# Patient Record
Sex: MALE | Race: WHITE | Employment: OTHER | URBAN - METROPOLITAN AREA
[De-identification: names, ages, dates, MRNs, and addresses within clinical notes are randomized per-mention and may not be internally consistent; named-entity substitution may affect disease eponyms.]

---

## 2017-01-10 ENCOUNTER — HOSPITAL ENCOUNTER (OUTPATIENT)
Dept: INFUSION THERAPY | Age: 67
Discharge: HOME | End: 2017-02-08
Attending: INTERNAL MEDICINE | Admitting: INTERNAL MEDICINE

## 2017-01-10 DIAGNOSIS — D80.1 HYPOGAMMAGLOBULINAEMIA, UNSPECIFIED: ICD-10-CM

## 2017-01-10 RX ORDER — DIPHENHYDRAMINE HYDROCHLORIDE 50 MG/ML
25 INJECTION INTRAMUSCULAR; INTRAVENOUS ONCE
Status: CANCELLED
Start: 2017-01-10 | End: 2017-01-10

## 2017-01-10 RX ORDER — DIPHENHYDRAMINE HYDROCHLORIDE 50 MG/ML
50 INJECTION INTRAMUSCULAR; INTRAVENOUS ONCE
Status: CANCELLED | OUTPATIENT
Start: 2017-01-10 | End: 2017-01-10

## 2017-01-10 RX ORDER — 0.9 % SODIUM CHLORIDE 0.9 %
10 VIAL (ML) INJECTION ONCE
Status: CANCELLED | OUTPATIENT
Start: 2017-01-10 | End: 2017-01-10

## 2017-01-10 RX ORDER — SODIUM CHLORIDE 9 MG/ML
INJECTION, SOLUTION INTRAVENOUS CONTINUOUS
Status: CANCELLED | OUTPATIENT
Start: 2017-01-10

## 2017-01-10 RX ORDER — METHYLPREDNISOLONE SODIUM SUCCINATE 40 MG/ML
40 INJECTION, POWDER, LYOPHILIZED, FOR SOLUTION INTRAMUSCULAR; INTRAVENOUS ONCE
Status: CANCELLED
Start: 2017-01-10 | End: 2017-01-10

## 2017-01-10 RX ORDER — METHYLPREDNISOLONE SODIUM SUCCINATE 125 MG/2ML
125 INJECTION, POWDER, LYOPHILIZED, FOR SOLUTION INTRAMUSCULAR; INTRAVENOUS ONCE
Status: CANCELLED | OUTPATIENT
Start: 2017-01-10 | End: 2017-01-10

## 2017-01-10 RX ORDER — 0.9 % SODIUM CHLORIDE 0.9 %
10 VIAL (ML) INJECTION PRN
Status: CANCELLED | OUTPATIENT
Start: 2017-01-10

## 2017-01-10 RX ORDER — HEPARIN SODIUM (PORCINE) LOCK FLUSH IV SOLN 100 UNIT/ML 100 UNIT/ML
500 SOLUTION INTRAVENOUS PRN
Status: CANCELLED | OUTPATIENT
Start: 2017-01-10

## 2017-01-10 RX ORDER — SODIUM CHLORIDE 9 MG/ML
INJECTION, SOLUTION INTRAVENOUS ONCE
Status: CANCELLED | OUTPATIENT
Start: 2017-01-10 | End: 2017-01-10

## 2017-01-10 RX ORDER — METHYLPREDNISOLONE SODIUM SUCCINATE 40 MG/ML
40 INJECTION, POWDER, LYOPHILIZED, FOR SOLUTION INTRAMUSCULAR; INTRAVENOUS ONCE
Status: DISCONTINUED | OUTPATIENT
Start: 2017-01-10 | End: 2017-01-10

## 2017-01-10 RX ORDER — DIPHENHYDRAMINE HYDROCHLORIDE 50 MG/ML
25 INJECTION INTRAMUSCULAR; INTRAVENOUS ONCE
Status: DISCONTINUED | OUTPATIENT
Start: 2017-01-10 | End: 2017-01-10

## 2017-01-10 RX ORDER — ACETAMINOPHEN 325 MG/1
650 TABLET ORAL ONCE
Status: DISCONTINUED | OUTPATIENT
Start: 2017-01-10 | End: 2017-01-10

## 2017-01-10 RX ORDER — ACETAMINOPHEN 325 MG/1
650 TABLET ORAL ONCE
Status: CANCELLED | OUTPATIENT
Start: 2017-01-10 | End: 2017-01-10

## 2017-01-12 ENCOUNTER — OFFICE VISIT (OUTPATIENT)
Dept: SURGERY | Age: 67
End: 2017-01-12

## 2017-01-12 ENCOUNTER — HOSPITAL ENCOUNTER (OUTPATIENT)
Dept: INFUSION THERAPY | Age: 67
Discharge: HOME | End: 2017-01-12
Attending: INTERNAL MEDICINE | Admitting: INTERNAL MEDICINE

## 2017-01-12 VITALS
SYSTOLIC BLOOD PRESSURE: 119 MMHG | HEIGHT: 72 IN | DIASTOLIC BLOOD PRESSURE: 62 MMHG | HEART RATE: 75 BPM | OXYGEN SATURATION: 97 % | RESPIRATION RATE: 18 BRPM | TEMPERATURE: 98.3 F | WEIGHT: 174 LBS | BODY MASS INDEX: 23.57 KG/M2

## 2017-01-12 VITALS
RESPIRATION RATE: 17 BRPM | SYSTOLIC BLOOD PRESSURE: 120 MMHG | BODY MASS INDEX: 22.43 KG/M2 | DIASTOLIC BLOOD PRESSURE: 70 MMHG | HEIGHT: 72 IN | HEART RATE: 84 BPM | WEIGHT: 165.6 LBS

## 2017-01-12 DIAGNOSIS — R19.09 GROIN MASS: ICD-10-CM

## 2017-01-12 DIAGNOSIS — D80.1 HYPOGAMMAGLOBULINAEMIA, UNSPECIFIED: ICD-10-CM

## 2017-01-12 DIAGNOSIS — C91.10 CLL (CHRONIC LYMPHOCYTIC LEUKEMIA) (HCC): Primary | ICD-10-CM

## 2017-01-12 LAB
ALBUMIN SERPL-MCNC: 4.2 GM/DL (ref 3.4–5)
ALP BLD-CCNC: 87 IU/L (ref 40–129)
ALT SERPL-CCNC: 10 U/L (ref 10–40)
ANION GAP SERPL CALCULATED.3IONS-SCNC: 17 MMOL/L (ref 4–16)
AST SERPL-CCNC: 9 IU/L (ref 15–37)
BILIRUB SERPL-MCNC: 0.5 MG/DL (ref 0–1)
BUN BLDV-MCNC: 12 MG/DL (ref 6–23)
CALCIUM SERPL-MCNC: 8.9 MG/DL (ref 8.3–10.6)
CHLORIDE BLD-SCNC: 99 MMOL/L (ref 99–110)
CO2: 23 MMOL/L (ref 21–32)
CREAT SERPL-MCNC: 0.9 MG/DL (ref 0.9–1.3)
DIFFERENTIAL TYPE: ABNORMAL
GFR AFRICAN AMERICAN: >60 ML/MIN/1.73M2
GFR NON-AFRICAN AMERICAN: >60 ML/MIN/1.73M2
GLUCOSE FASTING: 108 MG/DL (ref 70–99)
HCT VFR BLD CALC: 26.1 % (ref 42–52)
HEMOGLOBIN: 8.2 GM/DL (ref 13.5–18)
LYMPHOCYTES ABSOLUTE: 52.7 K/CU MM
LYMPHOCYTES RELATIVE PERCENT: 93 % (ref 24–44)
MACROCYTES: ABNORMAL
MCH RBC QN AUTO: 36.1 PG (ref 27–31)
MCHC RBC AUTO-ENTMCNC: 31.4 % (ref 32–36)
MCV RBC AUTO: 115 FL (ref 78–100)
MONOCYTES ABSOLUTE: 1.1 K/CU MM
MONOCYTES RELATIVE PERCENT: 2 % (ref 0–4)
PDW BLD-RTO: 18.8 % (ref 11.7–14.9)
PLATELET # BLD: 111 K/CU MM (ref 140–440)
PLT MORPHOLOGY: ABNORMAL
PMV BLD AUTO: 12.2 FL (ref 7.5–11.1)
POLYCHROMASIA: ABNORMAL
POTASSIUM SERPL-SCNC: 4.2 MMOL/L (ref 3.5–5.1)
RBC # BLD: 2.27 M/CU MM (ref 4.6–6.2)
SEGMENTED NEUTROPHILS ABSOLUTE COUNT: 2.8 K/CU MM
SEGMENTED NEUTROPHILS RELATIVE PERCENT: 5 % (ref 36–66)
SODIUM BLD-SCNC: 139 MMOL/L (ref 135–145)
TEAR DROP CELLS: ABNORMAL
TOTAL PROTEIN: 6.3 GM/DL (ref 6.4–8.2)
TOXIC GRANULATION: PRESENT
WBC # BLD: 56.6 K/CU MM (ref 4–10.5)

## 2017-01-12 PROCEDURE — 99203 OFFICE O/P NEW LOW 30 MIN: CPT | Performed by: SURGERY

## 2017-01-12 RX ORDER — 0.9 % SODIUM CHLORIDE 0.9 %
10 VIAL (ML) INJECTION PRN
Status: DISCONTINUED | OUTPATIENT
Start: 2017-01-12 | End: 2017-01-13 | Stop reason: HOSPADM

## 2017-01-12 RX ORDER — SODIUM CHLORIDE 9 MG/ML
INJECTION, SOLUTION INTRAVENOUS ONCE
Status: CANCELLED | OUTPATIENT
Start: 2017-01-12 | End: 2017-01-12

## 2017-01-12 RX ORDER — DIPHENHYDRAMINE HCL 25 MG
TABLET ORAL
Status: COMPLETED
Start: 2017-01-12 | End: 2017-01-12

## 2017-01-12 RX ORDER — HEPARIN SODIUM (PORCINE) LOCK FLUSH IV SOLN 100 UNIT/ML 100 UNIT/ML
500 SOLUTION INTRAVENOUS PRN
Status: CANCELLED | OUTPATIENT
Start: 2017-01-12

## 2017-01-12 RX ORDER — SODIUM CHLORIDE 9 MG/ML
INJECTION, SOLUTION INTRAVENOUS CONTINUOUS
Status: CANCELLED | OUTPATIENT
Start: 2017-01-12

## 2017-01-12 RX ORDER — DIPHENHYDRAMINE HCL 25 MG
25 TABLET ORAL ONCE
Status: COMPLETED | OUTPATIENT
Start: 2017-01-12 | End: 2017-01-12

## 2017-01-12 RX ORDER — DIPHENHYDRAMINE HYDROCHLORIDE 50 MG/ML
50 INJECTION INTRAMUSCULAR; INTRAVENOUS ONCE
Status: CANCELLED | OUTPATIENT
Start: 2017-01-12 | End: 2017-01-12

## 2017-01-12 RX ORDER — ACETAMINOPHEN 325 MG/1
650 TABLET ORAL ONCE
Status: CANCELLED | OUTPATIENT
Start: 2017-01-12 | End: 2017-01-12

## 2017-01-12 RX ORDER — METHYLPREDNISOLONE SODIUM SUCCINATE 40 MG/ML
40 INJECTION, POWDER, LYOPHILIZED, FOR SOLUTION INTRAMUSCULAR; INTRAVENOUS ONCE
Status: CANCELLED
Start: 2017-01-12 | End: 2017-01-12

## 2017-01-12 RX ORDER — METHYLPREDNISOLONE SODIUM SUCCINATE 40 MG/ML
40 INJECTION, POWDER, LYOPHILIZED, FOR SOLUTION INTRAMUSCULAR; INTRAVENOUS ONCE
Status: COMPLETED | OUTPATIENT
Start: 2017-01-12 | End: 2017-01-12

## 2017-01-12 RX ORDER — ACETAMINOPHEN 325 MG/1
650 TABLET ORAL ONCE
Status: COMPLETED | OUTPATIENT
Start: 2017-01-12 | End: 2017-01-12

## 2017-01-12 RX ORDER — IBRUTINIB 140 MG/1
140 TABLET, FILM COATED ORAL DAILY
COMMUNITY
Start: 2016-12-23 | End: 2020-08-03 | Stop reason: SDUPTHER

## 2017-01-12 RX ORDER — HEPARIN SODIUM (PORCINE) LOCK FLUSH IV SOLN 100 UNIT/ML 100 UNIT/ML
500 SOLUTION INTRAVENOUS PRN
Status: DISCONTINUED | OUTPATIENT
Start: 2017-01-12 | End: 2017-01-13 | Stop reason: HOSPADM

## 2017-01-12 RX ORDER — DIPHENHYDRAMINE HYDROCHLORIDE 50 MG/ML
25 INJECTION INTRAMUSCULAR; INTRAVENOUS ONCE
Status: DISCONTINUED | OUTPATIENT
Start: 2017-01-12 | End: 2017-01-12

## 2017-01-12 RX ORDER — DIPHENHYDRAMINE HYDROCHLORIDE 50 MG/ML
25 INJECTION INTRAMUSCULAR; INTRAVENOUS ONCE
Status: CANCELLED
Start: 2017-01-12 | End: 2017-01-12

## 2017-01-12 RX ORDER — METHYLPREDNISOLONE SODIUM SUCCINATE 125 MG/2ML
125 INJECTION, POWDER, LYOPHILIZED, FOR SOLUTION INTRAMUSCULAR; INTRAVENOUS ONCE
Status: CANCELLED | OUTPATIENT
Start: 2017-01-12 | End: 2017-01-12

## 2017-01-12 RX ORDER — 0.9 % SODIUM CHLORIDE 0.9 %
10 VIAL (ML) INJECTION ONCE
Status: CANCELLED | OUTPATIENT
Start: 2017-01-12 | End: 2017-01-12

## 2017-01-12 RX ORDER — 0.9 % SODIUM CHLORIDE 0.9 %
10 VIAL (ML) INJECTION PRN
Status: CANCELLED | OUTPATIENT
Start: 2017-01-12

## 2017-01-12 RX ADMIN — Medication 25 MG: at 08:23

## 2017-01-12 RX ADMIN — HEPARIN SODIUM (PORCINE) LOCK FLUSH IV SOLN 100 UNIT/ML 500 UNITS: 100 SOLUTION at 12:21

## 2017-01-12 RX ADMIN — Medication 10 ML: at 12:17

## 2017-01-12 RX ADMIN — ACETAMINOPHEN 650 MG: 325 TABLET ORAL at 08:21

## 2017-01-12 RX ADMIN — Medication 10 ML: at 08:10

## 2017-01-12 RX ADMIN — METHYLPREDNISOLONE SODIUM SUCCINATE 40 MG: 40 INJECTION, POWDER, LYOPHILIZED, FOR SOLUTION INTRAMUSCULAR; INTRAVENOUS at 08:25

## 2017-01-12 ASSESSMENT — ENCOUNTER SYMPTOMS
HEARTBURN: 0
HEMOPTYSIS: 0
NAUSEA: 0
COUGH: 1

## 2017-01-16 ENCOUNTER — HOSPITAL ENCOUNTER (OUTPATIENT)
Dept: PREADMISSION TESTING | Age: 67
Discharge: HOME | End: 2017-01-16
Attending: SURGERY | Admitting: SURGERY

## 2017-01-16 VITALS
WEIGHT: 164 LBS | TEMPERATURE: 98 F | HEART RATE: 83 BPM | OXYGEN SATURATION: 97 % | BODY MASS INDEX: 22.21 KG/M2 | DIASTOLIC BLOOD PRESSURE: 75 MMHG | SYSTOLIC BLOOD PRESSURE: 137 MMHG | HEIGHT: 72 IN | RESPIRATION RATE: 18 BRPM

## 2017-01-16 LAB
HCT VFR BLD CALC: 28.5 % (ref 42–52)
HEMOGLOBIN: 8.7 GM/DL (ref 13.5–18)
MCH RBC QN AUTO: 35.2 PG (ref 27–31)
MCHC RBC AUTO-ENTMCNC: 30.5 % (ref 32–36)
MCV RBC AUTO: 115.4 FL (ref 78–100)
PDW BLD-RTO: 19.4 % (ref 11.7–14.9)
PLATELET # BLD: 137 K/CU MM (ref 140–440)
PMV BLD AUTO: 11.8 FL (ref 7.5–11.1)
RBC # BLD: 2.47 M/CU MM (ref 4.6–6.2)
WBC # BLD: 82 K/CU MM (ref 4–10.5)

## 2017-01-16 RX ORDER — OMEPRAZOLE 20 MG/1
20 CAPSULE, DELAYED RELEASE ORAL DAILY PRN
COMMUNITY

## 2017-01-16 ASSESSMENT — PAIN DESCRIPTION - LOCATION: LOCATION: GROIN

## 2017-01-16 ASSESSMENT — PAIN DESCRIPTION - ORIENTATION: ORIENTATION: LEFT

## 2017-01-16 ASSESSMENT — PAIN SCALES - GENERAL: PAINLEVEL_OUTOF10: 3

## 2017-01-24 ENCOUNTER — HOSPITAL ENCOUNTER (OUTPATIENT)
Dept: OTHER | Age: 67
Discharge: HOME | End: 2017-01-24
Attending: INTERNAL MEDICINE | Admitting: INTERNAL MEDICINE

## 2017-01-24 LAB
ALBUMIN SERPL-MCNC: 4.4 GM/DL (ref 3.4–5)
ALP BLD-CCNC: 88 IU/L (ref 40–129)
ALT SERPL-CCNC: 9 U/L (ref 10–40)
ANION GAP SERPL CALCULATED.3IONS-SCNC: 17 MMOL/L (ref 4–16)
AST SERPL-CCNC: 6 IU/L (ref 15–37)
BILIRUB SERPL-MCNC: 0.5 MG/DL (ref 0–1)
BUN BLDV-MCNC: 10 MG/DL (ref 6–23)
CALCIUM SERPL-MCNC: 9.1 MG/DL (ref 8.3–10.6)
CHLORIDE BLD-SCNC: 97 MMOL/L (ref 99–110)
CO2: 24 MMOL/L (ref 21–32)
CREAT SERPL-MCNC: 0.9 MG/DL (ref 0.9–1.3)
ERYTHROCYTE SEDIMENTATION RATE: 72 MM/HR (ref 0–20)
FERRITIN: 193 NG/ML (ref 30–400)
FOLATE: 4.5 NG/ML (ref 3.1–17.5)
GFR AFRICAN AMERICAN: >60 ML/MIN/1.73M2
GFR NON-AFRICAN AMERICAN: >60 ML/MIN/1.73M2
GLUCOSE FASTING: 92 MG/DL (ref 70–99)
IRON: 65 UG/DL (ref 59–158)
LACTATE DEHYDROGENASE: 118 IU/L (ref 120–246)
PCT TRANSFERRIN: 24 % (ref 10–44)
POTASSIUM SERPL-SCNC: 3.6 MMOL/L (ref 3.5–5.1)
SODIUM BLD-SCNC: 138 MMOL/L (ref 135–145)
TOTAL IRON BINDING CAPACITY: 276 UG/DL (ref 250–450)
TOTAL PROTEIN: 6.2 GM/DL (ref 6.4–8.2)
UNSATURATED IRON BINDING CAPACITY: 211 UG/DL (ref 110–370)
URIC ACID: 5.4 MG/DL (ref 3.5–7.2)
VITAMIN B-12: 313.8 PG/ML (ref 211–911)

## 2017-01-25 LAB
ALBUMIN ELP: 3.5 GM/DL (ref 3.2–5.6)
ALPHA-1-GLOBULIN: 0.2 GM/DL (ref 0.1–0.4)
ALPHA-2-GLOBULIN: 1 GM/DL (ref 0.4–1.2)
BETA GLOBULIN: 0.9 GM/DL (ref 0.5–1.3)
GAMMA GLOBULIN: 0.6 GM/DL (ref 0.5–1.6)
PATHOLOGIST: ABNORMAL
TOTAL PROTEIN: 6.2 GM/DL (ref 6.4–8.2)

## 2017-01-26 ENCOUNTER — OFFICE VISIT (OUTPATIENT)
Dept: SURGERY | Age: 67
End: 2017-01-26

## 2017-01-26 VITALS
HEIGHT: 72 IN | BODY MASS INDEX: 22.21 KG/M2 | DIASTOLIC BLOOD PRESSURE: 60 MMHG | HEART RATE: 88 BPM | RESPIRATION RATE: 16 BRPM | WEIGHT: 164 LBS | SYSTOLIC BLOOD PRESSURE: 118 MMHG

## 2017-01-26 DIAGNOSIS — R19.09 GROIN MASS: Primary | ICD-10-CM

## 2017-01-26 DIAGNOSIS — Z09 POSTOP CHECK: ICD-10-CM

## 2017-01-26 PROCEDURE — 99024 POSTOP FOLLOW-UP VISIT: CPT | Performed by: SURGERY

## 2017-02-03 ENCOUNTER — HOSPITAL ENCOUNTER (OUTPATIENT)
Dept: OTHER | Age: 67
Discharge: HOME | End: 2017-02-03
Attending: INTERNAL MEDICINE | Admitting: INTERNAL MEDICINE

## 2017-02-03 LAB
ALBUMIN SERPL-MCNC: 4.3 GM/DL (ref 3.4–5)
ALP BLD-CCNC: 92 IU/L (ref 40–129)
ALT SERPL-CCNC: 8 U/L (ref 10–40)
ANION GAP SERPL CALCULATED.3IONS-SCNC: 16 MMOL/L (ref 4–16)
AST SERPL-CCNC: 7 IU/L (ref 15–37)
BILIRUB SERPL-MCNC: 0.6 MG/DL (ref 0–1)
BUN BLDV-MCNC: 10 MG/DL (ref 6–23)
CALCIUM SERPL-MCNC: 9.3 MG/DL (ref 8.3–10.6)
CHLORIDE BLD-SCNC: 97 MMOL/L (ref 99–110)
CO2: 24 MMOL/L (ref 21–32)
CREAT SERPL-MCNC: 0.8 MG/DL (ref 0.9–1.3)
GFR AFRICAN AMERICAN: >60 ML/MIN/1.73M2
GFR NON-AFRICAN AMERICAN: >60 ML/MIN/1.73M2
GLUCOSE FASTING: 242 MG/DL (ref 70–99)
LACTATE DEHYDROGENASE: 132 IU/L (ref 120–246)
POTASSIUM SERPL-SCNC: 4 MMOL/L (ref 3.5–5.1)
SODIUM BLD-SCNC: 137 MMOL/L (ref 135–145)
TOTAL PROTEIN: 6.3 GM/DL (ref 6.4–8.2)

## 2017-02-06 ENCOUNTER — HOSPITAL ENCOUNTER (OUTPATIENT)
Dept: INFUSION THERAPY | Age: 67
Discharge: HOME | End: 2017-02-06
Attending: INTERNAL MEDICINE | Admitting: INTERNAL MEDICINE

## 2017-02-06 VITALS
TEMPERATURE: 98.7 F | HEART RATE: 75 BPM | SYSTOLIC BLOOD PRESSURE: 133 MMHG | DIASTOLIC BLOOD PRESSURE: 75 MMHG | RESPIRATION RATE: 16 BRPM

## 2017-02-06 DIAGNOSIS — D80.1 HYPOGAMMAGLOBULINAEMIA, UNSPECIFIED: ICD-10-CM

## 2017-02-06 RX ORDER — METHYLPREDNISOLONE SODIUM SUCCINATE 40 MG/ML
40 INJECTION, POWDER, LYOPHILIZED, FOR SOLUTION INTRAMUSCULAR; INTRAVENOUS ONCE
Status: COMPLETED | OUTPATIENT
Start: 2017-02-06 | End: 2017-02-06

## 2017-02-06 RX ORDER — SODIUM CHLORIDE 9 MG/ML
INJECTION, SOLUTION INTRAVENOUS ONCE
Status: CANCELLED | OUTPATIENT
Start: 2017-02-06 | End: 2017-02-06

## 2017-02-06 RX ORDER — HEPARIN SODIUM (PORCINE) LOCK FLUSH IV SOLN 100 UNIT/ML 100 UNIT/ML
500 SOLUTION INTRAVENOUS PRN
Status: DISCONTINUED | OUTPATIENT
Start: 2017-02-06 | End: 2017-02-07 | Stop reason: HOSPADM

## 2017-02-06 RX ORDER — 0.9 % SODIUM CHLORIDE 0.9 %
10 VIAL (ML) INJECTION PRN
Status: CANCELLED | OUTPATIENT
Start: 2017-02-06

## 2017-02-06 RX ORDER — METHYLPREDNISOLONE SODIUM SUCCINATE 125 MG/2ML
125 INJECTION, POWDER, LYOPHILIZED, FOR SOLUTION INTRAMUSCULAR; INTRAVENOUS ONCE
Status: CANCELLED | OUTPATIENT
Start: 2017-02-06 | End: 2017-02-06

## 2017-02-06 RX ORDER — ACETAMINOPHEN 325 MG/1
650 TABLET ORAL ONCE
Status: CANCELLED | OUTPATIENT
Start: 2017-02-06 | End: 2017-02-06

## 2017-02-06 RX ORDER — 0.9 % SODIUM CHLORIDE 0.9 %
10 VIAL (ML) INJECTION ONCE
Status: CANCELLED | OUTPATIENT
Start: 2017-02-06 | End: 2017-02-06

## 2017-02-06 RX ORDER — DIPHENHYDRAMINE HYDROCHLORIDE 50 MG/ML
25 INJECTION INTRAMUSCULAR; INTRAVENOUS ONCE
Status: COMPLETED | OUTPATIENT
Start: 2017-02-06 | End: 2017-02-06

## 2017-02-06 RX ORDER — ACETAMINOPHEN 325 MG/1
650 TABLET ORAL ONCE
Status: COMPLETED | OUTPATIENT
Start: 2017-02-06 | End: 2017-02-06

## 2017-02-06 RX ORDER — METHYLPREDNISOLONE SODIUM SUCCINATE 40 MG/ML
40 INJECTION, POWDER, LYOPHILIZED, FOR SOLUTION INTRAMUSCULAR; INTRAVENOUS ONCE
Status: CANCELLED
Start: 2017-02-06 | End: 2017-02-06

## 2017-02-06 RX ORDER — DIPHENHYDRAMINE HYDROCHLORIDE 50 MG/ML
25 INJECTION INTRAMUSCULAR; INTRAVENOUS ONCE
Status: CANCELLED
Start: 2017-02-06 | End: 2017-02-06

## 2017-02-06 RX ORDER — 0.9 % SODIUM CHLORIDE 0.9 %
10 VIAL (ML) INJECTION PRN
Status: DISCONTINUED | OUTPATIENT
Start: 2017-02-06 | End: 2017-02-07 | Stop reason: HOSPADM

## 2017-02-06 RX ORDER — SODIUM CHLORIDE 9 MG/ML
INJECTION, SOLUTION INTRAVENOUS CONTINUOUS
Status: CANCELLED | OUTPATIENT
Start: 2017-02-06

## 2017-02-06 RX ORDER — HEPARIN SODIUM (PORCINE) LOCK FLUSH IV SOLN 100 UNIT/ML 100 UNIT/ML
500 SOLUTION INTRAVENOUS PRN
Status: CANCELLED | OUTPATIENT
Start: 2017-02-06

## 2017-02-06 RX ORDER — DIPHENHYDRAMINE HYDROCHLORIDE 50 MG/ML
50 INJECTION INTRAMUSCULAR; INTRAVENOUS ONCE
Status: CANCELLED | OUTPATIENT
Start: 2017-02-06 | End: 2017-02-06

## 2017-02-06 RX ADMIN — Medication 10 ML: at 09:00

## 2017-02-06 RX ADMIN — METHYLPREDNISOLONE SODIUM SUCCINATE 40 MG: 40 INJECTION, POWDER, LYOPHILIZED, FOR SOLUTION INTRAMUSCULAR; INTRAVENOUS at 09:07

## 2017-02-06 RX ADMIN — ACETAMINOPHEN 650 MG: 325 TABLET ORAL at 09:07

## 2017-02-06 RX ADMIN — Medication 10 ML: at 08:05

## 2017-02-06 RX ADMIN — DIPHENHYDRAMINE HYDROCHLORIDE 25 MG: 50 INJECTION INTRAMUSCULAR; INTRAVENOUS at 09:07

## 2017-02-06 ASSESSMENT — PAIN SCALES - GENERAL: PAINLEVEL_OUTOF10: 0

## 2017-02-17 ENCOUNTER — HOSPITAL ENCOUNTER (OUTPATIENT)
Dept: OTHER | Age: 67
Discharge: HOME | End: 2017-02-17
Attending: INTERNAL MEDICINE | Admitting: INTERNAL MEDICINE

## 2017-02-17 LAB
ALBUMIN SERPL-MCNC: 4.6 GM/DL (ref 3.4–5)
ALP BLD-CCNC: 92 IU/L (ref 40–128)
ALT SERPL-CCNC: 7 U/L (ref 10–40)
ANION GAP SERPL CALCULATED.3IONS-SCNC: 16 MMOL/L (ref 4–16)
AST SERPL-CCNC: 7 IU/L (ref 15–37)
BILIRUB SERPL-MCNC: 0.9 MG/DL (ref 0–1)
BUN BLDV-MCNC: 17 MG/DL (ref 6–23)
CALCIUM SERPL-MCNC: 9.3 MG/DL (ref 8.3–10.6)
CHLORIDE BLD-SCNC: 94 MMOL/L (ref 99–110)
CO2: 22 MMOL/L (ref 21–32)
CREAT SERPL-MCNC: 0.8 MG/DL (ref 0.9–1.3)
GFR AFRICAN AMERICAN: >60 ML/MIN/1.73M2
GFR NON-AFRICAN AMERICAN: >60 ML/MIN/1.73M2
GLUCOSE FASTING: 476 MG/DL (ref 70–99)
IGA: <10 MG/DL (ref 69–382)
IGG,SERUM: 603 MG/DL (ref 723–1685)
IGM,SERUM: <4 MG/DL (ref 62–277)
LACTATE DEHYDROGENASE: 136 IU/L (ref 120–246)
POTASSIUM SERPL-SCNC: 4.4 MMOL/L (ref 3.5–5.1)
SODIUM BLD-SCNC: 132 MMOL/L (ref 135–145)
TOTAL PROTEIN: 6.6 GM/DL (ref 6.4–8.2)
URIC ACID: 4.2 MG/DL (ref 3.5–7.2)

## 2017-02-21 LAB
ALBUMIN ELP: 3.7 GM/DL (ref 3.2–5.6)
ALPHA-1-GLOBULIN: 0.3 GM/DL (ref 0.1–0.4)
ALPHA-2-GLOBULIN: 1 GM/DL (ref 0.4–1.2)
BETA GLOBULIN: 0.9 GM/DL (ref 0.5–1.3)
GAMMA GLOBULIN: 0.7 GM/DL (ref 0.5–1.6)
TOTAL PROTEIN: 6.6 GM/DL (ref 6.4–8.2)

## 2017-03-06 ENCOUNTER — HOSPITAL ENCOUNTER (OUTPATIENT)
Dept: INFUSION THERAPY | Age: 67
Discharge: HOME | End: 2017-04-04
Attending: INTERNAL MEDICINE | Admitting: INTERNAL MEDICINE

## 2017-03-06 DIAGNOSIS — D80.1 HYPOGAMMAGLOBULINAEMIA, UNSPECIFIED: ICD-10-CM

## 2017-03-06 RX ORDER — DIPHENHYDRAMINE HYDROCHLORIDE 50 MG/ML
25 INJECTION INTRAMUSCULAR; INTRAVENOUS ONCE
Status: CANCELLED
Start: 2017-03-06 | End: 2017-03-06

## 2017-03-06 RX ORDER — HEPARIN SODIUM (PORCINE) LOCK FLUSH IV SOLN 100 UNIT/ML 100 UNIT/ML
500 SOLUTION INTRAVENOUS PRN
Status: CANCELLED | OUTPATIENT
Start: 2017-03-06

## 2017-03-06 RX ORDER — ACETAMINOPHEN 325 MG/1
650 TABLET ORAL ONCE
Status: CANCELLED | OUTPATIENT
Start: 2017-03-06 | End: 2017-03-06

## 2017-03-06 RX ORDER — ACETAMINOPHEN 325 MG/1
650 TABLET ORAL ONCE
Status: DISCONTINUED | OUTPATIENT
Start: 2017-03-06 | End: 2017-03-06

## 2017-03-06 RX ORDER — SODIUM CHLORIDE 9 MG/ML
INJECTION, SOLUTION INTRAVENOUS CONTINUOUS
Status: CANCELLED | OUTPATIENT
Start: 2017-03-06

## 2017-03-06 RX ORDER — METHYLPREDNISOLONE SODIUM SUCCINATE 40 MG/ML
40 INJECTION, POWDER, LYOPHILIZED, FOR SOLUTION INTRAMUSCULAR; INTRAVENOUS ONCE
Status: CANCELLED
Start: 2017-03-06 | End: 2017-03-06

## 2017-03-06 RX ORDER — DIPHENHYDRAMINE HYDROCHLORIDE 50 MG/ML
25 INJECTION INTRAMUSCULAR; INTRAVENOUS ONCE
Status: DISCONTINUED | OUTPATIENT
Start: 2017-03-06 | End: 2017-03-06

## 2017-03-06 RX ORDER — HEPARIN SODIUM (PORCINE) LOCK FLUSH IV SOLN 100 UNIT/ML 100 UNIT/ML
500 SOLUTION INTRAVENOUS PRN
Status: DISCONTINUED | OUTPATIENT
Start: 2017-03-06 | End: 2017-03-06

## 2017-03-06 RX ORDER — METHYLPREDNISOLONE SODIUM SUCCINATE 40 MG/ML
40 INJECTION, POWDER, LYOPHILIZED, FOR SOLUTION INTRAMUSCULAR; INTRAVENOUS ONCE
Status: DISCONTINUED | OUTPATIENT
Start: 2017-03-06 | End: 2017-03-06

## 2017-03-06 RX ORDER — SODIUM CHLORIDE 9 MG/ML
INJECTION, SOLUTION INTRAVENOUS ONCE
Status: CANCELLED | OUTPATIENT
Start: 2017-03-06 | End: 2017-03-06

## 2017-03-06 RX ORDER — 0.9 % SODIUM CHLORIDE 0.9 %
10 VIAL (ML) INJECTION PRN
Status: CANCELLED | OUTPATIENT
Start: 2017-03-06

## 2017-03-06 RX ORDER — 0.9 % SODIUM CHLORIDE 0.9 %
10 VIAL (ML) INJECTION ONCE
Status: CANCELLED | OUTPATIENT
Start: 2017-03-06 | End: 2017-03-06

## 2017-03-06 RX ORDER — METHYLPREDNISOLONE SODIUM SUCCINATE 125 MG/2ML
125 INJECTION, POWDER, LYOPHILIZED, FOR SOLUTION INTRAMUSCULAR; INTRAVENOUS ONCE
Status: CANCELLED | OUTPATIENT
Start: 2017-03-06 | End: 2017-03-06

## 2017-03-06 RX ORDER — DIPHENHYDRAMINE HYDROCHLORIDE 50 MG/ML
50 INJECTION INTRAMUSCULAR; INTRAVENOUS ONCE
Status: CANCELLED | OUTPATIENT
Start: 2017-03-06 | End: 2017-03-06

## 2017-03-06 RX ORDER — 0.9 % SODIUM CHLORIDE 0.9 %
10 VIAL (ML) INJECTION PRN
Status: DISCONTINUED | OUTPATIENT
Start: 2017-03-06 | End: 2017-03-06

## 2017-03-07 ENCOUNTER — HOSPITAL ENCOUNTER (OUTPATIENT)
Dept: INFUSION THERAPY | Age: 67
Discharge: HOME | End: 2017-03-07
Attending: INTERNAL MEDICINE | Admitting: INTERNAL MEDICINE

## 2017-03-07 VITALS
BODY MASS INDEX: 21.94 KG/M2 | TEMPERATURE: 99.3 F | DIASTOLIC BLOOD PRESSURE: 71 MMHG | SYSTOLIC BLOOD PRESSURE: 119 MMHG | HEIGHT: 72 IN | HEART RATE: 76 BPM | RESPIRATION RATE: 16 BRPM | OXYGEN SATURATION: 99 % | WEIGHT: 162 LBS

## 2017-03-07 DIAGNOSIS — D80.1 HYPOGAMMAGLOBULINAEMIA, UNSPECIFIED: ICD-10-CM

## 2017-03-07 LAB
ALBUMIN SERPL-MCNC: 4.2 GM/DL (ref 3.4–5)
ALP BLD-CCNC: 102 IU/L (ref 40–129)
ALT SERPL-CCNC: 9 U/L (ref 10–40)
ANION GAP SERPL CALCULATED.3IONS-SCNC: 12 MMOL/L (ref 4–16)
ANISOCYTOSIS: ABNORMAL
AST SERPL-CCNC: 8 IU/L (ref 15–37)
ATYPICAL LYMPHOCYTE ABSOLUTE COUNT: ABNORMAL
BILIRUB SERPL-MCNC: 0.6 MG/DL (ref 0–1)
BUN BLDV-MCNC: 14 MG/DL (ref 6–23)
CALCIUM SERPL-MCNC: 9.6 MG/DL (ref 8.3–10.6)
CHLORIDE BLD-SCNC: 99 MMOL/L (ref 99–110)
CO2: 27 MMOL/L (ref 21–32)
CREAT SERPL-MCNC: 0.7 MG/DL (ref 0.9–1.3)
DIFFERENTIAL TYPE: ABNORMAL
GFR AFRICAN AMERICAN: >60 ML/MIN/1.73M2
GFR NON-AFRICAN AMERICAN: >60 ML/MIN/1.73M2
GLUCOSE BLD-MCNC: 193 MG/DL (ref 70–140)
HCT VFR BLD CALC: 31.6 % (ref 42–52)
HEMOGLOBIN: 10 GM/DL (ref 13.5–18)
LYMPHOCYTES ABSOLUTE: 61.5 K/CU MM
LYMPHOCYTES RELATIVE PERCENT: 97 % (ref 24–44)
MACROCYTES: ABNORMAL
MCH RBC QN AUTO: 36 PG (ref 27–31)
MCHC RBC AUTO-ENTMCNC: 31.6 % (ref 32–36)
MCV RBC AUTO: 113.7 FL (ref 78–100)
PDW BLD-RTO: 17.2 % (ref 11.7–14.9)
PLATELET # BLD: 112 K/CU MM (ref 140–440)
PMV BLD AUTO: 12 FL (ref 7.5–11.1)
POLYCHROMASIA: ABNORMAL
POTASSIUM SERPL-SCNC: 4.2 MMOL/L (ref 3.5–5.1)
RBC # BLD: 2.78 M/CU MM (ref 4.6–6.2)
SEGMENTED NEUTROPHILS ABSOLUTE COUNT: 1.9 K/CU MM
SEGMENTED NEUTROPHILS RELATIVE PERCENT: 3 % (ref 36–66)
SMUDGE CELLS: PRESENT
SODIUM BLD-SCNC: 138 MMOL/L (ref 135–145)
TEAR DROP CELLS: ABNORMAL
TOTAL PROTEIN: 6.6 GM/DL (ref 6.4–8.2)
WBC # BLD: 63.4 K/CU MM (ref 4–10.5)

## 2017-03-07 RX ORDER — 0.9 % SODIUM CHLORIDE 0.9 %
10 VIAL (ML) INJECTION ONCE
Status: CANCELLED | OUTPATIENT
Start: 2017-03-07 | End: 2017-03-07

## 2017-03-07 RX ORDER — DIPHENHYDRAMINE HYDROCHLORIDE 50 MG/ML
25 INJECTION INTRAMUSCULAR; INTRAVENOUS ONCE
Status: COMPLETED | OUTPATIENT
Start: 2017-03-07 | End: 2017-03-07

## 2017-03-07 RX ORDER — METHYLPREDNISOLONE SODIUM SUCCINATE 40 MG/ML
40 INJECTION, POWDER, LYOPHILIZED, FOR SOLUTION INTRAMUSCULAR; INTRAVENOUS ONCE
Status: COMPLETED | OUTPATIENT
Start: 2017-03-07 | End: 2017-03-07

## 2017-03-07 RX ORDER — METHYLPREDNISOLONE SODIUM SUCCINATE 40 MG/ML
40 INJECTION, POWDER, LYOPHILIZED, FOR SOLUTION INTRAMUSCULAR; INTRAVENOUS ONCE
Status: CANCELLED
Start: 2017-03-07 | End: 2017-03-07

## 2017-03-07 RX ORDER — 0.9 % SODIUM CHLORIDE 0.9 %
10 VIAL (ML) INJECTION PRN
Status: DISCONTINUED | OUTPATIENT
Start: 2017-03-07 | End: 2017-03-08 | Stop reason: HOSPADM

## 2017-03-07 RX ORDER — ACETAMINOPHEN 325 MG/1
650 TABLET ORAL ONCE
Status: CANCELLED | OUTPATIENT
Start: 2017-03-07 | End: 2017-03-07

## 2017-03-07 RX ORDER — 0.9 % SODIUM CHLORIDE 0.9 %
10 VIAL (ML) INJECTION PRN
Status: CANCELLED | OUTPATIENT
Start: 2017-03-07

## 2017-03-07 RX ORDER — SODIUM CHLORIDE 9 MG/ML
INJECTION, SOLUTION INTRAVENOUS CONTINUOUS
Status: CANCELLED | OUTPATIENT
Start: 2017-03-07

## 2017-03-07 RX ORDER — HEPARIN SODIUM (PORCINE) LOCK FLUSH IV SOLN 100 UNIT/ML 100 UNIT/ML
500 SOLUTION INTRAVENOUS ONCE
Status: COMPLETED | OUTPATIENT
Start: 2017-03-07 | End: 2017-03-07

## 2017-03-07 RX ORDER — SODIUM CHLORIDE 9 MG/ML
INJECTION, SOLUTION INTRAVENOUS ONCE
Status: CANCELLED | OUTPATIENT
Start: 2017-03-07 | End: 2017-03-07

## 2017-03-07 RX ORDER — DIPHENHYDRAMINE HYDROCHLORIDE 50 MG/ML
25 INJECTION INTRAMUSCULAR; INTRAVENOUS ONCE
Status: CANCELLED
Start: 2017-03-07 | End: 2017-03-07

## 2017-03-07 RX ORDER — DIPHENHYDRAMINE HYDROCHLORIDE 50 MG/ML
50 INJECTION INTRAMUSCULAR; INTRAVENOUS ONCE
Status: CANCELLED | OUTPATIENT
Start: 2017-03-07 | End: 2017-03-07

## 2017-03-07 RX ORDER — METHYLPREDNISOLONE SODIUM SUCCINATE 125 MG/2ML
125 INJECTION, POWDER, LYOPHILIZED, FOR SOLUTION INTRAMUSCULAR; INTRAVENOUS ONCE
Status: CANCELLED | OUTPATIENT
Start: 2017-03-07 | End: 2017-03-07

## 2017-03-07 RX ORDER — HEPARIN SODIUM (PORCINE) LOCK FLUSH IV SOLN 100 UNIT/ML 100 UNIT/ML
500 SOLUTION INTRAVENOUS PRN
Status: CANCELLED | OUTPATIENT
Start: 2017-03-07

## 2017-03-07 RX ORDER — ACETAMINOPHEN 325 MG/1
650 TABLET ORAL ONCE
Status: COMPLETED | OUTPATIENT
Start: 2017-03-07 | End: 2017-03-07

## 2017-03-07 RX ADMIN — Medication 10 ML: at 08:35

## 2017-03-07 RX ADMIN — Medication 10 ML: at 11:50

## 2017-03-07 RX ADMIN — Medication 10 ML: at 08:30

## 2017-03-07 RX ADMIN — Medication 10 ML: at 08:38

## 2017-03-07 RX ADMIN — DIPHENHYDRAMINE HYDROCHLORIDE 25 MG: 50 INJECTION INTRAMUSCULAR; INTRAVENOUS at 08:38

## 2017-03-07 RX ADMIN — HEPARIN SODIUM (PORCINE) LOCK FLUSH IV SOLN 100 UNIT/ML 500 UNITS: 100 SOLUTION at 11:50

## 2017-03-07 RX ADMIN — ACETAMINOPHEN 650 MG: 325 TABLET ORAL at 08:35

## 2017-03-07 RX ADMIN — METHYLPREDNISOLONE SODIUM SUCCINATE 40 MG: 40 INJECTION, POWDER, LYOPHILIZED, FOR SOLUTION INTRAMUSCULAR; INTRAVENOUS at 08:35

## 2017-03-07 ASSESSMENT — PAIN SCALES - GENERAL: PAINLEVEL_OUTOF10: 0

## 2017-03-10 ENCOUNTER — HOSPITAL ENCOUNTER (OUTPATIENT)
Dept: OTHER | Age: 67
Discharge: HOME | End: 2017-03-10
Attending: INTERNAL MEDICINE | Admitting: INTERNAL MEDICINE

## 2017-03-10 LAB
ANISOCYTOSIS: ABNORMAL
BANDED NEUTROPHILS ABSOLUTE COUNT: 5.98 K/CU MM
BANDED NEUTROPHILS RELATIVE PERCENT: 8 % (ref 5–11)
DIFFERENTIAL TYPE: ABNORMAL
HCT VFR BLD CALC: 33.8 % (ref 42–52)
HEMOGLOBIN: 10.7 GM/DL (ref 13.5–18)
LYMPHOCYTES ABSOLUTE: 64.2 K/CU MM
LYMPHOCYTES RELATIVE PERCENT: 86 % (ref 24–44)
MCH RBC QN AUTO: 36.1 PG (ref 27–31)
MCHC RBC AUTO-ENTMCNC: 31.7 % (ref 32–36)
MCV RBC AUTO: 114.2 FL (ref 78–100)
PDW BLD-RTO: 18 % (ref 11.7–14.9)
PLATELET # BLD: 105 K/CU MM (ref 140–440)
PMV BLD AUTO: 12.5 FL (ref 7.5–11.1)
RBC # BLD: 2.96 M/CU MM (ref 4.6–6.2)
SEGMENTED NEUTROPHILS ABSOLUTE COUNT: 4.5 K/CU MM
SEGMENTED NEUTROPHILS RELATIVE PERCENT: 6 % (ref 36–66)
WBC # BLD: 74.7 K/CU MM (ref 4–10.5)
WBC # BLD: ABNORMAL 10*3/UL

## 2017-03-24 ENCOUNTER — HOSPITAL ENCOUNTER (OUTPATIENT)
Dept: OTHER | Age: 67
Discharge: HOME | End: 2017-03-24
Attending: INTERNAL MEDICINE | Admitting: INTERNAL MEDICINE

## 2017-03-24 LAB
ANISOCYTOSIS: ABNORMAL
ATYPICAL LYMPHOCYTE ABSOLUTE COUNT: ABNORMAL
BANDED NEUTROPHILS ABSOLUTE COUNT: 1.44 K/CU MM
BANDED NEUTROPHILS RELATIVE PERCENT: 2 % (ref 5–11)
DIFFERENTIAL TYPE: ABNORMAL
HCT VFR BLD CALC: 37 % (ref 42–52)
HEMOGLOBIN: 12 GM/DL (ref 13.5–18)
LYMPHOCYTES ABSOLUTE: 67.7 K/CU MM
LYMPHOCYTES RELATIVE PERCENT: 94 % (ref 24–44)
MACROCYTES: ABNORMAL
MCH RBC QN AUTO: 36 PG (ref 27–31)
MCHC RBC AUTO-ENTMCNC: 32.4 % (ref 32–36)
MCV RBC AUTO: 111.1 FL (ref 78–100)
PDW BLD-RTO: 16.2 % (ref 11.7–14.9)
PLATELET # BLD: 107 K/CU MM (ref 140–440)
PLT MORPHOLOGY: ABNORMAL
PMV BLD AUTO: 12.6 FL (ref 7.5–11.1)
POLYCHROMASIA: ABNORMAL
RBC # BLD: 3.33 M/CU MM (ref 4.6–6.2)
SEGMENTED NEUTROPHILS ABSOLUTE COUNT: 2.9 K/CU MM
SEGMENTED NEUTROPHILS RELATIVE PERCENT: 4 % (ref 36–66)
SMUDGE CELLS: PRESENT
TEAR DROP CELLS: ABNORMAL
WBC # BLD: 72 K/CU MM (ref 4–10.5)

## 2017-04-04 VITALS
DIASTOLIC BLOOD PRESSURE: 60 MMHG | RESPIRATION RATE: 18 BRPM | HEART RATE: 72 BPM | OXYGEN SATURATION: 98 % | SYSTOLIC BLOOD PRESSURE: 118 MMHG

## 2017-04-04 LAB
ALBUMIN SERPL-MCNC: 4 GM/DL (ref 3.4–5)
ALP BLD-CCNC: 79 IU/L (ref 40–128)
ALT SERPL-CCNC: 8 U/L (ref 10–40)
ANION GAP SERPL CALCULATED.3IONS-SCNC: 13 MMOL/L (ref 4–16)
ANISOCYTOSIS: ABNORMAL
AST SERPL-CCNC: 8 IU/L (ref 15–37)
BANDED NEUTROPHILS ABSOLUTE COUNT: 0.4 K/CU MM
BANDED NEUTROPHILS RELATIVE PERCENT: 1 % (ref 5–11)
BILIRUB SERPL-MCNC: 0.5 MG/DL (ref 0–1)
BUN BLDV-MCNC: 11 MG/DL (ref 6–23)
CALCIUM SERPL-MCNC: 8.8 MG/DL (ref 8.3–10.6)
CHLORIDE BLD-SCNC: 98 MMOL/L (ref 99–110)
CO2: 26 MMOL/L (ref 21–32)
CREAT SERPL-MCNC: 0.7 MG/DL (ref 0.9–1.3)
DIFFERENTIAL TYPE: ABNORMAL
GFR AFRICAN AMERICAN: >60 ML/MIN/1.73M2
GFR NON-AFRICAN AMERICAN: >60 ML/MIN/1.73M2
GLUCOSE BLD-MCNC: 298 MG/DL (ref 70–140)
HCT VFR BLD CALC: 29.9 % (ref 42–52)
HEMOGLOBIN: 9.4 GM/DL (ref 13.5–18)
LYMPHOCYTES ABSOLUTE: 38.2 K/CU MM
LYMPHOCYTES RELATIVE PERCENT: 96 % (ref 24–44)
MACROCYTES: ABNORMAL
MCH RBC QN AUTO: 35.5 PG (ref 27–31)
MCHC RBC AUTO-ENTMCNC: 31.4 % (ref 32–36)
MCV RBC AUTO: 112.8 FL (ref 78–100)
MONOCYTES ABSOLUTE: 0.4 K/CU MM
MONOCYTES RELATIVE PERCENT: 1 % (ref 0–4)
PDW BLD-RTO: 15.4 % (ref 11.7–14.9)
PLATELET # BLD: 83 K/CU MM (ref 140–440)
PMV BLD AUTO: 12.1 FL (ref 7.5–11.1)
POTASSIUM SERPL-SCNC: 4 MMOL/L (ref 3.5–5.1)
RBC # BLD: 2.65 M/CU MM (ref 4.6–6.2)
RBC # BLD: ABNORMAL 10*6/UL
SEGMENTED NEUTROPHILS ABSOLUTE COUNT: 0.8 K/CU MM
SEGMENTED NEUTROPHILS RELATIVE PERCENT: 2 % (ref 36–66)
SMUDGE CELLS: PRESENT
SODIUM BLD-SCNC: 137 MMOL/L (ref 135–145)
TOTAL PROTEIN: 5.7 GM/DL (ref 6.4–8.2)
WBC # BLD: 39.8 K/CU MM (ref 4–10.5)

## 2017-04-04 RX ORDER — METHYLPREDNISOLONE SODIUM SUCCINATE 40 MG/ML
40 INJECTION, POWDER, LYOPHILIZED, FOR SOLUTION INTRAMUSCULAR; INTRAVENOUS ONCE
Status: COMPLETED | OUTPATIENT
Start: 2017-04-04 | End: 2017-04-04

## 2017-04-04 RX ORDER — SODIUM CHLORIDE 0.9 % (FLUSH) 0.9 %
10 SYRINGE (ML) INJECTION PRN
Status: ACTIVE | OUTPATIENT
Start: 2017-04-04 | End: 2017-04-04

## 2017-04-04 RX ORDER — DIPHENHYDRAMINE HYDROCHLORIDE 50 MG/ML
25 INJECTION INTRAMUSCULAR; INTRAVENOUS ONCE
Status: COMPLETED | OUTPATIENT
Start: 2017-04-04 | End: 2017-04-04

## 2017-04-04 RX ORDER — HEPARIN SODIUM (PORCINE) LOCK FLUSH IV SOLN 100 UNIT/ML 100 UNIT/ML
500 SOLUTION INTRAVENOUS PRN
Status: DISPENSED | OUTPATIENT
Start: 2017-04-04 | End: 2017-04-04

## 2017-04-04 RX ORDER — ACETAMINOPHEN 325 MG/1
650 TABLET ORAL ONCE
Status: COMPLETED | OUTPATIENT
Start: 2017-04-04 | End: 2017-04-04

## 2017-04-04 RX ADMIN — DIPHENHYDRAMINE HYDROCHLORIDE 25 MG: 50 INJECTION INTRAMUSCULAR; INTRAVENOUS at 09:02

## 2017-04-04 RX ADMIN — HEPARIN SODIUM (PORCINE) LOCK FLUSH IV SOLN 100 UNIT/ML 500 UNITS: 100 SOLUTION at 11:33

## 2017-04-04 RX ADMIN — Medication 10 ML: at 11:33

## 2017-04-04 RX ADMIN — METHYLPREDNISOLONE SODIUM SUCCINATE 40 MG: 40 INJECTION, POWDER, LYOPHILIZED, FOR SOLUTION INTRAMUSCULAR; INTRAVENOUS at 09:02

## 2017-04-04 RX ADMIN — ACETAMINOPHEN 650 MG: 325 TABLET ORAL at 09:01

## 2017-04-18 ENCOUNTER — HOSPITAL ENCOUNTER (OUTPATIENT)
Dept: OTHER | Age: 67
Discharge: HOME | End: 2017-04-18
Attending: INTERNAL MEDICINE | Admitting: INTERNAL MEDICINE

## 2017-04-18 LAB
ALBUMIN SERPL-MCNC: 4.4 GM/DL (ref 3.4–5)
ALP BLD-CCNC: 61 IU/L (ref 40–129)
ALT SERPL-CCNC: 8 U/L (ref 10–40)
ANION GAP SERPL CALCULATED.3IONS-SCNC: 12 MMOL/L (ref 4–16)
AST SERPL-CCNC: 10 IU/L (ref 15–37)
BILIRUB SERPL-MCNC: 0.5 MG/DL (ref 0–1)
BUN BLDV-MCNC: 11 MG/DL (ref 6–23)
CALCIUM SERPL-MCNC: 9.1 MG/DL (ref 8.3–10.6)
CHLORIDE BLD-SCNC: 99 MMOL/L (ref 99–110)
CO2: 27 MMOL/L (ref 21–32)
CREAT SERPL-MCNC: 0.8 MG/DL (ref 0.9–1.3)
GFR AFRICAN AMERICAN: >60 ML/MIN/1.73M2
GFR NON-AFRICAN AMERICAN: >60 ML/MIN/1.73M2
GLUCOSE BLD-MCNC: 197 MG/DL (ref 70–140)
LACTATE DEHYDROGENASE: 135 IU/L (ref 120–246)
POTASSIUM SERPL-SCNC: 4.7 MMOL/L (ref 3.5–5.1)
SODIUM BLD-SCNC: 138 MMOL/L (ref 135–145)
TOTAL PROTEIN: 6 GM/DL (ref 6.4–8.2)

## 2017-04-19 LAB
ALBUMIN ELP: 3.6 GM/DL (ref 3.2–5.6)
ALPHA-1-GLOBULIN: 0.2 GM/DL (ref 0.1–0.4)
ALPHA-2-GLOBULIN: 0.8 GM/DL (ref 0.4–1.2)
BETA GLOBULIN: 0.9 GM/DL (ref 0.5–1.3)
GAMMA GLOBULIN: 0.6 GM/DL (ref 0.5–1.6)
TOTAL PROTEIN: 6 GM/DL (ref 6.4–8.2)

## 2017-05-02 ENCOUNTER — HOSPITAL ENCOUNTER (OUTPATIENT)
Dept: INFUSION THERAPY | Age: 67
Discharge: HOME | End: 2017-05-31
Attending: INTERNAL MEDICINE | Admitting: INTERNAL MEDICINE

## 2017-05-02 VITALS
RESPIRATION RATE: 16 BRPM | HEART RATE: 76 BPM | DIASTOLIC BLOOD PRESSURE: 84 MMHG | SYSTOLIC BLOOD PRESSURE: 129 MMHG | TEMPERATURE: 98.9 F

## 2017-05-02 DIAGNOSIS — D80.1 HYPOGAMMAGLOBULINAEMIA, UNSPECIFIED: ICD-10-CM

## 2017-05-02 LAB
ALBUMIN SERPL-MCNC: 4.5 GM/DL (ref 3.4–5)
ALP BLD-CCNC: 62 IU/L (ref 40–128)
ALT SERPL-CCNC: 8 U/L (ref 10–40)
ANION GAP SERPL CALCULATED.3IONS-SCNC: 14 MMOL/L (ref 4–16)
ANISOCYTOSIS: ABNORMAL
AST SERPL-CCNC: 10 IU/L (ref 15–37)
ATYPICAL LYMPHOCYTE ABSOLUTE COUNT: ABNORMAL
BANDED NEUTROPHILS ABSOLUTE COUNT: 0.74 K/CU MM
BANDED NEUTROPHILS RELATIVE PERCENT: 2 % (ref 5–11)
BILIRUB SERPL-MCNC: 0.5 MG/DL (ref 0–1)
BUN BLDV-MCNC: 13 MG/DL (ref 6–23)
CALCIUM SERPL-MCNC: 9.4 MG/DL (ref 8.3–10.6)
CHLORIDE BLD-SCNC: 103 MMOL/L (ref 99–110)
CO2: 25 MMOL/L (ref 21–32)
CREAT SERPL-MCNC: 0.8 MG/DL (ref 0.9–1.3)
DIFFERENTIAL TYPE: ABNORMAL
EOSINOPHILS ABSOLUTE: 0.4 K/CU MM
EOSINOPHILS RELATIVE PERCENT: 1 % (ref 0–3)
GFR AFRICAN AMERICAN: >60 ML/MIN/1.73M2
GFR NON-AFRICAN AMERICAN: >60 ML/MIN/1.73M2
GLUCOSE BLD-MCNC: 79 MG/DL (ref 70–140)
HCT VFR BLD CALC: 33.9 % (ref 42–52)
HEMOGLOBIN: 10.7 GM/DL (ref 13.5–18)
LYMPHOCYTES ABSOLUTE: 34.3 K/CU MM
LYMPHOCYTES RELATIVE PERCENT: 93 % (ref 24–44)
MACROCYTES: ABNORMAL
MCH RBC QN AUTO: 35.3 PG (ref 27–31)
MCHC RBC AUTO-ENTMCNC: 31.6 % (ref 32–36)
MCV RBC AUTO: 111.9 FL (ref 78–100)
PDW BLD-RTO: 15.9 % (ref 11.7–14.9)
PLATELET # BLD: 112 K/CU MM (ref 140–440)
PMV BLD AUTO: 12 FL (ref 7.5–11.1)
POLYCHROMASIA: ABNORMAL
POTASSIUM SERPL-SCNC: 4.5 MMOL/L (ref 3.5–5.1)
RBC # BLD: 3.03 M/CU MM (ref 4.6–6.2)
SEGMENTED NEUTROPHILS ABSOLUTE COUNT: 1.5 K/CU MM
SEGMENTED NEUTROPHILS RELATIVE PERCENT: 4 % (ref 36–66)
SMUDGE CELLS: PRESENT
SODIUM BLD-SCNC: 142 MMOL/L (ref 135–145)
TOTAL PROTEIN: 6.5 GM/DL (ref 6.4–8.2)
WBC # BLD: 36.9 K/CU MM (ref 4–10.5)

## 2017-05-02 RX ORDER — METHYLPREDNISOLONE SODIUM SUCCINATE 125 MG/2ML
125 INJECTION, POWDER, LYOPHILIZED, FOR SOLUTION INTRAMUSCULAR; INTRAVENOUS ONCE
Status: CANCELLED | OUTPATIENT
Start: 2017-05-02 | End: 2017-05-02

## 2017-05-02 RX ORDER — ACETAMINOPHEN 325 MG/1
650 TABLET ORAL ONCE
Status: COMPLETED | OUTPATIENT
Start: 2017-05-02 | End: 2017-05-02

## 2017-05-02 RX ORDER — METHYLPREDNISOLONE SODIUM SUCCINATE 40 MG/ML
40 INJECTION, POWDER, LYOPHILIZED, FOR SOLUTION INTRAMUSCULAR; INTRAVENOUS ONCE
Status: CANCELLED
Start: 2017-05-02 | End: 2017-05-02

## 2017-05-02 RX ORDER — HEPARIN SODIUM (PORCINE) LOCK FLUSH IV SOLN 100 UNIT/ML 100 UNIT/ML
500 SOLUTION INTRAVENOUS ONCE
Status: COMPLETED | OUTPATIENT
Start: 2017-05-02 | End: 2017-05-02

## 2017-05-02 RX ORDER — METHYLPREDNISOLONE SODIUM SUCCINATE 40 MG/ML
40 INJECTION, POWDER, LYOPHILIZED, FOR SOLUTION INTRAMUSCULAR; INTRAVENOUS ONCE
Status: COMPLETED | OUTPATIENT
Start: 2017-05-02 | End: 2017-05-02

## 2017-05-02 RX ORDER — HEPARIN SODIUM (PORCINE) LOCK FLUSH IV SOLN 100 UNIT/ML 100 UNIT/ML
500 SOLUTION INTRAVENOUS PRN
Status: CANCELLED | OUTPATIENT
Start: 2017-05-02

## 2017-05-02 RX ORDER — SODIUM CHLORIDE 9 MG/ML
INJECTION, SOLUTION INTRAVENOUS CONTINUOUS
Status: CANCELLED | OUTPATIENT
Start: 2017-05-02

## 2017-05-02 RX ORDER — DIPHENHYDRAMINE HYDROCHLORIDE 50 MG/ML
50 INJECTION INTRAMUSCULAR; INTRAVENOUS ONCE
Status: CANCELLED | OUTPATIENT
Start: 2017-05-02 | End: 2017-05-02

## 2017-05-02 RX ORDER — SODIUM CHLORIDE 9 MG/ML
INJECTION, SOLUTION INTRAVENOUS ONCE
Status: CANCELLED | OUTPATIENT
Start: 2017-05-02 | End: 2017-05-02

## 2017-05-02 RX ORDER — 0.9 % SODIUM CHLORIDE 0.9 %
10 VIAL (ML) INJECTION PRN
Status: CANCELLED | OUTPATIENT
Start: 2017-05-02

## 2017-05-02 RX ORDER — DIPHENHYDRAMINE HYDROCHLORIDE 50 MG/ML
25 INJECTION INTRAMUSCULAR; INTRAVENOUS ONCE
Status: COMPLETED | OUTPATIENT
Start: 2017-05-02 | End: 2017-05-02

## 2017-05-02 RX ORDER — DIPHENHYDRAMINE HYDROCHLORIDE 50 MG/ML
25 INJECTION INTRAMUSCULAR; INTRAVENOUS ONCE
Status: CANCELLED
Start: 2017-05-02 | End: 2017-05-02

## 2017-05-02 RX ORDER — 0.9 % SODIUM CHLORIDE 0.9 %
20 VIAL (ML) INJECTION PRN
Status: DISCONTINUED | OUTPATIENT
Start: 2017-05-02 | End: 2017-06-01 | Stop reason: HOSPADM

## 2017-05-02 RX ORDER — 0.9 % SODIUM CHLORIDE 0.9 %
10 VIAL (ML) INJECTION ONCE
Status: CANCELLED | OUTPATIENT
Start: 2017-05-02 | End: 2017-05-02

## 2017-05-02 RX ORDER — ACETAMINOPHEN 325 MG/1
650 TABLET ORAL ONCE
Status: CANCELLED | OUTPATIENT
Start: 2017-05-02 | End: 2017-05-02

## 2017-05-02 RX ADMIN — Medication 20 ML: at 11:45

## 2017-05-02 RX ADMIN — ACETAMINOPHEN 650 MG: 325 TABLET ORAL at 09:10

## 2017-05-02 RX ADMIN — Medication 20 ML: at 08:55

## 2017-05-02 RX ADMIN — METHYLPREDNISOLONE SODIUM SUCCINATE 40 MG: 40 INJECTION, POWDER, LYOPHILIZED, FOR SOLUTION INTRAMUSCULAR; INTRAVENOUS at 09:10

## 2017-05-02 RX ADMIN — DIPHENHYDRAMINE HYDROCHLORIDE 25 MG: 50 INJECTION INTRAMUSCULAR; INTRAVENOUS at 09:12

## 2017-05-02 RX ADMIN — HEPARIN SODIUM (PORCINE) LOCK FLUSH IV SOLN 100 UNIT/ML 500 UNITS: 100 SOLUTION at 11:45

## 2017-05-02 RX ADMIN — Medication 20 ML: at 09:12

## 2017-05-02 ASSESSMENT — PAIN SCALES - GENERAL: PAINLEVEL_OUTOF10: 0

## 2017-05-16 ENCOUNTER — HOSPITAL ENCOUNTER (OUTPATIENT)
Dept: OTHER | Age: 67
Discharge: HOME | End: 2017-05-16
Attending: INTERNAL MEDICINE | Admitting: INTERNAL MEDICINE

## 2017-05-16 LAB
ANISOCYTOSIS: ABNORMAL
ATYPICAL LYMPHOCYTE ABSOLUTE COUNT: ABNORMAL
BANDED NEUTROPHILS ABSOLUTE COUNT: 0.53 K/CU MM
BANDED NEUTROPHILS RELATIVE PERCENT: 1 % (ref 5–11)
DIFFERENTIAL TYPE: ABNORMAL
HCT VFR BLD CALC: 34.7 % (ref 42–52)
HEMOGLOBIN: 10.9 GM/DL (ref 13.5–18)
LYMPHOCYTES ABSOLUTE: 51 K/CU MM
LYMPHOCYTES RELATIVE PERCENT: 97 % (ref 24–44)
MACROCYTES: ABNORMAL
MCH RBC QN AUTO: 35 PG (ref 27–31)
MCHC RBC AUTO-ENTMCNC: 31.4 % (ref 32–36)
MCV RBC AUTO: 111.6 FL (ref 78–100)
PDW BLD-RTO: 16.5 % (ref 11.7–14.9)
PLATELET # BLD: 109 K/CU MM (ref 140–440)
PMV BLD AUTO: 11.7 FL (ref 7.5–11.1)
POLYCHROMASIA: ABNORMAL
RBC # BLD: 3.11 M/CU MM (ref 4.6–6.2)
SEGMENTED NEUTROPHILS ABSOLUTE COUNT: 1.1 K/CU MM
SEGMENTED NEUTROPHILS RELATIVE PERCENT: 2 % (ref 36–66)
SMUDGE CELLS: PRESENT
TEAR DROP CELLS: ABNORMAL
WBC # BLD: 52.6 K/CU MM (ref 4–10.5)

## 2017-05-30 ENCOUNTER — HOSPITAL ENCOUNTER (OUTPATIENT)
Dept: INFUSION THERAPY | Age: 67
Discharge: HOME | End: 2017-05-30
Attending: INTERNAL MEDICINE | Admitting: INTERNAL MEDICINE

## 2017-05-30 VITALS
DIASTOLIC BLOOD PRESSURE: 74 MMHG | BODY MASS INDEX: 22.21 KG/M2 | RESPIRATION RATE: 16 BRPM | SYSTOLIC BLOOD PRESSURE: 128 MMHG | TEMPERATURE: 98.6 F | HEART RATE: 62 BPM | HEIGHT: 72 IN | WEIGHT: 164 LBS | OXYGEN SATURATION: 96 %

## 2017-05-30 LAB
ALBUMIN SERPL-MCNC: 4.5 GM/DL (ref 3.4–5)
ALP BLD-CCNC: 54 IU/L (ref 40–129)
ALT SERPL-CCNC: 11 U/L (ref 10–40)
ANION GAP SERPL CALCULATED.3IONS-SCNC: 12 MMOL/L (ref 4–16)
ANISOCYTOSIS: ABNORMAL
AST SERPL-CCNC: 11 IU/L (ref 15–37)
ATYPICAL LYMPHOCYTE ABSOLUTE COUNT: ABNORMAL
BILIRUB SERPL-MCNC: 0.7 MG/DL (ref 0–1)
BUN BLDV-MCNC: 10 MG/DL (ref 6–23)
CALCIUM SERPL-MCNC: 9.1 MG/DL (ref 8.3–10.6)
CHLORIDE BLD-SCNC: 109 MMOL/L (ref 99–110)
CO2: 25 MMOL/L (ref 21–32)
CREAT SERPL-MCNC: 0.8 MG/DL (ref 0.9–1.3)
DIFFERENTIAL TYPE: ABNORMAL
GFR AFRICAN AMERICAN: >60 ML/MIN/1.73M2
GFR NON-AFRICAN AMERICAN: >60 ML/MIN/1.73M2
GLUCOSE BLD-MCNC: 68 MG/DL (ref 70–140)
HCT VFR BLD CALC: 32 % (ref 42–52)
HEMOGLOBIN: 10.5 GM/DL (ref 13.5–18)
LYMPHOCYTES ABSOLUTE: 45.7 K/CU MM
LYMPHOCYTES RELATIVE PERCENT: 98 % (ref 24–44)
MACROCYTES: ABNORMAL
MCH RBC QN AUTO: 36.1 PG (ref 27–31)
MCHC RBC AUTO-ENTMCNC: 32.8 % (ref 32–36)
MCV RBC AUTO: 110 FL (ref 78–100)
MONOCYTES ABSOLUTE: 0.5 K/CU MM
MONOCYTES RELATIVE PERCENT: 1 % (ref 0–4)
OVALOCYTES: ABNORMAL
PDW BLD-RTO: 16.9 % (ref 11.7–14.9)
PLATELET # BLD: 126 K/CU MM (ref 140–440)
PMV BLD AUTO: 11.9 FL (ref 7.5–11.1)
POLYCHROMASIA: ABNORMAL
POTASSIUM SERPL-SCNC: 3.9 MMOL/L (ref 3.5–5.1)
RBC # BLD: 2.91 M/CU MM (ref 4.6–6.2)
SEGMENTED NEUTROPHILS ABSOLUTE COUNT: 0.5 K/CU MM
SEGMENTED NEUTROPHILS RELATIVE PERCENT: 1 % (ref 36–66)
SMUDGE CELLS: PRESENT
SODIUM BLD-SCNC: 146 MMOL/L (ref 135–145)
TEAR DROP CELLS: ABNORMAL
TOTAL PROTEIN: 6.3 GM/DL (ref 6.4–8.2)
WBC # BLD: 46.7 K/CU MM (ref 4–10.5)

## 2017-05-30 RX ORDER — DIPHENHYDRAMINE HYDROCHLORIDE 50 MG/ML
25 INJECTION INTRAMUSCULAR; INTRAVENOUS ONCE
Status: COMPLETED | OUTPATIENT
Start: 2017-05-30 | End: 2017-05-30

## 2017-05-30 RX ORDER — METHYLPREDNISOLONE SODIUM SUCCINATE 40 MG/ML
40 INJECTION, POWDER, LYOPHILIZED, FOR SOLUTION INTRAMUSCULAR; INTRAVENOUS ONCE
Status: COMPLETED | OUTPATIENT
Start: 2017-05-30 | End: 2017-05-30

## 2017-05-30 RX ORDER — ACETAMINOPHEN 325 MG/1
650 TABLET ORAL ONCE
Status: COMPLETED | OUTPATIENT
Start: 2017-05-30 | End: 2017-05-30

## 2017-05-30 RX ORDER — 0.9 % SODIUM CHLORIDE 0.9 %
20 VIAL (ML) INJECTION PRN
Status: ACTIVE | OUTPATIENT
Start: 2017-05-30 | End: 2017-05-30

## 2017-05-30 RX ORDER — HEPARIN SODIUM (PORCINE) LOCK FLUSH IV SOLN 100 UNIT/ML 100 UNIT/ML
500 SOLUTION INTRAVENOUS ONCE
Status: COMPLETED | OUTPATIENT
Start: 2017-05-30 | End: 2017-05-30

## 2017-05-30 RX ADMIN — Medication 20 ML: at 08:37

## 2017-05-30 RX ADMIN — Medication 10 ML: at 08:30

## 2017-05-30 RX ADMIN — Medication 20 ML: at 08:35

## 2017-05-30 RX ADMIN — Medication 20 ML: at 11:10

## 2017-05-30 RX ADMIN — Medication 20 ML: at 08:00

## 2017-05-30 RX ADMIN — METHYLPREDNISOLONE SODIUM SUCCINATE 40 MG: 40 INJECTION, POWDER, LYOPHILIZED, FOR SOLUTION INTRAMUSCULAR; INTRAVENOUS at 08:30

## 2017-05-30 RX ADMIN — ACETAMINOPHEN 650 MG: 325 TABLET ORAL at 08:30

## 2017-05-30 RX ADMIN — DIPHENHYDRAMINE HYDROCHLORIDE 25 MG: 50 INJECTION INTRAMUSCULAR; INTRAVENOUS at 08:35

## 2017-05-30 RX ADMIN — HEPARIN SODIUM (PORCINE) LOCK FLUSH IV SOLN 100 UNIT/ML 500 UNITS: 100 SOLUTION at 11:10

## 2017-05-30 ASSESSMENT — PAIN SCALES - GENERAL: PAINLEVEL_OUTOF10: 0

## 2017-06-01 ENCOUNTER — HOSPITAL ENCOUNTER (OUTPATIENT)
Dept: OTHER | Age: 67
Discharge: HOME | End: 2017-06-30
Attending: INTERNAL MEDICINE | Admitting: INTERNAL MEDICINE

## 2017-06-27 ENCOUNTER — HOSPITAL ENCOUNTER (OUTPATIENT)
Dept: INFUSION THERAPY | Age: 67
Discharge: HOME | End: 2017-06-27
Attending: INTERNAL MEDICINE | Admitting: INTERNAL MEDICINE

## 2017-06-27 VITALS
DIASTOLIC BLOOD PRESSURE: 74 MMHG | RESPIRATION RATE: 18 BRPM | OXYGEN SATURATION: 97 % | SYSTOLIC BLOOD PRESSURE: 133 MMHG | HEART RATE: 72 BPM

## 2017-06-27 RX ORDER — METHYLPREDNISOLONE SODIUM SUCCINATE 40 MG/ML
40 INJECTION, POWDER, LYOPHILIZED, FOR SOLUTION INTRAMUSCULAR; INTRAVENOUS ONCE
Status: COMPLETED | OUTPATIENT
Start: 2017-06-27 | End: 2017-06-27

## 2017-06-27 RX ORDER — SODIUM CHLORIDE 0.9 % (FLUSH) 0.9 %
10 SYRINGE (ML) INJECTION PRN
Status: DISCONTINUED | OUTPATIENT
Start: 2017-06-27 | End: 2017-06-28 | Stop reason: HOSPADM

## 2017-06-27 RX ORDER — ACETAMINOPHEN 325 MG/1
650 TABLET ORAL ONCE
Status: COMPLETED | OUTPATIENT
Start: 2017-06-27 | End: 2017-06-27

## 2017-06-27 RX ORDER — DIPHENHYDRAMINE HYDROCHLORIDE 50 MG/ML
25 INJECTION INTRAMUSCULAR; INTRAVENOUS ONCE
Status: COMPLETED | OUTPATIENT
Start: 2017-06-27 | End: 2017-06-27

## 2017-06-27 RX ORDER — HEPARIN SODIUM (PORCINE) LOCK FLUSH IV SOLN 100 UNIT/ML 100 UNIT/ML
500 SOLUTION INTRAVENOUS PRN
Status: DISCONTINUED | OUTPATIENT
Start: 2017-06-27 | End: 2017-06-28 | Stop reason: HOSPADM

## 2017-06-27 RX ADMIN — HEPARIN SODIUM (PORCINE) LOCK FLUSH IV SOLN 100 UNIT/ML 500 UNITS: 100 SOLUTION at 11:00

## 2017-06-27 RX ADMIN — ACETAMINOPHEN 650 MG: 325 TABLET ORAL at 08:12

## 2017-06-27 RX ADMIN — METHYLPREDNISOLONE SODIUM SUCCINATE 40 MG: 40 INJECTION, POWDER, LYOPHILIZED, FOR SOLUTION INTRAMUSCULAR; INTRAVENOUS at 08:13

## 2017-06-27 RX ADMIN — Medication 20 ML: at 08:00

## 2017-06-27 RX ADMIN — DIPHENHYDRAMINE HYDROCHLORIDE 25 MG: 50 INJECTION INTRAMUSCULAR; INTRAVENOUS at 08:13

## 2017-06-27 RX ADMIN — Medication 20 ML: at 11:00

## 2017-07-24 ENCOUNTER — HOSPITAL ENCOUNTER (OUTPATIENT)
Dept: OTHER | Age: 67
Discharge: HOME | End: 2017-07-24
Attending: INTERNAL MEDICINE | Admitting: INTERNAL MEDICINE

## 2017-07-24 LAB
BANDED NEUTROPHILS ABSOLUTE COUNT: 0.54 K/CU MM
BANDED NEUTROPHILS RELATIVE PERCENT: 1 % (ref 5–11)
DIFFERENTIAL TYPE: ABNORMAL
EOSINOPHILS ABSOLUTE: 0.5 K/CU MM
EOSINOPHILS RELATIVE PERCENT: 1 % (ref 0–3)
HCT VFR BLD CALC: 37.6 % (ref 42–52)
HEMOGLOBIN: 12.3 GM/DL (ref 13.5–18)
LYMPHOCYTES ABSOLUTE: 48.8 K/CU MM
LYMPHOCYTES RELATIVE PERCENT: 90 % (ref 24–44)
MACROCYTES: ABNORMAL
MCH RBC QN AUTO: 35.9 PG (ref 27–31)
MCHC RBC AUTO-ENTMCNC: 32.7 % (ref 32–36)
MCV RBC AUTO: 109.6 FL (ref 78–100)
MONOCYTES ABSOLUTE: 1.1 K/CU MM
MONOCYTES RELATIVE PERCENT: 2 % (ref 0–4)
OVALOCYTES: ABNORMAL
PDW BLD-RTO: 15.9 % (ref 11.7–14.9)
PLATELET # BLD: 136 K/CU MM (ref 140–440)
PLT MORPHOLOGY: ABNORMAL
PMV BLD AUTO: 12.4 FL (ref 7.5–11.1)
POLYCHROMASIA: ABNORMAL
RBC # BLD: 3.43 M/CU MM (ref 4.6–6.2)
SEGMENTED NEUTROPHILS ABSOLUTE COUNT: 3.3 K/CU MM
SEGMENTED NEUTROPHILS RELATIVE PERCENT: 6 % (ref 36–66)
SMUDGE CELLS: PRESENT
TEAR DROP CELLS: ABNORMAL
WBC # BLD: 54.2 K/CU MM (ref 4–10.5)

## 2017-07-25 ENCOUNTER — HOSPITAL ENCOUNTER (OUTPATIENT)
Dept: INFUSION THERAPY | Age: 67
Discharge: HOME | End: 2017-07-25
Attending: INTERNAL MEDICINE | Admitting: INTERNAL MEDICINE

## 2017-07-25 VITALS
HEART RATE: 77 BPM | TEMPERATURE: 98 F | SYSTOLIC BLOOD PRESSURE: 136 MMHG | RESPIRATION RATE: 16 BRPM | DIASTOLIC BLOOD PRESSURE: 81 MMHG

## 2017-07-25 RX ORDER — METHYLPREDNISOLONE SODIUM SUCCINATE 40 MG/ML
40 INJECTION, POWDER, LYOPHILIZED, FOR SOLUTION INTRAMUSCULAR; INTRAVENOUS ONCE
Status: COMPLETED | OUTPATIENT
Start: 2017-07-25 | End: 2017-07-25

## 2017-07-25 RX ORDER — SODIUM CHLORIDE 0.9 % (FLUSH) 0.9 %
10 SYRINGE (ML) INJECTION PRN
Status: DISCONTINUED | OUTPATIENT
Start: 2017-07-25 | End: 2017-07-26 | Stop reason: HOSPADM

## 2017-07-25 RX ORDER — DIPHENHYDRAMINE HYDROCHLORIDE 50 MG/ML
25 INJECTION INTRAMUSCULAR; INTRAVENOUS ONCE
Status: COMPLETED | OUTPATIENT
Start: 2017-07-25 | End: 2017-07-25

## 2017-07-25 RX ORDER — HEPARIN SODIUM (PORCINE) LOCK FLUSH IV SOLN 100 UNIT/ML 100 UNIT/ML
500 SOLUTION INTRAVENOUS PRN
Status: DISCONTINUED | OUTPATIENT
Start: 2017-07-25 | End: 2017-07-26 | Stop reason: HOSPADM

## 2017-07-25 RX ORDER — ACETAMINOPHEN 325 MG/1
650 TABLET ORAL ONCE
Status: COMPLETED | OUTPATIENT
Start: 2017-07-25 | End: 2017-07-25

## 2017-07-25 RX ADMIN — HEPARIN SODIUM (PORCINE) LOCK FLUSH IV SOLN 100 UNIT/ML 500 UNITS: 100 SOLUTION at 11:49

## 2017-07-25 RX ADMIN — ACETAMINOPHEN 650 MG: 325 TABLET ORAL at 08:40

## 2017-07-25 RX ADMIN — Medication 10 ML: at 11:49

## 2017-07-25 RX ADMIN — DIPHENHYDRAMINE HYDROCHLORIDE 25 MG: 50 INJECTION INTRAMUSCULAR; INTRAVENOUS at 08:45

## 2017-07-25 RX ADMIN — METHYLPREDNISOLONE SODIUM SUCCINATE 40 MG: 40 INJECTION, POWDER, LYOPHILIZED, FOR SOLUTION INTRAMUSCULAR; INTRAVENOUS at 08:45

## 2017-07-25 ASSESSMENT — PAIN SCALES - GENERAL: PAINLEVEL_OUTOF10: 0

## 2017-08-21 ENCOUNTER — HOSPITAL ENCOUNTER (OUTPATIENT)
Dept: OTHER | Age: 67
Discharge: HOME | End: 2017-08-21
Attending: INTERNAL MEDICINE | Admitting: INTERNAL MEDICINE

## 2017-08-21 LAB
ALBUMIN SERPL-MCNC: 4.4 GM/DL (ref 3.4–5)
ALP BLD-CCNC: 67 IU/L (ref 40–129)
ALT SERPL-CCNC: 22 U/L (ref 10–40)
ANION GAP SERPL CALCULATED.3IONS-SCNC: 15 MMOL/L (ref 4–16)
AST SERPL-CCNC: 14 IU/L (ref 15–37)
BILIRUB SERPL-MCNC: 0.6 MG/DL (ref 0–1)
BUN BLDV-MCNC: 15 MG/DL (ref 6–23)
CALCIUM SERPL-MCNC: 9.1 MG/DL (ref 8.3–10.6)
CHLORIDE BLD-SCNC: 104 MMOL/L (ref 99–110)
CO2: 23 MMOL/L (ref 21–32)
CREAT SERPL-MCNC: 0.8 MG/DL (ref 0.9–1.3)
DIFFERENTIAL TYPE: ABNORMAL
GFR AFRICAN AMERICAN: >60 ML/MIN/1.73M2
GFR NON-AFRICAN AMERICAN: >60 ML/MIN/1.73M2
GLUCOSE BLD-MCNC: 126 MG/DL (ref 70–140)
HCT VFR BLD CALC: 37.2 % (ref 42–52)
HEMOGLOBIN: 12.1 GM/DL (ref 13.5–18)
IGA: <50 MG/DL (ref 69–382)
IGG,SERUM: 503 MG/DL (ref 723–1685)
IGM,SERUM: <25 MG/DL (ref 62–277)
LACTATE DEHYDROGENASE: 144 IU/L (ref 120–246)
LYMPHOCYTES ABSOLUTE: 34.8 K/CU MM
LYMPHOCYTES RELATIVE PERCENT: 90 % (ref 24–44)
MACROCYTES: ABNORMAL
MCH RBC QN AUTO: 35.6 PG (ref 27–31)
MCHC RBC AUTO-ENTMCNC: 32.5 % (ref 32–36)
MCV RBC AUTO: 109.4 FL (ref 78–100)
MONOCYTES ABSOLUTE: 1.2 K/CU MM
MONOCYTES RELATIVE PERCENT: 3 % (ref 0–4)
OVALOCYTES: ABNORMAL
PDW BLD-RTO: 15.6 % (ref 11.7–14.9)
PLATELET # BLD: 121 K/CU MM (ref 140–440)
PLT MORPHOLOGY: ABNORMAL
PMV BLD AUTO: 12.4 FL (ref 7.5–11.1)
POLYCHROMASIA: ABNORMAL
POTASSIUM SERPL-SCNC: 4.5 MMOL/L (ref 3.5–5.1)
RBC # BLD: 3.4 M/CU MM (ref 4.6–6.2)
SEGMENTED NEUTROPHILS ABSOLUTE COUNT: 2.7 K/CU MM
SEGMENTED NEUTROPHILS RELATIVE PERCENT: 7 % (ref 36–66)
SMUDGE CELLS: PRESENT
SODIUM BLD-SCNC: 142 MMOL/L (ref 135–145)
TOTAL PROTEIN: 6.3 GM/DL (ref 6.4–8.2)
WBC # BLD: 38.7 K/CU MM (ref 4–10.5)

## 2017-08-22 ENCOUNTER — HOSPITAL ENCOUNTER (OUTPATIENT)
Dept: INFUSION THERAPY | Age: 67
Discharge: HOME | End: 2017-08-22
Attending: INTERNAL MEDICINE | Admitting: INTERNAL MEDICINE

## 2017-08-22 VITALS
HEART RATE: 72 BPM | DIASTOLIC BLOOD PRESSURE: 73 MMHG | HEIGHT: 72 IN | TEMPERATURE: 98.2 F | RESPIRATION RATE: 20 BRPM | WEIGHT: 184 LBS | BODY MASS INDEX: 24.92 KG/M2 | SYSTOLIC BLOOD PRESSURE: 126 MMHG

## 2017-08-22 RX ORDER — SODIUM CHLORIDE 0.9 % (FLUSH) 0.9 %
10 SYRINGE (ML) INJECTION PRN
Status: DISCONTINUED | OUTPATIENT
Start: 2017-08-22 | End: 2017-08-23 | Stop reason: HOSPADM

## 2017-08-22 RX ORDER — DIPHENHYDRAMINE HYDROCHLORIDE 50 MG/ML
25 INJECTION INTRAMUSCULAR; INTRAVENOUS ONCE
Status: COMPLETED | OUTPATIENT
Start: 2017-08-22 | End: 2017-08-22

## 2017-08-22 RX ORDER — METHYLPREDNISOLONE SODIUM SUCCINATE 40 MG/ML
40 INJECTION, POWDER, LYOPHILIZED, FOR SOLUTION INTRAMUSCULAR; INTRAVENOUS ONCE
Status: COMPLETED | OUTPATIENT
Start: 2017-08-22 | End: 2017-08-22

## 2017-08-22 RX ORDER — ACETAMINOPHEN 325 MG/1
650 TABLET ORAL ONCE
Status: COMPLETED | OUTPATIENT
Start: 2017-08-22 | End: 2017-08-22

## 2017-08-22 RX ORDER — HEPARIN SODIUM (PORCINE) LOCK FLUSH IV SOLN 100 UNIT/ML 100 UNIT/ML
500 SOLUTION INTRAVENOUS PRN
Status: DISCONTINUED | OUTPATIENT
Start: 2017-08-22 | End: 2017-08-23 | Stop reason: HOSPADM

## 2017-08-22 RX ADMIN — METHYLPREDNISOLONE SODIUM SUCCINATE 40 MG: 40 INJECTION, POWDER, LYOPHILIZED, FOR SOLUTION INTRAMUSCULAR; INTRAVENOUS at 08:38

## 2017-08-22 RX ADMIN — Medication 10 ML: at 11:30

## 2017-08-22 RX ADMIN — HEPARIN SODIUM (PORCINE) LOCK FLUSH IV SOLN 100 UNIT/ML 500 UNITS: 100 SOLUTION at 11:35

## 2017-08-22 RX ADMIN — Medication 10 ML: at 08:39

## 2017-08-22 RX ADMIN — Medication 10 ML: at 11:35

## 2017-08-22 RX ADMIN — ACETAMINOPHEN 650 MG: 325 TABLET ORAL at 08:40

## 2017-08-22 RX ADMIN — DIPHENHYDRAMINE HYDROCHLORIDE 25 MG: 50 INJECTION INTRAMUSCULAR; INTRAVENOUS at 08:38

## 2017-08-22 ASSESSMENT — PAIN SCALES - GENERAL
PAINLEVEL_OUTOF10: 0
PAINLEVEL_OUTOF10: 0

## 2017-08-25 LAB
ALBUMIN ELP: 3.7 GM/DL (ref 3.2–5.6)
ALPHA-1-GLOBULIN: 0.2 GM/DL (ref 0.1–0.4)
ALPHA-2-GLOBULIN: 0.9 GM/DL (ref 0.4–1.2)
BETA GLOBULIN: 0.9 GM/DL (ref 0.5–1.3)
GAMMA GLOBULIN: 0.6 GM/DL (ref 0.5–1.6)
TOTAL PROTEIN: 6.3 GM/DL (ref 6.4–8.2)

## 2017-09-05 ENCOUNTER — HOSPITAL ENCOUNTER (OUTPATIENT)
Dept: OTHER | Age: 67
Discharge: HOME | End: 2017-09-05
Attending: INTERNAL MEDICINE | Admitting: INTERNAL MEDICINE

## 2017-09-05 LAB
ALBUMIN SERPL-MCNC: 4.4 GM/DL (ref 3.4–5)
ALP BLD-CCNC: 63 IU/L (ref 40–129)
ALT SERPL-CCNC: 22 U/L (ref 10–40)
ANION GAP SERPL CALCULATED.3IONS-SCNC: 13 MMOL/L (ref 4–16)
AST SERPL-CCNC: 21 IU/L (ref 15–37)
BILIRUB SERPL-MCNC: 0.4 MG/DL (ref 0–1)
BUN BLDV-MCNC: 13 MG/DL (ref 6–23)
CALCIUM SERPL-MCNC: 9.2 MG/DL (ref 8.3–10.6)
CHLORIDE BLD-SCNC: 103 MMOL/L (ref 99–110)
CO2: 25 MMOL/L (ref 21–32)
CREAT SERPL-MCNC: 0.9 MG/DL (ref 0.9–1.3)
GFR AFRICAN AMERICAN: >60 ML/MIN/1.73M2
GFR NON-AFRICAN AMERICAN: >60 ML/MIN/1.73M2
GLUCOSE BLD-MCNC: 75 MG/DL (ref 70–140)
LACTATE DEHYDROGENASE: 210 IU/L (ref 120–246)
POTASSIUM SERPL-SCNC: 4.2 MMOL/L (ref 3.5–5.1)
SODIUM BLD-SCNC: 141 MMOL/L (ref 135–145)
TOTAL PROTEIN: 6.5 GM/DL (ref 6.4–8.2)
URIC ACID: 6.6 MG/DL (ref 3.5–7.2)

## 2017-09-18 ENCOUNTER — HOSPITAL ENCOUNTER (OUTPATIENT)
Dept: OTHER | Age: 67
Discharge: HOME | End: 2017-09-18
Attending: INTERNAL MEDICINE | Admitting: INTERNAL MEDICINE

## 2017-09-18 LAB
ATYPICAL LYMPHOCYTE ABSOLUTE COUNT: ABNORMAL
BANDED NEUTROPHILS ABSOLUTE COUNT: 0.73 K/CU MM
BANDED NEUTROPHILS RELATIVE PERCENT: 3 % (ref 5–11)
DIFFERENTIAL TYPE: ABNORMAL
EOSINOPHILS ABSOLUTE: 0.5 K/CU MM
EOSINOPHILS RELATIVE PERCENT: 2 % (ref 0–3)
HCT VFR BLD CALC: 32.8 % (ref 42–52)
HEMOGLOBIN: 10.7 GM/DL (ref 13.5–18)
LYMPHOCYTES ABSOLUTE: 20.6 K/CU MM
LYMPHOCYTES RELATIVE PERCENT: 85 % (ref 24–44)
MACROCYTES: ABNORMAL
MCH RBC QN AUTO: 34.4 PG (ref 27–31)
MCHC RBC AUTO-ENTMCNC: 32.6 % (ref 32–36)
MCV RBC AUTO: 105.5 FL (ref 78–100)
MICROCYTES: ABNORMAL
PDW BLD-RTO: 15.4 % (ref 11.7–14.9)
PLATELET # BLD: 140 K/CU MM (ref 140–440)
PMV BLD AUTO: 10.6 FL (ref 7.5–11.1)
RBC # BLD: 3.11 M/CU MM (ref 4.6–6.2)
RBC # BLD: ABNORMAL 10*6/UL
SEGMENTED NEUTROPHILS ABSOLUTE COUNT: 2.4 K/CU MM
SEGMENTED NEUTROPHILS RELATIVE PERCENT: 10 % (ref 36–66)
SMUDGE CELLS: PRESENT
WBC # BLD: 24.2 K/CU MM (ref 4–10.5)

## 2017-09-19 ENCOUNTER — HOSPITAL ENCOUNTER (OUTPATIENT)
Dept: INFUSION THERAPY | Age: 67
Discharge: HOME | End: 2017-09-19
Attending: INTERNAL MEDICINE | Admitting: INTERNAL MEDICINE

## 2017-09-19 VITALS
OXYGEN SATURATION: 98 % | RESPIRATION RATE: 18 BRPM | SYSTOLIC BLOOD PRESSURE: 147 MMHG | DIASTOLIC BLOOD PRESSURE: 75 MMHG | TEMPERATURE: 98.4 F | HEART RATE: 80 BPM

## 2017-09-19 RX ORDER — ACETAMINOPHEN 325 MG/1
650 TABLET ORAL ONCE
Status: COMPLETED | OUTPATIENT
Start: 2017-09-19 | End: 2017-09-19

## 2017-09-19 RX ORDER — DIPHENHYDRAMINE HYDROCHLORIDE 50 MG/ML
25 INJECTION INTRAMUSCULAR; INTRAVENOUS ONCE
Status: COMPLETED | OUTPATIENT
Start: 2017-09-19 | End: 2017-09-19

## 2017-09-19 RX ORDER — HEPARIN SODIUM (PORCINE) LOCK FLUSH IV SOLN 100 UNIT/ML 100 UNIT/ML
500 SOLUTION INTRAVENOUS PRN
Status: DISCONTINUED | OUTPATIENT
Start: 2017-09-19 | End: 2017-09-20 | Stop reason: HOSPADM

## 2017-09-19 RX ORDER — SODIUM CHLORIDE 0.9 % (FLUSH) 0.9 %
10 SYRINGE (ML) INJECTION PRN
Status: DISCONTINUED | OUTPATIENT
Start: 2017-09-19 | End: 2017-09-20 | Stop reason: HOSPADM

## 2017-09-19 RX ORDER — METHYLPREDNISOLONE SODIUM SUCCINATE 40 MG/ML
40 INJECTION, POWDER, LYOPHILIZED, FOR SOLUTION INTRAMUSCULAR; INTRAVENOUS ONCE
Status: COMPLETED | OUTPATIENT
Start: 2017-09-19 | End: 2017-09-19

## 2017-09-19 RX ADMIN — Medication 10 ML: at 10:51

## 2017-09-19 RX ADMIN — HEPARIN SODIUM (PORCINE) LOCK FLUSH IV SOLN 100 UNIT/ML 500 UNITS: 100 SOLUTION at 10:51

## 2017-09-19 RX ADMIN — ACETAMINOPHEN 650 MG: 325 TABLET ORAL at 08:23

## 2017-09-19 RX ADMIN — DIPHENHYDRAMINE HYDROCHLORIDE 25 MG: 50 INJECTION INTRAMUSCULAR; INTRAVENOUS at 08:27

## 2017-09-19 RX ADMIN — Medication 20 ML: at 08:16

## 2017-09-19 RX ADMIN — METHYLPREDNISOLONE SODIUM SUCCINATE 40 MG: 40 INJECTION, POWDER, LYOPHILIZED, FOR SOLUTION INTRAMUSCULAR; INTRAVENOUS at 08:26

## 2017-10-17 ENCOUNTER — HOSPITAL ENCOUNTER (OUTPATIENT)
Dept: INFUSION THERAPY | Age: 67
Discharge: HOME | End: 2017-10-17
Attending: INTERNAL MEDICINE | Admitting: INTERNAL MEDICINE

## 2017-10-17 VITALS
DIASTOLIC BLOOD PRESSURE: 80 MMHG | RESPIRATION RATE: 16 BRPM | OXYGEN SATURATION: 96 % | HEART RATE: 79 BPM | SYSTOLIC BLOOD PRESSURE: 120 MMHG

## 2017-10-17 LAB
ATYPICAL LYMPHOCYTE ABSOLUTE COUNT: ABNORMAL
BANDED NEUTROPHILS ABSOLUTE COUNT: 0.28 K/CU MM
BANDED NEUTROPHILS RELATIVE PERCENT: 1 % (ref 5–11)
DIFFERENTIAL TYPE: ABNORMAL
HCT VFR BLD CALC: 29.8 % (ref 42–52)
HEMOGLOBIN: 9.8 GM/DL (ref 13.5–18)
LYMPHOCYTES ABSOLUTE: 25.7 K/CU MM
LYMPHOCYTES RELATIVE PERCENT: 92 % (ref 24–44)
MACROCYTES: ABNORMAL
MCH RBC QN AUTO: 34.5 PG (ref 27–31)
MCHC RBC AUTO-ENTMCNC: 32.9 % (ref 32–36)
MCV RBC AUTO: 104.9 FL (ref 78–100)
OVALOCYTES: ABNORMAL
PDW BLD-RTO: 16.3 % (ref 11.7–14.9)
PLATELET # BLD: 106 K/CU MM (ref 140–440)
PMV BLD AUTO: 11.2 FL (ref 7.5–11.1)
POLYCHROMASIA: ABNORMAL
RBC # BLD: 2.84 M/CU MM (ref 4.6–6.2)
RBC # BLD: ABNORMAL 10*6/UL
SEGMENTED NEUTROPHILS ABSOLUTE COUNT: 2 K/CU MM
SEGMENTED NEUTROPHILS RELATIVE PERCENT: 7 % (ref 36–66)
SMUDGE CELLS: PRESENT
WBC # BLD: 28 K/CU MM (ref 4–10.5)

## 2017-10-17 RX ORDER — DIPHENHYDRAMINE HYDROCHLORIDE 50 MG/ML
25 INJECTION INTRAMUSCULAR; INTRAVENOUS ONCE
Status: COMPLETED | OUTPATIENT
Start: 2017-10-17 | End: 2017-10-17

## 2017-10-17 RX ORDER — ACETAMINOPHEN 325 MG/1
650 TABLET ORAL ONCE
Status: COMPLETED | OUTPATIENT
Start: 2017-10-17 | End: 2017-10-17

## 2017-10-17 RX ORDER — HEPARIN SODIUM (PORCINE) LOCK FLUSH IV SOLN 100 UNIT/ML 100 UNIT/ML
500 SOLUTION INTRAVENOUS PRN
Status: DISPENSED | OUTPATIENT
Start: 2017-10-17 | End: 2017-10-17

## 2017-10-17 RX ORDER — METHYLPREDNISOLONE SODIUM SUCCINATE 40 MG/ML
40 INJECTION, POWDER, LYOPHILIZED, FOR SOLUTION INTRAMUSCULAR; INTRAVENOUS ONCE
Status: COMPLETED | OUTPATIENT
Start: 2017-10-17 | End: 2017-10-17

## 2017-10-17 RX ORDER — SODIUM CHLORIDE 0.9 % (FLUSH) 0.9 %
10 SYRINGE (ML) INJECTION PRN
Status: ACTIVE | OUTPATIENT
Start: 2017-10-17 | End: 2017-10-17

## 2017-10-17 RX ADMIN — Medication 10 ML: at 09:00

## 2017-10-17 RX ADMIN — Medication 10 ML: at 08:55

## 2017-10-17 RX ADMIN — DIPHENHYDRAMINE HYDROCHLORIDE 25 MG: 50 INJECTION INTRAMUSCULAR; INTRAVENOUS at 08:58

## 2017-10-17 RX ADMIN — HEPARIN SODIUM (PORCINE) LOCK FLUSH IV SOLN 100 UNIT/ML 500 UNITS: 100 SOLUTION at 11:18

## 2017-10-17 RX ADMIN — Medication 10 ML: at 08:57

## 2017-10-17 RX ADMIN — METHYLPREDNISOLONE SODIUM SUCCINATE 40 MG: 40 INJECTION, POWDER, LYOPHILIZED, FOR SOLUTION INTRAMUSCULAR; INTRAVENOUS at 08:55

## 2017-10-17 RX ADMIN — Medication 20 ML: at 08:10

## 2017-10-17 RX ADMIN — Medication 10 ML: at 11:18

## 2017-10-17 RX ADMIN — ACETAMINOPHEN 650 MG: 325 TABLET ORAL at 08:55

## 2017-10-17 ASSESSMENT — PAIN SCALES - GENERAL: PAINLEVEL_OUTOF10: 0

## 2017-10-31 ENCOUNTER — HOSPITAL ENCOUNTER (OUTPATIENT)
Dept: OTHER | Age: 67
Discharge: HOME | End: 2017-10-31
Attending: INTERNAL MEDICINE | Admitting: INTERNAL MEDICINE

## 2017-10-31 LAB
ALBUMIN SERPL-MCNC: 4.4 GM/DL (ref 3.4–5)
ALP BLD-CCNC: 89 IU/L (ref 40–129)
ALT SERPL-CCNC: 10 U/L (ref 10–40)
ANION GAP SERPL CALCULATED.3IONS-SCNC: 13 MMOL/L (ref 4–16)
ANISOCYTOSIS: ABNORMAL
AST SERPL-CCNC: 8 IU/L (ref 15–37)
BANDED NEUTROPHILS ABSOLUTE COUNT: 0.68 K/CU MM
BANDED NEUTROPHILS RELATIVE PERCENT: 1 % (ref 5–11)
BILIRUB SERPL-MCNC: 0.7 MG/DL (ref 0–1)
BUN BLDV-MCNC: 14 MG/DL (ref 6–23)
CALCIUM SERPL-MCNC: 8.9 MG/DL (ref 8.3–10.6)
CHLORIDE BLD-SCNC: 100 MMOL/L (ref 99–110)
CO2: 26 MMOL/L (ref 21–32)
CREAT SERPL-MCNC: 0.9 MG/DL (ref 0.9–1.3)
DIFFERENTIAL TYPE: ABNORMAL
GFR AFRICAN AMERICAN: >60 ML/MIN/1.73M2
GFR NON-AFRICAN AMERICAN: >60 ML/MIN/1.73M2
GLUCOSE BLD-MCNC: 173 MG/DL (ref 70–140)
HCT VFR BLD CALC: 30 % (ref 42–52)
HEMOGLOBIN: 9.4 GM/DL (ref 13.5–18)
LACTATE DEHYDROGENASE: 111 IU/L (ref 120–246)
LYMPHOCYTES ABSOLUTE: 65.4 K/CU MM
LYMPHOCYTES RELATIVE PERCENT: 96 % (ref 24–44)
MCH RBC QN AUTO: 33.7 PG (ref 27–31)
MCHC RBC AUTO-ENTMCNC: 31.3 % (ref 32–36)
MCV RBC AUTO: 107.5 FL (ref 78–100)
OVALOCYTES: ABNORMAL
PDW BLD-RTO: 18.1 % (ref 11.7–14.9)
PLATELET # BLD: 114 K/CU MM (ref 140–440)
PMV BLD AUTO: 12.1 FL (ref 7.5–11.1)
POTASSIUM SERPL-SCNC: 4.1 MMOL/L (ref 3.5–5.1)
RBC # BLD: 2.79 M/CU MM (ref 4.6–6.2)
SEGMENTED NEUTROPHILS ABSOLUTE COUNT: 2 K/CU MM
SEGMENTED NEUTROPHILS RELATIVE PERCENT: 3 % (ref 36–66)
SMUDGE CELLS: PRESENT
SODIUM BLD-SCNC: 139 MMOL/L (ref 135–145)
TOTAL PROTEIN: 6.4 GM/DL (ref 6.4–8.2)
URIC ACID: 5.1 MG/DL (ref 3.5–7.2)
WBC # BLD: 68.1 K/CU MM (ref 4–10.5)

## 2017-11-14 ENCOUNTER — HOSPITAL ENCOUNTER (OUTPATIENT)
Dept: INFUSION THERAPY | Age: 67
Discharge: HOME | End: 2017-11-14
Attending: INTERNAL MEDICINE | Admitting: INTERNAL MEDICINE

## 2017-11-14 ENCOUNTER — HOSPITAL ENCOUNTER (OUTPATIENT)
Dept: INFUSION THERAPY | Age: 67
Discharge: HOME | End: 2017-11-10
Attending: INTERNAL MEDICINE | Admitting: INTERNAL MEDICINE

## 2017-11-14 VITALS
HEIGHT: 72 IN | RESPIRATION RATE: 16 BRPM | OXYGEN SATURATION: 95 % | WEIGHT: 194 LBS | HEART RATE: 72 BPM | DIASTOLIC BLOOD PRESSURE: 74 MMHG | BODY MASS INDEX: 26.28 KG/M2 | TEMPERATURE: 98.3 F | SYSTOLIC BLOOD PRESSURE: 126 MMHG

## 2017-11-14 LAB
ALBUMIN SERPL-MCNC: 4.5 GM/DL (ref 3.4–5)
ALP BLD-CCNC: 84 IU/L (ref 40–129)
ALT SERPL-CCNC: 16 U/L (ref 10–40)
ANION GAP SERPL CALCULATED.3IONS-SCNC: 12 MMOL/L (ref 4–16)
ANISOCYTOSIS: ABNORMAL
AST SERPL-CCNC: 12 IU/L (ref 15–37)
ATYPICAL LYMPHOCYTE ABSOLUTE COUNT: ABNORMAL
BILIRUB SERPL-MCNC: 0.8 MG/DL (ref 0–1)
BUN BLDV-MCNC: 12 MG/DL (ref 6–23)
CALCIUM SERPL-MCNC: 9.1 MG/DL (ref 8.3–10.6)
CHLORIDE BLD-SCNC: 101 MMOL/L (ref 99–110)
CO2: 26 MMOL/L (ref 21–32)
CREAT SERPL-MCNC: 0.8 MG/DL (ref 0.9–1.3)
DIFFERENTIAL TYPE: ABNORMAL
EOSINOPHILS ABSOLUTE: 1.2 K/CU MM
EOSINOPHILS RELATIVE PERCENT: 1 % (ref 0–3)
GFR AFRICAN AMERICAN: >60 ML/MIN/1.73M2
GFR NON-AFRICAN AMERICAN: >60 ML/MIN/1.73M2
GLUCOSE BLD-MCNC: 63 MG/DL (ref 70–140)
HCT VFR BLD CALC: 29.8 % (ref 42–52)
HEMOGLOBIN: 9 GM/DL (ref 13.5–18)
LACTATE DEHYDROGENASE: 147 IU/L (ref 120–246)
LYMPHOCYTES ABSOLUTE: 119.6 K/CU MM
LYMPHOCYTES RELATIVE PERCENT: 97 % (ref 24–44)
MACROCYTES: ABNORMAL
MCH RBC QN AUTO: 34 PG (ref 27–31)
MCHC RBC AUTO-ENTMCNC: 30.2 % (ref 32–36)
MCV RBC AUTO: 112.5 FL (ref 78–100)
METAMYELOCYTES ABSOLUTE COUNT: 1.23 K/CU MM
METAMYELOCYTES PERCENT: 1 %
PDW BLD-RTO: 22.1 % (ref 11.7–14.9)
PLATELET # BLD: 130 K/CU MM (ref 140–440)
PLT MORPHOLOGY: ABNORMAL
PMV BLD AUTO: 12 FL (ref 7.5–11.1)
POLYCHROMASIA: ABNORMAL
POTASSIUM SERPL-SCNC: 3.9 MMOL/L (ref 3.5–5.1)
RBC # BLD: 2.65 M/CU MM (ref 4.6–6.2)
SEGMENTED NEUTROPHILS ABSOLUTE COUNT: 1.2 K/CU MM
SEGMENTED NEUTROPHILS RELATIVE PERCENT: 1 % (ref 36–66)
SMUDGE CELLS: PRESENT
SODIUM BLD-SCNC: 139 MMOL/L (ref 135–145)
TOTAL PROTEIN: 6.4 GM/DL (ref 6.4–8.2)
TOXIC GRANULATION: PRESENT
URIC ACID: 5.5 MG/DL (ref 3.5–7.2)
WBC # BLD: 123.2 K/CU MM (ref 4–10.5)

## 2017-11-14 RX ORDER — DIPHENHYDRAMINE HYDROCHLORIDE 50 MG/ML
25 INJECTION INTRAMUSCULAR; INTRAVENOUS ONCE
Status: DISCONTINUED | OUTPATIENT
Start: 2017-11-14 | End: 2019-04-11

## 2017-11-14 RX ORDER — HEPARIN SODIUM (PORCINE) LOCK FLUSH IV SOLN 100 UNIT/ML 100 UNIT/ML
500 SOLUTION INTRAVENOUS ONCE
Status: COMPLETED | OUTPATIENT
Start: 2017-11-14 | End: 2017-11-14

## 2017-11-14 RX ORDER — METHYLPREDNISOLONE SODIUM SUCCINATE 40 MG/ML
40 INJECTION, POWDER, LYOPHILIZED, FOR SOLUTION INTRAMUSCULAR; INTRAVENOUS ONCE
Status: DISCONTINUED | OUTPATIENT
Start: 2017-11-14 | End: 2019-04-11 | Stop reason: SDUPTHER

## 2017-11-14 RX ORDER — DIPHENHYDRAMINE HYDROCHLORIDE 50 MG/ML
25 INJECTION INTRAMUSCULAR; INTRAVENOUS ONCE
Status: COMPLETED | OUTPATIENT
Start: 2017-11-14 | End: 2017-11-14

## 2017-11-14 RX ORDER — 0.9 % SODIUM CHLORIDE 0.9 %
10 VIAL (ML) INJECTION PRN
Status: DISCONTINUED | OUTPATIENT
Start: 2017-11-14 | End: 2017-11-15 | Stop reason: HOSPADM

## 2017-11-14 RX ORDER — SODIUM CHLORIDE 0.9 % (FLUSH) 0.9 %
10 SYRINGE (ML) INJECTION PRN
Status: ACTIVE | OUTPATIENT
Start: 2017-11-14

## 2017-11-14 RX ORDER — METHYLPREDNISOLONE SODIUM SUCCINATE 40 MG/ML
40 INJECTION, POWDER, LYOPHILIZED, FOR SOLUTION INTRAMUSCULAR; INTRAVENOUS ONCE
Status: COMPLETED | OUTPATIENT
Start: 2017-11-14 | End: 2017-11-14

## 2017-11-14 RX ORDER — ACETAMINOPHEN 325 MG/1
650 TABLET ORAL ONCE
Status: COMPLETED | OUTPATIENT
Start: 2017-11-14 | End: 2017-11-14

## 2017-11-14 RX ORDER — ACETAMINOPHEN 325 MG/1
TABLET ORAL
Status: COMPLETED
Start: 2017-11-14 | End: 2017-11-14

## 2017-11-14 RX ORDER — HEPARIN SODIUM (PORCINE) LOCK FLUSH IV SOLN 100 UNIT/ML 100 UNIT/ML
500 SOLUTION INTRAVENOUS PRN
Status: DISCONTINUED | OUTPATIENT
Start: 2017-11-14 | End: 2019-04-11 | Stop reason: SDUPTHER

## 2017-11-14 RX ORDER — ACETAMINOPHEN 325 MG/1
650 TABLET ORAL ONCE
Status: DISCONTINUED | OUTPATIENT
Start: 2017-11-14 | End: 2019-04-11 | Stop reason: SDUPTHER

## 2017-11-14 RX ORDER — METHYLPREDNISOLONE SODIUM SUCCINATE 40 MG/ML
INJECTION, POWDER, LYOPHILIZED, FOR SOLUTION INTRAMUSCULAR; INTRAVENOUS
Status: COMPLETED
Start: 2017-11-14 | End: 2017-11-14

## 2017-11-14 RX ORDER — DIPHENHYDRAMINE HYDROCHLORIDE 50 MG/ML
INJECTION INTRAMUSCULAR; INTRAVENOUS
Status: COMPLETED
Start: 2017-11-14 | End: 2017-11-14

## 2017-11-14 RX ADMIN — Medication 10 ML: at 11:40

## 2017-11-14 RX ADMIN — Medication 10 ML: at 09:03

## 2017-11-14 RX ADMIN — METHYLPREDNISOLONE SODIUM SUCCINATE 40 MG: 40 INJECTION, POWDER, LYOPHILIZED, FOR SOLUTION INTRAMUSCULAR; INTRAVENOUS at 09:00

## 2017-11-14 RX ADMIN — Medication 10 ML: at 11:35

## 2017-11-14 RX ADMIN — DIPHENHYDRAMINE HYDROCHLORIDE 50 MG: 50 INJECTION INTRAMUSCULAR; INTRAVENOUS at 09:05

## 2017-11-14 RX ADMIN — HEPARIN SODIUM (PORCINE) LOCK FLUSH IV SOLN 100 UNIT/ML 500 UNITS: 100 SOLUTION at 11:40

## 2017-11-14 RX ADMIN — Medication 10 ML: at 08:50

## 2017-11-14 RX ADMIN — ACETAMINOPHEN 650 MG: 325 TABLET ORAL at 09:00

## 2017-11-14 ASSESSMENT — PAIN SCALES - GENERAL: PAINLEVEL_OUTOF10: 0

## 2017-11-14 NOTE — DISCHARGE SUMMARY
Tolerated IVIG well. Discharge instructions explained written copy given. Understanding verbalized. Discharged home. Down to private auto per self.

## 2017-11-28 ENCOUNTER — HOSPITAL ENCOUNTER (OUTPATIENT)
Dept: OTHER | Age: 67
Discharge: HOME | End: 2017-11-28
Attending: INTERNAL MEDICINE | Admitting: INTERNAL MEDICINE

## 2017-11-28 LAB
ANISOCYTOSIS: ABNORMAL
DIFFERENTIAL TYPE: ABNORMAL
HCT VFR BLD CALC: 32.2 % (ref 42–52)
HEMOGLOBIN: 9.1 GM/DL (ref 13.5–18)
LACTATE DEHYDROGENASE: 129 IU/L (ref 120–246)
LYMPHOCYTES ABSOLUTE: 98.7 K/CU MM
LYMPHOCYTES RELATIVE PERCENT: 96 % (ref 24–44)
MCH RBC QN AUTO: 34.1 PG (ref 27–31)
MCHC RBC AUTO-ENTMCNC: 28.3 % (ref 32–36)
MCV RBC AUTO: 120.6 FL (ref 78–100)
OVALOCYTES: ABNORMAL
PDW BLD-RTO: 22.6 % (ref 11.7–14.9)
PLATELET # BLD: 104 K/CU MM (ref 140–440)
PMV BLD AUTO: 11.9 FL (ref 7.5–11.1)
RBC # BLD: 2.67 M/CU MM (ref 4.6–6.2)
RBC # BLD: ABNORMAL 10*6/UL
SEGMENTED NEUTROPHILS ABSOLUTE COUNT: 4.1 K/CU MM
SEGMENTED NEUTROPHILS RELATIVE PERCENT: 4 % (ref 36–66)
URIC ACID: 6.3 MG/DL (ref 3.5–7.2)
WBC # BLD: 102.8 K/CU MM (ref 4–10.5)

## 2017-12-11 ENCOUNTER — HOSPITAL ENCOUNTER (OUTPATIENT)
Dept: OTHER | Age: 67
Discharge: HOME | End: 2017-12-11
Attending: INTERNAL MEDICINE | Admitting: INTERNAL MEDICINE

## 2017-12-11 LAB
ALBUMIN SERPL-MCNC: 4.5 GM/DL (ref 3.4–5)
ALP BLD-CCNC: 68 IU/L (ref 40–128)
ALT SERPL-CCNC: 20 U/L (ref 10–40)
ANION GAP SERPL CALCULATED.3IONS-SCNC: 12 MMOL/L (ref 4–16)
AST SERPL-CCNC: 15 IU/L (ref 15–37)
BILIRUB SERPL-MCNC: 0.7 MG/DL (ref 0–1)
BUN BLDV-MCNC: 10 MG/DL (ref 6–23)
CALCIUM SERPL-MCNC: 9.2 MG/DL (ref 8.3–10.6)
CHLORIDE BLD-SCNC: 103 MMOL/L (ref 99–110)
CO2: 25 MMOL/L (ref 21–32)
CREAT SERPL-MCNC: 0.9 MG/DL (ref 0.9–1.3)
GFR AFRICAN AMERICAN: >60 ML/MIN/1.73M2
GFR NON-AFRICAN AMERICAN: >60 ML/MIN/1.73M2
GLUCOSE BLD-MCNC: 100 MG/DL (ref 70–99)
POTASSIUM SERPL-SCNC: 4.4 MMOL/L (ref 3.5–5.1)
SODIUM BLD-SCNC: 140 MMOL/L (ref 135–145)
TOTAL PROTEIN: 6.2 GM/DL (ref 6.4–8.2)

## 2017-12-12 ENCOUNTER — HOSPITAL ENCOUNTER (OUTPATIENT)
Dept: INFUSION THERAPY | Age: 67
Discharge: HOME | End: 2017-12-12
Attending: INTERNAL MEDICINE | Admitting: INTERNAL MEDICINE

## 2017-12-12 VITALS
TEMPERATURE: 99 F | RESPIRATION RATE: 18 BRPM | SYSTOLIC BLOOD PRESSURE: 136 MMHG | DIASTOLIC BLOOD PRESSURE: 73 MMHG | OXYGEN SATURATION: 97 % | HEART RATE: 76 BPM

## 2017-12-12 RX ORDER — HEPARIN SODIUM (PORCINE) LOCK FLUSH IV SOLN 100 UNIT/ML 100 UNIT/ML
500 SOLUTION INTRAVENOUS PRN
Status: DISCONTINUED | OUTPATIENT
Start: 2017-12-12 | End: 2017-12-13 | Stop reason: HOSPADM

## 2017-12-12 RX ORDER — ACETAMINOPHEN 325 MG/1
650 TABLET ORAL ONCE
Status: COMPLETED | OUTPATIENT
Start: 2017-12-12 | End: 2017-12-12

## 2017-12-12 RX ORDER — SODIUM CHLORIDE 0.9 % (FLUSH) 0.9 %
10 SYRINGE (ML) INJECTION PRN
Status: DISCONTINUED | OUTPATIENT
Start: 2017-12-12 | End: 2017-12-13 | Stop reason: HOSPADM

## 2017-12-12 RX ORDER — DIPHENHYDRAMINE HYDROCHLORIDE 50 MG/ML
25 INJECTION INTRAMUSCULAR; INTRAVENOUS ONCE
Status: COMPLETED | OUTPATIENT
Start: 2017-12-12 | End: 2017-12-12

## 2017-12-12 RX ORDER — METHYLPREDNISOLONE SODIUM SUCCINATE 40 MG/ML
40 INJECTION, POWDER, LYOPHILIZED, FOR SOLUTION INTRAMUSCULAR; INTRAVENOUS ONCE
Status: COMPLETED | OUTPATIENT
Start: 2017-12-12 | End: 2017-12-12

## 2017-12-12 RX ADMIN — ACETAMINOPHEN 650 MG: 325 TABLET ORAL at 08:24

## 2017-12-12 RX ADMIN — METHYLPREDNISOLONE SODIUM SUCCINATE 40 MG: 40 INJECTION, POWDER, LYOPHILIZED, FOR SOLUTION INTRAMUSCULAR; INTRAVENOUS at 08:25

## 2017-12-12 RX ADMIN — Medication 20 ML: at 08:12

## 2017-12-12 RX ADMIN — Medication 10 ML: at 11:00

## 2017-12-12 RX ADMIN — HEPARIN SODIUM (PORCINE) LOCK FLUSH IV SOLN 100 UNIT/ML 500 UNITS: 100 SOLUTION at 11:00

## 2017-12-12 RX ADMIN — DIPHENHYDRAMINE HYDROCHLORIDE 25 MG: 50 INJECTION INTRAMUSCULAR; INTRAVENOUS at 08:29

## 2017-12-12 RX ADMIN — Medication: at 08:30

## 2017-12-12 NOTE — PLAN OF CARE
Pt taken to room 4   . Pt oriented to room, call light, bed/chair controls, TV, pt voiced understanding. Plan of care explained to pt, pt voiced understanding.

## 2018-01-09 ENCOUNTER — HOSPITAL ENCOUNTER (OUTPATIENT)
Dept: INFUSION THERAPY | Age: 68
Discharge: HOME | End: 2018-01-09
Attending: INTERNAL MEDICINE | Admitting: INTERNAL MEDICINE

## 2018-01-09 VITALS
DIASTOLIC BLOOD PRESSURE: 73 MMHG | HEART RATE: 78 BPM | OXYGEN SATURATION: 98 % | SYSTOLIC BLOOD PRESSURE: 124 MMHG | RESPIRATION RATE: 18 BRPM

## 2018-01-09 RX ORDER — SODIUM CHLORIDE 0.9 % (FLUSH) 0.9 %
10 SYRINGE (ML) INJECTION PRN
Status: DISCONTINUED | OUTPATIENT
Start: 2018-01-09 | End: 2018-01-10 | Stop reason: HOSPADM

## 2018-01-09 RX ORDER — ACETAMINOPHEN 325 MG/1
650 TABLET ORAL ONCE
Status: COMPLETED | OUTPATIENT
Start: 2018-01-09 | End: 2018-01-09

## 2018-01-09 RX ORDER — METHYLPREDNISOLONE SODIUM SUCCINATE 40 MG/ML
40 INJECTION, POWDER, LYOPHILIZED, FOR SOLUTION INTRAMUSCULAR; INTRAVENOUS ONCE
Status: COMPLETED | OUTPATIENT
Start: 2018-01-09 | End: 2018-01-09

## 2018-01-09 RX ORDER — HEPARIN SODIUM (PORCINE) LOCK FLUSH IV SOLN 100 UNIT/ML 100 UNIT/ML
500 SOLUTION INTRAVENOUS PRN
Status: DISCONTINUED | OUTPATIENT
Start: 2018-01-09 | End: 2018-01-10 | Stop reason: HOSPADM

## 2018-01-09 RX ORDER — DIPHENHYDRAMINE HYDROCHLORIDE 50 MG/ML
25 INJECTION INTRAMUSCULAR; INTRAVENOUS ONCE
Status: COMPLETED | OUTPATIENT
Start: 2018-01-09 | End: 2018-01-09

## 2018-01-09 RX ADMIN — Medication 10 ML: at 08:19

## 2018-01-09 RX ADMIN — HEPARIN SODIUM (PORCINE) LOCK FLUSH IV SOLN 100 UNIT/ML 500 UNITS: 100 SOLUTION at 12:28

## 2018-01-09 RX ADMIN — DIPHENHYDRAMINE HYDROCHLORIDE 25 MG: 50 INJECTION INTRAMUSCULAR; INTRAVENOUS at 08:54

## 2018-01-09 RX ADMIN — METHYLPREDNISOLONE SODIUM SUCCINATE 40 MG: 40 INJECTION, POWDER, LYOPHILIZED, FOR SOLUTION INTRAMUSCULAR; INTRAVENOUS at 08:53

## 2018-01-09 RX ADMIN — ACETAMINOPHEN 650 MG: 325 TABLET ORAL at 08:48

## 2018-01-09 RX ADMIN — Medication 10 ML: at 08:18

## 2018-01-09 RX ADMIN — Medication 10 ML: at 12:28

## 2018-01-09 NOTE — DISCHARGE SUMMARY
Tolerated infusion of ivig. Discharge instructions reviewed, signed and copy taken. Denies questions or concerns, states understanding. Off unit per self gait steady.

## 2018-02-06 ENCOUNTER — HOSPITAL ENCOUNTER (OUTPATIENT)
Dept: INFUSION THERAPY | Age: 68
Discharge: HOME | End: 2018-02-06
Attending: INTERNAL MEDICINE | Admitting: INTERNAL MEDICINE

## 2018-02-06 VITALS
SYSTOLIC BLOOD PRESSURE: 134 MMHG | DIASTOLIC BLOOD PRESSURE: 82 MMHG | HEART RATE: 66 BPM | TEMPERATURE: 98.2 F | WEIGHT: 194 LBS | BODY MASS INDEX: 26.28 KG/M2 | RESPIRATION RATE: 16 BRPM | HEIGHT: 72 IN | OXYGEN SATURATION: 96 %

## 2018-02-06 RX ORDER — HEPARIN SODIUM (PORCINE) LOCK FLUSH IV SOLN 100 UNIT/ML 100 UNIT/ML
500 SOLUTION INTRAVENOUS ONCE
Status: COMPLETED | OUTPATIENT
Start: 2018-02-06 | End: 2018-02-06

## 2018-02-06 RX ORDER — DIPHENHYDRAMINE HYDROCHLORIDE 50 MG/ML
25 INJECTION INTRAMUSCULAR; INTRAVENOUS ONCE
Status: COMPLETED | OUTPATIENT
Start: 2018-02-06 | End: 2018-02-06

## 2018-02-06 RX ORDER — 0.9 % SODIUM CHLORIDE 0.9 %
20 VIAL (ML) INJECTION PRN
Status: DISCONTINUED | OUTPATIENT
Start: 2018-02-06 | End: 2018-02-07 | Stop reason: HOSPADM

## 2018-02-06 RX ORDER — ACETAMINOPHEN 325 MG/1
650 TABLET ORAL ONCE
Status: COMPLETED | OUTPATIENT
Start: 2018-02-06 | End: 2018-02-06

## 2018-02-06 RX ORDER — METHYLPREDNISOLONE SODIUM SUCCINATE 40 MG/ML
40 INJECTION, POWDER, LYOPHILIZED, FOR SOLUTION INTRAMUSCULAR; INTRAVENOUS EVERY 6 HOURS
Status: DISCONTINUED | OUTPATIENT
Start: 2018-02-06 | End: 2018-02-07 | Stop reason: HOSPADM

## 2018-02-06 RX ADMIN — DIPHENHYDRAMINE HYDROCHLORIDE 25 MG: 50 INJECTION INTRAMUSCULAR; INTRAVENOUS at 09:43

## 2018-02-06 RX ADMIN — HEPARIN SODIUM (PORCINE) LOCK FLUSH IV SOLN 100 UNIT/ML 500 UNITS: 100 SOLUTION at 11:45

## 2018-02-06 RX ADMIN — Medication 10 ML: at 09:43

## 2018-02-06 RX ADMIN — ACETAMINOPHEN 650 MG: 325 TABLET ORAL at 09:30

## 2018-02-06 RX ADMIN — Medication 20 ML: at 11:45

## 2018-02-06 RX ADMIN — METHYLPREDNISOLONE SODIUM SUCCINATE 40 MG: 40 INJECTION, POWDER, LYOPHILIZED, FOR SOLUTION INTRAMUSCULAR; INTRAVENOUS at 09:30

## 2018-02-06 ASSESSMENT — PAIN SCALES - GENERAL: PAINLEVEL_OUTOF10: 0

## 2018-02-19 ENCOUNTER — HOSPITAL ENCOUNTER (OUTPATIENT)
Dept: OTHER | Age: 68
Discharge: HOME | End: 2018-02-19
Attending: INTERNAL MEDICINE | Admitting: INTERNAL MEDICINE

## 2018-02-19 LAB
ALBUMIN SERPL-MCNC: 4.7 GM/DL (ref 3.4–5)
ALP BLD-CCNC: 65 IU/L (ref 40–128)
ALT SERPL-CCNC: 17 U/L (ref 10–40)
ANION GAP SERPL CALCULATED.3IONS-SCNC: 13 MMOL/L (ref 4–16)
AST SERPL-CCNC: 13 IU/L (ref 15–37)
BILIRUB SERPL-MCNC: 0.6 MG/DL (ref 0–1)
BUN BLDV-MCNC: 17 MG/DL (ref 6–23)
CALCIUM SERPL-MCNC: 9.1 MG/DL (ref 8.3–10.6)
CHLORIDE BLD-SCNC: 102 MMOL/L (ref 99–110)
CO2: 26 MMOL/L (ref 21–32)
CREAT SERPL-MCNC: 1 MG/DL (ref 0.9–1.3)
GFR AFRICAN AMERICAN: >60 ML/MIN/1.73M2
GFR NON-AFRICAN AMERICAN: >60 ML/MIN/1.73M2
GLUCOSE BLD-MCNC: 112 MG/DL (ref 70–99)
IGA: <50 MG/DL (ref 69–382)
IGG,SERUM: 551 MG/DL (ref 723–1685)
IGM,SERUM: 25 MG/DL (ref 62–277)
POTASSIUM SERPL-SCNC: 4.6 MMOL/L (ref 3.5–5.1)
SODIUM BLD-SCNC: 141 MMOL/L (ref 135–145)
TOTAL PROTEIN: 6.6 GM/DL (ref 6.4–8.2)

## 2018-03-06 ENCOUNTER — HOSPITAL ENCOUNTER (OUTPATIENT)
Dept: INFUSION THERAPY | Age: 68
Discharge: HOME | End: 2018-03-06
Attending: INTERNAL MEDICINE | Admitting: INTERNAL MEDICINE

## 2018-03-06 RX ORDER — HEPARIN SODIUM (PORCINE) LOCK FLUSH IV SOLN 100 UNIT/ML 100 UNIT/ML
500 SOLUTION INTRAVENOUS PRN
Status: DISCONTINUED | OUTPATIENT
Start: 2018-03-06 | End: 2018-03-06

## 2018-03-06 RX ORDER — SODIUM CHLORIDE 0.9 % (FLUSH) 0.9 %
10 SYRINGE (ML) INJECTION PRN
Status: DISCONTINUED | OUTPATIENT
Start: 2018-03-06 | End: 2018-03-06

## 2018-03-06 RX ORDER — DIPHENHYDRAMINE HYDROCHLORIDE 50 MG/ML
25 INJECTION INTRAMUSCULAR; INTRAVENOUS ONCE
Status: DISCONTINUED | OUTPATIENT
Start: 2018-03-06 | End: 2018-03-06

## 2018-03-06 RX ORDER — METHYLPREDNISOLONE SODIUM SUCCINATE 40 MG/ML
40 INJECTION, POWDER, LYOPHILIZED, FOR SOLUTION INTRAMUSCULAR; INTRAVENOUS ONCE
Status: DISCONTINUED | OUTPATIENT
Start: 2018-03-06 | End: 2018-03-06

## 2018-03-06 RX ORDER — ACETAMINOPHEN 325 MG/1
650 TABLET ORAL ONCE
Status: DISCONTINUED | OUTPATIENT
Start: 2018-03-06 | End: 2018-03-06

## 2018-03-07 ENCOUNTER — HOSPITAL ENCOUNTER (OUTPATIENT)
Dept: INFUSION THERAPY | Age: 68
Discharge: HOME | End: 2018-03-31
Attending: INTERNAL MEDICINE | Admitting: INTERNAL MEDICINE

## 2018-03-07 VITALS
HEART RATE: 66 BPM | BODY MASS INDEX: 26.28 KG/M2 | DIASTOLIC BLOOD PRESSURE: 72 MMHG | HEIGHT: 72 IN | WEIGHT: 194 LBS | TEMPERATURE: 98.3 F | RESPIRATION RATE: 16 BRPM | SYSTOLIC BLOOD PRESSURE: 130 MMHG

## 2018-03-07 RX ORDER — METHYLPREDNISOLONE SODIUM SUCCINATE 40 MG/ML
40 INJECTION, POWDER, LYOPHILIZED, FOR SOLUTION INTRAMUSCULAR; INTRAVENOUS ONCE
Status: COMPLETED | OUTPATIENT
Start: 2018-03-07 | End: 2018-03-07

## 2018-03-07 RX ORDER — DIPHENHYDRAMINE HYDROCHLORIDE 50 MG/ML
25 INJECTION INTRAMUSCULAR; INTRAVENOUS ONCE
Status: COMPLETED | OUTPATIENT
Start: 2018-03-07 | End: 2018-03-07

## 2018-03-07 RX ORDER — SODIUM CHLORIDE 0.9 % (FLUSH) 0.9 %
10 SYRINGE (ML) INJECTION PRN
Status: DISCONTINUED | OUTPATIENT
Start: 2018-03-07 | End: 2018-04-01 | Stop reason: HOSPADM

## 2018-03-07 RX ORDER — ACETAMINOPHEN 325 MG/1
650 TABLET ORAL ONCE
Status: COMPLETED | OUTPATIENT
Start: 2018-03-07 | End: 2018-03-07

## 2018-03-07 RX ORDER — HEPARIN SODIUM (PORCINE) LOCK FLUSH IV SOLN 100 UNIT/ML 100 UNIT/ML
500 SOLUTION INTRAVENOUS PRN
Status: DISCONTINUED | OUTPATIENT
Start: 2018-03-07 | End: 2018-04-01 | Stop reason: HOSPADM

## 2018-03-07 RX ADMIN — HEPARIN SODIUM (PORCINE) LOCK FLUSH IV SOLN 100 UNIT/ML 500 UNITS: 100 SOLUTION at 11:05

## 2018-03-07 RX ADMIN — DIPHENHYDRAMINE HYDROCHLORIDE 25 MG: 50 INJECTION INTRAMUSCULAR; INTRAVENOUS at 09:58

## 2018-03-07 RX ADMIN — ACETAMINOPHEN 650 MG: 325 TABLET ORAL at 08:50

## 2018-03-07 RX ADMIN — Medication 10 ML: at 11:04

## 2018-03-07 RX ADMIN — Medication 10 ML: at 11:03

## 2018-03-07 RX ADMIN — Medication 10 ML: at 08:57

## 2018-03-07 RX ADMIN — METHYLPREDNISOLONE SODIUM SUCCINATE 40 MG: 40 INJECTION, POWDER, LYOPHILIZED, FOR SOLUTION INTRAMUSCULAR; INTRAVENOUS at 08:55

## 2018-03-07 RX ADMIN — Medication 10 ML: at 09:00

## 2018-03-07 RX ADMIN — Medication 10 ML: at 08:50

## 2018-03-07 ASSESSMENT — PAIN SCALES - GENERAL: PAINLEVEL_OUTOF10: 0

## 2018-04-01 ENCOUNTER — HOSPITAL ENCOUNTER (OUTPATIENT)
Dept: OTHER | Age: 68
Discharge: HOME | End: 2018-04-30
Attending: INTERNAL MEDICINE | Admitting: INTERNAL MEDICINE

## 2018-04-04 ENCOUNTER — HOSPITAL ENCOUNTER (OUTPATIENT)
Dept: INFUSION THERAPY | Age: 68
Discharge: HOME | End: 2018-04-04
Attending: INTERNAL MEDICINE | Admitting: INTERNAL MEDICINE

## 2018-04-04 VITALS
SYSTOLIC BLOOD PRESSURE: 135 MMHG | HEART RATE: 76 BPM | TEMPERATURE: 98.3 F | RESPIRATION RATE: 16 BRPM | DIASTOLIC BLOOD PRESSURE: 76 MMHG

## 2018-04-04 RX ORDER — METHYLPREDNISOLONE SODIUM SUCCINATE 40 MG/ML
40 INJECTION, POWDER, LYOPHILIZED, FOR SOLUTION INTRAMUSCULAR; INTRAVENOUS ONCE
Status: COMPLETED | OUTPATIENT
Start: 2018-04-04 | End: 2018-04-04

## 2018-04-04 RX ORDER — DIPHENHYDRAMINE HYDROCHLORIDE 50 MG/ML
25 INJECTION INTRAMUSCULAR; INTRAVENOUS ONCE
Status: COMPLETED | OUTPATIENT
Start: 2018-04-04 | End: 2018-04-04

## 2018-04-04 RX ORDER — ACETAMINOPHEN 325 MG/1
650 TABLET ORAL ONCE
Status: COMPLETED | OUTPATIENT
Start: 2018-04-04 | End: 2018-04-04

## 2018-04-04 RX ORDER — SODIUM CHLORIDE 0.9 % (FLUSH) 0.9 %
10 SYRINGE (ML) INJECTION PRN
Status: ACTIVE | OUTPATIENT
Start: 2018-04-04 | End: 2018-04-04

## 2018-04-04 RX ORDER — HEPARIN SODIUM (PORCINE) LOCK FLUSH IV SOLN 100 UNIT/ML 100 UNIT/ML
500 SOLUTION INTRAVENOUS PRN
Status: COMPLETED | OUTPATIENT
Start: 2018-04-04 | End: 2018-04-04

## 2018-04-04 RX ADMIN — HEPARIN SODIUM (PORCINE) LOCK FLUSH IV SOLN 100 UNIT/ML 500 UNITS: 100 SOLUTION at 11:55

## 2018-04-04 RX ADMIN — Medication 10 ML: at 11:55

## 2018-04-04 RX ADMIN — DIPHENHYDRAMINE HYDROCHLORIDE 25 MG: 50 INJECTION INTRAMUSCULAR; INTRAVENOUS at 09:01

## 2018-04-04 RX ADMIN — ACETAMINOPHEN 650 MG: 325 TABLET ORAL at 09:01

## 2018-04-04 RX ADMIN — METHYLPREDNISOLONE SODIUM SUCCINATE 40 MG: 40 INJECTION, POWDER, LYOPHILIZED, FOR SOLUTION INTRAMUSCULAR; INTRAVENOUS at 09:02

## 2018-04-04 ASSESSMENT — PAIN SCALES - GENERAL
PAINLEVEL_OUTOF10: 0
PAINLEVEL_OUTOF10: 0

## 2018-04-19 ENCOUNTER — HOSPITAL ENCOUNTER (OUTPATIENT)
Dept: OTHER | Age: 68
Discharge: HOME | End: 2018-04-19
Attending: INTERNAL MEDICINE | Admitting: INTERNAL MEDICINE

## 2018-04-19 LAB
ALBUMIN SERPL-MCNC: 4.7 GM/DL (ref 3.4–5)
ALP BLD-CCNC: 73 IU/L (ref 40–129)
ALT SERPL-CCNC: 25 U/L (ref 10–40)
ANION GAP SERPL CALCULATED.3IONS-SCNC: 12 MMOL/L (ref 4–16)
AST SERPL-CCNC: 21 IU/L (ref 15–37)
BILIRUB SERPL-MCNC: 0.6 MG/DL (ref 0–1)
BUN BLDV-MCNC: 11 MG/DL (ref 6–23)
CALCIUM SERPL-MCNC: 9.5 MG/DL (ref 8.3–10.6)
CHLORIDE BLD-SCNC: 99 MMOL/L (ref 99–110)
CO2: 28 MMOL/L (ref 21–32)
CREAT SERPL-MCNC: 1 MG/DL (ref 0.9–1.3)
GFR AFRICAN AMERICAN: >60 ML/MIN/1.73M2
GFR NON-AFRICAN AMERICAN: >60 ML/MIN/1.73M2
GLUCOSE BLD-MCNC: 92 MG/DL (ref 70–99)
POTASSIUM SERPL-SCNC: 4.5 MMOL/L (ref 3.5–5.1)
SODIUM BLD-SCNC: 139 MMOL/L (ref 135–145)
TOTAL PROTEIN: 6.8 GM/DL (ref 6.4–8.2)

## 2018-05-02 ENCOUNTER — HOSPITAL ENCOUNTER (OUTPATIENT)
Dept: INFUSION THERAPY | Age: 68
Discharge: HOME | End: 2018-05-31
Attending: INTERNAL MEDICINE | Admitting: INTERNAL MEDICINE

## 2018-05-02 VITALS
HEIGHT: 72 IN | SYSTOLIC BLOOD PRESSURE: 155 MMHG | WEIGHT: 204 LBS | DIASTOLIC BLOOD PRESSURE: 83 MMHG | RESPIRATION RATE: 16 BRPM | BODY MASS INDEX: 27.63 KG/M2 | TEMPERATURE: 97.8 F | HEART RATE: 80 BPM

## 2018-05-02 RX ORDER — HEPARIN SODIUM (PORCINE) LOCK FLUSH IV SOLN 100 UNIT/ML 100 UNIT/ML
500 SOLUTION INTRAVENOUS PRN
Status: DISCONTINUED | OUTPATIENT
Start: 2018-05-02 | End: 2018-06-01 | Stop reason: HOSPADM

## 2018-05-02 RX ORDER — ACETAMINOPHEN 325 MG/1
650 TABLET ORAL ONCE
Status: COMPLETED | OUTPATIENT
Start: 2018-05-02 | End: 2018-05-02

## 2018-05-02 RX ORDER — METHYLPREDNISOLONE SODIUM SUCCINATE 40 MG/ML
40 INJECTION, POWDER, LYOPHILIZED, FOR SOLUTION INTRAMUSCULAR; INTRAVENOUS ONCE
Status: COMPLETED | OUTPATIENT
Start: 2018-05-02 | End: 2018-05-02

## 2018-05-02 RX ORDER — SODIUM CHLORIDE 0.9 % (FLUSH) 0.9 %
10 SYRINGE (ML) INJECTION PRN
Status: DISCONTINUED | OUTPATIENT
Start: 2018-05-02 | End: 2018-06-01 | Stop reason: HOSPADM

## 2018-05-02 RX ORDER — DIPHENHYDRAMINE HYDROCHLORIDE 50 MG/ML
25 INJECTION INTRAMUSCULAR; INTRAVENOUS ONCE
Status: COMPLETED | OUTPATIENT
Start: 2018-05-02 | End: 2018-05-02

## 2018-05-02 RX ADMIN — ACETAMINOPHEN 650 MG: 325 TABLET ORAL at 08:10

## 2018-05-02 RX ADMIN — Medication 10 ML: at 08:12

## 2018-05-02 RX ADMIN — Medication 10 ML: at 10:36

## 2018-05-02 RX ADMIN — METHYLPREDNISOLONE SODIUM SUCCINATE 40 MG: 40 INJECTION, POWDER, LYOPHILIZED, FOR SOLUTION INTRAMUSCULAR; INTRAVENOUS at 08:10

## 2018-05-02 RX ADMIN — DIPHENHYDRAMINE HYDROCHLORIDE 25 MG: 50 INJECTION INTRAMUSCULAR; INTRAVENOUS at 08:13

## 2018-05-02 RX ADMIN — Medication 10 ML: at 08:00

## 2018-05-02 RX ADMIN — Medication 10 ML: at 10:35

## 2018-05-02 RX ADMIN — HEPARIN SODIUM (PORCINE) LOCK FLUSH IV SOLN 100 UNIT/ML 500 UNITS: 100 SOLUTION at 10:37

## 2018-05-02 ASSESSMENT — PAIN SCALES - GENERAL: PAINLEVEL_OUTOF10: 0

## 2018-05-25 ENCOUNTER — HOSPITAL ENCOUNTER (OUTPATIENT)
Dept: OTHER | Age: 68
Discharge: HOME | End: 2018-05-25
Attending: NURSE PRACTITIONER | Admitting: NURSE PRACTITIONER

## 2018-05-25 LAB
LACTATE DEHYDROGENASE: 145 IU/L (ref 120–246)
MAGNESIUM: 2.1 MG/DL (ref 1.8–2.4)

## 2018-05-30 ENCOUNTER — HOSPITAL ENCOUNTER (OUTPATIENT)
Dept: INFUSION THERAPY | Age: 68
Discharge: HOME | End: 2018-05-30
Attending: INTERNAL MEDICINE | Admitting: INTERNAL MEDICINE

## 2018-05-30 VITALS — SYSTOLIC BLOOD PRESSURE: 130 MMHG | DIASTOLIC BLOOD PRESSURE: 75 MMHG | HEART RATE: 85 BPM | RESPIRATION RATE: 20 BRPM

## 2018-05-30 RX ORDER — METHYLPREDNISOLONE SODIUM SUCCINATE 40 MG/ML
40 INJECTION, POWDER, LYOPHILIZED, FOR SOLUTION INTRAMUSCULAR; INTRAVENOUS ONCE
Status: COMPLETED | OUTPATIENT
Start: 2018-05-30 | End: 2018-05-30

## 2018-05-30 RX ORDER — SODIUM CHLORIDE 0.9 % (FLUSH) 0.9 %
10 SYRINGE (ML) INJECTION PRN
Status: DISCONTINUED | OUTPATIENT
Start: 2018-05-30 | End: 2018-05-31 | Stop reason: HOSPADM

## 2018-05-30 RX ORDER — HEPARIN SODIUM (PORCINE) LOCK FLUSH IV SOLN 100 UNIT/ML 100 UNIT/ML
500 SOLUTION INTRAVENOUS PRN
Status: DISCONTINUED | OUTPATIENT
Start: 2018-05-30 | End: 2018-05-31 | Stop reason: HOSPADM

## 2018-05-30 RX ORDER — DIPHENHYDRAMINE HYDROCHLORIDE 50 MG/ML
25 INJECTION INTRAMUSCULAR; INTRAVENOUS ONCE
Status: COMPLETED | OUTPATIENT
Start: 2018-05-30 | End: 2018-05-30

## 2018-05-30 RX ORDER — ACETAMINOPHEN 325 MG/1
650 TABLET ORAL ONCE
Status: COMPLETED | OUTPATIENT
Start: 2018-05-30 | End: 2018-05-30

## 2018-05-30 RX ADMIN — ACETAMINOPHEN 650 MG: 325 TABLET ORAL at 09:01

## 2018-05-30 RX ADMIN — DIPHENHYDRAMINE HYDROCHLORIDE 25 MG: 50 INJECTION INTRAMUSCULAR; INTRAVENOUS at 09:01

## 2018-05-30 RX ADMIN — Medication 10 ML: at 11:50

## 2018-05-30 RX ADMIN — HEPARIN SODIUM (PORCINE) LOCK FLUSH IV SOLN 100 UNIT/ML 500 UNITS: 100 SOLUTION at 11:50

## 2018-05-30 RX ADMIN — METHYLPREDNISOLONE SODIUM SUCCINATE 40 MG: 40 INJECTION, POWDER, LYOPHILIZED, FOR SOLUTION INTRAMUSCULAR; INTRAVENOUS at 09:02

## 2018-05-30 ASSESSMENT — PAIN SCALES - GENERAL
PAINLEVEL_OUTOF10: 0
PAINLEVEL_OUTOF10: 6

## 2018-06-01 ENCOUNTER — HOSPITAL ENCOUNTER (OUTPATIENT)
Dept: CT IMAGING | Age: 68
Discharge: HOME | End: 2018-06-01
Attending: INTERNAL MEDICINE | Admitting: INTERNAL MEDICINE

## 2018-06-01 ENCOUNTER — HOSPITAL ENCOUNTER (OUTPATIENT)
Dept: OTHER | Age: 68
Discharge: HOME | End: 2018-06-30
Attending: INTERNAL MEDICINE | Admitting: INTERNAL MEDICINE

## 2018-06-01 DIAGNOSIS — C91.10 LEUKEMIA, LYMPHOCYTIC, CHRONIC (HCC): ICD-10-CM

## 2018-06-01 DIAGNOSIS — C91.10 CHRONIC LYMPHOCYTIC LEUKEMIA OF B-CELL TYPE NOT HAVING ACHIEVED REMISSION (HCC): ICD-10-CM

## 2018-06-01 RX ORDER — SODIUM CHLORIDE 0.9 % (FLUSH) 0.9 %
10 SYRINGE (ML) INJECTION 2 TIMES DAILY
Status: DISCONTINUED | OUTPATIENT
Start: 2018-06-01 | End: 2018-06-02 | Stop reason: HOSPADM

## 2018-06-01 RX ADMIN — Medication 10 ML: at 15:09

## 2018-06-12 ENCOUNTER — HOSPITAL ENCOUNTER (OUTPATIENT)
Dept: MRI IMAGING | Age: 68
Discharge: HOME | End: 2018-06-12
Attending: INTERNAL MEDICINE | Admitting: INTERNAL MEDICINE

## 2018-06-12 DIAGNOSIS — M62.838 OTHER MUSCLE SPASM: ICD-10-CM

## 2018-06-27 ENCOUNTER — HOSPITAL ENCOUNTER (OUTPATIENT)
Dept: INFUSION THERAPY | Age: 68
Discharge: HOME | End: 2018-06-27
Attending: INTERNAL MEDICINE | Admitting: INTERNAL MEDICINE

## 2018-06-27 VITALS
HEART RATE: 76 BPM | SYSTOLIC BLOOD PRESSURE: 129 MMHG | DIASTOLIC BLOOD PRESSURE: 81 MMHG | BODY MASS INDEX: 26.14 KG/M2 | RESPIRATION RATE: 18 BRPM | HEIGHT: 72 IN | WEIGHT: 193 LBS | TEMPERATURE: 97.8 F

## 2018-06-27 RX ORDER — HEPARIN SODIUM (PORCINE) LOCK FLUSH IV SOLN 100 UNIT/ML 100 UNIT/ML
500 SOLUTION INTRAVENOUS PRN
Status: DISCONTINUED | OUTPATIENT
Start: 2018-06-27 | End: 2018-06-28 | Stop reason: HOSPADM

## 2018-06-27 RX ORDER — ACETAMINOPHEN 325 MG/1
650 TABLET ORAL ONCE
Status: COMPLETED | OUTPATIENT
Start: 2018-06-27 | End: 2018-06-27

## 2018-06-27 RX ORDER — 0.9 % SODIUM CHLORIDE 0.9 %
10 VIAL (ML) INJECTION PRN
Status: DISCONTINUED | OUTPATIENT
Start: 2018-06-27 | End: 2018-06-28 | Stop reason: HOSPADM

## 2018-06-27 RX ORDER — DIPHENHYDRAMINE HYDROCHLORIDE 50 MG/ML
25 INJECTION INTRAMUSCULAR; INTRAVENOUS ONCE
Status: COMPLETED | OUTPATIENT
Start: 2018-06-27 | End: 2018-06-27

## 2018-06-27 RX ORDER — METHYLPREDNISOLONE SODIUM SUCCINATE 40 MG/ML
40 INJECTION, POWDER, LYOPHILIZED, FOR SOLUTION INTRAMUSCULAR; INTRAVENOUS ONCE
Status: COMPLETED | OUTPATIENT
Start: 2018-06-27 | End: 2018-06-27

## 2018-06-27 RX ADMIN — HEPARIN SODIUM (PORCINE) LOCK FLUSH IV SOLN 100 UNIT/ML 500 UNITS: 100 SOLUTION at 11:10

## 2018-06-27 RX ADMIN — Medication 10 ML: at 08:20

## 2018-06-27 RX ADMIN — Medication 10 ML: at 11:10

## 2018-06-27 RX ADMIN — Medication 10 ML: at 08:28

## 2018-06-27 RX ADMIN — DIPHENHYDRAMINE HYDROCHLORIDE 25 MG: 50 INJECTION INTRAMUSCULAR; INTRAVENOUS at 08:28

## 2018-06-27 RX ADMIN — ACETAMINOPHEN 650 MG: 325 TABLET ORAL at 08:28

## 2018-06-27 RX ADMIN — METHYLPREDNISOLONE SODIUM SUCCINATE 40 MG: 40 INJECTION, POWDER, LYOPHILIZED, FOR SOLUTION INTRAMUSCULAR; INTRAVENOUS at 08:28

## 2018-06-27 ASSESSMENT — PAIN SCALES - GENERAL: PAINLEVEL_OUTOF10: 0

## 2018-07-01 ENCOUNTER — HOSPITAL ENCOUNTER (OUTPATIENT)
Dept: OTHER | Age: 68
Discharge: HOME | End: 2018-07-31
Attending: INTERNAL MEDICINE | Admitting: INTERNAL MEDICINE

## 2018-07-25 ENCOUNTER — HOSPITAL ENCOUNTER (OUTPATIENT)
Dept: INFUSION THERAPY | Age: 68
Discharge: HOME | End: 2018-07-25
Attending: INTERNAL MEDICINE | Admitting: INTERNAL MEDICINE

## 2018-07-25 VITALS
TEMPERATURE: 98.3 F | DIASTOLIC BLOOD PRESSURE: 72 MMHG | RESPIRATION RATE: 16 BRPM | SYSTOLIC BLOOD PRESSURE: 120 MMHG | HEART RATE: 77 BPM

## 2018-07-25 RX ORDER — METHYLPREDNISOLONE SODIUM SUCCINATE 40 MG/ML
40 INJECTION, POWDER, LYOPHILIZED, FOR SOLUTION INTRAMUSCULAR; INTRAVENOUS ONCE
Status: COMPLETED | OUTPATIENT
Start: 2018-07-25 | End: 2018-07-25

## 2018-07-25 RX ORDER — ACETAMINOPHEN 325 MG/1
650 TABLET ORAL ONCE
Status: COMPLETED | OUTPATIENT
Start: 2018-07-25 | End: 2018-07-25

## 2018-07-25 RX ORDER — SODIUM CHLORIDE 0.9 % (FLUSH) 0.9 %
10 SYRINGE (ML) INJECTION PRN
Status: DISCONTINUED | OUTPATIENT
Start: 2018-07-25 | End: 2018-07-26 | Stop reason: HOSPADM

## 2018-07-25 RX ORDER — DIPHENHYDRAMINE HYDROCHLORIDE 50 MG/ML
25 INJECTION INTRAMUSCULAR; INTRAVENOUS ONCE
Status: COMPLETED | OUTPATIENT
Start: 2018-07-25 | End: 2018-07-25

## 2018-07-25 RX ORDER — HEPARIN SODIUM (PORCINE) LOCK FLUSH IV SOLN 100 UNIT/ML 100 UNIT/ML
500 SOLUTION INTRAVENOUS PRN
Status: DISCONTINUED | OUTPATIENT
Start: 2018-07-25 | End: 2018-07-26 | Stop reason: HOSPADM

## 2018-07-25 RX ADMIN — Medication 10 ML: at 12:09

## 2018-07-25 RX ADMIN — HEPARIN SODIUM (PORCINE) LOCK FLUSH IV SOLN 100 UNIT/ML 500 UNITS: 100 SOLUTION at 12:08

## 2018-07-25 RX ADMIN — ACETAMINOPHEN 650 MG: 325 TABLET ORAL at 09:12

## 2018-07-25 RX ADMIN — DIPHENHYDRAMINE HYDROCHLORIDE 25 MG: 50 INJECTION INTRAMUSCULAR; INTRAVENOUS at 09:13

## 2018-07-25 RX ADMIN — Medication 10 ML: at 09:14

## 2018-07-25 RX ADMIN — METHYLPREDNISOLONE SODIUM SUCCINATE 40 MG: 40 INJECTION, POWDER, LYOPHILIZED, FOR SOLUTION INTRAMUSCULAR; INTRAVENOUS at 09:15

## 2018-07-25 ASSESSMENT — PAIN SCALES - GENERAL: PAINLEVEL_OUTOF10: 0

## 2018-07-25 NOTE — DISCHARGE SUMMARY
Tolerated IVIG well. Home instructions given with understaning. Discharged walking per self to the front entrance in fair condition to leave per private auto.

## 2018-08-01 ENCOUNTER — HOSPITAL ENCOUNTER (OUTPATIENT)
Dept: OTHER | Age: 68
Discharge: HOME | End: 2018-08-31
Attending: INTERNAL MEDICINE | Admitting: INTERNAL MEDICINE

## 2018-08-22 ENCOUNTER — HOSPITAL ENCOUNTER (OUTPATIENT)
Dept: INFUSION THERAPY | Age: 68
Discharge: HOME | End: 2018-08-22
Attending: INTERNAL MEDICINE | Admitting: INTERNAL MEDICINE

## 2018-08-22 VITALS
DIASTOLIC BLOOD PRESSURE: 89 MMHG | HEART RATE: 72 BPM | SYSTOLIC BLOOD PRESSURE: 148 MMHG | RESPIRATION RATE: 19 BRPM | TEMPERATURE: 98.3 F

## 2018-08-22 RX ORDER — ACETAMINOPHEN 325 MG/1
650 TABLET ORAL ONCE
Status: COMPLETED | OUTPATIENT
Start: 2018-08-22 | End: 2018-08-22

## 2018-08-22 RX ORDER — HEPARIN SODIUM (PORCINE) LOCK FLUSH IV SOLN 100 UNIT/ML 100 UNIT/ML
500 SOLUTION INTRAVENOUS ONCE
Status: COMPLETED | OUTPATIENT
Start: 2018-08-22 | End: 2018-08-22

## 2018-08-22 RX ORDER — 0.9 % SODIUM CHLORIDE 0.9 %
20 VIAL (ML) INJECTION PRN
Status: DISCONTINUED | OUTPATIENT
Start: 2018-08-22 | End: 2018-08-23 | Stop reason: HOSPADM

## 2018-08-22 RX ORDER — METHYLPREDNISOLONE SODIUM SUCCINATE 40 MG/ML
40 INJECTION, POWDER, LYOPHILIZED, FOR SOLUTION INTRAMUSCULAR; INTRAVENOUS DAILY
Status: DISCONTINUED | OUTPATIENT
Start: 2018-08-22 | End: 2018-08-23 | Stop reason: HOSPADM

## 2018-08-22 RX ORDER — DIPHENHYDRAMINE HYDROCHLORIDE 50 MG/ML
25 INJECTION INTRAMUSCULAR; INTRAVENOUS ONCE
Status: COMPLETED | OUTPATIENT
Start: 2018-08-22 | End: 2018-08-22

## 2018-08-22 RX ADMIN — HEPARIN SODIUM (PORCINE) LOCK FLUSH IV SOLN 100 UNIT/ML 500 UNITS: 100 SOLUTION at 11:01

## 2018-08-22 RX ADMIN — ACETAMINOPHEN 650 MG: 325 TABLET ORAL at 08:44

## 2018-08-22 RX ADMIN — Medication 20 ML: at 11:00

## 2018-08-22 RX ADMIN — DIPHENHYDRAMINE HYDROCHLORIDE 25 MG: 50 INJECTION INTRAMUSCULAR; INTRAVENOUS at 08:44

## 2018-08-22 RX ADMIN — METHYLPREDNISOLONE SODIUM SUCCINATE 40 MG: 40 INJECTION, POWDER, LYOPHILIZED, FOR SOLUTION INTRAMUSCULAR; INTRAVENOUS at 08:44

## 2018-08-22 ASSESSMENT — PAIN SCALES - GENERAL: PAINLEVEL_OUTOF10: 0

## 2018-09-01 ENCOUNTER — HOSPITAL ENCOUNTER (OUTPATIENT)
Dept: OTHER | Age: 68
Discharge: HOME | End: 2018-09-01
Attending: INTERNAL MEDICINE | Admitting: INTERNAL MEDICINE

## 2018-09-19 ENCOUNTER — HOSPITAL ENCOUNTER (OUTPATIENT)
Dept: INFUSION THERAPY | Age: 68
Discharge: HOME | End: 2018-09-19
Attending: INTERNAL MEDICINE | Admitting: INTERNAL MEDICINE

## 2018-09-19 VITALS
TEMPERATURE: 98.4 F | DIASTOLIC BLOOD PRESSURE: 76 MMHG | WEIGHT: 190 LBS | SYSTOLIC BLOOD PRESSURE: 133 MMHG | HEIGHT: 72 IN | OXYGEN SATURATION: 95 % | BODY MASS INDEX: 25.73 KG/M2 | RESPIRATION RATE: 16 BRPM | HEART RATE: 68 BPM

## 2018-09-19 RX ORDER — ACETAMINOPHEN 325 MG/1
650 TABLET ORAL ONCE
Status: COMPLETED | OUTPATIENT
Start: 2018-09-19 | End: 2018-09-19

## 2018-09-19 RX ORDER — DIPHENHYDRAMINE HYDROCHLORIDE 50 MG/ML
25 INJECTION INTRAMUSCULAR; INTRAVENOUS ONCE
Status: COMPLETED | OUTPATIENT
Start: 2018-09-19 | End: 2018-09-19

## 2018-09-19 RX ORDER — HEPARIN SODIUM (PORCINE) LOCK FLUSH IV SOLN 100 UNIT/ML 100 UNIT/ML
500 SOLUTION INTRAVENOUS ONCE
Status: COMPLETED | OUTPATIENT
Start: 2018-09-19 | End: 2018-09-19

## 2018-09-19 RX ORDER — 0.9 % SODIUM CHLORIDE 0.9 %
20 VIAL (ML) INJECTION PRN
Status: DISCONTINUED | OUTPATIENT
Start: 2018-09-19 | End: 2018-09-20 | Stop reason: HOSPADM

## 2018-09-19 RX ORDER — METHYLPREDNISOLONE SODIUM SUCCINATE 40 MG/ML
40 INJECTION, POWDER, LYOPHILIZED, FOR SOLUTION INTRAMUSCULAR; INTRAVENOUS ONCE
Status: COMPLETED | OUTPATIENT
Start: 2018-09-19 | End: 2018-09-19

## 2018-09-19 RX ADMIN — Medication 20 ML: at 08:32

## 2018-09-19 RX ADMIN — Medication 20 ML: at 08:37

## 2018-09-19 RX ADMIN — HEPARIN SODIUM (PORCINE) LOCK FLUSH IV SOLN 100 UNIT/ML 500 UNITS: 100 SOLUTION at 10:30

## 2018-09-19 RX ADMIN — DIPHENHYDRAMINE HYDROCHLORIDE 25 MG: 50 INJECTION INTRAMUSCULAR; INTRAVENOUS at 08:35

## 2018-09-19 RX ADMIN — METHYLPREDNISOLONE SODIUM SUCCINATE 40 MG: 40 INJECTION, POWDER, LYOPHILIZED, FOR SOLUTION INTRAMUSCULAR; INTRAVENOUS at 08:35

## 2018-09-19 RX ADMIN — ACETAMINOPHEN 650 MG: 325 TABLET ORAL at 08:30

## 2018-09-19 RX ADMIN — Medication 20 ML: at 08:00

## 2018-09-19 RX ADMIN — Medication 20 ML: at 10:30

## 2018-09-19 ASSESSMENT — PAIN SCALES - GENERAL: PAINLEVEL_OUTOF10: 0

## 2018-09-19 NOTE — PLAN OF CARE
Ambulatory to unit room 1 for IVIG. Reorientated to unit. Procedure and plan of care explained. Questions answered. Understanding verbalized.

## 2018-10-11 ENCOUNTER — HOSPITAL ENCOUNTER (OUTPATIENT)
Age: 68
Setting detail: SPECIMEN
Discharge: HOME | End: 2018-10-11
Payer: MEDICARE

## 2018-10-11 LAB
ALBUMIN SERPL-MCNC: 4.3 GM/DL (ref 3.4–5)
ALP BLD-CCNC: 74 IU/L (ref 40–129)
ALT SERPL-CCNC: 16 U/L (ref 10–40)
ANION GAP SERPL CALCULATED.3IONS-SCNC: 13 MMOL/L (ref 4–16)
AST SERPL-CCNC: 12 IU/L (ref 15–37)
BILIRUB SERPL-MCNC: 0.6 MG/DL (ref 0–1)
BUN BLDV-MCNC: 12 MG/DL (ref 6–23)
CALCIUM SERPL-MCNC: 8.8 MG/DL (ref 8.3–10.6)
CHLORIDE BLD-SCNC: 103 MMOL/L (ref 99–110)
CO2: 23 MMOL/L (ref 21–32)
CREAT SERPL-MCNC: 1 MG/DL (ref 0.9–1.3)
GFR AFRICAN AMERICAN: >60 ML/MIN/1.73M2
GFR NON-AFRICAN AMERICAN: >60 ML/MIN/1.73M2
GLUCOSE BLD-MCNC: 89 MG/DL (ref 70–99)
LACTATE DEHYDROGENASE: 146 IU/L (ref 120–246)
POTASSIUM SERPL-SCNC: 4.5 MMOL/L (ref 3.5–5.1)
SODIUM BLD-SCNC: 139 MMOL/L (ref 135–145)
TOTAL PROTEIN: 6.2 GM/DL (ref 6.4–8.2)

## 2018-10-11 PROCEDURE — 80053 COMPREHEN METABOLIC PANEL: CPT

## 2018-10-11 PROCEDURE — 83615 LACTATE (LD) (LDH) ENZYME: CPT

## 2018-10-17 ENCOUNTER — HOSPITAL ENCOUNTER (OUTPATIENT)
Dept: INFUSION THERAPY | Age: 68
Setting detail: INFUSION SERIES
Discharge: HOME | End: 2018-10-17
Payer: MEDICARE

## 2018-10-17 VITALS
RESPIRATION RATE: 16 BRPM | DIASTOLIC BLOOD PRESSURE: 75 MMHG | WEIGHT: 195 LBS | SYSTOLIC BLOOD PRESSURE: 130 MMHG | OXYGEN SATURATION: 96 % | HEIGHT: 72 IN | BODY MASS INDEX: 26.41 KG/M2 | HEART RATE: 74 BPM | TEMPERATURE: 98.7 F

## 2018-10-17 PROCEDURE — 96365 THER/PROPH/DIAG IV INF INIT: CPT

## 2018-10-17 PROCEDURE — 96366 THER/PROPH/DIAG IV INF ADDON: CPT

## 2018-10-17 PROCEDURE — 2580000003 HC RX 258: Performed by: INTERNAL MEDICINE

## 2018-10-17 PROCEDURE — 6360000002 HC RX W HCPCS: Performed by: INTERNAL MEDICINE

## 2018-10-17 PROCEDURE — 6370000000 HC RX 637 (ALT 250 FOR IP)

## 2018-10-17 PROCEDURE — 6370000000 HC RX 637 (ALT 250 FOR IP): Performed by: INTERNAL MEDICINE

## 2018-10-17 RX ORDER — DIPHENHYDRAMINE HYDROCHLORIDE 50 MG/ML
25 INJECTION INTRAMUSCULAR; INTRAVENOUS ONCE
Status: DISCONTINUED | OUTPATIENT
Start: 2018-10-17 | End: 2018-10-17

## 2018-10-17 RX ORDER — DIPHENHYDRAMINE HCL 25 MG
25 TABLET ORAL ONCE
Status: COMPLETED | OUTPATIENT
Start: 2018-10-17 | End: 2018-10-17

## 2018-10-17 RX ORDER — METHYLPREDNISOLONE SODIUM SUCCINATE 40 MG/ML
40 INJECTION, POWDER, LYOPHILIZED, FOR SOLUTION INTRAMUSCULAR; INTRAVENOUS ONCE
Status: COMPLETED | OUTPATIENT
Start: 2018-10-17 | End: 2018-10-17

## 2018-10-17 RX ORDER — HEPARIN SODIUM (PORCINE) LOCK FLUSH IV SOLN 100 UNIT/ML 100 UNIT/ML
500 SOLUTION INTRAVENOUS ONCE
Status: COMPLETED | OUTPATIENT
Start: 2018-10-17 | End: 2018-10-17

## 2018-10-17 RX ORDER — ACETAMINOPHEN 325 MG/1
650 TABLET ORAL ONCE
Status: COMPLETED | OUTPATIENT
Start: 2018-10-17 | End: 2018-10-17

## 2018-10-17 RX ORDER — DIPHENHYDRAMINE HCL 25 MG
TABLET ORAL
Status: COMPLETED
Start: 2018-10-17 | End: 2018-10-17

## 2018-10-17 RX ORDER — 0.9 % SODIUM CHLORIDE 0.9 %
20 VIAL (ML) INJECTION PRN
Status: DISCONTINUED | OUTPATIENT
Start: 2018-10-17 | End: 2018-10-18 | Stop reason: HOSPADM

## 2018-10-17 RX ADMIN — SODIUM CHLORIDE, PRESERVATIVE FREE 20 ML: 5 INJECTION INTRAVENOUS at 12:15

## 2018-10-17 RX ADMIN — IMMUNE GLOBULIN INTRAVENOUS (HUMAN) 20 G: 5 INJECTION, SOLUTION INTRAVENOUS at 09:45

## 2018-10-17 RX ADMIN — SODIUM CHLORIDE, PRESERVATIVE FREE 20 ML: 5 INJECTION INTRAVENOUS at 09:13

## 2018-10-17 RX ADMIN — SODIUM CHLORIDE, PRESERVATIVE FREE 20 ML: 5 INJECTION INTRAVENOUS at 08:20

## 2018-10-17 RX ADMIN — DIPHENHYDRAMINE HCL 25 MG: 25 TABLET ORAL at 09:10

## 2018-10-17 RX ADMIN — METHYLPREDNISOLONE SODIUM SUCCINATE 40 MG: 40 INJECTION, POWDER, FOR SOLUTION INTRAMUSCULAR; INTRAVENOUS at 09:10

## 2018-10-17 RX ADMIN — ACETAMINOPHEN 650 MG: 325 TABLET ORAL at 09:10

## 2018-10-17 RX ADMIN — SODIUM CHLORIDE, PRESERVATIVE FREE 500 UNITS: 5 INJECTION INTRAVENOUS at 12:15

## 2018-10-17 RX ADMIN — Medication 25 MG: at 09:10

## 2018-10-17 ASSESSMENT — PAIN SCALES - GENERAL: PAINLEVEL_OUTOF10: 0

## 2018-10-17 NOTE — PLAN OF CARE
Ambulatory to unit room 5 for IVIG. Reorientated to unit. Procedure and plan of care explained. Questions answered. Understanding verbalized.

## 2018-11-14 ENCOUNTER — HOSPITAL ENCOUNTER (OUTPATIENT)
Dept: INFUSION THERAPY | Age: 68
Setting detail: INFUSION SERIES
Discharge: HOME | End: 2018-11-14
Payer: MEDICARE

## 2018-11-14 VITALS
TEMPERATURE: 98.1 F | HEART RATE: 75 BPM | RESPIRATION RATE: 16 BRPM | SYSTOLIC BLOOD PRESSURE: 163 MMHG | DIASTOLIC BLOOD PRESSURE: 73 MMHG

## 2018-11-14 PROCEDURE — 96365 THER/PROPH/DIAG IV INF INIT: CPT

## 2018-11-14 PROCEDURE — 99211 OFF/OP EST MAY X REQ PHY/QHP: CPT

## 2018-11-14 PROCEDURE — 6370000000 HC RX 637 (ALT 250 FOR IP)

## 2018-11-14 PROCEDURE — 6370000000 HC RX 637 (ALT 250 FOR IP): Performed by: INTERNAL MEDICINE

## 2018-11-14 PROCEDURE — 2580000003 HC RX 258

## 2018-11-14 PROCEDURE — 96366 THER/PROPH/DIAG IV INF ADDON: CPT

## 2018-11-14 PROCEDURE — 2580000003 HC RX 258: Performed by: INTERNAL MEDICINE

## 2018-11-14 PROCEDURE — 6360000002 HC RX W HCPCS: Performed by: INTERNAL MEDICINE

## 2018-11-14 PROCEDURE — 96367 TX/PROPH/DG ADDL SEQ IV INF: CPT

## 2018-11-14 RX ORDER — DIPHENHYDRAMINE HYDROCHLORIDE 50 MG/ML
25 INJECTION INTRAMUSCULAR; INTRAVENOUS ONCE
Status: DISCONTINUED | OUTPATIENT
Start: 2018-11-14 | End: 2018-11-15 | Stop reason: HOSPADM

## 2018-11-14 RX ORDER — 0.9 % SODIUM CHLORIDE 0.9 %
10 VIAL (ML) INJECTION PRN
Status: DISCONTINUED | OUTPATIENT
Start: 2018-11-14 | End: 2018-11-15 | Stop reason: HOSPADM

## 2018-11-14 RX ORDER — DIPHENHYDRAMINE HCL 25 MG
TABLET ORAL
Status: COMPLETED
Start: 2018-11-14 | End: 2018-11-14

## 2018-11-14 RX ORDER — HEPARIN SODIUM (PORCINE) LOCK FLUSH IV SOLN 100 UNIT/ML 100 UNIT/ML
500 SOLUTION INTRAVENOUS PRN
Status: DISCONTINUED | OUTPATIENT
Start: 2018-11-14 | End: 2018-11-15 | Stop reason: HOSPADM

## 2018-11-14 RX ORDER — ACETAMINOPHEN 325 MG/1
650 TABLET ORAL ONCE
Status: COMPLETED | OUTPATIENT
Start: 2018-11-14 | End: 2018-11-14

## 2018-11-14 RX ORDER — METHYLPREDNISOLONE SODIUM SUCCINATE 40 MG/ML
40 INJECTION, POWDER, LYOPHILIZED, FOR SOLUTION INTRAMUSCULAR; INTRAVENOUS ONCE
Status: COMPLETED | OUTPATIENT
Start: 2018-11-14 | End: 2018-11-14

## 2018-11-14 RX ADMIN — DIPHENHYDRAMINE HCL 25 MG: 25 TABLET ORAL at 09:16

## 2018-11-14 RX ADMIN — IMMUNE GLOBULIN INTRAVENOUS (HUMAN) 20 G: 5 INJECTION, SOLUTION INTRAVENOUS at 09:28

## 2018-11-14 RX ADMIN — SODIUM CHLORIDE, PRESERVATIVE FREE 500 UNITS: 5 INJECTION INTRAVENOUS at 12:15

## 2018-11-14 RX ADMIN — WATER 10 ML: 1 INJECTION INTRAMUSCULAR; INTRAVENOUS; SUBCUTANEOUS at 09:16

## 2018-11-14 RX ADMIN — SODIUM CHLORIDE, PRESERVATIVE FREE 10 ML: 5 INJECTION INTRAVENOUS at 12:15

## 2018-11-14 RX ADMIN — METHYLPREDNISOLONE SODIUM SUCCINATE 40 MG: 40 INJECTION, POWDER, LYOPHILIZED, FOR SOLUTION INTRAMUSCULAR; INTRAVENOUS at 09:16

## 2018-11-14 RX ADMIN — ACETAMINOPHEN 650 MG: 325 TABLET ORAL at 09:16

## 2018-11-14 ASSESSMENT — PAIN SCALES - GENERAL: PAINLEVEL_OUTOF10: 0

## 2018-11-14 NOTE — PROGRESS NOTES
Alice deaccessed. DSD applied. Pt tolerated well. Discharged instructions given to pt, pt voiced understanding. Pt discharged via ambulatory by self to exit.

## 2018-11-14 NOTE — PROGRESS NOTES
Pt taken to room 05 for ivig infusion. Pt oriented to room, call light, bed/chair controls, TV, pt voiced understanding. Plan of care explained to pt, pt voiced understanding.

## 2018-12-12 ENCOUNTER — HOSPITAL ENCOUNTER (OUTPATIENT)
Dept: INFUSION THERAPY | Age: 68
Setting detail: INFUSION SERIES
Discharge: HOME | End: 2018-12-12
Payer: MEDICARE

## 2018-12-12 RX ORDER — METHYLPREDNISOLONE SODIUM SUCCINATE 40 MG/ML
40 INJECTION, POWDER, LYOPHILIZED, FOR SOLUTION INTRAMUSCULAR; INTRAVENOUS ONCE
Status: DISCONTINUED | OUTPATIENT
Start: 2018-12-12 | End: 2019-04-11 | Stop reason: SDUPTHER

## 2018-12-12 RX ORDER — HEPARIN SODIUM (PORCINE) LOCK FLUSH IV SOLN 100 UNIT/ML 100 UNIT/ML
500 SOLUTION INTRAVENOUS PRN
Status: DISCONTINUED | OUTPATIENT
Start: 2018-12-12 | End: 2019-04-11 | Stop reason: SDUPTHER

## 2018-12-12 RX ORDER — DIPHENHYDRAMINE HYDROCHLORIDE 50 MG/ML
25 INJECTION INTRAMUSCULAR; INTRAVENOUS EVERY 6 HOURS PRN
Status: DISCONTINUED | OUTPATIENT
Start: 2018-12-12 | End: 2019-04-09 | Stop reason: ALTCHOICE

## 2018-12-12 RX ORDER — ACETAMINOPHEN 325 MG/1
650 TABLET ORAL EVERY 4 HOURS PRN
Status: DISCONTINUED | OUTPATIENT
Start: 2018-12-12 | End: 2019-04-11 | Stop reason: SDUPTHER

## 2018-12-13 ENCOUNTER — HOSPITAL ENCOUNTER (OUTPATIENT)
Dept: INFUSION THERAPY | Age: 68
Setting detail: INFUSION SERIES
Discharge: HOME | End: 2018-12-13
Payer: MEDICARE

## 2018-12-13 VITALS
HEART RATE: 71 BPM | DIASTOLIC BLOOD PRESSURE: 89 MMHG | TEMPERATURE: 97.4 F | RESPIRATION RATE: 14 BRPM | SYSTOLIC BLOOD PRESSURE: 157 MMHG

## 2018-12-13 PROCEDURE — 96374 THER/PROPH/DIAG INJ IV PUSH: CPT

## 2018-12-13 PROCEDURE — 6360000002 HC RX W HCPCS: Performed by: INTERNAL MEDICINE

## 2018-12-13 PROCEDURE — 96366 THER/PROPH/DIAG IV INF ADDON: CPT

## 2018-12-13 PROCEDURE — 6370000000 HC RX 637 (ALT 250 FOR IP): Performed by: INTERNAL MEDICINE

## 2018-12-13 PROCEDURE — 99211 OFF/OP EST MAY X REQ PHY/QHP: CPT

## 2018-12-13 PROCEDURE — 96365 THER/PROPH/DIAG IV INF INIT: CPT

## 2018-12-13 RX ORDER — HEPARIN SODIUM (PORCINE) LOCK FLUSH IV SOLN 100 UNIT/ML 100 UNIT/ML
500 SOLUTION INTRAVENOUS PRN
Status: DISCONTINUED | OUTPATIENT
Start: 2018-12-13 | End: 2018-12-14 | Stop reason: HOSPADM

## 2018-12-13 RX ORDER — METHYLPREDNISOLONE SODIUM SUCCINATE 40 MG/ML
40 INJECTION, POWDER, LYOPHILIZED, FOR SOLUTION INTRAMUSCULAR; INTRAVENOUS ONCE
Status: COMPLETED | OUTPATIENT
Start: 2018-12-13 | End: 2018-12-13

## 2018-12-13 RX ORDER — SODIUM CHLORIDE 0.9 % (FLUSH) 0.9 %
20 SYRINGE (ML) INJECTION PRN
Status: DISCONTINUED | OUTPATIENT
Start: 2018-12-13 | End: 2018-12-14 | Stop reason: HOSPADM

## 2018-12-13 RX ORDER — DIPHENHYDRAMINE HCL 25 MG
25 TABLET ORAL ONCE
Status: COMPLETED | OUTPATIENT
Start: 2018-12-13 | End: 2018-12-13

## 2018-12-13 RX ORDER — DIPHENHYDRAMINE HYDROCHLORIDE 50 MG/ML
25 INJECTION INTRAMUSCULAR; INTRAVENOUS ONCE
Status: DISCONTINUED | OUTPATIENT
Start: 2018-12-13 | End: 2018-12-13

## 2018-12-13 RX ORDER — ACETAMINOPHEN 325 MG/1
650 TABLET ORAL PRN
Status: DISCONTINUED | OUTPATIENT
Start: 2018-12-13 | End: 2018-12-14 | Stop reason: HOSPADM

## 2018-12-13 RX ADMIN — METHYLPREDNISOLONE SODIUM SUCCINATE 40 MG: 40 INJECTION, POWDER, FOR SOLUTION INTRAMUSCULAR; INTRAVENOUS at 08:31

## 2018-12-13 RX ADMIN — DIPHENHYDRAMINE HCL 25 MG: 25 TABLET ORAL at 08:29

## 2018-12-13 RX ADMIN — ACETAMINOPHEN 650 MG: 325 TABLET ORAL at 08:30

## 2018-12-13 RX ADMIN — HUMAN IMMUNOGLOBULIN G 20 G: 10 LIQUID INTRAVENOUS at 08:53

## 2018-12-13 ASSESSMENT — PAIN SCALES - GENERAL
PAINLEVEL_OUTOF10: 0
PAINLEVEL_OUTOF10: 0

## 2018-12-13 NOTE — PROGRESS NOTES
Pt tolerated IVIG infusion without any side effects. Discharge instructions given and pt voiced understanding. Will return to see on Jan 10th for next scheduled IVIG infusion.

## 2018-12-13 NOTE — PROGRESS NOTES
Pt arrived ambulatory for IVIG. Taken to room 3. Placed in bed, oriented to room, bed controls, and call light. Call light placed in reach. Pt agreeable for POC.

## 2019-01-10 ENCOUNTER — HOSPITAL ENCOUNTER (OUTPATIENT)
Dept: INFUSION THERAPY | Age: 69
Setting detail: INFUSION SERIES
Discharge: HOME | End: 2019-01-10
Payer: MEDICARE

## 2019-01-10 VITALS
DIASTOLIC BLOOD PRESSURE: 95 MMHG | BODY MASS INDEX: 27.09 KG/M2 | HEART RATE: 70 BPM | HEIGHT: 72 IN | OXYGEN SATURATION: 100 % | TEMPERATURE: 98.8 F | WEIGHT: 200 LBS | RESPIRATION RATE: 16 BRPM | SYSTOLIC BLOOD PRESSURE: 181 MMHG

## 2019-01-10 PROCEDURE — 2580000003 HC RX 258: Performed by: NURSE PRACTITIONER

## 2019-01-10 PROCEDURE — 96366 THER/PROPH/DIAG IV INF ADDON: CPT

## 2019-01-10 PROCEDURE — 6360000002 HC RX W HCPCS: Performed by: NURSE PRACTITIONER

## 2019-01-10 PROCEDURE — 6370000000 HC RX 637 (ALT 250 FOR IP): Performed by: NURSE PRACTITIONER

## 2019-01-10 PROCEDURE — 96374 THER/PROPH/DIAG INJ IV PUSH: CPT

## 2019-01-10 PROCEDURE — 96365 THER/PROPH/DIAG IV INF INIT: CPT

## 2019-01-10 PROCEDURE — 99211 OFF/OP EST MAY X REQ PHY/QHP: CPT

## 2019-01-10 RX ORDER — DIPHENHYDRAMINE HCL 25 MG
25 TABLET ORAL ONCE
Status: COMPLETED | OUTPATIENT
Start: 2019-01-10 | End: 2019-01-10

## 2019-01-10 RX ORDER — METHYLPREDNISOLONE SODIUM SUCCINATE 40 MG/ML
40 INJECTION, POWDER, LYOPHILIZED, FOR SOLUTION INTRAMUSCULAR; INTRAVENOUS ONCE
Status: COMPLETED | OUTPATIENT
Start: 2019-01-10 | End: 2019-01-10

## 2019-01-10 RX ORDER — ACETAMINOPHEN 325 MG/1
650 TABLET ORAL ONCE
Status: COMPLETED | OUTPATIENT
Start: 2019-01-10 | End: 2019-01-10

## 2019-01-10 RX ORDER — SODIUM CHLORIDE 0.9 % (FLUSH) 0.9 %
10 SYRINGE (ML) INJECTION 2 TIMES DAILY
Status: DISCONTINUED | OUTPATIENT
Start: 2019-01-10 | End: 2019-01-11 | Stop reason: HOSPADM

## 2019-01-10 RX ORDER — HEPARIN SODIUM (PORCINE) LOCK FLUSH IV SOLN 100 UNIT/ML 100 UNIT/ML
500 SOLUTION INTRAVENOUS PRN
Status: DISCONTINUED | OUTPATIENT
Start: 2019-01-10 | End: 2019-01-11 | Stop reason: HOSPADM

## 2019-01-10 RX ADMIN — ACETAMINOPHEN 650 MG: 325 TABLET ORAL at 08:17

## 2019-01-10 RX ADMIN — SODIUM CHLORIDE, PRESERVATIVE FREE 500 UNITS: 5 INJECTION INTRAVENOUS at 11:16

## 2019-01-10 RX ADMIN — METHYLPREDNISOLONE SODIUM SUCCINATE 40 MG: 40 INJECTION, POWDER, LYOPHILIZED, FOR SOLUTION INTRAMUSCULAR; INTRAVENOUS at 08:17

## 2019-01-10 RX ADMIN — DIPHENHYDRAMINE HCL 25 MG: 25 TABLET ORAL at 08:17

## 2019-01-10 RX ADMIN — IMMUNE GLOBULIN (HUMAN) 20 G: 10 INJECTION INTRAVENOUS; SUBCUTANEOUS at 08:30

## 2019-01-10 RX ADMIN — SODIUM CHLORIDE, PRESERVATIVE FREE 10 ML: 5 INJECTION INTRAVENOUS at 11:16

## 2019-01-10 ASSESSMENT — PAIN SCALES - GENERAL: PAINLEVEL_OUTOF10: 0

## 2019-01-15 ENCOUNTER — HOSPITAL ENCOUNTER (OUTPATIENT)
Age: 69
Setting detail: SPECIMEN
Discharge: HOME | End: 2019-01-15
Payer: MEDICARE

## 2019-01-15 LAB
ALBUMIN SERPL-MCNC: 4.4 GM/DL (ref 3.4–5)
ALP BLD-CCNC: 64 IU/L (ref 40–128)
ALT SERPL-CCNC: 14 U/L (ref 10–40)
ANION GAP SERPL CALCULATED.3IONS-SCNC: 12 MMOL/L (ref 4–16)
AST SERPL-CCNC: 14 IU/L (ref 15–37)
BILIRUB SERPL-MCNC: 0.8 MG/DL (ref 0–1)
BUN BLDV-MCNC: 9 MG/DL (ref 6–23)
CALCIUM SERPL-MCNC: 8.7 MG/DL (ref 8.3–10.6)
CHLORIDE BLD-SCNC: 105 MMOL/L (ref 99–110)
CO2: 24 MMOL/L (ref 21–32)
CREAT SERPL-MCNC: 1.2 MG/DL (ref 0.9–1.3)
GFR AFRICAN AMERICAN: >60 ML/MIN/1.73M2
GFR NON-AFRICAN AMERICAN: >60 ML/MIN/1.73M2
GLUCOSE BLD-MCNC: 158 MG/DL (ref 70–99)
POTASSIUM SERPL-SCNC: 4.1 MMOL/L (ref 3.5–5.1)
SODIUM BLD-SCNC: 141 MMOL/L (ref 135–145)
TOTAL PROTEIN: 6.2 GM/DL (ref 6.4–8.2)

## 2019-01-15 PROCEDURE — 80053 COMPREHEN METABOLIC PANEL: CPT

## 2019-02-07 ENCOUNTER — HOSPITAL ENCOUNTER (OUTPATIENT)
Dept: INFUSION THERAPY | Age: 69
Setting detail: INFUSION SERIES
Discharge: HOME | End: 2019-02-07
Payer: MEDICARE

## 2019-02-07 RX ORDER — SODIUM CHLORIDE 0.9 % (FLUSH) 0.9 %
10 SYRINGE (ML) INJECTION ONCE
Status: DISCONTINUED | OUTPATIENT
Start: 2019-02-07 | End: 2019-06-13 | Stop reason: HOSPADM

## 2019-02-07 RX ORDER — ACETAMINOPHEN 325 MG/1
650 TABLET ORAL ONCE
Status: DISCONTINUED | OUTPATIENT
Start: 2019-02-07 | End: 2019-04-11 | Stop reason: SDUPTHER

## 2019-02-07 RX ORDER — METHYLPREDNISOLONE SODIUM SUCCINATE 40 MG/ML
20 INJECTION, POWDER, LYOPHILIZED, FOR SOLUTION INTRAMUSCULAR; INTRAVENOUS ONCE
Status: DISCONTINUED | OUTPATIENT
Start: 2019-02-07 | End: 2019-04-11 | Stop reason: SDUPTHER

## 2019-02-07 RX ORDER — DIPHENHYDRAMINE HCL 25 MG
25 TABLET ORAL ONCE
Status: DISCONTINUED | OUTPATIENT
Start: 2019-02-07 | End: 2019-04-11

## 2019-02-07 RX ORDER — HEPARIN SODIUM (PORCINE) LOCK FLUSH IV SOLN 100 UNIT/ML 100 UNIT/ML
500 SOLUTION INTRAVENOUS PRN
Status: DISCONTINUED | OUTPATIENT
Start: 2019-02-07 | End: 2019-06-13 | Stop reason: HOSPADM

## 2019-02-12 ENCOUNTER — HOSPITAL ENCOUNTER (OUTPATIENT)
Dept: INFUSION THERAPY | Age: 69
Setting detail: INFUSION SERIES
Discharge: HOME | End: 2019-02-12
Payer: MEDICARE

## 2019-02-12 VITALS
WEIGHT: 190 LBS | BODY MASS INDEX: 25.73 KG/M2 | RESPIRATION RATE: 14 BRPM | HEIGHT: 72 IN | HEART RATE: 75 BPM | DIASTOLIC BLOOD PRESSURE: 75 MMHG | OXYGEN SATURATION: 95 % | TEMPERATURE: 98.4 F | SYSTOLIC BLOOD PRESSURE: 151 MMHG

## 2019-02-12 PROCEDURE — 96366 THER/PROPH/DIAG IV INF ADDON: CPT

## 2019-02-12 PROCEDURE — 6360000002 HC RX W HCPCS: Performed by: NURSE PRACTITIONER

## 2019-02-12 PROCEDURE — 2580000003 HC RX 258: Performed by: NURSE PRACTITIONER

## 2019-02-12 PROCEDURE — 2580000003 HC RX 258

## 2019-02-12 PROCEDURE — 99211 OFF/OP EST MAY X REQ PHY/QHP: CPT

## 2019-02-12 PROCEDURE — 96365 THER/PROPH/DIAG IV INF INIT: CPT

## 2019-02-12 PROCEDURE — 6370000000 HC RX 637 (ALT 250 FOR IP): Performed by: NURSE PRACTITIONER

## 2019-02-12 RX ORDER — SODIUM CHLORIDE 0.9 % (FLUSH) 0.9 %
10 SYRINGE (ML) INJECTION PRN
Status: ACTIVE | OUTPATIENT
Start: 2019-02-12 | End: 2019-02-12

## 2019-02-12 RX ORDER — DIPHENHYDRAMINE HCL 25 MG
25 TABLET ORAL ONCE
Status: COMPLETED | OUTPATIENT
Start: 2019-02-12 | End: 2019-02-12

## 2019-02-12 RX ORDER — ACETAMINOPHEN 325 MG/1
650 TABLET ORAL ONCE
Status: COMPLETED | OUTPATIENT
Start: 2019-02-12 | End: 2019-02-12

## 2019-02-12 RX ORDER — METHYLPREDNISOLONE SODIUM SUCCINATE 40 MG/ML
40 INJECTION, POWDER, LYOPHILIZED, FOR SOLUTION INTRAMUSCULAR; INTRAVENOUS DAILY
Status: DISCONTINUED | OUTPATIENT
Start: 2019-02-12 | End: 2019-02-13 | Stop reason: HOSPADM

## 2019-02-12 RX ORDER — HEPARIN SODIUM (PORCINE) LOCK FLUSH IV SOLN 100 UNIT/ML 100 UNIT/ML
500 SOLUTION INTRAVENOUS PRN
Status: DISPENSED | OUTPATIENT
Start: 2019-02-12 | End: 2019-02-12

## 2019-02-12 RX ADMIN — WATER 10 ML: 1 INJECTION INTRAMUSCULAR; INTRAVENOUS; SUBCUTANEOUS at 08:49

## 2019-02-12 RX ADMIN — DIPHENHYDRAMINE HCL 25 MG: 25 TABLET ORAL at 08:49

## 2019-02-12 RX ADMIN — SODIUM CHLORIDE, PRESERVATIVE FREE 10 ML: 5 INJECTION INTRAVENOUS at 11:18

## 2019-02-12 RX ADMIN — METHYLPREDNISOLONE SODIUM SUCCINATE 40 MG: 40 INJECTION, POWDER, LYOPHILIZED, FOR SOLUTION INTRAMUSCULAR; INTRAVENOUS at 08:49

## 2019-02-12 RX ADMIN — IMMUNE GLOBULIN INTRAVENOUS (HUMAN) 20 G: 5 INJECTION, SOLUTION INTRAVENOUS at 08:52

## 2019-02-12 RX ADMIN — ACETAMINOPHEN 650 MG: 325 TABLET ORAL at 08:49

## 2019-02-12 RX ADMIN — SODIUM CHLORIDE, PRESERVATIVE FREE 500 UNITS: 5 INJECTION INTRAVENOUS at 11:18

## 2019-02-12 ASSESSMENT — PAIN SCALES - GENERAL: PAINLEVEL_OUTOF10: 0

## 2019-03-12 ENCOUNTER — HOSPITAL ENCOUNTER (OUTPATIENT)
Dept: INFUSION THERAPY | Age: 69
Setting detail: INFUSION SERIES
Discharge: HOME | End: 2019-03-12
Payer: MEDICARE

## 2019-03-12 VITALS
RESPIRATION RATE: 14 BRPM | DIASTOLIC BLOOD PRESSURE: 92 MMHG | TEMPERATURE: 98.2 F | SYSTOLIC BLOOD PRESSURE: 156 MMHG | HEART RATE: 72 BPM | OXYGEN SATURATION: 98 %

## 2019-03-12 PROCEDURE — 6370000000 HC RX 637 (ALT 250 FOR IP): Performed by: INTERNAL MEDICINE

## 2019-03-12 PROCEDURE — 96366 THER/PROPH/DIAG IV INF ADDON: CPT

## 2019-03-12 PROCEDURE — 99211 OFF/OP EST MAY X REQ PHY/QHP: CPT

## 2019-03-12 PROCEDURE — 6370000000 HC RX 637 (ALT 250 FOR IP): Performed by: NURSE PRACTITIONER

## 2019-03-12 PROCEDURE — 96374 THER/PROPH/DIAG INJ IV PUSH: CPT

## 2019-03-12 PROCEDURE — 96365 THER/PROPH/DIAG IV INF INIT: CPT

## 2019-03-12 PROCEDURE — 6360000002 HC RX W HCPCS: Performed by: NURSE PRACTITIONER

## 2019-03-12 PROCEDURE — 2580000003 HC RX 258: Performed by: NURSE PRACTITIONER

## 2019-03-12 RX ORDER — HEPARIN SODIUM (PORCINE) LOCK FLUSH IV SOLN 100 UNIT/ML 100 UNIT/ML
500 SOLUTION INTRAVENOUS PRN
Status: DISCONTINUED | OUTPATIENT
Start: 2019-03-12 | End: 2019-03-13 | Stop reason: HOSPADM

## 2019-03-12 RX ORDER — METHYLPREDNISOLONE SODIUM SUCCINATE 40 MG/ML
40 INJECTION, POWDER, LYOPHILIZED, FOR SOLUTION INTRAMUSCULAR; INTRAVENOUS ONCE
Status: COMPLETED | OUTPATIENT
Start: 2019-03-12 | End: 2019-03-12

## 2019-03-12 RX ORDER — DIPHENHYDRAMINE HCL 25 MG
25 TABLET ORAL ONCE
Status: COMPLETED | OUTPATIENT
Start: 2019-03-12 | End: 2019-03-12

## 2019-03-12 RX ORDER — BACLOFEN 10 MG/1
5 TABLET ORAL ONCE
Status: COMPLETED | OUTPATIENT
Start: 2019-03-12 | End: 2019-03-12

## 2019-03-12 RX ORDER — SODIUM CHLORIDE 0.9 % (FLUSH) 0.9 %
20 SYRINGE (ML) INJECTION PRN
Status: DISCONTINUED | OUTPATIENT
Start: 2019-03-12 | End: 2019-03-13 | Stop reason: HOSPADM

## 2019-03-12 RX ORDER — ACETAMINOPHEN 80 MG
TABLET,CHEWABLE ORAL
Status: DISPENSED
Start: 2019-03-12 | End: 2019-03-12

## 2019-03-12 RX ORDER — ACETAMINOPHEN 325 MG/1
650 TABLET ORAL ONCE
Status: COMPLETED | OUTPATIENT
Start: 2019-03-12 | End: 2019-03-12

## 2019-03-12 RX ADMIN — SODIUM CHLORIDE, PRESERVATIVE FREE 500 UNITS: 5 INJECTION INTRAVENOUS at 10:57

## 2019-03-12 RX ADMIN — ACETAMINOPHEN 650 MG: 325 TABLET ORAL at 08:23

## 2019-03-12 RX ADMIN — IMMUNE GLOBULIN INTRAVENOUS (HUMAN) 20 G: 5 INJECTION, SOLUTION INTRAVENOUS at 08:29

## 2019-03-12 RX ADMIN — SODIUM CHLORIDE, PRESERVATIVE FREE 20 ML: 5 INJECTION INTRAVENOUS at 10:57

## 2019-03-12 RX ADMIN — DIPHENHYDRAMINE HCL 25 MG: 25 TABLET ORAL at 08:23

## 2019-03-12 RX ADMIN — BACLOFEN 5 MG: 10 TABLET ORAL at 10:17

## 2019-03-12 RX ADMIN — METHYLPREDNISOLONE SODIUM SUCCINATE 40 MG: 40 INJECTION, POWDER, FOR SOLUTION INTRAMUSCULAR; INTRAVENOUS at 08:24

## 2019-03-12 ASSESSMENT — PAIN SCALES - GENERAL
PAINLEVEL_OUTOF10: 2
PAINLEVEL_OUTOF10: 2

## 2019-03-12 NOTE — PROGRESS NOTES
Pt c/o of back cramps and lower abd cramps. Dr. Debora Finch office notified and requesting muscle relaxer. Awaiting call back.

## 2019-04-04 ENCOUNTER — HOSPITAL ENCOUNTER (OUTPATIENT)
Age: 69
Setting detail: SPECIMEN
Discharge: HOME | End: 2019-04-04
Payer: MEDICARE

## 2019-04-04 LAB
MAGNESIUM: 2.2 MG/DL (ref 1.8–2.4)
PHOSPHORUS: 3.3 MG/DL (ref 2.5–4.9)

## 2019-04-04 PROCEDURE — 82607 VITAMIN B-12: CPT

## 2019-04-04 PROCEDURE — 84100 ASSAY OF PHOSPHORUS: CPT

## 2019-04-04 PROCEDURE — 84443 ASSAY THYROID STIM HORMONE: CPT

## 2019-04-04 PROCEDURE — 82746 ASSAY OF FOLIC ACID SERUM: CPT

## 2019-04-04 PROCEDURE — 83735 ASSAY OF MAGNESIUM: CPT

## 2019-04-04 PROCEDURE — 84439 ASSAY OF FREE THYROXINE: CPT

## 2019-04-05 LAB
FOLATE: 5.9 NG/ML (ref 3.1–17.5)
T4 FREE: 1.37 NG/DL (ref 0.9–1.8)
TSH HIGH SENSITIVITY: 3.04 UIU/ML (ref 0.27–4.2)
VITAMIN B-12: 259.8 PG/ML (ref 211–911)

## 2019-04-09 ENCOUNTER — APPOINTMENT (OUTPATIENT)
Dept: GENERAL RADIOLOGY | Age: 69
DRG: 190 | End: 2019-04-09
Payer: MEDICARE

## 2019-04-09 ENCOUNTER — HOSPITAL ENCOUNTER (OUTPATIENT)
Dept: INFUSION THERAPY | Age: 69
Setting detail: INFUSION SERIES
Discharge: HOME | DRG: 190 | End: 2019-04-09
Payer: MEDICARE

## 2019-04-09 ENCOUNTER — HOSPITAL ENCOUNTER (INPATIENT)
Age: 69
LOS: 3 days | Discharge: HOME | DRG: 190 | End: 2019-04-12
Attending: EMERGENCY MEDICINE | Admitting: INTERNAL MEDICINE
Payer: MEDICARE

## 2019-04-09 VITALS
RESPIRATION RATE: 16 BRPM | TEMPERATURE: 101.2 F | DIASTOLIC BLOOD PRESSURE: 79 MMHG | OXYGEN SATURATION: 95 % | HEART RATE: 115 BPM | SYSTOLIC BLOOD PRESSURE: 158 MMHG

## 2019-04-09 DIAGNOSIS — R05.9 COUGH: ICD-10-CM

## 2019-04-09 DIAGNOSIS — C91.10 CLL (CHRONIC LYMPHOCYTIC LEUKEMIA) (HCC): Primary | ICD-10-CM

## 2019-04-09 DIAGNOSIS — R79.89 ELEVATED LACTIC ACID LEVEL: ICD-10-CM

## 2019-04-09 DIAGNOSIS — M62.838 MUSCLE SPASM: ICD-10-CM

## 2019-04-09 DIAGNOSIS — E87.1 ACUTE HYPONATREMIA: ICD-10-CM

## 2019-04-09 DIAGNOSIS — J06.9 UPPER RESPIRATORY INFECTION, ACUTE: ICD-10-CM

## 2019-04-09 PROBLEM — M62.81 GENERALIZED MUSCLE WEAKNESS: Status: ACTIVE | Noted: 2019-04-09

## 2019-04-09 PROBLEM — Z79.4 TYPE 2 DIABETES MELLITUS WITH HYPERGLYCEMIA, WITH LONG-TERM CURRENT USE OF INSULIN (HCC): Status: ACTIVE | Noted: 2019-04-09

## 2019-04-09 PROBLEM — E11.65 TYPE 2 DIABETES MELLITUS WITH HYPERGLYCEMIA, WITH LONG-TERM CURRENT USE OF INSULIN (HCC): Status: ACTIVE | Noted: 2019-04-09

## 2019-04-09 PROBLEM — R63.0 POOR APPETITE: Status: ACTIVE | Noted: 2019-04-09

## 2019-04-09 LAB
ADENOVIRUS DETECTION BY PCR: NOT DETECTED
ALBUMIN SERPL-MCNC: 3.6 GM/DL (ref 3.4–5)
ALP BLD-CCNC: 110 IU/L (ref 40–129)
ALT SERPL-CCNC: 34 U/L (ref 10–40)
ANION GAP SERPL CALCULATED.3IONS-SCNC: 15 MMOL/L (ref 4–16)
AST SERPL-CCNC: 29 IU/L (ref 15–37)
ATYPICAL LYMPHOCYTE ABSOLUTE COUNT: ABNORMAL
BACTERIA: ABNORMAL /HPF
BACTERIA: NEGATIVE /HPF
BILIRUB SERPL-MCNC: 0.9 MG/DL (ref 0–1)
BILIRUBIN URINE: NEGATIVE MG/DL
BILIRUBIN URINE: NEGATIVE MG/DL
BLOOD, URINE: NEGATIVE
BLOOD, URINE: NEGATIVE
BORDETELLA PERTUSSIS PCR: NOT DETECTED
BUN BLDV-MCNC: 22 MG/DL (ref 6–23)
CALCIUM SERPL-MCNC: 8.1 MG/DL (ref 8.3–10.6)
CHLAMYDOPHILA PNEUMONIA PCR: NOT DETECTED
CHLORIDE BLD-SCNC: 95 MMOL/L (ref 99–110)
CLARITY: CLEAR
CLARITY: CLEAR
CO2: 17 MMOL/L (ref 21–32)
COLOR: YELLOW
COLOR: YELLOW
CORONAVIRUS 229E PCR: NOT DETECTED
CORONAVIRUS HKU1 PCR: NOT DETECTED
CORONAVIRUS NL63 PCR: NOT DETECTED
CORONAVIRUS OC43 PCR: NOT DETECTED
CREAT SERPL-MCNC: 1.1 MG/DL (ref 0.9–1.3)
DIFFERENTIAL TYPE: ABNORMAL
ESTIMATED AVERAGE GLUCOSE: 154 MG/DL
GFR AFRICAN AMERICAN: >60 ML/MIN/1.73M2
GFR NON-AFRICAN AMERICAN: >60 ML/MIN/1.73M2
GLUCOSE BLD-MCNC: 178 MG/DL (ref 70–99)
GLUCOSE BLD-MCNC: 180 MG/DL (ref 70–99)
GLUCOSE BLD-MCNC: 188 MG/DL (ref 70–99)
GLUCOSE BLD-MCNC: 259 MG/DL (ref 70–99)
GLUCOSE, URINE: NEGATIVE MG/DL
GLUCOSE, URINE: NEGATIVE MG/DL
HBA1C MFR BLD: 7 % (ref 4.2–6.3)
HCT VFR BLD CALC: 31.4 % (ref 42–52)
HEMOGLOBIN: 9.5 GM/DL (ref 13.5–18)
HUMAN METAPNEUMOVIRUS PCR: NOT DETECTED
INFLUENZA A BY PCR: NOT DETECTED
INFLUENZA A H1 (2009) PCR: NOT DETECTED
INFLUENZA A H1 PANDEMIC PCR: NOT DETECTED
INFLUENZA A H3 PCR: NOT DETECTED
INFLUENZA B BY PCR: NOT DETECTED
KETONES, URINE: ABNORMAL MG/DL
KETONES, URINE: NEGATIVE MG/DL
LACTIC ACID, SEPSIS: 0.8 MMOL/L (ref 0.5–1.9)
LACTIC ACID, SEPSIS: ABNORMAL MMOL/L (ref 0.5–1.9)
LEUKOCYTE ESTERASE, URINE: NEGATIVE
LEUKOCYTE ESTERASE, URINE: NEGATIVE
LIPASE: 23 IU/L (ref 13–60)
LYMPHOCYTES ABSOLUTE: ABNORMAL K/CU MM
LYMPHOCYTES RELATIVE PERCENT: 88 % (ref 24–44)
MCH RBC QN AUTO: 27.5 PG (ref 27–31)
MCHC RBC AUTO-ENTMCNC: 30.3 % (ref 32–36)
MCV RBC AUTO: 90.8 FL (ref 78–100)
MICROCYTES: ABNORMAL
MUCUS: ABNORMAL HPF
MUCUS: ABNORMAL HPF
MYCOPLASMA PNEUMONIAE PCR: NOT DETECTED
NITRITE URINE, QUANTITATIVE: NEGATIVE
NITRITE URINE, QUANTITATIVE: NEGATIVE
PARAINFLUENZA 1 PCR: NOT DETECTED
PARAINFLUENZA 2 PCR: NOT DETECTED
PARAINFLUENZA 3 PCR: NOT DETECTED
PARAINFLUENZA 4 PCR: NOT DETECTED
PDW BLD-RTO: 18.5 % (ref 11.7–14.9)
PH, URINE: 5 (ref 5–8)
PH, URINE: 5 (ref 5–8)
PLATELET # BLD: 117 K/CU MM (ref 140–440)
PMV BLD AUTO: 11.1 FL (ref 7.5–11.1)
POLYCHROMASIA: ABNORMAL
POTASSIUM SERPL-SCNC: 4.6 MMOL/L (ref 3.5–5.1)
PROTEIN UA: NEGATIVE MG/DL
PROTEIN UA: NEGATIVE MG/DL
RBC # BLD: 3.46 M/CU MM (ref 4.6–6.2)
RBC # BLD: ABNORMAL 10*6/UL
RBC URINE: <1 /HPF (ref 0–3)
RBC URINE: <1 /HPF (ref 0–3)
RHINOVIRUS ENTEROVIRUS PCR: ABNORMAL
RSV PCR: NOT DETECTED
SEGMENTED NEUTROPHILS ABSOLUTE COUNT: 3.3 K/CU MM
SEGMENTED NEUTROPHILS RELATIVE PERCENT: 12 % (ref 36–66)
SMUDGE CELLS: PRESENT
SODIUM BLD-SCNC: 127 MMOL/L (ref 135–145)
SPECIFIC GRAVITY UA: 1.01 (ref 1–1.03)
SPECIFIC GRAVITY UA: 1.02 (ref 1–1.03)
SQUAMOUS EPITHELIAL: <1 /HPF
TEAR DROP CELLS: ABNORMAL
TOTAL PROTEIN: 6.5 GM/DL (ref 6.4–8.2)
TRICHOMONAS: ABNORMAL /HPF
TRICHOMONAS: ABNORMAL /HPF
UNCLASSIFIED CAST: 1 /LPF
URIC ACID CRYSTALS: ABNORMAL /HPF
UROBILINOGEN, URINE: NORMAL MG/DL (ref 0.2–1)
UROBILINOGEN, URINE: NORMAL MG/DL (ref 0.2–1)
WBC # BLD: 27.1 K/CU MM (ref 4–10.5)
WBC UA: 1 /HPF (ref 0–2)
WBC UA: <1 /HPF (ref 0–2)

## 2019-04-09 PROCEDURE — 87581 M.PNEUMON DNA AMP PROBE: CPT

## 2019-04-09 PROCEDURE — 2700000000 HC OXYGEN THERAPY PER DAY

## 2019-04-09 PROCEDURE — 36415 COLL VENOUS BLD VENIPUNCTURE: CPT

## 2019-04-09 PROCEDURE — 80053 COMPREHEN METABOLIC PANEL: CPT

## 2019-04-09 PROCEDURE — 85027 COMPLETE CBC AUTOMATED: CPT

## 2019-04-09 PROCEDURE — 83605 ASSAY OF LACTIC ACID: CPT

## 2019-04-09 PROCEDURE — G0378 HOSPITAL OBSERVATION PER HR: HCPCS

## 2019-04-09 PROCEDURE — 85007 BL SMEAR W/DIFF WBC COUNT: CPT

## 2019-04-09 PROCEDURE — 6370000000 HC RX 637 (ALT 250 FOR IP): Performed by: NURSE PRACTITIONER

## 2019-04-09 PROCEDURE — 87486 CHLMYD PNEUM DNA AMP PROBE: CPT

## 2019-04-09 PROCEDURE — 82962 GLUCOSE BLOOD TEST: CPT

## 2019-04-09 PROCEDURE — 99211 OFF/OP EST MAY X REQ PHY/QHP: CPT

## 2019-04-09 PROCEDURE — 99285 EMERGENCY DEPT VISIT HI MDM: CPT

## 2019-04-09 PROCEDURE — 87798 DETECT AGENT NOS DNA AMP: CPT

## 2019-04-09 PROCEDURE — 94640 AIRWAY INHALATION TREATMENT: CPT

## 2019-04-09 PROCEDURE — 1200000000 HC SEMI PRIVATE

## 2019-04-09 PROCEDURE — 87040 BLOOD CULTURE FOR BACTERIA: CPT

## 2019-04-09 PROCEDURE — 2580000003 HC RX 258: Performed by: INTERNAL MEDICINE

## 2019-04-09 PROCEDURE — 83690 ASSAY OF LIPASE: CPT

## 2019-04-09 PROCEDURE — 87205 SMEAR GRAM STAIN: CPT

## 2019-04-09 PROCEDURE — 2580000003 HC RX 258: Performed by: PHYSICIAN ASSISTANT

## 2019-04-09 PROCEDURE — 93010 ELECTROCARDIOGRAM REPORT: CPT | Performed by: INTERNAL MEDICINE

## 2019-04-09 PROCEDURE — 83036 HEMOGLOBIN GLYCOSYLATED A1C: CPT

## 2019-04-09 PROCEDURE — 2580000003 HC RX 258: Performed by: NURSE PRACTITIONER

## 2019-04-09 PROCEDURE — 71046 X-RAY EXAM CHEST 2 VIEWS: CPT

## 2019-04-09 PROCEDURE — 94761 N-INVAS EAR/PLS OXIMETRY MLT: CPT

## 2019-04-09 PROCEDURE — 6360000002 HC RX W HCPCS: Performed by: INTERNAL MEDICINE

## 2019-04-09 PROCEDURE — 6370000000 HC RX 637 (ALT 250 FOR IP): Performed by: EMERGENCY MEDICINE

## 2019-04-09 PROCEDURE — 87070 CULTURE OTHR SPECIMN AEROBIC: CPT

## 2019-04-09 PROCEDURE — 81001 URINALYSIS AUTO W/SCOPE: CPT

## 2019-04-09 PROCEDURE — 93005 ELECTROCARDIOGRAM TRACING: CPT | Performed by: EMERGENCY MEDICINE

## 2019-04-09 PROCEDURE — 6370000000 HC RX 637 (ALT 250 FOR IP): Performed by: INTERNAL MEDICINE

## 2019-04-09 PROCEDURE — 6360000002 HC RX W HCPCS: Performed by: NURSE PRACTITIONER

## 2019-04-09 RX ORDER — IPRATROPIUM BROMIDE AND ALBUTEROL SULFATE 2.5; .5 MG/3ML; MG/3ML
1 SOLUTION RESPIRATORY (INHALATION) ONCE
Status: COMPLETED | OUTPATIENT
Start: 2019-04-09 | End: 2019-04-09

## 2019-04-09 RX ORDER — SODIUM CHLORIDE 0.9 % (FLUSH) 0.9 %
10 SYRINGE (ML) INJECTION EVERY 12 HOURS SCHEDULED
Status: DISCONTINUED | OUTPATIENT
Start: 2019-04-09 | End: 2019-04-12 | Stop reason: HOSPADM

## 2019-04-09 RX ORDER — PANTOPRAZOLE SODIUM 40 MG/10ML
40 INJECTION, POWDER, LYOPHILIZED, FOR SOLUTION INTRAVENOUS DAILY
Status: DISCONTINUED | OUTPATIENT
Start: 2019-04-09 | End: 2019-04-09

## 2019-04-09 RX ORDER — NICOTINE POLACRILEX 4 MG
15 LOZENGE BUCCAL PRN
Status: DISCONTINUED | OUTPATIENT
Start: 2019-04-09 | End: 2019-04-12 | Stop reason: HOSPADM

## 2019-04-09 RX ORDER — ONDANSETRON 2 MG/ML
4 INJECTION INTRAMUSCULAR; INTRAVENOUS EVERY 6 HOURS PRN
Status: DISCONTINUED | OUTPATIENT
Start: 2019-04-09 | End: 2019-04-12 | Stop reason: HOSPADM

## 2019-04-09 RX ORDER — SODIUM CHLORIDE 0.9 % (FLUSH) 0.9 %
10 SYRINGE (ML) INJECTION PRN
Status: DISCONTINUED | OUTPATIENT
Start: 2019-04-09 | End: 2019-04-12 | Stop reason: HOSPADM

## 2019-04-09 RX ORDER — IPRATROPIUM BROMIDE AND ALBUTEROL SULFATE 2.5; .5 MG/3ML; MG/3ML
1 SOLUTION RESPIRATORY (INHALATION)
Status: DISCONTINUED | OUTPATIENT
Start: 2019-04-09 | End: 2019-04-12 | Stop reason: HOSPADM

## 2019-04-09 RX ORDER — SODIUM CHLORIDE 9 MG/ML
INJECTION, SOLUTION INTRAVENOUS CONTINUOUS
Status: DISCONTINUED | OUTPATIENT
Start: 2019-04-09 | End: 2019-04-12 | Stop reason: HOSPADM

## 2019-04-09 RX ORDER — HEPARIN SODIUM (PORCINE) LOCK FLUSH IV SOLN 100 UNIT/ML 100 UNIT/ML
500 SOLUTION INTRAVENOUS PRN
Status: DISCONTINUED | OUTPATIENT
Start: 2019-04-09 | End: 2019-04-09 | Stop reason: HOSPADM

## 2019-04-09 RX ORDER — ACETAMINOPHEN 325 MG/1
650 TABLET ORAL EVERY 4 HOURS PRN
Status: DISCONTINUED | OUTPATIENT
Start: 2019-04-09 | End: 2019-04-12 | Stop reason: HOSPADM

## 2019-04-09 RX ORDER — 0.9 % SODIUM CHLORIDE 0.9 %
500 INTRAVENOUS SOLUTION INTRAVENOUS ONCE
Status: COMPLETED | OUTPATIENT
Start: 2019-04-09 | End: 2019-04-09

## 2019-04-09 RX ORDER — ACETAMINOPHEN 325 MG/1
650 TABLET ORAL ONCE
Status: DISCONTINUED | OUTPATIENT
Start: 2019-04-09 | End: 2019-04-09 | Stop reason: ALTCHOICE

## 2019-04-09 RX ORDER — SODIUM CHLORIDE 0.9 % (FLUSH) 0.9 %
10 SYRINGE (ML) INJECTION PRN
Status: DISCONTINUED | OUTPATIENT
Start: 2019-04-09 | End: 2019-04-09 | Stop reason: HOSPADM

## 2019-04-09 RX ORDER — GUAIFENESIN AND CODEINE PHOSPHATE 100; 10 MG/5ML; MG/5ML
10 SOLUTION ORAL EVERY 8 HOURS PRN
Status: DISCONTINUED | OUTPATIENT
Start: 2019-04-09 | End: 2019-04-12 | Stop reason: HOSPADM

## 2019-04-09 RX ORDER — DIPHENHYDRAMINE HCL 25 MG
25 TABLET ORAL ONCE
Status: DISCONTINUED | OUTPATIENT
Start: 2019-04-09 | End: 2019-04-09 | Stop reason: ALTCHOICE

## 2019-04-09 RX ORDER — INSULIN ASPART 100 [IU]/ML
20 INJECTION, SOLUTION INTRAVENOUS; SUBCUTANEOUS
Refills: 3 | Status: ON HOLD | COMMUNITY
Start: 2019-04-03 | End: 2022-03-27 | Stop reason: SDUPTHER

## 2019-04-09 RX ORDER — FAMOTIDINE 20 MG/1
20 TABLET, FILM COATED ORAL 2 TIMES DAILY
Status: DISCONTINUED | OUTPATIENT
Start: 2019-04-09 | End: 2019-04-12 | Stop reason: HOSPADM

## 2019-04-09 RX ORDER — INSULIN DETEMIR 100 [IU]/ML
50 INJECTION, SOLUTION SUBCUTANEOUS NIGHTLY
Refills: 3 | Status: ON HOLD | COMMUNITY
Start: 2019-04-03 | End: 2022-03-27 | Stop reason: SDUPTHER

## 2019-04-09 RX ORDER — VALACYCLOVIR HYDROCHLORIDE 500 MG/1
500 TABLET, FILM COATED ORAL DAILY
Status: DISCONTINUED | OUTPATIENT
Start: 2019-04-10 | End: 2019-04-12 | Stop reason: HOSPADM

## 2019-04-09 RX ORDER — DICYCLOMINE HYDROCHLORIDE 10 MG/1
20 CAPSULE ORAL 4 TIMES DAILY PRN
Status: DISCONTINUED | OUTPATIENT
Start: 2019-04-09 | End: 2019-04-12 | Stop reason: HOSPADM

## 2019-04-09 RX ORDER — DEXTROSE MONOHYDRATE 50 MG/ML
100 INJECTION, SOLUTION INTRAVENOUS PRN
Status: DISCONTINUED | OUTPATIENT
Start: 2019-04-09 | End: 2019-04-12 | Stop reason: HOSPADM

## 2019-04-09 RX ORDER — METHYLPREDNISOLONE SODIUM SUCCINATE 40 MG/ML
40 INJECTION, POWDER, LYOPHILIZED, FOR SOLUTION INTRAMUSCULAR; INTRAVENOUS ONCE
Status: DISCONTINUED | OUTPATIENT
Start: 2019-04-09 | End: 2019-04-09 | Stop reason: ALTCHOICE

## 2019-04-09 RX ORDER — DEXTROSE MONOHYDRATE 25 G/50ML
12.5 INJECTION, SOLUTION INTRAVENOUS PRN
Status: DISCONTINUED | OUTPATIENT
Start: 2019-04-09 | End: 2019-04-12 | Stop reason: HOSPADM

## 2019-04-09 RX ORDER — GUAIFENESIN 600 MG/1
600 TABLET, EXTENDED RELEASE ORAL 2 TIMES DAILY
Status: DISCONTINUED | OUTPATIENT
Start: 2019-04-09 | End: 2019-04-12 | Stop reason: HOSPADM

## 2019-04-09 RX ADMIN — VANCOMYCIN HYDROCHLORIDE 1500 MG: 5 INJECTION, POWDER, LYOPHILIZED, FOR SOLUTION INTRAVENOUS at 16:08

## 2019-04-09 RX ADMIN — SODIUM CHLORIDE: 9 INJECTION, SOLUTION INTRAVENOUS at 16:07

## 2019-04-09 RX ADMIN — ACETAMINOPHEN 650 MG: 325 TABLET ORAL at 16:19

## 2019-04-09 RX ADMIN — INSULIN LISPRO 2 UNITS: 100 INJECTION, SOLUTION INTRAVENOUS; SUBCUTANEOUS at 14:59

## 2019-04-09 RX ADMIN — GUAIFENESIN 600 MG: 600 TABLET, EXTENDED RELEASE ORAL at 20:30

## 2019-04-09 RX ADMIN — INSULIN DETEMIR 50 UNITS: 100 INJECTION, SOLUTION SUBCUTANEOUS at 20:30

## 2019-04-09 RX ADMIN — IPRATROPIUM BROMIDE AND ALBUTEROL SULFATE 1 AMPULE: .5; 3 SOLUTION RESPIRATORY (INHALATION) at 15:59

## 2019-04-09 RX ADMIN — SODIUM CHLORIDE 500 ML: 9 INJECTION, SOLUTION INTRAVENOUS at 11:01

## 2019-04-09 RX ADMIN — INSULIN LISPRO 6 UNITS: 100 INJECTION, SOLUTION INTRAVENOUS; SUBCUTANEOUS at 17:53

## 2019-04-09 RX ADMIN — ENOXAPARIN SODIUM 40 MG: 40 INJECTION SUBCUTANEOUS at 16:07

## 2019-04-09 RX ADMIN — CEFEPIME HYDROCHLORIDE 2 G: 2 INJECTION, POWDER, FOR SOLUTION INTRAVENOUS at 20:29

## 2019-04-09 RX ADMIN — CEFEPIME HYDROCHLORIDE 2 G: 2 INJECTION, POWDER, FOR SOLUTION INTRAVENOUS at 23:45

## 2019-04-09 RX ADMIN — IPRATROPIUM BROMIDE AND ALBUTEROL SULFATE 1 AMPULE: .5; 3 SOLUTION RESPIRATORY (INHALATION) at 11:21

## 2019-04-09 RX ADMIN — SODIUM CHLORIDE, PRESERVATIVE FREE 10 ML: 5 INJECTION INTRAVENOUS at 16:10

## 2019-04-09 RX ADMIN — ONDANSETRON 4 MG: 2 INJECTION INTRAMUSCULAR; INTRAVENOUS at 23:45

## 2019-04-09 RX ADMIN — Medication 10 ML: at 20:30

## 2019-04-09 RX ADMIN — FAMOTIDINE 20 MG: 20 TABLET ORAL at 20:30

## 2019-04-09 RX ADMIN — INSULIN LISPRO 1 UNITS: 100 INJECTION, SOLUTION INTRAVENOUS; SUBCUTANEOUS at 20:30

## 2019-04-09 ASSESSMENT — PAIN DESCRIPTION - LOCATION
LOCATION: ABDOMEN
LOCATION: ABDOMEN

## 2019-04-09 ASSESSMENT — PAIN SCALES - GENERAL
PAINLEVEL_OUTOF10: 0
PAINLEVEL_OUTOF10: 10
PAINLEVEL_OUTOF10: 10
PAINLEVEL_OUTOF10: 7

## 2019-04-09 ASSESSMENT — PAIN DESCRIPTION - PAIN TYPE
TYPE: ACUTE PAIN
TYPE: ACUTE PAIN

## 2019-04-09 NOTE — PROGRESS NOTES
Medication History  [facility]    Patient Name: Radha Bazzi 1950     Medication history has been completed by: Suyapa Salas CPhT    Source(s) of information: patient and insurance claims     Primary Care Physician: Nusrat Ortez MD     Pharmacy: [facility] [address]    Allergies as of 04/09/2019    (No Known Allergies)        Prior to Admission medications    Medication Sig Start Date End Date Taking?  Authorizing Provider   LEVEMIR FLEXTOUCH 100 UNIT/ML injection pen Inject 50 Units into the skin nightly 4/3/19  Yes Historical Provider, MD   NOVOLOG FLEXPEN 100 UNIT/ML injection pen Inject 15 Units into the skin 3 times daily (before meals) 4/3/19  Yes Historical Provider, MD   insulin aspart (NOVOLOG FLEXPEN) 100 UNIT/ML injection pen Inject into the skin See Admin Instructions 04/09/19 On Sliding scale regimen in addition to his base units of 15 before meals   Yes Historical Provider, MD   dicyclomine (BENTYL) 10 MG capsule Take 2 capsules by mouth 4 times daily as needed (pain) 9/26/17  Yes Duy Pardo PA-C   valACYclovir (VALTREX) 500 MG tablet Take 500 mg by mouth daily 2/24/17  Yes Historical Provider, MD   metFORMIN (GLUCOPHAGE) 1000 MG tablet Take 1,000 mg by mouth 2 times daily (with meals)   Yes Historical Provider, MD   omeprazole (PRILOSEC) 20 MG delayed release capsule Take 20 mg by mouth daily as needed   Yes Historical Provider, MD   IMBRUVICA 140 MG chemo capsule Take 140 mg by mouth daily  12/23/16  Yes Historical Provider, MD           glucose monitoring kit (FREESTYLE) monitoring kit 1 kit by Does not apply route daily as needed (glucose checks) 3/31/17   Mindy Cisneros MD   Insulin Syringe-Needle U-100 30G X 1/2\" 0.5 ML MISC 1 each by Does not apply route daily 3/31/17   Mindy Cisneros MD   Immune Globulin, Human, 20 GM/200ML SOLN solution Infuse 20 g intravenously every 30 days 03/29/17 Given through infusion therapy states he is due on the 4 th of April    Historical Provider, MD     Medications added or changed (ex. new medication, dosage change, interval change, formulation change): · Lantus changed to levemir   · Humalog changed to Novolog    Medications removed from list (include reason, ex. noncompliance, medication cost, therapy complete etc.):   · Cyclobenzaprine patient states he no longer takes this medication  · Glipizide-Metformin patient states he no longer takes this medication  · Tramadol patient states he no longer takes this medication    Other Comments:  · Medication list reviewed with patient and insurance claims verified  · Patient reports he was to have infusion therapy this am, but the clinic transferred him to the ER. Did not receive treatment. · Reports he took first doses of medications today  · Insulin coverage updated per information received.     To my knowledge the above medication history is accurate as of 4/9/2019 10:56 AM.   Char Guillermo CPhT   4/9/2019 10:56 AM

## 2019-04-09 NOTE — PROGRESS NOTES
Pt presented with weakness, shakiness, and c/o abd and upper (R) thigh pain with intermittent fevers. VS obtained and charted. Taken to ED for Evaluation per this nurse.

## 2019-04-09 NOTE — ED NOTES
THIS NURSE ATTEMPTED TO CALL REPORT FOR 2ND TIME, ANI FRANCO STATES THAT SHE IS STILL NOT ABLE TO TAKE REPORT DUE TO PATIENT CARE AND SHE HAS NOT BEEN ABLE TO LOOK THE PATIENT UP YET. STATES THAT SHE WILL CALL THIS NURSE BACK.      Aixa Martinez RN  04/09/19 [id number]

## 2019-04-09 NOTE — H&P
History and Physical  JENNI Su-BC   Internal Medicine Hospitalist        Name:  Micaela Schafer /Age/Sex: 1950  (80 y.o. male)   MRN & CSN:  [id number] & [id number] Admission Date/Time: 2019  8:35 AM   Location:  [facility] PCP: Sheela Taylor MD       Hospital Day: 1      Supervising Physician: Dr. Vilma Garcia     Chief Complaint: Abdominal Pain (has had off an on for last couple of years; states that they progressed today)     Assessment and Plan:   Micaela Schafer is a 80 y.o.  male who presents with Upper respiratory infection, acute     Suspected, acute Upper respiratory infection - c/o SOB and productive cough with yellowish sputum, fever 101.2 at IV infusion clinic, afebrile upon arrival to ED, WBC 27.1 could be r/t CLL, Lactate 2.6         - admit inpatient         - ABx: Cefepime, Vanc         - series Lactate per protocol         - IVF         - Mucinex          - Duoneb         - pending resp PCR, UA, bld and sputum CXs         - neutropenic isolation     Acute hyponatremia, possibly d/t dehydration, hyperglycemia - Na 127, corrected with fluids and Insulin for hyperglycemia, BMP in AM.      H/o CLL - on chemo treatment, oncology, Dr. Edmond consulted for chemo management.  Generalized muscle weakness/muscle spasms - could be 2/2 above condition, fall precautions, Hospice consulted.  Type 2 DM with hyperglycemia, with long-term current use of insulin - serum glucose 180; Last HgbA1C 8.9 (2016); hold DM2 home med for now, Levemir + ISS, monitor POCT, diabetic diet, pending HgbA1C.      Poor appetite - reports decreased appetite and poor oral intake, consult dietician for further evaluation.  CAD - on ASA, Plavix, s/p CABG per hx, continue DAPT, nitro, and Lipitor. Current diagnosis and plan of management discussed with the patient at the time of admission in lay language who agree(s) to the above plan and disposition of admission for further care. All concerns and questions addressed. Patient assessment and plan discussed with supervising physician - Dr. Lowell Snyder;    DVT Prophylaxis [x] Lovenox, []  Heparin, [x] SCDs, [] Ambulation  [] Long term AC   GI Prophylaxis [x] PPI,  [] H2 Blocker,  [] Carafate,  [] Diet,  [] No GI prophylaxis, N/A: patient is not under significant medical stress, non-ICU or is receiving a diet/tube feeds   Code Status DNR-CC status, discussed with patient at bedside upon admission. Disposition Patient requires continued admission due to Suspected, acute Upper respiratory infection, Generalized muscle weakness/muscle spasms, hyponatremia. Discharge Plan: Patient plans to return home upon discharge after seen by hospice team.   MDM [] Low, [x] Moderate,[]  High  Patient's risk as above due to:      [x] One or more chronic illnesses with mild exacerbation progression      [] Two or more stable chronic illnesses      [] Undiagnosed new problem with uncertain prognosis      [] Elective major surgery      []Prescription drug management     History of Present Illness:     Principal Problem: Upper respiratory infection, acute  Linda Nelson is a 80 y.o. male who presents to the ED with complaints of generalized muscle weakness and spasms, shortness of breath, and productive cough with yellow sputum. Patient has PMH of CAD, CLL, DM type 2, and arthritis. Patient reports that he has been coughing with yellow phlegm and nasal congestion symptoms for the past 4 weeks. Patient stated that he is more concern if he has  Influenza which is he had h/o influenza in the past. Patient also reports that he was at IV infusion clinic for his CLL treatment when he felt his muscle spasms got worse, felt a generalized muscle weakness, and had a fever of 101.2/temporal. Patient is seeing Dr. Edmond as his oncologist. Pt seen and examined. Patient denies CP, palpitation, chills or diaphoresis.  Denies any other symptoms Medical History:      Past Medical History:   Diagnosis Date    Arthritis     knees, Rt hand/wrist    CAD (coronary artery disease)     Cancer (HCC)     CLL--Sees Dr Josephine [place] Diabetes mellitus ([facility] [address]     History of blood transfusion     no reaction    Hx of motion sickness     MDRO (multiple drug resistant organisms) resistance     abscess on buttock 10yrs ago    Migraine     last one summer 2016    Pneumonia 2016    Wears dentures     full upper--lower dentures    Wears glasses      Past Surgery History:  Patient  has a past surgical history that includes knee surgery (); Tunneled venous port placement (); Colonoscopy (); fracture surgery (Left, ); Cardiac catheterization (); Tonsillectomy (late ); Dental surgery; eye surgery (Right, 2016); and other surgical history (Left, 2017). Social History:    FAM HX: Assessed: family history includes Asthma in his maternal uncle; Colon Cancer in his brother; Diabetes in his mother; Early Death in his brother; Heart Disease in his father; High Blood Pressure in his mother; Other in his sister.   Soc HX:   Social History     Socioeconomic History    Marital status: Single     Spouse name: None    Number of children: None    Years of education: None    Highest education level: None   Occupational History    None   Social Needs    Financial resource strain: None    Food insecurity:     Worry: None     Inability: None    Transportation needs:     Medical: None     Non-medical: None   Tobacco Use    Smoking status: Former Smoker     Years: 20.00     Last attempt to quit: 1987     Years since quittin.2    Smokeless tobacco: Never Used   Substance and Sexual Activity    Alcohol use: No    Drug use: No    Sexual activity: None   Lifestyle    Physical activity:     Days per week: None     Minutes per session: None    Stress: None   Relationships    Social connections:     Talks on phone: None     Gets together: None     Attends [employer] service: None     Active member of club or organization: None     Attends meetings of clubs or organizations: None     Relationship status: None    Intimate partner violence:     Fear of current or ex partner: None     Emotionally abused: None     Physically abused: None     Forced sexual activity: None   Other Topics Concern    None   Social History Narrative    None     TOBACCO:   reports that he quit smoking about 32 years ago. He quit after 20.00 years of use. He has never used smokeless tobacco.  ETOH:   reports that he does not drink alcohol. Drugs:  reports that he does not use drugs. Allergies: No Known Allergies  Medications:   Medications:    sodium chloride flush  10 mL Intravenous 2 times per day    sodium chloride flush  10 mL Intravenous 2 times per day    enoxaparin  40 mg Subcutaneous Daily    pantoprazole  40 mg Intravenous Daily    insulin lispro  0-12 Units Subcutaneous TID WC    insulin lispro  0-6 Units Subcutaneous Nightly    ibrutinib  140 mg Oral Daily    immune globulin (human)  20 g Intravenous Q30 Days    insulin detemir  50 Units Subcutaneous Nightly    valACYclovir  500 mg Oral Daily    guaiFENesin  600 mg Oral BID    ipratropium-albuterol  1 ampule Inhalation Q4H WA      Infusions:   PRN Meds:     sodium chloride flush 10 mL PRN   sodium chloride flush 10 mL PRN   magnesium hydroxide 30 mL Daily PRN   ondansetron 4 mg Q6H PRN   acetaminophen 650 mg Q4H PRN   dicyclomine 20 mg 4x Daily PRN   glucose 15 g PRN   dextrose 12.5 g PRN   glucagon (rDNA) 1 mg PRN   dextrose 100 mL/hr PRN     Prior to Admission Meds:  Prior to Admission medications    Medication Sig Start Date End Date Taking?  Authorizing Provider   LEVEMIR FLEXTOUCH 100 UNIT/ML injection pen Inject 50 Units into the skin nightly 4/3/19  Yes Historical Provider, MD   NOVOLOG FLEXPEN 100 UNIT/ML injection pen Inject 15 Units into the skin 3 times daily (before meals) 4/3/19  Yes Historical Provider, MD   insulin aspart (NOVOLOG FLEXPEN) 100 UNIT/ML injection pen Inject into the skin See Admin Instructions 04/09/19 On Sliding scale regimen in addition to his base units of 15 before meals   Yes Historical Provider, MD   dicyclomine (BENTYL) 10 MG capsule Take 2 capsules by mouth 4 times daily as needed (pain) 9/26/17  Yes Duy Pardo PA-C   valACYclovir (VALTREX) 500 MG tablet Take 500 mg by mouth daily 2/24/17  Yes Historical Provider, MD   metFORMIN (GLUCOPHAGE) 1000 MG tablet Take 1,000 mg by mouth 2 times daily (with meals)   Yes Historical Provider, MD   omeprazole (PRILOSEC) 20 MG delayed release capsule Take 20 mg by mouth daily as needed   Yes Historical Provider, MD   IMBRUVICA 140 MG chemo capsule Take 140 mg by mouth daily  12/23/16  Yes Historical Provider, MD   glucose monitoring kit (FREESTYLE) monitoring kit 1 kit by Does not apply route daily as needed (glucose checks) 3/31/17   Mindy Cisneros MD   Insulin Syringe-Needle U-100 30G X 1/2\" 0.5 ML MISC 1 each by Does not apply route daily 3/31/17   Mindy Cisneros MD   Immune Globulin, Human, 20 GM/200ML SOLN solution Infuse 20 g intravenously every 30 days 03/29/17 Given through infusion therapy states he is due on the 4 th of April    Historical Provider, MD     Data:     Laboratory this visit:  Reviewed  Recent Labs     04/09/19  0920   WBC 27.1*   HGB 9.5*   HCT 31.4*   *      Recent Labs     04/09/19  0920   *   K 4.6   CL 95*   CO2 17*   BUN 22   CREATININE 1.1     Recent Labs     04/09/19  0920   AST 29   ALT 34   BILITOT 0.9   ALKPHOS 110     No results for input(s): INR in the last 72 hours. No results for input(s): CKTOTAL, CKMB, CKMBINDEX in the last 72 hours. Invalid input(s): Wendy Ambrocio input(s): PRO-BNP    Radiology this visit:  Reviewed.     Xr Chest Standard (2 Vw)    Result Date: 4/9/2019  EXAMINATION: TWO VIEWS OF THE CHEST 4/9/2019 9:18 am COMPARISON: 05/29/2018 HISTORY: ORDERING SYSTEM PROVIDED HISTORY: cough TECHNOLOGIST PROVIDED HISTORY: Reason for exam:->cough Ordering Physician Provided Reason for Exam: cough, weakness Acuity: Acute Type of Exam: Initial Cough FINDINGS: Right IJ port noted. Heart size and pulmonary vessels within normal limits. Lungs clear. Costophrenic angles sharp     No evidence of pneumonia     EKG this visit:   EKG: sinus tachycardia, [id number]. Otherwise normal ECG. When compared with ECG of 26-SEP-2017 20:47, aberrant conduction is no longer present.     Current Treatment Team:  Treatment Team: Attending Provider: Krissy Moncada MD; Consulting Physician: Katharine Edmond MD; Registered Nurse: Gamaliel Rush RN; Consulting Physician: Abi Kaplan MD; Consulting Physician: Edgard Dillon MD; Patient Care Tech: [facility]  4/9/2019 11:18 AM      Electronically signed by JENNI Schneider CNP on 4/9/2019 at 11:18 AM

## 2019-04-09 NOTE — ED PROVIDER NOTES
EKG:  Sinus tachycardia with a rate of 104. MN interval 148, QRS 82, . No ST elevations or depressions. Normal T waves. Impression: Sinus tachycardia, otherwise normal EKG. Compared to previous EKG from 9/26/17, the previously noted PAC is resolved and the tachycardia is new. Vadim Carballo MD  04/09/19 0848      I independently examined and evaluated Ewelina Montoya. In brief, hx of CLL presents with spasms in random muscles in his body. He has had spasms like this previously with infusions for the CLL. The spasms stopped after he stopped the infusions. They're brief. Mild cough x 4 weeks, occasionally productive. Fever this morning at the infusion clinic. Vitals:    04/09/19 0842   BP: (!) 143/79   Pulse: 99   Resp: 23   Temp: 98.6 °F (37 °C)   SpO2: 99%     Focused exam revealed Patient sitting comfortably in the bed in no acute distress. Heart regular rate and rhythm. Lungs clear to auscultation bilaterally. Intact distal pulses. No extremity deformities or tenderness. No focal weakness. EKG:  See above    ED course:  Labs show leukocytosis consistent with his known leukemia. They also show a slightly elevated lactic acid level. Patient started on IV fluids. Chest x-ray is unremarkable. Urine is not concerning for an infection. No obvious source of infection. After discussion with his oncologist due to his fever, plan to him to the hospital for observation. Due to oncologist's recommendations will hold antibiotics at this time. Plan of care explained to patient. All questions and concerns were addressed to the patient's satisfaction. Patient understood and agreed with plan. All diagnostic, treatment, and disposition decisions were made by myself in conjunction with the advanced practice provider. For all further details of the patient's emergency department visit, please see the advanced practice provider's documentation.     Comment: Please note this report has been produced using speech recognition software and may contain errors related to that system including errors in grammar, punctuation, and spelling, as well as words and phrases that may be inappropriate. If there are any questions or concerns please feel free to contact the dictating provider for clarification.        Jennifer Woods MD  05/07/19 [id number]

## 2019-04-09 NOTE — CONSULTS
PHARMACY TO DOSE VANCOMYCIN PER DR PALLA    INFECTION BEING TREATED = UPPER RESP INFECTION  CULTURES = RHINOVIRUS POSITIVE, BLOOD AND SPUTUM (PENDING)  OTHER ABT'S = CEFEPIME    AGE = 80 y.o.     SEX = male  HEIGHT = 6' (1.829 m)  Wt Readings from Last 3 Encounters:   04/09/19 190 lb (86.2 kg)   02/12/19 190 lb (86.2 kg)   01/10/19 200 lb (90.7 kg)     Estimated Creatinine Clearance: 72 mL/min (based on SCr of 1.1 mg/dL). Recent Labs     04/09/19  0920   WBC 27.1*   BUN 22   CREATININE 1.1     DOSING PLAN COMMENTS:  GIVE VANCOMYCIN 1,500MG IVPB X1 DOSE TO START  -START VANCOMYCIN 1,250MG IVPB Q12H TOMORROW 4/10 @04:00    VANCO TROUGH SCHEDULED FOR 4/11 @03:30    Jonny [facility]  4/9/2019   3:00 PM  _______________________________________

## 2019-04-09 NOTE — ED PROVIDER NOTES
eMERGENCY dEPARTMENT eNCOUnter      PCP: Marko Pichardo MD    CHIEF COMPLAINT    Chief Complaint   Patient presents with    Abdominal Pain     has had off an on for last couple of years; states that they progressed today       HPI    Bernice Freeman is a 80 y.o. male with PMH of CLL who presents with muscle spasms and productive cough with yellow sputum. Patient reports that he has had cough and nasal congestion symptoms for the last 4 weeks and has been told that he has had influenza in the past. Patient reports that he has had muscle spasms this morning. Patient reports he's had muscle spasms in the past with his IV infusions for CLL. These feel the same as his prior muscle spasms with his IV infusions. Patient reports that he went to get an infusion this morning at the infusion clinic and they took his temperature and he had a fever of 101.2°F temporally. Patient pretty does not feel well and was advised to come to the ED for further evaluation. Patient reports that his muscle spasms occur in his abdomen, left side of neck, right thigh and they're brief and occurred. He reports that right now he is not having any spasms- pain free at this time. Patient reports that his oncologist is Dr. Boni Polo. Patient denies chest pain, palpitations, wheezing, hemoptysis, shortness of breath, nausea, vomiting, numbness, weakness, tingling, headache. REVIEW OF SYSTEMS    Constitutional:  + fever  HENT:  Denies sore throat or ear pain   Cardiovascular:  Denies chest pain, palpitations   Respiratory:  See HPI  GI:  Denies abdominal pain, nausea, vomiting, or diarrhea  :  Denies any urinary symptoms  Musculoskeletal:  + muscle spasms;  Denies back pain  Skin:  Denies rash  Neurologic:  Denies headache, focal weakness or sensory changes   Endocrine:  Denies polyuria or polydypsia   Lymphatic:  Denies swollen glands     All other review of systems are negative  See HPI and nursing notes for additional information     PAST MEDICAL AND SURGICAL HISTORY    Past Medical History:   Diagnosis Date    Arthritis     knees, Rt hand/wrist    CAD (coronary artery disease)     Cancer (HCC)     CLL--Sees Dr Usha Sandoval Diabetes mellitus ([facility] [address]     History of blood transfusion     no reaction    Hx of motion sickness     MDRO (multiple drug resistant organisms) resistance     abscess on buttock 10yrs ago    Migraine     last one summer 2016    Pneumonia 2016    Wears dentures     full upper--lower dentures    Wears glasses      Past Surgical History:   Procedure Laterality Date    CARDIAC CATHETERIZATION      x 2  last showed \"inflammation of heart\"    COLONOSCOPY  2010    DENTAL SURGERY      all teeth extracted     EYE SURGERY Right 2016    Cataract removal    FRACTURE SURGERY Left     left ankle plate and screws    KNEE SURGERY  1970    left    OTHER SURGICAL HISTORY Left 2017    groin excision    TONSILLECTOMY  late     age 13    TUNNELED VENOUS PORT PLACEMENT  2013    Right upper chest       CURRENT MEDICATIONS        ALLERGIES    No Known Allergies    SOCIAL AND FAMILY HISTORY    Social History     Socioeconomic History    Marital status: Single     Spouse name: None    Number of children: None    Years of education: None    Highest education level: None   Occupational History    None   Social Needs    Financial resource strain: None    Food insecurity:     Worry: None     Inability: None    Transportation needs:     Medical: None     Non-medical: None   Tobacco Use    Smoking status: Former Smoker     Years: 20.00     Last attempt to quit: 1987     Years since quittin.2    Smokeless tobacco: Never Used   Substance and Sexual Activity    Alcohol use: No    Drug use: No    Sexual activity: None   Lifestyle    Physical activity:     Days per week: None     Minutes per session: None    Stress: None   Relationships    Social connections:     Talks on phone: None     Gets together: None     Attends Confucianist service: None     Active member of club or organization: None     Attends meetings of clubs or organizations: None     Relationship status: None    Intimate partner violence:     Fear of current or ex partner: None     Emotionally abused: None     Physically abused: None     Forced sexual activity: None   Other Topics Concern    None   Social History Narrative    None     Family History   Problem Relation Age of Onset    Diabetes Mother     High Blood Pressure Mother     Heart Disease Father     Other Sister         liver problems    Early Death Brother     Asthma Maternal Uncle     Colon Cancer Brother          PHYSICAL EXAM    VITAL SIGNS: /67   Pulse 91   Temp 98.6 °F (37 °C) (Oral)   Resp 15   SpO2 93%    Constitutional:  Well developed, Well nourished  HENT:  Normocephalic, Atraumatic, PERRL. EOMI. Sclera clear. Conjunctiva normal, No discharge. Neck/Lymphatics: supple, no JVD, no swollen nodes  Cardiovascular:  Rate regular, regular Rhythm, no murmurs/rubs/gallops. No carotid bruits or murmurs heard in carotids. No JVD  Respiratory:  Nonlabored breathing. Normal breath sounds, No wheezing, rhonchi, rales. Patient does take frequent deep irregular breaths that he reports are due to trying to avoid muscle spasms. Abdomen: Bowel sounds normal, Soft, No tenderness, no masses. Musculoskeletal:    There is no edema, asymmetry, or calf / thigh tenderness bilaterally. No cyanosis. No cool or pale-appearing limb. Distal cap refill and pulses intact bilateral upper and lower extremities  Bilateral upper and lower extremity ROM intact without pain or obvious deficit  Integument:  Warm, Dry. No diaphoresis. Neurologic: Alert & oriented , No focal deficits noted. Cranial nerves II through XII grossly intact. Normal gross motor coordination & motor strength bilateral upper and lower extremities  Sensation intact.  DTRs 2+ in all extremities.   Psychiatric:  Affect normal, Mood normal.       Labs:  Results for orders placed or performed during the hospital encounter of 04/09/19   CBC Auto Differential   Result Value Ref Range    WBC 27.1 (H) 4.0 - 10.5 K/CU MM    RBC 3.46 (L) 4.6 - 6.2 M/CU MM    Hemoglobin 9.5 (L) 13.5 - 18.0 GM/DL    Hematocrit 31.4 (L) 42 - 52 %    MCV 90.8 78 - 100 FL    MCH 27.5 27 - 31 PG    MCHC 30.3 (L) 32.0 - 36.0 %    RDW 18.5 (H) 11.7 - 14.9 %    Platelets 963 (L) 814 - 440 K/CU MM    MPV 11.1 7.5 - 11.1 FL    Segs Relative 12.0 (L) 36 - 66 %    Lymphocytes % 88.0 (H) 24 - 44 %    Segs Absolute 3.3 K/CU MM    Lymphocytes #  K/CU MM     23.8  OCCASIONAL LYMPHOCYTES WITH VISIBLE NUCLEOLUS NOTED      Differential Type MANUAL DIFFERENTIAL     RBC Morphology OCCASIONAL  ELLIPTOCYTES       Microcytes 1+     Polychromasia 1+     Tear Drop Cells 1+     Atypical Lymphocytes Absolute 1+     Smudge Cells PRESENT    Comprehensive Metabolic Panel   Result Value Ref Range    Sodium 127 (L) 135 - 145 MMOL/L    Potassium 4.6 3.5 - 5.1 MMOL/L    Chloride 95 (L) 99 - 110 mMol/L    CO2 17 (L) 21 - 32 MMOL/L    BUN 22 6 - 23 MG/DL    CREATININE 1.1 0.9 - 1.3 MG/DL    Glucose 180 (H) 70 - 99 MG/DL    Calcium 8.1 (L) 8.3 - 10.6 MG/DL    Alb 3.6 3.4 - 5.0 GM/DL    Total Protein 6.5 6.4 - 8.2 GM/DL    Total Bilirubin 0.9 0.0 - 1.0 MG/DL    ALT 34 10 - 40 U/L    AST 29 15 - 37 IU/L    Alkaline Phosphatase 110 40 - 129 IU/L    GFR Non-African American >60 >60 mL/min/1.73m2    GFR African American >60 >60 mL/min/1.73m2    Anion Gap 15 4 - 16   Lipase   Result Value Ref Range    Lipase 23 13 - 60 IU/L   Urinalysis   Result Value Ref Range    Color, UA YELLOW YELLOW    Clarity, UA CLEAR CLEAR    Glucose, Urine NEGATIVE NEGATIVE MG/DL    Bilirubin Urine NEGATIVE NEGATIVE MG/DL    Ketones, Urine NEGATIVE NEGATIVE MG/DL    Specific Gravity, UA 1.017 1.001 - 1.035    Blood, Urine NEGATIVE NEGATIVE    pH, Urine 5.0 5.0 - 8.0    Protein, UA NEGATIVE NEGATIVE MG/DL    Urobilinogen, Urine NORMAL 0.2 - 1.0 MG/DL    Nitrite Urine, Quantitative NEGATIVE NEGATIVE    Leukocyte Esterase, Urine NEGATIVE NEGATIVE    RBC, UA <1 0 - 3 /HPF    WBC, UA <1 0 - 2 /HPF    Bacteria, UA RARE (A) NEGATIVE /HPF    Squam Epithel, UA <1 /HPF    Mucus, UA RARE (A) NEGATIVE HPF    Trichomonas, UA NONE SEEN NONE SEEN /HPF    Unclassified Cast 1 /LPF   Lactate, Sepsis   Result Value Ref Range    Lactic Acid, Sepsis (HH) 0.5 - 1.9 mMOL/L     2.6  LACS CALLED TO OMAR CRUZ PA IN ERT AT 0951 ON [id number] BY LAURA POWELL MT  RESULTS READ BACK     EKG 12 Lead   Result Value Ref Range    Ventricular Rate 104 BPM    Atrial Rate 104 BPM    P-R Interval 148 ms    QRS Duration 82 ms    Q-T Interval 330 ms    QTc Calculation (Bazett) 433 ms    P Axis 67 degrees    R Axis 46 degrees    T Axis 69 degrees    Diagnosis       Sinus tachycardia  Otherwise normal ECG  When compared with ECG of 26-SEP-2017 20:47,  aberrant conduction is no longer present             EKG      EKG Interpretation  Please see ED physician's note for EKG interpretation - Dr. Marquis Medeiros    XR CHEST STANDARD (2 VW)   Final Result   No evidence of pneumonia                 ED COURSE & MEDICAL DECISION MAKING       Vital signs and nursing notes reviewed during ED course. Patient care and presentation staffed and seen in conjunction with supervising physician, Dr. Sudhir Moon. Patient presents as above which prompted work up today. While in the ED today- labs, EKG, and imaging were obtained. For EKG interpretation-see ED physician's note. Chest x-ray obtained and reveals no evidence of pneumonia, pneumothorax, pleural effusion. Labs reveal chronic leukocytosis, chronic anemia, chronic thrombocytopenia likely secondary to patient's and well. Segs percentage is 12. CMP reveals acute hyponatremia of 127. Lactic acid is elevated at 2.6. Patient treated with IV fluids in the ED today for his lactate acidosis as well as hyponatremia. DuoNeb also given as patient was slightly wheezy for supervising physician with his cough. Blood cultures obtained. Respiratory panel is in process at this time. Urinalysis does not appear infected. Emergent processes considered today. I consulted patient's oncologist, Dr. Yoko Roland, and we discussed the patient's case. At this time given patient's segs percentage Dr. Edmond reports that patient is at risk for neutropenic fever but he does not feel that patient is currently having neutropenic fever. He would like to hold off on antibiotics at this time and have patient admitted to the hospitalist for 24 hours observation and await patient's respiratory disease panel. I consulted with hospitalist and discussed the patient's case with Dr. Nikhil Guerra as well as Drenda Canavan NP. They agreed to admit the patient at this time for further evaluation and care. All pertinent Lab data and radiographic results reviewed with patient at bedside. The patient and/or the family were informed of the results of any tests/labs/imaging, the treatment plan, and time was allotted to answer questions. Diagnosis, disposition, and treatment plan reviewed in detail with patient. Patient understands and agrees to admission at this time for further evaluation and care. Clinical  IMPRESSION    1. CLL (chronic lymphocytic leukemia) ([facility] [address]    2. Cough    3. Muscle spasm    4. Acute hyponatremia    5. Elevated lactic acid level            Comment: Please note this report has been produced using speech recognition software and may contain errors related to that system including errors in grammar, punctuation, and spelling, as well as words and phrases that may be inappropriate. If there are any questions or concerns please feel free to contact the dictating provider for clarification.           Paty Patrick PA-C  04/09/19 4456

## 2019-04-09 NOTE — CONSULTS
10yrs ago    Migraine     last one summer 2016    Pneumonia 2016    Wears dentures     full upper--lower dentures    Wears glasses        Past Surgical History:        Procedure Laterality Date    CARDIAC CATHETERIZATION  1990's    x 2  last showed \"inflammation of heart\"    COLONOSCOPY  2010    DENTAL SURGERY      all teeth extracted     EYE SURGERY Right 08/2016    Cataract removal    FRACTURE SURGERY Left 1990's    left ankle plate and screws    KNEE SURGERY  1970    left    OTHER SURGICAL HISTORY Left 01/18/2017    groin excision    TONSILLECTOMY  late 1960's    age 13    TUNNELED VENOUS PORT PLACEMENT  2013    Right upper chest         Current Medications:    Medications    Scheduled Medications:    sodium chloride flush  10 mL Intravenous 2 times per day    sodium chloride flush  10 mL Intravenous 2 times per day    enoxaparin  40 mg Subcutaneous Daily    pantoprazole  40 mg Intravenous Daily    insulin lispro  0-12 Units Subcutaneous TID WC    insulin lispro  0-6 Units Subcutaneous Nightly    ibrutinib  140 mg Oral Daily    immune globulin (human)  20 g Intravenous Q30 Days    insulin detemir  50 Units Subcutaneous Nightly    valACYclovir  500 mg Oral Daily    guaiFENesin  600 mg Oral BID    ipratropium-albuterol  1 ampule Inhalation Q4H WA     PRN Medications: sodium chloride flush, sodium chloride flush, magnesium hydroxide, ondansetron, acetaminophen, dicyclomine, glucose, dextrose, glucagon (rDNA), dextrose      Allergies:  Patient has no known allergies. Social History:   TOBACCO:   reports that he quit smoking about 32 years ago. He quit after 20.00 years of use. He has never used smokeless tobacco.  ETOH:   reports that he does not drink alcohol.     Family History:       Problem Relation Age of Onset    Diabetes Mother     High Blood Pressure Mother     Heart Disease Father     Other Sister         liver problems    Early Death Brother     Asthma Maternal Uncle     Colon Cancer Brother          REVIEW OF SYSTEMS:    As per HPI    PHYSICAL EXAM:      Vitals:    BP (!) 119/58   Pulse 95   Temp 99.9 °F (37.7 °C) (Oral)   Resp 20   SpO2 93%   CONSTITUTIONAL: awake, alert, ill appearing  EYES: No palor or any icetrus  ENT: AT NC  NECK: no jvd  HEMATOLOGIC/LYMPHATIC: no cervical, supraclavicular or axillary lymphadenopathy   LUNGS: dec bs rt side with occasional wheeze  CARDIOVASCULAR: regular rate and rhythm, normal S1 and S2, no murmur noted  ABDOMEN: normal bowel sounds x 4, soft,   non-distended,   Mildly tender  no masses palpated, no hepatosplenomegaly   NEUROLOGIC: awake, alert, oriented to name, place and time. Motor skills grossly intact.   SKIN: Normal skin color, texture, turgor and no jaundice. appears intact   EXTREMITIES: no LE edema     DATA:    ABGs: No results found for: PHART, PO2ART, MCN5MMQ  CBC:   Recent Labs     04/09/19  0920   WBC 27.1*   HGB 9.5*   *     BMP:    Recent Labs     04/09/19  0920   *   K 4.6   CL 95*   CO2 17*   BUN 22   CREATININE 1.1   GLUCOSE 180*     Magnesium:   Lab Results   Component Value Date    MG 2.2 04/04/2019     Hepatic:   Recent Labs     04/09/19  0920   AST 29   ALT 34   BILITOT 0.9   ALKPHOS 110     Recent Labs     04/09/19  0920   LIPASE 23     No results for input(s): PROTIME, INR in the last 72 hours. No results for input(s): PTT in the last 72 hours. Lipids: No results for input(s): CHOL, HDL in the last 72 hours. Invalid input(s): LDLCALCU  INR: No results for input(s): INR in the last 72 hours. 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 Acquired hypogammaglobulinemia: IVIG once acute issues resolve. ? URI/Bronchitis. BC pending. Is on empiric abx.     Other cytopenia: Could be sec to ?infection vs cll. Will monitor for now    Continue other medical care    Discussed the findings and plan with him and he verbalized understanding.     Thank you for letting us be part of the care and will follow along    [address]  4/9/2019  2:22 PM

## 2019-04-09 NOTE — ED NOTES
@ 1039  [facility] [address]  return call transfer to [address] [place] [address] consult completed     Eufemia Diez  04/09/19 5648

## 2019-04-10 LAB
ANION GAP SERPL CALCULATED.3IONS-SCNC: 10 MMOL/L (ref 4–16)
ANISOCYTOSIS: ABNORMAL
ATYPICAL LYMPHOCYTE ABSOLUTE COUNT: ABNORMAL
BUN BLDV-MCNC: 16 MG/DL (ref 6–23)
CALCIUM SERPL-MCNC: 7.1 MG/DL (ref 8.3–10.6)
CHLORIDE BLD-SCNC: 95 MMOL/L (ref 99–110)
CO2: 18 MMOL/L (ref 21–32)
CREAT SERPL-MCNC: 1 MG/DL (ref 0.9–1.3)
DIFFERENTIAL TYPE: ABNORMAL
ELLIPTOCYTES: ABNORMAL
GFR AFRICAN AMERICAN: >60 ML/MIN/1.73M2
GFR NON-AFRICAN AMERICAN: >60 ML/MIN/1.73M2
GLUCOSE BLD-MCNC: 147 MG/DL (ref 70–99)
GLUCOSE BLD-MCNC: 149 MG/DL (ref 70–99)
GLUCOSE BLD-MCNC: 161 MG/DL (ref 70–99)
GLUCOSE BLD-MCNC: 175 MG/DL (ref 70–99)
GLUCOSE BLD-MCNC: 190 MG/DL (ref 70–99)
GLUCOSE BLD-MCNC: 244 MG/DL (ref 70–99)
HCT VFR BLD CALC: 25.8 % (ref 42–52)
HEMOGLOBIN: 7.8 GM/DL (ref 13.5–18)
LYMPHOCYTES ABSOLUTE: 16.9 K/CU MM
LYMPHOCYTES RELATIVE PERCENT: 92 % (ref 24–44)
MCH RBC QN AUTO: 27.7 PG (ref 27–31)
MCHC RBC AUTO-ENTMCNC: 30.2 % (ref 32–36)
MCV RBC AUTO: 91.5 FL (ref 78–100)
MICROCYTES: ABNORMAL
PDW BLD-RTO: 18.6 % (ref 11.7–14.9)
PLATELET # BLD: 102 K/CU MM (ref 140–440)
PLT MORPHOLOGY: ABNORMAL
PMV BLD AUTO: 12.7 FL (ref 7.5–11.1)
POLYCHROMASIA: ABNORMAL
POTASSIUM SERPL-SCNC: 3.9 MMOL/L (ref 3.5–5.1)
RBC # BLD: 2.82 M/CU MM (ref 4.6–6.2)
SEGMENTED NEUTROPHILS ABSOLUTE COUNT: 1.5 K/CU MM
SEGMENTED NEUTROPHILS RELATIVE PERCENT: 8 % (ref 36–66)
SMUDGE CELLS: PRESENT
SODIUM BLD-SCNC: 123 MMOL/L (ref 135–145)
SODIUM BLD-SCNC: 125 MMOL/L (ref 135–145)
TEAR DROP CELLS: ABNORMAL
WBC # BLD: 18.4 K/CU MM (ref 4–10.5)

## 2019-04-10 PROCEDURE — 2580000003 HC RX 258: Performed by: INTERNAL MEDICINE

## 2019-04-10 PROCEDURE — 6360000002 HC RX W HCPCS: Performed by: HOSPITALIST

## 2019-04-10 PROCEDURE — 6360000002 HC RX W HCPCS: Performed by: NURSE PRACTITIONER

## 2019-04-10 PROCEDURE — 87040 BLOOD CULTURE FOR BACTERIA: CPT

## 2019-04-10 PROCEDURE — 6370000000 HC RX 637 (ALT 250 FOR IP): Performed by: NURSE PRACTITIONER

## 2019-04-10 PROCEDURE — 2580000003 HC RX 258: Performed by: NURSE PRACTITIONER

## 2019-04-10 PROCEDURE — 36415 COLL VENOUS BLD VENIPUNCTURE: CPT

## 2019-04-10 PROCEDURE — 94640 AIRWAY INHALATION TREATMENT: CPT

## 2019-04-10 PROCEDURE — 85007 BL SMEAR W/DIFF WBC COUNT: CPT

## 2019-04-10 PROCEDURE — 2580000003 HC RX 258: Performed by: HOSPITALIST

## 2019-04-10 PROCEDURE — 80048 BASIC METABOLIC PNL TOTAL CA: CPT

## 2019-04-10 PROCEDURE — 2700000000 HC OXYGEN THERAPY PER DAY

## 2019-04-10 PROCEDURE — 85027 COMPLETE CBC AUTOMATED: CPT

## 2019-04-10 PROCEDURE — 1200000000 HC SEMI PRIVATE

## 2019-04-10 PROCEDURE — 94761 N-INVAS EAR/PLS OXIMETRY MLT: CPT

## 2019-04-10 PROCEDURE — 84295 ASSAY OF SERUM SODIUM: CPT

## 2019-04-10 PROCEDURE — 82962 GLUCOSE BLOOD TEST: CPT

## 2019-04-10 PROCEDURE — 6360000002 HC RX W HCPCS: Performed by: INTERNAL MEDICINE

## 2019-04-10 PROCEDURE — 87449 NOS EACH ORGANISM AG IA: CPT

## 2019-04-10 RX ADMIN — SODIUM CHLORIDE: 9 INJECTION, SOLUTION INTRAVENOUS at 14:41

## 2019-04-10 RX ADMIN — IPRATROPIUM BROMIDE AND ALBUTEROL SULFATE 1 AMPULE: .5; 3 SOLUTION RESPIRATORY (INHALATION) at 07:20

## 2019-04-10 RX ADMIN — GUAIFENESIN 600 MG: 600 TABLET, EXTENDED RELEASE ORAL at 08:48

## 2019-04-10 RX ADMIN — INSULIN LISPRO 2 UNITS: 100 INJECTION, SOLUTION INTRAVENOUS; SUBCUTANEOUS at 18:49

## 2019-04-10 RX ADMIN — ACETAMINOPHEN 650 MG: 325 TABLET ORAL at 05:41

## 2019-04-10 RX ADMIN — INSULIN DETEMIR 50 UNITS: 100 INJECTION, SOLUTION SUBCUTANEOUS at 20:58

## 2019-04-10 RX ADMIN — VANCOMYCIN HYDROCHLORIDE 1250 MG: 5 INJECTION, POWDER, LYOPHILIZED, FOR SOLUTION INTRAVENOUS at 17:16

## 2019-04-10 RX ADMIN — INSULIN LISPRO 2 UNITS: 100 INJECTION, SOLUTION INTRAVENOUS; SUBCUTANEOUS at 20:58

## 2019-04-10 RX ADMIN — IPRATROPIUM BROMIDE AND ALBUTEROL SULFATE 1 AMPULE: .5; 3 SOLUTION RESPIRATORY (INHALATION) at 19:07

## 2019-04-10 RX ADMIN — INSULIN LISPRO 2 UNITS: 100 INJECTION, SOLUTION INTRAVENOUS; SUBCUTANEOUS at 11:53

## 2019-04-10 RX ADMIN — FAMOTIDINE 20 MG: 20 TABLET ORAL at 08:48

## 2019-04-10 RX ADMIN — CEFEPIME HYDROCHLORIDE 2 G: 2 INJECTION, POWDER, FOR SOLUTION INTRAVENOUS at 16:19

## 2019-04-10 RX ADMIN — GUAIFENESIN 600 MG: 600 TABLET, EXTENDED RELEASE ORAL at 20:57

## 2019-04-10 RX ADMIN — VANCOMYCIN HYDROCHLORIDE 1250 MG: 5 INJECTION, POWDER, LYOPHILIZED, FOR SOLUTION INTRAVENOUS at 05:34

## 2019-04-10 RX ADMIN — FAMOTIDINE 20 MG: 20 TABLET ORAL at 20:57

## 2019-04-10 RX ADMIN — ACETAMINOPHEN 650 MG: 325 TABLET ORAL at 11:55

## 2019-04-10 RX ADMIN — CEFEPIME HYDROCHLORIDE 2 G: 2 INJECTION, POWDER, FOR SOLUTION INTRAVENOUS at 08:48

## 2019-04-10 RX ADMIN — INSULIN LISPRO 2 UNITS: 100 INJECTION, SOLUTION INTRAVENOUS; SUBCUTANEOUS at 08:55

## 2019-04-10 RX ADMIN — VALACYCLOVIR HYDROCHLORIDE 500 MG: 500 TABLET, FILM COATED ORAL at 08:48

## 2019-04-10 RX ADMIN — SODIUM CHLORIDE: 9 INJECTION, SOLUTION INTRAVENOUS at 05:43

## 2019-04-10 ASSESSMENT — PAIN DESCRIPTION - ONSET: ONSET: GRADUAL

## 2019-04-10 ASSESSMENT — PAIN DESCRIPTION - LOCATION: LOCATION: HEAD

## 2019-04-10 ASSESSMENT — PAIN DESCRIPTION - PAIN TYPE: TYPE: ACUTE PAIN

## 2019-04-10 ASSESSMENT — PAIN DESCRIPTION - ORIENTATION: ORIENTATION: POSTERIOR

## 2019-04-10 ASSESSMENT — PAIN DESCRIPTION - DESCRIPTORS: DESCRIPTORS: ACHING;DULL;HEADACHE

## 2019-04-10 ASSESSMENT — PAIN SCALES - GENERAL
PAINLEVEL_OUTOF10: 6
PAINLEVEL_OUTOF10: 6

## 2019-04-10 ASSESSMENT — PAIN DESCRIPTION - PROGRESSION: CLINICAL_PROGRESSION: NOT CHANGED

## 2019-04-10 ASSESSMENT — PAIN DESCRIPTION - FREQUENCY: FREQUENCY: INTERMITTENT

## 2019-04-10 ASSESSMENT — PAIN - FUNCTIONAL ASSESSMENT: PAIN_FUNCTIONAL_ASSESSMENT: PREVENTS OR INTERFERES SOME ACTIVE ACTIVITIES AND ADLS

## 2019-04-10 NOTE — PROGRESS NOTES
Jourdan Cho is a 80 y.o. male patient is short of breath    Current Facility-Administered Medications   Medication Dose Route Frequency Provider Last Rate Last Dose    sodium chloride flush 0.9 % injection 10 mL  10 mL Intravenous 2 times per day SAI Haynes-C   10 mL at 04/09/19 1610    sodium chloride flush 0.9 % injection 10 mL  10 mL Intravenous PRN Jhony Stubbs, PA-C        sodium chloride flush 0.9 % injection 10 mL  10 mL Intravenous 2 times per day Devonda Purl, APRN - CNP        sodium chloride flush 0.9 % injection 10 mL  10 mL Intravenous PRN Devonda Purl, APRN - CNP        magnesium hydroxide (MILK OF MAGNESIA) 400 MG/5ML suspension 30 mL  30 mL Oral Daily PRN Devonda Purl, APRN - CNP        ondansetron (ZOFRAN) injection 4 mg  4 mg Intravenous Q6H PRN Devonda Purl, APRN - CNP   4 mg at 04/09/19 2345    enoxaparin (LOVENOX) injection 40 mg  40 mg Subcutaneous Daily Devonda Purl, APRN - CNP   40 mg at 04/09/19 1607    acetaminophen (TYLENOL) tablet 650 mg  650 mg Oral Q4H PRN Devonda Purl, APRN - CNP   650 mg at 04/10/19 0541    dicyclomine (BENTYL) capsule 20 mg  20 mg Oral 4x Daily PRN Devonda Purl, APRN - CNP        insulin lispro (HUMALOG) injection vial 0-12 Units  0-12 Units Subcutaneous TID WC Carmita Anderson, APRN - CNP   2 Units at 04/10/19 0855    insulin lispro (HUMALOG) injection vial 0-6 Units  0-6 Units Subcutaneous Nightly Devonda Purl, APRN - CNP   1 Units at 04/09/19 2030    glucose (GLUTOSE) 40 % oral gel 15 g  15 g Oral PRN Carmita Anderson, APRN - CNP        dextrose 50 % solution 12.5 g  12.5 g Intravenous PRN Carmita Anderson, APRN - CNP        glucagon (rDNA) injection 1 mg  1 mg Intramuscular PRN Carmita Anderson, APRN - CNP        dextrose 5 % solution  100 mL/hr Intravenous PRN Carmita Anderson, APRN - CNP        ibrutinib (IMBRUVICA) chemo tablet 140 mg  140 mg Oral Daily Carmita Anderson, APRN - CNP        insulin detemir (LEVEMIR) injection vial 50 Units  50 Units Subcutaneous Nightly JENNI Jean Baptiste - CNP   50 Units at 04/09/19 2030    valACYclovir (VALTREX) tablet 500 mg  500 mg Oral Daily JENNI Jean Baptiste - CNP   500 mg at 04/10/19 0848    guaiFENesin (MUCINEX) extended release tablet 600 mg  600 mg Oral BID JENNI Jean Baptiste - CNP   600 mg at 04/10/19 0848    ipratropium-albuterol (DUONEB) nebulizer solution 1 ampule  1 ampule Inhalation Q4H WA JENNI Jean Baptiste - CNP   1 ampule at 04/10/19 0720    0.9 % sodium chloride infusion   Intravenous Continuous JENNI Weiss  mL/hr at 04/10/19 0543      cefepime (MAXIPIME) 2 g in dextrose 5 % 50 mL IVPB  2 g Intravenous Q8H JENNI Jean aBptiste - CNP   Stopped at 04/10/19 0918    vancomycin (VANCOCIN) 1,250 mg in dextrose 5 % 250 mL IVPB  1,250 mg Intravenous Q12H Adan Jasmine MD   Stopped at 04/10/19 0734    famotidine (PEPCID) tablet 20 mg  20 mg Oral BID JENNI Jean Baptiste CNP   20 mg at 04/10/19 0848    guaiFENesin-codeine (TUSSI-ORGANIDIN NR) 100-10 MG/5ML syrup 10 mL  10 mL Oral Q8H PRN Adan Jasmine MD   10 mL at 04/09/19 2030     Facility-Administered Medications Ordered in Other Encounters   Medication Dose Route Frequency Provider Last Rate Last Dose    immune globulin (human) 10% solution 20 g  20 g Intravenous Once Neema Edmond, APRN - ROSA M        acetaminophen (TYLENOL) tablet 650 mg  650 mg Oral Once Neema Edmond, APRN - CNP        diphenhydrAMINE (BENADRYL) tablet 25 mg  25 mg Oral Once Neema Edmond APRN - CNP        methylPREDNISolone sodium (SOLU-MEDROL) injection 20 mg  20 mg Intravenous Once Neema Edmond APRN - CNP        heparin flush 100 UNIT/ML injection 500 Units  500 Units Intercatheter PRN Neema Edmond APRN - CNP        sodium chloride flush 0.9 % injection 10 mL  10 mL Intravenous Once Neema Saenz See, APRN - CNP        immune globulin (human) 10% solution 20 g  20 g Intravenous Once Shyam Rivas See, MD        acetaminophen (TYLENOL) tablet 650 mg  650 mg Oral Q4H PRN Kamilla Edmond MD        methylPREDNISolone sodium (SOLU-MEDROL) injection 40 mg  40 mg Intravenous Once Kamilla Edmond MD        heparin flush 100 UNIT/ML injection 500 Units  500 Units Intercatheter PRN Kamilla Edmond MD        sodium chloride flush 0.9 % injection 10 mL  10 mL Intravenous PRN Norrine Fleischer, MD        heparin flush 100 UNIT/ML injection 500 Units  500 Units Intercatheter PRN Norrine Fleischer, MD        acetaminophen (TYLENOL) tablet 650 mg  650 mg Oral Once Norrine Fleischer, MD        diphenhydrAMINE (BENADRYL) injection 25 mg  25 mg Intravenous Once Norrine Fleischer, MD        methylPREDNISolone sodium (SOLU-MEDROL) injection 40 mg  40 mg Intravenous Once Norrine Fleischer, MD         No Known Allergies  Principal Problem:    Upper respiratory infection, acute  Active Problems:    Generalized muscle weakness    Type 2 diabetes mellitus with hyperglycemia, with long-term current use of insulin (HCC)    Poor appetite  Resolved Problems:    * No resolved hospital problems. *    Blood pressure 120/65, pulse 91, temperature 98.3 °F (36.8 °C), temperature source Oral, resp. rate 17, height 6' (1.829 m), weight 190 lb 3.2 oz (86.3 kg), SpO2 93 %. Subjective:  Symptoms:  Stable. Diet:  Adequate intake. Pain:  He reports no pain. Objective:  General Appearance:  Comfortable. Vital signs: (most recent): Blood pressure (!) 147/64, pulse 87, temperature 98 °F (36.7 °C), temperature source Oral, resp. rate 22, height 6' (1.829 m), weight 190 lb 3.2 oz (86.3 kg), SpO2 96 %. Vital signs are normal.    HEENT: Normal HEENT exam.    Lungs: There are decreased breath sounds. Heart: Normal rate. Abdomen: Abdomen is soft. Bowel sounds are normal.     Extremities: Normal range of motion. Neurological: Patient is alert. Pupils:  Pupils are equal, round, and reactive to light. Skin:  Warm.       Assessment & Plan   Dyspnea due

## 2019-04-11 LAB
ANION GAP SERPL CALCULATED.3IONS-SCNC: 9 MMOL/L (ref 4–16)
BUN BLDV-MCNC: 13 MG/DL (ref 6–23)
CALCIUM SERPL-MCNC: 7.1 MG/DL (ref 8.3–10.6)
CHLORIDE BLD-SCNC: 104 MMOL/L (ref 99–110)
CO2: 20 MMOL/L (ref 21–32)
CREAT SERPL-MCNC: 0.9 MG/DL (ref 0.9–1.3)
DOSE AMOUNT: ABNORMAL
DOSE TIME: ABNORMAL
GFR AFRICAN AMERICAN: >60 ML/MIN/1.73M2
GFR NON-AFRICAN AMERICAN: >60 ML/MIN/1.73M2
GLUCOSE BLD-MCNC: 132 MG/DL (ref 70–99)
GLUCOSE BLD-MCNC: 164 MG/DL (ref 70–99)
GLUCOSE BLD-MCNC: 232 MG/DL (ref 70–99)
GLUCOSE BLD-MCNC: 377 MG/DL (ref 70–99)
GLUCOSE BLD-MCNC: 405 MG/DL (ref 70–99)
HCT VFR BLD CALC: 26.3 % (ref 42–52)
HEMOGLOBIN: 7.7 GM/DL (ref 13.5–18)
LEGIONELLA URINARY AG: NEGATIVE
MCH RBC QN AUTO: 27.6 PG (ref 27–31)
MCHC RBC AUTO-ENTMCNC: 29.3 % (ref 32–36)
MCV RBC AUTO: 94.3 FL (ref 78–100)
PDW BLD-RTO: 18.6 % (ref 11.7–14.9)
PLATELET # BLD: 105 K/CU MM (ref 140–440)
PMV BLD AUTO: 12.4 FL (ref 7.5–11.1)
POTASSIUM SERPL-SCNC: 4.2 MMOL/L (ref 3.5–5.1)
RBC # BLD: 2.79 M/CU MM (ref 4.6–6.2)
SODIUM BLD-SCNC: 133 MMOL/L (ref 135–145)
VANCOMYCIN TROUGH: 7.8 UG/ML (ref 10–20)
WBC # BLD: 15.4 K/CU MM (ref 4–10.5)

## 2019-04-11 PROCEDURE — 80048 BASIC METABOLIC PNL TOTAL CA: CPT

## 2019-04-11 PROCEDURE — 2700000000 HC OXYGEN THERAPY PER DAY

## 2019-04-11 PROCEDURE — 94667 MNPJ CHEST WALL 1ST: CPT

## 2019-04-11 PROCEDURE — 2580000003 HC RX 258: Performed by: NURSE PRACTITIONER

## 2019-04-11 PROCEDURE — 94640 AIRWAY INHALATION TREATMENT: CPT

## 2019-04-11 PROCEDURE — 2580000003 HC RX 258: Performed by: HOSPITALIST

## 2019-04-11 PROCEDURE — 80202 ASSAY OF VANCOMYCIN: CPT

## 2019-04-11 PROCEDURE — 6370000000 HC RX 637 (ALT 250 FOR IP): Performed by: NURSE PRACTITIONER

## 2019-04-11 PROCEDURE — 6360000002 HC RX W HCPCS: Performed by: HOSPITALIST

## 2019-04-11 PROCEDURE — 85027 COMPLETE CBC AUTOMATED: CPT

## 2019-04-11 PROCEDURE — 2580000003 HC RX 258

## 2019-04-11 PROCEDURE — 82962 GLUCOSE BLOOD TEST: CPT

## 2019-04-11 PROCEDURE — 1200000000 HC SEMI PRIVATE

## 2019-04-11 PROCEDURE — 94761 N-INVAS EAR/PLS OXIMETRY MLT: CPT

## 2019-04-11 PROCEDURE — 6360000002 HC RX W HCPCS: Performed by: NURSE PRACTITIONER

## 2019-04-11 RX ORDER — METHYLPREDNISOLONE SODIUM SUCCINATE 40 MG/ML
40 INJECTION, POWDER, LYOPHILIZED, FOR SOLUTION INTRAMUSCULAR; INTRAVENOUS EVERY 8 HOURS
Status: DISCONTINUED | OUTPATIENT
Start: 2019-04-11 | End: 2019-04-12 | Stop reason: HOSPADM

## 2019-04-11 RX ADMIN — INSULIN LISPRO 2 UNITS: 100 INJECTION, SOLUTION INTRAVENOUS; SUBCUTANEOUS at 09:40

## 2019-04-11 RX ADMIN — FAMOTIDINE 20 MG: 20 TABLET ORAL at 09:37

## 2019-04-11 RX ADMIN — INSULIN LISPRO 4 UNITS: 100 INJECTION, SOLUTION INTRAVENOUS; SUBCUTANEOUS at 13:56

## 2019-04-11 RX ADMIN — IPRATROPIUM BROMIDE AND ALBUTEROL SULFATE 1 AMPULE: .5; 3 SOLUTION RESPIRATORY (INHALATION) at 07:43

## 2019-04-11 RX ADMIN — SODIUM CHLORIDE: 9 INJECTION, SOLUTION INTRAVENOUS at 13:56

## 2019-04-11 RX ADMIN — IPRATROPIUM BROMIDE AND ALBUTEROL SULFATE 1 AMPULE: .5; 3 SOLUTION RESPIRATORY (INHALATION) at 11:35

## 2019-04-11 RX ADMIN — CEFEPIME HYDROCHLORIDE 2 G: 2 INJECTION, POWDER, FOR SOLUTION INTRAVENOUS at 16:16

## 2019-04-11 RX ADMIN — INSULIN LISPRO 10 UNITS: 100 INJECTION, SOLUTION INTRAVENOUS; SUBCUTANEOUS at 18:07

## 2019-04-11 RX ADMIN — VALACYCLOVIR HYDROCHLORIDE 500 MG: 500 TABLET, FILM COATED ORAL at 09:37

## 2019-04-11 RX ADMIN — METHYLPREDNISOLONE SODIUM SUCCINATE 40 MG: 40 INJECTION, POWDER, LYOPHILIZED, FOR SOLUTION INTRAMUSCULAR; INTRAVENOUS at 11:44

## 2019-04-11 RX ADMIN — GUAIFENESIN 600 MG: 600 TABLET, EXTENDED RELEASE ORAL at 21:11

## 2019-04-11 RX ADMIN — METHYLPREDNISOLONE SODIUM SUCCINATE 40 MG: 40 INJECTION, POWDER, LYOPHILIZED, FOR SOLUTION INTRAMUSCULAR; INTRAVENOUS at 19:12

## 2019-04-11 RX ADMIN — VANCOMYCIN HYDROCHLORIDE 1750 MG: 5 INJECTION, POWDER, LYOPHILIZED, FOR SOLUTION INTRAVENOUS at 16:17

## 2019-04-11 RX ADMIN — IPRATROPIUM BROMIDE AND ALBUTEROL SULFATE 1 AMPULE: .5; 3 SOLUTION RESPIRATORY (INHALATION) at 15:36

## 2019-04-11 RX ADMIN — VANCOMYCIN HYDROCHLORIDE 1250 MG: 5 INJECTION, POWDER, LYOPHILIZED, FOR SOLUTION INTRAVENOUS at 05:09

## 2019-04-11 RX ADMIN — GUAIFENESIN 600 MG: 600 TABLET, EXTENDED RELEASE ORAL at 09:37

## 2019-04-11 RX ADMIN — INSULIN LISPRO 6 UNITS: 100 INJECTION, SOLUTION INTRAVENOUS; SUBCUTANEOUS at 21:12

## 2019-04-11 RX ADMIN — INSULIN DETEMIR 50 UNITS: 100 INJECTION, SOLUTION SUBCUTANEOUS at 21:12

## 2019-04-11 RX ADMIN — CEFEPIME HYDROCHLORIDE 2 G: 2 INJECTION, POWDER, FOR SOLUTION INTRAVENOUS at 00:31

## 2019-04-11 RX ADMIN — IPRATROPIUM BROMIDE AND ALBUTEROL SULFATE 1 AMPULE: .5; 3 SOLUTION RESPIRATORY (INHALATION) at 20:53

## 2019-04-11 RX ADMIN — FAMOTIDINE 20 MG: 20 TABLET ORAL at 21:11

## 2019-04-11 RX ADMIN — CEFEPIME HYDROCHLORIDE 2 G: 2 INJECTION, POWDER, FOR SOLUTION INTRAVENOUS at 23:59

## 2019-04-11 RX ADMIN — CEFEPIME HYDROCHLORIDE 2 G: 2 INJECTION, POWDER, FOR SOLUTION INTRAVENOUS at 09:38

## 2019-04-11 ASSESSMENT — PAIN SCALES - GENERAL: PAINLEVEL_OUTOF10: 2

## 2019-04-11 ASSESSMENT — PAIN DESCRIPTION - PROGRESSION: CLINICAL_PROGRESSION: NOT CHANGED

## 2019-04-11 NOTE — PLAN OF CARE
Problem: Nutritional:  Goal: Nutritional status will improve  Description  Nutritional status will improve  Outcome: Ongoing     Problem: Physical Regulation:  Goal: Diagnostic test results will improve  Description  Diagnostic test results will improve  Outcome: Ongoing  Goal: Will remain free from infection  Description  Will remain free from infection  Outcome: Ongoing  Goal: Ability to maintain vital signs within normal range will improve  Description  Ability to maintain vital signs within normal range will improve  Outcome: Ongoing     Problem: Respiratory:  Goal: Ability to maintain normal respiratory secretions will improve  Description  Ability to maintain normal respiratory secretions will improve  Outcome: Ongoing     Problem: Skin Integrity:  Goal: Demonstration of wound healing without infection will improve  Description  Demonstration of wound healing without infection will improve  Outcome: Ongoing  Goal: Complications related to intravenous access or infusion will be avoided or minimized  Description  Complications related to intravenous access or infusion will be avoided or minimized  Outcome: Ongoing     Problem: Falls - Risk of:  Goal: Will remain free from falls  Description  Will remain free from falls  Outcome: Ongoing  Goal: Absence of physical injury  Description  Absence of physical injury  Outcome: Ongoing

## 2019-04-11 NOTE — PROGRESS NOTES
[facility] Hospitalist Progress Note     Admit Date: 4/9/2019      Hospital Day: 3      Subjective:   Pt seen and examined. Feels a little better. No SOB at rest, with nasal O2. He still coughs up lot of mucous. General ROS: No fever, chills. Cardiovascular ROS: no chest pain. Respiratory ROS: +cough, shortness of breath. Gastrointestinal ROS: no abdominal pain, diarrhea, N/V. Objective:   /63   Pulse 82   Temp 98.2 °F (36.8 °C) (Oral)   Resp 18   Ht 6' (1.829 m)   Wt 190 lb 3.2 oz (86.3 kg)   SpO2 94%   BMI 25.80 kg/m²    General: The patient appears as stated age. Well appearing, and in no distress at rest, with nasal O2 2L/min  Mental status: Alert, Oriented x3. Coherent. No agitation. Eyes: MITCHEL. Normal conjunctiva. ENT/Mouth: normal appearing jaw and neck, no neck nodes or sinus tenderness. Clear oropharynx with moist mucous membrane. Cardiovascular:  normal rate, regular rhythm, normal S1, S2, no murmurs, rubs, clicks or gallops. No peripheral edema. Dorsal pedis pulses 2+ bilaterally. Respiratory: bilateral expiratory wheeze present. No respiratory distress at rest.  Gastrointestinal: soft, nontender, nondistended, no masses or organomegaly. Genitourinary:  No CVA tenderness. Musculoskletal:  no clubbing or cyanosis. No joint swelling, warmth, or tenderness. Skin:  normal coloration and turgor, no rashes, no suspicious skin lesions noted. Neurologic: Normal speech, no focal findings or movement disorder noted. Data Review  Labs were reviewed. Imaging films was personally reviewed with finding below.     Xr Chest Standard (2 Vw)    Result Date: 4/9/2019  EXAMINATION: TWO VIEWS OF THE CHEST 4/9/2019 9:18 am COMPARISON: 05/29/2018 HISTORY: ORDERING SYSTEM PROVIDED HISTORY: cough TECHNOLOGIST PROVIDED HISTORY: Reason for exam:->cough Ordering Physician Provided Reason for Exam: cough, weakness Acuity: Acute Type of Exam: Initial Cough FINDINGS: Right IJ port noted. Heart size and pulmonary vessels within normal limits. Lungs clear. Costophrenic angles sharp     No evidence of pneumonia       Telemetry EKG strip was personally reviewed. Assessment/Plan:     Acute hypoxic respiratory failure  COPD exacerbation  Rhinovirus infection  - continue duoneb q4h  - start solumedrol 40mg iv q8h  - O2 to keep Sat >92%. - vancomycin. Fever, likely due to URI, improving. +Coag neg Staphy, suspect contamination. - continue vancomycin for now. If blood culture remains positive for CoNS in only one out of two sets, then may discontinue antibiotics. CLL  - continue Ibrutinib. Seen by Dr. Edmond    DM   - continue Levemir 50 units nightly and sliding scale. Anemia, due to CLL  - monitor CBC daily. Transfuse for Hgb <7. Thrombocytopenia due to CLL  - avoid AC      The above assessment/plan has been explained to the patient/family, who indicated understanding.     Geremias Power MD  4/11/2019 9:20 AM

## 2019-04-11 NOTE — PROGRESS NOTES
VANCOMYCIN LAB REVIEW / Dose Change:  TROUGH:    Recent Labs     04/11/19  0515   VANCOTROUGH 7.8*     Estimated Creatinine Clearance: 87 mL/min (based on SCr of 0.9 mg/dL). Recent Labs     04/09/19  0920 04/10/19  0545 04/11/19  0515   WBC 27.1* 18.4* 15.4*   BUN 22 16 13   CREATININE 1.1 1.0 0.9     WBC   Date Value Ref Range Status   04/11/2019 15.4 (H) 4.0 - 10.5 K/CU MM Final     Lactate   Date Value Ref Range Status   03/29/2017 1.4 0.4 - 2.0 mMOL/L Final   03/29/2017 2.2 (HH) 0.4 - 2.0 mMOL/L Final   07/05/2016 1.2 0.4 - 2.0 mMOL/L Final   07/05/2016 2.5 (HH) 0.4 - 2.0 mMOL/L Final     Temp Readings from Last 3 Encounters:   04/11/19 98.2 °F (36.8 °C) (Oral)   04/09/19 101.2 °F (38.4 °C) (Temporal)   03/12/19 98.2 °F (36.8 °C)       Intake/Output Summary (Last 24 hours) at 4/11/2019 0730  Last data filed at 4/10/2019 1904  Gross per 24 hour   Intake 1801.67 ml   Output 550 ml   Net 1251.67 ml     -DAY OF ANTIBIOTIC THERAPY:  3  -I/O net + 1251ml  -Dx: uri with fever & Bacteremia   V Tr goal = 15-20.  -renal, slight improvement each day, now, crcl= 87  -temps= wn limits.  -wbc= continue to trend towards normal, still up some at= 15.4  -cultures: blood= staph coag neg. - still in process; Sputum= still- pending.    -Vanco TR this early am= 7.8 = low. Dose of vanco iv just given right after this level.     THUS/ change :   -VANCOMYCIN to 1750 mg [20.3kg] ivpb q12hr;  next dose @ 15:00 today.    -next Vanco TR set for 4/13 @ 1430.    -Dai Wong MS, [facility]  7:33 AM  _____________________________________________________________________

## 2019-04-12 VITALS
RESPIRATION RATE: 17 BRPM | HEART RATE: 80 BPM | BODY MASS INDEX: 25.76 KG/M2 | WEIGHT: 190.2 LBS | DIASTOLIC BLOOD PRESSURE: 69 MMHG | TEMPERATURE: 97.8 F | HEIGHT: 72 IN | OXYGEN SATURATION: 93 % | SYSTOLIC BLOOD PRESSURE: 136 MMHG

## 2019-04-12 LAB
CULTURE: NORMAL
GLUCOSE BLD-MCNC: 334 MG/DL (ref 70–99)
GLUCOSE BLD-MCNC: 424 MG/DL (ref 70–99)
GRAM SMEAR: NORMAL
Lab: NORMAL
SPECIMEN: NORMAL

## 2019-04-12 PROCEDURE — 2580000003 HC RX 258: Performed by: NURSE PRACTITIONER

## 2019-04-12 PROCEDURE — 6370000000 HC RX 637 (ALT 250 FOR IP): Performed by: NURSE PRACTITIONER

## 2019-04-12 PROCEDURE — 6370000000 HC RX 637 (ALT 250 FOR IP): Performed by: HOSPITALIST

## 2019-04-12 PROCEDURE — 2580000003 HC RX 258: Performed by: HOSPITALIST

## 2019-04-12 PROCEDURE — 2700000000 HC OXYGEN THERAPY PER DAY

## 2019-04-12 PROCEDURE — 6370000000 HC RX 637 (ALT 250 FOR IP): Performed by: INTERNAL MEDICINE

## 2019-04-12 PROCEDURE — 2580000003 HC RX 258: Performed by: PHYSICIAN ASSISTANT

## 2019-04-12 PROCEDURE — 6360000002 HC RX W HCPCS: Performed by: HOSPITALIST

## 2019-04-12 PROCEDURE — 82962 GLUCOSE BLOOD TEST: CPT

## 2019-04-12 PROCEDURE — 94761 N-INVAS EAR/PLS OXIMETRY MLT: CPT

## 2019-04-12 PROCEDURE — 94640 AIRWAY INHALATION TREATMENT: CPT

## 2019-04-12 RX ORDER — ALBUTEROL SULFATE 90 UG/1
2 AEROSOL, METERED RESPIRATORY (INHALATION) EVERY 4 HOURS PRN
Qty: 1 INHALER | Refills: 3 | Status: SHIPPED | OUTPATIENT
Start: 2019-04-12 | End: 2022-04-18

## 2019-04-12 RX ORDER — GUAIFENESIN AND CODEINE PHOSPHATE 100; 10 MG/5ML; MG/5ML
10 SOLUTION ORAL EVERY 8 HOURS PRN
Qty: 1 BOTTLE | Refills: 0 | Status: SHIPPED | OUTPATIENT
Start: 2019-04-12 | End: 2019-04-15

## 2019-04-12 RX ORDER — AZITHROMYCIN 250 MG/1
500 TABLET, FILM COATED ORAL DAILY
Status: DISCONTINUED | OUTPATIENT
Start: 2019-04-12 | End: 2019-04-12 | Stop reason: HOSPADM

## 2019-04-12 RX ORDER — AZITHROMYCIN 500 MG/1
500 TABLET, FILM COATED ORAL DAILY
Qty: 2 TABLET | Refills: 0 | Status: SHIPPED | OUTPATIENT
Start: 2019-04-13 | End: 2019-04-15

## 2019-04-12 RX ORDER — PREDNISONE 10 MG/1
TABLET ORAL
Qty: 18 TABLET | Refills: 0 | Status: SHIPPED | OUTPATIENT
Start: 2019-04-12 | End: 2019-04-22 | Stop reason: ALTCHOICE

## 2019-04-12 RX ORDER — GUAIFENESIN 600 MG/1
600 TABLET, EXTENDED RELEASE ORAL 2 TIMES DAILY
Qty: 20 TABLET | Refills: 0 | Status: SHIPPED | OUTPATIENT
Start: 2019-04-12 | End: 2019-04-22 | Stop reason: ALTCHOICE

## 2019-04-12 RX ORDER — HEPARIN SODIUM (PORCINE) LOCK FLUSH IV SOLN 100 UNIT/ML 100 UNIT/ML
500 SOLUTION INTRAVENOUS PRN
Status: DISCONTINUED | OUTPATIENT
Start: 2019-04-12 | End: 2019-04-12 | Stop reason: HOSPADM

## 2019-04-12 RX ADMIN — METHYLPREDNISOLONE SODIUM SUCCINATE 40 MG: 40 INJECTION, POWDER, LYOPHILIZED, FOR SOLUTION INTRAMUSCULAR; INTRAVENOUS at 09:00

## 2019-04-12 RX ADMIN — FAMOTIDINE 20 MG: 20 TABLET ORAL at 09:00

## 2019-04-12 RX ADMIN — SODIUM CHLORIDE, PRESERVATIVE FREE 10 ML: 5 INJECTION INTRAVENOUS at 09:00

## 2019-04-12 RX ADMIN — VALACYCLOVIR HYDROCHLORIDE 500 MG: 500 TABLET, FILM COATED ORAL at 08:59

## 2019-04-12 RX ADMIN — GUAIFENESIN 600 MG: 600 TABLET, EXTENDED RELEASE ORAL at 09:00

## 2019-04-12 RX ADMIN — Medication 10 ML: at 00:05

## 2019-04-12 RX ADMIN — SODIUM CHLORIDE, PRESERVATIVE FREE 500 UNITS: 5 INJECTION INTRAVENOUS at 12:36

## 2019-04-12 RX ADMIN — IPRATROPIUM BROMIDE AND ALBUTEROL SULFATE 1 AMPULE: .5; 3 SOLUTION RESPIRATORY (INHALATION) at 08:43

## 2019-04-12 RX ADMIN — VANCOMYCIN HYDROCHLORIDE 1750 MG: 5 INJECTION, POWDER, LYOPHILIZED, FOR SOLUTION INTRAVENOUS at 02:27

## 2019-04-12 RX ADMIN — INSULIN LISPRO 12 UNITS: 100 INJECTION, SOLUTION INTRAVENOUS; SUBCUTANEOUS at 12:40

## 2019-04-12 RX ADMIN — INSULIN LISPRO 8 UNITS: 100 INJECTION, SOLUTION INTRAVENOUS; SUBCUTANEOUS at 09:03

## 2019-04-12 RX ADMIN — SODIUM CHLORIDE: 9 INJECTION, SOLUTION INTRAVENOUS at 00:05

## 2019-04-12 RX ADMIN — IPRATROPIUM BROMIDE AND ALBUTEROL SULFATE 1 AMPULE: .5; 3 SOLUTION RESPIRATORY (INHALATION) at 12:10

## 2019-04-12 RX ADMIN — METHYLPREDNISOLONE SODIUM SUCCINATE 40 MG: 40 INJECTION, POWDER, LYOPHILIZED, FOR SOLUTION INTRAMUSCULAR; INTRAVENOUS at 00:00

## 2019-04-12 RX ADMIN — AZITHROMYCIN 500 MG: 250 TABLET, FILM COATED ORAL at 11:01

## 2019-04-12 ASSESSMENT — PAIN SCALES - GENERAL: PAINLEVEL_OUTOF10: 0

## 2019-04-12 NOTE — PLAN OF CARE
Problem: Nutritional:  Goal: Nutritional status will improve  Description  Nutritional status will improve  4/12/2019 0101 by Quita Dalal RN  Outcome: Ongoing  4/11/2019 1524 by Eva Madison RN  Outcome: Ongoing     Problem: Physical Regulation:  Goal: Diagnostic test results will improve  Description  Diagnostic test results will improve  4/12/2019 0101 by Quita Dalal RN  Outcome: Ongoing  4/11/2019 1524 by Eva Madison RN  Outcome: Ongoing  Goal: Will remain free from infection  Description  Will remain free from infection  4/12/2019 0101 by Quita Dalal RN  Outcome: Ongoing  4/11/2019 1524 by Eva Madison RN  Outcome: Ongoing  Goal: Ability to maintain vital signs within normal range will improve  Description  Ability to maintain vital signs within normal range will improve  4/12/2019 0101 by Quita Dalal RN  Outcome: Ongoing  4/11/2019 1524 by Eva Madison RN  Outcome: Ongoing     Problem: Respiratory:  Goal: Ability to maintain normal respiratory secretions will improve  Description  Ability to maintain normal respiratory secretions will improve  4/12/2019 0101 by Quita Dalal RN  Outcome: Ongoing  4/11/2019 1524 by Eva Madison RN  Outcome: Ongoing     Problem: Skin Integrity:  Goal: Demonstration of wound healing without infection will improve  Description  Demonstration of wound healing without infection will improve  4/12/2019 0101 by Quita Dalal RN  Outcome: Ongoing  4/11/2019 1524 by Eva Madison RN  Outcome: Ongoing  Goal: Complications related to intravenous access or infusion will be avoided or minimized  Description  Complications related to intravenous access or infusion will be avoided or minimized  4/12/2019 0101 by Quita Dalal RN  Outcome: Ongoing  4/11/2019 1524 by Eva Madison RN  Outcome: Ongoing     Problem: Falls - Risk of:  Goal: Will remain free from falls  Description  Will remain free from

## 2019-04-12 NOTE — DISCHARGE INSTR - DIET

## 2019-04-12 NOTE — PROGRESS NOTES
4/12/2019 11:33 AM  Patient Room #: 8279/0109-V  Patient Name: Julieth Faustin    (Step 1 Done by RN if possible otherwise call Pulmonary Diagnostics)  1. Place patient on room air at rest for at least 30 minutes. If patient falls below 88% before 30 minutes then you can record the level and stop. Record room air saturation level ____ %. If patient is at 88% or below, they will qualify for home oxygen and you can stop. If level does not fall below 88%, fill in level above. If indicated continue to Step 2. Signature:_______________________ Date: ____________  (Step 2&3 Done by RCP)  2. Ambulate patient on room air until saturation falls below 89%. Record level of room air saturation with ambulation____ %. Next, place patient back on ______lpm oxygen and ambulate, record level _____%. (Note:  this level must show improvement from room air level done with ambulation.)  If patients saturation on room air with ambulation is 88% or below AND patient shows improvement with oxygen during ambulation, they will qualify for home oxygen and you can stop. If patient does not drop below 89%, then patient should have an overnight oximetry trending on room air to see if level falls below 88%. Complete level in Step 3 below. 3. Room air overnight oximetry level 88 % for__________  cumulative minutes. If patients room air oxygen level is < 89% for at least 5 cumulative minutes, patient will qualify for home oxygen and you can stop.         (Attach Night Trending Report)    Complete order below: Diagnosis:__________PNEUMONIA_____________  Home oxygen at:  Length of Need: ? Lifetime X 3 Months     _____lpm or _____%   via  [] nasal cannula  []mask  [] other:________________         []continuous []  with activity  []  Nocturnal   [] Portable Tanks []  Concentrator        Therapist Signature:  _____________________________     Date:  _______________  Physician Signature:  ___Electronically Signed in EMR_ Date: ________________    Physician Printed Name:  Mary Shields MD NPI:  [id number]  [] Patient Qualifies      [] Patient Does NOT qualify

## 2019-04-12 NOTE — DISCHARGE SUMMARY
98% on walking.     CLL  - continue Ibrutinib. He will follow up with Dr. Edmond  As outpatient.       DM   - continue Levemir 50 units nightly and sliding scale.       Anemia, due to CLL  - monitored CBC daily. he did not require transfuusionn during [facility] hospitalizz[facility].     Thrombocytopenia due to CLL  -   We avoided Lovenox for DVT prophylaxis. He did not have any evidence of bleeding. His plaatelet count was stable. Addendum on 4/15)  Blood culture on 4/9 showed GPC anaerobe in 1/2 sets. Repeat blood cultures were negative and he had no symptoms of sepsis or bacteremia upon discharge. I do not think he needs further intervention for this. Physical Examination upon discharge:   Pt was personally examined by me on the day of discharge with the following findings:  /69   Pulse 80   Temp 97.8 °F (36.6 °C) (Oral)   Resp 17   Ht 6' (1.829 m)   Wt 190 lb 3.2 oz (86.3 kg)   SpO2 93%   BMI 25.80 kg/m²   General: The patient appears as stated age. Well appearing, and in no distress. Mental status: Alert, Oriented x3. Coherent. No agitation. Eyes: MITCHEL. Normal conjunctiva. ENT/Mouth: normal appearing jaw and neck, no neck nodes or sinus tenderness. Clear oropharynx with moist mucous membrane. Cardiovascular:  normal rate, regular rhythm, normal S1, S2, no murmurs, rubs, clicks or gallops. No peripheral edema. Dorsal pedis pulses 2+ bilaterally. Respiratory:   Mild expiratory wheeze without  Respiratory distress. Gastrointestinal: soft, nontender, nondistended, no masses or organomegaly. Genitourinary:  No CVA tenderness. Musculoskletal:  no clubbing or cyanosis. No joint swelling, warmth, or tenderness. Skin:  normal coloration and turgor, no rashes, no suspicious skin lesions noted.     Condition at the time of discharge:  Stable  Disposition: home  Discharge FOLLOW UP  Follow-up With  Details  Why  Contact Info   Tawana Doan MD  Schedule an appointment as soon as

## 2019-04-12 NOTE — CONSULTS
Chart reviewed, the patient declined hospice consult per case management notes. I have not seen the patient.      Pepe Rosenberg MD, [facility]

## 2019-04-14 LAB
CULTURE: NORMAL
Lab: NORMAL
SPECIMEN: NORMAL

## 2019-04-15 LAB
CULTURE: NORMAL
CULTURE: NORMAL
Lab: NORMAL
Lab: NORMAL
SPECIMEN: NORMAL
SPECIMEN: NORMAL

## 2019-04-16 LAB
CULTURE: ABNORMAL
EKG ATRIAL RATE: 104 BPM
EKG DIAGNOSIS: NORMAL
EKG P AXIS: 67 DEGREES
EKG P-R INTERVAL: 148 MS
EKG Q-T INTERVAL: 330 MS
EKG QRS DURATION: 82 MS
EKG QTC CALCULATION (BAZETT): 433 MS
EKG R AXIS: 46 DEGREES
EKG T AXIS: 69 DEGREES
EKG VENTRICULAR RATE: 104 BPM
Lab: ABNORMAL
SPECIMEN: ABNORMAL

## 2019-04-22 ENCOUNTER — HOSPITAL ENCOUNTER (OUTPATIENT)
Dept: INFUSION THERAPY | Age: 69
Setting detail: INFUSION SERIES
Discharge: HOME | End: 2019-04-22
Payer: MEDICARE

## 2019-04-22 VITALS
OXYGEN SATURATION: 100 % | HEIGHT: 72 IN | SYSTOLIC BLOOD PRESSURE: 113 MMHG | TEMPERATURE: 98.2 F | HEART RATE: 82 BPM | RESPIRATION RATE: 18 BRPM | BODY MASS INDEX: 25.73 KG/M2 | DIASTOLIC BLOOD PRESSURE: 79 MMHG | WEIGHT: 190 LBS

## 2019-04-22 PROCEDURE — 96366 THER/PROPH/DIAG IV INF ADDON: CPT

## 2019-04-22 PROCEDURE — 2580000003 HC RX 258: Performed by: NURSE PRACTITIONER

## 2019-04-22 PROCEDURE — 96374 THER/PROPH/DIAG INJ IV PUSH: CPT

## 2019-04-22 PROCEDURE — 99211 OFF/OP EST MAY X REQ PHY/QHP: CPT

## 2019-04-22 PROCEDURE — 6360000002 HC RX W HCPCS: Performed by: NURSE PRACTITIONER

## 2019-04-22 PROCEDURE — 6370000000 HC RX 637 (ALT 250 FOR IP): Performed by: NURSE PRACTITIONER

## 2019-04-22 PROCEDURE — 96365 THER/PROPH/DIAG IV INF INIT: CPT

## 2019-04-22 RX ORDER — ACETAMINOPHEN 325 MG/1
650 TABLET ORAL ONCE
Status: COMPLETED | OUTPATIENT
Start: 2019-04-22 | End: 2019-04-22

## 2019-04-22 RX ORDER — DIPHENHYDRAMINE HCL 25 MG
25 TABLET ORAL ONCE
Status: COMPLETED | OUTPATIENT
Start: 2019-04-22 | End: 2019-04-22

## 2019-04-22 RX ORDER — SODIUM CHLORIDE 0.9 % (FLUSH) 0.9 %
10 SYRINGE (ML) INJECTION PRN
Status: DISCONTINUED | OUTPATIENT
Start: 2019-04-22 | End: 2019-04-23 | Stop reason: HOSPADM

## 2019-04-22 RX ORDER — HEPARIN SODIUM (PORCINE) LOCK FLUSH IV SOLN 100 UNIT/ML 100 UNIT/ML
500 SOLUTION INTRAVENOUS PRN
Status: DISCONTINUED | OUTPATIENT
Start: 2019-04-22 | End: 2019-04-23 | Stop reason: HOSPADM

## 2019-04-22 RX ORDER — METHYLPREDNISOLONE SODIUM SUCCINATE 40 MG/ML
40 INJECTION, POWDER, LYOPHILIZED, FOR SOLUTION INTRAMUSCULAR; INTRAVENOUS ONCE
Status: COMPLETED | OUTPATIENT
Start: 2019-04-22 | End: 2019-04-22

## 2019-04-22 RX ADMIN — DIPHENHYDRAMINE HCL 25 MG: 25 TABLET ORAL at 08:38

## 2019-04-22 RX ADMIN — IMMUNE GLOBULIN INTRAVENOUS (HUMAN) 20 G: 5 INJECTION, SOLUTION INTRAVENOUS at 08:42

## 2019-04-22 RX ADMIN — SODIUM CHLORIDE, PRESERVATIVE FREE 500 UNITS: 5 INJECTION INTRAVENOUS at 11:09

## 2019-04-22 RX ADMIN — METHYLPREDNISOLONE SODIUM SUCCINATE 40 MG: 40 INJECTION, POWDER, FOR SOLUTION INTRAMUSCULAR; INTRAVENOUS at 08:40

## 2019-04-22 RX ADMIN — SODIUM CHLORIDE, PRESERVATIVE FREE 10 ML: 5 INJECTION INTRAVENOUS at 11:09

## 2019-04-22 RX ADMIN — ACETAMINOPHEN 650 MG: 325 TABLET ORAL at 08:39

## 2019-04-22 ASSESSMENT — PAIN SCALES - GENERAL
PAINLEVEL_OUTOF10: 0
PAINLEVEL_OUTOF10: 0

## 2019-05-14 ENCOUNTER — APPOINTMENT (OUTPATIENT)
Dept: CT IMAGING | Age: 69
DRG: 191 | End: 2019-05-14
Payer: MEDICARE

## 2019-05-14 ENCOUNTER — APPOINTMENT (OUTPATIENT)
Dept: GENERAL RADIOLOGY | Age: 69
DRG: 191 | End: 2019-05-14
Payer: MEDICARE

## 2019-05-14 ENCOUNTER — HOSPITAL ENCOUNTER (INPATIENT)
Age: 69
LOS: 3 days | Discharge: HOME | DRG: 191 | End: 2019-05-17
Attending: EMERGENCY MEDICINE | Admitting: HOSPITALIST
Payer: MEDICARE

## 2019-05-14 DIAGNOSIS — J96.01 ACUTE RESPIRATORY FAILURE WITH HYPOXEMIA (HCC): ICD-10-CM

## 2019-05-14 DIAGNOSIS — C91.10 CLL (CHRONIC LYMPHOCYTIC LEUKEMIA) (HCC): ICD-10-CM

## 2019-05-14 DIAGNOSIS — J47.0 BRONCHIECTASIS WITH ACUTE LOWER RESPIRATORY INFECTION (HCC): Primary | ICD-10-CM

## 2019-05-14 PROBLEM — J44.9 COPD (CHRONIC OBSTRUCTIVE PULMONARY DISEASE) (HCC): Status: ACTIVE | Noted: 2019-05-14

## 2019-05-14 LAB
ADENOVIRUS DETECTION BY PCR: NOT DETECTED
ALBUMIN SERPL-MCNC: 3.2 GM/DL (ref 3.4–5)
ALP BLD-CCNC: 104 IU/L (ref 40–129)
ALT SERPL-CCNC: 26 U/L (ref 10–40)
ANION GAP SERPL CALCULATED.3IONS-SCNC: 16 MMOL/L (ref 4–16)
AST SERPL-CCNC: 15 IU/L (ref 15–37)
BANDED NEUTROPHILS ABSOLUTE COUNT: 0.34 K/CU MM
BANDED NEUTROPHILS RELATIVE PERCENT: 1 % (ref 5–11)
BILIRUB SERPL-MCNC: 0.6 MG/DL (ref 0–1)
BORDETELLA PERTUSSIS PCR: NOT DETECTED
BUN BLDV-MCNC: 13 MG/DL (ref 6–23)
CALCIUM SERPL-MCNC: 8.6 MG/DL (ref 8.3–10.6)
CHLAMYDOPHILA PNEUMONIA PCR: NOT DETECTED
CHLORIDE BLD-SCNC: 95 MMOL/L (ref 99–110)
CO2: 21 MMOL/L (ref 21–32)
CORONAVIRUS 229E PCR: NOT DETECTED
CORONAVIRUS HKU1 PCR: NOT DETECTED
CORONAVIRUS NL63 PCR: NOT DETECTED
CORONAVIRUS OC43 PCR: NOT DETECTED
CREAT SERPL-MCNC: 0.9 MG/DL (ref 0.9–1.3)
DIFFERENTIAL TYPE: ABNORMAL
ESTIMATED AVERAGE GLUCOSE: 137 MG/DL
GFR AFRICAN AMERICAN: >60 ML/MIN/1.73M2
GFR NON-AFRICAN AMERICAN: >60 ML/MIN/1.73M2
GLUCOSE BLD-MCNC: 132 MG/DL (ref 70–99)
GLUCOSE BLD-MCNC: 174 MG/DL (ref 70–99)
HBA1C MFR BLD: 6.4 % (ref 4.2–6.3)
HCT VFR BLD CALC: 27.4 % (ref 42–52)
HEMOGLOBIN: 7.9 GM/DL (ref 13.5–18)
HUMAN METAPNEUMOVIRUS PCR: NOT DETECTED
HYPOCHROMIA: ABNORMAL
INFLUENZA A BY PCR: NOT DETECTED
INFLUENZA A H1 (2009) PCR: NOT DETECTED
INFLUENZA A H1 PANDEMIC PCR: NOT DETECTED
INFLUENZA A H3 PCR: NOT DETECTED
INFLUENZA B BY PCR: NOT DETECTED
LACTATE: 0.8 MMOL/L (ref 0.4–2)
LYMPHOCYTES ABSOLUTE: 31.8 K/CU MM
LYMPHOCYTES RELATIVE PERCENT: 93 % (ref 24–44)
MCH RBC QN AUTO: 28.2 PG (ref 27–31)
MCHC RBC AUTO-ENTMCNC: 28.8 % (ref 32–36)
MCV RBC AUTO: 97.9 FL (ref 78–100)
MYCOPLASMA PNEUMONIAE PCR: NOT DETECTED
OVALOCYTES: ABNORMAL
PARAINFLUENZA 1 PCR: NOT DETECTED
PARAINFLUENZA 2 PCR: NOT DETECTED
PARAINFLUENZA 3 PCR: NOT DETECTED
PARAINFLUENZA 4 PCR: NOT DETECTED
PDW BLD-RTO: 20.6 % (ref 11.7–14.9)
PLATELET # BLD: 172 K/CU MM (ref 140–440)
PMV BLD AUTO: 11.9 FL (ref 7.5–11.1)
POTASSIUM SERPL-SCNC: 4 MMOL/L (ref 3.5–5.1)
RBC # BLD: 2.8 M/CU MM (ref 4.6–6.2)
RHINOVIRUS ENTEROVIRUS PCR: ABNORMAL
RSV PCR: NOT DETECTED
SEGMENTED NEUTROPHILS ABSOLUTE COUNT: 2.1 K/CU MM
SEGMENTED NEUTROPHILS RELATIVE PERCENT: 6 % (ref 36–66)
SMUDGE CELLS: PRESENT
SODIUM BLD-SCNC: 132 MMOL/L (ref 135–145)
TOTAL PROTEIN: 5.8 GM/DL (ref 6.4–8.2)
TOXIC GRANULATION: PRESENT
WBC # BLD: 34.2 K/CU MM (ref 4–10.5)

## 2019-05-14 PROCEDURE — 85027 COMPLETE CBC AUTOMATED: CPT

## 2019-05-14 PROCEDURE — 87486 CHLMYD PNEUM DNA AMP PROBE: CPT

## 2019-05-14 PROCEDURE — 2580000003 HC RX 258: Performed by: EMERGENCY MEDICINE

## 2019-05-14 PROCEDURE — 6360000002 HC RX W HCPCS: Performed by: HOSPITALIST

## 2019-05-14 PROCEDURE — 6360000004 HC RX CONTRAST MEDICATION: Performed by: EMERGENCY MEDICINE

## 2019-05-14 PROCEDURE — 2580000003 HC RX 258: Performed by: HOSPITALIST

## 2019-05-14 PROCEDURE — 96375 TX/PRO/DX INJ NEW DRUG ADDON: CPT

## 2019-05-14 PROCEDURE — 96366 THER/PROPH/DIAG IV INF ADDON: CPT

## 2019-05-14 PROCEDURE — 87581 M.PNEUMON DNA AMP PROBE: CPT

## 2019-05-14 PROCEDURE — 96365 THER/PROPH/DIAG IV INF INIT: CPT

## 2019-05-14 PROCEDURE — 85007 BL SMEAR W/DIFF WBC COUNT: CPT

## 2019-05-14 PROCEDURE — 83605 ASSAY OF LACTIC ACID: CPT

## 2019-05-14 PROCEDURE — 87633 RESP VIRUS 12-25 TARGETS: CPT

## 2019-05-14 PROCEDURE — 80053 COMPREHEN METABOLIC PANEL: CPT

## 2019-05-14 PROCEDURE — 6360000002 HC RX W HCPCS: Performed by: EMERGENCY MEDICINE

## 2019-05-14 PROCEDURE — 82962 GLUCOSE BLOOD TEST: CPT

## 2019-05-14 PROCEDURE — 1200000000 HC SEMI PRIVATE

## 2019-05-14 PROCEDURE — 74177 CT ABD & PELVIS W/CONTRAST: CPT

## 2019-05-14 PROCEDURE — 93005 ELECTROCARDIOGRAM TRACING: CPT | Performed by: EMERGENCY MEDICINE

## 2019-05-14 PROCEDURE — 83036 HEMOGLOBIN GLYCOSYLATED A1C: CPT

## 2019-05-14 PROCEDURE — 71046 X-RAY EXAM CHEST 2 VIEWS: CPT

## 2019-05-14 PROCEDURE — 71260 CT THORAX DX C+: CPT

## 2019-05-14 PROCEDURE — 87040 BLOOD CULTURE FOR BACTERIA: CPT

## 2019-05-14 PROCEDURE — 87798 DETECT AGENT NOS DNA AMP: CPT

## 2019-05-14 PROCEDURE — 6370000000 HC RX 637 (ALT 250 FOR IP): Performed by: HOSPITALIST

## 2019-05-14 PROCEDURE — 99285 EMERGENCY DEPT VISIT HI MDM: CPT

## 2019-05-14 RX ORDER — ONDANSETRON 2 MG/ML
4 INJECTION INTRAMUSCULAR; INTRAVENOUS EVERY 6 HOURS PRN
Status: DISCONTINUED | OUTPATIENT
Start: 2019-05-14 | End: 2019-05-17 | Stop reason: HOSPADM

## 2019-05-14 RX ORDER — MORPHINE SULFATE 4 MG/ML
4 INJECTION, SOLUTION INTRAMUSCULAR; INTRAVENOUS ONCE
Status: COMPLETED | OUTPATIENT
Start: 2019-05-14 | End: 2019-05-14

## 2019-05-14 RX ORDER — VALACYCLOVIR HYDROCHLORIDE 500 MG/1
500 TABLET, FILM COATED ORAL DAILY
Status: DISCONTINUED | OUTPATIENT
Start: 2019-05-15 | End: 2019-05-17 | Stop reason: HOSPADM

## 2019-05-14 RX ORDER — 0.9 % SODIUM CHLORIDE 0.9 %
1000 INTRAVENOUS SOLUTION INTRAVENOUS ONCE
Status: COMPLETED | OUTPATIENT
Start: 2019-05-14 | End: 2019-05-14

## 2019-05-14 RX ORDER — SODIUM CHLORIDE 9 MG/ML
INJECTION, SOLUTION INTRAVENOUS CONTINUOUS
Status: DISPENSED | OUTPATIENT
Start: 2019-05-14 | End: 2019-05-15

## 2019-05-14 RX ORDER — METHYLPREDNISOLONE SODIUM SUCCINATE 40 MG/ML
20 INJECTION, POWDER, LYOPHILIZED, FOR SOLUTION INTRAMUSCULAR; INTRAVENOUS DAILY
Status: DISCONTINUED | OUTPATIENT
Start: 2019-05-15 | End: 2019-05-17 | Stop reason: HOSPADM

## 2019-05-14 RX ORDER — NICOTINE POLACRILEX 4 MG
15 LOZENGE BUCCAL PRN
Status: DISCONTINUED | OUTPATIENT
Start: 2019-05-14 | End: 2019-05-15 | Stop reason: SDUPTHER

## 2019-05-14 RX ORDER — SODIUM CHLORIDE 0.9 % (FLUSH) 0.9 %
10 SYRINGE (ML) INJECTION EVERY 12 HOURS SCHEDULED
Status: DISCONTINUED | OUTPATIENT
Start: 2019-05-14 | End: 2019-05-17 | Stop reason: HOSPADM

## 2019-05-14 RX ORDER — INSULIN GLARGINE 100 [IU]/ML
50 INJECTION, SOLUTION SUBCUTANEOUS NIGHTLY
Status: DISCONTINUED | OUTPATIENT
Start: 2019-05-14 | End: 2019-05-16

## 2019-05-14 RX ORDER — DEXTROSE MONOHYDRATE 50 MG/ML
100 INJECTION, SOLUTION INTRAVENOUS PRN
Status: DISCONTINUED | OUTPATIENT
Start: 2019-05-14 | End: 2019-05-15 | Stop reason: SDUPTHER

## 2019-05-14 RX ORDER — IPRATROPIUM BROMIDE AND ALBUTEROL SULFATE 2.5; .5 MG/3ML; MG/3ML
1 SOLUTION RESPIRATORY (INHALATION)
Status: DISCONTINUED | OUTPATIENT
Start: 2019-05-15 | End: 2019-05-17 | Stop reason: HOSPADM

## 2019-05-14 RX ORDER — SODIUM CHLORIDE 0.9 % (FLUSH) 0.9 %
10 SYRINGE (ML) INJECTION PRN
Status: DISCONTINUED | OUTPATIENT
Start: 2019-05-14 | End: 2019-05-17 | Stop reason: HOSPADM

## 2019-05-14 RX ORDER — PANTOPRAZOLE SODIUM 40 MG/1
40 TABLET, DELAYED RELEASE ORAL
Status: DISCONTINUED | OUTPATIENT
Start: 2019-05-15 | End: 2019-05-17 | Stop reason: HOSPADM

## 2019-05-14 RX ORDER — DEXTROSE MONOHYDRATE 25 G/50ML
12.5 INJECTION, SOLUTION INTRAVENOUS PRN
Status: DISCONTINUED | OUTPATIENT
Start: 2019-05-14 | End: 2019-05-15 | Stop reason: SDUPTHER

## 2019-05-14 RX ADMIN — IOPAMIDOL 80 ML: 755 INJECTION, SOLUTION INTRAVENOUS at 18:13

## 2019-05-14 RX ADMIN — SODIUM CHLORIDE 1000 ML: 9 INJECTION, SOLUTION INTRAVENOUS at 18:22

## 2019-05-14 RX ADMIN — INSULIN LISPRO 1 UNITS: 100 INJECTION, SOLUTION INTRAVENOUS; SUBCUTANEOUS at 22:32

## 2019-05-14 RX ADMIN — AZITHROMYCIN MONOHYDRATE 500 MG: 500 INJECTION, POWDER, LYOPHILIZED, FOR SOLUTION INTRAVENOUS at 19:50

## 2019-05-14 RX ADMIN — SODIUM CHLORIDE, PRESERVATIVE FREE 10 ML: 5 INJECTION INTRAVENOUS at 22:26

## 2019-05-14 RX ADMIN — VANCOMYCIN HYDROCHLORIDE 1750 MG: 5 INJECTION, POWDER, LYOPHILIZED, FOR SOLUTION INTRAVENOUS at 22:24

## 2019-05-14 RX ADMIN — CEFEPIME HYDROCHLORIDE 2 G: 2 INJECTION, POWDER, FOR SOLUTION INTRAVENOUS at 19:36

## 2019-05-14 RX ADMIN — INSULIN GLARGINE 50 UNITS: 100 INJECTION, SOLUTION SUBCUTANEOUS at 22:32

## 2019-05-14 RX ADMIN — SODIUM CHLORIDE 1000 ML: 9 INJECTION, SOLUTION INTRAVENOUS at 22:25

## 2019-05-14 RX ADMIN — MORPHINE SULFATE 4 MG: 4 INJECTION INTRAVENOUS at 19:00

## 2019-05-14 ASSESSMENT — PAIN DESCRIPTION - PAIN TYPE: TYPE: ACUTE PAIN

## 2019-05-14 ASSESSMENT — PAIN DESCRIPTION - LOCATION: LOCATION: ABDOMEN

## 2019-05-14 ASSESSMENT — PAIN SCALES - GENERAL
PAINLEVEL_OUTOF10: 5
PAINLEVEL_OUTOF10: 0

## 2019-05-14 NOTE — ED NOTES
@[id number] paged hospitalist via [address] [place] [address] per Dr Luisa New  05/14/19 1941

## 2019-05-14 NOTE — ED PROVIDER NOTES
Triage Chief Complaint:   Shortness of Breath (recent pneumonia; ca patient (CLL)) and Cough    [place]:  Speedy Bennett is a 80 y.o. male that presents For shortness of breath and URI symptoms over the last 3 months or so He's had recurrent URI symptoms, he has had various different viruses and the flu, got over those and over the last few days he's developed other URI. He's had a cough congestion body aches and weakness. Patient does have CLL and is on chronic chemotherapy, has been treating symptomatically and not getting better. He has had fevers and chills, no chest pain, has been short of breath, no leg pain or leg swelling.     ROS:  General:  No fevers, + chills, no weakness  Eyes:  No recent vison changes, no discharge  ENT:  No sore throat, + nasal congestion, no hearing changes  Cardiovascular:  No chest pain, no palpitations  Respiratory:  + shortness of breath, no cough, no wheezing  Gastrointestinal:  No pain, no nausea, no vomiting, no diarrhea  Musculoskeletal:  No muscle pain, no joint pain  Skin:  No rash, no pruritis, no easy bruising  Neurologic:  No speech problems, no headache, no extremity numbness, no extremity tingling, no extremity weakness  Psychiatric:  No anxiety  Genitourinary:  No dysuria, no hematuria  Endocrine:  No unexpected weight gain, no unexpected weight loss  Extremities:  no edema, no pain    Past Medical History:   Diagnosis Date    Arthritis     knees, Rt hand/wrist    CAD (coronary artery disease)     Cancer (HCC)     CLL--[facility] Peter [facility]     History of blood transfusion     no reaction    Hx of motion sickness     MDRO (multiple drug resistant organisms) resistance     abscess on buttock 10yrs ago    Migraine     last one summer 2016    Pneumonia 2016    Wears dentures     full upper--lower dentures    Wears glasses      Past Surgical History:   Procedure Laterality Date    CARDIAC CATHETERIZATION  1990's    x 2  last showed \"inflammation of heart\"    COLONOSCOPY  2010    DENTAL SURGERY      all teeth extracted     EYE SURGERY Right 2016    Cataract removal    FRACTURE SURGERY Left     left ankle plate and screws    KNEE SURGERY  1970    left    OTHER SURGICAL HISTORY Left 2017    groin excision    TONSILLECTOMY  late     age 13    TUNNELED VENOUS PORT PLACEMENT      Right upper chest     Family History   Problem Relation Age of Onset    Diabetes Mother     High Blood Pressure Mother     Heart Disease Father     Other Sister         liver problems    Early Death Brother     Asthma Maternal Uncle     Colon Cancer Brother      Social History     Socioeconomic History    Marital status: Single     Spouse name: Not on file    Number of children: Not on file    Years of education: Not on file    Highest education level: Not on file   Occupational History    Not on file   Social Needs    Financial resource strain: Not on file    Food insecurity:     Worry: Not on file     Inability: Not on file    Transportation needs:     Medical: Not on file     Non-medical: Not on file   Tobacco Use    Smoking status: Former Smoker     Years: 20.00     Last attempt to quit: 1987     Years since quittin.4    Smokeless tobacco: Never Used   Substance and Sexual Activity    Alcohol use: No    Drug use: No    Sexual activity: Not on file   Lifestyle    Physical activity:     Days per week: Not on file     Minutes per session: Not on file    Stress: Not on file   Relationships    Social connections:     Talks on phone: Not on file     Gets together: Not on file     Attends Yarsanism service: Not on file     Active member of club or organization: Not on file     Attends meetings of clubs or organizations: Not on file     Relationship status: Not on file    Intimate partner violence:     Fear of current or ex partner: Not on file     Emotionally abused: Not on file     Physically abused: Not on file     Forced sexual activity: Not on file   Other Topics Concern    Not on file   Social History Narrative    Not on file     No current facility-administered medications for this encounter.       Current Outpatient Medications   Medication Sig Dispense Refill    levofloxacin (LEVAQUIN) 500 MG tablet Take 1 tablet by mouth daily for 7 days 7 tablet 0    LEVEMIR FLEXTOUCH 100 UNIT/ML injection pen Inject 50 Units into the skin nightly  3    NOVOLOG FLEXPEN 100 UNIT/ML injection pen Inject 20 Units into the skin 3 times daily (before meals)   3    insulin aspart (NOVOLOG FLEXPEN) 100 UNIT/ML injection pen Inject into the skin See Admin Instructions 05/14/19 On Sliding scale regimen in addition to his base units of 15 before meals      valACYclovir (VALTREX) 500 MG tablet Take 500 mg by mouth daily      omeprazole (PRILOSEC) 20 MG delayed release capsule Take 20 mg by mouth daily as needed      Immune Globulin, Human, 20 GM/200ML SOLN solution Infuse 20 g intravenously every 30 days 05/14/19 Given through infusion therapy states he is due on the 20 of May      IMBRUVICA 140 MG chemo capsule Take 140 mg by mouth daily       albuterol sulfate HFA (PROVENTIL HFA) 108 (90 Base) MCG/ACT inhaler Inhale 2 puffs into the lungs every 4 hours as needed for Wheezing or Shortness of Breath 1 Inhaler 3    glucose monitoring kit (FREESTYLE) monitoring kit 1 kit by Does not apply route daily as needed (glucose checks) 1 kit 0    Insulin Syringe-Needle U-100 30G X 1/2\" 0.5 ML MISC 1 each by Does not apply route daily 100 each 3    metFORMIN (GLUCOPHAGE) 1000 MG tablet Take 1,000 mg by mouth 2 times daily (with meals)       Facility-Administered Medications Ordered in Other Encounters   Medication Dose Route Frequency Provider Last Rate Last Dose    heparin flush 100 UNIT/ML injection 500 Units  500 Units Intercatheter PRN Margaret Numbers See, APRN - CNP        sodium chloride flush 0.9 % injection 10 mL  10 mL Intravenous Once Anjel Space See, APRN - CNP        sodium chloride flush 0.9 % injection 10 mL  10 mL Intravenous PRN Siddharth Coronado MD         No Known Allergies    Nursing Notes Reviewed    Physical Exam:  ED Triage Vitals [05/14/19 1614]   Enc Vitals Group      BP (!) 145/72      Pulse 95      Resp 17      Temp 99.2 °F (37.3 °C)      Temp Source Oral      SpO2 95 %      Weight 190 lb (86.2 kg)      Height 6' (1.829 m)      Head Circumference       Peak Flow       Pain Score       Pain Loc       Pain Edu? Excl. in [address]? My pulse ox interpretation is - normal    General appearance:  No acute distress. Skin:  Warm. Dry. Eye:  Extraocular movements intact. Ears, nose, mouth and throat:  Oral mucosa moist   Neck:  Trachea midline. Extremity:  No swelling. Normal ROM     Heart:  Regular rate and rhythm, normal S1 & S2, no extra heart sounds. Perfusion:  intact  Respiratory:  Lungs clear to auscultation bilaterally. Respirations nonlabored. Abdominal:  Normal bowel sounds. Soft. Nontender. Non distended. Neurological:  Alert and oriented times 3.       I have reviewed and interpreted all of the currently available lab results from this visit (if applicable):  Results for orders placed or performed during the hospital encounter of 05/14/19   RESP Disease Panel PCR   Result Value Ref Range    Adenovirus Detection by PCR NOT DETECTED NOT DETECTED    Coronavirus 229E PCR NOT DETECTED NOT DETECTED    Coronavirus HKU1 PCR NOT DETECTED NOT DETECTED    Coronavirus NL63 PCR NOT DETECTED NOT DETECTED    Coronavirus OC43 PCR NOT DETECTED NOT DETECTED    Human Metapneumovirus PCR NOT DETECTED NOT DETECTED    Rhinovirus Enterovirus PCR DETECTED BY PCR (A) NOT DETECTED    Influenza A by PCR NOT DETECTED NOT DETECTED    Influenza A H1 (2009) PCR NOT DETECTED NOT DETECTED    Influenza A H1 Pandemic PCR NOT DETECTED NOT DETECTED    Influenza A H3 PCR NOT DETECTED NOT DETECTED    Influenza B by PCR NOT DETECTED NOT DETECTED    Parainfluenza 1 PCR NOT DETECTED NOT DETECTED    Parainfluenza 2 PCR NOT DETECTED NOT DETECTED    Parainfluenza 3 PCR NOT DETECTED NOT DETECTED    Parainfluenza 4 PCR NOT DETECTED NOT DETECTED    RSV PCR NOT DETECTED NOT DETECTED    B Pertussis by PCR NOT DETECTED NOT DETECTED    Chlamydophila Pneumonia PCR NOT DETECTED NOT DETECTED    Mycoplasma pneumo by PCR NOT DETECTED NOT DETECTED   Culture blood 1   Result Value Ref Range    Specimen BLOOD     Special Requests NONE     Culture NO GROWTH AT 5 DAYS    Culture blood 2   Result Value Ref Range    Specimen BLOOD     Special Requests NONE     Culture NO GROWTH AT 5 DAYS    Sputum culture   Result Value Ref Range    Specimen SPUTUM     Special Requests NONE     Gram Smear RARE  NECROTIC POLYS       Gram Smear NO  EPITHELIAL CELLS NOTED       Gram Smear RARE  GRAM POSITIVE COCCI       Gram Smear MUCOUS     Culture MIXED, COMMENSAL RESPIRATORY ANGELA SCANTY GROWTH    CBC auto diff   Result Value Ref Range    WBC 34.2 (HH) 4.0 - 10.5 K/CU MM    RBC 2.80 (L) 4.6 - 6.2 M/CU MM    Hemoglobin 7.9 (L) 13.5 - 18.0 GM/DL    Hematocrit 27.4 (L) 42 - 52 %    MCV 97.9 78 - 100 FL    MCH 28.2 27 - 31 PG    MCHC 28.8 (L) 32.0 - 36.0 %    RDW 20.6 (H) 11.7 - 14.9 %    Platelets 776 494 - 013 K/CU MM    MPV 11.9 (H) 7.5 - 11.1 FL    Bands Relative 1 (L) 5 - 11 %    Segs Relative 6.0 (L) 36 - 66 %    Lymphocytes % 93.0 (H) 24 - 44 %    Bands Absolute 0.34 K/CU MM    Segs Absolute 2.1 K/CU MM    Lymphocytes # 31.8 K/CU MM    Differential Type MANUAL DIFFERENTIAL     Hypochromia 1+     Ovalocytes 1+     Toxic Granulation PRESENT     Smudge Cells PRESENT    CMP   Result Value Ref Range    Sodium 132 (L) 135 - 145 MMOL/L    Potassium 4.0 3.5 - 5.1 MMOL/L    Chloride 95 (L) 99 - 110 mMol/L    CO2 21 21 - 32 MMOL/L    BUN 13 6 - 23 MG/DL    CREATININE 0.9 0.9 - 1.3 MG/DL    Glucose 132 (H) 70 - 99 MG/DL    Calcium 8.6 8.3 - 10.6 MG/DL    Alb 3.2 (L) 3.4 - 5.0 GM/DL Total Protein 5.8 (L) 6.4 - 8.2 GM/DL    Total Bilirubin 0.6 0.0 - 1.0 MG/DL    ALT 26 10 - 40 U/L    AST 15 15 - 37 IU/L    Alkaline Phosphatase 104 40 - 129 IU/L    GFR Non-African American >60 >60 mL/min/1.73m2    GFR African American >60 >60 mL/min/1.73m2    Anion Gap 16 4 - 16   Lactic Acid, Plasma   Result Value Ref Range    Lactate 0.8 0.4 - 2.0 mMOL/L   Hemoglobin A1c   Result Value Ref Range    Hemoglobin A1C 6.4 (H) 4.2 - 6.3 %    eAG 137 mg/dL   Basic metabolic panel   Result Value Ref Range    Sodium 132 (L) 135 - 145 MMOL/L    Potassium 3.9 3.5 - 5.1 MMOL/L    Chloride 96 (L) 99 - 110 mMol/L    CO2 23 21 - 32 MMOL/L    Anion Gap 13 4 - 16    BUN 11 6 - 23 MG/DL    CREATININE 0.8 (L) 0.9 - 1.3 MG/DL    Glucose 115 (H) 70 - 99 MG/DL    Calcium 8.2 (L) 8.3 - 10.6 MG/DL    GFR Non-African American >60 >60 mL/min/1.73m2    GFR African American >60 >60 mL/min/1.73m2   CBC   Result Value Ref Range    WBC 29.1 (H) 4.0 - 10.5 K/CU MM    RBC 2.75 (L) 4.6 - 6.2 M/CU MM    Hemoglobin 7.7 (L) 13.5 - 18.0 GM/DL    Hematocrit 26.1 (L) 42 - 52 %    MCV 94.9 78 - 100 FL    MCH 28.0 27 - 31 PG    MCHC 29.5 (L) 32.0 - 36.0 %    RDW 20.5 (H) 11.7 - 14.9 %    Platelets 208 838 - 040 K/CU MM    MPV 12.0 (H) 7.5 - 11.1 FL   Basic metabolic panel   Result Value Ref Range    Sodium 139 135 - 145 MMOL/L    Potassium 4.2 3.5 - 5.1 MMOL/L    Chloride 104 99 - 110 mMol/L    CO2 23 21 - 32 MMOL/L    Anion Gap 12 4 - 16    BUN 16 6 - 23 MG/DL    CREATININE 0.7 (L) 0.9 - 1.3 MG/DL    Glucose 179 (H) 70 - 99 MG/DL    Calcium 8.4 8.3 - 10.6 MG/DL    GFR Non-African American >60 >60 mL/min/1.73m2    GFR African American >60 >60 mL/min/1.73m2   CBC auto differential   Result Value Ref Range    WBC 28.8 (H) 4.0 - 10.5 K/CU MM    RBC 2.79 (L) 4.6 - 6.2 M/CU MM    Hemoglobin 7.8 (L) 13.5 - 18.0 GM/DL    Hematocrit 27.6 (L) 42 - 52 %    MCV 98.9 78 - 100 FL    MCH 28.0 27 - 31 PG    MCHC 28.3 (L) 32.0 - 36.0 %    RDW 20.1 (H) 11.7 - 14.9 % MG/DL   POCT Glucose   Result Value Ref Range    POC Glucose 178 (H) 70 - 99 MG/DL   POCT Glucose   Result Value Ref Range    POC Glucose 125 (H) 70 - 99 MG/DL   POCT Glucose   Result Value Ref Range    POC Glucose 279 (H) 70 - 99 MG/DL   POCT Glucose   Result Value Ref Range    POC Glucose 336 (H) 70 - 99 MG/DL   POCT Glucose   Result Value Ref Range    POC Glucose 311 (H) 70 - 99 MG/DL   POCT Glucose   Result Value Ref Range    POC Glucose 181 (H) 70 - 99 MG/DL   POCT Glucose   Result Value Ref Range    POC Glucose 322 (H) 70 - 99 MG/DL   POCT Glucose   Result Value Ref Range    POC Glucose 251 (H) 70 - 99 MG/DL   POCT Glucose   Result Value Ref Range    POC Glucose 195 (H) 70 - 99 MG/DL   POCT Glucose   Result Value Ref Range    POC Glucose 166 (H) 70 - 99 MG/DL   POCT Glucose   Result Value Ref Range    POC Glucose 159 (H) 70 - 99 MG/DL   POCT Glucose   Result Value Ref Range    POC Glucose 202 (H) 70 - 99 MG/DL   EKG 12 Lead   Result Value Ref Range    Ventricular Rate 94 BPM    Atrial Rate 94 BPM    P-R Interval 144 ms    QRS Duration 88 ms    Q-T Interval 346 ms    QTc Calculation (Bazett) 432 ms    P Axis 36 degrees    R Axis 29 degrees    T Axis 63 degrees    Diagnosis       Normal sinus rhythm  Normal ECG  When compared with ECG of 09-APR-2019 08:41,  No significant change was found  Confirmed by [facility] Dejon GALLARDO ([id number]) on 5/15/2019 9:46:22 AM        Radiographs (if obtained):  [] The following radiograph was interpreted by myself in the absence of a radiologist:   [] Radiologist's Report Reviewed:  CT ABDOMEN PELVIS W IV CONTRAST Additional Contrast? None   Final Result   Multiple small pulmonary nodules with surrounding ground-glass changes and   bronchiectasis are suggestive of an infectious process. Recommend follow-up. Consider 3 months. No acute intra-process. Mediastinal adenopathy may be slightly increased from the previous study.    Retroperitoneal adenopathy appears slightly increased. Enlarged prostate gland. Correlate with PSA values. CT CHEST W CONTRAST   Final Result   Multiple small pulmonary nodules with surrounding ground-glass changes and   bronchiectasis are suggestive of an infectious process. Recommend follow-up. Consider 3 months. No acute intra-process. Mediastinal adenopathy may be slightly increased from the previous study. Retroperitoneal adenopathy appears slightly increased. Enlarged prostate gland. Correlate with PSA values. XR CHEST STANDARD (2 VW)   Final Result   No acute cardiopulmonary disease               EKG (if obtained): (All EKG's are interpreted by myself in the absence of a cardiologist)  EKG shows a sinus rhythm rate of 94 normal MI and QRS intervals no ST elevation no old EKG is available for comparison  Chart review shows recent radiographs:  Xr Chest Standard (2 Vw)    Result Date: 5/14/2019  EXAMINATION: TWO XRAY VIEWS OF THE CHEST 5/14/2019 4:23 pm COMPARISON: 04/09/2019 HISTORY: ORDERING SYSTEM PROVIDED HISTORY: sob TECHNOLOGIST PROVIDED HISTORY: Reason for exam:->sob Ordering Physician Provided Reason for Exam: flu like symptoms Acuity: Unknown Type of Exam: Unknown Relevant Medical/Surgical History: cad    ca    pneumonia FINDINGS: A right IJ port terminates in the mid SVC. No focal infiltrate, pleural effusion or pneumothorax is demonstrated. The heart is normal in size. No acute osseous abnormality is seen.      No acute cardiopulmonary disease       MDM:  Patient with URI symptoms, is immunocompromised due to his chronic chemotherapy, he had an IV placed, temperature is 99.2, labs sent he has a leukocytosis of 34.2, he has chronic issue with this, no elevation and BUN/creatinine, chest x-ray does not have acute process, respiratory panel added, CT chest added, blood cultures added, workup ending at this point patient will be signed out to oncoming physician to the final disposition once workup is

## 2019-05-14 NOTE — H&P
Department of Internal Medicine  Hospitalist  Attending History and Physical      CHIEF COMPLAINT:  Shortness of breath    Reason for Admission:  COPD exacerbation    History Obtained From:  patient    HISTORY OF PRESENT ILLNESS:      The patient is a 80 y.o. male with significant past medical history of CLL, DM presents as he has been having increased sputum production with fevers and shortness of breath. Has coughing which is deep causing muscles spasms and abd pain. No vomting or diarrhea.    Medical History:        Diagnosis Date    Arthritis     knees, Rt hand/wrist    CAD (coronary artery disease)     Cancer (HCC)     CLL--Sees Dr Dionne Carmona [facility] mellitus ([facility]     History of blood transfusion     no reaction    Hx of motion sickness     MDRO (multiple drug resistant organisms) resistance     abscess on buttock 10yrs ago    Migraine     last one summer 2016    Pneumonia 2016    Wears dentures     full upper--lower dentures    Wears glasses      Past Surgical History:        Procedure Laterality Date    CARDIAC CATHETERIZATION  1990's    x 2  last showed \"inflammation of heart\"    COLONOSCOPY  2010    DENTAL SURGERY      all teeth extracted     EYE SURGERY Right 08/2016    Cataract removal    FRACTURE SURGERY Left 1990's    left ankle plate and screws    KNEE SURGERY  1970    left    OTHER SURGICAL HISTORY Left 01/18/2017    groin excision    TONSILLECTOMY  late 1960's    age 13    TUNNELED VENOUS PORT PLACEMENT  2013    Right upper chest     Medications Prior to Admission:    Current Facility-Administered Medications on File Prior to Encounter   Medication Dose Route Frequency Provider Last Rate Last Dose    heparin flush 100 UNIT/ML injection 500 Units  500 Units Intercatheter PRN Jennifer Reges See, APRN - CNP        sodium chloride flush 0.9 % injection 10 mL  10 mL Intravenous Once Jennifer Reges See, APRN - CNP        sodium chloride flush 0.9 % injection 10 mL  10 mL Intravenous PRN Jayson Veras MD         Current Outpatient Medications on File Prior to Encounter   Medication Sig Dispense Refill    albuterol sulfate HFA (PROVENTIL HFA) 108 (90 Base) MCG/ACT inhaler Inhale 2 puffs into the lungs every 4 hours as needed for Wheezing or Shortness of Breath 1 Inhaler 3    LEVEMIR FLEXTOUCH 100 UNIT/ML injection pen Inject 50 Units into the skin nightly  3    NOVOLOG FLEXPEN 100 UNIT/ML injection pen Inject 15 Units into the skin 3 times daily (before meals)  3    insulin aspart (NOVOLOG FLEXPEN) 100 UNIT/ML injection pen Inject into the skin See Admin Instructions 04/09/19 On Sliding scale regimen in addition to his base units of 15 before meals      glucose monitoring kit (FREESTYLE) monitoring kit 1 kit by Does not apply route daily as needed (glucose checks) 1 kit 0    Insulin Syringe-Needle U-100 30G X 1/2\" 0.5 ML MISC 1 each by Does not apply route daily 100 each 3    valACYclovir (VALTREX) 500 MG tablet Take 500 mg by mouth daily      metFORMIN (GLUCOPHAGE) 1000 MG tablet Take 1,000 mg by mouth 2 times daily (with meals)      omeprazole (PRILOSEC) 20 MG delayed release capsule Take 20 mg by mouth daily as needed      Immune Globulin, Human, 20 GM/200ML SOLN solution Infuse 20 g intravenously every 30 days 03/29/17 Given through infusion therapy states he is due on the 4 th of April      IMBRUVICA 140 MG chemo capsule Take 140 mg by mouth daily            Allergies:  Patient has no known allergies.     Social History:   Social History     Socioeconomic History    Marital status: Single     Spouse name: Not on file    Number of children: Not on file    Years of education: Not on file    Highest education level: Not on file   Occupational History    Not on file   Social Needs    Financial resource strain: Not on file    Food insecurity:     Worry: Not on file     Inability: Not on file    Transportation needs:     Medical: Not on file     Non-medical: Not on file   Tobacco Use    Smoking status: Former Smoker     Years: 20.00     Last attempt to quit: 1987     Years since quittin.3    Smokeless tobacco: Never Used   Substance and Sexual Activity    Alcohol use: No    Drug use: No    Sexual activity: Not on file   Lifestyle    Physical activity:     Days per week: Not on file     Minutes per session: Not on file    Stress: Not on file   Relationships    Social connections:     Talks on phone: Not on file     Gets together: Not on file     Attends Hinduism service: Not on file     Active member of club or organization: Not on file     Attends meetings of clubs or organizations: Not on file     Relationship status: Not on file    Intimate partner violence:     Fear of current or ex partner: Not on file     Emotionally abused: Not on file     Physically abused: Not on file     Forced sexual activity: Not on file   Other Topics Concern    Not on file   Social History Narrative    Not on file         Family History:   Family History   Problem Relation Age of Onset    Diabetes Mother     High Blood Pressure Mother     Heart Disease Father     Other Sister         liver problems    Early Death Brother     Asthma Maternal Uncle     Colon Cancer Brother        REVIEW OF SYSTEMS:  CONSTITUTIONAL:  positive for  fevers  EYES:  negative  HEENT:  negative  RESPIRATORY:  positive for  cough with sputum and dyspnea  CARDIOVASCULAR:  negative  GASTROINTESTINAL:  positive for abdominal pain  GENITOURINARY:  negative  ENDOCRINE:  negative  MUSCULOSKELETAL:  negative  NEUROLOGICAL:  negative  BEHAVIOR/PSYCH:  negative  PHYSICAL EXAM:    Vitals:  BP (!) 145/72   Pulse 95   Temp 99.2 °F (37.3 °C) (Oral)   Resp 17   Ht 6' (1.829 m)   Wt 190 lb (86.2 kg)   SpO2 91%   BMI 25.77 kg/m²     CONSTITUTIONAL:  awake  EYES:  Lids and lashes normal, pupils equal, round and reactive to light, extra ocular muscles intact, sclera clear, conjunctiva normal  ENT:  Normocephalic, without obvious abnormality, atraumatic, sinuses nontender on palpation, external ears without lesionsLUNGS:  No increased work of breathing. decreased breath sounds. CARDIOVASCULAR:  Normal apical impulse, regular rate and rhythm, normal S1 and S2, no S3 or S4, and no murmur noted. Chest port righ upper chest  ABDOMEN:  No scars, normal bowel sounds, soft, non-distended, non-tender, no masses palpated, no hepatosplenomegally  MUSCULOSKELETAL:  there is no redness, warmth, or swelling of the joints  NEUROLOGIC:  Awake, alert, oriented to name, place and time. Cranial nerves II-XII are grossly intact. Motor is 5 out of 5 bilaterally. SKIN:  no bruising or bleeding    DATA:  CXR  NAD  CT chest/abd  Multiple small pulmonary nodules with surrounding ground-glass changes and   bronchiectasis are suggestive of an infectious process.  Recommend follow-up. Consider 3 months.       No acute intra-process.       Mediastinal adenopathy may be slightly increased from the previous study.    Retroperitoneal adenopathy appears slightly increased.       Enlarged prostate gland.  Correlate with PSA values.         EKG 94 SR  ASSESSMENT AND PLAN:    COPD exacerbation due to infectious bronchiectasis  -IV rocephin and zithro  -duoenbs, resp PCR  -sputum cx  Leukocytosis with CLL  -inbrutinib  -follows with Dr Edmond  Hyponatremia  -IVF  DM  -SSI  Mod PCM  -supplements  Chronic anemia with CLL  -stable and transfuse if Hb < 7  DVT prophylaxis  -lovenox

## 2019-05-14 NOTE — ED PROVIDER NOTES
Layla for Everett Medrano:    Patient endorsed to me pending results of CT imaging. CT imaging demonstrating infectious process/bronchiectasis in lungs. Patient is with CLL and is on chemotherapy and is with leukocytosis and borderline fever as well as hypoxemia requiring nasal cannula oxygen. Patient clearly benefit from admission. IV antibiotics were given for broad-spectrum coverage with cefepime, vancomycin and azithromycin. Patient to be discussed with hospitalist team and patient to be admitted to medicine.     Chelsy Ding MD  05/14/19 [id number]

## 2019-05-15 LAB
ANION GAP SERPL CALCULATED.3IONS-SCNC: 13 MMOL/L (ref 4–16)
BUN BLDV-MCNC: 11 MG/DL (ref 6–23)
CALCIUM SERPL-MCNC: 8.2 MG/DL (ref 8.3–10.6)
CHLORIDE BLD-SCNC: 96 MMOL/L (ref 99–110)
CO2: 23 MMOL/L (ref 21–32)
CREAT SERPL-MCNC: 0.8 MG/DL (ref 0.9–1.3)
GFR AFRICAN AMERICAN: >60 ML/MIN/1.73M2
GFR NON-AFRICAN AMERICAN: >60 ML/MIN/1.73M2
GLUCOSE BLD-MCNC: 115 MG/DL (ref 70–99)
GLUCOSE BLD-MCNC: 125 MG/DL (ref 70–99)
GLUCOSE BLD-MCNC: 178 MG/DL (ref 70–99)
GLUCOSE BLD-MCNC: 279 MG/DL (ref 70–99)
GLUCOSE BLD-MCNC: 311 MG/DL (ref 70–99)
GLUCOSE BLD-MCNC: 336 MG/DL (ref 70–99)
HCT VFR BLD CALC: 26.1 % (ref 42–52)
HEMOGLOBIN: 7.7 GM/DL (ref 13.5–18)
MCH RBC QN AUTO: 28 PG (ref 27–31)
MCHC RBC AUTO-ENTMCNC: 29.5 % (ref 32–36)
MCV RBC AUTO: 94.9 FL (ref 78–100)
PDW BLD-RTO: 20.5 % (ref 11.7–14.9)
PLATELET # BLD: 168 K/CU MM (ref 140–440)
PMV BLD AUTO: 12 FL (ref 7.5–11.1)
POTASSIUM SERPL-SCNC: 3.9 MMOL/L (ref 3.5–5.1)
RBC # BLD: 2.75 M/CU MM (ref 4.6–6.2)
SODIUM BLD-SCNC: 132 MMOL/L (ref 135–145)
WBC # BLD: 29.1 K/CU MM (ref 4–10.5)

## 2019-05-15 PROCEDURE — 6370000000 HC RX 637 (ALT 250 FOR IP): Performed by: INTERNAL MEDICINE

## 2019-05-15 PROCEDURE — 36415 COLL VENOUS BLD VENIPUNCTURE: CPT

## 2019-05-15 PROCEDURE — 2580000003 HC RX 258: Performed by: HOSPITALIST

## 2019-05-15 PROCEDURE — 85027 COMPLETE CBC AUTOMATED: CPT

## 2019-05-15 PROCEDURE — 80048 BASIC METABOLIC PNL TOTAL CA: CPT

## 2019-05-15 PROCEDURE — 6370000000 HC RX 637 (ALT 250 FOR IP): Performed by: HOSPITALIST

## 2019-05-15 PROCEDURE — 6360000002 HC RX W HCPCS: Performed by: HOSPITALIST

## 2019-05-15 PROCEDURE — 94640 AIRWAY INHALATION TREATMENT: CPT

## 2019-05-15 PROCEDURE — 1200000000 HC SEMI PRIVATE

## 2019-05-15 PROCEDURE — 6370000000 HC RX 637 (ALT 250 FOR IP): Performed by: NURSE PRACTITIONER

## 2019-05-15 PROCEDURE — 2700000000 HC OXYGEN THERAPY PER DAY

## 2019-05-15 PROCEDURE — 93010 ELECTROCARDIOGRAM REPORT: CPT | Performed by: INTERNAL MEDICINE

## 2019-05-15 PROCEDURE — 94761 N-INVAS EAR/PLS OXIMETRY MLT: CPT

## 2019-05-15 PROCEDURE — 82962 GLUCOSE BLOOD TEST: CPT

## 2019-05-15 PROCEDURE — 87070 CULTURE OTHR SPECIMN AEROBIC: CPT

## 2019-05-15 PROCEDURE — 87205 SMEAR GRAM STAIN: CPT

## 2019-05-15 RX ORDER — NICOTINE POLACRILEX 4 MG
15 LOZENGE BUCCAL PRN
Status: DISCONTINUED | OUTPATIENT
Start: 2019-05-15 | End: 2019-05-17 | Stop reason: HOSPADM

## 2019-05-15 RX ORDER — DEXTROSE MONOHYDRATE 25 G/50ML
12.5 INJECTION, SOLUTION INTRAVENOUS PRN
Status: DISCONTINUED | OUTPATIENT
Start: 2019-05-15 | End: 2019-05-17 | Stop reason: HOSPADM

## 2019-05-15 RX ORDER — ACETAMINOPHEN 325 MG/1
650 TABLET ORAL EVERY 4 HOURS PRN
Status: DISCONTINUED | OUTPATIENT
Start: 2019-05-15 | End: 2019-05-17 | Stop reason: HOSPADM

## 2019-05-15 RX ORDER — DEXTROSE MONOHYDRATE 50 MG/ML
100 INJECTION, SOLUTION INTRAVENOUS PRN
Status: DISCONTINUED | OUTPATIENT
Start: 2019-05-15 | End: 2019-05-17 | Stop reason: HOSPADM

## 2019-05-15 RX ADMIN — METHYLPREDNISOLONE SODIUM SUCCINATE 20 MG: 40 INJECTION, POWDER, LYOPHILIZED, FOR SOLUTION INTRAMUSCULAR; INTRAVENOUS at 08:48

## 2019-05-15 RX ADMIN — IPRATROPIUM BROMIDE AND ALBUTEROL SULFATE 1 AMPULE: .5; 3 SOLUTION RESPIRATORY (INHALATION) at 11:18

## 2019-05-15 RX ADMIN — AZITHROMYCIN MONOHYDRATE 500 MG: 500 INJECTION, POWDER, LYOPHILIZED, FOR SOLUTION INTRAVENOUS at 19:08

## 2019-05-15 RX ADMIN — INSULIN LISPRO 15 UNITS: 100 INJECTION, SOLUTION INTRAVENOUS; SUBCUTANEOUS at 17:41

## 2019-05-15 RX ADMIN — INSULIN LISPRO 15 UNITS: 100 INJECTION, SOLUTION INTRAVENOUS; SUBCUTANEOUS at 13:03

## 2019-05-15 RX ADMIN — SODIUM CHLORIDE, PRESERVATIVE FREE 10 ML: 5 INJECTION INTRAVENOUS at 20:47

## 2019-05-15 RX ADMIN — ENOXAPARIN SODIUM 40 MG: 40 INJECTION SUBCUTANEOUS at 08:48

## 2019-05-15 RX ADMIN — IPRATROPIUM BROMIDE AND ALBUTEROL SULFATE 1 AMPULE: .5; 3 SOLUTION RESPIRATORY (INHALATION) at 08:02

## 2019-05-15 RX ADMIN — IPRATROPIUM BROMIDE AND ALBUTEROL SULFATE 1 AMPULE: .5; 3 SOLUTION RESPIRATORY (INHALATION) at 15:12

## 2019-05-15 RX ADMIN — SODIUM CHLORIDE: 9 INJECTION, SOLUTION INTRAVENOUS at 13:01

## 2019-05-15 RX ADMIN — ACETAMINOPHEN 650 MG: 325 TABLET ORAL at 06:21

## 2019-05-15 RX ADMIN — INSULIN GLARGINE 50 UNITS: 100 INJECTION, SOLUTION SUBCUTANEOUS at 20:56

## 2019-05-15 RX ADMIN — CEFTRIAXONE 1 G: 1 INJECTION, POWDER, FOR SOLUTION INTRAMUSCULAR; INTRAVENOUS at 02:26

## 2019-05-15 RX ADMIN — VALACYCLOVIR HYDROCHLORIDE 500 MG: 500 TABLET, FILM COATED ORAL at 08:53

## 2019-05-15 RX ADMIN — PANTOPRAZOLE SODIUM 40 MG: 40 TABLET, DELAYED RELEASE ORAL at 06:21

## 2019-05-15 ASSESSMENT — PAIN SCALES - GENERAL
PAINLEVEL_OUTOF10: 6
PAINLEVEL_OUTOF10: 0
PAINLEVEL_OUTOF10: 0

## 2019-05-15 NOTE — PROGRESS NOTES
Progress Note (Hospitalist, Internal Medicine)  IDENTIFYING INFORMATION   PATIENT:  Purnima Florian  MRN:  [id number]  ADMIT DATE: 5/14/2019  TIME OF EVALUATION: 5/15/2019 1:16 PM      HISTORY OF PRESENT ILLNESS    [age] [facility] male with significant past medical history of CLL, DM presents as he has been having increased sputum production with fevers and shortness of breath. Has coughing which is deep causing muscles spasms and abd pain. No vomting or diarrhea. CC: SOB/cough    SUBJECTIVE     Pt states he is breathing better. Not wheezing as bad, no fevers.      MEDICATIONS   Medications Prior to Admission  Medications Prior to Admission: LEVEMIR FLEXTOUCH 100 UNIT/ML injection pen, Inject 50 Units into the skin nightly  NOVOLOG FLEXPEN 100 UNIT/ML injection pen, Inject 15 Units into the skin 3 times daily (before meals)  insulin aspart (NOVOLOG FLEXPEN) 100 UNIT/ML injection pen, Inject into the skin See Admin Instructions 05/14/19 On Sliding scale regimen in addition to his base units of 15 before meals  valACYclovir (VALTREX) 500 MG tablet, Take 500 mg by mouth daily  omeprazole (PRILOSEC) 20 MG delayed release capsule, Take 20 mg by mouth daily as needed  Immune Globulin, Human, 20 GM/200ML SOLN solution, Infuse 20 g intravenously every 30 days 05/14/19 Given through infusion therapy states he is due on the 20 of May  IMBRUVICA 140 MG chemo capsule, Take 140 mg by mouth daily   albuterol sulfate HFA (PROVENTIL HFA) 108 (90 Base) MCG/ACT inhaler, Inhale 2 puffs into the lungs every 4 hours as needed for Wheezing or Shortness of Breath  glucose monitoring kit (FREESTYLE) monitoring kit, 1 kit by Does not apply route daily as needed (glucose checks)  Insulin Syringe-Needle U-100 30G X 1/2\" 0.5 ML MISC, 1 each by Does not apply route daily  metFORMIN (GLUCOPHAGE) 1000 MG tablet, Take 1,000 mg by mouth 2 times daily (with meals)    Current Medications  Current Facility-Administered Medications   Medication Dose Route Frequency Provider Last Rate Last Dose    acetaminophen (TYLENOL) tablet 650 mg  650 mg Oral Q4H PRN Gil Alvarez, APRN - CNP   650 mg at 05/15/19 0621    glucose (GLUTOSE) 40 % oral gel 15 g  15 g Oral PRN Cong Phillips MD        dextrose 50 % solution 12.5 g  12.5 g Intravenous PRN Cong Phillips MD        glucagon (rDNA) injection 1 mg  1 mg Intramuscular PRN Cong Phillips MD        dextrose 5 % solution  100 mL/hr Intravenous PRN Cong Phillips MD        insulin lispro (HUMALOG) injection vial 15 Units  15 Units Subcutaneous TID WC Cong Phillips MD   15 Units at 05/15/19 1303    ipratropium-albuterol (DUONEB) nebulizer solution 1 ampule  1 ampule Inhalation Q4H WA Ady Sawant MD   1 ampule at 05/15/19 1118    ibrutinib (IMBRUVICA) chemo tablet 140 mg  140 mg Oral Daily Ady Sawant MD   140 mg at 05/15/19 1058    valACYclovir (VALTREX) tablet 500 mg  500 mg Oral Daily Ady Sawant MD   500 mg at 05/15/19 0853    pantoprazole (PROTONIX) tablet 40 mg  40 mg Oral QAM AC Ady Sawant MD   40 mg at 05/15/19 0621    sodium chloride flush 0.9 % injection 10 mL  10 mL Intravenous 2 times per day Ady Sawant MD   Stopped at 05/15/19 0849    sodium chloride flush 0.9 % injection 10 mL  10 mL Intravenous PRN Ady Sawant MD        magnesium hydroxide (MILK OF MAGNESIA) 400 MG/5ML suspension 30 mL  30 mL Oral Daily PRN Ady Sawant MD        ondansetron (ZOFRAN) injection 4 mg  4 mg Intravenous Q6H PRN Ady Sawant MD        enoxaparin (LOVENOX) injection 40 mg  40 mg Subcutaneous Daily Ady Sawant MD   40 mg at 05/15/19 0848    cefTRIAXone (ROCEPHIN) 1 g in dextrose 5 % 50 mL IVPB  1 g Intravenous Q24H Ady Sawant MD   Stopped at 05/15/19 0300    methylPREDNISolone sodium (SOLU-MEDROL) injection 20 mg  20 mg Intravenous Daily Ady Sawant MD   20 mg at 05/15/19 0848    0.9 % sodium chloride infusion   Intravenous Continuous Ady Sawant  mL/hr at 05/15/19 1301      insulin glargine HGB 7.7 04/11/2019     Last 3 WBC/ANC  Lab Results   Component Value Date    WBC 29.1 05/15/2019    WBC 34.2 05/14/2019    WBC 15.4 04/11/2019     No components found for: GRNLOCTYABS  Last 3 Platelets  No results found for: PLATELET  Chemistry  [unfilled]  [unfilled]  Lab Results   Component Value Date     10/11/2018     05/25/2018     11/28/2017     Coagulation Studies  Lab Results   Component Value Date    INR 1.03 12/30/2013     Liver Function Studies  Lab Results   Component Value Date    ALT 26 05/14/2019    AST 15 05/14/2019    ALKPHOS 104 05/14/2019       Recent Imaging        Relevant labs and imaging reviewed    ASSESSMENT AND PLAN     68M, h/o COPD, CLL, DM p/w SOB    COPD exacerbation due to infectious bronchiectasis  -IV rocephin and zithro  -duoenbs, resp PCR  -sputum cx    Leukocytosis with CLL  -inbrutinib  -follows with Dr Edmond    Hyponatremia  -IVF  DM  -SSI    Mod PCM  -supplements  Chronic anemia with CLL  -stable and transfuse if Hb < 7    DVT prophylaxis  -lovenox      Electronically signed by Myke Mosqueda MD on 5/15/2019 at 1:16 PM

## 2019-05-15 NOTE — ED NOTES
Hamzah Mathur [facility] [phone] call with any questions or updates     Leo Davis  05/14/19 2012

## 2019-05-15 NOTE — PLAN OF CARE
Problem: Falls - Risk of:  Goal: Will remain free from falls  Description  Will remain free from falls  Outcome: Ongoing  Goal: Absence of physical injury  Description  Absence of physical injury  Outcome: Ongoing     Problem: Discharge Planning:  Goal: Discharged to appropriate level of care  Description  Discharged to appropriate level of care  Outcome: Ongoing  Goal: Participates in care planning  Description  Participates in care planning  Outcome: Ongoing     Problem: Airway Clearance - Ineffective:  Goal: Clear lung sounds  Description  Clear lung sounds  Outcome: Ongoing  Goal: Ability to maintain a clear airway will improve  Description  Ability to maintain a clear airway will improve  Outcome: Ongoing     Problem: Gas Exchange - Impaired:  Goal: Levels of oxygenation will improve  Description  Levels of oxygenation will improve  Outcome: Ongoing

## 2019-05-16 LAB
ANION GAP SERPL CALCULATED.3IONS-SCNC: 12 MMOL/L (ref 4–16)
ANISOCYTOSIS: ABNORMAL
ATYPICAL LYMPHOCYTE ABSOLUTE COUNT: ABNORMAL
BANDED NEUTROPHILS ABSOLUTE COUNT: 0.58 K/CU MM
BANDED NEUTROPHILS RELATIVE PERCENT: 2 % (ref 5–11)
BASOPHILS ABSOLUTE: 0.3 K/CU MM
BASOPHILS RELATIVE PERCENT: 1 % (ref 0–1)
BUN BLDV-MCNC: 16 MG/DL (ref 6–23)
CALCIUM SERPL-MCNC: 8.4 MG/DL (ref 8.3–10.6)
CHLORIDE BLD-SCNC: 104 MMOL/L (ref 99–110)
CO2: 23 MMOL/L (ref 21–32)
CREAT SERPL-MCNC: 0.7 MG/DL (ref 0.9–1.3)
DIFFERENTIAL TYPE: ABNORMAL
GFR AFRICAN AMERICAN: >60 ML/MIN/1.73M2
GFR NON-AFRICAN AMERICAN: >60 ML/MIN/1.73M2
GLUCOSE BLD-MCNC: 179 MG/DL (ref 70–99)
GLUCOSE BLD-MCNC: 181 MG/DL (ref 70–99)
GLUCOSE BLD-MCNC: 195 MG/DL (ref 70–99)
GLUCOSE BLD-MCNC: 251 MG/DL (ref 70–99)
GLUCOSE BLD-MCNC: 322 MG/DL (ref 70–99)
HCT VFR BLD CALC: 27.6 % (ref 42–52)
HEMOGLOBIN: 7.8 GM/DL (ref 13.5–18)
LYMPHOCYTES ABSOLUTE: 25.3 K/CU MM
LYMPHOCYTES RELATIVE PERCENT: 88 % (ref 24–44)
MACROCYTES: ABNORMAL
MCH RBC QN AUTO: 28 PG (ref 27–31)
MCHC RBC AUTO-ENTMCNC: 28.3 % (ref 32–36)
MCV RBC AUTO: 98.9 FL (ref 78–100)
MONOCYTES ABSOLUTE: 0.3 K/CU MM
MONOCYTES RELATIVE PERCENT: 1 % (ref 0–4)
OVALOCYTES: ABNORMAL
PDW BLD-RTO: 20.1 % (ref 11.7–14.9)
PLATELET # BLD: 176 K/CU MM (ref 140–440)
PLT MORPHOLOGY: ABNORMAL
PMV BLD AUTO: 12 FL (ref 7.5–11.1)
POLYCHROMASIA: ABNORMAL
POTASSIUM SERPL-SCNC: 4.2 MMOL/L (ref 3.5–5.1)
RBC # BLD: 2.79 M/CU MM (ref 4.6–6.2)
SEGMENTED NEUTROPHILS ABSOLUTE COUNT: 2.3 K/CU MM
SEGMENTED NEUTROPHILS RELATIVE PERCENT: 8 % (ref 36–66)
SMUDGE CELLS: PRESENT
SODIUM BLD-SCNC: 139 MMOL/L (ref 135–145)
TOXIC GRANULATION: PRESENT
WBC # BLD: 28.8 K/CU MM (ref 4–10.5)

## 2019-05-16 PROCEDURE — 6370000000 HC RX 637 (ALT 250 FOR IP): Performed by: INTERNAL MEDICINE

## 2019-05-16 PROCEDURE — 6370000000 HC RX 637 (ALT 250 FOR IP): Performed by: HOSPITALIST

## 2019-05-16 PROCEDURE — 2580000003 HC RX 258: Performed by: HOSPITALIST

## 2019-05-16 PROCEDURE — 82962 GLUCOSE BLOOD TEST: CPT

## 2019-05-16 PROCEDURE — 80048 BASIC METABOLIC PNL TOTAL CA: CPT

## 2019-05-16 PROCEDURE — 85027 COMPLETE CBC AUTOMATED: CPT

## 2019-05-16 PROCEDURE — 6360000002 HC RX W HCPCS: Performed by: HOSPITALIST

## 2019-05-16 PROCEDURE — 85007 BL SMEAR W/DIFF WBC COUNT: CPT

## 2019-05-16 PROCEDURE — 2700000000 HC OXYGEN THERAPY PER DAY

## 2019-05-16 PROCEDURE — 94761 N-INVAS EAR/PLS OXIMETRY MLT: CPT

## 2019-05-16 PROCEDURE — 36415 COLL VENOUS BLD VENIPUNCTURE: CPT

## 2019-05-16 PROCEDURE — 94640 AIRWAY INHALATION TREATMENT: CPT

## 2019-05-16 PROCEDURE — 1200000000 HC SEMI PRIVATE

## 2019-05-16 RX ORDER — INSULIN GLARGINE 100 [IU]/ML
50 INJECTION, SOLUTION SUBCUTANEOUS NIGHTLY
Status: DISCONTINUED | OUTPATIENT
Start: 2019-05-16 | End: 2019-05-17 | Stop reason: HOSPADM

## 2019-05-16 RX ADMIN — IPRATROPIUM BROMIDE AND ALBUTEROL SULFATE 1 AMPULE: .5; 3 SOLUTION RESPIRATORY (INHALATION) at 19:52

## 2019-05-16 RX ADMIN — INSULIN LISPRO 20 UNITS: 100 INJECTION, SOLUTION INTRAVENOUS; SUBCUTANEOUS at 17:42

## 2019-05-16 RX ADMIN — AZITHROMYCIN MONOHYDRATE 500 MG: 500 INJECTION, POWDER, LYOPHILIZED, FOR SOLUTION INTRAVENOUS at 18:49

## 2019-05-16 RX ADMIN — INSULIN LISPRO 20 UNITS: 100 INJECTION, SOLUTION INTRAVENOUS; SUBCUTANEOUS at 13:14

## 2019-05-16 RX ADMIN — LINAGLIPTIN 5 MG: 5 TABLET, FILM COATED ORAL at 13:13

## 2019-05-16 RX ADMIN — INSULIN LISPRO 6 UNITS: 100 INJECTION, SOLUTION INTRAVENOUS; SUBCUTANEOUS at 17:40

## 2019-05-16 RX ADMIN — INSULIN LISPRO 15 UNITS: 100 INJECTION, SOLUTION INTRAVENOUS; SUBCUTANEOUS at 09:01

## 2019-05-16 RX ADMIN — PANTOPRAZOLE SODIUM 40 MG: 40 TABLET, DELAYED RELEASE ORAL at 06:52

## 2019-05-16 RX ADMIN — CEFTRIAXONE 1 G: 1 INJECTION, POWDER, FOR SOLUTION INTRAMUSCULAR; INTRAVENOUS at 00:24

## 2019-05-16 RX ADMIN — INSULIN LISPRO 8 UNITS: 100 INJECTION, SOLUTION INTRAVENOUS; SUBCUTANEOUS at 13:13

## 2019-05-16 RX ADMIN — IPRATROPIUM BROMIDE AND ALBUTEROL SULFATE 1 AMPULE: .5; 3 SOLUTION RESPIRATORY (INHALATION) at 12:05

## 2019-05-16 RX ADMIN — ENOXAPARIN SODIUM 40 MG: 40 INJECTION SUBCUTANEOUS at 07:41

## 2019-05-16 RX ADMIN — IPRATROPIUM BROMIDE AND ALBUTEROL SULFATE 1 AMPULE: .5; 3 SOLUTION RESPIRATORY (INHALATION) at 08:48

## 2019-05-16 RX ADMIN — METHYLPREDNISOLONE SODIUM SUCCINATE 20 MG: 40 INJECTION, POWDER, LYOPHILIZED, FOR SOLUTION INTRAMUSCULAR; INTRAVENOUS at 07:41

## 2019-05-16 RX ADMIN — CEFTRIAXONE 1 G: 1 INJECTION, POWDER, FOR SOLUTION INTRAMUSCULAR; INTRAVENOUS at 21:47

## 2019-05-16 RX ADMIN — VALACYCLOVIR HYDROCHLORIDE 500 MG: 500 TABLET, FILM COATED ORAL at 07:45

## 2019-05-16 RX ADMIN — INSULIN GLARGINE 50 UNITS: 100 INJECTION, SOLUTION SUBCUTANEOUS at 21:58

## 2019-05-16 ASSESSMENT — PAIN SCALES - GENERAL: PAINLEVEL_OUTOF10: 0

## 2019-05-16 NOTE — PROGRESS NOTES
Progress Note (Hospitalist, Internal Medicine)  IDENTIFYING INFORMATION   PATIENT:  Sydney Keyes  MRN:  [id number]  ADMIT DATE: 5/14/2019  TIME OF EVALUATION: 5/16/2019 12:59 PM      HISTORY OF PRESENT ILLNESS    80 y.o. male with significant past medical history of CLL, DM presents as he has been having increased sputum production with fevers and shortness of breath. Has coughing which is deep causing muscles spasms and abd pain. No vomting or diarrhea. CC: SOB/cough    SUBJECTIVE     Pt states he is breathing better. Not wheezing as bad, no fevers.  Few sneezing, and some coughing up phlegm    MEDICATIONS   Medications Prior to Admission  Medications Prior to Admission: LEVEMIR FLEXTOUCH 100 UNIT/ML injection pen, Inject 50 Units into the skin nightly  NOVOLOG FLEXPEN 100 UNIT/ML injection pen, Inject 15 Units into the skin 3 times daily (before meals)  insulin aspart (NOVOLOG FLEXPEN) 100 UNIT/ML injection pen, Inject into the skin See Admin Instructions 05/14/19 On Sliding scale regimen in addition to his base units of 15 before meals  valACYclovir (VALTREX) 500 MG tablet, Take 500 mg by mouth daily  omeprazole (PRILOSEC) 20 MG delayed release capsule, Take 20 mg by mouth daily as needed  Immune Globulin, Human, 20 GM/200ML SOLN solution, Infuse 20 g intravenously every 30 days 05/14/19 Given through infusion therapy states he is due on the 20 of May  IMBRUVICA 140 MG chemo capsule, Take 140 mg by mouth daily   albuterol sulfate HFA (PROVENTIL HFA) 108 (90 Base) MCG/ACT inhaler, Inhale 2 puffs into the lungs every 4 hours as needed for Wheezing or Shortness of Breath  glucose monitoring kit (FREESTYLE) monitoring kit, 1 kit by Does not apply route daily as needed (glucose checks)  Insulin Syringe-Needle U-100 30G X 1/2\" 0.5 ML MISC, 1 each by Does not apply route daily  metFORMIN (GLUCOPHAGE) 1000 MG tablet, Take 1,000 mg by mouth 2 times daily (with meals)    Current Medications  Current Facility-Administered Medications   Medication Dose Route Frequency Provider Last Rate Last Dose    insulin glargine (LANTUS) injection vial 50 Units  50 Units Subcutaneous Nightly  Iesha Norris MD        insulin lispro (HUMALOG) injection vial 0-12 Units  0-12 Units Subcutaneous TID  Hosea Lake MD        insulin lispro (HUMALOG) injection vial 20 Units  20 Units Subcutaneous TID  Hosea Lake MD        insulin lispro (HUMALOG) injection vial 0-12 Units  0-12 Units Subcutaneous 2 times per day Hosea Lake MD        linagliptin (TRADJENTA) tablet 5 mg  5 mg Oral Daily M Iesha Norris MD        acetaminophen (TYLENOL) tablet 650 mg  650 mg Oral Q4H PRN JENNI Su - CNP   650 mg at 05/15/19 0621    glucose (GLUTOSE) 40 % oral gel 15 g  15 g Oral PRN Jenkins Soulier V, MD        dextrose 50 % solution 12.5 g  12.5 g Intravenous PRN Cong Arroyo MD        glucagon (rDNA) injection 1 mg  1 mg Intramuscular PRN Jenkins Soulier V, MD        dextrose 5 % solution  100 mL/hr Intravenous PRN Marilu Roger MD        ipratropium-albuterol (DUONEB) nebulizer solution 1 ampule  1 ampule Inhalation Q4H WA Amie Chand MD   1 ampule at 05/16/19 1205    ibrutinib (IMBRUVICA) chemo tablet 140 mg  140 mg Oral Daily Amie Chand MD   140 mg at 05/16/19 0755    valACYclovir (VALTREX) tablet 500 mg  500 mg Oral Daily Amie Chand MD   500 mg at 05/16/19 0745    pantoprazole (PROTONIX) tablet 40 mg  40 mg Oral QAM AC Amie Chand MD   40 mg at 05/16/19 3465    sodium chloride flush 0.9 % injection 10 mL  10 mL Intravenous 2 times per day Amie Chand MD   10 mL at 05/15/19 2047    sodium chloride flush 0.9 % injection 10 mL  10 mL Intravenous PRN Amie Chand MD        magnesium hydroxide (MILK OF MAGNESIA) 400 MG/5ML suspension 30 mL  30 mL Oral Daily PRN Amie Chand MD        ondansetron (ZOFRAN) injection 4 mg  4 mg Intravenous Q6H PRN Amie Chand MD        enoxaparin (LOVENOX) injection 40 mg  40 mg Subcutaneous Daily Marianne Lobo MD   40 mg at 05/16/19 0741    cefTRIAXone (ROCEPHIN) 1 g in dextrose 5 % 50 mL IVPB  1 g Intravenous Q24H Marianne Lobo MD   Stopped at 05/16/19 0127    methylPREDNISolone sodium (SOLU-MEDROL) injection 20 mg  20 mg Intravenous Daily Marianne Lobo MD   20 mg at 05/16/19 0741    azithromycin (ZITHROMAX) 500 mg in dextrose 5 % 250 mL IVPB  500 mg Intravenous Q24H Marianne Lobo MD   Stopped at 05/15/19 2046     Facility-Administered Medications Ordered in Other Encounters   Medication Dose Route Frequency Provider Last Rate Last Dose    heparin flush 100 UNIT/ML injection 500 Units  500 Units Intercatheter PRN Daniel Ligas See, APRN - CNP        sodium chloride flush 0.9 % injection 10 mL  10 mL Intravenous Once Daniel Ligas See, APRN - CNP        sodium chloride flush 0.9 % injection 10 mL  10 mL Intravenous PRN Perlita Montgomery MD             Allergies  No Known Allergies    REVIEW OF SYSTEMS     Within above limitations. 14 point review of systems reviewed. Pertinent positive or negative as per HPI or otherwise negative per 14 point systems review. Reviewed 5/16/2019 at 12:59 PM    PHYSICAL EXAM       Blood pressure 134/69, pulse 87, temperature 97.1 °F (36.2 °C), temperature source Oral, resp. rate 18, height 6' (1.829 m), weight 183 lb 8 oz (83.2 kg), SpO2 96 %. General - AAO x 3  Psych - Appropriate affect/speech. No agitation  Eyes - Eye lids intact. No scleral icterus  ENT - Lips wnl. External ear clear/dry/intact. No thyromegaly on inspection  Neuro - No gross peripheral or central neuro deficits on inspection  Heart - Sinus. RRR. S1 and S2 present. No added HS/murmurs appreciated. No elevated JVD appreciated  Lung - Adequate air entry b/l, No crackles/wheezes- minimal  GI -  Soft. No guarding/rigidity. No hepatosplenomegaly/ascites. BS+   - No CVA/suprapubic tenderness or palpable bladder distension  Skin - Intact. No rash/petechiae/ecchymosis.

## 2019-05-16 NOTE — CONSULTS
Endocrinology   Consult Note  Dear Doctor Emili Sofia    Thank You for the Consult     Pt. Was Admitted for : COPD exacerbation    Reason for Consult:  Better control of blood glucose      History Obtained From:  Patient/ EMR       HISTORY OF PRESENT ILLNESS:                The patient is a 80 y.o. male with significant past medical history, of CAD,, CLL, diabetes mellitus, COPD I migraine headache was admitted to hospital for shortness of breath with thick exacerbation of COPD. He is being treated with steroid that resulted in this further worsening of his blood glucose. I was  consulted for better control of blood glucose. ROS:   Pt's ROS done in detail. Abnormal ROS are noted in Medical and Surgical History Section below: Other Medical History:        Diagnosis Date    Arthritis     knees, Rt hand/wrist    CAD (coronary artery disease)     Cancer (HCC)     CLL--Sees Dr Sarai Galo Diabetes mellitus ([facility] [address]     History of blood transfusion     no reaction    Hx of motion sickness     MDRO (multiple drug resistant organisms) resistance     abscess on buttock 10yrs ago    Migraine     last one summer 2016    Pneumonia 2016    Wears dentures     full upper--lower dentures    Wears glasses      Surgical History:        Procedure Laterality Date    CARDIAC CATHETERIZATION  1990's    x 2  last showed \"inflammation of heart\"    COLONOSCOPY  2010    DENTAL SURGERY      all teeth extracted     EYE SURGERY Right 08/2016    Cataract removal    FRACTURE SURGERY Left 1990's    left ankle plate and screws    KNEE SURGERY  1970    left    OTHER SURGICAL HISTORY Left 01/18/2017    groin excision    TONSILLECTOMY  late 1960's    age 13    TUNNELED VENOUS PORT PLACEMENT  2013    Right upper chest       Allergies:  Patient has no known allergies.     Family History:       Problem Relation Age of Onset    Diabetes Mother     High Blood Pressure Mother     Heart Disease Father     Other Sister         liver problems    Early Death Brother     Asthma Maternal Uncle     Colon Cancer Brother      REVIEW OF SYSTEMS:  Review of System Done as noted above     PHYSICAL EXAM:      Vitals:    /69   Pulse 87   Temp 97.1 °F (36.2 °C) (Oral)   Resp 18   Ht 6' (1.829 m)   Wt 183 lb 8 oz (83.2 kg)   SpO2 96%   BMI 24.89 kg/m²     CONSTITUTIONAL:  awake, alert, cooperative, appears stated age   EYES:  vision intact Fundoscopic Exam not performed   ENT:Normal  NECK:  Supple, No JVD. Thyroid Exam:Normal   LUNGS:  Has Vesicular Breath Sounds, has wheezing and rales  CARDIOVASCULAR:  Normal apical impulse, regular rate and rhythm, normal S1 and S2, no S3 or S4, and has no  murmur   ABDOMEN:  No scars, normal bowel sounds, soft, non-distended, non-tender, no masses palpated, no hepatolienomegaly  Musculoskeletal: Normal  Extremities: Normal, peripheral pulses normal, , has no edema   NEUROLOGIC:  Awake, alert, oriented to name, place and time. Cranial nerves II-XII are grossly intact. Motor is  intact. Sensory possible neuropathy.  ,  and gait is normal.    DATA:    CBC:   Recent Labs     05/14/19  1700 05/15/19  0741 05/16/19  0651   WBC 34.2* 29.1* 28.8*   HGB 7.9* 7.7* 7.8*    168 176    CMP:  Recent Labs     05/14/19  1700 05/15/19  0741 05/16/19  0651   * 132* 139   K 4.0 3.9 4.2   CL 95* 96* 104   CO2 21 23 23   BUN 13 11 16   CREATININE 0.9 0.8* 0.7*   CALCIUM 8.6 8.2* 8.4   PROT 5.8*  --   --    LABALBU 3.2*  --   --    BILITOT 0.6  --   --    ALKPHOS 104  --   --    AST 15  --   --    ALT 26  --   --      Lipids:   Lab Results   Component Value Date    CHOL 160 08/03/2012    HDL 32 08/03/2012    TRIG 274 08/03/2012     Glucose:   Recent Labs     05/15/19  1717 05/15/19  2055 05/16/19  0749   POCGLU 336* 311* 181*     Hemoglobin A1C:   Lab Results   Component Value Date    LABA1C 6.4 05/14/2019     Free T4:   Lab Results   Component Value Date    T4FREE 1.37 04/04/2019     Free T3:   Lab Results Component Value Date    FT3 3.2 03/27/2012     TSH High Sensitivity:   Lab Results   Component Value Date    TSHHS 3.040 04/04/2019       Xr Chest Standard (2 Vw)    Result Date: 5/14/2019  EXAMINATION: TWO XRAY VIEWS OF THE CHEST 5/14/2019 4:23 pm COMPARISON: 04/09/2019 HISTORY: ORDERING SYSTEM PROVIDED HISTORY: sob TECHNOLOGIST PROVIDED HISTORY: Reason for exam:->sob Ordering Physician Provided Reason for Exam: flu like symptoms Acuity: Unknown Type of Exam: Unknown Relevant Medical/Surgical History: cad    ca    pneumonia FINDINGS: A right IJ port terminates in the mid SVC. No focal infiltrate, pleural effusion or pneumothorax is demonstrated. The heart is normal in size. No acute osseous abnormality is seen. No acute cardiopulmonary disease     Ct Chest W Contrast    Result Date: 5/14/2019  EXAMINATION: CT OF THE ABDOMEN AND PELVIS WITH CONTRAST; CT OF THE CHEST WITH CONTRAST 5/14/2019 6:33 pm; 5/14/2019 6:30 pm t. .     Multiple small pulmonary nodules with surrounding ground-glass changes and bronchiectasis are suggestive of an infectious process. Recommend follow-up. Consider 3 months. No acute intra-process. Mediastinal adenopathy may be slightly increased from the previous study. Retroperitoneal adenopathy appears slightly increased. Enlarged prostate gland. Correlate with PSA values.            Scheduled Medicines   Medications:    insulin glargine  50 Units Subcutaneous Nightly    insulin lispro  0-12 Units Subcutaneous TID WC    insulin lispro  20 Units Subcutaneous TID WC    insulin lispro  0-12 Units Subcutaneous 2 times per day    ipratropium-albuterol  1 ampule Inhalation Q4H WA    ibrutinib  140 mg Oral Daily    valACYclovir  500 mg Oral Daily    pantoprazole  40 mg Oral QAM AC    sodium chloride flush  10 mL Intravenous 2 times per day    enoxaparin  40 mg Subcutaneous Daily    cefTRIAXone (ROCEPHIN) IV  1 g Intravenous Q24H    methylPREDNISolone  20 mg Intravenous Daily    azithromycin  500 mg Intravenous Q24H      Infusions:    dextrose           IMPRESSION    Patient Active Problem List   Diagnosis    Pneumonia    Sepsis ([facility] [address]    Hypogammaglobulinemia ([facility] [address]    CLL (chronic lymphocytic leukemia) ([facility] [address]    Upper respiratory infection, acute    Generalized muscle weakness    Type 2 diabetes mellitus with hyperglycemia, with long-term current use of insulin ([facility])    Poor appetite    COPD (chronic obstructive pulmonary disease) ([facility] [address]         RECOMMENDATIONS:      1. Reviewed POC blood glucose . Labs and X ray results   2. Reviewed Home and Current Medicines   3. Will Start On meal/ Correction bolus Humalog/ Lantus Insulin regime  4. Monitor Blood glucose frequently   5. Modify  the dose of Insulin/ OHGD  as needed        Will follow with you  Again thank you for sharing pt's care with me.      Truly yours,       Bonilla Bedolla MD

## 2019-05-16 NOTE — PLAN OF CARE
Problem: Falls - Risk of:  Goal: Will remain free from falls  Description  Will remain free from falls  5/16/2019 0534 by Leslye Mcclure RN  Outcome: Ongoing  5/15/2019 1622 by Enio Galvan RN  Outcome: Ongoing  Goal: Absence of physical injury  Description  Absence of physical injury  Outcome: Ongoing     Problem: Discharge Planning:  Goal: Discharged to appropriate level of care  Description  Discharged to appropriate level of care  Outcome: Ongoing  Goal: Participates in care planning  Description  Participates in care planning  Outcome: Ongoing     Problem: Airway Clearance - Ineffective:  Goal: Clear lung sounds  Description  Clear lung sounds  Outcome: Ongoing  Goal: Ability to maintain a clear airway will improve  Description  Ability to maintain a clear airway will improve  Outcome: Ongoing     Problem: Gas Exchange - Impaired:  Goal: Levels of oxygenation will improve  Description  Levels of oxygenation will improve  Outcome: Ongoing

## 2019-05-16 NOTE — CARE COORDINATION
LSW reviewed chart/into room to initiate discharge planning. LSW introduced self and role of LSW. Pt lives home alone. Pt has no HC. Pt does not use any DME. Pt has PCP. Pt has insurance but struggles with cost of Rx. Pt is agreeable to [facility]. Pt is independent of ADL's   Call and left message on [facility] voice mail.      Electronically signed by GEORGE Arceo on 5/16/2019 at 4:07 PM

## 2019-05-17 VITALS
SYSTOLIC BLOOD PRESSURE: 120 MMHG | DIASTOLIC BLOOD PRESSURE: 61 MMHG | RESPIRATION RATE: 16 BRPM | TEMPERATURE: 98 F | OXYGEN SATURATION: 92 % | HEART RATE: 88 BPM | BODY MASS INDEX: 24.79 KG/M2 | WEIGHT: 183 LBS | HEIGHT: 72 IN

## 2019-05-17 LAB
ANION GAP SERPL CALCULATED.3IONS-SCNC: 13 MMOL/L (ref 4–16)
ANISOCYTOSIS: ABNORMAL
ATYPICAL LYMPHOCYTE ABSOLUTE COUNT: ABNORMAL
BANDED NEUTROPHILS ABSOLUTE COUNT: 0.32 K/CU MM
BANDED NEUTROPHILS RELATIVE PERCENT: 1 % (ref 5–11)
BUN BLDV-MCNC: 20 MG/DL (ref 6–23)
CALCIUM SERPL-MCNC: 8.6 MG/DL (ref 8.3–10.6)
CHLORIDE BLD-SCNC: 104 MMOL/L (ref 99–110)
CO2: 25 MMOL/L (ref 21–32)
CREAT SERPL-MCNC: 0.8 MG/DL (ref 0.9–1.3)
DIFFERENTIAL TYPE: ABNORMAL
GFR AFRICAN AMERICAN: >60 ML/MIN/1.73M2
GFR NON-AFRICAN AMERICAN: >60 ML/MIN/1.73M2
GLUCOSE BLD-MCNC: 149 MG/DL (ref 70–99)
GLUCOSE BLD-MCNC: 159 MG/DL (ref 70–99)
GLUCOSE BLD-MCNC: 166 MG/DL (ref 70–99)
GLUCOSE BLD-MCNC: 202 MG/DL (ref 70–99)
HCT VFR BLD CALC: 28.1 % (ref 42–52)
HEMOGLOBIN: 7.9 GM/DL (ref 13.5–18)
LYMPHOCYTES ABSOLUTE: 29.9 K/CU MM
LYMPHOCYTES RELATIVE PERCENT: 95 % (ref 24–44)
MACROCYTES: ABNORMAL
MCH RBC QN AUTO: 28.1 PG (ref 27–31)
MCHC RBC AUTO-ENTMCNC: 28.1 % (ref 32–36)
MCV RBC AUTO: 100 FL (ref 78–100)
OVALOCYTES: ABNORMAL
PDW BLD-RTO: 20.3 % (ref 11.7–14.9)
PLATELET # BLD: 181 K/CU MM (ref 140–440)
PLT MORPHOLOGY: ABNORMAL
PMV BLD AUTO: 11.7 FL (ref 7.5–11.1)
POLYCHROMASIA: ABNORMAL
POTASSIUM SERPL-SCNC: 4.5 MMOL/L (ref 3.5–5.1)
RBC # BLD: 2.81 M/CU MM (ref 4.6–6.2)
SEGMENTED NEUTROPHILS ABSOLUTE COUNT: 1.3 K/CU MM
SEGMENTED NEUTROPHILS RELATIVE PERCENT: 4 % (ref 36–66)
SMUDGE CELLS: PRESENT
SODIUM BLD-SCNC: 142 MMOL/L (ref 135–145)
TEAR DROP CELLS: ABNORMAL
TOXIC GRANULATION: PRESENT
WBC # BLD: 31.5 K/CU MM (ref 4–10.5)

## 2019-05-17 PROCEDURE — 6370000000 HC RX 637 (ALT 250 FOR IP): Performed by: HOSPITALIST

## 2019-05-17 PROCEDURE — 2700000000 HC OXYGEN THERAPY PER DAY

## 2019-05-17 PROCEDURE — 6360000002 HC RX W HCPCS: Performed by: HOSPITALIST

## 2019-05-17 PROCEDURE — 82962 GLUCOSE BLOOD TEST: CPT

## 2019-05-17 PROCEDURE — 85027 COMPLETE CBC AUTOMATED: CPT

## 2019-05-17 PROCEDURE — 85007 BL SMEAR W/DIFF WBC COUNT: CPT

## 2019-05-17 PROCEDURE — 6370000000 HC RX 637 (ALT 250 FOR IP): Performed by: INTERNAL MEDICINE

## 2019-05-17 PROCEDURE — 94640 AIRWAY INHALATION TREATMENT: CPT

## 2019-05-17 PROCEDURE — 80048 BASIC METABOLIC PNL TOTAL CA: CPT

## 2019-05-17 RX ORDER — HEPARIN SODIUM (PORCINE) LOCK FLUSH IV SOLN 100 UNIT/ML 100 UNIT/ML
500 SOLUTION INTRAVENOUS PRN
Status: DISCONTINUED | OUTPATIENT
Start: 2019-05-17 | End: 2019-05-17 | Stop reason: HOSPADM

## 2019-05-17 RX ORDER — METHYLPREDNISOLONE 4 MG/1
TABLET ORAL
Qty: 1 KIT | Refills: 0 | Status: SHIPPED | OUTPATIENT
Start: 2019-05-17 | End: 2019-05-23

## 2019-05-17 RX ORDER — LEVOFLOXACIN 500 MG/1
500 TABLET, FILM COATED ORAL DAILY
Qty: 7 TABLET | Refills: 0 | Status: SHIPPED | OUTPATIENT
Start: 2019-05-17 | End: 2019-05-24

## 2019-05-17 RX ADMIN — INSULIN LISPRO 6 UNITS: 100 INJECTION, SOLUTION INTRAVENOUS; SUBCUTANEOUS at 11:53

## 2019-05-17 RX ADMIN — INSULIN LISPRO 20 UNITS: 100 INJECTION, SOLUTION INTRAVENOUS; SUBCUTANEOUS at 11:54

## 2019-05-17 RX ADMIN — VALACYCLOVIR HYDROCHLORIDE 500 MG: 500 TABLET, FILM COATED ORAL at 08:39

## 2019-05-17 RX ADMIN — METHYLPREDNISOLONE SODIUM SUCCINATE 20 MG: 40 INJECTION, POWDER, LYOPHILIZED, FOR SOLUTION INTRAMUSCULAR; INTRAVENOUS at 08:38

## 2019-05-17 RX ADMIN — PANTOPRAZOLE SODIUM 40 MG: 40 TABLET, DELAYED RELEASE ORAL at 05:47

## 2019-05-17 RX ADMIN — INSULIN LISPRO 20 UNITS: 100 INJECTION, SOLUTION INTRAVENOUS; SUBCUTANEOUS at 09:05

## 2019-05-17 RX ADMIN — ENOXAPARIN SODIUM 40 MG: 40 INJECTION SUBCUTANEOUS at 08:38

## 2019-05-17 RX ADMIN — IPRATROPIUM BROMIDE AND ALBUTEROL SULFATE 1 AMPULE: .5; 3 SOLUTION RESPIRATORY (INHALATION) at 07:52

## 2019-05-17 RX ADMIN — INSULIN LISPRO 2 UNITS: 100 INJECTION, SOLUTION INTRAVENOUS; SUBCUTANEOUS at 09:03

## 2019-05-17 RX ADMIN — IPRATROPIUM BROMIDE AND ALBUTEROL SULFATE 1 AMPULE: .5; 3 SOLUTION RESPIRATORY (INHALATION) at 11:10

## 2019-05-17 RX ADMIN — LINAGLIPTIN 5 MG: 5 TABLET, FILM COATED ORAL at 08:39

## 2019-05-17 NOTE — PROGRESS NOTES
Progress Note( Dr. Tripp Juan)  5/17/2019  Subjective:   Admit Date: 5/14/2019  PCP: Anthony Duke MD    Admitted For :    Consulted For:     Interval History:    Denies any chest pains,   Denies SOB . Denies nausea or vomiting. No new bowel or bladder symptoms.        Intake/Output Summary (Last 24 hours) at 5/17/2019 0727  Last data filed at 5/17/2019 0310  Gross per 24 hour   Intake 1230 ml   Output 1550 ml   Net -320 ml       DATA    CBC:   Recent Labs     05/15/19  0741 05/16/19  0651 05/17/19  0601   WBC 29.1* 28.8* 31.5*   HGB 7.7* 7.8* 7.9*    176 181    CMP:  Recent Labs     05/14/19  1700 05/15/19  0741 05/16/19  0651 05/17/19  0601   * 132* 139 142   K 4.0 3.9 4.2 4.5   CL 95* 96* 104 104   CO2 21 23 23 25   BUN 13 11 16 20   CREATININE 0.9 0.8* 0.7* 0.8*   CALCIUM 8.6 8.2* 8.4 8.6   PROT 5.8*  --   --   --    LABALBU 3.2*  --   --   --    BILITOT 0.6  --   --   --    ALKPHOS 104  --   --   --    AST 15  --   --   --    ALT 26  --   --   --      Lipids:   Lab Results   Component Value Date    CHOL 160 08/03/2012    HDL 32 08/03/2012    TRIG 274 08/03/2012     Glucose:  Recent Labs     05/16/19  1734 05/16/19  2157 05/17/19  0302   POCGLU 251* 195* 166*     VezofftcorU3D:  Lab Results   Component Value Date    LABA1C 6.4 05/14/2019     High Sensitivity TSH:   Lab Results   Component Value Date    TSHHS 3.040 04/04/2019     Free T3:   Lab Results   Component Value Date    FT3 3.2 03/27/2012     Free T4:  Lab Results   Component Value Date    T4FREE 1.37 04/04/2019       Xr Chest Standard (2 Vw)    Result Date: 5/14/2019  EXAMINATION: TWO XRAY VIEWS OF THE CHEST 5/14/2019 4:23 pm COMPARISON: 04/09/2019 HISTORY: ORDERING SYSTEM PROVIDED HISTORY: sob TECHNOLOGIST PROVIDED HISTORY: Reason for exam:->sob Ordering Physician Provided Reason for Exam: flu like symptoms Acuity: Unknown Type of Exam: Unknown Relevant Medical/Surgical History: cad    ca    pneumonia FINDINGS: A right IJ port terminates in the mid SVC. No focal infiltrate, pleural effusion or pneumothorax is demonstrated. The heart is normal in size. No acute osseous abnormality is seen. No acute cardiopulmonary disease     Ct Chest W Contrast    Result Date: 5/14/2019  EXAMINATION: CT OF THE ABDOMEN AND PELVIS WITH CONTRAST; CT OF THE CHEST WITH CONTRAST 5/14/2019 6:33 pm; 5/14/2019 6:30 pm TECHNIQUE: CT of the abdomen and pelvis was performed with the administration of intravenous contrast. Multiplanar reformatted images are provided for review. Dose modulation, iterative reconstruction, and/or weight based adjustment of the mA/kV was utilized to reduce the radiation dose to as low as reasonably achievable.; CT of the chest was performed with the administration of intravenous contrast. Multiplanar reformatted images are provided for review. Dose modulation, iterative reconstruction, and/or weight based adjustment of the mA/kV was utilized to reduce the radiation dose to as low as reasonably achievable. COMPARISON: June 1, 2018 HISTORY: ORDERING SYSTEM PROVIDED HISTORY: Abdominal pain TECHNOLOGIST PROVIDED HISTORY: IV contrast only. Thank you. Additional Contrast?->None Ordering Physician Provided Reason for Exam: Diffuse abd pain. Acuity: Acute Type of Exam: Initial Additional signs and symptoms: No a/p surg Relevant Medical/Surgical History: 80 mL Isovue 370 used for C/A/P; ORDERING SYSTEM PROVIDED HISTORY: Cough TECHNOLOGIST PROVIDED HISTORY: Ordering Physician Provided Reason for Exam: Cough Acuity: Acute Type of Exam: Initial Additional signs and symptoms: No a/p surg Relevant Medical/Surgical History: 80 mL Isovue 370 used FINDINGS: Chest: Mediastinum: No cardiomegaly. No pericardial effusion. Mediastinal adenopathy similar or slightly increased from the previous study. Paratracheal lymph nodes are seen. Subcarinal adenopathy. Left hilar lymph nodes with calcification. Lungs/pleura: Emphysematous changes.   No focal consolidation. Lower lobe bronchiectasis with multiple small pulmonary nodules. 3 mm left upper lobe nodule series 3, image 65. Multiple small nodular opacities within the lingula. Cluster of nodules with ground-glass opacity within the right middle lobe. Soft Tissues/Bones: Right chest wall Port-A-Cath. No axillary adenopathy. Thoracic spine degenerative changes. Abdomen/Pelvis: Organs: Stable liver hypodensities measuring less than 1 cm. Decreased attenuation of the liver is consistent with hepatic steatosis. No gallstones. No pancreatic lesion. Splenomegaly. No adrenal lesion. No hydronephrosis. Left renal cyst. GI/Bowel: No bowel obstruction. No acute inflammatory process. Sigmoid diverticulosis without acute diverticulitis. Pelvis: No free fluid. No bladder calculus. Prostate gland appears enlarged. Peritoneum/Retroperitoneum: Atherosclerotic calcification of the abdominal aorta without aneurysmal dilatation. Multiple prominent retroperitoneal lymph nodes appear slightly increased from the previous study. No pelvic adenopathy. Bones/Soft Tissues: No acute soft tissue abnormality. Lumbar spine degenerative changes. Multiple small pulmonary nodules with surrounding ground-glass changes and bronchiectasis are suggestive of an infectious process. Recommend follow-up. Consider 3 months. No acute intra-process. Mediastinal adenopathy may be slightly increased from the previous study. Retroperitoneal adenopathy appears slightly increased. Enlarged prostate gland. Correlate with PSA values. Ct Abdomen Pelvis W Iv Contrast Additional Contrast? None    Result Date: 5/14/2019  EXAMINATION: CT OF THE ABDOMEN AND PELVIS WITH CONTRAST; CT OF THE CHEST WITH CONTRAST 5/14/2019 6:33 pm; 5/14/2019 6:30 pm TECHNIQUE: CT of the abdomen and pelvis was performed with the administration of intravenous contrast. Multiplanar reformatted images are provided for review.  Dose modulation, iterative reconstruction, and/or weight based adjustment of the mA/kV was utilized to reduce the radiation dose to as low as reasonably achievable.; CT of the chest was performed with the administration of intravenous contrast. Multiplanar reformatted images are provided for review. Dose modulation, iterative reconstruction, and/or weight based adjustment of the mA/kV was utilized to reduce the radiation dose to as low as reasonably achievable. COMPARISON: June 1, 2018 HISTORY: ORDERING SYSTEM PROVIDED HISTORY: Abdominal pain TECHNOLOGIST PROVIDED HISTORY: IV contrast only. Thank you. Additional Contrast?->None Ordering Physician Provided Reason for Exam: Diffuse abd pain. Acuity: Acute Type of Exam: Initial Additional signs and symptoms: No a/p surg Relevant Medical/Surgical History: 80 mL Isovue 370 used for C/A/P; ORDERING SYSTEM PROVIDED HISTORY: Cough TECHNOLOGIST PROVIDED HISTORY: Ordering Physician Provided Reason for Exam: Cough Acuity: Acute Type of Exam: Initial Additional signs and symptoms: No a/p surg Relevant Medical/Surgical History: 80 mL Isovue 370 used FINDINGS: Chest: Mediastinum: No cardiomegaly. No pericardial effusion. Mediastinal adenopathy similar or slightly increased from the previous study. Paratracheal lymph nodes are seen. Subcarinal adenopathy. Left hilar lymph nodes with calcification. Lungs/pleura: Emphysematous changes. No focal consolidation. Lower lobe bronchiectasis with multiple small pulmonary nodules. 3 mm left upper lobe nodule series 3, image 65. Multiple small nodular opacities within the lingula. Cluster of nodules with ground-glass opacity within the right middle lobe. Soft Tissues/Bones: Right chest wall Port-A-Cath. No axillary adenopathy. Thoracic spine degenerative changes. Abdomen/Pelvis: Organs: Stable liver hypodensities measuring less than 1 cm. Decreased attenuation of the liver is consistent with hepatic steatosis. No gallstones. No pancreatic lesion. Splenomegaly. No adrenal lesion. No hydronephrosis. Left renal cyst. GI/Bowel: No bowel obstruction. No acute inflammatory process. Sigmoid diverticulosis without acute diverticulitis. Pelvis: No free fluid. No bladder calculus. Prostate gland appears enlarged. Peritoneum/Retroperitoneum: Atherosclerotic calcification of the abdominal aorta without aneurysmal dilatation. Multiple prominent retroperitoneal lymph nodes appear slightly increased from the previous study. No pelvic adenopathy. Bones/Soft Tissues: No acute soft tissue abnormality. Lumbar spine degenerative changes. Multiple small pulmonary nodules with surrounding ground-glass changes and bronchiectasis are suggestive of an infectious process. Recommend follow-up. Consider 3 months. No acute intra-process. Mediastinal adenopathy may be slightly increased from the previous study. Retroperitoneal adenopathy appears slightly increased. Enlarged prostate gland. Correlate with PSA values.        Scheduled Medicines   Medications:    insulin glargine  50 Units Subcutaneous Nightly    insulin lispro  0-12 Units Subcutaneous TID WC    insulin lispro  20 Units Subcutaneous TID WC    insulin lispro  0-12 Units Subcutaneous 2 times per day    linagliptin  5 mg Oral Daily    ipratropium-albuterol  1 ampule Inhalation Q4H WA    ibrutinib  140 mg Oral Daily    valACYclovir  500 mg Oral Daily    pantoprazole  40 mg Oral QAM AC    sodium chloride flush  10 mL Intravenous 2 times per day    enoxaparin  40 mg Subcutaneous Daily    cefTRIAXone (ROCEPHIN) IV  1 g Intravenous Q24H    methylPREDNISolone  20 mg Intravenous Daily    azithromycin  500 mg Intravenous Q24H      Infusions:    dextrose           Objective:   Vitals: /63   Pulse 68   Temp 97.7 °F (36.5 °C) (Oral)   Resp 16   Ht 6' (1.829 m)   Wt 183 lb (83 kg)   SpO2 97%   BMI 24.82 kg/m²   General appearance: alert and cooperative with exam  Neck: no JVD or bruit  Thyroid : Normal lobes   Lungs: Has Vesicular Breath sounds   Heart:  regular rate and rhythm  Abdomen: soft, non-tender; bowel sounds normal; no masses,  no organomegaly  Musculoskeletal: Normal  Extremities: extremities normal, , no edema  Neurologic:  Awake, alert, oriented to name, place and time. Cranial nerves II-XII are grossly intact. Motor is  intact. Sensory is intact. ,  and gait is normal.    Assessment:     Patient Active Problem List:     Pneumonia     Sepsis ([facility]     Hypogammaglobulinemia ([facility]     CLL (chronic lymphocytic leukemia) ([facility]     Upper respiratory infection, acute     Generalized muscle weakness     Type 2 diabetes mellitus with hyperglycemia, with long-term current use of insulin (HCC)     Poor appetite     COPD (chronic obstructive pulmonary disease) ([facility]      Plan:     1. Reviewed POC blood glucose . Labs and X ray results   2. Reviewed Current Medicines   3. On meal/ Correction bolus Humalog/ Basal Lantus Insulin regime / and Oral Hypoglycemic drugs   4. Monitor Blood glucose frequently   5. Modified  the dose of Insulin/ other medicines as needed   6. Will follow     .      Rachael Hernadez MD

## 2019-05-17 NOTE — DISCHARGE SUMMARY
Jourdan Cho 1950 [id number]  PCP:  Roberto Raphael MD    Admit date: 5/14/2019  Admitting Physician: Ady Sawant MD    Discharge date: 5/17/2019 Discharge Physician: David Rose MD      Reason for admission:   Chief Complaint   Patient presents with    Shortness of Breath     recent pneumonia; ca patient (CLL)    Cough     Present on Admission:   COPD (chronic obstructive pulmonary disease) [facility])       Discharge Diagnoses & Hospital Course[de-identified]      67 y. o. male with significant past medical history of CLL, DM presents as he has been having increased sputum production with fevers and shortness of breath. Has coughing which is deep causing muscles spasms and abd pain. Pt was diagnosed with multiple pulmonary nodules that is likely PNA. Pt had COPD exacerbation, and improved with IV steriods and ABX. Patient was feeling and looking better on the day of discharge and wanted to go home. Patient was discharged in a stable condition with following medications and instructions. Procedures:  CXR; CT chest    Significant Diagnostic Studies at discharge:   CBC:   Lab Results   Component Value Date    WBC 31.5 05/17/2019    RBC 2.81 05/17/2019    RBC 3.38 08/21/2017    HGB 7.9 05/17/2019    HCT 28.1 05/17/2019    .0 05/17/2019    MCH 28.1 05/17/2019    MCHC 28.1 05/17/2019    RDW 20.3 05/17/2019     05/17/2019    MPV 11.7 05/17/2019     WBC:    Lab Results   Component Value Date    WBC 31.5 05/17/2019         Exam:   Wt Readings from Last 3 Encounters:   05/17/19 183 lb (83 kg)   04/22/19 190 lb (86.2 kg)   04/11/19 190 lb 3.2 oz (86.3 kg)       Blood pressure 120/61, pulse 88, temperature 98 °F (36.7 °C), temperature source Oral, resp. rate 16, height 6' (1.829 m), weight 183 lb (83 kg), SpO2 92 %. General - AAO x 3  Psych - Appropriate affect/speech. No agitation  Eyes - Eye lids intact. No scleral icterus  ENT - Lips wnl. External ear clear/dry/intact.  No thyromegaly on inspection  Neuro - No gross peripheral or central neuro deficits on inspection  Heart - Sinus. RRR. S1 and S2 present. No elevated JVD appreciated  Lung - Adequate air entry b/l, No crackles/wheezes appreciated  GI -  Soft. No guarding/rigidity. No hepatosplenomegaly/ascites. BS+   - No CVA/suprapubic tenderness or palpable bladder distension  Skin - Intact. No rash/petechiae/ecchymosis. Warm extremities        Significant Diagnostic Studies:   CBC:   Recent Labs     05/15/19  0741 05/16/19  0651 05/17/19  0601   WBC 29.1* 28.8* 31.5*   HGB 7.7* 7.8* 7.9*    176 181     WBC   Date/Time Value Ref Range Status   05/17/2019 06:01 AM 31.5 (HH) 4.0 - 10.5 K/CU MM Final   05/16/2019 06:51 AM 28.8 (H) 4.0 - 10.5 K/CU MM Final   05/15/2019 07:41 AM 29.1 (H) 4.0 - 10.5 K/CU MM Final     Hemoglobin   Date/Time Value Ref Range Status   05/17/2019 06:01 AM 7.9 (L) 13.5 - 18.0 GM/DL Final   05/16/2019 06:51 AM 7.8 (L) 13.5 - 18.0 GM/DL Final   05/15/2019 07:41 AM 7.7 (L) 13.5 - 18.0 GM/DL Final     Platelets   Date/Time Value Ref Range Status   05/17/2019 06:01  140 - 440 K/CU MM Final   05/16/2019 06:51  140 - 440 K/CU MM Final   05/15/2019 07:41  140 - 440 K/CU MM Final    CMP:  Recent Labs     05/14/19  1700 05/15/19  0741 05/16/19  0651 05/17/19  0601   * 132* 139 142   K 4.0 3.9 4.2 4.5   CL 95* 96* 104 104   CO2 21 23 23 25   BUN 13 11 16 20   CREATININE 0.9 0.8* 0.7* 0.8*   CALCIUM 8.6 8.2* 8.4 8.6   PROT 5.8*  --   --   --    LABALBU 3.2*  --   --   --    BILITOT 0.6  --   --   --    ALKPHOS 104  --   --   --    AST 15  --   --   --    ALT 26  --   --   --      Troponin: No results for input(s): TROPONINI in the last 72 hours. BNP: No results for input(s): BNP in the last 72 hours. Lipids: No results for input(s): CHOL, HDL in the last 72 hours.     Invalid input(s): LDLCALCU  ABGs:No results for input(s): PH, EXA3EOH, PO2ART, BE, ION3VND, CO2CT, O2SAT, LABCARB in the last 72 hours.    Invalid input(s): METHGBART    Glucose:   Recent Labs     05/17/19  0302 05/17/19  0759 05/17/19  1150   POCGLU 166* 159* 202*     Magnesium: No results for input(s): MG in the last 72 hours. Phosphorus:No results for input(s): PHOS in the last 72 hours. INR: No results for input(s): INR in the last 72 hours. Multiple small pulmonary nodules with surrounding ground-glass changes and   bronchiectasis are suggestive of an infectious process.  Recommend follow-up. Consider 3 months. Patient Instructions:   Robert Wilkins   Home Medication Instructions OWJ:[id number]    Printed on:05/17/19 3237   Medication Information                      albuterol sulfate HFA (PROVENTIL HFA) 108 (90 Base) MCG/ACT inhaler  Inhale 2 puffs into the lungs every 4 hours as needed for Wheezing or Shortness of Breath             glucose monitoring kit (FREESTYLE) monitoring kit  1 kit by Does not apply route daily as needed (glucose checks)             IMBRUVICA 140 MG chemo capsule  Take 140 mg by mouth daily              Immune Globulin, Human, 20 GM/200ML SOLN solution  Infuse 20 g intravenously every 30 days 05/14/19 Given through infusion therapy states he is due on the 20 of May             insulin aspart (NOVOLOG FLEXPEN) 100 UNIT/ML injection pen  Inject into the skin See Admin Instructions 05/14/19 On Sliding scale regimen in addition to his base units of 15 before meals             Insulin Syringe-Needle U-100 30G X 1/2\" 0.5 ML MISC  1 each by Does not apply route daily             LEVEMIR FLEXTOUCH 100 UNIT/ML injection pen  Inject 50 Units into the skin nightly             levofloxacin (LEVAQUIN) 500 MG tablet  Take 1 tablet by mouth daily for 7 days             metFORMIN (GLUCOPHAGE) 1000 MG tablet  Take 1,000 mg by mouth 2 times daily (with meals)             methylPREDNISolone (MEDROL DOSEPACK) 4 MG tablet  Take by mouth.              NOVOLOG FLEXPEN 100 UNIT/ML injection pen  Inject 15 Units into the skin 3 times daily (before meals)             omeprazole (PRILOSEC) 20 MG delayed release capsule  Take 20 mg by mouth daily as needed             valACYclovir (VALTREX) 500 MG tablet  Take 500 mg by mouth daily                  Code Status: Full Code     Consults:   PHARMACY TO DOSE VANCOMYCIN  IP CONSULT TO ENDOCRINOLOGY    Diet: regular diet and diabetic diet    Activity: activity as tolerated   Work:    Discharged Condition: good    Prognosis: Fair - Good    Disposition: home      Follow-up with   1. PCP within   5-7    Days, will need repeat CT Chest in 3 months      Follow up labs: none       Discharge Physician Signed:   Patric Ledesma [facility], Internal medicine  5/17/2019 at 3:09 PM    The patient was seen and examined on day of discharge and this discharge summary is in conjunction with any daily progress note from day of discharge.   Time spent on discharge in the examination, evaluation, counseling and review of medications and discharge plan: <30 minutes    Please forward this discharge summary to patient's PCP

## 2019-05-18 LAB
CULTURE: NORMAL
GRAM SMEAR: NORMAL
Lab: NORMAL
SPECIMEN: NORMAL

## 2019-05-19 LAB
CULTURE: NORMAL
CULTURE: NORMAL
Lab: NORMAL
Lab: NORMAL
SPECIMEN: NORMAL
SPECIMEN: NORMAL

## 2019-05-20 ENCOUNTER — HOSPITAL ENCOUNTER (OUTPATIENT)
Dept: INFUSION THERAPY | Age: 69
Setting detail: INFUSION SERIES
Discharge: HOME | End: 2019-05-20
Payer: MEDICARE

## 2019-05-20 VITALS
DIASTOLIC BLOOD PRESSURE: 86 MMHG | SYSTOLIC BLOOD PRESSURE: 160 MMHG | HEART RATE: 91 BPM | RESPIRATION RATE: 16 BRPM | OXYGEN SATURATION: 95 % | TEMPERATURE: 98.9 F

## 2019-05-20 LAB
EKG ATRIAL RATE: 94 BPM
EKG DIAGNOSIS: NORMAL
EKG P AXIS: 36 DEGREES
EKG P-R INTERVAL: 144 MS
EKG Q-T INTERVAL: 346 MS
EKG QRS DURATION: 88 MS
EKG QTC CALCULATION (BAZETT): 432 MS
EKG R AXIS: 29 DEGREES
EKG T AXIS: 63 DEGREES
EKG VENTRICULAR RATE: 94 BPM

## 2019-05-20 PROCEDURE — 6370000000 HC RX 637 (ALT 250 FOR IP): Performed by: INTERNAL MEDICINE

## 2019-05-20 PROCEDURE — 2580000003 HC RX 258: Performed by: INTERNAL MEDICINE

## 2019-05-20 PROCEDURE — 96365 THER/PROPH/DIAG IV INF INIT: CPT

## 2019-05-20 PROCEDURE — 96366 THER/PROPH/DIAG IV INF ADDON: CPT

## 2019-05-20 PROCEDURE — 99211 OFF/OP EST MAY X REQ PHY/QHP: CPT

## 2019-05-20 PROCEDURE — 96374 THER/PROPH/DIAG INJ IV PUSH: CPT

## 2019-05-20 PROCEDURE — 6360000002 HC RX W HCPCS: Performed by: INTERNAL MEDICINE

## 2019-05-20 PROCEDURE — 2580000003 HC RX 258

## 2019-05-20 PROCEDURE — 96375 TX/PRO/DX INJ NEW DRUG ADDON: CPT

## 2019-05-20 RX ORDER — ACETAMINOPHEN 325 MG/1
650 TABLET ORAL ONCE
Status: COMPLETED | OUTPATIENT
Start: 2019-05-20 | End: 2019-05-20

## 2019-05-20 RX ORDER — HEPARIN SODIUM (PORCINE) LOCK FLUSH IV SOLN 100 UNIT/ML 100 UNIT/ML
500 SOLUTION INTRAVENOUS PRN
Status: DISCONTINUED | OUTPATIENT
Start: 2019-05-20 | End: 2019-05-21 | Stop reason: HOSPADM

## 2019-05-20 RX ORDER — DIPHENHYDRAMINE HYDROCHLORIDE 50 MG/ML
25 INJECTION INTRAMUSCULAR; INTRAVENOUS ONCE
Status: COMPLETED | OUTPATIENT
Start: 2019-05-20 | End: 2019-05-20

## 2019-05-20 RX ORDER — SODIUM CHLORIDE 0.9 % (FLUSH) 0.9 %
10 SYRINGE (ML) INJECTION PRN
Status: DISCONTINUED | OUTPATIENT
Start: 2019-05-20 | End: 2019-05-21 | Stop reason: HOSPADM

## 2019-05-20 RX ORDER — METHYLPREDNISOLONE SODIUM SUCCINATE 40 MG/ML
40 INJECTION, POWDER, LYOPHILIZED, FOR SOLUTION INTRAMUSCULAR; INTRAVENOUS ONCE
Status: COMPLETED | OUTPATIENT
Start: 2019-05-20 | End: 2019-05-20

## 2019-05-20 RX ADMIN — METHYLPREDNISOLONE SODIUM SUCCINATE 40 MG: 40 INJECTION, POWDER, LYOPHILIZED, FOR SOLUTION INTRAMUSCULAR; INTRAVENOUS at 08:15

## 2019-05-20 RX ADMIN — WATER: 1 INJECTION INTRAMUSCULAR; INTRAVENOUS; SUBCUTANEOUS at 08:47

## 2019-05-20 RX ADMIN — IMMUNE GLOBULIN INTRAVENOUS (HUMAN) 20 G: 5 INJECTION, SOLUTION INTRAVENOUS at 08:20

## 2019-05-20 RX ADMIN — DIPHENHYDRAMINE HYDROCHLORIDE 25 MG: 50 INJECTION, SOLUTION INTRAMUSCULAR; INTRAVENOUS at 08:16

## 2019-05-20 RX ADMIN — ACETAMINOPHEN 650 MG: 325 TABLET ORAL at 08:15

## 2019-05-20 RX ADMIN — SODIUM CHLORIDE, PRESERVATIVE FREE 500 UNITS: 5 INJECTION INTRAVENOUS at 10:26

## 2019-05-20 RX ADMIN — SODIUM CHLORIDE, PRESERVATIVE FREE 20 ML: 5 INJECTION INTRAVENOUS at 10:26

## 2019-05-20 ASSESSMENT — PAIN SCALES - GENERAL
PAINLEVEL_OUTOF10: 0
PAINLEVEL_OUTOF10: 0

## 2019-05-20 NOTE — PROGRESS NOTES
Pt taken to room 2, oriented to room, bed/chair controls, and call light. Needs met at present. Call light in reach. Pt agreeable for plan of care.

## 2019-06-17 ENCOUNTER — HOSPITAL ENCOUNTER (OUTPATIENT)
Dept: INFUSION THERAPY | Age: 69
Setting detail: INFUSION SERIES
Discharge: HOME | End: 2019-06-17
Payer: MEDICARE

## 2019-06-17 VITALS
RESPIRATION RATE: 16 BRPM | DIASTOLIC BLOOD PRESSURE: 75 MMHG | HEART RATE: 77 BPM | SYSTOLIC BLOOD PRESSURE: 161 MMHG | TEMPERATURE: 98.3 F | OXYGEN SATURATION: 98 %

## 2019-06-17 PROCEDURE — 96374 THER/PROPH/DIAG INJ IV PUSH: CPT

## 2019-06-17 PROCEDURE — 96375 TX/PRO/DX INJ NEW DRUG ADDON: CPT

## 2019-06-17 PROCEDURE — 99211 OFF/OP EST MAY X REQ PHY/QHP: CPT

## 2019-06-17 PROCEDURE — 2580000003 HC RX 258: Performed by: NURSE PRACTITIONER

## 2019-06-17 PROCEDURE — 96365 THER/PROPH/DIAG IV INF INIT: CPT

## 2019-06-17 PROCEDURE — 6360000002 HC RX W HCPCS: Performed by: NURSE PRACTITIONER

## 2019-06-17 PROCEDURE — 6370000000 HC RX 637 (ALT 250 FOR IP): Performed by: NURSE PRACTITIONER

## 2019-06-17 RX ORDER — HEPARIN SODIUM (PORCINE) LOCK FLUSH IV SOLN 100 UNIT/ML 100 UNIT/ML
500 SOLUTION INTRAVENOUS PRN
Status: DISCONTINUED | OUTPATIENT
Start: 2019-06-17 | End: 2019-06-18 | Stop reason: HOSPADM

## 2019-06-17 RX ORDER — SODIUM CHLORIDE 0.9 % (FLUSH) 0.9 %
10 SYRINGE (ML) INJECTION PRN
Status: DISCONTINUED | OUTPATIENT
Start: 2019-06-17 | End: 2019-06-18 | Stop reason: HOSPADM

## 2019-06-17 RX ORDER — ACETAMINOPHEN 325 MG/1
650 TABLET ORAL ONCE
Status: COMPLETED | OUTPATIENT
Start: 2019-06-17 | End: 2019-06-17

## 2019-06-17 RX ORDER — METHYLPREDNISOLONE SODIUM SUCCINATE 40 MG/ML
40 INJECTION, POWDER, LYOPHILIZED, FOR SOLUTION INTRAMUSCULAR; INTRAVENOUS ONCE
Status: COMPLETED | OUTPATIENT
Start: 2019-06-17 | End: 2019-06-17

## 2019-06-17 RX ORDER — DIPHENHYDRAMINE HYDROCHLORIDE 50 MG/ML
25 INJECTION INTRAMUSCULAR; INTRAVENOUS ONCE
Status: COMPLETED | OUTPATIENT
Start: 2019-06-17 | End: 2019-06-17

## 2019-06-17 RX ADMIN — DIPHENHYDRAMINE HYDROCHLORIDE 25 MG: 50 INJECTION, SOLUTION INTRAMUSCULAR; INTRAVENOUS at 08:20

## 2019-06-17 RX ADMIN — SODIUM CHLORIDE, PRESERVATIVE FREE 20 ML: 5 INJECTION INTRAVENOUS at 10:58

## 2019-06-17 RX ADMIN — IMMUNE GLOBULIN INTRAVENOUS (HUMAN) 20 G: 5 INJECTION, SOLUTION INTRAVENOUS at 08:22

## 2019-06-17 RX ADMIN — ACETAMINOPHEN 650 MG: 325 TABLET ORAL at 08:21

## 2019-06-17 RX ADMIN — Medication 500 UNITS: at 10:58

## 2019-06-17 RX ADMIN — SODIUM CHLORIDE, PRESERVATIVE FREE 10 ML: 5 INJECTION INTRAVENOUS at 08:21

## 2019-06-17 RX ADMIN — METHYLPREDNISOLONE SODIUM SUCCINATE 40 MG: 40 INJECTION, POWDER, FOR SOLUTION INTRAMUSCULAR; INTRAVENOUS at 08:20

## 2019-06-17 ASSESSMENT — PAIN SCALES - GENERAL
PAINLEVEL_OUTOF10: 0
PAINLEVEL_OUTOF10: 0

## 2019-06-17 NOTE — PROGRESS NOTES
Reviewed discharge instructions, voiced understanding, and copies of AVS given to take home. Pt tolerated IVIG well, with no s/s of an allergic reaction. Pt to private auto via ambulation.

## 2019-07-15 ENCOUNTER — HOSPITAL ENCOUNTER (OUTPATIENT)
Dept: INFUSION THERAPY | Age: 69
Setting detail: INFUSION SERIES
Discharge: HOME | End: 2019-07-15
Payer: MEDICARE

## 2019-07-15 VITALS
TEMPERATURE: 98.9 F | DIASTOLIC BLOOD PRESSURE: 78 MMHG | HEART RATE: 80 BPM | SYSTOLIC BLOOD PRESSURE: 150 MMHG | OXYGEN SATURATION: 99 % | RESPIRATION RATE: 14 BRPM

## 2019-07-15 PROCEDURE — 96375 TX/PRO/DX INJ NEW DRUG ADDON: CPT

## 2019-07-15 PROCEDURE — 99211 OFF/OP EST MAY X REQ PHY/QHP: CPT

## 2019-07-15 PROCEDURE — 6360000002 HC RX W HCPCS: Performed by: INTERNAL MEDICINE

## 2019-07-15 PROCEDURE — 6370000000 HC RX 637 (ALT 250 FOR IP): Performed by: INTERNAL MEDICINE

## 2019-07-15 PROCEDURE — 96365 THER/PROPH/DIAG IV INF INIT: CPT

## 2019-07-15 PROCEDURE — 2580000003 HC RX 258

## 2019-07-15 PROCEDURE — 96366 THER/PROPH/DIAG IV INF ADDON: CPT

## 2019-07-15 PROCEDURE — 96374 THER/PROPH/DIAG INJ IV PUSH: CPT

## 2019-07-15 PROCEDURE — 2580000003 HC RX 258: Performed by: INTERNAL MEDICINE

## 2019-07-15 RX ORDER — DIPHENHYDRAMINE HYDROCHLORIDE 50 MG/ML
25 INJECTION INTRAMUSCULAR; INTRAVENOUS ONCE
Status: COMPLETED | OUTPATIENT
Start: 2019-07-15 | End: 2019-07-15

## 2019-07-15 RX ORDER — ACETAMINOPHEN 325 MG/1
650 TABLET ORAL ONCE
Status: COMPLETED | OUTPATIENT
Start: 2019-07-15 | End: 2019-07-15

## 2019-07-15 RX ORDER — SODIUM CHLORIDE 0.9 % (FLUSH) 0.9 %
10 SYRINGE (ML) INJECTION PRN
Status: DISCONTINUED | OUTPATIENT
Start: 2019-07-15 | End: 2019-07-16 | Stop reason: HOSPADM

## 2019-07-15 RX ORDER — HEPARIN SODIUM (PORCINE) LOCK FLUSH IV SOLN 100 UNIT/ML 100 UNIT/ML
500 SOLUTION INTRAVENOUS PRN
Status: DISCONTINUED | OUTPATIENT
Start: 2019-07-15 | End: 2019-07-16 | Stop reason: HOSPADM

## 2019-07-15 RX ORDER — METHYLPREDNISOLONE SODIUM SUCCINATE 40 MG/ML
40 INJECTION, POWDER, LYOPHILIZED, FOR SOLUTION INTRAMUSCULAR; INTRAVENOUS ONCE
Status: COMPLETED | OUTPATIENT
Start: 2019-07-15 | End: 2019-07-15

## 2019-07-15 RX ADMIN — Medication 20 ML: at 11:21

## 2019-07-15 RX ADMIN — DIPHENHYDRAMINE HYDROCHLORIDE 25 MG: 50 INJECTION, SOLUTION INTRAMUSCULAR; INTRAVENOUS at 08:13

## 2019-07-15 RX ADMIN — ACETAMINOPHEN 650 MG: 325 TABLET ORAL at 08:07

## 2019-07-15 RX ADMIN — METHYLPREDNISOLONE SODIUM SUCCINATE 40 MG: 40 INJECTION, POWDER, LYOPHILIZED, FOR SOLUTION INTRAMUSCULAR; INTRAVENOUS at 08:07

## 2019-07-15 RX ADMIN — SODIUM CHLORIDE, PRESERVATIVE FREE 500 UNITS: 5 INJECTION INTRAVENOUS at 11:21

## 2019-07-15 RX ADMIN — Medication 10 ML: at 08:07

## 2019-07-15 RX ADMIN — IMMUNE GLOBULIN INTRAVENOUS (HUMAN) 20 G: 5 INJECTION, SOLUTION INTRAVENOUS at 08:46

## 2019-07-15 RX ADMIN — WATER 10 ML: 1 INJECTION INTRAMUSCULAR; INTRAVENOUS; SUBCUTANEOUS at 07:58

## 2019-07-15 ASSESSMENT — PAIN DESCRIPTION - ORIENTATION: ORIENTATION: LEFT

## 2019-07-15 ASSESSMENT — PAIN SCALES - GENERAL
PAINLEVEL_OUTOF10: 3
PAINLEVEL_OUTOF10: 3

## 2019-07-15 ASSESSMENT — PAIN DESCRIPTION - PAIN TYPE: TYPE: CHRONIC PAIN

## 2019-07-18 ENCOUNTER — HOSPITAL ENCOUNTER (OUTPATIENT)
Age: 69
Setting detail: SPECIMEN
Discharge: HOME | End: 2019-07-18
Payer: MEDICARE

## 2019-07-18 LAB
ALBUMIN SERPL-MCNC: 4.5 GM/DL (ref 3.4–5)
ALP BLD-CCNC: 71 IU/L (ref 40–129)
ALT SERPL-CCNC: 15 U/L (ref 10–40)
ANION GAP SERPL CALCULATED.3IONS-SCNC: 11 MMOL/L (ref 4–16)
AST SERPL-CCNC: 12 IU/L (ref 15–37)
BILIRUB SERPL-MCNC: 0.4 MG/DL (ref 0–1)
BUN BLDV-MCNC: 16 MG/DL (ref 6–23)
CALCIUM SERPL-MCNC: 9.2 MG/DL (ref 8.3–10.6)
CHLORIDE BLD-SCNC: 108 MMOL/L (ref 99–110)
CO2: 23 MMOL/L (ref 21–32)
CREAT SERPL-MCNC: 1 MG/DL (ref 0.9–1.3)
GFR AFRICAN AMERICAN: >60 ML/MIN/1.73M2
GFR NON-AFRICAN AMERICAN: >60 ML/MIN/1.73M2
GLUCOSE BLD-MCNC: 115 MG/DL (ref 70–99)
LACTATE DEHYDROGENASE: 135 IU/L (ref 120–246)
POTASSIUM SERPL-SCNC: 4.6 MMOL/L (ref 3.5–5.1)
SODIUM BLD-SCNC: 142 MMOL/L (ref 135–145)
TOTAL PROTEIN: 6.5 GM/DL (ref 6.4–8.2)
URIC ACID: 5.5 MG/DL (ref 3.5–7.2)

## 2019-07-18 PROCEDURE — 83615 LACTATE (LD) (LDH) ENZYME: CPT

## 2019-07-18 PROCEDURE — 80053 COMPREHEN METABOLIC PANEL: CPT

## 2019-07-18 PROCEDURE — 84550 ASSAY OF BLOOD/URIC ACID: CPT

## 2019-08-12 ENCOUNTER — HOSPITAL ENCOUNTER (OUTPATIENT)
Dept: INFUSION THERAPY | Age: 69
Setting detail: INFUSION SERIES
Discharge: HOME | End: 2019-08-12
Payer: MEDICARE

## 2019-08-12 VITALS
OXYGEN SATURATION: 98 % | TEMPERATURE: 98.9 F | RESPIRATION RATE: 14 BRPM | DIASTOLIC BLOOD PRESSURE: 77 MMHG | SYSTOLIC BLOOD PRESSURE: 143 MMHG

## 2019-08-12 PROCEDURE — 6360000002 HC RX W HCPCS: Performed by: INTERNAL MEDICINE

## 2019-08-12 PROCEDURE — 96375 TX/PRO/DX INJ NEW DRUG ADDON: CPT

## 2019-08-12 PROCEDURE — 96365 THER/PROPH/DIAG IV INF INIT: CPT

## 2019-08-12 PROCEDURE — 6370000000 HC RX 637 (ALT 250 FOR IP): Performed by: INTERNAL MEDICINE

## 2019-08-12 PROCEDURE — 96374 THER/PROPH/DIAG INJ IV PUSH: CPT

## 2019-08-12 PROCEDURE — 96366 THER/PROPH/DIAG IV INF ADDON: CPT

## 2019-08-12 PROCEDURE — 99211 OFF/OP EST MAY X REQ PHY/QHP: CPT

## 2019-08-12 PROCEDURE — 2580000003 HC RX 258: Performed by: INTERNAL MEDICINE

## 2019-08-12 RX ORDER — HEPARIN SODIUM (PORCINE) LOCK FLUSH IV SOLN 100 UNIT/ML 100 UNIT/ML
500 SOLUTION INTRAVENOUS PRN
Status: DISCONTINUED | OUTPATIENT
Start: 2019-08-12 | End: 2019-08-13 | Stop reason: HOSPADM

## 2019-08-12 RX ORDER — ACETAMINOPHEN 325 MG/1
650 TABLET ORAL ONCE
Status: COMPLETED | OUTPATIENT
Start: 2019-08-12 | End: 2019-08-12

## 2019-08-12 RX ORDER — SODIUM CHLORIDE 0.9 % (FLUSH) 0.9 %
10 SYRINGE (ML) INJECTION PRN
Status: DISCONTINUED | OUTPATIENT
Start: 2019-08-12 | End: 2019-08-13 | Stop reason: HOSPADM

## 2019-08-12 RX ORDER — DIPHENHYDRAMINE HYDROCHLORIDE 50 MG/ML
25 INJECTION INTRAMUSCULAR; INTRAVENOUS ONCE
Status: COMPLETED | OUTPATIENT
Start: 2019-08-12 | End: 2019-08-12

## 2019-08-12 RX ORDER — METHYLPREDNISOLONE SODIUM SUCCINATE 40 MG/ML
40 INJECTION, POWDER, LYOPHILIZED, FOR SOLUTION INTRAMUSCULAR; INTRAVENOUS DAILY
Status: DISCONTINUED | OUTPATIENT
Start: 2019-08-12 | End: 2019-08-13 | Stop reason: HOSPADM

## 2019-08-12 RX ADMIN — Medication 10 ML: at 08:51

## 2019-08-12 RX ADMIN — DIPHENHYDRAMINE HYDROCHLORIDE 25 MG: 50 INJECTION, SOLUTION INTRAMUSCULAR; INTRAVENOUS at 08:48

## 2019-08-12 RX ADMIN — METHYLPREDNISOLONE SODIUM SUCCINATE 40 MG: 40 INJECTION, POWDER, FOR SOLUTION INTRAMUSCULAR; INTRAVENOUS at 08:49

## 2019-08-12 RX ADMIN — Medication 20 ML: at 11:30

## 2019-08-12 RX ADMIN — IMMUNE GLOBULIN INTRAVENOUS (HUMAN) 20 G: 5 INJECTION, SOLUTION INTRAVENOUS at 08:55

## 2019-08-12 RX ADMIN — Medication 500 UNITS: at 11:30

## 2019-08-12 RX ADMIN — ACETAMINOPHEN 650 MG: 325 TABLET ORAL at 08:48

## 2019-08-12 ASSESSMENT — PAIN SCALES - GENERAL
PAINLEVEL_OUTOF10: 0
PAINLEVEL_OUTOF10: 0

## 2019-08-12 NOTE — PROGRESS NOTES
Reviewed discharge instructions, voiced understanding, and copies of AVS given to take home. Pt tolerated IVIG Infusion well, with no s/s of an allergic reaction. Pt to private auto via ambulation.

## 2019-09-09 ENCOUNTER — HOSPITAL ENCOUNTER (OUTPATIENT)
Dept: INFUSION THERAPY | Age: 69
Setting detail: INFUSION SERIES
Discharge: HOME | End: 2019-09-09
Payer: MEDICARE

## 2019-09-09 VITALS
WEIGHT: 195.39 LBS | TEMPERATURE: 99 F | SYSTOLIC BLOOD PRESSURE: 123 MMHG | DIASTOLIC BLOOD PRESSURE: 66 MMHG | BODY MASS INDEX: 26.47 KG/M2 | RESPIRATION RATE: 16 BRPM | HEIGHT: 72 IN | HEART RATE: 70 BPM | OXYGEN SATURATION: 97 %

## 2019-09-09 PROCEDURE — 96374 THER/PROPH/DIAG INJ IV PUSH: CPT

## 2019-09-09 PROCEDURE — 6370000000 HC RX 637 (ALT 250 FOR IP): Performed by: INTERNAL MEDICINE

## 2019-09-09 PROCEDURE — 2580000003 HC RX 258: Performed by: INTERNAL MEDICINE

## 2019-09-09 PROCEDURE — 96366 THER/PROPH/DIAG IV INF ADDON: CPT

## 2019-09-09 PROCEDURE — 96375 TX/PRO/DX INJ NEW DRUG ADDON: CPT

## 2019-09-09 PROCEDURE — 6360000002 HC RX W HCPCS: Performed by: INTERNAL MEDICINE

## 2019-09-09 PROCEDURE — 99211 OFF/OP EST MAY X REQ PHY/QHP: CPT

## 2019-09-09 PROCEDURE — 96365 THER/PROPH/DIAG IV INF INIT: CPT

## 2019-09-09 RX ORDER — METHYLPREDNISOLONE SODIUM SUCCINATE 40 MG/ML
40 INJECTION, POWDER, LYOPHILIZED, FOR SOLUTION INTRAMUSCULAR; INTRAVENOUS DAILY
Status: DISCONTINUED | OUTPATIENT
Start: 2019-09-09 | End: 2019-09-10 | Stop reason: HOSPADM

## 2019-09-09 RX ORDER — DIPHENHYDRAMINE HYDROCHLORIDE 50 MG/ML
25 INJECTION INTRAMUSCULAR; INTRAVENOUS ONCE
Status: COMPLETED | OUTPATIENT
Start: 2019-09-09 | End: 2019-09-09

## 2019-09-09 RX ORDER — HEPARIN SODIUM (PORCINE) LOCK FLUSH IV SOLN 100 UNIT/ML 100 UNIT/ML
500 SOLUTION INTRAVENOUS PRN
Status: DISCONTINUED | OUTPATIENT
Start: 2019-09-09 | End: 2019-09-10 | Stop reason: HOSPADM

## 2019-09-09 RX ORDER — 0.9 % SODIUM CHLORIDE 0.9 %
20 VIAL (ML) INJECTION PRN
Status: DISCONTINUED | OUTPATIENT
Start: 2019-09-09 | End: 2019-09-10 | Stop reason: HOSPADM

## 2019-09-09 RX ORDER — 0.9 % SODIUM CHLORIDE 0.9 %
10 VIAL (ML) INJECTION PRN
Status: DISCONTINUED | OUTPATIENT
Start: 2019-09-09 | End: 2019-09-09 | Stop reason: SDUPTHER

## 2019-09-09 RX ORDER — ACETAMINOPHEN 325 MG/1
650 TABLET ORAL ONCE
Status: COMPLETED | OUTPATIENT
Start: 2019-09-09 | End: 2019-09-09

## 2019-09-09 RX ORDER — SODIUM CHLORIDE 9 MG/ML
INJECTION, SOLUTION INTRAVENOUS ONCE
Status: DISCONTINUED | OUTPATIENT
Start: 2019-09-09 | End: 2019-09-09

## 2019-09-09 RX ADMIN — IMMUNE GLOBULIN INTRAVENOUS (HUMAN) 20 G: 5 INJECTION, SOLUTION INTRAVENOUS at 08:25

## 2019-09-09 RX ADMIN — Medication 500 UNITS: at 10:35

## 2019-09-09 RX ADMIN — ACETAMINOPHEN 650 MG: 325 TABLET ORAL at 08:20

## 2019-09-09 RX ADMIN — Medication 20 ML: at 10:35

## 2019-09-09 RX ADMIN — METHYLPREDNISOLONE SODIUM SUCCINATE 40 MG: 40 INJECTION, POWDER, FOR SOLUTION INTRAMUSCULAR; INTRAVENOUS at 08:20

## 2019-09-09 RX ADMIN — Medication 20 ML: at 08:20

## 2019-09-09 RX ADMIN — DIPHENHYDRAMINE HYDROCHLORIDE 25 MG: 50 INJECTION, SOLUTION INTRAMUSCULAR; INTRAVENOUS at 08:20

## 2019-09-09 ASSESSMENT — PAIN SCALES - GENERAL: PAINLEVEL_OUTOF10: 0

## 2019-09-09 NOTE — PLAN OF CARE
Ambulatory to unit room 4 for IVIG. Reorientated to unit. Procedure and plan of care explained. Questions answered. Understanding verbalized.

## 2019-10-07 ENCOUNTER — HOSPITAL ENCOUNTER (OUTPATIENT)
Dept: INFUSION THERAPY | Age: 69
Setting detail: INFUSION SERIES
Discharge: HOME | End: 2019-10-07
Payer: MEDICARE

## 2019-10-07 VITALS
HEART RATE: 79 BPM | OXYGEN SATURATION: 96 % | SYSTOLIC BLOOD PRESSURE: 141 MMHG | DIASTOLIC BLOOD PRESSURE: 72 MMHG | RESPIRATION RATE: 14 BRPM | TEMPERATURE: 98.7 F

## 2019-10-07 PROCEDURE — 2580000003 HC RX 258: Performed by: INTERNAL MEDICINE

## 2019-10-07 PROCEDURE — 99211 OFF/OP EST MAY X REQ PHY/QHP: CPT

## 2019-10-07 PROCEDURE — 96366 THER/PROPH/DIAG IV INF ADDON: CPT

## 2019-10-07 PROCEDURE — 96375 TX/PRO/DX INJ NEW DRUG ADDON: CPT

## 2019-10-07 PROCEDURE — 96374 THER/PROPH/DIAG INJ IV PUSH: CPT

## 2019-10-07 PROCEDURE — 96365 THER/PROPH/DIAG IV INF INIT: CPT

## 2019-10-07 PROCEDURE — 6360000002 HC RX W HCPCS: Performed by: INTERNAL MEDICINE

## 2019-10-07 PROCEDURE — 6370000000 HC RX 637 (ALT 250 FOR IP): Performed by: INTERNAL MEDICINE

## 2019-10-07 RX ORDER — HEPARIN SODIUM (PORCINE) LOCK FLUSH IV SOLN 100 UNIT/ML 100 UNIT/ML
500 SOLUTION INTRAVENOUS PRN
Status: DISCONTINUED | OUTPATIENT
Start: 2019-10-07 | End: 2019-10-08 | Stop reason: HOSPADM

## 2019-10-07 RX ORDER — ACETAMINOPHEN 325 MG/1
650 TABLET ORAL ONCE
Status: COMPLETED | OUTPATIENT
Start: 2019-10-07 | End: 2019-10-07

## 2019-10-07 RX ORDER — TAMSULOSIN HYDROCHLORIDE 0.4 MG/1
0.4 CAPSULE ORAL DAILY
COMMUNITY

## 2019-10-07 RX ORDER — 0.9 % SODIUM CHLORIDE 0.9 %
20 VIAL (ML) INJECTION PRN
Status: DISCONTINUED | OUTPATIENT
Start: 2019-10-07 | End: 2019-10-08 | Stop reason: HOSPADM

## 2019-10-07 RX ORDER — DIPHENHYDRAMINE HYDROCHLORIDE 50 MG/ML
25 INJECTION INTRAMUSCULAR; INTRAVENOUS ONCE
Status: COMPLETED | OUTPATIENT
Start: 2019-10-07 | End: 2019-10-07

## 2019-10-07 RX ORDER — METHYLPREDNISOLONE SODIUM SUCCINATE 40 MG/ML
40 INJECTION, POWDER, LYOPHILIZED, FOR SOLUTION INTRAMUSCULAR; INTRAVENOUS ONCE
Status: COMPLETED | OUTPATIENT
Start: 2019-10-07 | End: 2019-10-07

## 2019-10-07 RX ADMIN — DIPHENHYDRAMINE HYDROCHLORIDE 25 MG: 50 INJECTION, SOLUTION INTRAMUSCULAR; INTRAVENOUS at 08:41

## 2019-10-07 RX ADMIN — METHYLPREDNISOLONE SODIUM SUCCINATE 40 MG: 40 INJECTION, POWDER, FOR SOLUTION INTRAMUSCULAR; INTRAVENOUS at 08:41

## 2019-10-07 RX ADMIN — Medication 10 ML: at 11:16

## 2019-10-07 RX ADMIN — Medication 500 UNITS: at 11:17

## 2019-10-07 RX ADMIN — ACETAMINOPHEN 650 MG: 325 TABLET ORAL at 08:40

## 2019-10-07 RX ADMIN — IMMUNE GLOBULIN INTRAVENOUS (HUMAN) 20 G: 5 INJECTION, SOLUTION INTRAVENOUS at 08:45

## 2019-10-07 ASSESSMENT — PAIN SCALES - GENERAL: PAINLEVEL_OUTOF10: 0

## 2019-10-18 ENCOUNTER — HOSPITAL ENCOUNTER (OUTPATIENT)
Age: 69
Setting detail: SPECIMEN
Discharge: HOME | End: 2019-10-18
Payer: MEDICARE

## 2019-10-18 ENCOUNTER — HOSPITAL ENCOUNTER (EMERGENCY)
Age: 69
Discharge: HOME | End: 2019-10-18
Payer: MEDICARE

## 2019-10-18 VITALS
TEMPERATURE: 98 F | SYSTOLIC BLOOD PRESSURE: 170 MMHG | OXYGEN SATURATION: 95 % | RESPIRATION RATE: 20 BRPM | DIASTOLIC BLOOD PRESSURE: 78 MMHG | HEART RATE: 80 BPM

## 2019-10-18 DIAGNOSIS — S05.02XA ABRASION OF LEFT CORNEA, INITIAL ENCOUNTER: Primary | ICD-10-CM

## 2019-10-18 LAB
ALBUMIN SERPL-MCNC: 4.5 GM/DL (ref 3.4–5)
ALP BLD-CCNC: 67 IU/L (ref 40–128)
ALT SERPL-CCNC: 12 U/L (ref 10–40)
ANION GAP SERPL CALCULATED.3IONS-SCNC: 12 MMOL/L (ref 4–16)
AST SERPL-CCNC: 14 IU/L (ref 15–37)
BILIRUB SERPL-MCNC: 0.6 MG/DL (ref 0–1)
BUN BLDV-MCNC: 12 MG/DL (ref 6–23)
CALCIUM SERPL-MCNC: 8.7 MG/DL (ref 8.3–10.6)
CHLORIDE BLD-SCNC: 105 MMOL/L (ref 99–110)
CO2: 22 MMOL/L (ref 21–32)
CREAT SERPL-MCNC: 1.1 MG/DL (ref 0.9–1.3)
GFR AFRICAN AMERICAN: >60 ML/MIN/1.73M2
GFR NON-AFRICAN AMERICAN: >60 ML/MIN/1.73M2
GLUCOSE BLD-MCNC: 145 MG/DL (ref 70–99)
POTASSIUM SERPL-SCNC: 4.5 MMOL/L (ref 3.5–5.1)
SODIUM BLD-SCNC: 139 MMOL/L (ref 135–145)
TOTAL PROTEIN: 6.3 GM/DL (ref 6.4–8.2)

## 2019-10-18 PROCEDURE — 99283 EMERGENCY DEPT VISIT LOW MDM: CPT

## 2019-10-18 PROCEDURE — 80053 COMPREHEN METABOLIC PANEL: CPT

## 2019-10-18 PROCEDURE — 6370000000 HC RX 637 (ALT 250 FOR IP): Performed by: PHYSICIAN ASSISTANT

## 2019-10-18 RX ORDER — TOBRAMYCIN 3 MG/ML
1 SOLUTION/ DROPS OPHTHALMIC EVERY 4 HOURS
Qty: 1 BOTTLE | Refills: 0 | Status: SHIPPED | OUTPATIENT
Start: 2019-10-18 | End: 2019-10-25

## 2019-10-18 RX ORDER — TOBRAMYCIN 3 MG/ML
1 SOLUTION/ DROPS OPHTHALMIC ONCE
Status: COMPLETED | OUTPATIENT
Start: 2019-10-18 | End: 2019-10-18

## 2019-10-18 RX ORDER — TETRACAINE HYDROCHLORIDE 5 MG/ML
2 SOLUTION OPHTHALMIC ONCE
Status: COMPLETED | OUTPATIENT
Start: 2019-10-18 | End: 2019-10-18

## 2019-10-18 RX ADMIN — TOBRAMYCIN 1 DROP: 3 SOLUTION OPHTHALMIC at 04:19

## 2019-10-18 RX ADMIN — FLUORESCEIN SODIUM 1 MG: 1 STRIP OPHTHALMIC at 03:00

## 2019-10-18 RX ADMIN — TETRACAINE HYDROCHLORIDE 2 DROP: 5 SOLUTION OPHTHALMIC at 04:00

## 2019-10-24 ENCOUNTER — HOSPITAL ENCOUNTER (OUTPATIENT)
Age: 69
Setting detail: SPECIMEN
Discharge: HOME | End: 2019-10-24
Payer: MEDICARE

## 2019-10-24 LAB
FERRITIN: 7 NG/ML (ref 30–400)
IRON: 22 UG/DL (ref 59–158)
PCT TRANSFERRIN: 4 % (ref 10–44)
TOTAL IRON BINDING CAPACITY: 499 UG/DL (ref 250–450)
UNSATURATED IRON BINDING CAPACITY: 477 UG/DL (ref 110–370)

## 2019-10-24 PROCEDURE — 83550 IRON BINDING TEST: CPT

## 2019-10-24 PROCEDURE — 83540 ASSAY OF IRON: CPT

## 2019-10-24 PROCEDURE — 82728 ASSAY OF FERRITIN: CPT

## 2019-11-04 ENCOUNTER — HOSPITAL ENCOUNTER (OUTPATIENT)
Dept: INFUSION THERAPY | Age: 69
Setting detail: INFUSION SERIES
Discharge: HOME | End: 2019-11-04
Payer: MEDICARE

## 2019-11-04 VITALS
HEART RATE: 88 BPM | OXYGEN SATURATION: 99 % | TEMPERATURE: 98.1 F | SYSTOLIC BLOOD PRESSURE: 150 MMHG | RESPIRATION RATE: 16 BRPM | DIASTOLIC BLOOD PRESSURE: 88 MMHG

## 2019-11-04 PROCEDURE — 96375 TX/PRO/DX INJ NEW DRUG ADDON: CPT

## 2019-11-04 PROCEDURE — 2580000003 HC RX 258: Performed by: INTERNAL MEDICINE

## 2019-11-04 PROCEDURE — 96365 THER/PROPH/DIAG IV INF INIT: CPT

## 2019-11-04 PROCEDURE — 96374 THER/PROPH/DIAG INJ IV PUSH: CPT

## 2019-11-04 PROCEDURE — 6370000000 HC RX 637 (ALT 250 FOR IP): Performed by: INTERNAL MEDICINE

## 2019-11-04 PROCEDURE — 6360000002 HC RX W HCPCS: Performed by: INTERNAL MEDICINE

## 2019-11-04 PROCEDURE — 96366 THER/PROPH/DIAG IV INF ADDON: CPT

## 2019-11-04 RX ORDER — DIPHENHYDRAMINE HYDROCHLORIDE 50 MG/ML
25 INJECTION INTRAMUSCULAR; INTRAVENOUS ONCE
Status: COMPLETED | OUTPATIENT
Start: 2019-11-04 | End: 2019-11-04

## 2019-11-04 RX ORDER — 0.9 % SODIUM CHLORIDE 0.9 %
20 VIAL (ML) INJECTION PRN
Status: DISCONTINUED | OUTPATIENT
Start: 2019-11-04 | End: 2019-11-05 | Stop reason: HOSPADM

## 2019-11-04 RX ORDER — HEPARIN SODIUM (PORCINE) LOCK FLUSH IV SOLN 100 UNIT/ML 100 UNIT/ML
500 SOLUTION INTRAVENOUS PRN
Status: DISCONTINUED | OUTPATIENT
Start: 2019-11-04 | End: 2019-11-05 | Stop reason: HOSPADM

## 2019-11-04 RX ORDER — ACETAMINOPHEN 325 MG/1
650 TABLET ORAL ONCE
Status: COMPLETED | OUTPATIENT
Start: 2019-11-04 | End: 2019-11-04

## 2019-11-04 RX ORDER — METHYLPREDNISOLONE SODIUM SUCCINATE 40 MG/ML
40 INJECTION, POWDER, LYOPHILIZED, FOR SOLUTION INTRAMUSCULAR; INTRAVENOUS DAILY
Status: DISCONTINUED | OUTPATIENT
Start: 2019-11-04 | End: 2019-11-05 | Stop reason: HOSPADM

## 2019-11-04 RX ADMIN — DIPHENHYDRAMINE HYDROCHLORIDE 25 MG: 50 INJECTION, SOLUTION INTRAMUSCULAR; INTRAVENOUS at 08:12

## 2019-11-04 RX ADMIN — METHYLPREDNISOLONE SODIUM SUCCINATE 40 MG: 40 INJECTION, POWDER, FOR SOLUTION INTRAMUSCULAR; INTRAVENOUS at 08:12

## 2019-11-04 RX ADMIN — Medication 20 ML: at 11:16

## 2019-11-04 RX ADMIN — IMMUNE GLOBULIN INTRAVENOUS (HUMAN) 20 G: 5 INJECTION, SOLUTION INTRAVENOUS at 08:40

## 2019-11-04 RX ADMIN — ACETAMINOPHEN 650 MG: 325 TABLET ORAL at 08:12

## 2019-11-04 RX ADMIN — Medication 500 UNITS: at 11:17

## 2019-11-04 ASSESSMENT — PAIN SCALES - GENERAL: PAINLEVEL_OUTOF10: 0

## 2019-11-04 NOTE — PROGRESS NOTES
Prior to discharge, the After Visit Summary Discharge Instructions were reviewed with the patient. He was offered a printed version of the AVS, but declined the offer.

## 2019-11-07 ENCOUNTER — HOSPITAL ENCOUNTER (OUTPATIENT)
Dept: CT IMAGING | Age: 69
Discharge: HOME | End: 2019-11-07
Payer: MEDICARE

## 2019-11-07 DIAGNOSIS — R91.8 LUNG MASS: ICD-10-CM

## 2019-11-07 PROCEDURE — 6360000004 HC RX CONTRAST MEDICATION: Performed by: INTERNAL MEDICINE

## 2019-11-07 PROCEDURE — 71260 CT THORAX DX C+: CPT

## 2019-11-07 RX ADMIN — IOPAMIDOL 75 ML: 755 INJECTION, SOLUTION INTRAVENOUS at 08:42

## 2019-12-02 ENCOUNTER — HOSPITAL ENCOUNTER (OUTPATIENT)
Dept: INFUSION THERAPY | Age: 69
Setting detail: INFUSION SERIES
Discharge: HOME | End: 2019-12-02
Payer: MEDICARE

## 2019-12-02 VITALS
HEART RATE: 82 BPM | TEMPERATURE: 98.5 F | OXYGEN SATURATION: 98 % | RESPIRATION RATE: 16 BRPM | DIASTOLIC BLOOD PRESSURE: 78 MMHG | SYSTOLIC BLOOD PRESSURE: 161 MMHG

## 2019-12-02 PROCEDURE — 96374 THER/PROPH/DIAG INJ IV PUSH: CPT

## 2019-12-02 PROCEDURE — 6360000002 HC RX W HCPCS: Performed by: INTERNAL MEDICINE

## 2019-12-02 PROCEDURE — 96375 TX/PRO/DX INJ NEW DRUG ADDON: CPT

## 2019-12-02 PROCEDURE — 96366 THER/PROPH/DIAG IV INF ADDON: CPT

## 2019-12-02 PROCEDURE — 6370000000 HC RX 637 (ALT 250 FOR IP): Performed by: INTERNAL MEDICINE

## 2019-12-02 PROCEDURE — 99211 OFF/OP EST MAY X REQ PHY/QHP: CPT

## 2019-12-02 PROCEDURE — 2580000003 HC RX 258: Performed by: INTERNAL MEDICINE

## 2019-12-02 PROCEDURE — 96365 THER/PROPH/DIAG IV INF INIT: CPT

## 2019-12-02 RX ORDER — HEPARIN SODIUM (PORCINE) LOCK FLUSH IV SOLN 100 UNIT/ML 100 UNIT/ML
500 SOLUTION INTRAVENOUS PRN
Status: DISCONTINUED | OUTPATIENT
Start: 2019-12-02 | End: 2019-12-03 | Stop reason: HOSPADM

## 2019-12-02 RX ORDER — DIPHENHYDRAMINE HYDROCHLORIDE 50 MG/ML
25 INJECTION INTRAMUSCULAR; INTRAVENOUS ONCE
Status: COMPLETED | OUTPATIENT
Start: 2019-12-02 | End: 2019-12-02

## 2019-12-02 RX ORDER — ACETAMINOPHEN 325 MG/1
650 TABLET ORAL ONCE
Status: COMPLETED | OUTPATIENT
Start: 2019-12-02 | End: 2019-12-02

## 2019-12-02 RX ORDER — SODIUM CHLORIDE 0.9 % (FLUSH) 0.9 %
10 SYRINGE (ML) INJECTION PRN
Status: DISCONTINUED | OUTPATIENT
Start: 2019-12-02 | End: 2019-12-03 | Stop reason: HOSPADM

## 2019-12-02 RX ORDER — METHYLPREDNISOLONE SODIUM SUCCINATE 40 MG/ML
40 INJECTION, POWDER, LYOPHILIZED, FOR SOLUTION INTRAMUSCULAR; INTRAVENOUS DAILY
Status: DISCONTINUED | OUTPATIENT
Start: 2019-12-02 | End: 2019-12-03 | Stop reason: HOSPADM

## 2019-12-02 RX ADMIN — DIPHENHYDRAMINE HYDROCHLORIDE 25 MG: 50 INJECTION, SOLUTION INTRAMUSCULAR; INTRAVENOUS at 08:17

## 2019-12-02 RX ADMIN — IMMUNE GLOBULIN INTRAVENOUS (HUMAN) 20 G: 5 INJECTION, SOLUTION INTRAVENOUS at 08:21

## 2019-12-02 RX ADMIN — METHYLPREDNISOLONE SODIUM SUCCINATE 40 MG: 40 INJECTION, POWDER, FOR SOLUTION INTRAMUSCULAR; INTRAVENOUS at 08:20

## 2019-12-02 RX ADMIN — ACETAMINOPHEN 650 MG: 325 TABLET ORAL at 08:19

## 2019-12-02 RX ADMIN — Medication 10 ML: at 10:49

## 2019-12-02 RX ADMIN — Medication 500 UNITS: at 10:49

## 2019-12-02 ASSESSMENT — PAIN SCALES - GENERAL: PAINLEVEL_OUTOF10: 0

## 2019-12-02 NOTE — PROGRESS NOTES
Reviewed discharge instructions, voiced understanding, and copies of AVS given to take home. Pt tolerated IVIG well. Pt to exit via ambulation.        Orders Placed This Encounter   Medications    immune globulin (human) 10% solution 20 g     Order Specific Question:   What brand of IVIG     Answer:   Flebogamma     Comments:    is the preferred brand to dispense    acetaminophen (TYLENOL) tablet 650 mg    methylPREDNISolone sodium (SOLU-MEDROL) injection 40 mg    diphenhydrAMINE (BENADRYL) injection 25 mg    heparin flush 100 UNIT/ML injection 500 Units    sodium chloride flush 0.9 % injection 10 mL

## 2019-12-05 ENCOUNTER — HOSPITAL ENCOUNTER (OUTPATIENT)
Age: 69
Setting detail: SPECIMEN
Discharge: HOME | End: 2019-12-05
Payer: MEDICARE

## 2019-12-05 LAB
ALBUMIN SERPL-MCNC: 4.3 GM/DL (ref 3.4–5)
ALP BLD-CCNC: 72 IU/L (ref 40–129)
ALT SERPL-CCNC: 15 U/L (ref 10–40)
ANION GAP SERPL CALCULATED.3IONS-SCNC: 11 MMOL/L (ref 4–16)
AST SERPL-CCNC: 12 IU/L (ref 15–37)
BILIRUB SERPL-MCNC: 0.5 MG/DL (ref 0–1)
BUN BLDV-MCNC: 14 MG/DL (ref 6–23)
CALCIUM SERPL-MCNC: 8.9 MG/DL (ref 8.3–10.6)
CHLORIDE BLD-SCNC: 102 MMOL/L (ref 99–110)
CO2: 25 MMOL/L (ref 21–32)
CREAT SERPL-MCNC: 1 MG/DL (ref 0.9–1.3)
FERRITIN: 9 NG/ML (ref 30–400)
GFR AFRICAN AMERICAN: >60 ML/MIN/1.73M2
GFR NON-AFRICAN AMERICAN: >60 ML/MIN/1.73M2
GLUCOSE BLD-MCNC: 57 MG/DL (ref 70–99)
IRON: 30 UG/DL (ref 59–158)
POTASSIUM SERPL-SCNC: 3.7 MMOL/L (ref 3.5–5.1)
SODIUM BLD-SCNC: 138 MMOL/L (ref 135–145)
TOTAL PROTEIN: 6.4 GM/DL (ref 6.4–8.2)

## 2019-12-05 PROCEDURE — 82728 ASSAY OF FERRITIN: CPT

## 2019-12-05 PROCEDURE — 80053 COMPREHEN METABOLIC PANEL: CPT

## 2019-12-05 PROCEDURE — 83540 ASSAY OF IRON: CPT

## 2019-12-30 ENCOUNTER — HOSPITAL ENCOUNTER (OUTPATIENT)
Dept: INFUSION THERAPY | Age: 69
Setting detail: INFUSION SERIES
Discharge: HOME | End: 2019-12-30
Payer: MEDICARE

## 2019-12-30 VITALS
RESPIRATION RATE: 16 BRPM | TEMPERATURE: 98.5 F | SYSTOLIC BLOOD PRESSURE: 154 MMHG | OXYGEN SATURATION: 97 % | HEART RATE: 73 BPM | DIASTOLIC BLOOD PRESSURE: 77 MMHG

## 2019-12-30 PROCEDURE — 2580000003 HC RX 258: Performed by: INTERNAL MEDICINE

## 2019-12-30 PROCEDURE — 96366 THER/PROPH/DIAG IV INF ADDON: CPT

## 2019-12-30 PROCEDURE — 96374 THER/PROPH/DIAG INJ IV PUSH: CPT

## 2019-12-30 PROCEDURE — 99211 OFF/OP EST MAY X REQ PHY/QHP: CPT

## 2019-12-30 PROCEDURE — 96375 TX/PRO/DX INJ NEW DRUG ADDON: CPT

## 2019-12-30 PROCEDURE — 6360000002 HC RX W HCPCS: Performed by: INTERNAL MEDICINE

## 2019-12-30 PROCEDURE — 6370000000 HC RX 637 (ALT 250 FOR IP): Performed by: INTERNAL MEDICINE

## 2019-12-30 PROCEDURE — 96365 THER/PROPH/DIAG IV INF INIT: CPT

## 2019-12-30 RX ORDER — ACETAMINOPHEN 325 MG/1
650 TABLET ORAL EVERY 4 HOURS PRN
Status: DISCONTINUED | OUTPATIENT
Start: 2019-12-30 | End: 2019-12-31 | Stop reason: HOSPADM

## 2019-12-30 RX ORDER — HEPARIN SODIUM (PORCINE) LOCK FLUSH IV SOLN 100 UNIT/ML 100 UNIT/ML
500 SOLUTION INTRAVENOUS PRN
Status: DISCONTINUED | OUTPATIENT
Start: 2019-12-30 | End: 2019-12-31 | Stop reason: HOSPADM

## 2019-12-30 RX ORDER — SODIUM CHLORIDE 0.9 % (FLUSH) 0.9 %
10 SYRINGE (ML) INJECTION PRN
Status: DISCONTINUED | OUTPATIENT
Start: 2019-12-30 | End: 2019-12-31 | Stop reason: HOSPADM

## 2019-12-30 RX ORDER — DIPHENHYDRAMINE HYDROCHLORIDE 50 MG/ML
25 INJECTION INTRAMUSCULAR; INTRAVENOUS ONCE
Status: COMPLETED | OUTPATIENT
Start: 2019-12-30 | End: 2019-12-30

## 2019-12-30 RX ORDER — METHYLPREDNISOLONE SODIUM SUCCINATE 40 MG/ML
40 INJECTION, POWDER, LYOPHILIZED, FOR SOLUTION INTRAMUSCULAR; INTRAVENOUS ONCE
Status: COMPLETED | OUTPATIENT
Start: 2019-12-30 | End: 2019-12-30

## 2019-12-30 RX ADMIN — ACETAMINOPHEN 650 MG: 325 TABLET ORAL at 07:57

## 2019-12-30 RX ADMIN — IMMUNE GLOBULIN INTRAVENOUS (HUMAN) 20 G: 5 INJECTION, SOLUTION INTRAVENOUS at 08:49

## 2019-12-30 RX ADMIN — SODIUM CHLORIDE, PRESERVATIVE FREE 10 ML: 5 INJECTION INTRAVENOUS at 07:55

## 2019-12-30 RX ADMIN — SODIUM CHLORIDE, PRESERVATIVE FREE 10 ML: 5 INJECTION INTRAVENOUS at 08:00

## 2019-12-30 RX ADMIN — Medication 500 UNITS: at 11:24

## 2019-12-30 RX ADMIN — METHYLPREDNISOLONE SODIUM SUCCINATE 40 MG: 40 INJECTION, POWDER, FOR SOLUTION INTRAMUSCULAR; INTRAVENOUS at 07:57

## 2019-12-30 RX ADMIN — SODIUM CHLORIDE, PRESERVATIVE FREE 10 ML: 5 INJECTION INTRAVENOUS at 11:24

## 2019-12-30 RX ADMIN — DIPHENHYDRAMINE HYDROCHLORIDE 25 MG: 50 INJECTION, SOLUTION INTRAMUSCULAR; INTRAVENOUS at 08:01

## 2019-12-30 ASSESSMENT — PAIN SCALES - GENERAL
PAINLEVEL_OUTOF10: 0
PAINLEVEL_OUTOF10: 0

## 2019-12-30 NOTE — PROGRESS NOTES
Prior to discharge, the After Visit Summary Discharge Instructions were reviewed with the patient. He was offered a printed version of the AVS, but declined the offer.      Orders Placed This Encounter   Medications    immune globulin (human) 10% solution 20 g     Order Specific Question:   What brand of IVIG     Answer:   Flebogamma     Comments:    is the preferred brand to dispense    acetaminophen (TYLENOL) tablet 650 mg    diphenhydrAMINE (BENADRYL) injection 25 mg    methylPREDNISolone sodium (SOLU-MEDROL) injection 40 mg    sodium chloride flush 0.9 % injection 10 mL    heparin flush 100 UNIT/ML injection 500 Units

## 2019-12-30 NOTE — PROGRESS NOTES
Pt taken to room 02, oriented to room, bed/chair controls, and call light. Needs met at present. Call light in reach. Pt agreeable for plan of care.

## 2020-01-24 ENCOUNTER — HOSPITAL ENCOUNTER (OUTPATIENT)
Dept: INFUSION THERAPY | Age: 70
Discharge: HOME | End: 2020-01-24
Payer: MEDICARE

## 2020-01-24 LAB
DIFFERENTIAL TYPE: ABNORMAL
FERRITIN: 71 NG/ML (ref 30–400)
HCT VFR BLD CALC: 37.6 % (ref 42–52)
HEMOGLOBIN: 12.3 GM/DL (ref 13.5–18)
IRON: 83 UG/DL (ref 59–158)
LYMPHOCYTES ABSOLUTE: 13.3 K/CU MM
LYMPHOCYTES RELATIVE PERCENT: 84 % (ref 24–44)
MCH RBC QN AUTO: 29.4 PG (ref 27–31)
MCHC RBC AUTO-ENTMCNC: 32.7 % (ref 32–36)
MCV RBC AUTO: 90 FL (ref 78–100)
MONOCYTES ABSOLUTE: 0.3 K/CU MM
MONOCYTES RELATIVE PERCENT: 2 % (ref 0–4)
PDW BLD-RTO: 22.9 % (ref 11.7–14.9)
PLATELET # BLD: 120 K/CU MM (ref 140–440)
PMV BLD AUTO: 11.9 FL (ref 7.5–11.1)
RBC # BLD: 4.18 M/CU MM (ref 4.6–6.2)
SEGMENTED NEUTROPHILS ABSOLUTE COUNT: 2.2 K/CU MM
SEGMENTED NEUTROPHILS RELATIVE PERCENT: 14 % (ref 36–66)
WBC # BLD: 15.8 K/CU MM (ref 4–10.5)

## 2020-01-24 PROCEDURE — 85025 COMPLETE CBC W/AUTO DIFF WBC: CPT

## 2020-01-24 PROCEDURE — 83540 ASSAY OF IRON: CPT

## 2020-01-24 PROCEDURE — 36415 COLL VENOUS BLD VENIPUNCTURE: CPT

## 2020-01-24 PROCEDURE — 82728 ASSAY OF FERRITIN: CPT

## 2020-01-27 ENCOUNTER — HOSPITAL ENCOUNTER (OUTPATIENT)
Dept: INFUSION THERAPY | Age: 70
Setting detail: INFUSION SERIES
Discharge: HOME | End: 2020-01-27
Payer: MEDICARE

## 2020-01-27 VITALS
HEART RATE: 81 BPM | TEMPERATURE: 98.3 F | OXYGEN SATURATION: 97 % | RESPIRATION RATE: 16 BRPM | SYSTOLIC BLOOD PRESSURE: 145 MMHG | DIASTOLIC BLOOD PRESSURE: 83 MMHG

## 2020-01-27 PROCEDURE — 99211 OFF/OP EST MAY X REQ PHY/QHP: CPT

## 2020-01-27 PROCEDURE — 96375 TX/PRO/DX INJ NEW DRUG ADDON: CPT

## 2020-01-27 PROCEDURE — 96374 THER/PROPH/DIAG INJ IV PUSH: CPT

## 2020-01-27 PROCEDURE — 6360000002 HC RX W HCPCS: Performed by: INTERNAL MEDICINE

## 2020-01-27 PROCEDURE — 96366 THER/PROPH/DIAG IV INF ADDON: CPT

## 2020-01-27 PROCEDURE — 6370000000 HC RX 637 (ALT 250 FOR IP): Performed by: INTERNAL MEDICINE

## 2020-01-27 PROCEDURE — 2580000003 HC RX 258: Performed by: INTERNAL MEDICINE

## 2020-01-27 PROCEDURE — 96365 THER/PROPH/DIAG IV INF INIT: CPT

## 2020-01-27 RX ORDER — ACETAMINOPHEN 325 MG/1
650 TABLET ORAL EVERY 4 HOURS PRN
Status: DISCONTINUED | OUTPATIENT
Start: 2020-01-27 | End: 2020-01-28 | Stop reason: HOSPADM

## 2020-01-27 RX ORDER — SODIUM CHLORIDE 0.9 % (FLUSH) 0.9 %
10 SYRINGE (ML) INJECTION PRN
Status: DISCONTINUED | OUTPATIENT
Start: 2020-01-27 | End: 2020-01-28 | Stop reason: HOSPADM

## 2020-01-27 RX ORDER — HEPARIN SODIUM (PORCINE) LOCK FLUSH IV SOLN 100 UNIT/ML 100 UNIT/ML
500 SOLUTION INTRAVENOUS PRN
Status: DISCONTINUED | OUTPATIENT
Start: 2020-01-27 | End: 2020-01-28 | Stop reason: HOSPADM

## 2020-01-27 RX ORDER — DIPHENHYDRAMINE HYDROCHLORIDE 50 MG/ML
25 INJECTION INTRAMUSCULAR; INTRAVENOUS ONCE
Status: COMPLETED | OUTPATIENT
Start: 2020-01-27 | End: 2020-01-27

## 2020-01-27 RX ORDER — METHYLPREDNISOLONE SODIUM SUCCINATE 40 MG/ML
40 INJECTION, POWDER, LYOPHILIZED, FOR SOLUTION INTRAMUSCULAR; INTRAVENOUS ONCE
Status: COMPLETED | OUTPATIENT
Start: 2020-01-27 | End: 2020-01-27

## 2020-01-27 RX ADMIN — Medication 500 UNITS: at 10:13

## 2020-01-27 RX ADMIN — METHYLPREDNISOLONE SODIUM SUCCINATE 40 MG: 40 INJECTION, POWDER, FOR SOLUTION INTRAMUSCULAR; INTRAVENOUS at 07:55

## 2020-01-27 RX ADMIN — ACETAMINOPHEN 650 MG: 325 TABLET ORAL at 07:55

## 2020-01-27 RX ADMIN — SODIUM CHLORIDE, PRESERVATIVE FREE 10 ML: 5 INJECTION INTRAVENOUS at 10:13

## 2020-01-27 RX ADMIN — DIPHENHYDRAMINE HYDROCHLORIDE 25 MG: 50 INJECTION, SOLUTION INTRAMUSCULAR; INTRAVENOUS at 07:55

## 2020-01-27 RX ADMIN — IMMUNE GLOBULIN INTRAVENOUS (HUMAN) 20 G: 5 INJECTION, SOLUTION INTRAVENOUS at 08:00

## 2020-01-27 ASSESSMENT — PAIN SCALES - GENERAL: PAINLEVEL_OUTOF10: 0

## 2020-01-27 NOTE — PROGRESS NOTES
Prior to discharge, the After Visit Summary Discharge Instructions were reviewed with the patient. He was offered a printed version of the AVS, but declined the offer.      Orders Placed This Encounter   Medications    immune globulin (human) 10% solution 20 g     Order Specific Question:   What brand of IVIG     Answer:   Flebogamma     Comments:    is the preferred brand to dispense    sodium chloride flush 0.9 % injection 10 mL    heparin flush 100 UNIT/ML injection 500 Units    acetaminophen (TYLENOL) tablet 650 mg    diphenhydrAMINE (BENADRYL) injection 25 mg    methylPREDNISolone sodium (SOLU-MEDROL) injection 40 mg

## 2020-02-24 ENCOUNTER — HOSPITAL ENCOUNTER (OUTPATIENT)
Dept: INFUSION THERAPY | Age: 70
Setting detail: INFUSION SERIES
Discharge: HOME | End: 2020-02-24
Payer: MEDICARE

## 2020-02-24 VITALS
DIASTOLIC BLOOD PRESSURE: 73 MMHG | TEMPERATURE: 97.8 F | SYSTOLIC BLOOD PRESSURE: 167 MMHG | RESPIRATION RATE: 16 BRPM | HEART RATE: 85 BPM | OXYGEN SATURATION: 95 %

## 2020-02-24 LAB
ALBUMIN SERPL-MCNC: 4.2 GM/DL (ref 3.4–5)
ALP BLD-CCNC: 106 IU/L (ref 40–129)
ALT SERPL-CCNC: 18 U/L (ref 10–40)
ANION GAP SERPL CALCULATED.3IONS-SCNC: 14 MMOL/L (ref 4–16)
ANISOCYTOSIS: ABNORMAL
AST SERPL-CCNC: 13 IU/L (ref 15–37)
ATYPICAL LYMPHOCYTE ABSOLUTE COUNT: ABNORMAL
BANDED NEUTROPHILS ABSOLUTE COUNT: 1.57 K/CU MM
BANDED NEUTROPHILS RELATIVE PERCENT: 6 % (ref 5–11)
BILIRUB SERPL-MCNC: 0.6 MG/DL (ref 0–1)
BUN BLDV-MCNC: 15 MG/DL (ref 6–23)
CALCIUM SERPL-MCNC: 8.8 MG/DL (ref 8.3–10.6)
CHLORIDE BLD-SCNC: 100 MMOL/L (ref 99–110)
CO2: 22 MMOL/L (ref 21–32)
CREAT SERPL-MCNC: 1 MG/DL (ref 0.9–1.3)
DIFFERENTIAL TYPE: ABNORMAL
EOSINOPHILS ABSOLUTE: 0.5 K/CU MM
EOSINOPHILS RELATIVE PERCENT: 2 % (ref 0–3)
GFR AFRICAN AMERICAN: >60 ML/MIN/1.73M2
GFR NON-AFRICAN AMERICAN: >60 ML/MIN/1.73M2
GLUCOSE BLD-MCNC: 115 MG/DL (ref 70–99)
HCT VFR BLD CALC: 38.3 % (ref 42–52)
HEMOGLOBIN: 12.1 GM/DL (ref 13.5–18)
IGG,SERUM: 491 MG/DL (ref 723–1685)
LACTATE DEHYDROGENASE: 176 IU/L (ref 120–246)
LYMPHOCYTES ABSOLUTE: 20 K/CU MM
LYMPHOCYTES RELATIVE PERCENT: 77 % (ref 24–44)
MCH RBC QN AUTO: 29.6 PG (ref 27–31)
MCHC RBC AUTO-ENTMCNC: 31.6 % (ref 32–36)
MCV RBC AUTO: 93.6 FL (ref 78–100)
MONOCYTES ABSOLUTE: 0.3 K/CU MM
MONOCYTES RELATIVE PERCENT: 1 % (ref 0–4)
PDW BLD-RTO: 19.4 % (ref 11.7–14.9)
PLATELET # BLD: 159 K/CU MM (ref 140–440)
PLT MORPHOLOGY: ABNORMAL
PMV BLD AUTO: 11.6 FL (ref 7.5–11.1)
POLYCHROMASIA: ABNORMAL
POTASSIUM SERPL-SCNC: 4.5 MMOL/L (ref 3.5–5.1)
RBC # BLD: 4.09 M/CU MM (ref 4.6–6.2)
SEGMENTED NEUTROPHILS ABSOLUTE COUNT: 3.7 K/CU MM
SEGMENTED NEUTROPHILS RELATIVE PERCENT: 14 % (ref 36–66)
SMUDGE CELLS: PRESENT
SODIUM BLD-SCNC: 136 MMOL/L (ref 135–145)
TOTAL PROTEIN: 6.1 GM/DL (ref 6.4–8.2)
WBC # BLD: 26.1 K/CU MM (ref 4–10.5)

## 2020-02-24 PROCEDURE — 36591 DRAW BLOOD OFF VENOUS DEVICE: CPT

## 2020-02-24 PROCEDURE — 96374 THER/PROPH/DIAG INJ IV PUSH: CPT

## 2020-02-24 PROCEDURE — 83615 LACTATE (LD) (LDH) ENZYME: CPT

## 2020-02-24 PROCEDURE — 85027 COMPLETE CBC AUTOMATED: CPT

## 2020-02-24 PROCEDURE — 6370000000 HC RX 637 (ALT 250 FOR IP): Performed by: INTERNAL MEDICINE

## 2020-02-24 PROCEDURE — 80053 COMPREHEN METABOLIC PANEL: CPT

## 2020-02-24 PROCEDURE — 96365 THER/PROPH/DIAG IV INF INIT: CPT

## 2020-02-24 PROCEDURE — 82784 ASSAY IGA/IGD/IGG/IGM EACH: CPT

## 2020-02-24 PROCEDURE — 96375 TX/PRO/DX INJ NEW DRUG ADDON: CPT

## 2020-02-24 PROCEDURE — 2580000003 HC RX 258: Performed by: INTERNAL MEDICINE

## 2020-02-24 PROCEDURE — 85007 BL SMEAR W/DIFF WBC COUNT: CPT

## 2020-02-24 PROCEDURE — 6360000002 HC RX W HCPCS: Performed by: INTERNAL MEDICINE

## 2020-02-24 PROCEDURE — 99211 OFF/OP EST MAY X REQ PHY/QHP: CPT

## 2020-02-24 PROCEDURE — 96366 THER/PROPH/DIAG IV INF ADDON: CPT

## 2020-02-24 RX ORDER — DIPHENHYDRAMINE HYDROCHLORIDE 50 MG/ML
25 INJECTION INTRAMUSCULAR; INTRAVENOUS ONCE
Status: COMPLETED | OUTPATIENT
Start: 2020-02-24 | End: 2020-02-24

## 2020-02-24 RX ORDER — HEPARIN SODIUM (PORCINE) LOCK FLUSH IV SOLN 100 UNIT/ML 100 UNIT/ML
500 SOLUTION INTRAVENOUS PRN
Status: DISCONTINUED | OUTPATIENT
Start: 2020-02-24 | End: 2020-02-25 | Stop reason: HOSPADM

## 2020-02-24 RX ORDER — METHYLPREDNISOLONE SODIUM SUCCINATE 40 MG/ML
40 INJECTION, POWDER, LYOPHILIZED, FOR SOLUTION INTRAMUSCULAR; INTRAVENOUS ONCE
Status: COMPLETED | OUTPATIENT
Start: 2020-02-24 | End: 2020-02-24

## 2020-02-24 RX ORDER — SODIUM CHLORIDE 0.9 % (FLUSH) 0.9 %
10 SYRINGE (ML) INJECTION PRN
Status: DISCONTINUED | OUTPATIENT
Start: 2020-02-24 | End: 2020-02-25 | Stop reason: HOSPADM

## 2020-02-24 RX ORDER — ACETAMINOPHEN 325 MG/1
650 TABLET ORAL ONCE
Status: COMPLETED | OUTPATIENT
Start: 2020-02-24 | End: 2020-02-24

## 2020-02-24 RX ADMIN — DIPHENHYDRAMINE HYDROCHLORIDE 25 MG: 50 INJECTION INTRAMUSCULAR; INTRAVENOUS at 09:05

## 2020-02-24 RX ADMIN — Medication 500 UNITS: at 11:30

## 2020-02-24 RX ADMIN — ACETAMINOPHEN 650 MG: 325 TABLET ORAL at 09:06

## 2020-02-24 RX ADMIN — METHYLPREDNISOLONE SODIUM SUCCINATE 40 MG: 40 INJECTION, POWDER, FOR SOLUTION INTRAMUSCULAR; INTRAVENOUS at 09:05

## 2020-02-24 RX ADMIN — SODIUM CHLORIDE, PRESERVATIVE FREE 10 ML: 5 INJECTION INTRAVENOUS at 11:30

## 2020-02-24 RX ADMIN — IMMUNE GLOBULIN INTRAVENOUS (HUMAN) 200 ML: 5 INJECTION, SOLUTION INTRAVENOUS at 09:05

## 2020-02-24 ASSESSMENT — PAIN SCALES - GENERAL
PAINLEVEL_OUTOF10: 0
PAINLEVEL_OUTOF10: 0

## 2020-02-27 ENCOUNTER — HOSPITAL ENCOUNTER (OUTPATIENT)
Dept: CT IMAGING | Age: 70
Discharge: HOME | End: 2020-02-27
Payer: MEDICARE

## 2020-02-27 PROCEDURE — 6360000004 HC RX CONTRAST MEDICATION: Performed by: INTERNAL MEDICINE

## 2020-02-27 PROCEDURE — 71260 CT THORAX DX C+: CPT

## 2020-02-27 PROCEDURE — 2580000003 HC RX 258: Performed by: INTERNAL MEDICINE

## 2020-02-27 RX ORDER — SODIUM CHLORIDE 0.9 % (FLUSH) 0.9 %
10 SYRINGE (ML) INJECTION PRN
Status: DISCONTINUED | OUTPATIENT
Start: 2020-02-27 | End: 2020-02-28 | Stop reason: HOSPADM

## 2020-02-27 RX ADMIN — Medication 10 ML: at 11:20

## 2020-02-27 RX ADMIN — IOPAMIDOL 75 ML: 755 INJECTION, SOLUTION INTRAVENOUS at 11:20

## 2020-03-23 ENCOUNTER — HOSPITAL ENCOUNTER (OUTPATIENT)
Dept: INFUSION THERAPY | Age: 70
Setting detail: INFUSION SERIES
Discharge: HOME | End: 2020-03-23
Payer: MEDICARE

## 2020-03-23 VITALS — HEART RATE: 78 BPM | RESPIRATION RATE: 16 BRPM | TEMPERATURE: 98.4 F | OXYGEN SATURATION: 97 %

## 2020-03-23 PROCEDURE — 96374 THER/PROPH/DIAG INJ IV PUSH: CPT

## 2020-03-23 PROCEDURE — 6360000002 HC RX W HCPCS: Performed by: INTERNAL MEDICINE

## 2020-03-23 PROCEDURE — 96365 THER/PROPH/DIAG IV INF INIT: CPT

## 2020-03-23 PROCEDURE — 96375 TX/PRO/DX INJ NEW DRUG ADDON: CPT

## 2020-03-23 PROCEDURE — 6370000000 HC RX 637 (ALT 250 FOR IP): Performed by: INTERNAL MEDICINE

## 2020-03-23 PROCEDURE — 99211 OFF/OP EST MAY X REQ PHY/QHP: CPT

## 2020-03-23 PROCEDURE — 2580000003 HC RX 258: Performed by: INTERNAL MEDICINE

## 2020-03-23 RX ORDER — SODIUM CHLORIDE 0.9 % (FLUSH) 0.9 %
10 SYRINGE (ML) INJECTION PRN
Status: DISCONTINUED | OUTPATIENT
Start: 2020-03-23 | End: 2020-03-24 | Stop reason: HOSPADM

## 2020-03-23 RX ORDER — HEPARIN SODIUM (PORCINE) LOCK FLUSH IV SOLN 100 UNIT/ML 100 UNIT/ML
500 SOLUTION INTRAVENOUS PRN
Status: DISCONTINUED | OUTPATIENT
Start: 2020-03-23 | End: 2020-03-24 | Stop reason: HOSPADM

## 2020-03-23 RX ORDER — ACETAMINOPHEN 325 MG/1
650 TABLET ORAL ONCE
Status: COMPLETED | OUTPATIENT
Start: 2020-03-23 | End: 2020-03-23

## 2020-03-23 RX ORDER — DIPHENHYDRAMINE HYDROCHLORIDE 50 MG/ML
25 INJECTION INTRAMUSCULAR; INTRAVENOUS ONCE
Status: COMPLETED | OUTPATIENT
Start: 2020-03-23 | End: 2020-03-23

## 2020-03-23 RX ORDER — METHYLPREDNISOLONE SODIUM SUCCINATE 40 MG/ML
40 INJECTION, POWDER, LYOPHILIZED, FOR SOLUTION INTRAMUSCULAR; INTRAVENOUS ONCE
Status: COMPLETED | OUTPATIENT
Start: 2020-03-23 | End: 2020-03-23

## 2020-03-23 RX ADMIN — ACETAMINOPHEN 650 MG: 325 TABLET ORAL at 08:38

## 2020-03-23 RX ADMIN — DIPHENHYDRAMINE HYDROCHLORIDE 25 MG: 50 INJECTION, SOLUTION INTRAMUSCULAR; INTRAVENOUS at 08:44

## 2020-03-23 RX ADMIN — SODIUM CHLORIDE, PRESERVATIVE FREE 20 ML: 5 INJECTION INTRAVENOUS at 11:14

## 2020-03-23 RX ADMIN — IMMUNE GLOBULIN INTRAVENOUS (HUMAN) 20 G: 5 INJECTION, SOLUTION INTRAVENOUS at 08:44

## 2020-03-23 RX ADMIN — Medication 500 UNITS: at 11:15

## 2020-03-23 RX ADMIN — METHYLPREDNISOLONE SODIUM SUCCINATE 40 MG: 40 INJECTION, POWDER, FOR SOLUTION INTRAMUSCULAR; INTRAVENOUS at 08:38

## 2020-03-23 ASSESSMENT — PAIN SCALES - GENERAL
PAINLEVEL_OUTOF10: 0
PAINLEVEL_OUTOF10: 0

## 2020-03-23 NOTE — PROGRESS NOTES
Prior to discharge, the After Visit Summary Discharge Instructions were reviewed with the patient. He was offered a printed version of the AVS, but declined the offer. Recurrent appointment, pt tolerated infusion well. Pt discharged HOME. Pt to exit via ambulation. Appt card given for next appt  Pt in aggreeance for time and date.     Orders Placed This Encounter   Medications    immune globulin (FLEBOGAMMA) 10% solution 20 g    acetaminophen (TYLENOL) tablet 650 mg    diphenhydrAMINE (BENADRYL) injection 25 mg    methylPREDNISolone sodium (SOLU-MEDROL) injection 40 mg    heparin flush 100 UNIT/ML injection 500 Units    sodium chloride flush 0.9 % injection 10 mL

## 2020-04-08 PROBLEM — D70.9 NEUTROPENIA (HCC): Status: ACTIVE | Noted: 2020-04-08

## 2020-04-08 PROBLEM — D50.8 OTHER IRON DEFICIENCY ANEMIAS: Status: ACTIVE | Noted: 2020-04-08

## 2020-04-08 PROBLEM — M62.838 OTHER MUSCLE SPASM: Status: ACTIVE | Noted: 2020-04-08

## 2020-04-08 PROBLEM — M85.80 OTHER SPECIFIED DISORDERS OF BONE DENSITY AND STRUCTURE, UNSPECIFIED SITE: Status: ACTIVE | Noted: 2020-04-08

## 2020-04-08 PROBLEM — K90.9 INTESTINAL MALABSORPTION: Status: ACTIVE | Noted: 2020-04-08

## 2020-04-08 PROBLEM — L03.113 CELLULITIS OF RIGHT UPPER LIMB: Status: ACTIVE | Noted: 2020-04-08

## 2020-04-08 PROBLEM — R91.8 OTHER NONSPECIFIC ABNORMAL FINDING OF LUNG FIELD: Status: ACTIVE | Noted: 2020-04-08

## 2020-04-08 PROBLEM — B00.9 HERPESVIRAL INFECTION: Status: ACTIVE | Noted: 2020-04-08

## 2020-04-20 ENCOUNTER — HOSPITAL ENCOUNTER (OUTPATIENT)
Dept: INFUSION THERAPY | Age: 70
Setting detail: INFUSION SERIES
Discharge: HOME | End: 2020-04-20
Payer: MEDICARE

## 2020-04-20 VITALS
TEMPERATURE: 97.6 F | HEART RATE: 70 BPM | SYSTOLIC BLOOD PRESSURE: 164 MMHG | RESPIRATION RATE: 18 BRPM | DIASTOLIC BLOOD PRESSURE: 80 MMHG

## 2020-04-20 PROCEDURE — 96365 THER/PROPH/DIAG IV INF INIT: CPT

## 2020-04-20 PROCEDURE — 2580000003 HC RX 258: Performed by: INTERNAL MEDICINE

## 2020-04-20 PROCEDURE — 96375 TX/PRO/DX INJ NEW DRUG ADDON: CPT

## 2020-04-20 PROCEDURE — 96374 THER/PROPH/DIAG INJ IV PUSH: CPT

## 2020-04-20 PROCEDURE — 99211 OFF/OP EST MAY X REQ PHY/QHP: CPT

## 2020-04-20 PROCEDURE — 6360000002 HC RX W HCPCS

## 2020-04-20 PROCEDURE — 6370000000 HC RX 637 (ALT 250 FOR IP)

## 2020-04-20 PROCEDURE — 96366 THER/PROPH/DIAG IV INF ADDON: CPT

## 2020-04-20 PROCEDURE — 6360000002 HC RX W HCPCS: Performed by: INTERNAL MEDICINE

## 2020-04-20 RX ORDER — SODIUM CHLORIDE 0.9 % (FLUSH) 0.9 %
10 SYRINGE (ML) INJECTION PRN
Status: DISCONTINUED | OUTPATIENT
Start: 2020-04-20 | End: 2020-04-21 | Stop reason: HOSPADM

## 2020-04-20 RX ORDER — METHYLPREDNISOLONE SODIUM SUCCINATE 40 MG/ML
40 INJECTION, POWDER, LYOPHILIZED, FOR SOLUTION INTRAMUSCULAR; INTRAVENOUS ONCE
Status: COMPLETED | OUTPATIENT
Start: 2020-04-20 | End: 2020-04-20

## 2020-04-20 RX ORDER — HEPARIN SODIUM (PORCINE) LOCK FLUSH IV SOLN 100 UNIT/ML 100 UNIT/ML
500 SOLUTION INTRAVENOUS PRN
Status: DISCONTINUED | OUTPATIENT
Start: 2020-04-20 | End: 2020-04-21 | Stop reason: HOSPADM

## 2020-04-20 RX ORDER — ACETAMINOPHEN 325 MG/1
650 TABLET ORAL ONCE
Status: COMPLETED | OUTPATIENT
Start: 2020-04-20 | End: 2020-04-20

## 2020-04-20 RX ORDER — ACETAMINOPHEN 325 MG/1
TABLET ORAL
Status: COMPLETED
Start: 2020-04-20 | End: 2020-04-20

## 2020-04-20 RX ORDER — METHYLPREDNISOLONE SODIUM SUCCINATE 40 MG/ML
INJECTION, POWDER, LYOPHILIZED, FOR SOLUTION INTRAMUSCULAR; INTRAVENOUS
Status: COMPLETED
Start: 2020-04-20 | End: 2020-04-20

## 2020-04-20 RX ORDER — DIPHENHYDRAMINE HYDROCHLORIDE 50 MG/ML
INJECTION INTRAMUSCULAR; INTRAVENOUS
Status: COMPLETED
Start: 2020-04-20 | End: 2020-04-20

## 2020-04-20 RX ORDER — DIPHENHYDRAMINE HYDROCHLORIDE 50 MG/ML
25 INJECTION INTRAMUSCULAR; INTRAVENOUS ONCE
Status: COMPLETED | OUTPATIENT
Start: 2020-04-20 | End: 2020-04-20

## 2020-04-20 RX ADMIN — SODIUM CHLORIDE, PRESERVATIVE FREE 500 UNITS: 5 INJECTION INTRAVENOUS at 10:46

## 2020-04-20 RX ADMIN — ACETAMINOPHEN 650 MG: 325 TABLET ORAL at 08:12

## 2020-04-20 RX ADMIN — METHYLPREDNISOLONE SODIUM SUCCINATE 40 MG: 40 INJECTION, POWDER, LYOPHILIZED, FOR SOLUTION INTRAMUSCULAR; INTRAVENOUS at 08:08

## 2020-04-20 RX ADMIN — IMMUNE GLOBULIN INTRAVENOUS (HUMAN) 20 G: 5 INJECTION, SOLUTION INTRAVENOUS at 08:16

## 2020-04-20 RX ADMIN — SODIUM CHLORIDE, PRESERVATIVE FREE 10 ML: 5 INJECTION INTRAVENOUS at 10:46

## 2020-04-20 RX ADMIN — DIPHENHYDRAMINE HYDROCHLORIDE 25 MG: 50 INJECTION INTRAMUSCULAR; INTRAVENOUS at 08:10

## 2020-04-20 RX ADMIN — METHYLPREDNISOLONE SODIUM SUCCINATE 40 MG: 40 INJECTION, POWDER, FOR SOLUTION INTRAMUSCULAR; INTRAVENOUS at 08:08

## 2020-04-20 ASSESSMENT — PAIN SCALES - GENERAL: PAINLEVEL_OUTOF10: 0

## 2020-04-20 NOTE — PROGRESS NOTES
Tolerated IVIG well. Reviewed discharge instruction, voiced understanding. Copies of AVS given. Pt discharged Home. Pt to exit via ambulation.     Orders Placed This Encounter   Medications    immune globulin (FLEBOGAMMA) 10% solution 20 g    acetaminophen (TYLENOL) tablet 650 mg    methylPREDNISolone sodium (SOLU-MEDROL) injection 40 mg    diphenhydrAMINE (BENADRYL) injection 25 mg    heparin flush 100 UNIT/ML injection 500 Units    sodium chloride flush 0.9 % injection 10 mL    acetaminophen (TYLENOL) 325 MG tablet     Dee Dee Brown: cabinet override    methylPREDNISolone sodium (SOLU-MEDROL) 40 MG injection     Dee Dee Brown: cabinet override    diphenhydrAMINE (BENADRYL) 50 MG/ML injection     Dee Dee Brown: tino garza

## 2020-05-18 ENCOUNTER — HOSPITAL ENCOUNTER (OUTPATIENT)
Dept: INFUSION THERAPY | Age: 70
Setting detail: INFUSION SERIES
Discharge: HOME | End: 2020-05-18
Payer: MEDICARE

## 2020-05-18 VITALS
OXYGEN SATURATION: 98 % | TEMPERATURE: 97.3 F | DIASTOLIC BLOOD PRESSURE: 78 MMHG | SYSTOLIC BLOOD PRESSURE: 143 MMHG | HEART RATE: 79 BPM | RESPIRATION RATE: 16 BRPM

## 2020-05-18 PROCEDURE — 2580000003 HC RX 258: Performed by: INTERNAL MEDICINE

## 2020-05-18 PROCEDURE — 96365 THER/PROPH/DIAG IV INF INIT: CPT

## 2020-05-18 PROCEDURE — 6360000002 HC RX W HCPCS: Performed by: INTERNAL MEDICINE

## 2020-05-18 PROCEDURE — 96366 THER/PROPH/DIAG IV INF ADDON: CPT

## 2020-05-18 PROCEDURE — 96374 THER/PROPH/DIAG INJ IV PUSH: CPT

## 2020-05-18 PROCEDURE — 96375 TX/PRO/DX INJ NEW DRUG ADDON: CPT

## 2020-05-18 PROCEDURE — 99211 OFF/OP EST MAY X REQ PHY/QHP: CPT

## 2020-05-18 PROCEDURE — 6360000002 HC RX W HCPCS

## 2020-05-18 PROCEDURE — 6370000000 HC RX 637 (ALT 250 FOR IP)

## 2020-05-18 RX ORDER — METHYLPREDNISOLONE SODIUM SUCCINATE 40 MG/ML
40 INJECTION, POWDER, LYOPHILIZED, FOR SOLUTION INTRAMUSCULAR; INTRAVENOUS ONCE
Status: COMPLETED | OUTPATIENT
Start: 2020-05-18 | End: 2020-05-18

## 2020-05-18 RX ORDER — HEPARIN SODIUM (PORCINE) LOCK FLUSH IV SOLN 100 UNIT/ML 100 UNIT/ML
500 SOLUTION INTRAVENOUS PRN
Status: DISCONTINUED | OUTPATIENT
Start: 2020-05-18 | End: 2020-05-19 | Stop reason: HOSPADM

## 2020-05-18 RX ORDER — DIPHENHYDRAMINE HYDROCHLORIDE 50 MG/ML
INJECTION INTRAMUSCULAR; INTRAVENOUS
Status: COMPLETED
Start: 2020-05-18 | End: 2020-05-18

## 2020-05-18 RX ORDER — ACETAMINOPHEN 325 MG/1
TABLET ORAL
Status: COMPLETED
Start: 2020-05-18 | End: 2020-05-18

## 2020-05-18 RX ORDER — ACETAMINOPHEN 325 MG/1
650 TABLET ORAL ONCE
Status: COMPLETED | OUTPATIENT
Start: 2020-05-18 | End: 2020-05-18

## 2020-05-18 RX ORDER — METHYLPREDNISOLONE SODIUM SUCCINATE 40 MG/ML
INJECTION, POWDER, LYOPHILIZED, FOR SOLUTION INTRAMUSCULAR; INTRAVENOUS
Status: COMPLETED
Start: 2020-05-18 | End: 2020-05-18

## 2020-05-18 RX ORDER — DIPHENHYDRAMINE HYDROCHLORIDE 50 MG/ML
25 INJECTION INTRAMUSCULAR; INTRAVENOUS ONCE
Status: COMPLETED | OUTPATIENT
Start: 2020-05-18 | End: 2020-05-18

## 2020-05-18 RX ORDER — SODIUM CHLORIDE 0.9 % (FLUSH) 0.9 %
10 SYRINGE (ML) INJECTION PRN
Status: DISCONTINUED | OUTPATIENT
Start: 2020-05-18 | End: 2020-05-19 | Stop reason: HOSPADM

## 2020-05-18 RX ADMIN — DIPHENHYDRAMINE HYDROCHLORIDE 25 MG: 50 INJECTION INTRAMUSCULAR; INTRAVENOUS at 08:01

## 2020-05-18 RX ADMIN — ACETAMINOPHEN 650 MG: 325 TABLET ORAL at 08:02

## 2020-05-18 RX ADMIN — METHYLPREDNISOLONE SODIUM SUCCINATE 40 MG: 40 INJECTION, POWDER, FOR SOLUTION INTRAMUSCULAR; INTRAVENOUS at 08:01

## 2020-05-18 RX ADMIN — Medication 500 UNITS: at 11:11

## 2020-05-18 RX ADMIN — METHYLPREDNISOLONE SODIUM SUCCINATE 40 MG: 40 INJECTION, POWDER, LYOPHILIZED, FOR SOLUTION INTRAMUSCULAR; INTRAVENOUS at 08:01

## 2020-05-18 RX ADMIN — IMMUNE GLOBULIN INTRAVENOUS (HUMAN) 20 G: 5 INJECTION, SOLUTION INTRAVENOUS at 08:33

## 2020-05-18 RX ADMIN — SODIUM CHLORIDE, PRESERVATIVE FREE 20 ML: 5 INJECTION INTRAVENOUS at 11:10

## 2020-05-18 ASSESSMENT — PAIN DESCRIPTION - DESCRIPTORS: DESCRIPTORS: ACHING

## 2020-05-18 ASSESSMENT — PAIN SCALES - GENERAL: PAINLEVEL_OUTOF10: 2

## 2020-05-18 ASSESSMENT — PAIN DESCRIPTION - PAIN TYPE: TYPE: CHRONIC PAIN

## 2020-05-18 ASSESSMENT — PAIN DESCRIPTION - LOCATION: LOCATION: HAND

## 2020-05-18 NOTE — PROGRESS NOTES
Tolerated IVIG well. Reviewed discharge instruction, voiced understanding. Copies of AVS given. Pt discharged home. Pt to exit via ambulation.     Orders Placed This Encounter   Medications    immune globulin (FLEBOGAMMA) 10% solution 20 g    acetaminophen (TYLENOL) tablet 650 mg    diphenhydrAMINE (BENADRYL) injection 25 mg    methylPREDNISolone sodium (SOLU-MEDROL) injection 40 mg    heparin flush 100 UNIT/ML injection 500 Units    sodium chloride flush 0.9 % injection 10 mL    acetaminophen (TYLENOL) 325 MG tablet     Dee Dee Brown: cabinet override    methylPREDNISolone sodium (SOLU-MEDROL) 40 MG injection     Dee Dee Brown: cabinet override    diphenhydrAMINE (BENADRYL) 50 MG/ML injection     Dee Dee Brown: tino garza

## 2020-05-22 ENCOUNTER — HOSPITAL ENCOUNTER (OUTPATIENT)
Age: 70
Discharge: HOME | End: 2020-05-22
Payer: MEDICARE

## 2020-05-22 ENCOUNTER — HOSPITAL ENCOUNTER (OUTPATIENT)
Dept: CT IMAGING | Age: 70
Discharge: HOME | End: 2020-05-22
Payer: MEDICARE

## 2020-05-22 LAB
ANISOCYTOSIS: ABNORMAL
ATYPICAL LYMPHOCYTE ABSOLUTE COUNT: ABNORMAL
BANDED NEUTROPHILS ABSOLUTE COUNT: 1.11 K/CU MM
BANDED NEUTROPHILS RELATIVE PERCENT: 4 % (ref 5–11)
DIFFERENTIAL TYPE: ABNORMAL
GFR AFRICAN AMERICAN: >60 ML/MIN/1.73M2
GFR NON-AFRICAN AMERICAN: >60 ML/MIN/1.73M2
HCT VFR BLD CALC: 41 % (ref 42–52)
HEMOGLOBIN: 13.2 GM/DL (ref 13.5–18)
LYMPHOCYTES ABSOLUTE: 24.1 K/CU MM
LYMPHOCYTES RELATIVE PERCENT: 87 % (ref 24–44)
MCH RBC QN AUTO: 29.3 PG (ref 27–31)
MCHC RBC AUTO-ENTMCNC: 32.2 % (ref 32–36)
MCV RBC AUTO: 91.1 FL (ref 78–100)
METAMYELOCYTES ABSOLUTE COUNT: 0.28 K/CU MM
METAMYELOCYTES PERCENT: 1 %
PDW BLD-RTO: 14.8 % (ref 11.7–14.9)
PLATELET # BLD: 154 K/CU MM (ref 140–440)
PLT MORPHOLOGY: ABNORMAL
PMV BLD AUTO: 12.2 FL (ref 7.5–11.1)
POC CREATININE: 1 MG/DL (ref 0.9–1.3)
POLYCHROMASIA: ABNORMAL
RBC # BLD: 4.5 M/CU MM (ref 4.6–6.2)
REASON FOR REJECTION: NORMAL
REJECTED TEST: NORMAL
SEGMENTED NEUTROPHILS ABSOLUTE COUNT: 2.2 K/CU MM
SEGMENTED NEUTROPHILS RELATIVE PERCENT: 8 % (ref 36–66)
SMUDGE CELLS: PRESENT
WBC # BLD: 27.7 K/CU MM (ref 4–10.5)

## 2020-05-22 PROCEDURE — 71260 CT THORAX DX C+: CPT

## 2020-05-22 PROCEDURE — 85007 BL SMEAR W/DIFF WBC COUNT: CPT

## 2020-05-22 PROCEDURE — 36415 COLL VENOUS BLD VENIPUNCTURE: CPT

## 2020-05-22 PROCEDURE — 6360000004 HC RX CONTRAST MEDICATION: Performed by: INTERNAL MEDICINE

## 2020-05-22 PROCEDURE — 2580000003 HC RX 258: Performed by: INTERNAL MEDICINE

## 2020-05-22 PROCEDURE — 85027 COMPLETE CBC AUTOMATED: CPT

## 2020-05-22 PROCEDURE — 80053 COMPREHEN METABOLIC PANEL: CPT

## 2020-05-22 RX ORDER — SODIUM CHLORIDE 0.9 % (FLUSH) 0.9 %
10 SYRINGE (ML) INJECTION PRN
Status: DISCONTINUED | OUTPATIENT
Start: 2020-05-22 | End: 2020-05-23 | Stop reason: HOSPADM

## 2020-05-22 RX ADMIN — SODIUM CHLORIDE, PRESERVATIVE FREE 10 ML: 5 INJECTION INTRAVENOUS at 10:15

## 2020-05-22 RX ADMIN — IOPAMIDOL 75 ML: 755 INJECTION, SOLUTION INTRAVENOUS at 10:10

## 2020-05-27 ENCOUNTER — HOSPITAL ENCOUNTER (OUTPATIENT)
Age: 70
Setting detail: SPECIMEN
Discharge: HOME | End: 2020-05-27
Payer: MEDICARE

## 2020-05-27 LAB
ALBUMIN SERPL-MCNC: 4.4 GM/DL (ref 3.4–5)
ALP BLD-CCNC: 89 IU/L (ref 40–129)
ALT SERPL-CCNC: 24 U/L (ref 10–40)
ANION GAP SERPL CALCULATED.3IONS-SCNC: 16 MMOL/L (ref 4–16)
AST SERPL-CCNC: 14 IU/L (ref 15–37)
BILIRUB SERPL-MCNC: 0.8 MG/DL (ref 0–1)
BUN BLDV-MCNC: 14 MG/DL (ref 6–23)
CALCIUM SERPL-MCNC: 8.5 MG/DL (ref 8.3–10.6)
CHLORIDE BLD-SCNC: 99 MMOL/L (ref 99–110)
CO2: 22 MMOL/L (ref 21–32)
CREAT SERPL-MCNC: 1.1 MG/DL (ref 0.9–1.3)
FERRITIN: 25 NG/ML (ref 30–400)
GFR AFRICAN AMERICAN: >60 ML/MIN/1.73M2
GFR NON-AFRICAN AMERICAN: >60 ML/MIN/1.73M2
GLUCOSE BLD-MCNC: 266 MG/DL (ref 70–99)
IRON: 44 UG/DL (ref 59–158)
LACTATE DEHYDROGENASE: 155 IU/L (ref 120–246)
PCT TRANSFERRIN: 10 % (ref 10–44)
POTASSIUM SERPL-SCNC: 4.1 MMOL/L (ref 3.5–5.1)
SODIUM BLD-SCNC: 137 MMOL/L (ref 135–145)
TOTAL IRON BINDING CAPACITY: 425 UG/DL (ref 250–450)
TOTAL PROTEIN: 6.3 GM/DL (ref 6.4–8.2)
UNSATURATED IRON BINDING CAPACITY: 381 UG/DL (ref 110–370)
URIC ACID: 5 MG/DL (ref 3.5–7.2)
VITAMIN B-12: 325.8 PG/ML (ref 211–911)

## 2020-05-27 PROCEDURE — 83540 ASSAY OF IRON: CPT

## 2020-05-27 PROCEDURE — 82607 VITAMIN B-12: CPT

## 2020-05-27 PROCEDURE — 83615 LACTATE (LD) (LDH) ENZYME: CPT

## 2020-05-27 PROCEDURE — 80053 COMPREHEN METABOLIC PANEL: CPT

## 2020-05-27 PROCEDURE — 84550 ASSAY OF BLOOD/URIC ACID: CPT

## 2020-05-27 PROCEDURE — 36415 COLL VENOUS BLD VENIPUNCTURE: CPT

## 2020-05-27 PROCEDURE — 82728 ASSAY OF FERRITIN: CPT

## 2020-05-27 PROCEDURE — 83550 IRON BINDING TEST: CPT

## 2020-05-29 ENCOUNTER — HOSPITAL ENCOUNTER (OUTPATIENT)
Dept: INFUSION THERAPY | Age: 70
Discharge: HOME | End: 2020-05-29
Payer: MEDICARE

## 2020-05-29 PROCEDURE — 99211 OFF/OP EST MAY X REQ PHY/QHP: CPT

## 2020-06-15 ENCOUNTER — HOSPITAL ENCOUNTER (OUTPATIENT)
Dept: INFUSION THERAPY | Age: 70
Setting detail: INFUSION SERIES
Discharge: HOME | End: 2020-06-15
Payer: MEDICARE

## 2020-06-15 VITALS — OXYGEN SATURATION: 98 % | TEMPERATURE: 97.3 F | HEART RATE: 97 BPM | RESPIRATION RATE: 16 BRPM

## 2020-06-15 PROCEDURE — 96366 THER/PROPH/DIAG IV INF ADDON: CPT

## 2020-06-15 PROCEDURE — 6360000002 HC RX W HCPCS: Performed by: INTERNAL MEDICINE

## 2020-06-15 PROCEDURE — 6370000000 HC RX 637 (ALT 250 FOR IP): Performed by: INTERNAL MEDICINE

## 2020-06-15 PROCEDURE — 99211 OFF/OP EST MAY X REQ PHY/QHP: CPT

## 2020-06-15 PROCEDURE — 96374 THER/PROPH/DIAG INJ IV PUSH: CPT

## 2020-06-15 PROCEDURE — 96375 TX/PRO/DX INJ NEW DRUG ADDON: CPT

## 2020-06-15 PROCEDURE — 96361 HYDRATE IV INFUSION ADD-ON: CPT

## 2020-06-15 PROCEDURE — 2580000003 HC RX 258: Performed by: INTERNAL MEDICINE

## 2020-06-15 PROCEDURE — 96365 THER/PROPH/DIAG IV INF INIT: CPT

## 2020-06-15 RX ORDER — SODIUM CHLORIDE 0.9 % (FLUSH) 0.9 %
10 SYRINGE (ML) INJECTION PRN
Status: DISCONTINUED | OUTPATIENT
Start: 2020-06-15 | End: 2020-06-16 | Stop reason: HOSPADM

## 2020-06-15 RX ORDER — METHYLPREDNISOLONE SODIUM SUCCINATE 40 MG/ML
40 INJECTION, POWDER, LYOPHILIZED, FOR SOLUTION INTRAMUSCULAR; INTRAVENOUS ONCE
Status: COMPLETED | OUTPATIENT
Start: 2020-06-15 | End: 2020-06-15

## 2020-06-15 RX ORDER — HEPARIN SODIUM (PORCINE) LOCK FLUSH IV SOLN 100 UNIT/ML 100 UNIT/ML
500 SOLUTION INTRAVENOUS PRN
Status: DISCONTINUED | OUTPATIENT
Start: 2020-06-15 | End: 2020-06-16 | Stop reason: HOSPADM

## 2020-06-15 RX ORDER — DIPHENHYDRAMINE HYDROCHLORIDE 50 MG/ML
25 INJECTION INTRAMUSCULAR; INTRAVENOUS EVERY 6 HOURS PRN
Status: DISCONTINUED | OUTPATIENT
Start: 2020-06-15 | End: 2020-06-16 | Stop reason: HOSPADM

## 2020-06-15 RX ORDER — ACETAMINOPHEN 325 MG/1
650 TABLET ORAL ONCE
Status: COMPLETED | OUTPATIENT
Start: 2020-06-15 | End: 2020-06-15

## 2020-06-15 RX ADMIN — Medication 500 UNITS: at 10:47

## 2020-06-15 RX ADMIN — DIPHENHYDRAMINE HYDROCHLORIDE 25 MG: 50 INJECTION INTRAMUSCULAR; INTRAVENOUS at 08:16

## 2020-06-15 RX ADMIN — SODIUM CHLORIDE, PRESERVATIVE FREE 10 ML: 5 INJECTION INTRAVENOUS at 10:47

## 2020-06-15 RX ADMIN — ACETAMINOPHEN 650 MG: 325 TABLET ORAL at 08:14

## 2020-06-15 RX ADMIN — IMMUNE GLOBULIN INTRAVENOUS (HUMAN) 20 G: 5 INJECTION, SOLUTION INTRAVENOUS at 08:19

## 2020-06-15 RX ADMIN — METHYLPREDNISOLONE SODIUM SUCCINATE 40 MG: 40 INJECTION, POWDER, FOR SOLUTION INTRAMUSCULAR; INTRAVENOUS at 08:15

## 2020-06-15 ASSESSMENT — PAIN SCALES - GENERAL: PAINLEVEL_OUTOF10: 0

## 2020-06-15 NOTE — PROGRESS NOTES
Prior to discharge, the After Visit Summary Discharge Instructions were reviewed with the patient. He was offered a printed version of the AVS, but declined the offer. Recurrent appointment, pt tolerated infusion well. Pt discharged home. Pt to exit via steady gait.     Orders Placed This Encounter   Medications    immune globulin (FLEBOGAMMA) 10% solution 20 g    acetaminophen (TYLENOL) tablet 650 mg    diphenhydrAMINE (BENADRYL) injection 25 mg    methylPREDNISolone sodium (SOLU-MEDROL) injection 40 mg    heparin flush 100 UNIT/ML injection 500 Units    sodium chloride flush 0.9 % injection 10 mL

## 2020-07-13 ENCOUNTER — HOSPITAL ENCOUNTER (OUTPATIENT)
Dept: INFUSION THERAPY | Age: 70
Setting detail: INFUSION SERIES
Discharge: HOME | End: 2020-07-13
Payer: MEDICARE

## 2020-07-13 VITALS
TEMPERATURE: 99.7 F | OXYGEN SATURATION: 96 % | SYSTOLIC BLOOD PRESSURE: 153 MMHG | HEART RATE: 84 BPM | RESPIRATION RATE: 16 BRPM | DIASTOLIC BLOOD PRESSURE: 68 MMHG

## 2020-07-13 PROCEDURE — 6360000002 HC RX W HCPCS

## 2020-07-13 PROCEDURE — 6360000002 HC RX W HCPCS: Performed by: INTERNAL MEDICINE

## 2020-07-13 PROCEDURE — 96375 TX/PRO/DX INJ NEW DRUG ADDON: CPT

## 2020-07-13 PROCEDURE — 96374 THER/PROPH/DIAG INJ IV PUSH: CPT

## 2020-07-13 PROCEDURE — 96365 THER/PROPH/DIAG IV INF INIT: CPT

## 2020-07-13 PROCEDURE — 96366 THER/PROPH/DIAG IV INF ADDON: CPT

## 2020-07-13 PROCEDURE — 99211 OFF/OP EST MAY X REQ PHY/QHP: CPT

## 2020-07-13 PROCEDURE — 2580000003 HC RX 258: Performed by: INTERNAL MEDICINE

## 2020-07-13 PROCEDURE — 6370000000 HC RX 637 (ALT 250 FOR IP): Performed by: INTERNAL MEDICINE

## 2020-07-13 RX ORDER — DIPHENHYDRAMINE HYDROCHLORIDE 50 MG/ML
25 INJECTION INTRAMUSCULAR; INTRAVENOUS ONCE
Status: COMPLETED | OUTPATIENT
Start: 2020-07-13 | End: 2020-07-13

## 2020-07-13 RX ORDER — METHYLPREDNISOLONE SODIUM SUCCINATE 40 MG/ML
40 INJECTION, POWDER, LYOPHILIZED, FOR SOLUTION INTRAMUSCULAR; INTRAVENOUS ONCE
Status: COMPLETED | OUTPATIENT
Start: 2020-07-13 | End: 2020-07-13

## 2020-07-13 RX ORDER — SODIUM CHLORIDE 0.9 % (FLUSH) 0.9 %
10 SYRINGE (ML) INJECTION PRN
Status: DISCONTINUED | OUTPATIENT
Start: 2020-07-13 | End: 2020-07-14 | Stop reason: HOSPADM

## 2020-07-13 RX ORDER — HEPARIN SODIUM (PORCINE) LOCK FLUSH IV SOLN 100 UNIT/ML 100 UNIT/ML
500 SOLUTION INTRAVENOUS PRN
Status: DISCONTINUED | OUTPATIENT
Start: 2020-07-13 | End: 2020-07-14 | Stop reason: HOSPADM

## 2020-07-13 RX ORDER — ACETAMINOPHEN 325 MG/1
650 TABLET ORAL ONCE
Status: COMPLETED | OUTPATIENT
Start: 2020-07-13 | End: 2020-07-13

## 2020-07-13 RX ORDER — METHYLPREDNISOLONE SODIUM SUCCINATE 40 MG/ML
INJECTION, POWDER, LYOPHILIZED, FOR SOLUTION INTRAMUSCULAR; INTRAVENOUS
Status: COMPLETED
Start: 2020-07-13 | End: 2020-07-13

## 2020-07-13 RX ADMIN — METHYLPREDNISOLONE SODIUM SUCCINATE 40 MG: 40 INJECTION, POWDER, FOR SOLUTION INTRAMUSCULAR; INTRAVENOUS at 08:02

## 2020-07-13 RX ADMIN — METHYLPREDNISOLONE SODIUM SUCCINATE 40 MG: 40 INJECTION, POWDER, LYOPHILIZED, FOR SOLUTION INTRAMUSCULAR; INTRAVENOUS at 08:02

## 2020-07-13 RX ADMIN — IMMUNE GLOBULIN INTRAVENOUS (HUMAN) 20 G: 5 INJECTION, SOLUTION INTRAVENOUS at 08:20

## 2020-07-13 RX ADMIN — ACETAMINOPHEN 650 MG: 325 TABLET ORAL at 08:01

## 2020-07-13 RX ADMIN — SODIUM CHLORIDE, PRESERVATIVE FREE 10 ML: 5 INJECTION INTRAVENOUS at 10:45

## 2020-07-13 RX ADMIN — Medication 500 UNITS: at 10:45

## 2020-07-13 RX ADMIN — DIPHENHYDRAMINE HYDROCHLORIDE 25 MG: 50 INJECTION INTRAMUSCULAR; INTRAVENOUS at 08:01

## 2020-07-13 ASSESSMENT — PAIN SCALES - GENERAL
PAINLEVEL_OUTOF10: 0
PAINLEVEL_OUTOF10: 0

## 2020-07-20 ENCOUNTER — HOSPITAL ENCOUNTER (OUTPATIENT)
Dept: INFUSION THERAPY | Age: 70
Discharge: HOME | End: 2020-07-20
Payer: MEDICARE

## 2020-07-20 LAB
ALBUMIN SERPL-MCNC: 4.4 GM/DL (ref 3.4–5)
ALP BLD-CCNC: 98 IU/L (ref 40–129)
ALT SERPL-CCNC: 27 U/L (ref 10–40)
ANION GAP SERPL CALCULATED.3IONS-SCNC: 17 MMOL/L (ref 4–16)
AST SERPL-CCNC: 19 IU/L (ref 15–37)
BASOPHILS ABSOLUTE: 0 K/CU MM
BASOPHILS RELATIVE PERCENT: 0.1 % (ref 0–1)
BILIRUB SERPL-MCNC: 0.8 MG/DL (ref 0–1)
BUN BLDV-MCNC: 15 MG/DL (ref 6–23)
CALCIUM SERPL-MCNC: 9 MG/DL (ref 8.3–10.6)
CHLORIDE BLD-SCNC: 102 MMOL/L (ref 99–110)
CO2: 21 MMOL/L (ref 21–32)
CREAT SERPL-MCNC: 1.1 MG/DL (ref 0.9–1.3)
DIFFERENTIAL TYPE: ABNORMAL
EOSINOPHILS ABSOLUTE: 0.1 K/CU MM
EOSINOPHILS RELATIVE PERCENT: 0.7 % (ref 0–3)
GFR AFRICAN AMERICAN: >60 ML/MIN/1.73M2
GFR NON-AFRICAN AMERICAN: >60 ML/MIN/1.73M2
GLUCOSE BLD-MCNC: 217 MG/DL (ref 70–99)
HCT VFR BLD CALC: 39.7 % (ref 42–52)
HEMOGLOBIN: 13 GM/DL (ref 13.5–18)
LYMPHOCYTES ABSOLUTE: 9.1 K/CU MM
LYMPHOCYTES RELATIVE PERCENT: 67.7 % (ref 24–44)
MAGNESIUM: 2.3 MG/DL (ref 1.8–2.4)
MCH RBC QN AUTO: 28.4 PG (ref 27–31)
MCHC RBC AUTO-ENTMCNC: 32.7 % (ref 32–36)
MCV RBC AUTO: 86.9 FL (ref 78–100)
MONOCYTES ABSOLUTE: 0.4 K/CU MM
MONOCYTES RELATIVE PERCENT: 2.7 % (ref 0–4)
PDW BLD-RTO: 18.7 % (ref 11.7–14.9)
PLATELET # BLD: 118 K/CU MM (ref 140–440)
PMV BLD AUTO: 11.7 FL (ref 7.5–11.1)
POTASSIUM SERPL-SCNC: 4.2 MMOL/L (ref 3.5–5.1)
RBC # BLD: 4.57 M/CU MM (ref 4.6–6.2)
SEGMENTED NEUTROPHILS ABSOLUTE COUNT: 3.9 K/CU MM
SEGMENTED NEUTROPHILS RELATIVE PERCENT: 28.8 % (ref 36–66)
SODIUM BLD-SCNC: 140 MMOL/L (ref 135–145)
TOTAL PROTEIN: 6.5 GM/DL (ref 6.4–8.2)
WBC # BLD: 13.5 K/CU MM (ref 4–10.5)

## 2020-07-20 PROCEDURE — 85025 COMPLETE CBC W/AUTO DIFF WBC: CPT

## 2020-07-20 PROCEDURE — 83735 ASSAY OF MAGNESIUM: CPT

## 2020-07-20 PROCEDURE — 80053 COMPREHEN METABOLIC PANEL: CPT

## 2020-07-20 PROCEDURE — 36415 COLL VENOUS BLD VENIPUNCTURE: CPT

## 2020-07-24 PROBLEM — C91.10 LEUKEMIA, LYMPHOCYTIC, CHRONIC (HCC): Status: ACTIVE | Noted: 2020-07-24

## 2020-07-24 PROBLEM — D80.3 SELECTIVE DEFICIENCY OF IGG (HCC): Status: ACTIVE | Noted: 2020-07-24

## 2020-08-03 RX ORDER — IBRUTINIB 140 MG/1
140 TABLET, FILM COATED ORAL DAILY
Qty: 30 TABLET | Refills: 5 | Status: SHIPPED | OUTPATIENT
Start: 2020-08-03 | End: 2021-02-16 | Stop reason: SDUPTHER

## 2020-08-04 ENCOUNTER — HOSPITAL ENCOUNTER (OUTPATIENT)
Dept: INFUSION THERAPY | Age: 70
Discharge: HOME | End: 2020-08-04
Payer: MEDICARE

## 2020-08-04 LAB
ALBUMIN SERPL-MCNC: 4 GM/DL (ref 3.4–5)
ALP BLD-CCNC: 149 IU/L (ref 40–128)
ALT SERPL-CCNC: 25 U/L (ref 10–40)
ANION GAP SERPL CALCULATED.3IONS-SCNC: 14 MMOL/L (ref 4–16)
ANISOCYTOSIS: ABNORMAL
AST SERPL-CCNC: 26 IU/L (ref 15–37)
BANDED NEUTROPHILS ABSOLUTE COUNT: 1.76 K/CU MM
BANDED NEUTROPHILS RELATIVE PERCENT: 6 % (ref 5–11)
BILIRUB SERPL-MCNC: 0.6 MG/DL (ref 0–1)
BUN BLDV-MCNC: 15 MG/DL (ref 6–23)
CALCIUM SERPL-MCNC: 8.8 MG/DL (ref 8.3–10.6)
CHLORIDE BLD-SCNC: 101 MMOL/L (ref 99–110)
CO2: 22 MMOL/L (ref 21–32)
COMMENT: ABNORMAL
CREAT SERPL-MCNC: 1.2 MG/DL (ref 0.9–1.3)
DIFFERENTIAL TYPE: ABNORMAL
EOSINOPHILS ABSOLUTE: 0.3 K/CU MM
EOSINOPHILS RELATIVE PERCENT: 1 % (ref 0–3)
GFR AFRICAN AMERICAN: >60 ML/MIN/1.73M2
GFR NON-AFRICAN AMERICAN: >60 ML/MIN/1.73M2
GLUCOSE BLD-MCNC: 273 MG/DL (ref 70–99)
HCT VFR BLD CALC: 39.5 % (ref 42–52)
HEMOGLOBIN: 13.1 GM/DL (ref 13.5–18)
LYMPHOCYTES ABSOLUTE: 23.5 K/CU MM
LYMPHOCYTES RELATIVE PERCENT: 80 % (ref 24–44)
MCH RBC QN AUTO: 28.2 PG (ref 27–31)
MCHC RBC AUTO-ENTMCNC: 33.2 % (ref 32–36)
MCV RBC AUTO: 84.9 FL (ref 78–100)
MONOCYTES ABSOLUTE: 0.3 K/CU MM
MONOCYTES RELATIVE PERCENT: 1 % (ref 0–4)
PDW BLD-RTO: 18.9 % (ref 11.7–14.9)
PLATELET # BLD: 104 K/CU MM (ref 140–440)
PMV BLD AUTO: 10.6 FL (ref 7.5–11.1)
POLYCHROMASIA: ABNORMAL
POTASSIUM SERPL-SCNC: 4.6 MMOL/L (ref 3.5–5.1)
RBC # BLD: 4.65 M/CU MM (ref 4.6–6.2)
SEGMENTED NEUTROPHILS ABSOLUTE COUNT: 3.5 K/CU MM
SEGMENTED NEUTROPHILS RELATIVE PERCENT: 12 % (ref 36–66)
SMUDGE CELLS: PRESENT
SODIUM BLD-SCNC: 137 MMOL/L (ref 135–145)
TOTAL PROTEIN: 5.8 GM/DL (ref 6.4–8.2)
WBC # BLD: 29.4 K/CU MM (ref 4–10.5)

## 2020-08-04 PROCEDURE — 80053 COMPREHEN METABOLIC PANEL: CPT

## 2020-08-04 PROCEDURE — 85007 BL SMEAR W/DIFF WBC COUNT: CPT

## 2020-08-04 PROCEDURE — 36415 COLL VENOUS BLD VENIPUNCTURE: CPT

## 2020-08-04 PROCEDURE — 85027 COMPLETE CBC AUTOMATED: CPT

## 2020-08-06 LAB — COMMENT: NORMAL

## 2020-08-10 ENCOUNTER — HOSPITAL ENCOUNTER (OUTPATIENT)
Dept: INFUSION THERAPY | Age: 70
Setting detail: INFUSION SERIES
Discharge: HOME | End: 2020-08-10
Payer: MEDICARE

## 2020-08-10 VITALS
SYSTOLIC BLOOD PRESSURE: 156 MMHG | OXYGEN SATURATION: 98 % | DIASTOLIC BLOOD PRESSURE: 77 MMHG | HEART RATE: 84 BPM | TEMPERATURE: 97 F | RESPIRATION RATE: 16 BRPM

## 2020-08-10 DIAGNOSIS — C91.10 LEUKEMIA, LYMPHOCYTIC, CHRONIC (HCC): Primary | ICD-10-CM

## 2020-08-10 DIAGNOSIS — D80.3 SELECTIVE DEFICIENCY OF IGG (HCC): ICD-10-CM

## 2020-08-10 DIAGNOSIS — D80.1 HYPOGAMMAGLOBULINEMIA (HCC): ICD-10-CM

## 2020-08-10 PROCEDURE — 96375 TX/PRO/DX INJ NEW DRUG ADDON: CPT

## 2020-08-10 PROCEDURE — 96374 THER/PROPH/DIAG INJ IV PUSH: CPT

## 2020-08-10 PROCEDURE — 96366 THER/PROPH/DIAG IV INF ADDON: CPT

## 2020-08-10 PROCEDURE — 99211 OFF/OP EST MAY X REQ PHY/QHP: CPT

## 2020-08-10 PROCEDURE — 6360000002 HC RX W HCPCS: Performed by: INTERNAL MEDICINE

## 2020-08-10 PROCEDURE — 96365 THER/PROPH/DIAG IV INF INIT: CPT

## 2020-08-10 PROCEDURE — 2580000003 HC RX 258: Performed by: INTERNAL MEDICINE

## 2020-08-10 PROCEDURE — 6370000000 HC RX 637 (ALT 250 FOR IP): Performed by: INTERNAL MEDICINE

## 2020-08-10 RX ORDER — DIPHENHYDRAMINE HYDROCHLORIDE 50 MG/ML
25 INJECTION INTRAMUSCULAR; INTRAVENOUS ONCE
Status: COMPLETED | OUTPATIENT
Start: 2020-08-10 | End: 2020-08-10

## 2020-08-10 RX ORDER — SODIUM CHLORIDE 0.9 % (FLUSH) 0.9 %
10 SYRINGE (ML) INJECTION PRN
Status: CANCELLED | OUTPATIENT
Start: 2020-09-07

## 2020-08-10 RX ORDER — METHYLPREDNISOLONE SODIUM SUCCINATE 40 MG/ML
40 INJECTION, POWDER, LYOPHILIZED, FOR SOLUTION INTRAMUSCULAR; INTRAVENOUS ONCE
Status: COMPLETED | OUTPATIENT
Start: 2020-08-10 | End: 2020-08-10

## 2020-08-10 RX ORDER — ACETAMINOPHEN 325 MG/1
650 TABLET ORAL ONCE
Status: CANCELLED | OUTPATIENT
Start: 2020-09-07

## 2020-08-10 RX ORDER — SODIUM CHLORIDE 0.9 % (FLUSH) 0.9 %
10 SYRINGE (ML) INJECTION PRN
Status: DISCONTINUED | OUTPATIENT
Start: 2020-08-10 | End: 2020-08-11 | Stop reason: HOSPADM

## 2020-08-10 RX ORDER — HEPARIN SODIUM (PORCINE) LOCK FLUSH IV SOLN 100 UNIT/ML 100 UNIT/ML
500 SOLUTION INTRAVENOUS PRN
Status: DISCONTINUED | OUTPATIENT
Start: 2020-08-10 | End: 2020-08-11 | Stop reason: HOSPADM

## 2020-08-10 RX ORDER — METHYLPREDNISOLONE SODIUM SUCCINATE 40 MG/ML
40 INJECTION, POWDER, LYOPHILIZED, FOR SOLUTION INTRAMUSCULAR; INTRAVENOUS ONCE
Status: CANCELLED
Start: 2020-09-07

## 2020-08-10 RX ORDER — ACETAMINOPHEN 325 MG/1
650 TABLET ORAL ONCE
Status: COMPLETED | OUTPATIENT
Start: 2020-08-10 | End: 2020-08-10

## 2020-08-10 RX ORDER — CYCLOBENZAPRINE HCL 5 MG
5 TABLET ORAL 3 TIMES DAILY PRN
COMMUNITY
End: 2021-11-02 | Stop reason: ALTCHOICE

## 2020-08-10 RX ORDER — HEPARIN SODIUM (PORCINE) LOCK FLUSH IV SOLN 100 UNIT/ML 100 UNIT/ML
500 SOLUTION INTRAVENOUS PRN
Status: CANCELLED | OUTPATIENT
Start: 2020-09-07

## 2020-08-10 RX ORDER — DIPHENHYDRAMINE HYDROCHLORIDE 50 MG/ML
25 INJECTION INTRAMUSCULAR; INTRAVENOUS ONCE
Status: CANCELLED
Start: 2020-09-07

## 2020-08-10 RX ORDER — MAGNESIUM OXIDE 400 MG/1
400 TABLET ORAL DAILY
COMMUNITY
End: 2022-09-28

## 2020-08-10 RX ADMIN — METHYLPREDNISOLONE SODIUM SUCCINATE 40 MG: 40 INJECTION, POWDER, FOR SOLUTION INTRAMUSCULAR; INTRAVENOUS at 07:59

## 2020-08-10 RX ADMIN — IMMUNE GLOBULIN INTRAVENOUS (HUMAN) 20 G: 5 INJECTION, SOLUTION INTRAVENOUS at 08:05

## 2020-08-10 RX ADMIN — Medication 500 UNITS: at 10:10

## 2020-08-10 RX ADMIN — DIPHENHYDRAMINE HYDROCHLORIDE 20 MG: 50 INJECTION INTRAMUSCULAR; INTRAVENOUS at 08:02

## 2020-08-10 RX ADMIN — SODIUM CHLORIDE, PRESERVATIVE FREE 10 ML: 5 INJECTION INTRAVENOUS at 10:10

## 2020-08-10 RX ADMIN — ACETAMINOPHEN 650 MG: 325 TABLET ORAL at 07:59

## 2020-08-10 ASSESSMENT — PAIN SCALES - GENERAL
PAINLEVEL_OUTOF10: 0
PAINLEVEL_OUTOF10: 0

## 2020-08-10 NOTE — PROGRESS NOTES
Tolerated infusion well. Reviewed discharge instruction, voiced understanding. Copies of AVS given. Pt discharged home. Pt to exit via steady gait.     Orders Placed This Encounter   Medications    acetaminophen (TYLENOL) tablet 650 mg    immune globulin (FLEBOGAMMA) 10% solution 20 g    sodium chloride flush 0.9 % injection 10 mL    heparin flush 100 UNIT/ML injection 500 Units    diphenhydrAMINE (BENADRYL) injection 25 mg    methylPREDNISolone sodium (SOLU-MEDROL) injection 40 mg

## 2020-08-28 ENCOUNTER — HOSPITAL ENCOUNTER (OUTPATIENT)
Dept: ULTRASOUND IMAGING | Age: 70
Discharge: HOME | End: 2020-08-28
Payer: MEDICARE

## 2020-08-28 PROCEDURE — 76770 US EXAM ABDO BACK WALL COMP: CPT

## 2020-09-08 ENCOUNTER — HOSPITAL ENCOUNTER (OUTPATIENT)
Dept: INFUSION THERAPY | Age: 70
Setting detail: INFUSION SERIES
Discharge: HOME | End: 2020-09-08
Payer: MEDICARE

## 2020-09-08 VITALS
OXYGEN SATURATION: 97 % | RESPIRATION RATE: 14 BRPM | DIASTOLIC BLOOD PRESSURE: 79 MMHG | SYSTOLIC BLOOD PRESSURE: 148 MMHG | HEART RATE: 83 BPM | TEMPERATURE: 96.8 F

## 2020-09-08 DIAGNOSIS — D80.1 HYPOGAMMAGLOBULINEMIA (HCC): ICD-10-CM

## 2020-09-08 DIAGNOSIS — D80.3 SELECTIVE DEFICIENCY OF IGG (HCC): ICD-10-CM

## 2020-09-08 DIAGNOSIS — C91.10 LEUKEMIA, LYMPHOCYTIC, CHRONIC (HCC): Primary | ICD-10-CM

## 2020-09-08 PROCEDURE — 6360000002 HC RX W HCPCS: Performed by: INTERNAL MEDICINE

## 2020-09-08 PROCEDURE — 96374 THER/PROPH/DIAG INJ IV PUSH: CPT

## 2020-09-08 PROCEDURE — 2580000003 HC RX 258: Performed by: INTERNAL MEDICINE

## 2020-09-08 PROCEDURE — 99211 OFF/OP EST MAY X REQ PHY/QHP: CPT

## 2020-09-08 PROCEDURE — 6370000000 HC RX 637 (ALT 250 FOR IP)

## 2020-09-08 PROCEDURE — 96375 TX/PRO/DX INJ NEW DRUG ADDON: CPT

## 2020-09-08 PROCEDURE — 96366 THER/PROPH/DIAG IV INF ADDON: CPT

## 2020-09-08 PROCEDURE — 6360000002 HC RX W HCPCS

## 2020-09-08 PROCEDURE — 96365 THER/PROPH/DIAG IV INF INIT: CPT

## 2020-09-08 RX ORDER — METHYLPREDNISOLONE SODIUM SUCCINATE 40 MG/ML
INJECTION, POWDER, LYOPHILIZED, FOR SOLUTION INTRAMUSCULAR; INTRAVENOUS
Status: COMPLETED
Start: 2020-09-08 | End: 2020-09-08

## 2020-09-08 RX ORDER — HEPARIN SODIUM (PORCINE) LOCK FLUSH IV SOLN 100 UNIT/ML 100 UNIT/ML
500 SOLUTION INTRAVENOUS PRN
Status: CANCELLED | OUTPATIENT
Start: 2020-10-05

## 2020-09-08 RX ORDER — HEPARIN SODIUM (PORCINE) LOCK FLUSH IV SOLN 100 UNIT/ML 100 UNIT/ML
500 SOLUTION INTRAVENOUS PRN
Status: DISCONTINUED | OUTPATIENT
Start: 2020-09-08 | End: 2020-09-09 | Stop reason: HOSPADM

## 2020-09-08 RX ORDER — ACETAMINOPHEN 325 MG/1
650 TABLET ORAL ONCE
Status: CANCELLED | OUTPATIENT
Start: 2020-10-05

## 2020-09-08 RX ORDER — DIPHENHYDRAMINE HYDROCHLORIDE 50 MG/ML
25 INJECTION INTRAMUSCULAR; INTRAVENOUS ONCE
Status: CANCELLED
Start: 2020-10-05

## 2020-09-08 RX ORDER — METHYLPREDNISOLONE SODIUM SUCCINATE 40 MG/ML
40 INJECTION, POWDER, LYOPHILIZED, FOR SOLUTION INTRAMUSCULAR; INTRAVENOUS ONCE
Status: CANCELLED
Start: 2020-10-05

## 2020-09-08 RX ORDER — DIPHENHYDRAMINE HYDROCHLORIDE 50 MG/ML
INJECTION INTRAMUSCULAR; INTRAVENOUS
Status: COMPLETED
Start: 2020-09-08 | End: 2020-09-08

## 2020-09-08 RX ORDER — METHYLPREDNISOLONE SODIUM SUCCINATE 40 MG/ML
40 INJECTION, POWDER, LYOPHILIZED, FOR SOLUTION INTRAMUSCULAR; INTRAVENOUS ONCE
Status: COMPLETED | OUTPATIENT
Start: 2020-09-08 | End: 2020-09-08

## 2020-09-08 RX ORDER — DIPHENHYDRAMINE HYDROCHLORIDE 50 MG/ML
25 INJECTION INTRAMUSCULAR; INTRAVENOUS ONCE
Status: COMPLETED | OUTPATIENT
Start: 2020-09-08 | End: 2020-09-08

## 2020-09-08 RX ORDER — SODIUM CHLORIDE 0.9 % (FLUSH) 0.9 %
10 SYRINGE (ML) INJECTION PRN
Status: CANCELLED | OUTPATIENT
Start: 2020-10-05

## 2020-09-08 RX ORDER — SODIUM CHLORIDE 0.9 % (FLUSH) 0.9 %
10 SYRINGE (ML) INJECTION PRN
Status: DISCONTINUED | OUTPATIENT
Start: 2020-09-08 | End: 2020-09-09 | Stop reason: HOSPADM

## 2020-09-08 RX ORDER — ACETAMINOPHEN 325 MG/1
650 TABLET ORAL ONCE
Status: COMPLETED | OUTPATIENT
Start: 2020-09-08 | End: 2020-09-08

## 2020-09-08 RX ORDER — ACETAMINOPHEN 325 MG/1
TABLET ORAL
Status: COMPLETED
Start: 2020-09-08 | End: 2020-09-08

## 2020-09-08 RX ADMIN — SODIUM CHLORIDE, PRESERVATIVE FREE 10 ML: 5 INJECTION INTRAVENOUS at 08:01

## 2020-09-08 RX ADMIN — ACETAMINOPHEN 650 MG: 325 TABLET ORAL at 08:01

## 2020-09-08 RX ADMIN — IMMUNE GLOBULIN INTRAVENOUS (HUMAN) 20 G: 5 INJECTION, SOLUTION INTRAVENOUS at 08:38

## 2020-09-08 RX ADMIN — DIPHENHYDRAMINE HYDROCHLORIDE 25 MG: 50 INJECTION INTRAMUSCULAR; INTRAVENOUS at 08:02

## 2020-09-08 RX ADMIN — METHYLPREDNISOLONE SODIUM SUCCINATE 40 MG: 40 INJECTION, POWDER, FOR SOLUTION INTRAMUSCULAR; INTRAVENOUS at 08:01

## 2020-09-08 RX ADMIN — METHYLPREDNISOLONE SODIUM SUCCINATE 40 MG: 40 INJECTION, POWDER, LYOPHILIZED, FOR SOLUTION INTRAMUSCULAR; INTRAVENOUS at 08:01

## 2020-09-08 RX ADMIN — SODIUM CHLORIDE, PRESERVATIVE FREE 20 ML: 5 INJECTION INTRAVENOUS at 11:06

## 2020-09-08 RX ADMIN — Medication 500 UNITS: at 11:06

## 2020-09-08 ASSESSMENT — PAIN SCALES - GENERAL
PAINLEVEL_OUTOF10: 0
PAINLEVEL_OUTOF10: 0

## 2020-09-08 NOTE — PROGRESS NOTES
Tolerated IVIG infusion well. Reviewed discharge instruction, voiced understanding. Copies of AVS given. Pt discharged home. Pt to exit via ambulation.     Orders Placed This Encounter   Medications    acetaminophen (TYLENOL) tablet 650 mg    immune globulin (FLEBOGAMMA) 10% solution 20 g    sodium chloride flush 0.9 % injection 10 mL    heparin flush 100 UNIT/ML injection 500 Units    diphenhydrAMINE (BENADRYL) injection 25 mg    methylPREDNISolone sodium (SOLU-MEDROL) injection 40 mg    acetaminophen (TYLENOL) 325 MG tablet     Dee Dee Brown: cabinet override    methylPREDNISolone sodium (SOLU-MEDROL) 40 MG injection     Dee Dee Brown: cabinet override    diphenhydrAMINE (BENADRYL) 50 MG/ML injection     Dee Dee Brown: tino garza

## 2020-10-02 ENCOUNTER — OFFICE VISIT (OUTPATIENT)
Dept: ONCOLOGY | Age: 70
End: 2020-10-02
Payer: MEDICARE

## 2020-10-02 ENCOUNTER — HOSPITAL ENCOUNTER (OUTPATIENT)
Dept: INFUSION THERAPY | Age: 70
Discharge: HOME | End: 2020-10-02
Payer: MEDICARE

## 2020-10-02 VITALS
TEMPERATURE: 97.6 F | WEIGHT: 200 LBS | DIASTOLIC BLOOD PRESSURE: 78 MMHG | RESPIRATION RATE: 16 BRPM | OXYGEN SATURATION: 96 % | SYSTOLIC BLOOD PRESSURE: 128 MMHG | HEIGHT: 72 IN | HEART RATE: 78 BPM | BODY MASS INDEX: 27.09 KG/M2

## 2020-10-02 DIAGNOSIS — D80.3 SELECTIVE DEFICIENCY OF IGG (HCC): ICD-10-CM

## 2020-10-02 DIAGNOSIS — C91.10 CLL (CHRONIC LYMPHOCYTIC LEUKEMIA) (HCC): ICD-10-CM

## 2020-10-02 DIAGNOSIS — D70.9 NEUTROPENIA, UNSPECIFIED TYPE (HCC): ICD-10-CM

## 2020-10-02 LAB
ALBUMIN SERPL-MCNC: 4.5 GM/DL (ref 3.4–5)
ALP BLD-CCNC: 71 IU/L (ref 40–128)
ALT SERPL-CCNC: 13 U/L (ref 10–40)
ANION GAP SERPL CALCULATED.3IONS-SCNC: 13 MMOL/L (ref 4–16)
AST SERPL-CCNC: 13 IU/L (ref 15–37)
BASOPHILS ABSOLUTE: 0 K/CU MM
BASOPHILS RELATIVE PERCENT: 0 % (ref 0–1)
BILIRUB SERPL-MCNC: 0.6 MG/DL (ref 0–1)
BUN BLDV-MCNC: 15 MG/DL (ref 6–23)
CALCIUM SERPL-MCNC: 8.7 MG/DL (ref 8.3–10.6)
CHLORIDE BLD-SCNC: 101 MMOL/L (ref 99–110)
CO2: 24 MMOL/L (ref 21–32)
CREAT SERPL-MCNC: 1.2 MG/DL (ref 0.9–1.3)
DIFFERENTIAL TYPE: ABNORMAL
EOSINOPHILS ABSOLUTE: 0.1 K/CU MM
EOSINOPHILS RELATIVE PERCENT: 0.2 % (ref 0–3)
GFR AFRICAN AMERICAN: >60 ML/MIN/1.73M2
GFR NON-AFRICAN AMERICAN: >60 ML/MIN/1.73M2
GLUCOSE BLD-MCNC: 115 MG/DL (ref 70–99)
HCT VFR BLD CALC: 35 % (ref 42–52)
HEMOGLOBIN: 11.2 GM/DL (ref 13.5–18)
IGG,SERUM: 474 MG/DL (ref 723–1685)
IRON: 35 UG/DL (ref 59–158)
LACTATE DEHYDROGENASE: 155 IU/L (ref 120–246)
LYMPHOCYTES ABSOLUTE: 25.3 K/CU MM
LYMPHOCYTES RELATIVE PERCENT: 84.9 % (ref 24–44)
MCH RBC QN AUTO: 28.8 PG (ref 27–31)
MCHC RBC AUTO-ENTMCNC: 32 % (ref 32–36)
MCV RBC AUTO: 90 FL (ref 78–100)
MONOCYTES ABSOLUTE: 0.3 K/CU MM
MONOCYTES RELATIVE PERCENT: 1.1 % (ref 0–4)
PCT TRANSFERRIN: 8 % (ref 10–44)
PDW BLD-RTO: 18 % (ref 11.7–14.9)
PLATELET # BLD: 121 K/CU MM (ref 140–440)
PMV BLD AUTO: 10 FL (ref 7.5–11.1)
POTASSIUM SERPL-SCNC: 4.2 MMOL/L (ref 3.5–5.1)
RBC # BLD: 3.89 M/CU MM (ref 4.6–6.2)
SEGMENTED NEUTROPHILS ABSOLUTE COUNT: 4.1 K/CU MM
SEGMENTED NEUTROPHILS RELATIVE PERCENT: 13.8 % (ref 36–66)
SODIUM BLD-SCNC: 138 MMOL/L (ref 135–145)
TOTAL IRON BINDING CAPACITY: 416 UG/DL (ref 250–450)
TOTAL PROTEIN: 5.9 GM/DL (ref 6.4–8.2)
UNSATURATED IRON BINDING CAPACITY: 381 UG/DL (ref 110–370)
URIC ACID: 6.1 MG/DL (ref 3.5–7.2)
WBC # BLD: 29.8 K/CU MM (ref 4–10.5)

## 2020-10-02 PROCEDURE — 83615 LACTATE (LD) (LDH) ENZYME: CPT

## 2020-10-02 PROCEDURE — 84550 ASSAY OF BLOOD/URIC ACID: CPT

## 2020-10-02 PROCEDURE — 1123F ACP DISCUSS/DSCN MKR DOCD: CPT | Performed by: INTERNAL MEDICINE

## 2020-10-02 PROCEDURE — 4040F PNEUMOC VAC/ADMIN/RCVD: CPT | Performed by: INTERNAL MEDICINE

## 2020-10-02 PROCEDURE — G8484 FLU IMMUNIZE NO ADMIN: HCPCS | Performed by: INTERNAL MEDICINE

## 2020-10-02 PROCEDURE — 1036F TOBACCO NON-USER: CPT | Performed by: INTERNAL MEDICINE

## 2020-10-02 PROCEDURE — 99214 OFFICE O/P EST MOD 30 MIN: CPT | Performed by: INTERNAL MEDICINE

## 2020-10-02 PROCEDURE — 82784 ASSAY IGA/IGD/IGG/IGM EACH: CPT

## 2020-10-02 PROCEDURE — 83540 ASSAY OF IRON: CPT

## 2020-10-02 PROCEDURE — 3017F COLORECTAL CA SCREEN DOC REV: CPT | Performed by: INTERNAL MEDICINE

## 2020-10-02 PROCEDURE — 80053 COMPREHEN METABOLIC PANEL: CPT

## 2020-10-02 PROCEDURE — 83550 IRON BINDING TEST: CPT

## 2020-10-02 PROCEDURE — G8417 CALC BMI ABV UP PARAM F/U: HCPCS | Performed by: INTERNAL MEDICINE

## 2020-10-02 PROCEDURE — 36415 COLL VENOUS BLD VENIPUNCTURE: CPT

## 2020-10-02 PROCEDURE — G8427 DOCREV CUR MEDS BY ELIG CLIN: HCPCS | Performed by: INTERNAL MEDICINE

## 2020-10-02 PROCEDURE — 85025 COMPLETE CBC W/AUTO DIFF WBC: CPT

## 2020-10-02 RX ORDER — VALACYCLOVIR HYDROCHLORIDE 500 MG/1
500 TABLET, FILM COATED ORAL DAILY
Qty: 30 TABLET | Refills: 5 | Status: SHIPPED | OUTPATIENT
Start: 2020-10-02 | End: 2021-02-23 | Stop reason: SDUPTHER

## 2020-10-02 ASSESSMENT — PATIENT HEALTH QUESTIONNAIRE - PHQ9
SUM OF ALL RESPONSES TO PHQ QUESTIONS 1-9: 0
SUM OF ALL RESPONSES TO PHQ QUESTIONS 1-9: 0
2. FEELING DOWN, DEPRESSED OR HOPELESS: 0
SUM OF ALL RESPONSES TO PHQ9 QUESTIONS 1 & 2: 0
1. LITTLE INTEREST OR PLEASURE IN DOING THINGS: 0

## 2020-10-02 NOTE — PROGRESS NOTES
abdomen, and pelvis mildly increased as compared from previous CAT scan in March 2012.,  6. Starting in Jan 2014 he was initiated on 2nd line chemo with Bendamustine and Rituxan with good initial response. Continued till Nov 2014, CAT scan done on 8/14/14 showed an interval decrease in generalized adenopathy compared to previous study In December 2013, suggesting good response to therapy.,  7. CAT scan done on 12/23/14 showed Minimal interval increase in size of at least two periportal and retrocaval lymph nodes. Otherwise stable thoracic, abdominal and pelvic adenopathy. Also mild interval increase in splenomegaly measuring 19 cm, previously 17 cm. 8. CAT scan done on 6/25/15 showed mild interval decrease in the splenomegaly and also in retroperitoneal mesenteric lymph nodes. 9. Started Idelalisib [Zydelig] on 20th Jan 2015 at 150mg po bid. with good initial response. He did remarkably well with Zydelig from January of 2015 until June of 2016, after he was hospitalized for Rhinovirus Pneumonia in May of 2016. Also discussed Living Will, Power of Melina, DNR status, et cetera. In April 2016, chest CT showed stable  10. , 13. In July 2016, His FISH testing on peripheral blood showed abnormal results, with 6q deletion, 17p deletion, and 11q centromere signal in 3 percent of cells. The 17p deletion (Tp53) in 80 percent of cells is associated with unfavorable prognosis. Because of his CLL with poor prognostic markers, including 17p deletion detected in 2012 & again in July of 2016. 11. He was started on ibrutinib in September 2016 140mg/d increased to 280 mg/ after 4 weeks. 12. The abdominal CT 7/5/16 is showing no acute abnormalities, stable splenomegaly, and one periportal lymph node measuring 2.2 by 3.7 cm which is unchanged from before. 13. CAT scan of the chest August 5, 2016, showed axillary, mediastinal, hilar, and upper abdominal lymphadenopathy consistent with his CLL.  The maximum size of the nodes is decrease in mediastinal and hilar lymphadenopathy. . Regards to his abdomen also there is decrease in his lymphadenopathy. Although there is a left inguinal node that has enlarged. Section that no obvious source of his abdominal cramping. 10/10/2018 EGD showed severe ulcerative esophagitis with slight narrowing in the GE junction small hiatal hernia mild superficial gastritis, Colonoscope revealed 2 mm polyp in the sigmoid colon, diverticulosis, hemorrhoids, moderate stool  1/2019; Colonoscopy with two diminutive polyps, diverticulosis and hemorrhoids, path with TA above IC valve and HP distal sigmoid  10-19 iron def based on labs refered to GI , stool occult negative x #3 , NO PICA   11-19 saw Dr. Stephan Merino, and referred to [facility] for capsule endoscopy  11/7/2019: Ct chest:Ground-glass nodule seen within the right upper lobe the largest measuring 17  mm in diameter. 12-6-19 capsule endoscopy, results unavailable   2/2020: CT chest:IMPRESSION:  Previously noted ground-glass opacities in the right lung have resolved. However, there is a new cluster of tiny pulmonary nodules in the left lower  lobe which could represent an infectious or inflammatory etiology. Short-term follow-up chest CT is recommended in 3-6 months. Multiple additional tiny pulmonary nodules are overall stable. Stable mediastinal lymph nodes without new lymphadenopathy. Previous Therapies    May 2020: Ct chest:  1. Interval resolution of the previously described cluster of pulmonary    nodules in the left lower lobe.  Findings are most compatible with resolved    infectious/inflammatory etiology. 2. No acute cardiopulmonary disease. 3. COPD. 4. Stable subcentimeter mediastinal lymph nodes. 8/28/20: US RP normal    August 2020 cystoscopy revealed enlarged prostate. Was Recommended TURBT      PAST MEDICAL HISTORY:   1. Stage I CLL with conversion from good to poor prognostic factors. ,  2. History of hypertension for many years.,  3. 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 Arthritis in the past. ,  5. Frozen shoulder for which he had treatment including injection by Dr. Pat Sandoval. ,  6. Cellulitis with Zoster type lesion Left thigh resolved with Bactrim and Valtrex in 2016.,  7. abdominal illness, with fever, nausea, and discomfort, suggestive of infectious etiology in May 2016. ,  8. hospitalization between  and  for what seems like atypical rhinovirus pneumonia involving right middle and lower lobe and left lower lobe with sepsis,  9. 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 Dr. Gabe Ramirez is referring him for MOHS surgery. ,  10. hospitalized from  to 2017, for hyperosmolar hyperglycemia with hyponatremia and hyperkalemia and UTI. He was seen by Dr. Boni Roy, who put him on insulin. He was also seen by Dr. Billy Resendez. He felt much better after his sugar got under better control and electrolyte imbalanced reversed,  11. Ultrasound Doppler 2017, was negative for any DVT in either leg. Chest x-ray showed no acute process. PAST SURGICAL HISTORY:   1. knee operation,  2. tonsillectomy,  3. broken ankle times two requiring open reduction. ,  4. Vision better after cataract surgery,  FAMILY HISTORY:   His paternal aunt had colon cancer. Uncle also had some form of cancer. Father had heart disease. Sister  12 of Liver disease   SOCIAL HISTORY:   He has a 40-pack-year history of smoking, quit in . He denies alcohol use. He is not . Has one son. Interval History  10/2/2020: Arrived alone to the clinic today. Tolerating Ibrutinib fairly well. Cramps resolved after he started taking magnesium. No overt bleeding or nay rash. No fever, night sweats, lad, weight loss. No chest pain, increased sob, palpitations or any dizziness.  No abdominal pain, nausea, emesis, diarrhea or any constipation. Generalized aches and pains. No fatigue.      Review of Systems   Per interval history; otherwise 10 point ROS is negative              Vital Signs:  /78 (Site: Right Upper Arm, Position: Sitting, Cuff Size: Large Adult)   Pulse 78   Temp 97.6 °F (36.4 °C) (Infrared)   Resp 16   Ht 6' (1.829 m)   Wt 200 lb (90.7 kg)   SpO2 96%   BMI 27.12 kg/m²     Physical Exam:  CONSTITUTIONAL: alert and awake  EYES: No palor or any icetrus   ENT: ATNC   NECK: No JVD   HEMATOLOGIC/LYMPHATIC: no cervical, supraclavicular or axillary lymphadenopathy   LUNGS:CTAB   CARDIOVASCULAR:s1s2 SM+ rrr   ABDOMEN:soft ntnd bs pos  NEUROLOGIC: GI   SKIN: skin tags   EXTREMITIES: no LE edema bilaterally      Labs:    Hematology:  Lab Results   Component Value Date    WBC 29.8 (H) 10/02/2020    RBC 3.89 (L) 10/02/2020    HGB 11.2 (L) 10/02/2020    HCT 35.0 (L) 10/02/2020    MCV 90.0 10/02/2020    MCH 28.8 10/02/2020    MCHC 32.0 10/02/2020    RDW 18.0 (H) 10/02/2020     (L) 10/02/2020    MPV 10.0 10/02/2020    BANDSPCT 6 08/04/2020    SEGSPCT 13.8 (L) 10/02/2020    EOSRELPCT 0.2 10/02/2020    BASOPCT 0.0 10/02/2020    LYMPHOPCT 84.9 (H) 10/02/2020    MONOPCT 1.1 10/02/2020    BANDABS 1.76 08/04/2020    SEGSABS 4.1 10/02/2020    EOSABS 0.1 10/02/2020    BASOSABS 0.0 10/02/2020    LYMPHSABS 25.3 10/02/2020    MONOSABS 0.3 10/02/2020    DIFFTYPE AUTOMATED DIFFERENTIAL 10/02/2020    ANISOCYTOSIS 1+ 08/04/2020    POLYCHROM 1+ 08/04/2020    WBCMORP ATYPICAL LYMPHOCYTES WITH PROMINENT NUCLEOLI NOTED 09/26/2017    PLTM SEVERAL LARGE PLATELETS 86/84/1889     Lab Results   Component Value Date    ESR 72 (H) 01/24/2017       Chemistry:  Lab Results   Component Value Date     08/04/2020    K 4.6 08/04/2020     08/04/2020    CO2 22 08/04/2020    BUN 15 08/04/2020    CREATININE 1.2 08/04/2020    GLUCOSE 273 (H) 08/04/2020    CALCIUM 8.8 08/04/2020    PROT 5.8 (L) 08/04/2020    LABALBU 4.0 08/04/2020    BILITOT 0.6 08/04/2020    ALKPHOS 149 (H) 08/04/2020    AST 26 08/04/2020    ALT 25 08/04/2020    LABGLOM >60 08/04/2020    GFRAA >60 08/04/2020    PHOS 3.3 04/04/2019    MG 2.3 07/20/2020    POCGLU 202 (H) 05/17/2019     Lab Results   Component Value Date     05/27/2020     No components found for: LD  Lab Results   Component Value Date    TSHHS 3.040 04/04/2019    T4FREE 1.37 04/04/2019    FT3 3.2 03/27/2012       Immunology:  Lab Results   Component Value Date    PROT 5.8 (L) 08/04/2020    ALBUMINELP 3.7 08/21/2017    LABALPH 0.2 08/21/2017    LABALPH 0.9 08/21/2017    LABBETA 0.9 08/21/2017    GAMGLOB 0.6 08/21/2017     No results found for: Charlette Villafuerte, KLFLCR  No results found for: B2M    Coagulation Panel:  Lab Results   Component Value Date    PROTIME 11.1 12/30/2013    INR 1.03 12/30/2013    APTT 24.6 12/30/2013       Anemia Panel:  Lab Results   Component Value Date    DQCJWLVE79 325.8 05/27/2020    FOLATE 5.9 04/04/2019       Tumor Markers:  No results found for: , CEA, , LABCA2, PSA      Imaging: Reviewed     Pathology:Reviewed     Observations:  Performance Status: ECOG 1  Depression Status: PHQ-9 Total Score: 0 (10/2/2020 12:22 PM)          Assessment & Plan:  B-cell CLL, stage I with conversion from good to poor prognostic factors, with 17p deletion:   His diagnosis of CLL since 2007.   17p deletion since 2015. Initial treatment with Leukeran from 2000 to 2011 intermittently and FCR regimen in 2012, bendamustine/Rituxan in 2014. Idelalisib from January 2015 until June of 2016. On ibrutinib since September 2016. Ibrutinib therapy seems to be helping him, overall improvement. Only able to tolerate 140 mg p.o. every other day  He was off for a couple months During the fall 2017  Cramps very bad july 2020, held treatment for two weeks. Further regimen pending sx control and repeat CBC  . Acquired hypogammaglobulinemia:   Continue IVIG once a month.    Check immunoglobulin levels    spasms: resolved with magnesium    BPH: is being followed by Urology    Iron def anemia and esophagitis: follows with GI, Dr Rufino Dugan. Reported that he had colonoscopy 2Y ago with polyps detected. EGD was normal except for H. pylori which was treated. Was supposed to have VCE. Note September CBC with Hb decline slightly, could be sec to ibrutinib vs GIB. Was on  oral iron along with Vitamin C, further recs pending ferritin and iron panel. Intermittent mils thrombocytopenia: meds reviewed. Could be sec to ibrutinib vs CLL> Will monitor for now. Lung Nodule RUL: CT May 2020  as above with infection/inflammation. Continue other medical care. Discussed above findings and plan with him and he verbalized understanding. Answered all his questions. Discussed healthy lifestyle, smoking cessation, healthy diet and exercise as tolerated. Also discussed importance of being up-to-date with age-appropriate screening tools. Recommend follow-up with primary care physician and other specialist.    Please do not hesitate to contact us if you need any further information. Return to clinic 11/27/20 Or earlier if new symptoms    I have recommended that the patient follow CDC guidelines for prevention of COVID-19 infection.     [id number]           Electronically signed by Patricio Hirsch MD on 10/2/2020 at 12:55 PM

## 2020-10-02 NOTE — PROGRESS NOTES
MA Rooming Questions  Patient: Gaudencio Sharp  MRN: [id number]    Date: 10/2/2020        1. Do you have any new issues?   no         2. Do you need any refills on medications?    no    3. Have you had any imaging done since your last visit?   no    4. Have you been hospitalized or seen in the emergency room since your last visit here?   no    5. Did the patient have a depression screening completed today?  Yes    PHQ-9 Total Score: 0 (10/2/2020 12:22 PM)       PHQ-9 Given to (if applicable):               PHQ-9 Score (if applicable):                     [] Positive     []  Negative              Does question #9 need addressed (if applicable)                     [] Yes    []  No               Walt Lawrence MA

## 2020-10-04 RX ORDER — ASCORBIC ACID 250 MG
500 TABLET ORAL DAILY
Qty: 30 TABLET | Refills: 3 | Status: SHIPPED | OUTPATIENT
Start: 2020-10-04 | End: 2021-02-23 | Stop reason: SDUPTHER

## 2020-10-04 RX ORDER — FERROUS GLUCONATE 324(37.5)
324 TABLET ORAL DAILY
Qty: 30 TABLET | Refills: 5 | Status: SHIPPED | OUTPATIENT
Start: 2020-10-04 | End: 2021-04-27 | Stop reason: SDUPTHER

## 2020-10-06 ENCOUNTER — HOSPITAL ENCOUNTER (OUTPATIENT)
Dept: INFUSION THERAPY | Age: 70
Setting detail: INFUSION SERIES
Discharge: HOME | End: 2020-10-06
Payer: MEDICARE

## 2020-10-06 VITALS
RESPIRATION RATE: 14 BRPM | OXYGEN SATURATION: 97 % | TEMPERATURE: 98.8 F | HEART RATE: 79 BPM | SYSTOLIC BLOOD PRESSURE: 140 MMHG | DIASTOLIC BLOOD PRESSURE: 77 MMHG

## 2020-10-06 DIAGNOSIS — D80.1 HYPOGAMMAGLOBULINEMIA (HCC): ICD-10-CM

## 2020-10-06 DIAGNOSIS — C91.10 LEUKEMIA, LYMPHOCYTIC, CHRONIC (HCC): Primary | ICD-10-CM

## 2020-10-06 DIAGNOSIS — D80.3 SELECTIVE DEFICIENCY OF IGG (HCC): ICD-10-CM

## 2020-10-06 PROCEDURE — 99211 OFF/OP EST MAY X REQ PHY/QHP: CPT

## 2020-10-06 PROCEDURE — 96375 TX/PRO/DX INJ NEW DRUG ADDON: CPT

## 2020-10-06 PROCEDURE — 6370000000 HC RX 637 (ALT 250 FOR IP): Performed by: INTERNAL MEDICINE

## 2020-10-06 PROCEDURE — 2580000003 HC RX 258: Performed by: INTERNAL MEDICINE

## 2020-10-06 PROCEDURE — 96366 THER/PROPH/DIAG IV INF ADDON: CPT

## 2020-10-06 PROCEDURE — 6360000002 HC RX W HCPCS: Performed by: INTERNAL MEDICINE

## 2020-10-06 PROCEDURE — 96374 THER/PROPH/DIAG INJ IV PUSH: CPT

## 2020-10-06 PROCEDURE — 96365 THER/PROPH/DIAG IV INF INIT: CPT

## 2020-10-06 RX ORDER — HEPARIN SODIUM (PORCINE) LOCK FLUSH IV SOLN 100 UNIT/ML 100 UNIT/ML
500 SOLUTION INTRAVENOUS PRN
Status: CANCELLED | OUTPATIENT
Start: 2020-11-03

## 2020-10-06 RX ORDER — METHYLPREDNISOLONE SODIUM SUCCINATE 40 MG/ML
40 INJECTION, POWDER, LYOPHILIZED, FOR SOLUTION INTRAMUSCULAR; INTRAVENOUS ONCE
Status: CANCELLED
Start: 2020-11-03

## 2020-10-06 RX ORDER — ACETAMINOPHEN 325 MG/1
650 TABLET ORAL ONCE
Status: CANCELLED | OUTPATIENT
Start: 2020-11-03

## 2020-10-06 RX ORDER — ACETAMINOPHEN 325 MG/1
650 TABLET ORAL ONCE
Status: COMPLETED | OUTPATIENT
Start: 2020-10-06 | End: 2020-10-06

## 2020-10-06 RX ORDER — DIPHENHYDRAMINE HYDROCHLORIDE 50 MG/ML
25 INJECTION INTRAMUSCULAR; INTRAVENOUS ONCE
Status: COMPLETED | OUTPATIENT
Start: 2020-10-06 | End: 2020-10-06

## 2020-10-06 RX ORDER — DIPHENHYDRAMINE HYDROCHLORIDE 50 MG/ML
25 INJECTION INTRAMUSCULAR; INTRAVENOUS ONCE
Status: CANCELLED
Start: 2020-11-03

## 2020-10-06 RX ORDER — METHYLPREDNISOLONE SODIUM SUCCINATE 40 MG/ML
40 INJECTION, POWDER, LYOPHILIZED, FOR SOLUTION INTRAMUSCULAR; INTRAVENOUS ONCE
Status: COMPLETED | OUTPATIENT
Start: 2020-10-06 | End: 2020-10-06

## 2020-10-06 RX ORDER — HEPARIN SODIUM (PORCINE) LOCK FLUSH IV SOLN 100 UNIT/ML 100 UNIT/ML
500 SOLUTION INTRAVENOUS PRN
Status: DISCONTINUED | OUTPATIENT
Start: 2020-10-06 | End: 2020-10-07 | Stop reason: HOSPADM

## 2020-10-06 RX ORDER — SODIUM CHLORIDE 0.9 % (FLUSH) 0.9 %
10 SYRINGE (ML) INJECTION PRN
Status: DISCONTINUED | OUTPATIENT
Start: 2020-10-06 | End: 2020-10-07 | Stop reason: HOSPADM

## 2020-10-06 RX ORDER — SODIUM CHLORIDE 0.9 % (FLUSH) 0.9 %
10 SYRINGE (ML) INJECTION PRN
Status: CANCELLED | OUTPATIENT
Start: 2020-11-03

## 2020-10-06 RX ADMIN — ACETAMINOPHEN 650 MG: 325 TABLET ORAL at 07:57

## 2020-10-06 RX ADMIN — DIPHENHYDRAMINE HYDROCHLORIDE 25 MG: 50 INJECTION INTRAMUSCULAR; INTRAVENOUS at 07:57

## 2020-10-06 RX ADMIN — METHYLPREDNISOLONE SODIUM SUCCINATE 40 MG: 40 INJECTION, POWDER, FOR SOLUTION INTRAMUSCULAR; INTRAVENOUS at 07:57

## 2020-10-06 RX ADMIN — SODIUM CHLORIDE, PRESERVATIVE FREE 10 ML: 5 INJECTION INTRAVENOUS at 11:22

## 2020-10-06 RX ADMIN — SODIUM CHLORIDE, PRESERVATIVE FREE 10 ML: 5 INJECTION INTRAVENOUS at 07:59

## 2020-10-06 RX ADMIN — Medication 500 UNITS: at 11:22

## 2020-10-06 RX ADMIN — IMMUNE GLOBULIN INTRAVENOUS (HUMAN) 20 G: 5 INJECTION, SOLUTION INTRAVENOUS at 08:45

## 2020-10-06 ASSESSMENT — PAIN SCALES - GENERAL: PAINLEVEL_OUTOF10: 0

## 2020-10-06 NOTE — PROGRESS NOTES
Prior to discharge, the After Visit Summary Discharge Instructions were reviewed with the patient. He was offered a printed version of the AVS, but declined the offer. Recurrent appointment, pt tolerated infusion well. Pt discharged home. Pt to exit via steady gait.     Orders Placed This Encounter   Medications    acetaminophen (TYLENOL) tablet 650 mg    immune globulin (FLEBOGAMMA) 10% solution 20 g    sodium chloride flush 0.9 % injection 10 mL    heparin flush 100 UNIT/ML injection 500 Units    diphenhydrAMINE (BENADRYL) injection 25 mg    methylPREDNISolone sodium (SOLU-MEDROL) injection 40 mg

## 2020-10-27 ENCOUNTER — HOSPITAL ENCOUNTER (OUTPATIENT)
Age: 70
Discharge: HOME | End: 2020-10-27
Payer: MEDICARE

## 2020-10-27 PROCEDURE — 36415 COLL VENOUS BLD VENIPUNCTURE: CPT

## 2020-10-27 PROCEDURE — 84154 ASSAY OF PSA FREE: CPT

## 2020-10-27 PROCEDURE — 84153 ASSAY OF PSA TOTAL: CPT

## 2020-10-28 LAB
PROSTATE SPECIFIC ANTIGEN FREE: 0.3 NG/ML
PROSTATE SPECIFIC ANTIGEN PERCENT FREE: 16 %
PROSTATE SPECIFIC ANTIGEN: 1.9 NG/ML (ref 0–4)

## 2020-11-03 ENCOUNTER — HOSPITAL ENCOUNTER (OUTPATIENT)
Dept: INFUSION THERAPY | Age: 70
Setting detail: INFUSION SERIES
Discharge: HOME | End: 2020-11-03
Payer: MEDICARE

## 2020-11-03 VITALS
HEART RATE: 78 BPM | RESPIRATION RATE: 16 BRPM | DIASTOLIC BLOOD PRESSURE: 85 MMHG | OXYGEN SATURATION: 97 % | TEMPERATURE: 98.4 F | SYSTOLIC BLOOD PRESSURE: 138 MMHG

## 2020-11-03 DIAGNOSIS — D80.3 SELECTIVE DEFICIENCY OF IGG (HCC): ICD-10-CM

## 2020-11-03 DIAGNOSIS — D80.1 HYPOGAMMAGLOBULINEMIA (HCC): ICD-10-CM

## 2020-11-03 DIAGNOSIS — C91.10 LEUKEMIA, LYMPHOCYTIC, CHRONIC (HCC): Primary | ICD-10-CM

## 2020-11-03 PROCEDURE — 6360000002 HC RX W HCPCS: Performed by: INTERNAL MEDICINE

## 2020-11-03 PROCEDURE — 96375 TX/PRO/DX INJ NEW DRUG ADDON: CPT

## 2020-11-03 PROCEDURE — 96374 THER/PROPH/DIAG INJ IV PUSH: CPT

## 2020-11-03 PROCEDURE — 6370000000 HC RX 637 (ALT 250 FOR IP): Performed by: INTERNAL MEDICINE

## 2020-11-03 PROCEDURE — 99211 OFF/OP EST MAY X REQ PHY/QHP: CPT

## 2020-11-03 PROCEDURE — 96365 THER/PROPH/DIAG IV INF INIT: CPT

## 2020-11-03 PROCEDURE — 2580000003 HC RX 258: Performed by: INTERNAL MEDICINE

## 2020-11-03 PROCEDURE — 96366 THER/PROPH/DIAG IV INF ADDON: CPT

## 2020-11-03 RX ORDER — SODIUM CHLORIDE 0.9 % (FLUSH) 0.9 %
10 SYRINGE (ML) INJECTION PRN
Status: DISCONTINUED | OUTPATIENT
Start: 2020-11-03 | End: 2020-11-04 | Stop reason: HOSPADM

## 2020-11-03 RX ORDER — METHYLPREDNISOLONE SODIUM SUCCINATE 40 MG/ML
40 INJECTION, POWDER, LYOPHILIZED, FOR SOLUTION INTRAMUSCULAR; INTRAVENOUS ONCE
Status: CANCELLED
Start: 2020-12-01

## 2020-11-03 RX ORDER — HEPARIN SODIUM (PORCINE) LOCK FLUSH IV SOLN 100 UNIT/ML 100 UNIT/ML
500 SOLUTION INTRAVENOUS PRN
Status: CANCELLED | OUTPATIENT
Start: 2020-12-01

## 2020-11-03 RX ORDER — ACETAMINOPHEN 325 MG/1
650 TABLET ORAL ONCE
Status: CANCELLED | OUTPATIENT
Start: 2020-12-01

## 2020-11-03 RX ORDER — SODIUM CHLORIDE 0.9 % (FLUSH) 0.9 %
10 SYRINGE (ML) INJECTION PRN
Status: CANCELLED | OUTPATIENT
Start: 2020-12-01

## 2020-11-03 RX ORDER — DIPHENHYDRAMINE HYDROCHLORIDE 50 MG/ML
25 INJECTION INTRAMUSCULAR; INTRAVENOUS ONCE
Status: CANCELLED
Start: 2020-12-01

## 2020-11-03 RX ORDER — HEPARIN SODIUM (PORCINE) LOCK FLUSH IV SOLN 100 UNIT/ML 100 UNIT/ML
500 SOLUTION INTRAVENOUS PRN
Status: DISCONTINUED | OUTPATIENT
Start: 2020-11-03 | End: 2020-11-04 | Stop reason: HOSPADM

## 2020-11-03 RX ORDER — ACETAMINOPHEN 325 MG/1
650 TABLET ORAL ONCE
Status: COMPLETED | OUTPATIENT
Start: 2020-11-03 | End: 2020-11-03

## 2020-11-03 RX ORDER — DIPHENHYDRAMINE HYDROCHLORIDE 50 MG/ML
25 INJECTION INTRAMUSCULAR; INTRAVENOUS ONCE
Status: COMPLETED | OUTPATIENT
Start: 2020-11-03 | End: 2020-11-03

## 2020-11-03 RX ORDER — METHYLPREDNISOLONE SODIUM SUCCINATE 40 MG/ML
40 INJECTION, POWDER, LYOPHILIZED, FOR SOLUTION INTRAMUSCULAR; INTRAVENOUS ONCE
Status: COMPLETED | OUTPATIENT
Start: 2020-11-03 | End: 2020-11-03

## 2020-11-03 RX ADMIN — SODIUM CHLORIDE, PRESERVATIVE FREE 10 ML: 5 INJECTION INTRAVENOUS at 08:23

## 2020-11-03 RX ADMIN — ACETAMINOPHEN 650 MG: 325 TABLET ORAL at 08:22

## 2020-11-03 RX ADMIN — SODIUM CHLORIDE, PRESERVATIVE FREE 10 ML: 5 INJECTION INTRAVENOUS at 08:27

## 2020-11-03 RX ADMIN — DIPHENHYDRAMINE HYDROCHLORIDE 25 MG: 50 INJECTION INTRAMUSCULAR; INTRAVENOUS at 08:22

## 2020-11-03 RX ADMIN — SODIUM CHLORIDE, PRESERVATIVE FREE 10 ML: 5 INJECTION INTRAVENOUS at 10:34

## 2020-11-03 RX ADMIN — METHYLPREDNISOLONE SODIUM SUCCINATE 40 MG: 40 INJECTION, POWDER, FOR SOLUTION INTRAMUSCULAR; INTRAVENOUS at 08:22

## 2020-11-03 RX ADMIN — SODIUM CHLORIDE, PRESERVATIVE FREE 10 ML: 5 INJECTION INTRAVENOUS at 08:21

## 2020-11-03 RX ADMIN — Medication 500 UNITS: at 10:35

## 2020-11-03 RX ADMIN — IMMUNE GLOBULIN INTRAVENOUS (HUMAN) 20 G: 5 INJECTION, SOLUTION INTRAVENOUS at 08:22

## 2020-11-03 ASSESSMENT — PAIN SCALES - GENERAL
PAINLEVEL_OUTOF10: 0
PAINLEVEL_OUTOF10: 0

## 2020-12-01 ENCOUNTER — HOSPITAL ENCOUNTER (OUTPATIENT)
Dept: INFUSION THERAPY | Age: 70
Setting detail: INFUSION SERIES
Discharge: HOME | End: 2020-12-01
Payer: MEDICARE

## 2020-12-01 VITALS
TEMPERATURE: 97.7 F | SYSTOLIC BLOOD PRESSURE: 141 MMHG | OXYGEN SATURATION: 98 % | DIASTOLIC BLOOD PRESSURE: 73 MMHG | RESPIRATION RATE: 16 BRPM | HEART RATE: 88 BPM

## 2020-12-01 DIAGNOSIS — C91.10 LEUKEMIA, LYMPHOCYTIC, CHRONIC (HCC): Primary | ICD-10-CM

## 2020-12-01 DIAGNOSIS — D80.1 HYPOGAMMAGLOBULINEMIA (HCC): ICD-10-CM

## 2020-12-01 DIAGNOSIS — D80.3 SELECTIVE DEFICIENCY OF IGG (HCC): ICD-10-CM

## 2020-12-01 PROCEDURE — 6370000000 HC RX 637 (ALT 250 FOR IP): Performed by: INTERNAL MEDICINE

## 2020-12-01 PROCEDURE — 6360000002 HC RX W HCPCS: Performed by: INTERNAL MEDICINE

## 2020-12-01 PROCEDURE — 96366 THER/PROPH/DIAG IV INF ADDON: CPT

## 2020-12-01 PROCEDURE — 2580000003 HC RX 258: Performed by: INTERNAL MEDICINE

## 2020-12-01 PROCEDURE — 99211 OFF/OP EST MAY X REQ PHY/QHP: CPT

## 2020-12-01 PROCEDURE — 96375 TX/PRO/DX INJ NEW DRUG ADDON: CPT

## 2020-12-01 PROCEDURE — 96374 THER/PROPH/DIAG INJ IV PUSH: CPT

## 2020-12-01 PROCEDURE — 96365 THER/PROPH/DIAG IV INF INIT: CPT

## 2020-12-01 RX ORDER — HEPARIN SODIUM (PORCINE) LOCK FLUSH IV SOLN 100 UNIT/ML 100 UNIT/ML
500 SOLUTION INTRAVENOUS PRN
Status: CANCELLED | OUTPATIENT
Start: 2020-12-29

## 2020-12-01 RX ORDER — METHYLPREDNISOLONE SODIUM SUCCINATE 40 MG/ML
40 INJECTION, POWDER, LYOPHILIZED, FOR SOLUTION INTRAMUSCULAR; INTRAVENOUS ONCE
Status: CANCELLED
Start: 2020-12-29

## 2020-12-01 RX ORDER — METHYLPREDNISOLONE SODIUM SUCCINATE 40 MG/ML
40 INJECTION, POWDER, LYOPHILIZED, FOR SOLUTION INTRAMUSCULAR; INTRAVENOUS ONCE
Status: COMPLETED | OUTPATIENT
Start: 2020-12-01 | End: 2020-12-01

## 2020-12-01 RX ORDER — SODIUM CHLORIDE 0.9 % (FLUSH) 0.9 %
10 SYRINGE (ML) INJECTION PRN
Status: CANCELLED | OUTPATIENT
Start: 2020-12-29

## 2020-12-01 RX ORDER — ACETAMINOPHEN 325 MG/1
650 TABLET ORAL ONCE
Status: COMPLETED | OUTPATIENT
Start: 2020-12-01 | End: 2020-12-01

## 2020-12-01 RX ORDER — SODIUM CHLORIDE 0.9 % (FLUSH) 0.9 %
10 SYRINGE (ML) INJECTION PRN
Status: DISCONTINUED | OUTPATIENT
Start: 2020-12-01 | End: 2020-12-02 | Stop reason: HOSPADM

## 2020-12-01 RX ORDER — DIPHENHYDRAMINE HYDROCHLORIDE 50 MG/ML
25 INJECTION INTRAMUSCULAR; INTRAVENOUS ONCE
Status: COMPLETED | OUTPATIENT
Start: 2020-12-01 | End: 2020-12-01

## 2020-12-01 RX ORDER — DIPHENHYDRAMINE HYDROCHLORIDE 50 MG/ML
25 INJECTION INTRAMUSCULAR; INTRAVENOUS ONCE
Status: CANCELLED
Start: 2020-12-29

## 2020-12-01 RX ORDER — HEPARIN SODIUM (PORCINE) LOCK FLUSH IV SOLN 100 UNIT/ML 100 UNIT/ML
500 SOLUTION INTRAVENOUS PRN
Status: DISCONTINUED | OUTPATIENT
Start: 2020-12-01 | End: 2020-12-02 | Stop reason: HOSPADM

## 2020-12-01 RX ORDER — ACETAMINOPHEN 325 MG/1
650 TABLET ORAL ONCE
Status: CANCELLED | OUTPATIENT
Start: 2020-12-29

## 2020-12-01 RX ADMIN — METHYLPREDNISOLONE SODIUM SUCCINATE 40 MG: 40 INJECTION, POWDER, FOR SOLUTION INTRAMUSCULAR; INTRAVENOUS at 08:00

## 2020-12-01 RX ADMIN — IMMUNE GLOBULIN INTRAVENOUS (HUMAN) 20 G: 5 INJECTION, SOLUTION INTRAVENOUS at 08:19

## 2020-12-01 RX ADMIN — DIPHENHYDRAMINE HYDROCHLORIDE 25 MG: 50 INJECTION INTRAMUSCULAR; INTRAVENOUS at 08:00

## 2020-12-01 RX ADMIN — SODIUM CHLORIDE, PRESERVATIVE FREE 10 ML: 5 INJECTION INTRAVENOUS at 08:00

## 2020-12-01 RX ADMIN — SODIUM CHLORIDE, PRESERVATIVE FREE 10 ML: 5 INJECTION INTRAVENOUS at 10:24

## 2020-12-01 RX ADMIN — SODIUM CHLORIDE, PRESERVATIVE FREE 10 ML: 5 INJECTION INTRAVENOUS at 07:55

## 2020-12-01 RX ADMIN — ACETAMINOPHEN 650 MG: 325 TABLET ORAL at 08:04

## 2020-12-01 RX ADMIN — SODIUM CHLORIDE, PRESERVATIVE FREE 10 ML: 5 INJECTION INTRAVENOUS at 10:25

## 2020-12-01 RX ADMIN — Medication 500 UNITS: at 10:25

## 2020-12-01 ASSESSMENT — PAIN SCALES - GENERAL: PAINLEVEL_OUTOF10: 0

## 2020-12-01 NOTE — DISCHARGE SUMMARY
Tolerated IVIG well. Reviewed discharge instructions, understanding verbalized. Copies of AVS given to take home. Patient discharged home. Down to exit per self.     Orders Placed This Encounter   Medications    acetaminophen (TYLENOL) tablet 650 mg    immune globulin (FLEBOGAMMA) 10% solution 20 g    sodium chloride flush 0.9 % injection 10 mL    heparin flush 100 UNIT/ML injection 500 Units    diphenhydrAMINE (BENADRYL) injection 25 mg    methylPREDNISolone sodium (SOLU-MEDROL) injection 40 mg

## 2020-12-03 ENCOUNTER — HOSPITAL ENCOUNTER (OUTPATIENT)
Dept: INFUSION THERAPY | Age: 70
Discharge: HOME | End: 2020-12-03
Payer: MEDICARE

## 2020-12-03 ENCOUNTER — OFFICE VISIT (OUTPATIENT)
Dept: ONCOLOGY | Age: 70
End: 2020-12-03
Payer: MEDICARE

## 2020-12-03 VITALS
OXYGEN SATURATION: 96 % | SYSTOLIC BLOOD PRESSURE: 131 MMHG | DIASTOLIC BLOOD PRESSURE: 69 MMHG | WEIGHT: 200 LBS | HEIGHT: 72 IN | HEART RATE: 86 BPM | RESPIRATION RATE: 18 BRPM | BODY MASS INDEX: 27.09 KG/M2

## 2020-12-03 DIAGNOSIS — D70.9 NEUTROPENIA, UNSPECIFIED TYPE (HCC): ICD-10-CM

## 2020-12-03 DIAGNOSIS — K90.9 INTESTINAL MALABSORPTION, UNSPECIFIED TYPE: ICD-10-CM

## 2020-12-03 DIAGNOSIS — D50.8 OTHER IRON DEFICIENCY ANEMIAS: ICD-10-CM

## 2020-12-03 DIAGNOSIS — D80.1 HYPOGAMMAGLOBULINEMIA (HCC): ICD-10-CM

## 2020-12-03 LAB
ALBUMIN SERPL-MCNC: 4.1 GM/DL (ref 3.4–5)
ALP BLD-CCNC: 72 IU/L (ref 40–129)
ALT SERPL-CCNC: 15 U/L (ref 10–40)
ANION GAP SERPL CALCULATED.3IONS-SCNC: 12 MMOL/L (ref 4–16)
AST SERPL-CCNC: 14 IU/L (ref 15–37)
BASOPHILS ABSOLUTE: 0 K/CU MM
BASOPHILS RELATIVE PERCENT: 0 % (ref 0–1)
BILIRUB SERPL-MCNC: 0.4 MG/DL (ref 0–1)
BUN BLDV-MCNC: 17 MG/DL (ref 6–23)
CALCIUM SERPL-MCNC: 8.5 MG/DL (ref 8.3–10.6)
CHLORIDE BLD-SCNC: 102 MMOL/L (ref 99–110)
CO2: 24 MMOL/L (ref 21–32)
CREAT SERPL-MCNC: 1 MG/DL (ref 0.9–1.3)
DIFFERENTIAL TYPE: ABNORMAL
EOSINOPHILS ABSOLUTE: 0 K/CU MM
EOSINOPHILS RELATIVE PERCENT: 0.1 % (ref 0–3)
FERRITIN: 12 NG/ML (ref 30–400)
FOLATE: 8.5 NG/ML (ref 3.1–17.5)
GFR AFRICAN AMERICAN: >60 ML/MIN/1.73M2
GFR NON-AFRICAN AMERICAN: >60 ML/MIN/1.73M2
GLUCOSE BLD-MCNC: 273 MG/DL (ref 70–99)
HCT VFR BLD CALC: 34.9 % (ref 42–52)
HEMOGLOBIN: 11 GM/DL (ref 13.5–18)
IRON: 78 UG/DL (ref 59–158)
LYMPHOCYTES ABSOLUTE: 17.4 K/CU MM
LYMPHOCYTES RELATIVE PERCENT: 81.8 % (ref 24–44)
MCH RBC QN AUTO: 27.6 PG (ref 27–31)
MCHC RBC AUTO-ENTMCNC: 31.5 % (ref 32–36)
MCV RBC AUTO: 87.5 FL (ref 78–100)
MONOCYTES ABSOLUTE: 0.5 K/CU MM
MONOCYTES RELATIVE PERCENT: 2.4 % (ref 0–4)
PCT TRANSFERRIN: 20 % (ref 10–44)
PDW BLD-RTO: 17.9 % (ref 11.7–14.9)
PLATELET # BLD: 121 K/CU MM (ref 140–440)
PMV BLD AUTO: 10.7 FL (ref 7.5–11.1)
POTASSIUM SERPL-SCNC: 4.4 MMOL/L (ref 3.5–5.1)
RBC # BLD: 3.99 M/CU MM (ref 4.6–6.2)
SEGMENTED NEUTROPHILS ABSOLUTE COUNT: 3.3 K/CU MM
SEGMENTED NEUTROPHILS RELATIVE PERCENT: 15.7 % (ref 36–66)
SODIUM BLD-SCNC: 138 MMOL/L (ref 135–145)
TOTAL IRON BINDING CAPACITY: 383 UG/DL (ref 250–450)
TOTAL PROTEIN: 6.1 GM/DL (ref 6.4–8.2)
UNSATURATED IRON BINDING CAPACITY: 305 UG/DL (ref 110–370)
VITAMIN B-12: 348.9 PG/ML (ref 211–911)
WBC # BLD: 21.2 K/CU MM (ref 4–10.5)

## 2020-12-03 PROCEDURE — 99214 OFFICE O/P EST MOD 30 MIN: CPT | Performed by: INTERNAL MEDICINE

## 2020-12-03 PROCEDURE — 85025 COMPLETE CBC W/AUTO DIFF WBC: CPT

## 2020-12-03 PROCEDURE — 1036F TOBACCO NON-USER: CPT | Performed by: INTERNAL MEDICINE

## 2020-12-03 PROCEDURE — 83550 IRON BINDING TEST: CPT

## 2020-12-03 PROCEDURE — 3017F COLORECTAL CA SCREEN DOC REV: CPT | Performed by: INTERNAL MEDICINE

## 2020-12-03 PROCEDURE — G8417 CALC BMI ABV UP PARAM F/U: HCPCS | Performed by: INTERNAL MEDICINE

## 2020-12-03 PROCEDURE — 4040F PNEUMOC VAC/ADMIN/RCVD: CPT | Performed by: INTERNAL MEDICINE

## 2020-12-03 PROCEDURE — 80053 COMPREHEN METABOLIC PANEL: CPT

## 2020-12-03 PROCEDURE — 99211 OFF/OP EST MAY X REQ PHY/QHP: CPT

## 2020-12-03 PROCEDURE — 83540 ASSAY OF IRON: CPT

## 2020-12-03 PROCEDURE — 82746 ASSAY OF FOLIC ACID SERUM: CPT

## 2020-12-03 PROCEDURE — 1123F ACP DISCUSS/DSCN MKR DOCD: CPT | Performed by: INTERNAL MEDICINE

## 2020-12-03 PROCEDURE — 82728 ASSAY OF FERRITIN: CPT

## 2020-12-03 PROCEDURE — 36415 COLL VENOUS BLD VENIPUNCTURE: CPT

## 2020-12-03 PROCEDURE — 82607 VITAMIN B-12: CPT

## 2020-12-03 PROCEDURE — G8427 DOCREV CUR MEDS BY ELIG CLIN: HCPCS | Performed by: INTERNAL MEDICINE

## 2020-12-03 PROCEDURE — G8484 FLU IMMUNIZE NO ADMIN: HCPCS | Performed by: INTERNAL MEDICINE

## 2020-12-03 RX ORDER — SODIUM CHLORIDE 0.9 % (FLUSH) 0.9 %
5 SYRINGE (ML) INJECTION PRN
Status: CANCELLED | OUTPATIENT
Start: 2020-12-10

## 2020-12-03 RX ORDER — SODIUM CHLORIDE 9 MG/ML
INJECTION, SOLUTION INTRAVENOUS CONTINUOUS
Status: CANCELLED | OUTPATIENT
Start: 2020-12-10

## 2020-12-03 RX ORDER — EPINEPHRINE 1 MG/ML
0.3 INJECTION, SOLUTION, CONCENTRATE INTRAVENOUS PRN
Status: CANCELLED | OUTPATIENT
Start: 2020-12-10

## 2020-12-03 RX ORDER — DIPHENHYDRAMINE HYDROCHLORIDE 50 MG/ML
50 INJECTION INTRAMUSCULAR; INTRAVENOUS ONCE
Status: CANCELLED | OUTPATIENT
Start: 2020-12-10

## 2020-12-03 RX ORDER — CYANOCOBALAMIN 1000 UG/ML
1000 INJECTION INTRAMUSCULAR; SUBCUTANEOUS ONCE
Status: CANCELLED
Start: 2020-12-10

## 2020-12-03 RX ORDER — HEPARIN SODIUM (PORCINE) LOCK FLUSH IV SOLN 100 UNIT/ML 100 UNIT/ML
500 SOLUTION INTRAVENOUS PRN
Status: CANCELLED | OUTPATIENT
Start: 2020-12-10

## 2020-12-03 RX ORDER — METHYLPREDNISOLONE SODIUM SUCCINATE 125 MG/2ML
125 INJECTION, POWDER, LYOPHILIZED, FOR SOLUTION INTRAMUSCULAR; INTRAVENOUS ONCE
Status: CANCELLED | OUTPATIENT
Start: 2020-12-10

## 2020-12-03 RX ORDER — SODIUM CHLORIDE 0.9 % (FLUSH) 0.9 %
10 SYRINGE (ML) INJECTION PRN
Status: CANCELLED | OUTPATIENT
Start: 2020-12-10

## 2020-12-03 ASSESSMENT — PATIENT HEALTH QUESTIONNAIRE - PHQ9
2. FEELING DOWN, DEPRESSED OR HOPELESS: 0
SUM OF ALL RESPONSES TO PHQ QUESTIONS 1-9: 0
SUM OF ALL RESPONSES TO PHQ9 QUESTIONS 1 & 2: 0
1. LITTLE INTEREST OR PLEASURE IN DOING THINGS: 0
SUM OF ALL RESPONSES TO PHQ QUESTIONS 1-9: 0
SUM OF ALL RESPONSES TO PHQ QUESTIONS 1-9: 0

## 2020-12-03 NOTE — PROGRESS NOTES
Patient Name: Munir Stephens  Patient : 1950  Patient MRN: [id number]     Primary Oncologist: Catracho Jett MD  Referring Physician: Thelma Plata MD       Date of Service: 12/3/2020      Chief Complaint:   Chief Complaint   Patient presents with    Follow-up        Active Problem list  1. CLL (chronic lymphocytic leukemia) ([facility] [address]    2. Hypogammaglobulinemia (HCC)           HPI:        1. Mr. Betty Berger ( 52) was diagnosed with stage I CLL in , when he presented with infection and lymphocytosis. His peripheral blood immunophenotyping was characteristic of B-cell CLL. It was CD38 negative, ZAP-70 positive, CD19 and 20 positive, CD5 positive. Karyotyping was normal in 2006.,  2. Because of his infection and associated acquired immune deficiency(hypogammaglobulinemia), he was given IVIG for a year. He was started back on IVIG therapy monthly since 2013 to prevent future major infections continuing for now. Also had minor Infusional reaction to IVIG in 2016 responded to Steroids & Benadryl., No further problems after that. 3. CLL was treated only with Leukeran intermittently from 2007 until 2011 and again from 2011 until 2012. 4. 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 Flow studies still showed the same and FISH in 2012 showed deletion of tp53 (17p) and MYB (6q). ,  5. He was thus treated with Fludara and Rituxan for six months between April and 2012 with excellent clinical and hematological response.  . In 2013, he developed Multiple b/l nodes in axilla and groin area, CAT scan on 13, Bulky lymphadenopathy within the chest, abdomen, and pelvis mildly increased as compared from previous CAT scan in March 2012.,  6. Starting in Jan 2014 he was initiated on 2nd line chemo with Bendamustine and Rituxan with good initial response. Continued till Nov 2014, CAT scan done on 8/14/14 showed an interval decrease in generalized adenopathy compared to previous study In December 2013, suggesting good response to therapy.,  7. CAT scan done on 12/23/14 showed Minimal interval increase in size of at least two periportal and retrocaval lymph nodes. Otherwise stable thoracic, abdominal and pelvic adenopathy. Also mild interval increase in splenomegaly measuring 19 cm, previously 17 cm. 8. CAT scan done on 6/25/15 showed mild interval decrease in the splenomegaly and also in retroperitoneal mesenteric lymph nodes. 9. Started Idelalisib [Zydelig] on 20th Jan 2015 at 150mg po bid. with good initial response. He did remarkably well with Zydelig from January of 2015 until June of 2016, after he was hospitalized for Rhinovirus Pneumonia in May of 2016. Also discussed Living Will, Power of [facility], DNR status, et cetera. In April 2016, chest CT showed stable  10. , 13. In July 2016, His FISH testing on peripheral blood showed abnormal results, with 6q deletion, 17p deletion, and 11q centromere signal in 3 percent of cells. The 17p deletion (Tp53) in 80 percent of cells is associated with unfavorable prognosis. Because of his CLL with poor prognostic markers, including 17p deletion detected in 2012 & again in July of 2016. 11. He was started on ibrutinib in September 2016 140mg/d increased to 280 mg/ after 4 weeks. 12. The abdominal CT 7/5/16 is showing no acute abnormalities, stable splenomegaly, and one periportal lymph node measuring 2.2 by 3.7 cm which is unchanged from before. 13. CAT scan of the chest August 5, 2016, showed axillary, mediastinal, hilar, and upper abdominal lymphadenopathy consistent with his CLL. The maximum size of the nodes is 2.4 cm.  A 1 cm infiltrate in the lateral segment of the right middle lobe possibly infectious in nature, segmental calcification of left coronary artery. CAT scan of the chest, abdomen, and pelvis December 7,2016 2016, which revealed a mild mediastinal right hilar adenopathy, decreased in size from the previous examination of April and August of 2016, .5. His ibrutinib was stopped in December 2016 when he was feeling bad and it was started back at one a day, 140 mg daily instead of 280 mg that he was taking prior to that  14. In January 2017, he developed progressive lump in his left inguinal area. That being the only area of progressive adenopathy with multiple nodes coalesced together, a question of Pimentels transformation versus more aggressive histology conversion was considered as a possibility. 13. Dr. Juan Samuel, did a biopsy of the lymph node on January 18, 2017. The final pathology was reported as B-cell small lymphocytic lymphoma (B-cell CLL/SLL), with no evidence of any large cell OR Pimentels transformation. There was some evidence of localized herpetic simplex lymphadenitis. 16. Because of possibility of localized infection with herpes. I started him on Valtrex 500 t.i.d. for two to three weeks then tapered to 1/d as maintenance therapy. Lymphadenopathy in left groin almost completely resolved. 17. His ibrutinib was stopped in December 2016 when he was feeling bad and it was started back at one a day, 140 mg daily as maintenance dose instead of 280 mg that he was taking prior to that. 18. His quantitative immunoglobulin on February 17, 2017, IgG 600, IgA less than 10, IgM less than 4. Serum protein electrophoresis was within the normal range. Gammaglobulin was continued monthly since it has helped any serious infections under control. 19. August 2017 he had a IgG that was 503  20. October 2017 started a ibrutinib he was off for 2 months due to his insurance issues  5-18 CT chest: Showed some mild decrease in mediastinal and hilar lymphadenopathy. Gaetano Vicente Regards to his abdomen also there is decrease in his lymphadenopathy. Although there is a left inguinal node that has enlarged. Section that no obvious source of his abdominal cramping. 10/10/2018 EGD showed severe ulcerative esophagitis with slight narrowing in the GE junction small hiatal hernia mild superficial gastritis, Colonoscope revealed 2 mm polyp in the sigmoid colon, diverticulosis, hemorrhoids, moderate stool  1/2019; Colonoscopy with two diminutive polyps, diverticulosis and hemorrhoids, path with TA above IC valve and HP distal sigmoid  10-19 iron def based on labs refered to GI , stool occult negative x #3 , NO PICA   11-19 saw Dr. Tate Cheney, [facility]  11/7/2019: Ct chest:Ground-glass nodule seen within the right upper lobe the largest measuring 17  mm in diameter. 12-6-19 capsule endoscopy, results unavailable   2/2020: CT chest:IMPRESSION:  Previously noted ground-glass opacities in the right lung have resolved. However, there is a new cluster of tiny pulmonary nodules in the left lower  lobe which could represent an infectious or inflammatory etiology. Short-term follow-up chest CT is recommended in 3-6 months. Multiple additional tiny pulmonary nodules are overall stable. Stable mediastinal lymph nodes without new lymphadenopathy. Previous Therapies    May 2020: Ct chest:  1. Interval resolution of the previously described cluster of pulmonary    nodules in the left lower lobe.  Findings are most compatible with resolved    infectious/inflammatory etiology. 2. No acute cardiopulmonary disease. 3. COPD. 4. Stable subcentimeter mediastinal lymph nodes. 8/28/20: US RP normal    August 2020 cystoscopy revealed enlarged prostate. Was Recommended TURBT      PAST MEDICAL HISTORY:   1. Stage I CLL with conversion from good to poor prognostic factors. ,  2. History of hypertension for many years. ,  3. 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 Arthritis in the past. ,  5. Frozen shoulder for which he had treatment including injection by Dr. Sharri Willard. ,  6. Cellulitis with Zoster type lesion Left thigh resolved with Bactrim and Valtrex in 2016.,  7. abdominal illness, with fever, nausea, and discomfort, suggestive of infectious etiology in May 2016. ,  8. hospitalization between  and  for what seems like atypical rhinovirus pneumonia involving right middle and lower lobe and left lower lobe with sepsis,  9. 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 Dr. Amelia Mccarthy is referring him for [facility] surgery. ,  10. hospitalized from  to 2017, for hyperosmolar hyperglycemia with hyponatremia and hyperkalemia and UTI. He was seen by Dr. Jin Graves, who put him on insulin. He was also seen by Dr. Jay Madden. He felt much better after his sugar got under better control and electrolyte imbalanced reversed,  11. Ultrasound Doppler 2017, was negative for any DVT in either leg. Chest x-ray showed no acute process. PAST SURGICAL HISTORY:   1. knee operation,  2. tonsillectomy,  3. broken ankle times two requiring open reduction. ,  4. Vision better after cataract surgery    FAMILY HISTORY:   His paternal aunt had colon cancer. Uncle also had some form of cancer. Father had heart disease. Sister  12 of Liver disease     SOCIAL HISTORY:   He has a 40-pack-year history of smoking, quit in . He denies alcohol use. He is not . Has one son. Interval History  12/3/2020: Arrived alone to the clinic today. Overall feels good. No fever, night sweats, lad, weight loss. Appetite is preserved. No frequent infections. Tolerating Ibrutinib fairly well. Cramps resolved after he started taking magnesium. No overt bleeding or any rash. No chest pain, increased sob, palpitations or any dizziness.  No abdominal pain, nausea, emesis, diarrhea or any constipation. No fatigue.      Review of Systems   Per interval history; otherwise 10 point ROS is negative              Vital Signs:  /69 (Site: Right Upper Arm, Position: Sitting, Cuff Size: Medium Adult)   Pulse 86   Resp 18   Ht 6' (1.829 m)   Wt 200 lb (90.7 kg)   SpO2 96%   BMI 27.12 kg/m²     Physical Exam:  CONSTITUTIONAL: alert and awake  EYES: No palor or any icetrus   ENT: ATNC   NECK: No JVD   HEMATOLOGIC/LYMPHATIC: no cervical, supraclavicular or axillary lymphadenopathy   LUNGS:CTAB   CARDIOVASCULAR:s1s2 SM+ rrr   ABDOMEN:soft ntnd bs pos  NEUROLOGIC: GI   SKIN: skin tags   EXTREMITIES: no LE edema bilaterally      Labs:    Hematology:  Lab Results   Component Value Date    WBC 21.2 (H) 12/03/2020    RBC 3.99 (L) 12/03/2020    HGB 11.0 (L) 12/03/2020    HCT 34.9 (L) 12/03/2020    MCV 87.5 12/03/2020    MCH 27.6 12/03/2020    MCHC 31.5 (L) 12/03/2020    RDW 17.9 (H) 12/03/2020     (L) 12/03/2020    MPV 10.7 12/03/2020    BANDSPCT 6 08/04/2020    SEGSPCT 15.7 (L) 12/03/2020    EOSRELPCT 0.1 12/03/2020    BASOPCT 0.0 12/03/2020    LYMPHOPCT 81.8 (H) 12/03/2020    MONOPCT 2.4 12/03/2020    BANDABS 1.76 08/04/2020    SEGSABS 3.3 12/03/2020    EOSABS 0.0 12/03/2020    BASOSABS 0.0 12/03/2020    LYMPHSABS 17.4 12/03/2020    MONOSABS 0.5 12/03/2020    DIFFTYPE AUTOMATED DIFFERENTIAL 12/03/2020    ANISOCYTOSIS 1+ 08/04/2020    POLYCHROM 1+ 08/04/2020    WBCMORP ATYPICAL LYMPHOCYTES WITH PROMINENT NUCLEOLI NOTED 09/26/2017    PLTM SEVERAL LARGE PLATELETS 38/28/4800     Lab Results   Component Value Date    ESR 72 (H) 01/24/2017       Chemistry:  Lab Results   Component Value Date     10/02/2020    K 4.2 10/02/2020     10/02/2020    CO2 24 10/02/2020    BUN 15 10/02/2020    CREATININE 1.2 10/02/2020    GLUCOSE 115 (H) 10/02/2020    CALCIUM 8.7 10/02/2020    PROT 5.9 (L) 10/02/2020    LABALBU 4.5 10/02/2020    BILITOT 0.6 10/02/2020    ALKPHOS 71 10/02/2020    AST 13 (L) 10/02/2020    ALT 13 10/02/2020    LABGLOM >60 10/02/2020    GFRAA >60 10/02/2020    PHOS 3.3 04/04/2019    MG 2.3 07/20/2020    POCGLU 202 (H) 05/17/2019     Lab Results   Component Value Date     10/02/2020     No components found for: LD  Lab Results   Component Value Date    TSHHS 3.040 04/04/2019    T4FREE 1.37 04/04/2019    FT3 3.2 03/27/2012       Immunology:  Lab Results   Component Value Date    PROT 5.9 (L) 10/02/2020    ALBUMINELP 3.7 08/21/2017    LABALPH 0.2 08/21/2017    LABALPH 0.9 08/21/2017    LABBETA 0.9 08/21/2017    GAMGLOB 0.6 08/21/2017     No results found for: Viri Gaming, KLFLCR  No results found for: B2M    Coagulation Panel:  Lab Results   Component Value Date    PROTIME 11.1 12/30/2013    INR 1.03 12/30/2013    APTT 24.6 12/30/2013       Anemia Panel:  Lab Results   Component Value Date    WPKAIYTC27 325.8 05/27/2020    FOLATE 5.9 04/04/2019       Tumor Markers:  Lab Results   Component Value Date    PSA 1.9 10/27/2020         Imaging: Reviewed     Pathology:Reviewed     Observations:  Performance Status: ECOG 1  Depression Status: PHQ-9 Total Score: 0 (12/3/2020  9:24 AM)          Assessment & Plan:  B-cell CLL, stage I with conversion from good to poor prognostic factors, with 17p deletion:   His diagnosis of CLL since 2007.   17p deletion since 2015. Initial treatment with Leukeran from 2000 to 2011 intermittently and FCR regimen in 2012, bendamustine/Rituxan in 2014. Idelalisib from January 2015 until June of 2016. On ibrutinib since September 2016. Ibrutinib therapy seems to be helping him, overall improvement. Was Only able to tolerate 140 mg p.o. every other day  He was off for a couple months During the fall 2017  Cramps very bad july 2020, held treatment for two weeks. Resumed Ibrutinib and is currently on 140 mg po daily. Continue current dose as Stable counts and no B sx.      . Acquired hypogammaglobulinemia:   Continue IVIG once a month. Check immunoglobulin levels periodicxally    spasms: resolved with magnesium    BPH: is being followed by Urology    Iron def anemia and esophagitis: follows with GI, Dr Zena Hernandez. Reported that he had colonoscopy 2Y ago with polyps detected. EGD was normal except for H. pylori which was treated. Was supposed to have VCE. Note October 2020, CBC with Hb decline slightly, could be sec to ibrutinib vs GIB. Is  on  oral iron OD along with Vitamin C, further recs pending ferritin and iron panel. Intermittent mils thrombocytopenia: meds reviewed. Could be sec to ibrutinib vs CLL> Will monitor for now. Lung Nodule RUL: CT May 2020  as above clear, most probably sec to  infection/inflammation. Continue other medical care. Discussed above findings and plan with him and he verbalized understanding. Answered all his questions. Discussed healthy lifestyle, smoking cessation, healthy diet and exercise as tolerated. Also discussed importance of being up-to-date with age-appropriate screening tools. Recommend follow-up with primary care physician and other specialist.    Please do not hesitate to contact us if you need any further information. Return to clinic April 12/2021 Or earlier if new symptoms    I have recommended that the patient follow CDC guidelines for prevention of COVID-19 infection.     [id number]           Electronically signed by Abbie Martinez MD on 12/3/2020 at 9:48 AM

## 2020-12-04 PROBLEM — D50.0 IRON DEFICIENCY ANEMIA DUE TO CHRONIC BLOOD LOSS: Status: ACTIVE | Noted: 2020-04-08

## 2020-12-10 ENCOUNTER — TELEPHONE (OUTPATIENT)
Dept: INFUSION THERAPY | Age: 70
End: 2020-12-10

## 2020-12-10 ENCOUNTER — TELEPHONE (OUTPATIENT)
Dept: ONCOLOGY | Age: 70
End: 2020-12-10

## 2020-12-10 NOTE — TELEPHONE ENCOUNTER
L/M WITH April NURSE NAVIGATOR AT [facility] TO CALL PT TO EXPLAIN WHY HE NEEDS AN IRON INFUSION. PT WAS CALLED TO SCHEDULE AND DIDN'T FEEL IT WAS NEEDED SINCE HE WAS UNAWARE OF HIS IRON COUNTS.

## 2020-12-10 NOTE — TELEPHONE ENCOUNTER
Phoned pt to discuss the need for IV iron and B12 injection per Dr Marilia Melendez. Pt expressed understanding and agreed to have the infusion. He will call the infusion center to schedule.

## 2020-12-22 ENCOUNTER — HOSPITAL ENCOUNTER (OUTPATIENT)
Dept: INFUSION THERAPY | Age: 70
Setting detail: INFUSION SERIES
Discharge: HOME | End: 2020-12-22
Payer: MEDICARE

## 2020-12-22 VITALS
RESPIRATION RATE: 18 BRPM | TEMPERATURE: 97.3 F | SYSTOLIC BLOOD PRESSURE: 145 MMHG | DIASTOLIC BLOOD PRESSURE: 65 MMHG | HEART RATE: 74 BPM

## 2020-12-22 DIAGNOSIS — D50.0 IRON DEFICIENCY ANEMIA DUE TO CHRONIC BLOOD LOSS: ICD-10-CM

## 2020-12-22 DIAGNOSIS — D50.0 IRON DEFICIENCY ANEMIA DUE TO CHRONIC BLOOD LOSS: Primary | ICD-10-CM

## 2020-12-22 DIAGNOSIS — K90.9 INTESTINAL MALABSORPTION, UNSPECIFIED TYPE: Primary | ICD-10-CM

## 2020-12-22 PROCEDURE — 6360000002 HC RX W HCPCS: Performed by: INTERNAL MEDICINE

## 2020-12-22 PROCEDURE — 99211 OFF/OP EST MAY X REQ PHY/QHP: CPT

## 2020-12-22 PROCEDURE — 2580000003 HC RX 258: Performed by: INTERNAL MEDICINE

## 2020-12-22 PROCEDURE — 96365 THER/PROPH/DIAG IV INF INIT: CPT

## 2020-12-22 PROCEDURE — 96372 THER/PROPH/DIAG INJ SC/IM: CPT

## 2020-12-22 RX ORDER — CYANOCOBALAMIN 1000 UG/ML
1000 INJECTION INTRAMUSCULAR; SUBCUTANEOUS ONCE
Status: COMPLETED
Start: 2020-12-22 | End: 2020-12-22

## 2020-12-22 RX ORDER — SODIUM CHLORIDE 0.9 % (FLUSH) 0.9 %
10 SYRINGE (ML) INJECTION PRN
Status: DISCONTINUED | OUTPATIENT
Start: 2020-12-22 | End: 2020-12-23 | Stop reason: HOSPADM

## 2020-12-22 RX ORDER — METHYLPREDNISOLONE SODIUM SUCCINATE 125 MG/2ML
125 INJECTION, POWDER, LYOPHILIZED, FOR SOLUTION INTRAMUSCULAR; INTRAVENOUS ONCE
Status: CANCELLED | OUTPATIENT
Start: 2020-12-29 | End: 2020-12-29

## 2020-12-22 RX ORDER — HEPARIN SODIUM (PORCINE) LOCK FLUSH IV SOLN 100 UNIT/ML 100 UNIT/ML
500 SOLUTION INTRAVENOUS PRN
Status: DISCONTINUED | OUTPATIENT
Start: 2020-12-22 | End: 2020-12-23 | Stop reason: HOSPADM

## 2020-12-22 RX ORDER — SODIUM CHLORIDE 9 MG/ML
INJECTION, SOLUTION INTRAVENOUS CONTINUOUS
Status: CANCELLED | OUTPATIENT
Start: 2020-12-29

## 2020-12-22 RX ORDER — SODIUM CHLORIDE 0.9 % (FLUSH) 0.9 %
5 SYRINGE (ML) INJECTION PRN
Status: CANCELLED | OUTPATIENT
Start: 2020-12-29

## 2020-12-22 RX ORDER — HEPARIN SODIUM (PORCINE) LOCK FLUSH IV SOLN 100 UNIT/ML 100 UNIT/ML
500 SOLUTION INTRAVENOUS PRN
Status: CANCELLED | OUTPATIENT
Start: 2020-12-29

## 2020-12-22 RX ORDER — DIPHENHYDRAMINE HYDROCHLORIDE 50 MG/ML
50 INJECTION INTRAMUSCULAR; INTRAVENOUS ONCE
Status: CANCELLED | OUTPATIENT
Start: 2020-12-29 | End: 2020-12-29

## 2020-12-22 RX ORDER — EPINEPHRINE 1 MG/ML
0.3 INJECTION, SOLUTION, CONCENTRATE INTRAVENOUS PRN
Status: CANCELLED | OUTPATIENT
Start: 2020-12-29

## 2020-12-22 RX ORDER — CYANOCOBALAMIN 1000 UG/ML
1000 INJECTION INTRAMUSCULAR; SUBCUTANEOUS ONCE
Status: CANCELLED
Start: 2021-01-19

## 2020-12-22 RX ORDER — SODIUM CHLORIDE 0.9 % (FLUSH) 0.9 %
10 SYRINGE (ML) INJECTION PRN
Status: CANCELLED | OUTPATIENT
Start: 2020-12-29

## 2020-12-22 RX ADMIN — SODIUM CHLORIDE, PRESERVATIVE FREE 10 ML: 5 INJECTION INTRAVENOUS at 12:20

## 2020-12-22 RX ADMIN — CYANOCOBALAMIN 1000 MCG: 1000 INJECTION, SOLUTION INTRAMUSCULAR at 12:30

## 2020-12-22 RX ADMIN — SODIUM CHLORIDE, PRESERVATIVE FREE 10 ML: 5 INJECTION INTRAVENOUS at 13:25

## 2020-12-22 RX ADMIN — SODIUM CHLORIDE 750 MG: 9 INJECTION, SOLUTION INTRAVENOUS at 13:03

## 2020-12-22 RX ADMIN — Medication 500 UNITS: at 13:43

## 2020-12-22 NOTE — PROGRESS NOTES
Pt taken to room 02 for iron and vitamin b-12 . Pt oriented to room, call light, bed/chair controls, TV, pt voiced understanding. Plan of care explained to pt, pt voiced understanding.

## 2020-12-22 NOTE — PROGRESS NOTES
Tolerated infusion well. Reviewed discharge instruction, voiced understanding. Copies of AVS given. Pt discharged home. Pt to exit via steady gait. Orders Placed This Encounter   Medications    ferric carboxymaltose (INJECTAFER) 750 mg in sodium chloride 0.9 % 250 mL IVPB     With verification, confirm documentation of current weight, ordered weight-type, and dose calculation.     sodium chloride flush 0.9 % injection 10 mL    heparin flush 100 UNIT/ML injection 500 Units

## 2020-12-29 ENCOUNTER — HOSPITAL ENCOUNTER (OUTPATIENT)
Dept: INFUSION THERAPY | Age: 70
Setting detail: INFUSION SERIES
Discharge: HOME | End: 2020-12-29
Payer: MEDICARE

## 2020-12-29 VITALS
TEMPERATURE: 97.5 F | RESPIRATION RATE: 16 BRPM | DIASTOLIC BLOOD PRESSURE: 75 MMHG | OXYGEN SATURATION: 98 % | HEART RATE: 70 BPM | SYSTOLIC BLOOD PRESSURE: 131 MMHG

## 2020-12-29 DIAGNOSIS — D80.3 SELECTIVE DEFICIENCY OF IGG (HCC): ICD-10-CM

## 2020-12-29 DIAGNOSIS — D80.1 HYPOGAMMAGLOBULINEMIA (HCC): ICD-10-CM

## 2020-12-29 DIAGNOSIS — C91.10 LEUKEMIA, LYMPHOCYTIC, CHRONIC (HCC): Primary | ICD-10-CM

## 2020-12-29 DIAGNOSIS — K90.9 INTESTINAL MALABSORPTION, UNSPECIFIED TYPE: Primary | ICD-10-CM

## 2020-12-29 DIAGNOSIS — D50.0 IRON DEFICIENCY ANEMIA DUE TO CHRONIC BLOOD LOSS: ICD-10-CM

## 2020-12-29 PROCEDURE — 6370000000 HC RX 637 (ALT 250 FOR IP)

## 2020-12-29 PROCEDURE — 6360000002 HC RX W HCPCS: Performed by: INTERNAL MEDICINE

## 2020-12-29 PROCEDURE — 6360000002 HC RX W HCPCS

## 2020-12-29 PROCEDURE — 96366 THER/PROPH/DIAG IV INF ADDON: CPT

## 2020-12-29 PROCEDURE — 96365 THER/PROPH/DIAG IV INF INIT: CPT

## 2020-12-29 PROCEDURE — 2580000003 HC RX 258: Performed by: INTERNAL MEDICINE

## 2020-12-29 PROCEDURE — 99211 OFF/OP EST MAY X REQ PHY/QHP: CPT

## 2020-12-29 PROCEDURE — 96375 TX/PRO/DX INJ NEW DRUG ADDON: CPT

## 2020-12-29 PROCEDURE — 96374 THER/PROPH/DIAG INJ IV PUSH: CPT

## 2020-12-29 RX ORDER — DIPHENHYDRAMINE HYDROCHLORIDE 50 MG/ML
25 INJECTION INTRAMUSCULAR; INTRAVENOUS ONCE
Status: COMPLETED | OUTPATIENT
Start: 2020-12-29 | End: 2020-12-29

## 2020-12-29 RX ORDER — SODIUM CHLORIDE 9 MG/ML
INJECTION, SOLUTION INTRAVENOUS CONTINUOUS
Status: CANCELLED | OUTPATIENT
Start: 2021-01-05

## 2020-12-29 RX ORDER — ACETAMINOPHEN 325 MG/1
TABLET ORAL
Status: COMPLETED
Start: 2020-12-29 | End: 2020-12-29

## 2020-12-29 RX ORDER — DIPHENHYDRAMINE HYDROCHLORIDE 50 MG/ML
INJECTION INTRAMUSCULAR; INTRAVENOUS
Status: COMPLETED
Start: 2020-12-29 | End: 2020-12-29

## 2020-12-29 RX ORDER — ACETAMINOPHEN 325 MG/1
650 TABLET ORAL ONCE
Status: COMPLETED | OUTPATIENT
Start: 2020-12-29 | End: 2020-12-29

## 2020-12-29 RX ORDER — METHYLPREDNISOLONE SODIUM SUCCINATE 125 MG/2ML
125 INJECTION, POWDER, LYOPHILIZED, FOR SOLUTION INTRAMUSCULAR; INTRAVENOUS ONCE
Status: CANCELLED | OUTPATIENT
Start: 2021-01-05 | End: 2021-01-05

## 2020-12-29 RX ORDER — METHYLPREDNISOLONE SODIUM SUCCINATE 40 MG/ML
40 INJECTION, POWDER, LYOPHILIZED, FOR SOLUTION INTRAMUSCULAR; INTRAVENOUS ONCE
Status: COMPLETED | OUTPATIENT
Start: 2020-12-29 | End: 2020-12-29

## 2020-12-29 RX ORDER — METHYLPREDNISOLONE SODIUM SUCCINATE 40 MG/ML
INJECTION, POWDER, LYOPHILIZED, FOR SOLUTION INTRAMUSCULAR; INTRAVENOUS
Status: COMPLETED
Start: 2020-12-29 | End: 2020-12-29

## 2020-12-29 RX ORDER — SODIUM CHLORIDE 0.9 % (FLUSH) 0.9 %
10 SYRINGE (ML) INJECTION PRN
Status: CANCELLED | OUTPATIENT
Start: 2021-01-05

## 2020-12-29 RX ORDER — HEPARIN SODIUM (PORCINE) LOCK FLUSH IV SOLN 100 UNIT/ML 100 UNIT/ML
500 SOLUTION INTRAVENOUS PRN
Status: CANCELLED | OUTPATIENT
Start: 2021-01-26

## 2020-12-29 RX ORDER — SODIUM CHLORIDE 0.9 % (FLUSH) 0.9 %
10 SYRINGE (ML) INJECTION PRN
Status: DISCONTINUED | OUTPATIENT
Start: 2020-12-29 | End: 2020-12-30 | Stop reason: HOSPADM

## 2020-12-29 RX ORDER — SODIUM CHLORIDE 0.9 % (FLUSH) 0.9 %
5 SYRINGE (ML) INJECTION PRN
Status: CANCELLED | OUTPATIENT
Start: 2021-01-05

## 2020-12-29 RX ORDER — EPINEPHRINE 1 MG/ML
0.3 INJECTION, SOLUTION, CONCENTRATE INTRAVENOUS PRN
Status: CANCELLED | OUTPATIENT
Start: 2021-01-05

## 2020-12-29 RX ORDER — METHYLPREDNISOLONE SODIUM SUCCINATE 40 MG/ML
40 INJECTION, POWDER, LYOPHILIZED, FOR SOLUTION INTRAMUSCULAR; INTRAVENOUS ONCE
Status: CANCELLED
Start: 2021-01-26 | End: 2021-01-26

## 2020-12-29 RX ORDER — SODIUM CHLORIDE 0.9 % (FLUSH) 0.9 %
10 SYRINGE (ML) INJECTION PRN
Status: CANCELLED | OUTPATIENT
Start: 2021-01-26

## 2020-12-29 RX ORDER — DIPHENHYDRAMINE HYDROCHLORIDE 50 MG/ML
25 INJECTION INTRAMUSCULAR; INTRAVENOUS ONCE
Status: CANCELLED
Start: 2021-01-26 | End: 2021-01-26

## 2020-12-29 RX ORDER — DIPHENHYDRAMINE HYDROCHLORIDE 50 MG/ML
50 INJECTION INTRAMUSCULAR; INTRAVENOUS ONCE
Status: CANCELLED | OUTPATIENT
Start: 2021-01-05 | End: 2021-01-05

## 2020-12-29 RX ORDER — ACETAMINOPHEN 325 MG/1
650 TABLET ORAL ONCE
Status: CANCELLED | OUTPATIENT
Start: 2021-01-26 | End: 2021-01-26

## 2020-12-29 RX ORDER — HEPARIN SODIUM (PORCINE) LOCK FLUSH IV SOLN 100 UNIT/ML 100 UNIT/ML
500 SOLUTION INTRAVENOUS PRN
Status: DISCONTINUED | OUTPATIENT
Start: 2020-12-29 | End: 2020-12-30 | Stop reason: HOSPADM

## 2020-12-29 RX ORDER — HEPARIN SODIUM (PORCINE) LOCK FLUSH IV SOLN 100 UNIT/ML 100 UNIT/ML
500 SOLUTION INTRAVENOUS PRN
Status: CANCELLED | OUTPATIENT
Start: 2021-01-05

## 2020-12-29 RX ADMIN — Medication 500 UNITS: at 11:10

## 2020-12-29 RX ADMIN — METHYLPREDNISOLONE SODIUM SUCCINATE 40 MG: 40 INJECTION, POWDER, LYOPHILIZED, FOR SOLUTION INTRAMUSCULAR; INTRAVENOUS at 08:03

## 2020-12-29 RX ADMIN — IMMUNE GLOBULIN INTRAVENOUS (HUMAN) 20 G: 5 INJECTION, SOLUTION INTRAVENOUS at 08:43

## 2020-12-29 RX ADMIN — DIPHENHYDRAMINE HYDROCHLORIDE 25 MG: 50 INJECTION INTRAMUSCULAR; INTRAVENOUS at 08:03

## 2020-12-29 RX ADMIN — DIPHENHYDRAMINE HYDROCHLORIDE 25 MG: 50 INJECTION, SOLUTION INTRAMUSCULAR; INTRAVENOUS at 08:03

## 2020-12-29 RX ADMIN — METHYLPREDNISOLONE SODIUM SUCCINATE 40 MG: 40 INJECTION, POWDER, FOR SOLUTION INTRAMUSCULAR; INTRAVENOUS at 08:03

## 2020-12-29 RX ADMIN — SODIUM CHLORIDE, PRESERVATIVE FREE 10 ML: 5 INJECTION INTRAVENOUS at 11:10

## 2020-12-29 RX ADMIN — SODIUM CHLORIDE 750 MG: 9 INJECTION, SOLUTION INTRAVENOUS at 10:45

## 2020-12-29 RX ADMIN — ACETAMINOPHEN 650 MG: 325 TABLET ORAL at 08:03

## 2020-12-29 RX ADMIN — SODIUM CHLORIDE, PRESERVATIVE FREE 10 ML: 5 INJECTION INTRAVENOUS at 08:04

## 2020-12-29 RX ADMIN — SODIUM CHLORIDE, PRESERVATIVE FREE 10 ML: 5 INJECTION INTRAVENOUS at 10:44

## 2020-12-29 ASSESSMENT — PAIN SCALES - GENERAL: PAINLEVEL_OUTOF10: 0

## 2021-01-26 ENCOUNTER — HOSPITAL ENCOUNTER (OUTPATIENT)
Dept: INFUSION THERAPY | Age: 71
Setting detail: INFUSION SERIES
Discharge: HOME | End: 2021-01-26
Payer: MEDICARE

## 2021-01-26 VITALS
OXYGEN SATURATION: 97 % | HEART RATE: 77 BPM | RESPIRATION RATE: 16 BRPM | DIASTOLIC BLOOD PRESSURE: 72 MMHG | TEMPERATURE: 97.7 F | SYSTOLIC BLOOD PRESSURE: 145 MMHG

## 2021-01-26 DIAGNOSIS — D80.3 SELECTIVE DEFICIENCY OF IGG (HCC): ICD-10-CM

## 2021-01-26 DIAGNOSIS — D80.1 HYPOGAMMAGLOBULINEMIA (HCC): ICD-10-CM

## 2021-01-26 DIAGNOSIS — D50.0 IRON DEFICIENCY ANEMIA DUE TO CHRONIC BLOOD LOSS: ICD-10-CM

## 2021-01-26 DIAGNOSIS — C91.10 LEUKEMIA, LYMPHOCYTIC, CHRONIC (HCC): Primary | ICD-10-CM

## 2021-01-26 PROCEDURE — 96372 THER/PROPH/DIAG INJ SC/IM: CPT

## 2021-01-26 PROCEDURE — 96366 THER/PROPH/DIAG IV INF ADDON: CPT

## 2021-01-26 PROCEDURE — 96365 THER/PROPH/DIAG IV INF INIT: CPT

## 2021-01-26 PROCEDURE — 96374 THER/PROPH/DIAG INJ IV PUSH: CPT

## 2021-01-26 PROCEDURE — 2580000003 HC RX 258: Performed by: INTERNAL MEDICINE

## 2021-01-26 PROCEDURE — 6360000002 HC RX W HCPCS

## 2021-01-26 PROCEDURE — 6370000000 HC RX 637 (ALT 250 FOR IP)

## 2021-01-26 PROCEDURE — 96375 TX/PRO/DX INJ NEW DRUG ADDON: CPT

## 2021-01-26 PROCEDURE — 6360000002 HC RX W HCPCS: Performed by: INTERNAL MEDICINE

## 2021-01-26 PROCEDURE — 99211 OFF/OP EST MAY X REQ PHY/QHP: CPT

## 2021-01-26 RX ORDER — METHYLPREDNISOLONE SODIUM SUCCINATE 40 MG/ML
INJECTION, POWDER, LYOPHILIZED, FOR SOLUTION INTRAMUSCULAR; INTRAVENOUS
Status: COMPLETED
Start: 2021-01-26 | End: 2021-01-26

## 2021-01-26 RX ORDER — DIPHENHYDRAMINE HYDROCHLORIDE 50 MG/ML
INJECTION INTRAMUSCULAR; INTRAVENOUS
Status: COMPLETED
Start: 2021-01-26 | End: 2021-01-26

## 2021-01-26 RX ORDER — HEPARIN SODIUM (PORCINE) LOCK FLUSH IV SOLN 100 UNIT/ML 100 UNIT/ML
500 SOLUTION INTRAVENOUS PRN
Status: CANCELLED | OUTPATIENT
Start: 2021-02-23

## 2021-01-26 RX ORDER — DIPHENHYDRAMINE HYDROCHLORIDE 50 MG/ML
25 INJECTION INTRAMUSCULAR; INTRAVENOUS ONCE
Status: CANCELLED
Start: 2021-02-23 | End: 2021-02-23

## 2021-01-26 RX ORDER — HEPARIN SODIUM (PORCINE) LOCK FLUSH IV SOLN 100 UNIT/ML 100 UNIT/ML
500 SOLUTION INTRAVENOUS PRN
Status: DISCONTINUED | OUTPATIENT
Start: 2021-01-26 | End: 2021-01-27 | Stop reason: HOSPADM

## 2021-01-26 RX ORDER — ACETAMINOPHEN 325 MG/1
TABLET ORAL
Status: COMPLETED
Start: 2021-01-26 | End: 2021-01-26

## 2021-01-26 RX ORDER — DIPHENHYDRAMINE HYDROCHLORIDE 50 MG/ML
25 INJECTION INTRAMUSCULAR; INTRAVENOUS ONCE
Status: COMPLETED | OUTPATIENT
Start: 2021-01-26 | End: 2021-01-26

## 2021-01-26 RX ORDER — ACETAMINOPHEN 325 MG/1
650 TABLET ORAL ONCE
Status: CANCELLED | OUTPATIENT
Start: 2021-02-23 | End: 2021-02-23

## 2021-01-26 RX ORDER — ACETAMINOPHEN 325 MG/1
650 TABLET ORAL ONCE
Status: COMPLETED | OUTPATIENT
Start: 2021-01-26 | End: 2021-01-26

## 2021-01-26 RX ORDER — SODIUM CHLORIDE 0.9 % (FLUSH) 0.9 %
10 SYRINGE (ML) INJECTION PRN
Status: CANCELLED | OUTPATIENT
Start: 2021-02-23

## 2021-01-26 RX ORDER — SODIUM CHLORIDE 0.9 % (FLUSH) 0.9 %
10 SYRINGE (ML) INJECTION PRN
Status: DISCONTINUED | OUTPATIENT
Start: 2021-01-26 | End: 2021-01-26

## 2021-01-26 RX ORDER — SODIUM CHLORIDE 0.9 % (FLUSH) 0.9 %
10 SYRINGE (ML) INJECTION PRN
Status: DISCONTINUED | OUTPATIENT
Start: 2021-01-26 | End: 2021-01-27 | Stop reason: HOSPADM

## 2021-01-26 RX ORDER — METHYLPREDNISOLONE SODIUM SUCCINATE 40 MG/ML
40 INJECTION, POWDER, LYOPHILIZED, FOR SOLUTION INTRAMUSCULAR; INTRAVENOUS ONCE
Status: COMPLETED | OUTPATIENT
Start: 2021-01-26 | End: 2021-01-26

## 2021-01-26 RX ORDER — HEPARIN SODIUM (PORCINE) LOCK FLUSH IV SOLN 100 UNIT/ML 100 UNIT/ML
500 SOLUTION INTRAVENOUS PRN
Status: DISCONTINUED | OUTPATIENT
Start: 2021-01-26 | End: 2021-01-26

## 2021-01-26 RX ORDER — METHYLPREDNISOLONE SODIUM SUCCINATE 40 MG/ML
40 INJECTION, POWDER, LYOPHILIZED, FOR SOLUTION INTRAMUSCULAR; INTRAVENOUS ONCE
Status: CANCELLED
Start: 2021-02-23 | End: 2021-02-23

## 2021-01-26 RX ORDER — CYANOCOBALAMIN 1000 UG/ML
1000 INJECTION INTRAMUSCULAR; SUBCUTANEOUS ONCE
Status: COMPLETED
Start: 2021-01-26 | End: 2021-01-26

## 2021-01-26 RX ORDER — CYANOCOBALAMIN 1000 UG/ML
1000 INJECTION INTRAMUSCULAR; SUBCUTANEOUS ONCE
Status: CANCELLED
Start: 2021-02-16

## 2021-01-26 RX ADMIN — CYANOCOBALAMIN 1000 MCG: 1000 INJECTION, SOLUTION INTRAMUSCULAR at 10:53

## 2021-01-26 RX ADMIN — ACETAMINOPHEN 650 MG: 325 TABLET ORAL at 07:56

## 2021-01-26 RX ADMIN — HEPARIN 500 UNITS: 100 SYRINGE at 10:51

## 2021-01-26 RX ADMIN — METHYLPREDNISOLONE SODIUM SUCCINATE 40 MG: 40 INJECTION, POWDER, FOR SOLUTION INTRAMUSCULAR; INTRAVENOUS at 07:56

## 2021-01-26 RX ADMIN — IMMUNE GLOBULIN INTRAVENOUS (HUMAN) 20 G: 5 INJECTION, SOLUTION INTRAVENOUS at 08:37

## 2021-01-26 RX ADMIN — SODIUM CHLORIDE, PRESERVATIVE FREE 10 ML: 5 INJECTION INTRAVENOUS at 07:56

## 2021-01-26 RX ADMIN — DIPHENHYDRAMINE HYDROCHLORIDE 25 MG: 50 INJECTION INTRAMUSCULAR; INTRAVENOUS at 07:57

## 2021-01-26 RX ADMIN — METHYLPREDNISOLONE SODIUM SUCCINATE 40 MG: 40 INJECTION, POWDER, LYOPHILIZED, FOR SOLUTION INTRAMUSCULAR; INTRAVENOUS at 07:56

## 2021-01-26 RX ADMIN — DIPHENHYDRAMINE HYDROCHLORIDE 25 MG: 50 INJECTION, SOLUTION INTRAMUSCULAR; INTRAVENOUS at 07:57

## 2021-01-26 RX ADMIN — SODIUM CHLORIDE, PRESERVATIVE FREE 10 ML: 5 INJECTION INTRAVENOUS at 10:51

## 2021-01-26 ASSESSMENT — PAIN SCALES - GENERAL: PAINLEVEL_OUTOF10: 0

## 2021-01-26 NOTE — PROGRESS NOTES
Prior to discharge, the After Visit Summary Discharge Instructions were reviewed with the patient. He was offered a printed version of the AVS, but declined the offer. Recurrent appointment, pt tolerated infusion well. Pt discharged home. Pt to exit via steady gait.     Orders Placed This Encounter   Medications    acetaminophen (TYLENOL) tablet 650 mg    immune globulin (FLEBOGAMMA) 10% solution 20 g    DISCONTD: sodium chloride flush 0.9 % injection 10 mL    DISCONTD: heparin flush 100 UNIT/ML injection 500 Units    diphenhydrAMINE (BENADRYL) injection 25 mg    methylPREDNISolone sodium (SOLU-MEDROL) injection 40 mg    acetaminophen (TYLENOL) 325 MG tablet     Dee Dee Brown: cabinet override    methylPREDNISolone sodium (SOLU-MEDROL) 40 MG injection     Dee Dee Brown: cabinet override    diphenhydrAMINE (BENADRYL) 50 MG/ML injection     Dee Dee Brown: cabinet override    cyanocobalamin injection 1,000 mcg    heparin flush 100 UNIT/ML injection 500 Units    sodium chloride flush 0.9 % injection 10 mL

## 2021-02-03 ENCOUNTER — HOSPITAL ENCOUNTER (OUTPATIENT)
Dept: INFUSION THERAPY | Age: 71
Discharge: HOME | End: 2021-02-03
Payer: MEDICARE

## 2021-02-03 DIAGNOSIS — D80.1 HYPOGAMMAGLOBULINEMIA (HCC): ICD-10-CM

## 2021-02-03 DIAGNOSIS — C91.10 CLL (CHRONIC LYMPHOCYTIC LEUKEMIA) (HCC): ICD-10-CM

## 2021-02-03 LAB
ALBUMIN SERPL-MCNC: 4.3 GM/DL (ref 3.4–5)
ALP BLD-CCNC: 74 IU/L (ref 40–129)
ALT SERPL-CCNC: 27 U/L (ref 10–40)
ANION GAP SERPL CALCULATED.3IONS-SCNC: 11 MMOL/L (ref 4–16)
AST SERPL-CCNC: 16 IU/L (ref 15–37)
BASOPHILS ABSOLUTE: 0 K/CU MM
BASOPHILS RELATIVE PERCENT: 0 % (ref 0–1)
BILIRUB SERPL-MCNC: 0.6 MG/DL (ref 0–1)
BUN BLDV-MCNC: 12 MG/DL (ref 6–23)
CALCIUM SERPL-MCNC: 8.6 MG/DL (ref 8.3–10.6)
CHLORIDE BLD-SCNC: 101 MMOL/L (ref 99–110)
CO2: 25 MMOL/L (ref 21–32)
CREAT SERPL-MCNC: 1 MG/DL (ref 0.9–1.3)
DIFFERENTIAL TYPE: ABNORMAL
EOSINOPHILS ABSOLUTE: 0.1 K/CU MM
EOSINOPHILS RELATIVE PERCENT: 0.2 % (ref 0–3)
FERRITIN: 106 NG/ML (ref 30–400)
GFR AFRICAN AMERICAN: >60 ML/MIN/1.73M2
GFR NON-AFRICAN AMERICAN: >60 ML/MIN/1.73M2
GLUCOSE BLD-MCNC: 148 MG/DL (ref 70–99)
HCT VFR BLD CALC: 39.1 % (ref 42–52)
HEMOGLOBIN: 12.7 GM/DL (ref 13.5–18)
IRON: 68 UG/DL (ref 59–158)
LACTATE DEHYDROGENASE: 134 IU/L (ref 120–246)
LYMPHOCYTES ABSOLUTE: 20 K/CU MM
LYMPHOCYTES RELATIVE PERCENT: 83.4 % (ref 24–44)
MCH RBC QN AUTO: 30 PG (ref 27–31)
MCHC RBC AUTO-ENTMCNC: 32.5 % (ref 32–36)
MCV RBC AUTO: 92.2 FL (ref 78–100)
MONOCYTES ABSOLUTE: 0.5 K/CU MM
MONOCYTES RELATIVE PERCENT: 1.9 % (ref 0–4)
PCT TRANSFERRIN: 23 % (ref 10–44)
PDW BLD-RTO: 22.1 % (ref 11.7–14.9)
PLATELET # BLD: 78 K/CU MM (ref 140–440)
PMV BLD AUTO: 10.6 FL (ref 7.5–11.1)
POTASSIUM SERPL-SCNC: 4 MMOL/L (ref 3.5–5.1)
RBC # BLD: 4.24 M/CU MM (ref 4.6–6.2)
SEGMENTED NEUTROPHILS ABSOLUTE COUNT: 3.5 K/CU MM
SEGMENTED NEUTROPHILS RELATIVE PERCENT: 14.5 % (ref 36–66)
SODIUM BLD-SCNC: 137 MMOL/L (ref 135–145)
TOTAL IRON BINDING CAPACITY: 290 UG/DL (ref 250–450)
TOTAL PROTEIN: 6.2 GM/DL (ref 6.4–8.2)
UNSATURATED IRON BINDING CAPACITY: 222 UG/DL (ref 110–370)
WBC # BLD: 23.9 K/CU MM (ref 4–10.5)

## 2021-02-03 PROCEDURE — 80053 COMPREHEN METABOLIC PANEL: CPT

## 2021-02-03 PROCEDURE — 85025 COMPLETE CBC W/AUTO DIFF WBC: CPT

## 2021-02-03 PROCEDURE — 36415 COLL VENOUS BLD VENIPUNCTURE: CPT

## 2021-02-03 PROCEDURE — 83550 IRON BINDING TEST: CPT

## 2021-02-03 PROCEDURE — 83540 ASSAY OF IRON: CPT

## 2021-02-03 PROCEDURE — 83615 LACTATE (LD) (LDH) ENZYME: CPT

## 2021-02-03 PROCEDURE — 82728 ASSAY OF FERRITIN: CPT

## 2021-02-16 RX ORDER — IBRUTINIB 140 MG/1
140 TABLET, FILM COATED ORAL DAILY
Qty: 30 TABLET | Refills: 5 | Status: SHIPPED | OUTPATIENT
Start: 2021-02-16 | End: 2021-08-03 | Stop reason: SDUPTHER

## 2021-02-23 ENCOUNTER — HOSPITAL ENCOUNTER (OUTPATIENT)
Dept: INFUSION THERAPY | Age: 71
Setting detail: INFUSION SERIES
Discharge: HOME | End: 2021-02-23
Payer: MEDICARE

## 2021-02-23 VITALS
RESPIRATION RATE: 16 BRPM | BODY MASS INDEX: 27.36 KG/M2 | WEIGHT: 202 LBS | HEIGHT: 72 IN | TEMPERATURE: 97.3 F | SYSTOLIC BLOOD PRESSURE: 137 MMHG | DIASTOLIC BLOOD PRESSURE: 74 MMHG | HEART RATE: 70 BPM

## 2021-02-23 DIAGNOSIS — D80.3 SELECTIVE DEFICIENCY OF IGG (HCC): ICD-10-CM

## 2021-02-23 DIAGNOSIS — C91.10 LEUKEMIA, LYMPHOCYTIC, CHRONIC (HCC): Primary | ICD-10-CM

## 2021-02-23 DIAGNOSIS — D80.1 HYPOGAMMAGLOBULINEMIA (HCC): ICD-10-CM

## 2021-02-23 PROCEDURE — 96365 THER/PROPH/DIAG IV INF INIT: CPT

## 2021-02-23 PROCEDURE — 6360000002 HC RX W HCPCS: Performed by: INTERNAL MEDICINE

## 2021-02-23 PROCEDURE — 96366 THER/PROPH/DIAG IV INF ADDON: CPT

## 2021-02-23 PROCEDURE — 6370000000 HC RX 637 (ALT 250 FOR IP): Performed by: INTERNAL MEDICINE

## 2021-02-23 PROCEDURE — 2580000003 HC RX 258: Performed by: INTERNAL MEDICINE

## 2021-02-23 PROCEDURE — 99211 OFF/OP EST MAY X REQ PHY/QHP: CPT

## 2021-02-23 RX ORDER — DIPHENHYDRAMINE HYDROCHLORIDE 50 MG/ML
25 INJECTION INTRAMUSCULAR; INTRAVENOUS ONCE
Status: COMPLETED | OUTPATIENT
Start: 2021-02-23 | End: 2021-02-23

## 2021-02-23 RX ORDER — HEPARIN SODIUM (PORCINE) LOCK FLUSH IV SOLN 100 UNIT/ML 100 UNIT/ML
500 SOLUTION INTRAVENOUS PRN
Status: CANCELLED
Start: 2021-02-23

## 2021-02-23 RX ORDER — SODIUM CHLORIDE 0.9 % (FLUSH) 0.9 %
10 SYRINGE (ML) INJECTION PRN
Status: DISCONTINUED | OUTPATIENT
Start: 2021-02-23 | End: 2021-02-24 | Stop reason: HOSPADM

## 2021-02-23 RX ORDER — ACETAMINOPHEN 325 MG/1
650 TABLET ORAL ONCE
Status: CANCELLED | OUTPATIENT
Start: 2021-03-23 | End: 2021-03-23

## 2021-02-23 RX ORDER — CYANOCOBALAMIN 1000 UG/ML
1000 INJECTION INTRAMUSCULAR; SUBCUTANEOUS ONCE
Status: CANCELLED
Start: 2021-03-23 | End: 2021-03-23

## 2021-02-23 RX ORDER — ACETAMINOPHEN 325 MG/1
650 TABLET ORAL ONCE
Status: COMPLETED | OUTPATIENT
Start: 2021-02-23 | End: 2021-02-23

## 2021-02-23 RX ORDER — DIPHENHYDRAMINE HYDROCHLORIDE 50 MG/ML
25 INJECTION INTRAMUSCULAR; INTRAVENOUS ONCE
Status: CANCELLED
Start: 2021-03-23 | End: 2021-03-23

## 2021-02-23 RX ORDER — SODIUM CHLORIDE 0.9 % (FLUSH) 0.9 %
10 SYRINGE (ML) INJECTION PRN
Status: CANCELLED | OUTPATIENT
Start: 2021-03-23

## 2021-02-23 RX ORDER — METHYLPREDNISOLONE SODIUM SUCCINATE 40 MG/ML
40 INJECTION, POWDER, LYOPHILIZED, FOR SOLUTION INTRAMUSCULAR; INTRAVENOUS ONCE
Status: CANCELLED
Start: 2021-03-23 | End: 2021-03-23

## 2021-02-23 RX ORDER — VALACYCLOVIR HYDROCHLORIDE 500 MG/1
500 TABLET, FILM COATED ORAL DAILY
Qty: 30 TABLET | Refills: 5 | Status: SHIPPED | OUTPATIENT
Start: 2021-02-23 | End: 2021-04-27 | Stop reason: SDUPTHER

## 2021-02-23 RX ORDER — HEPARIN SODIUM (PORCINE) LOCK FLUSH IV SOLN 100 UNIT/ML 100 UNIT/ML
500 SOLUTION INTRAVENOUS PRN
Status: DISCONTINUED | OUTPATIENT
Start: 2021-02-23 | End: 2021-02-24 | Stop reason: HOSPADM

## 2021-02-23 RX ORDER — METHYLPREDNISOLONE SODIUM SUCCINATE 40 MG/ML
40 INJECTION, POWDER, LYOPHILIZED, FOR SOLUTION INTRAMUSCULAR; INTRAVENOUS ONCE
Status: COMPLETED | OUTPATIENT
Start: 2021-02-23 | End: 2021-02-23

## 2021-02-23 RX ORDER — HEPARIN SODIUM (PORCINE) LOCK FLUSH IV SOLN 100 UNIT/ML 100 UNIT/ML
500 SOLUTION INTRAVENOUS PRN
Status: CANCELLED | OUTPATIENT
Start: 2021-03-23

## 2021-02-23 RX ORDER — CYANOCOBALAMIN 1000 UG/ML
1000 INJECTION INTRAMUSCULAR; SUBCUTANEOUS ONCE
Status: COMPLETED | OUTPATIENT
Start: 2021-02-23 | End: 2021-02-23

## 2021-02-23 RX ORDER — ASCORBIC ACID 250 MG
500 TABLET ORAL DAILY
Qty: 30 TABLET | Refills: 3 | Status: SHIPPED | OUTPATIENT
Start: 2021-02-23 | End: 2021-04-27 | Stop reason: SDUPTHER

## 2021-02-23 RX ORDER — HEPARIN SODIUM (PORCINE) LOCK FLUSH IV SOLN 100 UNIT/ML 100 UNIT/ML
500 SOLUTION INTRAVENOUS PRN
Status: CANCELLED
Start: 2021-03-23

## 2021-02-23 RX ADMIN — ACETAMINOPHEN 650 MG: 325 TABLET ORAL at 07:56

## 2021-02-23 RX ADMIN — METHYLPREDNISOLONE SODIUM SUCCINATE 40 MG: 40 INJECTION, POWDER, FOR SOLUTION INTRAMUSCULAR; INTRAVENOUS at 07:59

## 2021-02-23 RX ADMIN — Medication 500 UNITS: at 10:18

## 2021-02-23 RX ADMIN — SODIUM CHLORIDE, PRESERVATIVE FREE 20 ML: 5 INJECTION INTRAVENOUS at 10:18

## 2021-02-23 RX ADMIN — SODIUM CHLORIDE, PRESERVATIVE FREE 10 ML: 5 INJECTION INTRAVENOUS at 07:55

## 2021-02-23 RX ADMIN — CYANOCOBALAMIN 1000 MCG: 1000 INJECTION, SOLUTION INTRAMUSCULAR at 08:06

## 2021-02-23 RX ADMIN — SODIUM CHLORIDE, PRESERVATIVE FREE 10 ML: 5 INJECTION INTRAVENOUS at 08:00

## 2021-02-23 RX ADMIN — IMMUNE GLOBULIN INTRAVENOUS (HUMAN) 20 G: 5 INJECTION, SOLUTION INTRAVENOUS at 08:20

## 2021-02-23 RX ADMIN — DIPHENHYDRAMINE HYDROCHLORIDE 25 MG: 50 INJECTION INTRAMUSCULAR; INTRAVENOUS at 07:59

## 2021-02-23 ASSESSMENT — PAIN SCALES - GENERAL: PAINLEVEL_OUTOF10: 0

## 2021-03-23 ENCOUNTER — HOSPITAL ENCOUNTER (OUTPATIENT)
Dept: INFUSION THERAPY | Age: 71
Setting detail: INFUSION SERIES
Discharge: HOME | End: 2021-03-23
Payer: MEDICARE

## 2021-03-23 VITALS
WEIGHT: 202 LBS | SYSTOLIC BLOOD PRESSURE: 138 MMHG | BODY MASS INDEX: 27.36 KG/M2 | RESPIRATION RATE: 16 BRPM | DIASTOLIC BLOOD PRESSURE: 66 MMHG | HEIGHT: 72 IN | TEMPERATURE: 97.3 F | HEART RATE: 72 BPM

## 2021-03-23 DIAGNOSIS — D80.1 HYPOGAMMAGLOBULINEMIA (HCC): ICD-10-CM

## 2021-03-23 DIAGNOSIS — C91.10 LEUKEMIA, LYMPHOCYTIC, CHRONIC (HCC): Primary | ICD-10-CM

## 2021-03-23 DIAGNOSIS — D80.3 SELECTIVE DEFICIENCY OF IGG (HCC): ICD-10-CM

## 2021-03-23 PROCEDURE — 6370000000 HC RX 637 (ALT 250 FOR IP): Performed by: INTERNAL MEDICINE

## 2021-03-23 PROCEDURE — 96365 THER/PROPH/DIAG IV INF INIT: CPT

## 2021-03-23 PROCEDURE — 96366 THER/PROPH/DIAG IV INF ADDON: CPT

## 2021-03-23 PROCEDURE — 96374 THER/PROPH/DIAG INJ IV PUSH: CPT

## 2021-03-23 PROCEDURE — 6360000002 HC RX W HCPCS: Performed by: INTERNAL MEDICINE

## 2021-03-23 PROCEDURE — 2580000003 HC RX 258: Performed by: INTERNAL MEDICINE

## 2021-03-23 PROCEDURE — 96375 TX/PRO/DX INJ NEW DRUG ADDON: CPT

## 2021-03-23 PROCEDURE — 99211 OFF/OP EST MAY X REQ PHY/QHP: CPT

## 2021-03-23 RX ORDER — METHYLPREDNISOLONE SODIUM SUCCINATE 40 MG/ML
40 INJECTION, POWDER, LYOPHILIZED, FOR SOLUTION INTRAMUSCULAR; INTRAVENOUS ONCE
Status: CANCELLED
Start: 2021-04-20 | End: 2021-04-20

## 2021-03-23 RX ORDER — SODIUM CHLORIDE 0.9 % (FLUSH) 0.9 %
10 SYRINGE (ML) INJECTION PRN
Status: DISCONTINUED | OUTPATIENT
Start: 2021-03-23 | End: 2021-03-24 | Stop reason: HOSPADM

## 2021-03-23 RX ORDER — DIPHENHYDRAMINE HYDROCHLORIDE 50 MG/ML
25 INJECTION INTRAMUSCULAR; INTRAVENOUS ONCE
Status: COMPLETED | OUTPATIENT
Start: 2021-03-23 | End: 2021-03-23

## 2021-03-23 RX ORDER — HEPARIN SODIUM (PORCINE) LOCK FLUSH IV SOLN 100 UNIT/ML 100 UNIT/ML
500 SOLUTION INTRAVENOUS PRN
Status: CANCELLED | OUTPATIENT
Start: 2021-04-20

## 2021-03-23 RX ORDER — SODIUM CHLORIDE 0.9 % (FLUSH) 0.9 %
10 SYRINGE (ML) INJECTION PRN
Status: CANCELLED | OUTPATIENT
Start: 2021-04-20

## 2021-03-23 RX ORDER — HEPARIN SODIUM (PORCINE) LOCK FLUSH IV SOLN 100 UNIT/ML 100 UNIT/ML
500 SOLUTION INTRAVENOUS PRN
Status: CANCELLED
Start: 2021-04-20

## 2021-03-23 RX ORDER — ACETAMINOPHEN 325 MG/1
650 TABLET ORAL ONCE
Status: COMPLETED | OUTPATIENT
Start: 2021-03-23 | End: 2021-03-23

## 2021-03-23 RX ORDER — CYANOCOBALAMIN 1000 UG/ML
1000 INJECTION INTRAMUSCULAR; SUBCUTANEOUS ONCE
Status: CANCELLED
Start: 2021-04-20 | End: 2021-04-20

## 2021-03-23 RX ORDER — HEPARIN SODIUM (PORCINE) LOCK FLUSH IV SOLN 100 UNIT/ML 100 UNIT/ML
500 SOLUTION INTRAVENOUS PRN
Status: DISCONTINUED | OUTPATIENT
Start: 2021-03-23 | End: 2021-03-24 | Stop reason: HOSPADM

## 2021-03-23 RX ORDER — CYANOCOBALAMIN 1000 UG/ML
1000 INJECTION INTRAMUSCULAR; SUBCUTANEOUS ONCE
Status: COMPLETED | OUTPATIENT
Start: 2021-03-23 | End: 2021-03-23

## 2021-03-23 RX ORDER — METHYLPREDNISOLONE SODIUM SUCCINATE 40 MG/ML
40 INJECTION, POWDER, LYOPHILIZED, FOR SOLUTION INTRAMUSCULAR; INTRAVENOUS ONCE
Status: COMPLETED | OUTPATIENT
Start: 2021-03-23 | End: 2021-03-23

## 2021-03-23 RX ORDER — DIPHENHYDRAMINE HYDROCHLORIDE 50 MG/ML
25 INJECTION INTRAMUSCULAR; INTRAVENOUS ONCE
Status: CANCELLED
Start: 2021-04-20 | End: 2021-04-20

## 2021-03-23 RX ORDER — ACETAMINOPHEN 325 MG/1
650 TABLET ORAL ONCE
Status: CANCELLED | OUTPATIENT
Start: 2021-04-20 | End: 2021-04-20

## 2021-03-23 RX ADMIN — SODIUM CHLORIDE, PRESERVATIVE FREE 10 ML: 5 INJECTION INTRAVENOUS at 11:00

## 2021-03-23 RX ADMIN — SODIUM CHLORIDE, PRESERVATIVE FREE 10 ML: 5 INJECTION INTRAVENOUS at 11:01

## 2021-03-23 RX ADMIN — ACETAMINOPHEN 650 MG: 325 TABLET ORAL at 08:06

## 2021-03-23 RX ADMIN — CYANOCOBALAMIN 1000 MCG: 1000 INJECTION, SOLUTION INTRAMUSCULAR at 08:10

## 2021-03-23 RX ADMIN — SODIUM CHLORIDE, PRESERVATIVE FREE 10 ML: 5 INJECTION INTRAVENOUS at 08:05

## 2021-03-23 RX ADMIN — Medication 500 UNITS: at 11:01

## 2021-03-23 RX ADMIN — IMMUNE GLOBULIN INTRAVENOUS (HUMAN) 20 G: 5 INJECTION, SOLUTION INTRAVENOUS at 08:45

## 2021-03-23 RX ADMIN — SODIUM CHLORIDE, PRESERVATIVE FREE 10 ML: 5 INJECTION INTRAVENOUS at 08:08

## 2021-03-23 RX ADMIN — METHYLPREDNISOLONE SODIUM SUCCINATE 40 MG: 40 INJECTION, POWDER, FOR SOLUTION INTRAMUSCULAR; INTRAVENOUS at 08:06

## 2021-03-23 RX ADMIN — DIPHENHYDRAMINE HYDROCHLORIDE 25 MG: 50 INJECTION INTRAMUSCULAR; INTRAVENOUS at 08:07

## 2021-03-23 RX ADMIN — SODIUM CHLORIDE, PRESERVATIVE FREE 10 ML: 5 INJECTION INTRAVENOUS at 08:07

## 2021-03-23 ASSESSMENT — PAIN SCALES - GENERAL: PAINLEVEL_OUTOF10: 0

## 2021-03-23 NOTE — DISCHARGE SUMMARY
Tolerated Treatments well. Reviewed discharge instructions, understanding verbalized. Copies of AVS given to take home. Patient discharged home. Down to exit per self.     Orders Placed This Encounter   Medications    acetaminophen (TYLENOL) tablet 650 mg    immune globulin (FLEBOGAMMA) 10% solution 20 g    sodium chloride flush 0.9 % injection 10 mL    heparin flush 100 UNIT/ML injection 500 Units    diphenhydrAMINE (BENADRYL) injection 25 mg    methylPREDNISolone sodium (SOLU-MEDROL) injection 40 mg    cyanocobalamin injection 1,000 mcg

## 2021-03-23 NOTE — PLAN OF CARE
Ambulatory to unit room 5 for Reorientated to unit. Procedure and plan of care explained. Questions answered. Understanding verbalized.

## 2021-04-05 ENCOUNTER — HOSPITAL ENCOUNTER (OUTPATIENT)
Dept: INFUSION THERAPY | Age: 71
Discharge: HOME | End: 2021-04-05
Payer: MEDICARE

## 2021-04-05 DIAGNOSIS — D80.1 HYPOGAMMAGLOBULINEMIA (HCC): ICD-10-CM

## 2021-04-05 DIAGNOSIS — C91.10 CLL (CHRONIC LYMPHOCYTIC LEUKEMIA) (HCC): ICD-10-CM

## 2021-04-05 LAB
ALBUMIN SERPL-MCNC: 4.2 GM/DL (ref 3.4–5)
ALP BLD-CCNC: 69 IU/L (ref 40–129)
ALT SERPL-CCNC: 25 U/L (ref 10–40)
ANION GAP SERPL CALCULATED.3IONS-SCNC: 8 MMOL/L (ref 4–16)
AST SERPL-CCNC: 21 IU/L (ref 15–37)
BASOPHILS ABSOLUTE: 0 K/CU MM
BASOPHILS RELATIVE PERCENT: 0.1 % (ref 0–1)
BILIRUB SERPL-MCNC: 0.6 MG/DL (ref 0–1)
BUN BLDV-MCNC: 15 MG/DL (ref 6–23)
CALCIUM SERPL-MCNC: 8.9 MG/DL (ref 8.3–10.6)
CHLORIDE BLD-SCNC: 106 MMOL/L (ref 99–110)
CO2: 27 MMOL/L (ref 21–32)
CREAT SERPL-MCNC: 1.1 MG/DL (ref 0.9–1.3)
DIFFERENTIAL TYPE: ABNORMAL
EOSINOPHILS ABSOLUTE: 0.1 K/CU MM
EOSINOPHILS RELATIVE PERCENT: 0.2 % (ref 0–3)
FERRITIN: 29 NG/ML (ref 30–400)
GFR AFRICAN AMERICAN: >60 ML/MIN/1.73M2
GFR NON-AFRICAN AMERICAN: >60 ML/MIN/1.73M2
GLUCOSE BLD-MCNC: 78 MG/DL (ref 70–99)
HCT VFR BLD CALC: 39.3 % (ref 42–52)
HEMOGLOBIN: 12.8 GM/DL (ref 13.5–18)
IGG,SERUM: 540 MG/DL (ref 723–1685)
IRON: 65 UG/DL (ref 59–158)
LACTATE DEHYDROGENASE: 152 IU/L (ref 120–246)
LYMPHOCYTES ABSOLUTE: 21.3 K/CU MM
LYMPHOCYTES RELATIVE PERCENT: 79.1 % (ref 24–44)
MCH RBC QN AUTO: 30.1 PG (ref 27–31)
MCHC RBC AUTO-ENTMCNC: 32.6 % (ref 32–36)
MCV RBC AUTO: 92.5 FL (ref 78–100)
MONOCYTES ABSOLUTE: 0.7 K/CU MM
MONOCYTES RELATIVE PERCENT: 2.7 % (ref 0–4)
PCT TRANSFERRIN: 17 % (ref 10–44)
PDW BLD-RTO: 16.7 % (ref 11.7–14.9)
PLATELET # BLD: 113 K/CU MM (ref 140–440)
PMV BLD AUTO: 10.9 FL (ref 7.5–11.1)
POTASSIUM SERPL-SCNC: 4.3 MMOL/L (ref 3.5–5.1)
RBC # BLD: 4.25 M/CU MM (ref 4.6–6.2)
SEGMENTED NEUTROPHILS ABSOLUTE COUNT: 4.8 K/CU MM
SEGMENTED NEUTROPHILS RELATIVE PERCENT: 17.9 % (ref 36–66)
SODIUM BLD-SCNC: 141 MMOL/L (ref 135–145)
TOTAL IRON BINDING CAPACITY: 374 UG/DL (ref 250–450)
TOTAL PROTEIN: 5.9 GM/DL (ref 6.4–8.2)
UNSATURATED IRON BINDING CAPACITY: 309 UG/DL (ref 110–370)
WBC # BLD: 27 K/CU MM (ref 4–10.5)

## 2021-04-05 PROCEDURE — 82728 ASSAY OF FERRITIN: CPT

## 2021-04-05 PROCEDURE — 82784 ASSAY IGA/IGD/IGG/IGM EACH: CPT

## 2021-04-05 PROCEDURE — 36415 COLL VENOUS BLD VENIPUNCTURE: CPT

## 2021-04-05 PROCEDURE — 80053 COMPREHEN METABOLIC PANEL: CPT

## 2021-04-05 PROCEDURE — 83540 ASSAY OF IRON: CPT

## 2021-04-05 PROCEDURE — 83615 LACTATE (LD) (LDH) ENZYME: CPT

## 2021-04-05 PROCEDURE — 83550 IRON BINDING TEST: CPT

## 2021-04-05 PROCEDURE — 85025 COMPLETE CBC W/AUTO DIFF WBC: CPT

## 2021-04-20 ENCOUNTER — HOSPITAL ENCOUNTER (OUTPATIENT)
Dept: INFUSION THERAPY | Age: 71
Setting detail: INFUSION SERIES
Discharge: HOME | End: 2021-04-20
Payer: MEDICARE

## 2021-04-20 VITALS
HEART RATE: 80 BPM | BODY MASS INDEX: 27.36 KG/M2 | SYSTOLIC BLOOD PRESSURE: 142 MMHG | OXYGEN SATURATION: 96 % | RESPIRATION RATE: 16 BRPM | DIASTOLIC BLOOD PRESSURE: 74 MMHG | WEIGHT: 202 LBS | TEMPERATURE: 97.5 F | HEIGHT: 72 IN

## 2021-04-20 DIAGNOSIS — D80.1 HYPOGAMMAGLOBULINEMIA (HCC): ICD-10-CM

## 2021-04-20 DIAGNOSIS — D80.3 SELECTIVE DEFICIENCY OF IGG (HCC): ICD-10-CM

## 2021-04-20 DIAGNOSIS — C91.10 LEUKEMIA, LYMPHOCYTIC, CHRONIC (HCC): Primary | ICD-10-CM

## 2021-04-20 PROCEDURE — 96372 THER/PROPH/DIAG INJ SC/IM: CPT

## 2021-04-20 PROCEDURE — 6370000000 HC RX 637 (ALT 250 FOR IP): Performed by: INTERNAL MEDICINE

## 2021-04-20 PROCEDURE — 96365 THER/PROPH/DIAG IV INF INIT: CPT

## 2021-04-20 PROCEDURE — 2580000003 HC RX 258: Performed by: INTERNAL MEDICINE

## 2021-04-20 PROCEDURE — 96367 TX/PROPH/DG ADDL SEQ IV INF: CPT

## 2021-04-20 PROCEDURE — 6360000002 HC RX W HCPCS: Performed by: INTERNAL MEDICINE

## 2021-04-20 PROCEDURE — 96374 THER/PROPH/DIAG INJ IV PUSH: CPT

## 2021-04-20 PROCEDURE — 99211 OFF/OP EST MAY X REQ PHY/QHP: CPT

## 2021-04-20 RX ORDER — CYANOCOBALAMIN 1000 UG/ML
1000 INJECTION INTRAMUSCULAR; SUBCUTANEOUS ONCE
Status: COMPLETED | OUTPATIENT
Start: 2021-04-20 | End: 2021-04-20

## 2021-04-20 RX ORDER — HEPARIN SODIUM (PORCINE) LOCK FLUSH IV SOLN 100 UNIT/ML 100 UNIT/ML
500 SOLUTION INTRAVENOUS PRN
Status: DISCONTINUED | OUTPATIENT
Start: 2021-04-20 | End: 2021-04-21 | Stop reason: HOSPADM

## 2021-04-20 RX ORDER — CYANOCOBALAMIN 1000 UG/ML
1000 INJECTION INTRAMUSCULAR; SUBCUTANEOUS ONCE
Status: CANCELLED
Start: 2021-05-18 | End: 2021-05-18

## 2021-04-20 RX ORDER — METHYLPREDNISOLONE SODIUM SUCCINATE 40 MG/ML
40 INJECTION, POWDER, LYOPHILIZED, FOR SOLUTION INTRAMUSCULAR; INTRAVENOUS ONCE
Status: CANCELLED
Start: 2021-05-18 | End: 2021-05-18

## 2021-04-20 RX ORDER — METHYLPREDNISOLONE SODIUM SUCCINATE 40 MG/ML
40 INJECTION, POWDER, LYOPHILIZED, FOR SOLUTION INTRAMUSCULAR; INTRAVENOUS ONCE
Status: COMPLETED | OUTPATIENT
Start: 2021-04-20 | End: 2021-04-20

## 2021-04-20 RX ORDER — HEPARIN SODIUM (PORCINE) LOCK FLUSH IV SOLN 100 UNIT/ML 100 UNIT/ML
500 SOLUTION INTRAVENOUS PRN
Status: CANCELLED
Start: 2021-05-18

## 2021-04-20 RX ORDER — SODIUM CHLORIDE 0.9 % (FLUSH) 0.9 %
10 SYRINGE (ML) INJECTION PRN
Status: CANCELLED | OUTPATIENT
Start: 2021-05-18

## 2021-04-20 RX ORDER — DIPHENHYDRAMINE HYDROCHLORIDE 50 MG/ML
25 INJECTION INTRAMUSCULAR; INTRAVENOUS ONCE
Status: COMPLETED | OUTPATIENT
Start: 2021-04-20 | End: 2021-04-20

## 2021-04-20 RX ORDER — ACETAMINOPHEN 325 MG/1
650 TABLET ORAL ONCE
Status: CANCELLED | OUTPATIENT
Start: 2021-05-18 | End: 2021-05-18

## 2021-04-20 RX ORDER — SODIUM CHLORIDE 0.9 % (FLUSH) 0.9 %
10 SYRINGE (ML) INJECTION PRN
Status: DISCONTINUED | OUTPATIENT
Start: 2021-04-20 | End: 2021-04-21 | Stop reason: HOSPADM

## 2021-04-20 RX ORDER — DOXYCYCLINE HYCLATE 100 MG/1
100 CAPSULE ORAL 2 TIMES DAILY
Status: ON HOLD | COMMUNITY
End: 2022-03-27 | Stop reason: HOSPADM

## 2021-04-20 RX ORDER — DIPHENHYDRAMINE HYDROCHLORIDE 50 MG/ML
25 INJECTION INTRAMUSCULAR; INTRAVENOUS ONCE
Status: CANCELLED
Start: 2021-05-18 | End: 2021-05-18

## 2021-04-20 RX ORDER — HEPARIN SODIUM (PORCINE) LOCK FLUSH IV SOLN 100 UNIT/ML 100 UNIT/ML
500 SOLUTION INTRAVENOUS PRN
Status: CANCELLED | OUTPATIENT
Start: 2021-05-18

## 2021-04-20 RX ORDER — ACETAMINOPHEN 325 MG/1
650 TABLET ORAL ONCE
Status: COMPLETED | OUTPATIENT
Start: 2021-04-20 | End: 2021-04-20

## 2021-04-20 RX ADMIN — SODIUM CHLORIDE, PRESERVATIVE FREE 10 ML: 5 INJECTION INTRAVENOUS at 11:01

## 2021-04-20 RX ADMIN — SODIUM CHLORIDE, PRESERVATIVE FREE 10 ML: 5 INJECTION INTRAVENOUS at 08:05

## 2021-04-20 RX ADMIN — ACETAMINOPHEN 650 MG: 325 TABLET ORAL at 08:00

## 2021-04-20 RX ADMIN — Medication 500 UNITS: at 11:01

## 2021-04-20 RX ADMIN — METHYLPREDNISOLONE SODIUM SUCCINATE 40 MG: 40 INJECTION, POWDER, FOR SOLUTION INTRAMUSCULAR; INTRAVENOUS at 08:04

## 2021-04-20 RX ADMIN — CYANOCOBALAMIN 1000 MCG: 1000 INJECTION, SOLUTION INTRAMUSCULAR at 08:06

## 2021-04-20 RX ADMIN — SODIUM CHLORIDE, PRESERVATIVE FREE 10 ML: 5 INJECTION INTRAVENOUS at 11:00

## 2021-04-20 RX ADMIN — DIPHENHYDRAMINE HYDROCHLORIDE 25 MG: 50 INJECTION, SOLUTION INTRAMUSCULAR; INTRAVENOUS at 08:00

## 2021-04-20 RX ADMIN — SODIUM CHLORIDE, PRESERVATIVE FREE 10 ML: 5 INJECTION INTRAVENOUS at 07:45

## 2021-04-20 RX ADMIN — SODIUM CHLORIDE, PRESERVATIVE FREE 10 ML: 5 INJECTION INTRAVENOUS at 08:03

## 2021-04-20 RX ADMIN — IMMUNE GLOBULIN INTRAVENOUS (HUMAN) 20 G: 5 INJECTION, SOLUTION INTRAVENOUS at 09:00

## 2021-04-20 ASSESSMENT — PAIN SCALES - GENERAL: PAINLEVEL_OUTOF10: 0

## 2021-04-20 NOTE — DISCHARGE SUMMARY
Tolerated IVIG well. Reviewed discharge instructions, understanding verbalized. Copies of AVS given to take home. Patient discharged home. Down to exit per self.     Orders Placed This Encounter   Medications    acetaminophen (TYLENOL) tablet 650 mg    immune globulin (FLEBOGAMMA) 10% solution 20 g    sodium chloride flush 0.9 % injection 10 mL    heparin flush 100 UNIT/ML injection 500 Units    diphenhydrAMINE (BENADRYL) injection 25 mg    methylPREDNISolone sodium (SOLU-MEDROL) injection 40 mg    heparin flush 100 UNIT/ML injection 500 Units    cyanocobalamin injection 1,000 mcg

## 2021-04-27 ENCOUNTER — OFFICE VISIT (OUTPATIENT)
Dept: ONCOLOGY | Age: 71
End: 2021-04-27
Payer: MEDICARE

## 2021-04-27 ENCOUNTER — HOSPITAL ENCOUNTER (OUTPATIENT)
Dept: INFUSION THERAPY | Age: 71
Discharge: HOME | End: 2021-04-27
Payer: MEDICARE

## 2021-04-27 VITALS
RESPIRATION RATE: 16 BRPM | TEMPERATURE: 99.6 F | WEIGHT: 201 LBS | DIASTOLIC BLOOD PRESSURE: 74 MMHG | SYSTOLIC BLOOD PRESSURE: 160 MMHG | BODY MASS INDEX: 27.22 KG/M2 | OXYGEN SATURATION: 93 % | HEART RATE: 82 BPM | HEIGHT: 72 IN

## 2021-04-27 DIAGNOSIS — C91.10 LEUKEMIA, LYMPHOCYTIC, CHRONIC (HCC): Primary | ICD-10-CM

## 2021-04-27 DIAGNOSIS — D50.0 IRON DEFICIENCY ANEMIA DUE TO CHRONIC BLOOD LOSS: ICD-10-CM

## 2021-04-27 DIAGNOSIS — D80.1 HYPOGAMMAGLOBULINEMIA (HCC): ICD-10-CM

## 2021-04-27 DIAGNOSIS — K90.9 INTESTINAL MALABSORPTION, UNSPECIFIED TYPE: ICD-10-CM

## 2021-04-27 PROCEDURE — 1123F ACP DISCUSS/DSCN MKR DOCD: CPT | Performed by: INTERNAL MEDICINE

## 2021-04-27 PROCEDURE — 99214 OFFICE O/P EST MOD 30 MIN: CPT | Performed by: INTERNAL MEDICINE

## 2021-04-27 PROCEDURE — 1036F TOBACCO NON-USER: CPT | Performed by: INTERNAL MEDICINE

## 2021-04-27 PROCEDURE — 4040F PNEUMOC VAC/ADMIN/RCVD: CPT | Performed by: INTERNAL MEDICINE

## 2021-04-27 PROCEDURE — 3017F COLORECTAL CA SCREEN DOC REV: CPT | Performed by: INTERNAL MEDICINE

## 2021-04-27 PROCEDURE — G8427 DOCREV CUR MEDS BY ELIG CLIN: HCPCS | Performed by: INTERNAL MEDICINE

## 2021-04-27 PROCEDURE — 99211 OFF/OP EST MAY X REQ PHY/QHP: CPT

## 2021-04-27 PROCEDURE — G8417 CALC BMI ABV UP PARAM F/U: HCPCS | Performed by: INTERNAL MEDICINE

## 2021-04-27 RX ORDER — ASCORBIC ACID 250 MG
500 TABLET ORAL DAILY
Qty: 30 TABLET | Refills: 3 | Status: SHIPPED | OUTPATIENT
Start: 2021-04-27 | End: 2021-10-18 | Stop reason: SDUPTHER

## 2021-04-27 RX ORDER — VALACYCLOVIR HYDROCHLORIDE 500 MG/1
500 TABLET, FILM COATED ORAL DAILY
Qty: 30 TABLET | Refills: 5 | Status: SHIPPED | OUTPATIENT
Start: 2021-04-27 | End: 2021-11-09

## 2021-04-27 RX ORDER — FERROUS GLUCONATE 324(37.5)
324 TABLET ORAL 2 TIMES DAILY
Qty: 60 TABLET | Refills: 5 | Status: SHIPPED | OUTPATIENT
Start: 2021-04-27 | End: 2021-11-10

## 2021-04-27 NOTE — PROGRESS NOTES
[place] Rooming Questions  Patient: Godwin Shaw  MRN: [id number]    Date: 4/27/2021        1. Do you have any new issues?   no         2. Do you need any refills on medications? yes - vit c acyclovir     3. Have you had any imaging done since your last visit? yes - US    4. Have you been hospitalized or seen in the emergency room since your last visit here?   no    5. Did the patient have a depression screening completed today?  No    No data recorded     PHQ-9 Given to (if applicable):               PHQ-9 Score (if applicable):                     [] Positive     []  Negative              Does question #9 need addressed (if applicable)                     [] Yes    []  No               Courtney Villaseñor CMA

## 2021-04-27 NOTE — PROGRESS NOTES
Patient Name: Fe Mancera  Patient : 1950  Patient MRN: [id number]     Primary Oncologist: Jorge Morocho MD  Referring Physician: Dolly Whalen MD       Date of Service: 2021      Chief Complaint:   Chief Complaint   Patient presents with    Follow-up        Active Problem list  1. Leukemia, lymphocytic, chronic ([address]    2. Iron deficiency anemia due to chronic blood loss    3. Intestinal malabsorption, unspecified type    4. Hypogammaglobulinemia (HCC)           HPI:        1. Mr. Zacarias Lu ( 52) was diagnosed with stage I CLL in , when he presented with infection and lymphocytosis. His peripheral blood immunophenotyping was characteristic of B-cell CLL. It was CD38 negative, ZAP-70 positive, CD19 and 20 positive, CD5 positive. Karyotyping was normal in 2006.,  2. Because of his infection and associated acquired immune deficiency(hypogammaglobulinemia), he was given IVIG for a year. He was started back on IVIG therapy monthly since 2013 to prevent future major infections continuing for now. Also had minor Infusional reaction to IVIG in 2016 responded to Steroids & Benadryl., No further problems after that. 3. CLL was treated only with Leukeran intermittently from 2007 until 2011 and again from 2011 until 2012. 4. 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 Flow studies still showed the same and FISH in 2012 showed deletion of tp53 (17p) and MYB (6q). ,  5. He was thus treated with Fludara and Rituxan for six months between April and 2012 with excellent clinical and hematological response.  . In 2013, he developed Multiple b/l nodes in axilla and groin area, CAT scan on to his insurance issues  5-18 CT chest: Showed some mild decrease in mediastinal and hilar lymphadenopathy. . Regards to his abdomen also there is decrease in his lymphadenopathy. Although there is a left inguinal node that has enlarged. Section that no obvious source of his abdominal cramping. 10/10/2018 EGD showed severe ulcerative esophagitis with slight narrowing in the GE junction small hiatal hernia mild superficial gastritis, Colonoscope revealed 2 mm polyp in the sigmoid colon, diverticulosis, hemorrhoids, moderate stool  1/2019; Colonoscopy with two diminutive polyps, diverticulosis and hemorrhoids, path with TA above IC valve and HP distal sigmoid  10-19 iron def based on labs refered to GI , stool occult negative x #3 , NO PICA   11-19 saw Dr. Rodriguez Prkasandra, and referred to subhash for capsule endoscopy  11/7/2019: Ct chest:Ground-glass nodule seen within the right upper lobe the largest measuring 17  mm in diameter. 12-6-19 capsule endoscopy, results unavailable   2/2020: CT chest:IMPRESSION:  Previously noted ground-glass opacities in the right lung have resolved. However, there is a new cluster of tiny pulmonary nodules in the left lower  lobe which could represent an infectious or inflammatory etiology. Short-term follow-up chest CT is recommended in 3-6 months. Multiple additional tiny pulmonary nodules are overall stable. Stable mediastinal lymph nodes without new lymphadenopathy. Previous Therapies    May 2020: Ct chest:  1. Interval resolution of the previously described cluster of pulmonary    nodules in the left lower lobe.  Findings are most compatible with resolved    infectious/inflammatory etiology. 2. No acute cardiopulmonary disease. 3. COPD. 4. Stable subcentimeter mediastinal lymph nodes. 8/28/20: US RP normal    August 2020 cystoscopy revealed enlarged prostate.  Was Recommended TURBT      PAST MEDICAL HISTORY:   1. Stage I CLL with conversion from good to poor prognostic factors. ,  2. History of hypertension for many years. ,  3. 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 Arthritis in the past. ,  5. Frozen shoulder for which he had treatment including injection by Dr. Khushboo Carmona. ,  6. Cellulitis with Zoster type lesion Left thigh resolved with Bactrim and Valtrex in 2016.,  7. abdominal illness, with fever, nausea, and discomfort, suggestive of infectious etiology in May 2016. ,  8. hospitalization between  and  for what seems like atypical rhinovirus pneumonia involving right middle and lower lobe and left lower lobe with sepsis,  9. 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 Dr. Nati Potter is referring him for MOHS surgery. ,  10. hospitalized from  to 2017, for hyperosmolar hyperglycemia with hyponatremia and hyperkalemia and UTI. He was seen by Dr. Evelyn Lam, who put him on insulin. He was also seen by Dr. Gabriela Duran. He felt much better after his sugar got under better control and electrolyte imbalanced reversed,  11. Ultrasound Doppler 2017, was negative for any DVT in either leg. Chest x-ray showed no acute process. PAST SURGICAL HISTORY:   1. knee operation,  2. tonsillectomy,  3. broken ankle times two requiring open reduction. ,  4. Vision better after cataract surgery    FAMILY HISTORY:   His paternal aunt had colon cancer. Uncle also had some form of cancer. Father had heart disease. Sister  12 of Liver disease     SOCIAL HISTORY:   He has a 40-pack-year history of smoking, quit in . He denies alcohol use. He is not . Has one son. Interval History  2021: Arrived alone to the clinic today. Reported that he started noticing swelling in the left testicle 3-4 weeks ago, was evaluated by Dr Lily Espinoza. Also had an ultrasound of the testicles done.  Then he started to notice swelling in  eright testicle. Reported pain. No fever. No erythema. No new  sx. No bleeding. Has completed one course of antibiotics and is on second course of antibiotics. Has run out of oral iron tablets 4 days. No overt bleeding. Energy levels are better. No other infection. No night sweats. No new lad, weight loss. Completed both doses of COVID vaccine.  Leg cramps resolved    Review of Systems   Per interval history; otherwise 10 point ROS is negative              Vital Signs:  BP (!) 160/74 (Site: Right Upper Arm, Position: Sitting, Cuff Size: Medium Adult)   Pulse 82   Temp 99.6 °F (37.6 °C) (Temporal)   Resp 16   Ht 6' (1.829 m)   Wt 201 lb (91.2 kg)   SpO2 93%   BMI 27.26 kg/m²     Physical Exam:  CONSTITUTIONAL: alert and awake  EYES: No palor or any icetrus   ENT: ATNC   NECK: No JVD   HEMATOLOGIC/LYMPHATIC: no cervical, supraclavicular or axillary lymphadenopathy   LUNGS:CTAB   CARDIOVASCULAR:s1s2 SM+ rrr   ABDOMEN:soft ntnd bs pos  NEUROLOGIC: GI   SKIN: skin tags   EXTREMITIES: no LE edema bilaterally      Labs:    Hematology:  Lab Results   Component Value Date    WBC 27.0 (H) 04/05/2021    RBC 4.25 (L) 04/05/2021    HGB 12.8 (L) 04/05/2021    HCT 39.3 (L) 04/05/2021    MCV 92.5 04/05/2021    MCH 30.1 04/05/2021    MCHC 32.6 04/05/2021    RDW 16.7 (H) 04/05/2021     (L) 04/05/2021    MPV 10.9 04/05/2021    BANDSPCT 6 08/04/2020    SEGSPCT 17.9 (L) 04/05/2021    EOSRELPCT 0.2 04/05/2021    BASOPCT 0.1 04/05/2021    LYMPHOPCT 79.1 (H) 04/05/2021    MONOPCT 2.7 04/05/2021    BANDABS 1.76 08/04/2020    SEGSABS 4.8 04/05/2021    EOSABS 0.1 04/05/2021    BASOSABS 0.0 04/05/2021    LYMPHSABS 21.3 04/05/2021    MONOSABS 0.7 04/05/2021    DIFFTYPE AUTOMATED DIFFERENTIAL 04/05/2021    ANISOCYTOSIS 1+ 08/04/2020    POLYCHROM 1+ 08/04/2020    WBCMORP ATYPICAL LYMPHOCYTES WITH PROMINENT NUCLEOLI NOTED 09/26/2017    PLTM SEVERAL LARGE PLATELETS 42/25/6565     Lab Results   Component Value Date    ESR 72 (H) tolerate 140 mg p.o. every other day  He was off for a couple months During the fall 2017  Cramps very bad july 2020, held treatment for two weeks. Resumed Ibrutinib and is currently on 140 mg po daily. Continue current dose as Stable counts and no B sx. . Acquired hypogammaglobulinemia:   Continue IVIG once a month. Check immunoglobulin levels     Testicular swelling/pain: Is being followed by urology    spasms: resolved with magnesium    BPH: is being followed by Urology    Iron def anemia and esophagitis: follows with GI, Dr Magdalene Gonzalez. Reported that he had colonoscopy 2018 with polyps detected. EGD was normal except for H. pylori which was treated. Was supposed to have VCE. Note October 2020, CBC with Hb decline slightly, could be sec to ibrutinib vs GIB. Is  on  oral iron OD along with Vitamin C, recommend further GI evaluation. UA with no blood. Intermittent mild thrombocytopenia: meds reviewed. Could be sec to ibrutinib vs CLL. Counts better than last cbc,  Will monitor for now. Lung Nodule RUL: CT May 2020  as above clear, most probably sec to  infection/inflammation. Continue other medical care. Discussed above findings and plan with him and he verbalized understanding. Answered all his questions. Discussed healthy lifestyle, smoking cessation, healthy diet and exercise as tolerated. Also discussed importance of being up-to-date with age-appropriate screening tools. Recommend follow-up with primary care physician and other specialist.    Please do not hesitate to contact us if you need any further information. Return to clinic august 2021   Or earlier if new symptoms    I have recommended that the patient follow CDC guidelines for prevention of COVID-19 infection.     [address]           Electronically signed by Melchor Soria MD on 4/27/2021 at 9:18 AM

## 2021-05-18 ENCOUNTER — HOSPITAL ENCOUNTER (OUTPATIENT)
Dept: INFUSION THERAPY | Age: 71
Setting detail: INFUSION SERIES
Discharge: HOME | End: 2021-05-18
Payer: MEDICARE

## 2021-05-18 VITALS
SYSTOLIC BLOOD PRESSURE: 137 MMHG | DIASTOLIC BLOOD PRESSURE: 74 MMHG | HEART RATE: 78 BPM | RESPIRATION RATE: 16 BRPM | OXYGEN SATURATION: 97 % | TEMPERATURE: 97.3 F

## 2021-05-18 DIAGNOSIS — D80.1 HYPOGAMMAGLOBULINEMIA (HCC): ICD-10-CM

## 2021-05-18 DIAGNOSIS — C91.10 LEUKEMIA, LYMPHOCYTIC, CHRONIC (HCC): Primary | ICD-10-CM

## 2021-05-18 DIAGNOSIS — D80.3 SELECTIVE DEFICIENCY OF IGG (HCC): ICD-10-CM

## 2021-05-18 PROCEDURE — 96366 THER/PROPH/DIAG IV INF ADDON: CPT

## 2021-05-18 PROCEDURE — 36430 TRANSFUSION BLD/BLD COMPNT: CPT

## 2021-05-18 PROCEDURE — 2580000003 HC RX 258: Performed by: INTERNAL MEDICINE

## 2021-05-18 PROCEDURE — 96375 TX/PRO/DX INJ NEW DRUG ADDON: CPT

## 2021-05-18 PROCEDURE — 6370000000 HC RX 637 (ALT 250 FOR IP): Performed by: INTERNAL MEDICINE

## 2021-05-18 PROCEDURE — 96372 THER/PROPH/DIAG INJ SC/IM: CPT

## 2021-05-18 PROCEDURE — 96374 THER/PROPH/DIAG INJ IV PUSH: CPT

## 2021-05-18 PROCEDURE — 6360000002 HC RX W HCPCS: Performed by: INTERNAL MEDICINE

## 2021-05-18 PROCEDURE — 96365 THER/PROPH/DIAG IV INF INIT: CPT

## 2021-05-18 PROCEDURE — 99211 OFF/OP EST MAY X REQ PHY/QHP: CPT

## 2021-05-18 RX ORDER — METHYLPREDNISOLONE SODIUM SUCCINATE 40 MG/ML
40 INJECTION, POWDER, LYOPHILIZED, FOR SOLUTION INTRAMUSCULAR; INTRAVENOUS ONCE
Status: CANCELLED
Start: 2021-06-01 | End: 2021-06-01

## 2021-05-18 RX ORDER — DIPHENHYDRAMINE HYDROCHLORIDE 50 MG/ML
25 INJECTION INTRAMUSCULAR; INTRAVENOUS ONCE
Status: COMPLETED | OUTPATIENT
Start: 2021-05-18 | End: 2021-05-18

## 2021-05-18 RX ORDER — ACETAMINOPHEN 325 MG/1
650 TABLET ORAL ONCE
Status: CANCELLED | OUTPATIENT
Start: 2021-06-01 | End: 2021-06-01

## 2021-05-18 RX ORDER — CYANOCOBALAMIN 1000 UG/ML
1000 INJECTION INTRAMUSCULAR; SUBCUTANEOUS ONCE
Status: CANCELLED
Start: 2021-06-01 | End: 2021-06-01

## 2021-05-18 RX ORDER — SODIUM CHLORIDE 0.9 % (FLUSH) 0.9 %
10 SYRINGE (ML) INJECTION PRN
Status: CANCELLED | OUTPATIENT
Start: 2021-06-01

## 2021-05-18 RX ORDER — SODIUM CHLORIDE 0.9 % (FLUSH) 0.9 %
10 SYRINGE (ML) INJECTION PRN
Status: DISCONTINUED | OUTPATIENT
Start: 2021-05-18 | End: 2021-05-19 | Stop reason: HOSPADM

## 2021-05-18 RX ORDER — HEPARIN SODIUM (PORCINE) LOCK FLUSH IV SOLN 100 UNIT/ML 100 UNIT/ML
500 SOLUTION INTRAVENOUS PRN
Status: DISCONTINUED | OUTPATIENT
Start: 2021-05-18 | End: 2021-05-19 | Stop reason: HOSPADM

## 2021-05-18 RX ORDER — ACETAMINOPHEN 325 MG/1
650 TABLET ORAL ONCE
Status: COMPLETED | OUTPATIENT
Start: 2021-05-18 | End: 2021-05-18

## 2021-05-18 RX ORDER — HEPARIN SODIUM (PORCINE) LOCK FLUSH IV SOLN 100 UNIT/ML 100 UNIT/ML
500 SOLUTION INTRAVENOUS PRN
Status: CANCELLED | OUTPATIENT
Start: 2021-06-01

## 2021-05-18 RX ORDER — HEPARIN SODIUM (PORCINE) LOCK FLUSH IV SOLN 100 UNIT/ML 100 UNIT/ML
500 SOLUTION INTRAVENOUS PRN
Status: CANCELLED
Start: 2021-06-01

## 2021-05-18 RX ORDER — CYANOCOBALAMIN 1000 UG/ML
1000 INJECTION INTRAMUSCULAR; SUBCUTANEOUS ONCE
Status: COMPLETED | OUTPATIENT
Start: 2021-05-18 | End: 2021-05-18

## 2021-05-18 RX ORDER — DIPHENHYDRAMINE HYDROCHLORIDE 50 MG/ML
25 INJECTION INTRAMUSCULAR; INTRAVENOUS ONCE
Status: CANCELLED
Start: 2021-06-01 | End: 2021-06-01

## 2021-05-18 RX ORDER — METHYLPREDNISOLONE SODIUM SUCCINATE 40 MG/ML
40 INJECTION, POWDER, LYOPHILIZED, FOR SOLUTION INTRAMUSCULAR; INTRAVENOUS ONCE
Status: COMPLETED | OUTPATIENT
Start: 2021-05-18 | End: 2021-05-18

## 2021-05-18 RX ADMIN — CYANOCOBALAMIN 1000 MCG: 1000 INJECTION, SOLUTION INTRAMUSCULAR at 08:12

## 2021-05-18 RX ADMIN — ACETAMINOPHEN 650 MG: 325 TABLET ORAL at 08:11

## 2021-05-18 RX ADMIN — METHYLPREDNISOLONE SODIUM SUCCINATE 40 MG: 40 INJECTION, POWDER, FOR SOLUTION INTRAMUSCULAR; INTRAVENOUS at 08:11

## 2021-05-18 RX ADMIN — DIPHENHYDRAMINE HYDROCHLORIDE 25 MG: 50 INJECTION INTRAMUSCULAR; INTRAVENOUS at 08:11

## 2021-05-18 RX ADMIN — IMMUNE GLOBULIN (HUMAN) 20 G: 10 INJECTION INTRAVENOUS; SUBCUTANEOUS at 08:54

## 2021-05-18 RX ADMIN — SODIUM CHLORIDE, PRESERVATIVE FREE 10 ML: 5 INJECTION INTRAVENOUS at 10:44

## 2021-05-18 RX ADMIN — SODIUM CHLORIDE, PRESERVATIVE FREE 10 ML: 5 INJECTION INTRAVENOUS at 08:10

## 2021-05-18 RX ADMIN — HEPARIN 500 UNITS: 100 SYRINGE at 10:44

## 2021-05-18 ASSESSMENT — PAIN SCALES - GENERAL: PAINLEVEL_OUTOF10: 0

## 2021-05-18 NOTE — PROGRESS NOTES
Prior to discharge, the After Visit Summary Discharge Instructions were reviewed with the patient. He was offered a printed version of the AVS, but declined the offer. Recurrent appointment, pt tolerated infusion well. Pt discharged home. Pt to exit via steady gait.     Orders Placed This Encounter   Medications    acetaminophen (TYLENOL) tablet 650 mg    sodium chloride flush 0.9 % injection 10 mL    heparin flush 100 UNIT/ML injection 500 Units    diphenhydrAMINE (BENADRYL) injection 25 mg    methylPREDNISolone sodium (SOLU-MEDROL) injection 40 mg    heparin flush 100 UNIT/ML injection 500 Units    cyanocobalamin injection 1,000 mcg    immune globulin (GAMUNEX-C) 10% solution 20 g

## 2021-05-18 NOTE — PROGRESS NOTES
Pt taken to room 02 for ivig infusion. Pt oriented to room, call light, bed/chair controls, TV, pt voiced understanding. Plan of care explained to pt, pt voiced understanding.

## 2021-06-07 ENCOUNTER — CLINICAL DOCUMENTATION (OUTPATIENT)
Dept: ONCOLOGY | Age: 71
End: 2021-06-07

## 2021-06-15 ENCOUNTER — HOSPITAL ENCOUNTER (OUTPATIENT)
Dept: INFUSION THERAPY | Age: 71
Setting detail: INFUSION SERIES
Discharge: HOME | End: 2021-06-15
Payer: MEDICARE

## 2021-06-15 VITALS
HEART RATE: 78 BPM | OXYGEN SATURATION: 97 % | DIASTOLIC BLOOD PRESSURE: 69 MMHG | TEMPERATURE: 97.9 F | RESPIRATION RATE: 16 BRPM | SYSTOLIC BLOOD PRESSURE: 154 MMHG

## 2021-06-15 DIAGNOSIS — D80.3 SELECTIVE DEFICIENCY OF IGG (HCC): ICD-10-CM

## 2021-06-15 DIAGNOSIS — D80.1 HYPOGAMMAGLOBULINEMIA (HCC): ICD-10-CM

## 2021-06-15 DIAGNOSIS — C91.10 LEUKEMIA, LYMPHOCYTIC, CHRONIC (HCC): Primary | ICD-10-CM

## 2021-06-15 PROCEDURE — 96372 THER/PROPH/DIAG INJ SC/IM: CPT

## 2021-06-15 PROCEDURE — 6360000002 HC RX W HCPCS: Performed by: INTERNAL MEDICINE

## 2021-06-15 PROCEDURE — 99211 OFF/OP EST MAY X REQ PHY/QHP: CPT

## 2021-06-15 PROCEDURE — 6370000000 HC RX 637 (ALT 250 FOR IP): Performed by: INTERNAL MEDICINE

## 2021-06-15 PROCEDURE — 2580000003 HC RX 258: Performed by: INTERNAL MEDICINE

## 2021-06-15 PROCEDURE — 96374 THER/PROPH/DIAG INJ IV PUSH: CPT

## 2021-06-15 PROCEDURE — 96365 THER/PROPH/DIAG IV INF INIT: CPT

## 2021-06-15 PROCEDURE — 6370000000 HC RX 637 (ALT 250 FOR IP)

## 2021-06-15 PROCEDURE — 96375 TX/PRO/DX INJ NEW DRUG ADDON: CPT

## 2021-06-15 PROCEDURE — 96366 THER/PROPH/DIAG IV INF ADDON: CPT

## 2021-06-15 RX ORDER — HEPARIN SODIUM (PORCINE) LOCK FLUSH IV SOLN 100 UNIT/ML 100 UNIT/ML
500 SOLUTION INTRAVENOUS PRN
Status: CANCELLED
Start: 2021-07-13

## 2021-06-15 RX ORDER — SODIUM CHLORIDE 0.9 % (FLUSH) 0.9 %
10 SYRINGE (ML) INJECTION PRN
Status: DISCONTINUED | OUTPATIENT
Start: 2021-06-15 | End: 2021-06-16 | Stop reason: HOSPADM

## 2021-06-15 RX ORDER — HEPARIN SODIUM (PORCINE) LOCK FLUSH IV SOLN 100 UNIT/ML 100 UNIT/ML
500 SOLUTION INTRAVENOUS PRN
Status: DISCONTINUED | OUTPATIENT
Start: 2021-06-15 | End: 2021-06-16 | Stop reason: HOSPADM

## 2021-06-15 RX ORDER — DIPHENHYDRAMINE HYDROCHLORIDE 50 MG/ML
25 INJECTION INTRAMUSCULAR; INTRAVENOUS ONCE
Status: COMPLETED | OUTPATIENT
Start: 2021-06-15 | End: 2021-06-15

## 2021-06-15 RX ORDER — HEPARIN SODIUM (PORCINE) LOCK FLUSH IV SOLN 100 UNIT/ML 100 UNIT/ML
500 SOLUTION INTRAVENOUS PRN
Status: CANCELLED | OUTPATIENT
Start: 2021-07-13

## 2021-06-15 RX ORDER — ACETAMINOPHEN 325 MG/1
650 TABLET ORAL ONCE
Status: CANCELLED | OUTPATIENT
Start: 2021-07-13 | End: 2021-07-13

## 2021-06-15 RX ORDER — CYANOCOBALAMIN 1000 UG/ML
1000 INJECTION INTRAMUSCULAR; SUBCUTANEOUS ONCE
Status: CANCELLED
Start: 2021-07-13 | End: 2021-07-13

## 2021-06-15 RX ORDER — METHYLPREDNISOLONE SODIUM SUCCINATE 40 MG/ML
40 INJECTION, POWDER, LYOPHILIZED, FOR SOLUTION INTRAMUSCULAR; INTRAVENOUS ONCE
Status: COMPLETED | OUTPATIENT
Start: 2021-06-15 | End: 2021-06-15

## 2021-06-15 RX ORDER — DIPHENHYDRAMINE HYDROCHLORIDE 50 MG/ML
25 INJECTION INTRAMUSCULAR; INTRAVENOUS ONCE
Status: CANCELLED
Start: 2021-07-13 | End: 2021-07-13

## 2021-06-15 RX ORDER — CYANOCOBALAMIN 1000 UG/ML
1000 INJECTION INTRAMUSCULAR; SUBCUTANEOUS ONCE
Status: COMPLETED | OUTPATIENT
Start: 2021-06-15 | End: 2021-06-15

## 2021-06-15 RX ORDER — SODIUM CHLORIDE 0.9 % (FLUSH) 0.9 %
10 SYRINGE (ML) INJECTION PRN
Status: CANCELLED | OUTPATIENT
Start: 2021-07-13

## 2021-06-15 RX ORDER — ACETAMINOPHEN 325 MG/1
TABLET ORAL
Status: COMPLETED
Start: 2021-06-15 | End: 2021-06-15

## 2021-06-15 RX ORDER — METHYLPREDNISOLONE SODIUM SUCCINATE 40 MG/ML
40 INJECTION, POWDER, LYOPHILIZED, FOR SOLUTION INTRAMUSCULAR; INTRAVENOUS ONCE
Status: CANCELLED
Start: 2021-07-13 | End: 2021-07-13

## 2021-06-15 RX ORDER — ACETAMINOPHEN 325 MG/1
650 TABLET ORAL ONCE
Status: COMPLETED | OUTPATIENT
Start: 2021-06-15 | End: 2021-06-15

## 2021-06-15 RX ADMIN — METHYLPREDNISOLONE SODIUM SUCCINATE 40 MG: 40 INJECTION, POWDER, FOR SOLUTION INTRAMUSCULAR; INTRAVENOUS at 07:56

## 2021-06-15 RX ADMIN — SODIUM CHLORIDE, PRESERVATIVE FREE 10 ML: 5 INJECTION INTRAVENOUS at 07:56

## 2021-06-15 RX ADMIN — DIPHENHYDRAMINE HYDROCHLORIDE 25 MG: 50 INJECTION, SOLUTION INTRAMUSCULAR; INTRAVENOUS at 08:03

## 2021-06-15 RX ADMIN — HEPARIN 500 UNITS: 100 SYRINGE at 10:20

## 2021-06-15 RX ADMIN — ACETAMINOPHEN 325 MG: 325 TABLET ORAL at 07:57

## 2021-06-15 RX ADMIN — SODIUM CHLORIDE, PRESERVATIVE FREE 10 ML: 5 INJECTION INTRAVENOUS at 10:19

## 2021-06-15 RX ADMIN — ACETAMINOPHEN 325 MG: 325 TABLET ORAL at 08:15

## 2021-06-15 RX ADMIN — CYANOCOBALAMIN 1000 MCG: 1000 INJECTION, SOLUTION INTRAMUSCULAR at 08:04

## 2021-06-15 RX ADMIN — SODIUM CHLORIDE, PRESERVATIVE FREE 10 ML: 5 INJECTION INTRAVENOUS at 08:05

## 2021-06-15 RX ADMIN — IMMUNE GLOBULIN (HUMAN) 20 G: 10 INJECTION INTRAVENOUS; SUBCUTANEOUS at 08:45

## 2021-06-15 ASSESSMENT — PAIN DESCRIPTION - PAIN TYPE: TYPE: CHRONIC PAIN

## 2021-06-15 ASSESSMENT — PAIN SCALES - GENERAL
PAINLEVEL_OUTOF10: 0
PAINLEVEL_OUTOF10: 2
PAINLEVEL_OUTOF10: 2

## 2021-06-15 ASSESSMENT — PAIN DESCRIPTION - LOCATION: LOCATION: HAND

## 2021-06-15 NOTE — PROGRESS NOTES
Tolerated infusion well. Reviewed discharge instruction, voiced understanding. Copies of AVS given. Pt discharged home. Pt to exit via steady gait.     Orders Placed This Encounter   Medications    acetaminophen (TYLENOL) tablet 650 mg    sodium chloride flush 0.9 % injection 10 mL    heparin flush 100 UNIT/ML injection 500 Units    diphenhydrAMINE (BENADRYL) injection 25 mg    methylPREDNISolone sodium (SOLU-MEDROL) injection 40 mg    heparin flush 100 UNIT/ML injection 500 Units    cyanocobalamin injection 1,000 mcg    immune globulin (GAMUNEX-C) 10% solution 20 g    acetaminophen (TYLENOL) 325 MG tablet     Jerrica Begum: cabinet override

## 2021-07-13 ENCOUNTER — HOSPITAL ENCOUNTER (OUTPATIENT)
Dept: INFUSION THERAPY | Age: 71
Setting detail: INFUSION SERIES
Discharge: HOME | End: 2021-07-13
Payer: MEDICARE

## 2021-07-13 VITALS
HEART RATE: 72 BPM | DIASTOLIC BLOOD PRESSURE: 69 MMHG | RESPIRATION RATE: 16 BRPM | SYSTOLIC BLOOD PRESSURE: 126 MMHG | OXYGEN SATURATION: 96 % | TEMPERATURE: 97.5 F

## 2021-07-13 DIAGNOSIS — C91.10 LEUKEMIA, LYMPHOCYTIC, CHRONIC (HCC): Primary | ICD-10-CM

## 2021-07-13 DIAGNOSIS — D80.3 SELECTIVE DEFICIENCY OF IGG (HCC): ICD-10-CM

## 2021-07-13 DIAGNOSIS — D80.1 HYPOGAMMAGLOBULINEMIA (HCC): ICD-10-CM

## 2021-07-13 PROCEDURE — 96374 THER/PROPH/DIAG INJ IV PUSH: CPT

## 2021-07-13 PROCEDURE — 6370000000 HC RX 637 (ALT 250 FOR IP): Performed by: INTERNAL MEDICINE

## 2021-07-13 PROCEDURE — 96365 THER/PROPH/DIAG IV INF INIT: CPT

## 2021-07-13 PROCEDURE — 2580000003 HC RX 258: Performed by: INTERNAL MEDICINE

## 2021-07-13 PROCEDURE — 99211 OFF/OP EST MAY X REQ PHY/QHP: CPT

## 2021-07-13 PROCEDURE — 96366 THER/PROPH/DIAG IV INF ADDON: CPT

## 2021-07-13 PROCEDURE — 6360000002 HC RX W HCPCS: Performed by: INTERNAL MEDICINE

## 2021-07-13 RX ORDER — HEPARIN SODIUM (PORCINE) LOCK FLUSH IV SOLN 100 UNIT/ML 100 UNIT/ML
500 SOLUTION INTRAVENOUS PRN
Status: DISCONTINUED | OUTPATIENT
Start: 2021-07-13 | End: 2021-07-14 | Stop reason: HOSPADM

## 2021-07-13 RX ORDER — SODIUM CHLORIDE 0.9 % (FLUSH) 0.9 %
10 SYRINGE (ML) INJECTION PRN
Status: CANCELLED | OUTPATIENT
Start: 2021-08-10

## 2021-07-13 RX ORDER — SODIUM CHLORIDE 0.9 % (FLUSH) 0.9 %
10 SYRINGE (ML) INJECTION PRN
Status: DISCONTINUED | OUTPATIENT
Start: 2021-07-13 | End: 2021-07-14 | Stop reason: HOSPADM

## 2021-07-13 RX ORDER — DIPHENHYDRAMINE HYDROCHLORIDE 50 MG/ML
25 INJECTION INTRAMUSCULAR; INTRAVENOUS ONCE
Status: CANCELLED
Start: 2021-08-10 | End: 2021-08-10

## 2021-07-13 RX ORDER — METHYLPREDNISOLONE SODIUM SUCCINATE 40 MG/ML
40 INJECTION, POWDER, LYOPHILIZED, FOR SOLUTION INTRAMUSCULAR; INTRAVENOUS ONCE
Status: COMPLETED | OUTPATIENT
Start: 2021-07-13 | End: 2021-07-13

## 2021-07-13 RX ORDER — CYANOCOBALAMIN 1000 UG/ML
1000 INJECTION INTRAMUSCULAR; SUBCUTANEOUS ONCE
Status: CANCELLED
Start: 2021-08-10 | End: 2021-08-10

## 2021-07-13 RX ORDER — HEPARIN SODIUM (PORCINE) LOCK FLUSH IV SOLN 100 UNIT/ML 100 UNIT/ML
500 SOLUTION INTRAVENOUS PRN
Status: CANCELLED | OUTPATIENT
Start: 2021-08-10

## 2021-07-13 RX ORDER — DIPHENHYDRAMINE HYDROCHLORIDE 50 MG/ML
25 INJECTION INTRAMUSCULAR; INTRAVENOUS ONCE
Status: COMPLETED | OUTPATIENT
Start: 2021-07-13 | End: 2021-07-13

## 2021-07-13 RX ORDER — HEPARIN SODIUM (PORCINE) LOCK FLUSH IV SOLN 100 UNIT/ML 100 UNIT/ML
500 SOLUTION INTRAVENOUS PRN
Status: CANCELLED
Start: 2021-08-10

## 2021-07-13 RX ORDER — ACETAMINOPHEN 325 MG/1
650 TABLET ORAL ONCE
Status: COMPLETED | OUTPATIENT
Start: 2021-07-13 | End: 2021-07-13

## 2021-07-13 RX ORDER — ACETAMINOPHEN 325 MG/1
650 TABLET ORAL ONCE
Status: CANCELLED | OUTPATIENT
Start: 2021-08-10 | End: 2021-08-10

## 2021-07-13 RX ORDER — METHYLPREDNISOLONE SODIUM SUCCINATE 40 MG/ML
40 INJECTION, POWDER, LYOPHILIZED, FOR SOLUTION INTRAMUSCULAR; INTRAVENOUS ONCE
Status: CANCELLED
Start: 2021-08-10 | End: 2021-08-10

## 2021-07-13 RX ORDER — CYANOCOBALAMIN 1000 UG/ML
1000 INJECTION INTRAMUSCULAR; SUBCUTANEOUS ONCE
Status: COMPLETED | OUTPATIENT
Start: 2021-07-13 | End: 2021-07-13

## 2021-07-13 RX ADMIN — SODIUM CHLORIDE, PRESERVATIVE FREE 10 ML: 5 INJECTION INTRAVENOUS at 07:55

## 2021-07-13 RX ADMIN — ACETAMINOPHEN 650 MG: 325 TABLET ORAL at 08:10

## 2021-07-13 RX ADMIN — IMMUNE GLOBULIN (HUMAN) 20 G: 10 INJECTION INTRAVENOUS; SUBCUTANEOUS at 08:20

## 2021-07-13 RX ADMIN — SODIUM CHLORIDE, PRESERVATIVE FREE 10 ML: 5 INJECTION INTRAVENOUS at 08:07

## 2021-07-13 RX ADMIN — HEPARIN 500 UNITS: 100 SYRINGE at 10:11

## 2021-07-13 RX ADMIN — SODIUM CHLORIDE, PRESERVATIVE FREE 10 ML: 5 INJECTION INTRAVENOUS at 10:10

## 2021-07-13 RX ADMIN — SODIUM CHLORIDE, PRESERVATIVE FREE 10 ML: 5 INJECTION INTRAVENOUS at 08:10

## 2021-07-13 RX ADMIN — CYANOCOBALAMIN 1000 MCG: 1000 INJECTION, SOLUTION INTRAMUSCULAR at 08:10

## 2021-07-13 RX ADMIN — DIPHENHYDRAMINE HYDROCHLORIDE 25 MG: 50 INJECTION INTRAMUSCULAR; INTRAVENOUS at 08:05

## 2021-07-13 RX ADMIN — METHYLPREDNISOLONE SODIUM SUCCINATE 40 MG: 40 INJECTION, POWDER, FOR SOLUTION INTRAMUSCULAR; INTRAVENOUS at 08:08

## 2021-07-13 RX ADMIN — SODIUM CHLORIDE, PRESERVATIVE FREE 10 ML: 5 INJECTION INTRAVENOUS at 10:11

## 2021-07-13 ASSESSMENT — PAIN SCALES - GENERAL: PAINLEVEL_OUTOF10: 0

## 2021-07-13 NOTE — PLAN OF CARE
Ambulatory to unit room 2 for IVIG. Reorientated to unit. Procedure and plan of care explained. Questions answered. Understanding verbalized.

## 2021-07-13 NOTE — DISCHARGE SUMMARY
Tolerated IVIG well. Reviewed discharge instructions, understanding verbalized. Copies of AVS given to take home. Patient discharged home. Down to exit per self.     Orders Placed This Encounter   Medications    acetaminophen (TYLENOL) tablet 650 mg    sodium chloride flush 0.9 % injection 10 mL    heparin flush 100 UNIT/ML injection 500 Units    diphenhydrAMINE (BENADRYL) injection 25 mg    methylPREDNISolone sodium (SOLU-MEDROL) injection 40 mg    heparin flush 100 UNIT/ML injection 500 Units    cyanocobalamin injection 1,000 mcg    immune globulin (GAMUNEX-C) 10% solution 20 g

## 2021-07-27 ENCOUNTER — HOSPITAL ENCOUNTER (OUTPATIENT)
Dept: INFUSION THERAPY | Age: 71
Discharge: HOME | End: 2021-07-27
Payer: MEDICARE

## 2021-07-27 DIAGNOSIS — C91.10 LEUKEMIA, LYMPHOCYTIC, CHRONIC (HCC): ICD-10-CM

## 2021-07-27 DIAGNOSIS — K90.9 INTESTINAL MALABSORPTION, UNSPECIFIED TYPE: ICD-10-CM

## 2021-07-27 DIAGNOSIS — D50.0 IRON DEFICIENCY ANEMIA DUE TO CHRONIC BLOOD LOSS: ICD-10-CM

## 2021-07-27 DIAGNOSIS — D80.1 HYPOGAMMAGLOBULINEMIA (HCC): ICD-10-CM

## 2021-07-27 LAB
ALBUMIN SERPL-MCNC: 4.9 GM/DL (ref 3.4–5)
ALP BLD-CCNC: 85 IU/L (ref 40–129)
ALT SERPL-CCNC: 19 U/L (ref 10–40)
ANION GAP SERPL CALCULATED.3IONS-SCNC: 13 MMOL/L (ref 4–16)
AST SERPL-CCNC: 14 IU/L (ref 15–37)
BASOPHILS ABSOLUTE: 0 K/CU MM
BASOPHILS RELATIVE PERCENT: 0.1 % (ref 0–1)
BILIRUB SERPL-MCNC: 0.5 MG/DL (ref 0–1)
BUN BLDV-MCNC: 18 MG/DL (ref 6–23)
CALCIUM SERPL-MCNC: 9.2 MG/DL (ref 8.3–10.6)
CHLORIDE BLD-SCNC: 102 MMOL/L (ref 99–110)
CO2: 24 MMOL/L (ref 21–32)
CREAT SERPL-MCNC: 0.9 MG/DL (ref 0.9–1.3)
DIFFERENTIAL TYPE: ABNORMAL
EOSINOPHILS ABSOLUTE: 0.1 K/CU MM
EOSINOPHILS RELATIVE PERCENT: 0.5 % (ref 0–3)
FERRITIN: 16 NG/ML (ref 30–400)
FOLATE: 10.5 NG/ML (ref 3.1–17.5)
GFR AFRICAN AMERICAN: >60 ML/MIN/1.73M2
GFR NON-AFRICAN AMERICAN: >60 ML/MIN/1.73M2
GLUCOSE BLD-MCNC: 131 MG/DL (ref 70–99)
HCT VFR BLD CALC: 39.9 % (ref 42–52)
HEMOGLOBIN: 12.6 GM/DL (ref 13.5–18)
IRON: 55 UG/DL (ref 59–158)
LACTATE DEHYDROGENASE: 158 IU/L (ref 120–246)
LYMPHOCYTES ABSOLUTE: 14.3 K/CU MM
LYMPHOCYTES RELATIVE PERCENT: 69.5 % (ref 24–44)
MCH RBC QN AUTO: 27 PG (ref 27–31)
MCHC RBC AUTO-ENTMCNC: 31.6 % (ref 32–36)
MCV RBC AUTO: 85.4 FL (ref 78–100)
MONOCYTES ABSOLUTE: 0.6 K/CU MM
MONOCYTES RELATIVE PERCENT: 2.7 % (ref 0–4)
PCT TRANSFERRIN: 12 % (ref 10–44)
PDW BLD-RTO: 17.6 % (ref 11.7–14.9)
PLATELET # BLD: 98 K/CU MM (ref 140–440)
PMV BLD AUTO: 10.8 FL (ref 7.5–11.1)
POTASSIUM SERPL-SCNC: 4.4 MMOL/L (ref 3.5–5.1)
RBC # BLD: 4.67 M/CU MM (ref 4.6–6.2)
SEGMENTED NEUTROPHILS ABSOLUTE COUNT: 5.6 K/CU MM
SEGMENTED NEUTROPHILS RELATIVE PERCENT: 27.2 % (ref 36–66)
SODIUM BLD-SCNC: 139 MMOL/L (ref 135–145)
TOTAL IRON BINDING CAPACITY: 455 UG/DL (ref 250–450)
TOTAL PROTEIN: 6.3 GM/DL (ref 6.4–8.2)
UNSATURATED IRON BINDING CAPACITY: 400 UG/DL (ref 110–370)
VITAMIN B-12: 655.6 PG/ML (ref 211–911)
WBC # BLD: 20.5 K/CU MM (ref 4–10.5)

## 2021-07-27 PROCEDURE — 82746 ASSAY OF FOLIC ACID SERUM: CPT

## 2021-07-27 PROCEDURE — 82728 ASSAY OF FERRITIN: CPT

## 2021-07-27 PROCEDURE — 36415 COLL VENOUS BLD VENIPUNCTURE: CPT

## 2021-07-27 PROCEDURE — 83540 ASSAY OF IRON: CPT

## 2021-07-27 PROCEDURE — 80053 COMPREHEN METABOLIC PANEL: CPT

## 2021-07-27 PROCEDURE — 83550 IRON BINDING TEST: CPT

## 2021-07-27 PROCEDURE — 85025 COMPLETE CBC W/AUTO DIFF WBC: CPT

## 2021-07-27 PROCEDURE — 82607 VITAMIN B-12: CPT

## 2021-07-27 PROCEDURE — 83615 LACTATE (LD) (LDH) ENZYME: CPT

## 2021-07-29 ENCOUNTER — TELEPHONE (OUTPATIENT)
Dept: ONCOLOGY | Age: 71
End: 2021-07-29

## 2021-07-29 RX ORDER — SODIUM CHLORIDE 9 MG/ML
INJECTION, SOLUTION INTRAVENOUS CONTINUOUS
Status: CANCELLED | OUTPATIENT
Start: 2021-07-29

## 2021-07-29 RX ORDER — SODIUM CHLORIDE 0.9 % (FLUSH) 0.9 %
5-40 SYRINGE (ML) INJECTION PRN
Status: CANCELLED | OUTPATIENT
Start: 2021-07-29

## 2021-07-29 RX ORDER — HEPARIN SODIUM (PORCINE) LOCK FLUSH IV SOLN 100 UNIT/ML 100 UNIT/ML
500 SOLUTION INTRAVENOUS PRN
Status: CANCELLED | OUTPATIENT
Start: 2021-07-29

## 2021-07-29 RX ORDER — EPINEPHRINE 1 MG/ML
0.3 INJECTION, SOLUTION, CONCENTRATE INTRAVENOUS PRN
Status: CANCELLED | OUTPATIENT
Start: 2021-07-29

## 2021-07-29 RX ORDER — DIPHENHYDRAMINE HYDROCHLORIDE 50 MG/ML
50 INJECTION INTRAMUSCULAR; INTRAVENOUS ONCE
Status: CANCELLED | OUTPATIENT
Start: 2021-07-29 | End: 2021-07-29

## 2021-07-29 RX ORDER — METHYLPREDNISOLONE SODIUM SUCCINATE 125 MG/2ML
125 INJECTION, POWDER, LYOPHILIZED, FOR SOLUTION INTRAMUSCULAR; INTRAVENOUS ONCE
Status: CANCELLED | OUTPATIENT
Start: 2021-07-29 | End: 2021-07-29

## 2021-07-29 RX ORDER — SODIUM CHLORIDE 9 MG/ML
25 INJECTION, SOLUTION INTRAVENOUS PRN
Status: CANCELLED | OUTPATIENT
Start: 2021-07-29

## 2021-08-02 ENCOUNTER — TELEPHONE (OUTPATIENT)
Dept: ONCOLOGY | Age: 71
End: 2021-08-02

## 2021-08-02 NOTE — TELEPHONE ENCOUNTER
Left message with pt that he can have both treatments on the same day as long as the infusion center can accommodate that schedule.

## 2021-08-03 RX ORDER — IBRUTINIB 140 MG/1
140 TABLET, FILM COATED ORAL DAILY
Qty: 30 TABLET | Refills: 5 | Status: SHIPPED | OUTPATIENT
Start: 2021-08-03 | End: 2022-01-18 | Stop reason: SDUPTHER

## 2021-08-04 ENCOUNTER — HOSPITAL ENCOUNTER (OUTPATIENT)
Dept: INFUSION THERAPY | Age: 71
Discharge: HOME | End: 2021-08-04
Payer: MEDICARE

## 2021-08-04 ENCOUNTER — OFFICE VISIT (OUTPATIENT)
Dept: ONCOLOGY | Age: 71
End: 2021-08-04
Payer: MEDICARE

## 2021-08-04 VITALS
HEIGHT: 72 IN | TEMPERATURE: 96.8 F | BODY MASS INDEX: 27.14 KG/M2 | HEART RATE: 80 BPM | OXYGEN SATURATION: 97 % | SYSTOLIC BLOOD PRESSURE: 141 MMHG | WEIGHT: 200.4 LBS | DIASTOLIC BLOOD PRESSURE: 70 MMHG

## 2021-08-04 DIAGNOSIS — C91.10 CLL (CHRONIC LYMPHOCYTIC LEUKEMIA) (HCC): Primary | ICD-10-CM

## 2021-08-04 DIAGNOSIS — D50.0 IRON DEFICIENCY ANEMIA DUE TO CHRONIC BLOOD LOSS: ICD-10-CM

## 2021-08-04 DIAGNOSIS — K90.9 INTESTINAL MALABSORPTION, UNSPECIFIED TYPE: ICD-10-CM

## 2021-08-04 PROCEDURE — G8427 DOCREV CUR MEDS BY ELIG CLIN: HCPCS | Performed by: INTERNAL MEDICINE

## 2021-08-04 PROCEDURE — 4040F PNEUMOC VAC/ADMIN/RCVD: CPT | Performed by: INTERNAL MEDICINE

## 2021-08-04 PROCEDURE — 3017F COLORECTAL CA SCREEN DOC REV: CPT | Performed by: INTERNAL MEDICINE

## 2021-08-04 PROCEDURE — 1123F ACP DISCUSS/DSCN MKR DOCD: CPT | Performed by: INTERNAL MEDICINE

## 2021-08-04 PROCEDURE — 99211 OFF/OP EST MAY X REQ PHY/QHP: CPT

## 2021-08-04 PROCEDURE — 99214 OFFICE O/P EST MOD 30 MIN: CPT | Performed by: INTERNAL MEDICINE

## 2021-08-04 PROCEDURE — 1036F TOBACCO NON-USER: CPT | Performed by: INTERNAL MEDICINE

## 2021-08-04 PROCEDURE — G8417 CALC BMI ABV UP PARAM F/U: HCPCS | Performed by: INTERNAL MEDICINE

## 2021-08-04 RX ORDER — ACETAMINOPHEN AND CODEINE PHOSPHATE 300; 30 MG/1; MG/1
1 TABLET ORAL EVERY 6 HOURS PRN
COMMUNITY
Start: 2021-05-20

## 2021-08-04 RX ORDER — FINASTERIDE 5 MG/1
5 TABLET, FILM COATED ORAL DAILY
COMMUNITY
Start: 2021-05-24

## 2021-08-04 RX ORDER — DOXYCYCLINE HYCLATE 50 MG/1
CAPSULE, GELATIN COATED ORAL
COMMUNITY
Start: 2021-07-08 | End: 2021-11-10

## 2021-08-04 ASSESSMENT — PATIENT HEALTH QUESTIONNAIRE - PHQ9
2. FEELING DOWN, DEPRESSED OR HOPELESS: 0
SUM OF ALL RESPONSES TO PHQ9 QUESTIONS 1 & 2: 0
SUM OF ALL RESPONSES TO PHQ QUESTIONS 1-9: 0
SUM OF ALL RESPONSES TO PHQ QUESTIONS 1-9: 0
1. LITTLE INTEREST OR PLEASURE IN DOING THINGS: 0
SUM OF ALL RESPONSES TO PHQ QUESTIONS 1-9: 0

## 2021-08-04 NOTE — PROGRESS NOTES
Patient Name: [facility]  Patient : 1950  Patient MRN: [id number]     Primary Oncologist: Phan Swanson MD  Referring Physician: Helen Monte MD       Date of Service: 2021      Chief Complaint:   Chief Complaint   Patient presents with    Follow-up        Active Problem list  1. CLL (chronic lymphocytic leukemia) (Formerly [facility])           HPI:        1. Mr. Bianka Armstrong ( 52) was diagnosed with stage I CLL in , when he presented with infection and lymphocytosis. His peripheral blood immunophenotyping was characteristic of B-cell CLL. It was CD38 negative, ZAP-70 positive, CD19 and 20 positive, CD5 positive. Karyotyping was normal in 2006.,  2. Because of his infection and associated acquired immune deficiency(hypogammaglobulinemia), he was given IVIG for a year. He was started back on IVIG therapy monthly since 2013 to prevent future major infections continuing for now. Also had minor Infusional reaction to IVIG in 2016 responded to Steroids & Benadryl., No further problems after that. 3. CLL was treated only with Leukeran intermittently from 2007 until 2011 and again from 2011 until 2012. 4. 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 Flow studies still showed the same and FISH in 2012 showed deletion of tp53 (17p) and MYB (6q). ,  5. He was thus treated with Fludara and Rituxan for six months between April and 2012 with excellent clinical and hematological response.  . In 2013, he developed Multiple b/l nodes in axilla and groin area, CAT scan on 13, Bulky lymphadenopathy within the chest, abdomen, and pelvis mildly increased as compared from previous CAT scan in March 2012.,  6. Starting in Jan 2014 he was initiated on 2nd line chemo with Bendamustine and Rituxan with good initial response. Continued till Nov 2014, CAT scan done on 8/14/14 showed an interval decrease in generalized adenopathy compared to previous study In December 2013, suggesting good response to therapy.,  7. CAT scan done on 12/23/14 showed Minimal interval increase in size of at least two periportal and retrocaval lymph nodes. Otherwise stable thoracic, abdominal and pelvic adenopathy. Also mild interval increase in splenomegaly measuring 19 cm, previously 17 cm. 8. CAT scan done on 6/25/15 showed mild interval decrease in the splenomegaly and also in retroperitoneal mesenteric lymph nodes. 9. Started Idelalisib [Zydelig] on 20th Jan 2015 at 150mg po bid. with good initial response. He did remarkably well with Zydelig from January of 2015 until June of 2016, after he was hospitalized for Rhinovirus Pneumonia in May of 2016. Also discussed Living Will, [facility], DNR status, et cetera. In April 2016, chest CT showed stable  10. , 13. In July 2016, His FISH testing on peripheral blood showed abnormal results, with 6q deletion, 17p deletion, and 11q centromere signal in 3 percent of cells. The 17p deletion (Tp53) in 80 percent of cells is associated with unfavorable prognosis. Because of his CLL with poor prognostic markers, including 17p deletion detected in 2012 & again in July of 2016. 11. He was started on ibrutinib in September 2016 140mg/d increased to 280 mg/ after 4 weeks. 12. The abdominal CT 7/5/16 is showing no acute abnormalities, stable splenomegaly, and one periportal lymph node measuring 2.2 by 3.7 cm which is unchanged from before. 13. CAT scan of the chest August 5, 2016, showed axillary, mediastinal, hilar, and upper abdominal lymphadenopathy consistent with his CLL. The maximum size of the nodes is 2.4 cm.  A 1 cm infiltrate in the lateral segment of the right middle lobe possibly infectious in nature, segmental calcification of left coronary artery. CAT scan of the chest, abdomen, and pelvis December 7,2016 2016, which revealed a mild mediastinal right hilar adenopathy, decreased in size from the previous examination of April and August of 2016, .5. His ibrutinib was stopped in December 2016 when he was feeling bad and it was started back at one a day, 140 mg daily instead of 280 mg that he was taking prior to that  14. In January 2017, he developed progressive lump in his left inguinal area. That being the only area of progressive adenopathy with multiple nodes coalesced together, a question of Pimentels transformation versus more aggressive histology conversion was considered as a possibility. 13. Dr. Montana Madison, did a biopsy of the lymph node on January 18, 2017. The final pathology was reported as B-cell small lymphocytic lymphoma (B-cell CLL/SLL), with no evidence of any large cell OR Pimentels transformation. There was some evidence of localized herpetic simplex lymphadenitis. 16. Because of possibility of localized infection with herpes. I started him on Valtrex 500 t.i.d. for two to three weeks then tapered to 1/d as maintenance therapy. Lymphadenopathy in left groin almost completely resolved. 17. His ibrutinib was stopped in December 2016 when he was feeling bad and it was started back at one a day, 140 mg daily as maintenance dose instead of 280 mg that he was taking prior to that. 18. His quantitative immunoglobulin on February 17, 2017, IgG 600, IgA less than 10, IgM less than 4. Serum protein electrophoresis was within the normal range. Gammaglobulin was continued monthly since it has helped any serious infections under control. 19. August 2017 he had a IgG that was 503  20. October 2017 started a ibrutinib he was off for 2 months due to his insurance issues  5-18 CT chest: Showed some mild decrease in mediastinal and hilar lymphadenopathy. . Regards to his abdomen also there is decrease in his lymphadenopathy. Although there is a left inguinal node that has enlarged. Section that no obvious source of his abdominal cramping. 10/10/2018 EGD showed severe ulcerative esophagitis with slight narrowing in the GE junction small hiatal hernia mild superficial gastritis, Colonoscope revealed 2 mm polyp in the sigmoid colon, diverticulosis, hemorrhoids, moderate stool  1/2019; Colonoscopy with two diminutive polyps, diverticulosis and hemorrhoids, path with TA above IC valve and HP distal sigmoid  10-19 iron def based on labs refered to GI , stool occult negative x #3 , NO PICA   11-19 saw Dr. Brian [facility]endon, [facility]  11/7/2019: Ct chest:Ground-glass nodule seen within the right upper lobe the largest measuring 17  mm in diameter. 12-6-19 capsule endoscopy, results unavailable   2/2020: CT chest:IMPRESSION:  Previously noted ground-glass opacities in the right lung have resolved. However, there is a new cluster of tiny pulmonary nodules in the left lower  lobe which could represent an infectious or inflammatory etiology. Short-term follow-up chest CT is recommended in 3-6 months. Multiple additional tiny pulmonary nodules are overall stable. Stable mediastinal lymph nodes without new lymphadenopathy. Previous Therapies    May 2020: Ct chest:  1. Interval resolution of the previously described cluster of pulmonary    nodules in the left lower lobe.  Findings are most compatible with resolved    infectious/inflammatory etiology. 2. No acute cardiopulmonary disease. 3. COPD. 4. Stable subcentimeter mediastinal lymph nodes. 8/28/20: US RP normal    August 2020 cystoscopy revealed enlarged prostate. Was Recommended TURBT      PAST MEDICAL HISTORY:   1. Stage I CLL with conversion from good to poor prognostic factors. ,  2. History of hypertension for many years. ,  3. 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 Arthritis in the past. ,  5. Frozen shoulder for which he had treatment including injection by Dr. Birtha Sever. ,  6. Cellulitis with Zoster type lesion Left thigh resolved with Bactrim and Valtrex in 2016.,  7. abdominal illness, with fever, nausea, and discomfort, suggestive of infectious etiology in May 2016. ,  8. hospitalization between  and  for what seems like atypical rhinovirus pneumonia involving right middle and lower lobe and left lower lobe with sepsis,  9. 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 Dr. Kaylee James [facility] ,  10. hospitalized from  to 2017, for hyperosmolar hyperglycemia with hyponatremia and hyperkalemia and UTI. He was seen by Dr. Iona Hernandez, who put him on insulin. He was also seen by Dr. Victor Baumgarten. He felt much better after his sugar got under better control and electrolyte imbalanced reversed,  11. Ultrasound Doppler 2017, was negative for any DVT in either leg. Chest x-ray showed no acute process. PAST SURGICAL HISTORY:   1. knee operation,  2. tonsillectomy,  3. broken ankle times two requiring open reduction. ,  4. Vision better after cataract surgery    FAMILY HISTORY:   His paternal aunt had colon cancer. Uncle also had some form of cancer. Father had heart disease. Sister  12 of Liver disease     SOCIAL HISTORY:   He has a 40-pack-year history of smoking, quit in . He denies alcohol use. He is not . Has one son. Interval History  2021: Arrived alone to the clinic today. .Reported upper abdominal spasms and tightness when he tries to reach, for the past 3-4 years. No nausea or any emesis. Intermittent loose stools. No overt bleeding. Fatigue partly because of insomnia. Gu sx the same. No chest pain, increased sob. No LE edema. Completed both doses of COVID vaccine. LE neuropathy the same. Has been taking iron bid.      Review of Systems   Per interval history; otherwise 10 point ROS is negative              Vital Signs:  BP (!) 141/70 (Site: Right Upper Arm, Position: Sitting, Cuff Size: Medium Adult)   Pulse 80   Temp 96.8 °F (36 °C) (Temporal)   Ht 6' (1.829 m)   Wt 200 lb 6.4 oz (90.9 kg)   SpO2 97%   BMI 27.18 kg/m²     Physical Exam:  CONSTITUTIONAL: alert and awake  EYES: No palor or any icetrus   ENT: ATNC   NECK: No JVD   HEMATOLOGIC/LYMPHATIC: no cervical, supraclavicular or axillary lymphadenopathy   LUNGS:CTAB   CARDIOVASCULAR:s1s2 SM+ rrr   ABDOMEN:soft ntnd bs pos  NEUROLOGIC: GI   SKIN: skin tags   EXTREMITIES: no LE edema bilaterally      Labs:    Hematology:  Lab Results   Component Value Date    WBC 20.5 (H) 07/27/2021    RBC 4.67 07/27/2021    HGB 12.6 (L) 07/27/2021    HCT 39.9 (L) 07/27/2021    MCV 85.4 07/27/2021    MCH 27.0 07/27/2021    MCHC 31.6 (L) 07/27/2021    RDW 17.6 (H) 07/27/2021    PLT 98 (L) 07/27/2021    MPV 10.8 07/27/2021    BANDSPCT 6 08/04/2020    SEGSPCT 27.2 (L) 07/27/2021    EOSRELPCT 0.5 07/27/2021    BASOPCT 0.1 07/27/2021    LYMPHOPCT 69.5 (H) 07/27/2021    MONOPCT 2.7 07/27/2021    BANDABS 1.76 08/04/2020    SEGSABS 5.6 07/27/2021    EOSABS 0.1 07/27/2021    BASOSABS 0.0 07/27/2021    LYMPHSABS 14.3 07/27/2021    MONOSABS 0.6 07/27/2021    DIFFTYPE AUTOMATED DIFFERENTIAL 07/27/2021    ANISOCYTOSIS 1+ 08/04/2020    POLYCHROM 1+ 08/04/2020    WBCMORP ATYPICAL LYMPHOCYTES WITH PROMINENT NUCLEOLI NOTED 09/26/2017    PLTM SEVERAL LARGE PLATELETS 54/21/2738     Lab Results   Component Value Date    ESR 72 (H) 01/24/2017       Chemistry:  Lab Results   Component Value Date     07/27/2021    K 4.4 07/27/2021     07/27/2021    CO2 24 07/27/2021    BUN 18 07/27/2021    CREATININE 0.9 07/27/2021    GLUCOSE 131 (H) 07/27/2021    CALCIUM 9.2 07/27/2021    PROT 6.3 (L) 07/27/2021    LABALBU 4.9 07/27/2021    BILITOT 0.5 07/27/2021    ALKPHOS 85 07/27/2021    AST 14 (L) 07/27/2021    ALT 19 07/27/2021    LABGLOM >60 07/27/2021    GFRAA >60 07/27/2021    PHOS 3.3 04/04/2019    MG 2.3 07/20/2020    POCGLU 202 (H) 05/17/2019     Lab Results   Component Value Date     07/27/2021     No components found for: LD  Lab Results   Component Value Date    TSHHS 3.040 04/04/2019    T4FREE 1.37 04/04/2019    FT3 3.2 03/27/2012       Immunology:  Lab Results   Component Value Date    PROT 6.3 (L) 07/27/2021    ALBUMINELP 3.7 08/21/2017    LABALPH 0.2 08/21/2017    LABALPH 0.9 08/21/2017    LABBETA 0.9 08/21/2017    GAMGLOB 0.6 08/21/2017     No results found for: Lisbet Brood, KLFLCR  No results found for: B2M    Coagulation Panel:  Lab Results   Component Value Date    PROTIME 11.1 12/30/2013    INR 1.03 12/30/2013    APTT 24.6 12/30/2013       Anemia Panel:  Lab Results   Component Value Date    BKMYTWKM81 655.6 07/27/2021    FOLATE 10.5 07/27/2021       Tumor Markers:  Lab Results   Component Value Date    PSA 1.9 10/27/2020         Imaging: Reviewed     Pathology:Reviewed     Observations:  Performance Status: ECOG 1  Depression Status: PHQ-9 Total Score: 0 (8/4/2021  1:00 PM)          Assessment & Plan:  B-cell CLL, stage I with conversion from good to poor prognostic factors, with 17p deletion:   His diagnosis of CLL since 2007.   17p deletion since 2015. Initial treatment with Leukeran from 2000 to 2011 intermittently and FCR regimen in 2012, bendamustine/Rituxan in 2014. Idelalisib from January 2015 until June of 2016. On ibrutinib since September 2016. Ibrutinib therapy seems to be helping him, overall improvement. Was Only able to tolerate 140 mg p.o. every other day  He was off for a couple months During the fall 2017  Cramps very bad july 2020, held treatment for two weeks. Resumed Ibrutinib and is currently on 140 mg po daily. Plan to Continue current dose as Stable counts and no B sx.      . Acquired hypogammaglobulinemia:   Continue IVIG once a month. Check immunoglobulin levels     BPH: is being followed by Urology    Iron def anemia and esophagitis: follows with GI, Dr Michelle Ghotra. Reported that he had colonoscopy 2018 with polyps detected. EGD was normal except for H. pylori which was treated. Was supposed to have VCE which was unsuccessful once. Ferritin still low,  Is  on  oral iron bid along with Vitamin C, recommend further GI evaluation. UA with no blood. Recommend parenteral iron. Intermittent mild thrombocytopenia: meds reviewed. Could be sec to ibrutinib vs CLL. Counts stable,  Will monitor for now. Lung Nodule RUL: CT May 2020  as above clear, most probably sec to  infection/inflammation. Continue other medical care. Discussed above findings and plan with him and he verbalized understanding. Answered all his questions. Discussed healthy lifestyle, smoking cessation, healthy diet and exercise as tolerated. Also discussed importance of being up-to-date with age-appropriate screening tools. Recommend follow-up with primary care physician and other specialist.    Please do not hesitate to contact us if you need any further information. Return to clinic November 2021   Or earlier if new symptoms    I have recommended that the patient follow CDC guidelines for prevention of COVID-19 infection.     [facility]           Electronically signed by Sarahi Perera MD on 8/4/2021 at 1:14 PM

## 2021-08-04 NOTE — PROGRESS NOTES
[place] Rooming Questions  Patient: Munir Stephens  MRN: [id number]    Date: 8/4/2021        1. Do you have any new issues?   no         2. Do you need any refills on medications? yes - [facility]     3. Have you had any imaging done since your last visit? yes - HERE     4. Have you been hospitalized or seen in the emergency room since your last visit here?   no    5. Did the patient have a depression screening completed today?  Yes    PHQ-9 Total Score: 0 (8/4/2021  1:00 PM)       PHQ-9 Given to (if applicable):               PHQ-9 Score (if applicable):                     [] Positive     []  Negative              Does question #9 need addressed (if applicable)                     [] Yes    []  No               [place] Font, CMA

## 2021-08-10 ENCOUNTER — HOSPITAL ENCOUNTER (OUTPATIENT)
Dept: INFUSION THERAPY | Age: 71
Setting detail: INFUSION SERIES
Discharge: HOME | End: 2021-08-10
Payer: MEDICARE

## 2021-08-10 VITALS
DIASTOLIC BLOOD PRESSURE: 78 MMHG | HEART RATE: 78 BPM | SYSTOLIC BLOOD PRESSURE: 149 MMHG | RESPIRATION RATE: 16 BRPM | BODY MASS INDEX: 27.09 KG/M2 | TEMPERATURE: 97.5 F | WEIGHT: 200 LBS | OXYGEN SATURATION: 96 % | HEIGHT: 72 IN

## 2021-08-10 DIAGNOSIS — D80.3 SELECTIVE DEFICIENCY OF IGG (HCC): ICD-10-CM

## 2021-08-10 DIAGNOSIS — C91.10 LEUKEMIA, LYMPHOCYTIC, CHRONIC (HCC): Primary | ICD-10-CM

## 2021-08-10 DIAGNOSIS — D80.1 HYPOGAMMAGLOBULINEMIA (HCC): ICD-10-CM

## 2021-08-10 PROCEDURE — 6360000002 HC RX W HCPCS: Performed by: INTERNAL MEDICINE

## 2021-08-10 PROCEDURE — 99211 OFF/OP EST MAY X REQ PHY/QHP: CPT

## 2021-08-10 PROCEDURE — 96374 THER/PROPH/DIAG INJ IV PUSH: CPT

## 2021-08-10 PROCEDURE — 96366 THER/PROPH/DIAG IV INF ADDON: CPT

## 2021-08-10 PROCEDURE — 96375 TX/PRO/DX INJ NEW DRUG ADDON: CPT

## 2021-08-10 PROCEDURE — 96365 THER/PROPH/DIAG IV INF INIT: CPT

## 2021-08-10 PROCEDURE — 2580000003 HC RX 258: Performed by: INTERNAL MEDICINE

## 2021-08-10 PROCEDURE — 6370000000 HC RX 637 (ALT 250 FOR IP): Performed by: INTERNAL MEDICINE

## 2021-08-10 PROCEDURE — 96372 THER/PROPH/DIAG INJ SC/IM: CPT

## 2021-08-10 RX ORDER — CYANOCOBALAMIN 1000 UG/ML
1000 INJECTION INTRAMUSCULAR; SUBCUTANEOUS ONCE
Status: CANCELLED
Start: 2021-09-07 | End: 2021-09-07

## 2021-08-10 RX ORDER — HEPARIN SODIUM (PORCINE) LOCK FLUSH IV SOLN 100 UNIT/ML 100 UNIT/ML
500 SOLUTION INTRAVENOUS PRN
Status: DISCONTINUED | OUTPATIENT
Start: 2021-08-10 | End: 2021-08-11 | Stop reason: HOSPADM

## 2021-08-10 RX ORDER — SODIUM CHLORIDE 0.9 % (FLUSH) 0.9 %
10 SYRINGE (ML) INJECTION PRN
Status: CANCELLED | OUTPATIENT
Start: 2021-09-07

## 2021-08-10 RX ORDER — ACETAMINOPHEN 325 MG/1
650 TABLET ORAL ONCE
Status: CANCELLED | OUTPATIENT
Start: 2021-09-07 | End: 2021-09-07

## 2021-08-10 RX ORDER — METHYLPREDNISOLONE SODIUM SUCCINATE 40 MG/ML
40 INJECTION, POWDER, LYOPHILIZED, FOR SOLUTION INTRAMUSCULAR; INTRAVENOUS ONCE
Status: COMPLETED | OUTPATIENT
Start: 2021-08-10 | End: 2021-08-10

## 2021-08-10 RX ORDER — HEPARIN SODIUM (PORCINE) LOCK FLUSH IV SOLN 100 UNIT/ML 100 UNIT/ML
500 SOLUTION INTRAVENOUS PRN
Status: CANCELLED | OUTPATIENT
Start: 2021-09-07

## 2021-08-10 RX ORDER — DIPHENHYDRAMINE HYDROCHLORIDE 50 MG/ML
25 INJECTION INTRAMUSCULAR; INTRAVENOUS ONCE
Status: CANCELLED
Start: 2021-09-07 | End: 2021-09-07

## 2021-08-10 RX ORDER — SODIUM CHLORIDE 0.9 % (FLUSH) 0.9 %
10 SYRINGE (ML) INJECTION PRN
Status: DISCONTINUED | OUTPATIENT
Start: 2021-08-10 | End: 2021-08-11 | Stop reason: HOSPADM

## 2021-08-10 RX ORDER — CYANOCOBALAMIN 1000 UG/ML
1000 INJECTION INTRAMUSCULAR; SUBCUTANEOUS ONCE
Status: COMPLETED | OUTPATIENT
Start: 2021-08-10 | End: 2021-08-10

## 2021-08-10 RX ORDER — ACETAMINOPHEN 325 MG/1
650 TABLET ORAL ONCE
Status: COMPLETED | OUTPATIENT
Start: 2021-08-10 | End: 2021-08-10

## 2021-08-10 RX ORDER — METHYLPREDNISOLONE SODIUM SUCCINATE 40 MG/ML
40 INJECTION, POWDER, LYOPHILIZED, FOR SOLUTION INTRAMUSCULAR; INTRAVENOUS ONCE
Status: CANCELLED
Start: 2021-09-07 | End: 2021-09-07

## 2021-08-10 RX ORDER — DIPHENHYDRAMINE HYDROCHLORIDE 50 MG/ML
25 INJECTION INTRAMUSCULAR; INTRAVENOUS ONCE
Status: COMPLETED | OUTPATIENT
Start: 2021-08-10 | End: 2021-08-10

## 2021-08-10 RX ORDER — HEPARIN SODIUM (PORCINE) LOCK FLUSH IV SOLN 100 UNIT/ML 100 UNIT/ML
500 SOLUTION INTRAVENOUS PRN
Status: CANCELLED
Start: 2021-09-07

## 2021-08-10 RX ADMIN — METHYLPREDNISOLONE SODIUM SUCCINATE 40 MG: 40 INJECTION, POWDER, FOR SOLUTION INTRAMUSCULAR; INTRAVENOUS at 07:55

## 2021-08-10 RX ADMIN — IMMUNE GLOBULIN (HUMAN) 20 G: 10 INJECTION INTRAVENOUS; SUBCUTANEOUS at 08:25

## 2021-08-10 RX ADMIN — SODIUM CHLORIDE, PRESERVATIVE FREE 10 ML: 5 INJECTION INTRAVENOUS at 10:20

## 2021-08-10 RX ADMIN — SODIUM CHLORIDE, PRESERVATIVE FREE 10 ML: 5 INJECTION INTRAVENOUS at 10:21

## 2021-08-10 RX ADMIN — HEPARIN 500 UNITS: 100 SYRINGE at 10:21

## 2021-08-10 RX ADMIN — CYANOCOBALAMIN 1000 MCG: 1000 INJECTION, SOLUTION INTRAMUSCULAR at 07:55

## 2021-08-10 RX ADMIN — DIPHENHYDRAMINE HYDROCHLORIDE 25 MG: 50 INJECTION INTRAMUSCULAR; INTRAVENOUS at 07:57

## 2021-08-10 RX ADMIN — ACETAMINOPHEN 650 MG: 325 TABLET ORAL at 07:55

## 2021-08-10 RX ADMIN — SODIUM CHLORIDE, PRESERVATIVE FREE 10 ML: 5 INJECTION INTRAVENOUS at 07:55

## 2021-08-10 RX ADMIN — SODIUM CHLORIDE, PRESERVATIVE FREE 10 ML: 5 INJECTION INTRAVENOUS at 07:57

## 2021-08-10 ASSESSMENT — PAIN SCALES - GENERAL: PAINLEVEL_OUTOF10: 0

## 2021-08-10 NOTE — DISCHARGE SUMMARY
Tolerated IVIG and B12  well. Reviewed discharge instructions, understanding verbalized. Copies of AVS given to take home. Patient discharged home. Down to exit per self.     Orders Placed This Encounter   Medications    acetaminophen (TYLENOL) tablet 650 mg    sodium chloride flush 0.9 % injection 10 mL    heparin flush 100 UNIT/ML injection 500 Units    diphenhydrAMINE (BENADRYL) injection 25 mg    methylPREDNISolone sodium (SOLU-MEDROL) injection 40 mg    heparin flush 100 UNIT/ML injection 500 Units    cyanocobalamin injection 1,000 mcg    immune globulin (GAMUNEX-C) 10% solution 20 g

## 2021-08-13 ENCOUNTER — HOSPITAL ENCOUNTER (OUTPATIENT)
Dept: INFUSION THERAPY | Age: 71
Setting detail: INFUSION SERIES
Discharge: HOME | End: 2021-08-13
Payer: MEDICARE

## 2021-08-13 VITALS
OXYGEN SATURATION: 97 % | RESPIRATION RATE: 16 BRPM | DIASTOLIC BLOOD PRESSURE: 78 MMHG | SYSTOLIC BLOOD PRESSURE: 145 MMHG | HEART RATE: 79 BPM | TEMPERATURE: 97.3 F

## 2021-08-13 DIAGNOSIS — D50.0 IRON DEFICIENCY ANEMIA DUE TO CHRONIC BLOOD LOSS: ICD-10-CM

## 2021-08-13 DIAGNOSIS — K90.9 INTESTINAL MALABSORPTION, UNSPECIFIED TYPE: Primary | ICD-10-CM

## 2021-08-13 PROCEDURE — 99211 OFF/OP EST MAY X REQ PHY/QHP: CPT

## 2021-08-13 PROCEDURE — 6360000002 HC RX W HCPCS: Performed by: INTERNAL MEDICINE

## 2021-08-13 PROCEDURE — 96365 THER/PROPH/DIAG IV INF INIT: CPT

## 2021-08-13 PROCEDURE — 2580000003 HC RX 258: Performed by: INTERNAL MEDICINE

## 2021-08-13 RX ORDER — SODIUM CHLORIDE 9 MG/ML
INJECTION, SOLUTION INTRAVENOUS CONTINUOUS
Status: CANCELLED | OUTPATIENT
Start: 2021-08-20

## 2021-08-13 RX ORDER — DIPHENHYDRAMINE HYDROCHLORIDE 50 MG/ML
50 INJECTION INTRAMUSCULAR; INTRAVENOUS ONCE
Status: CANCELLED | OUTPATIENT
Start: 2021-08-20 | End: 2021-08-20

## 2021-08-13 RX ORDER — HEPARIN SODIUM (PORCINE) LOCK FLUSH IV SOLN 100 UNIT/ML 100 UNIT/ML
500 SOLUTION INTRAVENOUS PRN
Status: CANCELLED | OUTPATIENT
Start: 2021-08-20

## 2021-08-13 RX ORDER — SODIUM CHLORIDE 9 MG/ML
25 INJECTION, SOLUTION INTRAVENOUS PRN
Status: CANCELLED | OUTPATIENT
Start: 2021-08-20

## 2021-08-13 RX ORDER — SODIUM CHLORIDE 9 MG/ML
25 INJECTION, SOLUTION INTRAVENOUS PRN
Status: DISCONTINUED | OUTPATIENT
Start: 2021-08-13 | End: 2021-08-14 | Stop reason: HOSPADM

## 2021-08-13 RX ORDER — SODIUM CHLORIDE 0.9 % (FLUSH) 0.9 %
5-40 SYRINGE (ML) INJECTION PRN
Status: CANCELLED | OUTPATIENT
Start: 2021-08-20

## 2021-08-13 RX ORDER — METHYLPREDNISOLONE SODIUM SUCCINATE 125 MG/2ML
125 INJECTION, POWDER, LYOPHILIZED, FOR SOLUTION INTRAMUSCULAR; INTRAVENOUS ONCE
Status: CANCELLED | OUTPATIENT
Start: 2021-08-20 | End: 2021-08-20

## 2021-08-13 RX ORDER — HEPARIN SODIUM (PORCINE) LOCK FLUSH IV SOLN 100 UNIT/ML 100 UNIT/ML
500 SOLUTION INTRAVENOUS PRN
Status: DISCONTINUED | OUTPATIENT
Start: 2021-08-13 | End: 2021-08-14 | Stop reason: HOSPADM

## 2021-08-13 RX ORDER — EPINEPHRINE 1 MG/ML
0.3 INJECTION, SOLUTION, CONCENTRATE INTRAVENOUS PRN
Status: CANCELLED | OUTPATIENT
Start: 2021-08-20

## 2021-08-13 RX ORDER — SODIUM CHLORIDE 0.9 % (FLUSH) 0.9 %
5-40 SYRINGE (ML) INJECTION PRN
Status: DISCONTINUED | OUTPATIENT
Start: 2021-08-13 | End: 2021-08-14 | Stop reason: HOSPADM

## 2021-08-13 RX ADMIN — HEPARIN 500 UNITS: 100 SYRINGE at 09:25

## 2021-08-13 RX ADMIN — FERRIC CARBOXYMALTOSE INJECTION 750 MG: 50 INJECTION, SOLUTION INTRAVENOUS at 08:40

## 2021-08-13 RX ADMIN — SODIUM CHLORIDE, PRESERVATIVE FREE 10 ML: 5 INJECTION INTRAVENOUS at 08:41

## 2021-08-13 RX ADMIN — SODIUM CHLORIDE, PRESERVATIVE FREE 20 ML: 5 INJECTION INTRAVENOUS at 08:55

## 2021-08-13 NOTE — PLAN OF CARE
Tolerated infusion well. Reviewed discharge instructions, understanding verbalized. Copies of AVS given to take home. Patient discharged home. Down to exit per self. Orders Placed This Encounter   Medications    ferric carboxymaltose (INJECTAFER) 750 mg in sodium chloride 0.9 % 250 mL IVPB     With verification, confirm documentation of current weight, ordered weight-type, and dose calculation.     sodium chloride flush 0.9 % injection 5-40 mL    0.9 % sodium chloride infusion    heparin flush 100 UNIT/ML injection 500 Units

## 2021-08-13 NOTE — PLAN OF CARE
Ambulatory to unit room 2 for Injectafer. Reorientated to unit. Procedure and plan of care explained. Questions answered. Understanding verbalized.

## 2021-08-20 ENCOUNTER — HOSPITAL ENCOUNTER (OUTPATIENT)
Dept: INFUSION THERAPY | Age: 71
Setting detail: INFUSION SERIES
Discharge: HOME | End: 2021-08-20
Payer: MEDICARE

## 2021-08-20 VITALS
OXYGEN SATURATION: 97 % | HEART RATE: 86 BPM | SYSTOLIC BLOOD PRESSURE: 152 MMHG | DIASTOLIC BLOOD PRESSURE: 70 MMHG | RESPIRATION RATE: 16 BRPM | TEMPERATURE: 97.3 F

## 2021-08-20 DIAGNOSIS — K90.9 INTESTINAL MALABSORPTION, UNSPECIFIED TYPE: Primary | ICD-10-CM

## 2021-08-20 DIAGNOSIS — D50.0 IRON DEFICIENCY ANEMIA DUE TO CHRONIC BLOOD LOSS: ICD-10-CM

## 2021-08-20 PROCEDURE — 6360000002 HC RX W HCPCS: Performed by: INTERNAL MEDICINE

## 2021-08-20 PROCEDURE — 99211 OFF/OP EST MAY X REQ PHY/QHP: CPT

## 2021-08-20 PROCEDURE — 96365 THER/PROPH/DIAG IV INF INIT: CPT

## 2021-08-20 PROCEDURE — 2580000003 HC RX 258: Performed by: INTERNAL MEDICINE

## 2021-08-20 RX ORDER — HEPARIN SODIUM (PORCINE) LOCK FLUSH IV SOLN 100 UNIT/ML 100 UNIT/ML
500 SOLUTION INTRAVENOUS PRN
Status: CANCELLED | OUTPATIENT
Start: 2021-08-27

## 2021-08-20 RX ORDER — METHYLPREDNISOLONE SODIUM SUCCINATE 125 MG/2ML
125 INJECTION, POWDER, LYOPHILIZED, FOR SOLUTION INTRAMUSCULAR; INTRAVENOUS ONCE
Status: CANCELLED | OUTPATIENT
Start: 2021-08-27 | End: 2021-08-27

## 2021-08-20 RX ORDER — SODIUM CHLORIDE 9 MG/ML
25 INJECTION, SOLUTION INTRAVENOUS PRN
Status: CANCELLED | OUTPATIENT
Start: 2021-08-27

## 2021-08-20 RX ORDER — SODIUM CHLORIDE 0.9 % (FLUSH) 0.9 %
5-40 SYRINGE (ML) INJECTION PRN
Status: CANCELLED | OUTPATIENT
Start: 2021-08-27

## 2021-08-20 RX ORDER — SODIUM CHLORIDE 9 MG/ML
INJECTION, SOLUTION INTRAVENOUS CONTINUOUS
Status: CANCELLED | OUTPATIENT
Start: 2021-08-27

## 2021-08-20 RX ORDER — DIPHENHYDRAMINE HYDROCHLORIDE 50 MG/ML
50 INJECTION INTRAMUSCULAR; INTRAVENOUS ONCE
Status: CANCELLED | OUTPATIENT
Start: 2021-08-27 | End: 2021-08-27

## 2021-08-20 RX ORDER — HEPARIN SODIUM (PORCINE) LOCK FLUSH IV SOLN 100 UNIT/ML 100 UNIT/ML
500 SOLUTION INTRAVENOUS PRN
Status: DISCONTINUED | OUTPATIENT
Start: 2021-08-20 | End: 2021-08-20

## 2021-08-20 RX ORDER — SODIUM CHLORIDE 0.9 % (FLUSH) 0.9 %
5-40 SYRINGE (ML) INJECTION PRN
Status: DISCONTINUED | OUTPATIENT
Start: 2021-08-20 | End: 2021-08-20

## 2021-08-20 RX ORDER — EPINEPHRINE 1 MG/ML
0.3 INJECTION, SOLUTION, CONCENTRATE INTRAVENOUS PRN
Status: CANCELLED | OUTPATIENT
Start: 2021-08-27

## 2021-08-20 RX ADMIN — SODIUM CHLORIDE, PRESERVATIVE FREE 20 ML: 5 INJECTION INTRAVENOUS at 08:38

## 2021-08-20 RX ADMIN — HEPARIN 500 UNITS: 100 SYRINGE at 08:38

## 2021-08-20 RX ADMIN — FERRIC CARBOXYMALTOSE INJECTION 750 MG: 50 INJECTION, SOLUTION INTRAVENOUS at 08:15

## 2021-08-20 RX ADMIN — SODIUM CHLORIDE, PRESERVATIVE FREE 10 ML: 5 INJECTION INTRAVENOUS at 08:00

## 2021-09-07 ENCOUNTER — HOSPITAL ENCOUNTER (OUTPATIENT)
Dept: INFUSION THERAPY | Age: 71
Setting detail: INFUSION SERIES
Discharge: HOME | End: 2021-09-07
Payer: MEDICARE

## 2021-09-07 VITALS
BODY MASS INDEX: 27.09 KG/M2 | HEIGHT: 72 IN | SYSTOLIC BLOOD PRESSURE: 127 MMHG | WEIGHT: 200 LBS | OXYGEN SATURATION: 93 % | HEART RATE: 72 BPM | TEMPERATURE: 97.3 F | DIASTOLIC BLOOD PRESSURE: 68 MMHG | RESPIRATION RATE: 16 BRPM

## 2021-09-07 DIAGNOSIS — D80.1 HYPOGAMMAGLOBULINEMIA (HCC): ICD-10-CM

## 2021-09-07 DIAGNOSIS — C91.10 LEUKEMIA, LYMPHOCYTIC, CHRONIC (HCC): Primary | ICD-10-CM

## 2021-09-07 DIAGNOSIS — D80.3 SELECTIVE DEFICIENCY OF IGG (HCC): ICD-10-CM

## 2021-09-07 PROCEDURE — 96366 THER/PROPH/DIAG IV INF ADDON: CPT

## 2021-09-07 PROCEDURE — 96375 TX/PRO/DX INJ NEW DRUG ADDON: CPT

## 2021-09-07 PROCEDURE — 6370000000 HC RX 637 (ALT 250 FOR IP): Performed by: INTERNAL MEDICINE

## 2021-09-07 PROCEDURE — 99211 OFF/OP EST MAY X REQ PHY/QHP: CPT

## 2021-09-07 PROCEDURE — 2580000003 HC RX 258: Performed by: INTERNAL MEDICINE

## 2021-09-07 PROCEDURE — 6360000002 HC RX W HCPCS: Performed by: INTERNAL MEDICINE

## 2021-09-07 PROCEDURE — 96365 THER/PROPH/DIAG IV INF INIT: CPT

## 2021-09-07 PROCEDURE — 96374 THER/PROPH/DIAG INJ IV PUSH: CPT

## 2021-09-07 RX ORDER — CYANOCOBALAMIN 1000 UG/ML
1000 INJECTION INTRAMUSCULAR; SUBCUTANEOUS ONCE
Status: COMPLETED | OUTPATIENT
Start: 2021-09-07 | End: 2021-09-07

## 2021-09-07 RX ORDER — METHYLPREDNISOLONE SODIUM SUCCINATE 40 MG/ML
40 INJECTION, POWDER, LYOPHILIZED, FOR SOLUTION INTRAMUSCULAR; INTRAVENOUS ONCE
Status: COMPLETED | OUTPATIENT
Start: 2021-09-07 | End: 2021-09-07

## 2021-09-07 RX ORDER — ACETAMINOPHEN 325 MG/1
650 TABLET ORAL ONCE
Status: CANCELLED | OUTPATIENT
Start: 2021-10-05 | End: 2021-10-05

## 2021-09-07 RX ORDER — DIPHENHYDRAMINE HYDROCHLORIDE 50 MG/ML
25 INJECTION INTRAMUSCULAR; INTRAVENOUS ONCE
Status: COMPLETED | OUTPATIENT
Start: 2021-09-07 | End: 2021-09-07

## 2021-09-07 RX ORDER — CYANOCOBALAMIN 1000 UG/ML
1000 INJECTION INTRAMUSCULAR; SUBCUTANEOUS ONCE
Status: CANCELLED
Start: 2021-10-05 | End: 2021-10-05

## 2021-09-07 RX ORDER — METHYLPREDNISOLONE SODIUM SUCCINATE 40 MG/ML
40 INJECTION, POWDER, LYOPHILIZED, FOR SOLUTION INTRAMUSCULAR; INTRAVENOUS ONCE
Status: CANCELLED
Start: 2021-10-05 | End: 2021-10-05

## 2021-09-07 RX ORDER — HEPARIN SODIUM (PORCINE) LOCK FLUSH IV SOLN 100 UNIT/ML 100 UNIT/ML
500 SOLUTION INTRAVENOUS PRN
Status: CANCELLED | OUTPATIENT
Start: 2021-10-05

## 2021-09-07 RX ORDER — ACETAMINOPHEN 325 MG/1
650 TABLET ORAL ONCE
Status: COMPLETED | OUTPATIENT
Start: 2021-09-07 | End: 2021-09-07

## 2021-09-07 RX ORDER — HEPARIN SODIUM (PORCINE) LOCK FLUSH IV SOLN 100 UNIT/ML 100 UNIT/ML
500 SOLUTION INTRAVENOUS PRN
Status: CANCELLED
Start: 2021-10-05

## 2021-09-07 RX ORDER — SODIUM CHLORIDE 0.9 % (FLUSH) 0.9 %
10 SYRINGE (ML) INJECTION PRN
Status: DISCONTINUED | OUTPATIENT
Start: 2021-09-07 | End: 2021-09-08 | Stop reason: HOSPADM

## 2021-09-07 RX ORDER — DIPHENHYDRAMINE HYDROCHLORIDE 50 MG/ML
25 INJECTION INTRAMUSCULAR; INTRAVENOUS ONCE
Status: CANCELLED
Start: 2021-10-05 | End: 2021-10-05

## 2021-09-07 RX ORDER — SODIUM CHLORIDE 0.9 % (FLUSH) 0.9 %
10 SYRINGE (ML) INJECTION PRN
Status: CANCELLED | OUTPATIENT
Start: 2021-10-05

## 2021-09-07 RX ORDER — HEPARIN SODIUM (PORCINE) LOCK FLUSH IV SOLN 100 UNIT/ML 100 UNIT/ML
500 SOLUTION INTRAVENOUS PRN
Status: DISCONTINUED | OUTPATIENT
Start: 2021-09-07 | End: 2021-09-08 | Stop reason: HOSPADM

## 2021-09-07 RX ADMIN — ACETAMINOPHEN 650 MG: 325 TABLET ORAL at 08:15

## 2021-09-07 RX ADMIN — SODIUM CHLORIDE, PRESERVATIVE FREE 10 ML: 5 INJECTION INTRAVENOUS at 08:10

## 2021-09-07 RX ADMIN — CYANOCOBALAMIN 1000 MCG: 1000 INJECTION, SOLUTION INTRAMUSCULAR at 08:20

## 2021-09-07 RX ADMIN — METHYLPREDNISOLONE SODIUM SUCCINATE 40 MG: 40 INJECTION, POWDER, FOR SOLUTION INTRAMUSCULAR; INTRAVENOUS at 08:15

## 2021-09-07 RX ADMIN — DIPHENHYDRAMINE HYDROCHLORIDE 25 MG: 50 INJECTION INTRAMUSCULAR; INTRAVENOUS at 08:17

## 2021-09-07 RX ADMIN — SODIUM CHLORIDE, PRESERVATIVE FREE 10 ML: 5 INJECTION INTRAVENOUS at 08:19

## 2021-09-07 RX ADMIN — SODIUM CHLORIDE, PRESERVATIVE FREE 10 ML: 5 INJECTION INTRAVENOUS at 10:46

## 2021-09-07 RX ADMIN — SODIUM CHLORIDE, PRESERVATIVE FREE 10 ML: 5 INJECTION INTRAVENOUS at 10:45

## 2021-09-07 RX ADMIN — SODIUM CHLORIDE, PRESERVATIVE FREE 10 ML: 5 INJECTION INTRAVENOUS at 08:16

## 2021-09-07 RX ADMIN — HEPARIN 500 UNITS: 100 SYRINGE at 10:45

## 2021-09-07 RX ADMIN — IMMUNE GLOBULIN (HUMAN) 20 G: 10 INJECTION INTRAVENOUS; SUBCUTANEOUS at 08:31

## 2021-09-07 ASSESSMENT — PAIN SCALES - GENERAL: PAINLEVEL_OUTOF10: 0

## 2021-10-05 ENCOUNTER — HOSPITAL ENCOUNTER (OUTPATIENT)
Dept: INFUSION THERAPY | Age: 71
Setting detail: INFUSION SERIES
Discharge: HOME | End: 2021-10-05
Payer: MEDICARE

## 2021-10-05 VITALS
TEMPERATURE: 97.3 F | RESPIRATION RATE: 16 BRPM | OXYGEN SATURATION: 96 % | SYSTOLIC BLOOD PRESSURE: 139 MMHG | DIASTOLIC BLOOD PRESSURE: 71 MMHG | HEART RATE: 80 BPM

## 2021-10-05 DIAGNOSIS — C91.10 LEUKEMIA, LYMPHOCYTIC, CHRONIC (HCC): Primary | ICD-10-CM

## 2021-10-05 DIAGNOSIS — D80.3 SELECTIVE DEFICIENCY OF IGG (HCC): ICD-10-CM

## 2021-10-05 DIAGNOSIS — D80.1 HYPOGAMMAGLOBULINEMIA (HCC): ICD-10-CM

## 2021-10-05 PROCEDURE — 2580000003 HC RX 258: Performed by: INTERNAL MEDICINE

## 2021-10-05 PROCEDURE — 96374 THER/PROPH/DIAG INJ IV PUSH: CPT

## 2021-10-05 PROCEDURE — 99211 OFF/OP EST MAY X REQ PHY/QHP: CPT

## 2021-10-05 PROCEDURE — 6360000002 HC RX W HCPCS: Performed by: INTERNAL MEDICINE

## 2021-10-05 PROCEDURE — 96366 THER/PROPH/DIAG IV INF ADDON: CPT

## 2021-10-05 PROCEDURE — 6370000000 HC RX 637 (ALT 250 FOR IP): Performed by: INTERNAL MEDICINE

## 2021-10-05 PROCEDURE — 96375 TX/PRO/DX INJ NEW DRUG ADDON: CPT

## 2021-10-05 PROCEDURE — 96365 THER/PROPH/DIAG IV INF INIT: CPT

## 2021-10-05 RX ORDER — DIPHENHYDRAMINE HYDROCHLORIDE 50 MG/ML
25 INJECTION INTRAMUSCULAR; INTRAVENOUS ONCE
Status: CANCELLED
Start: 2021-11-02 | End: 2021-11-02

## 2021-10-05 RX ORDER — HEPARIN SODIUM (PORCINE) LOCK FLUSH IV SOLN 100 UNIT/ML 100 UNIT/ML
500 SOLUTION INTRAVENOUS PRN
Status: CANCELLED | OUTPATIENT
Start: 2021-11-02

## 2021-10-05 RX ORDER — METHYLPREDNISOLONE SODIUM SUCCINATE 40 MG/ML
40 INJECTION, POWDER, LYOPHILIZED, FOR SOLUTION INTRAMUSCULAR; INTRAVENOUS ONCE
Status: COMPLETED | OUTPATIENT
Start: 2021-10-05 | End: 2021-10-05

## 2021-10-05 RX ORDER — CYANOCOBALAMIN 1000 UG/ML
1000 INJECTION INTRAMUSCULAR; SUBCUTANEOUS ONCE
Status: CANCELLED
Start: 2021-11-02 | End: 2021-11-02

## 2021-10-05 RX ORDER — ACETAMINOPHEN 325 MG/1
650 TABLET ORAL ONCE
Status: COMPLETED | OUTPATIENT
Start: 2021-10-05 | End: 2021-10-05

## 2021-10-05 RX ORDER — METHYLPREDNISOLONE SODIUM SUCCINATE 40 MG/ML
40 INJECTION, POWDER, LYOPHILIZED, FOR SOLUTION INTRAMUSCULAR; INTRAVENOUS ONCE
Status: CANCELLED
Start: 2021-11-02 | End: 2021-11-02

## 2021-10-05 RX ORDER — SODIUM CHLORIDE 0.9 % (FLUSH) 0.9 %
10 SYRINGE (ML) INJECTION PRN
Status: CANCELLED | OUTPATIENT
Start: 2021-11-02

## 2021-10-05 RX ORDER — HEPARIN SODIUM (PORCINE) LOCK FLUSH IV SOLN 100 UNIT/ML 100 UNIT/ML
500 SOLUTION INTRAVENOUS PRN
Status: DISCONTINUED | OUTPATIENT
Start: 2021-10-05 | End: 2021-10-06 | Stop reason: HOSPADM

## 2021-10-05 RX ORDER — ACETAMINOPHEN 325 MG/1
650 TABLET ORAL ONCE
Status: CANCELLED | OUTPATIENT
Start: 2021-11-02 | End: 2021-11-02

## 2021-10-05 RX ORDER — DIPHENHYDRAMINE HYDROCHLORIDE 50 MG/ML
25 INJECTION INTRAMUSCULAR; INTRAVENOUS ONCE
Status: COMPLETED | OUTPATIENT
Start: 2021-10-05 | End: 2021-10-05

## 2021-10-05 RX ORDER — CYANOCOBALAMIN 1000 UG/ML
1000 INJECTION INTRAMUSCULAR; SUBCUTANEOUS ONCE
Status: COMPLETED | OUTPATIENT
Start: 2021-10-05 | End: 2021-10-05

## 2021-10-05 RX ORDER — HEPARIN SODIUM (PORCINE) LOCK FLUSH IV SOLN 100 UNIT/ML 100 UNIT/ML
500 SOLUTION INTRAVENOUS PRN
Status: CANCELLED
Start: 2021-11-02

## 2021-10-05 RX ORDER — SODIUM CHLORIDE 0.9 % (FLUSH) 0.9 %
10 SYRINGE (ML) INJECTION PRN
Status: DISCONTINUED | OUTPATIENT
Start: 2021-10-05 | End: 2021-10-06 | Stop reason: HOSPADM

## 2021-10-05 RX ADMIN — ACETAMINOPHEN 650 MG: 325 TABLET ORAL at 08:20

## 2021-10-05 RX ADMIN — SODIUM CHLORIDE, PRESERVATIVE FREE 10 ML: 5 INJECTION INTRAVENOUS at 08:22

## 2021-10-05 RX ADMIN — CYANOCOBALAMIN 1000 MCG: 1000 INJECTION, SOLUTION INTRAMUSCULAR at 08:26

## 2021-10-05 RX ADMIN — METHYLPREDNISOLONE SODIUM SUCCINATE 40 MG: 40 INJECTION, POWDER, FOR SOLUTION INTRAMUSCULAR; INTRAVENOUS at 08:22

## 2021-10-05 RX ADMIN — HEPARIN 500 UNITS: 100 SYRINGE at 10:48

## 2021-10-05 RX ADMIN — SODIUM CHLORIDE, PRESERVATIVE FREE 10 ML: 5 INJECTION INTRAVENOUS at 08:24

## 2021-10-05 RX ADMIN — IMMUNE GLOBULIN (HUMAN) 20 G: 10 INJECTION INTRAVENOUS; SUBCUTANEOUS at 08:47

## 2021-10-05 RX ADMIN — SODIUM CHLORIDE, PRESERVATIVE FREE 10 ML: 5 INJECTION INTRAVENOUS at 08:10

## 2021-10-05 RX ADMIN — SODIUM CHLORIDE, PRESERVATIVE FREE 10 ML: 5 INJECTION INTRAVENOUS at 10:48

## 2021-10-05 RX ADMIN — DIPHENHYDRAMINE HYDROCHLORIDE 25 MG: 50 INJECTION INTRAMUSCULAR; INTRAVENOUS at 08:20

## 2021-10-05 ASSESSMENT — PAIN SCALES - GENERAL: PAINLEVEL_OUTOF10: 0

## 2021-10-05 NOTE — DISCHARGE SUMMARY
Tolerated IVIG well. Reviewed discharge instructions, understanding verbalized. Copies of AVS given to take home. Patient discharged home. Down to exit per self.     Orders Placed This Encounter   Medications    acetaminophen (TYLENOL) tablet 650 mg    sodium chloride flush 0.9 % injection 10 mL    heparin flush 100 UNIT/ML injection 500 Units    diphenhydrAMINE (BENADRYL) injection 25 mg    methylPREDNISolone sodium (SOLU-MEDROL) injection 40 mg    cyanocobalamin injection 1,000 mcg    immune globulin (GAMUNEX-C) 10% solution 20 g

## 2021-10-18 RX ORDER — ASCORBIC ACID 250 MG
500 TABLET ORAL DAILY
Qty: 30 TABLET | Refills: 3 | Status: SHIPPED | OUTPATIENT
Start: 2021-10-18 | End: 2022-03-07 | Stop reason: SDUPTHER

## 2021-10-29 ENCOUNTER — HOSPITAL ENCOUNTER (OUTPATIENT)
Dept: INFUSION THERAPY | Age: 71
Discharge: HOME | End: 2021-10-29
Payer: MEDICARE

## 2021-10-29 DIAGNOSIS — D50.0 IRON DEFICIENCY ANEMIA DUE TO CHRONIC BLOOD LOSS: ICD-10-CM

## 2021-10-29 DIAGNOSIS — C91.10 CLL (CHRONIC LYMPHOCYTIC LEUKEMIA) (HCC): ICD-10-CM

## 2021-10-29 DIAGNOSIS — K90.9 INTESTINAL MALABSORPTION, UNSPECIFIED TYPE: ICD-10-CM

## 2021-10-29 LAB
ALBUMIN SERPL-MCNC: 4.5 GM/DL (ref 3.4–5)
ALP BLD-CCNC: 83 IU/L (ref 40–129)
ALT SERPL-CCNC: 18 U/L (ref 10–40)
ANION GAP SERPL CALCULATED.3IONS-SCNC: 12 MMOL/L (ref 4–16)
AST SERPL-CCNC: 13 IU/L (ref 15–37)
BASOPHILS ABSOLUTE: 0 K/CU MM
BASOPHILS RELATIVE PERCENT: 0.1 % (ref 0–1)
BILIRUB SERPL-MCNC: 0.7 MG/DL (ref 0–1)
BUN BLDV-MCNC: 17 MG/DL (ref 6–23)
CALCIUM SERPL-MCNC: 9.1 MG/DL (ref 8.3–10.6)
CHLORIDE BLD-SCNC: 103 MMOL/L (ref 99–110)
CO2: 22 MMOL/L (ref 21–32)
CREAT SERPL-MCNC: 0.7 MG/DL (ref 0.9–1.3)
DIFFERENTIAL TYPE: ABNORMAL
EOSINOPHILS ABSOLUTE: 0.1 K/CU MM
EOSINOPHILS RELATIVE PERCENT: 0.5 % (ref 0–3)
FERRITIN: 75 NG/ML (ref 30–400)
GFR AFRICAN AMERICAN: >60 ML/MIN/1.73M2
GFR NON-AFRICAN AMERICAN: >60 ML/MIN/1.73M2
GLUCOSE BLD-MCNC: 191 MG/DL (ref 70–99)
HCT VFR BLD CALC: 39.8 % (ref 42–52)
HEMOGLOBIN: 13.1 GM/DL (ref 13.5–18)
IRON: 61 UG/DL (ref 59–158)
LYMPHOCYTES ABSOLUTE: 16.4 K/CU MM
LYMPHOCYTES RELATIVE PERCENT: 69.9 % (ref 24–44)
MCH RBC QN AUTO: 29.3 PG (ref 27–31)
MCHC RBC AUTO-ENTMCNC: 32.9 % (ref 32–36)
MCV RBC AUTO: 89 FL (ref 78–100)
MONOCYTES ABSOLUTE: 0.6 K/CU MM
MONOCYTES RELATIVE PERCENT: 2.6 % (ref 0–4)
PCT TRANSFERRIN: 18 % (ref 10–44)
PDW BLD-RTO: 18.5 % (ref 11.7–14.9)
PLATELET # BLD: 106 K/CU MM (ref 140–440)
PMV BLD AUTO: 10.7 FL (ref 7.5–11.1)
POTASSIUM SERPL-SCNC: 4.4 MMOL/L (ref 3.5–5.1)
RBC # BLD: 4.47 M/CU MM (ref 4.6–6.2)
SEGMENTED NEUTROPHILS ABSOLUTE COUNT: 6.3 K/CU MM
SEGMENTED NEUTROPHILS RELATIVE PERCENT: 26.9 % (ref 36–66)
SODIUM BLD-SCNC: 137 MMOL/L (ref 135–145)
TOTAL IRON BINDING CAPACITY: 339 UG/DL (ref 250–450)
TOTAL PROTEIN: 6.1 GM/DL (ref 6.4–8.2)
UNSATURATED IRON BINDING CAPACITY: 278 UG/DL (ref 110–370)
WBC # BLD: 23.5 K/CU MM (ref 4–10.5)

## 2021-10-29 PROCEDURE — 85025 COMPLETE CBC W/AUTO DIFF WBC: CPT

## 2021-10-29 PROCEDURE — 82728 ASSAY OF FERRITIN: CPT

## 2021-10-29 PROCEDURE — 80053 COMPREHEN METABOLIC PANEL: CPT

## 2021-10-29 PROCEDURE — 83550 IRON BINDING TEST: CPT

## 2021-10-29 PROCEDURE — 83540 ASSAY OF IRON: CPT

## 2021-10-29 PROCEDURE — 36415 COLL VENOUS BLD VENIPUNCTURE: CPT

## 2021-11-01 ENCOUNTER — OFFICE VISIT (OUTPATIENT)
Dept: ONCOLOGY | Age: 71
End: 2021-11-01
Payer: MEDICARE

## 2021-11-01 ENCOUNTER — HOSPITAL ENCOUNTER (OUTPATIENT)
Dept: INFUSION THERAPY | Age: 71
Discharge: HOME | End: 2021-11-01
Payer: MEDICARE

## 2021-11-01 VITALS
TEMPERATURE: 98.7 F | HEIGHT: 72 IN | SYSTOLIC BLOOD PRESSURE: 140 MMHG | BODY MASS INDEX: 26.93 KG/M2 | HEART RATE: 84 BPM | WEIGHT: 198.8 LBS | RESPIRATION RATE: 18 BRPM | OXYGEN SATURATION: 98 % | DIASTOLIC BLOOD PRESSURE: 70 MMHG

## 2021-11-01 DIAGNOSIS — C91.10 CLL (CHRONIC LYMPHOCYTIC LEUKEMIA) (HCC): Primary | ICD-10-CM

## 2021-11-01 DIAGNOSIS — D50.0 IRON DEFICIENCY ANEMIA DUE TO CHRONIC BLOOD LOSS: ICD-10-CM

## 2021-11-01 DIAGNOSIS — D80.1 HYPOGAMMAGLOBULINEMIA (HCC): ICD-10-CM

## 2021-11-01 DIAGNOSIS — K90.9 INTESTINAL MALABSORPTION, UNSPECIFIED TYPE: ICD-10-CM

## 2021-11-01 PROCEDURE — 1036F TOBACCO NON-USER: CPT | Performed by: INTERNAL MEDICINE

## 2021-11-01 PROCEDURE — G8427 DOCREV CUR MEDS BY ELIG CLIN: HCPCS | Performed by: INTERNAL MEDICINE

## 2021-11-01 PROCEDURE — G8484 FLU IMMUNIZE NO ADMIN: HCPCS | Performed by: INTERNAL MEDICINE

## 2021-11-01 PROCEDURE — G8417 CALC BMI ABV UP PARAM F/U: HCPCS | Performed by: INTERNAL MEDICINE

## 2021-11-01 PROCEDURE — 3017F COLORECTAL CA SCREEN DOC REV: CPT | Performed by: INTERNAL MEDICINE

## 2021-11-01 PROCEDURE — 1123F ACP DISCUSS/DSCN MKR DOCD: CPT | Performed by: INTERNAL MEDICINE

## 2021-11-01 PROCEDURE — 99214 OFFICE O/P EST MOD 30 MIN: CPT | Performed by: INTERNAL MEDICINE

## 2021-11-01 PROCEDURE — 4040F PNEUMOC VAC/ADMIN/RCVD: CPT | Performed by: INTERNAL MEDICINE

## 2021-11-01 PROCEDURE — 99211 OFF/OP EST MAY X REQ PHY/QHP: CPT

## 2021-11-01 RX ORDER — ATORVASTATIN CALCIUM 80 MG/1
80 TABLET, FILM COATED ORAL DAILY
COMMUNITY
Start: 2021-10-26

## 2021-11-01 NOTE — PROGRESS NOTES
Patient Name: Tricia Patel  Patient : 1950  Patient MRN: [id number]     Primary Oncologist: Fred Ingram MD  Referring Physician: Noé Jarvis MD       Date of Service: 2021      Chief Complaint:   Chief Complaint   Patient presents with    Follow-up        Active Problem list  1. CLL (chronic lymphocytic leukemia) ([facility] [address]    2. Iron deficiency anemia due to chronic blood loss    3. Intestinal malabsorption, unspecified type    4. Hypogammaglobulinemia (HCC)           HPI:        1. Mr. Saloni Zaragoza ( 52) was diagnosed with stage I CLL in , when he presented with infection and lymphocytosis. His peripheral blood immunophenotyping was characteristic of B-cell CLL. It was CD38 negative, ZAP-70 positive, CD19 and 20 positive, CD5 positive. Karyotyping was normal in 2006.,  2. Because of his infection and associated acquired immune deficiency(hypogammaglobulinemia), he was given IVIG for a year. He was started back on IVIG therapy monthly since 2013 to prevent future major infections continuing for now. Also had minor Infusional reaction to IVIG in 2016 responded to Steroids & Benadryl., No further problems after that. 3. CLL was treated only with Leukeran intermittently from 2007 until 2011 and again from 2011 until 2012. 4. 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 Flow studies still showed the same and FISH in 2012 showed deletion of tp53 (17p) and MYB (6q). ,  5. He was thus treated with Fludara and Rituxan for six months between April and 2012 with excellent clinical and hematological response.  . In 2013, he developed Multiple b/l nodes in axilla and groin area, CAT scan on 12/27/13, Bulky lymphadenopathy within the chest, abdomen, and pelvis mildly increased as compared from previous CAT scan in March 2012.,  6. Starting in Jan 2014 he was initiated on 2nd line chemo with Bendamustine and Rituxan with good initial response. Continued till Nov 2014, CAT scan done on 8/14/14 showed an interval decrease in generalized adenopathy compared to previous study In December 2013, suggesting good response to therapy.,  7. CAT scan done on 12/23/14 showed Minimal interval increase in size of at least two periportal and retrocaval lymph nodes. Otherwise stable thoracic, abdominal and pelvic adenopathy. Also mild interval increase in splenomegaly measuring 19 cm, previously 17 cm. 8. CAT scan done on 6/25/15 showed mild interval decrease in the splenomegaly and also in retroperitoneal mesenteric lymph nodes. 9. Started Idelalisib [Zydelig] on 20th Jan 2015 at 150mg po bid. with good initial response. He did remarkably well with Zydelig from January of 2015 until June of 2016, after he was hospitalized for Rhinovirus Pneumonia in May of 2016. Also discussed Living Will, Power of [address] [place] [address], DNR status, et cetera. In April 2016, chest CT showed stable  10. , 13. In July 2016, His FISH testing on peripheral blood showed abnormal results, with 6q deletion, 17p deletion, and 11q centromere signal in 3 percent of cells. The 17p deletion (Tp53) in 80 percent of cells is associated with unfavorable prognosis. Because of his CLL with poor prognostic markers, including 17p deletion detected in 2012 & again in July of 2016. 11. He was started on ibrutinib in September 2016 140mg/d increased to 280 mg/ after 4 weeks. 12. The abdominal CT 7/5/16 is showing no acute abnormalities, stable splenomegaly, and one periportal lymph node measuring 2.2 by 3.7 cm which is unchanged from before.   13. CAT scan of the chest August 5, 2016, showed axillary, mediastinal, hilar, and upper abdominal lymphadenopathy consistent with his CLL. The maximum size of the nodes is 2.4 cm. A 1 cm infiltrate in the lateral segment of the right middle lobe possibly infectious in nature, segmental calcification of left coronary artery. CAT scan of the chest, abdomen, and pelvis December 7,2016 2016, which revealed a mild mediastinal right hilar adenopathy, decreased in size from the previous examination of April and August of 2016, .5. His ibrutinib was stopped in December 2016 when he was feeling bad and it was started back at one a day, 140 mg daily instead of 280 mg that he was taking prior to that  14. In January 2017, he developed progressive lump in his left inguinal area. That being the only area of progressive adenopathy with multiple nodes coalesced together, a question of Pimentels transformation versus more aggressive histology conversion was considered as a possibility. 13. Dr. Jun Fam, did a biopsy of the lymph node on January 18, 2017. The final pathology was reported as B-cell small lymphocytic lymphoma (B-cell CLL/SLL), with no evidence of any large cell OR Pimentels transformation. There was some evidence of localized herpetic simplex lymphadenitis. 16. Because of possibility of localized infection with herpes. I started him on Valtrex 500 t.i.d. for two to three weeks then tapered to 1/d as maintenance therapy. Lymphadenopathy in left groin almost completely resolved. 17. His ibrutinib was stopped in December 2016 when he was feeling bad and it was started back at one a day, 140 mg daily as maintenance dose instead of 280 mg that he was taking prior to that. 18. His quantitative immunoglobulin on February 17, 2017, IgG 600, IgA less than 10, IgM less than 4. Serum protein electrophoresis was within the normal range. Gammaglobulin was continued monthly since it has helped any serious infections under control. 19. August 2017 he had a IgG that was 503  20.  October 2017 started a ibrutinib he was off for 2 months due to his insurance issues  5-18 CT chest: Showed some mild decrease in mediastinal and hilar lymphadenopathy. . Regards to his abdomen also there is decrease in his lymphadenopathy. Although there is a left inguinal node that has enlarged. Section that no obvious source of his abdominal cramping. 10/10/2018 EGD showed severe ulcerative esophagitis with slight narrowing in the GE junction small hiatal hernia mild superficial gastritis, Colonoscope revealed 2 mm polyp in the sigmoid colon, diverticulosis, hemorrhoids, moderate stool  1/2019; Colonoscopy with two diminutive polyps, diverticulosis and hemorrhoids, path with TA above IC valve and HP distal sigmoid  10-19 iron def based on labs refered to GI , stool occult negative x #3 , NO PICA   11-19 saw Dr. Adrian Mohr, and referred to subhash for capsule endoscopy  11/7/2019: Ct chest:Ground-glass nodule seen within the right upper lobe the largest measuring 17  mm in diameter. 12-6-19 capsule endoscopy, results unavailable   2/2020: CT chest:IMPRESSION:  Previously noted ground-glass opacities in the right lung have resolved. However, there is a new cluster of tiny pulmonary nodules in the left lower  lobe which could represent an infectious or inflammatory etiology. Short-term follow-up chest CT is recommended in 3-6 months. Multiple additional tiny pulmonary nodules are overall stable. Stable mediastinal lymph nodes without new lymphadenopathy. Previous Therapies    May 2020: Ct chest:  1. Interval resolution of the previously described cluster of pulmonary    nodules in the left lower lobe.  Findings are most compatible with resolved    infectious/inflammatory etiology. 2. No acute cardiopulmonary disease. 3. COPD. 4. Stable subcentimeter mediastinal lymph nodes. 8/28/20: US RP normal    August 2020 cystoscopy revealed enlarged prostate.  Was Recommended TURBT      PAST MEDICAL HISTORY:   1. Stage I CLL with conversion from good to poor prognostic factors. ,  2. History of hypertension for many years. ,  3. 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 Arthritis in the past. ,  5. Frozen shoulder for which he had treatment including injection by Dr. Alix De Oliveira. ,  6. Cellulitis with Zoster type lesion Left thigh resolved with Bactrim and Valtrex in 2016.,  7. abdominal illness, with fever, nausea, and discomfort, suggestive of infectious etiology in May 2016. ,  8. hospitalization between  and  for what seems like atypical rhinovirus pneumonia involving right middle and lower lobe and left lower lobe with sepsis,  9. 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 Dr. Beata Mullen is referring him for MOHS surgery. ,  10. hospitalized from  to 2017, for hyperosmolar hyperglycemia with hyponatremia and hyperkalemia and UTI. He was seen by Dr. Nahed Galdamez, who put him on insulin. He was also seen by Dr. Ger Meehan. He felt much better after his sugar got under better control and electrolyte imbalanced reversed,  11. Ultrasound Doppler 2017, was negative for any DVT in either leg. Chest x-ray showed no acute process. PAST SURGICAL HISTORY:   1. knee operation,  2. tonsillectomy,  3. broken ankle times two requiring open reduction. ,  4. Vision better after cataract surgery    FAMILY HISTORY:   His paternal aunt had colon cancer. Uncle also had some form of cancer. Father had heart disease. Sister  12 of Liver disease     SOCIAL HISTORY:   He has a 40-pack-year history of smoking, quit in . He denies alcohol use. He is not . Has one son. Interval History  2021: Arrived alone to the clinic today. Had a cystoscopy but looks like he did not have TURBT and is being observed for now. Reported slow stream but no other  sx. Medications helping. No fever, night sweats, lad, weight loss.  Energy levels the same. No chest pain, increased sob, palpitations or nay dizziness. Reported that the spasms in the side of the stomach have resolved but the front are persistent. No nausea or any emesis , diarrhea or any constipation. Resolves spontaneously.      Review of Systems   Per interval history; otherwise 10 point ROS is negative              Vital Signs:  BP (!) 140/70 (Site: Right Upper Arm, Position: Sitting, Cuff Size: Medium Adult)   Pulse 84   Temp 98.7 °F (37.1 °C) (Temporal)   Resp 18   Ht 6' (1.829 m)   Wt 198 lb 12.8 oz (90.2 kg)   SpO2 98%   BMI 26.96 kg/m²     Physical Exam:  CONSTITUTIONAL: alert and awake  EYES: No palor or any icetrus   ENT: ATNC   NECK: No JVD   HEMATOLOGIC/LYMPHATIC: no cervical, supraclavicular or axillary lymphadenopathy   LUNGS:CTAB   CARDIOVASCULAR:s1s2 SM+ rrr   ABDOMEN:soft ntnd bs pos  NEUROLOGIC: GI   SKIN: skin tags   EXTREMITIES: no LE edema bilaterally      Labs:    Hematology:  Lab Results   Component Value Date    WBC 23.5 (H) 10/29/2021    RBC 4.47 (L) 10/29/2021    HGB 13.1 (L) 10/29/2021    HCT 39.8 (L) 10/29/2021    MCV 89.0 10/29/2021    MCH 29.3 10/29/2021    MCHC 32.9 10/29/2021    RDW 18.5 (H) 10/29/2021     (L) 10/29/2021    MPV 10.7 10/29/2021    BANDSPCT 6 08/04/2020    SEGSPCT 26.9 (L) 10/29/2021    EOSRELPCT 0.5 10/29/2021    BASOPCT 0.1 10/29/2021    LYMPHOPCT 69.9 (H) 10/29/2021    MONOPCT 2.6 10/29/2021    BANDABS 1.76 08/04/2020    SEGSABS 6.3 10/29/2021    EOSABS 0.1 10/29/2021    BASOSABS 0.0 10/29/2021    LYMPHSABS 16.4 10/29/2021    MONOSABS 0.6 10/29/2021    DIFFTYPE AUTOMATED DIFFERENTIAL 10/29/2021    ANISOCYTOSIS 1+ 08/04/2020    POLYCHROM 1+ 08/04/2020    WBCMORP ATYPICAL LYMPHOCYTES WITH PROMINENT NUCLEOLI NOTED 09/26/2017    PLTM SEVERAL LARGE PLATELETS 38/41/2331     Lab Results   Component Value Date    ESR 72 (H) 01/24/2017       Chemistry:  Lab Results   Component Value Date     10/29/2021    K 4.4 10/29/2021    CL 103 10/29/2021    CO2 22 10/29/2021    BUN 17 10/29/2021    CREATININE 0.7 (L) 10/29/2021    GLUCOSE 191 (H) 10/29/2021    CALCIUM 9.1 10/29/2021    PROT 6.1 (L) 10/29/2021    LABALBU 4.5 10/29/2021    BILITOT 0.7 10/29/2021    ALKPHOS 83 10/29/2021    AST 13 (L) 10/29/2021    ALT 18 10/29/2021    LABGLOM >60 10/29/2021    GFRAA >60 10/29/2021    PHOS 3.3 04/04/2019    MG 2.3 07/20/2020    POCGLU 202 (H) 05/17/2019     Lab Results   Component Value Date     07/27/2021     No components found for: LD  Lab Results   Component Value Date    TSHHS 3.040 04/04/2019    T4FREE 1.37 04/04/2019    FT3 3.2 03/27/2012       Immunology:  Lab Results   Component Value Date    PROT 6.1 (L) 10/29/2021    ALBUMINELP 3.7 08/21/2017    LABALPH 0.2 08/21/2017    LABALPH 0.9 08/21/2017    LABBETA 0.9 08/21/2017    GAMGLOB 0.6 08/21/2017     No results found for: Wandalee Jonathan, KLFLCR  No results found for: B2M    Coagulation Panel:  Lab Results   Component Value Date    PROTIME 11.1 12/30/2013    INR 1.03 12/30/2013    APTT 24.6 12/30/2013       Anemia Panel:  Lab Results   Component Value Date    VVUVKLYY97 655.6 07/27/2021    FOLATE 10.5 07/27/2021       Tumor Markers:  Lab Results   Component Value Date    PSA 1.9 10/27/2020         Imaging: Reviewed     Pathology:Reviewed     Observations:  Performance Status: ECOG 1  Depression Status: No data recorded          Assessment & Plan:  B-cell CLL, stage I with conversion from good to poor prognostic factors, with 17p deletion:   His diagnosis of CLL since 2007.   17p deletion since 2015. Initial treatment with Leukeran from 2000 to 2011 intermittently and FCR regimen in 2012, bendamustine/Rituxan in 2014. Idelalisib from January 2015 until June of 2016. On ibrutinib since September 2016. Ibrutinib therapy seems to be helping him, overall improvement.   Was Only able to tolerate 140 mg p.o. every other day  He was off for a couple months During the fall 2017  Resumed Ibrutinib and is currently on 140 mg po daily. Plan to Continue current dose as Stable counts and no B sx. . Acquired hypogammaglobulinemia:   Continue IVIG     BPH: is being followed by Urology    Iron def anemia and esophagitis: follows with GI, Dr Sun Forbes. Reported that he had colonoscopy 2018 with polyps detected. EGD was normal except for H. pylori which was treated. Was supposed to have VCE which was unsuccessful once. Is  on  oral iron bid along with Vitamin C for 6 weeks and then OD for another 6 weeks, recommend further GI evaluation. UA with no blood. Recommend parenteral iron as needed    Intermittent mild thrombocytopenia: meds reviewed. Could be sec to ibrutinib vs CLL. Counts stable,  Will monitor for now. Lung Nodule RUL: CT May 2020  as above clear, most probably sec to  infection/inflammation. No further follow up was recommended    Continue other medical care. Discussed above findings and plan with him and he verbalized understanding. Answered all his questions. Discussed healthy lifestyle, smoking cessation, healthy diet and exercise as tolerated. Also discussed importance of being up-to-date with age-appropriate screening tools. Recommend follow-up with primary care physician and other specialist.    Please do not hesitate to contact us if you need any further information. Return to clinic feb 2022 Or earlier if new symptoms    I have recommended that the patient follow CDC guidelines for prevention of COVID-19 infection. Has received COVID vaccine, booster and also FLU vaccine.    [id number]           Electronically signed by Jhoana Mooney MD on 11/1/2021 at 9:49 AM

## 2021-11-02 ENCOUNTER — HOSPITAL ENCOUNTER (OUTPATIENT)
Dept: INFUSION THERAPY | Age: 71
Setting detail: INFUSION SERIES
Discharge: HOME | End: 2021-11-02
Payer: MEDICARE

## 2021-11-02 VITALS
HEART RATE: 78 BPM | TEMPERATURE: 97.5 F | SYSTOLIC BLOOD PRESSURE: 156 MMHG | RESPIRATION RATE: 16 BRPM | OXYGEN SATURATION: 95 % | DIASTOLIC BLOOD PRESSURE: 77 MMHG

## 2021-11-02 DIAGNOSIS — D80.1 HYPOGAMMAGLOBULINEMIA (HCC): ICD-10-CM

## 2021-11-02 DIAGNOSIS — D80.3 SELECTIVE DEFICIENCY OF IGG (HCC): ICD-10-CM

## 2021-11-02 DIAGNOSIS — C91.10 LEUKEMIA, LYMPHOCYTIC, CHRONIC (HCC): Primary | ICD-10-CM

## 2021-11-02 PROCEDURE — 6370000000 HC RX 637 (ALT 250 FOR IP): Performed by: INTERNAL MEDICINE

## 2021-11-02 PROCEDURE — 6360000002 HC RX W HCPCS: Performed by: INTERNAL MEDICINE

## 2021-11-02 PROCEDURE — 96366 THER/PROPH/DIAG IV INF ADDON: CPT

## 2021-11-02 PROCEDURE — 96365 THER/PROPH/DIAG IV INF INIT: CPT

## 2021-11-02 PROCEDURE — 96375 TX/PRO/DX INJ NEW DRUG ADDON: CPT

## 2021-11-02 PROCEDURE — 2580000003 HC RX 258: Performed by: INTERNAL MEDICINE

## 2021-11-02 PROCEDURE — 99211 OFF/OP EST MAY X REQ PHY/QHP: CPT

## 2021-11-02 PROCEDURE — 96374 THER/PROPH/DIAG INJ IV PUSH: CPT

## 2021-11-02 PROCEDURE — 96372 THER/PROPH/DIAG INJ SC/IM: CPT

## 2021-11-02 RX ORDER — CYANOCOBALAMIN 1000 UG/ML
1000 INJECTION INTRAMUSCULAR; SUBCUTANEOUS ONCE
Status: CANCELLED
Start: 2021-12-01 | End: 2021-12-01

## 2021-11-02 RX ORDER — METHYLPREDNISOLONE SODIUM SUCCINATE 40 MG/ML
40 INJECTION, POWDER, LYOPHILIZED, FOR SOLUTION INTRAMUSCULAR; INTRAVENOUS ONCE
Status: CANCELLED
Start: 2021-12-01 | End: 2021-12-01

## 2021-11-02 RX ORDER — HEPARIN SODIUM (PORCINE) LOCK FLUSH IV SOLN 100 UNIT/ML 100 UNIT/ML
500 SOLUTION INTRAVENOUS PRN
Status: DISCONTINUED | OUTPATIENT
Start: 2021-11-02 | End: 2021-11-03 | Stop reason: HOSPADM

## 2021-11-02 RX ORDER — HEPARIN SODIUM (PORCINE) LOCK FLUSH IV SOLN 100 UNIT/ML 100 UNIT/ML
500 SOLUTION INTRAVENOUS PRN
Status: CANCELLED | OUTPATIENT
Start: 2021-12-01

## 2021-11-02 RX ORDER — DIPHENHYDRAMINE HYDROCHLORIDE 50 MG/ML
25 INJECTION INTRAMUSCULAR; INTRAVENOUS ONCE
Status: CANCELLED
Start: 2021-12-01 | End: 2021-12-01

## 2021-11-02 RX ORDER — DIPHENHYDRAMINE HYDROCHLORIDE 50 MG/ML
25 INJECTION INTRAMUSCULAR; INTRAVENOUS ONCE
Status: COMPLETED | OUTPATIENT
Start: 2021-11-02 | End: 2021-11-02

## 2021-11-02 RX ORDER — ACETAMINOPHEN 325 MG/1
650 TABLET ORAL ONCE
Status: CANCELLED | OUTPATIENT
Start: 2021-12-01 | End: 2021-12-01

## 2021-11-02 RX ORDER — SODIUM CHLORIDE 0.9 % (FLUSH) 0.9 %
10 SYRINGE (ML) INJECTION PRN
Status: DISCONTINUED | OUTPATIENT
Start: 2021-11-02 | End: 2021-11-03 | Stop reason: HOSPADM

## 2021-11-02 RX ORDER — HEPARIN SODIUM (PORCINE) LOCK FLUSH IV SOLN 100 UNIT/ML 100 UNIT/ML
500 SOLUTION INTRAVENOUS PRN
Status: CANCELLED
Start: 2021-12-01

## 2021-11-02 RX ORDER — CYANOCOBALAMIN 1000 UG/ML
1000 INJECTION INTRAMUSCULAR; SUBCUTANEOUS ONCE
Status: COMPLETED | OUTPATIENT
Start: 2021-11-02 | End: 2021-11-02

## 2021-11-02 RX ORDER — SODIUM CHLORIDE 0.9 % (FLUSH) 0.9 %
10 SYRINGE (ML) INJECTION PRN
Status: CANCELLED | OUTPATIENT
Start: 2021-12-01

## 2021-11-02 RX ORDER — METHYLPREDNISOLONE SODIUM SUCCINATE 40 MG/ML
40 INJECTION, POWDER, LYOPHILIZED, FOR SOLUTION INTRAMUSCULAR; INTRAVENOUS ONCE
Status: COMPLETED | OUTPATIENT
Start: 2021-11-02 | End: 2021-11-02

## 2021-11-02 RX ORDER — ACETAMINOPHEN 325 MG/1
650 TABLET ORAL ONCE
Status: COMPLETED | OUTPATIENT
Start: 2021-11-02 | End: 2021-11-02

## 2021-11-02 RX ADMIN — SODIUM CHLORIDE, PRESERVATIVE FREE 10 ML: 5 INJECTION INTRAVENOUS at 08:08

## 2021-11-02 RX ADMIN — Medication 500 UNITS: at 10:35

## 2021-11-02 RX ADMIN — DIPHENHYDRAMINE HYDROCHLORIDE 25 MG: 50 INJECTION INTRAMUSCULAR; INTRAVENOUS at 08:09

## 2021-11-02 RX ADMIN — ACETAMINOPHEN 650 MG: 325 TABLET ORAL at 08:07

## 2021-11-02 RX ADMIN — IMMUNE GLOBULIN (HUMAN) 20 G: 10 INJECTION INTRAVENOUS; SUBCUTANEOUS at 08:37

## 2021-11-02 RX ADMIN — CYANOCOBALAMIN 1000 MCG: 1000 INJECTION, SOLUTION INTRAMUSCULAR at 08:13

## 2021-11-02 RX ADMIN — SODIUM CHLORIDE, PRESERVATIVE FREE 10 ML: 5 INJECTION INTRAVENOUS at 08:14

## 2021-11-02 RX ADMIN — METHYLPREDNISOLONE SODIUM SUCCINATE 40 MG: 40 INJECTION, POWDER, FOR SOLUTION INTRAMUSCULAR; INTRAVENOUS at 08:07

## 2021-11-02 RX ADMIN — SODIUM CHLORIDE, PRESERVATIVE FREE 20 ML: 5 INJECTION INTRAVENOUS at 10:35

## 2021-11-02 ASSESSMENT — PAIN SCALES - GENERAL
PAINLEVEL_OUTOF10: 0
PAINLEVEL_OUTOF10: 0

## 2021-11-02 NOTE — PROGRESS NOTES
Tolerated infusion well. Reviewed discharge instruction, voiced understanding. Copies of AVS given. Pt discharged home. Pt to exit via ambulation.     Orders Placed This Encounter   Medications    acetaminophen (TYLENOL) tablet 650 mg    sodium chloride flush 0.9 % injection 10 mL    diphenhydrAMINE (BENADRYL) injection 25 mg    methylPREDNISolone sodium (SOLU-MEDROL) injection 40 mg    heparin flush 100 UNIT/ML injection 500 Units    cyanocobalamin injection 1,000 mcg    immune globulin (GAMUNEX-C) 10% solution 20 g

## 2021-11-09 RX ORDER — VALACYCLOVIR HYDROCHLORIDE 500 MG/1
500 TABLET, FILM COATED ORAL DAILY
Qty: 30 TABLET | Refills: 5 | Status: SHIPPED | OUTPATIENT
Start: 2021-11-09 | End: 2022-04-28 | Stop reason: SDUPTHER

## 2021-11-10 RX ORDER — FERROUS GLUCONATE 324(37.5)
324 TABLET ORAL 2 TIMES DAILY
Qty: 60 TABLET | Refills: 5 | Status: SHIPPED | OUTPATIENT
Start: 2021-11-10 | End: 2022-04-28 | Stop reason: SDUPTHER

## 2021-11-30 ENCOUNTER — HOSPITAL ENCOUNTER (OUTPATIENT)
Dept: INFUSION THERAPY | Age: 71
Setting detail: INFUSION SERIES
Discharge: HOME | End: 2021-11-30
Payer: MEDICARE

## 2021-11-30 VITALS
DIASTOLIC BLOOD PRESSURE: 69 MMHG | RESPIRATION RATE: 16 BRPM | OXYGEN SATURATION: 94 % | TEMPERATURE: 97.3 F | HEART RATE: 92 BPM | SYSTOLIC BLOOD PRESSURE: 124 MMHG

## 2021-11-30 DIAGNOSIS — D80.1 HYPOGAMMAGLOBULINEMIA (HCC): ICD-10-CM

## 2021-11-30 DIAGNOSIS — D80.3 SELECTIVE DEFICIENCY OF IGG (HCC): ICD-10-CM

## 2021-11-30 DIAGNOSIS — C91.10 LEUKEMIA, LYMPHOCYTIC, CHRONIC (HCC): Primary | ICD-10-CM

## 2021-11-30 PROCEDURE — 2580000003 HC RX 258: Performed by: INTERNAL MEDICINE

## 2021-11-30 PROCEDURE — 6370000000 HC RX 637 (ALT 250 FOR IP)

## 2021-11-30 PROCEDURE — 99211 OFF/OP EST MAY X REQ PHY/QHP: CPT

## 2021-11-30 PROCEDURE — 96375 TX/PRO/DX INJ NEW DRUG ADDON: CPT

## 2021-11-30 PROCEDURE — 96374 THER/PROPH/DIAG INJ IV PUSH: CPT

## 2021-11-30 PROCEDURE — 96366 THER/PROPH/DIAG IV INF ADDON: CPT

## 2021-11-30 PROCEDURE — 96365 THER/PROPH/DIAG IV INF INIT: CPT

## 2021-11-30 PROCEDURE — 6360000002 HC RX W HCPCS

## 2021-11-30 PROCEDURE — 6360000002 HC RX W HCPCS: Performed by: INTERNAL MEDICINE

## 2021-11-30 RX ORDER — CYANOCOBALAMIN 1000 UG/ML
1000 INJECTION INTRAMUSCULAR; SUBCUTANEOUS ONCE
Status: CANCELLED
Start: 2021-12-01 | End: 2021-12-01

## 2021-11-30 RX ORDER — DIPHENHYDRAMINE HYDROCHLORIDE 50 MG/ML
25 INJECTION INTRAMUSCULAR; INTRAVENOUS ONCE
Status: COMPLETED | OUTPATIENT
Start: 2021-11-30 | End: 2021-11-30

## 2021-11-30 RX ORDER — METHYLPREDNISOLONE SODIUM SUCCINATE 40 MG/ML
40 INJECTION, POWDER, LYOPHILIZED, FOR SOLUTION INTRAMUSCULAR; INTRAVENOUS ONCE
Status: COMPLETED | OUTPATIENT
Start: 2021-11-30 | End: 2021-11-30

## 2021-11-30 RX ORDER — METHYLPREDNISOLONE SODIUM SUCCINATE 40 MG/ML
40 INJECTION, POWDER, LYOPHILIZED, FOR SOLUTION INTRAMUSCULAR; INTRAVENOUS ONCE
Status: CANCELLED
Start: 2021-12-01 | End: 2021-12-01

## 2021-11-30 RX ORDER — HEPARIN SODIUM (PORCINE) LOCK FLUSH IV SOLN 100 UNIT/ML 100 UNIT/ML
500 SOLUTION INTRAVENOUS PRN
Status: DISCONTINUED | OUTPATIENT
Start: 2021-11-30 | End: 2021-12-01 | Stop reason: HOSPADM

## 2021-11-30 RX ORDER — CYANOCOBALAMIN 1000 UG/ML
1000 INJECTION INTRAMUSCULAR; SUBCUTANEOUS ONCE
Status: COMPLETED | OUTPATIENT
Start: 2021-11-30 | End: 2021-11-30

## 2021-11-30 RX ORDER — HEPARIN SODIUM (PORCINE) LOCK FLUSH IV SOLN 100 UNIT/ML 100 UNIT/ML
500 SOLUTION INTRAVENOUS PRN
Status: CANCELLED
Start: 2021-12-01

## 2021-11-30 RX ORDER — ACETAMINOPHEN 325 MG/1
650 TABLET ORAL ONCE
Status: COMPLETED | OUTPATIENT
Start: 2021-11-30 | End: 2021-11-30

## 2021-11-30 RX ORDER — HEPARIN SODIUM (PORCINE) LOCK FLUSH IV SOLN 100 UNIT/ML 100 UNIT/ML
500 SOLUTION INTRAVENOUS PRN
Status: CANCELLED | OUTPATIENT
Start: 2021-12-01

## 2021-11-30 RX ORDER — SODIUM CHLORIDE 0.9 % (FLUSH) 0.9 %
10 SYRINGE (ML) INJECTION PRN
Status: CANCELLED | OUTPATIENT
Start: 2021-12-01

## 2021-11-30 RX ORDER — SODIUM CHLORIDE 0.9 % (FLUSH) 0.9 %
10 SYRINGE (ML) INJECTION PRN
Status: DISCONTINUED | OUTPATIENT
Start: 2021-11-30 | End: 2021-12-01 | Stop reason: HOSPADM

## 2021-11-30 RX ORDER — DIPHENHYDRAMINE HYDROCHLORIDE 50 MG/ML
25 INJECTION INTRAMUSCULAR; INTRAVENOUS ONCE
Status: CANCELLED
Start: 2021-12-01 | End: 2021-12-01

## 2021-11-30 RX ORDER — METHYLPREDNISOLONE SODIUM SUCCINATE 40 MG/ML
INJECTION, POWDER, LYOPHILIZED, FOR SOLUTION INTRAMUSCULAR; INTRAVENOUS
Status: COMPLETED
Start: 2021-11-30 | End: 2021-11-30

## 2021-11-30 RX ORDER — ACETAMINOPHEN 325 MG/1
TABLET ORAL
Status: COMPLETED
Start: 2021-11-30 | End: 2021-11-30

## 2021-11-30 RX ORDER — ACETAMINOPHEN 325 MG/1
650 TABLET ORAL ONCE
Status: CANCELLED | OUTPATIENT
Start: 2021-12-01 | End: 2021-12-01

## 2021-11-30 RX ORDER — DIPHENHYDRAMINE HYDROCHLORIDE 50 MG/ML
INJECTION INTRAMUSCULAR; INTRAVENOUS
Status: COMPLETED
Start: 2021-11-30 | End: 2021-11-30

## 2021-11-30 RX ADMIN — SODIUM CHLORIDE, PRESERVATIVE FREE 10 ML: 5 INJECTION INTRAVENOUS at 08:02

## 2021-11-30 RX ADMIN — IMMUNE GLOBULIN (HUMAN) 20 G: 10 INJECTION INTRAVENOUS; SUBCUTANEOUS at 08:32

## 2021-11-30 RX ADMIN — HEPARIN SODIUM (PORCINE) LOCK FLUSH IV SOLN 100 UNIT/ML 500 UNITS: 100 SOLUTION at 09:56

## 2021-11-30 RX ADMIN — ACETAMINOPHEN 650 MG: 325 TABLET ORAL at 07:57

## 2021-11-30 RX ADMIN — METHYLPREDNISOLONE SODIUM SUCCINATE 40 MG: 40 INJECTION, POWDER, FOR SOLUTION INTRAMUSCULAR; INTRAVENOUS at 07:58

## 2021-11-30 RX ADMIN — SODIUM CHLORIDE, PRESERVATIVE FREE 20 ML: 5 INJECTION INTRAVENOUS at 09:56

## 2021-11-30 RX ADMIN — CYANOCOBALAMIN 1000 MCG: 1000 INJECTION, SOLUTION INTRAMUSCULAR at 09:46

## 2021-11-30 RX ADMIN — DIPHENHYDRAMINE HYDROCHLORIDE 25 MG: 50 INJECTION, SOLUTION INTRAMUSCULAR; INTRAVENOUS at 07:58

## 2021-11-30 RX ADMIN — DIPHENHYDRAMINE HYDROCHLORIDE 25 MG: 50 INJECTION INTRAMUSCULAR; INTRAVENOUS at 07:58

## 2021-11-30 RX ADMIN — METHYLPREDNISOLONE SODIUM SUCCINATE 40 MG: 40 INJECTION, POWDER, LYOPHILIZED, FOR SOLUTION INTRAMUSCULAR; INTRAVENOUS at 07:58

## 2021-11-30 ASSESSMENT — PAIN SCALES - GENERAL
PAINLEVEL_OUTOF10: 0
PAINLEVEL_OUTOF10: 0

## 2021-11-30 NOTE — PROGRESS NOTES
Tolerated infusion well. Reviewed discharge instruction, voiced understanding. Copies of AVS given. Pt discharged home. Pt to exit via ambulation.     Orders Placed This Encounter   Medications    acetaminophen (TYLENOL) tablet 650 mg    sodium chloride flush 0.9 % injection 10 mL    heparin flush 100 UNIT/ML injection 500 Units    diphenhydrAMINE (BENADRYL) injection 25 mg    methylPREDNISolone sodium (SOLU-MEDROL) injection 40 mg    heparin flush 100 UNIT/ML injection 500 Units    cyanocobalamin injection 1,000 mcg    immune globulin (GAMUNEX-C) 10% solution 20 g    acetaminophen (TYLENOL) 325 MG tablet     Dee Dee Brown: cabinet override    methylPREDNISolone sodium (SOLU-MEDROL) 40 MG injection     Dee Dee Brown: cabinet override    diphenhydrAMINE (BENADRYL) 50 MG/ML injection     Dee Dee Brown: tino override

## 2021-12-27 ENCOUNTER — HOSPITAL ENCOUNTER (OUTPATIENT)
Dept: INFUSION THERAPY | Age: 71
Setting detail: INFUSION SERIES
Discharge: HOME | End: 2021-12-27
Payer: MEDICARE

## 2021-12-27 ENCOUNTER — TELEPHONE (OUTPATIENT)
Dept: ONCOLOGY | Age: 71
End: 2021-12-27

## 2021-12-27 VITALS
TEMPERATURE: 97.3 F | OXYGEN SATURATION: 96 % | SYSTOLIC BLOOD PRESSURE: 154 MMHG | DIASTOLIC BLOOD PRESSURE: 74 MMHG | HEART RATE: 81 BPM | RESPIRATION RATE: 18 BRPM

## 2021-12-27 DIAGNOSIS — D80.3 SELECTIVE DEFICIENCY OF IGG (HCC): ICD-10-CM

## 2021-12-27 DIAGNOSIS — D80.1 HYPOGAMMAGLOBULINEMIA (HCC): ICD-10-CM

## 2021-12-27 DIAGNOSIS — C91.10 LEUKEMIA, LYMPHOCYTIC, CHRONIC (HCC): Primary | ICD-10-CM

## 2021-12-27 PROCEDURE — 2580000003 HC RX 258: Performed by: INTERNAL MEDICINE

## 2021-12-27 PROCEDURE — 96372 THER/PROPH/DIAG INJ SC/IM: CPT

## 2021-12-27 PROCEDURE — 96374 THER/PROPH/DIAG INJ IV PUSH: CPT

## 2021-12-27 PROCEDURE — 96365 THER/PROPH/DIAG IV INF INIT: CPT

## 2021-12-27 PROCEDURE — 6360000002 HC RX W HCPCS: Performed by: INTERNAL MEDICINE

## 2021-12-27 PROCEDURE — 96375 TX/PRO/DX INJ NEW DRUG ADDON: CPT

## 2021-12-27 PROCEDURE — 99211 OFF/OP EST MAY X REQ PHY/QHP: CPT

## 2021-12-27 PROCEDURE — 96366 THER/PROPH/DIAG IV INF ADDON: CPT

## 2021-12-27 PROCEDURE — 6370000000 HC RX 637 (ALT 250 FOR IP): Performed by: INTERNAL MEDICINE

## 2021-12-27 RX ORDER — METHYLPREDNISOLONE SODIUM SUCCINATE 40 MG/ML
40 INJECTION, POWDER, LYOPHILIZED, FOR SOLUTION INTRAMUSCULAR; INTRAVENOUS ONCE
Status: CANCELLED
Start: 2022-01-01 | End: 2022-01-01

## 2021-12-27 RX ORDER — ACETAMINOPHEN 325 MG/1
650 TABLET ORAL ONCE
Status: CANCELLED | OUTPATIENT
Start: 2022-01-01 | End: 2022-01-01

## 2021-12-27 RX ORDER — METHYLPREDNISOLONE SODIUM SUCCINATE 40 MG/ML
40 INJECTION, POWDER, LYOPHILIZED, FOR SOLUTION INTRAMUSCULAR; INTRAVENOUS ONCE
Status: COMPLETED | OUTPATIENT
Start: 2021-12-27 | End: 2021-12-27

## 2021-12-27 RX ORDER — HEPARIN SODIUM (PORCINE) LOCK FLUSH IV SOLN 100 UNIT/ML 100 UNIT/ML
500 SOLUTION INTRAVENOUS PRN
Status: DISCONTINUED | OUTPATIENT
Start: 2021-12-27 | End: 2021-12-28 | Stop reason: HOSPADM

## 2021-12-27 RX ORDER — SODIUM CHLORIDE 0.9 % (FLUSH) 0.9 %
10 SYRINGE (ML) INJECTION PRN
Status: DISCONTINUED | OUTPATIENT
Start: 2021-12-27 | End: 2021-12-28 | Stop reason: HOSPADM

## 2021-12-27 RX ORDER — CYANOCOBALAMIN 1000 UG/ML
1000 INJECTION INTRAMUSCULAR; SUBCUTANEOUS ONCE
Status: CANCELLED
Start: 2022-01-01 | End: 2022-01-01

## 2021-12-27 RX ORDER — CYANOCOBALAMIN 1000 UG/ML
1000 INJECTION INTRAMUSCULAR; SUBCUTANEOUS ONCE
Status: COMPLETED | OUTPATIENT
Start: 2021-12-27 | End: 2021-12-27

## 2021-12-27 RX ORDER — HEPARIN SODIUM (PORCINE) LOCK FLUSH IV SOLN 100 UNIT/ML 100 UNIT/ML
500 SOLUTION INTRAVENOUS PRN
Status: CANCELLED | OUTPATIENT
Start: 2022-01-01

## 2021-12-27 RX ORDER — DIPHENHYDRAMINE HYDROCHLORIDE 50 MG/ML
25 INJECTION INTRAMUSCULAR; INTRAVENOUS ONCE
Status: COMPLETED | OUTPATIENT
Start: 2021-12-27 | End: 2021-12-27

## 2021-12-27 RX ORDER — SODIUM CHLORIDE 0.9 % (FLUSH) 0.9 %
10 SYRINGE (ML) INJECTION PRN
Status: CANCELLED | OUTPATIENT
Start: 2022-01-01

## 2021-12-27 RX ORDER — HEPARIN SODIUM (PORCINE) LOCK FLUSH IV SOLN 100 UNIT/ML 100 UNIT/ML
500 SOLUTION INTRAVENOUS PRN
Status: CANCELLED
Start: 2022-01-01

## 2021-12-27 RX ORDER — DIPHENHYDRAMINE HYDROCHLORIDE 50 MG/ML
25 INJECTION INTRAMUSCULAR; INTRAVENOUS ONCE
Status: CANCELLED
Start: 2022-01-01 | End: 2022-01-01

## 2021-12-27 RX ORDER — ACETAMINOPHEN 325 MG/1
650 TABLET ORAL ONCE
Status: COMPLETED | OUTPATIENT
Start: 2021-12-27 | End: 2021-12-27

## 2021-12-27 RX ADMIN — DIPHENHYDRAMINE HYDROCHLORIDE 25 MG: 50 INJECTION, SOLUTION INTRAMUSCULAR; INTRAVENOUS at 08:27

## 2021-12-27 RX ADMIN — SODIUM CHLORIDE, PRESERVATIVE FREE 20 ML: 5 INJECTION INTRAVENOUS at 10:33

## 2021-12-27 RX ADMIN — SODIUM CHLORIDE, PRESERVATIVE FREE 10 ML: 5 INJECTION INTRAVENOUS at 07:57

## 2021-12-27 RX ADMIN — METHYLPREDNISOLONE SODIUM SUCCINATE 40 MG: 40 INJECTION, POWDER, FOR SOLUTION INTRAMUSCULAR; INTRAVENOUS at 08:27

## 2021-12-27 RX ADMIN — ACETAMINOPHEN 650 MG: 325 TABLET ORAL at 08:27

## 2021-12-27 RX ADMIN — CYANOCOBALAMIN 1000 MCG: 1000 INJECTION, SOLUTION INTRAMUSCULAR at 09:45

## 2021-12-27 RX ADMIN — SODIUM CHLORIDE, PRESERVATIVE FREE 10 ML: 5 INJECTION INTRAVENOUS at 08:30

## 2021-12-27 RX ADMIN — HEPARIN 500 UNITS: 100 SYRINGE at 10:33

## 2021-12-27 RX ADMIN — IMMUNE GLOBULIN (HUMAN) 20 G: 10 INJECTION INTRAVENOUS; SUBCUTANEOUS at 08:33

## 2021-12-27 ASSESSMENT — PAIN SCALES - GENERAL
PAINLEVEL_OUTOF10: 0
PAINLEVEL_OUTOF10: 0

## 2021-12-27 NOTE — PROGRESS NOTES
Tolerated iinfusion well. Reviewed discharge instruction, voiced understanding. Copies of AVS offered but refused. Pt discharged home. Pt to exit via ambulation.     Orders Placed This Encounter   Medications    acetaminophen (TYLENOL) tablet 650 mg    sodium chloride flush 0.9 % injection 10 mL    heparin flush 100 UNIT/ML injection 500 Units    diphenhydrAMINE (BENADRYL) injection 25 mg    methylPREDNISolone sodium (SOLU-MEDROL) injection 40 mg    cyanocobalamin injection 1,000 mcg    immune globulin (GAMUNEX-C) 10% solution 20 g

## 2022-01-18 DIAGNOSIS — C91.10 CLL (CHRONIC LYMPHOCYTIC LEUKEMIA) (HCC): Primary | ICD-10-CM

## 2022-01-18 RX ORDER — IBRUTINIB 140 MG/1
140 TABLET, FILM COATED ORAL DAILY
Qty: 30 TABLET | Refills: 5 | Status: SHIPPED | OUTPATIENT
Start: 2022-01-18 | End: 2022-07-06 | Stop reason: SDUPTHER

## 2022-01-24 ENCOUNTER — HOSPITAL ENCOUNTER (OUTPATIENT)
Dept: INFUSION THERAPY | Age: 72
Setting detail: INFUSION SERIES
Discharge: HOME | End: 2022-01-24
Payer: MEDICARE

## 2022-01-24 VITALS
RESPIRATION RATE: 16 BRPM | SYSTOLIC BLOOD PRESSURE: 137 MMHG | TEMPERATURE: 99 F | OXYGEN SATURATION: 94 % | DIASTOLIC BLOOD PRESSURE: 62 MMHG | HEART RATE: 85 BPM

## 2022-01-24 DIAGNOSIS — D80.1 HYPOGAMMAGLOBULINEMIA (HCC): ICD-10-CM

## 2022-01-24 DIAGNOSIS — C91.10 LEUKEMIA, LYMPHOCYTIC, CHRONIC (HCC): Primary | ICD-10-CM

## 2022-01-24 DIAGNOSIS — D80.3 SELECTIVE DEFICIENCY OF IGG (HCC): ICD-10-CM

## 2022-01-24 PROCEDURE — 96374 THER/PROPH/DIAG INJ IV PUSH: CPT

## 2022-01-24 PROCEDURE — 96375 TX/PRO/DX INJ NEW DRUG ADDON: CPT

## 2022-01-24 PROCEDURE — 6360000002 HC RX W HCPCS

## 2022-01-24 PROCEDURE — 99211 OFF/OP EST MAY X REQ PHY/QHP: CPT

## 2022-01-24 PROCEDURE — 6360000002 HC RX W HCPCS: Performed by: INTERNAL MEDICINE

## 2022-01-24 PROCEDURE — 2580000003 HC RX 258: Performed by: INTERNAL MEDICINE

## 2022-01-24 PROCEDURE — 96372 THER/PROPH/DIAG INJ SC/IM: CPT

## 2022-01-24 PROCEDURE — 96365 THER/PROPH/DIAG IV INF INIT: CPT

## 2022-01-24 RX ORDER — CYANOCOBALAMIN 1000 UG/ML
1000 INJECTION INTRAMUSCULAR; SUBCUTANEOUS ONCE
Status: CANCELLED
Start: 2022-01-24 | End: 2022-01-24

## 2022-01-24 RX ORDER — HEPARIN SODIUM (PORCINE) LOCK FLUSH IV SOLN 100 UNIT/ML 100 UNIT/ML
500 SOLUTION INTRAVENOUS PRN
Status: CANCELLED | OUTPATIENT
Start: 2022-02-01

## 2022-01-24 RX ORDER — HEPARIN SODIUM (PORCINE) LOCK FLUSH IV SOLN 100 UNIT/ML 100 UNIT/ML
500 SOLUTION INTRAVENOUS PRN
Status: DISCONTINUED | OUTPATIENT
Start: 2022-01-24 | End: 2022-01-25 | Stop reason: HOSPADM

## 2022-01-24 RX ORDER — DIPHENHYDRAMINE HYDROCHLORIDE 50 MG/ML
25 INJECTION INTRAMUSCULAR; INTRAVENOUS ONCE
Status: CANCELLED
Start: 2022-02-28 | End: 2022-02-28

## 2022-01-24 RX ORDER — HEPARIN SODIUM (PORCINE) LOCK FLUSH IV SOLN 100 UNIT/ML 100 UNIT/ML
500 SOLUTION INTRAVENOUS PRN
Status: CANCELLED
Start: 2022-02-01

## 2022-01-24 RX ORDER — DIPHENHYDRAMINE HYDROCHLORIDE 50 MG/ML
25 INJECTION INTRAMUSCULAR; INTRAVENOUS ONCE
Status: CANCELLED
Start: 2022-01-24 | End: 2022-01-24

## 2022-01-24 RX ORDER — METHYLPREDNISOLONE SODIUM SUCCINATE 40 MG/ML
40 INJECTION, POWDER, LYOPHILIZED, FOR SOLUTION INTRAMUSCULAR; INTRAVENOUS ONCE
Status: COMPLETED | OUTPATIENT
Start: 2022-01-24 | End: 2022-01-24

## 2022-01-24 RX ORDER — METHYLPREDNISOLONE SODIUM SUCCINATE 125 MG/2ML
40 INJECTION, POWDER, LYOPHILIZED, FOR SOLUTION INTRAMUSCULAR; INTRAVENOUS ONCE
Status: CANCELLED
Start: 2022-03-01 | End: 2022-03-01

## 2022-01-24 RX ORDER — SODIUM CHLORIDE 0.9 % (FLUSH) 0.9 %
10 SYRINGE (ML) INJECTION PRN
Status: CANCELLED | OUTPATIENT
Start: 2022-02-01

## 2022-01-24 RX ORDER — SODIUM CHLORIDE 0.9 % (FLUSH) 0.9 %
10 SYRINGE (ML) INJECTION PRN
Status: DISCONTINUED | OUTPATIENT
Start: 2022-01-24 | End: 2022-01-25 | Stop reason: HOSPADM

## 2022-01-24 RX ORDER — METHYLPREDNISOLONE SODIUM SUCCINATE 40 MG/ML
INJECTION, POWDER, LYOPHILIZED, FOR SOLUTION INTRAMUSCULAR; INTRAVENOUS
Status: COMPLETED
Start: 2022-01-24 | End: 2022-01-24

## 2022-01-24 RX ORDER — CYANOCOBALAMIN 1000 UG/ML
1000 INJECTION INTRAMUSCULAR; SUBCUTANEOUS ONCE
Status: COMPLETED | OUTPATIENT
Start: 2022-01-24 | End: 2022-01-24

## 2022-01-24 RX ORDER — METHYLPREDNISOLONE SODIUM SUCCINATE 125 MG/2ML
40 INJECTION, POWDER, LYOPHILIZED, FOR SOLUTION INTRAMUSCULAR; INTRAVENOUS ONCE
Status: CANCELLED
Start: 2022-01-24 | End: 2022-01-24

## 2022-01-24 RX ORDER — CYANOCOBALAMIN 1000 UG/ML
1000 INJECTION INTRAMUSCULAR; SUBCUTANEOUS ONCE
Status: CANCELLED
Start: 2022-02-28 | End: 2022-02-28

## 2022-01-24 RX ADMIN — METHYLPREDNISOLONE SODIUM SUCCINATE 40 MG: 40 INJECTION, POWDER, LYOPHILIZED, FOR SOLUTION INTRAMUSCULAR; INTRAVENOUS at 09:21

## 2022-01-24 RX ADMIN — IMMUNE GLOBULIN (HUMAN) 20 G: 10 INJECTION INTRAVENOUS; SUBCUTANEOUS at 09:31

## 2022-01-24 RX ADMIN — SODIUM CHLORIDE, PRESERVATIVE FREE 10 ML: 5 INJECTION INTRAVENOUS at 11:49

## 2022-01-24 RX ADMIN — SODIUM CHLORIDE, PRESERVATIVE FREE 10 ML: 5 INJECTION INTRAVENOUS at 07:57

## 2022-01-24 RX ADMIN — HEPARIN 500 UNITS: 100 SYRINGE at 11:49

## 2022-01-24 RX ADMIN — METHYLPREDNISOLONE SODIUM SUCCINATE 40 MG: 40 INJECTION, POWDER, FOR SOLUTION INTRAMUSCULAR; INTRAVENOUS at 09:21

## 2022-01-24 RX ADMIN — CYANOCOBALAMIN 1000 MCG: 1000 INJECTION, SOLUTION INTRAMUSCULAR at 10:33

## 2022-01-24 NOTE — PROGRESS NOTES
Tolerated IVIG infusion well. Reviewed discharge instruction, voiced understanding. Copies of AVS given. Pt discharged home. Pt to exit via ambulation.     Orders Placed This Encounter   Medications    sodium chloride flush 0.9 % injection 10 mL    heparin flush 100 UNIT/ML injection 500 Units    heparin flush 100 UNIT/ML injection 500 Units    immune globulin (GAMUNEX-C) 10% solution 20 g    methylPREDNISolone sodium (SOLU-MEDROL) injection 40 mg    methylPREDNISolone sodium (SOLU-MEDROL) 40 MG injection     Dee Dee Brown: cabinet override    cyanocobalamin injection 1,000 mcg

## 2022-02-03 ENCOUNTER — HOSPITAL ENCOUNTER (OUTPATIENT)
Dept: INFUSION THERAPY | Age: 72
Discharge: HOME | End: 2022-02-03
Payer: MEDICARE

## 2022-02-03 DIAGNOSIS — D50.0 IRON DEFICIENCY ANEMIA DUE TO CHRONIC BLOOD LOSS: ICD-10-CM

## 2022-02-03 DIAGNOSIS — C91.10 CLL (CHRONIC LYMPHOCYTIC LEUKEMIA) (HCC): ICD-10-CM

## 2022-02-03 DIAGNOSIS — D80.1 HYPOGAMMAGLOBULINEMIA (HCC): ICD-10-CM

## 2022-02-03 DIAGNOSIS — K90.9 INTESTINAL MALABSORPTION, UNSPECIFIED TYPE: ICD-10-CM

## 2022-02-03 LAB
ALBUMIN SERPL-MCNC: 3.9 GM/DL (ref 3.4–5)
ALP BLD-CCNC: 92 IU/L (ref 40–129)
ALT SERPL-CCNC: 14 U/L (ref 10–40)
ANION GAP SERPL CALCULATED.3IONS-SCNC: 11 MMOL/L (ref 4–16)
AST SERPL-CCNC: 14 IU/L (ref 15–37)
BASOPHILS ABSOLUTE: 0 K/CU MM
BASOPHILS RELATIVE PERCENT: 0.1 % (ref 0–1)
BILIRUB SERPL-MCNC: 0.5 MG/DL (ref 0–1)
BUN BLDV-MCNC: 15 MG/DL (ref 6–23)
CALCIUM SERPL-MCNC: 8.8 MG/DL (ref 8.3–10.6)
CHLORIDE BLD-SCNC: 101 MMOL/L (ref 99–110)
CO2: 25 MMOL/L (ref 21–32)
CREAT SERPL-MCNC: 0.8 MG/DL (ref 0.9–1.3)
DIFFERENTIAL TYPE: ABNORMAL
EOSINOPHILS ABSOLUTE: 0.1 K/CU MM
EOSINOPHILS RELATIVE PERCENT: 0.7 % (ref 0–3)
FERRITIN: 17 NG/ML (ref 30–400)
FOLATE: 9.4 NG/ML (ref 3.1–17.5)
GFR AFRICAN AMERICAN: >60 ML/MIN/1.73M2
GFR NON-AFRICAN AMERICAN: >60 ML/MIN/1.73M2
GLUCOSE BLD-MCNC: 88 MG/DL (ref 70–99)
HCT VFR BLD CALC: 38 % (ref 42–52)
HEMOGLOBIN: 11.9 GM/DL (ref 13.5–18)
IGG,SERUM: 496 MG/DL (ref 723–1685)
IRON: 52 UG/DL (ref 59–158)
LACTATE DEHYDROGENASE: 157 IU/L (ref 120–246)
LYMPHOCYTES ABSOLUTE: 9.7 K/CU MM
LYMPHOCYTES RELATIVE PERCENT: 71 % (ref 24–44)
MCH RBC QN AUTO: 25.8 PG (ref 27–31)
MCHC RBC AUTO-ENTMCNC: 31.3 % (ref 32–36)
MCV RBC AUTO: 82.4 FL (ref 78–100)
MONOCYTES ABSOLUTE: 1 K/CU MM
MONOCYTES RELATIVE PERCENT: 7.2 % (ref 0–4)
PCT TRANSFERRIN: 13 % (ref 10–44)
PDW BLD-RTO: 16.5 % (ref 11.7–14.9)
PLATELET # BLD: 136 K/CU MM (ref 140–440)
PMV BLD AUTO: 10.8 FL (ref 7.5–11.1)
POTASSIUM SERPL-SCNC: 4.3 MMOL/L (ref 3.5–5.1)
RBC # BLD: 4.61 M/CU MM (ref 4.6–6.2)
SEGMENTED NEUTROPHILS ABSOLUTE COUNT: 2.9 K/CU MM
SEGMENTED NEUTROPHILS RELATIVE PERCENT: 21 % (ref 36–66)
SODIUM BLD-SCNC: 137 MMOL/L (ref 135–145)
TOTAL IRON BINDING CAPACITY: 404 UG/DL (ref 250–450)
TOTAL PROTEIN: 5.6 GM/DL (ref 6.4–8.2)
UNSATURATED IRON BINDING CAPACITY: 352 UG/DL (ref 110–370)
VITAMIN B-12: 954.1 PG/ML (ref 211–911)
WBC # BLD: 13.7 K/CU MM (ref 4–10.5)

## 2022-02-03 PROCEDURE — 82607 VITAMIN B-12: CPT

## 2022-02-03 PROCEDURE — 82784 ASSAY IGA/IGD/IGG/IGM EACH: CPT

## 2022-02-03 PROCEDURE — 83540 ASSAY OF IRON: CPT

## 2022-02-03 PROCEDURE — 82746 ASSAY OF FOLIC ACID SERUM: CPT

## 2022-02-03 PROCEDURE — 82728 ASSAY OF FERRITIN: CPT

## 2022-02-03 PROCEDURE — 83550 IRON BINDING TEST: CPT

## 2022-02-03 PROCEDURE — 83615 LACTATE (LD) (LDH) ENZYME: CPT

## 2022-02-03 PROCEDURE — 85025 COMPLETE CBC W/AUTO DIFF WBC: CPT

## 2022-02-03 PROCEDURE — 36415 COLL VENOUS BLD VENIPUNCTURE: CPT

## 2022-02-03 PROCEDURE — 80053 COMPREHEN METABOLIC PANEL: CPT

## 2022-02-10 ENCOUNTER — OFFICE VISIT (OUTPATIENT)
Dept: ONCOLOGY | Age: 72
End: 2022-02-10
Payer: MEDICARE

## 2022-02-10 ENCOUNTER — HOSPITAL ENCOUNTER (OUTPATIENT)
Dept: INFUSION THERAPY | Age: 72
Discharge: HOME | End: 2022-02-10

## 2022-02-10 VITALS
BODY MASS INDEX: 26.76 KG/M2 | SYSTOLIC BLOOD PRESSURE: 153 MMHG | DIASTOLIC BLOOD PRESSURE: 70 MMHG | HEIGHT: 72 IN | OXYGEN SATURATION: 97 % | WEIGHT: 197.6 LBS | RESPIRATION RATE: 16 BRPM | TEMPERATURE: 96.5 F | HEART RATE: 84 BPM

## 2022-02-10 DIAGNOSIS — D80.3 SELECTIVE DEFICIENCY OF IGG (HCC): ICD-10-CM

## 2022-02-10 DIAGNOSIS — J20.9 ACUTE BRONCHITIS, UNSPECIFIED ORGANISM: ICD-10-CM

## 2022-02-10 DIAGNOSIS — K90.9 INTESTINAL MALABSORPTION, UNSPECIFIED TYPE: ICD-10-CM

## 2022-02-10 DIAGNOSIS — D50.0 IRON DEFICIENCY ANEMIA DUE TO CHRONIC BLOOD LOSS: ICD-10-CM

## 2022-02-10 DIAGNOSIS — R91.8 OTHER NONSPECIFIC ABNORMAL FINDING OF LUNG FIELD: ICD-10-CM

## 2022-02-10 DIAGNOSIS — D80.1 HYPOGAMMAGLOBULINEMIA (HCC): ICD-10-CM

## 2022-02-10 DIAGNOSIS — C91.10 CLL (CHRONIC LYMPHOCYTIC LEUKEMIA) (HCC): Primary | ICD-10-CM

## 2022-02-10 PROCEDURE — G8417 CALC BMI ABV UP PARAM F/U: HCPCS | Performed by: INTERNAL MEDICINE

## 2022-02-10 PROCEDURE — G8427 DOCREV CUR MEDS BY ELIG CLIN: HCPCS | Performed by: INTERNAL MEDICINE

## 2022-02-10 PROCEDURE — 4040F PNEUMOC VAC/ADMIN/RCVD: CPT | Performed by: INTERNAL MEDICINE

## 2022-02-10 PROCEDURE — 1123F ACP DISCUSS/DSCN MKR DOCD: CPT | Performed by: INTERNAL MEDICINE

## 2022-02-10 PROCEDURE — G8482 FLU IMMUNIZE ORDER/ADMIN: HCPCS | Performed by: INTERNAL MEDICINE

## 2022-02-10 PROCEDURE — 1036F TOBACCO NON-USER: CPT | Performed by: INTERNAL MEDICINE

## 2022-02-10 PROCEDURE — 3017F COLORECTAL CA SCREEN DOC REV: CPT | Performed by: INTERNAL MEDICINE

## 2022-02-10 PROCEDURE — 99214 OFFICE O/P EST MOD 30 MIN: CPT | Performed by: INTERNAL MEDICINE

## 2022-02-10 RX ORDER — SODIUM CHLORIDE 9 MG/ML
INJECTION, SOLUTION INTRAVENOUS CONTINUOUS
Status: CANCELLED | OUTPATIENT
Start: 2022-02-24

## 2022-02-10 RX ORDER — ALBUTEROL SULFATE 90 UG/1
4 AEROSOL, METERED RESPIRATORY (INHALATION) PRN
Status: CANCELLED | OUTPATIENT
Start: 2022-02-24

## 2022-02-10 RX ORDER — ACETAMINOPHEN 325 MG/1
650 TABLET ORAL
Status: CANCELLED | OUTPATIENT
Start: 2022-02-24

## 2022-02-10 RX ORDER — AZITHROMYCIN 250 MG/1
250 TABLET, FILM COATED ORAL SEE ADMIN INSTRUCTIONS
Qty: 6 TABLET | Refills: 0 | Status: SHIPPED | OUTPATIENT
Start: 2022-02-10 | End: 2022-02-15

## 2022-02-10 RX ORDER — HEPARIN SODIUM (PORCINE) LOCK FLUSH IV SOLN 100 UNIT/ML 100 UNIT/ML
500 SOLUTION INTRAVENOUS PRN
Status: CANCELLED | OUTPATIENT
Start: 2022-02-24

## 2022-02-10 RX ORDER — SODIUM CHLORIDE 9 MG/ML
25 INJECTION, SOLUTION INTRAVENOUS PRN
Status: CANCELLED | OUTPATIENT
Start: 2022-02-24

## 2022-02-10 RX ORDER — DIPHENHYDRAMINE HYDROCHLORIDE 50 MG/ML
50 INJECTION INTRAMUSCULAR; INTRAVENOUS
Status: CANCELLED | OUTPATIENT
Start: 2022-02-24

## 2022-02-10 RX ORDER — SODIUM CHLORIDE 0.9 % (FLUSH) 0.9 %
5-40 SYRINGE (ML) INJECTION PRN
Status: CANCELLED | OUTPATIENT
Start: 2022-02-24

## 2022-02-10 RX ORDER — MEPERIDINE HYDROCHLORIDE 50 MG/ML
12.5 INJECTION INTRAMUSCULAR; INTRAVENOUS; SUBCUTANEOUS PRN
Status: CANCELLED | OUTPATIENT
Start: 2022-02-24

## 2022-02-10 RX ORDER — EPINEPHRINE 1 MG/ML
0.3 INJECTION, SOLUTION, CONCENTRATE INTRAVENOUS PRN
Status: CANCELLED | OUTPATIENT
Start: 2022-02-24

## 2022-02-10 RX ORDER — ONDANSETRON 2 MG/ML
8 INJECTION INTRAMUSCULAR; INTRAVENOUS
Status: CANCELLED | OUTPATIENT
Start: 2022-02-24

## 2022-02-10 ASSESSMENT — PATIENT HEALTH QUESTIONNAIRE - PHQ9
2. FEELING DOWN, DEPRESSED OR HOPELESS: 0
SUM OF ALL RESPONSES TO PHQ QUESTIONS 1-9: 0
1. LITTLE INTEREST OR PLEASURE IN DOING THINGS: 0
SUM OF ALL RESPONSES TO PHQ9 QUESTIONS 1 & 2: 0

## 2022-02-10 NOTE — PROGRESS NOTES
Patient Name: Jon Weaver  Patient : 1950  Patient MRN: [id number]     Primary Oncologist: John Beltran MD  Referring Physician: Nathan Jacinto MD       Date of Service: 2/10/2022      Chief Complaint:   Chief Complaint   Patient presents with    Follow-up        Active Problem list  1. CLL (chronic lymphocytic leukemia) ([facility])           HPI:        1. Mr. Juan Jose Abraham ( 52) was diagnosed with stage I CLL in , when he presented with infection and lymphocytosis. His peripheral blood immunophenotyping was characteristic of B-cell CLL. It was CD38 negative, ZAP-70 positive, CD19 and 20 positive, CD5 positive. Karyotyping was normal in 2006.,  2. Because of his infection and associated acquired immune deficiency(hypogammaglobulinemia), he was given IVIG for a year. He was started back on IVIG therapy monthly since 2013 to prevent future major infections continuing for now. Also had minor Infusional reaction to IVIG in 2016 responded to Steroids & Benadryl., No further problems after that. 3. CLL was treated only with Leukeran intermittently from 2007 until 2011 and again from 2011 until 2012. 4. 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 Flow studies still showed the same and FISH in 2012 showed deletion of tp53 (17p) and MYB (6q). ,  5. He was thus treated with Fludara and Rituxan for six months between April and 2012 with excellent clinical and hematological response.  . In 2013, he developed Multiple b/l nodes in axilla and groin area, CAT scan on 13, Bulky lymphadenopathy within the chest, abdomen, and pelvis mildly increased as compared from previous CAT scan in March 2012.,  6. Starting in Jan 2014 he was initiated on 2nd line chemo with Bendamustine and Rituxan with good initial response. Continued till Nov 2014, CAT scan done on 8/14/14 showed an interval decrease in generalized adenopathy compared to previous study In December 2013, suggesting good response to therapy.,  7. CAT scan done on 12/23/14 showed Minimal interval increase in size of at least two periportal and retrocaval lymph nodes. Otherwise stable thoracic, abdominal and pelvic adenopathy. Also mild interval increase in splenomegaly measuring 19 cm, previously 17 cm. 8. CAT scan done on 6/25/15 showed mild interval decrease in the splenomegaly and also in retroperitoneal mesenteric lymph nodes. 9. Started Idelalisib [Zydelig] on 20th Jan 2015 at 150mg po bid. with good initial response. He did remarkably well with Zydelig from January of 2015 until June of 2016, after he was hospitalized for Rhinovirus Pneumonia in May of 2016. Also discussed Living [facility], DNR status, et cetera. In April 2016, chest CT showed stable  10. , 13. In July 2016, His FISH testing on peripheral blood showed abnormal results, with 6q deletion, 17p deletion, and 11q centromere signal in 3 percent of cells. The 17p deletion (Tp53) in 80 percent of cells is associated with unfavorable prognosis. Because of his CLL with poor prognostic markers, including 17p deletion detected in 2012 & again in July of 2016. 11. He was started on ibrutinib in September 2016 140mg/d increased to 280 mg/ after 4 weeks. 12. The abdominal CT 7/5/16 is showing no acute abnormalities, stable splenomegaly, and one periportal lymph node measuring 2.2 by 3.7 cm which is unchanged from before. 13. CAT scan of the chest August 5, 2016, showed axillary, mediastinal, hilar, and upper abdominal lymphadenopathy consistent with his CLL. The maximum size of the nodes is 2.4 cm.  A 1 cm infiltrate in the lateral segment of the right middle lobe possibly infectious in nature, segmental calcification of left coronary artery. CAT scan of the chest, abdomen, and pelvis December 7,2016 2016, which revealed a mild mediastinal right hilar adenopathy, decreased in size from the previous examination of April and August of 2016, .5. His ibrutinib was stopped in December 2016 when he was feeling bad and it was started back at one a day, 140 mg daily instead of 280 mg that he was taking prior to that  14. In January 2017, he developed progressive lump in his left inguinal area. That being the only area of progressive adenopathy with multiple nodes coalesced together, a question of Pimentels transformation versus more aggressive histology conversion was considered as a possibility. 13. Dr. Tamela Peabody, did a biopsy of the lymph node on January 18, 2017. The final pathology was reported as B-cell small lymphocytic lymphoma (B-cell CLL/SLL), with no evidence of any large cell OR Pimentels transformation. There was some evidence of localized herpetic simplex lymphadenitis. 16. Because of possibility of localized infection with herpes. I started him on Valtrex 500 t.i.d. for two to three weeks then tapered to 1/d as maintenance therapy. Lymphadenopathy in left groin almost completely resolved. 17. His ibrutinib was stopped in December 2016 when he was feeling bad and it was started back at one a day, 140 mg daily as maintenance dose instead of 280 mg that he was taking prior to that. 18. His quantitative immunoglobulin on February 17, 2017, IgG 600, IgA less than 10, IgM less than 4. Serum protein electrophoresis was within the normal range. Gammaglobulin was continued monthly since it has helped any serious infections under control. 19. August 2017 he had a IgG that was 503  20. October 2017 started a ibrutinib he was off for 2 months due to his insurance issues  5-18 CT chest: Showed some mild decrease in mediastinal and hilar lymphadenopathy. . Regards to his abdomen also there is decrease in his lymphadenopathy. Although there is a left inguinal node that has enlarged. Section that no obvious source of his abdominal cramping. 10/10/2018 EGD showed severe ulcerative esophagitis with slight narrowing in the GE junction small hiatal hernia mild superficial gastritis, Colonoscope revealed 2 mm polyp in the sigmoid colon, diverticulosis, hemorrhoids, moderate stool  1/2019; Colonoscopy with two diminutive polyps, diverticulosis and hemorrhoids, path with TA above IC valve and HP distal sigmoid  10-19 iron def based on labs refered to GI , stool occult negative x #3 , NO PICA   11-19 saw Dr. Anjana [facility], [facility]  11/7/2019: Ct chest:Ground-glass nodule seen within the right upper lobe the largest measuring 17  mm in diameter. 12-6-19 capsule endoscopy, results unavailable   2/2020: CT chest:IMPRESSION:  Previously noted ground-glass opacities in the right lung have resolved. However, there is a new cluster of tiny pulmonary nodules in the left lower  lobe which could represent an infectious or inflammatory etiology. Short-term follow-up chest CT is recommended in 3-6 months. Multiple additional tiny pulmonary nodules are overall stable. Stable mediastinal lymph nodes without new lymphadenopathy. Previous Therapies    May 2020: Ct chest:  1. Interval resolution of the previously described cluster of pulmonary    nodules in the left lower lobe.  Findings are most compatible with resolved    infectious/inflammatory etiology. 2. No acute cardiopulmonary disease. 3. COPD. 4. Stable subcentimeter mediastinal lymph nodes. 8/28/20: US RP normal    August 2020 cystoscopy revealed enlarged prostate. Was Recommended TURBT      PAST MEDICAL HISTORY:   1. Stage I CLL with conversion from good to poor prognostic factors. ,  2. History of hypertension for many years. ,  3. 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 Arthritis in the past. ,  5. Frozen shoulder for which he had treatment including injection by Dr. Ben Higginbotham. ,  6. Cellulitis with Zoster type lesion Left thigh resolved with Bactrim and Valtrex in 2016.,  7. abdominal illness, with fever, nausea, and discomfort, suggestive of infectious etiology in May 2016. ,  8. hospitalization between  and  for what seems like atypical rhinovirus pneumonia involving right middle and lower lobe and left lower lobe with sepsis,  9. 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 Dr. Yareli Soto is referring him for MOHS surgery. ,  10. hospitalized from  to 2017, for hyperosmolar hyperglycemia with hyponatremia and hyperkalemia and UTI. He was seen by Dr. Abi Epps, who put him on insulin. He was also seen by Dr. Gisselle Miner. He felt much better after his sugar got under better control and electrolyte imbalanced reversed,  11. Ultrasound Doppler 2017, was negative for any DVT in either leg. Chest x-ray showed no acute process. PAST SURGICAL HISTORY:   1. knee operation,  2. tonsillectomy,  3. broken ankle times two requiring open reduction. ,  4. Vision better after cataract surgery    FAMILY HISTORY:   His paternal aunt had colon cancer. Uncle also had some form of cancer. Father had heart disease. Sister  12 of Liver disease     SOCIAL HISTORY:   He has a 40-pack-year history of smoking, quit in . He denies alcohol use. He is not . Has one son. Interval History  2/10/2022: Arrived alone to the clinic today. Urine stream better with medication. No fever, night sweats, lad, weight loss. Energy levels the same(feels tired all of a sudden), sleeps a lot. No chest pain, increased sob, palpitations or any dizziness. No abdominal pain. No nausea or any emesis , diarrhea or any constipation. No overt bleeding.  Reported increased sinus and nasal drainage.      Review of Systems   Per interval history; otherwise 10 point ROS is negative              Vital Signs:  BP (!) 153/70 (Site: Left Upper Arm, Position: Sitting, Cuff Size: Large Adult)   Pulse 84   Temp 96.5 °F (35.8 °C) (Temporal)   Resp 16   Ht 6' (1.829 m)   Wt 197 lb 9.6 oz (89.6 kg)   SpO2 97%   BMI 26.80 kg/m²     Physical Exam:  CONSTITUTIONAL: alert and awake  EYES: No palor or any icetrus   ENT: ATNC   NECK: No JVD   HEMATOLOGIC/LYMPHATIC: no cervical, supraclavicular or axillary lymphadenopathy   LUNGS:CTAB   CARDIOVASCULAR:s1s2 SM+ rrr   ABDOMEN:soft ntnd bs pos  NEUROLOGIC: GI   SKIN: skin tags   EXTREMITIES: no LE edema bilaterally      Labs:    Hematology:  Lab Results   Component Value Date    WBC 13.7 (H) 02/03/2022    RBC 4.61 02/03/2022    HGB 11.9 (L) 02/03/2022    HCT 38.0 (L) 02/03/2022    MCV 82.4 02/03/2022    MCH 25.8 (L) 02/03/2022    MCHC 31.3 (L) 02/03/2022    RDW 16.5 (H) 02/03/2022     (L) 02/03/2022    MPV 10.8 02/03/2022    BANDSPCT 6 08/04/2020    SEGSPCT 21.0 (L) 02/03/2022    EOSRELPCT 0.7 02/03/2022    BASOPCT 0.1 02/03/2022    LYMPHOPCT 71.0 (H) 02/03/2022    MONOPCT 7.2 (H) 02/03/2022    BANDABS 1.76 08/04/2020    SEGSABS 2.9 02/03/2022    EOSABS 0.1 02/03/2022    BASOSABS 0.0 02/03/2022    LYMPHSABS 9.7 02/03/2022    MONOSABS 1.0 02/03/2022    DIFFTYPE AUTOMATED DIFFERENTIAL 02/03/2022    ANISOCYTOSIS 1+ 08/04/2020    POLYCHROM 1+ 08/04/2020    WBCMORP ATYPICAL LYMPHOCYTES WITH PROMINENT NUCLEOLI NOTED 09/26/2017    PLTM SEVERAL LARGE PLATELETS 22/39/3529     Lab Results   Component Value Date    ESR 72 (H) 01/24/2017       Chemistry:  Lab Results   Component Value Date     02/03/2022    K 4.3 02/03/2022     02/03/2022    CO2 25 02/03/2022    BUN 15 02/03/2022    CREATININE 0.8 (L) 02/03/2022    GLUCOSE 88 02/03/2022    CALCIUM 8.8 02/03/2022    PROT 5.6 (L) 02/03/2022    LABALBU 3.9 02/03/2022    BILITOT 0.5 02/03/2022 ALKPHOS 92 02/03/2022    AST 14 (L) 02/03/2022    ALT 14 02/03/2022    LABGLOM >60 02/03/2022    GFRAA >60 02/03/2022    PHOS 3.3 04/04/2019    MG 2.3 07/20/2020    POCGLU 202 (H) 05/17/2019     Lab Results   Component Value Date     02/03/2022     No components found for: LD  Lab Results   Component Value Date    TSHHS 3.040 04/04/2019    T4FREE 1.37 04/04/2019    FT3 3.2 03/27/2012       Immunology:  Lab Results   Component Value Date    PROT 5.6 (L) 02/03/2022    ALBUMINELP 3.7 08/21/2017    LABALPH 0.2 08/21/2017    LABALPH 0.9 08/21/2017    LABBETA 0.9 08/21/2017    GAMGLOB 0.6 08/21/2017     No results found for: Mohsen Del, KLFLCR  No results found for: B2M    Coagulation Panel:  Lab Results   Component Value Date    PROTIME 11.1 12/30/2013    INR 1.03 12/30/2013    APTT 24.6 12/30/2013       Anemia Panel:  Lab Results   Component Value Date    CPYOJMWT21 954.1 (H) 02/03/2022    FOLATE 9.4 02/03/2022       Tumor Markers:  Lab Results   Component Value Date    PSA 1.9 10/27/2020         Imaging: Reviewed     Pathology:Reviewed     Observations:  Performance Status: ECOG 1  Depression Status: PHQ-9 Total Score: 0 (2/10/2022  9:11 AM)          Assessment & Plan:  B-cell CLL, stage I with conversion from good to poor prognostic factors, with 17p deletion:   His diagnosis of CLL since 2007.   17p deletion since 2015. Initial treatment with Leukeran from 2000 to 2011 intermittently and FCR regimen in 2012, bendamustine/Rituxan in 2014. Idelalisib from January 2015 until June of 2016. On ibrutinib since September 2016. Ibrutinib therapy seems to be helping him, overall improvement. Was Only able to tolerate 140 mg p.o. every other day  He was off for a couple months During the fall 2017  Resumed Ibrutinib and is currently on 140 mg po daily. Plan to Continue current dose as Stable wbc counts and no B sx.      . Acquired hypogammaglobulinemia:   Continue IVIG     BPH: is being followed by Urology    Iron def anemia and esophagitis: follows with GI, Dr Annemarie Dias. Reported that he had colonoscopy 2018 with polyps detected. EGD was normal except for H. pylori which was treated. Was supposed to have VCE which was unsuccessful once. Feb 2022 Hb 11.9, ferritin 17. Is  on  oral iron bid along with Vitamin C. Recommend parenteral iron. Recommend further GI evaluation but he defers . UA with no blood. Intermittent mild thrombocytopenia: meds reviewed. Could be sec to ibrutinib vs CLL. Counts stable,  Will monitor for now. Lung Nodule RUL: CT May 2020  as above clear, most probably sec to  infection/inflammation. No further follow up was recommended    Acute bronchitis: Z pack prescibed    Continue other medical care. Discussed above findings and plan with him and he verbalized understanding. Answered all his questions. Discussed healthy lifestyle, smoking cessation, healthy diet and exercise as tolerated. Also discussed importance of being up-to-date with age-appropriate screening tools. Recommend follow-up with primary care physician and other specialist.    Please do not hesitate to contact us if you need any further information. Return to clinic April 2022  Or earlier if new symptoms    I have recommended that the patient follow CDC guidelines for prevention of COVID-19 infection. Has received COVID vaccine, booster and also FLU vaccine.  Should be eligible for second nbooster  ANUPAM

## 2022-02-21 ENCOUNTER — HOSPITAL ENCOUNTER (OUTPATIENT)
Dept: INFUSION THERAPY | Age: 72
Setting detail: INFUSION SERIES
Discharge: HOME | End: 2022-02-21
Payer: MEDICARE

## 2022-02-21 VITALS
RESPIRATION RATE: 18 BRPM | HEART RATE: 84 BPM | SYSTOLIC BLOOD PRESSURE: 151 MMHG | DIASTOLIC BLOOD PRESSURE: 84 MMHG | OXYGEN SATURATION: 95 % | TEMPERATURE: 98.1 F

## 2022-02-21 DIAGNOSIS — K90.9 INTESTINAL MALABSORPTION, UNSPECIFIED TYPE: Primary | ICD-10-CM

## 2022-02-21 DIAGNOSIS — D80.1 HYPOGAMMAGLOBULINEMIA (HCC): ICD-10-CM

## 2022-02-21 DIAGNOSIS — D80.3 SELECTIVE DEFICIENCY OF IGG (HCC): ICD-10-CM

## 2022-02-21 DIAGNOSIS — D50.0 IRON DEFICIENCY ANEMIA DUE TO CHRONIC BLOOD LOSS: ICD-10-CM

## 2022-02-21 DIAGNOSIS — C91.10 LEUKEMIA, LYMPHOCYTIC, CHRONIC (HCC): Primary | ICD-10-CM

## 2022-02-21 PROCEDURE — 99211 OFF/OP EST MAY X REQ PHY/QHP: CPT

## 2022-02-21 PROCEDURE — 96374 THER/PROPH/DIAG INJ IV PUSH: CPT

## 2022-02-21 PROCEDURE — 96365 THER/PROPH/DIAG IV INF INIT: CPT

## 2022-02-21 PROCEDURE — 96366 THER/PROPH/DIAG IV INF ADDON: CPT

## 2022-02-21 PROCEDURE — 6360000002 HC RX W HCPCS: Performed by: INTERNAL MEDICINE

## 2022-02-21 PROCEDURE — 2580000003 HC RX 258: Performed by: INTERNAL MEDICINE

## 2022-02-21 PROCEDURE — 96375 TX/PRO/DX INJ NEW DRUG ADDON: CPT

## 2022-02-21 RX ORDER — SODIUM CHLORIDE 9 MG/ML
INJECTION, SOLUTION INTRAVENOUS CONTINUOUS
Status: CANCELLED | OUTPATIENT
Start: 2022-02-28

## 2022-02-21 RX ORDER — SODIUM CHLORIDE 0.9 % (FLUSH) 0.9 %
10 SYRINGE (ML) INJECTION PRN
Status: CANCELLED | OUTPATIENT
Start: 2022-02-28

## 2022-02-21 RX ORDER — EPINEPHRINE 1 MG/ML
0.3 INJECTION, SOLUTION, CONCENTRATE INTRAVENOUS PRN
Status: CANCELLED | OUTPATIENT
Start: 2022-02-28

## 2022-02-21 RX ORDER — CYANOCOBALAMIN 1000 UG/ML
1000 INJECTION INTRAMUSCULAR; SUBCUTANEOUS ONCE
Status: COMPLETED | OUTPATIENT
Start: 2022-02-21 | End: 2022-02-21

## 2022-02-21 RX ORDER — DIPHENHYDRAMINE HYDROCHLORIDE 50 MG/ML
25 INJECTION INTRAMUSCULAR; INTRAVENOUS ONCE
Status: CANCELLED
Start: 2022-02-28 | End: 2022-02-28

## 2022-02-21 RX ORDER — HEPARIN SODIUM (PORCINE) LOCK FLUSH IV SOLN 100 UNIT/ML 100 UNIT/ML
500 SOLUTION INTRAVENOUS PRN
Status: CANCELLED | OUTPATIENT
Start: 2022-02-28

## 2022-02-21 RX ORDER — SODIUM CHLORIDE 0.9 % (FLUSH) 0.9 %
10 SYRINGE (ML) INJECTION PRN
Status: DISCONTINUED | OUTPATIENT
Start: 2022-02-21 | End: 2022-02-22 | Stop reason: HOSPADM

## 2022-02-21 RX ORDER — CYANOCOBALAMIN 1000 UG/ML
1000 INJECTION INTRAMUSCULAR; SUBCUTANEOUS ONCE
Status: CANCELLED
Start: 2022-02-28 | End: 2022-02-28

## 2022-02-21 RX ORDER — SODIUM CHLORIDE 9 MG/ML
25 INJECTION, SOLUTION INTRAVENOUS PRN
Status: CANCELLED | OUTPATIENT
Start: 2022-02-28

## 2022-02-21 RX ORDER — SODIUM CHLORIDE 0.9 % (FLUSH) 0.9 %
5-40 SYRINGE (ML) INJECTION PRN
Status: CANCELLED | OUTPATIENT
Start: 2022-02-28

## 2022-02-21 RX ORDER — ACETAMINOPHEN 325 MG/1
650 TABLET ORAL
Status: CANCELLED | OUTPATIENT
Start: 2022-02-28

## 2022-02-21 RX ORDER — METHYLPREDNISOLONE SODIUM SUCCINATE 40 MG/ML
40 INJECTION, POWDER, LYOPHILIZED, FOR SOLUTION INTRAMUSCULAR; INTRAVENOUS ONCE
Status: CANCELLED
Start: 2022-02-28 | End: 2022-02-28

## 2022-02-21 RX ORDER — METHYLPREDNISOLONE SODIUM SUCCINATE 40 MG/ML
40 INJECTION, POWDER, LYOPHILIZED, FOR SOLUTION INTRAMUSCULAR; INTRAVENOUS ONCE
Status: COMPLETED | OUTPATIENT
Start: 2022-02-21 | End: 2022-02-21

## 2022-02-21 RX ORDER — ALBUTEROL SULFATE 90 UG/1
4 AEROSOL, METERED RESPIRATORY (INHALATION) PRN
Status: CANCELLED | OUTPATIENT
Start: 2022-02-28

## 2022-02-21 RX ORDER — HEPARIN SODIUM (PORCINE) LOCK FLUSH IV SOLN 100 UNIT/ML 100 UNIT/ML
500 SOLUTION INTRAVENOUS PRN
Status: DISCONTINUED | OUTPATIENT
Start: 2022-02-21 | End: 2022-02-22 | Stop reason: HOSPADM

## 2022-02-21 RX ORDER — ONDANSETRON 2 MG/ML
8 INJECTION INTRAMUSCULAR; INTRAVENOUS
Status: CANCELLED | OUTPATIENT
Start: 2022-02-28

## 2022-02-21 RX ORDER — SODIUM CHLORIDE 0.9 % (FLUSH) 0.9 %
5-40 SYRINGE (ML) INJECTION PRN
Status: DISCONTINUED | OUTPATIENT
Start: 2022-02-21 | End: 2022-02-22 | Stop reason: HOSPADM

## 2022-02-21 RX ORDER — DIPHENHYDRAMINE HYDROCHLORIDE 50 MG/ML
50 INJECTION INTRAMUSCULAR; INTRAVENOUS
Status: CANCELLED | OUTPATIENT
Start: 2022-02-28

## 2022-02-21 RX ORDER — MEPERIDINE HYDROCHLORIDE 25 MG/ML
12.5 INJECTION INTRAMUSCULAR; INTRAVENOUS; SUBCUTANEOUS PRN
Status: CANCELLED | OUTPATIENT
Start: 2022-02-28

## 2022-02-21 RX ORDER — HEPARIN SODIUM (PORCINE) LOCK FLUSH IV SOLN 100 UNIT/ML 100 UNIT/ML
500 SOLUTION INTRAVENOUS PRN
Status: CANCELLED
Start: 2022-02-28

## 2022-02-21 RX ORDER — DIPHENHYDRAMINE HYDROCHLORIDE 50 MG/ML
25 INJECTION INTRAMUSCULAR; INTRAVENOUS ONCE
Status: COMPLETED | OUTPATIENT
Start: 2022-02-21 | End: 2022-02-21

## 2022-02-21 RX ADMIN — SODIUM CHLORIDE, PRESERVATIVE FREE 10 ML: 5 INJECTION INTRAVENOUS at 12:02

## 2022-02-21 RX ADMIN — IMMUNE GLOBULIN (HUMAN) 20 G: 10 INJECTION INTRAVENOUS; SUBCUTANEOUS at 08:13

## 2022-02-21 RX ADMIN — DIPHENHYDRAMINE HYDROCHLORIDE 25 MG: 50 INJECTION, SOLUTION INTRAMUSCULAR; INTRAVENOUS at 08:04

## 2022-02-21 RX ADMIN — SODIUM CHLORIDE, PRESERVATIVE FREE 10 ML: 5 INJECTION INTRAVENOUS at 10:20

## 2022-02-21 RX ADMIN — METHYLPREDNISOLONE SODIUM SUCCINATE 40 MG: 40 INJECTION, POWDER, FOR SOLUTION INTRAMUSCULAR; INTRAVENOUS at 08:03

## 2022-02-21 RX ADMIN — CYANOCOBALAMIN 1000 MCG: 1000 INJECTION, SOLUTION INTRAMUSCULAR at 11:58

## 2022-02-21 RX ADMIN — SODIUM CHLORIDE, PRESERVATIVE FREE 10 ML: 5 INJECTION INTRAVENOUS at 08:08

## 2022-02-21 RX ADMIN — HEPARIN 500 UNITS: 100 SYRINGE at 12:02

## 2022-02-21 RX ADMIN — IRON SUCROSE 300 MG: 20 INJECTION, SOLUTION INTRAVENOUS at 10:21

## 2022-02-21 RX ADMIN — SODIUM CHLORIDE, PRESERVATIVE FREE 10 ML: 5 INJECTION INTRAVENOUS at 07:42

## 2022-02-21 RX ADMIN — SODIUM CHLORIDE, PRESERVATIVE FREE 10 ML: 5 INJECTION INTRAVENOUS at 08:04

## 2022-02-21 ASSESSMENT — PAIN SCALES - GENERAL: PAINLEVEL_OUTOF10: 0

## 2022-02-21 NOTE — PROGRESS NOTES
Tolerated IVIG and Venofer infusions well. Reviewed discharge instruction, voiced understanding. Copies of AVS given. Pt discharged home. Pt to exit via ambulation.     Orders Placed This Encounter   Medications    sodium chloride flush 0.9 % injection 10 mL    heparin flush 100 UNIT/ML injection 500 Units    immune globulin (GAMUNEX-C) 10% solution 20 g    methylPREDNISolone sodium (SOLU-MEDROL) injection 40 mg    diphenhydrAMINE (BENADRYL) injection 25 mg    cyanocobalamin injection 1,000 mcg

## 2022-02-28 ENCOUNTER — HOSPITAL ENCOUNTER (OUTPATIENT)
Dept: INFUSION THERAPY | Age: 72
Setting detail: INFUSION SERIES
Discharge: HOME | End: 2022-02-28
Payer: MEDICARE

## 2022-02-28 DIAGNOSIS — K90.9 INTESTINAL MALABSORPTION, UNSPECIFIED TYPE: Primary | ICD-10-CM

## 2022-02-28 DIAGNOSIS — D50.0 IRON DEFICIENCY ANEMIA DUE TO CHRONIC BLOOD LOSS: ICD-10-CM

## 2022-02-28 PROCEDURE — 99211 OFF/OP EST MAY X REQ PHY/QHP: CPT

## 2022-02-28 PROCEDURE — 96365 THER/PROPH/DIAG IV INF INIT: CPT

## 2022-02-28 PROCEDURE — 96366 THER/PROPH/DIAG IV INF ADDON: CPT

## 2022-02-28 PROCEDURE — 6360000002 HC RX W HCPCS: Performed by: INTERNAL MEDICINE

## 2022-02-28 PROCEDURE — 2580000003 HC RX 258: Performed by: INTERNAL MEDICINE

## 2022-02-28 RX ORDER — HEPARIN SODIUM (PORCINE) LOCK FLUSH IV SOLN 100 UNIT/ML 100 UNIT/ML
500 SOLUTION INTRAVENOUS PRN
Status: CANCELLED | OUTPATIENT
Start: 2022-03-07

## 2022-02-28 RX ORDER — HEPARIN SODIUM (PORCINE) LOCK FLUSH IV SOLN 100 UNIT/ML 100 UNIT/ML
500 SOLUTION INTRAVENOUS PRN
Status: DISCONTINUED | OUTPATIENT
Start: 2022-02-28 | End: 2022-03-01 | Stop reason: HOSPADM

## 2022-02-28 RX ORDER — ALBUTEROL SULFATE 90 UG/1
4 AEROSOL, METERED RESPIRATORY (INHALATION) PRN
Status: CANCELLED | OUTPATIENT
Start: 2022-03-07

## 2022-02-28 RX ORDER — EPINEPHRINE 1 MG/ML
0.3 INJECTION, SOLUTION, CONCENTRATE INTRAVENOUS PRN
Status: CANCELLED | OUTPATIENT
Start: 2022-03-07

## 2022-02-28 RX ORDER — SODIUM CHLORIDE 9 MG/ML
INJECTION, SOLUTION INTRAVENOUS CONTINUOUS
Status: CANCELLED | OUTPATIENT
Start: 2022-03-07

## 2022-02-28 RX ORDER — MEPERIDINE HYDROCHLORIDE 25 MG/ML
12.5 INJECTION INTRAMUSCULAR; INTRAVENOUS; SUBCUTANEOUS PRN
Status: CANCELLED | OUTPATIENT
Start: 2022-03-07

## 2022-02-28 RX ORDER — SODIUM CHLORIDE 9 MG/ML
25 INJECTION, SOLUTION INTRAVENOUS PRN
Status: CANCELLED | OUTPATIENT
Start: 2022-03-07

## 2022-02-28 RX ORDER — DIPHENHYDRAMINE HYDROCHLORIDE 50 MG/ML
50 INJECTION INTRAMUSCULAR; INTRAVENOUS
Status: CANCELLED | OUTPATIENT
Start: 2022-03-07

## 2022-02-28 RX ORDER — ONDANSETRON 2 MG/ML
8 INJECTION INTRAMUSCULAR; INTRAVENOUS
Status: CANCELLED | OUTPATIENT
Start: 2022-03-07

## 2022-02-28 RX ORDER — ACETAMINOPHEN 325 MG/1
650 TABLET ORAL
Status: CANCELLED | OUTPATIENT
Start: 2022-03-07

## 2022-02-28 RX ORDER — SODIUM CHLORIDE 0.9 % (FLUSH) 0.9 %
5-40 SYRINGE (ML) INJECTION PRN
Status: DISCONTINUED | OUTPATIENT
Start: 2022-02-28 | End: 2022-03-01 | Stop reason: HOSPADM

## 2022-02-28 RX ORDER — SODIUM CHLORIDE 0.9 % (FLUSH) 0.9 %
5-40 SYRINGE (ML) INJECTION PRN
Status: CANCELLED | OUTPATIENT
Start: 2022-03-07

## 2022-02-28 RX ADMIN — SODIUM CHLORIDE, PRESERVATIVE FREE 10 ML: 5 INJECTION INTRAVENOUS at 11:46

## 2022-02-28 RX ADMIN — HEPARIN 500 UNITS: 100 SYRINGE at 11:46

## 2022-02-28 RX ADMIN — IRON SUCROSE 300 MG: 20 INJECTION, SOLUTION INTRAVENOUS at 10:00

## 2022-02-28 RX ADMIN — SODIUM CHLORIDE, PRESERVATIVE FREE 10 ML: 5 INJECTION INTRAVENOUS at 09:55

## 2022-02-28 NOTE — PROGRESS NOTES
Alice deaccessed. DSD applied. Pt tolerated well. Discharged instructions given to pt, pt voiced understanding. Pt discharged via ambulatory by self to exit. HPI:  Patient is a 89 y/o female PMHx of dementia, HTN, osteopenia presenting to the ED BIB son c/o confusion today. Per Son at bedside, he received a call around 3 pm today from his Aunt that her speech did not make any sense. Patient Son states when he arrived he found patient in chair, when she had slid forward and fell off chair onto her buttocks. He denies patient having LOC at this time. He also found patient was confused and unable to  her cellphone from floor when asked for it. At baseline, pt able to answer questions and able to follow commands. Son states her PCP Dr. Norwood saw patient about a year ago. She was prescribed HCTZ however he cannot recall dose, patient does not take medication regularly. HE reports short term memory loss which has been ongoing for over a year, worsening in the past 6 months.    PAST MEDICAL & SURGICAL HISTORY:      FAMILY HISTORY:        Social Hx:  Nonsmoker, no drug or alcohol use    MEDICATIONS  (STANDING):       Allergies    No Known Allergies    Intolerances        ROS: Pertinent positives in HPI, all other ROS were reviewed and are negative.      Vital Signs Last 24 Hrs  T(C): 37.1 (06 Jan 2019 16:41), Max: 37.1 (06 Jan 2019 16:41)  T(F): 98.7 (06 Jan 2019 16:41), Max: 98.7 (06 Jan 2019 16:41)  HR: 90 (06 Jan 2019 16:41) (90 - 90)  BP: 199/119 (06 Jan 2019 16:41) (199/119 - 199/119)  BP(mean): --  RR: 16 (06 Jan 2019 16:41) (16 - 16)  SpO2: 100% (06 Jan 2019 16:41) (100% - 100%)        Constitutional: awake and alert.  HEENT: PERRLA, EOMI,   Neck: Supple.  Respiratory: Breath sounds are clear bilaterally  Cardiovascular: S1 and S2, regular / irregular rhythm  Gastrointestinal: soft, nontender  Extremities:  no edema  Vascular: Caritid Bruit - no  Musculoskeletal: no joint swelling/tenderness, no abnormal movements  Skin: No rashes    Neurological exam:  HF: A x O x 3. Appropriately interactive, normal affect. Speech fluent, No Aphasia or paraphasic errors. Naming /repetition intact   CN: THAD, EOMI, VFF, facial sensation normal, no NLFD, tongue midline, Palate moves equally, SCM equal bilaterally  Motor: No pronator drift, Strength 5/5 in all 4 ext, normal bulk and tone, no tremor, rigidity or bradykinesia.    Sens: Intact to light touch / PP/ VS/ JS    Reflexes: Symmetric and normal . BJ 2+, BR 2+, KJ 2+, AJ 2+, downgoing toes b/l  Coord:  No FNFA, dysmetria, JULIANE intact   Gait/Balance: Normal/Cannot test    NIHSS:          Labs:                        12.2   6.62  )-----------( 195      ( 06 Jan 2019 17:08 )             38.3     01-06    142  |  107  |  24<H>  ----------------------------<  113<H>  3.9   |  28  |  0.79    Ca    9.1      06 Jan 2019 17:08    TPro  7.3  /  Alb  3.5  /  TBili  0.5  /  DBili  x   /  AST  39<H>  /  ALT  48  /  AlkPhos  122<H>  01-06        PT/INR - ( 06 Jan 2019 17:08 )   PT: 12.1 sec;   INR: 1.09 ratio         PTT - ( 06 Jan 2019 17:08 )  PTT:28.3 sec    Radiology report:  - CT head:  - MRI brain  - MRA head/carotids  - EEG    A/P:    - No IV tpa given because…  - ASA/ PLavix  - Statin  - Dysphagia screen  - DVT prophylaxia  - MRI/A brain-carotids  - PT eval  - Speech/swallow eval    Total Critical Care Time spent: HPI:  Patient is a 89 y/o female PMHx of dementia, HTN, osteopenia presenting to the ED BIB son c/o confusion today. Patient arrival to ED at 1641. Per Son at bedside, he received a call around 3 pm today from his Aunt that her speech did not make any sense. Patient Son states when he arrived he found patient in chair, when she had slid forward and fell off chair onto her buttocks. He denies patient having LOC at this time. He also found patient was confused and unable to  her cellphone from floor when asked for it. At baseline, pt able to answer questions and able to follow commands. Son states her PCP Dr. Norwood saw patient about a year ago. She was prescribed HCTZ however he cannot recall dose, patient does not take medication regularly. HE reports short term memory loss which has been ongoing for over a year, worsening in the past 6 months.    PAST MEDICAL & SURGICAL HISTORY:  Dementia  HTN    FAMILY HISTORY:        Social Hx:  Nonsmoker, no drug or alcohol use    MEDICATIONS  (STANDING):       Allergies  No Known Allergies  Intolerances      ROS: Pertinent positives in HPI, all other ROS were reviewed and are negative.      Vital Signs Last 24 Hrs  T(C): 37.1 (06 Jan 2019 16:41), Max: 37.1 (06 Jan 2019 16:41)  T(F): 98.7 (06 Jan 2019 16:41), Max: 98.7 (06 Jan 2019 16:41)  HR: 90 (06 Jan 2019 16:41) (90 - 90)  BP: 199/119 (06 Jan 2019 16:41) (199/119 - 199/119)  BP(mean): --  RR: 16 (06 Jan 2019 16:41) (16 - 16)  SpO2: 100% (06 Jan 2019 16:41) (100% - 100%)        Constitutional: awake and alert.  HEENT: PERRLA, EOMI,   Neck: Supple.  Respiratory: Breath sounds are clear bilaterally  Cardiovascular: S1 and S2, regular / irregular rhythm  Gastrointestinal: soft, nontender  Extremities:  no edema  Vascular: Caritid Bruit - no  Musculoskeletal: no joint swelling/tenderness, no abnormal movements  Skin: No rashes    Neurological exam:  HF: A x O x 2 to person, place, year states it is december,  Speech fluent, No Aphasia or paraphasic errors. Naming /repetition intact   CN: THAD, EOMI, VFF, facial sensation normal, no NLFD, tongue midline, Palate moves equally, SCM equal bilaterally  Motor: No pronator drift, Strength 5/5 in all 4 ext, normal bulk and tone, no tremor, rigidity or bradykinesia.    Sens: Intact to light touch   Reflexes: Symmetric and normal . BJ 2+, BR 2+, KJ 2+, AJ 2+, downgoing toes b/l  Coord:  No FNFA, dysmetria, JULIANE intact   Gait/Balance: Cannot test    NIHSS: 1      Labs:                        12.2   6.62  )-----------( 195      ( 06 Jan 2019 17:08 )             38.3     01-06    142  |  107  |  24<H>  ----------------------------<  113<H>  3.9   |  28  |  0.79    Ca    9.1      06 Jan 2019 17:08    TPro  7.3  /  Alb  3.5  /  TBili  0.5  /  DBili  x   /  AST  39<H>  /  ALT  48  /  AlkPhos  122<H>  01-06        PT/INR - ( 06 Jan 2019 17:08 )   PT: 12.1 sec;   INR: 1.09 ratio         PTT - ( 06 Jan 2019 17:08 )  PTT:28.3 sec    Radiology report:  CT Brain Stroke Protocol (01.06.19 @ 16:59)   IMPRESSION:       No evidence of acute intracranial abnormality.  No evidence of   hemorrhage.      Chronic changes as above. HPI:  89 y/o female PMHx of dementia, HTN, osteopenia presenting to the ED BIB son c/o confusion today. Patient arrival to ED at 16.41 PM. Per Son at bedside, he received a call around 3.00 pm today from his Aunt that her speech did not make any sense. Patient Son states when he arrived he found patient in chair, when she had slid forward and fell off chair onto her buttocks. He denies patient having LOC at this time. He also found patient was confused and unable to  her cellphone from floor when asked for it. At baseline, pt able to answer questions and able to follow commands. Son states her PCP Dr. Norwood saw patient about a year ago, was prescribed HCTZ however he cannot recall dose, patient does not take medication regularly. HE reports short term memory loss which has been ongoing for over a year, worsening in the past 6 months.    PAST MEDICAL & SURGICAL HISTORY:  Dementia  HTN    FAMILY HISTORY:  Unable to obtain     Social Hx:  Nonsmoker, no drug or alcohol use    MEDICATIONS  (STANDING):       Allergies  No Known Allergies  Intolerances      ROS: Pertinent positives in HPI, all other ROS were reviewed and are negative.      Vital Signs Last 24 Hrs  T(C): 37.1 (06 Jan 2019 16:41), Max: 37.1 (06 Jan 2019 16:41)  T(F): 98.7 (06 Jan 2019 16:41), Max: 98.7 (06 Jan 2019 16:41)  HR: 90 (06 Jan 2019 16:41) (90 - 90)  BP: 199/119 (06 Jan 2019 16:41) (199/119 - 199/119)  BP(mean): --  RR: 16 (06 Jan 2019 16:41) (16 - 16)  SpO2: 100% (06 Jan 2019 16:41) (100% - 100%)      PE:  Constitutional: awake and alert.  HEENT: PERRLA, EOMI,   Neck: Supple.  Respiratory: Breath sounds are clear bilaterally  Cardiovascular: S1 and S2, regular / irregular rhythm  Extremities:  no edema  Vascular: Caritid Bruit - no  Musculoskeletal: no joint swelling/tenderness, no abnormal movements  Skin: No rashes    Neurological exam:  HF: A x O x 2 to person, place, year states it is december,  Speech fluent, No Aphasia or paraphasic errors. Naming /repetition intact   CN: THAD, EOMI, VFF, facial sensation normal, no NLFD, tongue midline, Palate moves equally, SCM equal bilaterally  Motor: No pronator drift, Strength 5/5 in all 4 ext, normal bulk and tone, no tremor, rigidity or bradykinesia.    Sens: Intact to light touch   Reflexes: Symmetric and normal . BJ 2+, BR 2+, KJ 2+, AJ 2+, downgoing toes b/l  Coord:  No FNFA, dysmetria, JULIANE intact   Gait/Balance: Cannot test    NIHSS: 1      Labs:                        12.2   6.62  )-----------( 195      ( 06 Jan 2019 17:08 )             38.3     01-06    142  |  107  |  24<H>  ----------------------------<  113<H>  3.9   |  28  |  0.79    Ca    9.1      06 Jan 2019 17:08    TPro  7.3  /  Alb  3.5  /  TBili  0.5  /  DBili  x   /  AST  39<H>  /  ALT  48  /  AlkPhos  122<H>  01-06        PT/INR - ( 06 Jan 2019 17:08 )   PT: 12.1 sec;   INR: 1.09 ratio         PTT - ( 06 Jan 2019 17:08 )  PTT:28.3 sec    Radiology report:  CT Brain Stroke Protocol (01.06.19 @ 16:59)   IMPRESSION:       No evidence of acute intracranial abnormality.  No evidence of   hemorrhage.      Chronic changes as above.

## 2022-03-07 ENCOUNTER — HOSPITAL ENCOUNTER (OUTPATIENT)
Dept: INFUSION THERAPY | Age: 72
Setting detail: INFUSION SERIES
Discharge: HOME | End: 2022-03-07
Payer: MEDICARE

## 2022-03-07 VITALS
OXYGEN SATURATION: 94 % | DIASTOLIC BLOOD PRESSURE: 79 MMHG | HEART RATE: 76 BPM | RESPIRATION RATE: 16 BRPM | TEMPERATURE: 97.3 F | SYSTOLIC BLOOD PRESSURE: 139 MMHG

## 2022-03-07 DIAGNOSIS — D50.0 IRON DEFICIENCY ANEMIA DUE TO CHRONIC BLOOD LOSS: ICD-10-CM

## 2022-03-07 DIAGNOSIS — K90.9 INTESTINAL MALABSORPTION, UNSPECIFIED TYPE: Primary | ICD-10-CM

## 2022-03-07 PROCEDURE — 96365 THER/PROPH/DIAG IV INF INIT: CPT

## 2022-03-07 PROCEDURE — 96366 THER/PROPH/DIAG IV INF ADDON: CPT

## 2022-03-07 PROCEDURE — 99211 OFF/OP EST MAY X REQ PHY/QHP: CPT

## 2022-03-07 PROCEDURE — 2580000003 HC RX 258: Performed by: INTERNAL MEDICINE

## 2022-03-07 PROCEDURE — 6360000002 HC RX W HCPCS: Performed by: INTERNAL MEDICINE

## 2022-03-07 RX ORDER — ALBUTEROL SULFATE 90 UG/1
4 AEROSOL, METERED RESPIRATORY (INHALATION) PRN
Status: CANCELLED | OUTPATIENT
Start: 2022-03-14

## 2022-03-07 RX ORDER — SODIUM CHLORIDE 9 MG/ML
INJECTION, SOLUTION INTRAVENOUS CONTINUOUS
Status: CANCELLED | OUTPATIENT
Start: 2022-03-14

## 2022-03-07 RX ORDER — DIPHENHYDRAMINE HYDROCHLORIDE 50 MG/ML
50 INJECTION INTRAMUSCULAR; INTRAVENOUS
Status: CANCELLED | OUTPATIENT
Start: 2022-03-14

## 2022-03-07 RX ORDER — SODIUM CHLORIDE 0.9 % (FLUSH) 0.9 %
5-40 SYRINGE (ML) INJECTION PRN
Status: DISCONTINUED | OUTPATIENT
Start: 2022-03-07 | End: 2022-03-07

## 2022-03-07 RX ORDER — ASCORBIC ACID 250 MG
500 TABLET ORAL DAILY
Qty: 30 TABLET | Refills: 3 | Status: ON HOLD | OUTPATIENT
Start: 2022-03-07 | End: 2022-03-27 | Stop reason: HOSPADM

## 2022-03-07 RX ORDER — HEPARIN SODIUM (PORCINE) LOCK FLUSH IV SOLN 100 UNIT/ML 100 UNIT/ML
500 SOLUTION INTRAVENOUS PRN
Status: CANCELLED | OUTPATIENT
Start: 2022-03-14

## 2022-03-07 RX ORDER — ACETAMINOPHEN 325 MG/1
650 TABLET ORAL
Status: CANCELLED | OUTPATIENT
Start: 2022-03-14

## 2022-03-07 RX ORDER — MEPERIDINE HYDROCHLORIDE 25 MG/ML
12.5 INJECTION INTRAMUSCULAR; INTRAVENOUS; SUBCUTANEOUS PRN
Status: CANCELLED | OUTPATIENT
Start: 2022-03-14

## 2022-03-07 RX ORDER — HEPARIN SODIUM (PORCINE) LOCK FLUSH IV SOLN 100 UNIT/ML 100 UNIT/ML
500 SOLUTION INTRAVENOUS PRN
Status: DISCONTINUED | OUTPATIENT
Start: 2022-03-07 | End: 2022-03-07

## 2022-03-07 RX ORDER — SODIUM CHLORIDE 0.9 % (FLUSH) 0.9 %
5-40 SYRINGE (ML) INJECTION PRN
Status: CANCELLED | OUTPATIENT
Start: 2022-03-14

## 2022-03-07 RX ORDER — SODIUM CHLORIDE 9 MG/ML
25 INJECTION, SOLUTION INTRAVENOUS PRN
Status: CANCELLED | OUTPATIENT
Start: 2022-03-14

## 2022-03-07 RX ORDER — ONDANSETRON 2 MG/ML
8 INJECTION INTRAMUSCULAR; INTRAVENOUS
Status: CANCELLED | OUTPATIENT
Start: 2022-03-14

## 2022-03-07 RX ORDER — EPINEPHRINE 1 MG/ML
0.3 INJECTION, SOLUTION, CONCENTRATE INTRAVENOUS PRN
Status: CANCELLED | OUTPATIENT
Start: 2022-03-14

## 2022-03-07 RX ADMIN — HEPARIN 500 UNITS: 100 SYRINGE at 10:40

## 2022-03-07 RX ADMIN — SODIUM CHLORIDE, PRESERVATIVE FREE 10 ML: 5 INJECTION INTRAVENOUS at 08:55

## 2022-03-07 RX ADMIN — IRON SUCROSE 300 MG: 20 INJECTION, SOLUTION INTRAVENOUS at 09:01

## 2022-03-07 RX ADMIN — SODIUM CHLORIDE, PRESERVATIVE FREE 20 ML: 5 INJECTION INTRAVENOUS at 10:40

## 2022-03-07 NOTE — DISCHARGE SUMMARY
Tolerated Infusion well. Reviewed discharge instructions, understanding verbalized. Copies of AVS given to take home. Patient discharged home. Down to exit per self.     Orders Placed This Encounter   Medications    iron sucrose (VENOFER) 300 mg in sodium chloride 0.9 % 250 mL IVPB    DISCONTD: sodium chloride flush 0.9 % injection 5-40 mL    DISCONTD: heparin flush 100 UNIT/ML injection 500 Units

## 2022-03-21 ENCOUNTER — HOSPITAL ENCOUNTER (OUTPATIENT)
Dept: INFUSION THERAPY | Age: 72
Setting detail: INFUSION SERIES
Discharge: HOME | DRG: 193 | End: 2022-03-21
Payer: MEDICARE

## 2022-03-21 VITALS
DIASTOLIC BLOOD PRESSURE: 73 MMHG | TEMPERATURE: 97.3 F | SYSTOLIC BLOOD PRESSURE: 135 MMHG | OXYGEN SATURATION: 94 % | HEART RATE: 81 BPM | RESPIRATION RATE: 16 BRPM

## 2022-03-21 DIAGNOSIS — D80.3 SELECTIVE DEFICIENCY OF IGG (HCC): ICD-10-CM

## 2022-03-21 DIAGNOSIS — D80.1 HYPOGAMMAGLOBULINEMIA (HCC): ICD-10-CM

## 2022-03-21 DIAGNOSIS — C91.10 LEUKEMIA, LYMPHOCYTIC, CHRONIC (HCC): Primary | ICD-10-CM

## 2022-03-21 PROCEDURE — 2580000003 HC RX 258: Performed by: INTERNAL MEDICINE

## 2022-03-21 PROCEDURE — 6360000002 HC RX W HCPCS: Performed by: INTERNAL MEDICINE

## 2022-03-21 PROCEDURE — 96366 THER/PROPH/DIAG IV INF ADDON: CPT

## 2022-03-21 PROCEDURE — 99211 OFF/OP EST MAY X REQ PHY/QHP: CPT

## 2022-03-21 PROCEDURE — 96375 TX/PRO/DX INJ NEW DRUG ADDON: CPT

## 2022-03-21 PROCEDURE — 96374 THER/PROPH/DIAG INJ IV PUSH: CPT

## 2022-03-21 PROCEDURE — 96365 THER/PROPH/DIAG IV INF INIT: CPT

## 2022-03-21 RX ORDER — SODIUM CHLORIDE 0.9 % (FLUSH) 0.9 %
10 SYRINGE (ML) INJECTION PRN
Status: DISCONTINUED | OUTPATIENT
Start: 2022-03-21 | End: 2022-03-22 | Stop reason: HOSPADM

## 2022-03-21 RX ORDER — GUAIFENESIN 600 MG/1
1200 TABLET, EXTENDED RELEASE ORAL 2 TIMES DAILY
COMMUNITY
End: 2022-04-18

## 2022-03-21 RX ORDER — METHYLPREDNISOLONE SODIUM SUCCINATE 40 MG/ML
40 INJECTION, POWDER, LYOPHILIZED, FOR SOLUTION INTRAMUSCULAR; INTRAVENOUS ONCE
Status: CANCELLED
Start: 2022-03-28 | End: 2022-03-28

## 2022-03-21 RX ORDER — CYANOCOBALAMIN 1000 UG/ML
1000 INJECTION INTRAMUSCULAR; SUBCUTANEOUS ONCE
Status: COMPLETED | OUTPATIENT
Start: 2022-03-21 | End: 2022-03-21

## 2022-03-21 RX ORDER — SODIUM CHLORIDE 0.9 % (FLUSH) 0.9 %
10 SYRINGE (ML) INJECTION PRN
Status: CANCELLED | OUTPATIENT
Start: 2022-03-28

## 2022-03-21 RX ORDER — HEPARIN SODIUM (PORCINE) LOCK FLUSH IV SOLN 100 UNIT/ML 100 UNIT/ML
500 SOLUTION INTRAVENOUS PRN
Status: DISCONTINUED | OUTPATIENT
Start: 2022-03-21 | End: 2022-03-22 | Stop reason: HOSPADM

## 2022-03-21 RX ORDER — DIPHENHYDRAMINE HYDROCHLORIDE 50 MG/ML
25 INJECTION INTRAMUSCULAR; INTRAVENOUS ONCE
Status: COMPLETED | OUTPATIENT
Start: 2022-03-21 | End: 2022-03-21

## 2022-03-21 RX ORDER — HEPARIN SODIUM (PORCINE) LOCK FLUSH IV SOLN 100 UNIT/ML 100 UNIT/ML
500 SOLUTION INTRAVENOUS PRN
Status: CANCELLED | OUTPATIENT
Start: 2022-03-28

## 2022-03-21 RX ORDER — BENZONATATE 100 MG/1
100 CAPSULE ORAL DAILY
COMMUNITY
End: 2022-04-28

## 2022-03-21 RX ORDER — CYANOCOBALAMIN 1000 UG/ML
1000 INJECTION INTRAMUSCULAR; SUBCUTANEOUS ONCE
Status: CANCELLED
Start: 2022-03-28 | End: 2022-03-28

## 2022-03-21 RX ORDER — METHYLPREDNISOLONE SODIUM SUCCINATE 40 MG/ML
40 INJECTION, POWDER, LYOPHILIZED, FOR SOLUTION INTRAMUSCULAR; INTRAVENOUS ONCE
Status: COMPLETED | OUTPATIENT
Start: 2022-03-21 | End: 2022-03-21

## 2022-03-21 RX ORDER — HEPARIN SODIUM (PORCINE) LOCK FLUSH IV SOLN 100 UNIT/ML 100 UNIT/ML
500 SOLUTION INTRAVENOUS PRN
Status: CANCELLED
Start: 2022-03-28

## 2022-03-21 RX ORDER — DIPHENHYDRAMINE HYDROCHLORIDE 50 MG/ML
25 INJECTION INTRAMUSCULAR; INTRAVENOUS ONCE
Status: CANCELLED
Start: 2022-03-28 | End: 2022-03-28

## 2022-03-21 RX ADMIN — CYANOCOBALAMIN 1000 MCG: 1000 INJECTION, SOLUTION INTRAMUSCULAR at 07:59

## 2022-03-21 RX ADMIN — SODIUM CHLORIDE, PRESERVATIVE FREE 10 ML: 5 INJECTION INTRAVENOUS at 07:55

## 2022-03-21 RX ADMIN — SODIUM CHLORIDE, PRESERVATIVE FREE 10 ML: 5 INJECTION INTRAVENOUS at 09:49

## 2022-03-21 RX ADMIN — DIPHENHYDRAMINE HYDROCHLORIDE 25 MG: 50 INJECTION, SOLUTION INTRAMUSCULAR; INTRAVENOUS at 07:58

## 2022-03-21 RX ADMIN — METHYLPREDNISOLONE SODIUM SUCCINATE 40 MG: 40 INJECTION, POWDER, FOR SOLUTION INTRAMUSCULAR; INTRAVENOUS at 07:58

## 2022-03-21 RX ADMIN — Medication 500 UNITS: at 10:33

## 2022-03-21 RX ADMIN — SODIUM CHLORIDE, PRESERVATIVE FREE 10 ML: 5 INJECTION INTRAVENOUS at 10:33

## 2022-03-21 RX ADMIN — IMMUNE GLOBULIN (HUMAN) 20 G: 10 INJECTION INTRAVENOUS; SUBCUTANEOUS at 08:18

## 2022-03-21 ASSESSMENT — PAIN SCALES - GENERAL: PAINLEVEL_OUTOF10: 0

## 2022-03-24 ENCOUNTER — HOSPITAL ENCOUNTER (OUTPATIENT)
Dept: GENERAL RADIOLOGY | Age: 72
Discharge: HOME | DRG: 193 | End: 2022-03-24
Payer: MEDICARE

## 2022-03-24 ENCOUNTER — APPOINTMENT (OUTPATIENT)
Dept: GENERAL RADIOLOGY | Age: 72
DRG: 193 | End: 2022-03-24
Payer: MEDICARE

## 2022-03-24 ENCOUNTER — HOSPITAL ENCOUNTER (OUTPATIENT)
Age: 72
Discharge: HOME | DRG: 193 | End: 2022-03-24
Payer: MEDICARE

## 2022-03-24 ENCOUNTER — HOSPITAL ENCOUNTER (INPATIENT)
Age: 72
LOS: 4 days | Discharge: HOME | DRG: 193 | End: 2022-03-28
Attending: INTERNAL MEDICINE | Admitting: INTERNAL MEDICINE
Payer: MEDICARE

## 2022-03-24 DIAGNOSIS — C91.10 CLL (CHRONIC LYMPHOCYTIC LEUKEMIA) (HCC): ICD-10-CM

## 2022-03-24 DIAGNOSIS — J18.9 PNEUMONIA DUE TO INFECTIOUS ORGANISM, UNSPECIFIED LATERALITY, UNSPECIFIED PART OF LUNG: Primary | ICD-10-CM

## 2022-03-24 DIAGNOSIS — R09.1 PLEURISY: ICD-10-CM

## 2022-03-24 DIAGNOSIS — R09.02 HYPOXIA: ICD-10-CM

## 2022-03-24 LAB
ADENOVIRUS DETECTION BY PCR: NOT DETECTED
ALBUMIN SERPL-MCNC: 3.6 GM/DL (ref 3.4–5)
ALP BLD-CCNC: 93 IU/L (ref 40–129)
ALT SERPL-CCNC: 17 U/L (ref 10–40)
ANION GAP SERPL CALCULATED.3IONS-SCNC: 14 MMOL/L (ref 4–16)
AST SERPL-CCNC: 17 IU/L (ref 15–37)
ATYPICAL LYMPHOCYTE ABSOLUTE COUNT: ABNORMAL
BASE EXCESS MIXED: 1 (ref 0–1.2)
BILIRUB SERPL-MCNC: 0.6 MG/DL (ref 0–1)
BORDETELLA PARAPERTUSSIS BY PCR: NOT DETECTED
BORDETELLA PERTUSSIS PCR: NOT DETECTED
BUN BLDV-MCNC: 15 MG/DL (ref 6–23)
CALCIUM SERPL-MCNC: 9 MG/DL (ref 8.3–10.6)
CHLAMYDOPHILA PNEUMONIA PCR: NOT DETECTED
CHLORIDE BLD-SCNC: 97 MMOL/L (ref 99–110)
CO2: 23 MMOL/L (ref 21–32)
COMMENT: ABNORMAL
CORONAVIRUS 229E PCR: NOT DETECTED
CORONAVIRUS HKU1 PCR: NOT DETECTED
CORONAVIRUS NL63 PCR: NOT DETECTED
CORONAVIRUS OC43 PCR: NOT DETECTED
CREAT SERPL-MCNC: 0.8 MG/DL (ref 0.9–1.3)
DIFFERENTIAL TYPE: ABNORMAL
EKG ATRIAL RATE: 101 BPM
EKG DIAGNOSIS: NORMAL
EKG P AXIS: 58 DEGREES
EKG P-R INTERVAL: 138 MS
EKG Q-T INTERVAL: 338 MS
EKG QRS DURATION: 86 MS
EKG QTC CALCULATION (BAZETT): 438 MS
EKG R AXIS: 33 DEGREES
EKG T AXIS: 73 DEGREES
EKG VENTRICULAR RATE: 101 BPM
EOSINOPHILS ABSOLUTE: 0.1 K/CU MM
EOSINOPHILS RELATIVE PERCENT: 1 % (ref 0–3)
GFR AFRICAN AMERICAN: >60 ML/MIN/1.73M2
GFR NON-AFRICAN AMERICAN: >60 ML/MIN/1.73M2
GLUCOSE BLD-MCNC: 128 MG/DL (ref 70–99)
GLUCOSE BLD-MCNC: 163 MG/DL (ref 70–99)
HCO3 VENOUS: 25.2 MMOL/L (ref 19–25)
HCT VFR BLD CALC: 37.2 % (ref 42–52)
HEMOGLOBIN: 11.4 GM/DL (ref 13.5–18)
HUMAN METAPNEUMOVIRUS PCR: NOT DETECTED
INFLUENZA A BY PCR: NOT DETECTED
INFLUENZA A H1 (2009) PCR: NOT DETECTED
INFLUENZA A H1 PANDEMIC PCR: NOT DETECTED
INFLUENZA A H3 PCR: NOT DETECTED
INFLUENZA B BY PCR: NOT DETECTED
LACTIC ACID, SEPSIS: 1.3 MMOL/L (ref 0.5–1.9)
LYMPHOCYTES ABSOLUTE: 7 K/CU MM
LYMPHOCYTES RELATIVE PERCENT: 96 % (ref 24–44)
MCH RBC QN AUTO: 24.1 PG (ref 27–31)
MCHC RBC AUTO-ENTMCNC: 30.6 % (ref 32–36)
MCV RBC AUTO: 78.6 FL (ref 78–100)
MONOCYTES ABSOLUTE: 0.2 K/CU MM
MONOCYTES RELATIVE PERCENT: 3 % (ref 0–4)
MYCOPLASMA PNEUMONIAE PCR: NOT DETECTED
O2 SAT, VEN: 95.2 % (ref 50–70)
PARAINFLUENZA 1 PCR: NOT DETECTED
PARAINFLUENZA 2 PCR: NOT DETECTED
PARAINFLUENZA 3 PCR: NOT DETECTED
PARAINFLUENZA 4 PCR: NOT DETECTED
PCO2, VEN: 38 MMHG (ref 38–52)
PDW BLD-RTO: 16.8 % (ref 11.7–14.9)
PH VENOUS: 7.43 (ref 7.32–7.42)
PLATELET # BLD: 207 K/CU MM (ref 140–440)
PMV BLD AUTO: 11.1 FL (ref 7.5–11.1)
PO2, VEN: 128 MMHG (ref 28–48)
POTASSIUM SERPL-SCNC: 4 MMOL/L (ref 3.5–5.1)
PRO-BNP: 182.3 PG/ML
PROCALCITONIN: 0.06
RBC # BLD: 4.73 M/CU MM (ref 4.6–6.2)
RHINOVIRUS ENTEROVIRUS PCR: NOT DETECTED
RSV PCR: NOT DETECTED
SARS-COV-2: NOT DETECTED
SMUDGE CELLS: PRESENT
SODIUM BLD-SCNC: 134 MMOL/L (ref 135–145)
TOTAL PROTEIN: 5.9 GM/DL (ref 6.4–8.2)
TROPONIN T: <0.01 NG/ML
WBC # BLD: 7.3 K/CU MM (ref 4–10.5)

## 2022-03-24 PROCEDURE — 93005 ELECTROCARDIOGRAM TRACING: CPT | Performed by: PHYSICIAN ASSISTANT

## 2022-03-24 PROCEDURE — 71046 X-RAY EXAM CHEST 2 VIEWS: CPT

## 2022-03-24 PROCEDURE — 2580000003 HC RX 258: Performed by: PHYSICIAN ASSISTANT

## 2022-03-24 PROCEDURE — 80053 COMPREHEN METABOLIC PANEL: CPT

## 2022-03-24 PROCEDURE — 93010 ELECTROCARDIOGRAM REPORT: CPT | Performed by: INTERNAL MEDICINE

## 2022-03-24 PROCEDURE — 83880 ASSAY OF NATRIURETIC PEPTIDE: CPT

## 2022-03-24 PROCEDURE — 99285 EMERGENCY DEPT VISIT HI MDM: CPT

## 2022-03-24 PROCEDURE — 85007 BL SMEAR W/DIFF WBC COUNT: CPT

## 2022-03-24 PROCEDURE — 6370000000 HC RX 637 (ALT 250 FOR IP): Performed by: NURSE PRACTITIONER

## 2022-03-24 PROCEDURE — 84484 ASSAY OF TROPONIN QUANT: CPT

## 2022-03-24 PROCEDURE — 84145 PROCALCITONIN (PCT): CPT

## 2022-03-24 PROCEDURE — 0202U NFCT DS 22 TRGT SARS-COV-2: CPT

## 2022-03-24 PROCEDURE — 85027 COMPLETE CBC AUTOMATED: CPT

## 2022-03-24 PROCEDURE — 96365 THER/PROPH/DIAG IV INF INIT: CPT

## 2022-03-24 PROCEDURE — 82805 BLOOD GASES W/O2 SATURATION: CPT

## 2022-03-24 PROCEDURE — 71045 X-RAY EXAM CHEST 1 VIEW: CPT

## 2022-03-24 PROCEDURE — 87899 AGENT NOS ASSAY W/OPTIC: CPT

## 2022-03-24 PROCEDURE — 82962 GLUCOSE BLOOD TEST: CPT

## 2022-03-24 PROCEDURE — 87040 BLOOD CULTURE FOR BACTERIA: CPT

## 2022-03-24 PROCEDURE — 1200000000 HC SEMI PRIVATE

## 2022-03-24 PROCEDURE — 36415 COLL VENOUS BLD VENIPUNCTURE: CPT

## 2022-03-24 PROCEDURE — 2580000003 HC RX 258: Performed by: NURSE PRACTITIONER

## 2022-03-24 PROCEDURE — 6370000000 HC RX 637 (ALT 250 FOR IP): Performed by: INTERNAL MEDICINE

## 2022-03-24 PROCEDURE — 83605 ASSAY OF LACTIC ACID: CPT

## 2022-03-24 PROCEDURE — 6360000002 HC RX W HCPCS: Performed by: PHYSICIAN ASSISTANT

## 2022-03-24 PROCEDURE — 87449 NOS EACH ORGANISM AG IA: CPT

## 2022-03-24 RX ORDER — IPRATROPIUM BROMIDE AND ALBUTEROL SULFATE 2.5; .5 MG/3ML; MG/3ML
1 SOLUTION RESPIRATORY (INHALATION) EVERY 6 HOURS PRN
Status: DISCONTINUED | OUTPATIENT
Start: 2022-03-24 | End: 2022-03-28 | Stop reason: HOSPADM

## 2022-03-24 RX ORDER — TAMSULOSIN HYDROCHLORIDE 0.4 MG/1
0.4 CAPSULE ORAL DAILY
Status: DISCONTINUED | OUTPATIENT
Start: 2022-03-24 | End: 2022-03-28 | Stop reason: HOSPADM

## 2022-03-24 RX ORDER — SODIUM CHLORIDE 0.9 % (FLUSH) 0.9 %
5-40 SYRINGE (ML) INJECTION EVERY 12 HOURS SCHEDULED
Status: DISCONTINUED | OUTPATIENT
Start: 2022-03-24 | End: 2022-03-28 | Stop reason: HOSPADM

## 2022-03-24 RX ORDER — SODIUM CHLORIDE 0.9 % (FLUSH) 0.9 %
5-40 SYRINGE (ML) INJECTION PRN
Status: DISCONTINUED | OUTPATIENT
Start: 2022-03-24 | End: 2022-03-28 | Stop reason: HOSPADM

## 2022-03-24 RX ORDER — ACETAMINOPHEN 650 MG/1
650 SUPPOSITORY RECTAL EVERY 6 HOURS PRN
Status: DISCONTINUED | OUTPATIENT
Start: 2022-03-24 | End: 2022-03-28 | Stop reason: HOSPADM

## 2022-03-24 RX ORDER — FERROUS GLUCONATE 324(37.5)
324 TABLET ORAL 2 TIMES DAILY WITH MEALS
Status: DISCONTINUED | OUTPATIENT
Start: 2022-03-24 | End: 2022-03-28 | Stop reason: HOSPADM

## 2022-03-24 RX ORDER — SODIUM CHLORIDE 9 MG/ML
25 INJECTION, SOLUTION INTRAVENOUS PRN
Status: DISCONTINUED | OUTPATIENT
Start: 2022-03-24 | End: 2022-03-28 | Stop reason: HOSPADM

## 2022-03-24 RX ORDER — IPRATROPIUM BROMIDE AND ALBUTEROL SULFATE 2.5; .5 MG/3ML; MG/3ML
1 SOLUTION RESPIRATORY (INHALATION) EVERY 6 HOURS
Status: DISCONTINUED | OUTPATIENT
Start: 2022-03-24 | End: 2022-03-24

## 2022-03-24 RX ORDER — PANTOPRAZOLE SODIUM 40 MG/1
40 TABLET, DELAYED RELEASE ORAL
Status: DISCONTINUED | OUTPATIENT
Start: 2022-03-25 | End: 2022-03-28 | Stop reason: HOSPADM

## 2022-03-24 RX ORDER — GUAIFENESIN 600 MG/1
1200 TABLET, EXTENDED RELEASE ORAL 2 TIMES DAILY
Status: DISCONTINUED | OUTPATIENT
Start: 2022-03-24 | End: 2022-03-28 | Stop reason: HOSPADM

## 2022-03-24 RX ORDER — POLYETHYLENE GLYCOL 3350 17 G/17G
17 POWDER, FOR SOLUTION ORAL DAILY PRN
Status: DISCONTINUED | OUTPATIENT
Start: 2022-03-24 | End: 2022-03-28 | Stop reason: HOSPADM

## 2022-03-24 RX ORDER — ALBUTEROL SULFATE 90 UG/1
2 AEROSOL, METERED RESPIRATORY (INHALATION) 3 TIMES DAILY
Status: DISCONTINUED | OUTPATIENT
Start: 2022-03-24 | End: 2022-03-28 | Stop reason: HOSPADM

## 2022-03-24 RX ORDER — BENZONATATE 100 MG/1
100 CAPSULE ORAL DAILY
Status: DISCONTINUED | OUTPATIENT
Start: 2022-03-24 | End: 2022-03-28 | Stop reason: HOSPADM

## 2022-03-24 RX ORDER — FINASTERIDE 5 MG/1
5 TABLET, FILM COATED ORAL DAILY
Status: DISCONTINUED | OUTPATIENT
Start: 2022-03-24 | End: 2022-03-28 | Stop reason: HOSPADM

## 2022-03-24 RX ORDER — ATORVASTATIN CALCIUM 40 MG/1
80 TABLET, FILM COATED ORAL NIGHTLY
Status: DISCONTINUED | OUTPATIENT
Start: 2022-03-24 | End: 2022-03-28 | Stop reason: HOSPADM

## 2022-03-24 RX ORDER — ASCORBIC ACID 500 MG
500 TABLET ORAL DAILY
Status: DISCONTINUED | OUTPATIENT
Start: 2022-03-24 | End: 2022-03-28 | Stop reason: HOSPADM

## 2022-03-24 RX ORDER — LEVOFLOXACIN 5 MG/ML
750 INJECTION, SOLUTION INTRAVENOUS EVERY 24 HOURS
Status: DISCONTINUED | OUTPATIENT
Start: 2022-03-25 | End: 2022-03-28 | Stop reason: HOSPADM

## 2022-03-24 RX ORDER — ACETAMINOPHEN 325 MG/1
650 TABLET ORAL EVERY 6 HOURS PRN
Status: DISCONTINUED | OUTPATIENT
Start: 2022-03-24 | End: 2022-03-28 | Stop reason: HOSPADM

## 2022-03-24 RX ORDER — ONDANSETRON 4 MG/1
4 TABLET, ORALLY DISINTEGRATING ORAL EVERY 8 HOURS PRN
Status: DISCONTINUED | OUTPATIENT
Start: 2022-03-24 | End: 2022-03-28 | Stop reason: HOSPADM

## 2022-03-24 RX ORDER — SODIUM CHLORIDE 9 MG/ML
INJECTION, SOLUTION INTRAVENOUS CONTINUOUS
Status: ACTIVE | OUTPATIENT
Start: 2022-03-24 | End: 2022-03-25

## 2022-03-24 RX ORDER — ONDANSETRON 2 MG/ML
4 INJECTION INTRAMUSCULAR; INTRAVENOUS EVERY 6 HOURS PRN
Status: DISCONTINUED | OUTPATIENT
Start: 2022-03-24 | End: 2022-03-28 | Stop reason: HOSPADM

## 2022-03-24 RX ORDER — LEVOFLOXACIN 5 MG/ML
750 INJECTION, SOLUTION INTRAVENOUS EVERY 24 HOURS
Status: DISCONTINUED | OUTPATIENT
Start: 2022-03-24 | End: 2022-03-24

## 2022-03-24 RX ORDER — INSULIN GLARGINE 100 [IU]/ML
40 INJECTION, SOLUTION SUBCUTANEOUS NIGHTLY
Status: DISCONTINUED | OUTPATIENT
Start: 2022-03-24 | End: 2022-03-28 | Stop reason: HOSPADM

## 2022-03-24 RX ADMIN — INSULIN GLARGINE 40 UNITS: 100 INJECTION, SOLUTION SUBCUTANEOUS at 23:41

## 2022-03-24 RX ADMIN — ATORVASTATIN CALCIUM 80 MG: 40 TABLET, FILM COATED ORAL at 23:42

## 2022-03-24 RX ADMIN — AZITHROMYCIN DIHYDRATE 500 MG: 500 INJECTION, POWDER, LYOPHILIZED, FOR SOLUTION INTRAVENOUS at 16:29

## 2022-03-24 RX ADMIN — CEFTRIAXONE SODIUM 1000 MG: 1 INJECTION, POWDER, FOR SOLUTION INTRAMUSCULAR; INTRAVENOUS at 15:44

## 2022-03-24 RX ADMIN — GUAIFENESIN 1200 MG: 600 TABLET, EXTENDED RELEASE ORAL at 23:41

## 2022-03-24 RX ADMIN — SODIUM CHLORIDE, PRESERVATIVE FREE 10 ML: 5 INJECTION INTRAVENOUS at 23:45

## 2022-03-24 NOTE — RT PROTOCOL NOTE
RT Inhaler-Nebulizer Bronchodilator Protocol Note    There is a bronchodilator order in the chart from a provider indicating to follow the RT Bronchodilator Protocol and there is an Initiate RT Inhaler-Nebulizer Bronchodilator Protocol order as well (see protocol at bottom of note). CXR Findings:  XR CHEST PORTABLE    Result Date: 3/24/2022  Some small somewhat nodular patchy areas of opacity are seen at the left lung base and to a lesser degree within the lateral right mid and lower lung and interstitial prominence on the prior study has significantly improved. Current findings may just represent chronic lung changes though areas of pneumonia, pneumonitis or a nodular pulmonary process cannot be excluded. The findings from the last RT Protocol Assessment were as follows:   History Pulmonary Disease: Chronic pulmonary disease  Respiratory Pattern: Dyspnea on exertion or RR 21-25 bpm  Breath Sounds: Slightly diminished and/or crackles  Cough: Strong, productive  Indication for Bronchodilator Therapy:    Bronchodilator Assessment Score: 7    Aerosolized bronchodilator medication orders have been revised according to the RT Inhaler-Nebulizer Bronchodilator Protocol below. Respiratory Therapist to perform RT Therapy Protocol Assessment initially then follow the protocol. Repeat RT Therapy Protocol Assessment PRN for score 0-3 or on second treatment, BID, and PRN for scores above 3. No Indications - adjust the frequency to every 6 hours PRN wheezing or bronchospasm, if no treatments needed after 48 hours then discontinue using Per Protocol order mode. If indication present, adjust the RT bronchodilator orders based on the Bronchodilator Assessment Score as indicated below.   Use Inhaler orders unless patient has one or more of the following: on home nebulizer, not able to hold breath for 10 seconds, is not alert and oriented, cannot activate and use MDI correctly, or respiratory rate 25 breaths per minute or more, then use the equivalent nebulizer order(s) with same Frequency and PRN reasons based on the score. If a patient is on this medication at home then do not decrease Frequency below that used at home. 0-3 - enter or revise RT bronchodilator order(s) to equivalent RT Bronchodilator order with Frequency of every 4 hours PRN for wheezing or increased work of breathing using Per Protocol order mode. 4-6 - enter or revise RT Bronchodilator order(s) to two equivalent RT bronchodilator orders with one order with BID Frequency and one order with Frequency of every 4 hours PRN wheezing or increased work of breathing using Per Protocol order mode. 7-10 - enter or revise RT Bronchodilator order(s) to two equivalent RT bronchodilator orders with one order with TID Frequency and one order with Frequency of every 4 hours PRN wheezing or increased work of breathing using Per Protocol order mode. 11-13 - enter or revise RT Bronchodilator order(s) to one equivalent RT bronchodilator order with QID Frequency and an Albuterol order with Frequency of every 4 hours PRN wheezing or increased work of breathing using Per Protocol order mode. Greater than 13 - enter or revise RT Bronchodilator order(s) to one equivalent RT bronchodilator order with every 4 hours Frequency and an Albuterol order with Frequency of every 2 hours PRN wheezing or increased work of breathing using Per Protocol order mode. RT to enter RT Home Evaluation for COPD & MDI Assessment order using Per Protocol order mode.     Electronically signed by Negrita Perea RCP on 3/24/2022 at 5:54 PM

## 2022-03-24 NOTE — ED PROVIDER NOTES
As PA-in-triage, I performed a medical screening history and physical exam on this patient. HISTORY OF PRESENT ILLNESS  Eitan Reeves is a 80 y.o. male presents for concern of pneumonia. Has been having 3 weeks of generalized fatigue and a deep harsh productive cough. Has a history of CLL, takes oral chemo pills and receives once monthly infusions for immunoglobulins. Was called by PCP today as outpatient chest x-ray showed concerning findings for pneumonia and was sent into the ED for evaluation. Had a fever this morning reported at 99F. Denies any pleuritic chest pain, mopped assist.  No nausea or vomiting. .  States that he was only put on a \"mucus relief medication\" and no antibiotics since onset of symptoms. PHYSICAL EXAM  BP (!) 164/68   Pulse 92   Temp 98.4 °F (36.9 °C) (Oral)   Resp 15   Ht 6' (1.829 m)   Wt 192 lb (87.1 kg)   SpO2 94%   BMI 26.04 kg/m²     On exam, the patient appears well-hydrated, well-nourished, and in no acute distress. Mucous membranes are moist. Speech is clear. Breathing is unlabored. Skin is dry. Mental status is normal. The patient has normal gait, moves all extremities, and is without facial droop. Labs, imaging, EKG and medications ordered at triage. Please see ED provider's note for patient complete ED evaluation, labs/imaging interpretation and final disposition/clinical impression.          Lucien Lorenz PA-C  03/24/22 [id number]

## 2022-03-24 NOTE — ED NOTES
[id number] paged hospitalist     Sheila Copping  03/24/22 1647  [address] [facility] returned call      Sheila Copping  03/24/22 1703

## 2022-03-24 NOTE — H&P
History and Physical      Name:  Tyrell Slaughter /Age/Sex: 1950  (80 y.o. male)   MRN & CSN:  [id number] & [id number] Admission Date/Time: 3/24/2022  3:19 PM   Location:  [facility] PCP: Olga Metzger MD       Hospital Day: 1           Assessment and Plan:   # Pneumonia, community acquired - CXR noted for bilateral nodular opacities in left lung base and to a lesser degree within the lateral right mid and lower lung. Respiratory panel negative. Will check pro-pascual, f/u lactic ordered in ED, check urine antigens, sputum cx if able, f/u bc's drawn in ED. Manage on IV Levaquin, nebs, supplemental O2 as needed, anti-tussive/expectorant medications, limited IV hydration overnight. #  Acute respiratory failure with hypoxia in setting of above - Pt not on home O2. Reported O2 sat in mid 80's in ED. Currently requiring O2 @ 2l per nc, f/u blood gases, monitor pulse ox, POC as noted above    # DM II - Hold metformin. Resume basal and ssi, monitor BG AC/HS, ADA diet    # CLL - Followed by Oncology O/P. On monthly IVIG, and daily Ibrutinib-resume. Other chronic medical conditions - Resume home medications unless contraindicated  # ? CAD per EHR - Pt denies. Denies CP, EKG without acute ischemia trop neg. Currently on statin   # Mixed HLD - resume statin  # BPH on flomax- resume, monitor for retention      Present on Admission:   Pneumonia             Diet ADULT DIET;  Regular; Low Fat/Low Chol/High Fiber/2 gm Na   DVT Prophylaxis [x] Lovenox, []  Heparin, [] SCDs, [] Ambulation  [] Long term AC   GI Prophylaxis [] PPI,  [] H2 Blocker,  [] Carafate,  [] Diet/Tube Feeds   Code Status Full code   Disposition Admit to med surg in pt    Patient plans to return home upon discharge         Chief Complaint: Shortness of Breath      History obtained from patient and EHR  History of Present Illness:   Tyrell Slaughter is a 80 y.o. male  with history of CLL, CAD (per EHR), diabetes mellitus, hyperlipidemia, BPH, who presents with upper respiratory symptoms. The patient reports sinus congestion and productive cough of green secretions for the past week. He has been managed on Mucinex and antitussives with no significant relief. He reports chills, did not measure his temperature. He denies significant shortness of breath. He denies chest pain. He reports generalized fatigue. He reports nausea; denies emesis or diarrhea. No dysuria. His PCP ordered an outpatient chest x-ray with findings concerning for pneumonia therefore he was sent in for evaluation. He presented to the emergency department afebrile pulse 92 respirations 15 blood pressure 164/68. O2 sat 94% on room air reportedly desatted to mid 80s therefore was placed on supplemental oxygen at 2 L per nasal cannula. Chest x-ray performed showed nodular patchy opacities in the left lung base and lateral right mid and lower lung. EKG performed showed sinus tachycardia with nonspecific ST-T wave changes, . Troponin negative x1, . Chemistry panel significant for sodium 134 chloride 94 glucose 128 otherwise unremarkable. LFTs unremarkable. Respiratory panel negative. WBC within normal limits. Hemoglobin 11.4 hematocrit 37.2, platelets 417. Blood cultures were ordered. The patient was dosed with IV Ceftriaxone/Zithromax and has been admitted for further management. Ten point ROS: reviewed and are negative, unless as noted in above HPI. Objective:   No intake or output data in the 24 hours ending 03/24/22 1739     Vitals:   Vitals:    03/24/22 1630 03/24/22 1645 03/24/22 1715 03/24/22 1730   BP: 110/80 126/65 (!) 144/81 (!) 144/67   Pulse: 81 86 94 93   Resp: 20 24 20 23   Temp:       TempSrc:       SpO2:    95%   Weight:       Height:           Physical Exam: 03/24/22     GEN -Awake* appearing male, in NAD. Appears given age. EYES -anicteric, conjunctiva pink. HENT -Head is normocephalic, atraumatic.  MM are moist. Oral pharynx without exudates,   NECK -Supple, no apparent thyromegaly or masses. RESP -few fine crackles in posterior lower lobes, no wheezing or rhonchi. Symmetric chest movement   C/V -S1/S2 auscultated. RRR without appreciable M/R/G. No JVD. Cap refill <3 sec. No peripheral edema. GI -Abdomen is soft non distended, non tender to palpation. + BS. No masses or guarding.  -No CVA/ flank tenderness. Godfrey catheter is not present. LYMPH- No petechiae or ecchymoses. MS -No gross joint deformities. SKIN -Normal coloration, warm, dry. NEURO-Cranial nerves appear grossly intact, normal speech, no lateralizing weakness. PSYC-Awake, alert, oriented x 4. Appropriate affect. Past Medical History:      Past Medical History:   Diagnosis Date    Arthritis     knees, Rt hand/wrist    CAD (coronary artery disease)     Cancer (HCC)     CLL--[facility]     History of blood transfusion     no reaction    Hx of motion sickness     MDRO (multiple drug resistant organisms) resistance     abscess on buttock 10yrs ago    Migraine     last one summer 2016    Pneumonia 2016    Wears dentures     full upper--lower dentures    Wears glasses        Past Surgical  History:    has a past surgical history that includes knee surgery (1970); Tunneled venous port placement (2013); Colonoscopy (2010); fracture surgery (Left, 1990's); Cardiac catheterization (1990's); Tonsillectomy (late 1960's); Dental surgery; eye surgery (Right, 08/2016); and other surgical history (Left, 01/18/2017). Family  History:   family history includes Asthma in his maternal uncle; Colon Cancer in his brother; Diabetes in his mother; Early Death in his brother; Heart Disease in his father; High Blood Pressure in his mother; Other in his sister.         Social History:     Social History     Socioeconomic History    Marital status: Single     Spouse name: None    Number of children: None    Years of education: None    Highest education level: None   Occupational History    None   Tobacco Use    Smoking status: Former Smoker     Packs/day: 1.00     Years: 20.00     Pack years: 20.00     Types: Cigarettes     Start date: 10/2/1965     Quit date: 1987     Years since quittin.2    Smokeless tobacco: Never Used   Substance and Sexual Activity    Alcohol use: No    Drug use: No    Sexual activity: None   Other Topics Concern    None   Social History Narrative    None     Social Determinants of Health     Financial Resource Strain:     Difficulty of Paying Living Expenses: Not on file   Food Insecurity:     Worried About Running Out of Food in the Last Year: Not on file    [place] of Food in the Last Year: Not on file   Transportation Needs:     Lack of Transportation (Medical): Not on file    Lack of Transportation (Non-Medical): Not on file   Physical Activity:     Days of Exercise per Week: Not on file    Minutes of Exercise per Session: Not on file   Stress:     Feeling of Stress : Not on file   Social Connections:     Frequency of Communication with Friends and Family: Not on file    Frequency of Social Gatherings with Friends and Family: Not on file    Attends Mosque Services: Not on file    Active Member of [address] [place] [address] [employer] or Organizations: Not on file    Attends Club or Organization Meetings: Not on file    Marital Status: Not on file   Intimate Partner Violence:     Fear of Current or Ex-Partner: Not on file    Emotionally Abused: Not on file    Physically Abused: Not on file    Sexually Abused: Not on file   Housing Stability:     Unable to Pay for Housing in the Last Year: Not on file    Number of [place] in the Last Year: Not on file    Unstable Housing in the Last Year: Not on file      reports that he quit smoking about 35 years ago. His smoking use included cigarettes. He started smoking about 56 years ago. He has a 20.00 pack-year smoking history.  He has never used smokeless tobacco.   reports no history of alcohol use. reports no history of drug use. Allergies:   No Known Allergies    Home Medications:     Prior to Admission medications    Medication Sig Start Date End Date Taking? Authorizing Provider   benzonatate (TESSALON) 100 MG capsule Take 100 mg by mouth daily    Historical Provider, MD   guaiFENesin (MUCINEX) 600 MG extended release tablet Take 1,200 mg by mouth 2 times daily    Historical Provider, MD   ascorbic acid (V-R VITAMIN C) 250 MG tablet Take 2 tablets by mouth daily 3/7/22   Rocío Carpio MD   ibrutinib (IMBRUVICA) 140 MG tablet Take 1 tablet by mouth daily 1/18/22 7/17/22  Rocío Carpio MD   ferrous gluconate 324 (37.5 Fe) MG TABS Take 1 tablet by mouth 2 times daily 11/10/21   Rocío Carpio MD   valACYclovir (VALTREX) 500 MG tablet Take 1 tablet by mouth daily 11/9/21   Rocío Carpio MD   atorvastatin (LIPITOR) 80 MG tablet take 1 tablet by oral route every day 10/26/21   Historical Provider, MD   acetaminophen-codeine (TYLENOL #3) 300-30 MG per tablet as needed.  5/20/21   Historical Provider, MD   finasteride (PROSCAR) 5 MG tablet  5/24/21   Historical Provider, MD   doxycycline hyclate (VIBRAMYCIN) 100 MG capsule Take 100 mg by mouth 2 times daily    Historical Provider, MD   magnesium oxide (MAG-OX) 400 MG tablet Take 400 mg by mouth daily    Historical Provider, MD   tamsulosin (FLOMAX) 0.4 MG capsule Take 0.4 mg by mouth daily    Historical Provider, MD   albuterol sulfate HFA (PROVENTIL HFA) 108 (90 Base) MCG/ACT inhaler Inhale 2 puffs into the lungs every 4 hours as needed for Wheezing or Shortness of Breath 4/12/19   Gianluca Bee MD   LEVEMIR FLEXTOUCH 100 UNIT/ML injection pen Inject 50 Units into the skin nightly 4/3/19   Historical Provider, MD   NOVOLOG FLEXPEN 100 UNIT/ML injection pen Inject 20 Units into the skin 3 times daily (before meals)  4/3/19   Historical Provider, MD   insulin aspart (NOVOLOG FLEXPEN) 100 UNIT/ML injection pen Inject into the skin See Admin Instructions 05/14/19 On Sliding scale regimen in addition to his base units of 15 before meals    Historical Provider, MD   glucose monitoring kit (FREESTYLE) monitoring kit 1 kit by Does not apply route daily as needed (glucose checks) 3/31/17   Jessica Perea MD   Insulin Syringe-Needle U-100 30G X 1/2\" 0.5 ML MISC 1 each by Does not apply route daily 3/31/17   Jessica Perea MD   metFORMIN (GLUCOPHAGE) 1000 MG tablet Take 1,000 mg by mouth 2 times daily (with meals)    Historical Provider, MD   omeprazole (PRILOSEC) 20 MG delayed release capsule Take 20 mg by mouth daily as needed    Historical Provider, MD   Immune Globulin, Human, 20 GM/200ML SOLN solution Infuse 20 g intravenously every 30 days 05/14/19 Given through infusion therapy states he is due on the 20 of May    Historical Provider, MD         Medications:   Medications:    ipratropium-albuterol  1 ampule Inhalation Q6H    sodium chloride flush  5-40 mL IntraVENous 2 times per day    enoxaparin  40 mg SubCUTAneous Daily    ascorbic acid  500 mg Oral Daily    atorvastatin  80 mg Oral Nightly    benzonatate  100 mg Oral Daily    ferrous gluconate  324 mg Oral BID    finasteride  5 mg Oral Daily    guaiFENesin  1,200 mg Oral BID    [START ON 3/25/2022] ibrutinib  140 mg Oral Daily    insulin lispro  10 Units SubCUTAneous TID WC    insulin detemir  40 Units SubCUTAneous Nightly    [START ON 3/25/2022] pantoprazole  40 mg Oral QAM AC    tamsulosin  0.4 mg Oral Daily    [START ON 3/25/2022] levofloxacin  750 mg IntraVENous Q24H      Infusions:    sodium chloride       PRN Meds: sodium chloride flush, 5-40 mL, PRN  sodium chloride, 25 mL, PRN  ondansetron, 4 mg, Q8H PRN   Or  ondansetron, 4 mg, Q6H PRN  polyethylene glycol, 17 g, Daily PRN  acetaminophen, 650 mg, Q6H PRN   Or  acetaminophen, 650 mg, Q6H PRN        Data:     Laboratory this visit:  Reviewed  Recent Labs     03/24/22  1403   WBC 7.3   HGB 11.4*   HCT 37.2*   PLT 207      Recent Labs     03/24/22  1403   *   K 4.0   CL 97*   CO2 23   BUN 15   CREATININE 0.8*     Recent Labs     03/24/22  1403   AST 17   ALT 17   BILITOT 0.6   ALKPHOS 93     No results for input(s): INR in the last 72 hours. Radiology this visit:  Reviewed. XR CHEST (2 VW)    Result Date: 3/24/2022  EXAMINATION: TWO XRAY VIEWS OF THE CHEST 3/24/2022 10:36 am COMPARISON: 5/14/2019 HISTORY: ORDERING SYSTEM PROVIDED HISTORY: Pleurisy TECHNOLOGIST PROVIDED HISTORY: Reason for Exam: patient reports sinus drainage and chest congestion, patient reports he is undergoing therapy for leukemia FINDINGS: A right IJ Port-A-Cath is in place. The tip is in the superior vena cava. The heart size is normal.  Bilateral interstitial disease is noted. The bony thorax is grossly intact. Bilateral interstitial opacity. Nodular consolidation at the left lung base. Pneumonia is favored over metastatic/lymphangitic disease. Consider atypical etiologies. XR CHEST PORTABLE    Result Date: 3/24/2022  EXAM: XR Chest, 1 View EXAM DATE/TIME: 3/24/2022 1:16 pm CLINICAL HISTORY: ORDERING SYSTEM PROVIDED  shortness of breath  TECHNOLOGIST PROVIDED HISTORY: Reason for exam:->shortness of breath  Reason for Exam: shortness of breath Additional signs and symptoms: shortness of breath  Relevant Medical/Surgical History: shortness of breath TECHNIQUE: Frontal view of the chest. COMPARISON: 03/24/2022 FINDINGS: Lungs:  Some small somewhat nodular patchy areas of opacity are seen at the left lung base and to a lesser degree within the lateral right mid and lower lung and interstitial prominence on the prior study has significantly improved. Pleural space:  No acute findings. No pneumothorax. Heart:  No acute findings. No cardiomegaly. Mediastinum:  No acute findings. Bones/joints:  No acute findings. Tubes, lines and devices:  Right-sided chest port is unchanged.      Some small somewhat nodular patchy areas of opacity are seen at the left lung base and to a lesser degree within the lateral right mid and lower lung and interstitial prominence on the prior study has significantly improved. Current findings may just represent chronic lung changes though areas of pneumonia, pneumonitis or a nodular pulmonary process cannot be excluded.            EKG this visit:  Reviewed         Electronically signed by JENNI Piña CNP on 3/24/2022 at 5:39 PM

## 2022-03-24 NOTE — ED TRIAGE NOTES
Pt to ED with complaints of shortness of breath x several days. Pt states he was sent to ED by PCP after being told he had bilateral pneumonia this morning.

## 2022-03-24 NOTE — ED PROVIDER NOTES
EMERGENCY DEPARTMENT ENCOUNTER        PCP: Alice Ybarra MD    [address] [facility]    Chief Complaint   Patient presents with    Shortness of Breath       This patient was not evaluated by the attending physician. I have independently evaluated this patient. My supervising physician was available for consultation. HPI      Peter Arias is a 80 y.o. male with PMH of CLL on oral chemo and monthly immunoglobulin infusions who presents to the ED sent by PCP for pneumonia. Context is patient reports he's had cough, congestion for 3 wks. He has gradually developed shortness of breath as well. Cough is productive with white to yellow sputum. Duration of symptoms is that they have been waxing and waning over the last 3 wks but lately have been worsening. He went to see his PCP today who did a CXR that showed pneumonia and patient was sent to the ED. He reports fever of 99F this morning. He has been taking \"mucus relief medication\" with some improvement in sputum production. Dr. Charlene Barrera is his oncologist.    Denies chest pain, nausea, vomiting, abdominal pain. Denies using oxygen at home. REVIEW OF SYSTEMS    Constitutional:  + Subjective fever  HENT:  + congestion; Denies sore throat or ear pain   Cardiovascular:  Denies palpitations, chest pain, syncope, extremity edema.   Respiratory:  + cough, shortness of breath; Denies hemoptysis    GI:  Denies abdominal pain, nausea, vomiting, or diarrhea  :  Denies any urinary symptoms  Musculoskeletal:  Denies back pain, extremity swelling  Skin:  Denies rash  Neurologic:  Denies lightheadedness, dizziness, headache, focal weakness or sensory changes   Endocrine:  Denies polyuria or polydypsia   Lymphatic:  Denies swollen glands     All other review of systems are negative  See HPI and nursing notes for additional information         PAST MEDICAL & SURGICAL HISTORY    Past Medical History:   Diagnosis Date    Arthritis     knees, Rt hand/wrist    CAD (coronary artery disease)     Cancer ([facility] [address]     CLL--Sees Dr Duran Henderson Diabetes mellitus [facility])     History of blood transfusion     no reaction    Hx of motion sickness     MDRO (multiple drug resistant organisms) resistance     abscess on buttock 10yrs ago    Migraine     last one summer 2016    Pneumonia 2016    Wears dentures     full upper--lower dentures    Wears glasses      Past Surgical History:   Procedure Laterality Date    CARDIAC CATHETERIZATION  1990's    x 2  last showed \"inflammation of heart\"    COLONOSCOPY  2010    DENTAL SURGERY      all teeth extracted     EYE SURGERY Right 08/2016    Cataract removal    FRACTURE SURGERY Left 1990's    left ankle plate and screws    KNEE SURGERY  1970    left    OTHER SURGICAL HISTORY Left 01/18/2017    groin excision    TONSILLECTOMY  late 1960's    age 13    TUNNELED VENOUS PORT PLACEMENT  2013    Right upper chest       CURRENT MEDICATIONS    Current Outpatient Rx   Medication Sig Dispense Refill    benzonatate (TESSALON) 100 MG capsule Take 100 mg by mouth daily      guaiFENesin (MUCINEX) 600 MG extended release tablet Take 1,200 mg by mouth 2 times daily      ascorbic acid (V-R VITAMIN C) 250 MG tablet Take 2 tablets by mouth daily 30 tablet 3    ibrutinib (IMBRUVICA) 140 MG tablet Take 1 tablet by mouth daily 30 tablet 5    ferrous gluconate 324 (37.5 Fe) MG TABS Take 1 tablet by mouth 2 times daily 60 tablet 5    valACYclovir (VALTREX) 500 MG tablet Take 1 tablet by mouth daily 30 tablet 5    atorvastatin (LIPITOR) 80 MG tablet take 1 tablet by oral route every day      acetaminophen-codeine (TYLENOL #3) 300-30 MG per tablet as needed.       finasteride (PROSCAR) 5 MG tablet       doxycycline hyclate (VIBRAMYCIN) 100 MG capsule Take 100 mg by mouth 2 times daily      magnesium oxide (MAG-OX) 400 MG tablet Take 400 mg by mouth daily      tamsulosin (FLOMAX) 0.4 MG capsule Take 0.4 mg by mouth daily      albuterol sulfate HFA (PROVENTIL HFA) 108 (90 Base) MCG/ACT inhaler Inhale 2 puffs into the lungs every 4 hours as needed for Wheezing or Shortness of Breath 1 Inhaler 3    LEVEMIR FLEXTOUCH 100 UNIT/ML injection pen Inject 50 Units into the skin nightly  3    NOVOLOG FLEXPEN 100 UNIT/ML injection pen Inject 20 Units into the skin 3 times daily (before meals)   3    insulin aspart (NOVOLOG FLEXPEN) 100 UNIT/ML injection pen Inject into the skin See Admin Instructions 19 On Sliding scale regimen in addition to his base units of 15 before meals      glucose monitoring kit (FREESTYLE) monitoring kit 1 kit by Does not apply route daily as needed (glucose checks) 1 kit 0    Insulin Syringe-Needle U-100 30G X 1/2\" 0.5 ML MISC 1 each by Does not apply route daily 100 each 3    metFORMIN (GLUCOPHAGE) 1000 MG tablet Take 1,000 mg by mouth 2 times daily (with meals)      omeprazole (PRILOSEC) 20 MG delayed release capsule Take 20 mg by mouth daily as needed      Immune Globulin, Human, 20 GM/200ML SOLN solution Infuse 20 g intravenously every 30 days 19 Given through infusion therapy states he is due on the 20 of May         ALLERGIES    No Known Allergies    SOCIAL & FAMILY HISTORY    Social History     Socioeconomic History    Marital status: Single     Spouse name: None    Number of children: None    Years of education: None    Highest education level: None   Occupational History    None   Tobacco Use    Smoking status: Former Smoker     Packs/day: 1.00     Years: 20.00     Pack years: 20.00     Types: Cigarettes     Start date: 10/2/1965     Quit date: 1987     Years since quittin.2    Smokeless tobacco: Never Used   Substance and Sexual Activity    Alcohol use: No    Drug use: No    Sexual activity: None   Other Topics Concern    None   Social History Narrative    None     Social Determinants of Health     Financial Resource Strain:     Difficulty of Paying Living Expenses: Not on file   Food Insecurity:     Worried About [facility] in the Last Year: Not on file    [place] of Food in the Last Year: Not on file   Transportation Needs:     Lack of Transportation (Medical): Not on file    Lack of Transportation (Non-Medical): Not on file   Physical Activity:     Days of Exercise per Week: Not on file    Minutes of Exercise per Session: Not on file   Stress:     Feeling of Stress : Not on file   Social Connections:     Frequency of Communication with Friends and Family: Not on file    Frequency of Social Gatherings with Friends and Family: Not on file    Attends Anabaptist Services: Not on file    Active Member of [address] [place] [address] or Organizations: Not on file    Attends Club or Organization Meetings: Not on file    Marital Status: Not on file   Intimate Partner Violence:     Fear of Current or Ex-Partner: Not on file    Emotionally Abused: Not on file    Physically Abused: Not on file    Sexually Abused: Not on file   Housing Stability:     Unable to Pay for Housing in the Last Year: Not on file    Number of [place] in the Last Year: Not on file    Unstable Housing in the Last Year: Not on file     Family History   Problem Relation Age of Onset    Diabetes Mother     High Blood Pressure Mother     Heart Disease Father     Other Sister         liver problems    Early Death Brother     Asthma Maternal Uncle     Colon Cancer Brother        PHYSICAL EXAM    VITAL SIGNS: /62   Pulse 87   Temp 99.3 °F (37.4 °C) (Oral)   Resp 24   Ht 6' (1.829 m)   Wt 192 lb (87.1 kg)   SpO2 (!) 85%   BMI 26.04 kg/m²    Constitutional:  Well developed, well nourished, no acute distress   HENT:  Atraumatic, moist mucus membranes  Neck: supple, no JVD  Respiratory:  Lungs with good air movement throughout, no retractions, no wheezing, rhonchi, rales. Mild intermittent tachypnea. Patient is on 2 L of oxygen via nasal cannula to maintain oxygen saturation as without oxygen he desaturates to 85% on room air. No clipped speech. Cardiovascular:  Rate regular, regular Rhythm, no murmurs/rubs/gallops. No carotid bruits or murmurs heard in carotids. Vascular: Radial pulses and DP pulses 2+ equal bilaterally  GI:  Soft, nontender, normal bowel sounds  Musculoskeletal:  No edema, no deformities. No thigh/calf tenderness to palpation bilaterally. Compartments are soft in bilateral lower extremities.   Integument:  Skin warm and dry, no petechiae   Neurologic:  Alert & oriented, normal speech  Psych: Pleasant affect, no hallucinations        LABS:  Results for orders placed or performed during the hospital encounter of 03/24/22   Respiratory Panel, Molecular, with COVID-19 (Restricted: peds pts or suitable admitted adults)    Specimen: Nasopharyngeal   Result Value Ref Range    Adenovirus Detection by PCR NOT DETECTED NOT DETECTED    Coronavirus 229E PCR NOT DETECTED NOT DETECTED    Coronavirus HKU1 PCR NOT DETECTED NOT DETECTED    Coronavirus NL63 PCR NOT DETECTED NOT DETECTED    Coronavirus OC43 PCR NOT DETECTED NOT DETECTED    SARS-CoV-2 NOT DETECTED NOT DETECTED    Human Metapneumovirus PCR NOT DETECTED NOT DETECTED    Rhinovirus Enterovirus PCR NOT DETECTED NOT DETECTED    Influenza A by PCR NOT DETECTED NOT DETECTED    Influenza A H1 Pandemic PCR NOT DETECTED NOT DETECTED    Influenza A H1 (2009) PCR NOT DETECTED NOT DETECTED    Influenza A H3 PCR NOT DETECTED NOT DETECTED    Influenza B by PCR NOT DETECTED NOT DETECTED    Parainfluenza 1 PCR NOT DETECTED NOT DETECTED    Parainfluenza 2 PCR NOT DETECTED NOT DETECTED    Parainfluenza 3 PCR NOT DETECTED NOT DETECTED    Parainfluenza 4 PCR NOT DETECTED NOT DETECTED    RSV PCR NOT DETECTED NOT DETECTED    Bordetella parapertussis by PCR NOT DETECTED NOT DETECTED    B Pertussis by PCR NOT DETECTED NOT DETECTED    Chlamydophila Pneumonia PCR NOT DETECTED NOT DETECTED    Mycoplasma pneumo by PCR NOT DETECTED NOT DETECTED   CBC with Auto Differential   Result Value Ref Range WBC 7.3 4.0 - 10.5 K/CU MM    RBC 4.73 4.6 - 6.2 M/CU MM    Hemoglobin 11.4 (L) 13.5 - 18.0 GM/DL    Hematocrit 37.2 (L) 42 - 52 %    MCV 78.6 78 - 100 FL    MCH 24.1 (L) 27 - 31 PG    MCHC 30.6 (L) 32.0 - 36.0 %    RDW 16.8 (H) 11.7 - 14.9 %    Platelets 785 857 - 827 K/CU MM    MPV 11.1 7.5 - 11.1 FL    Eosinophils % 1.0 0 - 3 %    Lymphocytes % 96.0 (H) 24 - 44 %    Monocytes % 3.0 0 - 4 %    Eosinophils Absolute 0.1 K/CU MM    Lymphocytes Absolute 7.0 K/CU MM    Monocytes Absolute 0.2 K/CU MM    Differential Type MANUAL DIFFERENTIAL     Atypical Lymphocytes Absolute 1+     Smudge Cells PRESENT    Comprehensive Metabolic Panel w/ Reflex to MG   Result Value Ref Range    Sodium 134 (L) 135 - 145 MMOL/L    Potassium 4.0 3.5 - 5.1 MMOL/L    Chloride 97 (L) 99 - 110 mMol/L    CO2 23 21 - 32 MMOL/L    BUN 15 6 - 23 MG/DL    CREATININE 0.8 (L) 0.9 - 1.3 MG/DL    Glucose 128 (H) 70 - 99 MG/DL    Calcium 9.0 8.3 - 10.6 MG/DL    Albumin 3.6 3.4 - 5.0 GM/DL    Total Protein 5.9 (L) 6.4 - 8.2 GM/DL    Total Bilirubin 0.6 0.0 - 1.0 MG/DL    ALT 17 10 - 40 U/L    AST 17 15 - 37 IU/L    Alkaline Phosphatase 93 40 - 129 IU/L    GFR Non-African American >60 >60 mL/min/1.73m2    GFR African American >60 >60 mL/min/1.73m2    Anion Gap 14 4 - 16   Troponin   Result Value Ref Range    Troponin T <0.010 <0.01 NG/ML   Brain Natriuretic Peptide   Result Value Ref Range    Pro-.3 <300 PG/ML   EKG 12 Lead   Result Value Ref Range    Ventricular Rate 101 BPM    Atrial Rate 101 BPM    P-R Interval 138 ms    QRS Duration 86 ms    Q-T Interval 338 ms    QTc Calculation (Bazett) 438 ms    P Axis 58 degrees    R Axis 33 degrees    T Axis 73 degrees    Diagnosis       Sinus tachycardia  Otherwise normal ECG  When compared with ECG of 14-MAY-2019 16:27,  No significant change was found  Confirmed by Mehreen Melvin ([id number]) on 3/24/2022 4:10:05 PM           EKG: EKG Interpretation  Please see ED physician's note for EKG interpretation-  [place]    RADIOLOGY/PROCEDURES    XR CHEST PORTABLE   Final Result      Some small somewhat nodular patchy areas of opacity are seen at the left lung   base and to a lesser degree within the lateral right mid and lower lung and   interstitial prominence on the prior study has significantly improved. Current findings may just represent chronic lung changes though areas of   pneumonia, pneumonitis or a nodular pulmonary process cannot be excluded. ED COURSE & MEDICAL DECISION MAKING       Vital signs and nursing notes reviewed during ED course. I have independently evaluated this patient. Supervising physician present in the Emergency Department, available for consultation, throughout entirety of patient care. Patient presents as above sent by PCP for pneumonia. Patient placed on telemetry monitoring as well as continuous pulse oximetry monitoring. While in the ED today, labs, imaging, and EKG were obtained. For EKG interpretation- please see ED physician's note. Labs reveal mild anemia but is without emergent findings. Troponin is negative. Respiratory disease panel negative. Chest xray obtained today and reveals some small patchy areas of opacity seen at the left lung base and within the lateral right mid and lower lung concerning for pneumonia. Patient hypoxic on room air and therefore is requiring supplemental oxygen. Patient started on IV antibiotics of IV ceftriaxone and IV azithromycin for pneumonia. Patient will be admitted for further evaluation care as well as IV antibiotics and supplemental oxygen. I consulted with hospitalist and we discussed the patient's case. Hospitalist agrees to admit the patient at this time for further evaluation and care. All pertinent Lab data and radiographic results reviewed with patient at bedside. The patient and/or the family were informed of the results of any tests/labs/imaging, the treatment plan, and time was allotted to answer questions. Diagnosis, disposition, and treatment plan reviewed in detail with patient who understands and agrees to admission at this time. See chart for details of medications given during the ED stay. Clinical  IMPRESSION    1. Pneumonia due to infectious organism, unspecified laterality, unspecified part of lung    2. Hypoxia    3. CLL (chronic lymphocytic leukemia) (HCC)        PLAN:  Admission to the hospital    Comment: Please note this report has been produced using speech recognition software and may contain errors related to that system including errors in grammar, punctuation, and spelling, as well as words and phrases that may be inappropriate. If there are any questions or concerns please feel free to contact the dictating provider for clarification.         Roverto Anderson PA-C  03/24/22 0951

## 2022-03-25 LAB
ALBUMIN SERPL-MCNC: 3.4 GM/DL (ref 3.4–5)
ALP BLD-CCNC: 89 IU/L (ref 40–128)
ALT SERPL-CCNC: 16 U/L (ref 10–40)
ANION GAP SERPL CALCULATED.3IONS-SCNC: 12 MMOL/L (ref 4–16)
AST SERPL-CCNC: 14 IU/L (ref 15–37)
ATYPICAL LYMPHOCYTE ABSOLUTE COUNT: ABNORMAL
BILIRUB SERPL-MCNC: 0.7 MG/DL (ref 0–1)
BUN BLDV-MCNC: 13 MG/DL (ref 6–23)
CALCIUM SERPL-MCNC: 8.8 MG/DL (ref 8.3–10.6)
CHLORIDE BLD-SCNC: 98 MMOL/L (ref 99–110)
CO2: 24 MMOL/L (ref 21–32)
CREAT SERPL-MCNC: 0.7 MG/DL (ref 0.9–1.3)
DIFFERENTIAL TYPE: ABNORMAL
GFR AFRICAN AMERICAN: >60 ML/MIN/1.73M2
GFR NON-AFRICAN AMERICAN: >60 ML/MIN/1.73M2
GLUCOSE BLD-MCNC: 107 MG/DL (ref 70–99)
GLUCOSE BLD-MCNC: 111 MG/DL (ref 70–99)
GLUCOSE BLD-MCNC: 128 MG/DL (ref 70–99)
GLUCOSE BLD-MCNC: 268 MG/DL (ref 70–99)
GLUCOSE BLD-MCNC: 351 MG/DL (ref 70–99)
GLUCOSE BLD-MCNC: 89 MG/DL (ref 70–99)
HCT VFR BLD CALC: 34.5 % (ref 42–52)
HEMOGLOBIN: 10.6 GM/DL (ref 13.5–18)
LYMPHOCYTES ABSOLUTE: 6 K/CU MM
LYMPHOCYTES RELATIVE PERCENT: 98 % (ref 24–44)
MCH RBC QN AUTO: 24.3 PG (ref 27–31)
MCHC RBC AUTO-ENTMCNC: 30.7 % (ref 32–36)
MCV RBC AUTO: 78.9 FL (ref 78–100)
PDW BLD-RTO: 17 % (ref 11.7–14.9)
PLATELET # BLD: 187 K/CU MM (ref 140–440)
PMV BLD AUTO: 11 FL (ref 7.5–11.1)
POTASSIUM SERPL-SCNC: 4.1 MMOL/L (ref 3.5–5.1)
RBC # BLD: 4.37 M/CU MM (ref 4.6–6.2)
SEGMENTED NEUTROPHILS ABSOLUTE COUNT: 0.1 K/CU MM
SEGMENTED NEUTROPHILS RELATIVE PERCENT: 2 % (ref 36–66)
SMUDGE CELLS: PRESENT
SODIUM BLD-SCNC: 134 MMOL/L (ref 135–145)
TOTAL PROTEIN: 5.2 GM/DL (ref 6.4–8.2)
WBC # BLD: 6.1 K/CU MM (ref 4–10.5)

## 2022-03-25 PROCEDURE — 1200000000 HC SEMI PRIVATE

## 2022-03-25 PROCEDURE — 36415 COLL VENOUS BLD VENIPUNCTURE: CPT

## 2022-03-25 PROCEDURE — 85007 BL SMEAR W/DIFF WBC COUNT: CPT

## 2022-03-25 PROCEDURE — 94640 AIRWAY INHALATION TREATMENT: CPT

## 2022-03-25 PROCEDURE — 6370000000 HC RX 637 (ALT 250 FOR IP): Performed by: HOSPITALIST

## 2022-03-25 PROCEDURE — 80053 COMPREHEN METABOLIC PANEL: CPT

## 2022-03-25 PROCEDURE — 94761 N-INVAS EAR/PLS OXIMETRY MLT: CPT

## 2022-03-25 PROCEDURE — 85027 COMPLETE CBC AUTOMATED: CPT

## 2022-03-25 PROCEDURE — 2580000003 HC RX 258: Performed by: NURSE PRACTITIONER

## 2022-03-25 PROCEDURE — 6360000002 HC RX W HCPCS: Performed by: NURSE PRACTITIONER

## 2022-03-25 PROCEDURE — 2700000000 HC OXYGEN THERAPY PER DAY

## 2022-03-25 PROCEDURE — 6370000000 HC RX 637 (ALT 250 FOR IP): Performed by: NURSE PRACTITIONER

## 2022-03-25 PROCEDURE — 82962 GLUCOSE BLOOD TEST: CPT

## 2022-03-25 RX ORDER — PREDNISONE 20 MG/1
40 TABLET ORAL DAILY
Status: DISCONTINUED | OUTPATIENT
Start: 2022-03-25 | End: 2022-03-28 | Stop reason: HOSPADM

## 2022-03-25 RX ADMIN — Medication 324 MG: at 17:45

## 2022-03-25 RX ADMIN — INSULIN LISPRO 10 UNITS: 100 INJECTION, SOLUTION INTRAVENOUS; SUBCUTANEOUS at 12:47

## 2022-03-25 RX ADMIN — ACETAMINOPHEN 650 MG: 325 TABLET ORAL at 12:49

## 2022-03-25 RX ADMIN — IBRUTINIB 140 MG: 140 TABLET, FILM COATED ORAL at 17:46

## 2022-03-25 RX ADMIN — ALBUTEROL SULFATE 2 PUFF: 90 AEROSOL, METERED RESPIRATORY (INHALATION) at 07:41

## 2022-03-25 RX ADMIN — ALBUTEROL SULFATE 2 PUFF: 90 AEROSOL, METERED RESPIRATORY (INHALATION) at 19:56

## 2022-03-25 RX ADMIN — SODIUM CHLORIDE, PRESERVATIVE FREE 10 ML: 5 INJECTION INTRAVENOUS at 08:13

## 2022-03-25 RX ADMIN — LEVOFLOXACIN 750 MG: 5 INJECTION, SOLUTION INTRAVENOUS at 17:54

## 2022-03-25 RX ADMIN — ALBUTEROL SULFATE 2 PUFF: 90 AEROSOL, METERED RESPIRATORY (INHALATION) at 15:24

## 2022-03-25 RX ADMIN — GUAIFENESIN 1200 MG: 600 TABLET, EXTENDED RELEASE ORAL at 08:14

## 2022-03-25 RX ADMIN — ENOXAPARIN SODIUM 40 MG: 100 INJECTION SUBCUTANEOUS at 08:12

## 2022-03-25 RX ADMIN — TAMSULOSIN HYDROCHLORIDE 0.4 MG: 0.4 CAPSULE ORAL at 08:13

## 2022-03-25 RX ADMIN — PANTOPRAZOLE SODIUM 40 MG: 40 TABLET, DELAYED RELEASE ORAL at 08:13

## 2022-03-25 RX ADMIN — Medication 2 PUFF: at 19:56

## 2022-03-25 RX ADMIN — INSULIN GLARGINE 40 UNITS: 100 INJECTION, SOLUTION SUBCUTANEOUS at 21:11

## 2022-03-25 RX ADMIN — FINASTERIDE 5 MG: 5 TABLET, FILM COATED ORAL at 08:13

## 2022-03-25 RX ADMIN — BENZONATATE 100 MG: 100 CAPSULE ORAL at 08:13

## 2022-03-25 RX ADMIN — GUAIFENESIN 1200 MG: 600 TABLET, EXTENDED RELEASE ORAL at 21:11

## 2022-03-25 RX ADMIN — Medication 324 MG: at 08:14

## 2022-03-25 RX ADMIN — PREDNISONE 40 MG: 20 TABLET ORAL at 12:47

## 2022-03-25 RX ADMIN — Medication 2 PUFF: at 15:24

## 2022-03-25 RX ADMIN — ATORVASTATIN CALCIUM 80 MG: 40 TABLET, FILM COATED ORAL at 21:11

## 2022-03-25 RX ADMIN — Medication 2 PUFF: at 07:41

## 2022-03-25 RX ADMIN — Medication 500 MG: at 08:12

## 2022-03-25 ASSESSMENT — PAIN SCALES - GENERAL
PAINLEVEL_OUTOF10: 3
PAINLEVEL_OUTOF10: 0

## 2022-03-25 NOTE — PROGRESS NOTES
Hospitalist Progress Note      Name:  Kim Delong /Age/Sex: 1950  (80 y.o. male)   MRN & CSN:  [id number] & [id number] Admission Date/Time: 3/24/2022  3:19 PM   Location:  [address] [place] [address] PCP: Torsten Fischer MD         Hospital Day: 2    Assessment and Plan:       1. Acute hypoxic respiratory failure secondary to community-acquired pneumonia:  Continue supplemental oxygen to keep O2 sat above 90%  Currently on 1 L nasal cannula oxygen  Continue IV antibiotic breathing treatment  Added p.o. steroid as the patient smoke before  there is mild chest wheezing on examination  ABG show ph 7.43, Pco2 38    4-hovmltscf-qgvogoka pneumonia continue IV levofloxacin  Serum  procalcitonin low  Upper respiratory panel negative including Covid negative  Follow-up legionella and strep pneumonia Ag     DM II - Hold metformin. Resume basal and ssi, monitor BG AC/HS, ADA diet     # CLL - Followed by Oncology O/P. On monthly IVIG, and daily Ibrutinib-resume.      # Mixed HLD - resume statin  # BPH on flomax- resume, monitor for retention    Diet ADULT DIET; Regular; Low Fat/Low Chol/High Fiber/2 gm Na   DVT Prophylaxis [x] Lovenox, []  Heparin, [] SCDs, [] Ambulation   GI Prophylaxis [x] PPI,  [] H2 Blocker,  [] Carafate,  [] Diet/Tube Feeds   Code Status Full Code   Disposition Patient requires continued admission due to    MDM [] Low, [] Moderate,[]  High  Patient's risk as above due to      History of Present Illness: The patient was seen and examined at bedside  Patient still has shortness of breath on productive cough  No chest pain no fever  There is mild chest wheezing   start on oral steroid    Objective: Intake/Output Summary (Last 24 hours) at 3/25/2022 1003  Last data filed at 3/25/2022 0810  Gross per 24 hour   Intake --   Output 600 ml   Net -600 ml      Vitals:   Vitals:    22 0742   BP:    Pulse:    Resp: 18   Temp:    SpO2: 93%     Physical Exam:   GEN Awake.  Alert , not in respiratory distress, not in pain  HEENT: PEERLA, , supple neck,   Chest: air entry equal bilaterally, mild  wheezing of chest , no crepitation  Heart: S1 and S2 heard, no murmur, no gallop or rub, regular rate  Abdomen: soft, ND , Nt, +BS  Extremities: no cyanosis, tenderness or erythema, peripheral pulses audible  Neurology: alert, oriented x3, able to move 4 limbs    Medications:   Medications:    predniSONE  40 mg Oral Daily    sodium chloride flush  5-40 mL IntraVENous 2 times per day    enoxaparin  40 mg SubCUTAneous Daily    ascorbic acid  500 mg Oral Daily    atorvastatin  80 mg Oral Nightly    benzonatate  100 mg Oral Daily    ferrous gluconate  324 mg Oral BID WC    finasteride  5 mg Oral Daily    guaiFENesin  1,200 mg Oral BID    ibrutinib  140 mg Oral Daily    insulin lispro  10 Units SubCUTAneous TID WC    pantoprazole  40 mg Oral QAM AC    tamsulosin  0.4 mg Oral Daily    levofloxacin  750 mg IntraVENous Q24H    insulin glargine  40 Units SubCUTAneous Nightly    albuterol sulfate HFA  2 puff Inhalation TID    ipratropium  2 puff Inhalation TID      Infusions:    sodium chloride       PRN Meds: sodium chloride flush, 5-40 mL, PRN  sodium chloride, 25 mL, PRN  ondansetron, 4 mg, Q8H PRN   Or  ondansetron, 4 mg, Q6H PRN  polyethylene glycol, 17 g, Daily PRN  acetaminophen, 650 mg, Q6H PRN   Or  acetaminophen, 650 mg, Q6H PRN  ipratropium-albuterol, 1 ampule, Q6H PRN          Electronically signed by Jeny Chamorro MD on 3/25/2022 at 10:03 AM

## 2022-03-25 NOTE — CONSULTS
The following patient supplied medication(s) have been identified and labeled by the pharmacist for administration in the hospital... Imbruvica 140mg po daily    Kostas Oliveira.  Howard Radford, [facility]  3/25/2022 10:08 AM

## 2022-03-25 NOTE — CARE COORDINATION
Reviewed chart and discussed in IDR, pt up and independent in room. Attempted to see pt but sleeping soundly. Chart review shows pt lives alone, he is independent, he has a PCP and insurance. CM will continue to follow.

## 2022-03-26 LAB
ALBUMIN SERPL-MCNC: 3.6 GM/DL (ref 3.4–5)
ALP BLD-CCNC: 89 IU/L (ref 40–128)
ALT SERPL-CCNC: 16 U/L (ref 10–40)
ANION GAP SERPL CALCULATED.3IONS-SCNC: 12 MMOL/L (ref 4–16)
AST SERPL-CCNC: 12 IU/L (ref 15–37)
BILIRUB SERPL-MCNC: 0.6 MG/DL (ref 0–1)
BUN BLDV-MCNC: 16 MG/DL (ref 6–23)
CALCIUM SERPL-MCNC: 9 MG/DL (ref 8.3–10.6)
CHLORIDE BLD-SCNC: 100 MMOL/L (ref 99–110)
CO2: 24 MMOL/L (ref 21–32)
CREAT SERPL-MCNC: 0.7 MG/DL (ref 0.9–1.3)
GFR AFRICAN AMERICAN: >60 ML/MIN/1.73M2
GFR NON-AFRICAN AMERICAN: >60 ML/MIN/1.73M2
GLUCOSE BLD-MCNC: 173 MG/DL (ref 70–99)
GLUCOSE BLD-MCNC: 179 MG/DL (ref 70–99)
GLUCOSE BLD-MCNC: 211 MG/DL (ref 70–99)
GLUCOSE BLD-MCNC: 261 MG/DL (ref 70–99)
GLUCOSE BLD-MCNC: 278 MG/DL (ref 70–99)
GLUCOSE BLD-MCNC: 321 MG/DL (ref 70–99)
GLUCOSE BLD-MCNC: 412 MG/DL (ref 70–99)
HCT VFR BLD CALC: 37.2 % (ref 42–52)
HEMOGLOBIN: 11.2 GM/DL (ref 13.5–18)
MCH RBC QN AUTO: 23.9 PG (ref 27–31)
MCHC RBC AUTO-ENTMCNC: 30.1 % (ref 32–36)
MCV RBC AUTO: 79.5 FL (ref 78–100)
PDW BLD-RTO: 17.2 % (ref 11.7–14.9)
PLATELET # BLD: 193 K/CU MM (ref 140–440)
PMV BLD AUTO: 11 FL (ref 7.5–11.1)
POTASSIUM SERPL-SCNC: 4.2 MMOL/L (ref 3.5–5.1)
RBC # BLD: 4.68 M/CU MM (ref 4.6–6.2)
SODIUM BLD-SCNC: 136 MMOL/L (ref 135–145)
TOTAL PROTEIN: 5.5 GM/DL (ref 6.4–8.2)
WBC # BLD: 6.6 K/CU MM (ref 4–10.5)

## 2022-03-26 PROCEDURE — 85027 COMPLETE CBC AUTOMATED: CPT

## 2022-03-26 PROCEDURE — 2700000000 HC OXYGEN THERAPY PER DAY

## 2022-03-26 PROCEDURE — 94761 N-INVAS EAR/PLS OXIMETRY MLT: CPT

## 2022-03-26 PROCEDURE — 6370000000 HC RX 637 (ALT 250 FOR IP): Performed by: NURSE PRACTITIONER

## 2022-03-26 PROCEDURE — 6370000000 HC RX 637 (ALT 250 FOR IP): Performed by: HOSPITALIST

## 2022-03-26 PROCEDURE — 80053 COMPREHEN METABOLIC PANEL: CPT

## 2022-03-26 PROCEDURE — 1200000000 HC SEMI PRIVATE

## 2022-03-26 PROCEDURE — 6360000002 HC RX W HCPCS: Performed by: NURSE PRACTITIONER

## 2022-03-26 PROCEDURE — 82962 GLUCOSE BLOOD TEST: CPT

## 2022-03-26 PROCEDURE — 2580000003 HC RX 258: Performed by: NURSE PRACTITIONER

## 2022-03-26 PROCEDURE — 36415 COLL VENOUS BLD VENIPUNCTURE: CPT

## 2022-03-26 PROCEDURE — 94640 AIRWAY INHALATION TREATMENT: CPT

## 2022-03-26 RX ORDER — DEXTROSE MONOHYDRATE 25 G/50ML
12.5 INJECTION, SOLUTION INTRAVENOUS PRN
Status: DISCONTINUED | OUTPATIENT
Start: 2022-03-26 | End: 2022-03-26

## 2022-03-26 RX ORDER — NICOTINE POLACRILEX 4 MG
15 LOZENGE BUCCAL PRN
Status: DISCONTINUED | OUTPATIENT
Start: 2022-03-26 | End: 2022-03-28 | Stop reason: HOSPADM

## 2022-03-26 RX ORDER — DEXTROSE MONOHYDRATE 50 MG/ML
100 INJECTION, SOLUTION INTRAVENOUS PRN
Status: DISCONTINUED | OUTPATIENT
Start: 2022-03-26 | End: 2022-03-28 | Stop reason: HOSPADM

## 2022-03-26 RX ADMIN — Medication 2 PUFF: at 14:36

## 2022-03-26 RX ADMIN — FINASTERIDE 5 MG: 5 TABLET, FILM COATED ORAL at 08:35

## 2022-03-26 RX ADMIN — ALBUTEROL SULFATE 2 PUFF: 90 AEROSOL, METERED RESPIRATORY (INHALATION) at 20:04

## 2022-03-26 RX ADMIN — Medication 500 MG: at 08:59

## 2022-03-26 RX ADMIN — PREDNISONE 40 MG: 20 TABLET ORAL at 08:58

## 2022-03-26 RX ADMIN — INSULIN LISPRO 10 UNITS: 100 INJECTION, SOLUTION INTRAVENOUS; SUBCUTANEOUS at 12:38

## 2022-03-26 RX ADMIN — SODIUM CHLORIDE 25 ML: 9 INJECTION, SOLUTION INTRAVENOUS at 17:28

## 2022-03-26 RX ADMIN — Medication 2 PUFF: at 20:05

## 2022-03-26 RX ADMIN — LEVOFLOXACIN 750 MG: 5 INJECTION, SOLUTION INTRAVENOUS at 17:29

## 2022-03-26 RX ADMIN — ALBUTEROL SULFATE 2 PUFF: 90 AEROSOL, METERED RESPIRATORY (INHALATION) at 14:35

## 2022-03-26 RX ADMIN — INSULIN LISPRO 10 UNITS: 100 INJECTION, SOLUTION INTRAVENOUS; SUBCUTANEOUS at 17:36

## 2022-03-26 RX ADMIN — SODIUM CHLORIDE, PRESERVATIVE FREE 10 ML: 5 INJECTION INTRAVENOUS at 22:31

## 2022-03-26 RX ADMIN — GUAIFENESIN 1200 MG: 600 TABLET, EXTENDED RELEASE ORAL at 22:30

## 2022-03-26 RX ADMIN — IBRUTINIB 140 MG: 140 TABLET, FILM COATED ORAL at 09:10

## 2022-03-26 RX ADMIN — BENZONATATE 100 MG: 100 CAPSULE ORAL at 08:59

## 2022-03-26 RX ADMIN — GUAIFENESIN 1200 MG: 600 TABLET, EXTENDED RELEASE ORAL at 08:58

## 2022-03-26 RX ADMIN — ATORVASTATIN CALCIUM 80 MG: 40 TABLET, FILM COATED ORAL at 22:30

## 2022-03-26 RX ADMIN — ENOXAPARIN SODIUM 40 MG: 100 INJECTION SUBCUTANEOUS at 08:59

## 2022-03-26 RX ADMIN — INSULIN LISPRO 10 UNITS: 100 INJECTION, SOLUTION INTRAVENOUS; SUBCUTANEOUS at 09:03

## 2022-03-26 RX ADMIN — ALBUTEROL SULFATE 2 PUFF: 90 AEROSOL, METERED RESPIRATORY (INHALATION) at 07:45

## 2022-03-26 RX ADMIN — Medication 2 PUFF: at 07:46

## 2022-03-26 RX ADMIN — TAMSULOSIN HYDROCHLORIDE 0.4 MG: 0.4 CAPSULE ORAL at 08:59

## 2022-03-26 RX ADMIN — Medication 324 MG: at 08:59

## 2022-03-26 RX ADMIN — PANTOPRAZOLE SODIUM 40 MG: 40 TABLET, DELAYED RELEASE ORAL at 08:58

## 2022-03-26 RX ADMIN — Medication 324 MG: at 17:29

## 2022-03-26 RX ADMIN — INSULIN GLARGINE 40 UNITS: 100 INJECTION, SOLUTION SUBCUTANEOUS at 22:30

## 2022-03-26 RX ADMIN — SODIUM CHLORIDE, PRESERVATIVE FREE 10 ML: 5 INJECTION INTRAVENOUS at 09:05

## 2022-03-26 ASSESSMENT — PAIN SCALES - GENERAL
PAINLEVEL_OUTOF10: 0

## 2022-03-26 NOTE — PROGRESS NOTES
Hospitalist Progress Note      Name:  Kim Delong /Age/Sex: 1950  (80 y.o. male)   MRN & CSN:  [id number] & [id number] Admission Date/Time: 3/24/2022  3:19 PM   Location:  [address] [place] [address] PCP: Torsten Fischer MD         Hospital Day: 3    Assessment and Plan:       1. Acute hypoxic respiratory failure secondary to community-acquired pneumonia:  Continue supplemental oxygen to keep O2 sat above 90%  Currently on 1 L nasal cannula oxygen  Continue IV antibiotic breathing treatment  Added p.o. steroid as the patient smoke before  there is mild chest wheezing on examination  ABG show ph 7.43, Pco2 38    5-Fslrlkwsq-cmupcply pneumonia continue IV levofloxacin  Serum  procalcitonin low  Upper respiratory panel negative including Covid negative  Follow-up legionella and strep pneumonia Ag     DM II - Hold metformin. Resume basal and ssi, monitor BG AC/HS, ADA diet     # CLL - Followed by Oncology O/P. On monthly IVIG, and daily Ibrutinib-resume. Anemia of chronic disease, Hb 10.6 down from 11.4 3/24  No sign of bleeding  Monitor CBC      # Mixed HLD - resume statin  # BPH on flomax- resume, monitor for retention    Diet ADULT DIET; Regular; Low Fat/Low Chol/High Fiber/2 gm Na   DVT Prophylaxis [x] Lovenox, []  Heparin, [] SCDs, [] Ambulation   GI Prophylaxis [x] PPI,  [] H2 Blocker,  [] Carafate,  [] Diet/Tube Feeds   Code Status Full Code   Disposition Patient requires continued admission due to    MDM [] Low, [] Moderate,[]  High  Patient's risk as above due to      History of Present Illness: The patient was seen and examined at bedside  Patient start feeling better  Still has chest wheezing   No chest pain no fever  start on oral steroid    Objective:        Intake/Output Summary (Last 24 hours) at 3/26/2022 0850  Last data filed at 3/26/2022 0601  Gross per 24 hour   Intake 375 ml   Output 2850 ml   Net -2475 ml      Vitals:   Vitals:    22 0757   BP: 137/84   Pulse: 77   Resp:    Temp: 98.1 °F (36.7 °C)   SpO2: 93%     Physical Exam:   GEN Awake.  Alert , not in respiratory distress, not in pain  HEENT: PEERLA, , supple neck,   Chest: air entry equal bilaterally, mild  wheezing of chest , no crepitation  Heart: S1 and S2 heard, no murmur, no gallop or rub, regular rate  Abdomen: soft, ND , Nt, +BS  Extremities: no cyanosis, tenderness or erythema, peripheral pulses audible  Neurology: alert, oriented x3, able to move 4 limbs    Medications:   Medications:    predniSONE  40 mg Oral Daily    sodium chloride flush  5-40 mL IntraVENous 2 times per day    enoxaparin  40 mg SubCUTAneous Daily    ascorbic acid  500 mg Oral Daily    atorvastatin  80 mg Oral Nightly    benzonatate  100 mg Oral Daily    ferrous gluconate  324 mg Oral BID WC    finasteride  5 mg Oral Daily    guaiFENesin  1,200 mg Oral BID    ibrutinib  140 mg Oral Daily    insulin lispro  10 Units SubCUTAneous TID WC    pantoprazole  40 mg Oral QAM AC    tamsulosin  0.4 mg Oral Daily    levofloxacin  750 mg IntraVENous Q24H    insulin glargine  40 Units SubCUTAneous Nightly    albuterol sulfate HFA  2 puff Inhalation TID    ipratropium  2 puff Inhalation TID      Infusions:    sodium chloride       PRN Meds: sodium chloride flush, 5-40 mL, PRN  sodium chloride, 25 mL, PRN  ondansetron, 4 mg, Q8H PRN   Or  ondansetron, 4 mg, Q6H PRN  polyethylene glycol, 17 g, Daily PRN  acetaminophen, 650 mg, Q6H PRN   Or  acetaminophen, 650 mg, Q6H PRN  ipratropium-albuterol, 1 ampule, Q6H PRN          Electronically signed by Becka Hernandez MD on 3/26/2022 at 8:50 AM

## 2022-03-27 LAB
ALBUMIN SERPL-MCNC: 3.8 GM/DL (ref 3.4–5)
ALP BLD-CCNC: 90 IU/L (ref 40–129)
ALT SERPL-CCNC: 21 U/L (ref 10–40)
ANION GAP SERPL CALCULATED.3IONS-SCNC: 12 MMOL/L (ref 4–16)
AST SERPL-CCNC: 16 IU/L (ref 15–37)
BILIRUB SERPL-MCNC: 0.4 MG/DL (ref 0–1)
BUN BLDV-MCNC: 19 MG/DL (ref 6–23)
CALCIUM SERPL-MCNC: 8.6 MG/DL (ref 8.3–10.6)
CHLORIDE BLD-SCNC: 98 MMOL/L (ref 99–110)
CO2: 24 MMOL/L (ref 21–32)
CREAT SERPL-MCNC: 0.8 MG/DL (ref 0.9–1.3)
GFR AFRICAN AMERICAN: >60 ML/MIN/1.73M2
GFR NON-AFRICAN AMERICAN: >60 ML/MIN/1.73M2
GLUCOSE BLD-MCNC: 178 MG/DL (ref 70–99)
GLUCOSE BLD-MCNC: 231 MG/DL (ref 70–99)
GLUCOSE BLD-MCNC: 296 MG/DL (ref 70–99)
GLUCOSE BLD-MCNC: 308 MG/DL (ref 70–99)
GLUCOSE BLD-MCNC: 449 MG/DL (ref 70–99)
HCT VFR BLD CALC: 38.9 % (ref 42–52)
HEMOGLOBIN: 12 GM/DL (ref 13.5–18)
MCH RBC QN AUTO: 24.4 PG (ref 27–31)
MCHC RBC AUTO-ENTMCNC: 30.8 % (ref 32–36)
MCV RBC AUTO: 79.1 FL (ref 78–100)
PDW BLD-RTO: 17 % (ref 11.7–14.9)
PLATELET # BLD: 212 K/CU MM (ref 140–440)
PMV BLD AUTO: 11.3 FL (ref 7.5–11.1)
POTASSIUM SERPL-SCNC: 4.9 MMOL/L (ref 3.5–5.1)
RBC # BLD: 4.92 M/CU MM (ref 4.6–6.2)
SODIUM BLD-SCNC: 134 MMOL/L (ref 135–145)
TOTAL PROTEIN: 5.8 GM/DL (ref 6.4–8.2)
WBC # BLD: 5.3 K/CU MM (ref 4–10.5)

## 2022-03-27 PROCEDURE — 82962 GLUCOSE BLOOD TEST: CPT

## 2022-03-27 PROCEDURE — 2580000003 HC RX 258: Performed by: NURSE PRACTITIONER

## 2022-03-27 PROCEDURE — 6370000000 HC RX 637 (ALT 250 FOR IP): Performed by: NURSE PRACTITIONER

## 2022-03-27 PROCEDURE — 94640 AIRWAY INHALATION TREATMENT: CPT

## 2022-03-27 PROCEDURE — 94761 N-INVAS EAR/PLS OXIMETRY MLT: CPT

## 2022-03-27 PROCEDURE — 6360000002 HC RX W HCPCS: Performed by: NURSE PRACTITIONER

## 2022-03-27 PROCEDURE — 80053 COMPREHEN METABOLIC PANEL: CPT

## 2022-03-27 PROCEDURE — 36415 COLL VENOUS BLD VENIPUNCTURE: CPT

## 2022-03-27 PROCEDURE — 85027 COMPLETE CBC AUTOMATED: CPT

## 2022-03-27 PROCEDURE — 6370000000 HC RX 637 (ALT 250 FOR IP): Performed by: HOSPITALIST

## 2022-03-27 PROCEDURE — 1200000000 HC SEMI PRIVATE

## 2022-03-27 RX ORDER — PREDNISONE 20 MG/1
TABLET ORAL
Qty: 15 TABLET | Refills: 0 | Status: SHIPPED | OUTPATIENT
Start: 2022-03-28 | End: 2022-04-09

## 2022-03-27 RX ORDER — INSULIN DETEMIR 100 [IU]/ML
40 INJECTION, SOLUTION SUBCUTANEOUS NIGHTLY
Qty: 5 PEN | Refills: 3 | Status: SHIPPED | OUTPATIENT
Start: 2022-03-27

## 2022-03-27 RX ORDER — IPRATROPIUM BROMIDE AND ALBUTEROL SULFATE 2.5; .5 MG/3ML; MG/3ML
3 SOLUTION RESPIRATORY (INHALATION) EVERY 6 HOURS PRN
Qty: 360 ML | Refills: 3 | Status: SHIPPED | OUTPATIENT
Start: 2022-03-27 | End: 2022-06-14

## 2022-03-27 RX ORDER — LEVOFLOXACIN 750 MG/1
750 TABLET ORAL DAILY
Qty: 5 TABLET | Refills: 0 | Status: SHIPPED | OUTPATIENT
Start: 2022-03-27 | End: 2022-04-06

## 2022-03-27 RX ORDER — INSULIN ASPART 100 [IU]/ML
15 INJECTION, SOLUTION INTRAVENOUS; SUBCUTANEOUS
Qty: 5 PEN | Refills: 3 | Status: ON HOLD | OUTPATIENT
Start: 2022-03-27 | End: 2022-11-02 | Stop reason: SDUPTHER

## 2022-03-27 RX ADMIN — TAMSULOSIN HYDROCHLORIDE 0.4 MG: 0.4 CAPSULE ORAL at 09:18

## 2022-03-27 RX ADMIN — Medication 324 MG: at 16:22

## 2022-03-27 RX ADMIN — SODIUM CHLORIDE 25 ML: 9 INJECTION, SOLUTION INTRAVENOUS at 16:28

## 2022-03-27 RX ADMIN — Medication 2 PUFF: at 14:57

## 2022-03-27 RX ADMIN — SODIUM CHLORIDE, PRESERVATIVE FREE 5 ML: 5 INJECTION INTRAVENOUS at 20:44

## 2022-03-27 RX ADMIN — IBRUTINIB 140 MG: 140 TABLET, FILM COATED ORAL at 08:51

## 2022-03-27 RX ADMIN — INSULIN GLARGINE 40 UNITS: 100 INJECTION, SOLUTION SUBCUTANEOUS at 20:44

## 2022-03-27 RX ADMIN — PANTOPRAZOLE SODIUM 40 MG: 40 TABLET, DELAYED RELEASE ORAL at 09:18

## 2022-03-27 RX ADMIN — ALBUTEROL SULFATE 2 PUFF: 90 AEROSOL, METERED RESPIRATORY (INHALATION) at 19:38

## 2022-03-27 RX ADMIN — GUAIFENESIN 1200 MG: 600 TABLET, EXTENDED RELEASE ORAL at 20:43

## 2022-03-27 RX ADMIN — ALBUTEROL SULFATE 2 PUFF: 90 AEROSOL, METERED RESPIRATORY (INHALATION) at 14:57

## 2022-03-27 RX ADMIN — ENOXAPARIN SODIUM 40 MG: 100 INJECTION SUBCUTANEOUS at 09:19

## 2022-03-27 RX ADMIN — Medication 500 MG: at 09:18

## 2022-03-27 RX ADMIN — Medication 2 PUFF: at 09:01

## 2022-03-27 RX ADMIN — BENZONATATE 100 MG: 100 CAPSULE ORAL at 09:18

## 2022-03-27 RX ADMIN — ATORVASTATIN CALCIUM 80 MG: 40 TABLET, FILM COATED ORAL at 20:43

## 2022-03-27 RX ADMIN — SODIUM CHLORIDE, PRESERVATIVE FREE 10 ML: 5 INJECTION INTRAVENOUS at 08:19

## 2022-03-27 RX ADMIN — Medication 324 MG: at 09:18

## 2022-03-27 RX ADMIN — PREDNISONE 40 MG: 20 TABLET ORAL at 09:18

## 2022-03-27 RX ADMIN — FINASTERIDE 5 MG: 5 TABLET, FILM COATED ORAL at 09:18

## 2022-03-27 RX ADMIN — ALBUTEROL SULFATE 2 PUFF: 90 AEROSOL, METERED RESPIRATORY (INHALATION) at 09:00

## 2022-03-27 RX ADMIN — INSULIN LISPRO 10 UNITS: 100 INJECTION, SOLUTION INTRAVENOUS; SUBCUTANEOUS at 12:29

## 2022-03-27 RX ADMIN — INSULIN LISPRO 10 UNITS: 100 INJECTION, SOLUTION INTRAVENOUS; SUBCUTANEOUS at 08:46

## 2022-03-27 RX ADMIN — LEVOFLOXACIN 750 MG: 5 INJECTION, SOLUTION INTRAVENOUS at 20:52

## 2022-03-27 RX ADMIN — SODIUM CHLORIDE, PRESERVATIVE FREE 10 ML: 5 INJECTION INTRAVENOUS at 20:43

## 2022-03-27 RX ADMIN — INSULIN LISPRO 10 UNITS: 100 INJECTION, SOLUTION INTRAVENOUS; SUBCUTANEOUS at 18:13

## 2022-03-27 RX ADMIN — Medication 2 PUFF: at 19:39

## 2022-03-27 RX ADMIN — GUAIFENESIN 1200 MG: 600 TABLET, EXTENDED RELEASE ORAL at 09:18

## 2022-03-27 NOTE — DISCHARGE SUMMARY
V2.0  Discharge Summary    Name:  Gerry Jacobs /Age/Sex: 1950 (80 y.o. male)   Admit Date: 3/24/2022  Discharge Date: 3/27/22    MRN & CSN:  [id number] & [id number] Encounter Date and Time 3/27/22 7:10 PM EDT    Attending:  Dariana Gutierrez MD Discharging Provider: Dariana Gutierrez MD       Hospital Course:     Brief HPI  Gerry Jacobs is a 80 y.o. male  with history of CLL, CAD (per EHR), diabetes mellitus, hyperlipidemia, BPH, who presents with upper respiratory symptoms. The patient reports sinus congestion and productive cough of green secretions for the past week. He has been managed on Mucinex and antitussives with no significant relief. He reports chills, did not measure his temperature. He denies significant shortness of breath. He denies chest pain. He reports generalized fatigue. He reports nausea; denies emesis or diarrhea. No dysuria. His PCP ordered an outpatient chest x-ray with findings concerning for pneumonia therefore he was sent in for evaluation. He presented to the emergency department afebrile pulse 92 respirations 15 blood pressure 164/68. O2 sat 94% on room air reportedly desatted to mid 80s therefore was placed on supplemental oxygen at 2 L per nasal cannula. Chest x-ray performed showed nodular patchy opacities in the left lung base and lateral right mid and lower lung. EKG performed showed sinus tachycardia with nonspecific ST-T wave changes, . Troponin negative x1, . Chemistry panel significant for sodium 134 chloride 94 glucose 128 otherwise unremarkable. LFTs unremarkable. Respiratory panel negative. WBC within normal limits. Hemoglobin 11.4 hematocrit 37.2, platelets 160. Blood cultures were ordered. The patient was dosed with IV Ceftriaxone/Zithromax and has been admitted for further management. Brief Problem Based Course:  Patient was admitted in the hospital.  He was placed on IV antibioticsand nebulized treatments and oral steroids. Respiratory status improved. Required oxygen at rest, pulse ox on ambulation today at 94% and breathing was not labored. Patient feels much improved and requesting to go home. Patient will finish course of antibiotic as an outpatient. In view of his immunocompromise status, will continue with a longer course of antibiotic than usual.    Otherwise his other medical issues during this hospital stay remained stable. Vital signs on discharge, blood pressure 134/71, rate 82, respiratory 18, saturation 94% room air. Cardiovascular: S1-S2, regular rate  Lungs: Coarse breath sounds, no wheezing, no accessory muscle use  Abdomen: Soft, nontender, no guarding or rebound  Extremities: No edema, no calf tenderness  Neuro: Alert, ambulatory      The patient expressed appropriate understanding of, and agreement with the discharge recommendations, medications, and plan. Consults this admission:  IP CONSULT TO HOSPITALIST     Addendum: Patient was scheduled for discharge on 3/27/2022. However due to personal issues, was unable to secure transportation, and access to his residence, therefore will be discharged today.     Discharge Diagnosis:   Acute hypoxic respiratory failure  Community-acquired pneumonia  Type 2 diabetes  CLL  BPH    Discharge Instruction:   Follow up appointments:   Primary care physician: Deepika Parker MD within 2 weeks  Diet: cardiac diet   Activity: activity as tolerated  Disposition: Discharged to:   [x]Home, []C, []SNF, []Acute Rehab, []Hospice   Condition on discharge: Stable  Labs and Tests to be Followed up as an outpatient by PCP or Specialist: PCP    Discharge Medications:        Medication List      START taking these medications    ipratropium-albuterol 0.5-2.5 (3) MG/3ML Soln nebulizer solution  Commonly known as: DUONEB  Inhale 3 mLs into the lungs every 6 hours as needed for Shortness of Breath     levoFLOXacin 750 MG tablet  Commonly known as: Levaquin  Take 1 tablet by mouth daily for 10 days     predniSONE 20 MG tablet  Commonly known as: DELTASONE  Take 2 tablets by mouth daily for 3 days, THEN 1.5 tablets daily for 3 days, THEN 1 tablet daily for 3 days, THEN 0.5 tablets daily for 3 days. Start taking on: March 28, 2022        CHANGE how you take these medications    Levemir FlexTouch 100 UNIT/ML injection pen  Generic drug: insulin detemir  Inject 40 Units into the skin nightly  What changed: how much to take     * NovoLOG FlexPen 100 UNIT/ML injection pen  Generic drug: insulin aspart  Inject 15 Units into the skin 3 times daily (before meals)  What changed: how much to take     * NovoLOG FlexPen 100 UNIT/ML injection pen  Generic drug: insulin aspart  What changed: Another medication with the same name was changed. Make sure you understand how and when to take each. * This list has 2 medication(s) that are the same as other medications prescribed for you. Read the directions carefully, and ask your doctor or other care provider to review them with you.             CONTINUE taking these medications    acetaminophen-codeine 300-30 MG per tablet  Commonly known as: TYLENOL #3     albuterol sulfate  (90 Base) MCG/ACT inhaler  Commonly known as: Proventil HFA  Inhale 2 puffs into the lungs every 4 hours as needed for Wheezing or Shortness of Breath     atorvastatin 80 MG tablet  Commonly known as: LIPITOR     benzonatate 100 MG capsule  Commonly known as: TESSALON     ferrous gluconate 324 (37.5 Fe) MG Tabs  Take 1 tablet by mouth 2 times daily     finasteride 5 MG tablet  Commonly known as: PROSCAR     glucose monitoring kit  1 kit by Does not apply route daily as needed (glucose checks)     Imbruvica 140 MG tablet  Generic drug: ibrutinib  Take 1 tablet by mouth daily     immune globulin (human) 20 GM/200ML Soln solution     Insulin Syringe-Needle U-100 30G X 1/2\" 0.5 ML Misc  1 each by Does not apply route daily     magnesium oxide 400 MG tablet  Commonly known as: MAG-OX     metFORMIN 1000 MG tablet  Commonly known as: GLUCOPHAGE     Mucinex 600 MG extended release tablet  Generic drug: guaiFENesin     omeprazole 20 MG delayed release capsule  Commonly known as: PRILOSEC     tamsulosin 0.4 MG capsule  Commonly known as: FLOMAX     valACYclovir 500 MG tablet  Commonly known as: VALTREX  Take 1 tablet by mouth daily        STOP taking these medications    ascorbic acid 250 MG tablet  Commonly known as: V-R VITAMIN C     doxycycline hyclate 100 MG capsule  Commonly known as: VIBRAMYCIN           Where to Get Your Medications      These medications were sent to [facility], [address] [facility]  [address], [facility] [address]    Phone: [phone]   · ipratropium-albuterol 0.5-2.5 (3) MG/3ML Soln nebulizer solution  · Levemir FlexTouch 100 UNIT/ML injection pen  · levoFLOXacin 750 MG tablet  · NovoLOG FlexPen 100 UNIT/ML injection pen  · predniSONE 20 MG tablet        Objective Findings at Discharge:   /71   Pulse 82   Temp 97.6 °F (36.4 °C) (Oral)   Resp 18   Ht 6' (1.829 m)   Wt 181 lb 9.6 oz (82.4 kg)   SpO2 95%   BMI 24.63 kg/m²         Labs and Imaging   XR CHEST (2 VW)    Result Date: 3/25/2022  EXAMINATION: TWO XRAY VIEWS OF THE CHEST 3/24/2022 10:36 am COMPARISON: 5/14/2019 HISTORY: ORDERING SYSTEM PROVIDED HISTORY: Pleurisy TECHNOLOGIST PROVIDED HISTORY: Reason for Exam: patient reports sinus drainage and chest congestion, patient reports he is undergoing therapy for leukemia FINDINGS: A right IJ Port-A-Cath is in place. The tip is in the superior vena cava. The heart size is normal.  Bilateral interstitial disease is noted. The bony thorax is grossly intact. Bilateral interstitial opacity. Nodular consolidation at the left lung base. Pneumonia is favored over metastatic/lymphangitic disease. Consider atypical etiologies.      XR CHEST PORTABLE    Result Date: 3/24/2022  EXAM: XR Chest, 1 View EXAM DATE/TIME: 3/24/2022 1:16 pm CLINICAL HISTORY: ORDERING SYSTEM PROVIDED  shortness of breath  TECHNOLOGIST PROVIDED HISTORY: Reason for exam:->shortness of breath  Reason for Exam: shortness of breath Additional signs and symptoms: shortness of breath  Relevant Medical/Surgical History: shortness of breath TECHNIQUE: Frontal view of the chest. COMPARISON: 03/24/2022 FINDINGS: Lungs:  Some small somewhat nodular patchy areas of opacity are seen at the left lung base and to a lesser degree within the lateral right mid and lower lung and interstitial prominence on the prior study has significantly improved. Pleural space:  No acute findings. No pneumothorax. Heart:  No acute findings. No cardiomegaly. Mediastinum:  No acute findings. Bones/joints:  No acute findings. Tubes, lines and devices:  Right-sided chest port is unchanged. Some small somewhat nodular patchy areas of opacity are seen at the left lung base and to a lesser degree within the lateral right mid and lower lung and interstitial prominence on the prior study has significantly improved. Current findings may just represent chronic lung changes though areas of pneumonia, pneumonitis or a nodular pulmonary process cannot be excluded.        CBC:   Recent Labs     03/25/22  0655 03/26/22  0815 03/27/22  1447   WBC 6.1 6.6 5.3   HGB 10.6* 11.2* 12.0*    193 212     BMP:    Recent Labs     03/25/22  0655 03/26/22  0815 03/27/22  1447   * 136 134*   K 4.1 4.2 4.9   CL 98* 100 98*   CO2 24 24 24   BUN 13 16 19   CREATININE 0.7* 0.7* 0.8*   GLUCOSE 107* 173* 296*     Hepatic:   Recent Labs     03/25/22  0655 03/26/22  0815 03/27/22  1447   AST 14* 12* 16   ALT 16 16 21   BILITOT 0.7 0.6 0.4   ALKPHOS 89 89 90     Lipids:   Lab Results   Component Value Date    CHOL 160 08/03/2012    HDL 32 08/03/2012    TRIG 274 08/03/2012     Hemoglobin A1C:   Lab Results   Component Value Date    LABA1C 6.4 05/14/2019     TSH: No results found for: TSH  Troponin:   Lab Results   Component Value Date    TROPONINT <0.010 03/24/2022    TROPONINT <0.010 09/26/2017    TROPONINT <0.010 03/29/2017     Lactic Acid: No results for input(s): LACTA in the last 72 hours. BNP: No results for input(s): PROBNP in the last 72 hours.   UA:  Lab Results   Component Value Date    NITRU NEGATIVE 04/09/2019    COLORU YELLOW 04/09/2019    WBCUA 1 04/09/2019    RBCUA <1 04/09/2019    MUCUS RARE 04/09/2019    TRICHOMONAS NONE SEEN 04/09/2019    BACTERIA NEGATIVE 04/09/2019    CLARITYU CLEAR 04/09/2019    SPECGRAV 1.015 04/09/2019    LEUKOCYTESUR NEGATIVE 04/09/2019    UROBILINOGEN NORMAL 04/09/2019    BILIRUBINUR NEGATIVE 04/09/2019    BLOODU NEGATIVE 04/09/2019    KETUA SMALL 04/09/2019     Urine Cultures: No results found for: LABURIN  Blood Cultures: No results found for: BC  No results found for: BLOODCULT2  Organism: No results found for: ORG    Time Spent Discharging patient 40 minutes    Electronically signed by Maryse Chang MD on 3/27/2022 at 7:10 PM

## 2022-03-27 NOTE — PLAN OF CARE
Problem: Gas Exchange - Impaired:  Goal: Levels of oxygenation will improve  Description: Levels of oxygenation will improve  Outcome: Ongoing     Problem: Serum Glucose Level - Abnormal:  Goal: Ability to maintain appropriate glucose levels will improve  Description: Ability to maintain appropriate glucose levels will improve  Outcome: Ongoing

## 2022-03-27 NOTE — PROGRESS NOTES
Orders given to d/c pt. Pt reported that his son has his keys to the house and that he was unable to reach his son by phone.

## 2022-03-27 NOTE — PROGRESS NOTES
03/27/22 1458   Treatment   Treatment Type MDI   $Treatment Type $Inhaled Therapy/Meds   Medications Albuterol   Pre-Tx Pulse 76   Breath Sounds Pre-Tx NIKI Diminished   Breath Sounds Pre-Tx LLL Diminished   Breath Sounds Pre-Tx RUL Diminished   Breath Sounds Pre-Tx RML Diminished   Breath Sounds Pre-Tx RLL Diminished   Delivery Source Other (Comment)   Position Rosas's   Tx Tolerance Well   Duration 3   Is patient on O2?  N   Oxygen Therapy/Pulse Ox   O2 Therapy Room air   SpO2 95 %   Pulse Oximeter Device Mode Intermittent   Pulse Oximeter Device Location Finger

## 2022-03-27 NOTE — DISCHARGE INSTR - DIET

## 2022-03-28 VITALS
SYSTOLIC BLOOD PRESSURE: 132 MMHG | HEIGHT: 72 IN | WEIGHT: 181.6 LBS | DIASTOLIC BLOOD PRESSURE: 76 MMHG | BODY MASS INDEX: 24.6 KG/M2 | TEMPERATURE: 97.8 F | OXYGEN SATURATION: 93 % | RESPIRATION RATE: 18 BRPM | HEART RATE: 79 BPM

## 2022-03-28 LAB — GLUCOSE BLD-MCNC: 159 MG/DL (ref 70–99)

## 2022-03-28 PROCEDURE — 6370000000 HC RX 637 (ALT 250 FOR IP): Performed by: NURSE PRACTITIONER

## 2022-03-28 PROCEDURE — 2580000003 HC RX 258: Performed by: NURSE PRACTITIONER

## 2022-03-28 PROCEDURE — 6370000000 HC RX 637 (ALT 250 FOR IP): Performed by: HOSPITALIST

## 2022-03-28 PROCEDURE — 94640 AIRWAY INHALATION TREATMENT: CPT

## 2022-03-28 PROCEDURE — 94761 N-INVAS EAR/PLS OXIMETRY MLT: CPT

## 2022-03-28 PROCEDURE — 82962 GLUCOSE BLOOD TEST: CPT

## 2022-03-28 PROCEDURE — 6360000002 HC RX W HCPCS: Performed by: NURSE PRACTITIONER

## 2022-03-28 RX ADMIN — PANTOPRAZOLE SODIUM 40 MG: 40 TABLET, DELAYED RELEASE ORAL at 08:55

## 2022-03-28 RX ADMIN — ALBUTEROL SULFATE 2 PUFF: 90 AEROSOL, METERED RESPIRATORY (INHALATION) at 07:54

## 2022-03-28 RX ADMIN — BENZONATATE 100 MG: 100 CAPSULE ORAL at 08:44

## 2022-03-28 RX ADMIN — GUAIFENESIN 1200 MG: 600 TABLET, EXTENDED RELEASE ORAL at 08:44

## 2022-03-28 RX ADMIN — Medication 2 PUFF: at 07:54

## 2022-03-28 RX ADMIN — FINASTERIDE 5 MG: 5 TABLET, FILM COATED ORAL at 08:44

## 2022-03-28 RX ADMIN — PREDNISONE 40 MG: 20 TABLET ORAL at 08:44

## 2022-03-28 RX ADMIN — Medication 500 MG: at 08:44

## 2022-03-28 RX ADMIN — SODIUM CHLORIDE, PRESERVATIVE FREE 10 ML: 5 INJECTION INTRAVENOUS at 08:55

## 2022-03-28 RX ADMIN — ENOXAPARIN SODIUM 40 MG: 100 INJECTION SUBCUTANEOUS at 08:45

## 2022-03-28 RX ADMIN — TAMSULOSIN HYDROCHLORIDE 0.4 MG: 0.4 CAPSULE ORAL at 08:44

## 2022-03-28 RX ADMIN — IBRUTINIB 140 MG: 140 TABLET, FILM COATED ORAL at 08:45

## 2022-03-28 RX ADMIN — INSULIN LISPRO 10 UNITS: 100 INJECTION, SOLUTION INTRAVENOUS; SUBCUTANEOUS at 08:52

## 2022-03-28 RX ADMIN — Medication 324 MG: at 08:44

## 2022-03-28 ASSESSMENT — PAIN SCALES - GENERAL: PAINLEVEL_OUTOF10: 0

## 2022-03-28 NOTE — PROGRESS NOTES
V2.0  [facility] [place]      Name:  David Jorge /Age/Sex: 1950  (80 y.o. male)   MRN & CSN:  [id number] & [id number] Encounter Date/Time: 3/28/2022 1:21 PM EDT    Location:  [address] [place] [address] PCP: Davida Tran MD       Hospital Day: 5    Assessment and Plan:   Acute hypoxic respiratory failure  Community-acquired pneumonia  Type 2 diabetes  CLL  BPH    3/27/2022  Stable for discharge  In view of his immunocompromise status, we will keep him in a longer course of antibiotics then usual.  Discharge paperwork completed. Diet No diet orders on file   DVT Prophylaxis [x] Lovenox, []  Heparin, [] SCDs, [] Ambulation,  [] Eliquis, [] Xarelto   Code Status Prior   Disposition Discharge   Surrogate Decision Maker/ POA      Subjective:   Jaiden Quiros is a 80 y. o. male  with history of CLL, CAD (per EHR), diabetes mellitus, hyperlipidemia, BPH, who presents with upper respiratory symptoms.  The patient reports sinus congestion and productive cough of green secretions for the past week.  He has been managed on Mucinex and antitussives with no significant relief.  He reports chills, did not measure his temperature.  He denies significant shortness of breath.  He denies chest pain.  He reports generalized fatigue.  He reports nausea; denies emesis or diarrhea. No dysuria.  His PCP ordered an outpatient chest x-ray with findings concerning for pneumonia therefore he was sent in for evaluation. [facility] presented to the emergency department afebrile pulse 92 respirations 15 blood pressure 164/68.  O2 sat 94% on room air reportedly desatted to mid 80s therefore was placed on supplemental oxygen at 2 L per nasal cannula.  Chest x-ray performed showed nodular patchy opacities in the left lung base and lateral right mid and lower lung.  EKG performed showed sinus tachycardia with nonspecific ST-T wave changes, .  Troponin negative x1, .  Chemistry panel significant for sodium 134 chloride 94 glucose 128 otherwise unremarkable.  LFTs unremarkable. Respiratory panel negative. WBC within normal limits.  Hemoglobin 11.4 hematocrit 37.2, platelets 862.  Blood cultures were ordered. The patient was dosed with IV Ceftriaxone/Zithromax and has been admitted for further management. 3/27/2022  He has no complaints. Breathing back to baseline. Feels overall much improved. Not happy regarding the insulin regimen, that is not the same as his home regimen. No cough. No sore throat. No fever chills  Review of Systems:    Review of Systems  Negative except as mentioned in HPI    Objective: Intake/Output Summary (Last 24 hours) at 3/28/2022 1321  Last data filed at 3/28/2022 0935  Gross per 24 hour   Intake 960 ml   Output 1625 ml   Net -665 ml        Vitals:   Vitals:    03/28/22 0930   BP: 132/76   Pulse: 79   Resp: 18   Temp: 97.8 °F (36.6 °C)   SpO2: 93%       BAIG:       Last 3 Weights: Wt Readings from Last 3 Encounters:   03/26/22 181 lb 9.6 oz (82.4 kg)   02/10/22 197 lb 9.6 oz (89.6 kg)   11/01/21 198 lb 12.8 oz (90.2 kg)             Physical Exam:     16-year-old white gentleman looks stated age. No acute respiratory distress. Speaking in full sentences. ENT: Pupils equal round react light, extraocular muscles intact. No oral thrush  Neck, no thyromegaly or adenopathy, no crepitus or JVD  Cardiovascular: S1-S2, regular rate  Lungs: Coarse breath sounds but clear to auscultation. Few crackles left base.   Abdomen: Soft, nontender, no guarding rebound  Extremities: No edema, calf tenderness, clubbing cyanosis  Neuro: Alert, in x3, nonfocal      NIHSS Stroke Scale:       CIWA Assessment:  CIWA Assessment  BP: 132/76  Pulse: 79       Medications:   Medications:    Infusions:   PRN Meds:     Labs      Recent Results (from the past 24 hour(s))   CBC    Collection Time: 03/27/22  2:47 PM   Result Value Ref Range    WBC 5.3 4.0 - 10.5 K/CU MM    RBC 4.92 4.6 - 6.2 M/CU MM    Hemoglobin 12.0 (L) 13.5 - 18.0 GM/DL    Hematocrit 38.9 (L) 42 - 52 %    MCV 79.1 78 - 100 FL    MCH 24.4 (L) 27 - 31 PG    MCHC 30.8 (L) 32.0 - 36.0 %    RDW 17.0 (H) 11.7 - 14.9 %    Platelets 060 633 - 176 K/CU MM    MPV 11.3 (H) 7.5 - 11.1 FL   Comprehensive Metabolic Panel    Collection Time: 03/27/22  2:47 PM   Result Value Ref Range    Sodium 134 (L) 135 - 145 MMOL/L    Potassium 4.9 3.5 - 5.1 MMOL/L    Chloride 98 (L) 99 - 110 mMol/L    CO2 24 21 - 32 MMOL/L    BUN 19 6 - 23 MG/DL    CREATININE 0.8 (L) 0.9 - 1.3 MG/DL    Glucose 296 (H) 70 - 99 MG/DL    Calcium 8.6 8.3 - 10.6 MG/DL    Albumin 3.8 3.4 - 5.0 GM/DL    Total Protein 5.8 (L) 6.4 - 8.2 GM/DL    Total Bilirubin 0.4 0.0 - 1.0 MG/DL    ALT 21 10 - 40 U/L    AST 16 15 - 37 IU/L    Alkaline Phosphatase 90 40 - 129 IU/L    GFR Non-African American >60 >60 mL/min/1.73m2    GFR African American >60 >60 mL/min/1.73m2    Anion Gap 12 4 - 16   POCT Glucose    Collection Time: 03/27/22  4:53 PM   Result Value Ref Range    POC Glucose 308 (H) 70 - 99 MG/DL   POCT Glucose    Collection Time: 03/28/22  7:27 AM   Result Value Ref Range    POC Glucose 159 (H) 70 - 99 MG/DL        Imaging/Diagnostics Last 24 Hours   No results found.     Electronically signed by Pauly Noguera MD on 3/28/2022 at 1:21 PM

## 2022-03-29 LAB
CULTURE: NORMAL
CULTURE: NORMAL
Lab: NORMAL
Lab: NORMAL
SPECIMEN: NORMAL
SPECIMEN: NORMAL

## 2022-04-03 NOTE — PROGRESS NOTES
Physician Progress Note      PATIENT:               Gil Salcido  CSN #:                  [id number]  :                       1950  ADMIT DATE:       3/24/2022 3:19 PM  [address] [place] DATE:        3/28/2022 11:09 AM  RESPONDING  PROVIDER #:        Ranjana Hernandez MD          QUERY TEXT:    Pt admitted with PNA. If possible, please document in the progress notes and   discharge summary if you are evaluating and/or treating any of the following:    Note: CAP and HCAP indicate where the pneumonia was acquired, not a specific   type. The medical record reflects the following:  Risk Factors: CLL on oral chemo, DM  Clinical Indicators: Patient presented with congestion, productive cough with   green expectorant, chills and fatigue. Patient was diagnosed with pneumonia. CXR showed some small somewhat nodular patchy areas of opacity are seen at the   left lung base and to a lesser degree within the lateral right mid and lower   lung and interstitial prominence on the prior study has significantly   improved. Treatment: Levaquin IV, Levaquin x 10 days PO on d/c, imaging, labs. Thank you,  Albino Sauceda, RN  [phone]  Options provided:  -- Gram negative pneumonia  -- Streptococcus pneumonia  -- Other - I will add my own diagnosis  -- Disagree - Not applicable / Not valid  -- Disagree - Clinically unable to determine / Unknown  -- Refer to Clinical Documentation Reviewer    PROVIDER RESPONSE TEXT:    Pneumonia due to unknown micro- organism    Query created by: Charlee Sahu on 2022 11:39 AM      QUERY TEXT:    Patient admitted with PNA. Noted documentation of acute respiratory failure in   H&P and progress notes dated 3/24-3/28/22. In order to support the diagnosis   of acute respiratory failure, please include additional clinical indicators in   your documentation. Or please document if the diagnosis of acute respiratory   failure has been ruled out after further study.     The medical record reflects the following:  Risk Factors: PNA, congestion  Clinical Indicators: Per ED notes, pt did not complain of shortness of breath,   lungs clear with occasional rhonchi. H&P dated 3/24/22, \"O2 sat 94% on room air reportedly desatted to mid 80s   therefore was placed on supplemental oxygen at 2 L per nasal cannula. \" Exam   yielded, \"few fine crackles in posterior lower lobes. \"  Pt denies significant   shortness of breath. PN 3/25/22 indicates specifically pt not in respiratory distress, mild   wheezing on exam, pt has SOB on productive cough. No blood gasses were drawn. Pt placed on 1L O2 approximately 0100 on 3/25 with   saturation of 91% O2 increased to 2L on 3/27 approx. 1300 with a saturation   of 95%. O2 discontinued and pt on room air as of 2100 on 3/27. No   documentation of hyoxia on nursing vital sign flowsheet with the exception of   1 entry of 85% on room air on 3/24 approximately 1500. Treatment: oxygen, nebs, Prednisone 40mg PO x 1 daily    Acute Respiratory Failure Clinical Indicators per  MS-DRG Training Guide and   Quick Reference Guide:  pO2 < 60 mmHg or SpO2 (pulse oximetry) < 91% breathing room air  pCO2 > 50 and pH < 7.35  P/F ratio (pO2 / FIO2) < 300  pO2 decrease or pCO2 increase by 10 mmHg from baseline (if known)  Supplemental oxygen of 40% or more  Presence of respiratory distress, tachypnea, dyspnea, shortness of breath,   wheezing  Unable to speak in complete sentences  Use of accessory muscles to breathe  Extreme anxiety and feeling of impending doom  Tripod position  Confusion/altered mental status/obtunded    Thank you,  Roxie Nunez, RN  [phone]  Options provided:  -- Acute Respiratory Failure as evidenced by, Please document evidence.   -- Acute Respiratory Failure ruled out after study  -- Other - I will add my own diagnosis  -- Disagree - Not applicable / Not valid  -- Disagree - Clinically unable to determine / Unknown  -- Refer to Clinical Documentation Reviewer    PROVIDER RESPONSE TEXT:    This patient is in acute respiratory failure as evidenced by requiring oxygen    Query created by: Miguel Gonsales on 4/1/2022 11:58 AM      Electronically signed by:  Loulou Dixon MD 4/3/2022 11:30 AM

## 2022-04-18 ENCOUNTER — HOSPITAL ENCOUNTER (OUTPATIENT)
Dept: INFUSION THERAPY | Age: 72
Setting detail: INFUSION SERIES
Discharge: HOME | End: 2022-04-18
Payer: MEDICARE

## 2022-04-18 VITALS — TEMPERATURE: 97.3 F | OXYGEN SATURATION: 90 % | HEART RATE: 84 BPM | RESPIRATION RATE: 18 BRPM

## 2022-04-18 DIAGNOSIS — D80.3 SELECTIVE DEFICIENCY OF IGG (HCC): ICD-10-CM

## 2022-04-18 DIAGNOSIS — C91.10 LEUKEMIA, LYMPHOCYTIC, CHRONIC (HCC): Primary | ICD-10-CM

## 2022-04-18 DIAGNOSIS — D80.1 HYPOGAMMAGLOBULINEMIA (HCC): ICD-10-CM

## 2022-04-18 PROCEDURE — 96366 THER/PROPH/DIAG IV INF ADDON: CPT

## 2022-04-18 PROCEDURE — 96372 THER/PROPH/DIAG INJ SC/IM: CPT

## 2022-04-18 PROCEDURE — 96375 TX/PRO/DX INJ NEW DRUG ADDON: CPT

## 2022-04-18 PROCEDURE — 96365 THER/PROPH/DIAG IV INF INIT: CPT

## 2022-04-18 PROCEDURE — 2580000003 HC RX 258: Performed by: INTERNAL MEDICINE

## 2022-04-18 PROCEDURE — 99211 OFF/OP EST MAY X REQ PHY/QHP: CPT

## 2022-04-18 PROCEDURE — 96374 THER/PROPH/DIAG INJ IV PUSH: CPT

## 2022-04-18 PROCEDURE — 6360000002 HC RX W HCPCS: Performed by: INTERNAL MEDICINE

## 2022-04-18 RX ORDER — CYANOCOBALAMIN 1000 UG/ML
1000 INJECTION INTRAMUSCULAR; SUBCUTANEOUS ONCE
Status: CANCELLED
Start: 2022-04-25 | End: 2022-04-25

## 2022-04-18 RX ORDER — HEPARIN SODIUM (PORCINE) LOCK FLUSH IV SOLN 100 UNIT/ML 100 UNIT/ML
500 SOLUTION INTRAVENOUS PRN
Status: CANCELLED
Start: 2022-04-25

## 2022-04-18 RX ORDER — METHYLPREDNISOLONE SODIUM SUCCINATE 40 MG/ML
40 INJECTION, POWDER, LYOPHILIZED, FOR SOLUTION INTRAMUSCULAR; INTRAVENOUS ONCE
Status: COMPLETED | OUTPATIENT
Start: 2022-04-18 | End: 2022-04-18

## 2022-04-18 RX ORDER — METHYLPREDNISOLONE SODIUM SUCCINATE 40 MG/ML
40 INJECTION, POWDER, LYOPHILIZED, FOR SOLUTION INTRAMUSCULAR; INTRAVENOUS ONCE
Status: CANCELLED
Start: 2022-04-25 | End: 2022-04-25

## 2022-04-18 RX ORDER — HEPARIN SODIUM (PORCINE) LOCK FLUSH IV SOLN 100 UNIT/ML 100 UNIT/ML
500 SOLUTION INTRAVENOUS PRN
Status: DISCONTINUED | OUTPATIENT
Start: 2022-04-18 | End: 2022-04-19 | Stop reason: HOSPADM

## 2022-04-18 RX ORDER — CYANOCOBALAMIN 1000 UG/ML
1000 INJECTION INTRAMUSCULAR; SUBCUTANEOUS ONCE
Status: COMPLETED | OUTPATIENT
Start: 2022-04-18 | End: 2022-04-18

## 2022-04-18 RX ORDER — SODIUM CHLORIDE 0.9 % (FLUSH) 0.9 %
10 SYRINGE (ML) INJECTION PRN
Status: CANCELLED | OUTPATIENT
Start: 2022-04-25

## 2022-04-18 RX ORDER — DIPHENHYDRAMINE HYDROCHLORIDE 50 MG/ML
25 INJECTION INTRAMUSCULAR; INTRAVENOUS ONCE
Status: COMPLETED | OUTPATIENT
Start: 2022-04-18 | End: 2022-04-18

## 2022-04-18 RX ORDER — DIPHENHYDRAMINE HYDROCHLORIDE 50 MG/ML
25 INJECTION INTRAMUSCULAR; INTRAVENOUS ONCE
Status: CANCELLED
Start: 2022-04-25 | End: 2022-04-25

## 2022-04-18 RX ORDER — SODIUM CHLORIDE 0.9 % (FLUSH) 0.9 %
10 SYRINGE (ML) INJECTION PRN
Status: DISCONTINUED | OUTPATIENT
Start: 2022-04-18 | End: 2022-04-19 | Stop reason: HOSPADM

## 2022-04-18 RX ORDER — HEPARIN SODIUM (PORCINE) LOCK FLUSH IV SOLN 100 UNIT/ML 100 UNIT/ML
500 SOLUTION INTRAVENOUS PRN
Status: CANCELLED | OUTPATIENT
Start: 2022-04-25

## 2022-04-18 RX ADMIN — DIPHENHYDRAMINE HYDROCHLORIDE 25 MG: 50 INJECTION, SOLUTION INTRAMUSCULAR; INTRAVENOUS at 08:06

## 2022-04-18 RX ADMIN — SODIUM CHLORIDE, PRESERVATIVE FREE 20 ML: 5 INJECTION INTRAVENOUS at 10:26

## 2022-04-18 RX ADMIN — HEPARIN 500 UNITS: 100 SYRINGE at 10:26

## 2022-04-18 RX ADMIN — SODIUM CHLORIDE, PRESERVATIVE FREE 10 ML: 5 INJECTION INTRAVENOUS at 08:06

## 2022-04-18 RX ADMIN — CYANOCOBALAMIN 1000 MCG: 1000 INJECTION, SOLUTION INTRAMUSCULAR at 08:06

## 2022-04-18 RX ADMIN — IMMUNE GLOBULIN (HUMAN) 20 G: 10 INJECTION INTRAVENOUS; SUBCUTANEOUS at 08:30

## 2022-04-18 RX ADMIN — METHYLPREDNISOLONE SODIUM SUCCINATE 40 MG: 40 INJECTION, POWDER, FOR SOLUTION INTRAMUSCULAR; INTRAVENOUS at 08:01

## 2022-04-18 RX ADMIN — SODIUM CHLORIDE, PRESERVATIVE FREE 10 ML: 5 INJECTION INTRAVENOUS at 08:00

## 2022-04-21 ENCOUNTER — HOSPITAL ENCOUNTER (OUTPATIENT)
Dept: INFUSION THERAPY | Age: 72
Discharge: HOME | End: 2022-04-21
Payer: MEDICARE

## 2022-04-21 DIAGNOSIS — D50.0 IRON DEFICIENCY ANEMIA DUE TO CHRONIC BLOOD LOSS: ICD-10-CM

## 2022-04-21 DIAGNOSIS — R91.8 OTHER NONSPECIFIC ABNORMAL FINDING OF LUNG FIELD: ICD-10-CM

## 2022-04-21 DIAGNOSIS — D80.1 HYPOGAMMAGLOBULINEMIA (HCC): ICD-10-CM

## 2022-04-21 DIAGNOSIS — K90.9 INTESTINAL MALABSORPTION, UNSPECIFIED TYPE: ICD-10-CM

## 2022-04-21 DIAGNOSIS — C91.10 CLL (CHRONIC LYMPHOCYTIC LEUKEMIA) (HCC): Primary | ICD-10-CM

## 2022-04-21 DIAGNOSIS — C91.10 CLL (CHRONIC LYMPHOCYTIC LEUKEMIA) (HCC): ICD-10-CM

## 2022-04-21 DIAGNOSIS — D80.3 SELECTIVE DEFICIENCY OF IGG (HCC): ICD-10-CM

## 2022-04-21 LAB
ALBUMIN SERPL-MCNC: 4 GM/DL (ref 3.4–5)
ALP BLD-CCNC: 98 IU/L (ref 40–129)
ALT SERPL-CCNC: 15 U/L (ref 10–40)
ANION GAP SERPL CALCULATED.3IONS-SCNC: 13 MMOL/L (ref 4–16)
AST SERPL-CCNC: 12 IU/L (ref 15–37)
BASOPHILS ABSOLUTE: 0 K/CU MM
BASOPHILS RELATIVE PERCENT: 0.1 % (ref 0–1)
BILIRUB SERPL-MCNC: 0.5 MG/DL (ref 0–1)
BUN BLDV-MCNC: 16 MG/DL (ref 6–23)
CALCIUM SERPL-MCNC: 9.8 MG/DL (ref 8.3–10.6)
CHLORIDE BLD-SCNC: 99 MMOL/L (ref 99–110)
CO2: 24 MMOL/L (ref 21–32)
CREAT SERPL-MCNC: 0.8 MG/DL (ref 0.9–1.3)
DIFFERENTIAL TYPE: ABNORMAL
EOSINOPHILS ABSOLUTE: 0.1 K/CU MM
EOSINOPHILS RELATIVE PERCENT: 0.6 % (ref 0–3)
FERRITIN: 85 NG/ML (ref 30–400)
GFR AFRICAN AMERICAN: >60 ML/MIN/1.73M2
GFR NON-AFRICAN AMERICAN: >60 ML/MIN/1.73M2
GLUCOSE BLD-MCNC: 100 MG/DL (ref 70–99)
HCT VFR BLD CALC: 39.8 % (ref 42–52)
HEMOGLOBIN: 12.6 GM/DL (ref 13.5–18)
IRON: 34 UG/DL (ref 59–158)
LYMPHOCYTES ABSOLUTE: 12.1 K/CU MM
LYMPHOCYTES RELATIVE PERCENT: 66 % (ref 24–44)
MCH RBC QN AUTO: 24.6 PG (ref 27–31)
MCHC RBC AUTO-ENTMCNC: 31.7 % (ref 32–36)
MCV RBC AUTO: 77.7 FL (ref 78–100)
MONOCYTES ABSOLUTE: 0.6 K/CU MM
MONOCYTES RELATIVE PERCENT: 3.2 % (ref 0–4)
PCT TRANSFERRIN: 11 % (ref 10–44)
PDW BLD-RTO: 20.3 % (ref 11.7–14.9)
PLATELET # BLD: 182 K/CU MM (ref 140–440)
PMV BLD AUTO: 10.5 FL (ref 7.5–11.1)
POTASSIUM SERPL-SCNC: 4.2 MMOL/L (ref 3.5–5.1)
RBC # BLD: 5.12 M/CU MM (ref 4.6–6.2)
SEGMENTED NEUTROPHILS ABSOLUTE COUNT: 5.5 K/CU MM
SEGMENTED NEUTROPHILS RELATIVE PERCENT: 30.1 % (ref 36–66)
SODIUM BLD-SCNC: 136 MMOL/L (ref 135–145)
TOTAL IRON BINDING CAPACITY: 317 UG/DL (ref 250–450)
TOTAL PROTEIN: 5.9 GM/DL (ref 6.4–8.2)
UNSATURATED IRON BINDING CAPACITY: 283 UG/DL (ref 110–370)
WBC # BLD: 18.3 K/CU MM (ref 4–10.5)

## 2022-04-21 PROCEDURE — 82728 ASSAY OF FERRITIN: CPT

## 2022-04-21 PROCEDURE — 36415 COLL VENOUS BLD VENIPUNCTURE: CPT

## 2022-04-21 PROCEDURE — 83550 IRON BINDING TEST: CPT

## 2022-04-21 PROCEDURE — 85025 COMPLETE CBC W/AUTO DIFF WBC: CPT

## 2022-04-21 PROCEDURE — 82607 VITAMIN B-12: CPT

## 2022-04-21 PROCEDURE — 80053 COMPREHEN METABOLIC PANEL: CPT

## 2022-04-21 PROCEDURE — 83540 ASSAY OF IRON: CPT

## 2022-04-24 LAB — VITAMIN B-12: 1163 PG/ML (ref 211–911)

## 2022-04-28 ENCOUNTER — HOSPITAL ENCOUNTER (OUTPATIENT)
Dept: INFUSION THERAPY | Age: 72
Discharge: HOME | End: 2022-04-28

## 2022-04-28 ENCOUNTER — OFFICE VISIT (OUTPATIENT)
Dept: ONCOLOGY | Age: 72
End: 2022-04-28
Payer: MEDICARE

## 2022-04-28 VITALS
OXYGEN SATURATION: 96 % | SYSTOLIC BLOOD PRESSURE: 146 MMHG | WEIGHT: 183 LBS | TEMPERATURE: 96.6 F | BODY MASS INDEX: 24.79 KG/M2 | DIASTOLIC BLOOD PRESSURE: 79 MMHG | HEART RATE: 79 BPM | RESPIRATION RATE: 18 BRPM | HEIGHT: 72 IN

## 2022-04-28 DIAGNOSIS — D80.1 HYPOGAMMAGLOBULINEMIA (HCC): ICD-10-CM

## 2022-04-28 DIAGNOSIS — D50.0 IRON DEFICIENCY ANEMIA DUE TO CHRONIC BLOOD LOSS: ICD-10-CM

## 2022-04-28 DIAGNOSIS — J20.9 ACUTE BRONCHITIS, UNSPECIFIED ORGANISM: ICD-10-CM

## 2022-04-28 DIAGNOSIS — C91.10 CLL (CHRONIC LYMPHOCYTIC LEUKEMIA) (HCC): Primary | ICD-10-CM

## 2022-04-28 DIAGNOSIS — K90.9 INTESTINAL MALABSORPTION, UNSPECIFIED TYPE: ICD-10-CM

## 2022-04-28 PROCEDURE — G8427 DOCREV CUR MEDS BY ELIG CLIN: HCPCS | Performed by: INTERNAL MEDICINE

## 2022-04-28 PROCEDURE — 3017F COLORECTAL CA SCREEN DOC REV: CPT | Performed by: INTERNAL MEDICINE

## 2022-04-28 PROCEDURE — G8420 CALC BMI NORM PARAMETERS: HCPCS | Performed by: INTERNAL MEDICINE

## 2022-04-28 PROCEDURE — 1036F TOBACCO NON-USER: CPT | Performed by: INTERNAL MEDICINE

## 2022-04-28 PROCEDURE — 99214 OFFICE O/P EST MOD 30 MIN: CPT | Performed by: INTERNAL MEDICINE

## 2022-04-28 PROCEDURE — 1123F ACP DISCUSS/DSCN MKR DOCD: CPT | Performed by: INTERNAL MEDICINE

## 2022-04-28 PROCEDURE — 4040F PNEUMOC VAC/ADMIN/RCVD: CPT | Performed by: INTERNAL MEDICINE

## 2022-04-28 RX ORDER — FERROUS GLUCONATE 324(37.5)
324 TABLET ORAL 2 TIMES DAILY
Qty: 60 TABLET | Refills: 5 | Status: SHIPPED | OUTPATIENT
Start: 2022-04-28

## 2022-04-28 RX ORDER — VALACYCLOVIR HYDROCHLORIDE 500 MG/1
500 TABLET, FILM COATED ORAL DAILY
Qty: 30 TABLET | Refills: 5 | Status: ON HOLD | OUTPATIENT
Start: 2022-04-28 | End: 2022-11-01

## 2022-04-28 NOTE — PROGRESS NOTES
Patient Name: Herbert Dumont  Patient : 1950  Patient MRN: [id number]     Primary Oncologist: Patricio Hargrove MD  Referring Physician: Schuyler Sandoval MD       Date of Service: 2022      Chief Complaint:   Chief Complaint   Patient presents with    Follow-up        Active Problem list  1. CLL (chronic lymphocytic leukemia) ([facility] [address]    2. Hypogammaglobulinemia ([facility] [address]    3. Iron deficiency anemia due to chronic blood loss    4. Intestinal malabsorption, unspecified type    5. Acute bronchitis, unspecified organism           HPI:        1. Mr. Shaila Felix ( 52) was diagnosed with stage I CLL in , when he presented with infection and lymphocytosis. His peripheral blood immunophenotyping was characteristic of B-cell CLL. It was CD38 negative, ZAP-70 positive, CD19 and 20 positive, CD5 positive. Karyotyping was normal in 2006.,  2. Because of his infection and associated acquired immune deficiency(hypogammaglobulinemia), he was given IVIG for a year. He was started back on IVIG therapy monthly since 2013 to prevent future major infections continuing for now. Also had minor Infusional reaction to IVIG in 2016 responded to Steroids & Benadryl., No further problems after that. 3. CLL was treated only with Leukeran intermittently from 2007 until 2011 and again from 2011 until 2012. 4. 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 Flow studies still showed the same and FISH in 2012 showed deletion of tp53 (17p) and MYB (6q). ,  5. He was thus treated with Fludara and Rituxan for six months between April and 2012 with excellent clinical and hematological response.  . In 2013, he developed Multiple b/l nodes in axilla and groin area, CAT scan on 12/27/13, Bulky lymphadenopathy within the chest, abdomen, and pelvis mildly increased as compared from previous CAT scan in March 2012.,  6. Starting in Jan 2014 he was initiated on 2nd line chemo with Bendamustine and Rituxan with good initial response. Continued till Nov 2014, CAT scan done on 8/14/14 showed an interval decrease in generalized adenopathy compared to previous study In December 2013, suggesting good response to therapy.,  7. CAT scan done on 12/23/14 showed Minimal interval increase in size of at least two periportal and retrocaval lymph nodes. Otherwise stable thoracic, abdominal and pelvic adenopathy. Also mild interval increase in splenomegaly measuring 19 cm, previously 17 cm. 8. CAT scan done on 6/25/15 showed mild interval decrease in the splenomegaly and also in retroperitoneal mesenteric lymph nodes. 9. Started Idelalisib [Zydelig] on 20th Jan 2015 at 150mg po bid. with good initial response. He did remarkably well with Zydelig from January of 2015 until June of 2016, after he was hospitalized for Rhinovirus Pneumonia in May of 2016. Also discussed Living Will, Power of [facility], DNR status, et cetera. In April 2016, chest CT showed stable  10. , 13. In July 2016, His FISH testing on peripheral blood showed abnormal results, with 6q deletion, 17p deletion, and 11q centromere signal in 3 percent of cells. The 17p deletion (Tp53) in 80 percent of cells is associated with unfavorable prognosis. Because of his CLL with poor prognostic markers, including 17p deletion detected in 2012 & again in July of 2016. 11. He was started on ibrutinib in September 2016 140mg/d increased to 280 mg/ after 4 weeks. 12. The abdominal CT 7/5/16 is showing no acute abnormalities, stable splenomegaly, and one periportal lymph node measuring 2.2 by 3.7 cm which is unchanged from before.   13. CAT scan of the chest August 5, 2016, showed axillary, mediastinal, hilar, and upper abdominal lymphadenopathy consistent with his CLL. The maximum size of the nodes is 2.4 cm. A 1 cm infiltrate in the lateral segment of the right middle lobe possibly infectious in nature, segmental calcification of left coronary artery. CAT scan of the chest, abdomen, and pelvis December 7,2016 2016, which revealed a mild mediastinal right hilar adenopathy, decreased in size from the previous examination of April and August of 2016, .5. His ibrutinib was stopped in December 2016 when he was feeling bad and it was started back at one a day, 140 mg daily instead of 280 mg that he was taking prior to that  14. In January 2017, he developed progressive lump in his left inguinal area. That being the only area of progressive adenopathy with multiple nodes coalesced together, a question of Pimentels transformation versus more aggressive histology conversion was considered as a possibility. 13. Dr. Sury Stone, did a biopsy of the lymph node on January 18, 2017. The final pathology was reported as B-cell small lymphocytic lymphoma (B-cell CLL/SLL), with no evidence of any large cell OR Pimentels transformation. There was some evidence of localized herpetic simplex lymphadenitis. 16. Because of possibility of localized infection with herpes. I started him on Valtrex 500 t.i.d. for two to three weeks then tapered to 1/d as maintenance therapy. Lymphadenopathy in left groin almost completely resolved. 17. His ibrutinib was stopped in December 2016 when he was feeling bad and it was started back at one a day, 140 mg daily as maintenance dose instead of 280 mg that he was taking prior to that. 18. His quantitative immunoglobulin on February 17, 2017, IgG 600, IgA less than 10, IgM less than 4. Serum protein electrophoresis was within the normal range. Gammaglobulin was continued monthly since it has helped any serious infections under control. 19. August 2017 he had a IgG that was 503  20.  October 2017 started a ibrutinib he was off for 2 months due to his insurance issues  5-18 CT chest: Showed some mild decrease in mediastinal and hilar lymphadenopathy. . Regards to his abdomen also there is decrease in his lymphadenopathy. Although there is a left inguinal node that has enlarged. Section that no obvious source of his abdominal cramping. 10/10/2018 EGD showed severe ulcerative esophagitis with slight narrowing in the GE junction small hiatal hernia mild superficial gastritis, Colonoscope revealed 2 mm polyp in the sigmoid colon, diverticulosis, hemorrhoids, moderate stool  1/2019; Colonoscopy with two diminutive polyps, diverticulosis and hemorrhoids, path with TA above IC valve and HP distal sigmoid  10-19 iron def based on labs refered to GI , stool occult negative x #3 , NO PICA   11-19 saw Dr. Sylvester Centeno, and referred to subhash for capsule endoscopy  11/7/2019: Ct chest:Ground-glass nodule seen within the right upper lobe the largest measuring 17  mm in diameter. 12-6-19 capsule endoscopy, results unavailable   2/2020: CT chest:IMPRESSION:  Previously noted ground-glass opacities in the right lung have resolved. However, there is a new cluster of tiny pulmonary nodules in the left lower  lobe which could represent an infectious or inflammatory etiology. Short-term follow-up chest CT is recommended in 3-6 months. Multiple additional tiny pulmonary nodules are overall stable. Stable mediastinal lymph nodes without new lymphadenopathy. Previous Therapies    May 2020: Ct chest:  1. Interval resolution of the previously described cluster of pulmonary    nodules in the left lower lobe.  Findings are most compatible with resolved    infectious/inflammatory etiology. 2. No acute cardiopulmonary disease. 3. COPD. 4. Stable subcentimeter mediastinal lymph nodes. 8/28/20: US RP normal    August 2020 cystoscopy revealed enlarged prostate.  Was Recommended TURBT    3/24/22: CXR:  Bilateral interstitial opacity.  Nodular consolidation at the left lung base. Pneumonia is favored over metastatic/lymphangitic disease.  Consider atypical   etiologies. Was treated with IV abx    But was given abx again and completed 22. PAST MEDICAL HISTORY:   1. Stage I CLL with conversion from good to poor prognostic factors. ,  2. History of hypertension for many years. ,  3. 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 Arthritis in the past. ,  5. Frozen shoulder for which he had treatment including injection by Dr. Nellie Flowers. ,  6. Cellulitis with Zoster type lesion Left thigh resolved with Bactrim and Valtrex in 2016.,  7. abdominal illness, with fever, nausea, and discomfort, suggestive of infectious etiology in May 2016. ,  8. hospitalization between  and  for what seems like atypical rhinovirus pneumonia involving right middle and lower lobe and left lower lobe with sepsis,  9. 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 Dr. Patricia Hernandez is referring him for MOHS surgery. ,  10. hospitalized from  to 2017, for hyperosmolar hyperglycemia with hyponatremia and hyperkalemia and UTI. He was seen by Dr. Roberta Cano, who put him on insulin. He was also seen by Dr. Angelica Velasquez. He felt much better after his sugar got under better control and electrolyte imbalanced reversed,  11. Ultrasound Doppler 2017, was negative for any DVT in either leg. Chest x-ray showed no acute process. PAST SURGICAL HISTORY:   1. knee operation,  2. tonsillectomy,  3. broken ankle times two requiring open reduction. ,  4. Vision better after cataract surgery    FAMILY HISTORY:   His paternal aunt had colon cancer. Uncle also had some form of cancer. Father had heart disease. Sister  12 of Liver disease     SOCIAL HISTORY:   He has a 40-pack-year history of smoking, quit in .   He denies alcohol use. He is not . Has one son. Interval History  4/28/2022: Arrived alone to the clinic today. Has been receiving IVIG every 28 days. Reported URI sx and head congestion. Cough consisting of yellow sputum. No fever. Feels very tired. No hemoptysis. Night sweats last week but better this week. No worsening lad. Lost 16 lbs. No abdominal pain, nausea, emesis, diarrhea or any constipation. No overt bleeding.      Review of Systems   Per interval history; otherwise 10 point ROS is negative              Vital Signs:  BP (!) 146/79 (Site: Right Upper Arm, Position: Sitting, Cuff Size: Medium Adult)   Pulse 79   Temp 96.6 °F (35.9 °C) (Infrared)   Resp 18   Ht 6' (1.829 m)   Wt 183 lb (83 kg)   SpO2 96%   BMI 24.82 kg/m²     Physical Exam:  CONSTITUTIONAL: alert and awake, tired appearing  EYES: No palor or any icetrus   ENT: ATNC   NECK: No JVD   HEMATOLOGIC/LYMPHATIC: no cervical, supraclavicular or axillary lymphadenopathy   LUNGS:CTAB   CARDIOVASCULAR:s1s2 SM+ rrr   ABDOMEN:soft ntnd bs pos  NEUROLOGIC: GI   SKIN: skin tags   EXTREMITIES: no LE edema bilaterally      Labs:    Hematology:  Lab Results   Component Value Date    WBC 18.3 (H) 04/21/2022    RBC 5.12 04/21/2022    HGB 12.6 (L) 04/21/2022    HCT 39.8 (L) 04/21/2022    MCV 77.7 (L) 04/21/2022    MCH 24.6 (L) 04/21/2022    MCHC 31.7 (L) 04/21/2022    RDW 20.3 (H) 04/21/2022     04/21/2022    MPV 10.5 04/21/2022    BANDSPCT 6 08/04/2020    SEGSPCT 30.1 (L) 04/21/2022    EOSRELPCT 0.6 04/21/2022    BASOPCT 0.1 04/21/2022    LYMPHOPCT 66.0 (H) 04/21/2022    MONOPCT 3.2 04/21/2022    BANDABS 1.76 08/04/2020    SEGSABS 5.5 04/21/2022    EOSABS 0.1 04/21/2022    BASOSABS 0.0 04/21/2022    LYMPHSABS 12.1 04/21/2022    MONOSABS 0.6 04/21/2022    DIFFTYPE AUTOMATED DIFFERENTIAL 04/21/2022    ANISOCYTOSIS 1+ 08/04/2020    POLYCHROM 1+ 08/04/2020    WBCMORP ATYPICAL LYMPHOCYTES WITH PROMINENT NUCLEOLI NOTED 09/26/2017    PLTM SEVERAL LARGE PLATELETS 08/53/9110     Lab Results   Component Value Date    ESR 72 (H) 01/24/2017       Chemistry:  Lab Results   Component Value Date     04/21/2022    K 4.2 04/21/2022    CL 99 04/21/2022    CO2 24 04/21/2022    BUN 16 04/21/2022    CREATININE 0.8 (L) 04/21/2022    GLUCOSE 100 (H) 04/21/2022    CALCIUM 9.8 04/21/2022    PROT 5.9 (L) 04/21/2022    LABALBU 4.0 04/21/2022    BILITOT 0.5 04/21/2022    ALKPHOS 98 04/21/2022    AST 12 (L) 04/21/2022    ALT 15 04/21/2022    LABGLOM >60 04/21/2022    GFRAA >60 04/21/2022    PHOS 3.3 04/04/2019    MG 2.3 07/20/2020    POCGLU 159 (H) 03/28/2022     Lab Results   Component Value Date     02/03/2022     No components found for: LD  Lab Results   Component Value Date    TSHHS 3.040 04/04/2019    T4FREE 1.37 04/04/2019    FT3 3.2 03/27/2012       Immunology:  Lab Results   Component Value Date    PROT 5.9 (L) 04/21/2022    ALBUMINELP 3.7 08/21/2017    LABALPH 0.2 08/21/2017    LABALPH 0.9 08/21/2017    LABBETA 0.9 08/21/2017    GAMGLOB 0.6 08/21/2017     No results found for: CARLOTTA Lay  No results found for: B2M    Coagulation Panel:  Lab Results   Component Value Date    PROTIME 11.1 12/30/2013    INR 1.03 12/30/2013    APTT 24.6 12/30/2013       Anemia Panel:  Lab Results   Component Value Date    BORSWYPN12 3403 (H) 04/21/2022    FOLATE 9.4 02/03/2022       Tumor Markers:  Lab Results   Component Value Date    PSA 1.9 10/27/2020         Imaging: Reviewed     Pathology:Reviewed     Observations:  Performance Status: ECOG 1  Depression Status: No data recorded          Assessment & Plan:  B-cell CLL, stage I with conversion from good to poor prognostic factors, with 17p deletion:   His diagnosis of CLL since 2007.   17p deletion since 2015. Initial treatment with Leukeran from 2000 to 2011 intermittently and FCR regimen in 2012, bendamustine/Rituxan in 2014. Idelalisib from January 2015 until June of 2016.    On ibrutinib since September 2016. Ibrutinib therapy seems to be helping him, overall improvement. Was Only able to tolerate 140 mg p.o. every other day  He was off for a couple months During the fall 2017  Resumed Ibrutinib and is currently on 140 mg po daily. Plan to Continue current dose as Stable wbc counts(slightly elevated wbc could be sec to ?bronchitis vs steroids)and no major  B sx. . Acquired hypogammaglobulinemia:   Continue IVIG     BPH: is being followed by Urology    Iron def anemia and esophagitis: follows with GI, Dr Lorena Yancey. Reported that he had colonoscopy 2018 with polyps detected. EGD was normal except for H. pylori which was treated. Was supposed to have VCE which was unsuccessful once. Is  on  oral iron bid along with Vitamin C.  UA with no blood. Ferritin 85 in April 2022, continue OD     Intermittent mild thrombocytopenia: meds reviewed. Could be sec to ibrutinib vs CLL. Counts stable,  Will monitor for now. Lung Nodule RUL: CT May 2020  as above clear, most probably sec to  infection/inflammation. No further follow up was recommended    Acute bronchitis/pna: Sx have not resolved even after 2 courses of abx. CT chest ordered. Continue other medical care. Discussed above findings and plan with him and he verbalized understanding. Answered all his questions. Discussed healthy lifestyle, smoking cessation, healthy diet and exercise as tolerated. Also discussed importance of being up-to-date with age-appropriate screening tools. Is going to follow with need for colonoscopy    Recommend follow-up with primary care physician and other specialist.    Please do not hesitate to contact us if you need any further information. Return to clinic July 2022  Or earlier if new symptoms    I have recommended that the patient follow CDC guidelines for prevention of COVID-19 infection. Has received COVID vaccine, booster and also FLU vaccine.  Should be eligible for second nbooster  ANUPAM

## 2022-04-28 NOTE — PROGRESS NOTES
MA Rooming Questions  Patient: Laina Ro  MRN: [id number]    Date: 4/28/2022        1. Do you have any new issues?   no         2. Do you need any refills on medications? yes - Iron and Valtrex    3. Have you had any imaging done since your last visit?   no    4. Have you been hospitalized or seen in the emergency room since your last visit here?   no    5. Did the patient have a depression screening completed today?  No    No data recorded     PHQ-9 Given to (if applicable):               PHQ-9 Score (if applicable):                     [] Positive     []  Negative              Does question #9 need addressed (if applicable)                     [] Yes    []  No               Tarah Sandhu MA

## 2022-05-03 NOTE — DISCHARGE SUMMARY
Tolerated infusion well. Reviewed discharge instructions, understanding verbalized. Copies of AVS given to take home. Patient discharged home. Down to exit per self. Orders Placed This Encounter   Medications    ferric carboxymaltose (INJECTAFER) 750 mg in sodium chloride 0.9 % 250 mL IVPB     With verification, confirm documentation of current weight, ordered weight-type, and dose calculation.     DISCONTD: sodium chloride flush 0.9 % injection 5-40 mL    DISCONTD: heparin flush 100 UNIT/ML injection 500 Units Where Do You Want The Question To Include Opioid Counseling Located?: Case Summary Tab

## 2022-05-04 ENCOUNTER — HOSPITAL ENCOUNTER (OUTPATIENT)
Dept: CT IMAGING | Age: 72
Discharge: HOME | End: 2022-05-04
Payer: COMMERCIAL

## 2022-05-04 DIAGNOSIS — D80.1 HYPOGAMMAGLOBULINEMIA (HCC): ICD-10-CM

## 2022-05-04 DIAGNOSIS — K90.9 INTESTINAL MALABSORPTION, UNSPECIFIED TYPE: ICD-10-CM

## 2022-05-04 DIAGNOSIS — C91.10 CLL (CHRONIC LYMPHOCYTIC LEUKEMIA) (HCC): ICD-10-CM

## 2022-05-04 DIAGNOSIS — D50.0 IRON DEFICIENCY ANEMIA DUE TO CHRONIC BLOOD LOSS: ICD-10-CM

## 2022-05-04 DIAGNOSIS — J20.9 ACUTE BRONCHITIS, UNSPECIFIED ORGANISM: ICD-10-CM

## 2022-05-04 PROCEDURE — 71260 CT THORAX DX C+: CPT

## 2022-05-04 PROCEDURE — 2580000003 HC RX 258: Performed by: INTERNAL MEDICINE

## 2022-05-04 PROCEDURE — 6360000004 HC RX CONTRAST MEDICATION: Performed by: INTERNAL MEDICINE

## 2022-05-04 PROCEDURE — A4216 STERILE WATER/SALINE, 10 ML: HCPCS | Performed by: INTERNAL MEDICINE

## 2022-05-04 RX ORDER — SODIUM CHLORIDE 0.9 % (FLUSH) 0.9 %
10 SYRINGE (ML) INJECTION PRN
Status: DISCONTINUED | OUTPATIENT
Start: 2022-05-04 | End: 2022-05-05 | Stop reason: HOSPADM

## 2022-05-04 RX ADMIN — SODIUM CHLORIDE, PRESERVATIVE FREE 10 ML: 5 INJECTION INTRAVENOUS at 09:46

## 2022-05-04 RX ADMIN — IOPAMIDOL 75 ML: 755 INJECTION, SOLUTION INTRAVENOUS at 09:47

## 2022-05-09 DIAGNOSIS — R91.1 LUNG NODULE: ICD-10-CM

## 2022-05-09 DIAGNOSIS — C91.10 CLL (CHRONIC LYMPHOCYTIC LEUKEMIA) (HCC): Primary | ICD-10-CM

## 2022-05-16 ENCOUNTER — HOSPITAL ENCOUNTER (OUTPATIENT)
Dept: INFUSION THERAPY | Age: 72
Setting detail: INFUSION SERIES
Discharge: HOME | End: 2022-05-16
Payer: COMMERCIAL

## 2022-05-16 VITALS
SYSTOLIC BLOOD PRESSURE: 123 MMHG | OXYGEN SATURATION: 93 % | RESPIRATION RATE: 22 BRPM | TEMPERATURE: 97.3 F | HEART RATE: 82 BPM | DIASTOLIC BLOOD PRESSURE: 71 MMHG

## 2022-05-16 DIAGNOSIS — D80.3 SELECTIVE DEFICIENCY OF IGG (HCC): ICD-10-CM

## 2022-05-16 DIAGNOSIS — D80.1 HYPOGAMMAGLOBULINEMIA (HCC): ICD-10-CM

## 2022-05-16 DIAGNOSIS — C91.10 LEUKEMIA, LYMPHOCYTIC, CHRONIC (HCC): Primary | ICD-10-CM

## 2022-05-16 PROCEDURE — 96374 THER/PROPH/DIAG INJ IV PUSH: CPT

## 2022-05-16 PROCEDURE — 99211 OFF/OP EST MAY X REQ PHY/QHP: CPT

## 2022-05-16 PROCEDURE — 96375 TX/PRO/DX INJ NEW DRUG ADDON: CPT

## 2022-05-16 PROCEDURE — 96372 THER/PROPH/DIAG INJ SC/IM: CPT

## 2022-05-16 PROCEDURE — 6360000002 HC RX W HCPCS: Performed by: INTERNAL MEDICINE

## 2022-05-16 PROCEDURE — 2580000003 HC RX 258: Performed by: INTERNAL MEDICINE

## 2022-05-16 PROCEDURE — 96365 THER/PROPH/DIAG IV INF INIT: CPT

## 2022-05-16 PROCEDURE — A4216 STERILE WATER/SALINE, 10 ML: HCPCS | Performed by: INTERNAL MEDICINE

## 2022-05-16 RX ORDER — METHYLPREDNISOLONE SODIUM SUCCINATE 40 MG/ML
40 INJECTION, POWDER, LYOPHILIZED, FOR SOLUTION INTRAMUSCULAR; INTRAVENOUS ONCE
Status: CANCELLED
Start: 2022-05-23 | End: 2022-05-23

## 2022-05-16 RX ORDER — DIPHENHYDRAMINE HYDROCHLORIDE 50 MG/ML
25 INJECTION INTRAMUSCULAR; INTRAVENOUS ONCE
Status: CANCELLED
Start: 2022-05-23 | End: 2022-05-23

## 2022-05-16 RX ORDER — CYANOCOBALAMIN 1000 UG/ML
1000 INJECTION INTRAMUSCULAR; SUBCUTANEOUS ONCE
Status: COMPLETED | OUTPATIENT
Start: 2022-05-16 | End: 2022-05-16

## 2022-05-16 RX ORDER — CYANOCOBALAMIN 1000 UG/ML
1000 INJECTION INTRAMUSCULAR; SUBCUTANEOUS ONCE
Status: CANCELLED
Start: 2022-05-23 | End: 2022-05-23

## 2022-05-16 RX ORDER — HEPARIN SODIUM (PORCINE) LOCK FLUSH IV SOLN 100 UNIT/ML 100 UNIT/ML
500 SOLUTION INTRAVENOUS PRN
Status: DISCONTINUED | OUTPATIENT
Start: 2022-05-16 | End: 2022-05-17 | Stop reason: HOSPADM

## 2022-05-16 RX ORDER — DIPHENHYDRAMINE HYDROCHLORIDE 50 MG/ML
25 INJECTION INTRAMUSCULAR; INTRAVENOUS ONCE
Status: COMPLETED | OUTPATIENT
Start: 2022-05-16 | End: 2022-05-16

## 2022-05-16 RX ORDER — HEPARIN SODIUM (PORCINE) LOCK FLUSH IV SOLN 100 UNIT/ML 100 UNIT/ML
500 SOLUTION INTRAVENOUS PRN
Status: CANCELLED
Start: 2022-05-23

## 2022-05-16 RX ORDER — SODIUM CHLORIDE 0.9 % (FLUSH) 0.9 %
10 SYRINGE (ML) INJECTION PRN
Status: CANCELLED | OUTPATIENT
Start: 2022-05-23

## 2022-05-16 RX ORDER — SODIUM CHLORIDE 0.9 % (FLUSH) 0.9 %
10 SYRINGE (ML) INJECTION PRN
Status: DISCONTINUED | OUTPATIENT
Start: 2022-05-16 | End: 2022-05-17 | Stop reason: HOSPADM

## 2022-05-16 RX ORDER — METHYLPREDNISOLONE SODIUM SUCCINATE 40 MG/ML
40 INJECTION, POWDER, LYOPHILIZED, FOR SOLUTION INTRAMUSCULAR; INTRAVENOUS ONCE
Status: COMPLETED | OUTPATIENT
Start: 2022-05-16 | End: 2022-05-16

## 2022-05-16 RX ORDER — HEPARIN SODIUM (PORCINE) LOCK FLUSH IV SOLN 100 UNIT/ML 100 UNIT/ML
500 SOLUTION INTRAVENOUS PRN
Status: CANCELLED | OUTPATIENT
Start: 2022-05-23

## 2022-05-16 RX ADMIN — IMMUNE GLOBULIN (HUMAN) 20 G: 10 INJECTION INTRAVENOUS; SUBCUTANEOUS at 08:19

## 2022-05-16 RX ADMIN — DIPHENHYDRAMINE HYDROCHLORIDE 25 MG: 50 INJECTION, SOLUTION INTRAMUSCULAR; INTRAVENOUS at 08:00

## 2022-05-16 RX ADMIN — Medication 10 ML: at 07:58

## 2022-05-16 RX ADMIN — METHYLPREDNISOLONE SODIUM SUCCINATE 40 MG: 40 INJECTION, POWDER, FOR SOLUTION INTRAMUSCULAR; INTRAVENOUS at 07:58

## 2022-05-16 RX ADMIN — Medication 500 UNITS: at 10:49

## 2022-05-16 RX ADMIN — Medication 10 ML: at 10:49

## 2022-05-16 RX ADMIN — CYANOCOBALAMIN 1000 MCG: 1000 INJECTION, SOLUTION INTRAMUSCULAR at 08:03

## 2022-05-16 NOTE — PROGRESS NOTES
SPOKE WITH Jesus Polo RN AT [facility]. UPDATED ON PTS COMPLAINTS OF NOT FEELING BETTER: COUGHING, CONGESTION, FATIGUE. INFORMED THAT PT STATES THAT HE HAD A CT ON THE 5TH AND STILL HASNT HEARD BACK FROM DR. POST'S OFFICE.

## 2022-05-26 ENCOUNTER — OFFICE VISIT (OUTPATIENT)
Dept: INFECTIOUS DISEASES | Age: 72
End: 2022-05-26
Payer: COMMERCIAL

## 2022-05-26 VITALS
TEMPERATURE: 96.6 F | WEIGHT: 188.4 LBS | DIASTOLIC BLOOD PRESSURE: 78 MMHG | RESPIRATION RATE: 18 BRPM | SYSTOLIC BLOOD PRESSURE: 135 MMHG | BODY MASS INDEX: 25.55 KG/M2 | HEART RATE: 77 BPM

## 2022-05-26 DIAGNOSIS — C91.10 CLL (CHRONIC LYMPHOCYTIC LEUKEMIA) (HCC): ICD-10-CM

## 2022-05-26 DIAGNOSIS — J18.9 PNEUMONIA OF LEFT LOWER LOBE DUE TO INFECTIOUS ORGANISM: Primary | ICD-10-CM

## 2022-05-26 DIAGNOSIS — J18.9 PNEUMONIA OF LEFT LOWER LOBE DUE TO INFECTIOUS ORGANISM: ICD-10-CM

## 2022-05-26 DIAGNOSIS — D80.1 HYPOGAMMAGLOBULINEMIA (HCC): ICD-10-CM

## 2022-05-26 LAB — PROCALCITONIN: 0.06 NG/ML (ref 0–0.15)

## 2022-05-26 PROCEDURE — G8417 CALC BMI ABV UP PARAM F/U: HCPCS | Performed by: INTERNAL MEDICINE

## 2022-05-26 PROCEDURE — 3017F COLORECTAL CA SCREEN DOC REV: CPT | Performed by: INTERNAL MEDICINE

## 2022-05-26 PROCEDURE — 1123F ACP DISCUSS/DSCN MKR DOCD: CPT | Performed by: INTERNAL MEDICINE

## 2022-05-26 PROCEDURE — G8427 DOCREV CUR MEDS BY ELIG CLIN: HCPCS | Performed by: INTERNAL MEDICINE

## 2022-05-26 PROCEDURE — 99205 OFFICE O/P NEW HI 60 MIN: CPT | Performed by: INTERNAL MEDICINE

## 2022-05-26 PROCEDURE — 1036F TOBACCO NON-USER: CPT | Performed by: INTERNAL MEDICINE

## 2022-05-26 RX ORDER — SULFAMETHOXAZOLE AND TRIMETHOPRIM 800; 160 MG/1; MG/1
1 TABLET ORAL 3 TIMES DAILY
Qty: 42 TABLET | Refills: 0 | Status: SHIPPED | OUTPATIENT
Start: 2022-05-26 | End: 2022-06-09

## 2022-05-26 NOTE — PROGRESS NOTES
5/28/2022         Referring Physician: No ref. provider found  Primary Care Physician: Sheila Peoples MD    Impression/Plan:   Diagnosis Orders   1. Pneumonia of left lower lobe due to infectious organism  Culture, Respiratory    Procalcitonin    1,3 Beta-D-Glucan    sulfamethoxazole-trimethoprim (BACTRIM DS;SEPTRA DS) 800-160 MG per tablet    FUNGAL ANTIBODIES BY ID    ASPERGILLUS GALACTOMANNAN AG ASSAY   2. CLL (chronic lymphocytic leukemia) ([facility] [address]     3. Hypogammaglobulinemia (HCC)  IgG 1, 2, 3, and 4       Discussion:  Pneumonia  Medical history of CLL, hypogammaglobulinemia does increase his risk for pneumonia notably from bacteria, but also from Mycobacterium, nocardia and fungi. At this point, we will treat with Bactrim. Obtain respiratory culture, and sputum AFB       Return in about 2 weeks (around 6/9/2022). 60 minutes was spent in the encounter; more than 50% of the face-to-face time was spent with the patient providing counseling and coordination of care. History: Chucky Truong is a 80 y.o.  male with a medical history of T2DM, CLL, hypogammaglobulinemia requiring monthly IVIG infusions, presenting today for recurrent pneumonia. Patient reports that he was treated for pneumonia in March 2022, he felt well for a while but once more had symptoms of productive cough and fatigue. He denies night sweats. He endorses some weight loss and poor appetite. He follows with Dr. Hubert Claros who had ordered a CT of the chest that was done on 5/4/2022. It showed patchy bilateral airspace opacities, interval increase in intrathoracic and upper abdominal lymphadenopathy and splenomegaly. Patient was born in [place]. He reports no history of exposure to anyone with tuberculosis. 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 He has no pets. Review of Systems   All other systems reviewed and are negative.       No Known Allergies    Patient Active Problem List   Diagnosis  Pneumonia    Sepsis ([facility] [address]    Hypogammaglobulinemia ([facility] [address]    [facility]LL [facility]ocytic leukemia) ([facility]    Upper respiratory infection, acute    Generalized muscle weakness    Type 2 diabetes mellitus with hyperglycemia, with long-term current use of insulin ([facility])    Poor appetite    COPD (chronic obstructive pulmonary disease) ([facility])    Neutropenia ([facility])    Other specified disorders of bone density and structure, unspecified site    Cellulitis of right upper limb    Herpesviral infection    Intestinal malabsorption    Other nonspecific abnormal finding of lung field    Iron deficiency anemia due to chronic blood loss    Other muscle spasm    Leukemia, lymphocytic, chronic ([facility])    Selective deficiency of IgG ([facility])    Acute bronchitis       Current Outpatient Medications   Medication Sig Dispense Refill    sulfamethoxazole-trimethoprim (BACTRIM DS;SEPTRA DS) 800-160 MG per tablet Take 1 tablet by mouth in the morning, at noon, and at bedtime for 14 days 42 tablet 0    ferrous gluconate 324 (37.5 Fe) MG TABS Take 1 tablet by mouth 2 times daily 60 tablet 5    valACYclovir (VALTREX) 500 MG tablet Take 1 tablet by mouth daily 30 tablet 5    ipratropium-albuterol (DUONEB) 0.5-2.5 (3) MG/3ML SOLN nebulizer solution Inhale 3 mLs into the lungs every 6 hours as needed for Shortness of Breath 360 mL 3    LEVEMIR FLEXTOUCH 100 UNIT/ML injection pen Inject 40 Units into the skin nightly 5 pen 3    NOVOLOG FLEXPEN 100 UNIT/ML injection pen Inject 15 Units into the skin 3 times daily (before meals) 5 pen 3    ibrutinib (IMBRUVICA) 140 MG tablet Take 1 tablet by mouth daily 30 tablet 5    atorvastatin (LIPITOR) 80 MG tablet take 1 tablet by oral route every day      acetaminophen-codeine (TYLENOL #3) 300-30 MG per tablet as needed.       finasteride (PROSCAR) 5 MG tablet       magnesium oxide (MAG-OX) 400 MG tablet Take 400 mg by mouth daily      tamsulosin (FLOMAX) 0.4 MG capsule Take 0.4 mg by mouth daily      glucose monitoring kit (FREESTYLE) monitoring kit 1 kit by Does not apply route daily as needed (glucose checks) 1 kit 0    Insulin Syringe-Needle U-100 30G X 1/2\" 0.5 ML MISC 1 each by Does not apply route daily 100 each 3    metFORMIN (GLUCOPHAGE) 1000 MG tablet Take 1,000 mg by mouth 2 times daily (with meals)      omeprazole (PRILOSEC) 20 MG delayed release capsule Take 20 mg by mouth daily as needed      Immune Globulin, Human, 20 GM/200ML SOLN solution Infuse 20 g intravenously every 30 days 05/14/19 Given through infusion therapy states he is due on the 20 of May       No current facility-administered medications for this visit.      Facility-Administered Medications Ordered in Other Visits   Medication Dose Route Frequency Provider Last Rate Last Admin    sodium chloride flush 0.9 % injection 10 mL  10 mL IntraVENous PRN Don Wilburn MD           Past Medical History:   Diagnosis Date    Arthritis     knees, Rt hand/wrist    CAD (coronary artery disease)     Cancer ([facility] [address]     CLL--Sees Dr Jonas Grace Diabetes mellitus ([facility] [address]     History of blood transfusion     no reaction    Hx of motion sickness     MDRO (multiple drug resistant organisms) resistance     abscess on buttock 10yrs ago    Migraine     last one summer 2016    Pneumonia 2016    Wears dentures     full upper--lower dentures    Wears glasses        Past Surgical History:   Procedure Laterality Date    CARDIAC CATHETERIZATION  1990's    x 2  last showed \"inflammation of heart\"    COLONOSCOPY  2010    DENTAL SURGERY      all teeth extracted     EYE SURGERY Right 08/2016    Cataract removal    FRACTURE SURGERY Left 1990's    left ankle plate and screws    KNEE SURGERY  1970    left    OTHER SURGICAL HISTORY Left 01/18/2017    groin excision    TONSILLECTOMY  late 1960's    age 13    TUNNELED VENOUS PORT PLACEMENT  2013    Right upper chest       Social History     Socioeconomic History    Marital status: Single     Spouse name: Not on file    Number of children: Not on file    Years of education: Not on file    Highest education level: Not on file   Occupational History    Not on file   Tobacco Use    Smoking status: Former Smoker     Packs/day: 1.00     Years: 20.00     Pack years: 20.00     Types: Cigarettes     Start date: 10/2/1965     Quit date: 1987     Years since quittin.4    Smokeless tobacco: Never Used   Substance and Sexual Activity    Alcohol use: No    Drug use: No    Sexual activity: Not on file   Other Topics Concern    Not on file   Social History Narrative    Not on file     Social Determinants of Health     Financial Resource Strain:     Difficulty of Paying Living Expenses: Not on file   Food Insecurity:     Worried About Running Out of Food in the Last Year: Not on file    [place] of Food in the Last Year: Not on file   Transportation Needs:     Lack of Transportation (Medical): Not on file    Lack of Transportation (Non-Medical):  Not on file   Physical Activity:     Days of Exercise per Week: Not on file    Minutes of Exercise per Session: Not on file   Stress:     Feeling of Stress : Not on file   Social Connections:     Frequency of Communication with Friends and Family: Not on file    Frequency of Social Gatherings with Friends and Family: Not on file    Attends [employer] Services: Not on file    Active Member of [address] [place] [address] or Organizations: Not on file    Attends Club or Organization Meetings: Not on file    Marital Status: Not on file   Intimate Partner Violence:     Fear of Current or Ex-Partner: Not on file    Emotionally Abused: Not on file    Physically Abused: Not on file    Sexually Abused: Not on file   Housing Stability:     Unable to Pay for Housing in the Last Year: Not on file    Number of [place] in the Last Year: Not on file    Unstable Housing in the Last Year: Not on file       Family History   Problem Relation Age of Onset    Diabetes Mother     High Blood Pressure Mother     Heart Disease Father     Other Sister         liver problems    Early Death Brother     Asthma Maternal Uncle     Colon Cancer Brother        Vital Signs:  Vitals:    05/26/22 1047   BP: 135/78   Site: Right Upper Arm   Position: Sitting   Cuff Size: Medium Adult   Pulse: 77   Resp: 18   Temp: (!) 96.6 °F (35.9 °C)   TempSrc: Infrared   Weight: 188 lb 6.4 oz (85.5 kg)        Wt Readings from Last 3 Encounters:   05/26/22 188 lb 6.4 oz (85.5 kg)   04/28/22 183 lb (83 kg)   03/26/22 181 lb 9.6 oz (82.4 kg)        Physical Exam:   Gen: alert and NAD  HEENT: sclera clear, pupils equal and reactive, extra ocular muscles intact, oropharynx clear, mucus membranes moist, tympanic membranes clear bilaterally, no cervical lymphadenopathy noted and neck supple  Neck: supple, no significant adenopathy  Chest: Bilateral rhonchi  Heart: regular rate and rhythm, no murmurs  ABD: abdomen is soft without significant tenderness, masses, organomegaly or guarding. EXT:peripheral pulses normal, no pedal edema, no clubbing or cyanosis  NEURO: alert, oriented, normal speech, no focal findings or movement disorder noted  Skin: well hydrated, no lesions, surgical site examined  Wounds: none  Labs:   WBC   Date Value Ref Range Status   04/21/2022 18.3 (H) 4.0 - 10.5 K/CU MM Final     Comment:     WBC/DIFFERENTIAL SUSPECT FLAG NOTED ON ANALYZER.  PLEASE REVIEW AND CONSIDER SENDING OUT IF   CLINICALLY INDICATED.     03/27/2022 5.3 4.0 - 10.5 K/CU MM Final   03/26/2022 6.6 4.0 - 10.5 K/CU MM Final     CREATININE   Date Value Ref Range Status   04/21/2022 0.8 (L) 0.9 - 1.3 MG/DL Final   03/27/2022 0.8 (L) 0.9 - 1.3 MG/DL Final   03/26/2022 0.7 (L) 0.9 - 1.3 MG/DL Final       Cultures:  Culture   Date Value Ref Range Status   05/27/2022   Preliminary    Prelim Report Moderate growth normal respiratory micheline with   05/27/2022 (A)  Preliminary    PSEUDOMONAS AERUGINOSA Light growth Sensitivity to follow   03/24/2022 NO GROWTH AT 5 DAYS  Final       Imaging Studies:         Electronicallysigned by Jeanie Peacock MD on 5/26/22 at 6:36 AM EDT

## 2022-05-27 ENCOUNTER — HOSPITAL ENCOUNTER (OUTPATIENT)
Age: 72
Setting detail: SPECIMEN
Discharge: HOME | End: 2022-05-27
Payer: COMMERCIAL

## 2022-05-27 PROCEDURE — 87070 CULTURE OTHR SPECIMN AEROBIC: CPT

## 2022-05-27 PROCEDURE — 87181 SC STD AGAR DILUTION PER AGT: CPT

## 2022-05-27 PROCEDURE — 87186 SC STD MICRODIL/AGAR DIL: CPT

## 2022-05-27 PROCEDURE — 87205 SMEAR GRAM STAIN: CPT

## 2022-05-27 PROCEDURE — 87077 CULTURE AEROBIC IDENTIFY: CPT

## 2022-05-28 LAB
(1,3)-BETA-D-GLUCAN (FUNGITELL) INTERPRETATION: ABNORMAL
(1,3)-BETA-D-GLUCAN (FUNGITELL): 65 PG/ML
IGG 1: 227 MG/DL (ref 240–1118)
IGG 2: 133 MG/DL (ref 124–549)
IGG 3: 16 MG/DL (ref 21–134)
IGG 4: 10 MG/DL (ref 1–123)

## 2022-05-28 NOTE — ASSESSMENT & PLAN NOTE
Medical history of CLL, hypogammaglobulinemia does increase his risk for pneumonia notably from bacteria, but also from Mycobacterium, nocardia and fungi. At this point, we will treat with Bactrim.   Obtain respiratory culture, and sputum AFB

## 2022-05-29 LAB
ASPERGILLUS GALACTO AG: NEGATIVE
ASPERGILLUS GALACTO INDEX: 0.06
CULTURE: ABNORMAL
CULTURE: ABNORMAL
Lab: ABNORMAL
SPECIMEN: ABNORMAL

## 2022-05-30 LAB
ASPERGILLUS ANTIBODY ID: NORMAL
BLASTOMYCES ANTIBODY ID: NORMAL
COCCIDIOIDES ANTIBODY ID: NOT DETECTED
HISTOPLASMA ABS, ID: NORMAL

## 2022-06-08 ENCOUNTER — OFFICE VISIT (OUTPATIENT)
Dept: INFECTIOUS DISEASES | Age: 72
End: 2022-06-08
Payer: COMMERCIAL

## 2022-06-08 VITALS
BODY MASS INDEX: 25.77 KG/M2 | DIASTOLIC BLOOD PRESSURE: 67 MMHG | TEMPERATURE: 96.8 F | SYSTOLIC BLOOD PRESSURE: 126 MMHG | WEIGHT: 190 LBS | RESPIRATION RATE: 18 BRPM | HEART RATE: 80 BPM

## 2022-06-08 DIAGNOSIS — D80.1 HYPOGAMMAGLOBULINEMIA (HCC): Primary | ICD-10-CM

## 2022-06-08 DIAGNOSIS — J15.1 PNEUMONIA OF LEFT LOWER LOBE DUE TO PSEUDOMONAS SPECIES (HCC): ICD-10-CM

## 2022-06-08 PROCEDURE — 1123F ACP DISCUSS/DSCN MKR DOCD: CPT | Performed by: INTERNAL MEDICINE

## 2022-06-08 PROCEDURE — 1036F TOBACCO NON-USER: CPT | Performed by: INTERNAL MEDICINE

## 2022-06-08 PROCEDURE — 99214 OFFICE O/P EST MOD 30 MIN: CPT | Performed by: INTERNAL MEDICINE

## 2022-06-08 PROCEDURE — G8427 DOCREV CUR MEDS BY ELIG CLIN: HCPCS | Performed by: INTERNAL MEDICINE

## 2022-06-08 PROCEDURE — 3017F COLORECTAL CA SCREEN DOC REV: CPT | Performed by: INTERNAL MEDICINE

## 2022-06-08 PROCEDURE — G8417 CALC BMI ABV UP PARAM F/U: HCPCS | Performed by: INTERNAL MEDICINE

## 2022-06-08 RX ORDER — CIPROFLOXACIN 500 MG/1
750 TABLET, FILM COATED ORAL 2 TIMES DAILY
Qty: 42 TABLET | Refills: 0 | Status: SHIPPED | OUTPATIENT
Start: 2022-06-08 | End: 2022-06-22

## 2022-06-08 NOTE — ASSESSMENT & PLAN NOTE
Pseudomonas aeruginosa on culture. He has COPD and at risk for continued colonization. Treat with ciprofloxacin for 2 weeks. Repeat respiratory culture after abx.

## 2022-06-08 NOTE — PROGRESS NOTES
6/8/2022         Referring Physician: No ref. provider found  Primary Care Physician: Hardy Ruiz MD    Impression/Plan:   Diagnosis Orders   1. Hypogammaglobulinemia ([address]     2. Pneumonia of left lower lobe due to Pseudomonas species (HCC)  ciprofloxacin (CIPRO) 500 mg tablet    Culture, Respiratory       Discussion:  Pneumonia  Pseudomonas aeruginosa on culture. He has COPD and at risk for continued colonization. Treat with ciprofloxacin for 2 weeks. Repeat respiratory culture after abx. Hypogammaglobulinemia ([address]  Follow up with Dr. Jayson Carmona. May need a higher dose of IVIG. Return in about 3 weeks (around 6/29/2022). 30 minutes was spent in the encounter; more than 50% of the face-to-face time was spent with the patient providing counseling and coordination of care. History: Leobardo Lyn is a 80 y.o.  male with a medical history of T2DM, CLL, hypogammaglobulinemia requiring monthly IVIG infusions, presenting today for recurrent pneumonia. Patient reports that he was treated for pneumonia in March 2022, he felt well for a while but once more had symptoms of productive cough and fatigue. He denies night sweats. He endorses some weight loss and poor appetite. He follows with Dr. Princess Fatima who had ordered a CT of the chest that was done on 5/4/2022. It showed patchy bilateral airspace opacities, interval increase in intrathoracic and upper abdominal lymphadenopathy and splenomegaly. Patient was born in [place]. He reports no history of exposure to anyone with tuberculosis. 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 He has no pets. 6/8/2022: at last visit Bactrim was prescribed, tests were done. Cough remains productive of yellow, non-bloody sputum. No chest pain, nausea or fever. Feels somewhat better and stronger. Not at baseline. Review of Systems   All other systems reviewed and are negative.       No Known Allergies    Patient Active Problem List   Diagnosis    Pneumonia    Sepsis ([facility] [address]    Hypogammaglobulinemia ([facility] [address]    [facility]LL [facility]ch[facility]ocytic [facility]) ([facility]    Upper respiratory infection, acute    Generalized muscle weakness    Type 2 diabetes mellitus with hyperglycemia, with long-term current use of insulin (HCC)    Poor appetite    [facility] (chronic obstructive [facility] disease) ([facility]    Neutropenia ([facility])    Other specified disorders of bone density and structure, unspecified site    Cellulitis of right upper limb    Herpesviral infection    Intestinal malabsorption    Other nonspecific abnormal finding of lung field    Iron deficiency anemia due to chronic blood loss    Other muscle spasm    Leukemia, lymphocytic, chronic ([facility])    Selective deficiency of IgG ([facility])    Acute bronchitis       Current Outpatient Medications   Medication Sig Dispense Refill    ciprofloxacin (CIPRO) 500 mg tablet Take 1.5 tablets by mouth 2 times daily for 14 days 42 tablet 0    sulfamethoxazole-trimethoprim (BACTRIM DS;SEPTRA DS) 800-160 MG per tablet Take 1 tablet by mouth in the morning, at noon, and at bedtime for 14 days 42 tablet 0    ferrous gluconate 324 (37.5 Fe) MG TABS Take 1 tablet by mouth 2 times daily 60 tablet 5    valACYclovir (VALTREX) 500 MG tablet Take 1 tablet by mouth daily 30 tablet 5    ipratropium-albuterol (DUONEB) 0.5-2.5 (3) MG/3ML SOLN nebulizer solution Inhale 3 mLs into the lungs every 6 hours as needed for Shortness of Breath 360 mL 3    LEVEMIR FLEXTOUCH 100 UNIT/ML injection pen Inject 40 Units into the skin nightly 5 pen 3    NOVOLOG FLEXPEN 100 UNIT/ML injection pen Inject 15 Units into the skin 3 times daily (before meals) 5 pen 3    ibrutinib (IMBRUVICA) 140 MG tablet Take 1 tablet by mouth daily 30 tablet 5    atorvastatin (LIPITOR) 80 MG tablet take 1 tablet by oral route every day      acetaminophen-codeine (TYLENOL #3) 300-30 MG per tablet as needed.       finasteride (PROSCAR) 5 MG tablet       magnesium oxide (MAG-OX) 400 MG tablet Take 400 mg by mouth daily      tamsulosin (FLOMAX) 0.4 MG capsule Take 0.4 mg by mouth daily      glucose monitoring kit (FREESTYLE) monitoring kit 1 kit by Does not apply route daily as needed (glucose checks) 1 kit 0    Insulin Syringe-Needle U-100 30G X 1/2\" 0.5 ML MISC 1 each by Does not apply route daily 100 each 3    metFORMIN (GLUCOPHAGE) 1000 MG tablet Take 1,000 mg by mouth 2 times daily (with meals)      omeprazole (PRILOSEC) 20 MG delayed release capsule Take 20 mg by mouth daily as needed      Immune Globulin, Human, 20 GM/200ML SOLN solution Infuse 20 g intravenously every 30 days 05/14/19 Given through infusion therapy states he is due on the 20 of May       No current facility-administered medications for this visit.      Facility-Administered Medications Ordered in Other Visits   Medication Dose Route Frequency Provider Last Rate Last Admin    sodium chloride flush 0.9 % injection 10 mL  10 mL IntraVENous PRN Sundeep Swann MD           Past Medical History:   Diagnosis Date    Arthritis     knees, Rt hand/wrist    CAD (coronary artery disease)     Cancer ([facility] [address]     CLL--Sees Dr Fco Chapa Diabetes mellitus ([facility] [address]     History of blood transfusion     no reaction    Hx of motion sickness     MDRO (multiple drug resistant organisms) resistance     abscess on buttock 10yrs ago    Migraine     last one summer 2016    Pneumonia 2016    Wears dentures     full upper--lower dentures    Wears glasses        Past Surgical History:   Procedure Laterality Date    CARDIAC CATHETERIZATION  1990's    x 2  last showed \"inflammation of heart\"    COLONOSCOPY  2010    DENTAL SURGERY      all teeth extracted     EYE SURGERY Right 08/2016    Cataract removal    FRACTURE SURGERY Left 1990's    left ankle plate and screws    KNEE SURGERY  1970    left    OTHER SURGICAL HISTORY Left 01/18/2017    groin excision    TONSILLECTOMY  late 1960's    age 16    TUNNELED VENOUS PORT PLACEMENT  2013    Right upper chest       Social History     Socioeconomic History    Marital status: Single     Spouse name: Not on file    Number of children: Not on file    Years of education: Not on file    Highest education level: Not on file   Occupational History    Not on file   Tobacco Use    Smoking status: Former Smoker     Packs/day: 1.00     Years: 20.00     Pack years: 20.00     Types: Cigarettes     Start date: 10/2/1965     Quit date: 1987     Years since quittin.4    Smokeless tobacco: Never Used   Substance and Sexual Activity    Alcohol use: No    Drug use: No    Sexual activity: Not on file   Other Topics Concern    Not on file   Social History Narrative    Not on file     Social Determinants of Health     Financial Resource Strain:     Difficulty of Paying Living Expenses: Not on file   Food Insecurity:     Worried About Running Out of Food in the Last Year: Not on file    [place] of Food in the Last Year: Not on file   Transportation Needs:     Lack of Transportation (Medical): Not on file    Lack of Transportation (Non-Medical):  Not on file   Physical Activity:     Days of Exercise per Week: Not on file    Minutes of Exercise per Session: Not on file   Stress:     Feeling of Stress : Not on file   Social Connections:     Frequency of Communication with Friends and Family: Not on file    Frequency of Social Gatherings with Friends and Family: Not on file    Attends [employer] Services: Not on file    Active Member of Clubs or Organizations: Not on file    Attends Club or Organization Meetings: Not on file    Marital Status: Not on file   Intimate Partner Violence:     Fear of Current or Ex-Partner: Not on file    Emotionally Abused: Not on file    Physically Abused: Not on file    Sexually Abused: Not on file   Housing Stability:     Unable to Pay for Housing in the Last Year: Not on file    Number of [place] in the Last Year: Not on file    Unstable Housing in the Last Year: Not on file       Family History   Problem Relation Age of Onset    Diabetes Mother     High Blood Pressure Mother     Heart Disease Father     Other Sister         liver problems    Early Death Brother     Asthma Maternal Uncle     Colon Cancer Brother        Vital Signs:  Vitals:    06/08/22 1113   BP: 126/67   Site: Right Upper Arm   Position: Sitting   Cuff Size: Medium Adult   Pulse: 80   Resp: 18   Temp: 96.8 °F (36 °C)   TempSrc: Infrared   Weight: 190 lb (86.2 kg)        Wt Readings from Last 3 Encounters:   06/08/22 190 lb (86.2 kg)   05/26/22 188 lb 6.4 oz (85.5 kg)   04/28/22 183 lb (83 kg)        Physical Exam:   Gen: alert and NAD  HEENT: sclera clear, pupils equal and reactive, extra ocular muscles intact, oropharynx clear, mucus membranes moist, tympanic membranes clear bilaterally, no cervical lymphadenopathy noted and neck supple  Neck: supple, no significant adenopathy  Chest: bibasilar crackles. Heart: regular rate and rhythm, no murmurs  ABD: abdomen is soft without significant tenderness, masses, organomegaly or guarding. EXT:peripheral pulses normal, no pedal edema, no clubbing or cyanosis  NEURO: alert, oriented, normal speech, no focal findings or movement disorder noted  Skin: well hydrated, no lesions, surgical site examined  Wounds: none  Labs:   WBC   Date Value Ref Range Status   04/21/2022 18.3 (H) 4.0 - 10.5 K/CU MM Final     Comment:     WBC/DIFFERENTIAL SUSPECT FLAG NOTED ON ANALYZER.  PLEASE REVIEW AND CONSIDER SENDING OUT IF   CLINICALLY INDICATED.     03/27/2022 5.3 4.0 - 10.5 K/CU MM Final   03/26/2022 6.6 4.0 - 10.5 K/CU MM Final     CREATININE   Date Value Ref Range Status   04/21/2022 0.8 (L) 0.9 - 1.3 MG/DL Final   03/27/2022 0.8 (L) 0.9 - 1.3 MG/DL Final   03/26/2022 0.7 (L) 0.9 - 1.3 MG/DL Final       Cultures:  Culture   Date Value Ref Range Status   05/27/2022   Final    Final Report Moderate growth normal respiratory micheline with   05/27/2022 PSEUDOMONAS AERUGINOSA Light growth (A)  Final   03/24/2022 NO GROWTH AT 5 DAYS  Final       Imaging Studies:         Electronicallysigned by Liz Joe MD on 5/26/22 at 6:36 AM EDT

## 2022-06-08 NOTE — PATIENT INSTRUCTIONS
Have repeat sputum cx after you complete ciprofloxacin. Follow up with Dr. Hannah William about your IVIG dose. If you have greater than 3 diarrheal stool a day then call the office.

## 2022-06-13 ENCOUNTER — HOSPITAL ENCOUNTER (OUTPATIENT)
Dept: INFUSION THERAPY | Age: 72
Setting detail: INFUSION SERIES
Discharge: HOME | End: 2022-06-13
Payer: COMMERCIAL

## 2022-06-13 VITALS
WEIGHT: 199 LBS | HEART RATE: 84 BPM | DIASTOLIC BLOOD PRESSURE: 70 MMHG | OXYGEN SATURATION: 97 % | SYSTOLIC BLOOD PRESSURE: 152 MMHG | TEMPERATURE: 97.3 F | RESPIRATION RATE: 20 BRPM | BODY MASS INDEX: 26.99 KG/M2

## 2022-06-13 DIAGNOSIS — D80.1 HYPOGAMMAGLOBULINEMIA (HCC): ICD-10-CM

## 2022-06-13 DIAGNOSIS — D80.3 SELECTIVE DEFICIENCY OF IGG (HCC): ICD-10-CM

## 2022-06-13 DIAGNOSIS — C91.10 LEUKEMIA, LYMPHOCYTIC, CHRONIC (HCC): Primary | ICD-10-CM

## 2022-06-13 PROCEDURE — 99211 OFF/OP EST MAY X REQ PHY/QHP: CPT

## 2022-06-13 PROCEDURE — 96375 TX/PRO/DX INJ NEW DRUG ADDON: CPT

## 2022-06-13 PROCEDURE — 96365 THER/PROPH/DIAG IV INF INIT: CPT

## 2022-06-13 PROCEDURE — 96372 THER/PROPH/DIAG INJ SC/IM: CPT

## 2022-06-13 PROCEDURE — 2580000003 HC RX 258: Performed by: INTERNAL MEDICINE

## 2022-06-13 PROCEDURE — 96366 THER/PROPH/DIAG IV INF ADDON: CPT

## 2022-06-13 PROCEDURE — 96374 THER/PROPH/DIAG INJ IV PUSH: CPT

## 2022-06-13 PROCEDURE — 6360000002 HC RX W HCPCS: Performed by: INTERNAL MEDICINE

## 2022-06-13 RX ORDER — HEPARIN SODIUM (PORCINE) LOCK FLUSH IV SOLN 100 UNIT/ML 100 UNIT/ML
500 SOLUTION INTRAVENOUS PRN
Status: CANCELLED
Start: 2022-06-27

## 2022-06-13 RX ORDER — CYANOCOBALAMIN 1000 UG/ML
1000 INJECTION INTRAMUSCULAR; SUBCUTANEOUS ONCE
Status: CANCELLED
Start: 2022-06-27 | End: 2022-06-27

## 2022-06-13 RX ORDER — DIPHENHYDRAMINE HYDROCHLORIDE 50 MG/ML
25 INJECTION INTRAMUSCULAR; INTRAVENOUS ONCE
Status: CANCELLED
Start: 2022-06-27 | End: 2022-06-27

## 2022-06-13 RX ORDER — SODIUM CHLORIDE 0.9 % (FLUSH) 0.9 %
10 SYRINGE (ML) INJECTION PRN
Status: CANCELLED | OUTPATIENT
Start: 2022-06-27

## 2022-06-13 RX ORDER — HEPARIN SODIUM (PORCINE) LOCK FLUSH IV SOLN 100 UNIT/ML 100 UNIT/ML
500 SOLUTION INTRAVENOUS PRN
Status: DISCONTINUED | OUTPATIENT
Start: 2022-06-13 | End: 2022-06-14 | Stop reason: HOSPADM

## 2022-06-13 RX ORDER — CYANOCOBALAMIN 1000 UG/ML
1000 INJECTION INTRAMUSCULAR; SUBCUTANEOUS ONCE
Status: COMPLETED | OUTPATIENT
Start: 2022-06-13 | End: 2022-06-13

## 2022-06-13 RX ORDER — DIPHENHYDRAMINE HYDROCHLORIDE 50 MG/ML
25 INJECTION INTRAMUSCULAR; INTRAVENOUS ONCE
Status: COMPLETED | OUTPATIENT
Start: 2022-06-13 | End: 2022-06-13

## 2022-06-13 RX ORDER — METHYLPREDNISOLONE SODIUM SUCCINATE 40 MG/ML
40 INJECTION, POWDER, LYOPHILIZED, FOR SOLUTION INTRAMUSCULAR; INTRAVENOUS ONCE
Status: CANCELLED
Start: 2022-06-27 | End: 2022-06-27

## 2022-06-13 RX ORDER — SODIUM CHLORIDE 0.9 % (FLUSH) 0.9 %
10 SYRINGE (ML) INJECTION PRN
Status: DISCONTINUED | OUTPATIENT
Start: 2022-06-13 | End: 2022-06-14 | Stop reason: HOSPADM

## 2022-06-13 RX ORDER — METHYLPREDNISOLONE SODIUM SUCCINATE 40 MG/ML
40 INJECTION, POWDER, LYOPHILIZED, FOR SOLUTION INTRAMUSCULAR; INTRAVENOUS ONCE
Status: COMPLETED | OUTPATIENT
Start: 2022-06-13 | End: 2022-06-13

## 2022-06-13 RX ORDER — HEPARIN SODIUM (PORCINE) LOCK FLUSH IV SOLN 100 UNIT/ML 100 UNIT/ML
500 SOLUTION INTRAVENOUS PRN
Status: CANCELLED | OUTPATIENT
Start: 2022-06-27

## 2022-06-13 RX ADMIN — DIPHENHYDRAMINE HYDROCHLORIDE 25 MG: 50 INJECTION, SOLUTION INTRAMUSCULAR; INTRAVENOUS at 08:07

## 2022-06-13 RX ADMIN — SODIUM CHLORIDE, PRESERVATIVE FREE 10 ML: 5 INJECTION INTRAVENOUS at 08:08

## 2022-06-13 RX ADMIN — SODIUM CHLORIDE, PRESERVATIVE FREE 20 ML: 5 INJECTION INTRAVENOUS at 10:36

## 2022-06-13 RX ADMIN — METHYLPREDNISOLONE SODIUM SUCCINATE 40 MG: 40 INJECTION, POWDER, FOR SOLUTION INTRAMUSCULAR; INTRAVENOUS at 08:05

## 2022-06-13 RX ADMIN — IMMUNE GLOBULIN (HUMAN) 20 G: 10 INJECTION INTRAVENOUS; SUBCUTANEOUS at 08:37

## 2022-06-13 RX ADMIN — SODIUM CHLORIDE, PRESERVATIVE FREE 10 ML: 5 INJECTION INTRAVENOUS at 07:52

## 2022-06-13 RX ADMIN — CYANOCOBALAMIN 1000 MCG: 1000 INJECTION INTRAMUSCULAR; SUBCUTANEOUS at 08:02

## 2022-06-13 RX ADMIN — HEPARIN 500 UNITS: 100 SYRINGE at 10:36

## 2022-06-13 NOTE — PROGRESS NOTES
Prior to discharge, the After Visit Summary Discharge Instructions were reviewed with the patient. He was offered a printed version of the AVS, but declined the offer. Recurrent appointment, pt tolerated infusion well. Pt discharged HOME. Pt to exit via AMBULATION. NEXT APPT MADE AND CARD GIVEN.     Orders Placed This Encounter   Medications    sodium chloride flush 0.9 % injection 10 mL    heparin flush 100 UNIT/ML injection 500 Units    immune globulin (GAMUNEX-C) 10% solution 20 g    methylPREDNISolone sodium (SOLU-MEDROL) injection 40 mg    diphenhydrAMINE (BENADRYL) injection 25 mg    cyanocobalamin injection 1,000 mcg

## 2022-06-14 ENCOUNTER — HOSPITAL ENCOUNTER (OUTPATIENT)
Dept: INFUSION THERAPY | Age: 72
Discharge: HOME | End: 2022-06-14
Payer: COMMERCIAL

## 2022-06-14 ENCOUNTER — OFFICE VISIT (OUTPATIENT)
Dept: ONCOLOGY | Age: 72
End: 2022-06-14
Payer: COMMERCIAL

## 2022-06-14 VITALS
TEMPERATURE: 97.3 F | HEART RATE: 87 BPM | BODY MASS INDEX: 25.44 KG/M2 | DIASTOLIC BLOOD PRESSURE: 75 MMHG | OXYGEN SATURATION: 96 % | SYSTOLIC BLOOD PRESSURE: 166 MMHG | HEIGHT: 72 IN | WEIGHT: 187.8 LBS

## 2022-06-14 DIAGNOSIS — D50.0 IRON DEFICIENCY ANEMIA DUE TO CHRONIC BLOOD LOSS: ICD-10-CM

## 2022-06-14 DIAGNOSIS — D80.1 HYPOGAMMAGLOBULINEMIA (HCC): ICD-10-CM

## 2022-06-14 DIAGNOSIS — C91.10 CLL (CHRONIC LYMPHOCYTIC LEUKEMIA) (HCC): Primary | ICD-10-CM

## 2022-06-14 DIAGNOSIS — K90.9 INTESTINAL MALABSORPTION, UNSPECIFIED TYPE: ICD-10-CM

## 2022-06-14 DIAGNOSIS — R91.1 LUNG NODULE: ICD-10-CM

## 2022-06-14 DIAGNOSIS — C91.10 CLL (CHRONIC LYMPHOCYTIC LEUKEMIA) (HCC): ICD-10-CM

## 2022-06-14 LAB
ALBUMIN SERPL-MCNC: 4.5 GM/DL (ref 3.4–5)
ALP BLD-CCNC: 81 IU/L (ref 40–129)
ALT SERPL-CCNC: 16 U/L (ref 10–40)
ANION GAP SERPL CALCULATED.3IONS-SCNC: 11 MMOL/L (ref 4–16)
AST SERPL-CCNC: 12 IU/L (ref 15–37)
BASOPHILS ABSOLUTE: 0 K/CU MM
BASOPHILS RELATIVE PERCENT: 0.2 % (ref 0–1)
BILIRUB SERPL-MCNC: 0.4 MG/DL (ref 0–1)
BUN BLDV-MCNC: 25 MG/DL (ref 6–23)
CALCIUM SERPL-MCNC: 9.6 MG/DL (ref 8.3–10.6)
CHLORIDE BLD-SCNC: 107 MMOL/L (ref 99–110)
CO2: 22 MMOL/L (ref 21–32)
CREAT SERPL-MCNC: 0.8 MG/DL (ref 0.9–1.3)
DIFFERENTIAL TYPE: ABNORMAL
EOSINOPHILS ABSOLUTE: 0.1 K/CU MM
EOSINOPHILS RELATIVE PERCENT: 0.6 % (ref 0–3)
FERRITIN: 22 NG/ML (ref 30–400)
FOLATE: 8.9 NG/ML (ref 3.1–17.5)
GFR AFRICAN AMERICAN: >60 ML/MIN/1.73M2
GFR NON-AFRICAN AMERICAN: >60 ML/MIN/1.73M2
GLUCOSE BLD-MCNC: 119 MG/DL (ref 70–99)
HCT VFR BLD CALC: 37 % (ref 42–52)
HEMOGLOBIN: 11.4 GM/DL (ref 13.5–18)
IGG,SERUM: 635 MG/DL (ref 723–1685)
IRON: 23 UG/DL (ref 59–158)
LACTATE DEHYDROGENASE: 173 IU/L (ref 120–246)
LYMPHOCYTES ABSOLUTE: 8.7 K/CU MM
LYMPHOCYTES RELATIVE PERCENT: 62.6 % (ref 24–44)
MCH RBC QN AUTO: 25.2 PG (ref 27–31)
MCHC RBC AUTO-ENTMCNC: 30.8 % (ref 32–36)
MCV RBC AUTO: 81.9 FL (ref 78–100)
MONOCYTES ABSOLUTE: 1.6 K/CU MM
MONOCYTES RELATIVE PERCENT: 11.1 % (ref 0–4)
PCT TRANSFERRIN: 5 % (ref 10–44)
PDW BLD-RTO: 21 % (ref 11.7–14.9)
PLATELET # BLD: 121 K/CU MM (ref 140–440)
PMV BLD AUTO: 10.4 FL (ref 7.5–11.1)
POTASSIUM SERPL-SCNC: 4.4 MMOL/L (ref 3.5–5.1)
RBC # BLD: 4.52 M/CU MM (ref 4.6–6.2)
SEGMENTED NEUTROPHILS ABSOLUTE COUNT: 3.6 K/CU MM
SEGMENTED NEUTROPHILS RELATIVE PERCENT: 25.5 % (ref 36–66)
SODIUM BLD-SCNC: 140 MMOL/L (ref 135–145)
TOTAL IRON BINDING CAPACITY: 424 UG/DL (ref 250–450)
TOTAL PROTEIN: 6.3 GM/DL (ref 6.4–8.2)
UNSATURATED IRON BINDING CAPACITY: 401 UG/DL (ref 110–370)
VITAMIN B-12: 1455 PG/ML (ref 211–911)
WBC # BLD: 14 K/CU MM (ref 4–10.5)

## 2022-06-14 PROCEDURE — 83540 ASSAY OF IRON: CPT

## 2022-06-14 PROCEDURE — 82607 VITAMIN B-12: CPT

## 2022-06-14 PROCEDURE — 80053 COMPREHEN METABOLIC PANEL: CPT

## 2022-06-14 PROCEDURE — G8427 DOCREV CUR MEDS BY ELIG CLIN: HCPCS | Performed by: INTERNAL MEDICINE

## 2022-06-14 PROCEDURE — 82746 ASSAY OF FOLIC ACID SERUM: CPT

## 2022-06-14 PROCEDURE — 83615 LACTATE (LD) (LDH) ENZYME: CPT

## 2022-06-14 PROCEDURE — 85025 COMPLETE CBC W/AUTO DIFF WBC: CPT

## 2022-06-14 PROCEDURE — 82784 ASSAY IGA/IGD/IGG/IGM EACH: CPT

## 2022-06-14 PROCEDURE — 1123F ACP DISCUSS/DSCN MKR DOCD: CPT | Performed by: INTERNAL MEDICINE

## 2022-06-14 PROCEDURE — 36415 COLL VENOUS BLD VENIPUNCTURE: CPT

## 2022-06-14 PROCEDURE — 1036F TOBACCO NON-USER: CPT | Performed by: INTERNAL MEDICINE

## 2022-06-14 PROCEDURE — 82728 ASSAY OF FERRITIN: CPT

## 2022-06-14 PROCEDURE — 3017F COLORECTAL CA SCREEN DOC REV: CPT | Performed by: INTERNAL MEDICINE

## 2022-06-14 PROCEDURE — G8417 CALC BMI ABV UP PARAM F/U: HCPCS | Performed by: INTERNAL MEDICINE

## 2022-06-14 PROCEDURE — 99214 OFFICE O/P EST MOD 30 MIN: CPT | Performed by: INTERNAL MEDICINE

## 2022-06-14 PROCEDURE — 83550 IRON BINDING TEST: CPT

## 2022-06-14 NOTE — PROGRESS NOTES
Patient Name: Gaudencio Sharp  Patient : 1950  Patient MRN: [id number]     Primary Oncologist: Maria Del Rosario Navarrete MD  Referring Physician: Maame Avendaño MD       Date of Service: 2022      Chief Complaint:   Chief Complaint   Patient presents with    Follow-up        Active Problem list  1. CLL (chronic lymphocytic leukemia) ([facility] [address]    2. Hypogammaglobulinemia (HCC)    3. Lung nodule    4. Iron deficiency anemia due to chronic blood loss    5. Intestinal malabsorption, unspecified type           HPI:        1. Mr. Jairo Nunez ( 52) was diagnosed with stage I CLL in , when he presented with infection and lymphocytosis. His peripheral blood immunophenotyping was characteristic of B-cell CLL. It was CD38 negative, ZAP-70 positive, CD19 and 20 positive, CD5 positive. Karyotyping was normal in 2006.,  2. Because of his infection and associated acquired immune deficiency(hypogammaglobulinemia), he was given IVIG for a year. He was started back on IVIG therapy monthly since 2013 to prevent future major infections continuing for now. Also had minor Infusional reaction to IVIG in 2016 responded to Steroids & Benadryl., No further problems after that. 3. CLL was treated only with Leukeran intermittently from 2007 until 2011 and again from 2011 until 2012. 4. 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 Flow studies still showed the same and FISH in 2012 showed deletion of tp53 (17p) and MYB (6q). ,  5. He was thus treated with Fludara and Rituxan for six months between April and 2012 with excellent clinical and hematological response.  . In 2013, he developed Multiple b/l nodes in axilla and groin area, CAT scan on 12/27/13, Bulky lymphadenopathy within the chest, abdomen, and pelvis mildly increased as compared from previous CAT scan in March 2012.,  6. Starting in Jan 2014 he was initiated on 2nd line chemo with Bendamustine and Rituxan with good initial response. Continued till Nov 2014, CAT scan done on 8/14/14 showed an interval decrease in generalized adenopathy compared to previous study In December 2013, suggesting good response to therapy.,  7. CAT scan done on 12/23/14 showed Minimal interval increase in size of at least two periportal and retrocaval lymph nodes. Otherwise stable thoracic, abdominal and pelvic adenopathy. Also mild interval increase in splenomegaly measuring 19 cm, previously 17 cm. 8. CAT scan done on 6/25/15 showed mild interval decrease in the splenomegaly and also in retroperitoneal mesenteric lymph nodes. 9. Started Idelalisib [Zydelig] on 20th Jan 2015 at 150mg po bid. with good initial response. He did remarkably well with Zydelig from January of 2015 until June of 2016, after he was hospitalized for Rhinovirus Pneumonia in May of 2016. Also discussed Living Will, Power of [facility], DNR status, et cetera. In April 2016, chest CT showed stable  10. , 13. In July 2016, His FISH testing on peripheral blood showed abnormal results, with 6q deletion, 17p deletion, and 11q centromere signal in 3 percent of cells. The 17p deletion (Tp53) in 80 percent of cells is associated with unfavorable prognosis. Because of his CLL with poor prognostic markers, including 17p deletion detected in 2012 & again in July of 2016. 11. He was started on ibrutinib in September 2016 140mg/d increased to 280 mg/ after 4 weeks. 12. The abdominal CT 7/5/16 is showing no acute abnormalities, stable splenomegaly, and one periportal lymph node measuring 2.2 by 3.7 cm which is unchanged from before.   13. CAT scan of the chest August 5, 2016, showed axillary, mediastinal, hilar, and upper abdominal lymphadenopathy consistent with his CLL. The maximum size of the nodes is 2.4 cm. A 1 cm infiltrate in the lateral segment of the right middle lobe possibly infectious in nature, segmental calcification of left coronary artery. CAT scan of the chest, abdomen, and pelvis December 7,2016 2016, which revealed a mild mediastinal right hilar adenopathy, decreased in size from the previous examination of April and August of 2016, .5. His ibrutinib was stopped in December 2016 when he was feeling bad and it was started back at one a day, 140 mg daily instead of 280 mg that he was taking prior to that  14. In January 2017, he developed progressive lump in his left inguinal area. That being the only area of progressive adenopathy with multiple nodes coalesced together, a question of Pimentels transformation versus more aggressive histology conversion was considered as a possibility. 13. Dr. Andrew Rodriguez, did a biopsy of the lymph node on January 18, 2017. The final pathology was reported as B-cell small lymphocytic lymphoma (B-cell CLL/SLL), with no evidence of any large cell OR Pimentels transformation. There was some evidence of localized herpetic simplex lymphadenitis. 16. Because of possibility of localized infection with herpes. I started him on Valtrex 500 t.i.d. for two to three weeks then tapered to 1/d as maintenance therapy. Lymphadenopathy in left groin almost completely resolved. 17. His ibrutinib was stopped in December 2016 when he was feeling bad and it was started back at one a day, 140 mg daily as maintenance dose instead of 280 mg that he was taking prior to that. 18. His quantitative immunoglobulin on February 17, 2017, IgG 600, IgA less than 10, IgM less than 4. Serum protein electrophoresis was within the normal range. Gammaglobulin was continued monthly since it has helped any serious infections under control. 19. August 2017 he had a IgG that was 503  20.  October 2017 started a ibrutinib he was off for 2 months due to his insurance issues  5-18 CT chest: Showed some mild decrease in mediastinal and hilar lymphadenopathy. . Regards to his abdomen also there is decrease in his lymphadenopathy. Although there is a left inguinal node that has enlarged. Section that no obvious source of his abdominal cramping. 10/10/2018 EGD showed severe ulcerative esophagitis with slight narrowing in the GE junction small hiatal hernia mild superficial gastritis, Colonoscope revealed 2 mm polyp in the sigmoid colon, diverticulosis, hemorrhoids, moderate stool  1/2019; Colonoscopy with two diminutive polyps, diverticulosis and hemorrhoids, path with TA above IC valve and HP distal sigmoid  10-19 iron def based on labs refered to GI , stool occult negative x #3 , NO PICA   11-19 saw Dr. Stephan Ravi, [facility]  11/7/2019: Ct chest:Ground-glass nodule seen within the right upper lobe the largest measuring 17  mm in diameter. 12-6-19 capsule endoscopy, results unavailable   2/2020: CT chest:IMPRESSION:  Previously noted ground-glass opacities in the right lung have resolved. However, there is a new cluster of tiny pulmonary nodules in the left lower  lobe which could represent an infectious or inflammatory etiology. Short-term follow-up chest CT is recommended in 3-6 months. Multiple additional tiny pulmonary nodules are overall stable. Stable mediastinal lymph nodes without new lymphadenopathy. Previous Therapies    May 2020: Ct chest:  1. Interval resolution of the previously described cluster of pulmonary    nodules in the left lower lobe.  Findings are most compatible with resolved    infectious/inflammatory etiology. 2. No acute cardiopulmonary disease. 3. COPD. 4. Stable subcentimeter mediastinal lymph nodes. 8/28/20: US RP normal    August 2020 cystoscopy revealed enlarged prostate.  Was Recommended TURBT    3/24/22: CXR:  Bilateral interstitial opacity.  Nodular consolidation at the left lung base. Pneumonia is favored over metastatic/lymphangitic disease.  Consider atypical   etiologies. Was treated with IV abx    But was given abx again and completed 4/21/22.    5/4/22: CT chest:  1. Patchy bilateral airspace opacities, most prominent in the left lower lobe   concerning for multifocal pneumonia.  Follow-up examination in 4-6 weeks is   recommended to assess for resolution. 2. Interval increase in intrathoracic and upper abdominal lymphadenopathy   consistent with patient's history of CLL. 3. Splenomegaly. 5/28/22: he was seen by Dr Elkin Peters who ordered bactrim     June 8 2022 he was seen by him again and was prescribed 2 week course of cipro    PAST MEDICAL HISTORY:   1. Stage I CLL with conversion from good to poor prognostic factors. ,  2. History of hypertension for many years. ,  3. 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 Arthritis in the past. ,  5. Frozen shoulder for which he had treatment including injection by Dr. Bjorn Tolentino. ,  6. Cellulitis with Zoster type lesion Left thigh resolved with Bactrim and Valtrex in March 2016.,  7. abdominal illness, with fever, nausea, and discomfort, suggestive of infectious etiology in May 2016. ,  8. hospitalization between July 5 and July 8 for what seems like atypical rhinovirus pneumonia involving right middle and lower lobe and left lower lobe with sepsis,  9. 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 Dr. Diaz Rodriges is referring him for MOHS surgery. ,  10. hospitalized from March 29 to March 31, 2017, for hyperosmolar hyperglycemia with hyponatremia and hyperkalemia and UTI. He was seen by Dr. Brionna Cunningham, who put him on insulin. He was also seen by Dr. Emilie Pino. He felt much better after his sugar got under better control and electrolyte imbalanced reversed,  11.  Ultrasound Doppler April 1, 2017, was negative for any DVT in either leg. Chest x-ray showed no acute process. PAST SURGICAL HISTORY:   1. knee operation,  2. tonsillectomy,  3. broken ankle times two requiring open reduction. ,  4. Vision better after cataract surgery    FAMILY HISTORY:   His paternal aunt had colon cancer. Uncle also had some form of cancer. Father had heart disease. Sister  12 of Liver disease     SOCIAL HISTORY:   He has a 40-pack-year history of smoking, quit in . He denies alcohol use. He is not . Has one son. Interval History  2022: Arrived alone to the clinic today. Overall feels a little better. Energy levels are better. No chest pain. Breathing better. Lost  Lot of weight and lately gained few lbs. Appetite is better. No fever. Cough still productive of yellow sputum. No hemoptysis. No drenching night sweats, lad. No abdominal pain, nausea, emesis, diarrhea or any constipation. Pain starts in the left hip radiating to the left LLE. No LLE weakness.      Review of Systems   Per interval history; otherwise 10 point ROS is negative              Vital Signs:  BP (!) 166/75 (Site: Right Upper Arm, Position: Sitting, Cuff Size: Medium Adult)   Pulse 87   Temp 97.3 °F (36.3 °C) (Infrared)   Ht 6' (1.829 m)   Wt 187 lb 12.8 oz (85.2 kg)   SpO2 96%   BMI 25.47 kg/m²     Physical Exam:  CONSTITUTIONAL: alert and awake, tired appearing  EYES: No palor or any icetrus   ENT: ATNC   NECK: No JVD   HEMATOLOGIC/LYMPHATIC: no cervical, supraclavicular or axillary lymphadenopathy   LUNGS: End exp wheeze left base  CARDIOVASCULAR:s1s2 SM+ rrr   ABDOMEN:soft ntnd bs pos  NEUROLOGIC: GI   SKIN: skin tags   EXTREMITIES: no LE edema bilaterally      Labs:    Hematology:  Lab Results   Component Value Date    WBC 18.3 (H) 2022    RBC 5.12 2022    HGB 12.6 (L) 2022    HCT 39.8 (L) 2022    MCV 77.7 (L) 2022    MCH 24.6 (L) 2022    MCHC 31.7 (L) 2022    RDW 20.3 (H) 2022  04/21/2022    MPV 10.5 04/21/2022    BANDSPCT 6 08/04/2020    SEGSPCT 30.1 (L) 04/21/2022    EOSRELPCT 0.6 04/21/2022    BASOPCT 0.1 04/21/2022    LYMPHOPCT 66.0 (H) 04/21/2022    MONOPCT 3.2 04/21/2022    BANDABS 1.76 08/04/2020    SEGSABS 5.5 04/21/2022    EOSABS 0.1 04/21/2022    BASOSABS 0.0 04/21/2022    LYMPHSABS 12.1 04/21/2022    MONOSABS 0.6 04/21/2022    DIFFTYPE AUTOMATED DIFFERENTIAL 04/21/2022    ANISOCYTOSIS 1+ 08/04/2020    POLYCHROM 1+ 08/04/2020    WBCMORP ATYPICAL LYMPHOCYTES WITH PROMINENT NUCLEOLI NOTED 09/26/2017    PLTM SEVERAL LARGE PLATELETS 67/49/2645     Lab Results   Component Value Date    ESR 72 (H) 01/24/2017       Chemistry:  Lab Results   Component Value Date     04/21/2022    K 4.2 04/21/2022    CL 99 04/21/2022    CO2 24 04/21/2022    BUN 16 04/21/2022    CREATININE 0.8 (L) 04/21/2022    GLUCOSE 100 (H) 04/21/2022    CALCIUM 9.8 04/21/2022    PROT 5.9 (L) 04/21/2022    LABALBU 4.0 04/21/2022    BILITOT 0.5 04/21/2022    ALKPHOS 98 04/21/2022    AST 12 (L) 04/21/2022    ALT 15 04/21/2022    LABGLOM >60 04/21/2022    GFRAA >60 04/21/2022    PHOS 3.3 04/04/2019    MG 2.3 07/20/2020    POCGLU 159 (H) 03/28/2022     Lab Results   Component Value Date     02/03/2022     No components found for: LD  Lab Results   Component Value Date    TSHHS 3.040 04/04/2019    T4FREE 1.37 04/04/2019    FT3 3.2 03/27/2012       Immunology:  Lab Results   Component Value Date    PROT 5.9 (L) 04/21/2022    ALBUMINELP 3.7 08/21/2017    LABALPH 0.2 08/21/2017    LABALPH 0.9 08/21/2017    LABBETA 0.9 08/21/2017    GAMGLOB 0.6 08/21/2017     No results found for: DEBORAH ShahR  No results found for: B2M    Coagulation Panel:  Lab Results   Component Value Date    PROTIME 11.1 12/30/2013    INR 1.03 12/30/2013    APTT 24.6 12/30/2013       Anemia Panel:  Lab Results   Component Value Date    ACANDKGN53 1163 (H) 04/21/2022    FOLATE 9.4 02/03/2022       Tumor Markers:  Lab Results   Component Value Date    PSA 1.9 10/27/2020         Imaging: Reviewed     Pathology:Reviewed     Observations:  Performance Status: ECOG 1  Depression Status: No data recorded          Assessment & Plan:  B-cell CLL, stage I with conversion from good to poor prognostic factors, with 17p deletion:   His diagnosis of CLL since 2007.   17p deletion since 2015. Initial treatment with Leukeran from 2000 to 2011 intermittently and FCR regimen in 2012, bendamustine/Rituxan in 2014. Idelalisib from January 2015 until June of 2016. On ibrutinib since September 2016. Ibrutinib therapy seems to be helping him, overall improvement. Was Only able to tolerate 140 mg p.o. every other day  He was off for a couple months During the fall 2017  Resumed Ibrutinib and is currently on 140 mg po daily. Plan to Continue current dose as no major B sx, but CBC pending. CT imaging in may 2022 with slightly increasing size of intraabdominal LN. Will monitor for now. . Acquired hypogammaglobulinemia:   Continue IVIG, increased the dose    BPH: is being followed by Urology    Iron def anemia and esophagitis: follows with GI, Dr Naomi Darnell. Reported that he had colonoscopy 2018 with polyps detected. EGD was normal except for H. pylori which was treated. Was supposed to have VCE which was unsuccessful once. Is  on  oral iron bid along with Vitamin C.  UA with no blood. Repeat labs pending    Intermittent mild thrombocytopenia: meds reviewed. Could be sec to ibrutinib vs CLL. CBC pending    Lung Nodule RUL: CT May 2020  as above clear, most probably sec to  infection/inflammation. No further follow up was recommended    Acute bronchitis/pna: Is on cipro, and followed by ID    HTN: Recommend that he maintains a log and discusses with PCP. May have to resume antihypertensives. Recommend low salt diet. Continue other medical care. Discussed above findings and plan with him and he verbalized understanding.  Answered all his questions. Discussed healthy lifestyle, smoking cessation, healthy diet and exercise as tolerated. Also discussed importance of being up-to-date with age-appropriate screening tools. Is going to follow with need for colonoscopy    Recommend follow-up with primary care physician and other specialist.    Please do not hesitate to contact us if you need any further information.     Return to clinic sep 2022   Or earlier if new symptoms  ANUPAM

## 2022-06-17 ENCOUNTER — CLINICAL DOCUMENTATION (OUTPATIENT)
Dept: ONCOLOGY | Age: 72
End: 2022-06-17

## 2022-06-17 RX ORDER — FAMOTIDINE 10 MG/ML
20 INJECTION, SOLUTION INTRAVENOUS
Status: CANCELLED | OUTPATIENT
Start: 2022-06-17

## 2022-06-17 RX ORDER — SODIUM CHLORIDE 9 MG/ML
INJECTION, SOLUTION INTRAVENOUS CONTINUOUS
Status: CANCELLED | OUTPATIENT
Start: 2022-06-17

## 2022-06-17 RX ORDER — ALBUTEROL SULFATE 90 UG/1
4 AEROSOL, METERED RESPIRATORY (INHALATION) PRN
Status: CANCELLED | OUTPATIENT
Start: 2022-06-17

## 2022-06-17 RX ORDER — MEPERIDINE HYDROCHLORIDE 50 MG/ML
12.5 INJECTION INTRAMUSCULAR; INTRAVENOUS; SUBCUTANEOUS PRN
Status: CANCELLED | OUTPATIENT
Start: 2022-06-17

## 2022-06-17 RX ORDER — SODIUM CHLORIDE 9 MG/ML
5-250 INJECTION, SOLUTION INTRAVENOUS PRN
Status: CANCELLED | OUTPATIENT
Start: 2022-06-17

## 2022-06-17 RX ORDER — EPINEPHRINE 1 MG/ML
0.3 INJECTION, SOLUTION, CONCENTRATE INTRAVENOUS PRN
Status: CANCELLED | OUTPATIENT
Start: 2022-06-17

## 2022-06-17 RX ORDER — ACETAMINOPHEN 325 MG/1
650 TABLET ORAL
Status: CANCELLED | OUTPATIENT
Start: 2022-06-17

## 2022-06-17 RX ORDER — DIPHENHYDRAMINE HYDROCHLORIDE 50 MG/ML
50 INJECTION INTRAMUSCULAR; INTRAVENOUS
Status: CANCELLED | OUTPATIENT
Start: 2022-06-17

## 2022-06-17 RX ORDER — ACETAMINOPHEN 325 MG/1
650 TABLET ORAL ONCE
Status: CANCELLED
Start: 2022-06-17 | End: 2022-06-17

## 2022-06-17 RX ORDER — HEPARIN SODIUM (PORCINE) LOCK FLUSH IV SOLN 100 UNIT/ML 100 UNIT/ML
500 SOLUTION INTRAVENOUS PRN
Status: CANCELLED | OUTPATIENT
Start: 2022-06-17

## 2022-06-17 RX ORDER — SODIUM CHLORIDE 0.9 % (FLUSH) 0.9 %
5-40 SYRINGE (ML) INJECTION PRN
Status: CANCELLED | OUTPATIENT
Start: 2022-06-17

## 2022-06-17 RX ORDER — DEXAMETHASONE SODIUM PHOSPHATE 10 MG/ML
10 INJECTION INTRAMUSCULAR; INTRAVENOUS ONCE
Status: CANCELLED
Start: 2022-06-17 | End: 2022-06-17

## 2022-06-17 RX ORDER — ONDANSETRON 2 MG/ML
8 INJECTION INTRAMUSCULAR; INTRAVENOUS
Status: CANCELLED | OUTPATIENT
Start: 2022-06-17

## 2022-06-17 NOTE — PROGRESS NOTES
Called patient at [phone] to inquire if he is taking oral iron supplements. Patient states he currently takes ferrous gluconate 324 mg BID. Patient will need iron infusion per physician instruction. Order for venofer 300 mg x3 and NN referral placed. Therapy plan added. Explained that once medication approved by insurance, patient will be scheduled for OP infusion and notified of appointment time. Patient voices understanding. No further needs addressed at this time.

## 2022-06-22 ENCOUNTER — HOSPITAL ENCOUNTER (OUTPATIENT)
Age: 72
Setting detail: SPECIMEN
Discharge: HOME | End: 2022-06-22
Payer: COMMERCIAL

## 2022-06-22 PROCEDURE — 87070 CULTURE OTHR SPECIMN AEROBIC: CPT

## 2022-06-22 PROCEDURE — 87205 SMEAR GRAM STAIN: CPT

## 2022-06-24 LAB
CULTURE: NORMAL
GRAM SMEAR: NORMAL
Lab: NORMAL
SPECIMEN: NORMAL

## 2022-06-30 ENCOUNTER — OFFICE VISIT (OUTPATIENT)
Dept: INFECTIOUS DISEASES | Age: 72
End: 2022-06-30
Payer: COMMERCIAL

## 2022-06-30 VITALS
TEMPERATURE: 97.3 F | SYSTOLIC BLOOD PRESSURE: 149 MMHG | WEIGHT: 193 LBS | BODY MASS INDEX: 26.18 KG/M2 | DIASTOLIC BLOOD PRESSURE: 73 MMHG | RESPIRATION RATE: 18 BRPM | HEART RATE: 87 BPM

## 2022-06-30 DIAGNOSIS — J43.9 PULMONARY EMPHYSEMA, UNSPECIFIED EMPHYSEMA TYPE (HCC): Primary | ICD-10-CM

## 2022-06-30 PROCEDURE — 1123F ACP DISCUSS/DSCN MKR DOCD: CPT | Performed by: INTERNAL MEDICINE

## 2022-06-30 PROCEDURE — 3017F COLORECTAL CA SCREEN DOC REV: CPT | Performed by: INTERNAL MEDICINE

## 2022-06-30 PROCEDURE — 3023F SPIROM DOC REV: CPT | Performed by: INTERNAL MEDICINE

## 2022-06-30 PROCEDURE — 1036F TOBACCO NON-USER: CPT | Performed by: INTERNAL MEDICINE

## 2022-06-30 PROCEDURE — G8427 DOCREV CUR MEDS BY ELIG CLIN: HCPCS | Performed by: INTERNAL MEDICINE

## 2022-06-30 PROCEDURE — G8417 CALC BMI ABV UP PARAM F/U: HCPCS | Performed by: INTERNAL MEDICINE

## 2022-06-30 PROCEDURE — 99213 OFFICE O/P EST LOW 20 MIN: CPT | Performed by: INTERNAL MEDICINE

## 2022-06-30 NOTE — ASSESSMENT & PLAN NOTE
Pseudomonas aeruginosa has cleared from his sputum. Ct of the chest does not reveal bronchiectasis. Will hold off on the use of azithromycin. See in 3 months.

## 2022-06-30 NOTE — PROGRESS NOTES
6/30/2022         Referring Physician: No ref. provider found  Primary Care Physician: Aliya Sanchez MD    Impression/Plan:   Diagnosis Orders   1. Pulmonary emphysema, unspecified emphysema type ([facility] [address]         Discussion:  COPD (chronic obstructive pulmonary disease) ([facility] [address]  Pseudomonas aeruginosa has cleared from his sputum. Ct of the chest does not reveal bronchiectasis. Will hold off on the use of azithromycin. See in 3 months. Return in about 3 months (around 9/30/2022). 30 minutes was spent in the encounter; more than 50% of the face-to-face time was spent with the patient providing counseling and coordination of care. History: Herbert Dumont is a 71 y.o.  male with a medical history of T2DM, CLL, hypogammaglobulinemia requiring monthly IVIG infusions, presenting today for recurrent pneumonia. Patient reports that he was treated for pneumonia in March 2022, he felt well for a while but once more had symptoms of productive cough and fatigue. He denies night sweats. He endorses some weight loss and poor appetite. He follows with Dr. Donnell Kumar who had ordered a CT of the chest that was done on 5/4/2022. It showed patchy bilateral airspace opacities, interval increase in intrathoracic and upper abdominal lymphadenopathy and splenomegaly. Patient was born in [place]. He reports no history of exposure to anyone with tuberculosis. 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 He has no pets. 6/8/2022: at last visit Bactrim was prescribed, tests were done. Cough remains productive of yellow, non-bloody sputum. No chest pain, nausea or fever. Feels somewhat better and stronger. Not at baseline. 6/30/2022: at last visit ciprofloxacin was prescribed for positive sputum culture with Pseudomonas aeruginosa. He continues to have productive cough with small amounts of non-bloody sputum      Review of Systems   All other systems reviewed and are negative.       No Known Allergies    Patient Active Problem List   Diagnosis    Pneumonia    Sepsis ([facility] [address]    Hypogammaglobulinemia ([facility] [address]    CLL (chronic lymphocytic leukemia) ([facility] [address]    Upper respiratory infection, acute    Generalized muscle weakness    Type 2 diabetes mellitus with hyperglycemia, with long-term current use of insulin ([facility])    Poor appetite    COPD (chronic obstructive pulmonary disease) ([facility])    Neutropenia ([facility])    Other specified disorders of bone density and structure, unspecified site    Cellulitis of right upper limb    Herpesviral infection    Intestinal malabsorption    Other nonspecific abnormal finding of lung field    Iron deficiency anemia due to chronic blood loss    Other muscle spasm    Leukemia, lymphocytic, chronic ([facility])    Selective deficiency of IgG ([facility])    Acute bronchitis       Current Outpatient Medications   Medication Sig Dispense Refill    ferrous gluconate 324 (37.5 Fe) MG TABS Take 1 tablet by mouth 2 times daily 60 tablet 5    valACYclovir (VALTREX) 500 MG tablet Take 1 tablet by mouth daily 30 tablet 5    LEVEMIR FLEXTOUCH 100 UNIT/ML injection pen Inject 40 Units into the skin nightly 5 pen 3    NOVOLOG FLEXPEN 100 UNIT/ML injection pen Inject 15 Units into the skin 3 times daily (before meals) 5 pen 3    ibrutinib (IMBRUVICA) 140 MG tablet Take 1 tablet by mouth daily 30 tablet 5    atorvastatin (LIPITOR) 80 MG tablet take 1 tablet by oral route every day      acetaminophen-codeine (TYLENOL #3) 300-30 MG per tablet as needed.       finasteride (PROSCAR) 5 MG tablet       magnesium oxide (MAG-OX) 400 MG tablet Take 400 mg by mouth daily      tamsulosin (FLOMAX) 0.4 MG capsule Take 0.4 mg by mouth daily      glucose monitoring kit (FREESTYLE) monitoring kit 1 kit by Does not apply route daily as needed (glucose checks) 1 kit 0    Insulin Syringe-Needle U-100 30G X 1/2\" 0.5 ML MISC 1 each by Does not apply route daily 100 each 3    metFORMIN (GLUCOPHAGE) 1000 MG tablet Take 1,000 mg by mouth 2 times daily (with meals)      omeprazole (PRILOSEC) 20 MG delayed release capsule Take 20 mg by mouth daily as needed      Immune Globulin, Human, 20 GM/200ML SOLN solution Infuse 20 g intravenously every 30 days 05/14/19 Given through infusion therapy states he is due on the 20 of May       No current facility-administered medications for this visit.      Facility-Administered Medications Ordered in Other Visits   Medication Dose Route Frequency Provider Last Rate Last Admin    sodium chloride flush 0.9 % injection 10 mL  10 mL IntraVENous PRN Rosita Ryan MD           Past Medical History:   Diagnosis Date    Arthritis     knees, Rt hand/wrist    CAD (coronary artery disease)     Cancer ([facility] [address]     CLL--Sees Dr Manjula Sexton Diabetes mellitus ([facility] [address]     History of blood transfusion     no reaction    Hx of motion sickness     MDRO (multiple drug resistant organisms) resistance     abscess on buttock 10yrs ago    Migraine     last one summer 2016    Pneumonia 2016    Wears dentures     full upper--lower dentures    Wears glasses        Past Surgical History:   Procedure Laterality Date    CARDIAC CATHETERIZATION  1990's    x 2  last showed \"inflammation of heart\"    COLONOSCOPY  2010    DENTAL SURGERY      all teeth extracted     EYE SURGERY Right 08/2016    Cataract removal    FRACTURE SURGERY Left 1990's    left ankle plate and screws    KNEE SURGERY  1970    left    OTHER SURGICAL HISTORY Left 01/18/2017    groin excision    TONSILLECTOMY  late 1960's    age 13    TUNNELED VENOUS PORT PLACEMENT  2013    Right upper chest       Social History     Socioeconomic History    Marital status: Single     Spouse name: Not on file    Number of children: Not on file    Years of education: Not on file    Highest education level: Not on file   Occupational History    Not on file   Tobacco Use    Smoking status: Former Smoker     Packs/day: 1.00     Years: 20.00     Pack years: 20.00     Types: Cigarettes     Start date: 10/2/1965     Quit date: 1987     Years since quittin.5    Smokeless tobacco: Never Used   Substance and Sexual Activity    Alcohol use: No    Drug use: No    Sexual activity: Not on file   Other Topics Concern    Not on file   Social History Narrative    Not on file     Social Determinants of Health     Financial Resource Strain:     Difficulty of Paying Living Expenses: Not on file   Food Insecurity:     Worried About Running Out of Food in the Last Year: Not on file    [place] of Food in the Last Year: Not on file   Transportation Needs:     Lack of Transportation (Medical): Not on file    Lack of Transportation (Non-Medical):  Not on file   Physical Activity:     Days of Exercise per Week: Not on file    Minutes of Exercise per Session: Not on file   Stress:     Feeling of Stress : Not on file   Social Connections:     Frequency of Communication with Friends and Family: Not on file    Frequency of Social Gatherings with Friends and Family: Not on file    Attends Yarsani Services: Not on file    Active Member of [address] [place] [address] or Organizations: Not on file    Attends Club or Organization Meetings: Not on file    Marital Status: Not on file   Intimate Partner Violence:     Fear of Current or Ex-Partner: Not on file    Emotionally Abused: Not on file    Physically Abused: Not on file    Sexually Abused: Not on file   Housing Stability:     Unable to Pay for Housing in the Last Year: Not on file    Number of [place] in the Last Year: Not on file    Unstable Housing in the Last Year: Not on file       Family History   Problem Relation Age of Onset    Diabetes Mother     High Blood Pressure Mother     Heart Disease Father     Other Sister         liver problems    Early Death Brother     Asthma Maternal Uncle     Colon Cancer Brother        Vital Signs:  Vitals:    22 0958   BP: (!) 149/73   Site: Right Upper Arm Position: Sitting   Cuff Size: Medium Adult   Pulse: 87   Resp: 18   Temp: 97.3 °F (36.3 °C)   TempSrc: Infrared   Weight: 193 lb (87.5 kg)        Wt Readings from Last 3 Encounters:   06/30/22 193 lb (87.5 kg)   06/14/22 187 lb 12.8 oz (85.2 kg)   06/13/22 199 lb (90.3 kg)        Physical Exam:   Gen: alert and NAD  HEENT: sclera clear, pupils equal and reactive, extra ocular muscles intact, oropharynx clear, mucus membranes moist, tympanic membranes clear bilaterally, no cervical lymphadenopathy noted and neck supple  Neck: supple, no significant adenopathy  Chest: bibasilar crackles. Heart: regular rate and rhythm, no murmurs  ABD: abdomen is soft without significant tenderness, masses, organomegaly or guarding. EXT:peripheral pulses normal, no pedal edema, no clubbing or cyanosis  NEURO: alert, oriented, normal speech, no focal findings or movement disorder noted  Skin: well hydrated, no lesions, surgical site examined  Wounds: none  Labs:   WBC   Date Value Ref Range Status   06/14/2022 14.0 (H) 4.0 - 10.5 K/CU MM Final     Comment:     WBC/DIFFERENTIAL SUSPECT FLAG NOTED ON ANALYZER. PLEASE REVIEW AND CONSIDER SENDING OUT IF   CLINICALLY INDICATED.     04/21/2022 18.3 (H) 4.0 - 10.5 K/CU MM Final     Comment:     WBC/DIFFERENTIAL SUSPECT FLAG NOTED ON ANALYZER.  PLEASE REVIEW AND CONSIDER SENDING OUT IF   CLINICALLY INDICATED.     03/27/2022 5.3 4.0 - 10.5 K/CU MM Final     CREATININE   Date Value Ref Range Status   06/14/2022 0.8 (L) 0.9 - 1.3 MG/DL Final   04/21/2022 0.8 (L) 0.9 - 1.3 MG/DL Final   03/27/2022 0.8 (L) 0.9 - 1.3 MG/DL Final       Cultures:  Culture   Date Value Ref Range Status   06/22/2022 Final Report Normal respiratory micheline  Final   05/27/2022   Final    Final Report Moderate growth normal respiratory micheline with   05/27/2022 PSEUDOMONAS AERUGINOSA Light growth (A)  Final       Imaging Studies:         Electronicallysigned by Geovanni Wynn MD on 5/26/22 at 6:36 AM EDT

## 2022-07-06 DIAGNOSIS — C91.10 CLL (CHRONIC LYMPHOCYTIC LEUKEMIA) (HCC): ICD-10-CM

## 2022-07-06 RX ORDER — IBRUTINIB 140 MG/1
140 TABLET, FILM COATED ORAL DAILY
Qty: 30 TABLET | Refills: 5 | Status: ACTIVE | OUTPATIENT
Start: 2022-07-06 | End: 2022-09-28

## 2022-07-06 NOTE — PROGRESS NOTES
[email or web address]    Date: 07/06/22    Medication is currently being filled at [facility] and has been routed there. Please call us with any questions at [phone] opt.  2.

## 2022-07-11 ENCOUNTER — HOSPITAL ENCOUNTER (OUTPATIENT)
Dept: INFUSION THERAPY | Age: 72
Setting detail: INFUSION SERIES
Discharge: HOME | End: 2022-07-11
Payer: COMMERCIAL

## 2022-07-11 VITALS
DIASTOLIC BLOOD PRESSURE: 69 MMHG | SYSTOLIC BLOOD PRESSURE: 156 MMHG | TEMPERATURE: 97.5 F | OXYGEN SATURATION: 94 % | HEART RATE: 78 BPM | RESPIRATION RATE: 20 BRPM

## 2022-07-11 DIAGNOSIS — D80.1 HYPOGAMMAGLOBULINEMIA (HCC): ICD-10-CM

## 2022-07-11 DIAGNOSIS — C91.10 LEUKEMIA, LYMPHOCYTIC, CHRONIC (HCC): Primary | ICD-10-CM

## 2022-07-11 DIAGNOSIS — D80.3 SELECTIVE DEFICIENCY OF IGG (HCC): ICD-10-CM

## 2022-07-11 PROCEDURE — 96374 THER/PROPH/DIAG INJ IV PUSH: CPT

## 2022-07-11 PROCEDURE — 6360000002 HC RX W HCPCS: Performed by: INTERNAL MEDICINE

## 2022-07-11 PROCEDURE — 96366 THER/PROPH/DIAG IV INF ADDON: CPT

## 2022-07-11 PROCEDURE — 2580000003 HC RX 258: Performed by: INTERNAL MEDICINE

## 2022-07-11 PROCEDURE — 96372 THER/PROPH/DIAG INJ SC/IM: CPT

## 2022-07-11 PROCEDURE — 96375 TX/PRO/DX INJ NEW DRUG ADDON: CPT

## 2022-07-11 PROCEDURE — 96365 THER/PROPH/DIAG IV INF INIT: CPT

## 2022-07-11 PROCEDURE — 99211 OFF/OP EST MAY X REQ PHY/QHP: CPT

## 2022-07-11 RX ORDER — SODIUM CHLORIDE 0.9 % (FLUSH) 0.9 %
10 SYRINGE (ML) INJECTION PRN
Status: CANCELLED | OUTPATIENT
Start: 2022-07-25

## 2022-07-11 RX ORDER — DIPHENHYDRAMINE HYDROCHLORIDE 50 MG/ML
25 INJECTION INTRAMUSCULAR; INTRAVENOUS ONCE
Status: CANCELLED
Start: 2022-07-25 | End: 2022-07-25

## 2022-07-11 RX ORDER — DIPHENHYDRAMINE HYDROCHLORIDE 50 MG/ML
25 INJECTION INTRAMUSCULAR; INTRAVENOUS ONCE
Status: COMPLETED | OUTPATIENT
Start: 2022-07-11 | End: 2022-07-11

## 2022-07-11 RX ORDER — METHYLPREDNISOLONE SODIUM SUCCINATE 40 MG/ML
40 INJECTION, POWDER, LYOPHILIZED, FOR SOLUTION INTRAMUSCULAR; INTRAVENOUS ONCE
Status: CANCELLED
Start: 2022-07-25 | End: 2022-07-25

## 2022-07-11 RX ORDER — METHYLPREDNISOLONE SODIUM SUCCINATE 40 MG/ML
40 INJECTION, POWDER, LYOPHILIZED, FOR SOLUTION INTRAMUSCULAR; INTRAVENOUS ONCE
Status: COMPLETED | OUTPATIENT
Start: 2022-07-11 | End: 2022-07-11

## 2022-07-11 RX ORDER — HEPARIN SODIUM (PORCINE) LOCK FLUSH IV SOLN 100 UNIT/ML 100 UNIT/ML
500 SOLUTION INTRAVENOUS PRN
Status: CANCELLED | OUTPATIENT
Start: 2022-07-25

## 2022-07-11 RX ORDER — CYANOCOBALAMIN 1000 UG/ML
1000 INJECTION INTRAMUSCULAR; SUBCUTANEOUS ONCE
Status: CANCELLED
Start: 2022-07-25 | End: 2022-07-25

## 2022-07-11 RX ORDER — SODIUM CHLORIDE 0.9 % (FLUSH) 0.9 %
10 SYRINGE (ML) INJECTION PRN
Status: DISCONTINUED | OUTPATIENT
Start: 2022-07-11 | End: 2022-07-12 | Stop reason: HOSPADM

## 2022-07-11 RX ORDER — CYANOCOBALAMIN 1000 UG/ML
1000 INJECTION INTRAMUSCULAR; SUBCUTANEOUS ONCE
Status: COMPLETED | OUTPATIENT
Start: 2022-07-11 | End: 2022-07-11

## 2022-07-11 RX ORDER — HEPARIN SODIUM (PORCINE) LOCK FLUSH IV SOLN 100 UNIT/ML 100 UNIT/ML
500 SOLUTION INTRAVENOUS PRN
Status: DISCONTINUED | OUTPATIENT
Start: 2022-07-11 | End: 2022-07-12 | Stop reason: HOSPADM

## 2022-07-11 RX ORDER — HEPARIN SODIUM (PORCINE) LOCK FLUSH IV SOLN 100 UNIT/ML 100 UNIT/ML
500 SOLUTION INTRAVENOUS PRN
Status: CANCELLED
Start: 2022-07-25

## 2022-07-11 RX ADMIN — DIPHENHYDRAMINE HYDROCHLORIDE 25 MG: 50 INJECTION, SOLUTION INTRAMUSCULAR; INTRAVENOUS at 07:58

## 2022-07-11 RX ADMIN — HEPARIN 500 UNITS: 100 SYRINGE at 11:35

## 2022-07-11 RX ADMIN — SODIUM CHLORIDE, PRESERVATIVE FREE 10 ML: 5 INJECTION INTRAVENOUS at 07:52

## 2022-07-11 RX ADMIN — METHYLPREDNISOLONE SODIUM SUCCINATE 40 MG: 40 INJECTION, POWDER, FOR SOLUTION INTRAMUSCULAR; INTRAVENOUS at 07:58

## 2022-07-11 RX ADMIN — CYANOCOBALAMIN 1000 MCG: 1000 INJECTION, SOLUTION INTRAMUSCULAR at 07:56

## 2022-07-11 RX ADMIN — SODIUM CHLORIDE, PRESERVATIVE FREE 20 ML: 5 INJECTION INTRAVENOUS at 11:35

## 2022-07-11 RX ADMIN — IMMUNE GLOBULIN (HUMAN) 30 G: 10 INJECTION INTRAVENOUS; SUBCUTANEOUS at 08:19

## 2022-07-11 NOTE — PROGRESS NOTES
Pt taken to room 2, oriented to room, bed/chair controls, and call light. Needs met at present. Call light in reach. Pt aware of and agreeable for plan of care.

## 2022-07-11 NOTE — PROGRESS NOTES
Tolerated IVIG infusion well. Reviewed discharge instruction, voiced understanding. Copies of AVS given. Pt discharged home. Pt to exit via ambulation.     Orders Placed This Encounter   Medications    sodium chloride flush 0.9 % injection 10 mL    heparin flush 100 UNIT/ML injection 500 Units    immune globulin (GAMUNEX-C) 10% solution 30 g    methylPREDNISolone sodium (SOLU-MEDROL) injection 40 mg    diphenhydrAMINE (BENADRYL) injection 25 mg    cyanocobalamin injection 1,000 mcg

## 2022-07-15 ENCOUNTER — HOSPITAL ENCOUNTER (OUTPATIENT)
Dept: INFUSION THERAPY | Age: 72
Setting detail: INFUSION SERIES
Discharge: HOME | End: 2022-07-15
Payer: COMMERCIAL

## 2022-07-15 VITALS
OXYGEN SATURATION: 94 % | RESPIRATION RATE: 18 BRPM | SYSTOLIC BLOOD PRESSURE: 138 MMHG | TEMPERATURE: 97.3 F | DIASTOLIC BLOOD PRESSURE: 74 MMHG | HEART RATE: 89 BPM

## 2022-07-15 DIAGNOSIS — K90.9 INTESTINAL MALABSORPTION, UNSPECIFIED TYPE: Primary | ICD-10-CM

## 2022-07-15 DIAGNOSIS — D50.0 IRON DEFICIENCY ANEMIA DUE TO CHRONIC BLOOD LOSS: ICD-10-CM

## 2022-07-15 PROCEDURE — 96366 THER/PROPH/DIAG IV INF ADDON: CPT

## 2022-07-15 PROCEDURE — 2580000003 HC RX 258: Performed by: INTERNAL MEDICINE

## 2022-07-15 PROCEDURE — 6360000002 HC RX W HCPCS: Performed by: INTERNAL MEDICINE

## 2022-07-15 PROCEDURE — 96365 THER/PROPH/DIAG IV INF INIT: CPT

## 2022-07-15 PROCEDURE — 99211 OFF/OP EST MAY X REQ PHY/QHP: CPT

## 2022-07-15 RX ORDER — SODIUM CHLORIDE 9 MG/ML
INJECTION, SOLUTION INTRAVENOUS CONTINUOUS
Status: CANCELLED | OUTPATIENT
Start: 2022-07-22

## 2022-07-15 RX ORDER — ONDANSETRON 2 MG/ML
8 INJECTION INTRAMUSCULAR; INTRAVENOUS
Status: CANCELLED | OUTPATIENT
Start: 2022-07-22

## 2022-07-15 RX ORDER — DEXAMETHASONE SODIUM PHOSPHATE 10 MG/ML
10 INJECTION INTRAMUSCULAR; INTRAVENOUS ONCE
Status: CANCELLED
Start: 2022-07-22 | End: 2022-07-22

## 2022-07-15 RX ORDER — MEPERIDINE HYDROCHLORIDE 25 MG/ML
12.5 INJECTION INTRAMUSCULAR; INTRAVENOUS; SUBCUTANEOUS PRN
Status: CANCELLED | OUTPATIENT
Start: 2022-07-22

## 2022-07-15 RX ORDER — SODIUM CHLORIDE 0.9 % (FLUSH) 0.9 %
5-40 SYRINGE (ML) INJECTION PRN
Status: DISCONTINUED | OUTPATIENT
Start: 2022-07-15 | End: 2022-07-16 | Stop reason: HOSPADM

## 2022-07-15 RX ORDER — DIPHENHYDRAMINE HYDROCHLORIDE 50 MG/ML
50 INJECTION INTRAMUSCULAR; INTRAVENOUS
Status: CANCELLED | OUTPATIENT
Start: 2022-07-22

## 2022-07-15 RX ORDER — EPINEPHRINE 1 MG/ML
0.3 INJECTION, SOLUTION, CONCENTRATE INTRAVENOUS PRN
Status: CANCELLED | OUTPATIENT
Start: 2022-07-22

## 2022-07-15 RX ORDER — ACETAMINOPHEN 325 MG/1
650 TABLET ORAL ONCE
Status: CANCELLED
Start: 2022-07-22 | End: 2022-07-22

## 2022-07-15 RX ORDER — HEPARIN SODIUM (PORCINE) LOCK FLUSH IV SOLN 100 UNIT/ML 100 UNIT/ML
500 SOLUTION INTRAVENOUS PRN
Status: DISCONTINUED | OUTPATIENT
Start: 2022-07-15 | End: 2022-07-16 | Stop reason: HOSPADM

## 2022-07-15 RX ORDER — FAMOTIDINE 10 MG/ML
20 INJECTION, SOLUTION INTRAVENOUS
Status: CANCELLED | OUTPATIENT
Start: 2022-07-22

## 2022-07-15 RX ORDER — ALBUTEROL SULFATE 90 UG/1
4 AEROSOL, METERED RESPIRATORY (INHALATION) PRN
Status: CANCELLED | OUTPATIENT
Start: 2022-07-22

## 2022-07-15 RX ORDER — SODIUM CHLORIDE 0.9 % (FLUSH) 0.9 %
5-40 SYRINGE (ML) INJECTION PRN
Status: CANCELLED | OUTPATIENT
Start: 2022-07-22

## 2022-07-15 RX ORDER — SODIUM CHLORIDE 9 MG/ML
5-250 INJECTION, SOLUTION INTRAVENOUS PRN
Status: CANCELLED | OUTPATIENT
Start: 2022-07-22

## 2022-07-15 RX ORDER — ACETAMINOPHEN 325 MG/1
650 TABLET ORAL
Status: CANCELLED | OUTPATIENT
Start: 2022-07-22

## 2022-07-15 RX ORDER — HEPARIN SODIUM (PORCINE) LOCK FLUSH IV SOLN 100 UNIT/ML 100 UNIT/ML
500 SOLUTION INTRAVENOUS PRN
Status: CANCELLED | OUTPATIENT
Start: 2022-07-22

## 2022-07-15 RX ADMIN — SODIUM CHLORIDE, PRESERVATIVE FREE 10 ML: 5 INJECTION INTRAVENOUS at 08:31

## 2022-07-15 RX ADMIN — IRON SUCROSE 300 MG: 20 INJECTION, SOLUTION INTRAVENOUS at 08:34

## 2022-07-15 RX ADMIN — SODIUM CHLORIDE, PRESERVATIVE FREE 10 ML: 5 INJECTION INTRAVENOUS at 10:08

## 2022-07-15 RX ADMIN — Medication 500 UNITS: at 10:08

## 2022-07-15 NOTE — DISCHARGE INSTRUCTIONS
IRON SUCROSE     -You may notice a foul taste in your mouth for the next 2-3 days   -Call your doctor for any unusual symptoms or new onset of:    Fever    Chills    Nausea    Vomiting    Constipation   -Thoroughly review medication education provided today    Please call with any questions    Thank you for choosing [facility]: Outpatient Infusion Unit. It was our pleasure to serve you.     Outpatient Infusion Clinic  Hours: 8:00 am to 5:00 pm  Phone number: [phone]

## 2022-07-19 ENCOUNTER — TELEPHONE (OUTPATIENT)
Dept: ONCOLOGY | Age: 72
End: 2022-07-19

## 2022-07-19 NOTE — TELEPHONE ENCOUNTER
PATIENT INFORMED OF CT CHEST W CONTRAST APPT AT [facility] ON 7/28/22  AT 10AM    PATIENT EXPRESSED UNDERSTANDING AND WAS INFORMED TO CALL [phone] IF ANY QUESTIONS OR CONCERNS ARISE.

## 2022-07-22 ENCOUNTER — HOSPITAL ENCOUNTER (OUTPATIENT)
Dept: INFUSION THERAPY | Age: 72
Setting detail: INFUSION SERIES
Discharge: HOME | End: 2022-07-22
Payer: COMMERCIAL

## 2022-07-22 VITALS
TEMPERATURE: 97.3 F | HEART RATE: 92 BPM | OXYGEN SATURATION: 93 % | SYSTOLIC BLOOD PRESSURE: 127 MMHG | RESPIRATION RATE: 16 BRPM | DIASTOLIC BLOOD PRESSURE: 60 MMHG

## 2022-07-22 DIAGNOSIS — D50.0 IRON DEFICIENCY ANEMIA DUE TO CHRONIC BLOOD LOSS: ICD-10-CM

## 2022-07-22 DIAGNOSIS — K90.9 INTESTINAL MALABSORPTION, UNSPECIFIED TYPE: Primary | ICD-10-CM

## 2022-07-22 PROCEDURE — 6360000002 HC RX W HCPCS: Performed by: INTERNAL MEDICINE

## 2022-07-22 PROCEDURE — 96366 THER/PROPH/DIAG IV INF ADDON: CPT

## 2022-07-22 PROCEDURE — 99211 OFF/OP EST MAY X REQ PHY/QHP: CPT

## 2022-07-22 PROCEDURE — 2580000003 HC RX 258: Performed by: INTERNAL MEDICINE

## 2022-07-22 PROCEDURE — 96365 THER/PROPH/DIAG IV INF INIT: CPT

## 2022-07-22 RX ORDER — SODIUM CHLORIDE 0.9 % (FLUSH) 0.9 %
5-40 SYRINGE (ML) INJECTION PRN
Status: DISCONTINUED | OUTPATIENT
Start: 2022-07-22 | End: 2022-07-23 | Stop reason: HOSPADM

## 2022-07-22 RX ORDER — ALBUTEROL SULFATE 90 UG/1
4 AEROSOL, METERED RESPIRATORY (INHALATION) PRN
Status: CANCELLED | OUTPATIENT
Start: 2022-07-29

## 2022-07-22 RX ORDER — SODIUM CHLORIDE 9 MG/ML
INJECTION, SOLUTION INTRAVENOUS CONTINUOUS
Status: CANCELLED | OUTPATIENT
Start: 2022-07-29

## 2022-07-22 RX ORDER — SODIUM CHLORIDE 0.9 % (FLUSH) 0.9 %
5-40 SYRINGE (ML) INJECTION PRN
Status: CANCELLED | OUTPATIENT
Start: 2022-07-29

## 2022-07-22 RX ORDER — ACETAMINOPHEN 325 MG/1
650 TABLET ORAL ONCE
Status: CANCELLED
Start: 2022-07-29 | End: 2022-07-29

## 2022-07-22 RX ORDER — ACETAMINOPHEN 325 MG/1
650 TABLET ORAL
Status: CANCELLED | OUTPATIENT
Start: 2022-07-29

## 2022-07-22 RX ORDER — HEPARIN SODIUM (PORCINE) LOCK FLUSH IV SOLN 100 UNIT/ML 100 UNIT/ML
500 SOLUTION INTRAVENOUS PRN
Status: DISCONTINUED | OUTPATIENT
Start: 2022-07-22 | End: 2022-07-23 | Stop reason: HOSPADM

## 2022-07-22 RX ORDER — DIPHENHYDRAMINE HYDROCHLORIDE 50 MG/ML
50 INJECTION INTRAMUSCULAR; INTRAVENOUS
Status: CANCELLED | OUTPATIENT
Start: 2022-07-29

## 2022-07-22 RX ORDER — ONDANSETRON 2 MG/ML
8 INJECTION INTRAMUSCULAR; INTRAVENOUS
Status: CANCELLED | OUTPATIENT
Start: 2022-07-29

## 2022-07-22 RX ORDER — DEXAMETHASONE SODIUM PHOSPHATE 10 MG/ML
10 INJECTION INTRAMUSCULAR; INTRAVENOUS ONCE
Status: CANCELLED
Start: 2022-07-29 | End: 2022-07-29

## 2022-07-22 RX ORDER — MEPERIDINE HYDROCHLORIDE 25 MG/ML
12.5 INJECTION INTRAMUSCULAR; INTRAVENOUS; SUBCUTANEOUS PRN
Status: CANCELLED | OUTPATIENT
Start: 2022-07-29

## 2022-07-22 RX ORDER — EPINEPHRINE 1 MG/ML
0.3 INJECTION, SOLUTION, CONCENTRATE INTRAVENOUS PRN
Status: CANCELLED | OUTPATIENT
Start: 2022-07-29

## 2022-07-22 RX ORDER — SODIUM CHLORIDE 9 MG/ML
5-250 INJECTION, SOLUTION INTRAVENOUS PRN
Status: CANCELLED | OUTPATIENT
Start: 2022-07-29

## 2022-07-22 RX ORDER — FAMOTIDINE 10 MG/ML
20 INJECTION, SOLUTION INTRAVENOUS
Status: CANCELLED | OUTPATIENT
Start: 2022-07-29

## 2022-07-22 RX ORDER — HEPARIN SODIUM (PORCINE) LOCK FLUSH IV SOLN 100 UNIT/ML 100 UNIT/ML
500 SOLUTION INTRAVENOUS PRN
Status: CANCELLED | OUTPATIENT
Start: 2022-07-29

## 2022-07-22 RX ADMIN — HEPARIN 500 UNITS: 100 SYRINGE at 14:35

## 2022-07-22 RX ADMIN — IRON SUCROSE 300 MG: 20 INJECTION, SOLUTION INTRAVENOUS at 12:54

## 2022-07-22 RX ADMIN — SODIUM CHLORIDE, PRESERVATIVE FREE 20 ML: 5 INJECTION INTRAVENOUS at 14:34

## 2022-07-22 RX ADMIN — SODIUM CHLORIDE, PRESERVATIVE FREE 10 ML: 5 INJECTION INTRAVENOUS at 12:53

## 2022-07-22 NOTE — PROGRESS NOTES
Tolerated INFUSION well. Reviewed discharge instruction, voiced understanding. Copies of AVS given. Pt discharged HOME. Pt to exit via [facility].     Orders Placed This Encounter   Medications    iron sucrose (VENOFER) 300 mg in sodium chloride 0.9 % 250 mL IVPB    sodium chloride flush 0.9 % injection 5-40 mL    heparin flush 100 UNIT/ML injection 500 Units none

## 2022-07-28 ENCOUNTER — HOSPITAL ENCOUNTER (OUTPATIENT)
Dept: CT IMAGING | Age: 72
Discharge: HOME | End: 2022-07-28
Payer: COMMERCIAL

## 2022-07-28 ENCOUNTER — HOSPITAL ENCOUNTER (OUTPATIENT)
Dept: INFUSION THERAPY | Age: 72
Discharge: HOME | End: 2022-07-28
Payer: COMMERCIAL

## 2022-07-28 DIAGNOSIS — C91.10 CLL (CHRONIC LYMPHOCYTIC LEUKEMIA) (HCC): ICD-10-CM

## 2022-07-28 DIAGNOSIS — K90.9 INTESTINAL MALABSORPTION, UNSPECIFIED TYPE: ICD-10-CM

## 2022-07-28 DIAGNOSIS — D80.1 HYPOGAMMAGLOBULINEMIA (HCC): ICD-10-CM

## 2022-07-28 DIAGNOSIS — J20.9 ACUTE BRONCHITIS, UNSPECIFIED ORGANISM: ICD-10-CM

## 2022-07-28 DIAGNOSIS — R91.1 LUNG NODULE: ICD-10-CM

## 2022-07-28 DIAGNOSIS — D50.0 IRON DEFICIENCY ANEMIA DUE TO CHRONIC BLOOD LOSS: ICD-10-CM

## 2022-07-28 LAB
ALBUMIN SERPL-MCNC: 4.1 GM/DL (ref 3.4–5)
ALP BLD-CCNC: 103 IU/L (ref 40–129)
ALT SERPL-CCNC: 16 U/L (ref 10–40)
ANION GAP SERPL CALCULATED.3IONS-SCNC: 10 MMOL/L (ref 4–16)
AST SERPL-CCNC: 14 IU/L (ref 15–37)
BASOPHILS ABSOLUTE: 0 K/CU MM
BASOPHILS RELATIVE PERCENT: 0.2 % (ref 0–1)
BILIRUB SERPL-MCNC: 0.5 MG/DL (ref 0–1)
BUN BLDV-MCNC: 13 MG/DL (ref 6–23)
CALCIUM SERPL-MCNC: 8.6 MG/DL (ref 8.3–10.6)
CHLORIDE BLD-SCNC: 99 MMOL/L (ref 99–110)
CO2: 24 MMOL/L (ref 21–32)
CREAT SERPL-MCNC: 0.9 MG/DL (ref 0.9–1.3)
DIFFERENTIAL TYPE: ABNORMAL
EOSINOPHILS ABSOLUTE: 0 K/CU MM
EOSINOPHILS RELATIVE PERCENT: 0.3 % (ref 0–3)
FERRITIN: 234 NG/ML (ref 30–400)
GFR AFRICAN AMERICAN: >60 ML/MIN/1.73M2
GFR AFRICAN AMERICAN: >60 ML/MIN/1.73M2
GFR NON-AFRICAN AMERICAN: >60 ML/MIN/1.73M2
GFR NON-AFRICAN AMERICAN: >60 ML/MIN/1.73M2
GLUCOSE BLD-MCNC: 250 MG/DL (ref 70–99)
HCT VFR BLD CALC: 35 % (ref 42–52)
HEMOGLOBIN: 10.7 GM/DL (ref 13.5–18)
IRON: 30 UG/DL (ref 59–158)
LACTATE DEHYDROGENASE: 152 IU/L (ref 120–246)
LYMPHOCYTES ABSOLUTE: 7.6 K/CU MM
LYMPHOCYTES RELATIVE PERCENT: 67.4 % (ref 24–44)
MCH RBC QN AUTO: 24.3 PG (ref 27–31)
MCHC RBC AUTO-ENTMCNC: 30.6 % (ref 32–36)
MCV RBC AUTO: 79.5 FL (ref 78–100)
MONOCYTES ABSOLUTE: 0.7 K/CU MM
MONOCYTES RELATIVE PERCENT: 5.8 % (ref 0–4)
PCT TRANSFERRIN: 10 % (ref 10–44)
PDW BLD-RTO: 19.2 % (ref 11.7–14.9)
PLATELET # BLD: 124 K/CU MM (ref 140–440)
PMV BLD AUTO: 9.9 FL (ref 7.5–11.1)
POC CREATININE: 0.8 MG/DL (ref 0.9–1.3)
POTASSIUM SERPL-SCNC: 4.2 MMOL/L (ref 3.5–5.1)
RBC # BLD: 4.4 M/CU MM (ref 4.6–6.2)
SEGMENTED NEUTROPHILS ABSOLUTE COUNT: 3 K/CU MM
SEGMENTED NEUTROPHILS RELATIVE PERCENT: 26.3 % (ref 36–66)
SODIUM BLD-SCNC: 133 MMOL/L (ref 135–145)
TOTAL IRON BINDING CAPACITY: 312 UG/DL (ref 250–450)
TOTAL PROTEIN: 5.7 GM/DL (ref 6.4–8.2)
UNSATURATED IRON BINDING CAPACITY: 282 UG/DL (ref 110–370)
WBC # BLD: 11.2 K/CU MM (ref 4–10.5)

## 2022-07-28 PROCEDURE — 85025 COMPLETE CBC W/AUTO DIFF WBC: CPT

## 2022-07-28 PROCEDURE — 83540 ASSAY OF IRON: CPT

## 2022-07-28 PROCEDURE — 2580000003 HC RX 258: Performed by: INTERNAL MEDICINE

## 2022-07-28 PROCEDURE — 80053 COMPREHEN METABOLIC PANEL: CPT

## 2022-07-28 PROCEDURE — 83550 IRON BINDING TEST: CPT

## 2022-07-28 PROCEDURE — 36415 COLL VENOUS BLD VENIPUNCTURE: CPT

## 2022-07-28 PROCEDURE — 71260 CT THORAX DX C+: CPT

## 2022-07-28 PROCEDURE — 6360000004 HC RX CONTRAST MEDICATION: Performed by: INTERNAL MEDICINE

## 2022-07-28 PROCEDURE — 82728 ASSAY OF FERRITIN: CPT

## 2022-07-28 PROCEDURE — 83615 LACTATE (LD) (LDH) ENZYME: CPT

## 2022-07-28 RX ORDER — SODIUM CHLORIDE 0.9 % (FLUSH) 0.9 %
5-40 SYRINGE (ML) INJECTION PRN
Status: DISCONTINUED | OUTPATIENT
Start: 2022-07-28 | End: 2022-07-29 | Stop reason: HOSPADM

## 2022-07-28 RX ADMIN — IOPAMIDOL 75 ML: 755 INJECTION, SOLUTION INTRAVENOUS at 10:25

## 2022-07-28 RX ADMIN — SODIUM CHLORIDE, PRESERVATIVE FREE 10 ML: 5 INJECTION INTRAVENOUS at 10:25

## 2022-07-29 ENCOUNTER — HOSPITAL ENCOUNTER (OUTPATIENT)
Dept: INFUSION THERAPY | Age: 72
Setting detail: INFUSION SERIES
Discharge: HOME | End: 2022-07-29
Payer: COMMERCIAL

## 2022-07-29 VITALS
TEMPERATURE: 97.3 F | RESPIRATION RATE: 16 BRPM | HEART RATE: 95 BPM | OXYGEN SATURATION: 94 % | DIASTOLIC BLOOD PRESSURE: 75 MMHG | SYSTOLIC BLOOD PRESSURE: 138 MMHG

## 2022-07-29 DIAGNOSIS — D50.0 IRON DEFICIENCY ANEMIA DUE TO CHRONIC BLOOD LOSS: ICD-10-CM

## 2022-07-29 DIAGNOSIS — K90.9 INTESTINAL MALABSORPTION, UNSPECIFIED TYPE: Primary | ICD-10-CM

## 2022-07-29 PROCEDURE — 96365 THER/PROPH/DIAG IV INF INIT: CPT

## 2022-07-29 PROCEDURE — 2580000003 HC RX 258: Performed by: INTERNAL MEDICINE

## 2022-07-29 PROCEDURE — 99211 OFF/OP EST MAY X REQ PHY/QHP: CPT

## 2022-07-29 PROCEDURE — 96366 THER/PROPH/DIAG IV INF ADDON: CPT

## 2022-07-29 PROCEDURE — 6360000002 HC RX W HCPCS: Performed by: INTERNAL MEDICINE

## 2022-07-29 RX ORDER — SODIUM CHLORIDE 9 MG/ML
5-250 INJECTION, SOLUTION INTRAVENOUS PRN
Status: CANCELLED | OUTPATIENT
Start: 2022-08-05

## 2022-07-29 RX ORDER — FAMOTIDINE 10 MG/ML
20 INJECTION, SOLUTION INTRAVENOUS
Status: CANCELLED | OUTPATIENT
Start: 2022-08-05

## 2022-07-29 RX ORDER — SODIUM CHLORIDE 9 MG/ML
INJECTION, SOLUTION INTRAVENOUS CONTINUOUS
Status: CANCELLED | OUTPATIENT
Start: 2022-08-05

## 2022-07-29 RX ORDER — ONDANSETRON 2 MG/ML
8 INJECTION INTRAMUSCULAR; INTRAVENOUS
Status: CANCELLED | OUTPATIENT
Start: 2022-08-05

## 2022-07-29 RX ORDER — DIPHENHYDRAMINE HYDROCHLORIDE 50 MG/ML
50 INJECTION INTRAMUSCULAR; INTRAVENOUS
Status: CANCELLED | OUTPATIENT
Start: 2022-08-05

## 2022-07-29 RX ORDER — ALBUTEROL SULFATE 90 UG/1
4 AEROSOL, METERED RESPIRATORY (INHALATION) PRN
Status: CANCELLED | OUTPATIENT
Start: 2022-08-05

## 2022-07-29 RX ORDER — SODIUM CHLORIDE 0.9 % (FLUSH) 0.9 %
5-40 SYRINGE (ML) INJECTION PRN
Status: CANCELLED | OUTPATIENT
Start: 2022-08-05

## 2022-07-29 RX ORDER — DEXAMETHASONE SODIUM PHOSPHATE 10 MG/ML
10 INJECTION INTRAMUSCULAR; INTRAVENOUS ONCE
Status: CANCELLED
Start: 2022-08-05 | End: 2022-08-05

## 2022-07-29 RX ORDER — EPINEPHRINE 1 MG/ML
0.3 INJECTION, SOLUTION, CONCENTRATE INTRAVENOUS PRN
Status: CANCELLED | OUTPATIENT
Start: 2022-08-05

## 2022-07-29 RX ORDER — SODIUM CHLORIDE 0.9 % (FLUSH) 0.9 %
5-40 SYRINGE (ML) INJECTION PRN
Status: DISCONTINUED | OUTPATIENT
Start: 2022-07-29 | End: 2022-07-29

## 2022-07-29 RX ORDER — HEPARIN SODIUM (PORCINE) LOCK FLUSH IV SOLN 100 UNIT/ML 100 UNIT/ML
500 SOLUTION INTRAVENOUS PRN
Status: DISCONTINUED | OUTPATIENT
Start: 2022-07-29 | End: 2022-07-29

## 2022-07-29 RX ORDER — MEPERIDINE HYDROCHLORIDE 25 MG/ML
12.5 INJECTION INTRAMUSCULAR; INTRAVENOUS; SUBCUTANEOUS PRN
Status: CANCELLED | OUTPATIENT
Start: 2022-08-05

## 2022-07-29 RX ORDER — ACETAMINOPHEN 325 MG/1
650 TABLET ORAL
Status: CANCELLED | OUTPATIENT
Start: 2022-08-05

## 2022-07-29 RX ORDER — ACETAMINOPHEN 325 MG/1
650 TABLET ORAL ONCE
Status: CANCELLED
Start: 2022-08-05 | End: 2022-08-05

## 2022-07-29 RX ORDER — HEPARIN SODIUM (PORCINE) LOCK FLUSH IV SOLN 100 UNIT/ML 100 UNIT/ML
500 SOLUTION INTRAVENOUS PRN
Status: CANCELLED | OUTPATIENT
Start: 2022-08-05

## 2022-07-29 RX ORDER — SODIUM CHLORIDE 9 MG/ML
INJECTION, SOLUTION INTRAVENOUS CONTINUOUS
Status: DISCONTINUED | OUTPATIENT
Start: 2022-07-29 | End: 2022-07-29

## 2022-07-29 RX ADMIN — SODIUM CHLORIDE, PRESERVATIVE FREE 10 ML: 5 INJECTION INTRAVENOUS at 12:50

## 2022-07-29 RX ADMIN — SODIUM CHLORIDE, PRESERVATIVE FREE 10 ML: 5 INJECTION INTRAVENOUS at 14:40

## 2022-07-29 RX ADMIN — Medication 500 UNITS: at 14:40

## 2022-07-29 RX ADMIN — IRON SUCROSE 300 MG: 20 INJECTION, SOLUTION INTRAVENOUS at 13:05

## 2022-07-29 NOTE — DISCHARGE INSTRUCTIONS
Venofer/Iron Sucrose     -You may notice a foul taste in your mouth for the next 2-3 days   -Call your doctor for any unusual symptoms or new onset of:    Fever    Chills    Nausea    Vomiting    Constipation   -Thoroughly review medication education provided today    Please call with any questions    Thank you for choosing [facility]: Outpatient Infusion Unit. It was our pleasure to serve you.     Outpatient Infusion Clinic  Hours: 8:00 am to 5:00 pm  Phone number: [phone]

## 2022-07-29 NOTE — PLAN OF CARE
Ambulatory to unit room 5 for Venofer. Reorientated to unit. Procedure and plan of care explained. Questions answered. Understanding verbalized.

## 2022-08-08 ENCOUNTER — CLINICAL DOCUMENTATION (OUTPATIENT)
Dept: ONCOLOGY | Age: 72
End: 2022-08-08

## 2022-08-08 ENCOUNTER — HOSPITAL ENCOUNTER (OUTPATIENT)
Dept: INFUSION THERAPY | Age: 72
Setting detail: INFUSION SERIES
Discharge: HOME | End: 2022-08-08
Payer: COMMERCIAL

## 2022-08-08 VITALS
DIASTOLIC BLOOD PRESSURE: 73 MMHG | HEART RATE: 85 BPM | RESPIRATION RATE: 16 BRPM | OXYGEN SATURATION: 97 % | SYSTOLIC BLOOD PRESSURE: 141 MMHG | TEMPERATURE: 97.3 F

## 2022-08-08 DIAGNOSIS — D80.3 SELECTIVE DEFICIENCY OF IGG (HCC): ICD-10-CM

## 2022-08-08 DIAGNOSIS — D80.1 HYPOGAMMAGLOBULINEMIA (HCC): ICD-10-CM

## 2022-08-08 DIAGNOSIS — C91.10 CHRONIC LYMPHOCYTIC LEUKEMIA (HCC): Primary | ICD-10-CM

## 2022-08-08 DIAGNOSIS — C91.10 LEUKEMIA, LYMPHOCYTIC, CHRONIC (HCC): Primary | ICD-10-CM

## 2022-08-08 PROCEDURE — 99211 OFF/OP EST MAY X REQ PHY/QHP: CPT

## 2022-08-08 PROCEDURE — 96365 THER/PROPH/DIAG IV INF INIT: CPT

## 2022-08-08 PROCEDURE — 96372 THER/PROPH/DIAG INJ SC/IM: CPT

## 2022-08-08 PROCEDURE — 96374 THER/PROPH/DIAG INJ IV PUSH: CPT

## 2022-08-08 PROCEDURE — 2580000003 HC RX 258: Performed by: INTERNAL MEDICINE

## 2022-08-08 PROCEDURE — 6360000002 HC RX W HCPCS: Performed by: INTERNAL MEDICINE

## 2022-08-08 PROCEDURE — 96375 TX/PRO/DX INJ NEW DRUG ADDON: CPT

## 2022-08-08 RX ORDER — HEPARIN SODIUM (PORCINE) LOCK FLUSH IV SOLN 100 UNIT/ML 100 UNIT/ML
500 SOLUTION INTRAVENOUS PRN
Status: DISCONTINUED | OUTPATIENT
Start: 2022-08-08 | End: 2022-08-09 | Stop reason: HOSPADM

## 2022-08-08 RX ORDER — CYANOCOBALAMIN 1000 UG/ML
1000 INJECTION INTRAMUSCULAR; SUBCUTANEOUS ONCE
Status: COMPLETED | OUTPATIENT
Start: 2022-08-08 | End: 2022-08-08

## 2022-08-08 RX ORDER — DIPHENHYDRAMINE HYDROCHLORIDE 50 MG/ML
25 INJECTION INTRAMUSCULAR; INTRAVENOUS ONCE
Status: CANCELLED
Start: 2022-08-22 | End: 2022-08-22

## 2022-08-08 RX ORDER — HEPARIN SODIUM (PORCINE) LOCK FLUSH IV SOLN 100 UNIT/ML 100 UNIT/ML
500 SOLUTION INTRAVENOUS PRN
Status: CANCELLED
Start: 2022-08-22

## 2022-08-08 RX ORDER — HEPARIN SODIUM (PORCINE) LOCK FLUSH IV SOLN 100 UNIT/ML 100 UNIT/ML
500 SOLUTION INTRAVENOUS PRN
Status: CANCELLED | OUTPATIENT
Start: 2022-08-22

## 2022-08-08 RX ORDER — METHYLPREDNISOLONE SODIUM SUCCINATE 40 MG/ML
40 INJECTION, POWDER, LYOPHILIZED, FOR SOLUTION INTRAMUSCULAR; INTRAVENOUS ONCE
Status: CANCELLED
Start: 2022-08-22 | End: 2022-08-22

## 2022-08-08 RX ORDER — SODIUM CHLORIDE 0.9 % (FLUSH) 0.9 %
10 SYRINGE (ML) INJECTION PRN
Status: DISCONTINUED | OUTPATIENT
Start: 2022-08-08 | End: 2022-08-09 | Stop reason: HOSPADM

## 2022-08-08 RX ORDER — CYANOCOBALAMIN 1000 UG/ML
1000 INJECTION INTRAMUSCULAR; SUBCUTANEOUS ONCE
Status: CANCELLED
Start: 2022-08-22 | End: 2022-08-22

## 2022-08-08 RX ORDER — DIPHENHYDRAMINE HYDROCHLORIDE 50 MG/ML
25 INJECTION INTRAMUSCULAR; INTRAVENOUS ONCE
Status: COMPLETED | OUTPATIENT
Start: 2022-08-08 | End: 2022-08-08

## 2022-08-08 RX ORDER — METHYLPREDNISOLONE SODIUM SUCCINATE 40 MG/ML
40 INJECTION, POWDER, LYOPHILIZED, FOR SOLUTION INTRAMUSCULAR; INTRAVENOUS ONCE
Status: COMPLETED | OUTPATIENT
Start: 2022-08-08 | End: 2022-08-08

## 2022-08-08 RX ORDER — SODIUM CHLORIDE 0.9 % (FLUSH) 0.9 %
10 SYRINGE (ML) INJECTION PRN
Status: CANCELLED | OUTPATIENT
Start: 2022-08-22

## 2022-08-08 RX ADMIN — SODIUM CHLORIDE, PRESERVATIVE FREE 10 ML: 5 INJECTION INTRAVENOUS at 07:53

## 2022-08-08 RX ADMIN — SODIUM CHLORIDE, PRESERVATIVE FREE 20 ML: 5 INJECTION INTRAVENOUS at 10:08

## 2022-08-08 RX ADMIN — METHYLPREDNISOLONE SODIUM SUCCINATE 40 MG: 40 INJECTION, POWDER, FOR SOLUTION INTRAMUSCULAR; INTRAVENOUS at 07:53

## 2022-08-08 RX ADMIN — Medication 500 UNITS: at 10:09

## 2022-08-08 RX ADMIN — IMMUNE GLOBULIN (HUMAN) 30 G: 10 INJECTION INTRAVENOUS; SUBCUTANEOUS at 08:19

## 2022-08-08 RX ADMIN — CYANOCOBALAMIN 1000 MCG: 1000 INJECTION, SOLUTION INTRAMUSCULAR at 07:57

## 2022-08-08 RX ADMIN — SODIUM CHLORIDE, PRESERVATIVE FREE 10 ML: 5 INJECTION INTRAVENOUS at 07:55

## 2022-08-08 RX ADMIN — SODIUM CHLORIDE, PRESERVATIVE FREE 10 ML: 5 INJECTION INTRAVENOUS at 07:54

## 2022-08-08 RX ADMIN — DIPHENHYDRAMINE HYDROCHLORIDE 25 MG: 50 INJECTION, SOLUTION INTRAMUSCULAR; INTRAVENOUS at 07:55

## 2022-08-08 NOTE — PROGRESS NOTES
Prior to discharge, the After Visit Summary Discharge Instructions were reviewed with the patient. He was offered a printed version of the AVS, but declined the offer. Recurrent appointment, pt tolerated infusion well. Pt discharged home. Pt to exit via ambulation.     Orders Placed This Encounter   Medications    sodium chloride flush 0.9 % injection 10 mL    heparin flush 100 UNIT/ML injection 500 Units    heparin flush 100 UNIT/ML injection 500 Units    immune globulin (GAMUNEX-C) 10% solution 30 g    methylPREDNISolone sodium (SOLU-MEDROL) injection 40 mg    diphenhydrAMINE (BENADRYL) injection 25 mg    cyanocobalamin injection 1,000 mcg

## 2022-08-08 NOTE — PROGRESS NOTES
Patient called to review CT results as per order from Dr. Ariella Cárdenas. New order placed for repeat CT in 3 months as per order from Dr. Ariella Cárdenas. Results reviewed with patient, patient states he is feeling better but still coughing up sputum, yellow in color. States he just saw dr. Mendel Borges and she stated his lungs sounded good. Patient has no needs at this time. Instructed to call the center if his condition worsens, patient informed of order for CT of chest in 3 months. Patient voices understanding.

## 2022-08-09 ENCOUNTER — TELEPHONE (OUTPATIENT)
Dept: INFECTIOUS DISEASES | Age: 72
End: 2022-08-09

## 2022-08-09 ENCOUNTER — CLINICAL DOCUMENTATION (OUTPATIENT)
Dept: ONCOLOGY | Age: 72
End: 2022-08-09

## 2022-08-09 NOTE — PROGRESS NOTES
[address] from Dr Bonnie Pablo office called for results of recent CT scan be faxed to [phone], faxed and confirm rec'd
None

## 2022-08-09 NOTE — TELEPHONE ENCOUNTER
Pt had a chest CT on 7-28-22 and is asking about the results. Dr. Radha Rao had pt get another CT and is wanting pt to see Dr. Kathy Ortiz again if needed. Results were back on 7-30-22. Please advise.

## 2022-09-06 ENCOUNTER — HOSPITAL ENCOUNTER (OUTPATIENT)
Dept: INFUSION THERAPY | Age: 72
Setting detail: INFUSION SERIES
Discharge: HOME | End: 2022-09-06
Payer: COMMERCIAL

## 2022-09-06 VITALS
SYSTOLIC BLOOD PRESSURE: 116 MMHG | DIASTOLIC BLOOD PRESSURE: 64 MMHG | RESPIRATION RATE: 18 BRPM | HEART RATE: 88 BPM | TEMPERATURE: 97.5 F | OXYGEN SATURATION: 94 %

## 2022-09-06 DIAGNOSIS — C91.10 LEUKEMIA, LYMPHOCYTIC, CHRONIC (HCC): Primary | ICD-10-CM

## 2022-09-06 DIAGNOSIS — D80.3 SELECTIVE DEFICIENCY OF IGG (HCC): ICD-10-CM

## 2022-09-06 DIAGNOSIS — D80.1 HYPOGAMMAGLOBULINEMIA (HCC): ICD-10-CM

## 2022-09-06 PROCEDURE — 96375 TX/PRO/DX INJ NEW DRUG ADDON: CPT

## 2022-09-06 PROCEDURE — 99211 OFF/OP EST MAY X REQ PHY/QHP: CPT

## 2022-09-06 PROCEDURE — 96366 THER/PROPH/DIAG IV INF ADDON: CPT

## 2022-09-06 PROCEDURE — 96374 THER/PROPH/DIAG INJ IV PUSH: CPT

## 2022-09-06 PROCEDURE — 6360000002 HC RX W HCPCS: Performed by: INTERNAL MEDICINE

## 2022-09-06 PROCEDURE — 96365 THER/PROPH/DIAG IV INF INIT: CPT

## 2022-09-06 PROCEDURE — 96372 THER/PROPH/DIAG INJ SC/IM: CPT

## 2022-09-06 PROCEDURE — 2580000003 HC RX 258: Performed by: INTERNAL MEDICINE

## 2022-09-06 RX ORDER — CYANOCOBALAMIN 1000 UG/ML
1000 INJECTION INTRAMUSCULAR; SUBCUTANEOUS ONCE
Status: COMPLETED | OUTPATIENT
Start: 2022-09-06 | End: 2022-09-06

## 2022-09-06 RX ORDER — HEPARIN SODIUM (PORCINE) LOCK FLUSH IV SOLN 100 UNIT/ML 100 UNIT/ML
500 SOLUTION INTRAVENOUS PRN
Status: DISCONTINUED | OUTPATIENT
Start: 2022-09-06 | End: 2022-09-07 | Stop reason: HOSPADM

## 2022-09-06 RX ORDER — METHYLPREDNISOLONE SODIUM SUCCINATE 40 MG/ML
40 INJECTION, POWDER, LYOPHILIZED, FOR SOLUTION INTRAMUSCULAR; INTRAVENOUS ONCE
Start: 2022-09-26 | End: 2022-09-26

## 2022-09-06 RX ORDER — METHYLPREDNISOLONE SODIUM SUCCINATE 40 MG/ML
40 INJECTION, POWDER, LYOPHILIZED, FOR SOLUTION INTRAMUSCULAR; INTRAVENOUS ONCE
Status: COMPLETED | OUTPATIENT
Start: 2022-09-06 | End: 2022-09-06

## 2022-09-06 RX ORDER — HEPARIN SODIUM (PORCINE) LOCK FLUSH IV SOLN 100 UNIT/ML 100 UNIT/ML
500 SOLUTION INTRAVENOUS PRN
Start: 2022-09-26

## 2022-09-06 RX ORDER — HEPARIN SODIUM (PORCINE) LOCK FLUSH IV SOLN 100 UNIT/ML 100 UNIT/ML
500 SOLUTION INTRAVENOUS PRN
OUTPATIENT
Start: 2022-09-26

## 2022-09-06 RX ORDER — SODIUM CHLORIDE 0.9 % (FLUSH) 0.9 %
10 SYRINGE (ML) INJECTION PRN
Status: DISCONTINUED | OUTPATIENT
Start: 2022-09-06 | End: 2022-09-07 | Stop reason: HOSPADM

## 2022-09-06 RX ORDER — DIPHENHYDRAMINE HYDROCHLORIDE 50 MG/ML
25 INJECTION INTRAMUSCULAR; INTRAVENOUS ONCE
Status: COMPLETED | OUTPATIENT
Start: 2022-09-06 | End: 2022-09-06

## 2022-09-06 RX ORDER — SODIUM CHLORIDE 0.9 % (FLUSH) 0.9 %
10 SYRINGE (ML) INJECTION PRN
OUTPATIENT
Start: 2022-09-26

## 2022-09-06 RX ORDER — DIPHENHYDRAMINE HYDROCHLORIDE 50 MG/ML
25 INJECTION INTRAMUSCULAR; INTRAVENOUS ONCE
Start: 2022-09-26 | End: 2022-09-26

## 2022-09-06 RX ORDER — CYANOCOBALAMIN 1000 UG/ML
1000 INJECTION INTRAMUSCULAR; SUBCUTANEOUS ONCE
Start: 2022-09-26 | End: 2022-09-26

## 2022-09-06 RX ADMIN — IMMUNE GLOBULIN (HUMAN) 30 G: 10 INJECTION INTRAVENOUS; SUBCUTANEOUS at 08:01

## 2022-09-06 RX ADMIN — Medication 500 UNITS: at 10:35

## 2022-09-06 RX ADMIN — SODIUM CHLORIDE, PRESERVATIVE FREE 10 ML: 5 INJECTION INTRAVENOUS at 07:45

## 2022-09-06 RX ADMIN — DIPHENHYDRAMINE HYDROCHLORIDE 25 MG: 50 INJECTION, SOLUTION INTRAMUSCULAR; INTRAVENOUS at 07:49

## 2022-09-06 RX ADMIN — SODIUM CHLORIDE, PRESERVATIVE FREE 10 ML: 5 INJECTION INTRAVENOUS at 10:35

## 2022-09-06 RX ADMIN — CYANOCOBALAMIN 1000 MCG: 1000 INJECTION, SOLUTION INTRAMUSCULAR at 07:49

## 2022-09-06 RX ADMIN — METHYLPREDNISOLONE SODIUM SUCCINATE 40 MG: 40 INJECTION, POWDER, FOR SOLUTION INTRAMUSCULAR; INTRAVENOUS at 07:49

## 2022-09-06 NOTE — PROGRESS NOTES
Tolerated infusion well. Reviewed discharge instruction, voiced understanding. Copies of AVS given. Pt discharged home. Pt to exit via ambulation.     Orders Placed This Encounter   Medications    sodium chloride flush 0.9 % injection 10 mL    heparin flush 100 UNIT/ML injection 500 Units    heparin flush 100 UNIT/ML injection 500 Units    immune globulin (GAMUNEX-C) 10% solution 30 g    methylPREDNISolone sodium (SOLU-MEDROL) injection 40 mg    diphenhydrAMINE (BENADRYL) injection 25 mg    cyanocobalamin injection 1,000 mcg

## 2022-09-13 ENCOUNTER — HOSPITAL ENCOUNTER (OUTPATIENT)
Dept: INFUSION THERAPY | Age: 72
Discharge: HOME | End: 2022-09-13
Payer: COMMERCIAL

## 2022-09-13 DIAGNOSIS — D50.0 IRON DEFICIENCY ANEMIA DUE TO CHRONIC BLOOD LOSS: ICD-10-CM

## 2022-09-13 DIAGNOSIS — K90.9 INTESTINAL MALABSORPTION, UNSPECIFIED TYPE: ICD-10-CM

## 2022-09-13 DIAGNOSIS — C91.10 CLL (CHRONIC LYMPHOCYTIC LEUKEMIA) (HCC): ICD-10-CM

## 2022-09-13 DIAGNOSIS — R91.1 LUNG NODULE: ICD-10-CM

## 2022-09-13 DIAGNOSIS — D80.1 HYPOGAMMAGLOBULINEMIA (HCC): ICD-10-CM

## 2022-09-13 LAB
ALBUMIN SERPL-MCNC: 3.9 GM/DL (ref 3.4–5)
ALP BLD-CCNC: 89 IU/L (ref 40–129)
ALT SERPL-CCNC: 13 U/L (ref 10–40)
ANION GAP SERPL CALCULATED.3IONS-SCNC: 13 MMOL/L (ref 4–16)
AST SERPL-CCNC: 14 IU/L (ref 15–37)
BASOPHILS ABSOLUTE: 0 K/CU MM
BASOPHILS RELATIVE PERCENT: 0.1 % (ref 0–1)
BILIRUB SERPL-MCNC: 0.4 MG/DL (ref 0–1)
BUN BLDV-MCNC: 14 MG/DL (ref 6–23)
CALCIUM SERPL-MCNC: 8.4 MG/DL (ref 8.3–10.6)
CHLORIDE BLD-SCNC: 105 MMOL/L (ref 99–110)
CO2: 22 MMOL/L (ref 21–32)
CREAT SERPL-MCNC: 0.7 MG/DL (ref 0.9–1.3)
DIFFERENTIAL TYPE: ABNORMAL
EOSINOPHILS ABSOLUTE: 0 K/CU MM
EOSINOPHILS RELATIVE PERCENT: 0.3 % (ref 0–3)
FERRITIN: 25 NG/ML (ref 30–400)
FOLATE: 5 NG/ML (ref 3.1–17.5)
GFR AFRICAN AMERICAN: >60 ML/MIN/1.73M2
GFR NON-AFRICAN AMERICAN: >60 ML/MIN/1.73M2
GLUCOSE BLD-MCNC: 128 MG/DL (ref 70–99)
HCT VFR BLD CALC: 37.2 % (ref 42–52)
HEMOGLOBIN: 11.4 GM/DL (ref 13.5–18)
IGG,SERUM: 559 MG/DL (ref 723–1685)
IRON: 31 UG/DL (ref 59–158)
LACTATE DEHYDROGENASE: 161 IU/L (ref 120–246)
LYMPHOCYTES ABSOLUTE: 8.1 K/CU MM
LYMPHOCYTES RELATIVE PERCENT: 70 % (ref 24–44)
MCH RBC QN AUTO: 25.2 PG (ref 27–31)
MCHC RBC AUTO-ENTMCNC: 30.6 % (ref 32–36)
MCV RBC AUTO: 82.1 FL (ref 78–100)
MONOCYTES ABSOLUTE: 0.4 K/CU MM
MONOCYTES RELATIVE PERCENT: 3.8 % (ref 0–4)
PCT TRANSFERRIN: 10 % (ref 10–44)
PDW BLD-RTO: 19.4 % (ref 11.7–14.9)
PLATELET # BLD: 97 K/CU MM (ref 140–440)
PMV BLD AUTO: 10.8 FL (ref 7.5–11.1)
POTASSIUM SERPL-SCNC: 4 MMOL/L (ref 3.5–5.1)
RBC # BLD: 4.53 M/CU MM (ref 4.6–6.2)
SEGMENTED NEUTROPHILS ABSOLUTE COUNT: 3 K/CU MM
SEGMENTED NEUTROPHILS RELATIVE PERCENT: 25.8 % (ref 36–66)
SODIUM BLD-SCNC: 140 MMOL/L (ref 135–145)
TOTAL IRON BINDING CAPACITY: 313 UG/DL (ref 250–450)
TOTAL PROTEIN: 5.5 GM/DL (ref 6.4–8.2)
UNSATURATED IRON BINDING CAPACITY: 282 UG/DL (ref 110–370)
VITAMIN B-12: 1217 PG/ML (ref 211–911)
WBC # BLD: 11.5 K/CU MM (ref 4–10.5)

## 2022-09-13 PROCEDURE — 82607 VITAMIN B-12: CPT

## 2022-09-13 PROCEDURE — 80053 COMPREHEN METABOLIC PANEL: CPT

## 2022-09-13 PROCEDURE — 36415 COLL VENOUS BLD VENIPUNCTURE: CPT

## 2022-09-13 PROCEDURE — 82784 ASSAY IGA/IGD/IGG/IGM EACH: CPT

## 2022-09-13 PROCEDURE — 83615 LACTATE (LD) (LDH) ENZYME: CPT

## 2022-09-13 PROCEDURE — 85025 COMPLETE CBC W/AUTO DIFF WBC: CPT

## 2022-09-13 PROCEDURE — 82746 ASSAY OF FOLIC ACID SERUM: CPT

## 2022-09-13 PROCEDURE — 83540 ASSAY OF IRON: CPT

## 2022-09-13 PROCEDURE — 83550 IRON BINDING TEST: CPT

## 2022-09-13 PROCEDURE — 82728 ASSAY OF FERRITIN: CPT

## 2022-09-14 ENCOUNTER — CLINICAL DOCUMENTATION (OUTPATIENT)
Dept: ONCOLOGY | Age: 72
End: 2022-09-14

## 2022-09-14 DIAGNOSIS — C91.10 CHRONIC LYMPHOCYTIC LEUKEMIA (HCC): Primary | ICD-10-CM

## 2022-09-14 NOTE — PROGRESS NOTES
This nurse attempted to call the patient at [phone] however no answer.  This RN's direct number left requesting a call back

## 2022-09-22 ENCOUNTER — HOSPITAL ENCOUNTER (OUTPATIENT)
Dept: INFUSION THERAPY | Age: 72
Discharge: HOME | End: 2022-09-22
Payer: COMMERCIAL

## 2022-09-22 ENCOUNTER — OFFICE VISIT (OUTPATIENT)
Dept: ONCOLOGY | Age: 72
End: 2022-09-22
Payer: COMMERCIAL

## 2022-09-22 VITALS
OXYGEN SATURATION: 97 % | TEMPERATURE: 96.9 F | SYSTOLIC BLOOD PRESSURE: 133 MMHG | DIASTOLIC BLOOD PRESSURE: 79 MMHG | WEIGHT: 182 LBS | HEIGHT: 72 IN | RESPIRATION RATE: 20 BRPM | BODY MASS INDEX: 24.65 KG/M2 | HEART RATE: 101 BPM

## 2022-09-22 DIAGNOSIS — D80.1 HYPOGAMMAGLOBULINEMIA (HCC): ICD-10-CM

## 2022-09-22 DIAGNOSIS — R10.13 EPIGASTRIC PAIN: ICD-10-CM

## 2022-09-22 DIAGNOSIS — C91.10 CHRONIC LYMPHOCYTIC LEUKEMIA (HCC): ICD-10-CM

## 2022-09-22 DIAGNOSIS — R91.1 LUNG NODULE: ICD-10-CM

## 2022-09-22 DIAGNOSIS — K90.9 INTESTINAL MALABSORPTION, UNSPECIFIED TYPE: ICD-10-CM

## 2022-09-22 DIAGNOSIS — D50.0 IRON DEFICIENCY ANEMIA DUE TO CHRONIC BLOOD LOSS: ICD-10-CM

## 2022-09-22 DIAGNOSIS — C91.10 CLL (CHRONIC LYMPHOCYTIC LEUKEMIA) (HCC): ICD-10-CM

## 2022-09-22 DIAGNOSIS — J43.9 PULMONARY EMPHYSEMA, UNSPECIFIED EMPHYSEMA TYPE (HCC): ICD-10-CM

## 2022-09-22 DIAGNOSIS — C91.10 CHRONIC LYMPHOCYTIC LEUKEMIA (HCC): Primary | ICD-10-CM

## 2022-09-22 LAB
ALBUMIN SERPL-MCNC: 3.7 GM/DL (ref 3.4–5)
ALP BLD-CCNC: 176 IU/L (ref 40–129)
ALT SERPL-CCNC: 25 U/L (ref 10–40)
ANION GAP SERPL CALCULATED.3IONS-SCNC: 14 MMOL/L (ref 4–16)
ANISOCYTOSIS: ABNORMAL
AST SERPL-CCNC: 41 IU/L (ref 15–37)
ATYPICAL LYMPHOCYTE ABSOLUTE COUNT: ABNORMAL
BANDED NEUTROPHILS ABSOLUTE COUNT: 0.55 K/CU MM
BANDED NEUTROPHILS RELATIVE PERCENT: 2 % (ref 5–11)
BASOPHILS ABSOLUTE: 0.6 K/CU MM
BASOPHILS RELATIVE PERCENT: 2 % (ref 0–1)
BILIRUB SERPL-MCNC: 1.2 MG/DL (ref 0–1)
BUN BLDV-MCNC: 19 MG/DL (ref 6–23)
CALCIUM SERPL-MCNC: 8.4 MG/DL (ref 8.3–10.6)
CHLORIDE BLD-SCNC: 104 MMOL/L (ref 99–110)
CO2: 20 MMOL/L (ref 21–32)
CREAT SERPL-MCNC: 1.2 MG/DL (ref 0.9–1.3)
DIFFERENTIAL TYPE: ABNORMAL
EOSINOPHILS ABSOLUTE: 0.3 K/CU MM
EOSINOPHILS RELATIVE PERCENT: 1 % (ref 0–3)
FERRITIN: 230 NG/ML (ref 30–400)
GFR AFRICAN AMERICAN: >60 ML/MIN/1.73M2
GFR NON-AFRICAN AMERICAN: 60 ML/MIN/1.73M2
GLUCOSE BLD-MCNC: 169 MG/DL (ref 70–99)
HCT VFR BLD CALC: 41.5 % (ref 42–52)
HEMOGLOBIN: 13.2 GM/DL (ref 13.5–18)
HEPATITIS C ANTIBODY: NON REACTIVE
LACTATE DEHYDROGENASE: 907 IU/L (ref 120–246)
LYMPHOCYTES ABSOLUTE: 17.4 K/CU MM
LYMPHOCYTES RELATIVE PERCENT: 63 % (ref 24–44)
MCH RBC QN AUTO: 24.5 PG (ref 27–31)
MCHC RBC AUTO-ENTMCNC: 31.8 % (ref 32–36)
MCV RBC AUTO: 77.1 FL (ref 78–100)
METAMYELOCYTES ABSOLUTE COUNT: 0.55 K/CU MM
METAMYELOCYTES PERCENT: 2 %
MONOCYTES ABSOLUTE: 2.2 K/CU MM
MONOCYTES RELATIVE PERCENT: 8 % (ref 0–4)
MYELOCYTE PERCENT: 1 %
MYELOCYTES ABSOLUTE COUNT: 0.28 K/CU MM
NUCLEATED RED BLOOD CELLS: 1
OVALOCYTES: ABNORMAL
PDW BLD-RTO: 19.4 % (ref 11.7–14.9)
PLATELET # BLD: 81 K/CU MM (ref 140–440)
PMV BLD AUTO: 10.5 FL (ref 7.5–11.1)
POLYCHROMASIA: ABNORMAL
POTASSIUM SERPL-SCNC: 3.3 MMOL/L (ref 3.5–5.1)
RBC # BLD: 5.38 M/CU MM (ref 4.6–6.2)
SEGMENTED NEUTROPHILS ABSOLUTE COUNT: 5.8 K/CU MM
SEGMENTED NEUTROPHILS RELATIVE PERCENT: 21 % (ref 36–66)
SMUDGE CELLS: PRESENT
SODIUM BLD-SCNC: 138 MMOL/L (ref 135–145)
TEAR DROP CELLS: ABNORMAL
TOTAL PROTEIN: 5.3 GM/DL (ref 6.4–8.2)
WBC # BLD: 27.7 K/CU MM (ref 4–10.5)

## 2022-09-22 PROCEDURE — 87350 HEPATITIS BE AG IA: CPT

## 2022-09-22 PROCEDURE — 83010 ASSAY OF HAPTOGLOBIN QUANT: CPT

## 2022-09-22 PROCEDURE — 84165 PROTEIN E-PHORESIS SERUM: CPT

## 2022-09-22 PROCEDURE — 99211 OFF/OP EST MAY X REQ PHY/QHP: CPT

## 2022-09-22 PROCEDURE — 86038 ANTINUCLEAR ANTIBODIES: CPT

## 2022-09-22 PROCEDURE — 3023F SPIROM DOC REV: CPT | Performed by: INTERNAL MEDICINE

## 2022-09-22 PROCEDURE — G8420 CALC BMI NORM PARAMETERS: HCPCS | Performed by: INTERNAL MEDICINE

## 2022-09-22 PROCEDURE — 85007 BL SMEAR W/DIFF WBC COUNT: CPT

## 2022-09-22 PROCEDURE — 85045 AUTOMATED RETICULOCYTE COUNT: CPT

## 2022-09-22 PROCEDURE — 86803 HEPATITIS C AB TEST: CPT

## 2022-09-22 PROCEDURE — 85027 COMPLETE CBC AUTOMATED: CPT

## 2022-09-22 PROCEDURE — 82728 ASSAY OF FERRITIN: CPT

## 2022-09-22 PROCEDURE — 83615 LACTATE (LD) (LDH) ENZYME: CPT

## 2022-09-22 PROCEDURE — 80053 COMPREHEN METABOLIC PANEL: CPT

## 2022-09-22 PROCEDURE — 84155 ASSAY OF PROTEIN SERUM: CPT

## 2022-09-22 PROCEDURE — 1123F ACP DISCUSS/DSCN MKR DOCD: CPT | Performed by: INTERNAL MEDICINE

## 2022-09-22 PROCEDURE — 1036F TOBACCO NON-USER: CPT | Performed by: INTERNAL MEDICINE

## 2022-09-22 PROCEDURE — 86430 RHEUMATOID FACTOR TEST QUAL: CPT

## 2022-09-22 PROCEDURE — G8427 DOCREV CUR MEDS BY ELIG CLIN: HCPCS | Performed by: INTERNAL MEDICINE

## 2022-09-22 PROCEDURE — 36415 COLL VENOUS BLD VENIPUNCTURE: CPT

## 2022-09-22 PROCEDURE — 99214 OFFICE O/P EST MOD 30 MIN: CPT | Performed by: INTERNAL MEDICINE

## 2022-09-22 PROCEDURE — 3017F COLORECTAL CA SCREEN DOC REV: CPT | Performed by: INTERNAL MEDICINE

## 2022-09-22 RX ORDER — AZITHROMYCIN 250 MG/1
250 TABLET, FILM COATED ORAL SEE ADMIN INSTRUCTIONS
Qty: 6 TABLET | Refills: 0 | Status: SHIPPED | OUTPATIENT
Start: 2022-09-22 | End: 2022-09-27

## 2022-09-22 RX ORDER — PREDNISONE 50 MG/1
50 TABLET ORAL DAILY
Qty: 5 TABLET | Refills: 0 | Status: SHIPPED | OUTPATIENT
Start: 2022-09-22 | End: 2022-09-27

## 2022-09-22 ASSESSMENT — PATIENT HEALTH QUESTIONNAIRE - PHQ9
SUM OF ALL RESPONSES TO PHQ9 QUESTIONS 1 & 2: 0
SUM OF ALL RESPONSES TO PHQ QUESTIONS 1-9: 0
1. LITTLE INTEREST OR PLEASURE IN DOING THINGS: 0
SUM OF ALL RESPONSES TO PHQ QUESTIONS 1-9: 0
2. FEELING DOWN, DEPRESSED OR HOPELESS: 0

## 2022-09-22 NOTE — PROGRESS NOTES
Patient Name: Kerline Gabriel  Patient : 1950  Patient MRN: [id number]     Primary Oncologist: Sadie Rabago MD  Referring Physician: Juan Antonio Noble MD       Date of Service: 2022      Chief Complaint:   Chief Complaint   Patient presents with    Follow-up        Active Problem list  1. Chronic lymphocytic leukemia ([facility] [address]    2. Hypogammaglobulinemia ([facility] [address]    3. Lung nodule           HPI:        Mr. Ale Barrera ( 52) was diagnosed with stage I CLL in , when he presented with infection and lymphocytosis. His peripheral blood immunophenotyping was characteristic of B-cell CLL. It was CD38 negative, ZAP-70 positive, CD19 and 20 positive, CD5 positive. Karyotyping was normal in 2006.,  Because of his infection and associated acquired immune deficiency(hypogammaglobulinemia), he was given IVIG for a year. He was started back on IVIG therapy monthly since 2013 to prevent future major infections continuing for now. Also had minor Infusional reaction to IVIG in 2016 responded to Steroids & Benadryl., No further problems after that. CLL was treated only with Leukeran intermittently from 2007 until 2011 and again from 2011 until 2012. 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 Flow studies still showed the same and FISH in 2012 showed deletion of tp53 (17p) and MYB (6q). ,  He was thus treated with Fludara and Rituxan for six months between April and 2012 with excellent clinical and hematological response.  . In 2013, he developed Multiple b/l nodes in axilla and groin area, CAT scan on 13, Bulky lymphadenopathy within the chest, abdomen, and pelvis mildly increased as compared from previous CAT scan in March 2012.,  Starting in Jan 2014 he was initiated on 2nd line chemo with Bendamustine and Rituxan with good initial response. Continued till Nov 2014, CAT scan done on 8/14/14 showed an interval decrease in generalized adenopathy compared to previous study In December 2013, suggesting good response to therapy. ,  CAT scan done on 12/23/14 showed Minimal interval increase in size of at least two periportal and retrocaval lymph nodes. Otherwise stable thoracic, abdominal and pelvic adenopathy. Also mild interval increase in splenomegaly measuring 19 cm, previously 17 cm. CAT scan done on 6/25/15 showed mild interval decrease in the splenomegaly and also in retroperitoneal mesenteric lymph nodes. Started Idelalisib [Zydelig] on 20th Jan 2015 at 150mg po bid. with good initial response. He did remarkably well with Zydelig from January of 2015 until June of 2016, after he was hospitalized for Rhinovirus Pneumonia in May of 2016. Also discussed Living Will, Power of Melinaad, DNR status, et cetera. In April 2016, chest CT showed stable  , 13. In July 2016, His FISH testing on peripheral blood showed abnormal results, with 6q deletion, 17p deletion, and 11q centromere signal in 3 percent of cells. The 17p deletion (Tp53) in 80 percent of cells is associated with unfavorable prognosis. Because of his CLL with poor prognostic markers, including 17p deletion detected in 2012 & again in July of 2016. He was started on ibrutinib in September 2016 140mg/d increased to 280 mg/ after 4 weeks. The abdominal CT 7/5/16 is showing no acute abnormalities, stable splenomegaly, and one periportal lymph node measuring 2.2 by 3.7 cm which is unchanged from before. CAT scan of the chest August 5, 2016, showed axillary, mediastinal, hilar, and upper abdominal lymphadenopathy consistent with his CLL. The maximum size of the nodes is 2.4 cm.  A 1 cm infiltrate in the lateral segment of the right middle lobe possibly lymphadenopathy. Although there is a left inguinal node that has enlarged. Section that no obvious source of his abdominal cramping. 10/10/2018 EGD showed severe ulcerative esophagitis with slight narrowing in the GE junction small hiatal hernia mild superficial gastritis, Colonoscope revealed 2 mm polyp in the sigmoid colon, diverticulosis, hemorrhoids, moderate stool  1/2019; Colonoscopy with two diminutive polyps, diverticulosis and hemorrhoids, path with TA above IC valve and HP distal sigmoid  10-19 iron def based on labs refered to GI , stool occult negative x #3 , NO PICA   11-19 saw Dr. Tahir Galindo, [facility]  11/7/2019: Ct chest:Ground-glass nodule seen within the right upper lobe the largest measuring 17  mm in diameter. 12-6-19 capsule endoscopy, results unavailable   2/2020: CT chest:IMPRESSION:  Previously noted ground-glass opacities in the right lung have resolved. However, there is a new cluster of tiny pulmonary nodules in the left lower  lobe which could represent an infectious or inflammatory etiology. Short-term follow-up chest CT is recommended in 3-6 months. Multiple additional tiny pulmonary nodules are overall stable. Stable mediastinal lymph nodes without new lymphadenopathy. Previous Therapies    May 2020: Ct chest:  1. Interval resolution of the previously described cluster of pulmonary    nodules in the left lower lobe. Findings are most compatible with resolved    infectious/inflammatory etiology. 2. No acute cardiopulmonary disease. 3. COPD. 4. Stable subcentimeter mediastinal lymph nodes. 8/28/20: US RP normal    August 2020 cystoscopy revealed enlarged prostate. Was Recommended TURBT    3/24/22: CXR:  Bilateral interstitial opacity. Nodular consolidation at the left lung base. Pneumonia is favored over metastatic/lymphangitic disease. Consider atypical   etiologies.    Was treated with IV abx    But was given abx again and completed 4/21/22.    5/4/22: CT chest:  1. Patchy bilateral airspace opacities, most prominent in the left lower lobe   concerning for multifocal pneumonia. Follow-up examination in 4-6 weeks is   recommended to assess for resolution. 2. Interval increase in intrathoracic and upper abdominal lymphadenopathy   consistent with patient's history of CLL. 3. Splenomegaly. 5/28/22: he was seen by Dr Maxwell Motley who ordered bactrim     June 8 2022 he was seen by him again and was prescribed 2 week course of cipro    July 28 2022 CT chest:  1. Overall improvement in multifocal pneumonia. However there has been   interval development of multifocal tree-in-bud opacities which either reflect   residual pneumonia versus new infectious bronchiolitis. 2. There are scattered new solid nodular opacities as well the largest   measuring up to 8 mm also likely infectious or inflammatory in etiology. Continued follow-up in 3 months is recommended to ensure resolution and/or   stability. 3. Slight interval increase in size of a left hilar lymph node as well as a   periportal and gastrohepatic lymph nodes. Attention on follow-up exams is   recommended. Otherwise stable to slight interval decrease in size of   mediastinal and hilar adenopathy. 4. Probable splenomegaly. 9/13/2022 CBC with WBC of 11.5 hemoglobin of 11.4 hematocrit of 37 MCV of 82 and platelets of 19SYM with creatinine 1.7 albumin 3.9  ferritin of 25 iron sats of 10% B12 1217 IgG 559    PAST MEDICAL HISTORY:   1. Stage I CLL with conversion from good to poor prognostic factors. ,  2. History of hypertension for many years. ,  3. 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 Arthritis in the past. ,  5. Frozen shoulder for which he had treatment including injection by Dr. Benedicto Owusu. ,  6. Cellulitis with Zoster type lesion Left thigh resolved with Bactrim and Valtrex in March 2016.,  7. abdominal illness, with fever, nausea, and discomfort, suggestive of infectious etiology in May 2016. ,  8. hospitalization between  and  for what seems like atypical rhinovirus pneumonia involving right middle and lower lobe and left lower lobe with sepsis,  9. 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 Dr. Kristan Dior is referring him for MOHS surgery. ,  10. hospitalized from  to 2017, for hyperosmolar hyperglycemia with hyponatremia and hyperkalemia and UTI. He was seen by Dr. Lisa Rojas, who put him on insulin. He was also seen by Dr. Denzel Harden. He felt much better after his sugar got under better control and electrolyte imbalanced reversed,  11. Ultrasound Doppler 2017, was negative for any DVT in either leg. Chest x-ray showed no acute process. PAST SURGICAL HISTORY:   1. knee operation,  2. tonsillectomy,  3. broken ankle times two requiring open reduction. ,  4. Vision better after cataract surgery    FAMILY HISTORY:   His paternal aunt had colon cancer. Uncle also had some form of cancer. Father had heart disease. Sister  12 of Liver disease     SOCIAL HISTORY:   He has a 40-pack-year history of smoking, quit in . He denies alcohol use. He is not . Has one son. Interval History  2022: Arrived alone to the clinic today. Reported that he has been having productive cough consisting of yellow sputum. No hemoptysis. No chest pain but has been more sob lately. Had one episode of dark vomitus. But then no further episodes or any other overt bleeding. Epigastric pain, stabbing in nature, not associated with cough. No radiation. No diarrhea or any constipation.      Review of Systems   Per interval history; otherwise 10 point ROS is negative              Vital Signs:  /79 (Site: Right Upper Arm, Position: Sitting, Cuff Size: Medium Adult)   Pulse (!) 101   Temp 96.9 °F (36.1 °C) (Infrared) Resp 20   Ht 6' (1.829 m)   Wt 182 lb (82.6 kg)   SpO2 97%   BMI 24.68 kg/m²     Physical Exam:  CONSTITUTIONAL: alert and awake, tired appearing, has been having coughing spells  EYES: No palor or any icetrus   ENT: ATNC   NECK: No JVD   HEMATOLOGIC/LYMPHATIC: no cervical, supraclavicular or axillary lymphadenopathy   LUNGS: Coarse breath sounds bilaterally  CARDIOVASCULAR:s1s2 SM+ rrr   ABDOMEN:soft ntnd bs pos  NEUROLOGIC: GI   SKIN: skin tags   EXTREMITIES: no LE edema bilaterally      Labs:    Hematology:  Lab Results   Component Value Date    WBC 11.5 (H) 09/13/2022    RBC 4.53 (L) 09/13/2022    HGB 11.4 (L) 09/13/2022    HCT 37.2 (L) 09/13/2022    MCV 82.1 09/13/2022    MCH 25.2 (L) 09/13/2022    MCHC 30.6 (L) 09/13/2022    RDW 19.4 (H) 09/13/2022    PLT 97 (L) 09/13/2022    MPV 10.8 09/13/2022    BANDSPCT 6 08/04/2020    SEGSPCT 25.8 (L) 09/13/2022    EOSRELPCT 0.3 09/13/2022    BASOPCT 0.1 09/13/2022    LYMPHOPCT 70.0 (H) 09/13/2022    MONOPCT 3.8 09/13/2022    BANDABS 1.76 08/04/2020    SEGSABS 3.0 09/13/2022    EOSABS 0.0 09/13/2022    BASOSABS 0.0 09/13/2022    LYMPHSABS 8.1 09/13/2022    MONOSABS 0.4 09/13/2022    DIFFTYPE AUTOMATED DIFFERENTIAL 09/13/2022    ANISOCYTOSIS 1+ 08/04/2020    POLYCHROM 1+ 08/04/2020    WBCMORP ATYPICAL LYMPHOCYTES WITH PROMINENT NUCLEOLI NOTED 09/26/2017    PLTM SEVERAL LARGE PLATELETS 43/50/1455     Lab Results   Component Value Date    ESR 72 (H) 01/24/2017       Chemistry:  Lab Results   Component Value Date     09/13/2022    K 4.0 09/13/2022     09/13/2022    CO2 22 09/13/2022    BUN 14 09/13/2022    CREATININE 0.7 (L) 09/13/2022    GLUCOSE 128 (H) 09/13/2022    CALCIUM 8.4 09/13/2022    PROT 5.5 (L) 09/13/2022    LABALBU 3.9 09/13/2022    BILITOT 0.4 09/13/2022    ALKPHOS 89 09/13/2022    AST 14 (L) 09/13/2022    ALT 13 09/13/2022    LABGLOM >60 09/13/2022    GFRAA >60 09/13/2022    PHOS 3.3 04/04/2019    MG 2.3 07/20/2020    POCGLU 159 (H) 03/28/2022     Lab Results   Component Value Date     09/13/2022     No components found for: LD  Lab Results   Component Value Date    TSHHS 3.040 04/04/2019    T4FREE 1.37 04/04/2019    FT3 3.2 03/27/2012       Immunology:  Lab Results   Component Value Date    PROT 5.5 (L) 09/13/2022    ALBUMINELP 3.7 08/21/2017    LABALPH 0.2 08/21/2017    LABALPH 0.9 08/21/2017    LABBETA 0.9 08/21/2017    GAMGLOB 0.6 08/21/2017     No results found for: Agueda Broussard, KLFLCR  No results found for: B2M    Coagulation Panel:  Lab Results   Component Value Date    PROTIME 11.1 12/30/2013    INR 1.03 12/30/2013    APTT 24.6 12/30/2013       Anemia Panel:  Lab Results   Component Value Date    QHCBLAQU84 2223 (H) 09/13/2022    FOLATE 5.0 09/13/2022       Tumor Markers:  Lab Results   Component Value Date    PSA 1.9 10/27/2020         Imaging: Reviewed     Pathology:Reviewed     Observations:  Performance Status: ECOG 1  Depression Status: PHQ-9 Total Score: 0 (9/22/2022  1:53 PM)          Assessment & Plan:  B-cell CLL, stage I with conversion from good to poor prognostic factors, with 17p deletion:   His diagnosis of CLL since 2007.   17p deletion since 2015. Initial treatment with Leukeran from 2000 to 2011 intermittently and FCR regimen in 2012, bendamustine/Rituxan in 2014. Idelalisib from January 2015 until June of 2016. On ibrutinib since September 2016. Ibrutinib therapy seems to be helping him, overall improvement. Was Only able to tolerate 140 mg p.o. every other day  He was off for a couple months During the fall 2017  Resumed Ibrutinib and  on 140 mg po daily. Plan to Continue current dose as no major B sx, stable WBC. CT imaging in July 2022 with slightly increasing size of intraabdominal LN. plan was to monitor. But note declining platelet count, could be secondary ibrutinib. Hold ibrutinib for now until further investigation.       Thrombocytopenia, rule out hemolysis, monoclonal gammopathy, nutritional deficiency. Doubt secondary to CLL. Hold Imbruvica for now. Acute bronchitis, empiric Zithromax and prednisone. Recommend follow-up consultation with pulmonology. Continue inhalers. CT chest ordered. Epigastric pain, CT scan of the abdomen pelvis ordered. PPI. Hold off on Imbruvica has been above-mentioned. Acquired hypogammaglobulinemia:   Continue IVIG,    BPH: is being followed by Urology    Iron def anemia and esophagitis: follows with GI, Dr Adrian Mohr. Reported that he had colonoscopy 2018 with polyps detected. EGD was normal except for H. pylori which was treated. Was supposed to have VCE which was unsuccessful once. He is on oral iron twice daily, recommend 1 to 2 tablets every other day instead. Repeat ferritin pending. UA with no blood. Recommend follow-up with GI    Lung Nodule RUL: Stable findings per CT July 2022    Continue other medical care. Discussed above findings and plan with him and he verbalized understanding. Answered all his questions. Discussed healthy lifestyle, smoking cessation, healthy diet and exercise as tolerated. Also discussed importance of being up-to-date with age-appropriate screening tools. Is going to follow with need for colonoscopy    Recommend follow-up with primary care physician and other specialist.    Please do not hesitate to contact us if you need any further information.     Return to clinic October 2022 or earlier if new symptoms    ANUPAM

## 2022-09-23 LAB — RETICULOCYTE COUNT PCT: 0.9 % (ref 0.2–2.2)

## 2022-09-24 LAB
HAPTOGLOBIN: 101 MG/DL (ref 30–200)
HEPATITIS BE ANTIGEN: NEGATIVE
RHEUMATOID FACTOR: <10 IU/ML (ref 0–14)

## 2022-09-25 LAB — ANTI-NUCLEAR ANTIBODY (ANA): NORMAL

## 2022-09-28 ENCOUNTER — HOSPITAL ENCOUNTER (INPATIENT)
Age: 72
LOS: 8 days | Discharge: HOME HEALTH | DRG: 871 | End: 2022-10-06
Attending: EMERGENCY MEDICINE | Admitting: INTERNAL MEDICINE
Payer: COMMERCIAL

## 2022-09-28 ENCOUNTER — APPOINTMENT (OUTPATIENT)
Dept: GENERAL RADIOLOGY | Age: 72
DRG: 871 | End: 2022-09-28
Payer: COMMERCIAL

## 2022-09-28 ENCOUNTER — APPOINTMENT (OUTPATIENT)
Dept: CT IMAGING | Age: 72
DRG: 871 | End: 2022-09-28
Payer: COMMERCIAL

## 2022-09-28 ENCOUNTER — APPOINTMENT (OUTPATIENT)
Dept: ULTRASOUND IMAGING | Age: 72
DRG: 871 | End: 2022-09-28
Payer: COMMERCIAL

## 2022-09-28 DIAGNOSIS — Z85.6 H/O CHRONIC LYMPHOCYTIC LEUKEMIA: ICD-10-CM

## 2022-09-28 DIAGNOSIS — R77.8 ELEVATED TROPONIN: ICD-10-CM

## 2022-09-28 DIAGNOSIS — T79.6XXA TRAUMATIC RHABDOMYOLYSIS, INITIAL ENCOUNTER (HCC): ICD-10-CM

## 2022-09-28 DIAGNOSIS — R74.8 ELEVATED CK: ICD-10-CM

## 2022-09-28 DIAGNOSIS — R59.1 LYMPHADENOPATHY: ICD-10-CM

## 2022-09-28 DIAGNOSIS — A41.9 SEPSIS, DUE TO UNSPECIFIED ORGANISM, UNSPECIFIED WHETHER ACUTE ORGAN DYSFUNCTION PRESENT (HCC): Primary | ICD-10-CM

## 2022-09-28 DIAGNOSIS — B34.8 RHINOVIRUS: ICD-10-CM

## 2022-09-28 DIAGNOSIS — J96.01 ACUTE RESPIRATORY FAILURE WITH HYPOXIA (HCC): ICD-10-CM

## 2022-09-28 DIAGNOSIS — I50.9 ACUTE CONGESTIVE HEART FAILURE, UNSPECIFIED HEART FAILURE TYPE (HCC): ICD-10-CM

## 2022-09-28 PROBLEM — R65.20 SEVERE SEPSIS (HCC): Status: ACTIVE | Noted: 2022-09-28

## 2022-09-28 LAB
ADENOVIRUS DETECTION BY PCR: NOT DETECTED
ALBUMIN ELP: 3.1 GM/DL (ref 3.2–5.6)
ALBUMIN SERPL-MCNC: 3.3 GM/DL (ref 3.4–5)
ALP BLD-CCNC: 138 IU/L (ref 40–129)
ALPHA-1-GLOBULIN: 0.2 GM/DL (ref 0.1–0.4)
ALPHA-2-GLOBULIN: 0.8 GM/DL (ref 0.4–1.2)
ALT SERPL-CCNC: 30 U/L (ref 10–40)
ANION GAP SERPL CALCULATED.3IONS-SCNC: 16 MMOL/L (ref 4–16)
AST SERPL-CCNC: 80 IU/L (ref 15–37)
ATYPICAL LYMPHOCYTE ABSOLUTE COUNT: ABNORMAL
BACTERIA: NEGATIVE /HPF
BANDED NEUTROPHILS ABSOLUTE COUNT: 5.36 K/CU MM
BANDED NEUTROPHILS RELATIVE PERCENT: 7 % (ref 5–11)
BASE EXCESS: 3 (ref 0–3.3)
BETA GLOBULIN: 0.6 GM/DL (ref 0.5–1.3)
BILIRUB SERPL-MCNC: 1.6 MG/DL (ref 0–1)
BILIRUBIN URINE: NEGATIVE
BLOOD, URINE: NORMAL
BORDETELLA PARAPERTUSSIS BY PCR: NOT DETECTED
BORDETELLA PERTUSSIS PCR: NOT DETECTED
BUN BLDV-MCNC: 29 MG/DL (ref 6–23)
CALCIUM SERPL-MCNC: 8 MG/DL (ref 8.3–10.6)
CHLAMYDOPHILA PNEUMONIA PCR: NOT DETECTED
CHLORIDE BLD-SCNC: 97 MMOL/L (ref 99–110)
CHP ED QC CHECK: NORMAL
CLARITY: NORMAL
CO2: 19 MMOL/L (ref 21–32)
COLOR: YELLOW
COMMENT: ABNORMAL
CORONAVIRUS 229E PCR: NOT DETECTED
CORONAVIRUS HKU1 PCR: NOT DETECTED
CORONAVIRUS NL63 PCR: NOT DETECTED
CORONAVIRUS OC43 PCR: NOT DETECTED
CREAT SERPL-MCNC: 1.1 MG/DL (ref 0.9–1.3)
DIFFERENTIAL TYPE: ABNORMAL
EKG ATRIAL RATE: 119 BPM
EKG DIAGNOSIS: NORMAL
EKG P AXIS: 48 DEGREES
EKG P-R INTERVAL: 138 MS
EKG Q-T INTERVAL: 316 MS
EKG QRS DURATION: 76 MS
EKG QTC CALCULATION (BAZETT): 444 MS
EKG R AXIS: 12 DEGREES
EKG T AXIS: 102 DEGREES
EKG VENTRICULAR RATE: 119 BPM
GAMMA GLOBULIN: 0.6 GM/DL (ref 0.5–1.6)
GFR AFRICAN AMERICAN: >60 ML/MIN/1.73M2
GFR NON-AFRICAN AMERICAN: >60 ML/MIN/1.73M2
GLUCOSE BLD-MCNC: 366 MG/DL (ref 70–99)
GLUCOSE BLD-MCNC: 370 MG/DL
GLUCOSE BLD-MCNC: 370 MG/DL (ref 70–99)
GLUCOSE BLD-MCNC: 433 MG/DL (ref 70–99)
GLUCOSE BLD-MCNC: 462 MG/DL (ref 70–99)
GLUCOSE, URINE: NEGATIVE MG/DL
HCO3 VENOUS: 20.6 MMOL/L (ref 19–25)
HCT VFR BLD CALC: 37.8 % (ref 42–52)
HEMOGLOBIN: 11.6 GM/DL (ref 13.5–18)
HUMAN METAPNEUMOVIRUS PCR: NOT DETECTED
HYALINE CASTS: 0 /LPF
INFLUENZA A BY PCR: NOT DETECTED
INFLUENZA A H1 (2009) PCR: NOT DETECTED
INFLUENZA A H1 PANDEMIC PCR: NOT DETECTED
INFLUENZA A H3 PCR: NOT DETECTED
INFLUENZA B BY PCR: NOT DETECTED
KETONES, URINE: NEGATIVE MG/DL
LACTIC ACID, SEPSIS: 2 MMOL/L (ref 0.5–1.9)
LACTIC ACID, SEPSIS: 2.4 MMOL/L (ref 0.5–1.9)
LACTIC ACID, SEPSIS: 3.1 MMOL/L (ref 0.5–1.9)
LEUKOCYTE ESTERASE, URINE: NEGATIVE
LIPASE: 11 IU/L (ref 13–60)
LYMPHOCYTES ABSOLUTE: 54.3 K/CU MM
LYMPHOCYTES RELATIVE PERCENT: 71 % (ref 24–44)
MCH RBC QN AUTO: 24.5 PG (ref 27–31)
MCHC RBC AUTO-ENTMCNC: 30.7 % (ref 32–36)
MCV RBC AUTO: 79.7 FL (ref 78–100)
METAMYELOCYTES ABSOLUTE COUNT: 2.3 K/CU MM
METAMYELOCYTES PERCENT: 3 %
MONOCYTES ABSOLUTE: 6.1 K/CU MM
MONOCYTES RELATIVE PERCENT: 8 % (ref 0–4)
MUCUS: NORMAL HPF
MYCOPLASMA PNEUMONIAE PCR: NOT DETECTED
NITRITE URINE, QUANTITATIVE: NEGATIVE
NUCLEATED RBC %: 0.2 %
NUCLEATED RED BLOOD CELLS: 2
O2 SAT, VEN: 93.5 % (ref 50–70)
OTHER CELL MORPHOLOGY: ABNORMAL
PARAINFLUENZA 1 PCR: NOT DETECTED
PARAINFLUENZA 2 PCR: NOT DETECTED
PARAINFLUENZA 3 PCR: NOT DETECTED
PARAINFLUENZA 4 PCR: NOT DETECTED
PCO2, VEN: 31 MMHG (ref 38–52)
PDW BLD-RTO: 19 % (ref 11.7–14.9)
PH VENOUS: 7.43 (ref 7.32–7.42)
PH, URINE: 6
PLATELET # BLD: 69 K/CU MM (ref 140–440)
PO2, VEN: 100 MMHG (ref 28–48)
POTASSIUM SERPL-SCNC: 5 MMOL/L (ref 3.5–5.1)
PRO-BNP: ABNORMAL PG/ML
PROTEIN UA: 30 MG/DL
RBC # BLD: 4.74 M/CU MM (ref 4.6–6.2)
RBC URINE: 1 /HPF
RHINOVIRUS ENTEROVIRUS PCR: ABNORMAL
RSV PCR: NOT DETECTED
SARS-COV-2: NOT DETECTED
SEGMENTED NEUTROPHILS ABSOLUTE COUNT: 6.9 K/CU MM
SEGMENTED NEUTROPHILS RELATIVE PERCENT: 9 % (ref 36–66)
SMUDGE CELLS: PRESENT
SODIUM BLD-SCNC: 132 MMOL/L (ref 135–145)
SPECIFIC GRAVITY UA: 1.01
SPEP INTERPRETATION: ABNORMAL
TOTAL CK: 3453 IU/L (ref 38–174)
TOTAL NUCLEATED RBC: 0.1 K/CU MM
TOTAL PROTEIN: 5.3 GM/DL (ref 6.4–8.2)
TOTAL PROTEIN: 5.3 GM/DL (ref 6.4–8.2)
TROPONIN T: 0.02 NG/ML
TROPONIN T: <0.01 NG/ML
UNDIFFERENTIATED CELL: 2 %
URIC ACID: 13.3 MG/DL (ref 3.5–7.2)
UROBILINOGEN, URINE: 0.2 MG/DL
WBC # BLD: 76.5 K/CU MM (ref 4–10.5)
WBC # BLD: ABNORMAL 10*3/UL
WBC UA: 1 /HPF

## 2022-09-28 PROCEDURE — 99285 EMERGENCY DEPT VISIT HI MDM: CPT

## 2022-09-28 PROCEDURE — 83690 ASSAY OF LIPASE: CPT

## 2022-09-28 PROCEDURE — 83605 ASSAY OF LACTIC ACID: CPT

## 2022-09-28 PROCEDURE — 87449 NOS EACH ORGANISM AG IA: CPT

## 2022-09-28 PROCEDURE — 87899 AGENT NOS ASSAY W/OPTIC: CPT

## 2022-09-28 PROCEDURE — 0202U NFCT DS 22 TRGT SARS-COV-2: CPT

## 2022-09-28 PROCEDURE — 6370000000 HC RX 637 (ALT 250 FOR IP): Performed by: NURSE PRACTITIONER

## 2022-09-28 PROCEDURE — 70450 CT HEAD/BRAIN W/O DYE: CPT

## 2022-09-28 PROCEDURE — 85027 COMPLETE CBC AUTOMATED: CPT

## 2022-09-28 PROCEDURE — 71045 X-RAY EXAM CHEST 1 VIEW: CPT

## 2022-09-28 PROCEDURE — 6370000000 HC RX 637 (ALT 250 FOR IP): Performed by: PHYSICIAN ASSISTANT

## 2022-09-28 PROCEDURE — 96361 HYDRATE IV INFUSION ADD-ON: CPT

## 2022-09-28 PROCEDURE — 2580000003 HC RX 258: Performed by: PHYSICIAN ASSISTANT

## 2022-09-28 PROCEDURE — 2580000003 HC RX 258: Performed by: NURSE PRACTITIONER

## 2022-09-28 PROCEDURE — 71275 CT ANGIOGRAPHY CHEST: CPT

## 2022-09-28 PROCEDURE — 87040 BLOOD CULTURE FOR BACTERIA: CPT

## 2022-09-28 PROCEDURE — 36415 COLL VENOUS BLD VENIPUNCTURE: CPT

## 2022-09-28 PROCEDURE — 84550 ASSAY OF BLOOD/URIC ACID: CPT

## 2022-09-28 PROCEDURE — 6360000002 HC RX W HCPCS: Performed by: PHYSICIAN ASSISTANT

## 2022-09-28 PROCEDURE — 2060000000 HC ICU INTERMEDIATE R&B

## 2022-09-28 PROCEDURE — 83036 HEMOGLOBIN GLYCOSYLATED A1C: CPT

## 2022-09-28 PROCEDURE — 84484 ASSAY OF TROPONIN QUANT: CPT

## 2022-09-28 PROCEDURE — 81001 URINALYSIS AUTO W/SCOPE: CPT

## 2022-09-28 PROCEDURE — 96365 THER/PROPH/DIAG IV INF INIT: CPT

## 2022-09-28 PROCEDURE — 80053 COMPREHEN METABOLIC PANEL: CPT

## 2022-09-28 PROCEDURE — 82805 BLOOD GASES W/O2 SATURATION: CPT

## 2022-09-28 PROCEDURE — 96375 TX/PRO/DX INJ NEW DRUG ADDON: CPT

## 2022-09-28 PROCEDURE — 94664 DEMO&/EVAL PT USE INHALER: CPT

## 2022-09-28 PROCEDURE — 6360000004 HC RX CONTRAST MEDICATION: Performed by: PHYSICIAN ASSISTANT

## 2022-09-28 PROCEDURE — 82550 ASSAY OF CK (CPK): CPT

## 2022-09-28 PROCEDURE — 94640 AIRWAY INHALATION TREATMENT: CPT

## 2022-09-28 PROCEDURE — 82962 GLUCOSE BLOOD TEST: CPT

## 2022-09-28 PROCEDURE — 87081 CULTURE SCREEN ONLY: CPT

## 2022-09-28 PROCEDURE — 93005 ELECTROCARDIOGRAM TRACING: CPT | Performed by: PHYSICIAN ASSISTANT

## 2022-09-28 PROCEDURE — 74177 CT ABD & PELVIS W/CONTRAST: CPT

## 2022-09-28 PROCEDURE — 6360000002 HC RX W HCPCS: Performed by: NURSE PRACTITIONER

## 2022-09-28 PROCEDURE — 85007 BL SMEAR W/DIFF WBC COUNT: CPT

## 2022-09-28 PROCEDURE — 87305 ASPERGILLUS AG IA: CPT

## 2022-09-28 PROCEDURE — 93010 ELECTROCARDIOGRAM REPORT: CPT | Performed by: INTERNAL MEDICINE

## 2022-09-28 PROCEDURE — 83880 ASSAY OF NATRIURETIC PEPTIDE: CPT

## 2022-09-28 RX ORDER — INSULIN LISPRO 100 [IU]/ML
5 INJECTION, SOLUTION INTRAVENOUS; SUBCUTANEOUS
Status: DISCONTINUED | OUTPATIENT
Start: 2022-09-29 | End: 2022-09-29

## 2022-09-28 RX ORDER — SODIUM CHLORIDE 0.9 % (FLUSH) 0.9 %
5-40 SYRINGE (ML) INJECTION PRN
Status: DISCONTINUED | OUTPATIENT
Start: 2022-09-28 | End: 2022-10-06 | Stop reason: HOSPADM

## 2022-09-28 RX ORDER — DEXTROSE MONOHYDRATE 100 MG/ML
1000 INJECTION, SOLUTION INTRAVENOUS CONTINUOUS PRN
Status: DISCONTINUED | OUTPATIENT
Start: 2022-09-28 | End: 2022-10-06 | Stop reason: HOSPADM

## 2022-09-28 RX ORDER — VALACYCLOVIR HYDROCHLORIDE 500 MG/1
500 TABLET, FILM COATED ORAL DAILY
Status: DISCONTINUED | OUTPATIENT
Start: 2022-09-29 | End: 2022-10-06 | Stop reason: HOSPADM

## 2022-09-28 RX ORDER — HYDROCODONE BITARTRATE AND ACETAMINOPHEN 5; 325 MG/1; MG/1
1 TABLET ORAL EVERY 6 HOURS PRN
Status: DISCONTINUED | OUTPATIENT
Start: 2022-09-28 | End: 2022-10-03

## 2022-09-28 RX ORDER — ACETAMINOPHEN 650 MG/1
650 SUPPOSITORY RECTAL EVERY 6 HOURS PRN
Status: DISCONTINUED | OUTPATIENT
Start: 2022-09-28 | End: 2022-10-06 | Stop reason: HOSPADM

## 2022-09-28 RX ORDER — ALBUTEROL SULFATE 90 UG/1
2 AEROSOL, METERED RESPIRATORY (INHALATION) EVERY 4 HOURS PRN
Status: DISCONTINUED | OUTPATIENT
Start: 2022-09-28 | End: 2022-10-06 | Stop reason: HOSPADM

## 2022-09-28 RX ORDER — MULTIVIT WITH MINERALS/LUTEIN
250 TABLET ORAL 2 TIMES DAILY
COMMUNITY

## 2022-09-28 RX ORDER — MORPHINE SULFATE 4 MG/ML
4 INJECTION, SOLUTION INTRAMUSCULAR; INTRAVENOUS EVERY 30 MIN PRN
Status: DISCONTINUED | OUTPATIENT
Start: 2022-09-28 | End: 2022-09-28

## 2022-09-28 RX ORDER — ACETAMINOPHEN 325 MG/1
650 TABLET ORAL EVERY 6 HOURS PRN
Status: DISCONTINUED | OUTPATIENT
Start: 2022-09-28 | End: 2022-10-06 | Stop reason: HOSPADM

## 2022-09-28 RX ORDER — INSULIN GLARGINE 100 [IU]/ML
30 INJECTION, SOLUTION SUBCUTANEOUS NIGHTLY
Status: DISCONTINUED | OUTPATIENT
Start: 2022-09-28 | End: 2022-09-30

## 2022-09-28 RX ORDER — ENOXAPARIN SODIUM 100 MG/ML
40 INJECTION SUBCUTANEOUS NIGHTLY
Status: DISCONTINUED | OUTPATIENT
Start: 2022-09-28 | End: 2022-10-06 | Stop reason: HOSPADM

## 2022-09-28 RX ORDER — SODIUM CHLORIDE 9 MG/ML
500 INJECTION, SOLUTION INTRAVENOUS PRN
Status: DISCONTINUED | OUTPATIENT
Start: 2022-09-28 | End: 2022-10-06 | Stop reason: HOSPADM

## 2022-09-28 RX ORDER — 0.9 % SODIUM CHLORIDE 0.9 %
1000 INTRAVENOUS SOLUTION INTRAVENOUS ONCE
Status: COMPLETED | OUTPATIENT
Start: 2022-09-28 | End: 2022-09-28

## 2022-09-28 RX ORDER — TAMSULOSIN HYDROCHLORIDE 0.4 MG/1
0.4 CAPSULE ORAL NIGHTLY
Status: DISCONTINUED | OUTPATIENT
Start: 2022-09-28 | End: 2022-10-06 | Stop reason: HOSPADM

## 2022-09-28 RX ORDER — ALBUTEROL SULFATE 90 UG/1
2 AEROSOL, METERED RESPIRATORY (INHALATION)
COMMUNITY
Start: 2022-09-06

## 2022-09-28 RX ORDER — ATORVASTATIN CALCIUM 40 MG/1
80 TABLET, FILM COATED ORAL NIGHTLY
Status: DISCONTINUED | OUTPATIENT
Start: 2022-09-28 | End: 2022-10-06 | Stop reason: HOSPADM

## 2022-09-28 RX ORDER — METHYLPREDNISOLONE SODIUM SUCCINATE 125 MG/2ML
80 INJECTION, POWDER, LYOPHILIZED, FOR SOLUTION INTRAMUSCULAR; INTRAVENOUS ONCE
Status: COMPLETED | OUTPATIENT
Start: 2022-09-28 | End: 2022-09-28

## 2022-09-28 RX ORDER — FINASTERIDE 5 MG/1
5 TABLET, FILM COATED ORAL NIGHTLY
Status: DISCONTINUED | OUTPATIENT
Start: 2022-09-28 | End: 2022-10-06 | Stop reason: HOSPADM

## 2022-09-28 RX ORDER — ALBUTEROL SULFATE 90 UG/1
2 AEROSOL, METERED RESPIRATORY (INHALATION) ONCE
Status: COMPLETED | OUTPATIENT
Start: 2022-09-28 | End: 2022-09-28

## 2022-09-28 RX ORDER — INSULIN LISPRO 100 [IU]/ML
0-4 INJECTION, SOLUTION INTRAVENOUS; SUBCUTANEOUS NIGHTLY
Status: DISCONTINUED | OUTPATIENT
Start: 2022-09-28 | End: 2022-10-06 | Stop reason: HOSPADM

## 2022-09-28 RX ORDER — FERROUS GLUCONATE 324(37.5)
324 TABLET ORAL 2 TIMES DAILY
Status: CANCELLED | OUTPATIENT
Start: 2022-09-28

## 2022-09-28 RX ORDER — PANTOPRAZOLE SODIUM 20 MG/1
20 TABLET, DELAYED RELEASE ORAL
Status: DISCONTINUED | OUTPATIENT
Start: 2022-09-29 | End: 2022-10-06 | Stop reason: HOSPADM

## 2022-09-28 RX ORDER — INSULIN LISPRO 100 [IU]/ML
0-8 INJECTION, SOLUTION INTRAVENOUS; SUBCUTANEOUS
Status: DISCONTINUED | OUTPATIENT
Start: 2022-09-29 | End: 2022-10-06 | Stop reason: HOSPADM

## 2022-09-28 RX ORDER — SODIUM CHLORIDE 0.9 % (FLUSH) 0.9 %
5-40 SYRINGE (ML) INJECTION EVERY 12 HOURS SCHEDULED
Status: DISCONTINUED | OUTPATIENT
Start: 2022-09-28 | End: 2022-10-06 | Stop reason: HOSPADM

## 2022-09-28 RX ORDER — IPRATROPIUM BROMIDE AND ALBUTEROL SULFATE 2.5; .5 MG/3ML; MG/3ML
1 SOLUTION RESPIRATORY (INHALATION) EVERY 6 HOURS PRN
COMMUNITY
Start: 2022-09-06

## 2022-09-28 RX ORDER — SODIUM CHLORIDE 9 MG/ML
INJECTION, SOLUTION INTRAVENOUS CONTINUOUS
Status: DISCONTINUED | OUTPATIENT
Start: 2022-09-28 | End: 2022-09-29

## 2022-09-28 RX ORDER — FUROSEMIDE 10 MG/ML
20 INJECTION INTRAMUSCULAR; INTRAVENOUS ONCE
Status: COMPLETED | OUTPATIENT
Start: 2022-09-28 | End: 2022-09-28

## 2022-09-28 RX ADMIN — PIPERACILLIN AND TAZOBACTAM 4500 MG: 4; .5 INJECTION, POWDER, FOR SOLUTION INTRAVENOUS at 21:47

## 2022-09-28 RX ADMIN — SODIUM CHLORIDE, PRESERVATIVE FREE 10 ML: 5 INJECTION INTRAVENOUS at 22:16

## 2022-09-28 RX ADMIN — VANCOMYCIN HYDROCHLORIDE 1750 MG: 1 INJECTION, POWDER, LYOPHILIZED, FOR SOLUTION INTRAVENOUS at 22:11

## 2022-09-28 RX ADMIN — ATORVASTATIN CALCIUM 80 MG: 40 TABLET, FILM COATED ORAL at 21:31

## 2022-09-28 RX ADMIN — Medication 2 PUFF: at 12:34

## 2022-09-28 RX ADMIN — MORPHINE SULFATE 4 MG: 4 INJECTION, SOLUTION INTRAMUSCULAR; INTRAVENOUS at 15:09

## 2022-09-28 RX ADMIN — INSULIN GLARGINE 30 UNITS: 100 INJECTION, SOLUTION SUBCUTANEOUS at 22:04

## 2022-09-28 RX ADMIN — SODIUM CHLORIDE: 9 INJECTION, SOLUTION INTRAVENOUS at 13:17

## 2022-09-28 RX ADMIN — ALBUTEROL SULFATE 2 PUFF: 90 AEROSOL, METERED RESPIRATORY (INHALATION) at 12:33

## 2022-09-28 RX ADMIN — SODIUM CHLORIDE: 9 INJECTION, SOLUTION INTRAVENOUS at 18:33

## 2022-09-28 RX ADMIN — TAMSULOSIN HYDROCHLORIDE 0.4 MG: 0.4 CAPSULE ORAL at 21:31

## 2022-09-28 RX ADMIN — INSULIN LISPRO 4 UNITS: 100 INJECTION, SOLUTION INTRAVENOUS; SUBCUTANEOUS at 21:51

## 2022-09-28 RX ADMIN — FINASTERIDE 5 MG: 5 TABLET, FILM COATED ORAL at 21:31

## 2022-09-28 RX ADMIN — METHYLPREDNISOLONE SODIUM SUCCINATE 80 MG: 125 INJECTION, POWDER, FOR SOLUTION INTRAMUSCULAR; INTRAVENOUS at 12:46

## 2022-09-28 RX ADMIN — SODIUM CHLORIDE 1000 ML: 9 INJECTION, SOLUTION INTRAVENOUS at 12:45

## 2022-09-28 RX ADMIN — ENOXAPARIN SODIUM 40 MG: 100 INJECTION SUBCUTANEOUS at 21:31

## 2022-09-28 RX ADMIN — IOPAMIDOL 80 ML: 755 INJECTION, SOLUTION INTRAVENOUS at 14:44

## 2022-09-28 RX ADMIN — FUROSEMIDE 20 MG: 10 INJECTION, SOLUTION INTRAVENOUS at 15:11

## 2022-09-28 RX ADMIN — PIPERACILLIN AND TAZOBACTAM 3375 MG: 3; .375 INJECTION, POWDER, LYOPHILIZED, FOR SOLUTION INTRAVENOUS at 12:47

## 2022-09-28 ASSESSMENT — ENCOUNTER SYMPTOMS
COUGH: 1
DIARRHEA: 0
BACK PAIN: 0
SORE THROAT: 0
ABDOMINAL DISTENTION: 0
SHORTNESS OF BREATH: 0
ABDOMINAL PAIN: 0
APNEA: 0

## 2022-09-28 ASSESSMENT — PAIN - FUNCTIONAL ASSESSMENT
PAIN_FUNCTIONAL_ASSESSMENT: PREVENTS OR INTERFERES SOME ACTIVE ACTIVITIES AND ADLS
PAIN_FUNCTIONAL_ASSESSMENT: 0-10
PAIN_FUNCTIONAL_ASSESSMENT: PREVENTS OR INTERFERES SOME ACTIVE ACTIVITIES AND ADLS

## 2022-09-28 ASSESSMENT — LIFESTYLE VARIABLES
HOW OFTEN DO YOU HAVE A DRINK CONTAINING ALCOHOL: NEVER
HOW OFTEN DO YOU HAVE A DRINK CONTAINING ALCOHOL: NEVER
HOW MANY STANDARD DRINKS CONTAINING ALCOHOL DO YOU HAVE ON A TYPICAL DAY: PATIENT DOES NOT DRINK
HOW MANY STANDARD DRINKS CONTAINING ALCOHOL DO YOU HAVE ON A TYPICAL DAY: PATIENT DOES NOT DRINK

## 2022-09-28 ASSESSMENT — PAIN DESCRIPTION - FREQUENCY
FREQUENCY: CONTINUOUS
FREQUENCY: CONTINUOUS

## 2022-09-28 ASSESSMENT — PAIN DESCRIPTION - ORIENTATION
ORIENTATION: RIGHT;LOWER
ORIENTATION: RIGHT;LOWER

## 2022-09-28 ASSESSMENT — PAIN DESCRIPTION - ONSET
ONSET: GRADUAL
ONSET: GRADUAL

## 2022-09-28 ASSESSMENT — PAIN SCALES - GENERAL
PAINLEVEL_OUTOF10: 0
PAINLEVEL_OUTOF10: 7
PAINLEVEL_OUTOF10: 5
PAINLEVEL_OUTOF10: 8
PAINLEVEL_OUTOF10: 0

## 2022-09-28 ASSESSMENT — PAIN DESCRIPTION - DESCRIPTORS
DESCRIPTORS: SHARP
DESCRIPTORS: SHARP

## 2022-09-28 ASSESSMENT — PAIN DESCRIPTION - PAIN TYPE
TYPE: ACUTE PAIN
TYPE: ACUTE PAIN

## 2022-09-28 ASSESSMENT — PAIN DESCRIPTION - LOCATION
LOCATION: ABDOMEN
LOCATION: ABDOMEN

## 2022-09-28 NOTE — PROGRESS NOTES
[address]  consulted by Jeffrey ARTEAGA for monitoring and adjustment. Indication for treatment: sepsis  Goal trough: [] 10-15 mcg/mL or [x] 15-20 mcg/ml  AUC/MARTHA: [] <500 or [x] 400-600    Pertinent Laboratory Values:   Temp Readings from Last 3 Encounters:   09/28/22 97.9 °F (36.6 °C) (Oral)   09/22/22 96.9 °F (36.1 °C) (Infrared)   09/06/22 97.5 °F (36.4 °C) (Infrared)     Recent Labs     09/28/22  1210   WBC 76.5*     Recent Labs     09/28/22  1210   BUN 29*   CREATININE 1.1     Estimated Creatinine Clearance: 68 mL/min (based on SCr of 1.1 mg/dL). Intake/Output Summary (Last 24 hours) at 9/28/2022 1940  Last data filed at 9/28/2022 1833  Gross per 24 hour   Intake --   Output 1400 ml   Net -1400 ml       Pertinent Cultures:  Date    Source    Results  09/28   nares    pending  09/28   blood    pending    Vancomycin level:   TROUGH:  No results for input(s): VANCOTROUGH in the last 72 hours. RANDOM:  No results for input(s): VANCORANDOM in the last 72 hours. Assessment:  SCr, BUN, and urine output: BUN 29, Cr 1.1  Day(s) of therapy: Day 1  Vancomycin concentration: To be collected    Plan:  Vancomycin 1750mg IVPB x 1 then 1500mg Q24h. Will obtain a random level to assess dosing. Pharmacy will continue to monitor patient and adjust therapy as indicated    Jory Camahco 09/30 @0600    Thank you for the consult.   Higinio Rebollar [facility]  9/28/2022 7:40 PM

## 2022-09-28 NOTE — CARE COORDINATION
CM was consulted for d/c planning. CM went and spoke to Hugo GIBBS and she states pt's family were having concerns about pt going home by self. CM went to see pt. Pt lives by him self in a second story apartment. Pt has 2 can[employer] as DME. Pt states he is able to care for self. Pt has been having weakness and this morning pt states his legs felt really week and he slide himself to the floor. Pt states he did not fall. Pt was unable to get up from the floor though. Pt spent the rest of the night on floor till pt's son came to check on him because he had not answered the phone. CM asked about any HC pt states he does not want home. CM asked about maybe going to a SNF. Pt was agreeable to looking at list and speaking to his son about it. CM offered to bring list back in with ones that accepted pt's insurance. Pt did report that he was going to go to his son's home after d/c.     CM reviewed SNF information for [facility].   SNF's as follows. [facility] at [address]    CM please follow up with pt on an answer. Pt is being admitted for Sepsis. Handoff note placed for follow up. CM gave list to pt. Pt and son requested MPOA to be completed. CM filled out paperwork and completed with pt. Paperwork was scanned into pts chart.

## 2022-09-28 NOTE — H&P
V2.0  History and Physical      Name:  Leonardo Golden /Age/Sex: 1950  (71 y.o. male)   MRN & CSN:  [id number] & [id number] Encounter Date/Time: 2022 5:48 PM EDT   Location:  ED30/ED-30 PCP: Nathan Layne MD       Hospital Day: 1    Assessment and Plan:   Leonardo Golden is a 71 y.o. male who presents with Severe sepsis [facility]    Hospital Problems             Last Modified POA    * (Principal) Severe sepsis ([facility] [address] 2022 Yes     Sepsis secondary to pneumonia  - sepsis evidenced by , RR 37, WBC 76.5, Lactic acid 3.1  - CT A/P right greater than left lower lobe pneumonia  - Hemodynamically stable   - 30cc/kg bolus administered in ED   - continue IVF, abx Vanco/Zosyn, trend LA, inflammatory makers, UCx, BCx pending, MRSA nasal probe, urinary antigens    Rhabdomyolysis  - Patient was down for unknown amount of time  - Initial CK 3453  - We will continue fluid resuscitation, and trend    Elevated troponin  - 0.024 on admission  - Likely reactive, will trend    Diabetes mellitus type 2 with hyperglycemia  - Blood glucose 366 on admission  - We will continue home Lantus  - Hold home metformin, and start sliding scale    Heart failure  - No previous echocardiogram  - proBNP 13,987 on admission  - We will check an echo and consult cardiology    CLL  - Known diagnosis, follows with hematology oncology  - WBC 76.5-was just 27.7 6 days ago, platelets 69  - Patient reports he has not been able to take chemo lately due to elevated white count and low platelets  - CT images reviewed, shows worsening disease process  - We will check uric acid, galactomannan level, and Fungitell level    Acute hypoxic respiratory failure  - Likely secondary to heart failure, and severe sepsis  - Currently satting mid 90s on 2 L nasal cannula  - Wean oxygen for goal sats greater than 92%    Other chronic conditions  - Hyperlipidemia: Continue atorvastatin  - BPH: Continue Flomax and Proscar    Disposition:   Current Living situation: Home  Expected Disposition: TBD  Estimated D/C: 2 to 3 days    Diet No diet orders on file   DVT Prophylaxis [x] Lovenox, []  Heparin, [] SCDs, [] Ambulation,  [] Eliquis, [] Xarelto   Code Status Prior   Surrogate Decision Maker/ POA Son     History from:     patient    History of Present Illness:     Chief Complaint: Severe sepsis ([address]  Ese Rodriguez is a 71 y.o. male who presents with severe sepsis. The patient reports he has not been well since he completed his prep for colonoscopy last week. He has been having worsening respiratory symptoms, including productive cough, and runny nose, of which he was treated for outpatient by his PCP. He reports yesterday he felt so weak that his legs gave out and he lowered himself to the ground. Once on the ground he was unable to get up. He laid there until this morning when his son crawled through a window because he was not answering his phone calls. Work-up in the ED revealed significantly elevated CK, WBC, low platelets. He has been admitted for further management. Review of Systems: Need 10 Elements   Review of Systems   Constitutional:  Positive for activity change. Negative for diaphoresis and fatigue. HENT:  Positive for congestion and postnasal drip. Negative for sneezing and sore throat. Respiratory:  Positive for cough. Negative for apnea and shortness of breath. Cardiovascular:  Negative for chest pain, palpitations and leg swelling. Gastrointestinal:  Negative for abdominal distention, abdominal pain and diarrhea. Genitourinary:  Negative for difficulty urinating, flank pain and frequency. Musculoskeletal:  Negative for arthralgias, back pain and myalgias. Neurological:  Positive for weakness. Negative for dizziness and headaches. Psychiatric/Behavioral:  Negative for agitation, behavioral problems and confusion.            Objective:   No intake or output data in the 24 hours ending 09/28/22 6118   Vitals:   Vitals: 09/28/22 1645 09/28/22 1700 09/28/22 1715 09/28/22 1730   BP: 113/78 115/78 111/74 117/72   Pulse: 94 (!) 102 98 100   Resp: 30 26 (!) 38 28   Temp:       TempSrc:       SpO2: 95% 94% 95% 94%   Weight:       Height:           Medications Prior to Admission     Prior to Admission medications    Medication Sig Start Date End Date Taking? Authorizing Provider   Ascorbic Acid (VITAMIN C) 250 MG tablet Take 250 mg by mouth 2 times daily   Yes Historical Provider, MD   albuterol sulfate HFA (PROVENTIL;VENTOLIN;PROAIR) 108 (90 Base) MCG/ACT inhaler Inhale 2 puffs into the lungs every 4-6 hours as needed for Shortness of Breath or Wheezing 9/6/22   Historical Provider, MD   ipratropium-albuterol (DUONEB) 0.5-2.5 (3) MG/3ML SOLN nebulizer solution Take 1 vial by nebulization every 6 hours as needed for Shortness of Breath 9/6/22   Historical Provider, MD   ferrous gluconate 324 (37.5 Fe) MG TABS Take 1 tablet by mouth 2 times daily 4/28/22   Eugene Espino MD   valACYclovir (VALTREX) 500 MG tablet Take 1 tablet by mouth daily 4/28/22   Eugene Espino MD   LEVEMIR FLEXTOUCH 100 UNIT/ML injection pen Inject 40 Units into the skin nightly  Patient taking differently: Inject 30 Units into the skin nightly 3/27/22   Aarti Gaming MD   NOVOLOG FLEXPEN 100 UNIT/ML injection pen Inject 15 Units into the skin 3 times daily (before meals) 3/27/22   Aarti Gaming MD   atorvastatin (LIPITOR) 80 MG tablet Take 80 mg by mouth daily 10/26/21   Historical Provider, MD   acetaminophen-codeine (TYLENOL #3) 300-30 MG per tablet Take 1 tablet by mouth every 6 hours as needed for Pain.  5/20/21   Historical Provider, MD   finasteride (PROSCAR) 5 MG tablet Take 5 mg by mouth daily 5/24/21   Historical Provider, MD   tamsulosin (FLOMAX) 0.4 MG capsule Take 0.4 mg by mouth daily    Historical Provider, MD   glucose monitoring kit (FREESTYLE) monitoring kit 1 kit by Does not apply route daily as needed (glucose checks) 3/31/17   Alessandra Jimenez MD Insulin Syringe-Needle U-100 30G X 1/2\" 0.5 ML MISC 1 each by Does not apply route daily 3/31/17   Pau Sheikh MD   metFORMIN (GLUCOPHAGE) 1000 MG tablet Take 1,000 mg by mouth 2 times daily (with meals)    Historical Provider, MD   omeprazole (PRILOSEC) 20 MG delayed release capsule Take 20 mg by mouth daily as needed (GERD)    Historical Provider, MD   Immune Globulin, Human, 20 GM/200ML SOLN solution Infuse 20 g intravenously every 30 days 05/14/19 Given through infusion therapy states he is due on the 20 of May    Historical Provider, MD       Physical Exam: Need 8 Elements   Physical Exam     General: Elderly male laying in ED cart, NAD  Eyes: EOMI  ENT: neck supple  Cardiovascular: Regular rate. Respiratory: Clear to auscultation  Gastrointestinal: Soft, non tender  Genitourinary: no suprapubic tenderness  Musculoskeletal: No edema  Skin: warm, dry, right chest port noted  Neuro: Alert. Psych: Mood appropriate. Past Medical History:   PMHx   Past Medical History:   Diagnosis Date    Arthritis     knees, Rt hand/wrist    CAD (coronary artery disease)     Cancer ([facility] [address]     CLL--Sees Dr Faustin Rather    Diabetes mellitus [facility])     History of blood transfusion     no reaction    Hx of motion sickness     Hyperlipidemia     MDRO (multiple drug resistant organisms) resistance     abscess on buttock 10yrs ago    Migraine     last one summer 2016    Pneumonia 2016    Wears dentures     full upper--lower dentures    Wears glasses      PSHX:  has a past surgical history that includes knee surgery (1970); Tunneled venous port placement (2013); Colonoscopy (2010); fracture surgery (Left, 1990's); Cardiac catheterization (1990's); Tonsillectomy (late 1960's); Dental surgery; eye surgery (Right, 08/2016); and other surgical history (Left, 01/18/2017).   Allergies: No Known Allergies  Fam HX:  family history includes Asthma in his maternal uncle; Colon Cancer in his brother; Diabetes in his mother; Early Death in his TRICHOMONAS NONE SEEN 04/09/2019 01:02 PM    BACTERIA NEGATIVE 09/28/2022 12:10 PM    CLARITYU SLIGHTLY CLOUDY 09/28/2022 12:10 PM    SPECGRAV 1.015 09/28/2022 12:10 PM    LEUKOCYTESUR NEGATIVE 09/28/2022 12:10 PM    UROBILINOGEN 0.2 09/28/2022 12:10 PM    BILIRUBINUR NEGATIVE 09/28/2022 12:10 PM    BLOODU LARGE 09/28/2022 12:10 PM    Ariel Snow NEGATIVE 09/28/2022 12:10 PM     Urine Cultures: No results found for: Kavehdameon Kaminski  Blood Cultures: No results found for: BC  No results found for: BLOODCULT2  Organism: No results found for: ORG    Imaging/Diagnostics Last 24 Hours   CT HEAD WO CONTRAST    Result Date: 9/28/2022  EXAMINATION: CT OF THE HEAD WITHOUT CONTRAST  9/28/2022 2:16 pm TECHNIQUE: CT of the head was performed without the administration of intravenous contrast. Automated exposure control, iterative reconstruction, and/or weight based adjustment of the mA/kV was utilized to reduce the radiation dose to as low as reasonably achievable. COMPARISON: None. HISTORY: ORDERING SYSTEM PROVIDED HISTORY: history of CLL , generalized weakness, down on floor all night TECHNOLOGIST PROVIDED HISTORY: Has a \"code stroke\" or \"stroke alert\" been called? ->No Reason for exam:->history of CLL , generalized weakness, down on floor all night Decision Support Exception - unselect if not a suspected or confirmed emergency medical condition->Emergency Medical Condition (MA) Reason for Exam: history of CLL , generalized weakness, down on floor all night FINDINGS: BRAIN/VENTRICLES: There is no acute intracranial hemorrhage, mass effect or midline shift. No abnormal extra-axial fluid collection. The gray-white differentiation is maintained without evidence of an acute infarct. There is prominence of the ventricles and sulci due to global parenchymal volume loss. There are nonspecific areas of hypoattenuation within the periventricular and subcortical white matter, which likely represent chronic microvascular ischemic change.   There vascular calcifications. ORBITS: The visualized portion of the orbits demonstrate no acute abnormality. SINUSES: Osteoneogenesis. There bilateral maxillary sinus air-fluid levels which can be seen with acute on chronic bacterial sinusitis. Mastoids are clear. Moderate severe ethmoid sinus mucosal thickening. SOFT TISSUES/SKULL: No acute abnormality of the visualized skull or soft tissues. Chronic microvascular disease. Negative acute bleed, midline shift or mass effect. Findings worrisome for acute on chronic maxillary sinus disease. Please correlate exam findings. CT ABDOMEN PELVIS W IV CONTRAST Additional Contrast? None    Result Date: 9/28/2022  EXAMINATION: CTA OF THE CHEST; CT OF THE ABDOMEN AND PELVIS WITH CONTRAST 9/28/2022 2:17 pm TECHNIQUE: CTA of the chest was performed after the administration of intravenous contrast.  Multiplanar reformatted images are provided for review. MIP images are provided for review. Automated exposure control, iterative reconstruction, and/or weight based adjustment of the mA/kV was utilized to reduce the radiation dose to as low as reasonably achievable.; CT of the abdomen and pelvis was performed with the administration of intravenous contrast. Multiplanar reformatted images are provided for review. Automated exposure control, iterative reconstruction, and/or weight based adjustment of the mA/kV was utilized to reduce the radiation dose to as low as reasonably achievable. COMPARISON: July 28, 2022 HISTORY: ORDERING SYSTEM PROVIDED HISTORY: Tachycardic, short of breath, history of CLL TECHNOLOGIST PROVIDED HISTORY: Reason for exam:->Tachycardic, short of breath, history of CLL Decision Support Exception - unselect if not a suspected or confirmed emergency medical condition->Emergency Medical Condition (MA) Reason for Exam: Tachycardic, short of breath, history of CLL FINDINGS: Pulmonary Arteries: Pulmonary arteries are adequately opacified for evaluation.   No evidence of intraluminal filling defect to suggest pulmonary embolism. Main pulmonary artery is normal in caliber. Mediastinum: Mediastinal and hilar lymphadenopathy bilaterally. Subcarinal lymph node measures up to 3.1 cm in short axis. Right hilar lymph node measures at least 2.1 cm in short axis. This appears worse from exam on July 28, 2022. Largest subcarinal lymph node on that exam measured up to 1.9 cm in short axis. Normal caliber thoracic aorta without acute finding. The heart is not enlarged. No pericardial effusion. Lungs/pleura: Consolidations in the right lower lobe and minimally in the left lower lobe concerning for pneumonia. No pleural effusion or pneumothorax. Background of mild emphysema. Bronchial thickening in the lower lobes. Soft Tissues/Bones: No acute bone or soft tissue abnormality. CT abdomen and pelvis Liver: Normal appearance of the liver. Gallbladder: Some pericholecystic fluid and mild gallbladder wall thickening. Perhaps mild intrahepatic bile duct dilatation. There are findings suspicious for a mass near the charlotte hepatis measuring 3.5 x 6.2 cm. Additional charlotte hepatis lymph nodes identified near the pancreas and IVC. Spleen: The spleen is enlarged. Measures at least 18 cm. Pancreas: No inflammatory changes or fluid collections. Charlotte hepatis masses favoring lymphadenopathy. Adrenal Glands: No focal adrenal abnormalities identified. Kidneys: No hydronephrosis. Small left renal cyst. Stomach: The stomach is nondistended. Small bowel: No evidence of small bowel obstruction. Colon: No signficant pericolonic inflammatory changes. Appendix: Normal appearance of the appendix. Normal caliber aorta with atherosclerosis. Retroperitoneal lymphadenopathy measures up to 2.0 cm in short axis in the left periaortic region. Mild free fluid in the pelvis. Partially distended urinary bladder. No acute findings of the osseous structures or subcutaneous soft tissues.      No evidence of pulmonary embolism. Right greater than left lower lobe pneumonia. Diffuse hilar and mediastinal lymphadenopathy, concerning for malignancy with history of CLL, also likely progressed from prior exam. Diffuse peritoneal and retroperitoneal lymphadenopathy in the upper abdomen most significant near the cassi hepatis and IVC where there is a large mass which is favored to be a lymph node measuring up to 6.2 x 3.5 cm, also likely progressed from exam. Splenomegaly, could also be related to malignancy. Mild gallbladder wall thickening and pericholecystic fluid, could be related to passive congestion or mass effect from adjacent lymphadenopathy. There is mild intrahepatic bile duct dilatation. XR CHEST PORTABLE    Result Date: 9/28/2022  EXAMINATION: ONE XRAY VIEW OF THE CHEST 9/28/2022 12:44 pm COMPARISON: CT chest July 28, 2022 images; CT chest May 4, 2022 HISTORY: ORDERING SYSTEM PROVIDED HISTORY: chest pain TECHNOLOGIST PROVIDED HISTORY: Reason for exam:->chest pain Reason for Exam: chest pain FINDINGS: Cardiac silhouette is stable. Right-sided MediPort in place. Patchy interstitial changes of the lungs bilaterally which are nonspecific and can be seen in the setting of pulmonary edema as well as in the setting of multifocal atypical/viral infectious process. No well-defined consolidation. No pleural effusion. No pneumothorax. Osseous structures appear intact. 1. Subtle patchy interstitial changes of the lungs which can be seen in setting of 0 pulmonary edema as well as multifocal atypical/viral infectious processes. No well-defined consolidation. CTA CHEST W CONTRAST    Result Date: 9/28/2022  EXAMINATION: CTA OF THE CHEST; CT OF THE ABDOMEN AND PELVIS WITH CONTRAST 9/28/2022 2:17 pm TECHNIQUE: CTA of the chest was performed after the administration of intravenous contrast.  Multiplanar reformatted images are provided for review. MIP images are provided for review.  Automated exposure control, iterative reconstruction, and/or weight based adjustment of the mA/kV was utilized to reduce the radiation dose to as low as reasonably achievable.; CT of the abdomen and pelvis was performed with the administration of intravenous contrast. Multiplanar reformatted images are provided for review. Automated exposure control, iterative reconstruction, and/or weight based adjustment of the mA/kV was utilized to reduce the radiation dose to as low as reasonably achievable. COMPARISON: July 28, 2022 HISTORY: ORDERING SYSTEM PROVIDED HISTORY: Tachycardic, short of breath, history of CLL TECHNOLOGIST PROVIDED HISTORY: Reason for exam:->Tachycardic, short of breath, history of CLL Decision Support Exception - unselect if not a suspected or confirmed emergency medical condition->Emergency Medical Condition (MA) Reason for Exam: Tachycardic, short of breath, history of CLL FINDINGS: Pulmonary Arteries: Pulmonary arteries are adequately opacified for evaluation. No evidence of intraluminal filling defect to suggest pulmonary embolism. Main pulmonary artery is normal in caliber. Mediastinum: Mediastinal and hilar lymphadenopathy bilaterally. Subcarinal lymph node measures up to 3.1 cm in short axis. Right hilar lymph node measures at least 2.1 cm in short axis. This appears worse from exam on July 28, 2022. Largest subcarinal lymph node on that exam measured up to 1.9 cm in short axis. Normal caliber thoracic aorta without acute finding. The heart is not enlarged. No pericardial effusion. Lungs/pleura: Consolidations in the right lower lobe and minimally in the left lower lobe concerning for pneumonia. No pleural effusion or pneumothorax. Background of mild emphysema. Bronchial thickening in the lower lobes. Soft Tissues/Bones: No acute bone or soft tissue abnormality. CT abdomen and pelvis Liver: Normal appearance of the liver. Gallbladder: Some pericholecystic fluid and mild gallbladder wall thickening.  Perhaps mild intrahepatic bile duct dilatation. There are findings suspicious for a mass near the charlotte hepatis measuring 3.5 x 6.2 cm. Additional charlotte hepatis lymph nodes identified near the pancreas and IVC. Spleen: The spleen is enlarged. Measures at least 18 cm. Pancreas: No inflammatory changes or fluid collections. Charlotte hepatis masses favoring lymphadenopathy. Adrenal Glands: No focal adrenal abnormalities identified. Kidneys: No hydronephrosis. Small left renal cyst. Stomach: The stomach is nondistended. Small bowel: No evidence of small bowel obstruction. Colon: No signficant pericolonic inflammatory changes. Appendix: Normal appearance of the appendix. Normal caliber aorta with atherosclerosis. Retroperitoneal lymphadenopathy measures up to 2.0 cm in short axis in the left periaortic region. Mild free fluid in the pelvis. Partially distended urinary bladder. No acute findings of the osseous structures or subcutaneous soft tissues. No evidence of pulmonary embolism. Right greater than left lower lobe pneumonia. Diffuse hilar and mediastinal lymphadenopathy, concerning for malignancy with history of CLL, also likely progressed from prior exam. Diffuse peritoneal and retroperitoneal lymphadenopathy in the upper abdomen most significant near the charlotte hepatis and IVC where there is a large mass which is favored to be a lymph node measuring up to 6.2 x 3.5 cm, also likely progressed from exam. Splenomegaly, could also be related to malignancy. Mild gallbladder wall thickening and pericholecystic fluid, could be related to passive congestion or mass effect from adjacent lymphadenopathy. There is mild intrahepatic bile duct dilatation.        Personally reviewed Lab Studies, Imaging, and discussed case with Dr. Alina Freeman    Electronically signed by JENNI Garcia CNP on 9/28/2022 at 5:48 PM

## 2022-09-28 NOTE — ED PROVIDER NOTES
I independently examined and evaluated Jose Elias Gray. In brief,     Focused exam revealed patient presents to the emergency department complaint of generalized weakness. Patient with history of CLL and iron infusions and oral chemotherapy. Oral chemotherapy stopped 1 week ago. Patient has had a colonoscopy and since then decreased p.o. intake diarrhea-like. Patient had a fall this morning at his house unable to get back up and son had to get into the house to call 911. Patient is alert and oriented upon arrival with complains of weakness fatigue short of breath cough and some abdominal pain. ED course: Patient presents to the emergency department for generalized weakness. Laboratory studies were ordered which shows elevated white count 27,000. Patient meeting sepsis criteria was ordered blood cultures lactic acid and antibiotics. Patient elevated BNP indicative of some congestive heart. .  Patient respiratory panel came back positive for rhinovirus. Patient was hypoxic and has been on 2 L oxygen nasal cannula. Elevated CK level indicative of rhabdo. Patient hypoglycemic blood glucose of 300 with normal anion gap not in DKA. Patient has been also ordered some IV fluids. These laboratory studies have been discussed with patient and I did discuss he will need to be admitted for further evaluation treatment management. Patient does have a history of CLL. Hospitalist consulted will be admitting patient for further evaluation treatment and management.   /72   Pulse 100   Temp 98.4 °F (36.9 °C) (Oral)   Resp 22   Ht 6' (1.829 m)   Wt 180 lb (81.6 kg)   SpO2 94%   BMI 24.41 kg/m²     Labs Reviewed   RESPIRATORY PANEL, MOLECULAR, WITH COVID-19 - Abnormal; Notable for the following components:       Result Value    Rhinovirus Enterovirus PCR DETECTED BY PCR (*)     All other components within normal limits   CBC WITH AUTO DIFFERENTIAL - Abnormal; Notable for the following components:    WBC 76.5 (*)     Hemoglobin 11.6 (*)     Hematocrit 37.8 (*)     MCH 24.5 (*)     MCHC 30.7 (*)     RDW 19.0 (*)     Platelets 69 (*)     Segs Relative 9.0 (*)     Lymphocytes % 71.0 (*)     Monocytes % 8.0 (*)     UNDIFFERENTIATED CELL 2.0 (*)     Metamyelocytes Relative 3 (*)     All other components within normal limits   COMPREHENSIVE METABOLIC PANEL - Abnormal; Notable for the following components:    Sodium 132 (*)     Chloride 97 (*)     CO2 19 (*)     BUN 29 (*)     Glucose 366 (*)     Calcium 8.0 (*)     Albumin 3.3 (*)     Total Protein 5.3 (*)     Total Bilirubin 1.6 (*)     AST 80 (*)     Alkaline Phosphatase 138 (*)     All other components within normal limits   TROPONIN - Abnormal; Notable for the following components:    Troponin T 0.024 (*)     All other components within normal limits   BRAIN NATRIURETIC PEPTIDE - Abnormal; Notable for the following components:    Pro-BNP 13,987 (*)     All other components within normal limits   LACTATE, SEPSIS - Abnormal; Notable for the following components:    Lactic Acid, Sepsis 3.1 (*)     All other components within normal limits   CK - Abnormal; Notable for the following components: Total CK 3,453 (*)     All other components within normal limits   LIPASE - Abnormal; Notable for the following components:    Lipase 11 (*)     All other components within normal limits   BLOOD GAS, VENOUS - Abnormal; Notable for the following components:    pH, Gonzales 7.43 (*)     pCO2, Gonzales 31 (*)     pO2, Gonzales 100 (*)     O2 Sat, Gonzales 93.5 (*)     All other components within normal limits   CULTURE, BLOOD 1   CULTURE, BLOOD 2   URINALYSIS   MICROSCOPIC URINALYSIS   LACTATE, SEPSIS   POCT GLUCOSE       US ABDOMEN LIMITED   Final Result   1. Hepatomegaly and hepatic steatosis. 2. Masses most compatible with lymphadenopathy in the cassi hepatis, better   seen on recent CT. 3. Hyperechoic focus in the liver could be focal fatty infiltration,   hemangioma or other mass.   This is too small to characterize on recent CT. Correlate with MRI hepatic mass protocol if indicated. 4. Gallbladder wall thickening with possible adenomyomatosis. CT ABDOMEN PELVIS W IV CONTRAST Additional Contrast? None   Final Result   No evidence of pulmonary embolism. Right greater than left lower lobe pneumonia. Diffuse hilar and mediastinal lymphadenopathy, concerning for malignancy with   history of CLL, also likely progressed from prior exam.      Diffuse peritoneal and retroperitoneal lymphadenopathy in the upper abdomen   most significant near the cassi hepatis and IVC where there is a large mass   which is favored to be a lymph node measuring up to 6.2 x 3.5 cm, also likely   progressed from exam.      Splenomegaly, could also be related to malignancy. Mild gallbladder wall thickening and pericholecystic fluid, could be related   to passive congestion or mass effect from adjacent lymphadenopathy. There is   mild intrahepatic bile duct dilatation. CTA CHEST W CONTRAST   Final Result   No evidence of pulmonary embolism. Right greater than left lower lobe pneumonia. Diffuse hilar and mediastinal lymphadenopathy, concerning for malignancy with   history of CLL, also likely progressed from prior exam.      Diffuse peritoneal and retroperitoneal lymphadenopathy in the upper abdomen   most significant near the cassi hepatis and IVC where there is a large mass   which is favored to be a lymph node measuring up to 6.2 x 3.5 cm, also likely   progressed from exam.      Splenomegaly, could also be related to malignancy. Mild gallbladder wall thickening and pericholecystic fluid, could be related   to passive congestion or mass effect from adjacent lymphadenopathy. There is   mild intrahepatic bile duct dilatation. CT HEAD WO CONTRAST   Final Result   Chronic microvascular disease. Negative acute bleed, midline shift or mass   effect.       Findings worrisome for acute on chronic maxillary sinus disease. Please   correlate exam findings. XR CHEST PORTABLE   Final Result   1. Subtle patchy interstitial changes of the lungs which can be seen in   setting of 0 pulmonary edema as well as multifocal atypical/viral infectious   processes. No well-defined consolidation. All diagnostic, treatment, and disposition decisions were made by myself in conjunction with the advanced practice provider. I personally saw the patient and performed a substantive portion of the visit including all aspects of the medical decision making. ICD-10-CM    1. Sepsis, due to unspecified organism, unspecified whether acute organ dysfunction present ([facility] [address]  A41.9       2. Acute congestive heart failure, unspecified heart failure type ([facility] [address]  I50.9       3. Elevated troponin  R77.8       4. H/O chronic lymphocytic leukemia  Z85.6       5. Acute respiratory failure with hypoxia (HCC)  J96.01       6. Traumatic rhabdomyolysis, initial encounter ([facility] [address]  T79. 6XXA       7. Elevated CK  R74.8       8. Rhinovirus  B34.8                 For all further details of the patient's emergency department visit, please see the advanced practice provider's documentation. Comment: Please note this report has been produced using speech recognition software and may contain errors related to that system including errors in grammar, punctuation, and spelling, as well as words and phrases that may be inappropriate. If there are any questions or concerns please feel free to contact the dictating provider for clarification.         Nan Frazier, DO  09/30/22 0025

## 2022-09-28 NOTE — ED NOTES
[facility] [place] Report given to Too castro RN. Passed bedside swallow evaluation. MEWS = 2. All questions answered. Encouraged to call back for any further questions.      Lis Berumen RN  09/28/22 Mark [id number], RN  09/28/22 9801

## 2022-09-28 NOTE — PROGRESS NOTES
Pt admitted to  2010 from ED, alert and oriented x4. Vital signs recorded and skin assessment performed by this nurse and Alysa FRANCO. Pt noted to have scattered bruising and scabs. Pt has small skin tear on left ankle and right elbow. Pt's bottom had redness and a scratch on it. Pt came with belongings including clothes and shoes, as well as a wallet. Pt's dinner was ordered at this time. Family at pt bedside. Pt oriented to room, staff, and call light. Pt denies pain and had no questions/requests.

## 2022-09-28 NOTE — ED PROVIDER NOTES
Patient's EKG is interpreted by me sinus rhythm rate 119   no ST elevation no ST depression I appreciate no acute ischemia or infarctions EKG unchanged compared to patient's previous EKG from March 2022 other than rate     Nate Medrano MD  09/28/22 121

## 2022-09-28 NOTE — ED TRIAGE NOTES
Patient reports 1 week of generalized weakness, fatigue, shortness of breath, and productive cough. Also with RLQ pain for 2-3 days. Does have intermittent nausea and loss of appetite.

## 2022-09-28 NOTE — PLAN OF CARE
9/28 @ 8786: Went down and spoke with Nurse. Patient Being Admitted, bed is ready and should be brought up to room soon.  Nurse and Tech agreed it be better to do US once patient is settled in new room on the floor  -CB

## 2022-09-28 NOTE — ED PROVIDER NOTES
EMERGENCY DEPARTMENT ENCOUNTER    [facility] EMERGENCY DEPARTMENT        TRIAGE CHIEF COMPLAINT:   Fatigue, Extremity Weakness (Reports BLE weakness and fatigue since colonoscopy last week. Explains felt like legs were going to give out around 0230 AM and fell down on flow in sitting position. Denies LOC. Denies hitting head. ), and Shortness of Breath (Has had for 1 week. PCP placed on Zpack and Steroid taper.)      [place]:  Chaparrita Ingram is a 71 y.o. male that presents with son for generalized fatigue, extremity weakness and shortness of breath. Onset was prior to arrival x1 week. Per son at bedside, symptoms began approximately a week ago when patient was placed onto a liquid diet prior to a colonoscopy. Since then, he is returned to a normal diet but has had a decreased appetite has not been eating or drinking at home. Patient began developing shortness of breath and right-sided chest wall pain and a cough. Was placed on a Z-Fernandez for suspected pneumonia which patient has completed. She states that he has had progressively worsening generalized weakness in the extremities as well as fatigue. This morning at 2:30 AM, he felt weak while walking and sat himself down on the ground and was not able to stand up. Son was not able to reach the patient this morning and actually had to use a ladder to climb into the patient's home. Patient was found laying on the ground, awake and alert. Was able to stand with significant assistance but was still progressively weak which prompted him to come to the ED for evaluation. No reported head injury or loss of consciousness. No anticoagulation use. Patient does have a history of CLL, was on oral chemotherapy pills up until a week ago when it was stopped by local oncology, Dr. Tatum Washington  as his platelets were low. She does receive regular infusion secondary to history of anemia. Over the last week, no fever or chills.   Denying any hemoptysis. Continues to have 8/10 sharp aching pain to the right chest wall, worse with palpation and deep inspiration. Denying any vomiting or diarrhea. No urinary complaints. No other changes at baseline mental status per son at bedside.       ROS:  General:  No fevers, no chills. +generalized weakness  Cardiovascular:  See HPI    Respiratory:  See HPI  Gastrointestinal:  No pain, no nausea, no vomiting, no diarrhea  Musculoskeletal:  No muscle pain, no joint pain  Skin:  No rash, no pruritis, no easy bruising  Neurologic:See HPI  Psychiatric:  No anxiety  Genitourinary:  No dysuria, no hematuria  Extremities:  No edema    Past Medical History:   Diagnosis Date    Arthritis     knees, Rt hand/wrist    CAD (coronary artery disease)     Cancer (HCC)     CLL--Sees Dr [facility]    Diabetes mellitus [facility])     History of blood transfusion     no reaction    Hx of motion sickness     Hyperlipidemia     MDRO (multiple drug resistant organisms) resistance     abscess on buttock 10yrs ago    Migraine     last one summer 2016    Pneumonia 2016    Wears dentures     full upper--lower dentures    Wears glasses      Past Surgical History:   Procedure Laterality Date    CARDIAC CATHETERIZATION  1990's    x 2  last showed \"inflammation of heart\"    COLONOSCOPY  2010    DENTAL SURGERY      all teeth extracted     EYE SURGERY Right 08/2016    Cataract removal    FRACTURE SURGERY Left 1990's    left ankle plate and screws    KNEE SURGERY  1970    left    OTHER SURGICAL HISTORY Left 01/18/2017    groin excision    TONSILLECTOMY  late 1960's    age 13    TUNNELED VENOUS PORT PLACEMENT  2013    Right upper chest     Family History   Problem Relation Age of Onset    Diabetes Mother     High Blood Pressure Mother     Heart Disease Father     Other Sister         liver problems    Early Death Brother     Asthma Maternal Uncle     Colon Cancer Brother      Social History     Socioeconomic History    Marital status: Single     Spouse name: Not on file    Number of children: Not on file    Years of education: Not on file    Highest education level: Not on file   Occupational History    Not on file   Tobacco Use    Smoking status: Former     Packs/day: 1.00     Years: 20.00     Pack years: 20.00     Types: Cigarettes     Start date: 10/2/1965     Quit date: 1987     Years since quittin.7    Smokeless tobacco: Never   Vaping Use    Vaping Use: Never used   Substance and Sexual Activity    Alcohol use: No    Drug use: No    Sexual activity: Not Currently   Other Topics Concern    Not on file   Social History Narrative    Not on file     Social Determinants of Health     Financial Resource Strain: Not on file   Food Insecurity: Not on file   Transportation Needs: Not on file   Physical Activity: Not on file   Stress: Not on file   Social Connections: Not on file   Intimate Partner Violence: Not on file   Housing Stability: Not on file     Current Facility-Administered Medications   Medication Dose Route Frequency Provider Last Rate Last Admin    0.9 % sodium chloride infusion   IntraVENous Continuous Vonda Amen, PA-C 150 mL/hr at 22 1317 New Bag at 22 1317    morphine sulfate (PF) injection 4 mg  4 mg IntraVENous Q30 Min PRN Vondamckenna Maguire, PA-C   4 mg at 22 1509     Current Outpatient Medications   Medication Sig Dispense Refill    Ascorbic Acid (VITAMIN C) 250 MG tablet Take 250 mg by mouth 2 times daily      albuterol sulfate HFA (PROVENTIL;VENTOLIN;PROAIR) 108 (90 Base) MCG/ACT inhaler Inhale 2 puffs into the lungs every 4-6 hours as needed for Shortness of Breath or Wheezing      ipratropium-albuterol (DUONEB) 0.5-2.5 (3) MG/3ML SOLN nebulizer solution Take 1 vial by nebulization every 6 hours as needed for Shortness of Breath      ferrous gluconate 324 (37.5 Fe) MG TABS Take 1 tablet by mouth 2 times daily 60 tablet 5    valACYclovir (VALTREX) 500 MG tablet Take 1 tablet by mouth daily 30 tablet 5    LEVEMIR FLEXTOUCH 100 UNIT/ML injection pen Inject 40 Units into the skin nightly (Patient taking differently: Inject 30 Units into the skin nightly) 5 pen 3    NOVOLOG FLEXPEN 100 UNIT/ML injection pen Inject 15 Units into the skin 3 times daily (before meals) 5 pen 3    atorvastatin (LIPITOR) 80 MG tablet Take 80 mg by mouth daily      acetaminophen-codeine (TYLENOL #3) 300-30 MG per tablet Take 1 tablet by mouth every 6 hours as needed for Pain. finasteride (PROSCAR) 5 MG tablet Take 5 mg by mouth daily      tamsulosin (FLOMAX) 0.4 MG capsule Take 0.4 mg by mouth daily      glucose monitoring kit (FREESTYLE) monitoring kit 1 kit by Does not apply route daily as needed (glucose checks) 1 kit 0    Insulin Syringe-Needle U-100 30G X 1/2\" 0.5 ML MISC 1 each by Does not apply route daily 100 each 3    metFORMIN (GLUCOPHAGE) 1000 MG tablet Take 1,000 mg by mouth 2 times daily (with meals)      omeprazole (PRILOSEC) 20 MG delayed release capsule Take 20 mg by mouth daily as needed (GERD)      Immune Globulin, Human, 20 GM/200ML SOLN solution Infuse 20 g intravenously every 30 days 05/14/19 Given through infusion therapy states he is due on the 20 of May       Facility-Administered Medications Ordered in Other Encounters   Medication Dose Route Frequency Provider Last Rate Last Admin    sodium chloride flush 0.9 % injection 10 mL  10 mL IntraVENous PRN Seb Rodriguez MD         No Known Allergies    Nursing Notes Reviewed  PHYSICAL EXAM    VITAL SIGNS: /81   Pulse 100   Temp 98.4 °F (36.9 °C) (Oral)   Resp (!) 31   Ht 6' (1.829 m)   Wt 180 lb (81.6 kg)   SpO2 96%   BMI 24.41 kg/m²    Constitutional:  Well developed, elderly male, nontoxic, appears globally weak and fatigued. Head:  Normocephalic, Atraumatic  Eyes:  EOMI. Sclera clear. Conjunctiva normal, No discharge. Neck/Lymphatics: Supple, no JVD, No lymphadenopathy  Cardiovascular: Echocardiogram 120 bpm, regular rhythm. Normal S1/S2.     Peripheral Vascular: Distal pulses 2+, Capillary refill <2seconds  Respiratory: 90% on room air. Patient is significantly tachypneic, 38 breaths/min. Coarse air exchange throughout the right mid to lower lung base. No stridor. No retractions or accessory muscle use. Noted conversational dyspnea, speaking 2-3 word sentences. Abdomen: Bowel sounds normal in all quadrants, Soft, Non tender/Nondistended, No palpable abdominal masses. Musculoskeletal: BUE/BLE symmetrical without atrophy or deformities. Calves are supple. Trace pretibial pitting edema. Integument:  Warm, Dry, Intact, Skin turgor and texture normal  Neurologic:  Alert & oriented x3 , No focal deficits noted. Cranial nerves II through XII grossly intact. No slurred speech. No facial droop. Globally weak and deconditioned but moving all extremities equally . No pronator drift.  No truncal ataxia  Psychiatric:  Affect appropriate      I have reviewed and interpreted all of the currently available lab results from this visit (if applicable):  Results for orders placed or performed during the hospital encounter of 09/28/22   Respiratory Panel, Molecular, with COVID-19 (Restricted: peds pts or suitable admitted adults)    Specimen: Nasopharyngeal   Result Value Ref Range    Adenovirus Detection by PCR NOT DETECTED NOT DETECTED    Coronavirus 229E PCR NOT DETECTED NOT DETECTED    Coronavirus HKU1 PCR NOT DETECTED NOT DETECTED    Coronavirus NL63 PCR NOT DETECTED NOT DETECTED    Coronavirus OC43 PCR NOT DETECTED NOT DETECTED    SARS-CoV-2 NOT DETECTED NOT DETECTED    Human Metapneumovirus PCR NOT DETECTED NOT DETECTED    Rhinovirus Enterovirus PCR DETECTED BY PCR (A) NOT DETECTED    Influenza A by PCR NOT DETECTED NOT DETECTED    Influenza A H1 Pandemic PCR NOT DETECTED NOT DETECTED    Influenza A H1 (2009) PCR NOT DETECTED NOT DETECTED    Influenza A H3 PCR NOT DETECTED NOT DETECTED    Influenza B by PCR NOT DETECTED NOT DETECTED    Parainfluenza 1 PCR NOT DETECTED NOT DETECTED    Parainfluenza 2 PCR NOT DETECTED NOT DETECTED    Parainfluenza 3 PCR NOT DETECTED NOT DETECTED    Parainfluenza 4 PCR NOT DETECTED NOT DETECTED    RSV PCR NOT DETECTED NOT DETECTED    Bordetella parapertussis by PCR NOT DETECTED NOT DETECTED    B Pertussis by PCR NOT DETECTED NOT DETECTED    Chlamydophila Pneumonia PCR NOT DETECTED NOT DETECTED    Mycoplasma pneumo by PCR NOT DETECTED NOT DETECTED   CBC with Auto Differential   Result Value Ref Range    WBC 76.5 (HH) 4.0 - 10.5 K/CU MM    RBC 4.74 4.6 - 6.2 M/CU MM    Hemoglobin 11.6 (L) 13.5 - 18.0 GM/DL    Hematocrit 37.8 (L) 42 - 52 %    MCV 79.7 78 - 100 FL    MCH 24.5 (L) 27 - 31 PG    MCHC 30.7 (L) 32.0 - 36.0 %    RDW 19.0 (H) 11.7 - 14.9 %    Platelets 69 (L) 385 - 440 K/CU MM    Differential Type MANUAL DIFFERENTIAL     Segs Relative 9.0 (L) 36 - 66 %    Lymphocytes % 71.0 (H) 24 - 44 %    Monocytes % 8.0 (H) 0 - 4 %    Segs Absolute 6.9 K/CU MM    Lymphocytes Absolute 54.3 K/CU MM    Monocytes Absolute 6.1 K/CU MM    Nucleated RBC % 0.2 %    Total Nucleated RBC 0.1 K/CU MM    UNDIFFERENTIATED CELL 2.0 (HH) 0.0 %    Metamyelocytes Relative 3 (H) 0.0 %    Bands Relative 7 5 - 11 %    nRBC 2     Metamyelocytes Absolute 2.30 K/CU MM    Bands Absolute 5.36 K/CU MM    Atypical Lymphocytes Absolute 1+     Smudge Cells PRESENT     WBC Morphology       PATIENT HAS HISTORY OF  NONFAMILIAIAL HYPOGAMMAGLOBULINEMIA    Other Cell Morphology       2  ATYPICAL LARGE ABNORMAL CELL FORMS WITH NUCLEOLI  PRESENT   Comprehensive Metabolic Panel   Result Value Ref Range    Sodium 132 (L) 135 - 145 MMOL/L    Potassium 5.0 3.5 - 5.1 MMOL/L    Chloride 97 (L) 99 - 110 mMol/L    CO2 19 (L) 21 - 32 MMOL/L    BUN 29 (H) 6 - 23 MG/DL    Creatinine 1.1 0.9 - 1.3 MG/DL    Glucose 366 (H) 70 - 99 MG/DL    Calcium 8.0 (L) 8.3 - 10.6 MG/DL    Albumin 3.3 (L) 3.4 - 5.0 GM/DL    Total Protein 5.3 (L) 6.4 - 8.2 GM/DL    Total Bilirubin 1.6 (H) 0.0 - 1.0 MG/DL    ALT 30 10 - 40 U/L    AST 80 (H) 15 - 37 IU/L    Alkaline Phosphatase 138 (H) 40 - 129 IU/L    GFR Non-African American >60 >60 mL/min/1.73m2    GFR African American >60 >60 mL/min/1.73m2    Anion Gap 16 4 - 16   Troponin   Result Value Ref Range    Troponin T 0.024 (H) <0.01 NG/ML   Brain Natriuretic Peptide   Result Value Ref Range    Pro-BNP 13,987 (H) <300 PG/ML   Lactate, Sepsis   Result Value Ref Range    Lactic Acid, Sepsis 3.1 (HH) 0.5 - 1.9 mMOL/L   Lactate, Sepsis   Result Value Ref Range    Lactic Acid, Sepsis 2.0 (HH) 0.5 - 1.9 mMOL/L   CK   Result Value Ref Range    Total CK 3,453 (H) 38 - 174 IU/L   Lipase   Result Value Ref Range    Lipase 11 (L) 13 - 60 IU/L   Blood Gas, Venous   Result Value Ref Range    pH, Gonzales 7.43 (H) 7.32 - 7.42    pCO2, Gonzales 31 (L) 38 - 52 mmHG    pO2, Gonzales 100 (H) 28 - 48 mmHG    Base Excess 3 0 - 3.3    HCO3, Venous 20.6 19 - 25 MMOL/L    O2 Sat, Gonzales 93.5 (H) 50 - 70 %    Comment VBG    Urinalysis   Result Value Ref Range    Color, UA YELLOW     Clarity, UA SLIGHTLY CLOUDY     Glucose, Urine NEGATIVE MG/DL    Bilirubin Urine NEGATIVE     Ketones, Urine NEGATIVE MG/DL    Specific Gravity, UA 1.015     Blood, Urine LARGE     pH, Urine 6.0     Protein, UA 30 MG/DL    Urobilinogen, Urine 0.2 MG/DL    Nitrite Urine, Quantitative NEGATIVE     Leukocyte Esterase, Urine NEGATIVE    Microscopic Urinalysis   Result Value Ref Range    RBC, UA 1 /HPF    WBC, UA 1 /HPF    Bacteria, UA NEGATIVE /HPF    Mucus, UA RARE HPF    Hyaline Casts, UA 0 /LPF   POC Blood Glucose   Result Value Ref Range    Glucose 370 mg/dL    QC OK? y    POCT Glucose   Result Value Ref Range    POC Glucose 370 (H) 70 - 99 MG/DL   EKG 12 Lead   Result Value Ref Range    Ventricular Rate 119 BPM    Atrial Rate 119 BPM    P-R Interval 138 ms    QRS Duration 76 ms    Q-T Interval 316 ms    QTc Calculation (Bazett) 444 ms    P Axis 48 degrees    R Axis 12 degrees    T Axis 102 degrees    Diagnosis       Sinus tachycardia with occasional premature ventricular complexes  Nonspecific T wave abnormality  Abnormal ECG  When compared with ECG of 24-MAR-2022 13:16,  premature ventricular complexes are now present  Nonspecific T wave abnormality now evident in Lateral leads          Radiographs (if obtained):  [] The following radiograph was interpreted by myself in the absence of a radiologist:   [] Radiologist's Report Reviewed:  CT ABDOMEN PELVIS W IV CONTRAST Additional Contrast? None   Final Result   No evidence of pulmonary embolism. Right greater than left lower lobe pneumonia. Diffuse hilar and mediastinal lymphadenopathy, concerning for malignancy with   history of CLL, also likely progressed from prior exam.      Diffuse peritoneal and retroperitoneal lymphadenopathy in the upper abdomen   most significant near the cassi hepatis and IVC where there is a large mass   which is favored to be a lymph node measuring up to 6.2 x 3.5 cm, also likely   progressed from exam.      Splenomegaly, could also be related to malignancy. Mild gallbladder wall thickening and pericholecystic fluid, could be related   to passive congestion or mass effect from adjacent lymphadenopathy. There is   mild intrahepatic bile duct dilatation. CTA CHEST W CONTRAST   Final Result   No evidence of pulmonary embolism. Right greater than left lower lobe pneumonia. Diffuse hilar and mediastinal lymphadenopathy, concerning for malignancy with   history of CLL, also likely progressed from prior exam.      Diffuse peritoneal and retroperitoneal lymphadenopathy in the upper abdomen   most significant near the cassi hepatis and IVC where there is a large mass   which is favored to be a lymph node measuring up to 6.2 x 3.5 cm, also likely   progressed from exam.      Splenomegaly, could also be related to malignancy.       Mild gallbladder wall thickening and pericholecystic fluid, could be related   to passive congestion or mass effect from nasal cannula which is a new oxygen requirement. Course diminished air exchange throughout the bilateral lung fields, in the right lower region. Abdomen soft nondistended without localized tenderness rebound or guarding. Trace pitting edema to the lower legs. Patient appears globally weak and deconditioned and otherwise moving all extremities equally. Adamantly denies any head injury. Patient placed on telemetry and continuous pulse ox monitoring. Given presenting symptoms, presentation concerning for soft/SIRS. Started on IV fluid bolus followed by infusion at 150 mL/h followed by broad-spectrum antibiotics with Zosyn, with pending lactic and blood cultures. Also started albuterol/Atrovent treatments and Solu-Medrol. CBC does show leukocytosis 76.5 without left shift but noted 7 bands, hemoglobin 11 .6 with platelets of 69. His white count has up trended from comparative labs 6 days ago. CMP does show stable creatinine of 1.1, BUN of 29. CO2 is low at 19 but normal anion gap. Mild transaminitis with AST of 60, alk phos of 138 and bilirubin of 1.6. BNP is noted elevated at 13,987 with a detectable troponin of 0.024. Patient has no document history on chart review for heart failure, however given the elevated BNP and concern for CHF, did stop fluid bolus and patient is continued on gentle fluid infusion at this time. CK also elevated at 3453 with a normal lipase. UA shows large gross blood with only 1 red blood cell, negative for bacteria and nitrites. Chest x-ray shows subtle patchy interstitial changes of the lungs which could be seen in pulmonary edema as well as multifocal atypical/viral process without well-defined consolidation. EKG just shows sinus tachycardia with occasional PVCs but no other acute ischemic changes. Full initiation of IV fluids, heart rate is downtrending. Patient also tolerated nasal cannula well with decreased respiratory effort.   Respiratory disease panel is positive for rhinovirus. CT head revealed chronic microvascular disease without evidence of midline shift, mass-effect or acute bleed. Also noted acute on chronic maxillary sinus disease. .  CTA chest reveals no evidence of PE but there is right greater than left lower lobe pneumonia with diffuse hilar and mediastinal lymphadenopathy which appears progressive compared to previous exam likely secondary to CLL. There is additional peritoneal and retroperitoneal lymphadenopathy throughout the upper abdomen near the IVC and cassi hepatis where there is a large mass favored to be lymph node measuring 6.2 x 3.5 which also appears progressed. There is some mild gallbladder wall thickening pericholecystic fluid which could be related to passive congestion or mass-effect from adjacent lymphadenopathy with mild intrahepatic bile duct dilatation. Did order right upper quadrant ultrasound to rule out underlying cholecystitis in light of his abdominal pain and elevated white count, RUQ US pending at time of this dictation. While in the ED, patient does well tolerating p.o. We will plan to admit for continued IV fluids, diuresis, CHF management as well as antibiotic coverage and sepsis care. I did consult with Griffin Oviedo CNP - hospitalist - and discussed patient's history, ED presentation/course including any pertinent laboratory findings and imaging study findings. He/she agrees to hospital admission. Patient is admitted to the hospital in stable condition. I did discuss this patient's history, ED presentation/course with my attending physician - Dr. Rosaura Sullivan - who has also seen and evaluated this patient. He/she does agree that admission is reasonable at this time with. Please see his/her note for additional details of their evaluation.     CRITICAL CARE NOTE:  There was a high probability of clinically significant life-threatening deterioration of the patient's condition requiring my urgent intervention due to sepsis, acute respiratory failure. IV fluid bolus followed by infusion, broad-spectrum antibiotics, telemetry, continuous pulse ox monitoring, frequent reassessments, consultation with specialty services, chart review,  was performed to address this. Total critical care time is at least  36 minutes. This includes vital sign monitoring, pulse oximetry monitoring, telemetry monitoring, clinical response to the IV medications, reviewing the nursing notes, consultation time, dictation/documentation time, and interpretation of the lab work. This time excludes time spent performing procedures and separately billable procedures and family discussion time. Diagnosis and plan discussed in detail with patient who understands and agrees. Is this patient to be included in the SEP-1 Core Measure due to severe sepsis or septic shock? Yes   SEP-1 CORE MEASURE DATA      Sepsis Criteria   Severe Sepsis Criteria   Septic Shock Criteria     Must be confirmed or suspected to move forward with diagnosis of sepsis. Must meet 2:    [] Temperature > 100.9 F (38.3 C)        or < 96.8 F (36 C)  [x] HR > 90  [x] RR > 20  [x] WBC > 12 or < 4 or 10% bands      AND:      [x] Infection Confirmed or        Suspected. Must meet 1:    [x] Lactate > 2       or   [] Signs of Organ Dysfunction:    - SBP < 90 or MAP < 65  - Altered mental status  - Creatinine > 2 or increased from      baseline  - Urine Output < 0.5 ml/kg/hr  - Bilirubin > 2  - INR > 1.5 (not anticoagulated)  - Platelets < 376,733  - Acute Respiratory Failure as     evidenced by new need for NIPPV     or mechanical ventilation      [x] No criteria met for Severe Sepsis. Must meet 1:    [] Lactate > 4        or   [] SBP < 90 or MAP < 65 for at        least two readings in the first        hour after fluid bolus        administration      [] Vasopressors initiated (if hypotension persists after fluid resuscitation)        [x] No criteria met for Septic Shock. Patient Vitals for the past 6 hrs:   BP Temp Pulse Resp SpO2 Height Weight Weight Method Percent Weight Change   09/28/22 1130 115/85 98.4 °F (36.9 °C) -- (!) 37 91 % 6' (1.829 m) 180 lb (81.6 kg) Stated 0   09/28/22 1234 -- -- (!) 117 26 93 % -- -- -- --   09/28/22 1245 99/72 -- (!) 116 (!) 31 94 % -- -- -- --   09/28/22 1300 130/79 -- (!) 119 (!) 34 94 % -- -- -- --   09/28/22 1305 104/76 -- (!) 112 18 95 % -- -- -- --   09/28/22 1315 106/67 -- (!) 114 (!) 38 94 % -- -- -- --   09/28/22 1330 118/71 -- (!) 116 23 98 % -- -- -- --   09/28/22 1345 109/68 -- (!) 111 26 98 % -- -- -- --   09/28/22 1400 108/77 -- (!) 106 (!) 39 95 % -- -- -- --   09/28/22 1455 117/72 -- 100 22 94 % -- -- -- --   09/28/22 1617 128/81 -- 100 (!) 31 96 % -- -- -- --      Recent Labs     09/28/22  1210   WBC 76.5*   CREATININE 1.1   BILITOT 1.6*   PLT 69*         Time Severe Sepsis Identified: 1304pm    Fluid Resuscitation Rational:  Had initially ordered IV fluid bolus 1 L/h followed by infusion at 150 mL/h however given elevated BNP and concern for fluid overload/CHF, did stop the fluid bolus and continued gentle transfusion. Repeat lactate level: ordered and pending at this time    Reassessment Exam:   I have reassessed tissue perfusion and hemodynamic status after fluid bolus at this time: Is improving, heart rate is downtrending      Disposition referral (if applicable):  No follow-up provider specified.     Disposition medications (if applicable):  New Prescriptions    No medications on file         (Please note that portions of this note may have been completed with a voice recognition program. Efforts were made to edit the dictations but occasionally words are mis-transcribed.)         Mahi Mead PA-C  09/28/22 [id number]

## 2022-09-28 NOTE — ED NOTES
Medication History  [facility]    Patient Name: Jessica Dodson 1950     Medication history has been completed by: Iris Huerta CPhT    Source(s) of information: patient and insurance claims     Primary Care Physician: Chin Robledo MD     Pharmacy: [facility]    Allergies as of 09/28/2022    (No Known Allergies)        Prior to Admission medications    Medication Sig Start Date End Date Taking? Authorizing Provider   albuterol sulfate HFA (PROVENTIL;VENTOLIN;PROAIR) 108 (90 Base) MCG/ACT inhaler Inhale 2 puffs into the lungs every 4-6 hours as needed 9/6/22   Historical Provider, MD   ipratropium-albuterol (DUONEB) 0.5-2.5 (3) MG/3ML SOLN nebulizer solution Take 1 vial by nebulization every 6 hours as needed 9/6/22   Historical Provider, MD   ferrous gluconate 324 (37.5 Fe) MG TABS Take 1 tablet by mouth 2 times daily 4/28/22   Mary Youngblood MD   valACYclovir (VALTREX) 500 MG tablet Take 1 tablet by mouth daily 4/28/22   Mary Youngblood MD   LEVEMIR FLEXTOUCH 100 UNIT/ML injection pen Inject 40 Units into the skin nightly  Patient taking differently: Inject 30 Units into the skin nightly 3/27/22   Pierre Hayes MD   NOVOLOG FLEXPEN 100 UNIT/ML injection pen Inject 15 Units into the skin 3 times daily (before meals) 3/27/22   Pierre Hayes MD   atorvastatin (LIPITOR) 80 MG tablet take 1 tablet by oral route every day 10/26/21   Historical Provider, MD   acetaminophen-codeine (TYLENOL #3) 300-30 MG per tablet as needed.  5/20/21   Historical Provider, MD   finasteride (PROSCAR) 5 MG tablet Take 5 mg by mouth daily 5/24/21   Historical Provider, MD   magnesium oxide (MAG-OX) 400 MG tablet Take 400 mg by mouth daily    Historical Provider, MD   tamsulosin (FLOMAX) 0.4 MG capsule Take 0.4 mg by mouth daily    Historical Provider, MD   glucose monitoring kit (FREESTYLE) monitoring kit 1 kit by Does not apply route daily as needed (glucose checks) 3/31/17   Chu Mendes MD   Insulin Syringe-Needle U-100 30G X 1/2\" 0.5 ML MISC 1 each by Does not apply route daily 3/31/17   Zachary Mcgovern MD   metFORMIN (GLUCOPHAGE) 1000 MG tablet Take 1,000 mg by mouth 2 times daily (with meals)    Historical Provider, MD   omeprazole (PRILOSEC) 20 MG delayed release capsule Take 20 mg by mouth daily as needed    Historical Provider, MD   Immune Globulin, Human, 20 GM/200ML SOLN solution Infuse 20 g intravenously every 30 days 05/14/19 Given through infusion therapy states he is due on the 20 of May    Historical Provider, MD     Medications added or changed (ex. new medication, dosage change, interval change, formulation change): Albuterol inhaler (added)  Albuterol nebulizer (added)  Vitamin C (added)    Medications removed from list (include reason, ex. noncompliance, medication cost, therapy complete etc.):   Imbruvica discontinued by another discipline   Magnesium not taking  Azithromycin therapy complete  Prednisone therapy complete    Comments:  Medication list reviewed with patient and insurance claims verified. Imbruvica therapy discontinued today  Per patient eye solns for cataract surgery are complete  Insulin therapy updated per information received. Patient reports he only took metformin today.     To my knowledge the above medication history is accurate as of 9/28/2022 1:37 PM.   Elba Romero CPhT   9/28/2022 1:37 PM   2

## 2022-09-29 ENCOUNTER — APPOINTMENT (OUTPATIENT)
Dept: ULTRASOUND IMAGING | Age: 72
DRG: 871 | End: 2022-09-29
Payer: COMMERCIAL

## 2022-09-29 LAB
ALBUMIN SERPL-MCNC: 2.9 GM/DL (ref 3.4–5)
ALP BLD-CCNC: 116 IU/L (ref 40–128)
ALT SERPL-CCNC: 27 U/L (ref 10–40)
ANION GAP SERPL CALCULATED.3IONS-SCNC: 9 MMOL/L (ref 4–16)
ANISOCYTOSIS: ABNORMAL
AST SERPL-CCNC: 34 IU/L (ref 15–37)
ATYPICAL LYMPHOCYTE ABSOLUTE COUNT: ABNORMAL
BILIRUB SERPL-MCNC: 0.9 MG/DL (ref 0–1)
BUN BLDV-MCNC: 26 MG/DL (ref 6–23)
CALCIUM SERPL-MCNC: 7.4 MG/DL (ref 8.3–10.6)
CHLORIDE BLD-SCNC: 99 MMOL/L (ref 99–110)
CO2: 25 MMOL/L (ref 21–32)
CREAT SERPL-MCNC: 1 MG/DL (ref 0.9–1.3)
DIFFERENTIAL TYPE: ABNORMAL
EOSINOPHILS ABSOLUTE: 0.6 K/CU MM
EOSINOPHILS RELATIVE PERCENT: 2 % (ref 0–3)
ESTIMATED AVERAGE GLUCOSE: 146 MG/DL
GFR AFRICAN AMERICAN: >60 ML/MIN/1.73M2
GFR NON-AFRICAN AMERICAN: >60 ML/MIN/1.73M2
GLUCOSE BLD-MCNC: 231 MG/DL (ref 70–99)
GLUCOSE BLD-MCNC: 255 MG/DL (ref 70–99)
GLUCOSE BLD-MCNC: 258 MG/DL (ref 70–99)
GLUCOSE BLD-MCNC: 334 MG/DL (ref 70–99)
GLUCOSE BLD-MCNC: 404 MG/DL (ref 70–99)
HBA1C MFR BLD: 6.7 % (ref 4.2–6.3)
HCT VFR BLD CALC: 32.1 % (ref 42–52)
HEMOGLOBIN: 9.9 GM/DL (ref 13.5–18)
LACTATE: 2.1 MMOL/L (ref 0.4–2)
LACTIC ACID, SEPSIS: 2.4 MMOL/L (ref 0.5–1.9)
LEGIONELLA URINARY AG: NEGATIVE
LV EF: 58 %
LVEF MODALITY: NORMAL
LYMPHOCYTES ABSOLUTE: 20 K/CU MM
LYMPHOCYTES RELATIVE PERCENT: 71 % (ref 24–44)
MCH RBC QN AUTO: 24.7 PG (ref 27–31)
MCHC RBC AUTO-ENTMCNC: 30.8 % (ref 32–36)
MCV RBC AUTO: 80 FL (ref 78–100)
MONOCYTES ABSOLUTE: 2.8 K/CU MM
MONOCYTES RELATIVE PERCENT: 10 % (ref 0–4)
PDW BLD-RTO: 18.8 % (ref 11.7–14.9)
PLATELET # BLD: 41 K/CU MM (ref 140–440)
PMV BLD AUTO: 10.9 FL (ref 7.5–11.1)
POTASSIUM SERPL-SCNC: 4.2 MMOL/L (ref 3.5–5.1)
RBC # BLD: 4.01 M/CU MM (ref 4.6–6.2)
SEGMENTED NEUTROPHILS ABSOLUTE COUNT: 4.8 K/CU MM
SEGMENTED NEUTROPHILS RELATIVE PERCENT: 17 % (ref 36–66)
SMUDGE CELLS: PRESENT
SODIUM BLD-SCNC: 133 MMOL/L (ref 135–145)
STREP PNEUMONIAE ANTIGEN: NORMAL
TOTAL CK: 553 IU/L (ref 38–174)
TOTAL PROTEIN: 4.5 GM/DL (ref 6.4–8.2)
WBC # BLD: 28.2 K/CU MM (ref 4–10.5)

## 2022-09-29 PROCEDURE — 97162 PT EVAL MOD COMPLEX 30 MIN: CPT

## 2022-09-29 PROCEDURE — 82962 GLUCOSE BLOOD TEST: CPT

## 2022-09-29 PROCEDURE — 97116 GAIT TRAINING THERAPY: CPT

## 2022-09-29 PROCEDURE — 97535 SELF CARE MNGMENT TRAINING: CPT

## 2022-09-29 PROCEDURE — 2580000003 HC RX 258: Performed by: NURSE PRACTITIONER

## 2022-09-29 PROCEDURE — 6370000000 HC RX 637 (ALT 250 FOR IP): Performed by: STUDENT IN AN ORGANIZED HEALTH CARE EDUCATION/TRAINING PROGRAM

## 2022-09-29 PROCEDURE — 80053 COMPREHEN METABOLIC PANEL: CPT

## 2022-09-29 PROCEDURE — 6360000002 HC RX W HCPCS: Performed by: NURSE PRACTITIONER

## 2022-09-29 PROCEDURE — 6370000000 HC RX 637 (ALT 250 FOR IP): Performed by: NURSE PRACTITIONER

## 2022-09-29 PROCEDURE — 93306 TTE W/DOPPLER COMPLETE: CPT

## 2022-09-29 PROCEDURE — 2700000000 HC OXYGEN THERAPY PER DAY

## 2022-09-29 PROCEDURE — 82550 ASSAY OF CK (CPK): CPT

## 2022-09-29 PROCEDURE — 97166 OT EVAL MOD COMPLEX 45 MIN: CPT

## 2022-09-29 PROCEDURE — C9113 INJ PANTOPRAZOLE SODIUM, VIA: HCPCS

## 2022-09-29 PROCEDURE — 85007 BL SMEAR W/DIFF WBC COUNT: CPT

## 2022-09-29 PROCEDURE — 94761 N-INVAS EAR/PLS OXIMETRY MLT: CPT

## 2022-09-29 PROCEDURE — 1200000000 HC SEMI PRIVATE

## 2022-09-29 PROCEDURE — 99222 1ST HOSP IP/OBS MODERATE 55: CPT | Performed by: INTERNAL MEDICINE

## 2022-09-29 PROCEDURE — 2500000003 HC RX 250 WO HCPCS: Performed by: STUDENT IN AN ORGANIZED HEALTH CARE EDUCATION/TRAINING PROGRAM

## 2022-09-29 PROCEDURE — 6360000002 HC RX W HCPCS: Performed by: STUDENT IN AN ORGANIZED HEALTH CARE EDUCATION/TRAINING PROGRAM

## 2022-09-29 PROCEDURE — 6360000002 HC RX W HCPCS

## 2022-09-29 PROCEDURE — 85027 COMPLETE CBC AUTOMATED: CPT

## 2022-09-29 PROCEDURE — 85025 COMPLETE CBC W/AUTO DIFF WBC: CPT

## 2022-09-29 PROCEDURE — 76705 ECHO EXAM OF ABDOMEN: CPT

## 2022-09-29 PROCEDURE — 83605 ASSAY OF LACTIC ACID: CPT

## 2022-09-29 RX ORDER — CALCIUM GLUCONATE 20 MG/ML
1000 INJECTION, SOLUTION INTRAVENOUS ONCE
Status: COMPLETED | OUTPATIENT
Start: 2022-09-29 | End: 2022-09-29

## 2022-09-29 RX ORDER — MORPHINE SULFATE 2 MG/ML
2 INJECTION, SOLUTION INTRAMUSCULAR; INTRAVENOUS EVERY 4 HOURS PRN
Status: DISCONTINUED | OUTPATIENT
Start: 2022-09-29 | End: 2022-10-03

## 2022-09-29 RX ORDER — PANTOPRAZOLE SODIUM 40 MG/10ML
40 INJECTION, POWDER, LYOPHILIZED, FOR SOLUTION INTRAVENOUS ONCE
Status: COMPLETED | OUTPATIENT
Start: 2022-09-29 | End: 2022-09-29

## 2022-09-29 RX ORDER — INSULIN LISPRO 100 [IU]/ML
10 INJECTION, SOLUTION INTRAVENOUS; SUBCUTANEOUS
Status: DISCONTINUED | OUTPATIENT
Start: 2022-09-29 | End: 2022-09-30

## 2022-09-29 RX ADMIN — PANTOPRAZOLE SODIUM 20 MG: 20 TABLET, DELAYED RELEASE ORAL at 05:28

## 2022-09-29 RX ADMIN — INSULIN LISPRO 10 UNITS: 100 INJECTION, SOLUTION INTRAVENOUS; SUBCUTANEOUS at 16:55

## 2022-09-29 RX ADMIN — PANTOPRAZOLE SODIUM 40 MG: 40 INJECTION, POWDER, FOR SOLUTION INTRAVENOUS at 01:14

## 2022-09-29 RX ADMIN — TAMSULOSIN HYDROCHLORIDE 0.4 MG: 0.4 CAPSULE ORAL at 20:08

## 2022-09-29 RX ADMIN — VALACYCLOVIR HYDROCHLORIDE 500 MG: 500 TABLET, FILM COATED ORAL at 08:17

## 2022-09-29 RX ADMIN — PIPERACILLIN AND TAZOBACTAM 4500 MG: 4; .5 INJECTION, POWDER, LYOPHILIZED, FOR SOLUTION INTRAVENOUS at 20:13

## 2022-09-29 RX ADMIN — INSULIN GLARGINE 30 UNITS: 100 INJECTION, SOLUTION SUBCUTANEOUS at 20:09

## 2022-09-29 RX ADMIN — INSULIN LISPRO 2 UNITS: 100 INJECTION, SOLUTION INTRAVENOUS; SUBCUTANEOUS at 08:15

## 2022-09-29 RX ADMIN — HYDROCODONE BITARTRATE AND ACETAMINOPHEN 1 TABLET: 5; 325 TABLET ORAL at 09:15

## 2022-09-29 RX ADMIN — SODIUM CHLORIDE, PRESERVATIVE FREE 10 ML: 5 INJECTION INTRAVENOUS at 08:17

## 2022-09-29 RX ADMIN — SODIUM CHLORIDE, PRESERVATIVE FREE 10 ML: 5 INJECTION INTRAVENOUS at 20:18

## 2022-09-29 RX ADMIN — MORPHINE SULFATE 2 MG: 2 INJECTION, SOLUTION INTRAMUSCULAR; INTRAVENOUS at 21:29

## 2022-09-29 RX ADMIN — ATORVASTATIN CALCIUM 80 MG: 40 TABLET, FILM COATED ORAL at 20:08

## 2022-09-29 RX ADMIN — INSULIN LISPRO 4 UNITS: 100 INJECTION, SOLUTION INTRAVENOUS; SUBCUTANEOUS at 12:27

## 2022-09-29 RX ADMIN — CALCIUM GLUCONATE 1000 MG: 20 INJECTION, SOLUTION INTRAVENOUS at 09:17

## 2022-09-29 RX ADMIN — MORPHINE SULFATE 2 MG: 2 INJECTION, SOLUTION INTRAMUSCULAR; INTRAVENOUS at 17:20

## 2022-09-29 RX ADMIN — PIPERACILLIN AND TAZOBACTAM 4500 MG: 4; .5 INJECTION, POWDER, LYOPHILIZED, FOR SOLUTION INTRAVENOUS at 12:23

## 2022-09-29 RX ADMIN — INSULIN LISPRO 5 UNITS: 100 INJECTION, SOLUTION INTRAVENOUS; SUBCUTANEOUS at 12:27

## 2022-09-29 RX ADMIN — HYDROCODONE BITARTRATE AND ACETAMINOPHEN 1 TABLET: 5; 325 TABLET ORAL at 15:46

## 2022-09-29 RX ADMIN — HYDROCODONE BITARTRATE AND ACETAMINOPHEN 1 TABLET: 5; 325 TABLET ORAL at 22:31

## 2022-09-29 RX ADMIN — SODIUM CHLORIDE: 9 INJECTION, SOLUTION INTRAVENOUS at 03:29

## 2022-09-29 RX ADMIN — PIPERACILLIN AND TAZOBACTAM 4500 MG: 4; .5 INJECTION, POWDER, LYOPHILIZED, FOR SOLUTION INTRAVENOUS at 04:15

## 2022-09-29 RX ADMIN — INSULIN LISPRO 5 UNITS: 100 INJECTION, SOLUTION INTRAVENOUS; SUBCUTANEOUS at 08:16

## 2022-09-29 RX ADMIN — INSULIN LISPRO 8 UNITS: 100 INJECTION, SOLUTION INTRAVENOUS; SUBCUTANEOUS at 16:56

## 2022-09-29 RX ADMIN — FINASTERIDE 5 MG: 5 TABLET, FILM COATED ORAL at 20:08

## 2022-09-29 ASSESSMENT — PAIN DESCRIPTION - LOCATION
LOCATION: CHEST;SHOULDER
LOCATION: CHEST;SHOULDER
LOCATION: SHOULDER;RIB CAGE
LOCATION: RIB CAGE
LOCATION: SHOULDER
LOCATION: ABDOMEN

## 2022-09-29 ASSESSMENT — PAIN - FUNCTIONAL ASSESSMENT
PAIN_FUNCTIONAL_ASSESSMENT: PREVENTS OR INTERFERES SOME ACTIVE ACTIVITIES AND ADLS
PAIN_FUNCTIONAL_ASSESSMENT: PREVENTS OR INTERFERES SOME ACTIVE ACTIVITIES AND ADLS

## 2022-09-29 ASSESSMENT — PAIN DESCRIPTION - DESCRIPTORS
DESCRIPTORS: SHARP
DESCRIPTORS: SHARP;ACHING
DESCRIPTORS: ACHING;BURNING
DESCRIPTORS: SHARP

## 2022-09-29 ASSESSMENT — PAIN SCALES - GENERAL
PAINLEVEL_OUTOF10: 4
PAINLEVEL_OUTOF10: 8
PAINLEVEL_OUTOF10: 0
PAINLEVEL_OUTOF10: 10
PAINLEVEL_OUTOF10: 5
PAINLEVEL_OUTOF10: 9
PAINLEVEL_OUTOF10: 0
PAINLEVEL_OUTOF10: 9
PAINLEVEL_OUTOF10: 10
PAINLEVEL_OUTOF10: 0

## 2022-09-29 ASSESSMENT — PAIN DESCRIPTION - PAIN TYPE
TYPE: ACUTE PAIN
TYPE: ACUTE PAIN

## 2022-09-29 ASSESSMENT — PAIN DESCRIPTION - FREQUENCY: FREQUENCY: CONTINUOUS

## 2022-09-29 ASSESSMENT — PAIN DESCRIPTION - ONSET: ONSET: GRADUAL

## 2022-09-29 ASSESSMENT — PAIN DESCRIPTION - ORIENTATION
ORIENTATION: RIGHT;LOWER
ORIENTATION: RIGHT

## 2022-09-29 NOTE — PROGRESS NOTES
Dr Saint Fanti - oncology paged via [address] [place] [address] for new consult for h/o CLL, pt known to you.

## 2022-09-29 NOTE — PROGRESS NOTES
V2.0  [facility] [place] Hospitalist Progress Note      Name:  Charan Brunson /Age/Sex: 1950  (71 y.o. male)   MRN & CSN:  [id number] & [id number] Encounter Date/Time: 2022 1:25 PM EDT    Location:  -A PCP: Abiodun Castaneda MD       Hospital Day: 2      Subjective:     Chief Complaint: Fatigue, Extremity Weakness (Reports BLE weakness and fatigue since colonoscopy last week. Explains felt like legs were going to give out around 0230 AM and fell down on flow in sitting position. Denies LOC. Denies hitting head. ), and Shortness of Breath (Has had for 1 week. PCP placed on Zpack and Steroid taper.)     No acute events overnight. Patient still feels shortness of breath on exertion. He also complains of episodes of right upper quadrant pain sharp in nature not associated with food started 3 days ago. Denies lightheadedness, dizziness, fever, night sweats, chills, chest pain, cough,  palpitations, abd pain, nausea, vomiting, diarrhea, dysuria. Assessment and Plan:   Charan Brunson is a 71 y.o. male with pmh of CLL, DM II, HTN, BPH who presents with Severe sepsis ([facility] [address]      Plan:  #Severe sepsis secondary to super imposed community acquired pneumonia in the setting of rhinovirus  Tachycardia, tachypnea, leukocytosis and lactic acidosis. , RR 37, WBC 76.5, Lactic acid 3.1. CT A/P right greater than left lower lobe pneumonia. S/p sepsis IVF. - will d/c IVF given concern pt has CHF; pt is hemodynamically stable and lactic acidosis resolved  - continue Zosyn for now; will likely descalate tomorrow  - will d/c vancomycin  - follow up blood cx, resp cx, urinary anteigens       #Rhabdomyolysis - resolved  Patient was down for unknown amount of time. Initial CK 3453 down to 680's. - will d/c IVF; check CK tonight and reassess     #Elevated troponin - resolved  - 0.024 on admission. Trended down. Likely demand      #Diabetes mellitus type 2 with hyperglycemia  Blood glucose 366 on admission.  Pt didn't receive lantus last night  - continue home Lantus  - MDSSI  - add 5U TID     #RUQ abd pain. Sharp in nature, not associated with food. CT abd 9/28 revealed mild gallbladder thickening and pericholecystic fluid could be related to passive congestion versus mass-effect from adjacent lymphadenopathy. RUQ U/S revealed masses compatible with lymphadenopathy and left cassi hepatis, hyperechoic focus in the liver, could be hemangioma versus other mass, gallbladder wall thickening with possible adenomyomatosis. Bilirubin elevated on admission 1.6 resolved today but other liver labs within normal range  Symptoms likely related to mass effect  - will monitor for now  - await hem/onc recs    #Concern for new onset of CHF   No previous echocardiogram. Having FROST, trace bilateral edema. proBNP [id number] on admission  - follow up ECHO  - cardiology consulted; appreciate recs     #CLL - with progression  Known diagnosis, follows with hematology oncology. WBC 76.5-was just 27.7 6 days ago, platelets 69, today WBC 28.2, plt 41  - Patient reports he has not been able to take chemo lately due to elevated white count and low platelets. URIC acid elevated 13, but K normal.   - CT images reviewed, shows worsening disease process  - f/u galactomannan level, and Fungitell level  - hem/onc consulted; appreciate recs    #hypoxia  Likely secondary to CAP, possible CHF  - currently on 2L sating 95%. Sating 92% on room air.  - Wean oxygen for goal sats greater than 92%  - management as above    #BPH Continue Flomax and Proscar    Diet ADULT DIET;  Regular; 4 carb choices (60 gm/meal)   DVT Prophylaxis [x] Lovenox, []  Heparin, [] SCDs, [] Ambulation,  [] Eliquis, [] Xarelto  [] Coumadin   Code Status DNR-CCA   Disposition From: home  Expected Disposition: TBD  Estimated Date of Discharge: 1-2 days  Patient requires continued admission due to CHF, sepsis workup   Surrogate Decision Maker/ POA      Review of Systems:    Review of Systems    See above    Objective: Intake/Output Summary (Last 24 hours) at 9/29/2022 1420  Last data filed at 9/29/2022 0612  Gross per 24 hour   Intake 3064.84 ml   Output 3800 ml   Net -735.16 ml        Vitals:   Vitals:    09/29/22 1129   BP:    Pulse: 80   Resp: (!) 31   Temp:    SpO2: 96%       Physical Exam:     General: NAD  Eyes: EOMI  ENT: neck supple  Cardiovascular: Regular rate. Respiratory: B/L Rhonchi Rt > Lt. Gastrointestinal: RUQ, epigastric tender to palpation, Soft  Genitourinary: no suprapubic tenderness  Musculoskeletal: bilateral trace  edema ankle area  Skin: warm, dry. Rt. Chest port in place  Neuro: Alert. Psych: Mood appropriate.      Medications:   Medications:    atorvastatin  80 mg Oral Nightly    finasteride  5 mg Oral Nightly    insulin glargine  30 Units SubCUTAneous Nightly    pantoprazole  20 mg Oral QAM AC    tamsulosin  0.4 mg Oral Nightly    valACYclovir  500 mg Oral Daily    sodium chloride flush  5-40 mL IntraVENous 2 times per day    enoxaparin  40 mg SubCUTAneous Nightly    piperacillin-tazobactam  4,500 mg IntraVENous Q8H    insulin lispro  0-8 Units SubCUTAneous TID WC    insulin lispro  0-4 Units SubCUTAneous Nightly    insulin lispro  5 Units SubCUTAneous TID WC      Infusions:    sodium chloride      dextrose       PRN Meds: HYDROcodone 5 mg - acetaminophen, 1 tablet, Q6H PRN  albuterol sulfate HFA, 2 puff, Q4H PRN  sodium chloride flush, 5-40 mL, PRN  sodium chloride, 500 mL, PRN  acetaminophen, 650 mg, Q6H PRN   Or  acetaminophen, 650 mg, Q6H PRN  glucose, 4 tablet, PRN  dextrose bolus, 125 mL, PRN   Or  dextrose bolus, 250 mL, PRN  glucagon (rDNA), 1 mg, PRN  dextrose, 1,000 mL, Continuous PRN      Labs      Recent Results (from the past 24 hour(s))   Lactate, Sepsis    Collection Time: 09/28/22  3:26 PM   Result Value Ref Range    Lactic Acid, Sepsis 2.0 (HH) 0.5 - 1.9 mMOL/L   POCT Glucose    Collection Time: 09/28/22  4:21 PM   Result Value Ref Range    POC Glucose 370 (H) 70 - 99 MG/DL   POC Blood Glucose    Collection Time: 09/28/22  4:23 PM   Result Value Ref Range    Glucose 370 mg/dL    QC OK? y    POCT Glucose    Collection Time: 09/28/22  8:00 PM   Result Value Ref Range    POC Glucose 462 (H) 70 - 99 MG/DL   POCT Glucose    Collection Time: 09/28/22  8:37 PM   Result Value Ref Range    POC Glucose 433 (H) 70 - 99 MG/DL   Strep Pneumoniae Antigen    Collection Time: 09/28/22 10:20 PM    Specimen: CSF   Result Value Ref Range    Strep pneumo Ag URINE NEGATIVE    Legionella antigen, urine    Collection Time: 09/28/22 10:20 PM    Specimen: Urine   Result Value Ref Range    Legionella Urinary Ag NEGATIVE NEGATIVE   Troponin    Collection Time: 09/28/22 10:48 PM   Result Value Ref Range    Troponin T <0.010 <0.01 NG/ML   Hemoglobin A1c    Collection Time: 09/28/22 10:48 PM   Result Value Ref Range    Hemoglobin A1C 6.7 (H) 4.2 - 6.3 %    eAG 146 mg/dL   Lactate, Sepsis    Collection Time: 09/28/22 10:48 PM   Result Value Ref Range    Lactic Acid, Sepsis 2.4 (HH) 0.5 - 1.9 mMOL/L   Lactate, Sepsis    Collection Time: 09/29/22 12:50 AM   Result Value Ref Range    Lactic Acid, Sepsis 2.4 (HH) 0.5 - 1.9 mMOL/L   CBC with Auto Differential    Collection Time: 09/29/22  4:30 AM   Result Value Ref Range    WBC 28.2 (H) 4.0 - 10.5 K/CU MM    RBC 4.01 (L) 4.6 - 6.2 M/CU MM    Hemoglobin 9.9 (L) 13.5 - 18.0 GM/DL    Hematocrit 32.1 (L) 42 - 52 %    MCV 80.0 78 - 100 FL    MCH 24.7 (L) 27 - 31 PG    MCHC 30.8 (L) 32.0 - 36.0 %    RDW 18.8 (H) 11.7 - 14.9 %    Platelets 41 (L) 519 - 440 K/CU MM    MPV 10.9 7.5 - 11.1 FL    Segs Relative 17.0 (L) 36 - 66 %    Eosinophils % 2.0 0 - 3 %    Lymphocytes % 71.0 (H) 24 - 44 %    Monocytes % 10.0 (H) 0 - 4 %    Segs Absolute 4.8 K/CU MM    Eosinophils Absolute 0.6 K/CU MM    Lymphocytes Absolute 20.0 K/CU MM    Monocytes Absolute 2.8 K/CU MM    Differential Type MANUAL DIFFERENTIAL     Anisocytosis 1+     Atypical Lymphocytes Absolute 2+     Smudge Cells PRESENT    CK    Collection Time: 09/29/22  4:30 AM   Result Value Ref Range    Total  (H) 38 - 174 IU/L   Comprehensive Metabolic Panel w/ Reflex to MG    Collection Time: 09/29/22  4:30 AM   Result Value Ref Range    Sodium 133 (L) 135 - 145 MMOL/L    Potassium 4.2 3.5 - 5.1 MMOL/L    Chloride 99 99 - 110 mMol/L    CO2 25 21 - 32 MMOL/L    BUN 26 (H) 6 - 23 MG/DL    Creatinine 1.0 0.9 - 1.3 MG/DL    Glucose 334 (H) 70 - 99 MG/DL    Calcium 7.4 (L) 8.3 - 10.6 MG/DL    Albumin 2.9 (L) 3.4 - 5.0 GM/DL    Total Protein 4.5 (L) 6.4 - 8.2 GM/DL    Total Bilirubin 0.9 0.0 - 1.0 MG/DL    ALT 27 10 - 40 U/L    AST 34 15 - 37 IU/L    Alkaline Phosphatase 116 40 - 128 IU/L    GFR Non-African American >60 >60 mL/min/1.73m2    GFR African American >60 >60 mL/min/1.73m2    Anion Gap 9 4 - 16   POCT Glucose    Collection Time: 09/29/22  8:05 AM   Result Value Ref Range    POC Glucose 255 (H) 70 - 99 MG/DL   POCT Glucose    Collection Time: 09/29/22 11:40 AM   Result Value Ref Range    POC Glucose 258 (H) 70 - 99 MG/DL   Lactic Acid    Collection Time: 09/29/22 12:20 PM   Result Value Ref Range    Lactate 2.1 (HH) 0.4 - 2.0 mMOL/L        Imaging/Diagnostics Last 24 Hours   CT HEAD WO CONTRAST    Result Date: 9/28/2022  EXAMINATION: CT OF THE HEAD WITHOUT CONTRAST  9/28/2022 2:16 pm TECHNIQUE: CT of the head was performed without the administration of intravenous contrast. Automated exposure control, iterative reconstruction, and/or weight based adjustment of the mA/kV was utilized to reduce the radiation dose to as low as reasonably achievable. COMPARISON: None. HISTORY: ORDERING SYSTEM PROVIDED HISTORY: history of CLL , generalized weakness, down on floor all night TECHNOLOGIST PROVIDED HISTORY: Has a \"code stroke\" or \"stroke alert\" been called? ->No Reason for exam:->history of CLL , generalized weakness, down on floor all night Decision Support Exception - unselect if not a suspected or confirmed emergency medical condition->Emergency Medical Condition (MA) Reason for Exam: history of CLL , generalized weakness, down on floor all night FINDINGS: BRAIN/VENTRICLES: There is no acute intracranial hemorrhage, mass effect or midline shift. No abnormal extra-axial fluid collection. The gray-white differentiation is maintained without evidence of an acute infarct. There is prominence of the ventricles and sulci due to global parenchymal volume loss. There are nonspecific areas of hypoattenuation within the periventricular and subcortical white matter, which likely represent chronic microvascular ischemic change. There vascular calcifications. ORBITS: The visualized portion of the orbits demonstrate no acute abnormality. SINUSES: Osteoneogenesis. There bilateral maxillary sinus air-fluid levels which can be seen with acute on chronic bacterial sinusitis. Mastoids are clear. Moderate severe ethmoid sinus mucosal thickening. SOFT TISSUES/SKULL: No acute abnormality of the visualized skull or soft tissues. Chronic microvascular disease. Negative acute bleed, midline shift or mass effect. Findings worrisome for acute on chronic maxillary sinus disease. Please correlate exam findings. CT ABDOMEN PELVIS W IV CONTRAST Additional Contrast? None    Result Date: 9/28/2022  EXAMINATION: CTA OF THE CHEST; CT OF THE ABDOMEN AND PELVIS WITH CONTRAST 9/28/2022 2:17 pm TECHNIQUE: CTA of the chest was performed after the administration of intravenous contrast.  Multiplanar reformatted images are provided for review. MIP images are provided for review. Automated exposure control, iterative reconstruction, and/or weight based adjustment of the mA/kV was utilized to reduce the radiation dose to as low as reasonably achievable.; CT of the abdomen and pelvis was performed with the administration of intravenous contrast. Multiplanar reformatted images are provided for review.  Automated exposure control, iterative reconstruction, and/or weight based adjustment of the mA/kV was utilized to reduce the radiation dose to as low as reasonably achievable. COMPARISON: July 28, 2022 HISTORY: ORDERING SYSTEM PROVIDED HISTORY: Tachycardic, short of breath, history of CLL TECHNOLOGIST PROVIDED HISTORY: Reason for exam:->Tachycardic, short of breath, history of CLL Decision Support Exception - unselect if not a suspected or confirmed emergency medical condition->Emergency Medical Condition (MA) Reason for Exam: Tachycardic, short of breath, history of CLL FINDINGS: Pulmonary Arteries: Pulmonary arteries are adequately opacified for evaluation. No evidence of intraluminal filling defect to suggest pulmonary embolism. Main pulmonary artery is normal in caliber. Mediastinum: Mediastinal and hilar lymphadenopathy bilaterally. Subcarinal lymph node measures up to 3.1 cm in short axis. Right hilar lymph node measures at least 2.1 cm in short axis. This appears worse from exam on July 28, 2022. Largest subcarinal lymph node on that exam measured up to 1.9 cm in short axis. Normal caliber thoracic aorta without acute finding. The heart is not enlarged. No pericardial effusion. Lungs/pleura: Consolidations in the right lower lobe and minimally in the left lower lobe concerning for pneumonia. No pleural effusion or pneumothorax. Background of mild emphysema. Bronchial thickening in the lower lobes. Soft Tissues/Bones: No acute bone or soft tissue abnormality. CT abdomen and pelvis Liver: Normal appearance of the liver. Gallbladder: Some pericholecystic fluid and mild gallbladder wall thickening. Perhaps mild intrahepatic bile duct dilatation. There are findings suspicious for a mass near the charlotte hepatis measuring 3.5 x 6.2 cm. Additional charlotte hepatis lymph nodes identified near the pancreas and IVC. Spleen: The spleen is enlarged. Measures at least 18 cm. Pancreas: No inflammatory changes or fluid collections.   Charlotte hepatis masses favoring lymphadenopathy. Adrenal Glands: No focal adrenal abnormalities identified. Kidneys: No hydronephrosis. Small left renal cyst. Stomach: The stomach is nondistended. Small bowel: No evidence of small bowel obstruction. Colon: No signficant pericolonic inflammatory changes. Appendix: Normal appearance of the appendix. Normal caliber aorta with atherosclerosis. Retroperitoneal lymphadenopathy measures up to 2.0 cm in short axis in the left periaortic region. Mild free fluid in the pelvis. Partially distended urinary bladder. No acute findings of the osseous structures or subcutaneous soft tissues. No evidence of pulmonary embolism. Right greater than left lower lobe pneumonia. Diffuse hilar and mediastinal lymphadenopathy, concerning for malignancy with history of CLL, also likely progressed from prior exam. Diffuse peritoneal and retroperitoneal lymphadenopathy in the upper abdomen most significant near the cassi hepatis and IVC where there is a large mass which is favored to be a lymph node measuring up to 6.2 x 3.5 cm, also likely progressed from exam. Splenomegaly, could also be related to malignancy. Mild gallbladder wall thickening and pericholecystic fluid, could be related to passive congestion or mass effect from adjacent lymphadenopathy. There is mild intrahepatic bile duct dilatation. XR CHEST PORTABLE    Result Date: 9/28/2022  EXAMINATION: ONE XRAY VIEW OF THE CHEST 9/28/2022 12:44 pm COMPARISON: CT chest July 28, 2022 images; CT chest May 4, 2022 HISTORY: ORDERING SYSTEM PROVIDED HISTORY: chest pain TECHNOLOGIST PROVIDED HISTORY: Reason for exam:->chest pain Reason for Exam: chest pain FINDINGS: Cardiac silhouette is stable. Right-sided MediPort in place. Patchy interstitial changes of the lungs bilaterally which are nonspecific and can be seen in the setting of pulmonary edema as well as in the setting of multifocal atypical/viral infectious process. No well-defined consolidation. No pleural effusion. No pneumothorax. Osseous structures appear intact. 1. Subtle patchy interstitial changes of the lungs which can be seen in setting of 0 pulmonary edema as well as multifocal atypical/viral infectious processes. No well-defined consolidation. CTA CHEST W CONTRAST    Result Date: 9/28/2022  EXAMINATION: CTA OF THE CHEST; CT OF THE ABDOMEN AND PELVIS WITH CONTRAST 9/28/2022 2:17 pm TECHNIQUE: CTA of the chest was performed after the administration of intravenous contrast.  Multiplanar reformatted images are provided for review. MIP images are provided for review. Automated exposure control, iterative reconstruction, and/or weight based adjustment of the mA/kV was utilized to reduce the radiation dose to as low as reasonably achievable.; CT of the abdomen and pelvis was performed with the administration of intravenous contrast. Multiplanar reformatted images are provided for review. Automated exposure control, iterative reconstruction, and/or weight based adjustment of the mA/kV was utilized to reduce the radiation dose to as low as reasonably achievable. COMPARISON: July 28, 2022 HISTORY: ORDERING SYSTEM PROVIDED HISTORY: Tachycardic, short of breath, history of CLL TECHNOLOGIST PROVIDED HISTORY: Reason for exam:->Tachycardic, short of breath, history of CLL Decision Support Exception - unselect if not a suspected or confirmed emergency medical condition->Emergency Medical Condition (MA) Reason for Exam: Tachycardic, short of breath, history of CLL FINDINGS: Pulmonary Arteries: Pulmonary arteries are adequately opacified for evaluation. No evidence of intraluminal filling defect to suggest pulmonary embolism. Main pulmonary artery is normal in caliber. Mediastinum: Mediastinal and hilar lymphadenopathy bilaterally. Subcarinal lymph node measures up to 3.1 cm in short axis. Right hilar lymph node measures at least 2.1 cm in short axis. This appears worse from exam on July 28, 2022. Largest subcarinal lymph node on that exam measured up to 1.9 cm in short axis. Normal caliber thoracic aorta without acute finding. The heart is not enlarged. No pericardial effusion. Lungs/pleura: Consolidations in the right lower lobe and minimally in the left lower lobe concerning for pneumonia. No pleural effusion or pneumothorax. Background of mild emphysema. Bronchial thickening in the lower lobes. Soft Tissues/Bones: No acute bone or soft tissue abnormality. CT abdomen and pelvis Liver: Normal appearance of the liver. Gallbladder: Some pericholecystic fluid and mild gallbladder wall thickening. Perhaps mild intrahepatic bile duct dilatation. There are findings suspicious for a mass near the charlotte hepatis measuring 3.5 x 6.2 cm. Additional charlotte hepatis lymph nodes identified near the pancreas and IVC. Spleen: The spleen is enlarged. Measures at least 18 cm. Pancreas: No inflammatory changes or fluid collections. Charlotte hepatis masses favoring lymphadenopathy. Adrenal Glands: No focal adrenal abnormalities identified. Kidneys: No hydronephrosis. Small left renal cyst. Stomach: The stomach is nondistended. Small bowel: No evidence of small bowel obstruction. Colon: No signficant pericolonic inflammatory changes. Appendix: Normal appearance of the appendix. Normal caliber aorta with atherosclerosis. Retroperitoneal lymphadenopathy measures up to 2.0 cm in short axis in the left periaortic region. Mild free fluid in the pelvis. Partially distended urinary bladder. No acute findings of the osseous structures or subcutaneous soft tissues. No evidence of pulmonary embolism. Right greater than left lower lobe pneumonia.  Diffuse hilar and mediastinal lymphadenopathy, concerning for malignancy with history of CLL, also likely progressed from prior exam. Diffuse peritoneal and retroperitoneal lymphadenopathy in the upper abdomen most significant near the charlotte hepatis and IVC where there is a large mass which is favored to be a lymph node measuring up to 6.2 x 3.5 cm, also likely progressed from exam. Splenomegaly, could also be related to malignancy. Mild gallbladder wall thickening and pericholecystic fluid, could be related to passive congestion or mass effect from adjacent lymphadenopathy. There is mild intrahepatic bile duct dilatation. US ABDOMEN LIMITED    Result Date: 9/29/2022  EXAMINATION: RIGHT UPPER QUADRANT ULTRASOUND 9/29/2022 5:44 am COMPARISON: CT from September 28, 2022 HISTORY: ORDERING SYSTEM PROVIDED HISTORY: RUQ pain TECHNOLOGIST PROVIDED HISTORY: Reason for exam:->RUQ pain FINDINGS: LIVER:  There is a 2.9 x 1.5 x 2.5 cm echogenic focus in the right hepatic lobe. The liver appears enlarged measuring up to 22.9 cm. Increased echogenicity may suggest hepatic steatosis. Trace adjacent abdominal ascites. BILIARY SYSTEM:  Gallbladder wall appears mildly thickened. Possible adenomyomatosis measuring 0.2 x 0.2 x 0.2 cm, less likely cholesterol polyp. Negative sonographic Choi sign. No pericholecystic fluid. Common bile duct is within normal limits measuring 1.7 mm. Mass near the cassi hepatis measures up to 1.6 x 0.5 x 2.2 cm as seen on recent CT. RIGHT KIDNEY: The right kidney is grossly unremarkable without evidence of hydronephrosis. PANCREAS:  The pancreas is hyperechoic and somewhat bulky in appearance, nonspecific. OTHER: Trace abdominal ascites. 1. Hepatomegaly and hepatic steatosis. 2. Masses most compatible with lymphadenopathy in the cassi hepatis, better seen on recent CT. 3. Hyperechoic focus in the liver could be focal fatty infiltration, hemangioma or other mass. This is too small to characterize on recent CT. Correlate with MRI hepatic mass protocol if indicated. 4. Gallbladder wall thickening with possible adenomyomatosis.        Electronically signed by Agus Temple MD on 9/29/2022 at 2:20 PM

## 2022-09-29 NOTE — PROGRESS NOTES
Physical Therapy  [facility] [place]  Morris George, 1950, 2010/2010-A, 9/29/2022    History  [place]:  The primary encounter diagnosis was Sepsis, due to unspecified organism, unspecified whether acute organ dysfunction present ([address] Diagnoses of Acute congestive heart failure, unspecified heart failure type ([address] Elevated troponin, H/O chronic lymphocytic leukemia, Acute respiratory failure with hypoxia ([address] Traumatic rhabdomyolysis, initial encounter ([address] Elevated CK, Rhinovirus, and Lymphadenopathy were also pertinent to this visit. Patient  has a past medical history of Arthritis, CAD (coronary artery disease), Cancer ([address] Diabetes mellitus ([address] History of blood transfusion, Hx of motion sickness, Hyperlipidemia, MDRO (multiple drug resistant organisms) resistance, Migraine, Pneumonia, Wears dentures, and Wears glasses. Patient  has a past surgical history that includes knee surgery (1970); Tunneled venous port placement (2013); Colonoscopy (2010); fracture surgery (Left, 1990's); Cardiac catheterization (1990's); Tonsillectomy (late 1960's); Dental surgery; eye surgery (Right, 08/2016); and other surgical history (Left, 01/18/2017). Subjective:  Patient states:  \"I'm so weak after that colonoscopy\"   Pain:  8/10 right side ribs and bilat shoulders   Communication with other providers:   co-eval with OTArabella   Restrictions: droplet     Home Setup/Prior level of function  Social/Functional History  Lives With: Alone (plans on staying at son's house at discharge)  Type of Home: [address]: One level (2nd floor apartment with no elevator)  Home Access: Stairs to enter with rails  Entrance Stairs - Number of Steps:  Full flight to 2nd floor apartment  Entrance Stairs - Rails: Both  Bathroom Shower/Tub: Tub/Shower unit  Bathroom Toilet: Standard  Bathroom Accessibility: Accessible  Home Equipment: [employer]  ADL Assistance: Independent  Homemaking Assistance: Independent  Homemaking Responsibilities: Yes  Ambulation Assistance: Independent (uses no AD)  Transfer Assistance: Independent  Active : Yes  Occupation: On disability    Examination of body systems (includes body structures/functions, activity/participation limitations):  Observation:  Supine in bed upon arrival. Cooperative with therapy to his tolerance. Limited with feeling tired and weak. Weak and painful cough. Vision:  [facility]  Hearing:  WFL   Cardiopulmonary:  3L O2, stable vitals throughout session. Musculoskeletal  ROM R/L:  [facility] BLEs  Strength R/L:  BLEs grossly 4+/5   Neuro:  hx of neuropathy with bilat LE numbness to feet up to his knees     Mobility/treatment:   Rolling L/R:  NT   Supine to sit:  SBA for safety with HOB slightly elevated  Transfers:   Sit to stand: CGA for safety from EOB and toilet   Stand to sit: CGA for safety to standard toilet and recliner  Step pivot: CGA to Ai without AD (2x)   Sitting balance:  SBA for safety at EOB and toilet, static and light dynamic without UE support   Standing balance:  CGA to Ai, no UE support, static and light dynamic while managing hand hygiene at the sink   Gait:10ft x 2 without AD CGA to Ai for steadying. Deviates from path, inconsistent step length and width, dec pace. Pt reached for external support due to balance deficits but declined trial with RW at this time. Limited activity tolerance this date limiting longer distance. Educated pt on POC, role of PT, DME use ,discharge. [facility] 6 Clicks Inpatient Mobility:  AM-PAC Inpatient Mobility Raw Score : 17      Safety: patient left in chair with alarm, call light within reach,  gait belt used. Assessment:  Pt is a 71year old male admitted with fatigue and BLE weakness having to lower himself to the ground and was unable to get himself back up. Diagnosed with severe sepsis. Recommend subacute rehab once medically stable.  At baseline he is indep with gross mobility and ADLs. He performed below his baseline this date with dec strength, balance, and activity tolerance. He would benefit from continued therapy to address his current deficits, dec potential fall risk, and restore function. Complexity: Moderate  Prognosis: Good, no significant barriers to participation at this time. Plan: 3-5 times per week  Discharge Recommendations: Subacute/Skilled Nursing Facility  Equipment: continue to assess at next level of care     Goals:  Short Term Goals  Time Frame for Short term goals: 2 weeks  Short term goal 1: Pt will perform sit><supine [place]  Short term goal 2: Pt will transfer SBA  Short term goal 3: Pt will ambulate 150ft with LRAD SBA  Short term goal 4: Pt will ascend/descend 1 flight of stairs, CGA with 1-2 rails       Treatment plan:  Bed mobility, transfers, balance, gait, TA, TX, stairs     Recommendations for NURSING mobility: ambulate to the bathroom with gait belt.  Encourage use of RW     Time:   Time in: 1005  Time out: 1028  Timed treatment minutes: 10  Total time: 23 minutes     Electronically signed by:    Andriy Iqbal [id number]  9/29/2022, 2:04 PM

## 2022-09-29 NOTE — PROGRESS NOTES
Occupational [address] [place] [address] ACUTE CARE OCCUPATIONAL THERAPY EVALUATION    Woodrow Stringer, 1950, 2010/2010-A, 9/29/2022    Discharge Recommendation: [facility]      History:  [place]:  The primary encounter diagnosis was Sepsis, due to unspecified organism, unspecified whether acute organ dysfunction present ([facility] Diagnoses of Acute congestive heart failure, unspecified heart failure type ([facility] [address] Elevated troponin, H/O chronic lymphocytic leukemia, Acute respiratory failure with hypoxia ([facility] [address] Traumatic rhabdomyolysis, initial encounter ([facility] Elevated CK, Rhinovirus, and Lymphadenopathy were also pertinent to this visit. Subjective:  Patient states: \"You're going to have to put my socks on today. I'm feeling pretty weak! \"   Pain: Pt reported 8/10 pain in Rt rib area and shoulders  Communication with other providers: PT Nannette  Restrictions: General Precautions, Fall Risk, 3L o2, Telemetry, Pulse Ox, BP cuff, IV, Bed/chair alarm    Home Setup/Prior level of function:  Social/Functional History  Lives With: Alone (plans on staying at son's house at discharge)  Type of Home: [address] [place] [address] Drive: One level (2nd floor apartment with no elevator)  Home Access: Stairs to enter with rails  Entrance Stairs - Number of Steps: Full flight to 2nd floor apartment  Entrance Stairs - Rails: Both  Bathroom Shower/Tub: Tub/Shower unit  Bathroom Toilet: Standard  Bathroom Accessibility: Accessible  Home Equipment: [employer]  ADL Assistance: Independent  Homemaking Assistance: Independent  Homemaking Responsibilities: Yes  Ambulation Assistance: Independent (uses no AD)  Transfer Assistance: Independent  Active : Yes  Occupation: On disability    Examination:  Observation: Supine in bed upon arrival. Agreeable to evaluation.   Vision: WFL  Hearing: WFL  Vitals: Stable vitals throughout session (o2 sats briefly dropped to 87% while on toilet but recovered to mid-90s with cues for pursed lip upper body clothing? [] 1   [] 2   [] 2   [] 3   [x] 3    [] 4      5. Taking care of personal grooming such as brushing teeth? [] 1   [] 2    [] 2 [] 3    [x] 3   [] 4      6. Eating meals? [] 1   [] 2   [] 2   [] 3   [] 3   [x] 4      Raw Score:  15   [24=0% impaired(CH), 23=1-19%(CI), 20-22=20-39%(CJ), 15-19=40-59%(CK), 10-14=60-79%(CL), 7-9=80-99%(CM), 6=100%(CN)]     Treatment:  Self Care Training:   Cues were given for safety, sequence, UE/LE placement, visual cues, and balance. Activities performed today included UB/LB dressing tasks, toileting, hand hygiene at sink      Safety Measures: Gait belt used, Left in Chair, Alarm in place    Assessment:  Pt is a 71year old male with a past medical history of Arthritis, CAD (coronary artery disease), Cancer ([facility] [address] Diabetes mellitus ([facility] [address] [facility] History of blood transfusion, Hx of motion sickness, Hyperlipidemia, MDRO (multiple drug resistant organisms) resistance, Migraine, Pneumonia, Wears dentures, and Wears glasses. Pt admitted with generalized weakness and shortness of breath. Pt diagnosed with sepsis secondary to pneumonia and traumatic rhabdomyolysis after a fall. Pt lives at home alone and at baseline is independent with ADLs, IADLs, mobility, and driving. Pt currently presents with the above impairments, and is not safe for immediate return home. Recommend continued OT services in SNF at discharge. Complexity: Moderate  Prognosis: Good  Plan: 3+x/week      Goals:  1. Pt will complete all aspects of bed mobility for EOB/OOB ADLs with supervision/HOB flat  2. Pt will complete UB/LB bathing min A with setup  3. Pt will complete all aspects of LB dressing mod A with setup  4. Pt will complete all functional transfers to and from bed, chair, toilet, shower chair SBA/good safety awareness  5. Pt will ambulate HH distance to bathroom for toileting SBA with RW  6. Pt will complete all aspects of toileting task mod A   7.  Pt will complete oral hygiene/grooming routine in standing at sink SBA with setup/no seated rest breaks  8.  Pt will complete ther ex/ther act with focus on UE strengthening, functional standing tolerance >8 minutes, dynamic standing balance for ADL/IADL tasks    Time:   Time in: 1007  Time out: 1027  Timed treatment minutes: 10  Total time: 20    Electronically signed by:    SEBASTIEN Posadas/L, [address] [place] [address], [id number]

## 2022-09-29 NOTE — CONSULTS
Chart reviewed  Full note to follow                      Name:  Jose Elias Gray /Age/Sex: 1950  (71 y.o. male)   MRN & CSN:  [id number] & [id number] Admission Date/Time: 2022 11:19 AM   Location:  -A PCP: Shira Phillips MD       Hospital Day: 2          Referring physician:  Taran Mandujano MD         Reason for consultation: Congestive heart failure congestive heart failure patient        Thanks for referral.    Information source: Patient    CC; fall      HPI:   Thank you for involving me in taking  care of Jose Elias Gray who  is a 71 y. o.year  Old male  Presents with history of CLL, diabetes admitted with complains of cough and weakness generalized had a fall as well has no chest pain or shortness of breath however his BNP is markedly elevated hence cardiology has been consulted for further assessment patient denies any cardiac history denies any chest pain                     Past medical history:    has a past medical history of Arthritis, CAD (coronary artery disease), Cancer ([facility] [address] Diabetes mellitus ([facility] [address] History of blood transfusion, Hx of motion sickness, Hyperlipidemia, MDRO (multiple drug resistant organisms) resistance, Migraine, Pneumonia, Wears dentures, and Wears glasses. Past surgical history:   has a past surgical history that includes knee surgery (); Tunneled venous port placement (); Colonoscopy (); fracture surgery (Left, 's); Cardiac catheterization ('s); Tonsillectomy (late 's); Dental surgery; eye surgery (Right, 2016); and other surgical history (Left, 2017). Social History:   reports that he quit smoking about 35 years ago. His smoking use included cigarettes. He started smoking about 57 years ago. He has a 20.00 pack-year smoking history. He has never used smokeless tobacco. He reports that he does not drink alcohol and does not use drugs.   Family history:  family history includes Asthma in his maternal uncle; Colon Cancer in his brother; Diabetes in his mother; Early Death in his brother; Heart Disease in his father; High Blood Pressure in his mother; Other in his sister.     No Known Allergies    0.9 % sodium chloride infusion, Continuous  HYDROcodone-acetaminophen (NORCO) 5-325 MG per tablet 1 tablet, Q6H PRN  albuterol sulfate HFA (PROVENTIL;VENTOLIN;PROAIR) 108 (90 Base) MCG/ACT inhaler 2 puff, Q4H PRN  atorvastatin (LIPITOR) tablet 80 mg, Nightly  finasteride (PROSCAR) tablet 5 mg, Nightly  insulin glargine (LANTUS) injection vial 30 Units, Nightly  pantoprazole (PROTONIX) tablet 20 mg, QAM AC  tamsulosin (FLOMAX) capsule 0.4 mg, Nightly  valACYclovir (VALTREX) tablet 500 mg, Daily  sodium chloride flush 0.9 % injection 5-40 mL, 2 times per day  sodium chloride flush 0.9 % injection 5-40 mL, PRN  0.9 % sodium chloride infusion, PRN  enoxaparin (LOVENOX) injection 40 mg, Nightly  acetaminophen (TYLENOL) tablet 650 mg, Q6H PRN   Or  acetaminophen (TYLENOL) suppository 650 mg, Q6H PRN  piperacillin-tazobactam (ZOSYN) 4,500 mg in dextrose 5 % 100 mL IVPB (mini-bag), Q8H  glucose chewable tablet 16 g, PRN  dextrose bolus 10% 125 mL, PRN   Or  dextrose bolus 10% 250 mL, PRN  glucagon (rDNA) injection 1 mg, PRN  dextrose 10 % infusion, Continuous PRN  insulin lispro (HUMALOG) injection vial 0-8 Units, TID WC  insulin lispro (HUMALOG) injection vial 0-4 Units, Nightly  insulin lispro (HUMALOG) injection vial 5 Units, TID WC  vancomycin (VANCOCIN) 1,500 mg in dextrose 5 % 500 mL IVPB, Q24H    sodium chloride flush 0.9 % injection 10 mL, PRN      Current Facility-Administered Medications   Medication Dose Route Frequency Provider Last Rate Last Admin    0.9 % sodium chloride infusion   IntraVENous Continuous Griselda Chapman, APRN -  mL/hr at 09/29/22 0329 New Bag at 09/29/22 0329    HYDROcodone-acetaminophen (NORCO) 5-325 MG per tablet 1 tablet  1 tablet Oral Q6H PRN Griselda Chapman, APRN - CNP        albuterol sulfate HFA (PROVENTIL;VENTOLIN;PROAIR) 108 (90 Base) MCG/ACT inhaler 2 puff  2 puff Inhalation Q4H PRN Griselda Vernal Kayode, APRN - CNP        atorvastatin (LIPITOR) tablet 80 mg  80 mg Oral Nightly Griselda Vernal Kayode, APRN - CNP   80 mg at 09/28/22 2131    finasteride (PROSCAR) tablet 5 mg  5 mg Oral Nightly Griselda Vernal Kayode, APRN - CNP   5 mg at 09/28/22 2131    insulin glargine (LANTUS) injection vial 30 Units  30 Units SubCUTAneous Nightly Griselda Vernal Kayode, APRN - CNP   30 Units at 09/28/22 2204    pantoprazole (PROTONIX) tablet 20 mg  20 mg Oral QAM AC Griselda Vernal Kayode, APRN - CNP        tamsulosin (FLOMAX) capsule 0.4 mg  0.4 mg Oral Nightly Griselda Vernal Kayode, APRN - CNP   0.4 mg at 09/28/22 2131    valACYclovir (VALTREX) tablet 500 mg  500 mg Oral Daily Griselda Vernal Kayode, APRN - CNP        sodium chloride flush 0.9 % injection 5-40 mL  5-40 mL IntraVENous 2 times per day Nitesh Gonzalo, APRN - CNP   10 mL at 09/28/22 2216    sodium chloride flush 0.9 % injection 5-40 mL  5-40 mL IntraVENous PRN Griselda Vernal Kayode, APRN - CNP        0.9 % sodium chloride infusion  500 mL IntraVENous PRN Griselda Vernal Kayode, APRN - CNP        enoxaparin (LOVENOX) injection 40 mg  40 mg SubCUTAneous Nightly Griselda Vernal Kayode, APRN - CNP   40 mg at 09/28/22 2131    acetaminophen (TYLENOL) tablet 650 mg  650 mg Oral Q6H PRN Griselda Vernal Kayode, APRN - CNP        Or    acetaminophen (TYLENOL) suppository 650 mg  650 mg Rectal Q6H PRN Griselda Vernal Kayode, APRN - CNP        piperacillin-tazobactam (ZOSYN) 4,500 mg in dextrose 5 % 100 mL IVPB (mini-bag)  4,500 mg IntraVENous Q8H Griselda Vernal Kayode, APRN - CNP 25 mL/hr at 09/29/22 0415 4,500 mg at 09/29/22 0415    glucose chewable tablet 16 g  4 tablet Oral PRN Griselda Vernal Kayode, APRN - CNP        dextrose bolus 10% 125 mL  125 mL IntraVENous PRN Griselda Vernal Kayode, APRN - CNP        Or    dextrose bolus 10% 250 mL  250 mL IntraVENous PRN Griselda Vernal Kayode, APRN - CNP        glucagon (rDNA) injection 1 mg  1 mg SubCUTAneous PRN Griselda Vernal Kayode, APRN - CNP        dextrose 10 % infusion  1,000 mL IntraVENous Continuous PRN Griselda Karl Harlan, APRN - CNP        insulin lispro (HUMALOG) injection vial 0-8 Units  0-8 Units SubCUTAneous TID  Griselda Claros APRN - CNP        insulin lispro (HUMALOG) injection vial 0-4 Units  0-4 Units SubCUTAneous Nightly Griselda Barajas Harlan, APRN - CNP   4 Units at 09/28/22 2151    insulin lispro (HUMALOG) injection vial 5 Units  5 Units SubCUTAneous TID  Griselda Karlphill Claros APRN - CNP        vancomycin (VANCOCIN) 1,500 mg in dextrose 5 % 500 mL IVPB  1,500 mg IntraVENous Q24H JENNI Boles CNP         Facility-Administered Medications Ordered in Other Encounters   Medication Dose Route Frequency Provider Last Rate Last Admin    sodium chloride flush 0.9 % injection 10 mL  10 mL IntraVENous PRN Irish Qureshi MD         Review of Systems:  All 14 systems reviewed, all negative except for weakness    Physical Examination:    /71   Pulse 71   Temp 98.1 °F (36.7 °C) (Oral)   Resp 24   Ht 6' (1.829 m)   Wt 180 lb (81.6 kg)   SpO2 96%   BMI 24.41 kg/m²      Wt Readings from Last 3 Encounters:   09/28/22 180 lb (81.6 kg)   09/22/22 182 lb (82.6 kg)   06/30/22 193 lb (87.5 kg)     Body mass index is 24.41 kg/m². General Appearance: Fair  Head: normocephalic     Eyes: normal, noninjected conjunctiva    ENT: normal mucosa, noninjected throat, normal     NECK: No JVP  No thyromegaly        Cardiovascular: No thrills palpated   Auscultation: Normal S1 and S2, no murmur   carotid bruit no   Abdominal Aorta no bruit    Respiratory:    Breath sounds diminished bilaterally    Extremities: Trace edema clubbing ,   no cyanosis    SKIN: Warm and well perfused, no pallor or cyanosis    Vascular exam:  Pedal Pulses: Palp bilaterally        Abdomen:  No masses or tenderness. No organomegaly noted. Neurological:  Oriented to time, place, and person   No focal neurological deficit noted.   Psychiatric:normal mood, no anxiety    Lab Review   Recent Results (from the past 24 hour(s))   CBC with Auto Differential    Collection Time: 09/28/22 12:10 PM   Result Value Ref Range    WBC 76.5 (HH) 4.0 - 10.5 K/CU MM    RBC 4.74 4.6 - 6.2 M/CU MM    Hemoglobin 11.6 (L) 13.5 - 18.0 GM/DL    Hematocrit 37.8 (L) 42 - 52 %    MCV 79.7 78 - 100 FL    MCH 24.5 (L) 27 - 31 PG    MCHC 30.7 (L) 32.0 - 36.0 %    RDW 19.0 (H) 11.7 - 14.9 %    Platelets 69 (L) 350 - 440 K/CU MM    Differential Type MANUAL DIFFERENTIAL     Segs Relative 9.0 (L) 36 - 66 %    Lymphocytes % 71.0 (H) 24 - 44 %    Monocytes % 8.0 (H) 0 - 4 %    Segs Absolute 6.9 K/CU MM    Lymphocytes Absolute 54.3 K/CU MM    Monocytes Absolute 6.1 K/CU MM    Nucleated RBC % 0.2 %    Total Nucleated RBC 0.1 K/CU MM    UNDIFFERENTIATED CELL 2.0 (HH) 0.0 %    Metamyelocytes Relative 3 (H) 0.0 %    Bands Relative 7 5 - 11 %    nRBC 2     Metamyelocytes Absolute 2.30 K/CU MM    Bands Absolute 5.36 K/CU MM    Atypical Lymphocytes Absolute 1+     Smudge Cells PRESENT     WBC Morphology       PATIENT HAS HISTORY OF  NONFAMILIAIAL HYPOGAMMAGLOBULINEMIA    Other Cell Morphology       2  ATYPICAL LARGE ABNORMAL CELL FORMS WITH NUCLEOLI  PRESENT   Comprehensive Metabolic Panel    Collection Time: 09/28/22 12:10 PM   Result Value Ref Range    Sodium 132 (L) 135 - 145 MMOL/L    Potassium 5.0 3.5 - 5.1 MMOL/L    Chloride 97 (L) 99 - 110 mMol/L    CO2 19 (L) 21 - 32 MMOL/L    BUN 29 (H) 6 - 23 MG/DL    Creatinine 1.1 0.9 - 1.3 MG/DL    Glucose 366 (H) 70 - 99 MG/DL    Calcium 8.0 (L) 8.3 - 10.6 MG/DL    Albumin 3.3 (L) 3.4 - 5.0 GM/DL    Total Protein 5.3 (L) 6.4 - 8.2 GM/DL    Total Bilirubin 1.6 (H) 0.0 - 1.0 MG/DL    ALT 30 10 - 40 U/L    AST 80 (H) 15 - 37 IU/L    Alkaline Phosphatase 138 (H) 40 - 129 IU/L    GFR Non-African American >60 >60 mL/min/1.73m2    GFR African American >60 >60 mL/min/1.73m2    Anion Gap 16 4 - 16   Troponin    Collection Time: 09/28/22 12:10 PM   Result Value Ref Range    Troponin T 0.024 (H) <0.01 NG/ML   Brain Natriuretic Peptide    Collection Time: 09/28/22 12:10 PM   Result Value Ref Range    Pro-BNP 13,987 (H) <300 PG/ML   Lactate, Sepsis    Collection Time: 09/28/22 12:10 PM   Result Value Ref Range    Lactic Acid, Sepsis 3.1 (HH) 0.5 - 1.9 mMOL/L   CK    Collection Time: 09/28/22 12:10 PM   Result Value Ref Range    Total CK 3,453 (H) 38 - 174 IU/L   Lipase    Collection Time: 09/28/22 12:10 PM   Result Value Ref Range    Lipase 11 (L) 13 - 60 IU/L   Urinalysis    Collection Time: 09/28/22 12:10 PM   Result Value Ref Range    Color, UA YELLOW     Clarity, UA SLIGHTLY CLOUDY     Glucose, Urine NEGATIVE MG/DL    Bilirubin Urine NEGATIVE     Ketones, Urine NEGATIVE MG/DL    Specific Gravity, UA 1.015     Blood, Urine LARGE     pH, Urine 6.0     Protein, UA 30 MG/DL    Urobilinogen, Urine 0.2 MG/DL    Nitrite Urine, Quantitative NEGATIVE     Leukocyte Esterase, Urine NEGATIVE    Microscopic Urinalysis    Collection Time: 09/28/22 12:10 PM   Result Value Ref Range    RBC, UA 1 /HPF    WBC, UA 1 /HPF    Bacteria, UA NEGATIVE /HPF    Mucus, UA RARE HPF    Hyaline Casts, UA 0 /LPF   Uric Acid    Collection Time: 09/28/22 12:10 PM   Result Value Ref Range    Uric Acid 13.3 (H) 3.5 - 7.2 MG/DL   EKG 12 Lead    Collection Time: 09/28/22 12:14 PM   Result Value Ref Range    Ventricular Rate 119 BPM    Atrial Rate 119 BPM    P-R Interval 138 ms    QRS Duration 76 ms    Q-T Interval 316 ms    QTc Calculation (Bazett) 444 ms    P Axis 48 degrees    R Axis 12 degrees    T Axis 102 degrees    Diagnosis       Sinus tachycardia with occasional premature ventricular complexes  Nonspecific T wave abnormality  Abnormal ECG  When compared with ECG of 24-MAR-2022 13:16,  premature ventricular complexes are now present  Nonspecific T wave abnormality now evident in Lateral leads  Confirmed by [facility] Phan GALLARDO ([id number]) on 9/28/2022 4:53:49 PM     Blood Gas, Venous    Collection Time: 09/28/22 12:15 PM   Result Value Ref Range    pH, Gonzales 7.43 (H) 7.32 - 7.42    pCO2, Gonzales 31 (L) 38 - 52 mmHG    pO2, Gonzales 100 (H) 28 - 48 mmHG    Base Excess 3 0 - 3.3    HCO3, Venous 20.6 19 - 25 MMOL/L    O2 Sat, Gonzales 93.5 (H) 50 - 70 %    Comment VBG    Respiratory Panel, Molecular, with COVID-19 (Restricted: peds pts or suitable admitted adults)    Collection Time: 09/28/22  1:00 PM    Specimen: Nasopharyngeal   Result Value Ref Range    Adenovirus Detection by PCR NOT DETECTED NOT DETECTED    Coronavirus 229E PCR NOT DETECTED NOT DETECTED    Coronavirus HKU1 PCR NOT DETECTED NOT DETECTED    Coronavirus NL63 PCR NOT DETECTED NOT DETECTED    Coronavirus OC43 PCR NOT DETECTED NOT DETECTED    SARS-CoV-2 NOT DETECTED NOT DETECTED    Human Metapneumovirus PCR NOT DETECTED NOT DETECTED    Rhinovirus Enterovirus PCR DETECTED BY PCR (A) NOT DETECTED    Influenza A by PCR NOT DETECTED NOT DETECTED    Influenza A H1 Pandemic PCR NOT DETECTED NOT DETECTED    Influenza A H1 (2009) PCR NOT DETECTED NOT DETECTED    Influenza A H3 PCR NOT DETECTED NOT DETECTED    Influenza B by PCR NOT DETECTED NOT DETECTED    Parainfluenza 1 PCR NOT DETECTED NOT DETECTED    Parainfluenza 2 PCR NOT DETECTED NOT DETECTED    Parainfluenza 3 PCR NOT DETECTED NOT DETECTED    Parainfluenza 4 PCR NOT DETECTED NOT DETECTED    RSV PCR NOT DETECTED NOT DETECTED    Bordetella parapertussis by PCR NOT DETECTED NOT DETECTED    B Pertussis by PCR NOT DETECTED NOT DETECTED    Chlamydophila Pneumonia PCR NOT DETECTED NOT DETECTED    Mycoplasma pneumo by PCR NOT DETECTED NOT DETECTED   Lactate, Sepsis    Collection Time: 09/28/22  3:26 PM   Result Value Ref Range    Lactic Acid, Sepsis 2.0 (HH) 0.5 - 1.9 mMOL/L   POCT Glucose    Collection Time: 09/28/22  4:21 PM   Result Value Ref Range    POC Glucose 370 (H) 70 - 99 MG/DL   POC Blood Glucose    Collection Time: 09/28/22  4:23 PM   Result Value Ref Range    Glucose 370 mg/dL    QC OK? y    POCT Glucose    Collection Time: 09/28/22  8:37 PM Result Value Ref Range    POC Glucose 433 (H) 70 - 99 MG/DL   Troponin    Collection Time: 09/28/22 10:48 PM   Result Value Ref Range    Troponin T <0.010 <0.01 NG/ML   Hemoglobin A1c    Collection Time: 09/28/22 10:48 PM   Result Value Ref Range    Hemoglobin A1C 6.7 (H) 4.2 - 6.3 %    eAG 146 mg/dL   Lactate, Sepsis    Collection Time: 09/28/22 10:48 PM   Result Value Ref Range    Lactic Acid, Sepsis 2.4 (HH) 0.5 - 1.9 mMOL/L   Lactate, Sepsis    Collection Time: 09/29/22 12:50 AM   Result Value Ref Range    Lactic Acid, Sepsis 2.4 (HH) 0.5 - 1.9 mMOL/L   CBC with Auto Differential    Collection Time: 09/29/22  4:30 AM   Result Value Ref Range    WBC 28.2 (H) 4.0 - 10.5 K/CU MM    RBC 4.01 (L) 4.6 - 6.2 M/CU MM    Hemoglobin 9.9 (L) 13.5 - 18.0 GM/DL    Hematocrit 32.1 (L) 42 - 52 %    MCV 80.0 78 - 100 FL    MCH 24.7 (L) 27 - 31 PG    MCHC 30.8 (L) 32.0 - 36.0 %    RDW 18.8 (H) 11.7 - 14.9 %    Platelets 41 (L) 824 - 440 K/CU MM    MPV 10.9 7.5 - 11.1 FL    Segs Relative 17.0 (L) 36 - 66 %    Eosinophils % 2.0 0 - 3 %    Lymphocytes % 71.0 (H) 24 - 44 %    Monocytes % 10.0 (H) 0 - 4 %    Segs Absolute 4.8 K/CU MM    Eosinophils Absolute 0.6 K/CU MM    Lymphocytes Absolute 20.0 K/CU MM    Monocytes Absolute 2.8 K/CU MM    Differential Type MANUAL DIFFERENTIAL     Anisocytosis 1+     Atypical Lymphocytes Absolute 2+     Smudge Cells PRESENT    CK    Collection Time: 09/29/22  4:30 AM   Result Value Ref Range    Total  (H) 38 - 174 IU/L   Comprehensive Metabolic Panel w/ Reflex to MG    Collection Time: 09/29/22  4:30 AM   Result Value Ref Range    Sodium 133 (L) 135 - 145 MMOL/L    Potassium 4.2 3.5 - 5.1 MMOL/L    Chloride 99 99 - 110 mMol/L    CO2 25 21 - 32 MMOL/L    BUN 26 (H) 6 - 23 MG/DL    Creatinine 1.0 0.9 - 1.3 MG/DL    Glucose 334 (H) 70 - 99 MG/DL    Calcium 7.4 (L) 8.3 - 10.6 MG/DL    Albumin 2.9 (L) 3.4 - 5.0 GM/DL    Total Protein 4.5 (L) 6.4 - 8.2 GM/DL    Total Bilirubin 0.9 0.0 - 1.0 MG/DL ALT 27 10 - 40 U/L    AST 34 15 - 37 IU/L    Alkaline Phosphatase 116 40 - 128 IU/L    GFR Non-African American >60 >60 mL/min/1.73m2    GFR African American >60 >60 mL/min/1.73m2    Anion Gap 9 4 - 16           Assessment/Recommendations:     -Elevated BNP suggestive of volume overload and congestive heart failure we will obtain an echocardiogram for further assessment  -If EF is low might need to rule out for ischemic heart disease  -Sinus tachycardia with PVCs  -Sepsis probably secondary to pneumonia  -Has CLL         Fred Alvarez MD, 9/29/2022 5:42 AM       Please note this report has been partially produced using speech recognition software and may contain errors related to that system including errors in grammar, punctuation, and spelling, as well as words and phrases that may be inappropriate. If there are any questions or concerns please feel free to contact the dictating provider for clarification.

## 2022-09-29 NOTE — PROGRESS NOTES
[id number] [facility]  consulted by Serena ARTEAGA for monitoring and adjustment. Indication for treatment: Sepsis  Goal trough: [] 10-15 mcg/mL or [x] 15-20 mcg/ml  AUC/MARTHA: [] <500 or [x] 400-600    Pertinent Laboratory Values:   Temp Readings from Last 3 Encounters:   09/29/22 98.1 °F (36.7 °C) (Oral)   09/22/22 96.9 °F (36.1 °C) (Infrared)   09/06/22 97.5 °F (36.4 °C) (Infrared)     Recent Labs     09/28/22  1210 09/29/22  0430   WBC 76.5* 28.2*       Recent Labs     09/28/22  1210 09/29/22  0430   BUN 29* 26*   CREATININE 1.1 1.0       Estimated Creatinine Clearance: 74 mL/min (based on SCr of 1 mg/dL). Intake/Output Summary (Last 24 hours) at 9/29/2022 1218  Last data filed at 9/29/2022 0612  Gross per 24 hour   Intake 3064.84 ml   Output 3800 ml   Net -735.16 ml         Pertinent Cultures:  Date    Source    Results  09/28   MRSA nasal   Ordered  09/28   Blood    Pending  09/28   Strep Pneumo/Legionella Negative  09/28   Respiratory PCR  Pending    Vancomycin level:   TROUGH:  No results for input(s): VANCOTROUGH in the last 72 hours. RANDOM:  No results for input(s): VANCORANDOM in the last 72 hours. Assessment:  SCr, BUN, and urine output:  Scr stable in range 1-1.1  Baseline range: 0.7-0.9  Day(s) of therapy: 2 of 7  Vancomycin concentration: To be collected    Plan:  Vancomycin 1750mg IVPB x 1 then 1500mg Q24h. Will obtain a random level to assess dosing.   Pharmacy will continue to monitor patient and adjust therapy as indicated    VANCOMYCIN CONCENTRATION SCHEDULED FOR 09/30 @0600    Thank you for the consult,  Iris SANCHEZ [facility], PharmD  9/29/2022 12:18 PM

## 2022-09-30 LAB
ALBUMIN SERPL-MCNC: 2.9 GM/DL (ref 3.4–5)
ALBUMIN SERPL-MCNC: 2.9 GM/DL (ref 3.4–5)
ALP BLD-CCNC: 127 IU/L (ref 40–129)
ALT SERPL-CCNC: 28 U/L (ref 10–40)
ANION GAP SERPL CALCULATED.3IONS-SCNC: 9 MMOL/L (ref 4–16)
ANISOCYTOSIS: ABNORMAL
AST SERPL-CCNC: 28 IU/L (ref 15–37)
ATYPICAL LYMPHOCYTE ABSOLUTE COUNT: ABNORMAL
BILIRUB SERPL-MCNC: 1.2 MG/DL (ref 0–1)
BILIRUBIN DIRECT: 0.5 MG/DL (ref 0–0.3)
BILIRUBIN, INDIRECT: 0.7 MG/DL (ref 0–0.7)
BUN BLDV-MCNC: 18 MG/DL (ref 6–23)
CALCIUM SERPL-MCNC: 7.7 MG/DL (ref 8.3–10.6)
CHLORIDE BLD-SCNC: 95 MMOL/L (ref 99–110)
CO2: 23 MMOL/L (ref 21–32)
CREAT SERPL-MCNC: 0.8 MG/DL (ref 0.9–1.3)
DIFFERENTIAL TYPE: ABNORMAL
GFR AFRICAN AMERICAN: >60 ML/MIN/1.73M2
GFR NON-AFRICAN AMERICAN: >60 ML/MIN/1.73M2
GLUCOSE BLD-MCNC: 142 MG/DL (ref 70–99)
GLUCOSE BLD-MCNC: 157 MG/DL (ref 70–99)
GLUCOSE BLD-MCNC: 183 MG/DL (ref 70–99)
GLUCOSE BLD-MCNC: 189 MG/DL (ref 70–99)
GLUCOSE BLD-MCNC: 237 MG/DL (ref 70–99)
HCT VFR BLD CALC: 40.7 % (ref 42–52)
HEMOGLOBIN: 12.2 GM/DL (ref 13.5–18)
LACTATE DEHYDROGENASE: 388 IU/L (ref 120–246)
LYMPHOCYTES ABSOLUTE: 25.2 K/CU MM
LYMPHOCYTES RELATIVE PERCENT: 74 % (ref 24–44)
MCH RBC QN AUTO: 24.4 PG (ref 27–31)
MCHC RBC AUTO-ENTMCNC: 30 % (ref 32–36)
MCV RBC AUTO: 81.4 FL (ref 78–100)
MONOCYTES ABSOLUTE: 0.3 K/CU MM
MONOCYTES RELATIVE PERCENT: 1 % (ref 0–4)
OVALOCYTES: ABNORMAL
PDW BLD-RTO: 19.7 % (ref 11.7–14.9)
PHOSPHORUS: 2.1 MG/DL (ref 2.5–4.9)
PLATELET # BLD: 54 K/CU MM (ref 140–440)
POLYCHROMASIA: ABNORMAL
POTASSIUM SERPL-SCNC: 4.7 MMOL/L (ref 3.5–5.1)
PRO-BNP: 2514 PG/ML
RBC # BLD: 5 M/CU MM (ref 4.6–6.2)
RETICULOCYTE COUNT PCT: 2.7 % (ref 0.2–2.2)
SEGMENTED NEUTROPHILS ABSOLUTE COUNT: 8.5 K/CU MM
SEGMENTED NEUTROPHILS RELATIVE PERCENT: 25 % (ref 36–66)
SMEAR REVIEW: NORMAL
SMUDGE CELLS: PRESENT
SODIUM BLD-SCNC: 127 MMOL/L (ref 135–145)
TOTAL PROTEIN: 4.6 GM/DL (ref 6.4–8.2)
WBC # BLD: 34 K/CU MM (ref 4–10.5)

## 2022-09-30 PROCEDURE — 6360000002 HC RX W HCPCS: Performed by: STUDENT IN AN ORGANIZED HEALTH CARE EDUCATION/TRAINING PROGRAM

## 2022-09-30 PROCEDURE — 6370000000 HC RX 637 (ALT 250 FOR IP): Performed by: NURSE PRACTITIONER

## 2022-09-30 PROCEDURE — 2580000003 HC RX 258: Performed by: NURSE PRACTITIONER

## 2022-09-30 PROCEDURE — 94640 AIRWAY INHALATION TREATMENT: CPT

## 2022-09-30 PROCEDURE — 99222 1ST HOSP IP/OBS MODERATE 55: CPT | Performed by: INTERNAL MEDICINE

## 2022-09-30 PROCEDURE — 1200000000 HC SEMI PRIVATE

## 2022-09-30 PROCEDURE — 83615 LACTATE (LD) (LDH) ENZYME: CPT

## 2022-09-30 PROCEDURE — 6360000002 HC RX W HCPCS: Performed by: NURSE PRACTITIONER

## 2022-09-30 PROCEDURE — 94664 DEMO&/EVAL PT USE INHALER: CPT

## 2022-09-30 PROCEDURE — 80053 COMPREHEN METABOLIC PANEL: CPT

## 2022-09-30 PROCEDURE — 6370000000 HC RX 637 (ALT 250 FOR IP): Performed by: STUDENT IN AN ORGANIZED HEALTH CARE EDUCATION/TRAINING PROGRAM

## 2022-09-30 PROCEDURE — 85045 AUTOMATED RETICULOCYTE COUNT: CPT

## 2022-09-30 PROCEDURE — 2700000000 HC OXYGEN THERAPY PER DAY

## 2022-09-30 PROCEDURE — 83010 ASSAY OF HAPTOGLOBIN QUANT: CPT

## 2022-09-30 PROCEDURE — 97530 THERAPEUTIC ACTIVITIES: CPT

## 2022-09-30 PROCEDURE — 99232 SBSQ HOSP IP/OBS MODERATE 35: CPT | Performed by: INTERNAL MEDICINE

## 2022-09-30 PROCEDURE — 84100 ASSAY OF PHOSPHORUS: CPT

## 2022-09-30 PROCEDURE — 83880 ASSAY OF NATRIURETIC PEPTIDE: CPT

## 2022-09-30 PROCEDURE — APPSS45 APP SPLIT SHARED TIME 31-45 MINUTES: Performed by: NURSE PRACTITIONER

## 2022-09-30 PROCEDURE — 82962 GLUCOSE BLOOD TEST: CPT

## 2022-09-30 PROCEDURE — 94761 N-INVAS EAR/PLS OXIMETRY MLT: CPT

## 2022-09-30 PROCEDURE — 85025 COMPLETE CBC W/AUTO DIFF WBC: CPT

## 2022-09-30 PROCEDURE — 82248 BILIRUBIN DIRECT: CPT

## 2022-09-30 RX ORDER — INSULIN LISPRO 100 [IU]/ML
12 INJECTION, SOLUTION INTRAVENOUS; SUBCUTANEOUS
Status: DISCONTINUED | OUTPATIENT
Start: 2022-09-30 | End: 2022-10-03

## 2022-09-30 RX ORDER — IPRATROPIUM BROMIDE AND ALBUTEROL SULFATE 2.5; .5 MG/3ML; MG/3ML
1 SOLUTION RESPIRATORY (INHALATION) EVERY 4 HOURS
Status: DISCONTINUED | OUTPATIENT
Start: 2022-09-30 | End: 2022-10-01

## 2022-09-30 RX ORDER — INSULIN GLARGINE 100 [IU]/ML
35 INJECTION, SOLUTION SUBCUTANEOUS NIGHTLY
Status: DISCONTINUED | OUTPATIENT
Start: 2022-09-30 | End: 2022-10-02

## 2022-09-30 RX ORDER — IPRATROPIUM BROMIDE AND ALBUTEROL SULFATE 2.5; .5 MG/3ML; MG/3ML
1 SOLUTION RESPIRATORY (INHALATION) EVERY 4 HOURS
Status: DISCONTINUED | OUTPATIENT
Start: 2022-09-30 | End: 2022-09-30

## 2022-09-30 RX ORDER — FUROSEMIDE 10 MG/ML
20 INJECTION INTRAMUSCULAR; INTRAVENOUS ONCE
Status: COMPLETED | OUTPATIENT
Start: 2022-09-30 | End: 2022-09-30

## 2022-09-30 RX ADMIN — IPRATROPIUM BROMIDE AND ALBUTEROL SULFATE 1 AMPULE: .5; 2.5 SOLUTION RESPIRATORY (INHALATION) at 19:42

## 2022-09-30 RX ADMIN — ATORVASTATIN CALCIUM 80 MG: 40 TABLET, FILM COATED ORAL at 20:04

## 2022-09-30 RX ADMIN — MORPHINE SULFATE 2 MG: 2 INJECTION, SOLUTION INTRAMUSCULAR; INTRAVENOUS at 13:09

## 2022-09-30 RX ADMIN — PANTOPRAZOLE SODIUM 20 MG: 20 TABLET, DELAYED RELEASE ORAL at 06:16

## 2022-09-30 RX ADMIN — MORPHINE SULFATE 2 MG: 2 INJECTION, SOLUTION INTRAMUSCULAR; INTRAVENOUS at 04:01

## 2022-09-30 RX ADMIN — TAMSULOSIN HYDROCHLORIDE 0.4 MG: 0.4 CAPSULE ORAL at 20:04

## 2022-09-30 RX ADMIN — PIPERACILLIN AND TAZOBACTAM 4500 MG: 4; .5 INJECTION, POWDER, LYOPHILIZED, FOR SOLUTION INTRAVENOUS at 20:03

## 2022-09-30 RX ADMIN — IPRATROPIUM BROMIDE AND ALBUTEROL SULFATE 1 AMPULE: .5; 2.5 SOLUTION RESPIRATORY (INHALATION) at 23:25

## 2022-09-30 RX ADMIN — INSULIN LISPRO 10 UNITS: 100 INJECTION, SOLUTION INTRAVENOUS; SUBCUTANEOUS at 08:33

## 2022-09-30 RX ADMIN — SODIUM CHLORIDE, PRESERVATIVE FREE 10 ML: 5 INJECTION INTRAVENOUS at 20:04

## 2022-09-30 RX ADMIN — FUROSEMIDE 20 MG: 10 INJECTION, SOLUTION INTRAVENOUS at 10:31

## 2022-09-30 RX ADMIN — HYDROCODONE BITARTRATE AND ACETAMINOPHEN 1 TABLET: 5; 325 TABLET ORAL at 12:28

## 2022-09-30 RX ADMIN — MORPHINE SULFATE 2 MG: 2 INJECTION, SOLUTION INTRAMUSCULAR; INTRAVENOUS at 08:37

## 2022-09-30 RX ADMIN — VALACYCLOVIR HYDROCHLORIDE 500 MG: 500 TABLET, FILM COATED ORAL at 08:31

## 2022-09-30 RX ADMIN — INSULIN GLARGINE 35 UNITS: 100 INJECTION, SOLUTION SUBCUTANEOUS at 20:40

## 2022-09-30 RX ADMIN — FINASTERIDE 5 MG: 5 TABLET, FILM COATED ORAL at 20:04

## 2022-09-30 RX ADMIN — PIPERACILLIN AND TAZOBACTAM 4500 MG: 4; .5 INJECTION, POWDER, LYOPHILIZED, FOR SOLUTION INTRAVENOUS at 04:06

## 2022-09-30 RX ADMIN — PIPERACILLIN AND TAZOBACTAM 4500 MG: 4; .5 INJECTION, POWDER, LYOPHILIZED, FOR SOLUTION INTRAVENOUS at 12:33

## 2022-09-30 RX ADMIN — SODIUM CHLORIDE, PRESERVATIVE FREE 10 ML: 5 INJECTION INTRAVENOUS at 08:31

## 2022-09-30 RX ADMIN — MORPHINE SULFATE 2 MG: 2 INJECTION, SOLUTION INTRAMUSCULAR; INTRAVENOUS at 17:19

## 2022-09-30 RX ADMIN — ENOXAPARIN SODIUM 40 MG: 100 INJECTION SUBCUTANEOUS at 20:03

## 2022-09-30 RX ADMIN — IPRATROPIUM BROMIDE AND ALBUTEROL SULFATE 1 AMPULE: .5; 2.5 SOLUTION RESPIRATORY (INHALATION) at 11:48

## 2022-09-30 RX ADMIN — IPRATROPIUM BROMIDE AND ALBUTEROL SULFATE 1 AMPULE: .5; 2.5 SOLUTION RESPIRATORY (INHALATION) at 16:09

## 2022-09-30 RX ADMIN — HYDROCODONE BITARTRATE AND ACETAMINOPHEN 1 TABLET: 5; 325 TABLET ORAL at 06:16

## 2022-09-30 ASSESSMENT — PAIN DESCRIPTION - LOCATION
LOCATION: RIB CAGE
LOCATION: ABDOMEN;CHEST
LOCATION: ABDOMEN;CHEST
LOCATION: RIB CAGE
LOCATION: CHEST
LOCATION: RIB CAGE;SHOULDER
LOCATION: ABDOMEN;CHEST

## 2022-09-30 ASSESSMENT — PAIN DESCRIPTION - DESCRIPTORS
DESCRIPTORS: ACHING

## 2022-09-30 ASSESSMENT — PAIN SCALES - GENERAL
PAINLEVEL_OUTOF10: 9
PAINLEVEL_OUTOF10: 2
PAINLEVEL_OUTOF10: 2
PAINLEVEL_OUTOF10: 9
PAINLEVEL_OUTOF10: 4
PAINLEVEL_OUTOF10: 10

## 2022-09-30 ASSESSMENT — PAIN DESCRIPTION - ORIENTATION
ORIENTATION: RIGHT
ORIENTATION: RIGHT;UPPER
ORIENTATION: RIGHT

## 2022-09-30 ASSESSMENT — PAIN DESCRIPTION - PAIN TYPE: TYPE: ACUTE PAIN

## 2022-09-30 NOTE — PROGRESS NOTES
Occupational Therapy      Occupational Therapy Treatment Note    Name: Jose Cruz Cheek MRN: [id number] :   1950   Date:  2022   Admission Date: 2022 Room:  -A     Primary Problem: Sepsis, Pneumonia, Traumatic rhabdomyolysis     Restrictions/Precautions: General Precautions, Fall Risk, Droplet Isolation, 2L o2, Telemetry, Pulse Ox, BP cuff, IV, Bed/chair alarm    Communication with other providers: PT Nannette    Subjective:  Patient states: \"Oh, I've never been this weak in my whole life! \"  Pain: Pt reported 10/10 pain in Rt rib cage    Objective:    Observation: Pt received in supine upon OT arrival. Agreeable to treatment with encouragement and education. Objective Measures: Stable vitals throughout session    Treatment, including education:  Therapeutic Activity Training:   Therapeutic activity training was instructed today. Cues were given for safety, sequence, UE/LE placement, awareness, and balance. Pt received in supine upon OT arrival. Pt re-educated on role of OT, POC, and importance of OOB activity. Pt transferred supine to sitting EOB SBA with HOB elevated to 40' and increased time/effort. Pt able to sit at EOB level SBA with good static sitting balance. Pt adamantly refused to wear socks this date, even for transfer to chair. Pt completed sit to stand transfer from bed CGA with min cues for safe hand placement. Pt ambulated ~8 ft bed to chair CGA to Min A with no AD. Pt with unsteady gait and reaching for environmental obstacles for support, but adamantly refusing use of RW. Pt transferred stand to sit to chair CGA with min cues for reaching back with arms. Pt doffed old hospital gown at chair level and donned clean gown. Pt stood from chair CGA with cues for safe hand placement for removal of old gown underneath bottom. Pt returned to chair CGA. Pt completed grooming task of brushing hair seated at chair level with supervision/setup.  Pt left positioned for comfort in chair with all lines intact, all needs within reach, and chair alarm on. Assessment / Impression:    Patient's tolerance of treatment: Fair/Poor  Adverse Reaction: Poor activity tolerance, self-limiting behaviors   Significant change in status and impact: Engaged in less OOB activity this date relative to evaluation  Barriers to improvement: Self-limiting behaviors, Non-compliance, Pain    Plan for Next Session:    Continue per OT POC. Continue to recommend SNF for rehab at discharge.     Time in: 1355  Time out: 1408  Timed treatment minutes: 13  Total treatment time: 13    Electronically signed by:    Gaynell Bence, OTR/L, [address] [place] [id number]

## 2022-09-30 NOTE — CONSULTS
[facility]               [address] [facility], [address] [facility]                                  CONSULTATION    PATIENT NAME: Michael Leyva                     :        1950  MED REC NO:   [id number]                          ROOM:         ACCOUNT NO:   [de-identified]                           ADMIT DATE: 2022  PROVIDER:     Sara Hernandez MD    CONSULT DATE:  2022    CHIEF COMPLAINTS:  1. Right upper quadrant abdominal pain, etiology to be determined. 2.  History of CLL with abnormal CAT scan, rule out metastatic  disease/lymphoma. HISTORY OF PRESENT ILLNESS:  As follows. The patient is a 78-year-old  white gentleman, patient known to me with past medical history  significant for diabetes mellitus, coronary artery disease, history of  CLL diagnosed approximately 16 years ago, being followed up by Dr. Ricardo Martinez; migraine headaches; hyperlipidemia; multidrug resistant  organism; osteoarthritis; history of colon polyps, who was admitted to  the hospital on 2022 with right upper quadrant abdominal pain,  generalized weakness, fatigue, and cough. The patient was diagnosed to  have sepsis due to pneumonia. The patient also has been complaining of  right upper quadrant abdominal pain with nausea, but no vomiting,  hematemesis, melena, or hematochezia. The patient's LFTs are within  normal limits except a total bilirubin of 1.2. A CAT scan of the  abdomen and pelvis was done, which showed marked hepatomegaly with  extensive intra-abdominal lymphadenopathy. Ultrasound of the right  upper quadrant was done as well, which showed gallbladder wall  thickening with possible adenomyomatosis. Also, a hyperechoic focus was  noted in the liver and masses compatible with lymphadenopathy were seen  as well. The patient is hemodynamically stable.   The patient did have  an EGD in the remote past and also has had about three or four  colonoscopies done. The last colonoscopy was on 09/20/2022, and the  patient had two colon polyps removed. A 5-mm polyp above the ileocecal  valve, which was tubular adenoma and 5-mm polyp in the proximal sigmoid  colon, which was tubular adenoma along with diverticulosis coli and  hemorrhoids. The patient is hemodynamically stable and the patient's  global condition however is extremely poor, and the patient has severe  protein-calorie malnutrition also and his DNR status is DNR-CCA. REVIEW OF SYSTEMS:  CENTRAL NERVOUS SYSTEM EXAMINATION:  The patient denies headache or  focal sensorimotor symptoms. CARDIOVASCULAR SYSTEM:  No history of chest pain, but the patient  complains of shortness of breath, but no leg swelling. GENITOURINARY SYSTEM:  No history of dysuria, pyuria, or hematuria. MUSCULOSKELETAL SYSTEM:  The patient complains of generalized weakness. RESPIRATORY SYSTEM:  The patient has cough, shortness of breath, but no  hemoptysis, fever, or chills. PAST MEDICAL HISTORY:  Significant for history of coronary artery  disease, diabetes mellitus, CLL diagnosed approximately 16 years ago,  being followed up by Dr. Zach Euceda and migraine headaches and  osteoarthritis and colon polyps. FAMILY HISTORY:  The patient's brother has been diagnosed with carcinoma  of the colon. MEDICATIONS:  Please refer to chart. SOCIOECONOMIC HISTORY:  The patient is a former smoker. There is no  history of EtOH abuse and/or recreational drug use. SURGERIES:  The patient has had tonsillectomy and adenoidectomy, eye  cataract surgery, and surgery on his left knee and left ankle, and also  had a tunneled venous port placed. ALLERGIES:  No known drug allergies. PHYSICAL EXAMINATION:  GENERAL:  Physical examination shows a 78-year-old white male of thin  build and poor nutritional status, who is lying flat in bed, slightly  short of breath and complaining of mild upper abdominal pain.   He is  awake, alert, and oriented and pleasant to talk with. VITAL SIGNS:  Stable. HEENT:  Examination shows skull to be atraumatic. NECK:  Supple. CHEST:  Shows diminished breath sounds. HEART:  S1, S2 are normal.  ABDOMEN:  Soft, slightly distended with mild tenderness in the right  upper quadrant with no guarding or rigidity. Liver and spleen are not  palpable. RECTAL:  Exam is deferred. CNS:  Exam shows the patient to be awake, alert, and oriented. There  are no focal sensorimotor sign. MUSCULOSKELETAL SYSTEM:  Exam shows wasting of the muscles of the  extremities. LABORATORY DATA:  The labs drawn during the present hospitalization  comprised of Chem profile, which shows a sodium of 127 and chloride of  95. LFTs showed total bilirubin of 1.2, and the transaminases are  within normal limits. CBC shows a WBC count of 28.2 and hemoglobin of  12.2 and platelet count is 54,318, and the patient's INR is 1.03. IMPRESSIONS:  3  A 71-year-old white gentleman with multiple comorbidities including  CLL, who presents with constitutional symptoms of generalized weakness,  fatigue, shortness of breath, is noted to have sepsis due to pneumonia. 2.  History of CLL with massive intra-abdominal adenopathy and  hepatomegaly, rule out metastatic disease/lymphoma. 3.  Right upper quadrant abdominal pain, most likely secondary to  hepatomegaly and lymphadenopathy. 4.  Doubt acute cholecystitis at this point. 5.  Severe protein-calorie malnutrition. RECOMMENDATIONS:  1.  I agree with present management. 2.  We will check CBC, Chem profile, amylase, lipase in a.m. 3.  If the patient continues to be symptomatic, we will get a HIDA scan  to rule out acute cholecystitis. 4.  The overall prognosis remains guarded in view of the patient's  multiple comorbidities and extensive possible metastatic disease and  underlying sepsis.   5.  The case and plan have been discussed in detail with the patient,  and all his questions have been answered. 6.  The case and plan were also discussed with the patient's hospitalist  earlier, Dr. Alejandro Ann.         Charity Montes MD    D: 09/30/2022 11:23:10       T: 09/30/2022 11:26:54     [email or web address]  Job#: [id number]     Doc#: [id number]    CC:  Kirk Clements MD

## 2022-09-30 NOTE — PROGRESS NOTES
Physical Therapy    Physical Therapy Treatment Note  Name: Mendel Menjivar MRN: [id number] :   1950   Date:  2022   Admission Date: 2022 Room:  -A   Restrictions/Precautions:          general precautions, falls   Communication with other providers:  OT   Subjective:  Patient states:  \"I know I need to do this but it hurts\"   Pain:   Location, Type, Intensity (0/10 to 10/10):  10/10 right side of back and shoulders (shoulders less bothersome today)   Objective:    Observation:    Supine in bed. Shallow breaths during session due to inc pain when taking a deep breath. Overall limited tolerance and participation today due to pain and generalized fatigue. Adamant he did not want to use a walker or wear socks today. Objective Measures:    2L, stable vitals   Treatment, including education/measures:  Supine to sit: SBA for safety, HOB slightly elevated  Sit to stand: CGA for safety from EOB and recliner with inc effort from lower recliner chair   Stand to sit: CGA for safety to recliner   Step pivot: CGA to Ai for steadying and safety, no AD   Ambulation: ~7ft without AD though was reaching for external support throughout distance. Very fwd flexed posture, shuffled gait, and dec ryan. CGA to Ai for steadying and safety. Educated pt on POC, role of PT, DME use (RW), progression with mobility, and discharge. Assessment / Impression:    Patient's tolerance of treatment:  fair   Adverse Reaction: no  Significant change in status and impact:  no  Barriers to improvement:  activity tolerance, strength, balance, pain   Plan for Next Sessions:     Activity tolerance, strength, balance, gait, transfers, bed mobility, stairs when appropriate   Time in:  1356  Time out:  1408  Timed treatment minutes:  12  Total treatment time:  12 minutes     Previously filed items:  Social/Functional History  Lives With: Alone (plans on staying at son's house at discharge)  Type of Home: [address] [place] [address]  Layout: One level (2nd floor apartment with no elevator)  Home Access: Stairs to enter with rails  Entrance Stairs - Number of Steps:  Full flight to 2nd floor apartment  Entrance Stairs - Rails: Both  Bathroom Shower/Tub: Tub/Shower unit  Bathroom Toilet: Standard  Bathroom Accessibility: Accessible  Home Equipment: [employer]  ADL Assistance: Independent  Homemaking Assistance: Independent  Homemaking Responsibilities: Yes  Ambulation Assistance: Independent (uses no AD)  Transfer Assistance: Independent  Active : Yes  Occupation: On disability        Short Term Goals  Time Frame for Short Term Goals: 2 weeks  Short Term Goal 1: Pt will perform sit><supine Panfilo  Short Term Goal 2: Pt will transfer SBA  Short Term Goal 3: Pt will ambulate 150ft with LRAD SBA  Short Term Goal 4: Pt will ascend/descend 1 flight of stairs, CGA with 1-2 rails    Electronically signed by:    Elba Lockwood [id number]  9/30/2022, 2:26 PM

## 2022-09-30 NOTE — PROGRESS NOTES
Physician Progress Note      PATIENT:               Jermaine Cameron  CSN #:                  [id number]  :                       1950  ADMIT DATE:       2022 11:19 AM  DISCH DATE:  RESPONDING  PROVIDER #:        Lisa Gardner MD          QUERY TEXT:    Pt admitted with Sepsis and has CHF documented. If possible, please document   in progress notes and discharge summary further specificity regarding the type   and acuity of CHF:    The medical record reflects the following:  Risk Factors: CHF  Clinical Indicators: \"will try lasix 20 mg IV given elevated proBNP, grade I   diastolic CHF and persistent hypoxia, LE edema\", \"Concern for new onset of   CHF, No previous echocardiogram. Having FROST, trace bilateral edema. proBNP   13,987 on admission\" documented in the &  PN, Echo reviewed:  normal   EF  55-60%; no wall motion abnormality: not much improvement with lasix  Treatment:  lasix 20 mg IV , ECHO, CARDS consult    Thank you Kings Jimenez RN, CDS ([phone])  Options provided:  -- Acute on Chronic Diastolic CHF/HFpEF  -- Acute Diastolic CHF/HFpEF  -- Other - I will add my own diagnosis  -- Disagree - Not applicable / Not valid  -- Disagree - Clinically unable to determine / Unknown  -- Refer to Clinical Documentation Reviewer    PROVIDER RESPONSE TEXT:    Provider is clinically unable to determine a response to this query. unable to conclude yet.     Query created by: Luis Alfredo Berman on 2022 1:27 PM      Electronically signed by:  Lisa Gardner MD 2022 2:00 PM

## 2022-09-30 NOTE — CONSULTS
ONCOLOGY HEMATOLOGY CARE ([facility])  CONSULTATION REPORT    2022  5:04 AM    Patient:    Susie Rea  : 1950   71 y.o. MRN: [id number]  Admitted: 2022 11:19 AM ATT: Jamar Perera MD   -  AdmitDx: Lymphadenopathy [R59.1]  Rhinovirus [B34.8]  Elevated troponin [R77.8]  Elevated CK [R74.8]  Acute respiratory failure with hypoxia ([address] [J96.01]  H/O chronic lymphocytic leukemia [Z85.6]  Severe sepsis ([address] [A41.9, R65.20]  Traumatic rhabdomyolysis, initial encounter ([facility] [address] [address] Leeas Cuff. 6XXA]  Acute congestive heart failure, unspecified heart failure type ([address] [I50.9]  Sepsis, due to unspecified organism, unspecified whether acute organ dysfunction present ([address] [A41.9]  PCP: Suha Diana MD    Reason for Consult: H/O CLL    Requesting Physician:  Jamar Perera MD      History Obtained From:  Patient and review of all records      [address]    Chief Complaint   Patient presents with    Fatigue    Extremity Weakness     Reports BLE weakness and fatigue since colonoscopy last week. Explains felt like legs were going to give out around 0230 AM and fell down on flow in sitting position. Denies LOC. Denies hitting head. Shortness of Breath     Has had for 1 week. PCP placed on Zpack and Steroid taper. HISTORY OF PRESENT ILLNESS   Susie Rea is a 71 y.o. male who was admitted with complaints of extreme weakness and tiredness and not been able to even get up from the bed. Reported increasing shortness of breath. Productive cough consisting of green sputum. No hemoptysis. Reported chest discomfort as well. Reported back pain. Denied any bleeding. No fever. 2022 CT scan of the chest, abdomen pelvis:  No evidence of pulmonary embolism. Right greater than left lower lobe pneumonia.        Diffuse hilar and mediastinal lymphadenopathy, concerning for malignancy with   history of CLL, also likely progressed from prior exam.       Diffuse peritoneal and retroperitoneal lymphadenopathy in the upper abdomen   most significant near the cassi hepatis and IVC where there is a large mass   which is favored to be a lymph node measuring up to 6.2 x 3.5 cm, also likely   progressed from exam.       Splenomegaly, could also be related to malignancy. Mild gallbladder wall thickening and pericholecystic fluid, could be related   to passive congestion or mass effect from adjacent lymphadenopathy. There is   mild intrahepatic bile duct dilatation. 9/29/2022 ultrasound of the abdomen:  1. Hepatomegaly and hepatic steatosis. 2. Masses most compatible with lymphadenopathy in the cassi hepatis, better   seen on recent CT. 3. Hyperechoic focus in the liver could be focal fatty infiltration,   hemangioma or other mass. This is too small to characterize on recent CT. Correlate with MRI hepatic mass protocol if indicated. 4. Gallbladder wall thickening with possible adenomyomatosis. 9/29/2022 CBC with WBC of 28.2 hemoglobin of 9.9 hematocrit 32.1 MCV of 80 platelets of 41. CMP with sodium of 133 potassium of 4.2 BUN of 26 creatinine 1 calcium 7.4 total bilirubin of 1.9    But note LDH was 907 9/22/2022    BACKGROUND ONCOLOGY HISTORY:  B-cell CLL, stage I with conversion from good to poor prognostic factors, with 17p deletion:   His diagnosis of CLL since 2007.   17p deletion since 2015. Initial treatment with Leukeran from 2000 to 2011 intermittently and FCR regimen in 2012, bendamustine/Rituxan in 2014. Idelalisib from January 2015 until June of 2016. On ibrutinib since September 2016. Ibrutinib therapy seems to be helping him, overall improvement. Was Only able to tolerate 140 mg p.o. every other day  He was off for a couple months During the fall 2017  Resumed Ibrutinib and  on 140 mg po daily. Plan to Continue current dose as no major B sx, stable WBC.   CT imaging in July 2022 with slightly increasing size of intraabdominal LN. plan was to monitor. But note declining platelet count, could be secondary ibrutinib. Hold ibrutinib for now until further investigation.   PAST MEDICAL HISTORY    Past Medical History:   Diagnosis Date    Arthritis     knees, Rt hand/wrist    CAD (coronary artery disease)     Cancer (HCC)     CLL--Sees Dr Marii Robles    Diabetes mellitus [facility])     History of blood transfusion     no reaction    Hx of motion sickness     Hyperlipidemia     MDRO (multiple drug resistant organisms) resistance     abscess on buttock 10yrs ago    Migraine     last one summer 2016    Pneumonia 2016    Wears dentures     full upper--lower dentures    Wears glasses        SURGICAL HISTORY    Past Surgical History:   Procedure Laterality Date    CARDIAC CATHETERIZATION  1990's    x 2  last showed \"inflammation of heart\"    COLONOSCOPY  2010    DENTAL SURGERY      all teeth extracted     EYE SURGERY Right 08/2016    Cataract removal    FRACTURE SURGERY Left 1990's    left ankle plate and screws    KNEE SURGERY  1970    left    OTHER SURGICAL HISTORY Left 01/18/2017    groin excision    TONSILLECTOMY  late 1960's    age 13    TUNNELED VENOUS PORT PLACEMENT  2013    Right upper chest       Current Medications:    Medications    Scheduled Medications:    insulin lispro  10 Units SubCUTAneous TID WC    atorvastatin  80 mg Oral Nightly    finasteride  5 mg Oral Nightly    insulin glargine  30 Units SubCUTAneous Nightly    pantoprazole  20 mg Oral QAM AC    tamsulosin  0.4 mg Oral Nightly    valACYclovir  500 mg Oral Daily    sodium chloride flush  5-40 mL IntraVENous 2 times per day    enoxaparin  40 mg SubCUTAneous Nightly    piperacillin-tazobactam  4,500 mg IntraVENous Q8H    insulin lispro  0-8 Units SubCUTAneous TID WC    insulin lispro  0-4 Units SubCUTAneous Nightly     PRN Medications: morphine, HYDROcodone 5 mg - acetaminophen, albuterol sulfate HFA, sodium chloride flush, sodium chloride, acetaminophen **OR** acetaminophen, glucose, dextrose bolus **OR** dextrose bolus, glucagon (rDNA), dextrose    Allergies:  Patient has no known allergies. FAMILY HISTORY    Family History   Problem Relation Age of Onset    Diabetes Mother     High Blood Pressure Mother     Heart Disease Father     Other Sister         liver problems    Early Death Brother     Asthma Maternal Uncle     Colon Cancer Brother        SOCIAL HISTORY    Social History     Socioeconomic History    Marital status: Single     Spouse name: None    Number of children: None    Years of education: None    Highest education level: None   Tobacco Use    Smoking status: Former     Packs/day: 1.00     Years: 20.00     Pack years: 20.00     Types: Cigarettes     Start date: 10/2/1965     Quit date: 1987     Years since quittin.7    Smokeless tobacco: Never   Vaping Use    Vaping Use: Never used   Substance and Sexual Activity    Alcohol use: No    Drug use: No    Sexual activity: Not Currently       Review of systems:  Constitutional: No fever, No chills, No night sweats. Energy level poor  Eyes:  No diplopia, No transient or permanent loss of vision, No scotomata. ENT / Mouth:  No epistaxis, No dysphagia, No hoarseness, No oral ulcers, No gingival bleeding. No sore throat, No postnasal drip, No nasal drip, No mouth pain, No sinus pain, No tinnitus, Normal hearing. Cardiovascular:  No chest pain, No palpitations, No syncope, No upper extremity edema, No lower extremity edema, No calf discomfort.   Respiratory: HPI  Gastrointestinal:  No abdominal pain, No abdominal cramping, No  Musculoskeletal: Generalized aches and pains    PHYSICAL EXAM      Vitals: BP (!) 165/90 Comment: pt in pain, pain medication administered  Pulse 73   Temp 98.2 °F (36.8 °C)   Resp 20   Ht 6' (1.829 m)   Wt 173 lb 4.5 oz (78.6 kg)   SpO2 92%   BMI 23.50 kg/m²     CONSTITUTIONAL: awake, alert, Ill  appearing  EYES: But no icterus  ENT: ATNC  NECK: No JVD  HEMATOLOGIC/LYMPHATIC: no cervical, supraclavicular or axillary lymphadenopathy   LUNGS: Bilateral extensive coarse breath sounds  CARDIOVASCULAR: s1s2 rrr no murmurs  ABDOMEN: soft ntnd bs pos  NEUROLOGIC: GI  SKIN: Ecchymosis  EXTREMITIES: no LE edema bilaterally      LABORATORY RESULTS  CBC:   Recent Labs     09/28/22  1210 09/29/22  0430   WBC 76.5* 28.2*   HGB 11.6* 9.9*   PLT 69* 41*     BMP:    Recent Labs     09/28/22  1210 09/28/22  1623 09/29/22  0430   *  --  133*   K 5.0  --  4.2   CL 97*  --  99   CO2 19*  --  25   BUN 29*  --  26*   CREATININE 1.1  --  1.0   GLUCOSE 366* 370 334*     Hepatic:   Recent Labs     09/28/22  1210 09/29/22  0430   AST 80* 34   ALT 30 27   BILITOT 1.6* 0.9   ALKPHOS 138* 116     INR: No results for input(s): INR in the last 72 hours. RADIOLOGY REPORTS  Reviewed    IMPRESSION & RECOMMENDATIONS:  History of CLL, ibrutinib was being held. Now with worsening leukocytosis, anemia and thrombocytopenia. Could be secondary to infection. Rule out any hemolysis. Flow cytometry pending. Further recommendations pending above. Note CAT scan with progressive lymphadenopathy. Will consider further treatment with immunotherapy with or without chemotherapy as outpatient after infection control. Continue other medical care    ECOG Performance Status (ECOG Scale 0-4): 2-3    This plan was discussed with the patient and he verbalized understanding. We will continue to follow the patient. Thank you for allowing us to participate in the care of this patient.      [address]

## 2022-09-30 NOTE — PROGRESS NOTES
V2.0  [facility] Hospitalist Progress Note      Name:  Charan Brunson /Age/Sex: 1950  (71 y.o. male)   MRN & CSN:  [id number] & [id number] Encounter Date/Time: 2022 1:25 PM EDT    Location:  -A PCP: Abiodun Castaneda MD       Hospital Day: 3      Subjective:     Chief Complaint: Fatigue, Extremity Weakness (Reports BLE weakness and fatigue since colonoscopy last week. Explains felt like legs were going to give out around 0230 AM and fell down on flow in sitting position. Denies LOC. Denies hitting head. ), and Shortness of Breath (Has had for 1 week. PCP placed on Zpack and Steroid taper.)  Yesterday evening patient was complaining of right upper quadrant pain radiating to his right shoulder severe 10 out of 10. He states this pain is similar to the episode he has been having in the past week. He received topical but with no help. Morphine as needed ordered. Given the CT finding and the concern for gallbladder compressibility with adjacent mass GI was consulted. No acute events overnight. Patient still feels shortness of breath . Sating 92% on 2L. He still complains of episodes of right upper quadrant pain but controlled with the morphine PRN. Denies lightheadedness, dizziness, fever, night sweats, chills, chest pain, cough,  palpitations,  nausea, vomiting, diarrhea, dysuria. Assessment and Plan:   Charan Brunson is a 71 y.o. male with pmh of CLL, DM II, HTN, BPH who presents with Severe sepsis ([facility] [address]      Plan:  #Severe sepsis secondary to super imposed community acquired pneumonia in the setting of rhinovirus  Tachycardia, tachypnea, leukocytosis and lactic acidosis. , RR 37, WBC 76.5, Lactic acid 3.1. CT A/P right greater than left lower lobe pneumonia. S/p sepsis IVF. Strep, legionella negative. Resp viral panel + for rhinovirus.    - continue Zosyn for now  - follow up blood cx, resp cx  - will try lasix 20 mg IV given elevated proBNP, grade I diastolic CHF and persistent hypoxia, LE edema      #hypoxia  Likely secondary to CAP, rhinovirus, possible CHF  -  currently on 2L sating 92%; slightly worse compared to yesterday   - Wean oxygen for goal sats greater than 92%  - management as above    #Diabetes mellitus type 2 with hyperglycemia  Glucose 157 this AM.   - increase home Lantus to 35U  - MDSSI  - increase to Lispro 12U TID w meals     #RUQ abd pain. Sharp in nature, not associated with food. CT abd 9/28 revealed mild gallbladder thickening and pericholecystic fluid could be related to passive congestion versus mass-effect from adjacent lymphadenopathy. RUQ U/S revealed masses compatible with lymphadenopathy and left cassi hepatis, hyperechoic focus in the liver, could be hemangioma versus other mass, gallbladder wall thickening with possible adenomyomatosis. Bilirubin elevated on admission 1.6; today 1.2 but other liver labs within normal range  Symptoms likely related to mass effect  - GI consulted; appreciate recs  - await hem/onc recs    #Concern for new onset of CHF   No previous echocardiogram. Having FROST, trace bilateral edema. proBNP [id number] on admission. ECHO revealed EF 07-31, grade 1 diastolic, heavily calcified aortic valve without stenosis, thickened mitral valve, in addition to pleural effusion and small circumferential torrential pericardial effusion   - cardiology consulted; appreciate recs  - will try lasix as above  - intake/output, daily weights    #hyponatremia  Na 129 corrected; concern for volume overload. - lasix as above  - monitor    #Rhabdomyolysis - resolved  Patient was down for unknown amount of time. Initial CK 3453 down to 500's. #Elevated troponin - resolved  - 0.024 on admission. Trended down. Likely demand         #CLL - with progression  Known diagnosis, follows with hematology oncology.   WBC 76.5-was just 27.7 6 days ago, platelets 69, today WBC increased 34, plt 54  - Patient reports he has not been able to take chemo lately due to elevated white count and low platelets. URIC acid elevated 13, but K normal.   - CT images reviewed, shows worsening disease process  - f/u galactomannan level, and Fungitell level  - hem/onc consulted; appreciate recs    #BPH Continue Flomax and Proscar    Diet ADULT DIET; Regular; 4 carb choices (60 gm/meal); Low Fat/Low Chol/High Fiber/JOSE; Low Sodium (2 gm)   DVT Prophylaxis [x] Lovenox, []  Heparin, [] SCDs, [] Ambulation,  [] Eliquis, [] Xarelto  [] Coumadin   Code Status DNR-CCA   Disposition From: home  Expected Disposition: TBD  Estimated Date of Discharge: 1-2 days  Patient requires continued admission due to CHF, sepsis workup   Surrogate Decision Maker/ POA      Review of Systems:    Review of Systems    See above    Objective: Intake/Output Summary (Last 24 hours) at 9/30/2022 0946  Last data filed at 9/30/2022 0831  Gross per 24 hour   Intake 60 ml   Output 1175 ml   Net -1115 ml        Vitals:   Vitals:    09/30/22 0829   BP: (!) 158/118   Pulse: 75   Resp: 19   Temp: 97.7 °F (36.5 °C)   SpO2: 93%       Physical Exam:     General: NAD  Eyes: EOMI  ENT: neck supple  Cardiovascular: Regular rate. Respiratory: B/L Rhonchi Rt > Lt; decreased breathing sounds noted. Gastrointestinal: RUQ, epigastric tender to palpation, Soft  Genitourinary: no suprapubic tenderness  Musculoskeletal: bilateral edema LE 1+ worse compared to yesterday  Skin: warm, dry. Rt. Chest port in place  Neuro: Alert. Psych: Mood appropriate.      Medications:   Medications:    furosemide  20 mg IntraVENous Once    insulin glargine  35 Units SubCUTAneous Nightly    insulin lispro  12 Units SubCUTAneous TID WC    ipratropium-albuterol  1 ampule Inhalation Q4H    atorvastatin  80 mg Oral Nightly    finasteride  5 mg Oral Nightly    pantoprazole  20 mg Oral QAM AC    tamsulosin  0.4 mg Oral Nightly    valACYclovir  500 mg Oral Daily    sodium chloride flush  5-40 mL IntraVENous 2 times per day    enoxaparin  40 mg SubCUTAneous Nightly    piperacillin-tazobactam  4,500 mg IntraVENous Q8H    insulin lispro  0-8 Units SubCUTAneous TID WC    insulin lispro  0-4 Units SubCUTAneous Nightly      Infusions:    sodium chloride      dextrose       PRN Meds: morphine, 2 mg, Q4H PRN  HYDROcodone 5 mg - acetaminophen, 1 tablet, Q6H PRN  albuterol sulfate HFA, 2 puff, Q4H PRN  sodium chloride flush, 5-40 mL, PRN  sodium chloride, 500 mL, PRN  acetaminophen, 650 mg, Q6H PRN   Or  acetaminophen, 650 mg, Q6H PRN  glucose, 4 tablet, PRN  dextrose bolus, 125 mL, PRN   Or  dextrose bolus, 250 mL, PRN  glucagon (rDNA), 1 mg, PRN  dextrose, 1,000 mL, Continuous PRN      Labs      Recent Results (from the past 24 hour(s))   POCT Glucose    Collection Time: 09/29/22 11:40 AM   Result Value Ref Range    POC Glucose 258 (H) 70 - 99 MG/DL   Lactic Acid    Collection Time: 09/29/22 12:20 PM   Result Value Ref Range    Lactate 2.1 (HH) 0.4 - 2.0 mMOL/L   POCT Glucose    Collection Time: 09/29/22  4:44 PM   Result Value Ref Range    POC Glucose 404 (H) 70 - 99 MG/DL   POCT Glucose    Collection Time: 09/29/22  7:53 PM   Result Value Ref Range    POC Glucose 231 (H) 70 - 99 MG/DL   POCT Glucose    Collection Time: 09/30/22  6:03 AM   Result Value Ref Range    POC Glucose 157 (H) 70 - 99 MG/DL   CBC with Auto Differential    Collection Time: 09/30/22  6:40 AM   Result Value Ref Range    WBC 34.0 (HH) 4.0 - 10.5 K/CU MM    RBC 5.00 4.6 - 6.2 M/CU MM    Hemoglobin 12.2 (L) 13.5 - 18.0 GM/DL    Hematocrit 40.7 (L) 42 - 52 %    MCV 81.4 78 - 100 FL    MCH 24.4 (L) 27 - 31 PG    MCHC 30.0 (L) 32.0 - 36.0 %    RDW 19.7 (H) 11.7 - 14.9 %    Platelets 54 (L) 683 - 440 K/CU MM   Lactate Dehydrogenase    Collection Time: 09/30/22  6:40 AM   Result Value Ref Range     (H) 120 - 246 IU/L   Reticulocytes    Collection Time: 09/30/22  6:40 AM   Result Value Ref Range    Retic Ct Pct 2.7 (H) 0.2 - 2.20 %   Renal Function Panel    Collection Time: 09/30/22  8:40 AM Result Value Ref Range    Sodium 127 (L) 135 - 145 MMOL/L    Potassium 4.7 3.5 - 5.1 MMOL/L    Chloride 95 (L) 99 - 110 mMol/L    CO2 23 21 - 32 MMOL/L    Anion Gap 9 4 - 16    BUN 18 6 - 23 MG/DL    Creatinine 0.8 (L) 0.9 - 1.3 MG/DL    Glucose 237 (H) 70 - 99 MG/DL    Calcium 7.7 (L) 8.3 - 10.6 MG/DL    GFR Non-African American >60 >60 mL/min/1.73m2    GFR African American >60 >60 mL/min/1.73m2    Albumin 2.9 (L) 3.4 - 5.0 GM/DL    Phosphorus 2.1 (L) 2.5 - 4.9 MG/DL   Hepatic Function Panel    Collection Time: 09/30/22  8:40 AM   Result Value Ref Range    Albumin 2.9 (L) 3.4 - 5.0 GM/DL    Total Bilirubin 1.2 (H) 0.0 - 1.0 MG/DL    Bilirubin, Direct 0.5 (H) 0.0 - 0.3 MG/DL    Bilirubin, Indirect 0.7 0 - 0.7 MG/DL    Alkaline Phosphatase 127 40 - 129 IU/L    AST 28 15 - 37 IU/L    ALT 28 10 - 40 U/L    Total Protein 4.6 (L) 6.4 - 8.2 GM/DL        Imaging/Diagnostics Last 24 Hours   CT HEAD WO CONTRAST    Result Date: 9/28/2022  EXAMINATION: CT OF THE HEAD WITHOUT CONTRAST  9/28/2022 2:16 pm TECHNIQUE: CT of the head was performed without the administration of intravenous contrast. Automated exposure control, iterative reconstruction, and/or weight based adjustment of the mA/kV was utilized to reduce the radiation dose to as low as reasonably achievable. COMPARISON: None. HISTORY: ORDERING SYSTEM PROVIDED HISTORY: history of CLL , generalized weakness, down on floor all night TECHNOLOGIST PROVIDED HISTORY: Has a \"code stroke\" or \"stroke alert\" been called? ->No Reason for exam:->history of CLL , generalized weakness, down on floor all night Decision Support Exception - unselect if not a suspected or confirmed emergency medical condition->Emergency Medical Condition (MA) Reason for Exam: history of CLL , generalized weakness, down on floor all night FINDINGS: BRAIN/VENTRICLES: There is no acute intracranial hemorrhage, mass effect or midline shift. No abnormal extra-axial fluid collection.   The gray-white differentiation is maintained without evidence of an acute infarct. There is prominence of the ventricles and sulci due to global parenchymal volume loss. There are nonspecific areas of hypoattenuation within the periventricular and subcortical white matter, which likely represent chronic microvascular ischemic change. There vascular calcifications. ORBITS: The visualized portion of the orbits demonstrate no acute abnormality. SINUSES: Osteoneogenesis. There bilateral maxillary sinus air-fluid levels which can be seen with acute on chronic bacterial sinusitis. Mastoids are clear. Moderate severe ethmoid sinus mucosal thickening. SOFT TISSUES/SKULL: No acute abnormality of the visualized skull or soft tissues. Chronic microvascular disease. Negative acute bleed, midline shift or mass effect. Findings worrisome for acute on chronic maxillary sinus disease. Please correlate exam findings. CT ABDOMEN PELVIS W IV CONTRAST Additional Contrast? None    Result Date: 9/28/2022  EXAMINATION: CTA OF THE CHEST; CT OF THE ABDOMEN AND PELVIS WITH CONTRAST 9/28/2022 2:17 pm TECHNIQUE: CTA of the chest was performed after the administration of intravenous contrast.  Multiplanar reformatted images are provided for review. MIP images are provided for review. Automated exposure control, iterative reconstruction, and/or weight based adjustment of the mA/kV was utilized to reduce the radiation dose to as low as reasonably achievable.; CT of the abdomen and pelvis was performed with the administration of intravenous contrast. Multiplanar reformatted images are provided for review. Automated exposure control, iterative reconstruction, and/or weight based adjustment of the mA/kV was utilized to reduce the radiation dose to as low as reasonably achievable.  COMPARISON: July 28, 2022 HISTORY: ORDERING SYSTEM PROVIDED HISTORY: Tachycardic, short of breath, history of CLL TECHNOLOGIST PROVIDED HISTORY: Reason for exam:->Tachycardic, short of breath, history of CLL Decision Support Exception - unselect if not a suspected or confirmed emergency medical condition->Emergency Medical Condition (MA) Reason for Exam: Tachycardic, short of breath, history of CLL FINDINGS: Pulmonary Arteries: Pulmonary arteries are adequately opacified for evaluation. No evidence of intraluminal filling defect to suggest pulmonary embolism. Main pulmonary artery is normal in caliber. Mediastinum: Mediastinal and hilar lymphadenopathy bilaterally. Subcarinal lymph node measures up to 3.1 cm in short axis. Right hilar lymph node measures at least 2.1 cm in short axis. This appears worse from exam on July 28, 2022. Largest subcarinal lymph node on that exam measured up to 1.9 cm in short axis. Normal caliber thoracic aorta without acute finding. The heart is not enlarged. No pericardial effusion. Lungs/pleura: Consolidations in the right lower lobe and minimally in the left lower lobe concerning for pneumonia. No pleural effusion or pneumothorax. Background of mild emphysema. Bronchial thickening in the lower lobes. Soft Tissues/Bones: No acute bone or soft tissue abnormality. CT abdomen and pelvis Liver: Normal appearance of the liver. Gallbladder: Some pericholecystic fluid and mild gallbladder wall thickening. Perhaps mild intrahepatic bile duct dilatation. There are findings suspicious for a mass near the charlotte hepatis measuring 3.5 x 6.2 cm. Additional charlotte hepatis lymph nodes identified near the pancreas and IVC. Spleen: The spleen is enlarged. Measures at least 18 cm. Pancreas: No inflammatory changes or fluid collections. Charlotte hepatis masses favoring lymphadenopathy. Adrenal Glands: No focal adrenal abnormalities identified. Kidneys: No hydronephrosis. Small left renal cyst. Stomach: The stomach is nondistended. Small bowel: No evidence of small bowel obstruction. Colon: No signficant pericolonic inflammatory changes.  Appendix: Normal appearance of the appendix. Normal caliber aorta with atherosclerosis. Retroperitoneal lymphadenopathy measures up to 2.0 cm in short axis in the left periaortic region. Mild free fluid in the pelvis. Partially distended urinary bladder. No acute findings of the osseous structures or subcutaneous soft tissues. No evidence of pulmonary embolism. Right greater than left lower lobe pneumonia. Diffuse hilar and mediastinal lymphadenopathy, concerning for malignancy with history of CLL, also likely progressed from prior exam. Diffuse peritoneal and retroperitoneal lymphadenopathy in the upper abdomen most significant near the cassi hepatis and IVC where there is a large mass which is favored to be a lymph node measuring up to 6.2 x 3.5 cm, also likely progressed from exam. Splenomegaly, could also be related to malignancy. Mild gallbladder wall thickening and pericholecystic fluid, could be related to passive congestion or mass effect from adjacent lymphadenopathy. There is mild intrahepatic bile duct dilatation. XR CHEST PORTABLE    Result Date: 9/28/2022  EXAMINATION: ONE XRAY VIEW OF THE CHEST 9/28/2022 12:44 pm COMPARISON: CT chest July 28, 2022 images; CT chest May 4, 2022 HISTORY: ORDERING SYSTEM PROVIDED HISTORY: chest pain TECHNOLOGIST PROVIDED HISTORY: Reason for exam:->chest pain Reason for Exam: chest pain FINDINGS: Cardiac silhouette is stable. Right-sided MediPort in place. Patchy interstitial changes of the lungs bilaterally which are nonspecific and can be seen in the setting of pulmonary edema as well as in the setting of multifocal atypical/viral infectious process. No well-defined consolidation. No pleural effusion. No pneumothorax. Osseous structures appear intact. 1. Subtle patchy interstitial changes of the lungs which can be seen in setting of 0 pulmonary edema as well as multifocal atypical/viral infectious processes. No well-defined consolidation.      CTA CHEST W CONTRAST    Result Date: 9/28/2022  EXAMINATION: CTA OF THE CHEST; CT OF THE ABDOMEN AND PELVIS WITH CONTRAST 9/28/2022 2:17 pm TECHNIQUE: CTA of the chest was performed after the administration of intravenous contrast.  Multiplanar reformatted images are provided for review. MIP images are provided for review. Automated exposure control, iterative reconstruction, and/or weight based adjustment of the mA/kV was utilized to reduce the radiation dose to as low as reasonably achievable.; CT of the abdomen and pelvis was performed with the administration of intravenous contrast. Multiplanar reformatted images are provided for review. Automated exposure control, iterative reconstruction, and/or weight based adjustment of the mA/kV was utilized to reduce the radiation dose to as low as reasonably achievable. COMPARISON: July 28, 2022 HISTORY: ORDERING SYSTEM PROVIDED HISTORY: Tachycardic, short of breath, history of CLL TECHNOLOGIST PROVIDED HISTORY: Reason for exam:->Tachycardic, short of breath, history of CLL Decision Support Exception - unselect if not a suspected or confirmed emergency medical condition->Emergency Medical Condition (MA) Reason for Exam: Tachycardic, short of breath, history of CLL FINDINGS: Pulmonary Arteries: Pulmonary arteries are adequately opacified for evaluation. No evidence of intraluminal filling defect to suggest pulmonary embolism. Main pulmonary artery is normal in caliber. Mediastinum: Mediastinal and hilar lymphadenopathy bilaterally. Subcarinal lymph node measures up to 3.1 cm in short axis. Right hilar lymph node measures at least 2.1 cm in short axis. This appears worse from exam on July 28, 2022. Largest subcarinal lymph node on that exam measured up to 1.9 cm in short axis. Normal caliber thoracic aorta without acute finding. The heart is not enlarged. No pericardial effusion.  Lungs/pleura: Consolidations in the right lower lobe and minimally in the left lower lobe concerning for pneumonia. No pleural effusion or pneumothorax. Background of mild emphysema. Bronchial thickening in the lower lobes. Soft Tissues/Bones: No acute bone or soft tissue abnormality. CT abdomen and pelvis Liver: Normal appearance of the liver. Gallbladder: Some pericholecystic fluid and mild gallbladder wall thickening. Perhaps mild intrahepatic bile duct dilatation. There are findings suspicious for a mass near the charlotte hepatis measuring 3.5 x 6.2 cm. Additional charlotte hepatis lymph nodes identified near the pancreas and IVC. Spleen: The spleen is enlarged. Measures at least 18 cm. Pancreas: No inflammatory changes or fluid collections. Charlotte hepatis masses favoring lymphadenopathy. Adrenal Glands: No focal adrenal abnormalities identified. Kidneys: No hydronephrosis. Small left renal cyst. Stomach: The stomach is nondistended. Small bowel: No evidence of small bowel obstruction. Colon: No signficant pericolonic inflammatory changes. Appendix: Normal appearance of the appendix. Normal caliber aorta with atherosclerosis. Retroperitoneal lymphadenopathy measures up to 2.0 cm in short axis in the left periaortic region. Mild free fluid in the pelvis. Partially distended urinary bladder. No acute findings of the osseous structures or subcutaneous soft tissues. No evidence of pulmonary embolism. Right greater than left lower lobe pneumonia. Diffuse hilar and mediastinal lymphadenopathy, concerning for malignancy with history of CLL, also likely progressed from prior exam. Diffuse peritoneal and retroperitoneal lymphadenopathy in the upper abdomen most significant near the charlotte hepatis and IVC where there is a large mass which is favored to be a lymph node measuring up to 6.2 x 3.5 cm, also likely progressed from exam. Splenomegaly, could also be related to malignancy.  Mild gallbladder wall thickening and pericholecystic fluid, could be related to passive congestion or mass effect from adjacent lymphadenopathy. There is mild intrahepatic bile duct dilatation. US ABDOMEN LIMITED    Result Date: 9/29/2022  EXAMINATION: RIGHT UPPER QUADRANT ULTRASOUND 9/29/2022 5:44 am COMPARISON: CT from September 28, 2022 HISTORY: ORDERING SYSTEM PROVIDED HISTORY: RUQ pain TECHNOLOGIST PROVIDED HISTORY: Reason for exam:->RUQ pain FINDINGS: LIVER:  There is a 2.9 x 1.5 x 2.5 cm echogenic focus in the right hepatic lobe. The liver appears enlarged measuring up to 22.9 cm. Increased echogenicity may suggest hepatic steatosis. Trace adjacent abdominal ascites. BILIARY SYSTEM:  Gallbladder wall appears mildly thickened. Possible adenomyomatosis measuring 0.2 x 0.2 x 0.2 cm, less likely cholesterol polyp. Negative sonographic Choi sign. No pericholecystic fluid. Common bile duct is within normal limits measuring 1.7 mm. Mass near the cassi hepatis measures up to 1.6 x 0.5 x 2.2 cm as seen on recent CT. RIGHT KIDNEY: The right kidney is grossly unremarkable without evidence of hydronephrosis. PANCREAS:  The pancreas is hyperechoic and somewhat bulky in appearance, nonspecific. OTHER: Trace abdominal ascites. 1. Hepatomegaly and hepatic steatosis. 2. Masses most compatible with lymphadenopathy in the cassi hepatis, better seen on recent CT. 3. Hyperechoic focus in the liver could be focal fatty infiltration, hemangioma or other mass. This is too small to characterize on recent CT. Correlate with MRI hepatic mass protocol if indicated. 4. Gallbladder wall thickening with possible adenomyomatosis.        Electronically signed by Joseph La MD on 9/30/2022 at 9:46 AM

## 2022-09-30 NOTE — DISCHARGE INSTR - COC
Continuity of Care Form    Patient Name: Adonis Ervin   :  1950  MRN:  [id number]    Admit date:  2022  Discharge date:  ***    Code Status Order: DNR-CCA   Advance Directives:     Admitting Physician:  Max Kumar MD  PCP: Katherine Boucher MD    Discharging Nurse: [facility] Unit/Room#: -  Discharging Unit Phone Number: ***    Emergency Contact:   Extended Emergency Contact Information  Primary Emergency Contact: [address] [facility], [address] [place] [address] Phone: [phone]  Mobile Phone: [phone]  Relation: Child    Past Surgical History:  Past Surgical History:   Procedure Laterality Date    CARDIAC CATHETERIZATION      x 2  last showed \"inflammation of heart\"    COLONOSCOPY      DENTAL SURGERY      all teeth extracted     EYE SURGERY Right 2016    Cataract removal    FRACTURE SURGERY Left     left ankle plate and screws    KNEE SURGERY  1970    left    OTHER SURGICAL HISTORY Left 2017    groin excision    TONSILLECTOMY  late     age 13    TUNNELED VENOUS PORT PLACEMENT      Right upper chest       Immunization History:   Immunization History   Administered Date(s) Administered    COVID-19, MODERNA BLUE border, Primary or Immunocompromised, (age 12y+), IM, 100 mcg/0.5mL 2021, 2021, 2021    Influenza A ([id number]) Vaccine PF IM 2009    Influenza Vaccine, unspecified formulation 10/28/2011    Influenza Virus Vaccine 10/28/2011    Influenza, FLUARIX, FLULAVAL, FLUZONE (age 10 mo+) AND AFLURIA, (age 1 y+), PF, 0.5mL 2021    Influenza, FLUZONE (age 72 y+), High Dose, 0.7mL 2020    Pneumococcal Conjugate 13-valent ([id number]) 2020    Pneumococcal Polysaccharide (Kakutzzwm66) 2016       Active Problems:  Patient Active Problem List   Diagnosis Code    Pneumonia J18.9    Sepsis ([facility] [address] A41.9    Hypogammaglobulinemia ([facility] [address] D80.1    CLL (chronic lymphocytic leukemia) ([facility] [address] C91.10    Upper respiratory infection, acute J06.9    Generalized muscle weakness M62.81    Type 2 diabetes mellitus with hyperglycemia, with long-term current use of insulin ([facility]) E11.65, Z79.4    Poor appetite R63.0    COPD (chronic obstructive pulmonary disease) ([facility]) J44.9    Neutropenia ([facility]) D70.9    Other specified disorders of bone density and structure, unspecified site M85.80    Cellulitis of right upper limb L03.113    Herpesviral infection B00.9    Intestinal malabsorption K90.9    Other nonspecific abnormal finding of lung field R91.8    Iron deficiency anemia due to chronic blood loss D50.0    Other muscle spasm M62.838    Leukemia, lymphocytic, chronic ([facility]) C91.10    Selective deficiency of IgG ([facility]) D80.3    Acute bronchitis J20.9    Severe sepsis ([facility]) A41.9, R65.20       Isolation/Infection:   Isolation            Droplet          Patient Infection Status       Infection Onset Added Last Indicated Last Indicated By Review Planned Expiration Resolved Resolved By    Rhinovirus 09/28/22 09/28/22 09/28/22 Respiratory Panel, Molecular, with COVID-19 (Restricted: peds pts or suitable admitted adults) 10/05/22 10/08/22      Resolved    COVID-19 (Rule Out) 09/28/22 09/28/22 09/28/22 Respiratory Panel, Molecular, with COVID-19 (Restricted: peds pts or suitable admitted adults) (Ordered)   09/28/22 Rule-Out Test Resulted    COVID-19 (Rule Out) 03/24/22 03/24/22 03/24/22 Respiratory Panel, Molecular, with COVID-19 (Restricted: peds pts or suitable admitted adults) (Ordered)   03/24/22 Rule-Out Test Resulted            Nurse Assessment:  Last Vital Signs: BP (!) 150/94   Pulse 92   Temp 97.7 °F (36.5 °C) (Oral)   Resp 30   Ht 6' (1.829 m)   Wt 173 lb 4.5 oz (78.6 kg)   SpO2 94%   BMI 23.50 kg/m²     Last documented pain score (0-10 scale): Pain Level: 9  Last Weight:   Wt Readings from Last 1 Encounters:   09/29/22 173 lb 4.5 oz (78.6 kg)     Mental Status:  {IP PT MENTAL STATUS:20030}    IV Access:  [facility] [employer] OSIEL IV ACCESS:862111621}    Nursing Mobility/ADLs:  Walking   {CHP DME RDKH:282520750}  Transfer  {CHP DME KNUC:960311391}  Bathing  {CHP DME MHYF:190100885}  Dressing  {CHP DME RLRM:944937006}  Toileting  {CHP DME EJAJ:373969690}  Feeding  {CHP DME EPTD:079028644}  Med Admin  {P DME KZVU:690293535}  Med Delivery   { OSIEL MED Delivery:899587242}    Wound Care Documentation and Therapy:        Elimination:  Continence: Bowel: {YES / AX:91689}  Bladder: {YES / WJ:05061}  Urinary Catheter: {Urinary Catheter:805994104}   Colostomy/Ileostomy/Ileal Conduit: {YES / KF:25411}       Date of Last BM: ***    Intake/Output Summary (Last 24 hours) at 9/30/2022 1319  Last data filed at 9/30/2022 1141  Gross per 24 hour   Intake 60 ml   Output 2075 ml   Net -2015 ml     I/O last 3 completed shifts: In: 3064.8 [I.V.:2416.1;  IV Piggyback:648.8]  Out: 3575 [Urine:3575]    Safety Concerns:     [facility] Safety Concerns:856041288}    Impairments/Disabilities:      { OSILE Impairments/Disabilities:664725180}    Other Treatments: ***    Patient's personal belongings (please select all that are sent with patient):  {[facility] DME Belongings:606474711}    RN SIGNATURE:  {Esignature:514842319}    CASE MANAGEMENT/SOCIAL WORK SECTION    Inpatient Status Date: ***    Readmission Risk Assessment Score:  Readmission Risk              Risk of Unplanned Readmission:  22           Discharging to Facility/ Agency   Name:   Address:  Phone:  Fax:    / signature: {Esignature:000411003}    PHYSICIAN SECTION    Prognosis: {Prognosis:8544139565}    Condition at Discharge: [address] [facility] Patient Condition:399876936}    Rehab Potential (if transferring to Rehab): {Prognosis:1825247416}\    Nutrition Therapy:  Current Nutrition Therapy:   [facility] Diet List:692802630}    Routes of Feeding: {CHP DME Other Feedings:953465784}  Liquids: {Slp liquid thickness:17187}  Daily Fluid Restriction: {CHP DME Yes amt example:084518936}  Last Modified Barium Swallow with Video (Video Swallowing Test): {Done Not Done VDNM:263195980}    Treatments at the Time of Hospital Discharge:   Respiratory Treatments: ***  Oxygen Therapy:  {Therapy; copd oxygen:24360}  Ventilator:    {[facility] Vent VECQ:308412381}    Rehab Therapies: {THERAPEUTIC INTERVENTION:3648724499}  Weight Bearing Status/Restrictions: [facility] Weight Bearin}  Other Medical Equipment (for information only, NOT a DME order):  {EQUIPMENT:454249285}    Recommended Labs or Other Treatments After Discharge: ***    Physician Certification: I certify the above information and transfer of Jose Cruz Cheek  is necessary for the continuing treatment of the diagnosis listed and that he requires {Admit to Appropriate Level of Care:42396} for {GREATER/LESS:682347818} 30 days.      Update Admission H&P: {CHP DME Changes in JZI:266052240}    PHYSICIAN SIGNATURE:  {Esignature:264929482}

## 2022-10-01 LAB
ANION GAP SERPL CALCULATED.3IONS-SCNC: 11 MMOL/L (ref 4–16)
ANISOCYTOSIS: ABNORMAL
BUN BLDV-MCNC: 18 MG/DL (ref 6–23)
CALCIUM SERPL-MCNC: 7.7 MG/DL (ref 8.3–10.6)
CHLORIDE BLD-SCNC: 95 MMOL/L (ref 99–110)
CO2: 23 MMOL/L (ref 21–32)
CREAT SERPL-MCNC: 0.7 MG/DL (ref 0.9–1.3)
CULTURE: NORMAL
DIFFERENTIAL TYPE: ABNORMAL
GFR AFRICAN AMERICAN: >60 ML/MIN/1.73M2
GFR NON-AFRICAN AMERICAN: >60 ML/MIN/1.73M2
GLUCOSE BLD-MCNC: 132 MG/DL (ref 70–99)
GLUCOSE BLD-MCNC: 145 MG/DL (ref 70–99)
GLUCOSE BLD-MCNC: 147 MG/DL (ref 70–99)
GLUCOSE BLD-MCNC: 164 MG/DL (ref 70–99)
GLUCOSE BLD-MCNC: 181 MG/DL (ref 70–99)
HAPTOGLOBIN: 272 MG/DL (ref 30–200)
HCT VFR BLD CALC: 37.2 % (ref 42–52)
HEMOGLOBIN: 11.5 GM/DL (ref 13.5–18)
LYMPHOCYTES ABSOLUTE: 22.2 K/CU MM
LYMPHOCYTES RELATIVE PERCENT: 72 % (ref 24–44)
Lab: NORMAL
MCH RBC QN AUTO: 24.6 PG (ref 27–31)
MCHC RBC AUTO-ENTMCNC: 30.9 % (ref 32–36)
MCV RBC AUTO: 79.7 FL (ref 78–100)
METAMYELOCYTES ABSOLUTE COUNT: 0.31 K/CU MM
METAMYELOCYTES PERCENT: 1 %
MONOCYTES ABSOLUTE: 0.9 K/CU MM
MONOCYTES RELATIVE PERCENT: 3 % (ref 0–4)
PDW BLD-RTO: 18.8 % (ref 11.7–14.9)
PLATELET # BLD: 58 K/CU MM (ref 140–440)
POTASSIUM SERPL-SCNC: 4.5 MMOL/L (ref 3.5–5.1)
RBC # BLD: 4.67 M/CU MM (ref 4.6–6.2)
SEGMENTED NEUTROPHILS ABSOLUTE COUNT: 7.4 K/CU MM
SEGMENTED NEUTROPHILS RELATIVE PERCENT: 24 % (ref 36–66)
SODIUM BLD-SCNC: 129 MMOL/L (ref 135–145)
SPECIMEN: NORMAL
WBC # BLD: 30.8 K/CU MM (ref 4–10.5)

## 2022-10-01 PROCEDURE — 87449 NOS EACH ORGANISM AG IA: CPT

## 2022-10-01 PROCEDURE — 1200000000 HC SEMI PRIVATE

## 2022-10-01 PROCEDURE — 36415 COLL VENOUS BLD VENIPUNCTURE: CPT

## 2022-10-01 PROCEDURE — 6370000000 HC RX 637 (ALT 250 FOR IP): Performed by: STUDENT IN AN ORGANIZED HEALTH CARE EDUCATION/TRAINING PROGRAM

## 2022-10-01 PROCEDURE — 80048 BASIC METABOLIC PNL TOTAL CA: CPT

## 2022-10-01 PROCEDURE — 82962 GLUCOSE BLOOD TEST: CPT

## 2022-10-01 PROCEDURE — 85007 BL SMEAR W/DIFF WBC COUNT: CPT

## 2022-10-01 PROCEDURE — 6370000000 HC RX 637 (ALT 250 FOR IP): Performed by: NURSE PRACTITIONER

## 2022-10-01 PROCEDURE — 94761 N-INVAS EAR/PLS OXIMETRY MLT: CPT

## 2022-10-01 PROCEDURE — 2700000000 HC OXYGEN THERAPY PER DAY

## 2022-10-01 PROCEDURE — 85027 COMPLETE CBC AUTOMATED: CPT

## 2022-10-01 PROCEDURE — 6360000002 HC RX W HCPCS: Performed by: NURSE PRACTITIONER

## 2022-10-01 PROCEDURE — 87305 ASPERGILLUS AG IA: CPT

## 2022-10-01 PROCEDURE — 2580000003 HC RX 258: Performed by: NURSE PRACTITIONER

## 2022-10-01 PROCEDURE — 94640 AIRWAY INHALATION TREATMENT: CPT

## 2022-10-01 PROCEDURE — 6360000002 HC RX W HCPCS: Performed by: STUDENT IN AN ORGANIZED HEALTH CARE EDUCATION/TRAINING PROGRAM

## 2022-10-01 RX ORDER — IPRATROPIUM BROMIDE AND ALBUTEROL SULFATE 2.5; .5 MG/3ML; MG/3ML
1 SOLUTION RESPIRATORY (INHALATION)
Status: DISCONTINUED | OUTPATIENT
Start: 2022-10-01 | End: 2022-10-06 | Stop reason: HOSPADM

## 2022-10-01 RX ORDER — INSULIN GLARGINE 100 [IU]/ML
18 INJECTION, SOLUTION SUBCUTANEOUS ONCE
Status: COMPLETED | OUTPATIENT
Start: 2022-10-01 | End: 2022-10-01

## 2022-10-01 RX ORDER — FUROSEMIDE 10 MG/ML
20 INJECTION INTRAMUSCULAR; INTRAVENOUS ONCE
Status: COMPLETED | OUTPATIENT
Start: 2022-10-01 | End: 2022-10-01

## 2022-10-01 RX ADMIN — FINASTERIDE 5 MG: 5 TABLET, FILM COATED ORAL at 20:15

## 2022-10-01 RX ADMIN — SODIUM CHLORIDE, PRESERVATIVE FREE 10 ML: 5 INJECTION INTRAVENOUS at 08:20

## 2022-10-01 RX ADMIN — IPRATROPIUM BROMIDE AND ALBUTEROL SULFATE 1 AMPULE: .5; 2.5 SOLUTION RESPIRATORY (INHALATION) at 15:36

## 2022-10-01 RX ADMIN — SODIUM CHLORIDE, PRESERVATIVE FREE 10 ML: 5 INJECTION INTRAVENOUS at 10:25

## 2022-10-01 RX ADMIN — TAMSULOSIN HYDROCHLORIDE 0.4 MG: 0.4 CAPSULE ORAL at 20:15

## 2022-10-01 RX ADMIN — IPRATROPIUM BROMIDE AND ALBUTEROL SULFATE 1 AMPULE: .5; 2.5 SOLUTION RESPIRATORY (INHALATION) at 03:26

## 2022-10-01 RX ADMIN — PANTOPRAZOLE SODIUM 20 MG: 20 TABLET, DELAYED RELEASE ORAL at 04:06

## 2022-10-01 RX ADMIN — IPRATROPIUM BROMIDE AND ALBUTEROL SULFATE 1 AMPULE: .5; 2.5 SOLUTION RESPIRATORY (INHALATION) at 11:46

## 2022-10-01 RX ADMIN — SODIUM CHLORIDE, PRESERVATIVE FREE 10 ML: 5 INJECTION INTRAVENOUS at 20:16

## 2022-10-01 RX ADMIN — HYDROCODONE BITARTRATE AND ACETAMINOPHEN 1 TABLET: 5; 325 TABLET ORAL at 04:05

## 2022-10-01 RX ADMIN — INSULIN GLARGINE 18 UNITS: 100 INJECTION, SOLUTION SUBCUTANEOUS at 23:13

## 2022-10-01 RX ADMIN — MORPHINE SULFATE 2 MG: 2 INJECTION, SOLUTION INTRAMUSCULAR; INTRAVENOUS at 02:28

## 2022-10-01 RX ADMIN — SODIUM CHLORIDE, PRESERVATIVE FREE 10 ML: 5 INJECTION INTRAVENOUS at 15:46

## 2022-10-01 RX ADMIN — MORPHINE SULFATE 2 MG: 2 INJECTION, SOLUTION INTRAMUSCULAR; INTRAVENOUS at 17:51

## 2022-10-01 RX ADMIN — INSULIN LISPRO 12 UNITS: 100 INJECTION, SOLUTION INTRAVENOUS; SUBCUTANEOUS at 11:44

## 2022-10-01 RX ADMIN — HYDROCODONE BITARTRATE AND ACETAMINOPHEN 1 TABLET: 5; 325 TABLET ORAL at 10:19

## 2022-10-01 RX ADMIN — VALACYCLOVIR HYDROCHLORIDE 500 MG: 500 TABLET, FILM COATED ORAL at 10:23

## 2022-10-01 RX ADMIN — PIPERACILLIN AND TAZOBACTAM 4500 MG: 4; .5 INJECTION, POWDER, LYOPHILIZED, FOR SOLUTION INTRAVENOUS at 20:15

## 2022-10-01 RX ADMIN — MORPHINE SULFATE 2 MG: 2 INJECTION, SOLUTION INTRAMUSCULAR; INTRAVENOUS at 08:19

## 2022-10-01 RX ADMIN — ATORVASTATIN CALCIUM 80 MG: 40 TABLET, FILM COATED ORAL at 20:15

## 2022-10-01 RX ADMIN — MORPHINE SULFATE 2 MG: 2 INJECTION, SOLUTION INTRAMUSCULAR; INTRAVENOUS at 23:14

## 2022-10-01 RX ADMIN — INSULIN LISPRO 12 UNITS: 100 INJECTION, SOLUTION INTRAVENOUS; SUBCUTANEOUS at 08:08

## 2022-10-01 RX ADMIN — HYDROCODONE BITARTRATE AND ACETAMINOPHEN 1 TABLET: 5; 325 TABLET ORAL at 16:08

## 2022-10-01 RX ADMIN — SODIUM CHLORIDE, PRESERVATIVE FREE 10 ML: 5 INJECTION INTRAVENOUS at 13:42

## 2022-10-01 RX ADMIN — PIPERACILLIN AND TAZOBACTAM 4500 MG: 4; .5 INJECTION, POWDER, LYOPHILIZED, FOR SOLUTION INTRAVENOUS at 11:16

## 2022-10-01 RX ADMIN — FUROSEMIDE 20 MG: 10 INJECTION, SOLUTION INTRAVENOUS at 10:23

## 2022-10-01 RX ADMIN — ENOXAPARIN SODIUM 40 MG: 100 INJECTION SUBCUTANEOUS at 20:15

## 2022-10-01 RX ADMIN — IPRATROPIUM BROMIDE AND ALBUTEROL SULFATE 1 AMPULE: .5; 2.5 SOLUTION RESPIRATORY (INHALATION) at 07:41

## 2022-10-01 RX ADMIN — MORPHINE SULFATE 2 MG: 2 INJECTION, SOLUTION INTRAMUSCULAR; INTRAVENOUS at 13:41

## 2022-10-01 RX ADMIN — SODIUM CHLORIDE, PRESERVATIVE FREE 10 ML: 5 INJECTION INTRAVENOUS at 17:52

## 2022-10-01 RX ADMIN — IPRATROPIUM BROMIDE AND ALBUTEROL SULFATE 1 AMPULE: .5; 3 SOLUTION RESPIRATORY (INHALATION) at 19:11

## 2022-10-01 RX ADMIN — PIPERACILLIN AND TAZOBACTAM 4500 MG: 4; .5 INJECTION, POWDER, LYOPHILIZED, FOR SOLUTION INTRAVENOUS at 04:03

## 2022-10-01 ASSESSMENT — PAIN SCALES - GENERAL
PAINLEVEL_OUTOF10: 2
PAINLEVEL_OUTOF10: 4
PAINLEVEL_OUTOF10: 8
PAINLEVEL_OUTOF10: 4
PAINLEVEL_OUTOF10: 4
PAINLEVEL_OUTOF10: 6
PAINLEVEL_OUTOF10: 10
PAINLEVEL_OUTOF10: 3
PAINLEVEL_OUTOF10: 4
PAINLEVEL_OUTOF10: 5
PAINLEVEL_OUTOF10: 8
PAINLEVEL_OUTOF10: 10
PAINLEVEL_OUTOF10: 4
PAINLEVEL_OUTOF10: 7
PAINLEVEL_OUTOF10: 5
PAINLEVEL_OUTOF10: 6
PAINLEVEL_OUTOF10: 8
PAINLEVEL_OUTOF10: 8

## 2022-10-01 ASSESSMENT — PAIN DESCRIPTION - DESCRIPTORS
DESCRIPTORS: ACHING;DISCOMFORT
DESCRIPTORS: ACHING;DISCOMFORT
DESCRIPTORS: ACHING
DESCRIPTORS: ACHING;DISCOMFORT
DESCRIPTORS: ACHING
DESCRIPTORS: ACHING;DISCOMFORT
DESCRIPTORS: ACHING;DISCOMFORT
DESCRIPTORS: ACHING
DESCRIPTORS: ACHING

## 2022-10-01 ASSESSMENT — PAIN DESCRIPTION - LOCATION
LOCATION: CHEST;ABDOMEN
LOCATION: CHEST
LOCATION: CHEST;ABDOMEN
LOCATION: ABDOMEN;CHEST
LOCATION: CHEST
LOCATION: CHEST

## 2022-10-01 ASSESSMENT — PAIN DESCRIPTION - ORIENTATION
ORIENTATION: RIGHT;UPPER
ORIENTATION: RIGHT
ORIENTATION: RIGHT;MID
ORIENTATION: RIGHT

## 2022-10-01 NOTE — PROGRESS NOTES
V2.0  [facility] [place]      Name:  Renea Gonzalez /Age/Sex: 1950  (71 y.o. male)   MRN & CSN:  [id number] & [id number] Encounter Date/Time: 10/1/2022 1:25 PM EDT    Location:  -A PCP: Bossman Lang MD       Hospital Day: 4      Subjective:     Chief Complaint: Fatigue, Extremity Weakness (Reports BLE weakness and fatigue since colonoscopy last week. Explains felt like legs were going to give out around 0230 AM and fell down on flow in sitting position. Denies LOC. Denies hitting head. ), and Shortness of Breath (Has had for 1 week. PCP placed on Zpack and Steroid taper.)    No acute events overnight. Had urine output 900 cc past 24 hours not clear if fully recorded or not. Patient still feels shortness of breath but states it is better. Sating 91% on room air placed back on 2L NC. He still complains of episodes of right upper quadrant pain but controlled with the morphine PRN. Denies lightheadedness, dizziness, fever, night sweats, chills, chest pain, cough,  palpitations,  nausea, vomiting, diarrhea, dysuria. Assessment and Plan:   Renea Gonzalez is a 71 y.o. male with pmh of CLL, DM II, HTN, BPH who presents with Severe sepsis ([facility] [address]      Plan:  #Severe sepsis secondary to super imposed community acquired pneumonia in the setting of rhinovirus  Tachycardia, tachypnea, leukocytosis and lactic acidosis. , RR 37, WBC 76.5, Lactic acid 3.1. CT A/P right greater than left lower lobe pneumonia. S/p sepsis IVF. Strep, legionella negative. Resp viral panel + for rhinovirus.    - continue Zosyn   - follow up blood cx, resp cx  - will try another lasix 20 mg IV       #hypoxia - improving  Likely secondary to CAP, rhinovirus, possible CHF  -  currently on 2L sating 92%  - Wean oxygen for goal sats greater than 92%  - management as above    #Diabetes mellitus type 2 with hyperglycemia  Glucose 157 this AM.   - increase home Lantus to 35U  - MDSSI  - increase to Lispro 12U TID w meals     #RUQ abd pain. Likely 2/2 lymphadenopathy and mass effect in retroperitoneal area; no CT or U/S suggesting cholithiasis or cholecystitis. Bilirubin elevated on admission 1.6; today 1.2 but other liver labs within normal range  - GI consulted; appreciate recs; may do HIDA scan; will discuss  - monitor liver enzymes    #Concern for new onset of CHF   No previous echocardiogram. Having FROST, trace bilateral edema. proBNP [id number] on admission. ECHO revealed EF 31-48, grade 1 diastolic, heavily calcified aortic valve without stenosis, thickened mitral valve, in addition to pleural effusion and small circumferential torrential pericardial effusion   - cardiology consulted; appreciate recs  - lasix as above  - intake/output, daily weights    #hyponatremia  Na 129 9/30 corrected; concern for volume overload. Labs pending today  - lasix as above  - monitor    #Rhabdomyolysis - resolved  Patient was down for unknown amount of time. Initial CK 3453 down to 500's. #Elevated troponin - resolved  - 0.024 on admission. Trended down. Likely demand         #CLL - with progression  Leukocytosis, thrombocytopenia, normocytic anemia  Known diagnosis, follows with hematology oncology. Patient reports he has not been able to take chemo lately due to elevated white count and low platelets. - CT images reviewed, shows worsening disease process  - f/u galactomannan level, and Fungitell level  - hem/onc consulted; appreciate recs  - may be considered for immunotherapy with or without chemotherapy outpatient    #BPH Continue Flomax and Proscar    Diet ADULT DIET; Regular; 4 carb choices (60 gm/meal);  Low Fat/Low Chol/High Fiber/JOSE; Low Sodium (2 gm)  ADULT ORAL NUTRITION SUPPLEMENT; Breakfast, Lunch, Dinner; Diabetic Oral Supplement   DVT Prophylaxis [x] Lovenox, []  Heparin, [] SCDs, [] Ambulation,  [] Eliquis, [] Xarelto  [] Coumadin   Code Status DNR-CCA   Disposition From: home  Expected Disposition: TBD  Estimated Date of Discharge: today or tomorrow  Patient requires continued admission due to oxygen requirement, GI work up   Surrogate Decision Maker/ POA      Review of Systems:    Review of Systems    See above    Objective: Intake/Output Summary (Last 24 hours) at 10/1/2022 1039  Last data filed at 10/1/2022 0829  Gross per 24 hour   Intake 240 ml   Output 1200 ml   Net -960 ml        Vitals:   Vitals:    10/01/22 1019   BP:    Pulse:    Resp: 16   Temp:    SpO2:        Physical Exam:     General: NAD  Eyes: EOMI  ENT: neck supple  Cardiovascular: Regular rate. Respiratory: B/L Rhonchi Rt > Lt (improved); decreased breathing sounds improved. Gastrointestinal: RUQ, epigastric tender to palpation, Soft  Genitourinary: no suprapubic tenderness  Musculoskeletal: bilateral edema LE 1+   Skin: warm, dry. Rt. Chest port in place  Neuro: Alert. Psych: Mood appropriate.      Medications:   Medications:    insulin glargine  35 Units SubCUTAneous Nightly    insulin lispro  12 Units SubCUTAneous TID WC    ipratropium-albuterol  1 ampule Inhalation Q4H    atorvastatin  80 mg Oral Nightly    finasteride  5 mg Oral Nightly    pantoprazole  20 mg Oral QAM AC    tamsulosin  0.4 mg Oral Nightly    valACYclovir  500 mg Oral Daily    sodium chloride flush  5-40 mL IntraVENous 2 times per day    enoxaparin  40 mg SubCUTAneous Nightly    piperacillin-tazobactam  4,500 mg IntraVENous Q8H    insulin lispro  0-8 Units SubCUTAneous TID WC    insulin lispro  0-4 Units SubCUTAneous Nightly      Infusions:    sodium chloride      dextrose       PRN Meds: morphine, 2 mg, Q4H PRN  HYDROcodone 5 mg - acetaminophen, 1 tablet, Q6H PRN  albuterol sulfate HFA, 2 puff, Q4H PRN  sodium chloride flush, 5-40 mL, PRN  sodium chloride, 500 mL, PRN  acetaminophen, 650 mg, Q6H PRN   Or  acetaminophen, 650 mg, Q6H PRN  glucose, 4 tablet, PRN  dextrose bolus, 125 mL, PRN   Or  dextrose bolus, 250 mL, PRN  glucagon (rDNA), 1 mg, PRN  dextrose, 1,000 mL, Continuous PRN      Labs      Recent Results (from the past 24 hour(s))   POCT Glucose    Collection Time: 09/30/22 11:17 AM   Result Value Ref Range    POC Glucose 142 (H) 70 - 99 MG/DL   POCT Glucose    Collection Time: 09/30/22  4:13 PM   Result Value Ref Range    POC Glucose 189 (H) 70 - 99 MG/DL   POCT Glucose    Collection Time: 09/30/22  8:08 PM   Result Value Ref Range    POC Glucose 183 (H) 70 - 99 MG/DL   POCT Glucose    Collection Time: 10/01/22  6:20 AM   Result Value Ref Range    POC Glucose 164 (H) 70 - 99 MG/DL        Imaging/Diagnostics Last 24 Hours   CT HEAD WO CONTRAST    Result Date: 9/28/2022  EXAMINATION: CT OF THE HEAD WITHOUT CONTRAST  9/28/2022 2:16 pm TECHNIQUE: CT of the head was performed without the administration of intravenous contrast. Automated exposure control, iterative reconstruction, and/or weight based adjustment of the mA/kV was utilized to reduce the radiation dose to as low as reasonably achievable. COMPARISON: None. HISTORY: ORDERING SYSTEM PROVIDED HISTORY: history of CLL , generalized weakness, down on floor all night TECHNOLOGIST PROVIDED HISTORY: Has a \"code stroke\" or \"stroke alert\" been called? ->No Reason for exam:->history of CLL , generalized weakness, down on floor all night Decision Support Exception - unselect if not a suspected or confirmed emergency medical condition->Emergency Medical Condition (MA) Reason for Exam: history of CLL , generalized weakness, down on floor all night FINDINGS: BRAIN/VENTRICLES: There is no acute intracranial hemorrhage, mass effect or midline shift. No abnormal extra-axial fluid collection. The gray-white differentiation is maintained without evidence of an acute infarct. There is prominence of the ventricles and sulci due to global parenchymal volume loss. There are nonspecific areas of hypoattenuation within the periventricular and subcortical white matter, which likely represent chronic microvascular ischemic change. There vascular calcifications. ORBITS: The visualized portion of the orbits demonstrate no acute abnormality. SINUSES: Osteoneogenesis. There bilateral maxillary sinus air-fluid levels which can be seen with acute on chronic bacterial sinusitis. Mastoids are clear. Moderate severe ethmoid sinus mucosal thickening. SOFT TISSUES/SKULL: No acute abnormality of the visualized skull or soft tissues. Chronic microvascular disease. Negative acute bleed, midline shift or mass effect. Findings worrisome for acute on chronic maxillary sinus disease. Please correlate exam findings. CT ABDOMEN PELVIS W IV CONTRAST Additional Contrast? None    Result Date: 9/28/2022  EXAMINATION: CTA OF THE CHEST; CT OF THE ABDOMEN AND PELVIS WITH CONTRAST 9/28/2022 2:17 pm TECHNIQUE: CTA of the chest was performed after the administration of intravenous contrast.  Multiplanar reformatted images are provided for review. MIP images are provided for review. Automated exposure control, iterative reconstruction, and/or weight based adjustment of the mA/kV was utilized to reduce the radiation dose to as low as reasonably achievable.; CT of the abdomen and pelvis was performed with the administration of intravenous contrast. Multiplanar reformatted images are provided for review. Automated exposure control, iterative reconstruction, and/or weight based adjustment of the mA/kV was utilized to reduce the radiation dose to as low as reasonably achievable. COMPARISON: July 28, 2022 HISTORY: ORDERING SYSTEM PROVIDED HISTORY: Tachycardic, short of breath, history of CLL TECHNOLOGIST PROVIDED HISTORY: Reason for exam:->Tachycardic, short of breath, history of CLL Decision Support Exception - unselect if not a suspected or confirmed emergency medical condition->Emergency Medical Condition (MA) Reason for Exam: Tachycardic, short of breath, history of CLL FINDINGS: Pulmonary Arteries: Pulmonary arteries are adequately opacified for evaluation. No evidence of intraluminal filling defect to suggest pulmonary embolism. Main pulmonary artery is normal in caliber. Mediastinum: Mediastinal and hilar lymphadenopathy bilaterally. Subcarinal lymph node measures up to 3.1 cm in short axis. Right hilar lymph node measures at least 2.1 cm in short axis. This appears worse from exam on July 28, 2022. Largest subcarinal lymph node on that exam measured up to 1.9 cm in short axis. Normal caliber thoracic aorta without acute finding. The heart is not enlarged. No pericardial effusion. Lungs/pleura: Consolidations in the right lower lobe and minimally in the left lower lobe concerning for pneumonia. No pleural effusion or pneumothorax. Background of mild emphysema. Bronchial thickening in the lower lobes. Soft Tissues/Bones: No acute bone or soft tissue abnormality. CT abdomen and pelvis Liver: Normal appearance of the liver. Gallbladder: Some pericholecystic fluid and mild gallbladder wall thickening. Perhaps mild intrahepatic bile duct dilatation. There are findings suspicious for a mass near the charlotte hepatis measuring 3.5 x 6.2 cm. Additional charlotte hepatis lymph nodes identified near the pancreas and IVC. Spleen: The spleen is enlarged. Measures at least 18 cm. Pancreas: No inflammatory changes or fluid collections. Charlotte hepatis masses favoring lymphadenopathy. Adrenal Glands: No focal adrenal abnormalities identified. Kidneys: No hydronephrosis. Small left renal cyst. Stomach: The stomach is nondistended. Small bowel: No evidence of small bowel obstruction. Colon: No signficant pericolonic inflammatory changes. Appendix: Normal appearance of the appendix. Normal caliber aorta with atherosclerosis. Retroperitoneal lymphadenopathy measures up to 2.0 cm in short axis in the left periaortic region. Mild free fluid in the pelvis. Partially distended urinary bladder. No acute findings of the osseous structures or subcutaneous soft tissues.      No evidence of pulmonary embolism. Right greater than left lower lobe pneumonia. Diffuse hilar and mediastinal lymphadenopathy, concerning for malignancy with history of CLL, also likely progressed from prior exam. Diffuse peritoneal and retroperitoneal lymphadenopathy in the upper abdomen most significant near the cassi hepatis and IVC where there is a large mass which is favored to be a lymph node measuring up to 6.2 x 3.5 cm, also likely progressed from exam. Splenomegaly, could also be related to malignancy. Mild gallbladder wall thickening and pericholecystic fluid, could be related to passive congestion or mass effect from adjacent lymphadenopathy. There is mild intrahepatic bile duct dilatation. XR CHEST PORTABLE    Result Date: 9/28/2022  EXAMINATION: ONE XRAY VIEW OF THE CHEST 9/28/2022 12:44 pm COMPARISON: CT chest July 28, 2022 images; CT chest May 4, 2022 HISTORY: ORDERING SYSTEM PROVIDED HISTORY: chest pain TECHNOLOGIST PROVIDED HISTORY: Reason for exam:->chest pain Reason for Exam: chest pain FINDINGS: Cardiac silhouette is stable. Right-sided MediPort in place. Patchy interstitial changes of the lungs bilaterally which are nonspecific and can be seen in the setting of pulmonary edema as well as in the setting of multifocal atypical/viral infectious process. No well-defined consolidation. No pleural effusion. No pneumothorax. Osseous structures appear intact. 1. Subtle patchy interstitial changes of the lungs which can be seen in setting of 0 pulmonary edema as well as multifocal atypical/viral infectious processes. No well-defined consolidation. CTA CHEST W CONTRAST    Result Date: 9/28/2022  EXAMINATION: CTA OF THE CHEST; CT OF THE ABDOMEN AND PELVIS WITH CONTRAST 9/28/2022 2:17 pm TECHNIQUE: CTA of the chest was performed after the administration of intravenous contrast.  Multiplanar reformatted images are provided for review. MIP images are provided for review.  Automated exposure control, iterative reconstruction, and/or weight based adjustment of the mA/kV was utilized to reduce the radiation dose to as low as reasonably achievable.; CT of the abdomen and pelvis was performed with the administration of intravenous contrast. Multiplanar reformatted images are provided for review. Automated exposure control, iterative reconstruction, and/or weight based adjustment of the mA/kV was utilized to reduce the radiation dose to as low as reasonably achievable. COMPARISON: July 28, 2022 HISTORY: ORDERING SYSTEM PROVIDED HISTORY: Tachycardic, short of breath, history of CLL TECHNOLOGIST PROVIDED HISTORY: Reason for exam:->Tachycardic, short of breath, history of CLL Decision Support Exception - unselect if not a suspected or confirmed emergency medical condition->Emergency Medical Condition (MA) Reason for Exam: Tachycardic, short of breath, history of CLL FINDINGS: Pulmonary Arteries: Pulmonary arteries are adequately opacified for evaluation. No evidence of intraluminal filling defect to suggest pulmonary embolism. Main pulmonary artery is normal in caliber. Mediastinum: Mediastinal and hilar lymphadenopathy bilaterally. Subcarinal lymph node measures up to 3.1 cm in short axis. Right hilar lymph node measures at least 2.1 cm in short axis. This appears worse from exam on July 28, 2022. Largest subcarinal lymph node on that exam measured up to 1.9 cm in short axis. Normal caliber thoracic aorta without acute finding. The heart is not enlarged. No pericardial effusion. Lungs/pleura: Consolidations in the right lower lobe and minimally in the left lower lobe concerning for pneumonia. No pleural effusion or pneumothorax. Background of mild emphysema. Bronchial thickening in the lower lobes. Soft Tissues/Bones: No acute bone or soft tissue abnormality. CT abdomen and pelvis Liver: Normal appearance of the liver. Gallbladder: Some pericholecystic fluid and mild gallbladder wall thickening.  Perhaps mild intrahepatic bile duct dilatation. There are findings suspicious for a mass near the charlotte hepatis measuring 3.5 x 6.2 cm. Additional charlotte hepatis lymph nodes identified near the pancreas and IVC. Spleen: The spleen is enlarged. Measures at least 18 cm. Pancreas: No inflammatory changes or fluid collections. Charlotte hepatis masses favoring lymphadenopathy. Adrenal Glands: No focal adrenal abnormalities identified. Kidneys: No hydronephrosis. Small left renal cyst. Stomach: The stomach is nondistended. Small bowel: No evidence of small bowel obstruction. Colon: No signficant pericolonic inflammatory changes. Appendix: Normal appearance of the appendix. Normal caliber aorta with atherosclerosis. Retroperitoneal lymphadenopathy measures up to 2.0 cm in short axis in the left periaortic region. Mild free fluid in the pelvis. Partially distended urinary bladder. No acute findings of the osseous structures or subcutaneous soft tissues. No evidence of pulmonary embolism. Right greater than left lower lobe pneumonia. Diffuse hilar and mediastinal lymphadenopathy, concerning for malignancy with history of CLL, also likely progressed from prior exam. Diffuse peritoneal and retroperitoneal lymphadenopathy in the upper abdomen most significant near the charlotte hepatis and IVC where there is a large mass which is favored to be a lymph node measuring up to 6.2 x 3.5 cm, also likely progressed from exam. Splenomegaly, could also be related to malignancy. Mild gallbladder wall thickening and pericholecystic fluid, could be related to passive congestion or mass effect from adjacent lymphadenopathy. There is mild intrahepatic bile duct dilatation.      US ABDOMEN LIMITED    Result Date: 9/29/2022  EXAMINATION: RIGHT UPPER QUADRANT ULTRASOUND 9/29/2022 5:44 am COMPARISON: CT from September 28, 2022 HISTORY: ORDERING SYSTEM PROVIDED HISTORY: RUQ pain TECHNOLOGIST PROVIDED HISTORY: Reason for exam:->RUQ pain FINDINGS: LIVER:  There is a 2.9 x 1.5 x 2.5 cm echogenic focus in the right hepatic lobe. The liver appears enlarged measuring up to 22.9 cm. Increased echogenicity may suggest hepatic steatosis. Trace adjacent abdominal ascites. BILIARY SYSTEM:  Gallbladder wall appears mildly thickened. Possible adenomyomatosis measuring 0.2 x 0.2 x 0.2 cm, less likely cholesterol polyp. Negative sonographic Choi sign. No pericholecystic fluid. Common bile duct is within normal limits measuring 1.7 mm. Mass near the cassi hepatis measures up to 1.6 x 0.5 x 2.2 cm as seen on recent CT. RIGHT KIDNEY: The right kidney is grossly unremarkable without evidence of hydronephrosis. PANCREAS:  The pancreas is hyperechoic and somewhat bulky in appearance, nonspecific. OTHER: Trace abdominal ascites. 1. Hepatomegaly and hepatic steatosis. 2. Masses most compatible with lymphadenopathy in the cassi hepatis, better seen on recent CT. 3. Hyperechoic focus in the liver could be focal fatty infiltration, hemangioma or other mass. This is too small to characterize on recent CT. Correlate with MRI hepatic mass protocol if indicated. 4. Gallbladder wall thickening with possible adenomyomatosis.        Electronically signed by Pat Bustos MD on 10/1/2022 at 10:39 AM

## 2022-10-02 ENCOUNTER — APPOINTMENT (OUTPATIENT)
Dept: NUCLEAR MEDICINE | Age: 72
DRG: 871 | End: 2022-10-02
Payer: COMMERCIAL

## 2022-10-02 ENCOUNTER — APPOINTMENT (OUTPATIENT)
Dept: GENERAL RADIOLOGY | Age: 72
DRG: 871 | End: 2022-10-02
Payer: COMMERCIAL

## 2022-10-02 LAB
ALBUMIN SERPL-MCNC: 3.1 GM/DL (ref 3.4–5)
ALP BLD-CCNC: 202 IU/L (ref 40–128)
ALT SERPL-CCNC: 31 U/L (ref 10–40)
AMYLASE: 18 U/L (ref 25–115)
ANION GAP SERPL CALCULATED.3IONS-SCNC: 12 MMOL/L (ref 4–16)
ANISOCYTOSIS: ABNORMAL
APTT: 29.9 SECONDS (ref 25.1–37.1)
AST SERPL-CCNC: 28 IU/L (ref 15–37)
ATYPICAL LYMPHOCYTE ABSOLUTE COUNT: ABNORMAL
BANDED NEUTROPHILS ABSOLUTE COUNT: 0.41 K/CU MM
BANDED NEUTROPHILS RELATIVE PERCENT: 1 % (ref 5–11)
BILIRUB SERPL-MCNC: 1 MG/DL (ref 0–1)
BUN BLDV-MCNC: 20 MG/DL (ref 6–23)
CALCIUM SERPL-MCNC: 7.7 MG/DL (ref 8.3–10.6)
CHLORIDE BLD-SCNC: 94 MMOL/L (ref 99–110)
CO2: 23 MMOL/L (ref 21–32)
CREAT SERPL-MCNC: 0.8 MG/DL (ref 0.9–1.3)
DIFFERENTIAL TYPE: ABNORMAL
GFR AFRICAN AMERICAN: >60 ML/MIN/1.73M2
GFR NON-AFRICAN AMERICAN: >60 ML/MIN/1.73M2
GLUCOSE BLD-MCNC: 100 MG/DL (ref 70–99)
GLUCOSE BLD-MCNC: 127 MG/DL (ref 70–99)
GLUCOSE BLD-MCNC: 141 MG/DL (ref 70–99)
GLUCOSE BLD-MCNC: 146 MG/DL (ref 70–99)
GLUCOSE BLD-MCNC: 221 MG/DL (ref 70–99)
HCT VFR BLD CALC: 38.2 % (ref 42–52)
HEMOGLOBIN: 12 GM/DL (ref 13.5–18)
INR BLD: 1.07 INDEX
LIPASE: 14 IU/L (ref 13–60)
LYMPHOCYTES ABSOLUTE: 35 K/CU MM
LYMPHOCYTES RELATIVE PERCENT: 85 % (ref 24–44)
MCH RBC QN AUTO: 24.6 PG (ref 27–31)
MCHC RBC AUTO-ENTMCNC: 31.4 % (ref 32–36)
MCV RBC AUTO: 78.3 FL (ref 78–100)
PDW BLD-RTO: 19.1 % (ref 11.7–14.9)
PLATELET # BLD: 74 K/CU MM (ref 140–440)
POLYCHROMASIA: ABNORMAL
POTASSIUM SERPL-SCNC: 4.5 MMOL/L (ref 3.5–5.1)
PROTHROMBIN TIME: 13.8 SECONDS (ref 11.7–14.5)
RBC # BLD: 4.88 M/CU MM (ref 4.6–6.2)
SEGMENTED NEUTROPHILS ABSOLUTE COUNT: 5.8 K/CU MM
SEGMENTED NEUTROPHILS RELATIVE PERCENT: 14 % (ref 36–66)
SMUDGE CELLS: PRESENT
SODIUM BLD-SCNC: 129 MMOL/L (ref 135–145)
TOTAL PROTEIN: 5.1 GM/DL (ref 6.4–8.2)
WBC # BLD: 41.2 K/CU MM (ref 4–10.5)

## 2022-10-02 PROCEDURE — 80053 COMPREHEN METABOLIC PANEL: CPT

## 2022-10-02 PROCEDURE — 94640 AIRWAY INHALATION TREATMENT: CPT

## 2022-10-02 PROCEDURE — 85610 PROTHROMBIN TIME: CPT

## 2022-10-02 PROCEDURE — 3430000000 HC RX DIAGNOSTIC RADIOPHARMACEUTICAL: Performed by: SPECIALIST

## 2022-10-02 PROCEDURE — 85007 BL SMEAR W/DIFF WBC COUNT: CPT

## 2022-10-02 PROCEDURE — 2580000003 HC RX 258: Performed by: NURSE PRACTITIONER

## 2022-10-02 PROCEDURE — 94761 N-INVAS EAR/PLS OXIMETRY MLT: CPT

## 2022-10-02 PROCEDURE — 85730 THROMBOPLASTIN TIME PARTIAL: CPT

## 2022-10-02 PROCEDURE — 6370000000 HC RX 637 (ALT 250 FOR IP): Performed by: STUDENT IN AN ORGANIZED HEALTH CARE EDUCATION/TRAINING PROGRAM

## 2022-10-02 PROCEDURE — 36415 COLL VENOUS BLD VENIPUNCTURE: CPT

## 2022-10-02 PROCEDURE — 1200000000 HC SEMI PRIVATE

## 2022-10-02 PROCEDURE — 82962 GLUCOSE BLOOD TEST: CPT

## 2022-10-02 PROCEDURE — 80048 BASIC METABOLIC PNL TOTAL CA: CPT

## 2022-10-02 PROCEDURE — 78226 HEPATOBILIARY SYSTEM IMAGING: CPT

## 2022-10-02 PROCEDURE — 6360000002 HC RX W HCPCS: Performed by: NURSE PRACTITIONER

## 2022-10-02 PROCEDURE — 6360000002 HC RX W HCPCS: Performed by: STUDENT IN AN ORGANIZED HEALTH CARE EDUCATION/TRAINING PROGRAM

## 2022-10-02 PROCEDURE — 2580000003 HC RX 258: Performed by: STUDENT IN AN ORGANIZED HEALTH CARE EDUCATION/TRAINING PROGRAM

## 2022-10-02 PROCEDURE — 85027 COMPLETE CBC AUTOMATED: CPT

## 2022-10-02 PROCEDURE — A9537 TC99M MEBROFENIN: HCPCS | Performed by: SPECIALIST

## 2022-10-02 PROCEDURE — 71045 X-RAY EXAM CHEST 1 VIEW: CPT

## 2022-10-02 PROCEDURE — 2700000000 HC OXYGEN THERAPY PER DAY

## 2022-10-02 PROCEDURE — 6370000000 HC RX 637 (ALT 250 FOR IP): Performed by: NURSE PRACTITIONER

## 2022-10-02 PROCEDURE — 82150 ASSAY OF AMYLASE: CPT

## 2022-10-02 PROCEDURE — 83690 ASSAY OF LIPASE: CPT

## 2022-10-02 RX ORDER — INSULIN GLARGINE 100 [IU]/ML
20 INJECTION, SOLUTION SUBCUTANEOUS NIGHTLY
Status: DISCONTINUED | OUTPATIENT
Start: 2022-10-02 | End: 2022-10-06 | Stop reason: HOSPADM

## 2022-10-02 RX ORDER — FUROSEMIDE 10 MG/ML
20 INJECTION INTRAMUSCULAR; INTRAVENOUS ONCE
Status: COMPLETED | OUTPATIENT
Start: 2022-10-02 | End: 2022-10-02

## 2022-10-02 RX ORDER — AMOXICILLIN AND CLAVULANATE POTASSIUM 875; 125 MG/1; MG/1
1 TABLET, FILM COATED ORAL EVERY 12 HOURS SCHEDULED
Status: DISCONTINUED | OUTPATIENT
Start: 2022-10-02 | End: 2022-10-02

## 2022-10-02 RX ADMIN — HYDROCODONE BITARTRATE AND ACETAMINOPHEN 1 TABLET: 5; 325 TABLET ORAL at 08:12

## 2022-10-02 RX ADMIN — ENOXAPARIN SODIUM 40 MG: 100 INJECTION SUBCUTANEOUS at 21:01

## 2022-10-02 RX ADMIN — INSULIN LISPRO 12 UNITS: 100 INJECTION, SOLUTION INTRAVENOUS; SUBCUTANEOUS at 08:16

## 2022-10-02 RX ADMIN — IPRATROPIUM BROMIDE AND ALBUTEROL SULFATE 1 AMPULE: .5; 3 SOLUTION RESPIRATORY (INHALATION) at 20:51

## 2022-10-02 RX ADMIN — MORPHINE SULFATE 2 MG: 2 INJECTION, SOLUTION INTRAMUSCULAR; INTRAVENOUS at 17:19

## 2022-10-02 RX ADMIN — TAMSULOSIN HYDROCHLORIDE 0.4 MG: 0.4 CAPSULE ORAL at 21:02

## 2022-10-02 RX ADMIN — SODIUM CHLORIDE, PRESERVATIVE FREE 10 ML: 5 INJECTION INTRAVENOUS at 08:13

## 2022-10-02 RX ADMIN — SODIUM CHLORIDE, PRESERVATIVE FREE 10 ML: 5 INJECTION INTRAVENOUS at 12:17

## 2022-10-02 RX ADMIN — HYDROCODONE BITARTRATE AND ACETAMINOPHEN 1 TABLET: 5; 325 TABLET ORAL at 19:55

## 2022-10-02 RX ADMIN — PANTOPRAZOLE SODIUM 20 MG: 20 TABLET, DELAYED RELEASE ORAL at 05:45

## 2022-10-02 RX ADMIN — PIPERACILLIN AND TAZOBACTAM 4500 MG: 4; .5 INJECTION, POWDER, LYOPHILIZED, FOR SOLUTION INTRAVENOUS at 03:42

## 2022-10-02 RX ADMIN — ATORVASTATIN CALCIUM 80 MG: 40 TABLET, FILM COATED ORAL at 21:01

## 2022-10-02 RX ADMIN — VALACYCLOVIR HYDROCHLORIDE 500 MG: 500 TABLET, FILM COATED ORAL at 08:13

## 2022-10-02 RX ADMIN — IPRATROPIUM BROMIDE AND ALBUTEROL SULFATE 1 AMPULE: .5; 3 SOLUTION RESPIRATORY (INHALATION) at 11:51

## 2022-10-02 RX ADMIN — Medication 6 MILLICURIE: at 14:53

## 2022-10-02 RX ADMIN — PIPERACILLIN AND TAZOBACTAM 3375 MG: 3; .375 INJECTION, POWDER, LYOPHILIZED, FOR SOLUTION INTRAVENOUS at 22:17

## 2022-10-02 RX ADMIN — FINASTERIDE 5 MG: 5 TABLET, FILM COATED ORAL at 21:02

## 2022-10-02 RX ADMIN — FUROSEMIDE 20 MG: 10 INJECTION, SOLUTION INTRAVENOUS at 12:17

## 2022-10-02 RX ADMIN — IPRATROPIUM BROMIDE AND ALBUTEROL SULFATE 1 AMPULE: .5; 3 SOLUTION RESPIRATORY (INHALATION) at 08:25

## 2022-10-02 RX ADMIN — SODIUM CHLORIDE, PRESERVATIVE FREE 10 ML: 5 INJECTION INTRAVENOUS at 17:20

## 2022-10-02 RX ADMIN — SODIUM CHLORIDE, PRESERVATIVE FREE 10 ML: 5 INJECTION INTRAVENOUS at 21:02

## 2022-10-02 RX ADMIN — MORPHINE SULFATE 2 MG: 2 INJECTION, SOLUTION INTRAMUSCULAR; INTRAVENOUS at 05:50

## 2022-10-02 RX ADMIN — HYDROCODONE BITARTRATE AND ACETAMINOPHEN 1 TABLET: 5; 325 TABLET ORAL at 02:36

## 2022-10-02 RX ADMIN — INSULIN GLARGINE 20 UNITS: 100 INJECTION, SOLUTION SUBCUTANEOUS at 21:06

## 2022-10-02 RX ADMIN — PIPERACILLIN AND TAZOBACTAM 3375 MG: 3; .375 INJECTION, POWDER, LYOPHILIZED, FOR SOLUTION INTRAVENOUS at 17:29

## 2022-10-02 RX ADMIN — MORPHINE SULFATE 2 MG: 2 INJECTION, SOLUTION INTRAMUSCULAR; INTRAVENOUS at 21:12

## 2022-10-02 ASSESSMENT — PAIN SCALES - GENERAL
PAINLEVEL_OUTOF10: 3
PAINLEVEL_OUTOF10: 3
PAINLEVEL_OUTOF10: 2
PAINLEVEL_OUTOF10: 6
PAINLEVEL_OUTOF10: 4
PAINLEVEL_OUTOF10: 3
PAINLEVEL_OUTOF10: 5
PAINLEVEL_OUTOF10: 8
PAINLEVEL_OUTOF10: 7
PAINLEVEL_OUTOF10: 3
PAINLEVEL_OUTOF10: 5
PAINLEVEL_OUTOF10: 2
PAINLEVEL_OUTOF10: 7
PAINLEVEL_OUTOF10: 3
PAINLEVEL_OUTOF10: 6
PAINLEVEL_OUTOF10: 8
PAINLEVEL_OUTOF10: 2
PAINLEVEL_OUTOF10: 8
PAINLEVEL_OUTOF10: 8
PAINLEVEL_OUTOF10: 4

## 2022-10-02 ASSESSMENT — PAIN DESCRIPTION - LOCATION
LOCATION: CHEST
LOCATION: ABDOMEN;CHEST
LOCATION: CHEST
LOCATION: SHOULDER;ABDOMEN
LOCATION: CHEST;ABDOMEN

## 2022-10-02 ASSESSMENT — PAIN DESCRIPTION - DESCRIPTORS
DESCRIPTORS: ACHING;DISCOMFORT
DESCRIPTORS: ACHING;DISCOMFORT
DESCRIPTORS: ACHING
DESCRIPTORS: ACHING
DESCRIPTORS: ACHING;DISCOMFORT
DESCRIPTORS: ACHING;DISCOMFORT
DESCRIPTORS: ACHING

## 2022-10-02 ASSESSMENT — PAIN DESCRIPTION - ORIENTATION
ORIENTATION: RIGHT;LOWER
ORIENTATION: RIGHT;LEFT
ORIENTATION: RIGHT

## 2022-10-02 ASSESSMENT — PAIN DESCRIPTION - PAIN TYPE
TYPE: ACUTE PAIN

## 2022-10-02 ASSESSMENT — PAIN DESCRIPTION - FREQUENCY
FREQUENCY: CONTINUOUS

## 2022-10-02 ASSESSMENT — PAIN DESCRIPTION - ONSET
ONSET: PROGRESSIVE

## 2022-10-02 NOTE — PROGRESS NOTES
V2.0  [facility] Hospitalist Progress Note      Name:  Brenda Griffith /Age/Sex: 1950  (71 y.o. male)   MRN & CSN:  [id number] & [id number] Encounter Date/Time: 10/2/2022 1:25 PM EDT    Location:  -A PCP: Shavon Pires MD       Hospital Day: 5      Subjective:     Chief Complaint: Fatigue, Extremity Weakness (Reports BLE weakness and fatigue since colonoscopy last week. Explains felt like legs were going to give out around 0230 AM and fell down on flow in sitting position. Denies LOC. Denies hitting head. ), and Shortness of Breath (Has had for 1 week. PCP placed on Zpack and Steroid taper.)      No acute events overnight. Had urine output 1.15L cc past 24 hours . Patient still feels shortness of breath but states it is better. Sating 92% on 2L NC. He still complains of episodes of right upper quadrant pain but controlled with the morphine PRN. GI saw pt who ordered HIDA scan. Denies lightheadedness, dizziness, fever, night sweats, chills, chest pain, cough,  palpitations,  nausea, vomiting, diarrhea, dysuria. Assessment and Plan:   Brenda Griffith is a 71 y.o. male with pmh of CLL, DM II, HTN, BPH who presents with Severe sepsis ([facility] [address]      Plan:  #Severe sepsis secondary to super imposed community acquired pneumonia in the setting of rhinovirus  CT A/P right greater than left lower lobe pneumonia. S/p sepsis IVF. Strep, legionella negative. Resp viral panel + for rhinovirus. Will continue treating with broad abx given pt is immunocompromised. - Continue Zosyn -~  - follow up blood cx, resp cx; no growth to date  - will give another lasix 20 mg IV     #hypoxia - improving  Likely secondary to CAP, rhinovirus, possible CHF.   CXR showed worsening right pleural effusion; concern effusion is malignant  -  currently on 2L sating 92%  - Wean oxygen for goal sats greater than 92%  - management as above  - consult pulm given persistent hypoxia, may need thoracentesis  - order IS; could be related to atelectasis from pain    #Diabetes mellitus type 2 with hyperglycemia  Glucose 146 this AM. Lantus was decreased to 18U last night as his glucose was 145.   - continue 18U QHS lantus  - MDSSI  - increase to Lispro 12U TID w meals     #RUQ abd pain. Likely 2/2 lymphadenopathy and mass effect in retroperitoneal area; no CT or U/S suggesting cholithiasis or cholecystitis. Bilirubin elevated on admission 1.6; today 1.2 but other liver labs within normal range  - GI consulted; appreciate recs  - plan for HIDA scan per GI  - monitor liver enzymes    #Concern for new onset of CHF   No previous echocardiogram. Having FROST, trace bilateral edema. proBNP [id number] on admission. ECHO revealed EF 24-90, grade 1 diastolic, heavily calcified aortic valve without stenosis, thickened mitral valve, in addition to pleural effusion and small circumferential torrential pericardial effusion   - cardiology consulted; appreciate recs  - lasix as above  - intake/output, daily weights    #hyponatremia  Na 129 ; concern for volume overload. - lasix as above  - monitor    #Rhabdomyolysis - resolved  Patient was down for unknown amount of time. Initial CK 3453 down to 500's. #Elevated troponin - resolved  - 0.024 on admission. Trended down. Likely demand         #CLL - with progression  Leukocytosis, thrombocytopenia, normocytic anemia  Known diagnosis, follows with hematology oncology. Patient reports he has not been able to take chemo lately due to elevated white count and low platelets.   - CT images reviewed, shows worsening disease process  - f/u galactomannan level, and Fungitell level  - hem/onc consulted; appreciate recs  - may be considered for immunotherapy with or without chemotherapy outpatient    #BPH Continue Flomax and Proscar    Diet Diet NPO Exceptions are: Ice Chips, Sips of Water with Meds, Sips of Clear Liquids   DVT Prophylaxis [x] Lovenox, []  Heparin, [] SCDs, [] Ambulation,  [] Eliquis, [] Xarelto [] Coumadin   Code Status DNR-CCA   Disposition From: home  Expected Disposition: TBD  Estimated Date of Discharge: likely tomorrow  Patient requires continued admission due to oxygen requirement, GI work up   Surrogate Decision Maker/ POA      Review of Systems:    Review of Systems    See above    Objective: Intake/Output Summary (Last 24 hours) at 10/2/2022 1130  Last data filed at 10/2/2022 1953  Gross per 24 hour   Intake 720 ml   Output 475 ml   Net 245 ml        Vitals:   Vitals:    10/02/22 0821   BP:    Pulse: 87   Resp:    Temp:    SpO2:        Physical Exam:     General: NAD  Eyes: EOMI  ENT: neck supple  Cardiovascular: Regular rate. Respiratory: B/L Rhonchi Rt > Lt (much improved)   Gastrointestinal: RUQ, epigastric tender to palpation, Soft  Genitourinary: no suprapubic tenderness  Musculoskeletal: bilateral trace edema LE   Skin: warm, dry. Rt. Chest port in place  Neuro: Alert. Psych: Mood appropriate.      Medications:   Medications:    furosemide  20 mg IntraVENous Once    amoxicillin-clavulanate  1 tablet Oral 2 times per day    ipratropium-albuterol  1 ampule Inhalation Q4H WA    [Held by provider] insulin glargine  35 Units SubCUTAneous Nightly    insulin lispro  12 Units SubCUTAneous TID WC    atorvastatin  80 mg Oral Nightly    finasteride  5 mg Oral Nightly    pantoprazole  20 mg Oral QAM AC    tamsulosin  0.4 mg Oral Nightly    valACYclovir  500 mg Oral Daily    sodium chloride flush  5-40 mL IntraVENous 2 times per day    enoxaparin  40 mg SubCUTAneous Nightly    insulin lispro  0-8 Units SubCUTAneous TID WC    insulin lispro  0-4 Units SubCUTAneous Nightly      Infusions:    sodium chloride      dextrose       PRN Meds: morphine, 2 mg, Q4H PRN  HYDROcodone 5 mg - acetaminophen, 1 tablet, Q6H PRN  albuterol sulfate HFA, 2 puff, Q4H PRN  sodium chloride flush, 5-40 mL, PRN  sodium chloride, 500 mL, PRN  acetaminophen, 650 mg, Q6H PRN   Or  acetaminophen, 650 mg, Q6H PRN  glucose, 4 tablet, PRN  dextrose bolus, 125 mL, PRN   Or  dextrose bolus, 250 mL, PRN  glucagon (rDNA), 1 mg, PRN  dextrose, 1,000 mL, Continuous PRN      Labs      Recent Results (from the past 24 hour(s))   POCT Glucose    Collection Time: 10/01/22  4:07 PM   Result Value Ref Range    POC Glucose 132 (H) 70 - 99 MG/DL   POCT Glucose    Collection Time: 10/01/22  8:12 PM   Result Value Ref Range    POC Glucose 145 (H) 70 - 99 MG/DL   POCT Glucose    Collection Time: 10/02/22  7:25 AM   Result Value Ref Range    POC Glucose 146 (H) 70 - 99 MG/DL   CBC with Auto Differential    Collection Time: 10/02/22  9:10 AM   Result Value Ref Range    WBC 41.2 (HH) 4.0 - 10.5 K/CU MM    RBC 4.88 4.6 - 6.2 M/CU MM    Hemoglobin 12.0 (L) 13.5 - 18.0 GM/DL    Hematocrit 38.2 (L) 42 - 52 %    MCV 78.3 78 - 100 FL    MCH 24.6 (L) 27 - 31 PG    MCHC 31.4 (L) 32.0 - 36.0 %    RDW 19.1 (H) 11.7 - 14.9 %    Platelets 74 (L) 108 - 440 K/CU MM    Bands Relative 1 (L) 5 - 11 %    Segs Relative 14.0 (L) 36 - 66 %    Lymphocytes % 85.0 (H) 24 - 44 %    Bands Absolute 0.41 K/CU MM    Segs Absolute 5.8 K/CU MM    Lymphocytes Absolute 35.0 K/CU MM    Differential Type MANUAL DIFFERENTIAL     Anisocytosis 1+     Polychromasia 1+     Atypical Lymphocytes Absolute 1+     Smudge Cells PRESENT    Protime/INR & PTT    Collection Time: 10/02/22  9:10 AM   Result Value Ref Range    Protime 13.8 11.7 - 14.5 SECONDS    INR 1.07 INDEX    aPTT 29.9 25.1 - 37.1 SECONDS   Lipase    Collection Time: 10/02/22  9:10 AM   Result Value Ref Range    Lipase 14 13 - 60 IU/L   Comprehensive Metabolic Panel    Collection Time: 10/02/22  9:10 AM   Result Value Ref Range    Sodium 129 (L) 135 - 145 MMOL/L    Potassium 4.5 3.5 - 5.1 MMOL/L    Chloride 94 (L) 99 - 110 mMol/L    CO2 23 21 - 32 MMOL/L    BUN 20 6 - 23 MG/DL    Creatinine 0.8 (L) 0.9 - 1.3 MG/DL    Glucose 221 (H) 70 - 99 MG/DL    Calcium 7.7 (L) 8.3 - 10.6 MG/DL    Albumin 3.1 (L) 3.4 - 5.0 GM/DL    Total Protein 5.1 (L) 6.4 - 8.2 GM/DL    Total Bilirubin 1.0 0.0 - 1.0 MG/DL    ALT 31 10 - 40 U/L    AST 28 15 - 37 IU/L    Alkaline Phosphatase 202 (H) 40 - 128 IU/L    GFR Non-African American >60 >60 mL/min/1.73m2    GFR African American >60 >60 mL/min/1.73m2    Anion Gap 12 4 - 16   Amylase    Collection Time: 10/02/22  9:10 AM   Result Value Ref Range    Amylase 18 (L) 25 - 115 U/L        Imaging/Diagnostics Last 24 Hours   CT HEAD WO CONTRAST    Result Date: 9/28/2022  EXAMINATION: CT OF THE HEAD WITHOUT CONTRAST  9/28/2022 2:16 pm TECHNIQUE: CT of the head was performed without the administration of intravenous contrast. Automated exposure control, iterative reconstruction, and/or weight based adjustment of the mA/kV was utilized to reduce the radiation dose to as low as reasonably achievable. COMPARISON: None. HISTORY: ORDERING SYSTEM PROVIDED HISTORY: history of CLL , generalized weakness, down on floor all night TECHNOLOGIST PROVIDED HISTORY: Has a \"code stroke\" or \"stroke alert\" been called? ->No Reason for exam:->history of CLL , generalized weakness, down on floor all night Decision Support Exception - unselect if not a suspected or confirmed emergency medical condition->Emergency Medical Condition (MA) Reason for Exam: history of CLL , generalized weakness, down on floor all night FINDINGS: BRAIN/VENTRICLES: There is no acute intracranial hemorrhage, mass effect or midline shift. No abnormal extra-axial fluid collection. The gray-white differentiation is maintained without evidence of an acute infarct. There is prominence of the ventricles and sulci due to global parenchymal volume loss. There are nonspecific areas of hypoattenuation within the periventricular and subcortical white matter, which likely represent chronic microvascular ischemic change. There vascular calcifications. ORBITS: The visualized portion of the orbits demonstrate no acute abnormality. SINUSES: Osteoneogenesis.   There bilateral maxillary sinus air-fluid levels which can be seen with acute on chronic bacterial sinusitis. Mastoids are clear. Moderate severe ethmoid sinus mucosal thickening. SOFT TISSUES/SKULL: No acute abnormality of the visualized skull or soft tissues. Chronic microvascular disease. Negative acute bleed, midline shift or mass effect. Findings worrisome for acute on chronic maxillary sinus disease. Please correlate exam findings. CT ABDOMEN PELVIS W IV CONTRAST Additional Contrast? None    Result Date: 9/28/2022  EXAMINATION: CTA OF THE CHEST; CT OF THE ABDOMEN AND PELVIS WITH CONTRAST 9/28/2022 2:17 pm TECHNIQUE: CTA of the chest was performed after the administration of intravenous contrast.  Multiplanar reformatted images are provided for review. MIP images are provided for review. Automated exposure control, iterative reconstruction, and/or weight based adjustment of the mA/kV was utilized to reduce the radiation dose to as low as reasonably achievable.; CT of the abdomen and pelvis was performed with the administration of intravenous contrast. Multiplanar reformatted images are provided for review. Automated exposure control, iterative reconstruction, and/or weight based adjustment of the mA/kV was utilized to reduce the radiation dose to as low as reasonably achievable. COMPARISON: July 28, 2022 HISTORY: ORDERING SYSTEM PROVIDED HISTORY: Tachycardic, short of breath, history of CLL TECHNOLOGIST PROVIDED HISTORY: Reason for exam:->Tachycardic, short of breath, history of CLL Decision Support Exception - unselect if not a suspected or confirmed emergency medical condition->Emergency Medical Condition (MA) Reason for Exam: Tachycardic, short of breath, history of CLL FINDINGS: Pulmonary Arteries: Pulmonary arteries are adequately opacified for evaluation. No evidence of intraluminal filling defect to suggest pulmonary embolism. Main pulmonary artery is normal in caliber.  Mediastinum: Mediastinal and hilar lymphadenopathy bilaterally. Subcarinal lymph node measures up to 3.1 cm in short axis. Right hilar lymph node measures at least 2.1 cm in short axis. This appears worse from exam on July 28, 2022. Largest subcarinal lymph node on that exam measured up to 1.9 cm in short axis. Normal caliber thoracic aorta without acute finding. The heart is not enlarged. No pericardial effusion. Lungs/pleura: Consolidations in the right lower lobe and minimally in the left lower lobe concerning for pneumonia. No pleural effusion or pneumothorax. Background of mild emphysema. Bronchial thickening in the lower lobes. Soft Tissues/Bones: No acute bone or soft tissue abnormality. CT abdomen and pelvis Liver: Normal appearance of the liver. Gallbladder: Some pericholecystic fluid and mild gallbladder wall thickening. Perhaps mild intrahepatic bile duct dilatation. There are findings suspicious for a mass near the charlotte hepatis measuring 3.5 x 6.2 cm. Additional charlotte hepatis lymph nodes identified near the pancreas and IVC. Spleen: The spleen is enlarged. Measures at least 18 cm. Pancreas: No inflammatory changes or fluid collections. Charlotte hepatis masses favoring lymphadenopathy. Adrenal Glands: No focal adrenal abnormalities identified. Kidneys: No hydronephrosis. Small left renal cyst. Stomach: The stomach is nondistended. Small bowel: No evidence of small bowel obstruction. Colon: No signficant pericolonic inflammatory changes. Appendix: Normal appearance of the appendix. Normal caliber aorta with atherosclerosis. Retroperitoneal lymphadenopathy measures up to 2.0 cm in short axis in the left periaortic region. Mild free fluid in the pelvis. Partially distended urinary bladder. No acute findings of the osseous structures or subcutaneous soft tissues. No evidence of pulmonary embolism. Right greater than left lower lobe pneumonia.  Diffuse hilar and mediastinal lymphadenopathy, concerning for malignancy with history of CLL, also likely progressed from prior exam. Diffuse peritoneal and retroperitoneal lymphadenopathy in the upper abdomen most significant near the cassi hepatis and IVC where there is a large mass which is favored to be a lymph node measuring up to 6.2 x 3.5 cm, also likely progressed from exam. Splenomegaly, could also be related to malignancy. Mild gallbladder wall thickening and pericholecystic fluid, could be related to passive congestion or mass effect from adjacent lymphadenopathy. There is mild intrahepatic bile duct dilatation. XR CHEST PORTABLE    Result Date: 9/28/2022  EXAMINATION: ONE XRAY VIEW OF THE CHEST 9/28/2022 12:44 pm COMPARISON: CT chest July 28, 2022 images; CT chest May 4, 2022 HISTORY: ORDERING SYSTEM PROVIDED HISTORY: chest pain TECHNOLOGIST PROVIDED HISTORY: Reason for exam:->chest pain Reason for Exam: chest pain FINDINGS: Cardiac silhouette is stable. Right-sided MediPort in place. Patchy interstitial changes of the lungs bilaterally which are nonspecific and can be seen in the setting of pulmonary edema as well as in the setting of multifocal atypical/viral infectious process. No well-defined consolidation. No pleural effusion. No pneumothorax. Osseous structures appear intact. 1. Subtle patchy interstitial changes of the lungs which can be seen in setting of 0 pulmonary edema as well as multifocal atypical/viral infectious processes. No well-defined consolidation. CTA CHEST W CONTRAST    Result Date: 9/28/2022  EXAMINATION: CTA OF THE CHEST; CT OF THE ABDOMEN AND PELVIS WITH CONTRAST 9/28/2022 2:17 pm TECHNIQUE: CTA of the chest was performed after the administration of intravenous contrast.  Multiplanar reformatted images are provided for review. MIP images are provided for review.  Automated exposure control, iterative reconstruction, and/or weight based adjustment of the mA/kV was utilized to reduce the radiation dose to as low as reasonably achievable.; CT of the abdomen and pelvis was performed with the administration of intravenous contrast. Multiplanar reformatted images are provided for review. Automated exposure control, iterative reconstruction, and/or weight based adjustment of the mA/kV was utilized to reduce the radiation dose to as low as reasonably achievable. COMPARISON: July 28, 2022 HISTORY: ORDERING SYSTEM PROVIDED HISTORY: Tachycardic, short of breath, history of CLL TECHNOLOGIST PROVIDED HISTORY: Reason for exam:->Tachycardic, short of breath, history of CLL Decision Support Exception - unselect if not a suspected or confirmed emergency medical condition->Emergency Medical Condition (MA) Reason for Exam: Tachycardic, short of breath, history of CLL FINDINGS: Pulmonary Arteries: Pulmonary arteries are adequately opacified for evaluation. No evidence of intraluminal filling defect to suggest pulmonary embolism. Main pulmonary artery is normal in caliber. Mediastinum: Mediastinal and hilar lymphadenopathy bilaterally. Subcarinal lymph node measures up to 3.1 cm in short axis. Right hilar lymph node measures at least 2.1 cm in short axis. This appears worse from exam on July 28, 2022. Largest subcarinal lymph node on that exam measured up to 1.9 cm in short axis. Normal caliber thoracic aorta without acute finding. The heart is not enlarged. No pericardial effusion. Lungs/pleura: Consolidations in the right lower lobe and minimally in the left lower lobe concerning for pneumonia. No pleural effusion or pneumothorax. Background of mild emphysema. Bronchial thickening in the lower lobes. Soft Tissues/Bones: No acute bone or soft tissue abnormality. CT abdomen and pelvis Liver: Normal appearance of the liver. Gallbladder: Some pericholecystic fluid and mild gallbladder wall thickening. Perhaps mild intrahepatic bile duct dilatation. There are findings suspicious for a mass near the cassi hepatis measuring 3.5 x 6.2 cm.  Additional cassi hepatis lymph nodes identified near the pancreas and IVC. Spleen: The spleen is enlarged. Measures at least 18 cm. Pancreas: No inflammatory changes or fluid collections. Charlotte hepatis masses favoring lymphadenopathy. Adrenal Glands: No focal adrenal abnormalities identified. Kidneys: No hydronephrosis. Small left renal cyst. Stomach: The stomach is nondistended. Small bowel: No evidence of small bowel obstruction. Colon: No signficant pericolonic inflammatory changes. Appendix: Normal appearance of the appendix. Normal caliber aorta with atherosclerosis. Retroperitoneal lymphadenopathy measures up to 2.0 cm in short axis in the left periaortic region. Mild free fluid in the pelvis. Partially distended urinary bladder. No acute findings of the osseous structures or subcutaneous soft tissues. No evidence of pulmonary embolism. Right greater than left lower lobe pneumonia. Diffuse hilar and mediastinal lymphadenopathy, concerning for malignancy with history of CLL, also likely progressed from prior exam. Diffuse peritoneal and retroperitoneal lymphadenopathy in the upper abdomen most significant near the charlotte hepatis and IVC where there is a large mass which is favored to be a lymph node measuring up to 6.2 x 3.5 cm, also likely progressed from exam. Splenomegaly, could also be related to malignancy. Mild gallbladder wall thickening and pericholecystic fluid, could be related to passive congestion or mass effect from adjacent lymphadenopathy. There is mild intrahepatic bile duct dilatation. US ABDOMEN LIMITED    Result Date: 9/29/2022  EXAMINATION: RIGHT UPPER QUADRANT ULTRASOUND 9/29/2022 5:44 am COMPARISON: CT from September 28, 2022 HISTORY: ORDERING SYSTEM PROVIDED HISTORY: RUQ pain TECHNOLOGIST PROVIDED HISTORY: Reason for exam:->RUQ pain FINDINGS: LIVER:  There is a 2.9 x 1.5 x 2.5 cm echogenic focus in the right hepatic lobe. The liver appears enlarged measuring up to 22.9 cm.   Increased echogenicity may suggest hepatic steatosis. Trace adjacent abdominal ascites. BILIARY SYSTEM:  Gallbladder wall appears mildly thickened. Possible adenomyomatosis measuring 0.2 x 0.2 x 0.2 cm, less likely cholesterol polyp. Negative sonographic Choi sign. No pericholecystic fluid. Common bile duct is within normal limits measuring 1.7 mm. Mass near the cassi hepatis measures up to 1.6 x 0.5 x 2.2 cm as seen on recent CT. RIGHT KIDNEY: The right kidney is grossly unremarkable without evidence of hydronephrosis. PANCREAS:  The pancreas is hyperechoic and somewhat bulky in appearance, nonspecific. OTHER: Trace abdominal ascites. 1. Hepatomegaly and hepatic steatosis. 2. Masses most compatible with lymphadenopathy in the cassi hepatis, better seen on recent CT. 3. Hyperechoic focus in the liver could be focal fatty infiltration, hemangioma or other mass. This is too small to characterize on recent CT. Correlate with MRI hepatic mass protocol if indicated. 4. Gallbladder wall thickening with possible adenomyomatosis.        Electronically signed by Elena Estrella MD on 10/2/2022 at 11:30 AM

## 2022-10-02 NOTE — PROGRESS NOTES
DOING FAIR STILL WITH RUQ ABD PAIN NO VOMITING ORAL INTAKE OK C/O COUGH AND SOB AS WELL  VITALS STABLE   LABS FROM AM PENDING WILL GET HIDA SCAN TO R/O AC Vomiting

## 2022-10-03 ENCOUNTER — HOSPITAL ENCOUNTER (OUTPATIENT)
Dept: INFUSION THERAPY | Age: 72
Setting detail: INFUSION SERIES
Discharge: HOME | End: 2022-10-03

## 2022-10-03 ENCOUNTER — CLINICAL DOCUMENTATION (OUTPATIENT)
Dept: ONCOLOGY | Age: 72
End: 2022-10-03

## 2022-10-03 LAB
ALBUMIN SERPL-MCNC: 2.5 GM/DL (ref 3.4–5)
ALP BLD-CCNC: 176 IU/L (ref 40–128)
ALT SERPL-CCNC: 23 U/L (ref 10–40)
ANION GAP SERPL CALCULATED.3IONS-SCNC: 12 MMOL/L (ref 4–16)
ANISOCYTOSIS: ABNORMAL
AST SERPL-CCNC: 22 IU/L (ref 15–37)
ATYPICAL LYMPHOCYTE ABSOLUTE COUNT: ABNORMAL
BANDED NEUTROPHILS ABSOLUTE COUNT: 0.32 K/CU MM
BANDED NEUTROPHILS RELATIVE PERCENT: 1 % (ref 5–11)
BASOPHILS ABSOLUTE: 0.3 K/CU MM
BASOPHILS RELATIVE PERCENT: 1 % (ref 0–1)
BILIRUB SERPL-MCNC: 1 MG/DL (ref 0–1)
BUN BLDV-MCNC: 23 MG/DL (ref 6–23)
CALCIUM SERPL-MCNC: 7.2 MG/DL (ref 8.3–10.6)
CHLORIDE BLD-SCNC: 97 MMOL/L (ref 99–110)
CO2: 20 MMOL/L (ref 21–32)
CREAT SERPL-MCNC: 0.8 MG/DL (ref 0.9–1.3)
CULTURE: NORMAL
CULTURE: NORMAL
DIFFERENTIAL TYPE: ABNORMAL
EOSINOPHILS ABSOLUTE: 0.3 K/CU MM
EOSINOPHILS RELATIVE PERCENT: 1 % (ref 0–3)
GFR AFRICAN AMERICAN: >60 ML/MIN/1.73M2
GFR NON-AFRICAN AMERICAN: >60 ML/MIN/1.73M2
GLUCOSE BLD-MCNC: 137 MG/DL (ref 70–99)
GLUCOSE BLD-MCNC: 139 MG/DL (ref 70–99)
GLUCOSE BLD-MCNC: 168 MG/DL (ref 70–99)
GLUCOSE BLD-MCNC: 227 MG/DL (ref 70–99)
GLUCOSE BLD-MCNC: 237 MG/DL (ref 70–99)
HCT VFR BLD CALC: 33 % (ref 42–52)
HEMOGLOBIN: 10 GM/DL (ref 13.5–18)
LYMPHOCYTES ABSOLUTE: 22.5 K/CU MM
LYMPHOCYTES RELATIVE PERCENT: 70 % (ref 24–44)
Lab: NORMAL
Lab: NORMAL
MAGNESIUM: 2 MG/DL (ref 1.8–2.4)
MCH RBC QN AUTO: 24.3 PG (ref 27–31)
MCHC RBC AUTO-ENTMCNC: 30.3 % (ref 32–36)
MCV RBC AUTO: 80.3 FL (ref 78–100)
PDW BLD-RTO: 18.6 % (ref 11.7–14.9)
PHOSPHORUS: 2.4 MG/DL (ref 2.5–4.9)
PLATELET # BLD: 67 K/CU MM (ref 140–440)
PMV BLD AUTO: 10.9 FL (ref 7.5–11.1)
POTASSIUM SERPL-SCNC: 4.2 MMOL/L (ref 3.5–5.1)
PRO-BNP: 343.3 PG/ML
PROCALCITONIN: 0.74
RBC # BLD: 4.11 M/CU MM (ref 4.6–6.2)
SEGMENTED NEUTROPHILS ABSOLUTE COUNT: 8.7 K/CU MM
SEGMENTED NEUTROPHILS RELATIVE PERCENT: 27 % (ref 36–66)
SODIUM BLD-SCNC: 129 MMOL/L (ref 135–145)
SPECIMEN: NORMAL
SPECIMEN: NORMAL
TOTAL PROTEIN: 5.1 GM/DL (ref 6.4–8.2)
WBC # BLD: 32.1 K/CU MM (ref 4–10.5)

## 2022-10-03 PROCEDURE — 2580000003 HC RX 258: Performed by: NURSE PRACTITIONER

## 2022-10-03 PROCEDURE — 6370000000 HC RX 637 (ALT 250 FOR IP): Performed by: STUDENT IN AN ORGANIZED HEALTH CARE EDUCATION/TRAINING PROGRAM

## 2022-10-03 PROCEDURE — 2700000000 HC OXYGEN THERAPY PER DAY

## 2022-10-03 PROCEDURE — 84145 PROCALCITONIN (PCT): CPT

## 2022-10-03 PROCEDURE — 94640 AIRWAY INHALATION TREATMENT: CPT

## 2022-10-03 PROCEDURE — 83735 ASSAY OF MAGNESIUM: CPT

## 2022-10-03 PROCEDURE — 84100 ASSAY OF PHOSPHORUS: CPT

## 2022-10-03 PROCEDURE — 80053 COMPREHEN METABOLIC PANEL: CPT

## 2022-10-03 PROCEDURE — 88108 CYTOPATH CONCENTRATE TECH: CPT | Performed by: PATHOLOGY

## 2022-10-03 PROCEDURE — 6370000000 HC RX 637 (ALT 250 FOR IP): Performed by: NURSE PRACTITIONER

## 2022-10-03 PROCEDURE — 88305 TISSUE EXAM BY PATHOLOGIST: CPT | Performed by: PATHOLOGY

## 2022-10-03 PROCEDURE — 2580000003 HC RX 258: Performed by: STUDENT IN AN ORGANIZED HEALTH CARE EDUCATION/TRAINING PROGRAM

## 2022-10-03 PROCEDURE — 83880 ASSAY OF NATRIURETIC PEPTIDE: CPT

## 2022-10-03 PROCEDURE — 94761 N-INVAS EAR/PLS OXIMETRY MLT: CPT

## 2022-10-03 PROCEDURE — 6360000002 HC RX W HCPCS: Performed by: NURSE PRACTITIONER

## 2022-10-03 PROCEDURE — 99232 SBSQ HOSP IP/OBS MODERATE 35: CPT | Performed by: INTERNAL MEDICINE

## 2022-10-03 PROCEDURE — 82962 GLUCOSE BLOOD TEST: CPT

## 2022-10-03 PROCEDURE — 85027 COMPLETE CBC AUTOMATED: CPT

## 2022-10-03 PROCEDURE — 1200000000 HC SEMI PRIVATE

## 2022-10-03 PROCEDURE — 85007 BL SMEAR W/DIFF WBC COUNT: CPT

## 2022-10-03 PROCEDURE — 94150 VITAL CAPACITY TEST: CPT

## 2022-10-03 PROCEDURE — 6360000002 HC RX W HCPCS: Performed by: STUDENT IN AN ORGANIZED HEALTH CARE EDUCATION/TRAINING PROGRAM

## 2022-10-03 RX ORDER — HYDROCODONE BITARTRATE AND ACETAMINOPHEN 5; 325 MG/1; MG/1
2 TABLET ORAL EVERY 6 HOURS PRN
Status: DISCONTINUED | OUTPATIENT
Start: 2022-10-03 | End: 2022-10-06 | Stop reason: HOSPADM

## 2022-10-03 RX ORDER — GABAPENTIN 100 MG/1
100 CAPSULE ORAL 3 TIMES DAILY
Status: DISCONTINUED | OUTPATIENT
Start: 2022-10-03 | End: 2022-10-06 | Stop reason: HOSPADM

## 2022-10-03 RX ADMIN — MORPHINE SULFATE 2 MG: 2 INJECTION, SOLUTION INTRAMUSCULAR; INTRAVENOUS at 02:04

## 2022-10-03 RX ADMIN — GABAPENTIN 100 MG: 100 CAPSULE ORAL at 20:58

## 2022-10-03 RX ADMIN — INSULIN GLARGINE 20 UNITS: 100 INJECTION, SOLUTION SUBCUTANEOUS at 21:14

## 2022-10-03 RX ADMIN — FINASTERIDE 5 MG: 5 TABLET, FILM COATED ORAL at 20:57

## 2022-10-03 RX ADMIN — IPRATROPIUM BROMIDE AND ALBUTEROL SULFATE 1 AMPULE: .5; 3 SOLUTION RESPIRATORY (INHALATION) at 20:00

## 2022-10-03 RX ADMIN — ATORVASTATIN CALCIUM 80 MG: 40 TABLET, FILM COATED ORAL at 20:58

## 2022-10-03 RX ADMIN — PIPERACILLIN AND TAZOBACTAM 3375 MG: 3; .375 INJECTION, POWDER, LYOPHILIZED, FOR SOLUTION INTRAVENOUS at 21:22

## 2022-10-03 RX ADMIN — INSULIN LISPRO 2 UNITS: 100 INJECTION, SOLUTION INTRAVENOUS; SUBCUTANEOUS at 18:00

## 2022-10-03 RX ADMIN — HYDROCODONE BITARTRATE AND ACETAMINOPHEN 1 TABLET: 5; 325 TABLET ORAL at 10:27

## 2022-10-03 RX ADMIN — SODIUM CHLORIDE, PRESERVATIVE FREE 10 ML: 5 INJECTION INTRAVENOUS at 21:24

## 2022-10-03 RX ADMIN — HYDROCODONE BITARTRATE AND ACETAMINOPHEN 1 TABLET: 5; 325 TABLET ORAL at 04:32

## 2022-10-03 RX ADMIN — PIPERACILLIN AND TAZOBACTAM 3375 MG: 3; .375 INJECTION, POWDER, LYOPHILIZED, FOR SOLUTION INTRAVENOUS at 05:37

## 2022-10-03 RX ADMIN — IPRATROPIUM BROMIDE AND ALBUTEROL SULFATE 1 AMPULE: .5; 3 SOLUTION RESPIRATORY (INHALATION) at 11:57

## 2022-10-03 RX ADMIN — HYDROCODONE BITARTRATE AND ACETAMINOPHEN 2 TABLET: 5; 325 TABLET ORAL at 21:15

## 2022-10-03 RX ADMIN — MORPHINE SULFATE 2 MG: 2 INJECTION, SOLUTION INTRAMUSCULAR; INTRAVENOUS at 12:56

## 2022-10-03 RX ADMIN — VALACYCLOVIR HYDROCHLORIDE 500 MG: 500 TABLET, FILM COATED ORAL at 08:27

## 2022-10-03 RX ADMIN — GABAPENTIN 100 MG: 100 CAPSULE ORAL at 15:42

## 2022-10-03 RX ADMIN — PANTOPRAZOLE SODIUM 20 MG: 20 TABLET, DELAYED RELEASE ORAL at 05:36

## 2022-10-03 RX ADMIN — IPRATROPIUM BROMIDE AND ALBUTEROL SULFATE 1 AMPULE: .5; 3 SOLUTION RESPIRATORY (INHALATION) at 15:20

## 2022-10-03 RX ADMIN — HYDROCODONE BITARTRATE AND ACETAMINOPHEN 2 TABLET: 5; 325 TABLET ORAL at 15:42

## 2022-10-03 RX ADMIN — ACETAMINOPHEN 650 MG: 325 TABLET ORAL at 18:00

## 2022-10-03 RX ADMIN — ENOXAPARIN SODIUM 40 MG: 100 INJECTION SUBCUTANEOUS at 20:58

## 2022-10-03 RX ADMIN — PIPERACILLIN AND TAZOBACTAM 3375 MG: 3; .375 INJECTION, POWDER, LYOPHILIZED, FOR SOLUTION INTRAVENOUS at 14:00

## 2022-10-03 RX ADMIN — MORPHINE SULFATE 2 MG: 2 INJECTION, SOLUTION INTRAMUSCULAR; INTRAVENOUS at 06:11

## 2022-10-03 RX ADMIN — TAMSULOSIN HYDROCHLORIDE 0.4 MG: 0.4 CAPSULE ORAL at 20:58

## 2022-10-03 RX ADMIN — IPRATROPIUM BROMIDE AND ALBUTEROL SULFATE 1 AMPULE: .5; 3 SOLUTION RESPIRATORY (INHALATION) at 07:25

## 2022-10-03 ASSESSMENT — PAIN SCALES - GENERAL
PAINLEVEL_OUTOF10: 7
PAINLEVEL_OUTOF10: 7
PAINLEVEL_OUTOF10: 2
PAINLEVEL_OUTOF10: 8
PAINLEVEL_OUTOF10: 4
PAINLEVEL_OUTOF10: 3
PAINLEVEL_OUTOF10: 7
PAINLEVEL_OUTOF10: 10
PAINLEVEL_OUTOF10: 5
PAINLEVEL_OUTOF10: 4
PAINLEVEL_OUTOF10: 9

## 2022-10-03 ASSESSMENT — PAIN DESCRIPTION - LOCATION
LOCATION: CHEST
LOCATION: CHEST
LOCATION: RIB CAGE
LOCATION: GENERALIZED

## 2022-10-03 ASSESSMENT — PAIN DESCRIPTION - DESCRIPTORS
DESCRIPTORS: ACHING
DESCRIPTORS: SORE
DESCRIPTORS: ACHING
DESCRIPTORS: ACHING
DESCRIPTORS: SORE

## 2022-10-03 ASSESSMENT — PAIN DESCRIPTION - PAIN TYPE: TYPE: ACUTE PAIN

## 2022-10-03 ASSESSMENT — PAIN DESCRIPTION - ORIENTATION
ORIENTATION: RIGHT
ORIENTATION: RIGHT

## 2022-10-03 ASSESSMENT — PAIN - FUNCTIONAL ASSESSMENT
PAIN_FUNCTIONAL_ASSESSMENT: PREVENTS OR INTERFERES SOME ACTIVE ACTIVITIES AND ADLS
PAIN_FUNCTIONAL_ASSESSMENT: ACTIVITIES ARE NOT PREVENTED

## 2022-10-03 ASSESSMENT — PAIN SCALES - WONG BAKER: WONGBAKER_NUMERICALRESPONSE: 0

## 2022-10-03 ASSESSMENT — PAIN DESCRIPTION - FREQUENCY: FREQUENCY: CONTINUOUS

## 2022-10-03 NOTE — PROGRESS NOTES
ONCOLOGY HEMATOLOGY CARE ([place])  PROGRESS NOTE      10/3/2022  1:45 PM    Patient:    Anthony Baptiste  : 1950   71 y.o. MRN: [id number]  Admitted: 2022 11:19 AM ATT: Claudette Shall, MD   -A  AdmitDx: Lymphadenopathy [R59.1]  Rhinovirus [B34.8]  Elevated troponin [R77.8]  Elevated CK [R74.8]  Acute respiratory failure with hypoxia ([address] [J96.01]  H/O chronic lymphocytic leukemia [Z85.6]  Severe sepsis ([address] [A41.9, R65.20]  Traumatic rhabdomyolysis, initial encounter ([address] Pepper Rodrigues. 6XXA]  Acute congestive heart failure, unspecified heart failure type ([address] [I50.9]  Sepsis, due to unspecified organism, unspecified whether acute organ dysfunction present ([address] [A41.9]  PCP: Americo Delatorre MD      Patient was seen and examined today. Breathing better  No fever  Productive cough  Strength is a little better  No bleeding    / CT scan of the chest, abdomen pelvis:  No evidence of pulmonary embolism. Right greater than left lower lobe pneumonia. Diffuse hilar and mediastinal lymphadenopathy, concerning for malignancy with   history of CLL, also likely progressed from prior exam.       Diffuse peritoneal and retroperitoneal lymphadenopathy in the upper abdomen   most significant near the cassi hepatis and IVC where there is a large mass   which is favored to be a lymph node measuring up to 6.2 x 3.5 cm, also likely   progressed from exam.       Splenomegaly, could also be related to malignancy. Mild gallbladder wall thickening and pericholecystic fluid, could be related   to passive congestion or mass effect from adjacent lymphadenopathy. There is   mild intrahepatic bile duct dilatation. 2022 ultrasound of the abdomen:  1. Hepatomegaly and hepatic steatosis. 2. Masses most compatible with lymphadenopathy in the cassi hepatis, better   seen on recent CT. 3. Hyperechoic focus in the liver could be focal fatty infiltration,   hemangioma or other mass. This is too small to characterize on recent CT. Correlate with MRI hepatic mass protocol if indicated. 4. Gallbladder wall thickening with possible adenomyomatosis. 9/29/2022 CBC with WBC of 28.2 hemoglobin of 9.9 hematocrit 32.1 MCV of 80 platelets of 41. CMP with sodium of 133 potassium of 4.2 BUN of 26 creatinine 1 calcium 7.4 total bilirubin of 1.9     But note LDH was 907 9/22/2022     BACKGROUND ONCOLOGY HISTORY:  B-cell CLL, stage I with conversion from good to poor prognostic factors, with 17p deletion:   His diagnosis of CLL since 2007.   17p deletion since 2015. Initial treatment with Leukeran from 2000 to 2011 intermittently and FCR regimen in 2012, bendamustine/Rituxan in 2014. Idelalisib from January 2015 until June of 2016. On ibrutinib since September 2016. Ibrutinib therapy seems to be helping him, overall improvement. Was Only able to tolerate 140 mg p.o. every other day  He was off for a couple months During the fall 2017  Resumed Ibrutinib and  on 140 mg po daily. Plan to Continue current dose as no major B sx, stable WBC. CT imaging in July 2022 with slightly increasing size of intraabdominal LN. plan was to monitor. But note declining platelet count, could be secondary ibrutinib. Hold ibrutinib for now until further investigation. REVIEW OF SYSTEMS      Constitutional:  Denies fever, chills  HEENT:  Denies swelling of neck glands  Respiratory:  Denies hemoptysis  Cardiovascular:  Denies chest pain  GI:  Denies abdominal pain, nausea, vomiting, constipation or diarrhea   Neurologic:  Denies headache, focal weakness or sensory changes   PSYCHIATRIC:  Neg depression, personality changes, anxiety.   ALL/IMM:  Neg reactions to drugs other than listed    All systems negative except  for symptoms of HPI and as marked as above    PHYSICAL EXAM :    Vitals: /73   Pulse 97   Temp 98.1 °F (36.7 °C) (Oral)   Resp 22   Ht 6' (1.829 m)   Wt 173 lb 4.5 oz (78.6 kg)   SpO2 92%   BMI 23.50 kg/m²     CONSTITUTIONAL: awake, alert, Ill  appearing  EYES: But no icterus  ENT: ATNC  NECK: No JVD  HEMATOLOGIC/LYMPHATIC: no cervical, supraclavicular or axillary lymphadenopathy   LUNGS: Bilateral extensive coarse breath sounds  CARDIOVASCULAR: s1s2 rrr no murmurs  ABDOMEN: soft ntnd bs pos  NEUROLOGIC: GI  SKIN: Echymosis  EXTREMITIES: no LE edema bilaterally    LABORATORY RESULTS  CBC:   Recent Labs     10/01/22  0955 10/02/22  0910 10/03/22  1322   WBC 30.8* 41.2* 32.1*   HGB 11.5* 12.0* 10.0*   PLT 58* 74* 67*     BMP:    Recent Labs     10/01/22  0955 10/02/22  0910   * 129*   K 4.5 4.5   CL 95* 94*   CO2 23 23   BUN 18 20   CREATININE 0.7* 0.8*   GLUCOSE 181* 221*     Hepatic:   Recent Labs     10/02/22  0910   AST 28   ALT 31   BILITOT 1.0   ALKPHOS 202*     INR:   Recent Labs     10/02/22  0910   INR 1.07         RADIOLOGY REPORTS  10/2/22; HIDA scan:No convincing scintigraphic evidence of acute cholecystitis. ASSESSMENT & RECOMMENDATIONS:  History of CLL, ibrutinib was being held. Now with worsening leukocytosis, anemia and thrombocytopenia. Could be secondary to infection/abx. No evidence of hemolysis per smear/labs. Flow cytometry pending. Further recommendations pending above. Note CAT scan with progressive lymphadenopathy. Will consider further treatment with immunotherapy with or without chemotherapy as outpatient after infection control. IVIG prior to discharge   CBC reviewed today     Continue other medical care     ECOG Performance Status (ECOG Scale 0-4): 2-3     This plan was discussed with the patient and he verbalized understanding. We will continue to follow the patient. Thank you for allowing us to participate in the care of this patient.       [address]

## 2022-10-03 NOTE — PROGRESS NOTES
Received a VM from ICU step down RN at [facility] regarding infusion today 10/03/2022. Called RN @ Ext [phone].  RN states that Dr. Eleuterio Pablo has already addressed and patient will be rescheduled for IVIG once discharged from hospital.

## 2022-10-03 NOTE — PROGRESS NOTES
V2.0  [facility] Hospitalist Progress Note      Name:  Danielle Joel /Age/Sex: 1950  (71 y.o. male)   MRN & CSN:  [id number] & [id number] Encounter Date/Time: 10/3/2022 1:25 PM EDT    Location:  -A PCP: Josey Peña MD       Hospital Day: 6      Subjective:     Chief Complaint: Fatigue, Extremity Weakness (Reports BLE weakness and fatigue since colonoscopy last week. Explains felt like legs were going to give out around 0230 AM and fell down on flow in sitting position. Denies LOC. Denies hitting head. ), and Shortness of Breath (Has had for 1 week. PCP placed on Zpack and Steroid taper.)      No acute events overnight. Had urine output 1.375L cc past 24 hours unsure of accuracy. Patient still feels shortness of breath but states it is better. O2 increased to 3L. He still complains of episodes of right upper quadrant pain but controlled with the pain meds. HIDA scan unremarkable. Denies lightheadedness, dizziness, fever, night sweats, chills, chest pain, cough,  palpitations,  nausea, vomiting, diarrhea, dysuria. Assessment and Plan:   Danielle Joel is a 71 y.o. male with pmh of CLL, DM II, HTN, BPH who presents with Severe sepsis ([facility] [address]      Plan:  #Severe sepsis secondary to super imposed community acquired pneumonia in the setting of rhinovirus  CT A/P right greater than left lower lobe pneumonia. S/p sepsis IVF. Strep, legionella negative. Resp viral panel + for rhinovirus. Will continue treating with broad abx given pt is immunocompromised. Received lasix 20 mg IV daily X3 days. Procalc elevated 0.78 but pt has CLL which can make it false positive  WBC still high but better than yesterday which is likely 2/2 CLL  - Continue Zosyn -~  - follow up blood cx, resp cx; no growth to date      #hypoxia - improving  Likely secondary to CAP, rhinovirus, possible CHF.   CXR showed worsening right pleural effusion; concern effusion is malignant vs empyema vs parapneumonic   - currently on 3L sating 92%  - Wean oxygen for goal sats greater than 92%  - pulm following; discussed with pulm, will consult IR for thoracentesis if possible  - management as above  - continue IS; hypoxia could be related to atelectasis from pain  - may need to discharge on home O2    #Diabetes mellitus type 2 with hyperglycemia  Glucose 137 this AM.   - continue 20U QHS lantus  - MDSSI  - d/c lispro w meals     #RUQ abd pain. Likely 2/2 lymphadenopathy and mass effect in retroperitoneal area; no CT, U/S or HIDA scan suggesting cholithiasis or cholecystitis. - GI consulted; no further recs  - will add gabapentin 100 TID  - monitor liver enzymes    #Concern for new onset of CHF   Having FROST, trace bilateral edema. proBNP [id number] on admission. ECHO revealed EF 18-28, grade 1 diastolic, heavily calcified aortic valve without stenosis, thickened mitral valve, in addition to pleural effusion and small circumferential torrential pericardial effusion   Received lasix IV with adequate urine ouput. ProBNP came down. - cardiology consulted; appreciate recs  - intake/output, daily weights    #hyponatremia  Na stable 129 ; concern for volume overload. SIADH 2/2 pain could also be contributing.   - received lasix as above  - monitor    #Rhabdomyolysis - resolved  Patient was down for unknown amount of time. Initial CK 3453 down to 500's. #Elevated troponin - resolved  - 0.024 on admission. Trended down. Likely demand         #CLL - with progression  Leukocytosis, thrombocytopenia, normocytic anemia  Known diagnosis, follows with hematology oncology. Patient reports he has not been able to take chemo lately due to elevated white count and low platelets.   - CT images reviewed, shows worsening disease process  - f/u galactomannan level, and Fungitell level  - hem/onc consulted; appreciate recs  - may be considered for immunotherapy with or without chemotherapy outpatient    #BPH Continue Flomax and Proscar    Diet ADULT DIET; Regular; 4 carb choices (60 gm/meal); Low Fat/Low Chol/High Fiber/JOSE; Low Sodium (2 gm)  ADULT ORAL NUTRITION SUPPLEMENT; Breakfast, Lunch, Dinner; Diabetic Oral Supplement   DVT Prophylaxis [x] Lovenox, []  Heparin, [] SCDs, [] Ambulation,  [] Eliquis, [] Xarelto  [] Coumadin   Code Status DNR-CCA   Disposition From: home  Expected Disposition: TBD  Estimated Date of Discharge: likely tomorrow  Patient requires continued admission due to oxygen requirement, GI work up   Surrogate Decision Maker/ POA      Review of Systems:    Review of Systems    See above    Objective: Intake/Output Summary (Last 24 hours) at 10/3/2022 1443  Last data filed at 10/2/2022 1952  Gross per 24 hour   Intake 279.5 ml   Output --   Net 279.5 ml        Vitals:   Vitals:    10/03/22 1401   BP: 120/74   Pulse: 92   Resp: 23   Temp: 98 °F (36.7 °C)   SpO2: 93%       Physical Exam:     General: NAD  Eyes: EOMI  ENT: neck supple  Cardiovascular: Regular rate. Respiratory: B/L Rhonchi Rt > Lt (improved)   Gastrointestinal: RUQ, epigastric tender to palpation, Soft  Genitourinary: no suprapubic tenderness  Musculoskeletal: bilateral trace edema LE   Skin: warm, dry. Rt. Chest port in place  Neuro: Alert. Psych: Mood appropriate.      Medications:   Medications:    gabapentin  100 mg Oral TID    insulin glargine  20 Units SubCUTAneous Nightly    piperacillin-tazobactam  3,375 mg IntraVENous Q8H    ipratropium-albuterol  1 ampule Inhalation Q4H WA    atorvastatin  80 mg Oral Nightly    finasteride  5 mg Oral Nightly    pantoprazole  20 mg Oral QAM AC    tamsulosin  0.4 mg Oral Nightly    valACYclovir  500 mg Oral Daily    sodium chloride flush  5-40 mL IntraVENous 2 times per day    enoxaparin  40 mg SubCUTAneous Nightly    insulin lispro  0-8 Units SubCUTAneous TID WC    insulin lispro  0-4 Units SubCUTAneous Nightly      Infusions:    sodium chloride      dextrose       PRN Meds: HYDROcodone 5 mg - acetaminophen, 2 tablet, Q6H PRN  albuterol sulfate HFA, 2 puff, Q4H PRN  sodium chloride flush, 5-40 mL, PRN  sodium chloride, 500 mL, PRN  acetaminophen, 650 mg, Q6H PRN   Or  acetaminophen, 650 mg, Q6H PRN  glucose, 4 tablet, PRN  dextrose bolus, 125 mL, PRN   Or  dextrose bolus, 250 mL, PRN  glucagon (rDNA), 1 mg, PRN  dextrose, 1,000 mL, Continuous PRN      Labs      Recent Results (from the past 24 hour(s))   POCT Glucose    Collection Time: 10/02/22  4:07 PM   Result Value Ref Range    POC Glucose 127 (H) 70 - 99 MG/DL   POCT Glucose    Collection Time: 10/02/22  8:42 PM   Result Value Ref Range    POC Glucose 141 (H) 70 - 99 MG/DL   POCT Glucose    Collection Time: 10/03/22  5:34 AM   Result Value Ref Range    POC Glucose 137 (H) 70 - 99 MG/DL   POCT Glucose    Collection Time: 10/03/22 11:30 AM   Result Value Ref Range    POC Glucose 168 (H) 70 - 99 MG/DL   CBC with Auto Differential    Collection Time: 10/03/22  1:22 PM   Result Value Ref Range    WBC 32.1 (HH) 4.0 - 10.5 K/CU MM    RBC 4.11 (L) 4.6 - 6.2 M/CU MM    Hemoglobin 10.0 (L) 13.5 - 18.0 GM/DL    Hematocrit 33.0 (L) 42 - 52 %    MCV 80.3 78 - 100 FL    MCH 24.3 (L) 27 - 31 PG    MCHC 30.3 (L) 32.0 - 36.0 %    RDW 18.6 (H) 11.7 - 14.9 %    Platelets 67 (L) 340 - 440 K/CU MM    MPV 10.9 7.5 - 11.1 FL    Bands Relative 1 (L) 5 - 11 %    Segs Relative 27.0 (L) 36 - 66 %    Eosinophils % 1.0 0 - 3 %    Basophils % 1.0 0 - 1 %    Lymphocytes % 70.0 (H) 24 - 44 %    Bands Absolute 0.32 K/CU MM    Segs Absolute 8.7 K/CU MM    Eosinophils Absolute 0.3 K/CU MM    Basophils Absolute 0.3 K/CU MM    Lymphocytes Absolute 22.5 K/CU MM    Differential Type MANUAL DIFFERENTIAL     Anisocytosis 1+     Atypical Lymphocytes Absolute 1+    Brain Natriuretic Peptide    Collection Time: 10/03/22  1:22 PM   Result Value Ref Range    Pro-.3 (H) <300 PG/ML   Procalcitonin    Collection Time: 10/03/22  1:22 PM   Result Value Ref Range    Procalcitonin 0.745         Imaging/Diagnostics Last 24 Hours   CT HEAD WO CONTRAST    Result Date: 9/28/2022  EXAMINATION: CT OF THE HEAD WITHOUT CONTRAST  9/28/2022 2:16 pm TECHNIQUE: CT of the head was performed without the administration of intravenous contrast. Automated exposure control, iterative reconstruction, and/or weight based adjustment of the mA/kV was utilized to reduce the radiation dose to as low as reasonably achievable. COMPARISON: None. HISTORY: ORDERING SYSTEM PROVIDED HISTORY: history of CLL , generalized weakness, down on floor all night TECHNOLOGIST PROVIDED HISTORY: Has a \"code stroke\" or \"stroke alert\" been called? ->No Reason for exam:->history of CLL , generalized weakness, down on floor all night Decision Support Exception - unselect if not a suspected or confirmed emergency medical condition->Emergency Medical Condition (MA) Reason for Exam: history of CLL , generalized weakness, down on floor all night FINDINGS: BRAIN/VENTRICLES: There is no acute intracranial hemorrhage, mass effect or midline shift. No abnormal extra-axial fluid collection. The gray-white differentiation is maintained without evidence of an acute infarct. There is prominence of the ventricles and sulci due to global parenchymal volume loss. There are nonspecific areas of hypoattenuation within the periventricular and subcortical white matter, which likely represent chronic microvascular ischemic change. There vascular calcifications. ORBITS: The visualized portion of the orbits demonstrate no acute abnormality. SINUSES: Osteoneogenesis. There bilateral maxillary sinus air-fluid levels which can be seen with acute on chronic bacterial sinusitis. Mastoids are clear. Moderate severe ethmoid sinus mucosal thickening. SOFT TISSUES/SKULL: No acute abnormality of the visualized skull or soft tissues. Chronic microvascular disease. Negative acute bleed, midline shift or mass effect. Findings worrisome for acute on chronic maxillary sinus disease.   Please correlate exam findings. CT ABDOMEN PELVIS W IV CONTRAST Additional Contrast? None    Result Date: 9/28/2022  EXAMINATION: CTA OF THE CHEST; CT OF THE ABDOMEN AND PELVIS WITH CONTRAST 9/28/2022 2:17 pm TECHNIQUE: CTA of the chest was performed after the administration of intravenous contrast.  Multiplanar reformatted images are provided for review. MIP images are provided for review. Automated exposure control, iterative reconstruction, and/or weight based adjustment of the mA/kV was utilized to reduce the radiation dose to as low as reasonably achievable.; CT of the abdomen and pelvis was performed with the administration of intravenous contrast. Multiplanar reformatted images are provided for review. Automated exposure control, iterative reconstruction, and/or weight based adjustment of the mA/kV was utilized to reduce the radiation dose to as low as reasonably achievable. COMPARISON: July 28, 2022 HISTORY: ORDERING SYSTEM PROVIDED HISTORY: Tachycardic, short of breath, history of CLL TECHNOLOGIST PROVIDED HISTORY: Reason for exam:->Tachycardic, short of breath, history of CLL Decision Support Exception - unselect if not a suspected or confirmed emergency medical condition->Emergency Medical Condition (MA) Reason for Exam: Tachycardic, short of breath, history of CLL FINDINGS: Pulmonary Arteries: Pulmonary arteries are adequately opacified for evaluation. No evidence of intraluminal filling defect to suggest pulmonary embolism. Main pulmonary artery is normal in caliber. Mediastinum: Mediastinal and hilar lymphadenopathy bilaterally. Subcarinal lymph node measures up to 3.1 cm in short axis. Right hilar lymph node measures at least 2.1 cm in short axis. This appears worse from exam on July 28, 2022. Largest subcarinal lymph node on that exam measured up to 1.9 cm in short axis. Normal caliber thoracic aorta without acute finding. The heart is not enlarged. No pericardial effusion.  Lungs/pleura: Consolidations in the right lower lobe and minimally in the left lower lobe concerning for pneumonia. No pleural effusion or pneumothorax. Background of mild emphysema. Bronchial thickening in the lower lobes. Soft Tissues/Bones: No acute bone or soft tissue abnormality. CT abdomen and pelvis Liver: Normal appearance of the liver. Gallbladder: Some pericholecystic fluid and mild gallbladder wall thickening. Perhaps mild intrahepatic bile duct dilatation. There are findings suspicious for a mass near the charlotte hepatis measuring 3.5 x 6.2 cm. Additional charlotte hepatis lymph nodes identified near the pancreas and IVC. Spleen: The spleen is enlarged. Measures at least 18 cm. Pancreas: No inflammatory changes or fluid collections. Charlotte hepatis masses favoring lymphadenopathy. Adrenal Glands: No focal adrenal abnormalities identified. Kidneys: No hydronephrosis. Small left renal cyst. Stomach: The stomach is nondistended. Small bowel: No evidence of small bowel obstruction. Colon: No signficant pericolonic inflammatory changes. Appendix: Normal appearance of the appendix. Normal caliber aorta with atherosclerosis. Retroperitoneal lymphadenopathy measures up to 2.0 cm in short axis in the left periaortic region. Mild free fluid in the pelvis. Partially distended urinary bladder. No acute findings of the osseous structures or subcutaneous soft tissues. No evidence of pulmonary embolism. Right greater than left lower lobe pneumonia. Diffuse hilar and mediastinal lymphadenopathy, concerning for malignancy with history of CLL, also likely progressed from prior exam. Diffuse peritoneal and retroperitoneal lymphadenopathy in the upper abdomen most significant near the charlotte hepatis and IVC where there is a large mass which is favored to be a lymph node measuring up to 6.2 x 3.5 cm, also likely progressed from exam. Splenomegaly, could also be related to malignancy.  Mild gallbladder wall thickening and pericholecystic fluid, could be related to passive congestion or mass effect from adjacent lymphadenopathy. There is mild intrahepatic bile duct dilatation. XR CHEST PORTABLE    Result Date: 9/28/2022  EXAMINATION: ONE XRAY VIEW OF THE CHEST 9/28/2022 12:44 pm COMPARISON: CT chest July 28, 2022 images; CT chest May 4, 2022 HISTORY: ORDERING SYSTEM PROVIDED HISTORY: chest pain TECHNOLOGIST PROVIDED HISTORY: Reason for exam:->chest pain Reason for Exam: chest pain FINDINGS: Cardiac silhouette is stable. Right-sided MediPort in place. Patchy interstitial changes of the lungs bilaterally which are nonspecific and can be seen in the setting of pulmonary edema as well as in the setting of multifocal atypical/viral infectious process. No well-defined consolidation. No pleural effusion. No pneumothorax. Osseous structures appear intact. 1. Subtle patchy interstitial changes of the lungs which can be seen in setting of 0 pulmonary edema as well as multifocal atypical/viral infectious processes. No well-defined consolidation. CTA CHEST W CONTRAST    Result Date: 9/28/2022  EXAMINATION: CTA OF THE CHEST; CT OF THE ABDOMEN AND PELVIS WITH CONTRAST 9/28/2022 2:17 pm TECHNIQUE: CTA of the chest was performed after the administration of intravenous contrast.  Multiplanar reformatted images are provided for review. MIP images are provided for review. Automated exposure control, iterative reconstruction, and/or weight based adjustment of the mA/kV was utilized to reduce the radiation dose to as low as reasonably achievable.; CT of the abdomen and pelvis was performed with the administration of intravenous contrast. Multiplanar reformatted images are provided for review. Automated exposure control, iterative reconstruction, and/or weight based adjustment of the mA/kV was utilized to reduce the radiation dose to as low as reasonably achievable.  COMPARISON: July 28, 2022 HISTORY: ORDERING SYSTEM PROVIDED HISTORY: Tachycardic, short of breath, history of CLL TECHNOLOGIST PROVIDED HISTORY: Reason for exam:->Tachycardic, short of breath, history of CLL Decision Support Exception - unselect if not a suspected or confirmed emergency medical condition->Emergency Medical Condition (MA) Reason for Exam: Tachycardic, short of breath, history of CLL FINDINGS: Pulmonary Arteries: Pulmonary arteries are adequately opacified for evaluation. No evidence of intraluminal filling defect to suggest pulmonary embolism. Main pulmonary artery is normal in caliber. Mediastinum: Mediastinal and hilar lymphadenopathy bilaterally. Subcarinal lymph node measures up to 3.1 cm in short axis. Right hilar lymph node measures at least 2.1 cm in short axis. This appears worse from exam on July 28, 2022. Largest subcarinal lymph node on that exam measured up to 1.9 cm in short axis. Normal caliber thoracic aorta without acute finding. The heart is not enlarged. No pericardial effusion. Lungs/pleura: Consolidations in the right lower lobe and minimally in the left lower lobe concerning for pneumonia. No pleural effusion or pneumothorax. Background of mild emphysema. Bronchial thickening in the lower lobes. Soft Tissues/Bones: No acute bone or soft tissue abnormality. CT abdomen and pelvis Liver: Normal appearance of the liver. Gallbladder: Some pericholecystic fluid and mild gallbladder wall thickening. Perhaps mild intrahepatic bile duct dilatation. There are findings suspicious for a mass near the charlotte hepatis measuring 3.5 x 6.2 cm. Additional charlotte hepatis lymph nodes identified near the pancreas and IVC. Spleen: The spleen is enlarged. Measures at least 18 cm. Pancreas: No inflammatory changes or fluid collections. Charlotte hepatis masses favoring lymphadenopathy. Adrenal Glands: No focal adrenal abnormalities identified. Kidneys: No hydronephrosis. Small left renal cyst. Stomach: The stomach is nondistended. Small bowel: No evidence of small bowel obstruction.  Colon: No signficant pericolonic inflammatory changes. Appendix: Normal appearance of the appendix. Normal caliber aorta with atherosclerosis. Retroperitoneal lymphadenopathy measures up to 2.0 cm in short axis in the left periaortic region. Mild free fluid in the pelvis. Partially distended urinary bladder. No acute findings of the osseous structures or subcutaneous soft tissues. No evidence of pulmonary embolism. Right greater than left lower lobe pneumonia. Diffuse hilar and mediastinal lymphadenopathy, concerning for malignancy with history of CLL, also likely progressed from prior exam. Diffuse peritoneal and retroperitoneal lymphadenopathy in the upper abdomen most significant near the cassi hepatis and IVC where there is a large mass which is favored to be a lymph node measuring up to 6.2 x 3.5 cm, also likely progressed from exam. Splenomegaly, could also be related to malignancy. Mild gallbladder wall thickening and pericholecystic fluid, could be related to passive congestion or mass effect from adjacent lymphadenopathy. There is mild intrahepatic bile duct dilatation. US ABDOMEN LIMITED    Result Date: 9/29/2022  EXAMINATION: RIGHT UPPER QUADRANT ULTRASOUND 9/29/2022 5:44 am COMPARISON: CT from September 28, 2022 HISTORY: ORDERING SYSTEM PROVIDED HISTORY: RUQ pain TECHNOLOGIST PROVIDED HISTORY: Reason for exam:->RUQ pain FINDINGS: LIVER:  There is a 2.9 x 1.5 x 2.5 cm echogenic focus in the right hepatic lobe. The liver appears enlarged measuring up to 22.9 cm. Increased echogenicity may suggest hepatic steatosis. Trace adjacent abdominal ascites. BILIARY SYSTEM:  Gallbladder wall appears mildly thickened. Possible adenomyomatosis measuring 0.2 x 0.2 x 0.2 cm, less likely cholesterol polyp. Negative sonographic Choi sign. No pericholecystic fluid. Common bile duct is within normal limits measuring 1.7 mm.  Mass near the cassi hepatis measures up to 1.6 x 0.5 x 2.2 cm as seen on recent CT. RIGHT KIDNEY: The right kidney is grossly unremarkable without evidence of hydronephrosis. PANCREAS:  The pancreas is hyperechoic and somewhat bulky in appearance, nonspecific. OTHER: Trace abdominal ascites. 1. Hepatomegaly and hepatic steatosis. 2. Masses most compatible with lymphadenopathy in the cassi hepatis, better seen on recent CT. 3. Hyperechoic focus in the liver could be focal fatty infiltration, hemangioma or other mass. This is too small to characterize on recent CT. Correlate with MRI hepatic mass protocol if indicated. 4. Gallbladder wall thickening with possible adenomyomatosis.        Electronically signed by Khanh Hayes MD on 10/3/2022 at 2:43 PM

## 2022-10-03 NOTE — PROGRESS NOTES
Pulmonary and Critical Care  Progress Note    Subjective: The patient has improved. WBC elevated ? from CLL. Shortness of breath has improved  Chest pain none  Addressing respiratory complaints Patient is negative for  hemoptysis and cyanosis  CONSTITUTIONAL:  negative for fevers and chills      Past Medical History:     has a past medical history of Arthritis, CAD (coronary artery disease), Cancer ([facility] [address] Diabetes mellitus ([facility] [address] History of blood transfusion, Hx of motion sickness, Hyperlipidemia, MDRO (multiple drug resistant organisms) resistance, Migraine, Pneumonia, Wears dentures, and Wears glasses. has a past surgical history that includes knee surgery (1970); Tunneled venous port placement (2013); Colonoscopy (2010); fracture surgery (Left, 1990's); Cardiac catheterization (1990's); Tonsillectomy (late 1960's); Dental surgery; eye surgery (Right, 08/2016); and other surgical history (Left, 01/18/2017). reports that he quit smoking about 35 years ago. His smoking use included cigarettes. He started smoking about 57 years ago. He has a 20.00 pack-year smoking history. He has never used smokeless tobacco. He reports that he does not drink alcohol and does not use drugs. Family history:  family history includes Asthma in his maternal uncle; Colon Cancer in his brother; Diabetes in his mother; Early Death in his brother; Heart Disease in his father; High Blood Pressure in his mother; Other in his sister.     No Known Allergies  Social History:    Reviewed; no changes    Objective:   PHYSICAL EXAM:        VITALS:  /73   Pulse 94   Temp 98.1 °F (36.7 °C) (Oral)   Resp 22   Ht 6' (1.829 m)   Wt 173 lb 4.5 oz (78.6 kg)   SpO2 92%   BMI 23.50 kg/m²     24HR INTAKE/OUTPUT:    Intake/Output Summary (Last 24 hours) at 10/3/2022 1020  Last data filed at 10/2/2022 1952  Gross per 24 hour   Intake 279.5 ml   Output 900 ml   Net -620.5 ml       CONSTITUTIONAL:  awake, alert, cooperative, no apparent distress, and appears stated age  LUNGS:  decreased breath sounds, basilar crackles. CARDIOVASCULAR:  normal S1 and S2 and negative JVD  ABD:Abdomen soft, non-tender. BS normal. No masses,  No organomegaly  NEURO:Alert. DATA:    CBC:  Recent Labs     10/01/22  0955 10/02/22  0910   WBC 30.8* 41.2*   RBC 4.67 4.88   HGB 11.5* 12.0*   HCT 37.2* 38.2*   PLT 58* 74*   MCV 79.7 78.3   MCH 24.6* 24.6*   MCHC 30.9* 31.4*   RDW 18.8* 19.1*   SEGSPCT 24.0* 14.0*   BANDSPCT  --  1*      BMP:  Recent Labs     10/01/22  0955 10/02/22  0910   * 129*   K 4.5 4.5   CL 95* 94*   CO2 23 23   BUN 18 20   CREATININE 0.7* 0.8*   CALCIUM 7.7* 7.7*   GLUCOSE 181* 221*      ABG:  No results for input(s): PH, PO2ART, ANU8WRB, HCO3, BEART, O2SAT in the last 72 hours. Lab Results   Component Value Date    PROBNP 2,514 (H) 09/30/2022    PROBNP 04,266 (H) 09/28/2022    PROBNP 182.3 03/24/2022     No results found for: [facility]    Radiology Review:  Pertinent images / reports were reviewed as a part of this visit. Assessment:     Patient Active Problem List   Diagnosis    Pneumonia    Sepsis ([address]    Hypogammaglobulinemia ([address]    CLL (chronic lymphocytic leukemia) ([address]    Upper respiratory infection, acute    Generalized muscle weakness    Type 2 diabetes mellitus with hyperglycemia, with long-term current use of insulin (HCC)    Poor appetite    COPD (chronic obstructive pulmonary disease) (HCC)    Neutropenia (HCC)    Other specified disorders of bone density and structure, unspecified site    Cellulitis of right upper limb    Herpesviral infection    Intestinal malabsorption    Other nonspecific abnormal finding of lung field    Iron deficiency anemia due to chronic blood loss    Other muscle spasm    Leukemia, lymphocytic, chronic (HCC)    Selective deficiency of IgG (HCC)    Acute bronchitis    Severe sepsis ([address]       Plan:   1. Overall the patient has improved. 2. [address] Activity.     Kev Patino, MD  10/3/2022  10:20 AM

## 2022-10-03 NOTE — CARE COORDINATION
BINA called Sarita at [facility] to follow up on the referral made Friday, September 30. She stated that the pre-cert was initiated that day and that they have not heard back from [employer] as of now.  She stated that she will call BINA when she has the approval.

## 2022-10-03 NOTE — CONSULTS
[address] [place] [address], [facility]                                  CONSULTATION    PATIENT NAME: Catrachito Liu                     :        1950  MED REC NO:   [id number]                          ROOM:         ACCOUNT NO:   [de-identified]                           ADMIT DATE: 2022  PROVIDER:     Virginie Gusman MD    CONSULT DATE:  10/02/2022    HISTORY OF PRESENT ILLNESS:  The patient is a 23-year-old gentleman with  history of diabetes, CLL, arthritis, hyperlipidemia; who was admitted  through the emergency room with complaints of increasing shortness of  breath, generalized tiredness and weakness, and cough productive of  greenish phlegm. He denied any hemoptysis. He denied any fever or  chills. He denied any nausea or vomiting. He denied any chest pains. He had a CAT scan of the chest, which showed no evidence of PE and  bibasilar pneumonia and diffuse hilar and mediastinal lymphadenopathy  consistent with history of CLL, diffuse peritoneal and retroperitoneal  lymphadenopathy. PAST MEDICAL HISTORY:  Significant for CLL, coronary artery disease,  arthritis, diabetes, hyperlipidemia. PAST SURGICAL HISTORY:  Remarkable for cardiac catheterization,  colonoscopy, dental surgery, cataract removal, tonsillectomy, tunneled  venous catheter placement. FAMILY HISTORY:  Reveals that his mother has diabetes. SOCIAL HISTORY:  Reveals that he quit smoking in , but he used to  smoke a pack per day for 20 years prior to that. No history of alcohol  or drug abuse. MEDICATIONS:  Reviewed. ALLERGIES:  He has no known allergies. REVIEW OF SYSTEMS:  A 10- to 14-point review of systems were reviewed  and are negative except for what is mentioned in history of present  illness. PHYSICAL EXAMINATION:  GENERAL:  The patient is alert, oriented x3, in no acute respiratory  distress.   VITAL SIGNS:  His blood pressure was 122/63 mmHg, pulse of 85 per  minute, and respiratory rate of 20 per minute. He is afebrile. His  saturation is 92% on 3 liters nasal cannula. HEENT:  Exam is essentially unremarkable. There is no JVD, no  lymphadenopathy. NECK:  Supple. LUNGS:  Exam revealed bibasilar crackles. HEART:  Exam showed normal S1, S2. There was no S3, S4 noted. ABDOMEN:  Exam is benign. There is no evidence of any organomegaly. Bowel sounds are present. NEUROLOGIC:  Exam reveals that he is awake and responsive and answering  questions and moving his extremities. LABORATORY DATA:  His CBC showed a white count of 41.2 with predominant  lymphocytes, hemoglobin of 12.0, hematocrit of 38.2. His electrolytes  showed a sodium 129, potassium 4.5, chloride 94, carbon dioxide 23, BUN  20, creatinine 0.8. His chest x-ray showed small right pleural effusion  and bibasilar infiltrate. His CAT scan of the chest, which was done  earlier on admission showed bibasilar pneumonia and hilar and  mediastinal lymphadenopathy, peritoneal and retroperitoneal  lymphadenopathy consistent with CLL. His respiratory disease panel PCR  was positive for rhinovirus. IMPRESSION:  1.  Bibasilar pneumonia. 2.  CLL with diffuse lymphadenopathy. 3.  COPD. 4.  History of tobacco abuse in the past.  5.  Rhinovirus infection. PLAN:  1. Continue present antibiotic therapy. 2.  DuoNeb q.4h. while awake. 3.  Sputum for culture and sensitivity. 4.  Check procalcitonin level. 5.  Check BNP level. 6.  As per orders. Thank you very much.         Lenny Plata MD    D: 10/02/2022 20:04:44       T: 10/02/2022 20:07:32     [email or web address]  Job#: [id number]     Doc#: [id number]    CC:

## 2022-10-04 ENCOUNTER — APPOINTMENT (OUTPATIENT)
Dept: GENERAL RADIOLOGY | Age: 72
DRG: 871 | End: 2022-10-04
Payer: COMMERCIAL

## 2022-10-04 ENCOUNTER — APPOINTMENT (OUTPATIENT)
Dept: INTERVENTIONAL RADIOLOGY/VASCULAR | Age: 72
DRG: 871 | End: 2022-10-04
Payer: COMMERCIAL

## 2022-10-04 LAB
1,3 BETA-D-GLUCAN INTERP: NEGATIVE
1,3 BETA-D-GLUCAN: <31 PG/ML
ALBUMIN SERPL-MCNC: 2.8 GM/DL (ref 3.4–5)
ALP BLD-CCNC: 177 IU/L (ref 40–129)
ALT SERPL-CCNC: 23 U/L (ref 10–40)
ANION GAP SERPL CALCULATED.3IONS-SCNC: 9 MMOL/L (ref 4–16)
AST SERPL-CCNC: 21 IU/L (ref 15–37)
BILIRUB SERPL-MCNC: 1 MG/DL (ref 0–1)
BUN BLDV-MCNC: 23 MG/DL (ref 6–23)
CALCIUM SERPL-MCNC: 7.8 MG/DL (ref 8.3–10.6)
CHLORIDE BLD-SCNC: 93 MMOL/L (ref 99–110)
CO2: 23 MMOL/L (ref 21–32)
CREAT SERPL-MCNC: 0.8 MG/DL (ref 0.9–1.3)
CREATININE URINE: 60.4 MG/DL
FLUID TYPE: ABNORMAL INDEX
GFR AFRICAN AMERICAN: >60 ML/MIN/1.73M2
GFR NON-AFRICAN AMERICAN: >60 ML/MIN/1.73M2
GLUCOSE BLD-MCNC: 130 MG/DL (ref 70–99)
GLUCOSE BLD-MCNC: 135 MG/DL (ref 70–99)
GLUCOSE BLD-MCNC: 145 MG/DL (ref 70–99)
GLUCOSE BLD-MCNC: 150 MG/DL (ref 70–99)
GLUCOSE BLD-MCNC: 169 MG/DL (ref 70–99)
GLUCOSE BLD-MCNC: 179 MG/DL (ref 70–99)
GLUCOSE, FLUID: 179 MG/DL
INR BLD: 1.08 INDEX
LACTATE DEHYDROGENASE, FLUID: 581 IU/L
LACTATE DEHYDROGENASE: 372 IU/L (ref 120–246)
LYMPHOCYTES, BODY FLUID: 59 %
MESOTHELIAL FLUID: 0 /100 WBC
MONOCYTE, FLUID: 3 %
NEUTROPHIL, FLUID: 38 %
OSMOLALITY URINE: 435 MOS/L (ref 292–1090)
OSMOLALITY: 275 MOS/L (ref 280–300)
OTHER CELLS FLUID: 0
POTASSIUM SERPL-SCNC: 4.7 MMOL/L (ref 3.5–5.1)
PROTEIN FLUID: 3.6 G/DL
PROTHROMBIN TIME: 14 SECONDS (ref 11.7–14.5)
RBC FLUID: ABNORMAL /CU MM
SODIUM BLD-SCNC: 125 MMOL/L (ref 135–145)
TOTAL PROTEIN: 5.3 GM/DL (ref 6.4–8.2)
WBC FLUID: 4698 /CU MM

## 2022-10-04 PROCEDURE — 6370000000 HC RX 637 (ALT 250 FOR IP): Performed by: STUDENT IN AN ORGANIZED HEALTH CARE EDUCATION/TRAINING PROGRAM

## 2022-10-04 PROCEDURE — 0W993ZZ DRAINAGE OF RIGHT PLEURAL CAVITY, PERCUTANEOUS APPROACH: ICD-10-PCS | Performed by: RADIOLOGY

## 2022-10-04 PROCEDURE — 6360000002 HC RX W HCPCS: Performed by: STUDENT IN AN ORGANIZED HEALTH CARE EDUCATION/TRAINING PROGRAM

## 2022-10-04 PROCEDURE — 94761 N-INVAS EAR/PLS OXIMETRY MLT: CPT

## 2022-10-04 PROCEDURE — 80053 COMPREHEN METABOLIC PANEL: CPT

## 2022-10-04 PROCEDURE — 85610 PROTHROMBIN TIME: CPT

## 2022-10-04 PROCEDURE — 2580000003 HC RX 258: Performed by: STUDENT IN AN ORGANIZED HEALTH CARE EDUCATION/TRAINING PROGRAM

## 2022-10-04 PROCEDURE — 83935 ASSAY OF URINE OSMOLALITY: CPT

## 2022-10-04 PROCEDURE — 97116 GAIT TRAINING THERAPY: CPT

## 2022-10-04 PROCEDURE — 36415 COLL VENOUS BLD VENIPUNCTURE: CPT

## 2022-10-04 PROCEDURE — 6370000000 HC RX 637 (ALT 250 FOR IP): Performed by: NURSE PRACTITIONER

## 2022-10-04 PROCEDURE — 94150 VITAL CAPACITY TEST: CPT

## 2022-10-04 PROCEDURE — 6360000002 HC RX W HCPCS: Performed by: INTERNAL MEDICINE

## 2022-10-04 PROCEDURE — 83930 ASSAY OF BLOOD OSMOLALITY: CPT

## 2022-10-04 PROCEDURE — 2700000000 HC OXYGEN THERAPY PER DAY

## 2022-10-04 PROCEDURE — 84157 ASSAY OF PROTEIN OTHER: CPT

## 2022-10-04 PROCEDURE — 89051 BODY FLUID CELL COUNT: CPT

## 2022-10-04 PROCEDURE — 71045 X-RAY EXAM CHEST 1 VIEW: CPT

## 2022-10-04 PROCEDURE — 82945 GLUCOSE OTHER FLUID: CPT

## 2022-10-04 PROCEDURE — 6360000002 HC RX W HCPCS: Performed by: NURSE PRACTITIONER

## 2022-10-04 PROCEDURE — 94640 AIRWAY INHALATION TREATMENT: CPT

## 2022-10-04 PROCEDURE — 97530 THERAPEUTIC ACTIVITIES: CPT

## 2022-10-04 PROCEDURE — 32555 ASPIRATE PLEURA W/ IMAGING: CPT

## 2022-10-04 PROCEDURE — 1200000000 HC SEMI PRIVATE

## 2022-10-04 PROCEDURE — 83615 LACTATE (LD) (LDH) ENZYME: CPT

## 2022-10-04 PROCEDURE — 82962 GLUCOSE BLOOD TEST: CPT

## 2022-10-04 PROCEDURE — 82570 ASSAY OF URINE CREATININE: CPT

## 2022-10-04 PROCEDURE — 2580000003 HC RX 258: Performed by: NURSE PRACTITIONER

## 2022-10-04 RX ADMIN — IPRATROPIUM BROMIDE AND ALBUTEROL SULFATE 1 AMPULE: .5; 3 SOLUTION RESPIRATORY (INHALATION) at 07:20

## 2022-10-04 RX ADMIN — GABAPENTIN 100 MG: 100 CAPSULE ORAL at 09:01

## 2022-10-04 RX ADMIN — SODIUM CHLORIDE, PRESERVATIVE FREE 10 ML: 5 INJECTION INTRAVENOUS at 11:24

## 2022-10-04 RX ADMIN — PIPERACILLIN AND TAZOBACTAM 3375 MG: 3; .375 INJECTION, POWDER, LYOPHILIZED, FOR SOLUTION INTRAVENOUS at 05:55

## 2022-10-04 RX ADMIN — FINASTERIDE 5 MG: 5 TABLET, FILM COATED ORAL at 21:03

## 2022-10-04 RX ADMIN — VALACYCLOVIR HYDROCHLORIDE 500 MG: 500 TABLET, FILM COATED ORAL at 09:01

## 2022-10-04 RX ADMIN — HYDROCODONE BITARTRATE AND ACETAMINOPHEN 2 TABLET: 5; 325 TABLET ORAL at 04:33

## 2022-10-04 RX ADMIN — ENOXAPARIN SODIUM 40 MG: 100 INJECTION SUBCUTANEOUS at 21:02

## 2022-10-04 RX ADMIN — IPRATROPIUM BROMIDE AND ALBUTEROL SULFATE 1 AMPULE: .5; 3 SOLUTION RESPIRATORY (INHALATION) at 20:17

## 2022-10-04 RX ADMIN — ACETAMINOPHEN 650 MG: 325 TABLET ORAL at 09:01

## 2022-10-04 RX ADMIN — PIPERACILLIN AND TAZOBACTAM 3375 MG: 3; .375 INJECTION, POWDER, LYOPHILIZED, FOR SOLUTION INTRAVENOUS at 21:36

## 2022-10-04 RX ADMIN — SODIUM CHLORIDE, PRESERVATIVE FREE 10 ML: 5 INJECTION INTRAVENOUS at 21:04

## 2022-10-04 RX ADMIN — TAMSULOSIN HYDROCHLORIDE 0.4 MG: 0.4 CAPSULE ORAL at 21:03

## 2022-10-04 RX ADMIN — GABAPENTIN 100 MG: 100 CAPSULE ORAL at 21:03

## 2022-10-04 RX ADMIN — HYDROCODONE BITARTRATE AND ACETAMINOPHEN 2 TABLET: 5; 325 TABLET ORAL at 18:43

## 2022-10-04 RX ADMIN — PANTOPRAZOLE SODIUM 20 MG: 20 TABLET, DELAYED RELEASE ORAL at 04:33

## 2022-10-04 RX ADMIN — HYDROMORPHONE HYDROCHLORIDE 0.25 MG: 1 INJECTION, SOLUTION INTRAMUSCULAR; INTRAVENOUS; SUBCUTANEOUS at 14:26

## 2022-10-04 RX ADMIN — IPRATROPIUM BROMIDE AND ALBUTEROL SULFATE 1 AMPULE: .5; 3 SOLUTION RESPIRATORY (INHALATION) at 15:15

## 2022-10-04 RX ADMIN — HYDROCODONE BITARTRATE AND ACETAMINOPHEN 2 TABLET: 5; 325 TABLET ORAL at 11:46

## 2022-10-04 RX ADMIN — INSULIN GLARGINE 20 UNITS: 100 INJECTION, SOLUTION SUBCUTANEOUS at 21:34

## 2022-10-04 RX ADMIN — GABAPENTIN 100 MG: 100 CAPSULE ORAL at 14:27

## 2022-10-04 RX ADMIN — PIPERACILLIN AND TAZOBACTAM 3375 MG: 3; .375 INJECTION, POWDER, LYOPHILIZED, FOR SOLUTION INTRAVENOUS at 14:34

## 2022-10-04 RX ADMIN — ATORVASTATIN CALCIUM 80 MG: 40 TABLET, FILM COATED ORAL at 21:03

## 2022-10-04 ASSESSMENT — PAIN DESCRIPTION - DESCRIPTORS
DESCRIPTORS: SHARP;SHOOTING
DESCRIPTORS: ACHING
DESCRIPTORS: SORE
DESCRIPTORS: ACHING
DESCRIPTORS: SHARP

## 2022-10-04 ASSESSMENT — PAIN SCALES - WONG BAKER
WONGBAKER_NUMERICALRESPONSE: 0
WONGBAKER_NUMERICALRESPONSE: 0

## 2022-10-04 ASSESSMENT — PAIN - FUNCTIONAL ASSESSMENT
PAIN_FUNCTIONAL_ASSESSMENT: ACTIVITIES ARE NOT PREVENTED
PAIN_FUNCTIONAL_ASSESSMENT: ACTIVITIES ARE NOT PREVENTED

## 2022-10-04 ASSESSMENT — PAIN DESCRIPTION - PAIN TYPE
TYPE: ACUTE PAIN
TYPE: CHRONIC PAIN

## 2022-10-04 ASSESSMENT — PAIN DESCRIPTION - LOCATION
LOCATION: RIB CAGE
LOCATION: BACK
LOCATION: ABDOMEN
LOCATION: CHEST
LOCATION: CHEST

## 2022-10-04 ASSESSMENT — PAIN DESCRIPTION - FREQUENCY
FREQUENCY: CONTINUOUS
FREQUENCY: CONTINUOUS

## 2022-10-04 ASSESSMENT — PAIN SCALES - GENERAL
PAINLEVEL_OUTOF10: 6
PAINLEVEL_OUTOF10: 3
PAINLEVEL_OUTOF10: 6
PAINLEVEL_OUTOF10: 4
PAINLEVEL_OUTOF10: 2
PAINLEVEL_OUTOF10: 8

## 2022-10-04 ASSESSMENT — PAIN DESCRIPTION - ONSET: ONSET: AWAKENED FROM SLEEP

## 2022-10-04 ASSESSMENT — PAIN DESCRIPTION - ORIENTATION
ORIENTATION: RIGHT
ORIENTATION: RIGHT

## 2022-10-04 NOTE — PROGRESS NOTES
V2.0  [facility] [place]      Name:  Leonardo Golden /Age/Sex: 1950  (71 y.o. male)   MRN & CSN:  [id number] & [id number] Encounter Date/Time: 10/4/2022 1:25 PM EDT    Location:  -A PCP: Nathan Layne MD       Hospital Day: 7      Subjective:     Chief Complaint: Fatigue, Extremity Weakness (Reports BLE weakness and fatigue since colonoscopy last week. Explains felt like legs were going to give out around 0230 AM and fell down on flow in sitting position. Denies LOC. Denies hitting head. ), and Shortness of Breath (Has had for 1 week. PCP placed on Zpack and Steroid taper.)    [place] in a.m. post thoracentesis this morning. Endorsing pain at thoracentesis site, no other complaints. He feels about the same compared to yesterday. Assessment and Plan:   Leonardo Golden is a 71 y.o. male with pmh of CLL, DM II, HTN, BPH who presents with Severe sepsis ([facility] [address]      Plan:  #Severe sepsis secondary to super imposed community acquired pneumonia in the setting of rhinovirus  CT A/P right greater than left lower lobe pneumonia. S/p sepsis IVF. Strep, legionella negative. Resp viral panel + for rhinovirus. Will continue treating with broad abx given pt is immunocompromised. Received lasix 20 mg IV daily X3 days. Procalc elevated 0.78 but pt has CLL which can make it false positive  WBC still high but better than yesterday which is likely 2/2 CLL  - Continue Zosyn   -Cultures negative so far. #hypoxia - improving  Likely secondary to CAP, rhinovirus, possible CHF. CXR showed worsening right pleural effusion; concern effusion is malignant vs empyema vs parapneumonic   -Patient got thoracentesis today. Will follow thoracentesis panel. #Diabetes mellitus type 2 with hyperglycemia  - continue 20U QHS lantus  - MDSSI  - d/c lispro w meals     #RUQ abd pain.  Likely 2/2 lymphadenopathy and mass effect in retroperitoneal area; no CT, U/S or HIDA scan suggesting cholithiasis or cholecystitis. - GI consulted; no further recs  - continue gabapentin 100 TID  - monitor liver enzymes    #Concern for new onset of CHF   Having FROST, trace bilateral edema. proBNP [id number] on admission. ECHO revealed EF 86-94, grade 1 diastolic, heavily calcified aortic valve without stenosis, thickened mitral valve, in addition to pleural effusion and small circumferential torrential pericardial effusion   Received lasix IV with adequate urine ouput. ProBNP came down. - cardiology consulted; appreciate recs  - intake/output, daily weights    #hyponatremia  Na worse today at 125; patient appears euvolemic on exam  -Send for urine electrolytes, urine and serum osmolality  -Consult nephrology for further recs. #Rhabdomyolysis - resolved  Patient was down for unknown amount of time. Initial CK 3453 down to 500's. #Elevated troponin - resolved  - 0.024 on admission. Trended down. Likely demand         #CLL - with progression  Leukocytosis, thrombocytopenia, normocytic anemia  Known diagnosis, follows with hematology oncology. Patient reports he has not been able to take chemo lately due to elevated white count and low platelets. - CT images reviewed, shows worsening disease process  - f/u galactomannan level, and Fungitell level  - hem/onc consulted; appreciate recs  - may be considered for immunotherapy with or without chemotherapy outpatient    #BPH Continue Flomax and Proscar    Diet ADULT DIET; Regular; 4 carb choices (60 gm/meal);  Low Fat/Low Chol/High Fiber/JOSE; Low Sodium (2 gm)  ADULT ORAL NUTRITION SUPPLEMENT; Breakfast, Lunch, Dinner; Diabetic Oral Supplement   DVT Prophylaxis [x] Lovenox, []  Heparin, [] SCDs, [] Ambulation,  [] Eliquis, [] Xarelto  [] Coumadin   Code Status DNR-CCA   Disposition From: home  Expected Disposition: TBD  Estimated Date of Discharge: likely tomorrow  Patient requires continued admission due to oxygen requirement, hyponatremia work-up   Surrogate Decision Maker/ POA      Review of Systems:    Review of Systems    See above    Objective: Intake/Output Summary (Last 24 hours) at 10/4/2022 1525  Last data filed at 10/4/2022 0901  Gross per 24 hour   Intake 600 ml   Output 2125 ml   Net -1525 ml          Vitals:   Vitals:    10/04/22 1517   BP:    Pulse:    Resp:    Temp:    SpO2: 94%       Physical Exam:     General: NAD  Eyes: EOMI  ENT: neck supple  Cardiovascular: Regular rate. Respiratory: B/L Rhonchi Rt > Lt (improved)   Gastrointestinal: RUQ, epigastric tender to palpation, Soft  Genitourinary: no suprapubic tenderness  Musculoskeletal: bilateral trace edema LE   Skin: warm, dry. Rt. Chest port in place  Neuro: Alert. Psych: Mood appropriate.      Medications:   Medications:    gabapentin  100 mg Oral TID    insulin glargine  20 Units SubCUTAneous Nightly    piperacillin-tazobactam  3,375 mg IntraVENous Q8H    ipratropium-albuterol  1 ampule Inhalation Q4H WA    atorvastatin  80 mg Oral Nightly    finasteride  5 mg Oral Nightly    pantoprazole  20 mg Oral QAM AC    tamsulosin  0.4 mg Oral Nightly    valACYclovir  500 mg Oral Daily    sodium chloride flush  5-40 mL IntraVENous 2 times per day    enoxaparin  40 mg SubCUTAneous Nightly    insulin lispro  0-8 Units SubCUTAneous TID WC    insulin lispro  0-4 Units SubCUTAneous Nightly      Infusions:    sodium chloride      dextrose       PRN Meds: HYDROcodone 5 mg - acetaminophen, 2 tablet, Q6H PRN  albuterol sulfate HFA, 2 puff, Q4H PRN  sodium chloride flush, 5-40 mL, PRN  sodium chloride, 500 mL, PRN  acetaminophen, 650 mg, Q6H PRN   Or  acetaminophen, 650 mg, Q6H PRN  glucose, 4 tablet, PRN  dextrose bolus, 125 mL, PRN   Or  dextrose bolus, 250 mL, PRN  glucagon (rDNA), 1 mg, PRN  dextrose, 1,000 mL, Continuous PRN      Labs      Recent Results (from the past 24 hour(s))   POCT Glucose    Collection Time: 10/03/22  5:56 PM   Result Value Ref Range    POC Glucose 227 (H) 70 - 99 MG/DL   POCT Glucose    Collection Time: 10/03/22  8:51 PM   Result Value Ref Range    POC Glucose 139 (H) 70 - 99 MG/DL   POCT Glucose    Collection Time: 10/04/22 12:07 AM   Result Value Ref Range    POC Glucose 179 (H) 70 - 99 MG/DL   Protime-INR    Collection Time: 10/04/22  6:33 AM   Result Value Ref Range    Protime 14.0 11.7 - 14.5 SECONDS    INR 1.08 INDEX   Lactate Dehydrogenase    Collection Time: 10/04/22  6:33 AM   Result Value Ref Range     (H) 120 - 246 IU/L   Comprehensive Metabolic Panel w/ Reflex to MG    Collection Time: 10/04/22  6:33 AM   Result Value Ref Range    Sodium 125 (L) 135 - 145 MMOL/L    Potassium 4.7 3.5 - 5.1 MMOL/L    Chloride 93 (L) 99 - 110 mMol/L    CO2 23 21 - 32 MMOL/L    BUN 23 6 - 23 MG/DL    Creatinine 0.8 (L) 0.9 - 1.3 MG/DL    Glucose 169 (H) 70 - 99 MG/DL    Calcium 7.8 (L) 8.3 - 10.6 MG/DL    Albumin 2.8 (L) 3.4 - 5.0 GM/DL    Total Protein 5.3 (L) 6.4 - 8.2 GM/DL    Total Bilirubin 1.0 0.0 - 1.0 MG/DL    ALT 23 10 - 40 U/L    AST 21 15 - 37 IU/L    Alkaline Phosphatase 177 (H) 40 - 129 IU/L    GFR Non-African American >60 >60 mL/min/1.73m2    GFR African American >60 >60 mL/min/1.73m2    Anion Gap 9 4 - 16   POCT Glucose    Collection Time: 10/04/22  7:33 AM   Result Value Ref Range    POC Glucose 145 (H) 70 - 99 MG/DL   POCT Glucose    Collection Time: 10/04/22 11:52 AM   Result Value Ref Range    POC Glucose 135 (H) 70 - 99 MG/DL        Imaging/Diagnostics Last 24 Hours   CT HEAD WO CONTRAST    Result Date: 9/28/2022  EXAMINATION: CT OF THE HEAD WITHOUT CONTRAST  9/28/2022 2:16 pm TECHNIQUE: CT of the head was performed without the administration of intravenous contrast. Automated exposure control, iterative reconstruction, and/or weight based adjustment of the mA/kV was utilized to reduce the radiation dose to as low as reasonably achievable. COMPARISON: None.  HISTORY: ORDERING SYSTEM PROVIDED HISTORY: history of CLL , generalized weakness, down on floor all night TECHNOLOGIST PROVIDED HISTORY: Has a \"code stroke\" or \"stroke alert\" been called? ->No Reason for exam:->history of CLL , generalized weakness, down on floor all night Decision Support Exception - unselect if not a suspected or confirmed emergency medical condition->Emergency Medical Condition (MA) Reason for Exam: history of CLL , generalized weakness, down on floor all night FINDINGS: BRAIN/VENTRICLES: There is no acute intracranial hemorrhage, mass effect or midline shift. No abnormal extra-axial fluid collection. The gray-white differentiation is maintained without evidence of an acute infarct. There is prominence of the ventricles and sulci due to global parenchymal volume loss. There are nonspecific areas of hypoattenuation within the periventricular and subcortical white matter, which likely represent chronic microvascular ischemic change. There vascular calcifications. ORBITS: The visualized portion of the orbits demonstrate no acute abnormality. SINUSES: Osteoneogenesis. There bilateral maxillary sinus air-fluid levels which can be seen with acute on chronic bacterial sinusitis. Mastoids are clear. Moderate severe ethmoid sinus mucosal thickening. SOFT TISSUES/SKULL: No acute abnormality of the visualized skull or soft tissues. Chronic microvascular disease. Negative acute bleed, midline shift or mass effect. Findings worrisome for acute on chronic maxillary sinus disease. Please correlate exam findings. CT ABDOMEN PELVIS W IV CONTRAST Additional Contrast? None    Result Date: 9/28/2022  EXAMINATION: CTA OF THE CHEST; CT OF THE ABDOMEN AND PELVIS WITH CONTRAST 9/28/2022 2:17 pm TECHNIQUE: CTA of the chest was performed after the administration of intravenous contrast.  Multiplanar reformatted images are provided for review. MIP images are provided for review.  Automated exposure control, iterative reconstruction, and/or weight based adjustment of the mA/kV was utilized to reduce the radiation dose to as low as reasonably achievable.; CT of the abdomen and pelvis was performed with the administration of intravenous contrast. Multiplanar reformatted images are provided for review. Automated exposure control, iterative reconstruction, and/or weight based adjustment of the mA/kV was utilized to reduce the radiation dose to as low as reasonably achievable. COMPARISON: July 28, 2022 HISTORY: ORDERING SYSTEM PROVIDED HISTORY: Tachycardic, short of breath, history of CLL TECHNOLOGIST PROVIDED HISTORY: Reason for exam:->Tachycardic, short of breath, history of CLL Decision Support Exception - unselect if not a suspected or confirmed emergency medical condition->Emergency Medical Condition (MA) Reason for Exam: Tachycardic, short of breath, history of CLL FINDINGS: Pulmonary Arteries: Pulmonary arteries are adequately opacified for evaluation. No evidence of intraluminal filling defect to suggest pulmonary embolism. Main pulmonary artery is normal in caliber. Mediastinum: Mediastinal and hilar lymphadenopathy bilaterally. Subcarinal lymph node measures up to 3.1 cm in short axis. Right hilar lymph node measures at least 2.1 cm in short axis. This appears worse from exam on July 28, 2022. Largest subcarinal lymph node on that exam measured up to 1.9 cm in short axis. Normal caliber thoracic aorta without acute finding. The heart is not enlarged. No pericardial effusion. Lungs/pleura: Consolidations in the right lower lobe and minimally in the left lower lobe concerning for pneumonia. No pleural effusion or pneumothorax. Background of mild emphysema. Bronchial thickening in the lower lobes. Soft Tissues/Bones: No acute bone or soft tissue abnormality. CT abdomen and pelvis Liver: Normal appearance of the liver. Gallbladder: Some pericholecystic fluid and mild gallbladder wall thickening. Perhaps mild intrahepatic bile duct dilatation. There are findings suspicious for a mass near the cassi hepatis measuring 3.5 x 6.2 cm.  Additional cahrlotte hepatis lymph nodes identified near the pancreas and IVC. Spleen: The spleen is enlarged. Measures at least 18 cm. Pancreas: No inflammatory changes or fluid collections. Charlotte hepatis masses favoring lymphadenopathy. Adrenal Glands: No focal adrenal abnormalities identified. Kidneys: No hydronephrosis. Small left renal cyst. Stomach: The stomach is nondistended. Small bowel: No evidence of small bowel obstruction. Colon: No signficant pericolonic inflammatory changes. Appendix: Normal appearance of the appendix. Normal caliber aorta with atherosclerosis. Retroperitoneal lymphadenopathy measures up to 2.0 cm in short axis in the left periaortic region. Mild free fluid in the pelvis. Partially distended urinary bladder. No acute findings of the osseous structures or subcutaneous soft tissues. No evidence of pulmonary embolism. Right greater than left lower lobe pneumonia. Diffuse hilar and mediastinal lymphadenopathy, concerning for malignancy with history of CLL, also likely progressed from prior exam. Diffuse peritoneal and retroperitoneal lymphadenopathy in the upper abdomen most significant near the charlotte hepatis and IVC where there is a large mass which is favored to be a lymph node measuring up to 6.2 x 3.5 cm, also likely progressed from exam. Splenomegaly, could also be related to malignancy. Mild gallbladder wall thickening and pericholecystic fluid, could be related to passive congestion or mass effect from adjacent lymphadenopathy. There is mild intrahepatic bile duct dilatation. XR CHEST PORTABLE    Result Date: 9/28/2022  EXAMINATION: ONE XRAY VIEW OF THE CHEST 9/28/2022 12:44 pm COMPARISON: CT chest July 28, 2022 images; CT chest May 4, 2022 HISTORY: ORDERING SYSTEM PROVIDED HISTORY: chest pain TECHNOLOGIST PROVIDED HISTORY: Reason for exam:->chest pain Reason for Exam: chest pain FINDINGS: Cardiac silhouette is stable. Right-sided MediPort in place.   Patchy interstitial changes of the lungs bilaterally which are nonspecific and can be seen in the setting of pulmonary edema as well as in the setting of multifocal atypical/viral infectious process. No well-defined consolidation. No pleural effusion. No pneumothorax. Osseous structures appear intact. 1. Subtle patchy interstitial changes of the lungs which can be seen in setting of 0 pulmonary edema as well as multifocal atypical/viral infectious processes. No well-defined consolidation. CTA CHEST W CONTRAST    Result Date: 9/28/2022  EXAMINATION: CTA OF THE CHEST; CT OF THE ABDOMEN AND PELVIS WITH CONTRAST 9/28/2022 2:17 pm TECHNIQUE: CTA of the chest was performed after the administration of intravenous contrast.  Multiplanar reformatted images are provided for review. MIP images are provided for review. Automated exposure control, iterative reconstruction, and/or weight based adjustment of the mA/kV was utilized to reduce the radiation dose to as low as reasonably achievable.; CT of the abdomen and pelvis was performed with the administration of intravenous contrast. Multiplanar reformatted images are provided for review. Automated exposure control, iterative reconstruction, and/or weight based adjustment of the mA/kV was utilized to reduce the radiation dose to as low as reasonably achievable. COMPARISON: July 28, 2022 HISTORY: ORDERING SYSTEM PROVIDED HISTORY: Tachycardic, short of breath, history of CLL TECHNOLOGIST PROVIDED HISTORY: Reason for exam:->Tachycardic, short of breath, history of CLL Decision Support Exception - unselect if not a suspected or confirmed emergency medical condition->Emergency Medical Condition (MA) Reason for Exam: Tachycardic, short of breath, history of CLL FINDINGS: Pulmonary Arteries: Pulmonary arteries are adequately opacified for evaluation. No evidence of intraluminal filling defect to suggest pulmonary embolism. Main pulmonary artery is normal in caliber.  Mediastinum: Mediastinal and hilar lymphadenopathy bilaterally. Subcarinal lymph node measures up to 3.1 cm in short axis. Right hilar lymph node measures at least 2.1 cm in short axis. This appears worse from exam on July 28, 2022. Largest subcarinal lymph node on that exam measured up to 1.9 cm in short axis. Normal caliber thoracic aorta without acute finding. The heart is not enlarged. No pericardial effusion. Lungs/pleura: Consolidations in the right lower lobe and minimally in the left lower lobe concerning for pneumonia. No pleural effusion or pneumothorax. Background of mild emphysema. Bronchial thickening in the lower lobes. Soft Tissues/Bones: No acute bone or soft tissue abnormality. CT abdomen and pelvis Liver: Normal appearance of the liver. Gallbladder: Some pericholecystic fluid and mild gallbladder wall thickening. Perhaps mild intrahepatic bile duct dilatation. There are findings suspicious for a mass near the charlotte hepatis measuring 3.5 x 6.2 cm. Additional charlotte hepatis lymph nodes identified near the pancreas and IVC. Spleen: The spleen is enlarged. Measures at least 18 cm. Pancreas: No inflammatory changes or fluid collections. Charlotte hepatis masses favoring lymphadenopathy. Adrenal Glands: No focal adrenal abnormalities identified. Kidneys: No hydronephrosis. Small left renal cyst. Stomach: The stomach is nondistended. Small bowel: No evidence of small bowel obstruction. Colon: No signficant pericolonic inflammatory changes. Appendix: Normal appearance of the appendix. Normal caliber aorta with atherosclerosis. Retroperitoneal lymphadenopathy measures up to 2.0 cm in short axis in the left periaortic region. Mild free fluid in the pelvis. Partially distended urinary bladder. No acute findings of the osseous structures or subcutaneous soft tissues. No evidence of pulmonary embolism. Right greater than left lower lobe pneumonia.  Diffuse hilar and mediastinal lymphadenopathy, concerning for malignancy with history of CLL, also likely progressed from prior exam. Diffuse peritoneal and retroperitoneal lymphadenopathy in the upper abdomen most significant near the cassi hepatis and IVC where there is a large mass which is favored to be a lymph node measuring up to 6.2 x 3.5 cm, also likely progressed from exam. Splenomegaly, could also be related to malignancy. Mild gallbladder wall thickening and pericholecystic fluid, could be related to passive congestion or mass effect from adjacent lymphadenopathy. There is mild intrahepatic bile duct dilatation. US ABDOMEN LIMITED    Result Date: 9/29/2022  EXAMINATION: RIGHT UPPER QUADRANT ULTRASOUND 9/29/2022 5:44 am COMPARISON: CT from September 28, 2022 HISTORY: ORDERING SYSTEM PROVIDED HISTORY: RUQ pain TECHNOLOGIST PROVIDED HISTORY: Reason for exam:->RUQ pain FINDINGS: LIVER:  There is a 2.9 x 1.5 x 2.5 cm echogenic focus in the right hepatic lobe. The liver appears enlarged measuring up to 22.9 cm. Increased echogenicity may suggest hepatic steatosis. Trace adjacent abdominal ascites. BILIARY SYSTEM:  Gallbladder wall appears mildly thickened. Possible adenomyomatosis measuring 0.2 x 0.2 x 0.2 cm, less likely cholesterol polyp. Negative sonographic Choi sign. No pericholecystic fluid. Common bile duct is within normal limits measuring 1.7 mm. Mass near the cassi hepatis measures up to 1.6 x 0.5 x 2.2 cm as seen on recent CT. RIGHT KIDNEY: The right kidney is grossly unremarkable without evidence of hydronephrosis. PANCREAS:  The pancreas is hyperechoic and somewhat bulky in appearance, nonspecific. OTHER: Trace abdominal ascites. 1. Hepatomegaly and hepatic steatosis. 2. Masses most compatible with lymphadenopathy in the cassi hepatis, better seen on recent CT. 3. Hyperechoic focus in the liver could be focal fatty infiltration, hemangioma or other mass. This is too small to characterize on recent CT. Correlate with MRI hepatic mass protocol if indicated.  4. Gallbladder wall thickening with possible adenomyomatosis.        Electronically signed by Anita Pelletier MD on 10/4/2022 at 3:25 PM

## 2022-10-04 NOTE — PROGRESS NOTES
Physical Therapy    Physical Therapy Treatment Note  Name: Jyothi Acevedo MRN: [id number] :   1950   Date:  10/4/2022   Admission Date: 2022 Room:  -A   Restrictions/Precautions:          droplet   Communication with other providers:  OT   Subjective:  Patient states:  \"I know I need to do this\"   Pain:   Location, Type, Intensity (0/10 to 10/10): In his back near thoracentesis site. Did not rate (RN stated he would provide pain meds after therapy)   Objective:    Observation:    Supine in bed. Inc encouragement to work with therapy   Objective Measures:    Stable vitals on 2L   Treatment, including education/measures:  Supine to sit: SBA for safety HOB slightly elevated   Sit to supine: SBA for safety HOB slightly elevated   Sit to stand: CGA for safety from EOB  Stand to sit: CGA for safety to EOB  Step pivot: CGA for safety with RW   Ambulation: 25ft with RW CGA for safety. Dec ryan with fwd flexed posture and dec foot clearance bilat. Educated pt on POC, role of PT, DME use (encouraged use of RW today), discharge, importance of frequent mobility, deconditioning. Assessment / Impression:    Patient's tolerance of treatment:  fair   Adverse Reaction: no   Significant change in status and impact:  no  Barriers to improvement:  activity tolerance, strength, balance, participation, pain   Plan for Next Session:    Activity tolerance, strength, balance, gait, transfers, stairs   Time in:  1351  Time out:  1402  Timed treatment minutes:  11  Total treatment time:  11 minutes     Previously filed items:  Social/Functional History  Lives With: Alone (plans on staying at son's house at discharge)  Type of Home: [address]: One level (2nd floor apartment with no elevator)  Home Access: Stairs to enter with rails  Entrance Stairs - Number of Steps:  Full flight to 2nd floor apartment  Entrance Stairs - Rails: Both  Bathroom Shower/Tub: Tub/Shower unit  Bathroom Toilet: Standard  Bathroom Accessibility: Accessible  Home Equipment: Cane  ADL Assistance: Independent  Homemaking Assistance: Independent  Homemaking Responsibilities: Yes  Ambulation Assistance: Independent (uses no AD)  Transfer Assistance: Independent  Active : Yes  Occupation: On disability        Short Term Goals  Time Frame for Short Term Goals: 2 weeks  Short Term Goal 1: Pt will perform sit><supine Panfilo  Short Term Goal 2: Pt will transfer SBA  Short Term Goal 3: Pt will ambulate 150ft with LRAD SBA  Short Term Goal 4: Pt will ascend/descend 1 flight of stairs, CGA with 1-2 rails    Electronically signed by:    Luisa Hackett [id number]  10/4/2022, 3:10 PM

## 2022-10-04 NOTE — PLAN OF CARE
Problem: Discharge Planning  Goal: Discharge to home or other facility with appropriate resources  Outcome: Progressing  Flowsheets (Taken 10/4/2022 0911)  Discharge to home or other facility with appropriate resources: Identify barriers to discharge with patient and caregiver     Problem: Pain  Goal: Verbalizes/displays adequate comfort level or baseline comfort level  Outcome: Progressing  Flowsheets (Taken 10/4/2022 0901)  Verbalizes/displays adequate comfort level or baseline comfort level: Encourage patient to monitor pain and request assistance     Problem: Skin/Tissue Integrity  Goal: Absence of new skin breakdown  Description: 1. Monitor for areas of redness and/or skin breakdown  2. Assess vascular access sites hourly  3. Every 4-6 hours minimum:  Change oxygen saturation probe site  4. Every 4-6 hours:  If on nasal continuous positive airway pressure, respiratory therapy assess nares and determine need for appliance change or resting period.   Outcome: Progressing     Problem: Chronic Conditions and Co-morbidities  Goal: Patient's chronic conditions and co-morbidity symptoms are monitored and maintained or improved  Outcome: Progressing  Flowsheets (Taken 10/4/2022 0911)  Care Plan - Patient's Chronic Conditions and Co-Morbidity Symptoms are Monitored and Maintained or Improved: Monitor and assess patient's chronic conditions and comorbid symptoms for stability, deterioration, or improvement     Problem: Safety - Adult  Goal: Free from fall injury  Outcome: Progressing

## 2022-10-04 NOTE — PROGRESS NOTES
Pulmonary and Critical Care  Progress Note    Subjective: The patient is comfortable in bed. On N/C. Shortness of breath has improved  Chest pain none  Addressing respiratory complaints Patient is negative for  hemoptysis and cyanosis  CONSTITUTIONAL:  negative for fevers and chills      Past Medical History:     has a past medical history of Arthritis, CAD (coronary artery disease), Cancer ([facility] [address] Diabetes mellitus ([facility] [address] History of blood transfusion, Hx of motion sickness, Hyperlipidemia, MDRO (multiple drug resistant organisms) resistance, Migraine, Pneumonia, Wears dentures, and Wears glasses. has a past surgical history that includes knee surgery (1970); Tunneled venous port placement (2013); Colonoscopy (2010); fracture surgery (Left, 1990's); Cardiac catheterization (1990's); Tonsillectomy (late 1960's); Dental surgery; eye surgery (Right, 08/2016); and other surgical history (Left, 01/18/2017). reports that he quit smoking about 35 years ago. His smoking use included cigarettes. He started smoking about 57 years ago. He has a 20.00 pack-year smoking history. He has never used smokeless tobacco. He reports that he does not drink alcohol and does not use drugs. Family history:  family history includes Asthma in his maternal uncle; Colon Cancer in his brother; Diabetes in his mother; Early Death in his brother; Heart Disease in his father; High Blood Pressure in his mother; Other in his sister.     No Known Allergies  Social History:    Reviewed; no changes    Objective:   PHYSICAL EXAM:        VITALS:  /80   Pulse 92   Temp 98.2 °F (36.8 °C) (Oral)   Resp 22   Ht 6' (1.829 m)   Wt 166 lb 10.7 oz (75.6 kg)   SpO2 93%   BMI 22.60 kg/m²     24HR INTAKE/OUTPUT:    Intake/Output Summary (Last 24 hours) at 10/4/2022 1021  Last data filed at 10/4/2022 0901  Gross per 24 hour   Intake 600 ml   Output 2125 ml   Net -1525 ml       CONSTITUTIONAL:  awake, alert, cooperative, no apparent distress, and appears stated age  LUNGS:  decreased breath sounds, basilar crackles. CARDIOVASCULAR:  normal S1 and S2 and negative JVD  ABD:Abdomen soft, non-tender. BS normal. No masses,  No organomegaly  NEURO:Alert. DATA:    CBC:  Recent Labs     10/02/22  0910 10/03/22  1322   WBC 41.2* 32.1*   RBC 4.88 4.11*   HGB 12.0* 10.0*   HCT 38.2* 33.0*   PLT 74* 67*   MCV 78.3 80.3   MCH 24.6* 24.3*   MCHC 31.4* 30.3*   RDW 19.1* 18.6*   SEGSPCT 14.0* 27.0*   BANDSPCT 1* 1*      BMP:  Recent Labs     10/02/22  0910 10/03/22  1322 10/04/22  0633   * 129* 125*   K 4.5 4.2 4.7   CL 94* 97* 93*   CO2 23 20* 23   BUN 20 23 23   CREATININE 0.8* 0.8* 0.8*   CALCIUM 7.7* 7.2* 7.8*   GLUCOSE 221* 237* 169*      ABG:  No results for input(s): PH, PO2ART, OSQ3KWO, HCO3, BEART, O2SAT in the last 72 hours. Lab Results   Component Value Date    PROBNP 343.3 (H) 10/03/2022    PROBNP 2,514 (H) 09/30/2022    PROBNP 94,942 (H) 09/28/2022     No results found for: [facility]    Radiology Review:  Pertinent images / reports were reviewed as a part of this visit. Assessment:     Patient Active Problem List   Diagnosis    Pneumonia    Sepsis ([address]    Hypogammaglobulinemia ([address]    CLL (chronic lymphocytic leukemia) ([address]    Upper respiratory infection, acute    Generalized muscle weakness    Type 2 diabetes mellitus with hyperglycemia, with long-term current use of insulin ([facility])    Poor appetite    COPD (chronic obstructive pulmonary disease) ([facility])    Neutropenia ([facility])    Other specified disorders of bone density and structure, unspecified site    Cellulitis of right upper limb    Herpesviral infection    Intestinal malabsorption    Other nonspecific abnormal finding of lung field    Iron deficiency anemia due to chronic blood loss    Other muscle spasm    Leukemia, lymphocytic, chronic ([facility])    Selective deficiency of IgG ([facility])    Acute bronchitis    Severe sepsis ([address]       Plan:   1. Overall the patient has improved.   2. [facility]. Activity. [employer] Spirometry.   Corrina Sandoval MD  10/4/2022  10:21 AM

## 2022-10-04 NOTE — PROGRESS NOTES
Physician Progress Note      PATIENT:               Abran Scrdaniela  CSN #:                  [id number]  :                       1950  ADMIT DATE:       2022 11:19 AM  DISCH DATE:  RESPONDING  PROVIDER #:        Glenn Thomas MD          QUERY TEXT:    Pt admitted with Sepsis and has CHF documented. If possible, please document   in progress notes and discharge summary further specificity regarding the type   and acuity of CHF:    The medical record reflects the following:  Risk Factors: CHF  Clinical Indicators: \"Concern for new onset of CHF Having FROST, trace bilateral   edema. proBNP [id number] on admission. ECHO revealed EF 19-55, grade 1 diastolic,   heavily calcified aortic valve without stenosis, thickened mitral valve, in   addition to pleural effusion and small circumferential torrential pericardial   effusion  Treatment: Received lasix IV, repeated BNP, cardiology consulted-   intake/output, daily weights    Thank you Mary Holland RN, CDS ([phone])  Options provided:  -- Acute on Chronic Diastolic CHF/HFpEF  -- Acute Diastolic CHF/HFpEF  -- Other - I will add my own diagnosis  -- Disagree - Not applicable / Not valid  -- Disagree - Clinically unable to determine / Unknown  -- Refer to Clinical Documentation Reviewer    PROVIDER RESPONSE TEXT:    Provider is clinically unable to determine a response to this query. await thoracentesis to determine cause of pleural effusion.     Query created by: Paris Noland on 10/3/2022 3:14 PM      Electronically signed by:  Glenn Thomas MD 10/4/2022 2:15 AM

## 2022-10-04 NOTE — OR NURSING
INFORMED CONSENT:  Obtained prior to procedure Dr. Selene Lawrence. Consent placed in chart. ASSESSMENT:  Patient alert and oriented and aware of procedure to be done. No acute distress noted. BARRIER PRECAUTIONS & STERILE TECHNIQUE:               Pt remains in inpatient bed and on Cardiac Monitor. Pt prepped and draped in a sterile fashion with chlorhexadine.     PAIN/LOCAL ANESTHESIA/SEDATION MANAGEMENT:           Local: Lidocaine 1% given by Dr. [place]:           ACCESS TIME: 4505 with One Step          US/FLUORO: US guided with 4  Images taken    1126--Clear kwame fluid returns    1132--Total of 450cc clear kwame fluid removed           STERILE DRESSINGS: Tegaderm and gauze    SPECIMENS: 450cc fluid sent to lab    EBL:      Less than 1 cc    FOLLOW- UP X-RAY: stat chest xray    REPORT CALLED TO: Keena Barry RN 2E

## 2022-10-04 NOTE — BRIEF OP NOTE
Brief Postoperative Note      Patient: Kannan Nayg  YOB: 1950  MRN: [id number]    Pre-operative Diagnosis: right pleural effusion      Post-operative Diagnosis: Same    Procedure: Ultrasound guided thoracentesis     Anesthesia: 1% Lidocaine     Surgeons/Assistants: Roberta Lemus MD    Complications: none    Specimens: were obtained    Findings: successful US guided right thoracentesis, clear kwame fluid obtained    Electronically signed by Roberta Lemus MD on 10/4/2022 at 12:36 PM

## 2022-10-04 NOTE — PROGRESS NOTES
Occupational Therapy      Occupational Therapy Treatment Note    Name: Chaparrita Ingram MRN: [id number] :   1950   Date:  10/4/2022   Admission Date: 2022 Room:  -A     Primary Problem: Sepsis, Pneumonia, Traumatic rhabdomyolysis     Restrictions/Precautions: General Precautions, Fall Risk, Droplet Isolation, 2L o2, Telemetry, Pulse Ox, BP cuff, IV, Bed/chair alarm    Communication with other providers: PT Nannette    Subjective:  Patient states: \"I'm not putting socks on! They hurt my feet! \"   Pain: Pt reported pain at thoracentesis site but did not numerically rate    Objective:    Observation: Pt received in supine upon OT arrival. Agreeable to limited treatment only after max encouragement and education. Objective Measures: A & O x 4, stable vitals throughout session    Treatment, including education:  Therapeutic Activity Training:   Therapeutic activity training was instructed today. Cues were given for safety, sequence, UE/LE placement, awareness, and balance. Pt received in supine upon OT arrival. Pt re-educated on role of OT, POC, and importance of EOB/OOB activity. Pt initially refused, but agreeable after max encouragement and education. Pt transferred supine to sitting EOB SBA with HOB elevated to 30'. Pt able to sit at EOB level with supervision/good static sitting balance. Pt adamantly refused to don socks this date, stating they \"hurt my feet. \" Pt completed sit to stand transfer from bed CGA with min cues for safe hand placement. Pt ambulated ~25 ft CGA with RW. Pt with improved gait quality and steadiness this date relative to prior session when he refused use of RW. Pt transferred stand to sit to bed CGA with min cues for reaching back with arms. Pt refused any ADLs this date and returned to supine SBA. Pt left positioned for comfort in bed with all lines intact, all needs within reach, and bed alarm on.     Assessment / Impression:    Patient's tolerance of treatment: Fair-  Adverse Reaction: None  Significant change in status and impact: Slightly improved respiratory status from prior treatment  Barriers to improvement: Self-limiting and non-compliant behaviors    Plan for Next Session:    Continue per OT POC. Continue to recommend SNF at discharge.     Time in: 1351  Time out: 1402  Timed treatment minutes: 11  Total treatment time: 11    Electronically signed by:    SEBASTIEN Smith/L, [address] [place] [id number]

## 2022-10-04 NOTE — PROGRESS NOTES
Pt is alert, oriented, and cooperative with care. Pt has son updated, agreeable to plan of care. PT has good apetitie and eats breakfast well. Pt medicated for discomfort, see MAR. PT sitting in position of comfort, without complaints, tahnks staff for care, denies complaints. Call light in reach, all needs met.

## 2022-10-05 PROBLEM — E43 SEVERE MALNUTRITION (HCC): Status: ACTIVE | Noted: 2022-10-05

## 2022-10-05 LAB
BILIRUBIN URINE: NEGATIVE
BLOOD, URINE: NEGATIVE
CHLORIDE URINE RANDOM: 10 MMOL/L (ref 43–210)
CLARITY: CLEAR
COLOR: YELLOW
GLUCOSE BLD-MCNC: 144 MG/DL (ref 70–99)
GLUCOSE BLD-MCNC: 147 MG/DL (ref 70–99)
GLUCOSE BLD-MCNC: 151 MG/DL (ref 70–99)
GLUCOSE BLD-MCNC: 157 MG/DL (ref 70–99)
GLUCOSE BLD-MCNC: 169 MG/DL (ref 70–99)
GLUCOSE, URINE: NEGATIVE MG/DL
KETONES, URINE: NEGATIVE MG/DL
LEUKOCYTE ESTERASE, URINE: NEGATIVE
NITRITE URINE, QUANTITATIVE: NEGATIVE
OSMOLALITY URINE: 395 MOS/L (ref 292–1090)
PH, URINE: 6
POTASSIUM, UR: 34 MMOL/L (ref 22–119)
PROTEIN UA: NEGATIVE MG/DL
SODIUM URINE: 10 MMOL/L (ref 35–167)
SODIUM URINE: 10 MMOL/L (ref 35–167)
SPECIFIC GRAVITY UA: 1.01
UROBILINOGEN, URINE: 2 MG/DL

## 2022-10-05 PROCEDURE — 2700000000 HC OXYGEN THERAPY PER DAY

## 2022-10-05 PROCEDURE — 83935 ASSAY OF URINE OSMOLALITY: CPT

## 2022-10-05 PROCEDURE — 84300 ASSAY OF URINE SODIUM: CPT

## 2022-10-05 PROCEDURE — 84133 ASSAY OF URINE POTASSIUM: CPT

## 2022-10-05 PROCEDURE — 2580000003 HC RX 258: Performed by: NURSE PRACTITIONER

## 2022-10-05 PROCEDURE — 99232 SBSQ HOSP IP/OBS MODERATE 35: CPT | Performed by: INTERNAL MEDICINE

## 2022-10-05 PROCEDURE — 6370000000 HC RX 637 (ALT 250 FOR IP): Performed by: STUDENT IN AN ORGANIZED HEALTH CARE EDUCATION/TRAINING PROGRAM

## 2022-10-05 PROCEDURE — 94640 AIRWAY INHALATION TREATMENT: CPT

## 2022-10-05 PROCEDURE — 6370000000 HC RX 637 (ALT 250 FOR IP): Performed by: NURSE PRACTITIONER

## 2022-10-05 PROCEDURE — 94761 N-INVAS EAR/PLS OXIMETRY MLT: CPT

## 2022-10-05 PROCEDURE — 2580000003 HC RX 258: Performed by: STUDENT IN AN ORGANIZED HEALTH CARE EDUCATION/TRAINING PROGRAM

## 2022-10-05 PROCEDURE — 82962 GLUCOSE BLOOD TEST: CPT

## 2022-10-05 PROCEDURE — 6360000002 HC RX W HCPCS: Performed by: NURSE PRACTITIONER

## 2022-10-05 PROCEDURE — 81003 URINALYSIS AUTO W/O SCOPE: CPT

## 2022-10-05 PROCEDURE — 82436 ASSAY OF URINE CHLORIDE: CPT

## 2022-10-05 PROCEDURE — 1200000000 HC SEMI PRIVATE

## 2022-10-05 PROCEDURE — 6360000002 HC RX W HCPCS: Performed by: STUDENT IN AN ORGANIZED HEALTH CARE EDUCATION/TRAINING PROGRAM

## 2022-10-05 RX ADMIN — PIPERACILLIN AND TAZOBACTAM 3375 MG: 3; .375 INJECTION, POWDER, LYOPHILIZED, FOR SOLUTION INTRAVENOUS at 21:42

## 2022-10-05 RX ADMIN — IPRATROPIUM BROMIDE AND ALBUTEROL SULFATE 1 AMPULE: .5; 3 SOLUTION RESPIRATORY (INHALATION) at 20:28

## 2022-10-05 RX ADMIN — PIPERACILLIN AND TAZOBACTAM 3375 MG: 3; .375 INJECTION, POWDER, LYOPHILIZED, FOR SOLUTION INTRAVENOUS at 06:23

## 2022-10-05 RX ADMIN — PIPERACILLIN AND TAZOBACTAM 3375 MG: 3; .375 INJECTION, POWDER, LYOPHILIZED, FOR SOLUTION INTRAVENOUS at 16:08

## 2022-10-05 RX ADMIN — PANTOPRAZOLE SODIUM 20 MG: 20 TABLET, DELAYED RELEASE ORAL at 06:22

## 2022-10-05 RX ADMIN — ENOXAPARIN SODIUM 40 MG: 100 INJECTION SUBCUTANEOUS at 21:34

## 2022-10-05 RX ADMIN — HYDROCODONE BITARTRATE AND ACETAMINOPHEN 2 TABLET: 5; 325 TABLET ORAL at 21:39

## 2022-10-05 RX ADMIN — TAMSULOSIN HYDROCHLORIDE 0.4 MG: 0.4 CAPSULE ORAL at 21:34

## 2022-10-05 RX ADMIN — SODIUM CHLORIDE 500 ML: 9 INJECTION, SOLUTION INTRAVENOUS at 16:07

## 2022-10-05 RX ADMIN — INSULIN GLARGINE 20 UNITS: 100 INJECTION, SOLUTION SUBCUTANEOUS at 21:37

## 2022-10-05 RX ADMIN — GABAPENTIN 100 MG: 100 CAPSULE ORAL at 16:02

## 2022-10-05 RX ADMIN — HYDROCODONE BITARTRATE AND ACETAMINOPHEN 2 TABLET: 5; 325 TABLET ORAL at 16:20

## 2022-10-05 RX ADMIN — IPRATROPIUM BROMIDE AND ALBUTEROL SULFATE 1 AMPULE: .5; 3 SOLUTION RESPIRATORY (INHALATION) at 07:14

## 2022-10-05 RX ADMIN — GABAPENTIN 100 MG: 100 CAPSULE ORAL at 21:45

## 2022-10-05 RX ADMIN — HYDROCODONE BITARTRATE AND ACETAMINOPHEN 2 TABLET: 5; 325 TABLET ORAL at 03:53

## 2022-10-05 RX ADMIN — GABAPENTIN 100 MG: 100 CAPSULE ORAL at 09:59

## 2022-10-05 RX ADMIN — IPRATROPIUM BROMIDE AND ALBUTEROL SULFATE 1 AMPULE: .5; 3 SOLUTION RESPIRATORY (INHALATION) at 11:16

## 2022-10-05 RX ADMIN — FINASTERIDE 5 MG: 5 TABLET, FILM COATED ORAL at 21:34

## 2022-10-05 RX ADMIN — VALACYCLOVIR HYDROCHLORIDE 500 MG: 500 TABLET, FILM COATED ORAL at 09:59

## 2022-10-05 RX ADMIN — HYDROCODONE BITARTRATE AND ACETAMINOPHEN 2 TABLET: 5; 325 TABLET ORAL at 09:57

## 2022-10-05 RX ADMIN — IPRATROPIUM BROMIDE AND ALBUTEROL SULFATE 1 AMPULE: .5; 3 SOLUTION RESPIRATORY (INHALATION) at 16:03

## 2022-10-05 RX ADMIN — ATORVASTATIN CALCIUM 80 MG: 40 TABLET, FILM COATED ORAL at 21:33

## 2022-10-05 ASSESSMENT — PAIN SCALES - WONG BAKER
WONGBAKER_NUMERICALRESPONSE: 0
WONGBAKER_NUMERICALRESPONSE: 0

## 2022-10-05 ASSESSMENT — PAIN SCALES - GENERAL
PAINLEVEL_OUTOF10: 8
PAINLEVEL_OUTOF10: 2
PAINLEVEL_OUTOF10: 5
PAINLEVEL_OUTOF10: 10
PAINLEVEL_OUTOF10: 6

## 2022-10-05 ASSESSMENT — PAIN DESCRIPTION - ORIENTATION
ORIENTATION: RIGHT
ORIENTATION: RIGHT

## 2022-10-05 ASSESSMENT — PAIN DESCRIPTION - ONSET: ONSET: ON-GOING

## 2022-10-05 ASSESSMENT — ENCOUNTER SYMPTOMS
NAUSEA: 1
BLOOD IN STOOL: 0
ABDOMINAL PAIN: 0
COUGH: 0
BACK PAIN: 1
SINUS PRESSURE: 0
SINUS PAIN: 0
VOMITING: 0
EYE PAIN: 0
EYE DISCHARGE: 0
VOICE CHANGE: 0
DIARRHEA: 0
SHORTNESS OF BREATH: 0
CHEST TIGHTNESS: 0

## 2022-10-05 ASSESSMENT — PAIN DESCRIPTION - LOCATION
LOCATION: ABDOMEN;BACK;SHOULDER
LOCATION: ABDOMEN
LOCATION: CHEST

## 2022-10-05 ASSESSMENT — PAIN DESCRIPTION - FREQUENCY
FREQUENCY: INTERMITTENT
FREQUENCY: CONTINUOUS

## 2022-10-05 ASSESSMENT — PAIN - FUNCTIONAL ASSESSMENT
PAIN_FUNCTIONAL_ASSESSMENT: PREVENTS OR INTERFERES SOME ACTIVE ACTIVITIES AND ADLS

## 2022-10-05 ASSESSMENT — PAIN DESCRIPTION - PAIN TYPE: TYPE: SURGICAL PAIN

## 2022-10-05 ASSESSMENT — PAIN DESCRIPTION - DESCRIPTORS
DESCRIPTORS: SHARP
DESCRIPTORS: ACHING
DESCRIPTORS: ACHING;SHARP

## 2022-10-05 NOTE — PROGRESS NOTES
Occupational Therapy  . Occupational Therapy Treatment Note    Name: Brenda Griffith MRN: [id number] :   1950   Date:  10/5/2022   Admission Date: 2022 Room:  -A       Patient sleeping. When aroused patient declining therapy services offered. Would like to go back to sleep. Education given in regards to OOB activity. Will check back when able.        Electronically signed by:    HERMELINDO Newman COTA/L [id number]    10/5/2022, 5:19 PM

## 2022-10-05 NOTE — PROGRESS NOTES
ONCOLOGY HEMATOLOGY CARE ([place])  PROGRESS NOTE      10/5/2022  8:48 AM    Patient:    Paris Hutchison  : 1950   71 y.o. MRN: [id number]  Admitted: 2022 11:19 AM ATT: Gaudencio Vega MD   -A  AdmitDx: Lymphadenopathy [R59.1]  Rhinovirus [B34.8]  Elevated troponin [R77.8]  Elevated CK [R74.8]  Acute respiratory failure with hypoxia ([address] [J96.01]  H/O chronic lymphocytic leukemia [Z85.6]  Severe sepsis ([address] [A41.9, R65.20]  Traumatic rhabdomyolysis, initial encounter ([address] [facility]. 6XXA]  Acute congestive heart failure, unspecified heart failure type ([address] [I50.9]  Sepsis, due to unspecified organism, unspecified whether acute organ dysfunction present ([address] [A41.9]  PCP: César Person MD      Patient was seen and examined today. Breathing a little better  Productive cough not as bad as well. Legs still weak but ambulated to the corridor and back. No fever  Sweating  No bleeding  Mild right sided abdominal pain. 2022 CT scan of the chest, abdomen pelvis:  No evidence of pulmonary embolism. Right greater than left lower lobe pneumonia. Diffuse hilar and mediastinal lymphadenopathy, concerning for malignancy with   history of CLL, also likely progressed from prior exam.       Diffuse peritoneal and retroperitoneal lymphadenopathy in the upper abdomen   most significant near the cassi hepatis and IVC where there is a large mass   which is favored to be a lymph node measuring up to 6.2 x 3.5 cm, also likely   progressed from exam.       Splenomegaly, could also be related to malignancy. Mild gallbladder wall thickening and pericholecystic fluid, could be related   to passive congestion or mass effect from adjacent lymphadenopathy. There is   mild intrahepatic bile duct dilatation. 2022 ultrasound of the abdomen:  1. Hepatomegaly and hepatic steatosis.    2. Masses most compatible with lymphadenopathy in the cassi hepatis, better   seen on recent CT. 3. Hyperechoic focus in the liver could be focal fatty infiltration,   hemangioma or other mass. This is too small to characterize on recent CT. Correlate with MRI hepatic mass protocol if indicated. 4. Gallbladder wall thickening with possible adenomyomatosis. 9/29/2022 CBC with WBC of 28.2 hemoglobin of 9.9 hematocrit 32.1 MCV of 80 platelets of 41. CMP with sodium of 133 potassium of 4.2 BUN of 26 creatinine 1 calcium 7.4 total bilirubin of 1.9     10/4/22: Thoracentesis yesterday     BACKGROUND ONCOLOGY HISTORY:  B-cell CLL, stage I with conversion from good to poor prognostic factors, with 17p deletion:   His diagnosis of CLL since 2007.   17p deletion since 2015. Initial treatment with Leukeran from 2000 to 2011 intermittently and FCR regimen in 2012, bendamustine/Rituxan in 2014. Idelalisib from January 2015 until June of 2016. On ibrutinib since September 2016. Ibrutinib therapy seems to be helping him, overall improvement. Was Only able to tolerate 140 mg p.o. every other day  He was off for a couple months During the fall 2017  Resumed Ibrutinib and  on 140 mg po daily. Plan to Continue current dose as no major B sx, stable WBC. CT imaging in July 2022 with slightly increasing size of intraabdominal LN. plan was to monitor. But note declining platelet count, could be secondary ibrutinib. Hold ibrutinib for now until further investigation. REVIEW OF SYSTEMS      Constitutional:  Denies fever, chills  HEENT:  Denies swelling of neck glands  Respiratory:  Denies hemoptysis  Cardiovascular:  Denies chest pain  GI:  Denies nausea, vomiting, constipation or diarrhea   Neurologic:  Denies headache, focal weakness or sensory changes   PSYCHIATRIC:  Neg depression, personality changes, anxiety.   ALL/IMM:  Neg reactions to drugs other than listed    All systems negative except  for symptoms of HPI and as marked as above    PHYSICAL EXAM :    Vitals: BP (!) 121/92   Pulse 79 Temp 98 °F (36.7 °C) (Oral)   Resp 23   Ht 6' (1.829 m)   Wt 171 lb 15.3 oz (78 kg)   SpO2 94%   BMI 23.32 kg/m²     CONSTITUTIONAL: awake, alert, Ill  appearing  EYES: But no icterus  ENT: ATNC  NECK: No JVD  HEMATOLOGIC/LYMPHATIC: no cervical, supraclavicular or axillary lymphadenopathy   LUNGS: Bilateral extensive coarse breath sounds  CARDIOVASCULAR: s1s2 rrr no murmurs  ABDOMEN: soft ntnd bs pos  NEUROLOGIC: GI  SKIN: Echymosis  EXTREMITIES: no LE edema bilaterally    LABORATORY RESULTS  CBC:   Recent Labs     10/02/22  0910 10/03/22  1322   WBC 41.2* 32.1*   HGB 12.0* 10.0*   PLT 74* 67*     BMP:    Recent Labs     10/02/22  0910 10/03/22  1322 10/04/22  0633   * 129* 125*   K 4.5 4.2 4.7   CL 94* 97* 93*   CO2 23 20* 23   BUN 20 23 23   CREATININE 0.8* 0.8* 0.8*   GLUCOSE 221* 237* 169*     Hepatic:   Recent Labs     10/02/22  0910 10/03/22  1322 10/04/22  0633   AST 28 22 21   ALT 31 23 23   BILITOT 1.0 1.0 1.0   ALKPHOS 202* 176* 177*     INR:   Recent Labs     10/02/22  0910 10/04/22  0633   INR 1.07 1.08         RADIOLOGY REPORTS  10/2/22; HIDA scan:No convincing scintigraphic evidence of acute cholecystitis. ASSESSMENT & RECOMMENDATIONS:  History of CLL, ibrutinib  being held. Now with worsening leukocytosis, anemia and thrombocytopenia could be sec to CLL vs infection vs abx. No evidence of hemolysis per smear/labs. Flow cytometry still pending, will discuss with pathology. PS with no blasts/schistocytes. Note CAT scan with progressive lymphadenopathy. Will consider further treatment with immunotherapy with or without chemotherapy as outpatient after infection control. IVIG prior to discharge   CBC ordered today. Continue other medical care including improving nutritional status     ECOG Performance Status (ECOG Scale 0-4): 2-3     This plan was discussed with the patient and he verbalized understanding. We will continue to follow the patient.      Thank you for allowing us to participate in the care of this patient.       [address]

## 2022-10-05 NOTE — CONSULTS
Patient:   Ramo Brock    Date:  10/05/22  :  1950, 71 y.o. Nephrologist: Ekta Duran MD  Provider: Jaqui Robles MD    Reason for Consult: Acute on chronic hyponatremia    Consult requested by : Dr John Jj MD    Chief Complaint:   Shiv Galvan at home and was on the floor for 9 hours    HISTORY OF PRESENT ILLNESS/Hospital course  AND  brief background history    Mr. Oracio Ruiz, is a 78-year-old male, who was brought to the emergency department on 2022 by his son after falling on the floor at night and unable to wake up until morning. He was trying to get a glass of water at night, he fell in the floor and legs were so weak he could not wake up at his son next morning found him on the floor. Although initially he did not want to come to the emergency department, but as he continued to feel weak he decided to come eventually. On arrival in the emergency department, he was hemodynamically stable and afebrile, O2 saturation recorded 94%. He underwent several diagnostic test, which include imaging and biochemical.  Imaging studies include chest x-ray, CT of the head without contrast, CT angiogram of the chest with contrast, CT of the abdomen and pelvis with contrast as well as abdominal ultrasound. Abdomen is summarized the findings, hepatomegaly and hepatic steatosis, bilateral right greater than left lower lobe infiltrate lymphadenopathy, splenomegaly, mild gallbladder thickening, mild intrahepatic bile duct dilatation. Biochemical testing showed sodium of 132, serum bicarbonate of 19, BUN/creatinine 29 and 1.1 respectively. But serum glucose was 366. Other pertinent abnormal lab include calcium of 8.0 at the face of albumin 3.3, high total bilirubin of 1.6, high alkaline phosphatase of 138, along with slightly elevated AST, proBNP level more than 13,000, white cell 76.5 thousand, hemoglobin 11.6, and platelet count of 74,451.   Appropriate culture were drawn, empirical antimicrobial agent were initiated. He was also given 1 L of crystalloid solution mainly normal saline. He was then subsequently admitted to medical floor for further evaluation. Chad Melissa He was treated with empirical antibiotic for presumed bacterial pneumonitis, also was positive for rhinovirus, and oncology evaluation because of his history of CLL. Also underwent thoracentesis-pleural fluid looks like exudative to me    He was given furosemide 20 mg IV daily for the last 4 days. But sodium dropped to 125 mEq/L, hence the kidney consult was requested. I was able to review his prior data, he has had intermittent hyponatremia and his johnny sodium was 115 in 2017 also 123 mEq/L back in April 10, 2019. Although recently back in April and 2022 sodium was normal.  I am little bit familiar with him as I saw him back in , unfortunately I was not able to see him since then    PAST MEDICAL HISTORY:  1.  Diabetes mellitus diagnosed roughly in , type 2. He was  managed on oral pill. 2.  CLL. He sees Dr. Bindu Luo now  -he was on ibrutinib that has been discontinued because of thrombocytopenia. 3.  Possible COPD. HABITS:  He quit smoking at age 29 and has a 40-pack-year history. No history  of alcohol or illicit drug abuse. FAMILY MEDICAL HISTORY:  Mother  at age 80. .  Dad  from heart  disease at age 54. Brother also has heart disease. He has 7 children; they  are apparently healthy. Patient lives alone at home     PAST SURGICAL HISTORY:    1.   the port placement for the chemo. 2.  He also had left knee surgery and had some ankle fracture and a  reconstructive procedure. SOCIAL HISTORY:  The patient is single. He is  x twice. He has 7  children. He lives alone in a house that he inherited from his parents. He  is retired. The last time he worked was 41. He is otherwise active.   He lives alone at home               REVIEW OF SYSTEMS:     All pertinent ROS neg except   Extreme weakness, mainly leg weakness  Unable to ambulate  Shortness of breath  Cough and low-grade fever    Medication :     Reviewed, see in epic    BP (!) 121/92   Pulse 79   Temp 98 °F (36.7 °C) (Oral)   Resp 23   Ht 6' (1.829 m)   Wt 171 lb 15.3 oz (78 kg)   SpO2 94%   BMI 23.32 kg/m²     PHYSICAL EXAM:  General appearance: Thin, alert, awake and oriented without any acute distress  HEENT: At least 2+ conjunctival pallor  Neck: Supple, with supplemental oxygen-his head of the bed elevated about 80 degrees  Heart: Seemed regular rate and rhythm this morning  LUNGS: Diffuse crackles in posterior exam  Abdomen: Soft  Extremities: Trace leg edema    LABS:  Reviewed, see in epic            IMPRESSION:  Acute on chronic hyponatremia-clinical scenario, physical exam and imaging study all suggest from hypervolemia-sodium dropped despite-diuretics, albeit only 20 mg daily-since his last furosemide was October 2, which is 3 days ago-we will start with redoing urine urinary indicis-he may have additional etiology such as ADH secretion either appropriate or inappropriate  Underlying CLL with pneumonitis and rhinovirus-as well as anemia and thrombocytopenia and of course high white blood cell  Also has diabetes    PLAN:    We will start with UA and urinary indicis  proBNP level tomorrow morning  I suspect he will need high-dose of intensive IV loop-if urinary indicis confirm the suspicion of hypervolemia  Watch for iatrogenic and nosocomial complication  Follow clinically and biochemically  Arginine vasopressin level tomorrow morning just for academic curiosity-which may or may not help(we do not have ability to check copeptin )

## 2022-10-05 NOTE — PROGRESS NOTES
Pulmonary and Critical Care  Progress Note    Subjective: The patient had thoracentesis. Pl fluid exudate. Shortness of breath has improved. Chest pain none. Addressing respiratory complaints Patient is negative for  hemoptysis and cyanosis  CONSTITUTIONAL:  negative for fevers and chills      Past Medical History:     has a past medical history of Arthritis, CAD (coronary artery disease), Cancer ([facility] [address] Diabetes mellitus ([facility] [address] History of blood transfusion, Hx of motion sickness, Hyperlipidemia, MDRO (multiple drug resistant organisms) resistance, Migraine, Pneumonia, Wears dentures, and Wears glasses. has a past surgical history that includes knee surgery (1970); Tunneled venous port placement (2013); Colonoscopy (2010); fracture surgery (Left, 1990's); Cardiac catheterization (1990's); Tonsillectomy (late 1960's); Dental surgery; eye surgery (Right, 08/2016); and other surgical history (Left, 01/18/2017). reports that he quit smoking about 35 years ago. His smoking use included cigarettes. He started smoking about 57 years ago. He has a 20.00 pack-year smoking history. He has never used smokeless tobacco. He reports that he does not drink alcohol and does not use drugs. Family history:  family history includes Asthma in his maternal uncle; Colon Cancer in his brother; Diabetes in his mother; Early Death in his brother; Heart Disease in his father; High Blood Pressure in his mother; Other in his sister.     No Known Allergies  Social History:    Reviewed; no changes    Objective:   PHYSICAL EXAM:        VITALS:  BP (!) 121/92   Pulse 79   Temp 98 °F (36.7 °C) (Oral)   Resp 24   Ht 6' (1.829 m)   Wt 171 lb 15.3 oz (78 kg)   SpO2 94%   BMI 23.32 kg/m²     24HR INTAKE/OUTPUT:    Intake/Output Summary (Last 24 hours) at 10/5/2022 1029  Last data filed at 10/5/2022 0029  Gross per 24 hour   Intake 240 ml   Output 1350 ml   Net -1110 ml       CONSTITUTIONAL:  awake, alert, cooperative, no apparent distress, and appears stated age  LUNGS:  decreased breath sounds, basilar crackles. CARDIOVASCULAR:  normal S1 and S2 and negative JVD  ABD:Abdomen soft, non-tender. BS normal. No masses,  No organomegaly  NEURO:Alert. DATA:    CBC:  Recent Labs     10/03/22  1322   WBC 32.1*   RBC 4.11*   HGB 10.0*   HCT 33.0*   PLT 67*   MCV 80.3   MCH 24.3*   MCHC 30.3*   RDW 18.6*   SEGSPCT 27.0*   BANDSPCT 1*      BMP:  Recent Labs     10/03/22  1322 10/04/22  0633   * 125*   K 4.2 4.7   CL 97* 93*   CO2 20* 23   BUN 23 23   CREATININE 0.8* 0.8*   CALCIUM 7.2* 7.8*   GLUCOSE 237* 169*      ABG:  No results for input(s): PH, PO2ART, DNH8QUY, HCO3, BEART, O2SAT in the last 72 hours. Lab Results   Component Value Date    PROBNP 343.3 (H) 10/03/2022    PROBNP 2,514 (H) 09/30/2022    PROBNP 21,220 (H) 09/28/2022     No results found for: [address] [facility]    Radiology Review:  Pertinent images / reports were reviewed as a part of this visit. Assessment:     Patient Active Problem List   Diagnosis    Pneumonia    Sepsis ([address]    Hypogammaglobulinemia ([address]    CLL (chronic lymphocytic leukemia) ([address]    Upper respiratory infection, acute    Generalized muscle weakness    Type 2 diabetes mellitus with hyperglycemia, with long-term current use of insulin (HCC)    Poor appetite    COPD (chronic obstructive pulmonary disease) (HCC)    Neutropenia (HCC)    Other specified disorders of bone density and structure, unspecified site    Cellulitis of right upper limb    Herpesviral infection    Intestinal malabsorption    Other nonspecific abnormal finding of lung field    Iron deficiency anemia due to chronic blood loss    Other muscle spasm    Leukemia, lymphocytic, chronic (HCC)    Selective deficiency of IgG (HCC)    Acute bronchitis    Severe sepsis ([address]       Plan:   1. Overall the patient has improved. 2. [address] Activity. 4. Discussed with the family.    Gilda Mcdonald MD  10/5/2022  10:29 AM

## 2022-10-05 NOTE — PROGRESS NOTES
Dr Francisco White - Nephrology paged via [facility] for new consult for hyponatremia.

## 2022-10-05 NOTE — PROGRESS NOTES
Comprehensive Nutrition Assessment    Type and Reason for Visit:  Initial, RD Nutrition Re-Screen/LOS    Nutrition Recommendations/Plan:   Modify current diet order to 5 carb choices/meal   DC diabetic oral supplement   Order Low Calorie/High Protein supplement TID (breakfast, lunch, dinner) and frozen supplement BID (lunch and dinner)   Monitor PO/supplement intakes, weight, labs, fluid status      Malnutrition Assessment:  Malnutrition Status:  Severe malnutrition (10/05/22 1423)    Context:  Chronic Illness     Findings of the 6 clinical characteristics of malnutrition:  Energy Intake:  Mild decrease in energy intake   Weight Loss:  Mild weight loss (7.5% over 7 months)     Body Fat Loss:  Severe body fat loss Fat Overlying Ribs, Orbital, Triceps   Muscle Mass Loss:  Severe muscle mass loss Temples (temporalis), Clavicles (pectoralis & deltoids), Hand (interosseous)  Fluid Accumulation:  No significant fluid accumulation     Strength:  Not Performed    Nutrition Assessment:    Pt admitted on 9/28 for severe sepsis. PMH of DM, CAD, Cancer (leukemia), HLD, full dentures. Pt currently on a 4 carb/meal, heart healthy, low sodium diet and diabetic oral supplement supplement TID. Pt reports that his appetite is ok. States he does not like the taste of diabetic oral supplement, willing to trial other supplements. No nausea/vomitting or constipation/diarrhea. NFPE findings show severe muscle wasting and severe fat loss. Pt reports he has lost weight, UBW of 186lbs from 6/2022. Complains of pain in his abdomin when he coughs. Continue to monitor at high nutrition risk. Nutrition Related Findings:    Na 125, Chloride 93, Phos 2.4, A1c 6.7, Glu 169, Last BM 10/3. Meds: Flomax Wound Type: Multiple, Pressure Injury, Skin Tears (friction/sheer on coccyx)       Current Nutrition Intake & Therapies:    Average Meal Intake: %  Average Supplements Intake: None Ordered  ADULT DIET;  Regular; 4 carb choices (60 gm/meal); Low Fat/Low Chol/High Fiber/JOSE; Low Sodium (2 gm)  ADULT ORAL NUTRITION SUPPLEMENT; Breakfast, Lunch, Dinner; Diabetic Oral Supplement    Anthropometric Measures:  Height: 6' (182.9 cm)  Ideal Body Weight (IBW): 178 lbs (81 kg)    Admission Body Weight: 173 lb 4.5 oz (78.6 kg)  Current Body Weight: 171 lb 15.3 oz (78 kg), 96.6 % IBW.  Weight Source: Bed Scale  Current BMI (kg/m2): 23.3  Usual Body Weight: 186 lb (84.4 kg) (3/2022)  % Weight Change (Calculated): -7.5  Weight Adjustment For: No Adjustment  BMI Categories: Normal Weight (BMI 22.0 to 24.9) age over 72    Estimated Daily Nutrient Needs:  Energy Requirements Based On: Kcal/kg  Weight Used for Energy Requirements: Current  Energy (kcal/day): 4892-5654 (25-30 kcal/kg)  Weight Used for Protein Requirements: Current  Protein (g/day):  (1.2-1.5 g/kg)  Method Used for Fluid Requirements: 1 ml/kcal  Fluid (ml/day): 0435-5888    Nutrition Diagnosis:   Severe malnutrition related to increase demand for energy/nutrients as evidenced by weight loss 7.5% in 3 months, severe loss of subcutaneous fat, severe muscle loss    Nutrition Interventions:   Food and/or Nutrient Delivery: Continue Current Diet, Start Oral Nutrition Supplement  Nutrition Education/Counseling: No recommendation at this time  Coordination of Nutrition Care: Continue to monitor while inpatient       Goals:  Goals: Meet at least 75% of estimated needs, by next RD assessment       Nutrition Monitoring and Evaluation:   Behavioral-Environmental Outcomes: None Identified  Food/Nutrient Intake Outcomes: Supplement Intake  Physical Signs/Symptoms Outcomes: Biochemical Data, Nausea or Vomiting, Fluid Status or Edema, Hemodynamic Status, Skin, Weight    Discharge Planning:    Continue Oral Nutrition Supplement, Continue current diet     Larisa Bowie, Dietetic Intern   Contact: [id number]

## 2022-10-05 NOTE — PLAN OF CARE
Problem: Discharge Planning  Goal: Discharge to home or other facility with appropriate resources  Outcome: Progressing     Problem: Pain  Goal: Verbalizes/displays adequate comfort level or baseline comfort level  Outcome: Progressing     Problem: Skin/Tissue Integrity  Goal: Absence of new skin breakdown  Description: 1. Monitor for areas of redness and/or skin breakdown  2. Assess vascular access sites hourly  3. Every 4-6 hours minimum:  Change oxygen saturation probe site  4. Every 4-6 hours:  If on nasal continuous positive airway pressure, respiratory therapy assess nares and determine need for appliance change or resting period.   Outcome: Progressing     Problem: Chronic Conditions and Co-morbidities  Goal: Patient's chronic conditions and co-morbidity symptoms are monitored and maintained or improved  Outcome: Progressing     Problem: Safety - Adult  Goal: Free from fall injury  Outcome: Progressing     Problem: Nutrition Deficit:  Goal: Optimize nutritional status  Outcome: Progressing  Flowsheets (Taken 10/5/2022 1332 by Babak Ferrara)  Nutrient intake appropriate for improving, restoring, or maintaining nutritional needs:   Monitor oral intake, labs, and treatment plans   Recommend appropriate diets, oral nutritional supplements, and vitamin/mineral supplements

## 2022-10-05 NOTE — PROGRESS NOTES
Physical Therapy  Attempted to see this patient at [id number]. Patient refuses therapy as he \"Just got back in bed\" from ambulating to the restroom. Will attempts later as time allows.   Viviane Pisano [place]  7:55 AM  10/5/2022

## 2022-10-05 NOTE — CARE COORDINATION
Contacted [facility]; spoke to Junior He. Continuing to await precert. She will call this CM with update later today. Magaly Winters RN     [id number] Received call from [employer]; spoke to Junior He. Insurance is requesting updated PT/OT notes today. Added WB for new tx. Magaly Winters RN     [id number] Per Dr Jennifer Carrero- family now stated that patient will return home upon discharge. Attempted to contact [employer] Marty Gentile justin at [phone]; [facility].  Magaly Winters RN

## 2022-10-05 NOTE — PROGRESS NOTES
V2.0  [facility] Hospitalist Progress Note      Name:  Ese Rodriguez /Age/Sex: 1950  (71 y.o. male)   MRN & CSN:  [id number] & [id number] Encounter Date/Time: 10/5/2022 8:25 AM EDT    Location:  -A PCP: Krista Barron MD       Hospital Day: 8    Assessment and Plan:   Ese Rodriguez is a 71 y.o. male with pmh of CLL, diabetes, hypertension, BPH who presents with Severe sepsis ([facility] [address]    Plan:  Severe sepsis in the setting of rhinovirus infection with superimposed bacterial pneumonia: CT scan showed right greater than left lower lobe pneumonia. Respiratory viral panel positive for rhinovirus. Currently on IV antibiotics at the patient is immunocompromised. Pro-Gabriel is elevated, has underlying CLL. Continue Zosyn for now. Cultures negative so far. Pulmonology on board  Acute hypoxic respiratory failure in the setting of above: Also some factors of possible CHF as chest x-ray shows worsening right-sided pleural effusion concerning for malignant versus empyema versus parapneumonic effusion. Status postthoracentesis yesterday. Thoracentesis shows signs for likely related to date yet to be meeting for empyema with underlying CLL. We will continue to follow. Non-insulin-dependent diabetes mellitus type 2 on Lantus and sliding scale insulin  Right upper quadrant abdominal pain likely in the setting of lymphadenopathy and mass-effect of the retroperitoneal area: Hematology oncology on board. GI on board. On monitoring of liver enzymes on a daily basis  Concern for new onset of CHF: Received Lasix IV with adequate urine output. proBNP came down. Cardiology consult in place appreciate recommendations  Hypervolemic hypoosmolar hypochloremic hyponatremia urine studies are pending. Nephrology on board. Sodium is around low 120s. History of CLL: Was on ibrutinib that is on hold. Flow cytometry pending. Hematology oncology following. CAT scan shows progressive lymphadenopathy.   Hematology oncology will first consider further treatment with immunotherapy with or without chemotherapy as outpatient after infection control. Hematology oncology recommends IVIG prior to discharge. Diet ADULT DIET; Regular; 4 carb choices (60 gm/meal); Low Fat/Low Chol/High Fiber/JOSE; Low Sodium (2 gm)  ADULT ORAL NUTRITION SUPPLEMENT; Breakfast, Lunch, Dinner; Diabetic Oral Supplement   DVT Prophylaxis [] Lovenox, []  Heparin, [] SCDs, [] Ambulation,  [] Eliquis, [] Xarelto  [] Coumadin   Code Status DNR-CCA   Disposition From: home  Expected Disposition: SNF  Estimated Date of Discharge: tomorrow  Patient requires continued admission due to hyponatremia work up   Surrogate Decision Maker/ POA      Subjective:     Chief Complaint: Fatigue, Extremity Weakness (Reports BLE weakness and fatigue since colonoscopy last week. Explains felt like legs were going to give out around 0230 AM and fell down on flow in sitting position. Denies LOC. Denies hitting head. ), and Shortness of Breath (Has had for 1 week. PCP placed on Zpack and Steroid taper.)       Patient is having bilateral lower extremity weakness. Fatigue. Slowly improving. Not at baseline. Lethargy and weakness. Minimal urine output. Review of Systems:    Review of Systems   Constitutional:  Positive for appetite change and unexpected weight change. Negative for chills and fever. HENT:  Negative for ear pain, hearing loss, sinus pressure, sinus pain and voice change. Eyes:  Negative for pain, discharge and visual disturbance. Respiratory:  Negative for cough, chest tightness and shortness of breath. Cardiovascular:  Negative for chest pain, palpitations and leg swelling. Gastrointestinal:  Positive for nausea. Negative for abdominal pain, blood in stool, diarrhea and vomiting. Genitourinary:  Negative for dysuria and frequency. Musculoskeletal:  Positive for arthralgias and back pain.    Neurological:  Positive for dizziness and weakness. Negative for light-headedness and headaches. Psychiatric/Behavioral:  Negative for sleep disturbance. Objective: Intake/Output Summary (Last 24 hours) at 10/5/2022 0825  Last data filed at 10/5/2022 0029  Gross per 24 hour   Intake 480 ml   Output 1600 ml   Net -1120 ml        Vitals:   Vitals:    10/05/22 0714   BP:    Pulse: 79   Resp: 23   Temp:    SpO2: 94%       Physical Exam:   Physical Exam  Vitals reviewed. Constitutional:       Appearance: Normal appearance. He is normal weight. HENT:      Head: Normocephalic. Nose: Nose normal.      Mouth/Throat:      Mouth: Mucous membranes are moist.   Eyes:      Conjunctiva/sclera: Conjunctivae normal.      Pupils: Pupils are equal, round, and reactive to light. Cardiovascular:      Rate and Rhythm: Normal rate and regular rhythm. Pulses: Normal pulses. Heart sounds: Normal heart sounds. No murmur heard. Pulmonary:      Effort: Pulmonary effort is normal.      Breath sounds: Wheezing, rhonchi and rales present. Abdominal:      General: Abdomen is flat. Bowel sounds are normal. There is no distension. Palpations: Abdomen is soft. Tenderness: There is no abdominal tenderness. Musculoskeletal:         General: No deformity. Normal range of motion. Cervical back: Normal range of motion and neck supple. Right lower leg: No edema. Left lower leg: No edema. Skin:     General: Skin is warm. Coloration: Skin is not jaundiced or pale. Findings: Erythema present. Neurological:      General: No focal deficit present. Mental Status: He is alert and oriented to person, place, and time. Mental status is at baseline. Motor: Weakness present.    Psychiatric:         Mood and Affect: Mood normal.          Medications:   Medications:    gabapentin  100 mg Oral TID    insulin glargine  20 Units SubCUTAneous Nightly    piperacillin-tazobactam  3,375 mg IntraVENous Q8H    ipratropium-albuterol  1 ampule Inhalation Q4H WA    atorvastatin  80 mg Oral Nightly    finasteride  5 mg Oral Nightly    pantoprazole  20 mg Oral QAM AC    tamsulosin  0.4 mg Oral Nightly    valACYclovir  500 mg Oral Daily    sodium chloride flush  5-40 mL IntraVENous 2 times per day    enoxaparin  40 mg SubCUTAneous Nightly    insulin lispro  0-8 Units SubCUTAneous TID WC    insulin lispro  0-4 Units SubCUTAneous Nightly      Infusions:    sodium chloride      dextrose       PRN Meds: HYDROcodone 5 mg - acetaminophen, 2 tablet, Q6H PRN  albuterol sulfate HFA, 2 puff, Q4H PRN  sodium chloride flush, 5-40 mL, PRN  sodium chloride, 500 mL, PRN  acetaminophen, 650 mg, Q6H PRN   Or  acetaminophen, 650 mg, Q6H PRN  glucose, 4 tablet, PRN  dextrose bolus, 125 mL, PRN   Or  dextrose bolus, 250 mL, PRN  glucagon (rDNA), 1 mg, PRN  dextrose, 1,000 mL, Continuous PRN        Labs      Recent Results (from the past 24 hour(s))   POCT Glucose    Collection Time: 10/04/22 11:52 AM   Result Value Ref Range    POC Glucose 135 (H) 70 - 99 MG/DL   PLEURAL/PERITONEAL FLUID PANEL    Collection Time: 10/04/22  3:00 PM   Result Value Ref Range    Glucose, Fluid 179 >60 MG/DL    LD, Fluid 581 (H) <200 IU/L    Protein, Fluid 3.6 G/DL    Fluid Type PLEURAL INDEX    RBC, Fluid 81,000 /CU MM    WBC, Fluid 4,698 /CU MM    Neutrophil Count, Fluid 38 %    Lymphocytes, Body Fluid 59 %    Monocyte Count, Fluid 3 %    Mesothelial, Fluid 0 /100 WBC    Other Cells, Fluid 0    POCT Glucose    Collection Time: 10/04/22  4:27 PM   Result Value Ref Range    POC Glucose 130 (H) 70 - 99 MG/DL   Osmolality, urine    Collection Time: 10/04/22  6:50 PM   Result Value Ref Range    Osmolality, Ur 435 292 - 1090 MOS/L   Creatinine, urine, random    Collection Time: 10/04/22  6:50 PM   Result Value Ref Range    Creatinine, Ur 60.4 MG/DL   Osmolality    Collection Time: 10/04/22  7:01 PM   Result Value Ref Range    Osmolality 275 (L) 280 - 300 MOS/L   POCT Glucose    Collection Time: 10/04/22  9:00 PM   Result Value Ref Range    POC Glucose 150 (H) 70 - 99 MG/DL   POCT Glucose    Collection Time: 10/05/22  2:05 AM   Result Value Ref Range    POC Glucose 144 (H) 70 - 99 MG/DL   POCT Glucose    Collection Time: 10/05/22  6:27 AM   Result Value Ref Range    POC Glucose 147 (H) 70 - 99 MG/DL        Imaging/Diagnostics Last 24 Hours   XR CHEST PORTABLE    Result Date: 10/4/2022  EXAMINATION: ONE XRAY VIEW OF THE CHEST 10/4/2022 11:53 am COMPARISON: 10/02/2022 HISTORY: ORDERING SYSTEM PROVIDED HISTORY: post right thoracentesis TECHNOLOGIST PROVIDED HISTORY: Reason for exam:->post right thoracentesis Reason for Exam: post right thora Follow-up exam FINDINGS: Interval decrease in in size of right pleural effusion. No obvious pneumothorax. Persistent bibasilar opacities are unchanged. Right chest port is stable in positioning. Stable cardiac silhouette. The osseous structures are stable. Slight interval decrease in size of right pleural effusion. No pneumothorax.        Electronically signed by Tori Matos MD, MD on 10/5/2022 at 8:25 AM

## 2022-10-06 VITALS
SYSTOLIC BLOOD PRESSURE: 136 MMHG | TEMPERATURE: 98.4 F | HEART RATE: 91 BPM | DIASTOLIC BLOOD PRESSURE: 86 MMHG | OXYGEN SATURATION: 93 % | RESPIRATION RATE: 27 BRPM | BODY MASS INDEX: 23.29 KG/M2 | HEIGHT: 72 IN | WEIGHT: 171.96 LBS

## 2022-10-06 PROBLEM — R65.20 SEVERE SEPSIS (HCC): Status: RESOLVED | Noted: 2022-09-28 | Resolved: 2022-10-06

## 2022-10-06 PROBLEM — A41.9 SEVERE SEPSIS (HCC): Status: RESOLVED | Noted: 2022-09-28 | Resolved: 2022-10-06

## 2022-10-06 LAB
ANION GAP SERPL CALCULATED.3IONS-SCNC: 9 MMOL/L (ref 4–16)
ASPERGILLUS GALACTO AG: NEGATIVE
ASPERGILLUS GALACTO INDEX: 0.07
BUN BLDV-MCNC: 19 MG/DL (ref 6–23)
CALCIUM SERPL-MCNC: 7.9 MG/DL (ref 8.3–10.6)
CHLORIDE BLD-SCNC: 95 MMOL/L (ref 99–110)
CO2: 25 MMOL/L (ref 21–32)
CREAT SERPL-MCNC: 0.7 MG/DL (ref 0.9–1.3)
GFR AFRICAN AMERICAN: >60 ML/MIN/1.73M2
GFR NON-AFRICAN AMERICAN: >60 ML/MIN/1.73M2
GLUCOSE BLD-MCNC: 153 MG/DL (ref 70–99)
GLUCOSE BLD-MCNC: 246 MG/DL (ref 70–99)
POTASSIUM SERPL-SCNC: 4.7 MMOL/L (ref 3.5–5.1)
SODIUM BLD-SCNC: 129 MMOL/L (ref 135–145)

## 2022-10-06 PROCEDURE — 6370000000 HC RX 637 (ALT 250 FOR IP): Performed by: NURSE PRACTITIONER

## 2022-10-06 PROCEDURE — 84588 ASSAY OF VASOPRESSIN: CPT

## 2022-10-06 PROCEDURE — 82962 GLUCOSE BLOOD TEST: CPT

## 2022-10-06 PROCEDURE — 6370000000 HC RX 637 (ALT 250 FOR IP): Performed by: STUDENT IN AN ORGANIZED HEALTH CARE EDUCATION/TRAINING PROGRAM

## 2022-10-06 PROCEDURE — 6360000002 HC RX W HCPCS: Performed by: STUDENT IN AN ORGANIZED HEALTH CARE EDUCATION/TRAINING PROGRAM

## 2022-10-06 PROCEDURE — 94640 AIRWAY INHALATION TREATMENT: CPT

## 2022-10-06 PROCEDURE — 36415 COLL VENOUS BLD VENIPUNCTURE: CPT

## 2022-10-06 PROCEDURE — 80048 BASIC METABOLIC PNL TOTAL CA: CPT

## 2022-10-06 PROCEDURE — 94761 N-INVAS EAR/PLS OXIMETRY MLT: CPT

## 2022-10-06 PROCEDURE — 2580000003 HC RX 258: Performed by: STUDENT IN AN ORGANIZED HEALTH CARE EDUCATION/TRAINING PROGRAM

## 2022-10-06 PROCEDURE — 2700000000 HC OXYGEN THERAPY PER DAY

## 2022-10-06 PROCEDURE — 94618 PULMONARY STRESS TESTING: CPT

## 2022-10-06 RX ORDER — DIPHENHYDRAMINE HYDROCHLORIDE 50 MG/ML
50 INJECTION INTRAMUSCULAR; INTRAVENOUS ONCE
Status: COMPLETED | OUTPATIENT
Start: 2022-10-06 | End: 2022-10-06

## 2022-10-06 RX ORDER — METHYLPREDNISOLONE SODIUM SUCCINATE 40 MG/ML
40 INJECTION, POWDER, LYOPHILIZED, FOR SOLUTION INTRAMUSCULAR; INTRAVENOUS ONCE
Status: COMPLETED | OUTPATIENT
Start: 2022-10-06 | End: 2022-10-06

## 2022-10-06 RX ORDER — GABAPENTIN 100 MG/1
100 CAPSULE ORAL 3 TIMES DAILY
Qty: 90 CAPSULE | Refills: 0 | Status: SHIPPED | OUTPATIENT
Start: 2022-10-06 | End: 2022-11-05

## 2022-10-06 RX ADMIN — VALACYCLOVIR HYDROCHLORIDE 500 MG: 500 TABLET, FILM COATED ORAL at 09:38

## 2022-10-06 RX ADMIN — IPRATROPIUM BROMIDE AND ALBUTEROL SULFATE 1 AMPULE: .5; 3 SOLUTION RESPIRATORY (INHALATION) at 11:40

## 2022-10-06 RX ADMIN — METHYLPREDNISOLONE SODIUM SUCCINATE 40 MG: 40 INJECTION, POWDER, FOR SOLUTION INTRAMUSCULAR; INTRAVENOUS at 12:51

## 2022-10-06 RX ADMIN — DIPHENHYDRAMINE HYDROCHLORIDE 50 MG: 50 INJECTION, SOLUTION INTRAMUSCULAR; INTRAVENOUS at 12:51

## 2022-10-06 RX ADMIN — GABAPENTIN 100 MG: 100 CAPSULE ORAL at 09:39

## 2022-10-06 RX ADMIN — HYDROCODONE BITARTRATE AND ACETAMINOPHEN 2 TABLET: 5; 325 TABLET ORAL at 03:55

## 2022-10-06 RX ADMIN — PIPERACILLIN AND TAZOBACTAM 3375 MG: 3; .375 INJECTION, POWDER, LYOPHILIZED, FOR SOLUTION INTRAVENOUS at 06:41

## 2022-10-06 RX ADMIN — PANTOPRAZOLE SODIUM 20 MG: 20 TABLET, DELAYED RELEASE ORAL at 06:41

## 2022-10-06 RX ADMIN — ACETAMINOPHEN 650 MG: 325 TABLET ORAL at 12:51

## 2022-10-06 RX ADMIN — IMMUNE GLOBULIN (HUMAN) 20 G: 10 INJECTION INTRAVENOUS; SUBCUTANEOUS at 12:55

## 2022-10-06 RX ADMIN — IPRATROPIUM BROMIDE AND ALBUTEROL SULFATE 1 AMPULE: .5; 3 SOLUTION RESPIRATORY (INHALATION) at 07:24

## 2022-10-06 RX ADMIN — IPRATROPIUM BROMIDE AND ALBUTEROL SULFATE 1 AMPULE: .5; 3 SOLUTION RESPIRATORY (INHALATION) at 15:28

## 2022-10-06 ASSESSMENT — PAIN DESCRIPTION - LOCATION: LOCATION: BACK;ABDOMEN;SHOULDER

## 2022-10-06 ASSESSMENT — PAIN SCALES - GENERAL: PAINLEVEL_OUTOF10: 10

## 2022-10-06 NOTE — PROGRESS NOTES
10/6/2022 11:30 AM  Patient Room #: 2010/2010-A  Patient Name: Susie Rea    (Step 1 Done by RN if possible otherwise call Pulmonary Diagnostics)  Place patient on room air at rest for at least 30 minutes. If patient falls below 88% before 30 minutes then you can record the level and stop. Record room air saturation level _87_ %. If patient is at 88% or below, they will qualify for home oxygen and you can stop. If level does not fall below 88%, fill in level above. If indicated continue to Step 2. Signature:__Ana Mcrae RRT___ Date: _10/069/2022__  (Step 2&3 Done by RCP)  Ambulate patient on room air until saturation falls below 89%. Record level of room air saturation with ambulation___ %. Next, place patient back on ___lpm oxygen and ambulate, record level __%. (Note:  this level must show improvement from room air level done with ambulation.)  If patients saturation on room air with ambulation is 88% or below AND patient shows improvement with oxygen during ambulation, they will qualify for home oxygen and you can stop. If patient does not drop below 89%, then patient should have an overnight oximetry trending on room air to see if level falls below 88%. Complete level in Step 3 below. Room air overnight oximetry level 88 % for___  cumulative minutes. If patients room air oxygen level is < 89% for at least 5 cumulative minutes, patient will qualify for home oxygen and you can stop. (Attach Night Trending Report)    Complete order below: Diagnosis:__CHF__  Home oxygen at:  Length of Need: X Lifetime ?  3 Months     __3_lpm or __%   via  [x] nasal cannula  []mask  [] other: Conserving Device         [x]continuous [x]  with activity  [x]  Nocturnal   [x] Portable Tanks [x]  Concentrator  [] Conserving Device        Therapist Signature:__Ana Mcrae RRT__     Date:  __10/06/2022_  Physician Signature:  __Electronically Signed in EMR_    Date:___  Ordering User: Jacob Joceline Gray MD  Provider ID: [id number]  NPI:  [id number]  [x] Patient Qualifies      [] Patient Does NOT qualify

## 2022-10-06 NOTE — DISCHARGE SUMMARY
V2.0  Discharge Summary    Name:  Jessie Meza /Age/Sex: 1950 (71 y.o. male)   Admit Date: 2022  Discharge Date: 10/6/22    MRN & CSN:  [id number] & [id number] Encounter Date and Time 10/6/22 9:27 AM EDT    Attending:  Jacinda Jones MD Discharging Provider: Jacinda Jones MD, MD       Hospital Course:     Brief HPI:Cornel Bruce is a 71 y.o. male with pmh of CLL, diabetes, hypertension, BPH who presents with Severe sepsis ([facility] [address]     Plan:  Severe sepsis in the setting of rhinovirus infection with superimposed bacterial pneumonia: CT scan showed right greater than left lower lobe pneumonia. Respiratory viral panel positive for rhinovirus. Pro-Gabriel is elevated, has underlying CLL. Completed 8 days of zosyn. DC Abx. Can  be discharged. Cultures negative so far. Pulmonology on board  Acute hypoxic respiratory failure in the setting of above: Also some factors of possible CHF as chest x-ray shows worsening right-sided pleural effusion concerning for malignant versus empyema versus parapneumonic effusion. Status post thoracentesis. Non-insulin-dependent diabetes mellitus type 2   Right upper quadrant abdominal pain likely in the setting of lymphadenopathy and mass-effect of the retroperitoneal area: Hematology oncology on board. GI on board. Concern for new onset of CHF: Received Lasix IV with adequate urine output. proBNP came down. Cardiology consult in place appreciated recommendations. Please follow up outpatient. Hypervolemic hypoosmolar hypochloremic hyponatremia urine studies are pending. Nephrology on board. History of CLL: Was on ibrutinib that was on hold. Flow cytometry pending. Hematology oncology following. CAT scan shows progressive lymphadenopathy. Hematology oncology will first consider further treatment with immunotherapy with or without chemotherapy as outpatient. Hematology oncology recommended IVIG prior to discharge one dose.  Will DC today    The patient expressed appropriate understanding of, and agreement with the discharge recommendations, medications, and plan. Consults this admission:  IP CONSULT TO CASE MANAGEMENT  IP CONSULT TO HOSPITALIST  IP CONSULT TO CARDIOLOGY  IP CONSULT TO ONCOLOGY  IP CONSULT TO GI  IP CONSULT TO PULMONOLOGY  IP CONSULT TO INTERVENTIONAL RADIOLOGY  IP CONSULT TO NEPHROLOGY  IP CONSULT TO HOME CARE NEEDS    Discharge Diagnosis:   Severe sepsis [facility])        Discharge Instruction:   Follow up appointments: PCP, nephrology, Hem Onc   Primary care physician: Shavon Pires MD within 2 weeks  Diet: regular diet   Activity: activity as tolerated  Disposition: Discharged to:   []Home, [x]HHC, []SNF, []Acute Rehab, []Hospice   Condition on discharge: Stable  Labs and Tests to be Followed up as an outpatient by PCP or Specialist:   Will consider further treatment with immunotherapy with or without chemotherapy as outpatient after infection control. Discharge Medications:        Medication List        START taking these medications      gabapentin 100 MG capsule  Commonly known as: NEURONTIN  Take 1 capsule by mouth 3 times daily for 30 days.             CHANGE how you take these medications      Levemir FlexTouch 100 UNIT/ML injection pen  Generic drug: insulin detemir  Inject 40 Units into the skin nightly  What changed: how much to take            CONTINUE taking these medications      acetaminophen-codeine 300-30 MG per tablet  Commonly known as: TYLENOL #3     albuterol sulfate  (90 Base) MCG/ACT inhaler  Commonly known as: PROVENTIL;VENTOLIN;PROAIR     atorvastatin 80 MG tablet  Commonly known as: LIPITOR     ferrous gluconate 324 (37.5 Fe) MG Tabs  Take 1 tablet by mouth 2 times daily     finasteride 5 MG tablet  Commonly known as: PROSCAR     glucose monitoring kit  1 kit by Does not apply route daily as needed (glucose checks)     immune globulin (human) 20 GM/200ML Soln solution     Insulin Syringe-Needle U-100 30G X 1/2\" 0.5 ML Misc  1 each by Does not apply route daily     ipratropium-albuterol 0.5-2.5 (3) MG/3ML Soln nebulizer solution  Commonly known as: DUONEB     metFORMIN 1000 MG tablet  Commonly known as: GLUCOPHAGE     NovoLOG FlexPen 100 UNIT/ML injection pen  Generic drug: insulin aspart  Inject 15 Units into the skin 3 times daily (before meals)     omeprazole 20 MG delayed release capsule  Commonly known as: PRILOSEC     tamsulosin 0.4 MG capsule  Commonly known as: FLOMAX     valACYclovir 500 MG tablet  Commonly known as: VALTREX  Take 1 tablet by mouth daily     vitamin C 250 MG tablet               Where to Get Your Medications        These medications were sent to [address] [place] [address], [address] [facility]  [address] [address]      Phone: [phone]   gabapentin 100 MG capsule        Objective Findings at Discharge:   /86   Pulse 91   Temp 98.4 °F (36.9 °C) (Oral)   Resp 27   Ht 6' (1.829 m)   Wt 171 lb 15.3 oz (78 kg)   SpO2 93%   BMI 23.32 kg/m²       Physical Exam:   Physical Exam  Vitals reviewed. Constitutional:       Appearance: Normal appearance. He is normal weight. HENT:      Head: Normocephalic. Nose: Nose normal.      Mouth/Throat:      Mouth: Mucous membranes are moist.   Eyes:      Conjunctiva/sclera: Conjunctivae normal.      Pupils: Pupils are equal, round, and reactive to light. Cardiovascular:      Rate and Rhythm: Normal rate and regular rhythm. Pulses: Normal pulses. Heart sounds: Normal heart sounds. No murmur heard. Pulmonary:      Effort: Pulmonary effort is normal.      Breath sounds: Normal breath sounds. No wheezing, rhonchi or rales. Abdominal:      General: Abdomen is flat. Bowel sounds are normal. There is no distension. Palpations: Abdomen is soft. Tenderness: There is no abdominal tenderness. Musculoskeletal:         General: No deformity. Normal range of motion. Cervical back: Normal range of motion and neck supple. Right lower leg: No edema. Left lower leg: No edema. Skin:     Coloration: Skin is not jaundiced or pale. Findings: Bruising present. Neurological:      General: No focal deficit present. Mental Status: He is alert and oriented to person, place, and time. Mental status is at baseline. Motor: Weakness present. Labs and Imaging   XR CHEST PORTABLE    Result Date: 10/4/2022  EXAMINATION: ONE XRAY VIEW OF THE CHEST 10/4/2022 11:53 am COMPARISON: 10/02/2022 HISTORY: ORDERING SYSTEM PROVIDED HISTORY: post right thoracentesis TECHNOLOGIST PROVIDED HISTORY: Reason for exam:->post right thoracentesis Reason for Exam: post right thora Follow-up exam FINDINGS: Interval decrease in in size of right pleural effusion. No obvious pneumothorax. Persistent bibasilar opacities are unchanged. Right chest port is stable in positioning. Stable cardiac silhouette. The osseous structures are stable. Slight interval decrease in size of right pleural effusion. No pneumothorax. IR GUIDED THORACENTESIS PLEURAL    Result Date: 10/5/2022  PROCEDURE: ULTRASOUND GUIDED RIGHT THORACENTESIS 10/5/2022 HISTORY: ORDERING SYSTEM PROVIDED HISTORY: Right pleural effusion TECHNOLOGIST PROVIDED HISTORY: Reason for exam:->pleural effusion Which side should the procedure be performed?->Right TECHNIQUE: Informed consent was obtained after a detailed explanation of the procedure including risks, benefits, and alternatives. Universal protocol was observed including a time-out to confirm the correct patient and procedure. The right posterior chest was prepped and draped in sterile fashion and local anesthesia was achieved with lidocaine. A 5 Western Keila Yueh catheter over its stylette was advanced into the pleural space under ultrasound guidance. The catheter was advanced off the stylet which was removed.   The catheter was attached to a vacuum container for fluid evacuation. The catheter was removed and a sterile dressing was applied. The patient tolerated the procedure well. No immediate complication. Estimated blood loss: 0 mL FINDINGS: A total of 450 mL kwame pleural fluid was removed. Successful ultrasound guided right thoracentesis. CBC:   Recent Labs     10/03/22  1322   WBC 32.1*   HGB 10.0*   PLT 67*     BMP:    Recent Labs     10/03/22  1322 10/04/22  0633   * 125*   K 4.2 4.7   CL 97* 93*   CO2 20* 23   BUN 23 23   CREATININE 0.8* 0.8*   GLUCOSE 237* 169*     Hepatic:   Recent Labs     10/03/22  1322 10/04/22  0633   AST 22 21   ALT 23 23   BILITOT 1.0 1.0   ALKPHOS 176* 177*     Lipids:   Lab Results   Component Value Date/Time    CHOL 160 08/03/2012 02:47 PM    HDL 32 08/03/2012 02:47 PM    TRIG 274 08/03/2012 02:47 PM     Hemoglobin A1C:   Lab Results   Component Value Date/Time    LABA1C 6.7 09/28/2022 10:48 PM     TSH: No results found for: TSH  Troponin:   Lab Results   Component Value Date/Time    TROPONINT <0.010 09/28/2022 10:48 PM    TROPONINT 0.024 09/28/2022 12:10 PM    TROPONINT <0.010 03/24/2022 02:03 PM     Lactic Acid: No results for input(s): LACTA in the last 72 hours.   BNP:   Recent Labs     10/03/22  1322   PROBNP 343.3*     UA:  Lab Results   Component Value Date/Time    NITRU NEGATIVE 10/05/2022 11:20 AM    COLORU YELLOW 10/05/2022 11:20 AM    WBCUA 1 09/28/2022 12:10 PM    RBCUA 1 09/28/2022 12:10 PM    MUCUS RARE 09/28/2022 12:10 PM    TRICHOMONAS NONE SEEN 04/09/2019 01:02 PM    BACTERIA NEGATIVE 09/28/2022 12:10 PM    CLARITYU CLEAR 10/05/2022 11:20 AM    SPECGRAV 1.015 10/05/2022 11:20 AM    LEUKOCYTESUR NEGATIVE 10/05/2022 11:20 AM    UROBILINOGEN 2.0 10/05/2022 11:20 AM    BILIRUBINUR NEGATIVE 10/05/2022 11:20 AM    BLOODU NEGATIVE 10/05/2022 11:20 AM    KETUA NEGATIVE 10/05/2022 11:20 AM     Urine Cultures: No results found for: LABURIN  Blood Cultures: No results found for: BC  No results found for: BLOODCULT2  Organism: No results found for: ORG    Time Spent Discharging patient 35 minutes    Electronically signed by Ree Hunter MD, MD on 10/6/2022 at 9:38 AM

## 2022-10-06 NOTE — PROGRESS NOTES
Pt home O2 paperwork faxed to [facility]. Please do not discharge pt without oxygen. This testing will be good for 48 hours and will have to be repeated if pt has not discharged prior to then. If portable oxygen concentrator or tank has not been delivered prior to pt being ready to leave, please call PFT @ [phone] or Respiratory Therapy @ [phone]. Thanks.

## 2022-10-06 NOTE — PROGRESS NOTES
Pulmonary and Critical Care  Progress Note    Subjective: The patient is comfortable in bed. On N/C. Shortness of breath has improved  Chest pain none  Addressing respiratory complaints Patient is negative for  hemoptysis and cyanosis  CONSTITUTIONAL:  negative for fevers and chills      Past Medical History:     has a past medical history of Arthritis, CAD (coronary artery disease), Cancer ([facility] [address] Diabetes mellitus ([facility] [address] History of blood transfusion, Hx of motion sickness, Hyperlipidemia, MDRO (multiple drug resistant organisms) resistance, Migraine, Pneumonia, Wears dentures, and Wears glasses. has a past surgical history that includes knee surgery (1970); Tunneled venous port placement (2013); Colonoscopy (2010); fracture surgery (Left, 1990's); Cardiac catheterization (1990's); Tonsillectomy (late 1960's); Dental surgery; eye surgery (Right, 08/2016); and other surgical history (Left, 01/18/2017). reports that he quit smoking about 35 years ago. His smoking use included cigarettes. He started smoking about 57 years ago. He has a 20.00 pack-year smoking history. He has never used smokeless tobacco. He reports that he does not drink alcohol and does not use drugs. Family history:  family history includes Asthma in his maternal uncle; Colon Cancer in his brother; Diabetes in his mother; Early Death in his brother; Heart Disease in his father; High Blood Pressure in his mother; Other in his sister.     No Known Allergies  Social History:    Reviewed; no changes    Objective:   PHYSICAL EXAM:        VITALS:  /86   Pulse 91   Temp 98.4 °F (36.9 °C) (Oral)   Resp 27   Ht 6' (1.829 m)   Wt 171 lb 15.3 oz (78 kg)   SpO2 93%   BMI 23.32 kg/m²     24HR INTAKE/OUTPUT:    Intake/Output Summary (Last 24 hours) at 10/6/2022 1046  Last data filed at 10/6/2022 0343  Gross per 24 hour   Intake 560 ml   Output 1900 ml   Net -1340 ml       CONSTITUTIONAL:  awake, alert, cooperative, no apparent distress, and appears stated age  LUNGS:  decreased breath sounds, basilar crackles. CARDIOVASCULAR:  normal S1 and S2 and negative JVD  ABD:Abdomen soft, non-tender. BS normal. No masses,  No organomegaly  NEURO:Alert. DATA:    CBC:  Recent Labs     10/03/22  1322   WBC 32.1*   RBC 4.11*   HGB 10.0*   HCT 33.0*   PLT 67*   MCV 80.3   MCH 24.3*   MCHC 30.3*   RDW 18.6*   SEGSPCT 27.0*   BANDSPCT 1*      BMP:  Recent Labs     10/03/22  1322 10/04/22  0633 10/06/22  0955   * 125* 129*   K 4.2 4.7 4.7   CL 97* 93* 95*   CO2 20* 23 25   BUN 23 23 19   CREATININE 0.8* 0.8* 0.7*   CALCIUM 7.2* 7.8* 7.9*   GLUCOSE 237* 169* 246*      ABG:  No results for input(s): PH, PO2ART, VME4ZHA, HCO3, BEART, O2SAT in the last 72 hours. Lab Results   Component Value Date    PROBNP 343.3 (H) 10/03/2022    PROBNP 2,514 (H) 09/30/2022    PROBNP 94,042 (H) 09/28/2022     No results found for: [facility]    Radiology Review:  Pertinent images / reports were reviewed as a part of this visit. Assessment:     Patient Active Problem List   Diagnosis    Pneumonia    Sepsis ([address]    Hypogammaglobulinemia ([address]    CLL (chronic lymphocytic leukemia) ([address]    Upper respiratory infection, acute    Generalized muscle weakness    Type 2 diabetes mellitus with hyperglycemia, with long-term current use of insulin (HCC)    Poor appetite    COPD (chronic obstructive pulmonary disease) (HCC)    Neutropenia (HCC)    Other specified disorders of bone density and structure, unspecified site    Cellulitis of right upper limb    Herpesviral infection    Intestinal malabsorption    Other nonspecific abnormal finding of lung field    Iron deficiency anemia due to chronic blood loss    Other muscle spasm    Leukemia, lymphocytic, chronic (HCC)    Selective deficiency of IgG (HCC)    Acute bronchitis    Severe malnutrition ([address]       Plan:   1. Overall the patient has improved. 2. [address] Activity.   Gilda Mcdonald MD  10/6/2022  10:46 AM

## 2022-10-06 NOTE — PROGRESS NOTES
Nephrology Progress Note  10/6/2022 8:54 AM        Subjective:   Admit Date: 9/28/2022  PCP: Nubia Montgomery MD    Interval History: Seen in early morning, feeling better    Diet: Little bit better    ROS: No overt shortness of breath  Leg edema improved  He made 1.9 L of urinalysis without diuretics  No fever, acceptable blood pressure    Data:     Current meds:    immune globulin  50 g IntraVENous Once    gabapentin  100 mg Oral TID    insulin glargine  20 Units SubCUTAneous Nightly    piperacillin-tazobactam  3,375 mg IntraVENous Q8H    ipratropium-albuterol  1 ampule Inhalation Q4H WA    atorvastatin  80 mg Oral Nightly    finasteride  5 mg Oral Nightly    pantoprazole  20 mg Oral QAM AC    tamsulosin  0.4 mg Oral Nightly    valACYclovir  500 mg Oral Daily    sodium chloride flush  5-40 mL IntraVENous 2 times per day    enoxaparin  40 mg SubCUTAneous Nightly    insulin lispro  0-8 Units SubCUTAneous TID WC    insulin lispro  0-4 Units SubCUTAneous Nightly      sodium chloride 500 mL (10/05/22 1607)    dextrose           I/O last 3 completed shifts:   In: 1080 [P.O.:1080]  Out: 2650 [Urine:2650]    CBC:   Recent Labs     10/03/22  1322   WBC 32.1*   HGB 10.0*   PLT 67*          Recent Labs     10/03/22  1322 10/04/22  0633   * 125*   K 4.2 4.7   CL 97* 93*   CO2 20* 23   BUN 23 23   CREATININE 0.8* 0.8*   GLUCOSE 237* 169*       Lab Results   Component Value Date    CALCIUM 7.8 (L) 10/04/2022    PHOS 2.4 (L) 10/03/2022       Objective:     Vitals: BP (!) 138/94   Pulse 87   Temp 98.3 °F (36.8 °C) (Oral)   Resp 16   Ht 6' (1.829 m)   Wt 171 lb 15.3 oz (78 kg)   SpO2 93%   BMI 23.32 kg/m² ,    General appearance: Thin, alert, awake and oriented  HEENT: At least 2+ conjunctival pallor  Neck: Supple and head of the bed elevated about 80 degrees-chest wall Mediport  Lungs: Positive for diminished breath sound  Heart: Irregular  Abdomen: Soft  Extremities: Leg edema significantly improved      Problem List :         Impression :     Acute on chronic hyponatremia-urinary indicis suggest reduced renal perfusion or sodium avidity-obviously both hypo and hypervolemia can cause it-given his significant urine output and improvement in leg edema-reduce effective arterial volume is a possibility-we will see what happens to his sodium-and will hold diuretics  Underlying CLL and pneumonitis including rhinovirus-with thrombocytopenia as well as likely exudative effusion-unlikely cardiogenic-  Has diabetes    Recommendation/Plan  :     As he  made about 2 L of urine without any diuretics-leg edema significantly improved-we will sit tight and do not add anything-reviewed the sodium level  Encourage p.o. food and fluid  Follow clinically and biochemically      Luis M Sotelo MD MD

## 2022-10-06 NOTE — CARE COORDINATION
Discussed in IDR. Patient discharging to home today as family declined SNF admit.  [facility] ordered; notified [facility] of referral. Aby Swenson RN

## 2022-10-06 NOTE — DISCHARGE INSTR - DIET

## 2022-10-06 NOTE — PROGRESS NOTES
Walked in to check patient and he was on [employer]. His SpO2 was 87% and had some increased work of breathing. Pt oxygen reapplied @ 3 LPM. Patient said he felt much better with oxygen and [facility]. All paperwork will be faxed as soon as completed.

## 2022-10-06 NOTE — PROGRESS NOTES
Patient was seen in hospital for CHF . I am prescribing oxygen because the diagnosis and testing requires the patient to have oxygen in the home. Conditions will improve or be benefited by oxygen use. The patient is able to perform good mobility and therefore requires the use of a portable oxygen system for ambulation.

## 2022-10-07 ENCOUNTER — TELEPHONE (OUTPATIENT)
Dept: ONCOLOGY | Age: 72
End: 2022-10-07

## 2022-10-07 NOTE — TELEPHONE ENCOUNTER
Called patient to reschedule OV 10/07/22. Patient cancelled his OV 10/07/22. Patient stated he just got out of hospital yesterday. Stated he is very weak & can hardly walk. He will call back to reschedule when he is feeling better.

## 2022-10-10 DIAGNOSIS — C91.10 CHRONIC LYMPHOCYTIC LEUKEMIA (HCC): Primary | ICD-10-CM

## 2022-10-10 RX ORDER — HYDROCODONE BITARTRATE AND ACETAMINOPHEN 5; 325 MG/1; MG/1
1 TABLET ORAL EVERY 12 HOURS PRN
Qty: 60 TABLET | Refills: 0 | Status: SHIPPED | OUTPATIENT
Start: 2022-10-10 | End: 2022-11-09

## 2022-10-10 NOTE — CARE COORDINATION
Spoke with pts son yesterday. He stated his father was in pain and was not sent home with any pain meds. Gave unit number to call to see if MD was still working and would order something for pain & explanied that liaison would call Hilda Baca to see when pt was on [facility] schedule to be seen. Hilda Baca stated awaiting pcp order. Hilda Baca stated pt was seen on Sunday.   04/08/23 [address] gave verbal ok for [facility]hc from [facility], [facility] office

## 2022-10-11 ENCOUNTER — HOSPITAL ENCOUNTER (OUTPATIENT)
Dept: INFUSION THERAPY | Age: 72
Discharge: HOME | End: 2022-10-11
Payer: COMMERCIAL

## 2022-10-11 ENCOUNTER — OFFICE VISIT (OUTPATIENT)
Dept: ONCOLOGY | Age: 72
End: 2022-10-11
Payer: COMMERCIAL

## 2022-10-11 VITALS
RESPIRATION RATE: 20 BRPM | TEMPERATURE: 96.4 F | BODY MASS INDEX: 23.32 KG/M2 | SYSTOLIC BLOOD PRESSURE: 106 MMHG | HEART RATE: 100 BPM | HEIGHT: 72 IN | DIASTOLIC BLOOD PRESSURE: 64 MMHG | OXYGEN SATURATION: 94 %

## 2022-10-11 DIAGNOSIS — R91.1 LUNG NODULE: ICD-10-CM

## 2022-10-11 DIAGNOSIS — D80.1 HYPOGAMMAGLOBULINEMIA (HCC): ICD-10-CM

## 2022-10-11 DIAGNOSIS — C91.10 CHRONIC LYMPHOCYTIC LEUKEMIA (HCC): Primary | ICD-10-CM

## 2022-10-11 DIAGNOSIS — J43.9 PULMONARY EMPHYSEMA, UNSPECIFIED EMPHYSEMA TYPE (HCC): ICD-10-CM

## 2022-10-11 DIAGNOSIS — C91.10 CHRONIC LYMPHOCYTIC LEUKEMIA (HCC): ICD-10-CM

## 2022-10-11 LAB
ALBUMIN SERPL-MCNC: 3.2 GM/DL (ref 3.4–5)
ALP BLD-CCNC: 155 IU/L (ref 40–129)
ALT SERPL-CCNC: 18 U/L (ref 10–40)
ANION GAP SERPL CALCULATED.3IONS-SCNC: 10 MMOL/L (ref 4–16)
AST SERPL-CCNC: 19 IU/L (ref 15–37)
BILIRUB SERPL-MCNC: 0.5 MG/DL (ref 0–1)
BUN BLDV-MCNC: 23 MG/DL (ref 6–23)
CALCIUM SERPL-MCNC: 8.2 MG/DL (ref 8.3–10.6)
CHLORIDE BLD-SCNC: 99 MMOL/L (ref 99–110)
CO2: 22 MMOL/L (ref 21–32)
CREAT SERPL-MCNC: 0.6 MG/DL (ref 0.9–1.3)
FERRITIN: 250 NG/ML (ref 30–400)
FOLATE: 3.4 NG/ML (ref 3.1–17.5)
GFR AFRICAN AMERICAN: >60 ML/MIN/1.73M2
GFR NON-AFRICAN AMERICAN: >60 ML/MIN/1.73M2
GLUCOSE BLD-MCNC: 111 MG/DL (ref 70–99)
LACTATE DEHYDROGENASE: 270 IU/L (ref 120–246)
POTASSIUM SERPL-SCNC: 4.4 MMOL/L (ref 3.5–5.1)
SODIUM BLD-SCNC: 131 MMOL/L (ref 135–145)
TOTAL PROTEIN: 5.3 GM/DL (ref 6.4–8.2)
VITAMIN B-12: 1749 PG/ML (ref 211–911)

## 2022-10-11 PROCEDURE — 3017F COLORECTAL CA SCREEN DOC REV: CPT | Performed by: INTERNAL MEDICINE

## 2022-10-11 PROCEDURE — 99214 OFFICE O/P EST MOD 30 MIN: CPT | Performed by: INTERNAL MEDICINE

## 2022-10-11 PROCEDURE — 83550 IRON BINDING TEST: CPT

## 2022-10-11 PROCEDURE — 82728 ASSAY OF FERRITIN: CPT

## 2022-10-11 PROCEDURE — 80053 COMPREHEN METABOLIC PANEL: CPT

## 2022-10-11 PROCEDURE — G8420 CALC BMI NORM PARAMETERS: HCPCS | Performed by: INTERNAL MEDICINE

## 2022-10-11 PROCEDURE — 1036F TOBACCO NON-USER: CPT | Performed by: INTERNAL MEDICINE

## 2022-10-11 PROCEDURE — G8427 DOCREV CUR MEDS BY ELIG CLIN: HCPCS | Performed by: INTERNAL MEDICINE

## 2022-10-11 PROCEDURE — 3023F SPIROM DOC REV: CPT | Performed by: INTERNAL MEDICINE

## 2022-10-11 PROCEDURE — G8484 FLU IMMUNIZE NO ADMIN: HCPCS | Performed by: INTERNAL MEDICINE

## 2022-10-11 PROCEDURE — 82746 ASSAY OF FOLIC ACID SERUM: CPT

## 2022-10-11 PROCEDURE — 82607 VITAMIN B-12: CPT

## 2022-10-11 PROCEDURE — 1123F ACP DISCUSS/DSCN MKR DOCD: CPT | Performed by: INTERNAL MEDICINE

## 2022-10-11 PROCEDURE — 83540 ASSAY OF IRON: CPT

## 2022-10-11 PROCEDURE — 85007 BL SMEAR W/DIFF WBC COUNT: CPT

## 2022-10-11 PROCEDURE — 83615 LACTATE (LD) (LDH) ENZYME: CPT

## 2022-10-11 PROCEDURE — 99211 OFF/OP EST MAY X REQ PHY/QHP: CPT

## 2022-10-11 PROCEDURE — 1111F DSCHRG MED/CURRENT MED MERGE: CPT | Performed by: INTERNAL MEDICINE

## 2022-10-11 PROCEDURE — 36415 COLL VENOUS BLD VENIPUNCTURE: CPT

## 2022-10-11 PROCEDURE — 85027 COMPLETE CBC AUTOMATED: CPT

## 2022-10-11 NOTE — PROGRESS NOTES
MA Rooming Questions  Patient: Jose Elias Gray  MRN: [id number]    Date: 10/11/2022        1. Do you have any new issues?   no         2. Do you need any refills on medications?    no    3. Have you had any imaging done since your last visit?   no    4. Have you been hospitalized or seen in the emergency room since your last visit here?   no    5. Did the patient have a depression screening completed today?  No    No data recorded     PHQ-9 Given to (if applicable):               PHQ-9 Score (if applicable):                     [] Positive     []  Negative              Does question #9 need addressed (if applicable)                     [] Yes    []  No               Domonique Smith MA

## 2022-10-11 NOTE — PROGRESS NOTES
Patient Name: Jessie Meza  Patient : 1950  Patient MRN: [id number]     Primary Oncologist: Yovany Fernandez MD  Referring Physician: Mary Jane Phillips MD       Date of Service: 10/11/2022      Chief Complaint:   Chief Complaint   Patient presents with    Follow-up        Active Problem list  1. Chronic lymphocytic leukemia ([facility] [address]    2. Hypogammaglobulinemia (HCC)    3. Lung nodule    4. Pulmonary emphysema, unspecified emphysema type ([facility] [address]           HPI:        Mr. Carmen Casas ( 52) was diagnosed with stage I CLL in , when he presented with infection and lymphocytosis. His peripheral blood immunophenotyping was characteristic of B-cell CLL. It was CD38 negative, ZAP-70 positive, CD19 and 20 positive, CD5 positive. Karyotyping was normal in 2006.,  Because of his infection and associated acquired immune deficiency(hypogammaglobulinemia), he was given IVIG for a year. He was started back on IVIG therapy monthly since 2013 to prevent future major infections continuing for now. Also had minor Infusional reaction to IVIG in 2016 responded to Steroids & Benadryl., No further problems after that. CLL was treated only with Leukeran intermittently from 2007 until 2011 and again from 2011 until 2012. 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 Flow studies still showed the same and FISH in 2012 showed deletion of tp53 (17p) and MYB (6q). ,  He was thus treated with Fludara and Rituxan for six months between April and 2012 with excellent clinical and hematological response.  . In 2013, he developed Multiple b/l nodes in axilla and groin area, CAT scan on 13, Bulky lymphadenopathy within the chest, abdomen, and pelvis mildly increased as compared from previous CAT scan in March 2012.,  Starting in Jan 2014 he was initiated on 2nd line chemo with Bendamustine and Rituxan with good initial response. Continued till Nov 2014, CAT scan done on 8/14/14 showed an interval decrease in generalized adenopathy compared to previous study In December 2013, suggesting good response to therapy. ,  CAT scan done on 12/23/14 showed Minimal interval increase in size of at least two periportal and retrocaval lymph nodes. Otherwise stable thoracic, abdominal and pelvic adenopathy. Also mild interval increase in splenomegaly measuring 19 cm, previously 17 cm. CAT scan done on 6/25/15 showed mild interval decrease in the splenomegaly and also in retroperitoneal mesenteric lymph nodes. Started Idelalisib [Zydelig] on 20th Jan 2015 at 150mg po bid. with good initial response. He did remarkably well with Zydelig from January of 2015 until June of 2016, after he was hospitalized for Rhinovirus Pneumonia in May of 2016. Also discussed Living Will, Power of jonathan, DNR status, et cetera. In April 2016, chest CT showed stable  , 13. In July 2016, His FISH testing on peripheral blood showed abnormal results, with 6q deletion, 17p deletion, and 11q centromere signal in 3 percent of cells. The 17p deletion (Tp53) in 80 percent of cells is associated with unfavorable prognosis. Because of his CLL with poor prognostic markers, including 17p deletion detected in 2012 & again in July of 2016. He was started on ibrutinib in September 2016 140mg/d increased to 280 mg/ after 4 weeks. The abdominal CT 7/5/16 is showing no acute abnormalities, stable splenomegaly, and one periportal lymph node measuring 2.2 by 3.7 cm which is unchanged from before. CAT scan of the chest August 5, 2016, showed axillary, mediastinal, hilar, and upper abdominal lymphadenopathy consistent with his CLL. The maximum size of the nodes is 2.4 cm.  A 1 cm infiltrate in the lateral segment of the right middle lobe possibly infectious in nature, segmental calcification of left coronary artery. CAT scan of the chest, abdomen, and pelvis December 7,2016 2016, which revealed a mild mediastinal right hilar adenopathy, decreased in size from the previous examination of April and August of 2016, .5. His ibrutinib was stopped in December 2016 when he was feeling bad and it was started back at one a day, 140 mg daily instead of 280 mg that he was taking prior to that  In January 2017, he developed progressive lump in his left inguinal area. That being the only area of progressive adenopathy with multiple nodes coalesced together, a question of Pimentels transformation versus more aggressive histology conversion was considered as a possibility. Dr. Onelia Molina, did a biopsy of the lymph node on January 18, 2017. The final pathology was reported as B-cell small lymphocytic lymphoma (B-cell CLL/SLL), with no evidence of any large cell OR Pimentels transformation. There was some evidence of localized herpetic simplex lymphadenitis. Because of possibility of localized infection with herpes. I started him on Valtrex 500 t.i.d. for two to three weeks then tapered to 1/d as maintenance therapy. Lymphadenopathy in left groin almost completely resolved. His ibrutinib was stopped in December 2016 when he was feeling bad and it was started back at one a day, 140 mg daily as maintenance dose instead of 280 mg that he was taking prior to that. His quantitative immunoglobulin on February 17, 2017, IgG 600, IgA less than 10, IgM less than 4. Serum protein electrophoresis was within the normal range. Gammaglobulin was continued monthly since it has helped any serious infections under control.   August 2017 he had a IgG that was 503  October 2017 started a ibrutinib he was off for 2 months due to his insurance issues  5-18 CT chest: Showed some mild decrease in mediastinal and hilar lymphadenopathy. . Regards to his abdomen also there is decrease in his lymphadenopathy. Although there is a left inguinal node that has enlarged. Section that no obvious source of his abdominal cramping. 10/10/2018 EGD showed severe ulcerative esophagitis with slight narrowing in the GE junction small hiatal hernia mild superficial gastritis, Colonoscope revealed 2 mm polyp in the sigmoid colon, diverticulosis, hemorrhoids, moderate stool  1/2019; Colonoscopy with two diminutive polyps, diverticulosis and hemorrhoids, path with TA above IC valve and HP distal sigmoid  10-19 iron def based on labs refered to GI , stool occult negative x #3 , NO PICA   11-19 saw Dr. Lily Sanford, and referred to [place] for capsule endoscopy  11/7/2019: Ct chest:Ground-glass nodule seen within the right upper lobe the largest measuring 17  mm in diameter. 12-6-19 capsule endoscopy, results unavailable   2/2020: CT chest:IMPRESSION:  Previously noted ground-glass opacities in the right lung have resolved. However, there is a new cluster of tiny pulmonary nodules in the left lower  lobe which could represent an infectious or inflammatory etiology. Short-term follow-up chest CT is recommended in 3-6 months. Multiple additional tiny pulmonary nodules are overall stable. Stable mediastinal lymph nodes without new lymphadenopathy. Previous Therapies    May 2020: Ct chest:  1. Interval resolution of the previously described cluster of pulmonary    nodules in the left lower lobe. Findings are most compatible with resolved    infectious/inflammatory etiology. 2. No acute cardiopulmonary disease. 3. COPD. 4. Stable subcentimeter mediastinal lymph nodes. 8/28/20: US RP normal    August 2020 cystoscopy revealed enlarged prostate. Was Recommended TURBT    3/24/22: CXR:  Bilateral interstitial opacity. Nodular consolidation at the left lung base. Pneumonia is favored over metastatic/lymphangitic disease.   Consider atypical etiologies. Was treated with IV abx    But was given abx again and completed 4/21/22.    5/4/22: CT chest:  1. Patchy bilateral airspace opacities, most prominent in the left lower lobe   concerning for multifocal pneumonia. Follow-up examination in 4-6 weeks is   recommended to assess for resolution. 2. Interval increase in intrathoracic and upper abdominal lymphadenopathy   consistent with patient's history of CLL. 3. Splenomegaly. 5/28/22: he was seen by Dr Kori Hernandez who ordered bactrim     June 8 2022 he was seen by him again and was prescribed 2 week course of cipro    July 28 2022 CT chest:  1. Overall improvement in multifocal pneumonia. However there has been   interval development of multifocal tree-in-bud opacities which either reflect   residual pneumonia versus new infectious bronchiolitis. 2. There are scattered new solid nodular opacities as well the largest   measuring up to 8 mm also likely infectious or inflammatory in etiology. Continued follow-up in 3 months is recommended to ensure resolution and/or   stability. 3. Slight interval increase in size of a left hilar lymph node as well as a   periportal and gastrohepatic lymph nodes. Attention on follow-up exams is   recommended. Otherwise stable to slight interval decrease in size of   mediastinal and hilar adenopathy. 4. Probable splenomegaly. 9/13/2022 CBC with WBC of 11.5 hemoglobin of 11.4 hematocrit of 37 MCV of 82 and platelets of 46IOG with creatinine 1.7 albumin 3.9  ferritin of 25 iron sats of 10% B12 1217 IgG 559    He was admitted on 9/28/2022 with severe sepsis in the setting of rhinovirus infection with superimposed bacterial pneumonia. Completed 8-day course of Zosyn. Also acute hypoxic respiratory failure in the setting of bowel and component of CHF as well. Was evaluated by cardiology as well. Has been holding ibrutinib.    9/28/2022 CT scan of the head:  Chronic microvascular disease.   Negative acute bleed, midline shift or mass   effect. Findings worrisome for acute on chronic maxillary sinus disease. Please   correlate exam findings. CTA of the chest, CT scan of the abdomen pelvis:  No evidence of pulmonary embolism. Right greater than left lower lobe pneumonia. Diffuse hilar and mediastinal lymphadenopathy, concerning for malignancy with   history of CLL, also likely progressed from prior exam.       Diffuse peritoneal and retroperitoneal lymphadenopathy in the upper abdomen   most significant near the cassi hepatis and IVC where there is a large mass   which is favored to be a lymph node measuring up to 6.2 x 3.5 cm, also likely   progressed from exam.       Splenomegaly, could also be related to malignancy. Mild gallbladder wall thickening and pericholecystic fluid, could be related   to passive congestion or mass effect from adjacent lymphadenopathy. There is   mild intrahepatic bile duct dilatation. 9/29/2022 ultrasound of the abdomen:  1. Hepatomegaly and hepatic steatosis. 2. Masses most compatible with lymphadenopathy in the cassi hepatis, better   seen on recent CT. 3. Hyperechoic focus in the liver could be focal fatty infiltration,   hemangioma or other mass. This is too small to characterize on recent CT. Correlate with MRI hepatic mass protocol if indicated. 4. Gallbladder wall thickening with possible adenomyomatosis. HIDA scan with no convincing evidence of acute cholecystitis. 10/5/2022 underwent right thoracentesis, cytology negative for carcinoma. Many small lymphoid cells were seen    Postthoracentesis chest x-ray with slight interval decrease in size of the right pleural effusion. No pneumothorax. PAST MEDICAL HISTORY:   1. Stage I CLL with conversion from good to poor prognostic factors. ,  2. History of hypertension for many years. ,  3. 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 Arthritis in the past. ,  5. Frozen shoulder for which he had treatment including injection by Dr. Mae Lares. ,  6. Cellulitis with Zoster type lesion Left thigh resolved with Bactrim and Valtrex in 2016.,  7. abdominal illness, with fever, nausea, and discomfort, suggestive of infectious etiology in May 2016. ,  8. hospitalization between  and  for what seems like atypical rhinovirus pneumonia involving right middle and lower lobe and left lower lobe with sepsis,  9. 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 Dr. Yuniel R[facility] ,  10. hospitalized from  to 2017, for hyperosmolar hyperglycemia with hyponatremia and hyperkalemia and UTI. He was seen by Dr. Carolina Dominique, who put him on insulin. He was also seen by Dr. Makayla Jonas. He felt much better after his sugar got under better control and electrolyte imbalanced reversed,  11. Ultrasound Doppler 2017, was negative for any DVT in either leg. Chest x-ray showed no acute process. PAST SURGICAL HISTORY:   1. knee operation,  2. tonsillectomy,  3. broken ankle times two requiring open reduction. ,  4. Vision better after cataract surgery    FAMILY HISTORY:   His paternal aunt had colon cancer. Uncle also had some form of cancer. Father had heart disease. Sister  12 of Liver disease     SOCIAL HISTORY:   He has a 40-pack-year history of smoking, quit in . He denies alcohol use. He is not . Has one son. Interval History  10/11/2022: Arrived alone to the clinic today. Reported that he has been having productive cough consisting of yellow sputum. No hemoptysis. No chest pain but has been more sob lately. Had one episode of dark vomitus. But then no further episodes or any other overt bleeding. Epigastric pain, stabbing in nature, not associated with cough. No radiation.  No diarrhea or any constipation.      Review of Systems   Per interval history; otherwise 10 point ROS is negative              Vital Signs:  /64 (Site: Right Upper Arm, Position: Sitting, Cuff Size: Medium Adult)   Pulse 100   Temp (!) 96.4 °F (35.8 °C) (Infrared)   Resp 20   Ht 6' (1.829 m)   SpO2 94%   PF (!) 2 L/min   BMI 23.32 kg/m²     Physical Exam:  CONSTITUTIONAL: alert and awake, tired appearing, arrived on wheelchair and was on supplemental oxygen  EYES: No palor or any icetrus   ENT: ATNC   NECK: No JVD   HEMATOLOGIC/LYMPHATIC: no cervical, supraclavicular or axillary lymphadenopathy   LUNGS: Coarse breath sounds bilaterally with bilateral extensive wheeze  CARDIOVASCULAR:s1s2 SM+ rrr   ABDOMEN:soft ntnd bs pos  NEUROLOGIC: GI   SKIN: skin tags   EXTREMITIES: no LE edema bilaterally      Labs:    Hematology:  Lab Results   Component Value Date    WBC 32.1 (HH) 10/03/2022    RBC 4.11 (L) 10/03/2022    HGB 10.0 (L) 10/03/2022    HCT 33.0 (L) 10/03/2022    MCV 80.3 10/03/2022    MCH 24.3 (L) 10/03/2022    MCHC 30.3 (L) 10/03/2022    RDW 18.6 (H) 10/03/2022    PLT 67 (L) 10/03/2022    MPV 10.9 10/03/2022    BANDSPCT 1 (L) 10/03/2022    SEGSPCT 27.0 (L) 10/03/2022    EOSRELPCT 1.0 10/03/2022    BASOPCT 1.0 10/03/2022    LYMPHOPCT 70.0 (H) 10/03/2022    MONOPCT 3.0 10/01/2022    BANDABS 0.32 10/03/2022    SEGSABS 8.7 10/03/2022    EOSABS 0.3 10/03/2022    BASOSABS 0.3 10/03/2022    LYMPHSABS 22.5 10/03/2022    MONOSABS 0.9 10/01/2022    DIFFTYPE MANUAL DIFFERENTIAL 10/03/2022    ANISOCYTOSIS 1+ 10/03/2022    POLYCHROM 1+ 10/02/2022    WBCMORP  09/28/2022     PATIENT HAS HISTORY OF  NONFAMILIAIAL HYPOGAMMAGLOBULINEMIA    PLTM SEVERAL LARGE PLATELETS 04/92/2622     Lab Results   Component Value Date    ESR 72 (H) 01/24/2017       Chemistry:  Lab Results   Component Value Date     (L) 10/06/2022    K 4.7 10/06/2022    CL 95 (L) 10/06/2022    CO2 25 10/06/2022    BUN 19 10/06/2022    CREATININE 0.7 (L) 10/06/2022 GLUCOSE 246 (H) 10/06/2022    CALCIUM 7.9 (L) 10/06/2022    PROT 5.3 (L) 10/04/2022    LABALBU 2.8 (L) 10/04/2022    BILITOT 1.0 10/04/2022    ALKPHOS 177 (H) 10/04/2022    AST 21 10/04/2022    ALT 23 10/04/2022    LABGLOM >60 10/06/2022    GFRAA >60 10/06/2022    PHOS 2.4 (L) 10/03/2022    MG 2.0 10/03/2022    POCGLU 153 (H) 10/06/2022     Lab Results   Component Value Date     (H) 10/04/2022     No components found for: LD  Lab Results   Component Value Date    TSHHS 3.040 04/04/2019    T4FREE 1.37 04/04/2019    FT3 3.2 03/27/2012       Immunology:  Lab Results   Component Value Date    PROT 5.3 (L) 10/04/2022    SPEP  09/22/2022     INTERPRETATION - Slightly decreased albumin and decreased total proteins. LP.    ALBUMINELP 3.1 (L) 09/22/2022    LABALPH 0.2 09/22/2022    LABALPH 0.8 09/22/2022    LABBETA 0.6 09/22/2022    GAMGLOB 0.6 09/22/2022     No results found for: Izabel Hives, KLFLCR  No results found for: B2M    Coagulation Panel:  Lab Results   Component Value Date    PROTIME 14.0 10/04/2022    INR 1.08 10/04/2022    APTT 29.9 10/02/2022       Anemia Panel:  Lab Results   Component Value Date    YUEDJMUF66 1217 (H) 09/13/2022    FOLATE 5.0 09/13/2022       Tumor Markers:  Lab Results   Component Value Date    PSA 1.9 10/27/2020         Imaging: Reviewed     Pathology:Reviewed     Observations:  Performance Status: ECOG 1  Depression Status: No data recorded          Assessment & Plan:  B-cell CLL, stage I with conversion from good to poor prognostic factors, with 17p deletion:   His diagnosis of CLL since 2007.   17p deletion since 2015. Initial treatment with Leukeran from 2000 to 2011 intermittently and FCR regimen in 2012, bendamustine/Rituxan in 2014. Idelalisib from January 2015 until June of 2016. On ibrutinib since September 2016. Ibrutinib therapy seems to be helping him, overall improvement.   Was Only able to tolerate 140 mg p.o. every other day  He was off for a couple months During the fall 2017  Resumed Ibrutinib and  on 140 mg po daily. Plan to Continue current dose as no major B sx, stable WBC. CT imaging in July 2022 with slightly increasing size of intraabdominal LN. plan was to monitor. But note declining platelet count, probably secondary to ibrutinib versus progressive CLL versus underlying infection and antibiotics. Note CT scan of the chest abdomen pelvis also with progressive lymphadenopathy. Discussed the findings and discussed systemic treatment with single agent Rituxan(preferable secondary to his performance status) versus BR and he wished to proceed with single agent Rituxan. Discussed adverse effects. Continue to hold ibrutinib for now. Thrombocytopenia, no hemolysis, monoclonal gammopathy, nutritional deficiency. Could be secondary to ibrutinib versus progressive CLL versus infection versus antibiotics. Plan as above. Sepsis with pneumonia, recent completion of Zosyn. Also received IVIG    Epigastric pain, could be secondary to lymphadenopathy. Note above plan for Rituxan    Acquired hypogammaglobulinemia:   Continue IVIG,    BPH: is being followed by Urology    Iron def anemia and esophagitis: follows with GI, Dr Brittany Nieves. Reported that he had colonoscopy 2018 with polyps detected. EGD was normal except for H. pylori which was treated. Was supposed to have VCE which was unsuccessful once. He is on oral iron twice daily, recommend 1 to 2 tablets every other day instead. Repeat ferritin pending. UA with no blood. Recommend follow-up with GI once other acute issues resolve    Lung Nodule RUL: Stable findings, will follow    Continue other medical care. Discussed above findings and plan with him and he verbalized understanding. Answered all his questions. Discussed healthy lifestyle, smoking cessation, healthy diet and exercise as tolerated. Also discussed importance of being up-to-date with age-appropriate screening tools.   Is going to follow with need for colonoscopy    Recommend follow-up with primary care physician and other specialist.    Please do not hesitate to contact us if you need any further information.     Return to clinic after he starts Rituxan or earlier if new symptoms    ANUPAM

## 2022-10-13 ENCOUNTER — CLINICAL DOCUMENTATION (OUTPATIENT)
Dept: ONCOLOGY | Age: 72
End: 2022-10-13

## 2022-10-13 LAB
ANISOCYTOSIS: ABNORMAL
ATYPICAL LYMPHOCYTE ABSOLUTE COUNT: ABNORMAL
BANDED NEUTROPHILS RELATIVE PERCENT: 1 % (ref 5–11)
DIFFERENTIAL TYPE: ABNORMAL
HCT VFR BLD CALC: 35.3 % (ref 42–52)
HEMOGLOBIN: 11.1 GM/DL (ref 13.5–18)
IRON: 24 UG/DL (ref 59–158)
LYMPHOCYTES RELATIVE PERCENT: 87 % (ref 24–44)
MCH RBC QN AUTO: 24.8 PG (ref 27–31)
MCHC RBC AUTO-ENTMCNC: 31.4 % (ref 32–36)
MCV RBC AUTO: 78.8 FL (ref 78–100)
MONOCYTES RELATIVE PERCENT: 2 % (ref 0–4)
PCT TRANSFERRIN: 9 % (ref 10–44)
PDW BLD-RTO: 21.4 % (ref 11.7–14.9)
PLATELET # BLD: 62 K/CU MM (ref 140–440)
PMV BLD AUTO: 10.3 FL (ref 7.5–11.1)
RBC # BLD: 4.48 M/CU MM (ref 4.6–6.2)
RBC # BLD: ABNORMAL 10*6/UL
SEGMENTED NEUTROPHILS RELATIVE PERCENT: 10 % (ref 36–66)
TOTAL IRON BINDING CAPACITY: 255 UG/DL (ref 250–450)
UNSATURATED IRON BINDING CAPACITY: 231 UG/DL (ref 110–370)
WBC # BLD: 48 K/CU MM (ref 4–10.5)
WBC # BLD: ABNORMAL 10*3/UL

## 2022-10-13 NOTE — PROGRESS NOTES
Referral has been made for tx: Rituxan weekly x8. Called patient at [phone] to inquire if patient would be willing to come to [facility] since he has been receiving IVIG at [address] [place] [address] OP infusion. Patient agreeable. Awaiting approval from insurance. Explained that once medication approved, patient will be scheduled for education/treatment and contacted with appointment times. Voices understanding. Denies further questions or concerns at this time. Scheduling updated.

## 2022-10-14 LAB
ARGININE VASOPRESSIN PLASMA: <0.5 PG/ML (ref 0–6.9)
COMMENT: NORMAL

## 2022-10-22 ENCOUNTER — APPOINTMENT (OUTPATIENT)
Dept: GENERAL RADIOLOGY | Age: 72
DRG: 871 | End: 2022-10-22
Payer: COMMERCIAL

## 2022-10-22 ENCOUNTER — HOSPITAL ENCOUNTER (INPATIENT)
Age: 72
LOS: 11 days | Discharge: HOME HEALTH | DRG: 871 | End: 2022-11-02
Attending: HOSPITALIST | Admitting: STUDENT IN AN ORGANIZED HEALTH CARE EDUCATION/TRAINING PROGRAM
Payer: COMMERCIAL

## 2022-10-22 DIAGNOSIS — J18.9 PNEUMONIA DUE TO INFECTIOUS ORGANISM, UNSPECIFIED LATERALITY, UNSPECIFIED PART OF LUNG: Primary | ICD-10-CM

## 2022-10-22 DIAGNOSIS — R77.8 ELEVATED TROPONIN: ICD-10-CM

## 2022-10-22 DIAGNOSIS — B34.8 RHINOVIRUS: ICD-10-CM

## 2022-10-22 DIAGNOSIS — Z85.6 PERSONAL HISTORY OF CLL (CHRONIC LYMPHOCYTIC LEUKEMIA): ICD-10-CM

## 2022-10-22 LAB
ADENOVIRUS DETECTION BY PCR: NOT DETECTED
ALBUMIN SERPL-MCNC: 2.8 GM/DL (ref 3.4–5)
ALP BLD-CCNC: 176 IU/L (ref 40–129)
ALT SERPL-CCNC: 19 U/L (ref 10–40)
ANION GAP SERPL CALCULATED.3IONS-SCNC: 11 MMOL/L (ref 4–16)
ANION GAP SERPL CALCULATED.3IONS-SCNC: 11 MMOL/L (ref 4–16)
ANISOCYTOSIS: ABNORMAL
AST SERPL-CCNC: 16 IU/L (ref 15–37)
ATYPICAL LYMPHOCYTE ABSOLUTE COUNT: ABNORMAL
BANDED NEUTROPHILS ABSOLUTE COUNT: 0.38 K/CU MM
BANDED NEUTROPHILS RELATIVE PERCENT: 1 % (ref 5–11)
BILIRUB SERPL-MCNC: 0.7 MG/DL (ref 0–1)
BORDETELLA PARAPERTUSSIS BY PCR: NOT DETECTED
BORDETELLA PERTUSSIS PCR: NOT DETECTED
BUN BLDV-MCNC: 18 MG/DL (ref 6–23)
BUN BLDV-MCNC: 18 MG/DL (ref 6–23)
CALCIUM SERPL-MCNC: 7.6 MG/DL (ref 8.3–10.6)
CALCIUM SERPL-MCNC: 8.2 MG/DL (ref 8.3–10.6)
CHLAMYDOPHILA PNEUMONIA PCR: NOT DETECTED
CHLORIDE BLD-SCNC: 95 MMOL/L (ref 99–110)
CHLORIDE BLD-SCNC: 97 MMOL/L (ref 99–110)
CO2: 23 MMOL/L (ref 21–32)
CO2: 24 MMOL/L (ref 21–32)
CORONAVIRUS 229E PCR: NOT DETECTED
CORONAVIRUS HKU1 PCR: NOT DETECTED
CORONAVIRUS NL63 PCR: NOT DETECTED
CORONAVIRUS OC43 PCR: NOT DETECTED
CREAT SERPL-MCNC: 0.7 MG/DL (ref 0.9–1.3)
CREAT SERPL-MCNC: 0.7 MG/DL (ref 0.9–1.3)
DIFFERENTIAL TYPE: ABNORMAL
GFR SERPL CREATININE-BSD FRML MDRD: >60 ML/MIN/1.73M2
GFR SERPL CREATININE-BSD FRML MDRD: >60 ML/MIN/1.73M2
GLUCOSE BLD-MCNC: 141 MG/DL (ref 70–99)
GLUCOSE BLD-MCNC: 148 MG/DL (ref 70–99)
GLUCOSE BLD-MCNC: 198 MG/DL (ref 70–99)
GLUCOSE BLD-MCNC: 200 MG/DL (ref 70–99)
GLUCOSE BLD-MCNC: 228 MG/DL (ref 70–99)
GLUCOSE BLD-MCNC: 268 MG/DL (ref 70–99)
HCT VFR BLD CALC: 34.8 % (ref 42–52)
HEMOGLOBIN: 10.8 GM/DL (ref 13.5–18)
HUMAN METAPNEUMOVIRUS PCR: NOT DETECTED
HYPOCHROMIA: ABNORMAL
INFLUENZA A BY PCR: NOT DETECTED
INFLUENZA A H1 (2009) PCR: NOT DETECTED
INFLUENZA A H1 PANDEMIC PCR: NOT DETECTED
INFLUENZA A H3 PCR: NOT DETECTED
INFLUENZA B BY PCR: NOT DETECTED
LACTATE: 1.6 MMOL/L (ref 0.4–2)
LACTATE: 2.4 MMOL/L (ref 0.4–2)
LEGIONELLA URINARY AG: NEGATIVE
LYMPHOCYTES ABSOLUTE: 31.9 K/CU MM
LYMPHOCYTES RELATIVE PERCENT: 85 % (ref 24–44)
MCH RBC QN AUTO: 25 PG (ref 27–31)
MCHC RBC AUTO-ENTMCNC: 31 % (ref 32–36)
MCV RBC AUTO: 80.6 FL (ref 78–100)
MICROCYTES: ABNORMAL
MONOCYTES ABSOLUTE: 0.8 K/CU MM
MONOCYTES RELATIVE PERCENT: 2 % (ref 0–4)
MYCOPLASMA PNEUMONIAE PCR: NOT DETECTED
OSMOLALITY URINE: 438 MOS/L (ref 292–1090)
OSMOLALITY: 287 MOS/L (ref 280–300)
OVALOCYTES: ABNORMAL
PARAINFLUENZA 1 PCR: NOT DETECTED
PARAINFLUENZA 2 PCR: NOT DETECTED
PARAINFLUENZA 3 PCR: NOT DETECTED
PARAINFLUENZA 4 PCR: NOT DETECTED
PDW BLD-RTO: 21.1 % (ref 11.7–14.9)
PLATELET # BLD: 85 K/CU MM (ref 140–440)
PMV BLD AUTO: 9.6 FL (ref 7.5–11.1)
POTASSIUM SERPL-SCNC: 4.5 MMOL/L (ref 3.5–5.1)
POTASSIUM SERPL-SCNC: 4.7 MMOL/L (ref 3.5–5.1)
PREALBUMIN: 4 MG/DL (ref 20–40)
PRO-BNP: 774.5 PG/ML
PROCALCITONIN: 0.14
RBC # BLD: 4.32 M/CU MM (ref 4.6–6.2)
RHINOVIRUS ENTEROVIRUS PCR: ABNORMAL
RSV PCR: NOT DETECTED
SARS-COV-2: NOT DETECTED
SEGMENTED NEUTROPHILS ABSOLUTE COUNT: 4.5 K/CU MM
SEGMENTED NEUTROPHILS RELATIVE PERCENT: 12 % (ref 36–66)
SMUDGE CELLS: PRESENT
SODIUM BLD-SCNC: 130 MMOL/L (ref 135–145)
SODIUM BLD-SCNC: 131 MMOL/L (ref 135–145)
SODIUM URINE: 22 MMOL/L (ref 35–167)
STREP PNEUMONIAE ANTIGEN: NORMAL
TEAR DROP CELLS: ABNORMAL
TOTAL PROTEIN: 5.3 GM/DL (ref 6.4–8.2)
TROPONIN T: 0.01 NG/ML
WBC # BLD: 37.6 K/CU MM (ref 4–10.5)

## 2022-10-22 PROCEDURE — 83935 ASSAY OF URINE OSMOLALITY: CPT

## 2022-10-22 PROCEDURE — 1200000000 HC SEMI PRIVATE

## 2022-10-22 PROCEDURE — 82962 GLUCOSE BLOOD TEST: CPT

## 2022-10-22 PROCEDURE — 6360000002 HC RX W HCPCS: Performed by: PHYSICIAN ASSISTANT

## 2022-10-22 PROCEDURE — 87181 SC STD AGAR DILUTION PER AGT: CPT

## 2022-10-22 PROCEDURE — 71045 X-RAY EXAM CHEST 1 VIEW: CPT

## 2022-10-22 PROCEDURE — 84484 ASSAY OF TROPONIN QUANT: CPT

## 2022-10-22 PROCEDURE — 87040 BLOOD CULTURE FOR BACTERIA: CPT

## 2022-10-22 PROCEDURE — 99285 EMERGENCY DEPT VISIT HI MDM: CPT

## 2022-10-22 PROCEDURE — 6370000000 HC RX 637 (ALT 250 FOR IP): Performed by: PHYSICIAN ASSISTANT

## 2022-10-22 PROCEDURE — 6360000002 HC RX W HCPCS: Performed by: HOSPITALIST

## 2022-10-22 PROCEDURE — 80048 BASIC METABOLIC PNL TOTAL CA: CPT

## 2022-10-22 PROCEDURE — 87070 CULTURE OTHR SPECIMN AEROBIC: CPT

## 2022-10-22 PROCEDURE — 6360000002 HC RX W HCPCS: Performed by: NURSE PRACTITIONER

## 2022-10-22 PROCEDURE — 84134 ASSAY OF PREALBUMIN: CPT

## 2022-10-22 PROCEDURE — 6370000000 HC RX 637 (ALT 250 FOR IP): Performed by: NURSE PRACTITIONER

## 2022-10-22 PROCEDURE — 96365 THER/PROPH/DIAG IV INF INIT: CPT

## 2022-10-22 PROCEDURE — 2580000003 HC RX 258: Performed by: PHYSICIAN ASSISTANT

## 2022-10-22 PROCEDURE — 96368 THER/DIAG CONCURRENT INF: CPT

## 2022-10-22 PROCEDURE — 84145 PROCALCITONIN (PCT): CPT

## 2022-10-22 PROCEDURE — 85027 COMPLETE CBC AUTOMATED: CPT

## 2022-10-22 PROCEDURE — 83880 ASSAY OF NATRIURETIC PEPTIDE: CPT

## 2022-10-22 PROCEDURE — 94761 N-INVAS EAR/PLS OXIMETRY MLT: CPT

## 2022-10-22 PROCEDURE — 2700000000 HC OXYGEN THERAPY PER DAY

## 2022-10-22 PROCEDURE — 80053 COMPREHEN METABOLIC PANEL: CPT

## 2022-10-22 PROCEDURE — 99222 1ST HOSP IP/OBS MODERATE 55: CPT | Performed by: INTERNAL MEDICINE

## 2022-10-22 PROCEDURE — 87899 AGENT NOS ASSAY W/OPTIC: CPT

## 2022-10-22 PROCEDURE — 87077 CULTURE AEROBIC IDENTIFY: CPT

## 2022-10-22 PROCEDURE — 83930 ASSAY OF BLOOD OSMOLALITY: CPT

## 2022-10-22 PROCEDURE — 87205 SMEAR GRAM STAIN: CPT

## 2022-10-22 PROCEDURE — 87186 SC STD MICRODIL/AGAR DIL: CPT

## 2022-10-22 PROCEDURE — 2580000003 HC RX 258: Performed by: NURSE PRACTITIONER

## 2022-10-22 PROCEDURE — 0202U NFCT DS 22 TRGT SARS-COV-2: CPT

## 2022-10-22 PROCEDURE — 84300 ASSAY OF URINE SODIUM: CPT

## 2022-10-22 PROCEDURE — 36415 COLL VENOUS BLD VENIPUNCTURE: CPT

## 2022-10-22 PROCEDURE — 85007 BL SMEAR W/DIFF WBC COUNT: CPT

## 2022-10-22 PROCEDURE — 83605 ASSAY OF LACTIC ACID: CPT

## 2022-10-22 PROCEDURE — 93005 ELECTROCARDIOGRAM TRACING: CPT | Performed by: PHYSICIAN ASSISTANT

## 2022-10-22 PROCEDURE — 87449 NOS EACH ORGANISM AG IA: CPT

## 2022-10-22 RX ORDER — IPRATROPIUM BROMIDE AND ALBUTEROL SULFATE 2.5; .5 MG/3ML; MG/3ML
1 SOLUTION RESPIRATORY (INHALATION) EVERY 4 HOURS PRN
Status: DISCONTINUED | OUTPATIENT
Start: 2022-10-22 | End: 2022-11-02 | Stop reason: HOSPADM

## 2022-10-22 RX ORDER — ATORVASTATIN CALCIUM 40 MG/1
80 TABLET, FILM COATED ORAL DAILY
Status: DISCONTINUED | OUTPATIENT
Start: 2022-10-22 | End: 2022-11-02 | Stop reason: HOSPADM

## 2022-10-22 RX ORDER — ACETAMINOPHEN 325 MG/1
650 TABLET ORAL EVERY 6 HOURS PRN
Status: DISCONTINUED | OUTPATIENT
Start: 2022-10-22 | End: 2022-11-02 | Stop reason: HOSPADM

## 2022-10-22 RX ORDER — INSULIN GLARGINE 100 [IU]/ML
40 INJECTION, SOLUTION SUBCUTANEOUS NIGHTLY
Status: DISCONTINUED | OUTPATIENT
Start: 2022-10-22 | End: 2022-11-02

## 2022-10-22 RX ORDER — INSULIN LISPRO 100 [IU]/ML
0-4 INJECTION, SOLUTION INTRAVENOUS; SUBCUTANEOUS
Status: DISCONTINUED | OUTPATIENT
Start: 2022-10-22 | End: 2022-11-02

## 2022-10-22 RX ORDER — GUAIFENESIN 600 MG/1
600 TABLET, EXTENDED RELEASE ORAL 2 TIMES DAILY
Status: DISCONTINUED | OUTPATIENT
Start: 2022-10-22 | End: 2022-10-27

## 2022-10-22 RX ORDER — ONDANSETRON 2 MG/ML
4 INJECTION INTRAMUSCULAR; INTRAVENOUS EVERY 6 HOURS PRN
Status: DISCONTINUED | OUTPATIENT
Start: 2022-10-22 | End: 2022-11-02 | Stop reason: HOSPADM

## 2022-10-22 RX ORDER — ONDANSETRON 4 MG/1
4 TABLET, ORALLY DISINTEGRATING ORAL EVERY 8 HOURS PRN
Status: DISCONTINUED | OUTPATIENT
Start: 2022-10-22 | End: 2022-11-02 | Stop reason: HOSPADM

## 2022-10-22 RX ORDER — ACETAMINOPHEN 650 MG/1
650 SUPPOSITORY RECTAL EVERY 6 HOURS PRN
Status: DISCONTINUED | OUTPATIENT
Start: 2022-10-22 | End: 2022-11-02 | Stop reason: HOSPADM

## 2022-10-22 RX ORDER — POLYETHYLENE GLYCOL 3350 17 G/17G
17 POWDER, FOR SOLUTION ORAL DAILY PRN
Status: DISCONTINUED | OUTPATIENT
Start: 2022-10-22 | End: 2022-11-02 | Stop reason: HOSPADM

## 2022-10-22 RX ORDER — IPRATROPIUM BROMIDE AND ALBUTEROL SULFATE 2.5; .5 MG/3ML; MG/3ML
1 SOLUTION RESPIRATORY (INHALATION)
Status: DISCONTINUED | OUTPATIENT
Start: 2022-10-22 | End: 2022-10-22

## 2022-10-22 RX ORDER — FERROUS GLUCONATE 324(37.5)
324 TABLET ORAL 2 TIMES DAILY
Status: DISCONTINUED | OUTPATIENT
Start: 2022-10-22 | End: 2022-11-02 | Stop reason: HOSPADM

## 2022-10-22 RX ORDER — ASCORBIC ACID 500 MG
250 TABLET ORAL 2 TIMES DAILY
Status: DISCONTINUED | OUTPATIENT
Start: 2022-10-22 | End: 2022-11-02 | Stop reason: HOSPADM

## 2022-10-22 RX ORDER — INSULIN LISPRO 100 [IU]/ML
0-4 INJECTION, SOLUTION INTRAVENOUS; SUBCUTANEOUS NIGHTLY
Status: DISCONTINUED | OUTPATIENT
Start: 2022-10-22 | End: 2022-11-02

## 2022-10-22 RX ORDER — ENOXAPARIN SODIUM 100 MG/ML
40 INJECTION SUBCUTANEOUS DAILY
Status: DISCONTINUED | OUTPATIENT
Start: 2022-10-22 | End: 2022-11-02 | Stop reason: HOSPADM

## 2022-10-22 RX ORDER — SODIUM CHLORIDE 0.9 % (FLUSH) 0.9 %
5-40 SYRINGE (ML) INJECTION EVERY 12 HOURS SCHEDULED
Status: DISCONTINUED | OUTPATIENT
Start: 2022-10-22 | End: 2022-11-02 | Stop reason: HOSPADM

## 2022-10-22 RX ORDER — HYDROCODONE BITARTRATE AND ACETAMINOPHEN 5; 325 MG/1; MG/1
1 TABLET ORAL EVERY 12 HOURS PRN
Status: DISCONTINUED | OUTPATIENT
Start: 2022-10-22 | End: 2022-11-02 | Stop reason: HOSPADM

## 2022-10-22 RX ORDER — FINASTERIDE 5 MG/1
5 TABLET, FILM COATED ORAL DAILY
Status: DISCONTINUED | OUTPATIENT
Start: 2022-10-22 | End: 2022-11-02 | Stop reason: HOSPADM

## 2022-10-22 RX ORDER — PANTOPRAZOLE SODIUM 40 MG/1
40 TABLET, DELAYED RELEASE ORAL
Status: DISCONTINUED | OUTPATIENT
Start: 2022-10-22 | End: 2022-11-02 | Stop reason: HOSPADM

## 2022-10-22 RX ORDER — GABAPENTIN 100 MG/1
100 CAPSULE ORAL 3 TIMES DAILY
Status: DISCONTINUED | OUTPATIENT
Start: 2022-10-22 | End: 2022-11-02 | Stop reason: HOSPADM

## 2022-10-22 RX ORDER — MORPHINE SULFATE 2 MG/ML
2 INJECTION, SOLUTION INTRAMUSCULAR; INTRAVENOUS EVERY 4 HOURS PRN
Status: DISCONTINUED | OUTPATIENT
Start: 2022-10-22 | End: 2022-11-02 | Stop reason: HOSPADM

## 2022-10-22 RX ORDER — SODIUM CHLORIDE 9 MG/ML
INJECTION, SOLUTION INTRAVENOUS CONTINUOUS
Status: DISPENSED | OUTPATIENT
Start: 2022-10-22 | End: 2022-10-23

## 2022-10-22 RX ORDER — BENZONATATE 100 MG/1
100 CAPSULE ORAL 3 TIMES DAILY PRN
Status: DISCONTINUED | OUTPATIENT
Start: 2022-10-22 | End: 2022-11-02 | Stop reason: HOSPADM

## 2022-10-22 RX ORDER — GUAIFENESIN/DEXTROMETHORPHAN 100-10MG/5
5 SYRUP ORAL ONCE
Status: COMPLETED | OUTPATIENT
Start: 2022-10-22 | End: 2022-10-22

## 2022-10-22 RX ORDER — SODIUM CHLORIDE 0.9 % (FLUSH) 0.9 %
5-40 SYRINGE (ML) INJECTION PRN
Status: DISCONTINUED | OUTPATIENT
Start: 2022-10-22 | End: 2022-11-02 | Stop reason: HOSPADM

## 2022-10-22 RX ORDER — 0.9 % SODIUM CHLORIDE 0.9 %
1000 INTRAVENOUS SOLUTION INTRAVENOUS ONCE
Status: COMPLETED | OUTPATIENT
Start: 2022-10-22 | End: 2022-10-22

## 2022-10-22 RX ORDER — ALBUTEROL SULFATE 90 UG/1
2 AEROSOL, METERED RESPIRATORY (INHALATION) EVERY 4 HOURS PRN
Status: DISCONTINUED | OUTPATIENT
Start: 2022-10-22 | End: 2022-11-02 | Stop reason: HOSPADM

## 2022-10-22 RX ORDER — TAMSULOSIN HYDROCHLORIDE 0.4 MG/1
0.4 CAPSULE ORAL DAILY
Status: DISCONTINUED | OUTPATIENT
Start: 2022-10-22 | End: 2022-11-02 | Stop reason: HOSPADM

## 2022-10-22 RX ORDER — SODIUM CHLORIDE 9 MG/ML
INJECTION, SOLUTION INTRAVENOUS PRN
Status: DISCONTINUED | OUTPATIENT
Start: 2022-10-22 | End: 2022-11-02 | Stop reason: HOSPADM

## 2022-10-22 RX ADMIN — Medication 324 MG: at 21:03

## 2022-10-22 RX ADMIN — GUAIFENESIN AND DEXTROMETHORPHAN 5 ML: 100; 10 SYRUP ORAL at 09:00

## 2022-10-22 RX ADMIN — FINASTERIDE 5 MG: 5 TABLET, FILM COATED ORAL at 14:38

## 2022-10-22 RX ADMIN — Medication 324 MG: at 14:39

## 2022-10-22 RX ADMIN — VANCOMYCIN HYDROCHLORIDE 1000 MG: 1 INJECTION, POWDER, LYOPHILIZED, FOR SOLUTION INTRAVENOUS at 23:09

## 2022-10-22 RX ADMIN — DEXTROSE MONOHYDRATE 500 MG: 50 INJECTION, SOLUTION INTRAVENOUS at 09:57

## 2022-10-22 RX ADMIN — CEFEPIME 2000 MG: 2 INJECTION, POWDER, FOR SOLUTION INTRAVENOUS at 09:57

## 2022-10-22 RX ADMIN — BENZONATATE 100 MG: 100 CAPSULE ORAL at 14:45

## 2022-10-22 RX ADMIN — VANCOMYCIN HYDROCHLORIDE 1500 MG: 5 INJECTION, POWDER, LYOPHILIZED, FOR SOLUTION INTRAVENOUS at 11:15

## 2022-10-22 RX ADMIN — ENOXAPARIN SODIUM 40 MG: 40 INJECTION SUBCUTANEOUS at 14:39

## 2022-10-22 RX ADMIN — GABAPENTIN 100 MG: 100 CAPSULE ORAL at 14:38

## 2022-10-22 RX ADMIN — GUAIFENESIN 600 MG: 600 TABLET, EXTENDED RELEASE ORAL at 14:38

## 2022-10-22 RX ADMIN — TAMSULOSIN HYDROCHLORIDE 0.4 MG: 0.4 CAPSULE ORAL at 14:38

## 2022-10-22 RX ADMIN — SODIUM CHLORIDE: 9 INJECTION, SOLUTION INTRAVENOUS at 14:36

## 2022-10-22 RX ADMIN — GUAIFENESIN 600 MG: 600 TABLET, EXTENDED RELEASE ORAL at 21:01

## 2022-10-22 RX ADMIN — PANTOPRAZOLE SODIUM 40 MG: 40 TABLET, DELAYED RELEASE ORAL at 14:38

## 2022-10-22 RX ADMIN — PIPERACILLIN AND TAZOBACTAM 4500 MG: 4; .5 INJECTION, POWDER, FOR SOLUTION INTRAVENOUS at 14:37

## 2022-10-22 RX ADMIN — GABAPENTIN 100 MG: 100 CAPSULE ORAL at 21:01

## 2022-10-22 RX ADMIN — HYDROCODONE BITARTRATE AND ACETAMINOPHEN 1 TABLET: 5; 325 TABLET ORAL at 14:45

## 2022-10-22 RX ADMIN — INSULIN GLARGINE 40 UNITS: 100 INJECTION, SOLUTION SUBCUTANEOUS at 21:06

## 2022-10-22 RX ADMIN — OXYCODONE HYDROCHLORIDE AND ACETAMINOPHEN 250 MG: 500 TABLET ORAL at 14:39

## 2022-10-22 RX ADMIN — MORPHINE SULFATE 2 MG: 2 INJECTION, SOLUTION INTRAMUSCULAR; INTRAVENOUS at 21:09

## 2022-10-22 RX ADMIN — SODIUM CHLORIDE 1000 ML: 9 INJECTION, SOLUTION INTRAVENOUS at 09:33

## 2022-10-22 RX ADMIN — PIPERACILLIN AND TAZOBACTAM 4500 MG: 4; .5 INJECTION, POWDER, FOR SOLUTION INTRAVENOUS at 21:01

## 2022-10-22 RX ADMIN — OXYCODONE HYDROCHLORIDE AND ACETAMINOPHEN 250 MG: 500 TABLET ORAL at 21:01

## 2022-10-22 RX ADMIN — ATORVASTATIN CALCIUM 80 MG: 40 TABLET, FILM COATED ORAL at 21:01

## 2022-10-22 ASSESSMENT — PAIN DESCRIPTION - DESCRIPTORS
DESCRIPTORS: DISCOMFORT
DESCRIPTORS: ACHING;SORE
DESCRIPTORS: ACHING;SORE
DESCRIPTORS: ACHING;POUNDING

## 2022-10-22 ASSESSMENT — PAIN SCALES - WONG BAKER: WONGBAKER_NUMERICALRESPONSE: 0

## 2022-10-22 ASSESSMENT — PAIN SCALES - GENERAL
PAINLEVEL_OUTOF10: 5
PAINLEVEL_OUTOF10: 6
PAINLEVEL_OUTOF10: 4
PAINLEVEL_OUTOF10: 6
PAINLEVEL_OUTOF10: 6
PAINLEVEL_OUTOF10: 0

## 2022-10-22 ASSESSMENT — PAIN DESCRIPTION - LOCATION
LOCATION: CHEST;BACK
LOCATION: CHEST;BACK
LOCATION: CHEST
LOCATION: CHEST

## 2022-10-22 ASSESSMENT — PAIN DESCRIPTION - ONSET: ONSET: ON-GOING

## 2022-10-22 ASSESSMENT — PAIN DESCRIPTION - PAIN TYPE
TYPE: ACUTE PAIN
TYPE: ACUTE PAIN

## 2022-10-22 ASSESSMENT — PAIN - FUNCTIONAL ASSESSMENT
PAIN_FUNCTIONAL_ASSESSMENT: ACTIVITIES ARE NOT PREVENTED
PAIN_FUNCTIONAL_ASSESSMENT: PREVENTS OR INTERFERES SOME ACTIVE ACTIVITIES AND ADLS

## 2022-10-22 ASSESSMENT — PAIN DESCRIPTION - FREQUENCY
FREQUENCY: INTERMITTENT
FREQUENCY: INTERMITTENT

## 2022-10-22 ASSESSMENT — PAIN DESCRIPTION - ORIENTATION
ORIENTATION: RIGHT;LEFT;POSTERIOR
ORIENTATION: RIGHT;LEFT
ORIENTATION: OUTER

## 2022-10-22 NOTE — ED PROVIDER NOTES
EMERGENCY DEPARTMENT ENCOUNTER      PCP: Evi Poole MD    [address] [facility]    Chief Complaint   Patient presents with    Fever    Shortness of Breath       This patient was not evaluated by the attending physician. I have independently evaluated this patient. HPI    Handy Goyal is a 71 y.o. male who presents with worsening cough for the past 3 weeks. Cough is productive of whitish-yellow phlegm. Patient states he recently had pneumonia and feels like it is returning. Patient normally wears 3 L nasal can oxygen at home. Patient states he has had increased shortness of breath. Patient states he is at worsens with left-sided chest pain especially with cough. Patient denies abdominal pain, vomiting or diarrhea. Patient denies lower extremity swelling. Patient has had associated hot and cold chills. REVIEW OF SYSTEMS    Constitutional:  see HPI   HENT:  Denies sore throat or ear pain   Cardiovascular:  see HPI  Respiratory:  See HPI.    GI:  Denies abdominal pain, vomiting, or diarrhea  :  Denies any urinary symptoms   Musculoskeletal:  Denies back pain  Skin:  Denies rash  Neurologic:  Denies focal weakness or sensory changes   Lymphatic:  Denies swollen glands     All other review of systems are negative  See HPI and nursing notes for additional information       PAST MEDICAL & SURGICAL HISTORY    Past Medical History:   Diagnosis Date    Arthritis     knees, Rt hand/wrist    CAD (coronary artery disease)     Cancer ([facility] [address]     CLL--Sees Dr Gimenez Case    Diabetes mellitus [facility])     History of blood transfusion     no reaction    Hx of motion sickness     Hyperlipidemia     MDRO (multiple drug resistant organisms) resistance     abscess on buttock 10yrs ago    Migraine     last one summer 2016    Pneumonia 2016    Wears dentures     full upper--lower dentures    Wears glasses      Past Surgical History:   Procedure Laterality Date    CARDIAC CATHETERIZATION  1990's    x 2  last showed \"inflammation of heart\"    COLONOSCOPY  2010    DENTAL SURGERY      all teeth extracted     EYE SURGERY Right 08/2016    Cataract removal    FRACTURE SURGERY Left 1990's    left ankle plate and screws    KNEE SURGERY  1970    left    OTHER SURGICAL HISTORY Left 01/18/2017    groin excision    TONSILLECTOMY  late 1960's    age 13    TUNNELED VENOUS PORT PLACEMENT  2013    Right upper chest       CURRENT MEDICATIONS    Current Outpatient Rx   Medication Sig Dispense Refill    HYDROcodone-acetaminophen (NORCO) 5-325 MG per tablet Take 1 tablet by mouth every 12 hours as needed for Pain for up to 30 days. 60 tablet 0    gabapentin (NEURONTIN) 100 MG capsule Take 1 capsule by mouth 3 times daily for 30 days. 90 capsule 0    albuterol sulfate HFA (PROVENTIL;VENTOLIN;PROAIR) 108 (90 Base) MCG/ACT inhaler Inhale 2 puffs into the lungs every 4-6 hours as needed for Shortness of Breath or Wheezing      ipratropium-albuterol (DUONEB) 0.5-2.5 (3) MG/3ML SOLN nebulizer solution Take 1 vial by nebulization every 6 hours as needed for Shortness of Breath      Ascorbic Acid (VITAMIN C) 250 MG tablet Take 250 mg by mouth 2 times daily      ferrous gluconate 324 (37.5 Fe) MG TABS Take 1 tablet by mouth 2 times daily 60 tablet 5    valACYclovir (VALTREX) 500 MG tablet Take 1 tablet by mouth daily 30 tablet 5    LEVEMIR FLEXTOUCH 100 UNIT/ML injection pen Inject 40 Units into the skin nightly 5 pen 3    NOVOLOG FLEXPEN 100 UNIT/ML injection pen Inject 15 Units into the skin 3 times daily (before meals) 5 pen 3    atorvastatin (LIPITOR) 80 MG tablet Take 80 mg by mouth daily      acetaminophen-codeine (TYLENOL #3) 300-30 MG per tablet Take 1 tablet by mouth every 6 hours as needed for Pain.       finasteride (PROSCAR) 5 MG tablet Take 5 mg by mouth daily      tamsulosin (FLOMAX) 0.4 MG capsule Take 0.4 mg by mouth daily      glucose monitoring kit (FREESTYLE) monitoring kit 1 kit by Does not apply route daily as needed (glucose checks) 1 kit 0 Insulin Syringe-Needle U-100 30G X 1/2\" 0.5 ML MISC 1 each by Does not apply route daily 100 each 3    metFORMIN (GLUCOPHAGE) 1000 MG tablet Take 1,000 mg by mouth 2 times daily (with meals)      omeprazole (PRILOSEC) 20 MG delayed release capsule Take 20 mg by mouth daily as needed (GERD)      Immune Globulin, Human, 20 GM/200ML SOLN solution Infuse 20 g intravenously every 30 days 19 Given through infusion therapy states he is due on the 20 of May         ALLERGIES    No Known Allergies    SOCIAL & FAMILY HISTORY    Social History     Socioeconomic History    Marital status: Single   Tobacco Use    Smoking status: Former     Packs/day: 1.00     Years: 20.00     Pack years: 20.00     Types: Cigarettes     Start date: 10/2/1965     Quit date: 1987     Years since quittin.8    Smokeless tobacco: Never   Vaping Use    Vaping Use: Never used   Substance and Sexual Activity    Alcohol use: No    Drug use: No    Sexual activity: Not Currently     Family History   Problem Relation Age of Onset    Diabetes Mother     High Blood Pressure Mother     Heart Disease Father     Other Sister         liver problems    Early Death Brother     Asthma Maternal Uncle     Colon Cancer Brother        PHYSICAL EXAM    VITAL SIGNS: BP (!) 142/85   Pulse 94   Temp 99.6 °F (37.6 °C) (Oral)   Resp 22   Ht 6' (1.829 m)   Wt 170 lb (77.1 kg)   SpO2 93%   BMI 23.06 kg/m²    Constitutional: Ill-appearing elderly male  HENT:  Atraumatic, moist mucus membranes  Neck/Lymphatics: supple, no JVD, no swollen nodes  Respiratory: decreased lung sounds bilaterally, rhonchorous breath sounds bilaterally  Cardiovascular: Tachycardic, normal rhythm  GI:  Soft, nontender, normal bowel sounds  Musculoskeletal:  No edema, no acute deformities  Integument: Scab noted to left lateral ankle. No surrounding erythema or purulent discharge.   Neurologic:  Alert & oriented, no slurred speech  Psych: Pleasant affect, no hallucinations      EKG EKG Interpretation  Please see ED physician's note for EKG interpretation     LABS:  Results for orders placed or performed during the hospital encounter of 10/22/22   Respiratory Panel, Molecular, with COVID-19 (Restricted: peds pts or suitable admitted adults)    Specimen: Nasopharyngeal   Result Value Ref Range    Adenovirus Detection by PCR NOT DETECTED NOT DETECTED    Coronavirus 229E PCR NOT DETECTED NOT DETECTED    Coronavirus HKU1 PCR NOT DETECTED NOT DETECTED    Coronavirus NL63 PCR NOT DETECTED NOT DETECTED    Coronavirus OC43 PCR NOT DETECTED NOT DETECTED    SARS-CoV-2 NOT DETECTED NOT DETECTED    Human Metapneumovirus PCR NOT DETECTED NOT DETECTED    Rhinovirus Enterovirus PCR DETECTED BY PCR (A) NOT DETECTED    Influenza A by PCR NOT DETECTED NOT DETECTED    Influenza A H1 Pandemic PCR NOT DETECTED NOT DETECTED    Influenza A H1 (2009) PCR NOT DETECTED NOT DETECTED    Influenza A H3 PCR NOT DETECTED NOT DETECTED    Influenza B by PCR NOT DETECTED NOT DETECTED    Parainfluenza 1 PCR NOT DETECTED NOT DETECTED    Parainfluenza 2 PCR NOT DETECTED NOT DETECTED    Parainfluenza 3 PCR NOT DETECTED NOT DETECTED    Parainfluenza 4 PCR NOT DETECTED NOT DETECTED    RSV PCR NOT DETECTED NOT DETECTED    Bordetella parapertussis by PCR NOT DETECTED NOT DETECTED    B Pertussis by PCR NOT DETECTED NOT DETECTED    Chlamydophila Pneumonia PCR NOT DETECTED NOT DETECTED    Mycoplasma pneumo by PCR NOT DETECTED NOT DETECTED   CBC with Auto Differential   Result Value Ref Range    WBC 37.6 (HH) 4.0 - 10.5 K/CU MM    RBC 4.32 (L) 4.6 - 6.2 M/CU MM    Hemoglobin 10.8 (L) 13.5 - 18.0 GM/DL    Hematocrit 34.8 (L) 42 - 52 %    MCV 80.6 78 - 100 FL    MCH 25.0 (L) 27 - 31 PG    MCHC 31.0 (L) 32.0 - 36.0 %    RDW 21.1 (H) 11.7 - 14.9 %    Platelets 85 (L) 159 - 440 K/CU MM    MPV 9.6 7.5 - 11.1 FL    Bands Relative 1 (L) 5 - 11 %    Segs Relative 12.0 (L) 36 - 66 %    Lymphocytes % 85.0 (H) 24 - 44 %    Monocytes % 2.0 0 - 4 % Bands Absolute 0.38 K/CU MM    Segs Absolute 4.5 K/CU MM    Lymphocytes Absolute 31.9 K/CU MM    Monocytes Absolute 0.8 K/CU MM    Differential Type MANUAL DIFFERENTIAL     Anisocytosis 2+     Microcytes 1+     Hypochromia 1+     Tear Drop Cells 1+     Ovalocytes 1+     Atypical Lymphocytes Absolute 3+     Smudge Cells PRESENT    Comprehensive Metabolic Panel   Result Value Ref Range    Sodium 130 (L) 135 - 145 MMOL/L    Potassium 4.7 3.5 - 5.1 MMOL/L    Chloride 95 (L) 99 - 110 mMol/L    CO2 24 21 - 32 MMOL/L    BUN 18 6 - 23 MG/DL    Creatinine 0.7 (L) 0.9 - 1.3 MG/DL    Est, Glom Filt Rate >60 >60 mL/min/1.73m2    Glucose 200 (H) 70 - 99 MG/DL    Calcium 8.2 (L) 8.3 - 10.6 MG/DL    Albumin 2.8 (L) 3.4 - 5.0 GM/DL    Total Protein 5.3 (L) 6.4 - 8.2 GM/DL    Total Bilirubin 0.7 0.0 - 1.0 MG/DL    ALT 19 10 - 40 U/L    AST 16 15 - 37 IU/L    Alkaline Phosphatase 176 (H) 40 - 129 IU/L    Anion Gap 11 4 - 16   Troponin   Result Value Ref Range    Troponin T 0.010 (H) <0.01 NG/ML   Lactic Acid   Result Value Ref Range    Lactate 1.6 0.4 - 2.0 mMOL/L   Procalcitonin   Result Value Ref Range    Procalcitonin 0.145    Brain Natriuretic Peptide   Result Value Ref Range    Pro-.5 (H) <300 PG/ML   POCT Glucose   Result Value Ref Range    POC Glucose 198 (H) 70 - 99 MG/DL   EKG 12 Lead   Result Value Ref Range    Ventricular Rate 114 BPM    Atrial Rate 114 BPM    P-R Interval 136 ms    QRS Duration 86 ms    Q-T Interval 314 ms    QTc Calculation (Bazett) 432 ms    P Axis 49 degrees    R Axis 48 degrees    T Axis 69 degrees    Diagnosis       Sinus tachycardia  Otherwise normal ECG  When compared with ECG of 28-SEP-2022 12:14,  premature ventricular complexes are no longer present  Nonspecific T wave abnormality no longer evident in Lateral leads             RADIOLOGY/PROCEDURES    XR CHEST PORTABLE   Final Result   1.  Persistent patchy airspace opacities in the mid to basilar right lung   suspicious for pneumonia or aspiration given the prior CT appearance. There   may be cavitation in the right lung base. 2. Minimal left basilar airspace opacity more likely due to atelectasis than   pneumonia or aspiration. 3. At least mild peribronchial cuffing potentially due to reactive airways   disease or bronchitis. ED COURSE & MEDICAL DECISION MAKING      Patient presents as above. Patient provide IV fluids, cough medication. CBC shows white blood count of 37.6, hemoglobin 10.8 hematocrit of 34.8, platelets 85. Patient has history of CLL. CMP shows sodium 130, chloride 95, glucose 200. Troponin detectable at 0.01. Lactic acid is 1.6. Procalcitonin 0.145. . Respiratory disease panel was positive for rhinovirus. Chest x-ray shows persistent patchy airspace opacity in the mid to basilar right lung suspicious for pneumonia or aspiration, may be cavitation at lung base, minimal left basilar airspace opacity, mild peribronchial cuffing. IV vancomycin, cefepime and azithromycin as ordered. I believe patient requires admission for further evaluation and treatment. Consult hospitalist who accepts admission. Clinical  IMPRESSION    1. Pneumonia due to infectious organism, unspecified laterality, unspecified part of lung    2. Rhinovirus    3. Elevated troponin    4. Personal history of CLL (chronic lymphocytic leukemia)        Patient admitted      Comment: Please note this report has been produced using speech recognition software and may contain errors related to that system including errors in grammar, punctuation, and spelling, as well as words and phrases that may be inappropriate. If there are any questions or concerns please feel free to contact the dictating provider for clarification.                            Angelita Navarro PA-C  10/22/22 [id number]

## 2022-10-22 NOTE — PROGRESS NOTES
I have personally seen and examined the patient independently. I have reviewed the patient's available data,including medical history and recent test results. Reviewed and discussed note as documented by HAKAN. I agree with the physical exam findings, assessment and plan. MDM is substantive portion of supervisory note. Started having pain in left upper chest when coughing. Coughing up yellow phlegm. Feels like he is having trouble breathing. Has been on oxygen since 10/6. Last chemo was on 10/11. Has had poor appetite. Patient laying on side in bed  Does not appear to be comfortable  Has some coarseness and cough during auscultation    -Will place patient on broad-spectrum antibiotics for pneumonia versus bronchitis. Speech therapy evaluation for possible aspiration. Check cultures. Consult pulmonology due to possible cavitary lesion in the right lung base.

## 2022-10-22 NOTE — H&P
History and Physical      Name:  Chaparrita Ingram /Age/Sex: 1950  (71 y.o. male)   MRN & CSN:  [id number] & [id number] Admission Date/Time: 10/22/2022  8:27 AM   Location:  ED18/ED-18 PCP: Julio Franz MD       Hospital Day: 1           Assessment and Plan:   # Persistent pneumonia-chest x-ray shows persistent patchy airspace opacities in the mid to basilar right lung suspicious for pneumonia or aspiration possible cavitation in the right lung. Mild peribronchial cuffing potentially due to reactive airway disease or bronchitis. Patient has history of Pseudomonas cultured in his sputum back in  and was followed by ID. Recent admission 2 weeks ago he was positive for rhinovirus. MRSA screen and blood cultures were negative. A sputum culture was not obtained. Patient completed 8 days of Zosyn and was not discharged on antibiotics. Respiratory panel currently showing rhinovirus.     - Consult Pulmonology    - Given recent hospitalization with intravenous IV antibiotics the patient will be managed on IV Zosyn and Vancomycin. - Will consult ID for eval recs on antibiotics. Will check sputum culture, urine antigens and MRSA screen. - F/u blood cx obtained in ED. Monitor pro-pascual  - Duonebs, supplemental O2, Anti-tussive/expectorant medications as needed, CBC in am    # Hypoxic respiratory failure - recently discharge home O2 at 3 L per nasal cannula 10/6/22, no increase in respiratory requirement at this time, monitor with continuous pulse ox    # Chest pain/elevated trop - suspect pleuritic/MSK given only has pain with coughing. EKG with non specific ST changes. Per EHR has hx CAD [facility] report not available. - will trend trops, continue pts statin.  Recent TTE 22 showed  preserved EF 01-54%, grade I diastolicc dysfx, no regional wall motion abnormalities, consult Cardiology for for eval/recs, monitor on tele    # Sepsis in setting of PNA and CLL - Criteria met with tachycardia and elevated wbc (chronically elevated). Pt currently afebrile, procal 0.145, lower than recent admission, lactic acid pending. Currnetly not requiring vasopressor support. Received 1l NS in ED.   -Will continue current empiric abx as noted  -IVF's x1 liter, re-eval need for further IV hydrtaion  -f/u cultures, lactics, procal mrsa screen as noted, CBC in am    # Hyponatremia -suspect hypoosmolar, receiving 0.9NS. Urine studies has been ordered we will follow-up repeat sodium levels, monitor for any needed interventions    # CLL and hypogammaglobulinemia on Rituxan and IVIG followed by Oncology - Will consult Heme/Oncology to follow, monitor platelets may need to DC chemical prophylaxis    # Protein calorie malnutrition suspected with hypoalbuminemia-we will check prealbumin, nutrition consult. Other chronic medical conditions-resume home medications unless contraindicated  # COPD - not in exacerbation. Respiratory care as noted above, no bronchospasm noted, defer steroids to Pulm  # DM type II with neuropathy, A1C 6.7 9/28/22 - Monitor BG  AC/HS, resume pts basal and ssi, monitor for adjustment in regimen, ADA diet  # Mixed hyperlipidemia - on statin  # Recent pleural effusion on recent admission 2 weeks ago, requiring thoracentesis, fluid culture not available. # BPH on flomax, proscar    Present on Admission:   Pneumonia due to organism             Diet No diet orders on file   DVT Prophylaxis [x] Lovenox, []  Heparin, [] SCDs, [] Ambulation  [] Long term AC   GI Prophylaxis [] PPI,  [] H2 Blocker,  [] Carafate,  [] Diet/Tube Feeds   Code Status Full code    Disposition Admit to [facility].     Patient plans to return home upon discharge         Chief Complaint: Fever and Shortness of Breath      History obtained from EHR and patient  History of Present Illness:   Briana King is a 71 y.o. male  with history of CLL on Rituxan and IVIG, hypogammaglobulinemia, lung nodule, COPD, Hypoxic respiratory failure on 3l per nc, DM type II, Hyperlipidemia, BPH among other co-morbidities who presents with shortness of breath and chest pain. Patient was recently hospitalized 2 weeks ago for sepsis and pneumonia. He was found to have rhino enterovirus. Appears the patient also required a thoracentesis however fluid culture was not obtained. He was treated with IV Zosyn. He was not discharged on antibiotics. The patient reports persistent productive cough of tan to yellow secretions since discharge. He reports chills did not measure his temperature. He has mild shortness of breath on his O2, worse with exertion. Last evening he reports left upper chest wall pain that occurred with coughing only which prompted his admission. The patient denies associated nausea vomiting or diaphoresis. The patient denies lower extremity swelling or pain. Patient does report decrease in oral intake the past 24 hours. He denies any other acute issues. He presented to the emergency department with low-grade temp of 99.6, pulse 111, respirations 20 blood pressure 142/85. O2 sat 93% on 3 L per nasal cannula. Respiratory panel performed showed rhino enterovirus. CBC showed WBC 37.6, hemoglobin 10.8, hematocrit 34.8, platelets 85. Chemistry panel shows sodium 130 potassium 4.7 chloride 95 CO2 24 BUN 18 creatinine 0.7. LFTs showed alk phos 176 elevated on prior studies, albumin 2.8, otherwise unremarkable. EKG showed normal sinus rhythm with nonspecific ST changes, troponin 0.010. The patient was given 1l NS, blood cultures obtained x1. He was dosed with IV Cefepime, Vancomycin and Zithromax. He has been admitted for further management. Ten point ROS: reviewed and are negative, unless as noted in above HPI.     Objective:   No intake or output data in the 24 hours ending 10/22/22 1117     Vitals:   Vitals:    10/22/22 0832 10/22/22 0945   BP: (!) 142/85    Pulse: (!) 111 94   Resp: 20 22   Temp:  99.6 °F (37.6 °C)   TempSrc:  Oral   SpO2: 93% Weight:  170 lb (77.1 kg)   Height:  6' (1.829 m)       Physical Exam: 10/22/22     GEN -Awake  appearing male, in NAD. Appears given age. EYES -anicteric, conjunctiva pink. HENT -Head is normocephalic, atraumatic. MM are moist. No evidence of thrush. NECK -Supple, no apparent thyromegaly or masses. RESP -course breath sound, resp unlabored, no wheezing. Symmetric chest movement   C/V -S1/S2 auscultated. RRR without appreciable M/R/G. No JVD. Cap refill <3 sec. No peripheral edema. GI -Abdomen is soft non distended, non tender to palpation. + BS. No masses or guarding.  -No CVA/ flank tenderness. LYMPH- No petechiae or ecchymoses. MS -No gross joint deformities. SKIN -Normal coloration, warm, dry. NEURO-Cranial nerves appear grossly intact, normal speech, no lateralizing weakness. PSYC-Awake, alert, oriented x 4 . Appropriate affect. Past Medical History:      Past Medical History:   Diagnosis Date    Arthritis     knees, Rt hand/wrist    CAD (coronary artery disease)     Cancer (HCC)     CLL--Sees Dr Tyler Stratton    Diabetes mellitus [facility])     History of blood transfusion     no reaction    Hx of motion sickness     Hyperlipidemia     MDRO (multiple drug resistant organisms) resistance     abscess on buttock 10yrs ago    Migraine     last one summer 2016    Pneumonia 2016    Wears dentures     full upper--lower dentures    Wears glasses        PMH reviewed  Past Surgical  History:    has a past surgical history that includes knee surgery (1970); Tunneled venous port placement (2013); Colonoscopy (2010); fracture surgery (Left, 1990's); Cardiac catheterization (1990's); Tonsillectomy (late 1960's); Dental surgery; eye surgery (Right, 08/2016); and other surgical history (Left, 01/18/2017).     Surgical history reviewed  Family  History:   family history includes Asthma in his maternal uncle; Colon Cancer in his brother; Diabetes in his mother; Early Death in his brother; Heart Disease in his father; High Blood Pressure in his mother; Other in his sister. Family history reviewed  Social History:     Social History     Socioeconomic History    Marital status: Single   Tobacco Use    Smoking status: Former     Packs/day: 1.00     Years: 20.00     Pack years: 20.00     Types: Cigarettes     Start date: 10/2/1965     Quit date: 1987     Years since quittin.8    Smokeless tobacco: Never   Vaping Use    Vaping Use: Never used   Substance and Sexual Activity    Alcohol use: No    Drug use: No    Sexual activity: Not Currently      reports that he quit smoking about 35 years ago. His smoking use included cigarettes. He started smoking about 57 years ago. He has a 20.00 pack-year smoking history. He has never used smokeless tobacco.   reports no history of alcohol use. reports no history of drug use. Social history reviewed  Allergies:   No Known Allergies    Home Medications:     Prior to Admission medications    Medication Sig Start Date End Date Taking? Authorizing Provider   HYDROcodone-acetaminophen (NORCO) 5-325 MG per tablet Take 1 tablet by mouth every 12 hours as needed for Pain for up to 30 days. 10/10/22 11/9/22  Adilia Dumont MD   gabapentin (NEURONTIN) 100 MG capsule Take 1 capsule by mouth 3 times daily for 30 days.  10/6/22 11/5/22  Jacob Fraser MD   albuterol sulfate HFA (PROVENTIL;VENTOLIN;PROAIR) 108 (90 Base) MCG/ACT inhaler Inhale 2 puffs into the lungs every 4-6 hours as needed for Shortness of Breath or Wheezing 22   Historical Provider, MD   ipratropium-albuterol (DUONEB) 0.5-2.5 (3) MG/3ML SOLN nebulizer solution Take 1 vial by nebulization every 6 hours as needed for Shortness of Breath 22   Historical Provider, MD   Ascorbic Acid (VITAMIN C) 250 MG tablet Take 250 mg by mouth 2 times daily    Historical Provider, MD   ferrous gluconate 324 (37.5 Fe) MG TABS Take 1 tablet by mouth 2 times daily 22   Adilia Dumont MD   valACYclovir (VALTREX) 500 MG tablet Take 1 tablet by mouth daily 4/28/22   Flo Connolly MD   LEVEMIR FLEXTOUCH 100 UNIT/ML injection pen Inject 40 Units into the skin nightly 3/27/22   Santiago White MD   NOVOLOG FLEXPEN 100 UNIT/ML injection pen Inject 15 Units into the skin 3 times daily (before meals) 3/27/22   Santiago White MD   atorvastatin (LIPITOR) 80 MG tablet Take 80 mg by mouth daily 10/26/21   Historical Provider, MD   acetaminophen-codeine (TYLENOL #3) 300-30 MG per tablet Take 1 tablet by mouth every 6 hours as needed for Pain. 5/20/21   Historical Provider, MD   finasteride (PROSCAR) 5 MG tablet Take 5 mg by mouth daily 5/24/21   Historical Provider, MD   tamsulosin (FLOMAX) 0.4 MG capsule Take 0.4 mg by mouth daily    Historical Provider, MD   glucose monitoring kit (FREESTYLE) monitoring kit 1 kit by Does not apply route daily as needed (glucose checks) 3/31/17   Florentino Guerra MD   Insulin Syringe-Needle U-100 30G X 1/2\" 0.5 ML MISC 1 each by Does not apply route daily 3/31/17   Florentino Guerra MD   metFORMIN (GLUCOPHAGE) 1000 MG tablet Take 1,000 mg by mouth 2 times daily (with meals)    Historical Provider, MD   omeprazole (PRILOSEC) 20 MG delayed release capsule Take 20 mg by mouth daily as needed (GERD)    Historical Provider, MD   Immune Globulin, Human, 20 GM/200ML SOLN solution Infuse 20 g intravenously every 30 days 05/14/19 Given through infusion therapy states he is due on the 20 of May    Historical Provider, MD         Medications:   Medications:    vancomycin  1,500 mg IntraVENous Once      Infusions:   PRN Meds:      Data:     Laboratory this visit:  Reviewed  Recent Labs     10/22/22  0905   WBC 37.6*   HGB 10.8*   HCT 34.8*   PLT 85*      Recent Labs     10/22/22  0905   *   K 4.7   CL 95*   CO2 24   BUN 18   CREATININE 0.7*     Recent Labs     10/22/22  0905   AST 16   ALT 19   BILITOT 0.7   ALKPHOS 176*     No results for input(s): INR in the last 72 hours. Radiology this visit:  Reviewed.     CT HEAD WO CONTRAST    Result Date: 9/28/2022  EXAMINATION: CT OF THE HEAD WITHOUT CONTRAST  9/28/2022 2:16 pm TECHNIQUE: CT of the head was performed without the administration of intravenous contrast. Automated exposure control, iterative reconstruction, and/or weight based adjustment of the mA/kV was utilized to reduce the radiation dose to as low as reasonably achievable. COMPARISON: None. HISTORY: ORDERING SYSTEM PROVIDED HISTORY: history of CLL , generalized weakness, down on floor all night TECHNOLOGIST PROVIDED HISTORY: Has a \"code stroke\" or \"stroke alert\" been called? ->No Reason for exam:->history of CLL , generalized weakness, down on floor all night Decision Support Exception - unselect if not a suspected or confirmed emergency medical condition->Emergency Medical Condition (MA) Reason for Exam: history of CLL , generalized weakness, down on floor all night FINDINGS: BRAIN/VENTRICLES: There is no acute intracranial hemorrhage, mass effect or midline shift. No abnormal extra-axial fluid collection. The gray-white differentiation is maintained without evidence of an acute infarct. There is prominence of the ventricles and sulci due to global parenchymal volume loss. There are nonspecific areas of hypoattenuation within the periventricular and subcortical white matter, which likely represent chronic microvascular ischemic change. There vascular calcifications. ORBITS: The visualized portion of the orbits demonstrate no acute abnormality. SINUSES: Osteoneogenesis. There bilateral maxillary sinus air-fluid levels which can be seen with acute on chronic bacterial sinusitis. Mastoids are clear. Moderate severe ethmoid sinus mucosal thickening. SOFT TISSUES/SKULL: No acute abnormality of the visualized skull or soft tissues. Chronic microvascular disease. Negative acute bleed, midline shift or mass effect. Findings worrisome for acute on chronic maxillary sinus disease. Please correlate exam findings.      NM HEPATOBILIARY    Result Date: 10/2/2022  EXAMINATION: NUCLEAR MEDICINE HEPATOBILIARY SCINTIGRAPHY (HIDA SCAN). 10/2/2022 2:54 pm TECHNIQUE: Approximately 4.6 mCi Tc-99m Mebrofenin (Choletec) was administered IV. Then, dynamic images of the abdomen were obtained in the anterior projection for 60 min(s). A right lateral view was also obtained at 60 min(s). COMPARISON: 09/28/2022 CT abdomen pelvis HISTORY: ORDERING SYSTEM PROVIDED HISTORY: RUQ ABD PAIN R/O [facility] CHOLECYSTITIS TECHNOLOGIST PROVIDED HISTORY: Reason for exam:->RUQ ABD PAIN R/O AC CHOLECYSTITIS Reason for Exam: RUQ pain r/o acute cholecystitis FINDINGS: Prompt, homogenous uptake by the liver is noted with normal appearance of radiotracer excretion into the biliary system. Clearance of blood pool activity appears appropriate. Gallbladder and small bowel is visualized in appropriate sequence. No convincing scintigraphic evidence of acute cholecystitis. CT ABDOMEN PELVIS W IV CONTRAST Additional Contrast? None    Result Date: 9/28/2022  EXAMINATION: CTA OF THE CHEST; CT OF THE ABDOMEN AND PELVIS WITH CONTRAST 9/28/2022 2:17 pm TECHNIQUE: CTA of the chest was performed after the administration of intravenous contrast.  Multiplanar reformatted images are provided for review. MIP images are provided for review. Automated exposure control, iterative reconstruction, and/or weight based adjustment of the mA/kV was utilized to reduce the radiation dose to as low as reasonably achievable.; CT of the abdomen and pelvis was performed with the administration of intravenous contrast. Multiplanar reformatted images are provided for review. Automated exposure control, iterative reconstruction, and/or weight based adjustment of the mA/kV was utilized to reduce the radiation dose to as low as reasonably achievable.  COMPARISON: July 28, 2022 HISTORY: ORDERING SYSTEM PROVIDED HISTORY: Tachycardic, short of breath, history of CLL TECHNOLOGIST PROVIDED HISTORY: Reason for exam:->Tachycardic, short of breath, history of CLL Decision Support Exception - unselect if not a suspected or confirmed emergency medical condition->Emergency Medical Condition (MA) Reason for Exam: Tachycardic, short of breath, history of CLL FINDINGS: Pulmonary Arteries: Pulmonary arteries are adequately opacified for evaluation. No evidence of intraluminal filling defect to suggest pulmonary embolism. Main pulmonary artery is normal in caliber. Mediastinum: Mediastinal and hilar lymphadenopathy bilaterally. Subcarinal lymph node measures up to 3.1 cm in short axis. Right hilar lymph node measures at least 2.1 cm in short axis. This appears worse from exam on July 28, 2022. Largest subcarinal lymph node on that exam measured up to 1.9 cm in short axis. Normal caliber thoracic aorta without acute finding. The heart is not enlarged. No pericardial effusion. Lungs/pleura: Consolidations in the right lower lobe and minimally in the left lower lobe concerning for pneumonia. No pleural effusion or pneumothorax. Background of mild emphysema. Bronchial thickening in the lower lobes. Soft Tissues/Bones: No acute bone or soft tissue abnormality. CT abdomen and pelvis Liver: Normal appearance of the liver. Gallbladder: Some pericholecystic fluid and mild gallbladder wall thickening. Perhaps mild intrahepatic bile duct dilatation. There are findings suspicious for a mass near the charlotte hepatis measuring 3.5 x 6.2 cm. Additional charlotte hepatis lymph nodes identified near the pancreas and IVC. Spleen: The spleen is enlarged. Measures at least 18 cm. Pancreas: No inflammatory changes or fluid collections. Charlotte hepatis masses favoring lymphadenopathy. Adrenal Glands: No focal adrenal abnormalities identified. Kidneys: No hydronephrosis. Small left renal cyst. Stomach: The stomach is nondistended. Small bowel: No evidence of small bowel obstruction. Colon: No signficant pericolonic inflammatory changes. Appendix: Normal appearance of the appendix. Normal caliber aorta with atherosclerosis. Retroperitoneal lymphadenopathy measures up to 2.0 cm in short axis in the left periaortic region. Mild free fluid in the pelvis. Partially distended urinary bladder. No acute findings of the osseous structures or subcutaneous soft tissues. No evidence of pulmonary embolism. Right greater than left lower lobe pneumonia. Diffuse hilar and mediastinal lymphadenopathy, concerning for malignancy with history of CLL, also likely progressed from prior exam. Diffuse peritoneal and retroperitoneal lymphadenopathy in the upper abdomen most significant near the cassi hepatis and IVC where there is a large mass which is favored to be a lymph node measuring up to 6.2 x 3.5 cm, also likely progressed from exam. Splenomegaly, could also be related to malignancy. Mild gallbladder wall thickening and pericholecystic fluid, could be related to passive congestion or mass effect from adjacent lymphadenopathy. There is mild intrahepatic bile duct dilatation. XR CHEST PORTABLE    Result Date: 10/22/2022  EXAMINATION: ONE XRAY VIEW OF THE CHEST 10/22/2022 8:49 am COMPARISON: Chest radiograph 10/04/2022, chest CTA 09/28/2022 HISTORY: ORDERING SYSTEM PROVIDED HISTORY: Cough, left-sided chest pain TECHNOLOGIST PROVIDED HISTORY: Reason for exam:->Cough, left-sided chest pain Reason for Exam: Cough, left-sided chest pain FINDINGS: Unchanged right internal jugular central venous port catheter. Overlying tubing. At least mild bilateral peribronchial cuffing. Persistent patchy airspace opacities in the mid to basilar right lung with possible cavitation in the base. Left basilar airspace opacity. No definite findings of pneumothorax or pleural effusion. Unchanged mediastinal prominence due to aortic tortuosity. Normal hilar and cardiac contours. No obvious acute fracture. Joints maintain anatomic alignment.      1. Persistent patchy airspace opacities in the mid to basilar right lung suspicious for pneumonia or aspiration given the prior CT appearance. There may be cavitation in the right lung base. 2. Minimal left basilar airspace opacity more likely due to atelectasis than pneumonia or aspiration. 3. At least mild peribronchial cuffing potentially due to reactive airways disease or bronchitis. XR CHEST PORTABLE    Result Date: 10/4/2022  EXAMINATION: ONE XRAY VIEW OF THE CHEST 10/4/2022 11:53 am COMPARISON: 10/02/2022 HISTORY: ORDERING SYSTEM PROVIDED HISTORY: post right thoracentesis TECHNOLOGIST PROVIDED HISTORY: Reason for exam:->post right thoracentesis Reason for Exam: post right thora Follow-up exam FINDINGS: Interval decrease in in size of right pleural effusion. No obvious pneumothorax. Persistent bibasilar opacities are unchanged. Right chest port is stable in positioning. Stable cardiac silhouette. The osseous structures are stable. Slight interval decrease in size of right pleural effusion. No pneumothorax. XR CHEST PORTABLE    Result Date: 10/3/2022  EXAMINATION: ONE XRAY VIEW OF THE CHEST 10/2/2022 11:53 am COMPARISON: 09/28/2022 HISTORY: ORDERING SYSTEM PROVIDED HISTORY: persistent hypoxia TECHNOLOGIST PROVIDED HISTORY: Reason for exam:->persistent hypoxia Reason for Exam: persistent hypoxia FINDINGS: Right chest wall Port-A-Cath line with tip in the SVC. Small bilateral pleural effusions. Slight increase of the right lung patchy basilar opacity. Interstitial opacities noted throughout both lungs are stable. No pneumothorax. Cardiomediastinal silhouette and bony thorax are unchanged. New small right pleural effusion with worsening right lower lobe opacity may represent worsening pneumonia.      XR CHEST PORTABLE    Result Date: 9/28/2022  EXAMINATION: ONE XRAY VIEW OF THE CHEST 9/28/2022 12:44 pm COMPARISON: CT chest July 28, 2022 images; CT chest May 4, 2022 HISTORY: ORDERING SYSTEM PROVIDED HISTORY: chest pain TECHNOLOGIST PROVIDED HISTORY: Reason for exam:->chest pain Reason for Exam: chest pain FINDINGS: Cardiac silhouette is stable. Right-sided MediPort in place. Patchy interstitial changes of the lungs bilaterally which are nonspecific and can be seen in the setting of pulmonary edema as well as in the setting of multifocal atypical/viral infectious process. No well-defined consolidation. No pleural effusion. No pneumothorax. Osseous structures appear intact. 1. Subtle patchy interstitial changes of the lungs which can be seen in setting of 0 pulmonary edema as well as multifocal atypical/viral infectious processes. No well-defined consolidation. CTA CHEST W CONTRAST    Result Date: 9/28/2022  EXAMINATION: CTA OF THE CHEST; CT OF THE ABDOMEN AND PELVIS WITH CONTRAST 9/28/2022 2:17 pm TECHNIQUE: CTA of the chest was performed after the administration of intravenous contrast.  Multiplanar reformatted images are provided for review. MIP images are provided for review. Automated exposure control, iterative reconstruction, and/or weight based adjustment of the mA/kV was utilized to reduce the radiation dose to as low as reasonably achievable.; CT of the abdomen and pelvis was performed with the administration of intravenous contrast. Multiplanar reformatted images are provided for review. Automated exposure control, iterative reconstruction, and/or weight based adjustment of the mA/kV was utilized to reduce the radiation dose to as low as reasonably achievable.  COMPARISON: July 28, 2022 HISTORY: ORDERING SYSTEM PROVIDED HISTORY: Tachycardic, short of breath, history of CLL TECHNOLOGIST PROVIDED HISTORY: Reason for exam:->Tachycardic, short of breath, history of CLL Decision Support Exception - unselect if not a suspected or confirmed emergency medical condition->Emergency Medical Condition (MA) Reason for Exam: Tachycardic, short of breath, history of CLL FINDINGS: Pulmonary Arteries: Pulmonary arteries are adequately opacified for evaluation. No evidence of intraluminal filling defect to suggest pulmonary embolism. Main pulmonary artery is normal in caliber. Mediastinum: Mediastinal and hilar lymphadenopathy bilaterally. Subcarinal lymph node measures up to 3.1 cm in short axis. Right hilar lymph node measures at least 2.1 cm in short axis. This appears worse from exam on July 28, 2022. Largest subcarinal lymph node on that exam measured up to 1.9 cm in short axis. Normal caliber thoracic aorta without acute finding. The heart is not enlarged. No pericardial effusion. Lungs/pleura: Consolidations in the right lower lobe and minimally in the left lower lobe concerning for pneumonia. No pleural effusion or pneumothorax. Background of mild emphysema. Bronchial thickening in the lower lobes. Soft Tissues/Bones: No acute bone or soft tissue abnormality. CT abdomen and pelvis Liver: Normal appearance of the liver. Gallbladder: Some pericholecystic fluid and mild gallbladder wall thickening. Perhaps mild intrahepatic bile duct dilatation. There are findings suspicious for a mass near the charlotte hepatis measuring 3.5 x 6.2 cm. Additional charlotte hepatis lymph nodes identified near the pancreas and IVC. Spleen: The spleen is enlarged. Measures at least 18 cm. Pancreas: No inflammatory changes or fluid collections. Charlotte hepatis masses favoring lymphadenopathy. Adrenal Glands: No focal adrenal abnormalities identified. Kidneys: No hydronephrosis. Small left renal cyst. Stomach: The stomach is nondistended. Small bowel: No evidence of small bowel obstruction. Colon: No signficant pericolonic inflammatory changes. Appendix: Normal appearance of the appendix. Normal caliber aorta with atherosclerosis. Retroperitoneal lymphadenopathy measures up to 2.0 cm in short axis in the left periaortic region. Mild free fluid in the pelvis. Partially distended urinary bladder.  No acute findings of the osseous structures or subcutaneous soft tissues. No evidence of pulmonary embolism. Right greater than left lower lobe pneumonia. Diffuse hilar and mediastinal lymphadenopathy, concerning for malignancy with history of CLL, also likely progressed from prior exam. Diffuse peritoneal and retroperitoneal lymphadenopathy in the upper abdomen most significant near the cassi hepatis and IVC where there is a large mass which is favored to be a lymph node measuring up to 6.2 x 3.5 cm, also likely progressed from exam. Splenomegaly, could also be related to malignancy. Mild gallbladder wall thickening and pericholecystic fluid, could be related to passive congestion or mass effect from adjacent lymphadenopathy. There is mild intrahepatic bile duct dilatation. US ABDOMEN LIMITED    Result Date: 9/29/2022  EXAMINATION: RIGHT UPPER QUADRANT ULTRASOUND 9/29/2022 5:44 am COMPARISON: CT from September 28, 2022 HISTORY: ORDERING SYSTEM PROVIDED HISTORY: RUQ pain TECHNOLOGIST PROVIDED HISTORY: Reason for exam:->RUQ pain FINDINGS: LIVER:  There is a 2.9 x 1.5 x 2.5 cm echogenic focus in the right hepatic lobe. The liver appears enlarged measuring up to 22.9 cm. Increased echogenicity may suggest hepatic steatosis. Trace adjacent abdominal ascites. BILIARY SYSTEM:  Gallbladder wall appears mildly thickened. Possible adenomyomatosis measuring 0.2 x 0.2 x 0.2 cm, less likely cholesterol polyp. Negative sonographic Choi sign. No pericholecystic fluid. Common bile duct is within normal limits measuring 1.7 mm. Mass near the cassi hepatis measures up to 1.6 x 0.5 x 2.2 cm as seen on recent CT. RIGHT KIDNEY: The right kidney is grossly unremarkable without evidence of hydronephrosis. PANCREAS:  The pancreas is hyperechoic and somewhat bulky in appearance, nonspecific. OTHER: Trace abdominal ascites. 1. Hepatomegaly and hepatic steatosis. 2. Masses most compatible with lymphadenopathy in the cassi hepatis, better seen on recent CT.  3. Hyperechoic focus in the liver could be focal fatty infiltration, hemangioma or other mass. This is too small to characterize on recent CT. Correlate with MRI hepatic mass protocol if indicated. 4. Gallbladder wall thickening with possible adenomyomatosis. IR GUIDED THORACENTESIS PLEURAL    Result Date: 10/5/2022  PROCEDURE: ULTRASOUND GUIDED RIGHT THORACENTESIS 10/5/2022 HISTORY: ORDERING SYSTEM PROVIDED HISTORY: Right pleural effusion TECHNOLOGIST PROVIDED HISTORY: Reason for exam:->pleural effusion Which side should the procedure be performed?->Right TECHNIQUE: Informed consent was obtained after a detailed explanation of the procedure including risks, benefits, and alternatives. Universal protocol was observed including a time-out to confirm the correct patient and procedure. The right posterior chest was prepped and draped in sterile fashion and local anesthesia was achieved with lidocaine. A 5 Western Keila Yueh catheter over its stylette was advanced into the pleural space under ultrasound guidance. The catheter was advanced off the stylet which was removed. The catheter was attached to a vacuum container for fluid evacuation. The catheter was removed and a sterile dressing was applied. The patient tolerated the procedure well. No immediate complication. Estimated blood loss: 0 mL FINDINGS: A total of 450 mL kwame pleural fluid was removed. Successful ultrasound guided right thoracentesis.            EKG this visit:  personally reviewed         Electronically signed by JENNI Delgado CNP on 10/22/2022 at 11:17 AM

## 2022-10-22 NOTE — ED PROVIDER NOTES
EKG sinus tachycardia, rate of 114, AL interval 136, cures duration 86, QT/QTc 314/432, no ST elevation noted.      Sruthi Flanagan,   10/22/22 1042

## 2022-10-22 NOTE — CONSULTS
CARDIOLOGY CONSULT NOTE   Reason for consultation:  Chest pain     Referring physician:  Elham Gross MD     Primary care physician: Lula Mullen MD      Chief Complaints :  Chief Complaint   Patient presents with    Fever    Shortness of Breath        History of present illness:Cornel is a 71 y. o.year old who  presents with recurrent pneumonia and severe coughing. I am asked to see him for chest pain. His pain is mostly with coughing and localized to left upper ribs. He denies any exertional pain. No chest pressure, orthopnea, PND  Past medical history:    has a past medical history of Arthritis, CAD (coronary artery disease), Cancer ([facility] [address] Diabetes mellitus ([facility] [address] History of blood transfusion, Hx of motion sickness, Hyperlipidemia, MDRO (multiple drug resistant organisms) resistance, Migraine, Pneumonia, Wears dentures, and Wears glasses. Past surgical history:   has a past surgical history that includes knee surgery (1970); Tunneled venous port placement (2013); Colonoscopy (2010); fracture surgery (Left, 1990's); Cardiac catheterization (1990's); Tonsillectomy (late 1960's); Dental surgery; eye surgery (Right, 08/2016); and other surgical history (Left, 01/18/2017). Social History:   reports that he quit smoking about 35 years ago. His smoking use included cigarettes. He started smoking about 57 years ago. He has a 20.00 pack-year smoking history. He has never used smokeless tobacco. He reports that he does not drink alcohol and does not use drugs.   Family history:   no family history of CAD, STROKE of DM at early age    No Known Allergies    sodium chloride flush 0.9 % injection 5-40 mL, 2 times per day  sodium chloride flush 0.9 % injection 5-40 mL, PRN  0.9 % sodium chloride infusion, PRN  enoxaparin (LOVENOX) injection 40 mg, Daily  ondansetron (ZOFRAN-ODT) disintegrating tablet 4 mg, Q8H PRN   Or  ondansetron (ZOFRAN) injection 4 mg, Q6H PRN  polyethylene glycol (GLYCOLAX) packet 17 g, Daily PRN  acetaminophen (TYLENOL) tablet 650 mg, Q6H PRN   Or  acetaminophen (TYLENOL) suppository 650 mg, Q6H PRN  insulin lispro (HUMALOG) injection vial 0-4 Units, TID WC  insulin lispro (HUMALOG) injection vial 0-4 Units, Nightly  benzonatate (TESSALON) capsule 100 mg, TID PRN  guaiFENesin (MUCINEX) extended release tablet 600 mg, BID  albuterol sulfate HFA (PROVENTIL;VENTOLIN;PROAIR) 108 (90 Base) MCG/ACT inhaler 2 puff, Q4H PRN  ascorbic acid (VITAMIN C) tablet 250 mg, BID  atorvastatin (LIPITOR) tablet 80 mg, Daily  ferrous gluconate 324 (37.5 Fe) MG tablet 324 mg, BID  finasteride (PROSCAR) tablet 5 mg, Daily  gabapentin (NEURONTIN) capsule 100 mg, TID  HYDROcodone-acetaminophen (NORCO) 5-325 MG per tablet 1 tablet, Q12H PRN  pantoprazole (PROTONIX) tablet 40 mg, QAM AC  tamsulosin (FLOMAX) capsule 0.4 mg, Daily  0.9 % sodium chloride infusion, Continuous  insulin glargine (LANTUS) injection vial 40 Units, Nightly  vancomycin (VANCOCIN) 1,000 mg in dextrose 5 % 250 mL IVPB ([id number]), Q12H  piperacillin-tazobactam (ZOSYN) 4,500 mg in dextrose 5 % 100 mL IVPB (mini-bag), Q6H  morphine (PF) injection 2 mg, Q4H PRN  ipratropium-albuterol (DUONEB) nebulizer solution 1 ampule, Q4H PRN    sodium chloride flush 0.9 % injection 10 mL, PRN      Current Facility-Administered Medications   Medication Dose Route Frequency Provider Last Rate Last Admin    sodium chloride flush 0.9 % injection 5-40 mL  5-40 mL IntraVENous 2 times per day Clary Connor, APRN - CNP        sodium chloride flush 0.9 % injection 5-40 mL  5-40 mL IntraVENous PRN Josefina Ann APRN - CNP        0.9 % sodium chloride infusion   IntraVENous PRN Clary Connor, APRN - CNP        enoxaparin (LOVENOX) injection 40 mg  40 mg SubCUTAneous Daily Clary Connor, APRN - CNP   40 mg at 10/22/22 1439    ondansetron (ZOFRAN-ODT) disintegrating tablet 4 mg  4 mg Oral Q8H PRN Clary Connor, APRN - CNP        Or    ondansetron (ZOFRAN) injection 4 mg 4 mg IntraVENous Q6H PRN JENNI Ramsay CNP        polyethylene glycol (GLYCOLAX) packet 17 g  17 g Oral Daily PRN JENNI Ramsay CNP        acetaminophen (TYLENOL) tablet 650 mg  650 mg Oral Q6H PRN JENNI Ramsay CNP        Or    acetaminophen (TYLENOL) suppository 650 mg  650 mg Rectal Q6H PRN JENNI Ramsay CNP        insulin lispro (HUMALOG) injection vial 0-4 Units  0-4 Units SubCUTAneous TID  JENNI Mckeon CNP        insulin lispro (HUMALOG) injection vial 0-4 Units  0-4 Units SubCUTAneous Nightly JENNI Ramsay CNP        benzonatate (TESSALON) capsule 100 mg  100 mg Oral TID PRN JENNI Ramsay CNP   100 mg at 10/22/22 1445    guaiFENesin (MUCINEX) extended release tablet 600 mg  600 mg Oral BID JENNI Ramsay CNP   600 mg at 10/22/22 1438    albuterol sulfate HFA (PROVENTIL;VENTOLIN;PROAIR) 108 (90 Base) MCG/ACT inhaler 2 puff  2 puff Inhalation Q4H PRN JENNI Ramsay CNP        ascorbic acid (VITAMIN C) tablet 250 mg  250 mg Oral BID JENNI Ramsay CNP   250 mg at 10/22/22 1439    atorvastatin (LIPITOR) tablet 80 mg  80 mg Oral Daily JENNI Ramsay CNP        ferrous gluconate 324 (37.5 Fe) MG tablet 324 mg  324 mg Oral BID JENNI Ramsay CNP   324 mg at 10/22/22 1439    finasteride (PROSCAR) tablet 5 mg  5 mg Oral Daily JENNI Ramsay CNP   5 mg at 10/22/22 1438    gabapentin (NEURONTIN) capsule 100 mg  100 mg Oral TID JENNI Ramsay CNP   100 mg at 10/22/22 1438    HYDROcodone-acetaminophen (NORCO) 5-325 MG per tablet 1 tablet  1 tablet Oral Q12H PRN JENNI Ramsay CNP   1 tablet at 10/22/22 1445    pantoprazole (PROTONIX) tablet 40 mg  40 mg Oral QAM  JENNI Mckeon CNP   40 mg at 10/22/22 1438    tamsulosin (FLOMAX) capsule 0.4 mg  0.4 mg Oral Daily JENNI Ramsay CNP   0.4 mg at 10/22/22 1438    0.9 % sodium chloride infusion IntraVENous Continuous JENNI Almonte CNP 75 mL/hr at 10/22/22 1436 New Bag at 10/22/22 1436    insulin glargine (LANTUS) injection vial 40 Units  40 Units SubCUTAneous Nightly JENNI Mckeon CNP        vancomycin (VANCOCIN) 1,000 mg in dextrose 5 % 250 mL IVPB ([id number])  1,000 mg IntraVENous Q12H JENNI Mckeon CNP        piperacillin-tazobactam (ZOSYN) 4,500 mg in dextrose 5 % 100 mL IVPB (mini-bag)  4,500 mg IntraVENous Q6H JENNI Almonte CNP   Stopped at 10/22/22 1553    morphine (PF) injection 2 mg  2 mg IntraVENous Q4H PRN Azalee Overall, MD        ipratropium-albuterol (DUONEB) nebulizer solution 1 ampule  1 ampule Inhalation Q4H PRN Azalee Overall, MD         Facility-Administered Medications Ordered in Other Encounters   Medication Dose Route Frequency Provider Last Rate Last Admin    sodium chloride flush 0.9 % injection 10 mL  10 mL IntraVENous PRN Francesco Cherry MD         Review of Systems:   Constitutional: + Fever   Eyes: No Decreased Vision  ENT: No Headaches, Hearing Loss or Vertigo  Cardiovascular: As per HPI  Respiratory: As per HPI  Gastrointestinal: No abdominal pain, appetite loss, blood in stools, constipation, diarrhea or heartburn  Genitourinary: No dysuria, trouble voiding, or hematuria  Musculoskeletal:  No gait disturbance, weakness or joint complaints  Integumentary: No rash or pruritis  Neurological: No TIA or stroke symptoms  Psychiatric: No anxiety or depression  Endocrine: No malaise, fatigue or temperature intolerance  Hematologic/Lymphatic: No bleeding problems, blood clots or swollen lymph nodes  Allergic/Immunologic: No nasal congestion or hives  All systems negative except as marked. Physical Examination:    Vitals:    10/22/22 1515   BP:    Pulse:    Resp: 16   Temp:    SpO2:         General Appearance:  No distress, conversant    Constitutional:  No acute distress, Non-toxic appearance.     HENT:  Normocephalic, Atraumatic, Bilateral external ears normal, Oropharynx moist,   Eyes:  PERRL, EOMI, Conjunctiva normal, No discharge. Respiratory:  Normal breath sounds, No respiratory distress, No wheezing  Cardiovascular: S1, S2, no murmurs, gallops. JVD wnl  Abdomen /GI:  Bowel sounds normal, Soft, No tenderness   Genitourinary: No costovertebral angle tenderness   Musculoskeletal:  No edema, no tenderness, no deformities. Integument:  Well hydrated, no rash   Neurologic:  Alert & oriented x 3, no focal deficits noted       Medical decision making and Data review:    Lab Review   Recent Labs     10/22/22  0905   WBC 37.6*   HGB 10.8*   HCT 34.8*   PLT 85*      Recent Labs     10/22/22  0905   *   K 4.7   CL 95*   CO2 24   BUN 18   CREATININE 0.7*     Recent Labs     10/22/22  0905   AST 16   ALT 19   BILITOT 0.7   ALKPHOS 176*     Recent Labs     10/22/22  0905   TROPONINT 0.010*       Recent Labs     10/22/22  0905   PROBNP 774.5*     Lab Results   Component Value Date    INR 1.08 10/04/2022    PROTIME 14.0 10/04/2022       EKG: (reviewed by myself): No ischemic changes    All labs, medications and tests reviewed by myself including data  from outside source , patient and available family . Continue all other medications of all above medical condition listed as is. Impression and Recommendations:    Non cardiac chest pain      71 y. o.year old with above medical history. His pain is non cardiac in nature. I would recommend to treat his underlying pulmonary illness. Thank you  much for consult and giving us the opportunity in contributing in the care of this patient. Please feel free to call me for any questions.        Annette Marshall MD, 10/22/2022 5:26 PM

## 2022-10-22 NOTE — RT PROTOCOL NOTE
RT Inhaler-Nebulizer Bronchodilator Protocol Note    There is a bronchodilator order in the chart from a provider indicating to follow the RT Bronchodilator Protocol and there is an Initiate RT Inhaler-Nebulizer Bronchodilator Protocol order as well (see protocol at bottom of note). CXR Findings:  XR CHEST PORTABLE    Result Date: 10/22/2022  1. Persistent patchy airspace opacities in the mid to basilar right lung suspicious for pneumonia or aspiration given the prior CT appearance. There may be cavitation in the right lung base. 2. Minimal left basilar airspace opacity more likely due to atelectasis than pneumonia or aspiration. 3. At least mild peribronchial cuffing potentially due to reactive airways disease or bronchitis. The findings from the last RT Protocol Assessment were as follows:   History Pulmonary Disease: None or smoker <15 pack years  Respiratory Pattern: Regular pattern and RR 12-20 bpm  Breath Sounds: Slightly diminished and/or crackles  Cough: Strong, spontaneous, non-productive  Indication for Bronchodilator Therapy: On home bronchodilators (only PRN at home)  Bronchodilator Assessment Score: 2    Aerosolized bronchodilator medication orders have been revised according to the RT Inhaler-Nebulizer Bronchodilator Protocol below. Respiratory Therapist to perform RT Therapy Protocol Assessment initially then follow the protocol. Repeat RT Therapy Protocol Assessment PRN for score 0-3 or on second treatment, BID, and PRN for scores above 3. No Indications - adjust the frequency to every 6 hours PRN wheezing or bronchospasm, if no treatments needed after 48 hours then discontinue using Per Protocol order mode. If indication present, adjust the RT bronchodilator orders based on the Bronchodilator Assessment Score as indicated below.   Use Inhaler orders unless patient has one or more of the following: on home nebulizer, not able to hold breath for 10 seconds, is not alert and oriented, cannot activate and use MDI correctly, or respiratory rate 25 breaths per minute or more, then use the equivalent nebulizer order(s) with same Frequency and PRN reasons based on the score. If a patient is on this medication at home then do not decrease Frequency below that used at home. 0-3 - enter or revise RT bronchodilator order(s) to equivalent RT Bronchodilator order with Frequency of every 4 hours PRN for wheezing or increased work of breathing using Per Protocol order mode. 4-6 - enter or revise RT Bronchodilator order(s) to two equivalent RT bronchodilator orders with one order with BID Frequency and one order with Frequency of every 4 hours PRN wheezing or increased work of breathing using Per Protocol order mode. 7-10 - enter or revise RT Bronchodilator order(s) to two equivalent RT bronchodilator orders with one order with TID Frequency and one order with Frequency of every 4 hours PRN wheezing or increased work of breathing using Per Protocol order mode. 11-13 - enter or revise RT Bronchodilator order(s) to one equivalent RT bronchodilator order with QID Frequency and an Albuterol order with Frequency of every 4 hours PRN wheezing or increased work of breathing using Per Protocol order mode. Greater than 13 - enter or revise RT Bronchodilator order(s) to one equivalent RT bronchodilator order with every 4 hours Frequency and an Albuterol order with Frequency of every 2 hours PRN wheezing or increased work of breathing using Per Protocol order mode. RT to enter RT Home Evaluation for COPD & MDI Assessment order using Per Protocol order mode.     Electronically signed by Brandon Jenkins RCP on 10/22/2022 at 3:14 PM

## 2022-10-22 NOTE — ED NOTES
ED TO INPATIENT SBAR HANDOFF    Patient Name: Nisa Long   :  1950  71 y.o. MRN:  [id number]  Preferred Name  [facility] Room #:  ED18/ED-18  Family/Caregiver Present yes  Restraints no   Sitter no   Sepsis Risk Score Sepsis Risk Score: 8.46    Situation  Code Status: Prior No additional code details. Allergies: Patient has no known allergies. Weight: Patient Vitals for the past 96 hrs (Last 3 readings):   Weight   10/22/22 0945 170 lb (77.1 kg)     Arrived from: home  Chief Complaint:   Chief Complaint   Patient presents with    Fever    Shortness of [place] Problem/Diagnosis:  Active Problems:    * No active hospital problems. *  Resolved Problems:    * No resolved hospital problems. *    Imaging:   XR CHEST PORTABLE   Final Result   1. Persistent patchy airspace opacities in the mid to basilar right lung   suspicious for pneumonia or aspiration given the prior CT appearance. There   may be cavitation in the right lung base. 2. Minimal left basilar airspace opacity more likely due to atelectasis than   pneumonia or aspiration. 3. At least mild peribronchial cuffing potentially due to reactive airways   disease or bronchitis.            Abnormal labs:   Abnormal Labs Reviewed   RESPIRATORY PANEL, MOLECULAR, WITH COVID-19 - Abnormal; Notable for the following components:       Result Value    Rhinovirus Enterovirus PCR DETECTED BY PCR (*)     All other components within normal limits   CBC WITH AUTO DIFFERENTIAL - Abnormal; Notable for the following components:    WBC 37.6 (*)     RBC 4.32 (*)     Hemoglobin 10.8 (*)     Hematocrit 34.8 (*)     MCH 25.0 (*)     MCHC 31.0 (*)     RDW 21.1 (*)     Platelets 85 (*)     Bands Relative 1 (*)     Segs Relative 12.0 (*)     Lymphocytes % 85.0 (*)     All other components within normal limits   COMPREHENSIVE METABOLIC PANEL - Abnormal; Notable for the following components:    Sodium 130 (*)     Chloride 95 (*)     Creatinine 0.7 (*) Glucose 200 (*)     Calcium 8.2 (*)     Albumin 2.8 (*)     Total Protein 5.3 (*)     Alkaline Phosphatase 176 (*)     All other components within normal limits   TROPONIN - Abnormal; Notable for the following components:    Troponin T 0.010 (*)     All other components within normal limits   BRAIN NATRIURETIC PEPTIDE - Abnormal; Notable for the following components:    Pro-.5 (*)     All other components within normal limits   POCT GLUCOSE - Abnormal; Notable for the following components:    POC Glucose 198 (*)     All other components within normal limits     Critical values: yes     Abnormal Assessment Findings: WBC 37.6,+rhino, Sodium 130.     Background  History:   Past Medical History:   Diagnosis Date    Arthritis     knees, Rt hand/wrist    CAD (coronary artery disease)     Cancer (HCC)     CLL--Sees Dr Yesenia Ortez    Diabetes mellitus [facility])     History of blood transfusion     no reaction    Hx of motion sickness     Hyperlipidemia     MDRO (multiple drug resistant organisms) resistance     abscess on buttock 10yrs ago    Migraine     last one summer 2016    Pneumonia 2016    Wears dentures     full upper--lower dentures    Wears glasses        Assessment    Vitals/MEWS:    Level of Consciousness: Alert (0)   Vitals:    10/22/22 0832 10/22/22 0945   BP: (!) 142/85    Pulse: (!) 111 94   Resp: 20 22   Temp:  99.6 °F (37.6 °C)   TempSrc:  Oral   SpO2: 93%    Weight:  170 lb (77.1 kg)   Height:  6' (1.829 m)     FiO2 (%): nasal cannula for respiratory distress  O2 Flow Rate: O2 Device: High flow nasal cannula O2 Flow Rate (L/min): 3 L/min  Cardiac Rhythm:    Pain Assessment:  [x] Verbal [] Apurva Guild Scale  Pain Scale: Pain Assessment  Pain Assessment: 0-10  Pain Level: 5  Patient's Stated Pain Goal: 0 - No pain  Pain Location: Chest  Pain Descriptors: Discomfort  Pain Type: Acute pain  Pain Frequency: Intermittent  Last documented pain score (0-10 scale) Pain Level: 5  Last documented pain medication administered:   Mental Status: oriented and alert  C-SSRS: Risk of Suicide: No Risk  Bedside swallow:    [place] Coma Scale (GCS): Active LDA's:   Peripheral IV 10/22/22 Right Hand (Active)     PO Status: Regular  Pertinent or High Risk Medications/Drips: no   If Yes, please provide details:   Pending Blood Product Administration: no     You may also review the ED PT Care Timeline found under the Summary Nursing Index tab. Recommendation    Pending orders See sign and held  Plan for Discharge (if known): Additional Comments: pt was admitted and recently dc to home. Pt has wound on coccyx that pt has had, pt does have power port in upper right chest. I tried to access x2 and unable to access.  Placed IV in right hand   If any further questions, please call Sending RN at [phone]    Electronically signed by: Electronically signed by Saul Shannon RN on 10/22/2022 at 10:35 AM      Saul Shannon RN  10/22/22 2724

## 2022-10-22 NOTE — CONSULTS
[address] [facility]  consulted by JENNI Campbell-CNP for monitoring and adjustment. Indication for treatment: Pneumonia (HAP)  Goal trough: [] 10-15 mcg/mL or [x] 15-20 mcg/ml  AUC/MARTHA: [] <500 or [x] 400-600    Pertinent Laboratory Values:   Temp Readings from Last 3 Encounters:   10/22/22 97.7 °F (36.5 °C) (Oral)   10/11/22 (!) 96.4 °F (35.8 °C) (Infrared)   10/06/22 98.4 °F (36.9 °C) (Oral)     Recent Labs     10/22/22  0905   WBC 37.6*   LACTATE 1.6     Recent Labs     10/22/22  0905   BUN 18   CREATININE 0.7*     Estimated Creatinine Clearance: 106 mL/min (A) (based on SCr of 0.7 mg/dL (L)). No intake or output data in the 24 hours ending 10/22/22 1302    Pertinent Cultures:  Date    Source    Results  10/22   Nasal MRSA screen  Ordered  10/22   Blood    Ordered  10/22                          Resp Cx                                 Ordered  10/22                          Legionella/Strep pneumo       Ordered    Vancomycin level:   TROUGH:  No results for input(s): VANCOTROUGH in the last 72 hours. RANDOM:  No results for input(s): VANCORANDOM in the last 72 hours. Assessment:  SCr, BUN, and urine output: SCR close to baseline at 0.7, no UOP data  Pt also ordered on Zosyn with just one iv line for access. Day(s) of therapy: 1 of 7 (last dose 10/29 @11:00)  Vancomycin concentration:   10/23 - random level @06:00    Plan:  The patient received vancomycin 1500mg ivpb x1 dose earlier today. Start vancomycin 1000mg ivpb q12h later tonight for a predicted AUC of 488 at steady state. Check the vanco level tomorrow am.  Pharmacy will continue to monitor patient and adjust therapy as indicated    Jory 3 10/23 @06:00    Thank you for the consult. Nhi Dove, [address] [place] [address]  10/22/2022 1:02 PM

## 2022-10-23 ENCOUNTER — APPOINTMENT (OUTPATIENT)
Dept: CT IMAGING | Age: 72
DRG: 871 | End: 2022-10-23
Payer: COMMERCIAL

## 2022-10-23 LAB
ANION GAP SERPL CALCULATED.3IONS-SCNC: 15 MMOL/L (ref 4–16)
ANISOCYTOSIS: ABNORMAL
ATYPICAL LYMPHOCYTE ABSOLUTE COUNT: ABNORMAL
BANDED NEUTROPHILS ABSOLUTE COUNT: 1.04 K/CU MM
BANDED NEUTROPHILS RELATIVE PERCENT: 6 % (ref 5–11)
BASOPHILS ABSOLUTE: 0.2 K/CU MM
BASOPHILS RELATIVE PERCENT: 1 % (ref 0–1)
BUN BLDV-MCNC: 15 MG/DL (ref 6–23)
CALCIUM SERPL-MCNC: 7.3 MG/DL (ref 8.3–10.6)
CHLORIDE BLD-SCNC: 93 MMOL/L (ref 99–110)
CO2: 21 MMOL/L (ref 21–32)
CREAT SERPL-MCNC: 0.6 MG/DL (ref 0.9–1.3)
DIFFERENTIAL TYPE: ABNORMAL
EOSINOPHILS ABSOLUTE: 0.3 K/CU MM
EOSINOPHILS RELATIVE PERCENT: 2 % (ref 0–3)
ESTIMATED AVERAGE GLUCOSE: 151 MG/DL
GFR SERPL CREATININE-BSD FRML MDRD: >60 ML/MIN/1.73M2
GLUCOSE BLD-MCNC: 122 MG/DL (ref 70–99)
GLUCOSE BLD-MCNC: 137 MG/DL (ref 70–99)
GLUCOSE BLD-MCNC: 184 MG/DL (ref 70–99)
GLUCOSE BLD-MCNC: 246 MG/DL (ref 70–99)
GLUCOSE BLD-MCNC: 345 MG/DL (ref 70–99)
HBA1C MFR BLD: 6.9 % (ref 4.2–6.3)
HCT VFR BLD CALC: 30.2 % (ref 42–52)
HEMOGLOBIN: 9.1 GM/DL (ref 13.5–18)
HYPOCHROMIA: ABNORMAL
LACTATE: 1.2 MMOL/L (ref 0.4–2)
LACTATE: 1.3 MMOL/L (ref 0.4–2)
LACTATE: 2.1 MMOL/L (ref 0.4–2)
LYMPHOCYTES ABSOLUTE: 12.3 K/CU MM
LYMPHOCYTES RELATIVE PERCENT: 71 % (ref 24–44)
MCH RBC QN AUTO: 25 PG (ref 27–31)
MCHC RBC AUTO-ENTMCNC: 30.1 % (ref 32–36)
MCV RBC AUTO: 83 FL (ref 78–100)
PDW BLD-RTO: 20.9 % (ref 11.7–14.9)
PLATELET # BLD: 65 K/CU MM (ref 140–440)
PMV BLD AUTO: 9.5 FL (ref 7.5–11.1)
POTASSIUM SERPL-SCNC: 4.1 MMOL/L (ref 3.5–5.1)
PROCALCITONIN: 0.13
RBC # BLD: 3.64 M/CU MM (ref 4.6–6.2)
SEGMENTED NEUTROPHILS ABSOLUTE COUNT: 3.5 K/CU MM
SEGMENTED NEUTROPHILS RELATIVE PERCENT: 20 % (ref 36–66)
SMUDGE CELLS: PRESENT
SODIUM BLD-SCNC: 129 MMOL/L (ref 135–145)
TROPONIN T: <0.01 NG/ML
WBC # BLD: 17.3 K/CU MM (ref 4–10.5)

## 2022-10-23 PROCEDURE — 6360000002 HC RX W HCPCS: Performed by: HOSPITALIST

## 2022-10-23 PROCEDURE — 2700000000 HC OXYGEN THERAPY PER DAY

## 2022-10-23 PROCEDURE — 85027 COMPLETE CBC AUTOMATED: CPT

## 2022-10-23 PROCEDURE — 94761 N-INVAS EAR/PLS OXIMETRY MLT: CPT

## 2022-10-23 PROCEDURE — 85007 BL SMEAR W/DIFF WBC COUNT: CPT

## 2022-10-23 PROCEDURE — 6360000002 HC RX W HCPCS: Performed by: NURSE PRACTITIONER

## 2022-10-23 PROCEDURE — 1200000000 HC SEMI PRIVATE

## 2022-10-23 PROCEDURE — 6370000000 HC RX 637 (ALT 250 FOR IP): Performed by: NURSE PRACTITIONER

## 2022-10-23 PROCEDURE — 71250 CT THORAX DX C-: CPT

## 2022-10-23 PROCEDURE — 2580000003 HC RX 258: Performed by: STUDENT IN AN ORGANIZED HEALTH CARE EDUCATION/TRAINING PROGRAM

## 2022-10-23 PROCEDURE — 84484 ASSAY OF TROPONIN QUANT: CPT

## 2022-10-23 PROCEDURE — 80048 BASIC METABOLIC PNL TOTAL CA: CPT

## 2022-10-23 PROCEDURE — 83036 HEMOGLOBIN GLYCOSYLATED A1C: CPT

## 2022-10-23 PROCEDURE — 36415 COLL VENOUS BLD VENIPUNCTURE: CPT

## 2022-10-23 PROCEDURE — 2580000003 HC RX 258: Performed by: NURSE PRACTITIONER

## 2022-10-23 PROCEDURE — 99233 SBSQ HOSP IP/OBS HIGH 50: CPT | Performed by: INTERNAL MEDICINE

## 2022-10-23 PROCEDURE — 84145 PROCALCITONIN (PCT): CPT

## 2022-10-23 PROCEDURE — 83605 ASSAY OF LACTIC ACID: CPT

## 2022-10-23 PROCEDURE — 82962 GLUCOSE BLOOD TEST: CPT

## 2022-10-23 RX ORDER — SODIUM CHLORIDE 9 MG/ML
INJECTION, SOLUTION INTRAVENOUS CONTINUOUS
Status: DISPENSED | OUTPATIENT
Start: 2022-10-23 | End: 2022-10-24

## 2022-10-23 RX ADMIN — SODIUM CHLORIDE, PRESERVATIVE FREE 10 ML: 5 INJECTION INTRAVENOUS at 10:36

## 2022-10-23 RX ADMIN — SODIUM CHLORIDE: 9 INJECTION, SOLUTION INTRAVENOUS at 14:58

## 2022-10-23 RX ADMIN — GABAPENTIN 100 MG: 100 CAPSULE ORAL at 20:51

## 2022-10-23 RX ADMIN — PIPERACILLIN AND TAZOBACTAM 4500 MG: 4; .5 INJECTION, POWDER, FOR SOLUTION INTRAVENOUS at 10:35

## 2022-10-23 RX ADMIN — HYDROCODONE BITARTRATE AND ACETAMINOPHEN 1 TABLET: 5; 325 TABLET ORAL at 10:37

## 2022-10-23 RX ADMIN — PIPERACILLIN AND TAZOBACTAM 4500 MG: 4; .5 INJECTION, POWDER, FOR SOLUTION INTRAVENOUS at 02:00

## 2022-10-23 RX ADMIN — INSULIN GLARGINE 40 UNITS: 100 INJECTION, SOLUTION SUBCUTANEOUS at 20:59

## 2022-10-23 RX ADMIN — PIPERACILLIN AND TAZOBACTAM 4500 MG: 4; .5 INJECTION, POWDER, FOR SOLUTION INTRAVENOUS at 15:00

## 2022-10-23 RX ADMIN — Medication 324 MG: at 20:52

## 2022-10-23 RX ADMIN — VANCOMYCIN HYDROCHLORIDE 1000 MG: 1 INJECTION, POWDER, LYOPHILIZED, FOR SOLUTION INTRAVENOUS at 22:55

## 2022-10-23 RX ADMIN — ATORVASTATIN CALCIUM 80 MG: 40 TABLET, FILM COATED ORAL at 20:50

## 2022-10-23 RX ADMIN — OXYCODONE HYDROCHLORIDE AND ACETAMINOPHEN 250 MG: 500 TABLET ORAL at 10:40

## 2022-10-23 RX ADMIN — OXYCODONE HYDROCHLORIDE AND ACETAMINOPHEN 250 MG: 500 TABLET ORAL at 20:51

## 2022-10-23 RX ADMIN — FINASTERIDE 5 MG: 5 TABLET, FILM COATED ORAL at 10:40

## 2022-10-23 RX ADMIN — TAMSULOSIN HYDROCHLORIDE 0.4 MG: 0.4 CAPSULE ORAL at 10:36

## 2022-10-23 RX ADMIN — MORPHINE SULFATE 2 MG: 2 INJECTION, SOLUTION INTRAMUSCULAR; INTRAVENOUS at 06:03

## 2022-10-23 RX ADMIN — GUAIFENESIN 600 MG: 600 TABLET, EXTENDED RELEASE ORAL at 10:37

## 2022-10-23 RX ADMIN — MORPHINE SULFATE 2 MG: 2 INJECTION, SOLUTION INTRAMUSCULAR; INTRAVENOUS at 18:38

## 2022-10-23 RX ADMIN — ENOXAPARIN SODIUM 40 MG: 40 INJECTION SUBCUTANEOUS at 10:36

## 2022-10-23 RX ADMIN — VANCOMYCIN HYDROCHLORIDE 1000 MG: 1 INJECTION, POWDER, LYOPHILIZED, FOR SOLUTION INTRAVENOUS at 11:50

## 2022-10-23 RX ADMIN — GABAPENTIN 100 MG: 100 CAPSULE ORAL at 10:37

## 2022-10-23 RX ADMIN — GUAIFENESIN 600 MG: 600 TABLET, EXTENDED RELEASE ORAL at 20:50

## 2022-10-23 RX ADMIN — PIPERACILLIN AND TAZOBACTAM 4500 MG: 4; .5 INJECTION, POWDER, FOR SOLUTION INTRAVENOUS at 20:50

## 2022-10-23 RX ADMIN — GABAPENTIN 100 MG: 100 CAPSULE ORAL at 13:41

## 2022-10-23 RX ADMIN — Medication 324 MG: at 10:38

## 2022-10-23 RX ADMIN — BENZONATATE 100 MG: 100 CAPSULE ORAL at 10:38

## 2022-10-23 RX ADMIN — SODIUM CHLORIDE: 9 INJECTION, SOLUTION INTRAVENOUS at 10:35

## 2022-10-23 RX ADMIN — MORPHINE SULFATE 2 MG: 2 INJECTION, SOLUTION INTRAMUSCULAR; INTRAVENOUS at 13:44

## 2022-10-23 RX ADMIN — MORPHINE SULFATE 2 MG: 2 INJECTION, SOLUTION INTRAMUSCULAR; INTRAVENOUS at 01:58

## 2022-10-23 RX ADMIN — INSULIN LISPRO 1 UNITS: 100 INJECTION, SOLUTION INTRAVENOUS; SUBCUTANEOUS at 13:39

## 2022-10-23 RX ADMIN — PANTOPRAZOLE SODIUM 40 MG: 40 TABLET, DELAYED RELEASE ORAL at 05:59

## 2022-10-23 ASSESSMENT — PAIN - FUNCTIONAL ASSESSMENT
PAIN_FUNCTIONAL_ASSESSMENT: ACTIVITIES ARE NOT PREVENTED

## 2022-10-23 ASSESSMENT — PAIN DESCRIPTION - DESCRIPTORS
DESCRIPTORS: ACHING;SORE;DISCOMFORT
DESCRIPTORS: ACHING;SORE
DESCRIPTORS: ACHING;SORE;DISCOMFORT

## 2022-10-23 ASSESSMENT — PAIN DESCRIPTION - ORIENTATION
ORIENTATION: RIGHT;LEFT;POSTERIOR

## 2022-10-23 ASSESSMENT — PAIN DESCRIPTION - PAIN TYPE
TYPE: ACUTE PAIN

## 2022-10-23 ASSESSMENT — PAIN SCALES - GENERAL
PAINLEVEL_OUTOF10: 6
PAINLEVEL_OUTOF10: 7
PAINLEVEL_OUTOF10: 3
PAINLEVEL_OUTOF10: 2
PAINLEVEL_OUTOF10: 4
PAINLEVEL_OUTOF10: 7
PAINLEVEL_OUTOF10: 6
PAINLEVEL_OUTOF10: 5

## 2022-10-23 ASSESSMENT — PAIN DESCRIPTION - ONSET
ONSET: ON-GOING

## 2022-10-23 ASSESSMENT — PAIN DESCRIPTION - LOCATION
LOCATION: CHEST
LOCATION: BACK;CHEST
LOCATION: CHEST
LOCATION: CHEST;BACK
LOCATION: BACK;CHEST
LOCATION: CHEST

## 2022-10-23 ASSESSMENT — PAIN DESCRIPTION - FREQUENCY
FREQUENCY: INTERMITTENT

## 2022-10-23 NOTE — PLAN OF CARE
Problem: Discharge Planning  Goal: Discharge to home or other facility with appropriate resources  10/23/2022 0400 by Kelley Jimenez RN  Outcome: Progressing  10/22/2022 1552 by Glenys Hendrix RN  Outcome: Progressing     Problem: Pain  Goal: Verbalizes/displays adequate comfort level or baseline comfort level  10/23/2022 0400 by Kelley Jimenez RN  Outcome: Progressing  10/22/2022 1552 by Glenys Hendrix RN  Outcome: Progressing     Problem: ABCDS Injury Assessment  Goal: Absence of physical injury  10/23/2022 0400 by Kelley Jimenez RN  Outcome: Progressing  10/22/2022 1552 by Glenys Hendrix RN  Outcome: Progressing     Problem: Skin/Tissue Integrity  Goal: Absence of new skin breakdown  Description: 1. Monitor for areas of redness and/or skin breakdown  2. Assess vascular access sites hourly  3. Every 4-6 hours minimum:  Change oxygen saturation probe site  4. Every 4-6 hours:  If on nasal continuous positive airway pressure, respiratory therapy assess nares and determine need for appliance change or resting period.   10/23/2022 0400 by Kelley Jimenez RN  Outcome: Progressing  10/22/2022 1552 by Glenys Hendrix RN  Outcome: Progressing     Problem: Safety - Adult  Goal: Free from fall injury  Outcome: Progressing

## 2022-10-23 NOTE — CONSULTS
Subjective:   CHIEF COMPLAINT / HPI:  71year old male with CLL and hypogammaglobulinemia. He is havinr recurrent pneumonia in RLL area. He is admitted with severe cough with yellow green sputum. As per pt chest hurts while coughing. He is feeling weak. He denies any fever or hemoptysis.       Past Medical History:  Past Medical History:   Diagnosis Date    Arthritis     knees, Rt hand/wrist    CAD (coronary artery disease)     Cancer (HCC)     CLL--Sees Dr Tristen Campa    Diabetes mellitus [facility])     History of blood transfusion     no reaction    Hx of motion sickness     Hyperlipidemia     MDRO (multiple drug resistant organisms) resistance     abscess on buttock 10yrs ago    Migraine     last one summer 2016    Pneumonia 2016    Wears dentures     full upper--lower dentures    Wears glasses        Past Surgical History:        Procedure Laterality Date    CARDIAC CATHETERIZATION  1990's    x 2  last showed \"inflammation of heart\"    COLONOSCOPY  2010    DENTAL SURGERY      all teeth extracted     EYE SURGERY Right 08/2016    Cataract removal    FRACTURE SURGERY Left 1990's    left ankle plate and screws    KNEE SURGERY  1970    left    OTHER SURGICAL HISTORY Left 01/18/2017    groin excision    TONSILLECTOMY  late 1960's    age 13    TUNNELED VENOUS PORT PLACEMENT  2013    Right upper chest       Current Medications:    Current Facility-Administered Medications: sodium chloride flush 0.9 % injection 5-40 mL, 5-40 mL, IntraVENous, 2 times per day  sodium chloride flush 0.9 % injection 5-40 mL, 5-40 mL, IntraVENous, PRN  0.9 % sodium chloride infusion, , IntraVENous, PRN  enoxaparin (LOVENOX) injection 40 mg, 40 mg, SubCUTAneous, Daily  ondansetron (ZOFRAN-ODT) disintegrating tablet 4 mg, 4 mg, Oral, Q8H PRN **OR** ondansetron (ZOFRAN) injection 4 mg, 4 mg, IntraVENous, Q6H PRN  polyethylene glycol (GLYCOLAX) packet 17 g, 17 g, Oral, Daily PRN  acetaminophen (TYLENOL) tablet 650 mg, 650 mg, Oral, Q6H PRN **OR** acetaminophen (TYLENOL) suppository 650 mg, 650 mg, Rectal, Q6H PRN  insulin lispro (HUMALOG) injection vial 0-4 Units, 0-4 Units, SubCUTAneous, TID WC  insulin lispro (HUMALOG) injection vial 0-4 Units, 0-4 Units, SubCUTAneous, Nightly  benzonatate (TESSALON) capsule 100 mg, 100 mg, Oral, TID PRN  guaiFENesin (MUCINEX) extended release tablet 600 mg, 600 mg, Oral, BID  albuterol sulfate HFA (PROVENTIL;VENTOLIN;PROAIR) 108 (90 Base) MCG/ACT inhaler 2 puff, 2 puff, Inhalation, Q4H PRN  ascorbic acid (VITAMIN C) tablet 250 mg, 250 mg, Oral, BID  atorvastatin (LIPITOR) tablet 80 mg, 80 mg, Oral, Daily  ferrous gluconate 324 (37.5 Fe) MG tablet 324 mg, 324 mg, Oral, BID  finasteride (PROSCAR) tablet 5 mg, 5 mg, Oral, Daily  gabapentin (NEURONTIN) capsule 100 mg, 100 mg, Oral, TID  HYDROcodone-acetaminophen (NORCO) 5-325 MG per tablet 1 tablet, 1 tablet, Oral, Q12H PRN  pantoprazole (PROTONIX) tablet 40 mg, 40 mg, Oral, QAM AC  tamsulosin (FLOMAX) capsule 0.4 mg, 0.4 mg, Oral, Daily  insulin glargine (LANTUS) injection vial 40 Units, 40 Units, SubCUTAneous, Nightly  vancomycin (VANCOCIN) 1,000 mg in dextrose 5 % 250 mL IVPB ([id number]), 1,000 mg, IntraVENous, Q12H  piperacillin-tazobactam (ZOSYN) 4,500 mg in dextrose 5 % 100 mL IVPB (mini-bag), 4,500 mg, IntraVENous, Q6H  morphine (PF) injection 2 mg, 2 mg, IntraVENous, Q4H PRN  ipratropium-albuterol (DUONEB) nebulizer solution 1 ampule, 1 ampule, Inhalation, Q4H PRN  Facility-Administered Medications Ordered in Other Encounters: sodium chloride flush 0.9 % injection 10 mL, 10 mL, IntraVENous, PRN    No Known Allergies    Social History:    Social History     Socioeconomic History    Marital status: Single   Tobacco Use    Smoking status: Former     Packs/day: 1.00     Years: 20.00     Pack years: 20.00     Types: Cigarettes     Start date: 10/2/1965     Quit date: 1987     Years since quittin.8    Smokeless tobacco: Never   Vaping Use    Vaping Use: Never used Substance and Sexual Activity    Alcohol use: No    Drug use: No    Sexual activity: Not Currently       Family History:   Family History   Problem Relation Age of Onset    Diabetes Mother     High Blood Pressure Mother     Heart Disease Father     Other Sister         liver problems    Early Death Brother     Asthma Maternal Uncle     Colon Cancer Brother        Immunization:  Immunization History   Administered Date(s) Administered    COVID-19, MODERNA BLUE border, Primary or Immunocompromised, (age 12y+), IM, 100 mcg/0.5mL 02/17/2021, 03/19/2021, 08/19/2021    Influenza A ([id number]) Vaccine PF IM 11/03/2009    Influenza Vaccine, unspecified formulation 10/28/2011    Influenza Virus Vaccine 10/28/2011    Influenza, FLUARIX, FLULAVAL, FLUZONE (age 10 mo+) AND AFLURIA, (age 1 y+), PF, 0.5mL 11/18/2021    Influenza, FLUZONE (age 72 y+), High Dose, 0.7mL 11/13/2020    Pneumococcal Conjugate 13-valent ([id number]) 11/13/2020    Pneumococcal Polysaccharide ([id number]) 07/08/2016         REVIEW OF SYSTEMS:    CONSTITUTIONAL:  negative for fevers, chills, diaphoresis, activity change, appetite change, fatigue, night sweats and unexpected weight change.    EYES:  negative for blurred vision, eye discharge, visual disturbance and icterus  HEENT:  negative for hearing loss, tinnitus, ear drainage, sinus pressure, nasal congestion, epistaxis and snoring  RESPIRATORY:  See HPI  CARDIOVASCULAR:  negative for chest pain, palpitations, exertional chest pressure/discomfort, edema, syncope  GASTROINTESTINAL:  negative for nausea, vomiting, diarrhea, constipation, blood in stool and abdominal pain  GENITOURINARY:  negative for frequency, dysuria and hematuria  HEMATOLOGIC/LYMPHATIC:  negative for easy bruising, bleeding and lymphadenopathy  ALLERGIC/IMMUNOLOGIC:  negative for recurrent infections, angioedema, anaphylaxis and drug reactions  ENDOCRINE:  negative for weight changes and diabetic symptoms including polyuria, polydipsia and polyphagia    MUSCULOSKELETAL:  negative for  pain, joint swelling, decreased range of motion and muscle weakness  NEUROLOGICAL:  negative for headaches, slurred speech, unilateral weakness  PSYCHIATRIC/BEHAVIORAL: negative for hallucinations, behavioral problems, confusion and agitation. Objective:   PHYSICAL EXAM:      VITALS:    Vitals:    10/23/22 0228 10/23/22 0330 10/23/22 0603 10/23/22 0633   BP:  119/63     Pulse:  94     Resp: 19 19 17 19   Temp:  97.9 °F (36.6 °C)     TempSrc:  Oral     SpO2:  91%     Weight:  163 lb 14.4 oz (74.3 kg)     Height:             CONSTITUTIONAL:  awake, alert, cooperative, no apparent distress, and appears stated age  NECK:  Supple, symmetrical, trachea midline, no adenopathy, thyroid symmetric, not enlarged and no tenderness  CHEST: Chest expansion equal and symmetrical, no intercostal retraction. LUNGS:  no increased work of breathing, has expiratory wheezes both lungs, no crackles. CARDIOVASCULAR: S1 and S2, no edema and no JVD  ABDOMEN:  normal bowel sounds, non-distended and no masses palpated, and no tenderness to palpation. No hepatospleenomegaly  LYMPHADENOPATHY:  no axillary or supraclavicular adenopathy. No cervical adnenopathy  PSYCHIATRIC: Oriented to person place and time. No obvious depression or anxiety. MUSCULOSKELETAL: No obvious misalignment or effusion of the joints. No clubbing, cyanosis of the digits. RIGHT AND LEFT LOWER EXTREMITIES: No edema, no inflammation, no tenderness. SKIN:  normal skin color, texture, turgor and no redness, warmth, or swelling.  No palpable nodules    DATA:       EXAMINATION:   ONE XRAY VIEW OF THE CHEST       10/22/2022 8:49 am       COMPARISON:   Chest radiograph 10/04/2022, chest CTA 09/28/2022       HISTORY:   ORDERING SYSTEM PROVIDED HISTORY: Cough, left-sided chest pain   TECHNOLOGIST PROVIDED HISTORY:   Reason for exam:->Cough, left-sided chest pain   Reason for Exam: Cough, left-sided chest pain       FINDINGS: Unchanged right internal jugular central venous port catheter. Overlying   tubing. At least mild bilateral peribronchial cuffing. Persistent patchy airspace   opacities in the mid to basilar right lung with possible cavitation in the   base. Left basilar airspace opacity. No definite findings of pneumothorax   or pleural effusion. Unchanged mediastinal prominence due to aortic   tortuosity. Normal hilar and cardiac contours. No obvious acute fracture. Joints maintain anatomic alignment. Impression   1. Persistent patchy airspace opacities in the mid to basilar right lung   suspicious for pneumonia or aspiration given the prior CT appearance. There   may be cavitation in the right lung base. 2. Minimal left basilar airspace opacity more likely due to atelectasis than   pneumonia or aspiration. 3. At least mild peribronchial cuffing potentially due to reactive airways   disease or bronchitis.                            Assessment:     RLL pneumonia  Ch hypoxemic resp failure  CLL and hypogammaglobulinemia  Ac bronchospasm          Plan:     D/w pt and family  Adequate o2 adm  Sputum cx  Agree with antibx  Bd rx  Repeat ct chest  Will see what ID recommendation is and will do diagnostic bronch if needed  Thanks will follow

## 2022-10-23 NOTE — CONSULTS
[address] [facility]  consulted by JENNI Posadas-CNP for monitoring and adjustment. Indication for treatment: Pneumonia (HAP)  Goal trough: [] 10-15 mcg/mL or [x] 15-20 mcg/ml  AUC/MARTHA: [] <500 or [x] 400-600    Pertinent Laboratory Values:   Temp Readings from Last 3 Encounters:   10/23/22 97 °F (36.1 °C) (Axillary)   10/11/22 (!) 96.4 °F (35.8 °C) (Infrared)   10/06/22 98.4 °F (36.9 °C) (Oral)     Recent Labs     10/22/22  0905 10/22/22  1900 10/23/22  0119   WBC 37.6*  --   --    LACTATE 1.6 2.4* 1.2       Recent Labs     10/22/22  0905 10/22/22  1900   BUN 18 18   CREATININE 0.7* 0.7*       Estimated Creatinine Clearance: 102 mL/min (A) (based on SCr of 0.7 mg/dL (L)). Intake/Output Summary (Last 24 hours) at 10/23/2022 1134  Last data filed at 10/23/2022 1036  Gross per 24 hour   Intake 1426.09 ml   Output 1525 ml   Net -98.91 ml       Pertinent Cultures:  Date    Source    Results  10/22   Nasal MRSA screen  Ordered  10/22   Blood    No growth  10/22                          Resp Cx                                 Ordered  10/22                          Legionella/Strep pneumo       Negative  10/22                          Resp Panel                             Rhinovirus    Vancomycin level:   TROUGH:  No results for input(s): VANCOTROUGH in the last 72 hours. RANDOM:  No results for input(s): VANCORANDOM in the last 72 hours. Assessment:  SCr, BUN, and urine output: SCR close to baseline at 0.7, UOP good  Pt also ordered on Zosyn with just one iv line for access. Day(s) of therapy: 2 of 7 (last dose 10/29 @11:00)  Vancomycin concentration:   10/23 - no level collected  10/24 - random @06:00    Plan:  Renal trends close to baseline yesterday. Continue vancomycin 1000mg ivpb q12h for a predicted AUC of 488 at steady state.   Vanco level not obtained this morning, rescheduled for tomorrow am.  Pharmacy will continue to monitor patient and adjust therapy as indicated    VANCOMYCIN CONCENTRATION SCHEDULED FOR 10/24 @06:00    Thank you for the consult. Carmen Espinosa  [facility], [facility]  10/23/2022 11:34 AM

## 2022-10-23 NOTE — PROGRESS NOTES
Hospitalist Progress Note      Name:  Jose Elias Gray /Age/Sex: 1950  (71 y.o. male)   MRN & CSN:  [id number] & [id number] Admission Date/Time: 10/22/2022  8:27 AM   [facility] PCP: Shira Phillips MD         Hospital Day: 2    Assessment and Plan:   Jose Elias Gray is a 71 y.o.  male  who presents with Pneumonia due to organism    Persistent pneumonia-chest x-ray s/o pneumonia, possible cavitation in the right lung. Pseudomonas in sputum  in    Recent admission 2 weeks ago he was positive for rhinovirus. MRSA screen and blood cultures were negative. Patient completed 8 days of Zosyn. Respiratory panel +ve for rhinovirus. Will continue IV Zosyn and Vancomycin. ID consulted. Bx no growth 24 HRs, sputum culture pending, urine antigens and MRSA screen. Pulmonology consulted, Possible bronch. Sepsis in setting of PNA and CLL - SIRS criteria met. LA still persistent. Continue IVF and Abx. Trend LA. Chronic Hypoxic respiratory failure - recently discharge home O2 at 3 L per nasal cannula 10/6/22, no increase in respiratory requirement at this time, monitor with continuous pulse ox    Chest pain/elevated trop - suspect pleuritic/MSK given only has pain with coughing. EKG with non specific ST changes. hx CAD C report not available. Recent TTE 22 EF 35-89%, grade I diastolicc dysfunction. consulted Cardiology. Deemed non cardiac chest pain     Mild Hyponatremia -suspect hypoosmolar, receiving 0.9NS. BMP daily     CLL and hypogammaglobulinemia on Rituxan and IVIG followed by Oncology - Heme/Oncology following. Monitor CBC     Protein calorie malnutrition suspected with hypo albuminemia- nutrition consulted. Low pre-albumin     Other chronic medical conditions-resume home medications unless contraindicated  COPD - not in exacerbation.  Respiratory care as noted above, no bronchospasm noted, defer steroids to Pulm  DM type II with neuropathy, A1C 6.7 22 - Monitor BG  AC/HS, resume pts basal and ssi, monitor for adjustment in regimen, ADA diet  Mixed hyperlipidemia - on statin  Recent pleural effusion on recent admission 2 weeks ago, requiring thoracentesis, fluid culture not available. BPH on flomax, proscar    Diet ADULT DIET; Regular; 5 carb choices (75 gm/meal)  ADULT ORAL NUTRITION SUPPLEMENT; AM Snack, PM Snack; Standard High Calorie/High Protein Oral Supplement   DVT Prophylaxis [] Lovenox, []  Heparin, [] SCDs, [] Ambulation   GI Prophylaxis [] PPI,  [] H2 Blocker,  [] Carafate,  [] Diet/Tube Feeds   Code Status Full Code   Disposition Patient requires continued admission due to Pneumonia   MDM [] Low, [] Moderate,[]  High  Patient's risk as above due to recurrent pneumonia     History of Present Illness:     Chief Complaint: Pneumonia due to organism  Woodrow Stringer is a 71 y.o.  male  who presents with CLL on Rituxan and IVIG, hypogammaglobulinemia, lung nodule, COPD, Hypoxic respiratory failure on 3l per nc, DM type II, Hyperlipidemia, BPH among other co-morbidities who presents with shortness of breath and chest pain. Patient was recently hospitalized 2 weeks ago for sepsis and pneumonia. He was found to have rhino enterovirus. Appears the patient also required a thoracentesis however fluid culture was not obtained. He was treated with IV Zosyn. He was not discharged on antibiotics. The patient reports persistent productive cough of tan to yellow secretions since discharge. He reports chills did not measure his temperature. He has mild shortness of breath on his O2, worse with exertion. Last evening he reports left upper chest wall pain that occurred with coughing only which prompted his admission. denies associated nausea vomiting or diaphoresis, lower extremity swelling or pain. Patient does report decrease in oral intake the past 24 hours. He denies any other acute issues.        Ten point ROS reviewed negative, unless as noted above    Objective: Intake/Output Summary (Last 24 hours) at 10/23/2022 1504  Last data filed at 10/23/2022 1402  Gross per 24 hour   Intake 1426.09 ml   Output 1725 ml   Net -298.91 ml      Vitals:   Vitals:    10/23/22 1453   BP: 124/68   Pulse: 82   Resp: 18   Temp: 97.3 °F (36.3 °C)   SpO2: 94%     Physical Exam:   GEN Awake male, sitting upright in bed in no apparent distress. Appears given age. EYES Pupils are equally round. No scleral erythema, discharge, or conjunctivitis. HENT Mucous membranes are moist. Oral pharynx without exudates, no evidence of thrush. NECK Supple, no apparent thyromegaly or masses. RESP Diffuse coarse crackles   CARDIO/VASC S1/S2 auscultated. Regular rate without appreciable murmurs, rubs, or gallops. No JVD or carotid bruits. Peripheral pulses equal bilaterally and palpable. No peripheral edema. GI Abdomen is soft without significant tenderness, masses, or guarding. Bowel sounds are normoactive. Rectal exam deferred.  No costovertebral angle tenderness. Normal appearing external genitalia. Godfrey catheter is not present. HEME/LYMPH No palpable cervical lymphadenopathy and no hepatosplenomegaly. No petechiae or ecchymoses. MSK No gross joint deformities. SKIN Normal coloration, warm, dry. NEURO Cranial nerves appear grossly intact, normal speech, no lateralizing weakness. PSYCH Awake, alert, oriented x 4. Affect appropriate.     Medications:   Medications:    sodium chloride flush  5-40 mL IntraVENous 2 times per day    enoxaparin  40 mg SubCUTAneous Daily    insulin lispro  0-4 Units SubCUTAneous TID WC    insulin lispro  0-4 Units SubCUTAneous Nightly    guaiFENesin  600 mg Oral BID    vitamin C  250 mg Oral BID    atorvastatin  80 mg Oral Daily    ferrous gluconate  324 mg Oral BID    finasteride  5 mg Oral Daily    gabapentin  100 mg Oral TID    pantoprazole  40 mg Oral QAM AC    tamsulosin  0.4 mg Oral Daily    insulin glargine  40 Units SubCUTAneous Nightly    vancomycin  1,000 mg IntraVENous Q12H    piperacillin-tazobactam  4,500 mg IntraVENous Q6H      Infusions:    sodium chloride 75 mL/hr at 10/23/22 1458    sodium chloride 25 mL/hr at 10/23/22 1035     PRN Meds: sodium chloride flush, 5-40 mL, PRN  sodium chloride, , PRN  ondansetron, 4 mg, Q8H PRN   Or  ondansetron, 4 mg, Q6H PRN  polyethylene glycol, 17 g, Daily PRN  acetaminophen, 650 mg, Q6H PRN   Or  acetaminophen, 650 mg, Q6H PRN  benzonatate, 100 mg, TID PRN  albuterol sulfate HFA, 2 puff, Q4H PRN  HYDROcodone-acetaminophen, 1 tablet, Q12H PRN  morphine, 2 mg, Q4H PRN  ipratropium-albuterol, 1 ampule, Q4H PRN          Patient is still admitted because Pneumonia. The anticipated discharge is in greater than 24 hours.      Electronically signed by Juventino Babinski, MD on 10/23/2022 at 3:04 PM

## 2022-10-23 NOTE — PLAN OF CARE
Problem: Discharge Planning  Goal: Discharge to home or other facility with appropriate resources  10/23/2022 1722 by Emi Shelley LPN  Outcome: Progressing  10/23/2022 0400 by Kelley Jimenez RN  Outcome: Progressing     Problem: Pain  Goal: Verbalizes/displays adequate comfort level or baseline comfort level  10/23/2022 1722 by Emi Shelley LPN  Outcome: Progressing  10/23/2022 0400 by Kelley Jimenez RN  Outcome: Progressing     Problem: ABCDS Injury Assessment  Goal: Absence of physical injury  10/23/2022 1722 by Emi Shelley LPN  Outcome: Progressing  10/23/2022 0400 by Kelley Jimenez RN  Outcome: Progressing     Problem: Skin/Tissue Integrity  Goal: Absence of new skin breakdown  Description: 1. Monitor for areas of redness and/or skin breakdown  2. Assess vascular access sites hourly  3. Every 4-6 hours minimum:  Change oxygen saturation probe site  4. Every 4-6 hours:  If on nasal continuous positive airway pressure, respiratory therapy assess nares and determine need for appliance change or resting period.   10/23/2022 1722 by Emi Shelley LPN  Outcome: Progressing  10/23/2022 0400 by Kelley Jimenez RN  Outcome: Progressing     Problem: Safety - Adult  Goal: Free from fall injury  10/23/2022 1722 by Emi Shelley LPN  Outcome: Progressing  10/23/2022 0400 by Kelley Jimenez RN  Outcome: Progressing

## 2022-10-23 NOTE — CONSULTS
HEMATOLOGY ONCOLOGY  Consultation Report    REASON FOR CONSULT  To evaluate the patient with CLL    CHIEF COMPLAINT    Chief Complaint   Patient presents with    Fever    Shortness of Breath     HISTORY OF PRESENT ILLNESS   Adonis Ervin is a 71 y.o. male with history of CLL, hypogammaglobulinemia, lung nodule, COPD, Hypoxic respiratory failure on 3l per nc, DM type II, Hyperlipidemia, BPH, presented to [facility] on 10/22/22 with shortness of breath and chest pain. He was recently hospitalized 2 weeks ago for sepsis and pneumonia. He was found to have rhino enterovirus. Appears that he required a thoracentesis however fluid culture was not obtained. He was treated with IV Zosyn. He was not discharged home on antibiotics. Respiratory panel performed showed rhino enterovirus. CBC showed WBC 37.6, hemoglobin 10.8, hematocrit 34.8, platelets 85. He was admitted for persistent pneumonia and hypoxic respiratory failure. He is known to us for CLL. He was on ibrutinib until recently and he was noted to have disease progression recently. We are in the process of initiating therapy with Rituximab (he hasn't received it yet because of recent admission). He was also on IVIG for CLL induced hypogammaglobulinemia. He received IVIG on 10/6/22. I was called to evaluate him. He still has significant cough.      PAST MEDICAL HISTORY    Past Medical History:   Diagnosis Date    Arthritis     knees, Rt hand/wrist    CAD (coronary artery disease)     Cancer (HCC)     CLL--Sees Dr Faustin Rather    Diabetes mellitus [facility])     History of blood transfusion     no reaction    Hx of motion sickness     Hyperlipidemia     MDRO (multiple drug resistant organisms) resistance     abscess on buttock 10yrs ago    Migraine     last one summer 2016    Pneumonia 2016    Wears dentures     full upper--lower dentures    Wears glasses        SURGICAL HISTORY    Past Surgical History:   Procedure Laterality Date    CARDIAC CATHETERIZATION      x 2  last showed \"inflammation of heart\"    COLONOSCOPY  2010    DENTAL SURGERY      all teeth extracted     EYE SURGERY Right 2016    Cataract removal    FRACTURE SURGERY Left     left ankle plate and screws    KNEE SURGERY  1970    left    OTHER SURGICAL HISTORY Left 2017    groin excision    TONSILLECTOMY  late     age 13    TUNNELED VENOUS PORT PLACEMENT      Right upper chest       FAMILY HISTORY    Family History   Problem Relation Age of Onset    Diabetes Mother     High Blood Pressure Mother     Heart Disease Father     Other Sister         liver problems    Early Death Brother     Asthma Maternal Uncle     Colon Cancer Brother        SOCIAL HISTORY    Social History     Socioeconomic History    Marital status: Single   Tobacco Use    Smoking status: Former     Packs/day: 1.00     Years: 20.00     Pack years: 20.00     Types: Cigarettes     Start date: 10/2/1965     Quit date: 1987     Years since quittin.8    Smokeless tobacco: Never   Vaping Use    Vaping Use: Never used   Substance and Sexual Activity    Alcohol use: No    Drug use: No    Sexual activity: Not Currently       REVIEW OF SYSTEMS    Constitutional:  Denies fever, chills, loss of appetite or weight loss. He has tiredness and fatigue  HEENT:  Denies swelling of neck glands  Respiratory:  Has cough, shortness of breath and chest pain. Denies hemoptysis  Cardiovascular:  Has chest pain. Denies palpitations or swelling   GI:  Denies abdominal pain, nausea, vomiting, constipation or diarrhea   Musculoskeletal:  Denies back pain   Skin:  Denies rash   Neurologic:  Denies headache, focal weakness or sensory changes   All systems negative except as marked.      PHYSICAL EXAM    Vitals: /70   Pulse 96   Temp 97 °F (36.1 °C) (Axillary)   Resp 18   Ht 6' (1.829 m)   Wt 163 lb 14.4 oz (74.3 kg)   SpO2 93%   BMI 22.23 kg/m²   CONSTITUTIONAL: awake, alert, cooperative, no apparent distress   EYES: pupils equal, round and reactive to light, sclera clear and conjunctiva normal  ENT: Normocephalic, without obvious abnormality, atraumatic  NECK: supple, symmetrical, no jugular venous distension and no carotid bruits   HEMATOLOGIC/LYMPHATIC: no cervical, supraclavicular or axillary lymphadenopathy   LUNGS: VBS, no wheezes, coarse breath sound, no increased work of breathing    CARDIOVASCULAR: regular rate and rhythm, normal S1 and S2, no murmur noted  ABDOMEN: normal bowel sounds x 4, soft, non-distended, non-tender, no masses palpated, no hepatosplenomgaly   MUSCULOSKELETAL: full range of motion noted, tone is normal  NEUROLOGIC: awake, alert, oriented to name, place and time. Motor skills grossly intact. SKIN: Normal skin color, texture, turgor and no jaundice. Skin appears intact   EXTREMITIES: no LE edema, no cyanosis, no leg swelling, no clubbing    LABORATORY RESULTS  CBC:   Recent Labs     10/22/22  0905   WBC 37.6*   HGB 10.8*   PLT 85*     BMP:    Recent Labs     10/22/22  0905 10/22/22  1900   * 131*   K 4.7 4.5   CL 95* 97*   CO2 24 23   BUN 18 18   CREATININE 0.7* 0.7*   GLUCOSE 200* 228*     Hepatic:   Recent Labs     10/22/22  0905   AST 16   ALT 19   BILITOT 0.7   ALKPHOS 176*     INR: No results for input(s): INR in the last 72 hours. RADIOLOGY REPORTS  Chest X ray  1. Persistent patchy airspace opacities in the mid to basilar right lung   suspicious for pneumonia or aspiration given the prior CT appearance. There   may be cavitation in the right lung base. 2. Minimal left basilar airspace opacity more likely due to atelectasis than   pneumonia or aspiration. 3. At least mild peribronchial cuffing potentially due to reactive airways   disease or bronchitis. ASSESSMENT  CLL  Hypogammaglobulinemia    RECOMMENDATION  He is known to us for CLL. He was on ibrutinib until recently and he was noted to have disease progression recently.      We are in the process of initiating therapy with Rituximab (he hasn't received it yet because of recent admission). We will continue to hold rituximab until he is completely recovers from current condition. He was also on IVIG for CLL induced hypogammaglobulinemia. He received IVIG on 10/6/22. He doesn't need IVIG now. Please continue with current antibiotics, zosyn and vancomycin for now. Will follow up with CT chest.     We will follow the patient. Thank you for allowing me to participate in the care of this very pleasant patient.

## 2022-10-24 PROBLEM — B34.8 RHINOVIRUS INFECTION: Status: ACTIVE | Noted: 2022-10-24

## 2022-10-24 PROBLEM — Z85.6 PERSONAL HISTORY OF CLL (CHRONIC LYMPHOCYTIC LEUKEMIA): Status: ACTIVE | Noted: 2022-10-24

## 2022-10-24 LAB
ALBUMIN SERPL-MCNC: 2.7 GM/DL (ref 3.4–5)
ALP BLD-CCNC: 185 IU/L (ref 40–128)
ALT SERPL-CCNC: 18 U/L (ref 10–40)
ANION GAP SERPL CALCULATED.3IONS-SCNC: 9 MMOL/L (ref 4–16)
ANISOCYTOSIS: ABNORMAL
AST SERPL-CCNC: 17 IU/L (ref 15–37)
ATYPICAL LYMPHOCYTE ABSOLUTE COUNT: ABNORMAL
BILIRUB SERPL-MCNC: 0.6 MG/DL (ref 0–1)
BUN BLDV-MCNC: 15 MG/DL (ref 6–23)
CALCIUM SERPL-MCNC: 8.1 MG/DL (ref 8.3–10.6)
CHLORIDE BLD-SCNC: 101 MMOL/L (ref 99–110)
CO2: 26 MMOL/L (ref 21–32)
CREAT SERPL-MCNC: 0.5 MG/DL (ref 0.9–1.3)
DIFFERENTIAL TYPE: ABNORMAL
DOSE AMOUNT: NORMAL
DOSE TIME: NORMAL
EKG ATRIAL RATE: 114 BPM
EKG DIAGNOSIS: NORMAL
EKG P AXIS: 49 DEGREES
EKG P-R INTERVAL: 136 MS
EKG Q-T INTERVAL: 314 MS
EKG QRS DURATION: 86 MS
EKG QTC CALCULATION (BAZETT): 432 MS
EKG R AXIS: 48 DEGREES
EKG T AXIS: 69 DEGREES
EKG VENTRICULAR RATE: 114 BPM
GFR SERPL CREATININE-BSD FRML MDRD: >60 ML/MIN/1.73M2
GLUCOSE BLD-MCNC: 118 MG/DL (ref 70–99)
GLUCOSE BLD-MCNC: 193 MG/DL (ref 70–99)
GLUCOSE BLD-MCNC: 194 MG/DL (ref 70–99)
GLUCOSE BLD-MCNC: 216 MG/DL (ref 70–99)
GLUCOSE BLD-MCNC: 94 MG/DL (ref 70–99)
HCT VFR BLD CALC: 32.1 % (ref 42–52)
HEMOGLOBIN: 9.6 GM/DL (ref 13.5–18)
HIGH SENSITIVE C-REACTIVE PROTEIN: 41.6 MG/L (ref 0–5)
LYMPHOCYTES ABSOLUTE: 18.5 K/CU MM
LYMPHOCYTES RELATIVE PERCENT: 78 % (ref 24–44)
MCH RBC QN AUTO: 24.6 PG (ref 27–31)
MCHC RBC AUTO-ENTMCNC: 29.9 % (ref 32–36)
MCV RBC AUTO: 82.3 FL (ref 78–100)
MONOCYTES ABSOLUTE: 0.5 K/CU MM
MONOCYTES RELATIVE PERCENT: 2 % (ref 0–4)
OVALOCYTES: ABNORMAL
PDW BLD-RTO: 20.8 % (ref 11.7–14.9)
PLATELET # BLD: 81 K/CU MM (ref 140–440)
PMV BLD AUTO: 10.1 FL (ref 7.5–11.1)
POTASSIUM SERPL-SCNC: 4.2 MMOL/L (ref 3.5–5.1)
PROCALCITONIN: 0.12
RBC # BLD: 3.9 M/CU MM (ref 4.6–6.2)
SEGMENTED NEUTROPHILS ABSOLUTE COUNT: 4.7 K/CU MM
SEGMENTED NEUTROPHILS RELATIVE PERCENT: 20 % (ref 36–66)
SMUDGE CELLS: PRESENT
SODIUM BLD-SCNC: 136 MMOL/L (ref 135–145)
TOTAL PROTEIN: 4.5 GM/DL (ref 6.4–8.2)
VANCOMYCIN RANDOM: 12.7 UG/ML
WBC # BLD: 23.7 K/CU MM (ref 4–10.5)
WBC # BLD: ABNORMAL 10*3/UL

## 2022-10-24 PROCEDURE — 2700000000 HC OXYGEN THERAPY PER DAY

## 2022-10-24 PROCEDURE — 94761 N-INVAS EAR/PLS OXIMETRY MLT: CPT

## 2022-10-24 PROCEDURE — 99223 1ST HOSP IP/OBS HIGH 75: CPT | Performed by: INTERNAL MEDICINE

## 2022-10-24 PROCEDURE — 36415 COLL VENOUS BLD VENIPUNCTURE: CPT

## 2022-10-24 PROCEDURE — 6360000002 HC RX W HCPCS: Performed by: NURSE PRACTITIONER

## 2022-10-24 PROCEDURE — 99222 1ST HOSP IP/OBS MODERATE 55: CPT | Performed by: INTERNAL MEDICINE

## 2022-10-24 PROCEDURE — 1200000000 HC SEMI PRIVATE

## 2022-10-24 PROCEDURE — 85007 BL SMEAR W/DIFF WBC COUNT: CPT

## 2022-10-24 PROCEDURE — 80053 COMPREHEN METABOLIC PANEL: CPT

## 2022-10-24 PROCEDURE — 85027 COMPLETE CBC AUTOMATED: CPT

## 2022-10-24 PROCEDURE — APPSS60 APP SPLIT SHARED TIME 46-60 MINUTES: Performed by: NURSE PRACTITIONER

## 2022-10-24 PROCEDURE — 80202 ASSAY OF VANCOMYCIN: CPT

## 2022-10-24 PROCEDURE — 6360000002 HC RX W HCPCS: Performed by: HOSPITALIST

## 2022-10-24 PROCEDURE — 93010 ELECTROCARDIOGRAM REPORT: CPT | Performed by: INTERNAL MEDICINE

## 2022-10-24 PROCEDURE — 2580000003 HC RX 258: Performed by: NURSE PRACTITIONER

## 2022-10-24 PROCEDURE — 99232 SBSQ HOSP IP/OBS MODERATE 35: CPT | Performed by: PHYSICIAN ASSISTANT

## 2022-10-24 PROCEDURE — 84145 PROCALCITONIN (PCT): CPT

## 2022-10-24 PROCEDURE — 6370000000 HC RX 637 (ALT 250 FOR IP): Performed by: NURSE PRACTITIONER

## 2022-10-24 PROCEDURE — 86141 C-REACTIVE PROTEIN HS: CPT

## 2022-10-24 PROCEDURE — 99211 OFF/OP EST MAY X REQ PHY/QHP: CPT

## 2022-10-24 PROCEDURE — 82962 GLUCOSE BLOOD TEST: CPT

## 2022-10-24 RX ADMIN — OXYCODONE HYDROCHLORIDE AND ACETAMINOPHEN 250 MG: 500 TABLET ORAL at 20:15

## 2022-10-24 RX ADMIN — SODIUM CHLORIDE, PRESERVATIVE FREE 10 ML: 5 INJECTION INTRAVENOUS at 09:01

## 2022-10-24 RX ADMIN — GUAIFENESIN 600 MG: 600 TABLET, EXTENDED RELEASE ORAL at 08:51

## 2022-10-24 RX ADMIN — BENZONATATE 100 MG: 100 CAPSULE ORAL at 06:33

## 2022-10-24 RX ADMIN — PIPERACILLIN AND TAZOBACTAM 4500 MG: 4; .5 INJECTION, POWDER, FOR SOLUTION INTRAVENOUS at 08:59

## 2022-10-24 RX ADMIN — HYDROCODONE BITARTRATE AND ACETAMINOPHEN 1 TABLET: 5; 325 TABLET ORAL at 00:53

## 2022-10-24 RX ADMIN — MORPHINE SULFATE 2 MG: 2 INJECTION, SOLUTION INTRAMUSCULAR; INTRAVENOUS at 06:32

## 2022-10-24 RX ADMIN — INSULIN GLARGINE 40 UNITS: 100 INJECTION, SOLUTION SUBCUTANEOUS at 21:25

## 2022-10-24 RX ADMIN — OXYCODONE HYDROCHLORIDE AND ACETAMINOPHEN 250 MG: 500 TABLET ORAL at 08:51

## 2022-10-24 RX ADMIN — Medication 324 MG: at 20:17

## 2022-10-24 RX ADMIN — Medication 324 MG: at 08:55

## 2022-10-24 RX ADMIN — ENOXAPARIN SODIUM 40 MG: 40 INJECTION SUBCUTANEOUS at 08:52

## 2022-10-24 RX ADMIN — ATORVASTATIN CALCIUM 80 MG: 40 TABLET, FILM COATED ORAL at 20:16

## 2022-10-24 RX ADMIN — GABAPENTIN 100 MG: 100 CAPSULE ORAL at 14:27

## 2022-10-24 RX ADMIN — HYDROCODONE BITARTRATE AND ACETAMINOPHEN 1 TABLET: 5; 325 TABLET ORAL at 11:55

## 2022-10-24 RX ADMIN — GABAPENTIN 100 MG: 100 CAPSULE ORAL at 20:16

## 2022-10-24 RX ADMIN — TAMSULOSIN HYDROCHLORIDE 0.4 MG: 0.4 CAPSULE ORAL at 08:51

## 2022-10-24 RX ADMIN — PIPERACILLIN AND TAZOBACTAM 4500 MG: 4; .5 INJECTION, POWDER, FOR SOLUTION INTRAVENOUS at 20:15

## 2022-10-24 RX ADMIN — GABAPENTIN 100 MG: 100 CAPSULE ORAL at 08:53

## 2022-10-24 RX ADMIN — FINASTERIDE 5 MG: 5 TABLET, FILM COATED ORAL at 08:52

## 2022-10-24 RX ADMIN — VANCOMYCIN HYDROCHLORIDE 1000 MG: 1 INJECTION, POWDER, LYOPHILIZED, FOR SOLUTION INTRAVENOUS at 11:59

## 2022-10-24 RX ADMIN — MORPHINE SULFATE 2 MG: 2 INJECTION, SOLUTION INTRAMUSCULAR; INTRAVENOUS at 20:15

## 2022-10-24 RX ADMIN — PANTOPRAZOLE SODIUM 40 MG: 40 TABLET, DELAYED RELEASE ORAL at 05:31

## 2022-10-24 RX ADMIN — GUAIFENESIN 600 MG: 600 TABLET, EXTENDED RELEASE ORAL at 20:16

## 2022-10-24 RX ADMIN — SODIUM CHLORIDE, PRESERVATIVE FREE 10 ML: 5 INJECTION INTRAVENOUS at 20:16

## 2022-10-24 RX ADMIN — PIPERACILLIN AND TAZOBACTAM 4500 MG: 4; .5 INJECTION, POWDER, FOR SOLUTION INTRAVENOUS at 14:27

## 2022-10-24 RX ADMIN — INSULIN LISPRO 1 UNITS: 100 INJECTION, SOLUTION INTRAVENOUS; SUBCUTANEOUS at 11:54

## 2022-10-24 RX ADMIN — PIPERACILLIN AND TAZOBACTAM 4500 MG: 4; .5 INJECTION, POWDER, FOR SOLUTION INTRAVENOUS at 02:10

## 2022-10-24 ASSESSMENT — PAIN DESCRIPTION - ORIENTATION
ORIENTATION: UPPER
ORIENTATION: ANTERIOR
ORIENTATION: ANTERIOR;MID
ORIENTATION: UPPER
ORIENTATION: UPPER

## 2022-10-24 ASSESSMENT — PAIN DESCRIPTION - LOCATION
LOCATION: CHEST

## 2022-10-24 ASSESSMENT — PAIN DESCRIPTION - FREQUENCY
FREQUENCY: INTERMITTENT
FREQUENCY: INTERMITTENT

## 2022-10-24 ASSESSMENT — PAIN DESCRIPTION - DESCRIPTORS
DESCRIPTORS: DISCOMFORT
DESCRIPTORS: DISCOMFORT
DESCRIPTORS: DISCOMFORT;PENETRATING
DESCRIPTORS: ACHING;STABBING;TEARING
DESCRIPTORS: DISCOMFORT

## 2022-10-24 ASSESSMENT — PAIN SCALES - GENERAL
PAINLEVEL_OUTOF10: 4
PAINLEVEL_OUTOF10: 5
PAINLEVEL_OUTOF10: 3
PAINLEVEL_OUTOF10: 5
PAINLEVEL_OUTOF10: 4
PAINLEVEL_OUTOF10: 0
PAINLEVEL_OUTOF10: 6
PAINLEVEL_OUTOF10: 3

## 2022-10-24 ASSESSMENT — PAIN - FUNCTIONAL ASSESSMENT
PAIN_FUNCTIONAL_ASSESSMENT: PREVENTS OR INTERFERES SOME ACTIVE ACTIVITIES AND ADLS

## 2022-10-24 ASSESSMENT — PAIN SCALES - WONG BAKER
WONGBAKER_NUMERICALRESPONSE: 0

## 2022-10-24 ASSESSMENT — PAIN DESCRIPTION - ONSET
ONSET: ON-GOING
ONSET: ON-GOING

## 2022-10-24 ASSESSMENT — PAIN DESCRIPTION - PAIN TYPE
TYPE: ACUTE PAIN
TYPE: ACUTE PAIN

## 2022-10-24 NOTE — CARE COORDINATION
Reviewed chart and spoke with pt about discharge needs/plans. Pt lives with his son, he had just discharge home and had to be readmitted, He has home O2 though [facility]. And believes he is active with [facility]. Pt has a PCP and insurance that covers medications. He states plan will be to return home with son and [facility] [place] at discharge. CM will continue to follow.

## 2022-10-24 NOTE — CONSULTS
Comprehensive Nutrition Assessment    Type and Reason for Visit:  Initial, Positive Nutrition Screen, Wound, Consult (Weight loss, poor appetite; Nutritional Assessment for Russ Score; Oral nutrition supplements)    Nutrition Recommendations/Plan:   Start chocolate standard high calorie, high protein oral nutrition supplements TID, may adjust if glucose levels are uncontrolled, will monitor glucose closely   Continue carb controlled diet   Encourage adequate hydration   Assist with meal set up   Document Po intakes in I/O      Malnutrition Assessment:  Malnutrition Status:  Severe malnutrition (10/24/22 1219)    Context:  Chronic Illness (acute on chronic)     Findings of the 6 clinical characteristics of malnutrition:  Energy Intake:  75% or less estimated energy requirements for 1 month or longer  Weight Loss:  Greater than 5% over 1 month     Body Fat Loss:  Severe body fat loss Orbital, Triceps, Buccal region   Muscle Mass Loss:  Severe muscle mass loss Temples (temporalis), Clavicles (pectoralis & deltoids), Thigh (quadraceps), Calf (gastrocnemius), Scapula (trapezius)  Fluid Accumulation:  No significant fluid accumulation     Strength:  Not Performed    Nutrition Assessment:    Admitted with PNA, SOB, CP. PMH: Sepsis, CLL, DMII, HLD, BPH, Intestional malabsorption. Pt on Carb controlled 5 diet, consuming % of last few meals x 1-2 days per pt. Pt admits to poor nutritional status during last admission x 2 weeks due to taste aversions, appetite and taste has improved during this admission. Acute on chronic significant wt loss noted. 5% in 1 month and 18% wt loss in 11 months. C/o generalized weakness and weight loss noted since last coloscopy with polyp removal x 2-3 months ago. C/o blood in stool today. Severe wasting per NFPE. Meets criteria for malnutrition. Pt likes chocolate Ensure supplements, discussed probiotic intake and adequate protein-carb intake at meals.  Follow as high nutrition risk.    Nutrition Related Findings:    +rhinovirus droplet isolation; POCT 216, Hgb 9.6 Wound Type: Pressure Injury, Multiple, Skin Tears, Wound Consult Pending       Current Nutrition Intake & Therapies:    Average Meal Intake: % (1-3 meals in the last 2 days but poor intake over weeks up to months pta)  Average Supplements Intake: None Ordered  ADULT DIET; Regular; 5 carb choices (75 gm/meal)  ADULT ORAL NUTRITION SUPPLEMENT; Breakfast, Lunch, Dinner; Standard High Calorie/High Protein Oral Supplement    Anthropometric Measures:  Height: 6' (182.9 cm)  Ideal Body Weight (IBW): 178 lbs (81 kg)    Admission Body Weight: 163 lb 12.8 oz (74.3 kg)  Current Body Weight: 163 lb 14.4 oz (74.3 kg), 92.1 % IBW.  Weight Source: Bed Scale  Current BMI (kg/m2): 22.2  Usual Body Weight: 173 lb (78.5 kg) (9/29/22; pt weighed up to 198 lb 12.8 oz in November 2021)  % Weight Change (Calculated): -5.3  Weight Adjustment For: No Adjustment                 BMI Categories: Normal Weight (BMI 22.0 to 24.9) age over 72    Estimated Daily Nutrient Needs:  Energy Requirements Based On: Kcal/kg  Weight Used for Energy Requirements: Current  Energy (kcal/day): 3530-7537 (25-30 kcals/kg)  Weight Used for Protein Requirements: Ideal  Protein (g/day):  (1.2-1.4 g/kg)  Method Used for Fluid Requirements: 1 ml/kcal  Fluid (ml/day): 4463-5886    Nutrition Diagnosis:   Severe malnutrition, In context of chronic illness (acute on chronic) related to inadequate protein-energy intake, impaired nutrient utilization, increase demand for energy/nutrients, catabolic illness as evidenced by poor intake prior to admission, weight loss greater than or equal to 5% in 1 month, severe muscle loss, severe loss of subcutaneous fat    Nutrition Interventions:   Food and/or Nutrient Delivery: Continue Current Diet, Start Oral Nutrition Supplement  Nutrition Education/Counseling: Education initiated  Coordination of Nutrition Care: Continue to monitor while inpatient, Coordination of Care  Plan of Care discussed with: pt    Goals:     Goals: PO intake 75% or greater (consistently)       Nutrition Monitoring and Evaluation:   Behavioral-Environmental Outcomes: None Identified  Food/Nutrient Intake Outcomes: Food and Nutrient Intake, Supplement Intake  Physical Signs/Symptoms Outcomes: Biochemical Data, GI Status, Meal Time Behavior, Nutrition Focused Physical Findings, Weight    Discharge Planning:     Too soon to determine     Lucas Oconnor RD, LD  Contact: [phone]

## 2022-10-24 NOTE — CONSULTS
Infectious Disease Consult Note  10/24/2022   Patient Name: Florentino Mcmanus : 1950   Impression:  Persistent Rhinovirus with Superimposed Bacterial Pneumonia with Continued Chronic Hypoxic Respiratory Failure:  Cavitary Lesions RLL and NIKI:  T-max 99.6  Leukocytosis ma 37.6 on DWT  10/22-BC 0/2-NGTD  10/22-Strep pneumo ag/ Legionella ag negative  10/22-Resp panel: positive for Rhinovirus  10/22-MRSA/MssA pending  10/22-Resp culture: GNR  10/23-CT Chest WO Contrast:The infiltrates seen previously in the lower lobes bilaterally for the most   part have decreased in size and conspicuity. However, there are now innumerable punctate tree-in-bud centrilobular micro   nodules, which are nonspecific but suggest inflammatory/infectious   bronchiolitis. Consider both typical and atypical etiologies. In addition, cavitary lesion has developed in the periphery of the right   lower lobe and to a lesser extent within the left upper lung. Necrotizing   infection would be primarily considered given the rapid development. Consider both typical and atypical etiologies. Enlarged mediastinal lymph nodes, similar when compared to the previous exam,   process of Lia related to history of chronic lymphocytic leukemia  CLL and Hypogammaglobulinemia:  Dr. Jessica Moses onboard  Imp anemia sec to infection  Was on ibrutinib but recent plan was to start Rituxan.   Plan to repeat IgG levels    DMII:  Hypoalbuminemia:  Hyponatremia:  CAD/Chest Pain/ Elevated Troponin:  Dr. Yeny Hernandez onboard  Imp of non-cardiac chest pain  Multi-morbidity: per PMHx  Plan:  Continue IV Zosyn 3,375 mg q8h  DC IV vancomycin  Trend CRP and Pct, ordered  Await MRSA screen  Await speciation and sensi of resp culture  Appreciate Dr. Bowman [facility], will await for Bronch with cultures to narrow the ABX regimen  Appreciate Dr. Lopez [facility], will follow with her plan    Thank you for allowing me to consult in the care of this patient.  ------------------------  REASON FOR systems reviewed Including eyes, ENT, respiratory, cardiovascular, GI, , dermatologic, neurologic, psych, hem/lymphatic, musculoskeletal and endocrine were negative other than what is mentioned above.      Patient Active Problem List    Diagnosis Date Noted    Pneumonia due to organism 10/22/2022    Severe malnutrition ([address] 10/05/2022    Acute bronchitis 02/10/2022    Leukemia, lymphocytic, chronic ([address] 07/24/2020    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    Neutropenia ([address] 04/08/2020    Other specified disorders of bone density and structure, unspecified site 04/08/2020    Cellulitis of right upper limb 04/08/2020    Herpesviral infection 04/08/2020    Intestinal malabsorption 04/08/2020    Other nonspecific abnormal finding of lung field 04/08/2020    Iron deficiency anemia due to chronic blood loss 04/08/2020    Other muscle spasm 04/08/2020    COPD (chronic obstructive pulmonary disease) ([address] 05/14/2019    Upper respiratory infection, acute 04/09/2019    Generalized muscle weakness 04/09/2019    Type 2 diabetes mellitus with hyperglycemia, with long-term current use of insulin ([address] 04/09/2019    Poor appetite 04/09/2019    CLL (chronic lymphocytic leukemia) ([address]     Hypogammaglobulinemia ([address] 12/06/2016    Pneumonia 07/07/2016    Sepsis ([address] 07/07/2016     Past Medical History:   Diagnosis Date    Arthritis     knees, Rt hand/wrist    CAD (coronary artery disease)     Cancer (HCC)     CLL--Sees Dr Marii Robles    Diabetes mellitus [facility])     History of blood transfusion     no reaction    Hx of motion sickness     Hyperlipidemia     MDRO (multiple drug resistant organisms) resistance     abscess on buttock 10yrs ago    Migraine     last one summer 2016    Pneumonia 2016    Wears dentures     full upper--lower dentures    Wears glasses       Past Surgical History:   Procedure Laterality Date    CARDIAC CATHETERIZATION  1990's    x 2  last showed \"inflammation of heart\"    COLONOSCOPY  2010    DENTAL SURGERY      all teeth extracted     EYE SURGERY Right 2016    Cataract removal    FRACTURE SURGERY Left     left ankle plate and screws    KNEE SURGERY  1970    left    OTHER SURGICAL HISTORY Left 2017    groin excision    TONSILLECTOMY  late     age 13    TUNNELED VENOUS PORT PLACEMENT      Right upper chest      Family History   Problem Relation Age of Onset    Diabetes Mother     High Blood Pressure Mother     Heart Disease Father     Other Sister         liver problems    Early Death Brother     Asthma Maternal Uncle     Colon Cancer Brother       Infectious disease related family history - not contibutory. SOCIAL HISTORY  Social History     Tobacco Use    Smoking status: Former     Packs/day: 1.00     Years: 20.00     Pack years: 20.00     Types: Cigarettes     Start date: 10/2/1965     Quit date: 1987     Years since quittin.8    Smokeless tobacco: Never   Substance Use Topics    Alcohol use: No      Born:[place], [place]  Lives: [place] with son  Occupation: Retired from multiple hardware jobs  No recent travel of significance. No recent unusual exposures. Pets: 3 dogs   ? ALLERGIES  No Known Allergies   MEDICATIONS  Reviewed and are per the chart/EMR. ? Antibiotics:   Present:  Zosyn 10/22  Past:  Azithromycin 10/22  Cefepime 10/22  Vancomycin 10/22-24?  -------------------------------------------------------------------------------------------------------------------    Vital Signs:  Vitals:    10/24/22 0832   BP: 135/74   Pulse: 75   Resp: 19   Temp: 97.9 °F (36.6 °C)   SpO2: 96%         Exam:    VS: noted; wt 163 lb (74.3 kg) Height 6'  Gen: alert and oriented X3, no distress  Skin: no stigmata of endocarditis  Wounds: C/D/I  HEMT: AT/NC Oropharynx pink, moist, and without lesions or exudates; edentulous (wears full dentures)  Eyes: PERRLA, EOMI, conjunctiva pink, sclera anicteric. Neck: Supple. Trachea midline. No LAD. Chest: no distress and CTA. Anterior breath sounds diminished. Oxygen per NC. Heart: NSR and no MRG. Abd: soft, non-distended, no tenderness, no hepatomegaly. Normoactive bowel sounds. Ext: no clubbing, cyanosis, or edema  Neuro: Mental status intact. CN 2-12 intact and no focal sensory or motor deficits    ? Diagnostic Studies: reviewed  10/22/22 XR Chest Portable:  Impression   1. Persistent patchy airspace opacities in the mid to basilar right lung   suspicious for pneumonia or aspiration given the prior CT appearance. There   may be cavitation in the right lung base. 2. Minimal left basilar airspace opacity more likely due to atelectasis than   pneumonia or aspiration. 3. At least mild peribronchial cuffing potentially due to reactive airways   disease or bronchitis. 10/23/22 CT Chest WO Contrast:  Impression   The infiltrates seen previously in the lower lobes bilaterally for the most   part have decreased in size and conspicuity. However, there are now innumerable punctate tree-in-bud centrilobular micro   nodules, which are nonspecific but suggest inflammatory/infectious   bronchiolitis. Consider both typical and atypical etiologies. In addition, cavitary lesion has developed in the periphery of the right   lower lobe and to a lesser extent within the left upper lung. Necrotizing   infection would be primarily considered given the rapid development. Consider both typical and atypical etiologies. Enlarged mediastinal lymph nodes, similar when compared to the previous exam,   process of Lia related to history of chronic lymphocytic leukemia. ??  I have examined this patient and available medical records on this date and have made the above observations, conclusions and recommendations. Electronically signed by: Electronically signed by Obed Wilder.  JENNI Farley CNP on 10/24/2022 at 10:50 AM

## 2022-10-24 NOTE — CONSULTS
[id number] [facility]  consulted by SIERRA Armas for monitoring and adjustment. Indication for treatment: Pneumonia (HAP)  Goal trough: [] 10-15 mcg/mL or [x] 15-20 mcg/ml  AUC/MRATHA: [] <500 or [x] 400-600    Pertinent Laboratory Values:   Temp Readings from Last 3 Encounters:   10/24/22 97.9 °F (36.6 °C) (Axillary)   10/11/22 (!) 96.4 °F (35.8 °C) (Infrared)   10/06/22 98.4 °F (36.9 °C) (Oral)     Recent Labs     10/22/22  0905 10/22/22  1900 10/23/22  0119 10/23/22  1115 10/23/22  1741 10/24/22  0419   WBC 37.6*  --   --  17.3*  --  23.7*   LACTATE 1.6   < > 1.2 2.1* 1.3  --     < > = values in this interval not displayed. Recent Labs     10/22/22  1900 10/23/22  1115 10/24/22  0419   BUN 18 15 15   CREATININE 0.7* 0.6* 0.5*       Estimated Creatinine Clearance: 142 mL/min (A) (based on SCr of 0.5 mg/dL (L)). Intake/Output Summary (Last 24 hours) at 10/24/2022 1349  Last data filed at 10/24/2022 1045  Gross per 24 hour   Intake --   Output 875 ml   Net -875 ml         Pertinent Cultures:  Date    Source    Results  10/22   Nasal MRSA screen  Ordered  10/22   Blood    No growth x 48 hr  10/22                          Resp Cx                                 GNR  10/22                          Legionella/Strep pneumo       Negative  10/22                          Resp Panel                             Rhinovirus    Vancomycin level:   TROUGH:  No results for input(s): VANCOTROUGH in the last 72 hours. RANDOM:    Recent Labs     10/24/22  0419   VANCORANDOM 12.7       Assessment:  SCr, BUN, and urine output: SCR close to baseline at 0.7, UOP good  Pt also ordered on Zosyn with just one iv line for access.   Day(s) of therapy: 3 of 7 (last dose 10/29 @11:00)  Vancomycin concentration:   10/23 - no level collected  10/24 - 12.7, 5 hours post-dose, AUC < 400    Plan:  Renal trends remain close to baseline   Random level sub-therapeutic today, predicted AUC < 400  Changed dose to Vancomycin 1250mg IVPB every 12 hours  Predicted  and Trough 14.6 at steady state  Repeat random level in 2 days. Pharmacy will continue to monitor patient and adjust therapy as indicated    Jory 3 10/26 @06:00    Thank you for the consult.   Franklin Crouch, [facility]  10/24/2022 1:49 PM

## 2022-10-24 NOTE — PROGRESS NOTES
ONCOLOGY HEMATOLOGY CARE ([facility])  PROGRESS NOTE      10/24/2022  8:19 AM    Patient:    Ayala Cloud  : 1950   71 y.o. MRN: [id number]  Admitted: 10/22/2022  8:27 AM ATT: Thelma Ramirez, *   [id number]  AdmitDx: Pneumonia due to organism [J18.9]  Rhinovirus [B34.8]  Personal history of CLL (chronic lymphocytic leukemia) [Z85.6]  Elevated troponin [R77.8]  Pneumonia due to infectious organism, unspecified laterality, unspecified part of lung [J18.9]  PCP: Zulema White MD      Patient was seen and examined today. Not in any acute distress and no overnight events. Coughing up green sputum  Chest hurts with all the coughing  No fever  No bleeding  SOB  Poor appetite  Feels very weak    10/23/22: Ct chest:  The infiltrates seen previously in the lower lobes bilaterally for the most   part have decreased in size and conspicuity. However, there are now innumerable punctate tree-in-bud centrilobular micro   nodules, which are nonspecific but suggest inflammatory/infectious   bronchiolitis. Consider both typical and atypical etiologies. In addition, cavitary lesion has developed in the periphery of the right   lower lobe and to a lesser extent within the left upper lung. Necrotizing   infection would be primarily considered given the rapid development. Consider both typical and atypical etiologies. Enlarged mediastinal lymph nodes, similar when compared to the previous exam,   process of Lia related to history of chronic lymphocytic leukemia.            PHYSICAL EXAM :    Vitals: /77   Pulse 84   Temp 97.5 °F (36.4 °C)   Resp 20   Ht 6' (1.829 m)   Wt 163 lb 14.4 oz (74.3 kg)   SpO2 93%   BMI 22.23 kg/m²     CONSTITUTIONAL: awake, alert, ill appearing   LUNGS:coarse bilateral bs with wheezing as well  CARDIOVASCULAR:s1s2 rrr   ABDOMEN: soft bs pos  NEUROLOGIC: GI  EXTREMITIES: no LE edema bilaterally    LABORATORY RESULTS  CBC:   Recent Labs 10/22/22  0905 10/23/22  1115 10/24/22  0419   WBC 37.6* 17.3* 23.7*   HGB 10.8* 9.1* 9.6*   PLT 85* 65* 81*     BMP:    Recent Labs     10/22/22  1900 10/23/22  1115 10/24/22  0419   * 129* 136   K 4.5 4.1 4.2   CL 97* 93* 101   CO2 23 21 26   BUN 18 15 15   CREATININE 0.7* 0.6* 0.5*   GLUCOSE 228* 345* 118*     Hepatic:   Recent Labs     10/22/22  0905 10/24/22  0419   AST 16 17   ALT 19 18   BILITOT 0.7 0.6   ALKPHOS 176* 185*     INR: No results for input(s): INR in the last 72 hours. RADIOLOGY REPORTS  Reviewed    ASSESSMENT & RECOMMENDATIONS:  ?Pneumonia: is on antimicrobials. Plan for bronch and evaluation by ID. Recommend ICS, chest compressions and suction. CLL:Rising leukocytosis could also be contributed by infection. Anemia could be sec to infection/abx. Was on ibrutinib before but recent plan was to start Rituxan. Recently received IVIG as well. Repeat IgG levels. Continue other medical care. This plan was discussed with the patient and his son and they seem to have verbalized  understanding. We will continue to follow the patient. Thank you for allowing us to participate in the care of this patient.      [address]

## 2022-10-24 NOTE — CONSULTS
[facility] Continence Nurse  Consult Note       Woodrow Stringer  AGE: 71 y.o.    GENDER: male  : 1950  TODAY'S DATE:  10/24/2022    Subjective:     Reason for CWOCN Evaluation and Assessment: wound assessment      Woodrow Stringer is a 71 y.o. male referred by:   [x] Physician  [] Nursing  [] Other:     Wound Identification:  Wound Type: diabetic, pressure, and traumatic  Contributing Factors: diabetes, chronic pressure, decreased mobility, and decreased tissue oxygenation        PAST MEDICAL HISTORY        Diagnosis Date    Arthritis     knees, Rt hand/wrist    CAD (coronary artery disease)     Cancer (HCC)     CLL--Sees Dr Josefina Ferrer    Diabetes mellitus [facility])     History of blood transfusion     no reaction    Hx of motion sickness     Hyperlipidemia     MDRO (multiple drug resistant organisms) resistance     abscess on buttock 10yrs ago    Migraine     last one summer 2016    Pneumonia 2016    Wears dentures     full upper--lower dentures    Wears glasses        PAST SURGICAL HISTORY    Past Surgical History:   Procedure Laterality Date    CARDIAC CATHETERIZATION      x 2  last showed \"inflammation of heart\"    COLONOSCOPY  2010    DENTAL SURGERY      all teeth extracted     EYE SURGERY Right 2016    Cataract removal    FRACTURE SURGERY Left     left ankle plate and screws    KNEE SURGERY  1970    left    OTHER SURGICAL HISTORY Left 2017    groin excision    TONSILLECTOMY  late 's    age 13    TUNNELED VENOUS PORT PLACEMENT      Right upper chest       FAMILY HISTORY    Family History   Problem Relation Age of Onset    Diabetes Mother     High Blood Pressure Mother     Heart Disease Father     Other Sister         liver problems    Early Death Brother     Asthma Maternal Uncle     Colon Cancer Brother        SOCIAL HISTORY    Social History     Tobacco Use    Smoking status: Former     Packs/day: 1.00     Years: 20.00     Pack years: 20.00     Types: Cigarettes     Start date: 10/2/1965     Quit date: 1987     Years since quittin.8    Smokeless tobacco: Never   Vaping Use    Vaping Use: Never used   Substance Use Topics    Alcohol use: No    Drug use: No       ALLERGIES    No Known Allergies    MEDICATIONS    Current Facility-Administered Medications on File Prior to Encounter   Medication Dose Route Frequency Provider Last Rate Last Admin    sodium chloride flush 0.9 % injection 10 mL  10 mL IntraVENous PRN Dafne Conway MD         Current Outpatient Medications on File Prior to Encounter   Medication Sig Dispense Refill    HYDROcodone-acetaminophen (NORCO) 5-325 MG per tablet Take 1 tablet by mouth every 12 hours as needed for Pain for up to 30 days. 60 tablet 0    gabapentin (NEURONTIN) 100 MG capsule Take 1 capsule by mouth 3 times daily for 30 days. 90 capsule 0    albuterol sulfate HFA (PROVENTIL;VENTOLIN;PROAIR) 108 (90 Base) MCG/ACT inhaler Inhale 2 puffs into the lungs every 4-6 hours as needed for Shortness of Breath or Wheezing      ipratropium-albuterol (DUONEB) 0.5-2.5 (3) MG/3ML SOLN nebulizer solution Take 1 vial by nebulization every 6 hours as needed for Shortness of Breath      Ascorbic Acid (VITAMIN C) 250 MG tablet Take 250 mg by mouth 2 times daily      ferrous gluconate 324 (37.5 Fe) MG TABS Take 1 tablet by mouth 2 times daily 60 tablet 5    valACYclovir (VALTREX) 500 MG tablet Take 1 tablet by mouth daily 30 tablet 5    LEVEMIR FLEXTOUCH 100 UNIT/ML injection pen Inject 40 Units into the skin nightly 5 pen 3    NOVOLOG FLEXPEN 100 UNIT/ML injection pen Inject 15 Units into the skin 3 times daily (before meals) 5 pen 3    atorvastatin (LIPITOR) 80 MG tablet Take 80 mg by mouth daily      acetaminophen-codeine (TYLENOL #3) 300-30 MG per tablet Take 1 tablet by mouth every 6 hours as needed for Pain.       finasteride (PROSCAR) 5 MG tablet Take 5 mg by mouth daily      tamsulosin (FLOMAX) 0.4 MG capsule Take 0.4 mg by mouth daily      glucose monitoring kit (FREESTYLE) monitoring kit 1 kit by Does not apply route daily as needed (glucose checks) 1 kit 0    Insulin Syringe-Needle U-100 30G X 1/2\" 0.5 ML MISC 1 each by Does not apply route daily 100 each 3    metFORMIN (GLUCOPHAGE) 1000 MG tablet Take 1,000 mg by mouth 2 times daily (with meals)      omeprazole (PRILOSEC) 20 MG delayed release capsule Take 20 mg by mouth daily as needed (GERD)      Immune Globulin, Human, 20 GM/200ML SOLN solution Infuse 20 g intravenously every 30 days 05/14/19 Given through infusion therapy states he is due on the 20 of May           Objective:      /74   Pulse 75   Temp 97.9 °F (36.6 °C) (Axillary)   Resp 16   Ht 6' (1.829 m)   Wt 163 lb 14.4 oz (74.3 kg)   SpO2 96%   BMI 22.23 kg/m²   Russ Risk Score: Russ Scale Score: 18    LABS    CBC:   Lab Results   Component Value Date/Time    WBC 23.7 10/24/2022 04:19 AM    RBC 3.90 10/24/2022 04:19 AM    RBC 3.38 08/21/2017 09:06 AM    HGB 9.6 10/24/2022 04:19 AM    HCT 32.1 10/24/2022 04:19 AM    MCV 82.3 10/24/2022 04:19 AM    MCH 24.6 10/24/2022 04:19 AM    MCHC 29.9 10/24/2022 04:19 AM    RDW 20.8 10/24/2022 04:19 AM    PLT 81 10/24/2022 04:19 AM    MPV 10.1 10/24/2022 04:19 AM     CMP:    Lab Results   Component Value Date/Time     10/24/2022 04:19 AM    K 4.2 10/24/2022 04:19 AM     10/24/2022 04:19 AM    CO2 26 10/24/2022 04:19 AM    BUN 15 10/24/2022 04:19 AM    CREATININE 0.5 10/24/2022 04:19 AM    GFRAA >60 10/11/2022 10:30 AM    LABGLOM >60 10/24/2022 04:19 AM    GLUCOSE 118 10/24/2022 04:19 AM    PROT 4.5 10/24/2022 04:19 AM    PROT 6.1 12/27/2012 09:53 AM    LABALBU 2.7 10/24/2022 04:19 AM    CALCIUM 8.1 10/24/2022 04:19 AM    BILITOT 0.6 10/24/2022 04:19 AM    ALKPHOS 185 10/24/2022 04:19 AM    AST 17 10/24/2022 04:19 AM    ALT 18 10/24/2022 04:19 AM     Albumin:    Lab Results   Component Value Date/Time    LABALBU 2.7 10/24/2022 04:19 AM     PT/INR:    Lab Results   Component Value Date/Time PROTIME 14.0 10/04/2022 06:33 AM    INR 1.08 10/04/2022 06:33 AM     HgBA1c:    Lab Results   Component Value Date/Time    LABA1C 6.9 10/23/2022 11:15 AM         Assessment:     Patient Active Problem List   Diagnosis    Pneumonia    Sepsis ([facility]    Hypogammaglobulinemia ([facility]    CLL (chronic lymphocytic leukemia) ([facility]    Upper respiratory infection, acute    Generalized muscle weakness    Type 2 diabetes mellitus with hyperglycemia, with long-term current use of insulin ([facility])    Poor appetite    COPD (chronic obstructive pulmonary disease) ([facility])    Neutropenia ([facility])    Other specified disorders of bone density and structure, unspecified site    Cellulitis of right upper limb    Herpesviral infection    Intestinal malabsorption    Other nonspecific abnormal finding of lung field    Iron deficiency anemia due to chronic blood loss    Other muscle spasm    Leukemia, lymphocytic, chronic ([facility])    Selective deficiency of IgG ([facility])    Acute bronchitis    Severe malnutrition ([facility])    Pneumonia due to organism       Measurements:  Wound 10/01/22 Sacrum Mid (Active)   Wound Image   10/24/22 1045   Wound Etiology Pressure Stage 2 10/24/22 1045   Dressing Status Intact 10/24/22 1211   Wound Cleansed Cleansed with saline 10/24/22 1045   Dressing/Treatment Silicone border 90/05/65 1211   Wound Length (cm) 1.2 cm 10/24/22 1045   Wound Width (cm) 0.6 cm 10/24/22 1045   Wound Depth (cm) 0.1 cm 10/24/22 1045   Wound Surface Area (cm^2) 0.72 cm^2 10/24/22 1045   Wound Volume (cm^3) 0.072 cm^3 10/24/22 1045   Distance Tunneling (cm) 0 cm 10/24/22 1045   Tunneling Position ___ O'Clock 0 10/24/22 1045   Undermining Starts ___ O'Clock 0 10/24/22 1045   Undermining Ends___ O'Clock 0 10/24/22 1045   Undermining Maxium Distance (cm) 0 10/24/22 1045   Wound Assessment Pink/red 10/24/22 1211   Drainage Amount None 10/24/22 1211   Drainage Description Serosanguinous 10/24/22 1211   Odor None 10/24/22 1045   Glo-wound Assessment Excoriated 10/24/22 1211   Margins Unattached edges 10/24/22 1211   Wound Thickness Description not for Pressure Injury Partial thickness 10/24/22 1045   Number of days: 22       Wound 10/01/22 Elbow Left redden (Active)   Dressing Status Intact 10/24/22 1211   Wound Cleansed Cleansed with saline 10/22/22 1325   Dressing/Treatment Silicone border 96/78/23 1211   Wound Length (cm) 0 cm 10/24/22 1045   Wound Width (cm) 0 cm 10/24/22 1045   Wound Depth (cm) 0 cm 10/24/22 1045   Wound Surface Area (cm^2) 0 cm^2 10/24/22 1045   Wound Volume (cm^3) 0 cm^3 10/24/22 1045   Wound Assessment Pink/red 10/24/22 1211   Drainage Amount Scant 10/24/22 1211   Odor None 10/24/22 1211   Glo-wound Assessment Fragile 10/24/22 1211   Margins Attached edges 10/24/22 1211   Number of days: 22       Wound 10/01/22 Elbow Right appears to be scabbed (Active)   Wound Image   10/24/22 1045   Wound Etiology Traumatic 10/24/22 1045   Dressing Status Intact 10/24/22 1211   Wound Cleansed Cleansed with saline 10/24/22 1045   Dressing/Treatment Silicone border 33/61/45 1211   Wound Length (cm) 0.5 cm 10/24/22 1045   Wound Width (cm) 0.5 cm 10/24/22 1045   Wound Depth (cm) 0.1 cm 10/24/22 1045   Wound Surface Area (cm^2) 0.25 cm^2 10/24/22 1045   Wound Volume (cm^3) 0.025 cm^3 10/24/22 1045   Distance Tunneling (cm) 0 cm 10/24/22 1045   Tunneling Position ___ O'Clock 0 10/24/22 1045   Undermining Starts ___ O'Clock 0 10/24/22 1045   Undermining Ends___ O'Clock 0 10/24/22 1045   Undermining Maxium Distance (cm) 0 10/24/22 1045   Wound Assessment Pink/red 10/24/22 1211   Drainage Amount Scant 10/24/22 1211   Drainage Description Yellow;Green 10/24/22 1211   Odor None 10/24/22 1045   Glo-wound Assessment Intact 10/24/22 1045   Margins Attached edges 10/24/22 1211   Number of days: 22       Wound 10/01/22 Ankle Left;Lateral (Active)   Wound Image   10/24/22 1045   Wound Etiology Diabetic 10/24/22 1045   Dressing Status Intact 10/24/22 1211   Wound Cleansed Cleansed with saline 10/24/22 1045   Dressing/Treatment Silicone border 24/09/38 1211   Wound Length (cm) 0.6 cm 10/24/22 1045   Wound Width (cm) 0.5 cm 10/24/22 1045   Wound Depth (cm) 0.1 cm 10/24/22 1045   Wound Surface Area (cm^2) 0.3 cm^2 10/24/22 1045   Wound Volume (cm^3) 0.03 cm^3 10/24/22 1045   Distance Tunneling (cm) 0 cm 10/24/22 1045   Tunneling Position ___ O'Clock 0 10/24/22 1045   Undermining Starts ___ O'Clock 0 10/24/22 1045   Undermining Ends___ O'Clock 0 10/24/22 1045   Undermining Maxium Distance (cm) 0 10/24/22 1045   Wound Assessment Pink/red 10/24/22 1211   Drainage Amount None 10/24/22 1211   Odor None 10/24/22 1045   Glo-wound Assessment Intact 10/24/22 1045   Margins Attached edges 10/24/22 1211   Number of days: 22       Response to treatment:  Well tolerated by patient. Pain Assessment:  Severity:  none  Quality of pain: na  Wound Pain Timing/Severity: na  Premedicated: no    Plan:     Plan of Care: Wound 10/01/22 Sacrum Mid-Dressing/Treatment: Silicone border  Wound 10/01/22 Elbow Left aury-Dressing/Treatment: Silicone border  Wound 10/01/22 Elbow Right appears to be scabbed-Dressing/Treatment: Silicone border  Wound 10/01/22 Ankle Left;Lateral-Dressing/Treatment: Silicone border    Pt in bed. Agreeable to wound assessment. Stage 2 noted to sacrum. Collagen and silicone foam border recommended and applied. Left lateral ankle with dry eschar-possible diabetic wound. Recommend betadine paint and leave HERMELINDO. Stated left elbow is from \"picking. \" Dry red area. Silicone foam border applied. Heels intact. Pictures and measurements taken. Atmos air pump applied to mattress. Pt is a mild risk for skin breakdown AEB Russ. Follow Russ orders. Updated nurse.      Specialty Bed Required : yes  [] Low Air Loss   [x] Pressure Redistribution  [] Fluid Immersion  [] Bariatric  [] Total Pressure Relief  [] Other:     Discharge Plan:  Placement for patient upon discharge: tbd  Hospice Care: no  Patient appropriate for Outpatient [address]: stated son helps manage and Ken 79    Patient/Caregiver Teaching:  Level of patient/caregiver understanding able to:   Voiced understanding.         Electronically signed by Ashley Callejas RN, [place]orge Mon on 10/24/2022 at 12:20 PM

## 2022-10-24 NOTE — PROGRESS NOTES
pulmonary      SUBJECTIVE:  stable but with cough and sputum production     OBJECTIVE    VITALS:  /77   Pulse 84   Temp 97.5 °F (36.4 °C)   Resp 20   Ht 6' (1.829 m)   Wt 163 lb 14.4 oz (74.3 kg)   SpO2 93%   BMI 22.23 kg/m²   HEAD AND FACE EXAM:  No throat injection, no active exudate,no thrush  NECK EXAM;No JVD, no masses, symmetrical  CHEST EXAM; Expansion equal and symmetrical, no masses  LUNG EXAM; Good breath sounds bilaterally. There are expiratory wheezes both lungs, there are crackles at both lung bases  CARDIOVASCULAR EXAM: Positive S1 and S2, no S3 or S4, no clicks ,no murmurs  RIGHT AND LEFT LOWER EXTRIMITY EXAM: No edema, no swelling, no inflamation  CNS EXAM: Alert and oriented X3          LABS   Lab Results   Component Value Date    WBC 23.7 (H) 10/24/2022    HGB 9.6 (L) 10/24/2022    HCT 32.1 (L) 10/24/2022    MCV 82.3 10/24/2022    PLT 81 (L) 10/24/2022     Lab Results   Component Value Date    CREATININE 0.5 (L) 10/24/2022    BUN 15 10/24/2022     10/24/2022    K 4.2 10/24/2022     10/24/2022    CO2 26 10/24/2022     Lab Results   Component Value Date    INR 1.08 10/04/2022    PROTIME 14.0 10/04/2022          Lab Results   Component Value Date/Time    PHOS 2.4 10/03/2022 01:22 PM    PHOS 2.1 09/30/2022 08:40 AM    PHOS 3.3 04/04/2019 12:39 PM      No results for input(s): PH, PO2ART, OHZ3GTW, HCO3, BEART, O2SAT in the last 72 hours. Wt Readings from Last 3 Encounters:   10/23/22 163 lb 14.4 oz (74.3 kg)   10/05/22 171 lb 15.3 oz (78 kg)   09/22/22 182 lb (82.6 kg)        The infiltrates seen previously in the lower lobes bilaterally for the most   part have decreased in size and conspicuity. However, there are now innumerable punctate tree-in-bud centrilobular micro   nodules, which are nonspecific but suggest inflammatory/infectious   bronchiolitis. Consider both typical and atypical etiologies.        In addition, cavitary lesion has developed in the periphery of the right   lower lobe and to a lesser extent within the left upper lung. Necrotizing   infection would be primarily considered given the rapid development. Consider both typical and atypical etiologies. Enlarged mediastinal lymph nodes, similar when compared to the previous exam,   process of Lia related to history of chronic lymphocytic leukemia.                  ASSESMENT  Pneumonia  Ch resp failure  cll        PLAN  Cpm  Id eval  bronch    10/24/2022  Jeryl Councilman, MD, M.D.

## 2022-10-24 NOTE — PROGRESS NOTES
Hospitalist Progress Note      Name:  Fay Huerta /Age/Sex: 1950  (71 y.o. male)   MRN & CSN:  [id number] & [id number] Admission Date/Time: 10/22/2022  8:27 AM   [facility] PCP: Daphne Landa MD         Hospital Day: 3    Assessment and Plan:   Fay Huerta is a 71 y.o.  male  who presents with Pneumonia due to organism    Persistent pneumonia-chest x-ray s/o pneumonia, possible cavitation in the right lung. Pseudomonas in sputum  in    Recent admission 2 weeks ago he was positive for rhinovirus. MRSA screen and blood cultures were negative. Patient completed 8 days of Zosyn. Respiratory panel +ve for rhinovirus. Will continue IV Zosyn and Vancomycin. ID consulted. Bx no growth 48 HRs, sputum culture Gram -ve rods,  and MRSA screen. Pulmonology consulted, Planning for bronch. Sepsis in setting of PNA and CLL - SIRS criteria met. LA resolved. Continue IVF and Abx. Chronic Hypoxic respiratory failure - recently discharge home O2 at 3 L per nasal cannula 10/6/22, no increase in respiratory requirement at this time, monitor with continuous pulse ox    Chest pain/elevated trop - suspect pleuritic/MSK given only has pain with coughing. EKG with non specific ST changes. hx CAD [facility] report not available. Recent TTE 22 EF 53-88%, grade I diastolicc dysfunction. consulted Cardiology. Deemed non cardiac chest pain     Mild Hyponatremia -suspect hypoosmolar, receiving 0.9NS. BMP daily     CLL and hypogammaglobulinemia on Rituxan and IVIG followed by Oncology - Heme/Oncology following. Monitor CBC     Protein calorie malnutrition suspected with hypo albuminemia- nutrition consulted. Low pre-albumin     Other chronic medical conditions-resume home medications unless contraindicated  COPD - not in exacerbation.  Respiratory care as noted above, no bronchospasm noted, defer steroids to Pulm  DM type II with neuropathy, A1C 6.7 22 - Monitor BG  AC/HS, resume pts basal and ssi, monitor for adjustment in regimen, ADA diet  Mixed hyperlipidemia - on statin  Recent pleural effusion on recent admission 2 weeks ago, requiring thoracentesis, fluid culture not available. BPH on flomax, proscar    Diet ADULT DIET; Regular; 5 carb choices (75 gm/meal)  ADULT ORAL NUTRITION SUPPLEMENT; Breakfast, Lunch, Dinner; Standard High Calorie/High Protein Oral Supplement   DVT Prophylaxis [] Lovenox, []  Heparin, [] SCDs, [] Ambulation   GI Prophylaxis [] PPI,  [] H2 Blocker,  [] Carafate,  [] Diet/Tube Feeds   Code Status Full Code   Disposition Patient requires continued admission due to Pneumonia   MDM [] Low, [] Moderate,[]  High  Patient's risk as above due to recurrent pneumonia     History of Present Illness:     Chief Complaint: Pneumonia due to organism  Amy To is a 71 y.o.  male  who presents with CLL on Rituxan and IVIG, hypogammaglobulinemia, lung nodule, COPD, Hypoxic respiratory failure on 3l per nc, DM type II, Hyperlipidemia, BPH among other co-morbidities who presents with shortness of breath and chest pain. Patient was recently hospitalized 2 weeks ago for sepsis and pneumonia. He was found to have rhino enterovirus. Reports he has been feeling better. Looks in mild distress. Denies any chest pain, dizziness, N/V, abdominal pain, fever. Ten point ROS reviewed negative, unless as noted above    Objective: Intake/Output Summary (Last 24 hours) at 10/24/2022 1506  Last data filed at 10/24/2022 1045  Gross per 24 hour   Intake --   Output 550 ml   Net -550 ml      Vitals:   Vitals:    10/24/22 1225   BP:    Pulse:    Resp: 17   Temp:    SpO2:      Physical Exam:   GEN Awake male, sitting upright in bed in no apparent distress. Appears given age. EYES Pupils are equally round. No scleral erythema, discharge, or conjunctivitis. HENT Mucous membranes are moist. Oral pharynx without exudates, no evidence of thrush.   NECK Supple, no apparent thyromegaly or masses. RESP Diffuse coarse crackles   CARDIO/VASC S1/S2 auscultated. Regular rate without appreciable murmurs, rubs, or gallops. No JVD or carotid bruits. Peripheral pulses equal bilaterally and palpable. No peripheral edema. GI Abdomen is soft without significant tenderness, masses, or guarding. Bowel sounds are normoactive. Rectal exam deferred.  No costovertebral angle tenderness. Normal appearing external genitalia. Godfrey catheter is not present. HEME/LYMPH No palpable cervical lymphadenopathy and no hepatosplenomegaly. No petechiae or ecchymoses. MSK No gross joint deformities. SKIN Normal coloration, warm, dry. NEURO Cranial nerves appear grossly intact, normal speech, no lateralizing weakness. PSYCH Awake, alert, oriented x 4. Affect appropriate.     Medications:   Medications:    vancomycin  1,250 mg IntraVENous Q12H    sodium chloride flush  5-40 mL IntraVENous 2 times per day    enoxaparin  40 mg SubCUTAneous Daily    insulin lispro  0-4 Units SubCUTAneous TID WC    insulin lispro  0-4 Units SubCUTAneous Nightly    guaiFENesin  600 mg Oral BID    vitamin C  250 mg Oral BID    atorvastatin  80 mg Oral Daily    ferrous gluconate  324 mg Oral BID    finasteride  5 mg Oral Daily    gabapentin  100 mg Oral TID    pantoprazole  40 mg Oral QAM AC    tamsulosin  0.4 mg Oral Daily    insulin glargine  40 Units SubCUTAneous Nightly    piperacillin-tazobactam  4,500 mg IntraVENous Q6H      Infusions:    sodium chloride 25 mL/hr at 10/23/22 1035     PRN Meds: sodium chloride flush, 5-40 mL, PRN  sodium chloride, , PRN  ondansetron, 4 mg, Q8H PRN   Or  ondansetron, 4 mg, Q6H PRN  polyethylene glycol, 17 g, Daily PRN  acetaminophen, 650 mg, Q6H PRN   Or  acetaminophen, 650 mg, Q6H PRN  benzonatate, 100 mg, TID PRN  albuterol sulfate HFA, 2 puff, Q4H PRN  HYDROcodone-acetaminophen, 1 tablet, Q12H PRN  morphine, 2 mg, Q4H PRN  ipratropium-albuterol, 1 ampule, Q4H PRN        Patient is still admitted because Pneumonia. The anticipated discharge is in greater than 24 hours.      Electronically signed by Cristy Yeh MD on 10/24/2022 at 3:06 PM

## 2022-10-25 LAB
ALBUMIN SERPL-MCNC: 2.6 GM/DL (ref 3.4–5)
ALP BLD-CCNC: 213 IU/L (ref 40–128)
ALT SERPL-CCNC: 18 U/L (ref 10–40)
ANION GAP SERPL CALCULATED.3IONS-SCNC: 9 MMOL/L (ref 4–16)
ANISOCYTOSIS: ABNORMAL
AST SERPL-CCNC: 20 IU/L (ref 15–37)
ATYPICAL LYMPHOCYTE ABSOLUTE COUNT: ABNORMAL
BANDED NEUTROPHILS ABSOLUTE COUNT: 2.87 K/CU MM
BANDED NEUTROPHILS RELATIVE PERCENT: 9 % (ref 5–11)
BILIRUB SERPL-MCNC: 0.5 MG/DL (ref 0–1)
BUN BLDV-MCNC: 16 MG/DL (ref 6–23)
CALCIUM SERPL-MCNC: 8 MG/DL (ref 8.3–10.6)
CHLORIDE BLD-SCNC: 102 MMOL/L (ref 99–110)
CO2: 28 MMOL/L (ref 21–32)
CREAT SERPL-MCNC: 0.5 MG/DL (ref 0.9–1.3)
CULTURE: ABNORMAL
CULTURE: ABNORMAL
DIFFERENTIAL TYPE: ABNORMAL
GFR SERPL CREATININE-BSD FRML MDRD: >60 ML/MIN/1.73M2
GLUCOSE BLD-MCNC: 115 MG/DL (ref 70–99)
GLUCOSE BLD-MCNC: 163 MG/DL (ref 70–99)
GLUCOSE BLD-MCNC: 207 MG/DL (ref 70–99)
GLUCOSE BLD-MCNC: 264 MG/DL (ref 70–99)
GLUCOSE BLD-MCNC: 99 MG/DL (ref 70–99)
HCT VFR BLD CALC: 30.9 % (ref 42–52)
HEMOGLOBIN: 9.2 GM/DL (ref 13.5–18)
HIGH SENSITIVE C-REACTIVE PROTEIN: 14.4 MG/L (ref 0–5)
IGG,SERUM: 326 MG/DL (ref 723–1685)
LYMPHOCYTES ABSOLUTE: 22 K/CU MM
LYMPHOCYTES RELATIVE PERCENT: 69 % (ref 24–44)
Lab: ABNORMAL
MCH RBC QN AUTO: 24.7 PG (ref 27–31)
MCHC RBC AUTO-ENTMCNC: 29.8 % (ref 32–36)
MCV RBC AUTO: 82.8 FL (ref 78–100)
METAMYELOCYTES ABSOLUTE COUNT: 0.32 K/CU MM
METAMYELOCYTES PERCENT: 1 %
MONOCYTES ABSOLUTE: 3.2 K/CU MM
MONOCYTES RELATIVE PERCENT: 10 % (ref 0–4)
MYELOCYTE PERCENT: 1 %
MYELOCYTES ABSOLUTE COUNT: 0.32 K/CU MM
PDW BLD-RTO: 20.7 % (ref 11.7–14.9)
PLATELET # BLD: 80 K/CU MM (ref 140–440)
PMV BLD AUTO: 9.8 FL (ref 7.5–11.1)
POTASSIUM SERPL-SCNC: 4.3 MMOL/L (ref 3.5–5.1)
PROCALCITONIN: 0.13
RBC # BLD: 3.73 M/CU MM (ref 4.6–6.2)
RBC # BLD: ABNORMAL 10*6/UL
SEGMENTED NEUTROPHILS ABSOLUTE COUNT: 3.2 K/CU MM
SEGMENTED NEUTROPHILS RELATIVE PERCENT: 10 % (ref 36–66)
SMUDGE CELLS: PRESENT
SODIUM BLD-SCNC: 139 MMOL/L (ref 135–145)
SPECIMEN: ABNORMAL
TOTAL PROTEIN: 4.5 GM/DL (ref 6.4–8.2)
WBC # BLD: 31.9 K/CU MM (ref 4–10.5)

## 2022-10-25 PROCEDURE — 86141 C-REACTIVE PROTEIN HS: CPT

## 2022-10-25 PROCEDURE — 82784 ASSAY IGA/IGD/IGG/IGM EACH: CPT

## 2022-10-25 PROCEDURE — 6370000000 HC RX 637 (ALT 250 FOR IP): Performed by: NURSE PRACTITIONER

## 2022-10-25 PROCEDURE — 80053 COMPREHEN METABOLIC PANEL: CPT

## 2022-10-25 PROCEDURE — 84145 PROCALCITONIN (PCT): CPT

## 2022-10-25 PROCEDURE — 85027 COMPLETE CBC AUTOMATED: CPT

## 2022-10-25 PROCEDURE — 2580000003 HC RX 258: Performed by: NURSE PRACTITIONER

## 2022-10-25 PROCEDURE — 36591 DRAW BLOOD OFF VENOUS DEVICE: CPT

## 2022-10-25 PROCEDURE — 85007 BL SMEAR W/DIFF WBC COUNT: CPT

## 2022-10-25 PROCEDURE — 82962 GLUCOSE BLOOD TEST: CPT

## 2022-10-25 PROCEDURE — 1200000000 HC SEMI PRIVATE

## 2022-10-25 PROCEDURE — 99232 SBSQ HOSP IP/OBS MODERATE 35: CPT | Performed by: NURSE PRACTITIONER

## 2022-10-25 PROCEDURE — 6360000002 HC RX W HCPCS: Performed by: HOSPITALIST

## 2022-10-25 PROCEDURE — 6360000002 HC RX W HCPCS: Performed by: NURSE PRACTITIONER

## 2022-10-25 RX ORDER — METHYLPREDNISOLONE SODIUM SUCCINATE 40 MG/ML
40 INJECTION, POWDER, LYOPHILIZED, FOR SOLUTION INTRAMUSCULAR; INTRAVENOUS DAILY
Status: DISCONTINUED | OUTPATIENT
Start: 2022-10-25 | End: 2022-10-25

## 2022-10-25 RX ORDER — METHYLPREDNISOLONE SODIUM SUCCINATE 40 MG/ML
40 INJECTION, POWDER, LYOPHILIZED, FOR SOLUTION INTRAMUSCULAR; INTRAVENOUS ONCE
Status: COMPLETED | OUTPATIENT
Start: 2022-10-25 | End: 2022-10-26

## 2022-10-25 RX ORDER — DIPHENHYDRAMINE HYDROCHLORIDE 50 MG/ML
25 INJECTION INTRAMUSCULAR; INTRAVENOUS ONCE
Status: DISCONTINUED | OUTPATIENT
Start: 2022-10-25 | End: 2022-10-25

## 2022-10-25 RX ORDER — DIPHENHYDRAMINE HYDROCHLORIDE 50 MG/ML
25 INJECTION INTRAMUSCULAR; INTRAVENOUS ONCE
Status: COMPLETED | OUTPATIENT
Start: 2022-10-25 | End: 2022-10-26

## 2022-10-25 RX ADMIN — OXYCODONE HYDROCHLORIDE AND ACETAMINOPHEN 250 MG: 500 TABLET ORAL at 20:35

## 2022-10-25 RX ADMIN — INSULIN LISPRO 2 UNITS: 100 INJECTION, SOLUTION INTRAVENOUS; SUBCUTANEOUS at 17:13

## 2022-10-25 RX ADMIN — Medication 324 MG: at 07:59

## 2022-10-25 RX ADMIN — GUAIFENESIN 600 MG: 600 TABLET, EXTENDED RELEASE ORAL at 07:59

## 2022-10-25 RX ADMIN — MORPHINE SULFATE 2 MG: 2 INJECTION, SOLUTION INTRAMUSCULAR; INTRAVENOUS at 20:35

## 2022-10-25 RX ADMIN — FINASTERIDE 5 MG: 5 TABLET, FILM COATED ORAL at 07:59

## 2022-10-25 RX ADMIN — SODIUM CHLORIDE, PRESERVATIVE FREE 10 ML: 5 INJECTION INTRAVENOUS at 20:35

## 2022-10-25 RX ADMIN — MORPHINE SULFATE 2 MG: 2 INJECTION, SOLUTION INTRAMUSCULAR; INTRAVENOUS at 16:28

## 2022-10-25 RX ADMIN — OXYCODONE HYDROCHLORIDE AND ACETAMINOPHEN 250 MG: 500 TABLET ORAL at 07:59

## 2022-10-25 RX ADMIN — MORPHINE SULFATE 2 MG: 2 INJECTION, SOLUTION INTRAMUSCULAR; INTRAVENOUS at 01:53

## 2022-10-25 RX ADMIN — TAMSULOSIN HYDROCHLORIDE 0.4 MG: 0.4 CAPSULE ORAL at 07:58

## 2022-10-25 RX ADMIN — HYDROCODONE BITARTRATE AND ACETAMINOPHEN 1 TABLET: 5; 325 TABLET ORAL at 07:59

## 2022-10-25 RX ADMIN — ENOXAPARIN SODIUM 40 MG: 40 INJECTION SUBCUTANEOUS at 07:58

## 2022-10-25 RX ADMIN — PIPERACILLIN AND TAZOBACTAM 4500 MG: 4; .5 INJECTION, POWDER, FOR SOLUTION INTRAVENOUS at 02:18

## 2022-10-25 RX ADMIN — MORPHINE SULFATE 2 MG: 2 INJECTION, SOLUTION INTRAMUSCULAR; INTRAVENOUS at 05:46

## 2022-10-25 RX ADMIN — GABAPENTIN 100 MG: 100 CAPSULE ORAL at 20:35

## 2022-10-25 RX ADMIN — BENZONATATE 100 MG: 100 CAPSULE ORAL at 01:54

## 2022-10-25 RX ADMIN — PIPERACILLIN AND TAZOBACTAM 4500 MG: 4; .5 INJECTION, POWDER, FOR SOLUTION INTRAVENOUS at 08:06

## 2022-10-25 RX ADMIN — PANTOPRAZOLE SODIUM 40 MG: 40 TABLET, DELAYED RELEASE ORAL at 05:47

## 2022-10-25 RX ADMIN — MEROPENEM 1000 MG: 1 INJECTION, POWDER, FOR SOLUTION INTRAVENOUS at 16:33

## 2022-10-25 RX ADMIN — ATORVASTATIN CALCIUM 80 MG: 40 TABLET, FILM COATED ORAL at 20:35

## 2022-10-25 RX ADMIN — Medication 324 MG: at 20:35

## 2022-10-25 RX ADMIN — GUAIFENESIN 600 MG: 600 TABLET, EXTENDED RELEASE ORAL at 20:35

## 2022-10-25 RX ADMIN — SODIUM CHLORIDE, PRESERVATIVE FREE 10 ML: 5 INJECTION INTRAVENOUS at 08:07

## 2022-10-25 RX ADMIN — GABAPENTIN 100 MG: 100 CAPSULE ORAL at 07:58

## 2022-10-25 RX ADMIN — MORPHINE SULFATE 2 MG: 2 INJECTION, SOLUTION INTRAMUSCULAR; INTRAVENOUS at 11:04

## 2022-10-25 RX ADMIN — PIPERACILLIN AND TAZOBACTAM 4500 MG: 4; .5 INJECTION, POWDER, FOR SOLUTION INTRAVENOUS at 14:22

## 2022-10-25 RX ADMIN — GABAPENTIN 100 MG: 100 CAPSULE ORAL at 14:23

## 2022-10-25 RX ADMIN — BENZONATATE 100 MG: 100 CAPSULE ORAL at 07:59

## 2022-10-25 RX ADMIN — INSULIN GLARGINE 40 UNITS: 100 INJECTION, SOLUTION SUBCUTANEOUS at 20:36

## 2022-10-25 ASSESSMENT — PAIN SCALES - WONG BAKER
WONGBAKER_NUMERICALRESPONSE: 0

## 2022-10-25 ASSESSMENT — PAIN - FUNCTIONAL ASSESSMENT
PAIN_FUNCTIONAL_ASSESSMENT: PREVENTS OR INTERFERES SOME ACTIVE ACTIVITIES AND ADLS

## 2022-10-25 ASSESSMENT — PAIN DESCRIPTION - FREQUENCY
FREQUENCY: INTERMITTENT
FREQUENCY: INTERMITTENT

## 2022-10-25 ASSESSMENT — ENCOUNTER SYMPTOMS
COUGH: 1
SHORTNESS OF BREATH: 1
NAUSEA: 0
BACK PAIN: 0
SORE THROAT: 0
WHEEZING: 0
VOMITING: 0

## 2022-10-25 ASSESSMENT — PAIN DESCRIPTION - LOCATION
LOCATION: CHEST
LOCATION: RIB CAGE
LOCATION: CHEST
LOCATION: RIB CAGE

## 2022-10-25 ASSESSMENT — PAIN DESCRIPTION - ORIENTATION
ORIENTATION: UPPER
ORIENTATION: UPPER
ORIENTATION: MID

## 2022-10-25 ASSESSMENT — PAIN DESCRIPTION - DESCRIPTORS
DESCRIPTORS: DISCOMFORT
DESCRIPTORS: DISCOMFORT
DESCRIPTORS: HEAVINESS;GNAWING

## 2022-10-25 ASSESSMENT — PAIN SCALES - GENERAL
PAINLEVEL_OUTOF10: 10
PAINLEVEL_OUTOF10: 5
PAINLEVEL_OUTOF10: 8
PAINLEVEL_OUTOF10: 4
PAINLEVEL_OUTOF10: 5
PAINLEVEL_OUTOF10: 3
PAINLEVEL_OUTOF10: 7
PAINLEVEL_OUTOF10: 0
PAINLEVEL_OUTOF10: 3
PAINLEVEL_OUTOF10: 5

## 2022-10-25 ASSESSMENT — PAIN DESCRIPTION - ONSET
ONSET: ON-GOING
ONSET: ON-GOING

## 2022-10-25 ASSESSMENT — PAIN DESCRIPTION - PAIN TYPE
TYPE: ACUTE PAIN
TYPE: ACUTE PAIN

## 2022-10-25 NOTE — PLAN OF CARE
Problem: Discharge Planning  Goal: Discharge to home or other facility with appropriate resources  10/24/2022 2256 by Zaid Melchor RN  Outcome: Progressing  10/24/2022 1049 by Jace Rooney LPN  Outcome: Progressing     Problem: Pain  Goal: Verbalizes/displays adequate comfort level or baseline comfort level  10/24/2022 2256 by Zaid Melchor RN  Outcome: Progressing  10/24/2022 1049 by Jace Rooney LPN  Outcome: Progressing     Problem: ABCDS Injury Assessment  Goal: Absence of physical injury  10/24/2022 2256 by Zaid Melchor RN  Outcome: Progressing  10/24/2022 1049 by Jace Rooney LPN  Outcome: Progressing     Problem: Skin/Tissue Integrity  Goal: Absence of new skin breakdown  Description: 1. Monitor for areas of redness and/or skin breakdown  2. Assess vascular access sites hourly  3. Every 4-6 hours minimum:  Change oxygen saturation probe site  4. Every 4-6 hours:  If on nasal continuous positive airway pressure, respiratory therapy assess nares and determine need for appliance change or resting period.   10/24/2022 2256 by Zaid Melchor RN  Outcome: Progressing  10/24/2022 1049 by Jace Rooney LPN  Outcome: Progressing     Problem: Safety - Adult  Goal: Free from fall injury  10/24/2022 2256 by Zaid Melchor RN  Outcome: Progressing  10/24/2022 1049 by Jace Rooney LPN  Outcome: Progressing     Problem: Chronic Conditions and Co-morbidities  Goal: Patient's chronic conditions and co-morbidity symptoms are monitored and maintained or improved  10/24/2022 2256 by Zaid Melchor RN  Outcome: Progressing  10/24/2022 1049 by Jace Rooney LPN  Outcome: Progressing

## 2022-10-25 NOTE — PROGRESS NOTES
10/25/2022  2:50 PM    Patient:    Anthony Baptiste  : 1950   71 y.o. MRN: [id number]  Admitted: 10/22/2022  8:27 AM ATT: Nestor Newsome MD   [id number]  AdmitDx: Pneumonia due to organism [J18.9]  Rhinovirus [B34.8]  Personal history of CLL (chronic lymphocytic leukemia) [Z85.6]  Elevated troponin [R77.8]  Pneumonia due to infectious organism, unspecified laterality, unspecified part of lung [J18.9]  PCP: Americo Delatorre MD      Interval: Patient was seen and examined today in bed. He feels slightly improved from a respiratory standpoint. Continuing to cough up thick green sputum. No fever or chills. Anxious to get up out of bed and walk more. Still weak. Apparently planning for bronchoscopy soon. Discussed IgG results with patient. 10/23/22: CT chest:  The infiltrates seen previously in the lower lobes bilaterally for the most   part have decreased in size and conspicuity. However, there are now innumerable punctate tree-in-bud centrilobular micro   nodules, which are nonspecific but suggest inflammatory/infectious   bronchiolitis. Consider both typical and atypical etiologies. In addition, cavitary lesion has developed in the periphery of the right   lower lobe and to a lesser extent within the left upper lung. Necrotizing   infection would be primarily considered given the rapid development. Consider both typical and atypical etiologies. Enlarged mediastinal lymph nodes, similar when compared to the previous exam,   process of Lia related to history of chronic lymphocytic leukemia. PHYSICAL EXAM :    Vitals: BP (!) 144/82   Pulse 77   Temp 98 °F (36.7 °C) (Axillary)   Resp 17   Ht 6' (1.829 m)   Wt 164 lb 8 oz (74.6 kg)   SpO2 94%   BMI 22.31 kg/m²     CONSTITUTIONAL: awake, alert, ill appearing   LUNGS: Bilateral breath sounds coarse with bilateral upper lobe wheezing. No distress on room air.   CARDIOVASCULAR:s1s2 rrr   ABDOMEN: soft bs pos  NEUROLOGIC: GI, speech intact  EXTREMITIES: no LE edema bilaterally    LABORATORY RESULTS  CBC:   Recent Labs     10/23/22  1115 10/24/22  0419 10/25/22  0530   WBC 17.3* 23.7* 31.9*   HGB 9.1* 9.6* 9.2*   PLT 65* 81* 80*       BMP:    Recent Labs     10/23/22  1115 10/24/22  0419 10/25/22  0530   * 136 139   K 4.1 4.2 4.3   CL 93* 101 102   CO2 21 26 28   BUN 15 15 16   CREATININE 0.6* 0.5* 0.5*   GLUCOSE 345* 118* 115*       Hepatic:   Recent Labs     10/24/22  0419 10/25/22  0530   AST 17 20   ALT 18 18   BILITOT 0.6 0.5   ALKPHOS 185* 213*       INR: No results for input(s): INR in the last 72 hours. RADIOLOGY REPORTS  Reviewed    ASSESSMENT & RECOMMENDATIONS:  ?Pneumonia: is on antimicrobials. Plan for bronch and evaluation by ID. Recommend ICS, chest compressions and suction. Patient wishes to be out of bed which we would would encourage for pulmonary toileting    CLL:Rising leukocytosis could also be contributed by infection however does keep trending up. Anemia could be sec to infection/abx and is currently stable. Was on ibrutinib before but recent plan was to start Rituxan. Received 20 g IVIG during previous hospitalization in early October. Typically gets 30 g monthly. Repeat IgG levels 326 on 10/25/2022. Will give additional 30 g IVIG for total of 50 g in October. Continue other medical care. This plan was discussed with the patient and his son and they seem to have verbalized  understanding. We will continue to follow the patient. Thank you for allowing us to participate in the care of this patient. Emy Diaz PA-C    Portions of this note are copied forward from previous clinic note. Interval history, ROS, physical exam, assessment and plan has been reviewed and updated for accuracy by this provider.

## 2022-10-25 NOTE — PROGRESS NOTES
Physician Progress Note      Abilio KATHLEEN #:                  [id number]  :                       1950  ADMIT DATE:       10/22/2022 8:27 AM  DISCH DATE:  RESPONDING  PROVIDER #:        Aster Sierar MD          QUERY TEXT:    Patient admitted with Sepsis. If possible, please document in progress notes   and discharge summary if you are evaluating and /or treating any of the   following: The medical record reflects the following:  Risk Factors: Sepsis, Pneumonia, CLL, DMII, Intestinal malabsorption  Clinical Indicators: Severe malnutrition, In context of chronic illness (acute   on chronic) related to inadequate protein-energy intake, impaired nutrient   utilization, increase demand for energy/nutrients, catabolic illness as   evidenced by poor intake prior to admission, weight loss greater than or equal   to 5% in 1 month, severe muscle loss, severe loss of subcutaneous fat,   Current BMI (kg/m2): 22.2, Hypoalbuminemia  Treatment: Continue Current Diet, Start Oral Nutrition Supplement, Breakfast,   Lunch, Dinner; Standard High Calorie/High Protein Oral Supplement    Thank you, Malini Caruso RN, CDS ([phone])    ASPEN Criteria:    https://aspenjournals. onlinelibrary. oliveros. com/doi/full/10.1177/940022197641059  5  Options provided:  -- Protein calorie malnutrition severe  -- Other - I will add my own diagnosis  -- Disagree - Not applicable / Not valid  -- Disagree - Clinically unable to determine / Unknown  -- Refer to Clinical Documentation Reviewer    PROVIDER RESPONSE TEXT:    This patient has severe protein calorie malnutrition. Query created by: Ninoska Kwan on 10/25/2022 3:08 PM      Electronically signed by:   Aster Sierra MD 10/25/2022 3:31 PM

## 2022-10-25 NOTE — PROGRESS NOTES
pulmonary      SUBJECTIVE:  still with cough and sputum production     OBJECTIVE    VITALS:  BP (!) 144/82   Pulse 77   Temp 98 °F (36.7 °C) (Axillary)   Resp 18   Ht 6' (1.829 m)   Wt 164 lb 8 oz (74.6 kg)   SpO2 94%   BMI 22.31 kg/m²   HEAD AND FACE EXAM:  No throat injection, no active exudate,no thrush  NECK EXAM;No JVD, no masses, symmetrical  CHEST EXAM; Expansion equal and symmetrical, no masses  LUNG EXAM; Good breath sounds bilaterally. There are expiratory wheezes both lungs, there are crackles at both lung bases  CARDIOVASCULAR EXAM: Positive S1 and S2, no S3 or S4, no clicks ,no murmurs  RIGHT AND LEFT LOWER EXTRIMITY EXAM: No edema, no swelling, no inflamation  CNS EXAM: Alert and oriented X3          LABS   Lab Results   Component Value Date    WBC 31.9 (HH) 10/25/2022    HGB 9.2 (L) 10/25/2022    HCT 30.9 (L) 10/25/2022    MCV 82.8 10/25/2022    PLT 80 (L) 10/25/2022     Lab Results   Component Value Date    CREATININE 0.5 (L) 10/25/2022    BUN 16 10/25/2022     10/25/2022    K 4.3 10/25/2022     10/25/2022    CO2 28 10/25/2022     Lab Results   Component Value Date    INR 1.08 10/04/2022    PROTIME 14.0 10/04/2022          Lab Results   Component Value Date/Time    PHOS 2.4 10/03/2022 01:22 PM    PHOS 2.1 09/30/2022 08:40 AM    PHOS 3.3 04/04/2019 12:39 PM      No results for input(s): PH, PO2ART, YGU5FBQ, HCO3, BEART, O2SAT in the last 72 hours.       Wt Readings from Last 3 Encounters:   10/25/22 164 lb 8 oz (74.6 kg)   10/05/22 171 lb 15.3 oz (78 kg)   09/22/22 182 lb (82.6 kg)               ASSESMENT  Pneumonia persistent  Ch resp failure  Cyndia Lye o2  Dr Emi Christopher be back tomorrow and resume care    10/25/2022  Bonchas Vieyra MD, M.D.

## 2022-10-25 NOTE — PLAN OF CARE
Problem: Discharge Planning  Goal: Discharge to home or other facility with appropriate resources  10/25/2022 1030 by Siddharth Adan LPN  Outcome: Progressing  10/24/2022 2256 by Patricio Hinson RN  Outcome: Progressing

## 2022-10-25 NOTE — PROGRESS NOTES
V2.0  [facility] [place] Hospitalist Progress Note      Name:  Jose Cruz Cheek /Age/Sex: 1950  (71 y.o. male)   MRN & CSN:  [id number] & [id number] Encounter Date/Time: 10/25/2022 10:15 AM EDT    Location:  [address] [place] [address]-A PCP: Atanacio Sandhoff, MD       Hospital Day: 4    Assessment and Plan:   Jose Cruz Cheek is a 71 y.o. male  who presents with Pneumonia due to organism      Plan:    Persistent pneumonia-chest x-ray s/o pneumonia, possible cavitation in the right lung. Pseudomonas in sputum  in    Recent admission 2 weeks ago he was positive for rhinovirus. MRSA screen and blood cultures were negative. Patient completed 8 days of Zosyn. Respiratory panel +ve for rhinovirus. Will continue IV Zosyn . ID consulted. Bx no growth 48 HRs, sputum culture Gram -ve rods,  Pulmonology consulted, Planning for bronch, timing TBD. .       Sepsis in setting of PNA and CLL - SIRS criteria met. LA resolved. Initially received IV fluids. Now stopped. Continue antibiotics as above. Chronic Hypoxic respiratory failure - recently discharge home O2 at 3 L per nasal cannula 10/6/22, no increase in respiratory requirement at this time, monitor with continuous pulse ox     Chest pain/elevated trop - suspect pleuritic/MSK given only has pain with coughing. EKG with non specific ST changes. hx CAD LHC report not available. Recent TTE 22 EF 42-47%, grade I diastolicc dysfunction. consulted Cardiology. Deemed non cardiac chest pain     Mild Hyponatremia   -Resolved with IV fluid administration. CLL and hypogammaglobulinemia was on ibrutinib before but now plan is to start on Rituxan as per heme-onc. Recently seen by IVIG as well. We are following this patient while he is inpatient. Protein calorie malnutrition suspected with hypo albuminemia- nutrition consulted. Low pre-albumin     Other chronic medical conditions-resume home medications unless contraindicated  COPD - not in exacerbation.  Respiratory care as noted above, no bronchospasm noted, hold steroids. DM type II with neuropathy, A1C 6.7 9/28/22 - Monitor BG  AC/HS, resume pts basal and ssi, monitor for adjustment in regimen, ADA diet    Mixed hyperlipidemia - on statin  Recent pleural effusion on recent admission 2 weeks ago, requiring thoracentesis, fluid culture not available. BPH on flomax, proscar         Comment: Please note this report has been produced using speech recognition software and may contain errors related to that system including errors in grammar, punctuation, and spelling, as well as words and phrases that may be inappropriate. If there are any questions or concerns please feel free to contact the dictating provider for clarification. Diet ADULT DIET; Regular; 5 carb choices (75 gm/meal)  ADULT ORAL NUTRITION SUPPLEMENT; Breakfast, Lunch, Dinner; Standard High Calorie/High Protein Oral Supplement   DVT Prophylaxis [x] Lovenox, []  Heparin, [] SCDs, [] Ambulation,  [] Eliquis, [] Xarelto  [] Coumadin   Code Status Full Code   Disposition From: Home  Expected Disposition: Home versus SNF  Estimated Date of Discharge: 2 to 3 days. Patient requires continued admission due to pending about bronchoscopy,   Surrogate Decision Maker/ POJASS Cordova     Subjective:     Chief Complaint: Fever and Shortness of Breath     Feels okay. Reports marginal improvement in shortness of breath. Denies any chest pain, dizziness, nausea vomiting, abdominal pain, fevers or chills. Review of Systems:    Review of Systems   Constitutional:  Negative for chills and fever. HENT:  Negative for sore throat. Eyes:  Negative for visual disturbance. Respiratory:  Positive for cough and shortness of breath. Negative for wheezing. Cardiovascular:  Negative for palpitations and leg swelling. Gastrointestinal:  Negative for nausea and vomiting. Endocrine: Negative for polyuria. Genitourinary:  Negative for dysuria.    Musculoskeletal:  Negative for back pain.   Skin:  Negative for wound. Neurological:  Negative for dizziness and weakness. Psychiatric/Behavioral:  Negative for confusion. \    Objective: Intake/Output Summary (Last 24 hours) at 10/25/2022 1015  Last data filed at 10/25/2022 3650  Gross per 24 hour   Intake --   Output 2000 ml   Net -2000 ml        Vitals:   Vitals:    10/25/22 0829   BP:    Pulse:    Resp: 18   Temp:    SpO2:        Physical Exam:   Physical Exam  Constitutional:       General: He is not in acute distress. Appearance: He is ill-appearing. HENT:      Mouth/Throat:      Mouth: Mucous membranes are moist.      Pharynx: No posterior oropharyngeal erythema. Eyes:      General: No scleral icterus. Extraocular Movements: Extraocular movements intact. Pupils: Pupils are equal, round, and reactive to light. Cardiovascular:      Rate and Rhythm: Normal rate and regular rhythm. Pulses: Normal pulses. Heart sounds: No murmur heard. Pulmonary:      Effort: Pulmonary effort is normal.      Breath sounds: No wheezing or rales. Comments: On nasal cannula oxygen. Not in any acute respiratory distress. Bilateral rhonchi. Abdominal:      General: Bowel sounds are normal. There is no distension. Palpations: Abdomen is soft. Tenderness: There is no abdominal tenderness. Musculoskeletal:         General: Normal range of motion. Right lower leg: No edema. Left lower leg: No edema. Skin:     General: Skin is warm. Findings: No rash. Neurological:      General: No focal deficit present. Mental Status: He is alert and oriented to person, place, and time. Cranial Nerves: No cranial nerve deficit. Sensory: No sensory deficit. Motor: No weakness.    Psychiatric:         Mood and Affect: Mood normal.       Medications:   Medications:    immune globulin  30 g IntraVENous Once    sodium chloride flush  5-40 mL IntraVENous 2 times per day    enoxaparin  40 mg SubCUTAneous Daily    insulin lispro  0-4 Units SubCUTAneous TID WC    insulin lispro  0-4 Units SubCUTAneous Nightly    guaiFENesin  600 mg Oral BID    vitamin C  250 mg Oral BID    atorvastatin  80 mg Oral Daily    ferrous gluconate  324 mg Oral BID    finasteride  5 mg Oral Daily    gabapentin  100 mg Oral TID    pantoprazole  40 mg Oral QAM AC    tamsulosin  0.4 mg Oral Daily    insulin glargine  40 Units SubCUTAneous Nightly    piperacillin-tazobactam  4,500 mg IntraVENous Q6H      Infusions:    sodium chloride 25 mL/hr at 10/23/22 1035     PRN Meds: sodium chloride flush, 5-40 mL, PRN  sodium chloride, , PRN  ondansetron, 4 mg, Q8H PRN   Or  ondansetron, 4 mg, Q6H PRN  polyethylene glycol, 17 g, Daily PRN  acetaminophen, 650 mg, Q6H PRN   Or  acetaminophen, 650 mg, Q6H PRN  benzonatate, 100 mg, TID PRN  albuterol sulfate HFA, 2 puff, Q4H PRN  HYDROcodone-acetaminophen, 1 tablet, Q12H PRN  morphine, 2 mg, Q4H PRN  ipratropium-albuterol, 1 ampule, Q4H PRN        Labs      Recent Results (from the past 24 hour(s))   POCT Glucose    Collection Time: 10/24/22 11:31 AM   Result Value Ref Range    POC Glucose 216 (H) 70 - 99 MG/DL   POCT Glucose    Collection Time: 10/24/22  4:49 PM   Result Value Ref Range    POC Glucose 193 (H) 70 - 99 MG/DL   POCT Glucose    Collection Time: 10/24/22  7:48 PM   Result Value Ref Range    POC Glucose 194 (H) 70 - 99 MG/DL   C-Reactive Protein    Collection Time: 10/25/22  5:30 AM   Result Value Ref Range    CRP, High Sensitivity 14.4 (H) 0.0 - 5.0 mg/L   CBC with Auto Differential    Collection Time: 10/25/22  5:30 AM   Result Value Ref Range    WBC 31.9 (HH) 4.0 - 10.5 K/CU MM    RBC 3.73 (L) 4.6 - 6.2 M/CU MM    Hemoglobin 9.2 (L) 13.5 - 18.0 GM/DL    Hematocrit 30.9 (L) 42 - 52 %    MCV 82.8 78 - 100 FL    MCH 24.7 (L) 27 - 31 PG    MCHC 29.8 (L) 32.0 - 36.0 %    RDW 20.7 (H) 11.7 - 14.9 %    Platelets 80 (L) 740 - 440 K/CU MM    MPV 9.8 7.5 - 11.1 FL    Myelocyte Percent 1 (H) 0.0 %    Metamyelocytes Relative 1 (H) 0.0 %    Bands Relative 9 5 - 11 %    Segs Relative 10.0 (L) 36 - 66 %    Lymphocytes % 69.0 (H) 24 - 44 %    Monocytes % 10.0 (H) 0 - 4 %    Myelocytes Absolute 0.32 K/CU MM    Metamyelocytes Absolute 0.32 K/CU MM    Bands Absolute 2.87 K/CU MM    Segs Absolute 3.2 K/CU MM    Lymphocytes Absolute 22.0 K/CU MM    Monocytes Absolute 3.2 K/CU MM    Differential Type MANUAL DIFFERENTIAL     RBC Morphology OCCASIONAL     Anisocytosis 1+     Atypical Lymphocytes Absolute 1+     Smudge Cells PRESENT    Comprehensive Metabolic Panel w/ Reflex to MG    Collection Time: 10/25/22  5:30 AM   Result Value Ref Range    Sodium 139 135 - 145 MMOL/L    Potassium 4.3 3.5 - 5.1 MMOL/L    Chloride 102 99 - 110 mMol/L    CO2 28 21 - 32 MMOL/L    BUN 16 6 - 23 MG/DL    Creatinine 0.5 (L) 0.9 - 1.3 MG/DL    Est, Glom Filt Rate >60 >60 mL/min/1.73m2    Glucose 115 (H) 70 - 99 MG/DL    Calcium 8.0 (L) 8.3 - 10.6 MG/DL    Albumin 2.6 (L) 3.4 - 5.0 GM/DL    Total Protein 4.5 (L) 6.4 - 8.2 GM/DL    Total Bilirubin 0.5 0.0 - 1.0 MG/DL    ALT 18 10 - 40 U/L    AST 20 15 - 37 IU/L    Alkaline Phosphatase 213 (H) 40 - 128 IU/L    Anion Gap 9 4 - 16   Procalcitonin    Collection Time: 10/25/22  5:30 AM   Result Value Ref Range    Procalcitonin 0.126    IgG    Collection Time: 10/25/22  5:30 AM   Result Value Ref Range    IgG, Serum 326 (L) 723 - 1,685 MG/DL   POCT Glucose    Collection Time: 10/25/22  7:22 AM   Result Value Ref Range    POC Glucose 99 70 - 99 MG/DL        Imaging/Diagnostics Last 24 Hours   CT CHEST WO CONTRAST    Result Date: 10/23/2022  EXAMINATION: CT OF THE CHEST WITHOUT CONTRAST 10/23/2022 9:59 am TECHNIQUE: CT of the chest was performed without the administration of intravenous contrast. Multiplanar reformatted images are provided for review.  Automated exposure control, iterative reconstruction, and/or weight based adjustment of the mA/kV was utilized to reduce the radiation dose to as low as reasonably achievable. COMPARISON: 09/28/2022 HISTORY: ORDERING SYSTEM PROVIDED HISTORY: fu pneumonia persistent TECHNOLOGIST PROVIDED HISTORY: Reason for exam:->fu pneumonia persistent Reason for Exam: fu pneumonia persistent FINDINGS: Mediastinum: Enlarged mediastinal lymph nodes are again identified, similar when compared to the previous exam.  Largest in the subcarinal space, with a short axis measurement of approximately 3 cm. Visualized thyroid unremarkable. Esophagus unremarkable. Limited noncontrast imaging of the cardiac chambers, thoracic aorta, and pulmonary arteries unremarkable. Lungs/pleura: Infiltrates in the lower lungs seen previously are decreased in density. However, innumerable tree-in-bud centrilobular micro nodules are now present within both lungs, greatest in the mid to lower lungs. In addition, a cavitary lesion in the superior segment of the right lower lobe has developed measuring approximately 5.3 cm maximally. Additional nodular infiltrates are seen in the left upper lobe, also with early cavitation. Diffuse bronchial wall thickening is noted. No filling defects are seen within the airways. No pneumothorax. No pleural effusion. Upper Abdomen: Unremarkable. Soft Tissues/Bones: No acute or suspicious bony abnormality. Extra thoracic soft tissues unremarkable. The infiltrates seen previously in the lower lobes bilaterally for the most part have decreased in size and conspicuity. However, there are now innumerable punctate tree-in-bud centrilobular micro nodules, which are nonspecific but suggest inflammatory/infectious bronchiolitis. Consider both typical and atypical etiologies. In addition, cavitary lesion has developed in the periphery of the right lower lobe and to a lesser extent within the left upper lung. Necrotizing infection would be primarily considered given the rapid development. Consider both typical and atypical etiologies.  Enlarged mediastinal lymph nodes, similar when compared to the previous exam, process of Lia related to history of chronic lymphocytic leukemia.        Electronically signed by Nestor Newsome MD on 10/25/2022 at 10:15 AM

## 2022-10-25 NOTE — PROGRESS NOTES
Patient lost IV access, patient advised had a Right chest port. Chest port was accessed with good blood return and no issues noted. Patient tolerated well.

## 2022-10-25 NOTE — CARE COORDINATION
Reviewed chart and discussed in IDR, plan return home once medically ready. Pt to have a bronchoscopy on Thurs. CM will continue to follow for needs.

## 2022-10-25 NOTE — PROGRESS NOTES
Infectious Disease Progress Note  10/25/2022   Patient Name: Marcelo Webb : 1950   Impression:  Rhinovirus Pneumonia with Superimposed Pseudomonas aeruginosa Bacterial Pneumonia with Continued Chronic Hypoxic Respiratory Failure:  Cavitary Lesions RLL and NIKI:  Afebrile   Leukocytosis trending back up, could be steroid induced  CRP and Pct remain low, patient reports he is feeling improved, oxygen remains at his baseline 3 L/min/NC, appears slightly improved  10/22-BC 0/2-NGTD  10/22-Strep pneumo ag/ Legionella ag negative  10/22-Resp panel: positive for Rhinovirus  10/22-MRSA/MssA pending  10/22-Resp culture: Pseudomonas aeruginosa  10/23-CT Chest WO Contrast:The infiltrates seen previously in the lower lobes bilaterally for the most   part have decreased in size and conspicuity. However, there are now innumerable punctate tree-in-bud centrilobular micro   nodules, which are nonspecific but suggest inflammatory/infectious   bronchiolitis. Consider both typical and atypical etiologies. In addition, cavitary lesion has developed in the periphery of the right   lower lobe and to a lesser extent within the left upper lung. Necrotizing   infection would be primarily considered given the rapid development. Consider both typical and atypical etiologies. Enlarged mediastinal lymph nodes, similar when compared to the previous exam,   process of Lia related to history of chronic lymphocytic leukemia  CLL and Hypogammaglobulinemia:  Dr. Maribel Garcia onboard  Imp anemia sec to infection  Was on ibrutinib but recent plan was to start Rituxan.        DMII:  Hypoalbuminemia:  Hyponatremia:  CAD/Chest Pain/ Elevated Troponin:  Dr. Hollingsworth Pulse onboard  Imp of non-cardiac chest pain  Multi-morbidity: per PMHx  Plan:  DC IV Zosyn as sensi shows only intermediate to P.aeruginosa and resistant to cefepime  Start IV meropenem 1 gm q8h  Trend CRP and Pct, ordered  Await MRSA screen  Await speciation and sensi of resp culture  Appreciate Dr. Veena Moon, will await for Bronch with cultures to narrow the ABX regimen  Appreciate Dr. Kalli Lopez, will follow with her plan    Ongoing Antimicrobial Therapy  Meropenem 10/25-  Completed Antimicrobial Therapy  Azithromycin 10/22  Cefepime 10/22  Vancomycin 10/22-24  Zosyn 10/22-25? History:? Interval history noted. Chief complaint: Rhinovirus pneumonia with bacterial superinfection. Denies n/v/d/f or untoward effects of antibiotics  Physical Exam:  Vital Signs: BP (!) 144/82   Pulse 77   Temp 98 °F (36.7 °C) (Axillary)   Resp 17   Ht 6' (1.829 m)   Wt 164 lb 8 oz (74.6 kg)   SpO2 94%   BMI 22.31 kg/m²     Gen: remains A&O x 4, no distress or dyspnea  Chest: no distress and CTA. Anterior breath sounds diminished. Oxygen per NC. Heart: NSR and no MRG. Abd: soft, non-distended, no tenderness, no hepatomegaly. Normoactive bowel sounds. Ext: no clubbing, cyanosis, or edema  Neuro: Mental status intact. CN 2-12 intact and no focal sensory or motor deficits     Radiologic / Imaging / TESTING  10/22/22 XR Chest Portable:  Impression   1. Persistent patchy airspace opacities in the mid to basilar right lung   suspicious for pneumonia or aspiration given the prior CT appearance. There   may be cavitation in the right lung base. 2. Minimal left basilar airspace opacity more likely due to atelectasis than   pneumonia or aspiration. 3. At least mild peribronchial cuffing potentially due to reactive airways   disease or bronchitis. 10/23/22 CT Chest WO Contrast:  Impression   The infiltrates seen previously in the lower lobes bilaterally for the most   part have decreased in size and conspicuity. However, there are now innumerable punctate tree-in-bud centrilobular micro   nodules, which are nonspecific but suggest inflammatory/infectious   bronchiolitis. Consider both typical and atypical etiologies.        In addition, cavitary lesion has developed in the periphery of the right   lower lobe and to a lesser extent within the left upper lung. Necrotizing   infection would be primarily considered given the rapid development. Consider both typical and atypical etiologies. Enlarged mediastinal lymph nodes, similar when compared to the previous exam,   process of Lia related to history of chronic lymphocytic leukemia.         Labs:    Recent Results (from the past 24 hour(s))   POCT Glucose    Collection Time: 10/24/22  4:49 PM   Result Value Ref Range    POC Glucose 193 (H) 70 - 99 MG/DL   POCT Glucose    Collection Time: 10/24/22  7:48 PM   Result Value Ref Range    POC Glucose 194 (H) 70 - 99 MG/DL   C-Reactive Protein    Collection Time: 10/25/22  5:30 AM   Result Value Ref Range    CRP, High Sensitivity 14.4 (H) 0.0 - 5.0 mg/L   CBC with Auto Differential    Collection Time: 10/25/22  5:30 AM   Result Value Ref Range    WBC 31.9 (HH) 4.0 - 10.5 K/CU MM    RBC 3.73 (L) 4.6 - 6.2 M/CU MM    Hemoglobin 9.2 (L) 13.5 - 18.0 GM/DL    Hematocrit 30.9 (L) 42 - 52 %    MCV 82.8 78 - 100 FL    MCH 24.7 (L) 27 - 31 PG    MCHC 29.8 (L) 32.0 - 36.0 %    RDW 20.7 (H) 11.7 - 14.9 %    Platelets 80 (L) 534 - 440 K/CU MM    MPV 9.8 7.5 - 11.1 FL    Myelocyte Percent 1 (H) 0.0 %    Metamyelocytes Relative 1 (H) 0.0 %    Bands Relative 9 5 - 11 %    Segs Relative 10.0 (L) 36 - 66 %    Lymphocytes % 69.0 (H) 24 - 44 %    Monocytes % 10.0 (H) 0 - 4 %    Myelocytes Absolute 0.32 K/CU MM    Metamyelocytes Absolute 0.32 K/CU MM    Bands Absolute 2.87 K/CU MM    Segs Absolute 3.2 K/CU MM    Lymphocytes Absolute 22.0 K/CU MM    Monocytes Absolute 3.2 K/CU MM    Differential Type MANUAL DIFFERENTIAL     RBC Morphology OCCASIONAL     Anisocytosis 1+     Atypical Lymphocytes Absolute 1+     Smudge Cells PRESENT    Comprehensive Metabolic Panel w/ Reflex to MG    Collection Time: 10/25/22  5:30 AM   Result Value Ref Range    Sodium 139 135 - 145 MMOL/L    Potassium 4.3 3.5 - 5.1 MMOL/L    Chloride 102 99 - 110 mMol/L    CO2 28 21 - 32 MMOL/L    BUN 16 6 - 23 MG/DL    Creatinine 0.5 (L) 0.9 - 1.3 MG/DL    Est, Glom Filt Rate >60 >60 mL/min/1.73m2    Glucose 115 (H) 70 - 99 MG/DL    Calcium 8.0 (L) 8.3 - 10.6 MG/DL    Albumin 2.6 (L) 3.4 - 5.0 GM/DL    Total Protein 4.5 (L) 6.4 - 8.2 GM/DL    Total Bilirubin 0.5 0.0 - 1.0 MG/DL    ALT 18 10 - 40 U/L    AST 20 15 - 37 IU/L    Alkaline Phosphatase 213 (H) 40 - 128 IU/L    Anion Gap 9 4 - 16   Procalcitonin    Collection Time: 10/25/22  5:30 AM   Result Value Ref Range    Procalcitonin 0.126    IgG    Collection Time: 10/25/22  5:30 AM   Result Value Ref Range    IgG, Serum 326 (L) 723 - 1,685 MG/DL   POCT Glucose    Collection Time: 10/25/22  7:22 AM   Result Value Ref Range    POC Glucose 99 70 - 99 MG/DL   POCT Glucose    Collection Time: 10/25/22 12:06 PM   Result Value Ref Range    POC Glucose 163 (H) 70 - 99 MG/DL     CULTURE results: Invalid input(s): BLOOD CULTURE,  URINE CULTURE, SURGICAL CULTURE    Diagnosis:  Patient Active Problem List   Diagnosis    Pneumonia    Sepsis ([facility] [address]    Hypogammaglobulinemia ([facility] [address]    CLL (chronic lymphocytic leukemia) ([facility] [address]    Upper respiratory infection, acute    Generalized muscle weakness    Type 2 diabetes mellitus with hyperglycemia, with long-term current use of insulin ([facility])    Poor appetite    COPD (chronic obstructive pulmonary disease) ([facility])    Neutropenia ([facility])    Other specified disorders of bone density and structure, unspecified site    Cellulitis of right upper limb    Herpesviral infection    Intestinal malabsorption    Other nonspecific abnormal finding of lung field    Iron deficiency anemia due to chronic blood loss    Other muscle spasm    Leukemia, lymphocytic, chronic (HCC)    Selective deficiency of IgG (HCC)    Acute bronchitis    Severe malnutrition ([facility])    Pneumonia due to organism    Personal history of CLL (chronic lymphocytic leukemia)    Rhinovirus infection       Active Problems  Principal Problem: Pneumonia due to organism  Active Problems:    Personal history of CLL (chronic lymphocytic leukemia)    Rhinovirus infection  Resolved Problems:    * No resolved hospital problems. *    Electronically signed by: Electronically signed by JENNI Martinez CNP on 10/25/2022 at 4:06 PM

## 2022-10-26 ENCOUNTER — APPOINTMENT (OUTPATIENT)
Dept: GENERAL RADIOLOGY | Age: 72
DRG: 871 | End: 2022-10-26
Payer: COMMERCIAL

## 2022-10-26 LAB
GLUCOSE BLD-MCNC: 257 MG/DL (ref 70–99)
GLUCOSE BLD-MCNC: 301 MG/DL (ref 70–99)
GLUCOSE BLD-MCNC: 337 MG/DL (ref 70–99)
GLUCOSE BLD-MCNC: 363 MG/DL (ref 70–99)
INR BLD: 1.14 INDEX
PROTHROMBIN TIME: 14.7 SECONDS (ref 11.7–14.5)

## 2022-10-26 PROCEDURE — 80053 COMPREHEN METABOLIC PANEL: CPT

## 2022-10-26 PROCEDURE — 6360000002 HC RX W HCPCS: Performed by: HOSPITALIST

## 2022-10-26 PROCEDURE — 2580000003 HC RX 258: Performed by: NURSE PRACTITIONER

## 2022-10-26 PROCEDURE — 87081 CULTURE SCREEN ONLY: CPT

## 2022-10-26 PROCEDURE — 85610 PROTHROMBIN TIME: CPT

## 2022-10-26 PROCEDURE — 6370000000 HC RX 637 (ALT 250 FOR IP): Performed by: NURSE PRACTITIONER

## 2022-10-26 PROCEDURE — 82962 GLUCOSE BLOOD TEST: CPT

## 2022-10-26 PROCEDURE — 94761 N-INVAS EAR/PLS OXIMETRY MLT: CPT

## 2022-10-26 PROCEDURE — 6360000002 HC RX W HCPCS: Performed by: NURSE PRACTITIONER

## 2022-10-26 PROCEDURE — 71045 X-RAY EXAM CHEST 1 VIEW: CPT

## 2022-10-26 PROCEDURE — 99232 SBSQ HOSP IP/OBS MODERATE 35: CPT | Performed by: NURSE PRACTITIONER

## 2022-10-26 PROCEDURE — 1200000000 HC SEMI PRIVATE

## 2022-10-26 PROCEDURE — 99232 SBSQ HOSP IP/OBS MODERATE 35: CPT | Performed by: INTERNAL MEDICINE

## 2022-10-26 PROCEDURE — 6360000002 HC RX W HCPCS: Performed by: PHYSICIAN ASSISTANT

## 2022-10-26 PROCEDURE — 2700000000 HC OXYGEN THERAPY PER DAY

## 2022-10-26 RX ADMIN — INSULIN LISPRO 2 UNITS: 100 INJECTION, SOLUTION INTRAVENOUS; SUBCUTANEOUS at 08:48

## 2022-10-26 RX ADMIN — MORPHINE SULFATE 2 MG: 2 INJECTION, SOLUTION INTRAMUSCULAR; INTRAVENOUS at 03:29

## 2022-10-26 RX ADMIN — INSULIN GLARGINE 40 UNITS: 100 INJECTION, SOLUTION SUBCUTANEOUS at 20:57

## 2022-10-26 RX ADMIN — IMMUNE GLOBULIN (HUMAN) 30 G: 10 INJECTION INTRAVENOUS; SUBCUTANEOUS at 04:02

## 2022-10-26 RX ADMIN — SODIUM CHLORIDE 25 ML: 9 INJECTION, SOLUTION INTRAVENOUS at 08:58

## 2022-10-26 RX ADMIN — MEROPENEM 1000 MG: 1 INJECTION, POWDER, FOR SOLUTION INTRAVENOUS at 00:36

## 2022-10-26 RX ADMIN — TAMSULOSIN HYDROCHLORIDE 0.4 MG: 0.4 CAPSULE ORAL at 08:41

## 2022-10-26 RX ADMIN — GABAPENTIN 100 MG: 100 CAPSULE ORAL at 13:51

## 2022-10-26 RX ADMIN — OXYCODONE HYDROCHLORIDE AND ACETAMINOPHEN 250 MG: 500 TABLET ORAL at 08:41

## 2022-10-26 RX ADMIN — INSULIN LISPRO 4 UNITS: 100 INJECTION, SOLUTION INTRAVENOUS; SUBCUTANEOUS at 20:57

## 2022-10-26 RX ADMIN — GABAPENTIN 100 MG: 100 CAPSULE ORAL at 08:41

## 2022-10-26 RX ADMIN — Medication 324 MG: at 20:40

## 2022-10-26 RX ADMIN — Medication 324 MG: at 08:42

## 2022-10-26 RX ADMIN — SODIUM CHLORIDE, PRESERVATIVE FREE 10 ML: 5 INJECTION INTRAVENOUS at 20:39

## 2022-10-26 RX ADMIN — METHYLPREDNISOLONE SODIUM SUCCINATE 40 MG: 40 INJECTION, POWDER, FOR SOLUTION INTRAMUSCULAR; INTRAVENOUS at 03:29

## 2022-10-26 RX ADMIN — ENOXAPARIN SODIUM 40 MG: 40 INJECTION SUBCUTANEOUS at 08:42

## 2022-10-26 RX ADMIN — GUAIFENESIN 600 MG: 600 TABLET, EXTENDED RELEASE ORAL at 08:41

## 2022-10-26 RX ADMIN — FINASTERIDE 5 MG: 5 TABLET, FILM COATED ORAL at 08:41

## 2022-10-26 RX ADMIN — HYDROCODONE BITARTRATE AND ACETAMINOPHEN 1 TABLET: 5; 325 TABLET ORAL at 20:40

## 2022-10-26 RX ADMIN — MORPHINE SULFATE 2 MG: 2 INJECTION, SOLUTION INTRAMUSCULAR; INTRAVENOUS at 11:52

## 2022-10-26 RX ADMIN — INSULIN LISPRO 3 UNITS: 100 INJECTION, SOLUTION INTRAVENOUS; SUBCUTANEOUS at 16:26

## 2022-10-26 RX ADMIN — DIPHENHYDRAMINE HYDROCHLORIDE 25 MG: 50 INJECTION, SOLUTION INTRAMUSCULAR; INTRAVENOUS at 03:29

## 2022-10-26 RX ADMIN — MEROPENEM 1000 MG: 1 INJECTION, POWDER, FOR SOLUTION INTRAVENOUS at 08:59

## 2022-10-26 RX ADMIN — PANTOPRAZOLE SODIUM 40 MG: 40 TABLET, DELAYED RELEASE ORAL at 05:10

## 2022-10-26 RX ADMIN — GUAIFENESIN 600 MG: 600 TABLET, EXTENDED RELEASE ORAL at 20:40

## 2022-10-26 RX ADMIN — GABAPENTIN 100 MG: 100 CAPSULE ORAL at 20:40

## 2022-10-26 RX ADMIN — ATORVASTATIN CALCIUM 80 MG: 40 TABLET, FILM COATED ORAL at 20:39

## 2022-10-26 RX ADMIN — MEROPENEM 1000 MG: 1 INJECTION, POWDER, FOR SOLUTION INTRAVENOUS at 17:15

## 2022-10-26 RX ADMIN — HYDROCODONE BITARTRATE AND ACETAMINOPHEN 1 TABLET: 5; 325 TABLET ORAL at 08:47

## 2022-10-26 RX ADMIN — SODIUM CHLORIDE, PRESERVATIVE FREE 10 ML: 5 INJECTION INTRAVENOUS at 08:43

## 2022-10-26 RX ADMIN — INSULIN LISPRO 4 UNITS: 100 INJECTION, SOLUTION INTRAVENOUS; SUBCUTANEOUS at 11:53

## 2022-10-26 RX ADMIN — OXYCODONE HYDROCHLORIDE AND ACETAMINOPHEN 250 MG: 500 TABLET ORAL at 20:40

## 2022-10-26 ASSESSMENT — ENCOUNTER SYMPTOMS
SORE THROAT: 0
NAUSEA: 0
BACK PAIN: 0
COUGH: 1
SHORTNESS OF BREATH: 1
VOMITING: 0
WHEEZING: 0

## 2022-10-26 ASSESSMENT — PAIN DESCRIPTION - DESCRIPTORS
DESCRIPTORS: ACHING
DESCRIPTORS: CRAMPING;NAGGING;SHARP

## 2022-10-26 ASSESSMENT — PAIN SCALES - GENERAL
PAINLEVEL_OUTOF10: 9
PAINLEVEL_OUTOF10: 4

## 2022-10-26 ASSESSMENT — PAIN DESCRIPTION - LOCATION
LOCATION: CHEST
LOCATION: GENERALIZED

## 2022-10-26 ASSESSMENT — PAIN - FUNCTIONAL ASSESSMENT: PAIN_FUNCTIONAL_ASSESSMENT: PREVENTS OR INTERFERES SOME ACTIVE ACTIVITIES AND ADLS

## 2022-10-26 NOTE — CARE COORDINATION
Reviewed chart, discussed in IDR, attempted to see pt but he was sleeping. Plan remains for Bronchoscopy tomorrow. Pt is from home with wife, [id number] and has home O2 through [facility]. CM will continue to follow for needs.

## 2022-10-26 NOTE — PROGRESS NOTES
pulmonary      SUBJECTIVE:  he feels better     OBJECTIVE    VITALS:  BP (!) 140/66   Pulse 82   Temp 98.1 °F (36.7 °C) (Oral)   Resp 16   Ht 6' (1.829 m)   Wt 164 lb 14.4 oz (74.8 kg)   SpO2 94%   BMI 22.36 kg/m²   HEAD AND FACE EXAM:  No throat injection, no active exudate,no thrush  NECK EXAM;No JVD, no masses, symmetrical  CHEST EXAM; Expansion equal and symmetrical, no masses  LUNG EXAM; Good breath sounds bilaterally. There are expiratory wheezes both lungs, there are crackles at both lung bases  CARDIOVASCULAR EXAM: Positive S1 and S2, no S3 or S4, no clicks ,no murmurs  RIGHT AND LEFT LOWER EXTRIMITY EXAM: No edema, no swelling, no inflamation  CNS EXAM: Alert and oriented X3          LABS   Lab Results   Component Value Date    WBC 31.9 (HH) 10/25/2022    HGB 9.2 (L) 10/25/2022    HCT 30.9 (L) 10/25/2022    MCV 82.8 10/25/2022    PLT 80 (L) 10/25/2022     Lab Results   Component Value Date    CREATININE 0.5 (L) 10/25/2022    BUN 16 10/25/2022     10/25/2022    K 4.3 10/25/2022     10/25/2022    CO2 28 10/25/2022     Lab Results   Component Value Date    INR 1.08 10/04/2022    PROTIME 14.0 10/04/2022          Lab Results   Component Value Date/Time    PHOS 2.4 10/03/2022 01:22 PM    PHOS 2.1 09/30/2022 08:40 AM    PHOS 3.3 04/04/2019 12:39 PM      No results for input(s): PH, PO2ART, JMK8QAH, HCO3, BEART, O2SAT in the last 72 hours.       Wt Readings from Last 3 Encounters:   10/26/22 164 lb 14.4 oz (74.8 kg)   10/05/22 171 lb 15.3 oz (78 kg)   09/22/22 182 lb (82.6 kg)               ASSESMENT  Persistent infiltrate/pneumonia  Ch resp failure  Kary Bolder tomorrow  Dr Jose Khan to resume care tomorrow    10/26/2022  Adonis Lubin MD, M.D.

## 2022-10-26 NOTE — PLAN OF CARE
Problem: Discharge Planning  Goal: Discharge to home or other facility with appropriate resources  Outcome: Progressing     Problem: Pain  Goal: Verbalizes/displays adequate comfort level or baseline comfort level  Outcome: Progressing     Problem: ABCDS Injury Assessment  Goal: Absence of physical injury  Outcome: Progressing  Flowsheets (Taken 10/26/2022 0851)  Absence of Physical Injury: Implement safety measures based on patient assessment     Problem: Skin/Tissue Integrity  Goal: Absence of new skin breakdown  Description: 1. Monitor for areas of redness and/or skin breakdown  2. Assess vascular access sites hourly  3. Every 4-6 hours minimum:  Change oxygen saturation probe site  4. Every 4-6 hours:  If on nasal continuous positive airway pressure, respiratory therapy assess nares and determine need for appliance change or resting period.   Outcome: Progressing     Problem: Safety - Adult  Goal: Free from fall injury  Outcome: Progressing  Flowsheets (Taken 10/26/2022 0851)  Free From Fall Injury: Instruct family/caregiver on patient safety     Problem: Chronic Conditions and Co-morbidities  Goal: Patient's chronic conditions and co-morbidity symptoms are monitored and maintained or improved  Outcome: Progressing

## 2022-10-26 NOTE — PROGRESS NOTES
ONCOLOGY HEMATOLOGY CARE ([place])  PROGRESS NOTE      10/26/2022  8:09 AM    Patient:    Paul Arciniega  : 1950   71 y.o. MRN: [id number]  Admitted: 10/22/2022  8:27 AM ATT: Aylene Andrews MD   [id number]  AdmitDx: Pneumonia due to organism [J18.9]  Rhinovirus [B34.8]  Personal history of CLL (chronic lymphocytic leukemia) [Z85.6]  Elevated troponin [R77.8]  Pneumonia due to infectious organism, unspecified laterality, unspecified part of lung [J18.9]  PCP: Rosalia Saavedra MD      Patient was seen and examined today. Reported that he is breathing and feeling a little better today. Continues to cough up green sputum  No fever  No bleeding  To be bathroom and back    10/23/22: Ct chest:  The infiltrates seen previously in the lower lobes bilaterally for the most   part have decreased in size and conspicuity. However, there are now innumerable punctate tree-in-bud centrilobular micro   nodules, which are nonspecific but suggest inflammatory/infectious   bronchiolitis. Consider both typical and atypical etiologies. In addition, cavitary lesion has developed in the periphery of the right   lower lobe and to a lesser extent within the left upper lung. Necrotizing   infection would be primarily considered given the rapid development. Consider both typical and atypical etiologies. Enlarged mediastinal lymph nodes, similar when compared to the previous exam,   process of Lia related to history of chronic lymphocytic leukemia.            PHYSICAL EXAM :    Vitals: BP (!) 140/66   Pulse 82   Temp 98.1 °F (36.7 °C) (Oral)   Resp 16   Ht 6' (1.829 m)   Wt 164 lb 14.4 oz (74.8 kg)   SpO2 94%   BMI 22.36 kg/m²     CONSTITUTIONAL: awake, alert, ill appearing   LUNGS:coarse bbs juan  CARDIOVASCULAR:s1s2 rrr   ABDOMEN: soft bs pos  NEUROLOGIC: GI  EXTREMITIES: no LE edema bilaterally    LABORATORY RESULTS  CBC:   Recent Labs     10/23/22  1115 10/24/22  0419 10/25/22  6991 WBC 17.3* 23.7* 31.9*   HGB 9.1* 9.6* 9.2*   PLT 65* 81* 80*     BMP:    Recent Labs     10/23/22  1115 10/24/22  0419 10/25/22  0530   * 136 139   K 4.1 4.2 4.3   CL 93* 101 102   CO2 21 26 28   BUN 15 15 16   CREATININE 0.6* 0.5* 0.5*   GLUCOSE 345* 118* 115*     Hepatic:   Recent Labs     10/24/22  0419 10/25/22  0530   AST 17 20   ALT 18 18   BILITOT 0.6 0.5   ALKPHOS 185* 213*     INR: No results for input(s): INR in the last 72 hours. RADIOLOGY REPORTS  Reviewed    ASSESSMENT & RECOMMENDATIONS:  ?Pneumonia: is on antimicrobials. Plan for bronch in the am . Noted ID recs. Continue aggressive pulmonary toilet    CLL:Rising leukocytosis could also be contributed by infection/steroids. Anemia could be sec to infection/abx. Was on ibrutinib before but recent plan was to start Rituxan. IVIG . Recommend flow cytometry    Continue other medical care. This plan was discussed with the patient and he seem to have verbalized  understanding. Improve nutritional status and recommend OOB to chair and ambulate as tolerated. We will continue to follow the patient. Thank you for allowing us to participate in the care of this patient.      [address]

## 2022-10-26 NOTE — PROGRESS NOTES
Infectious Disease Progress Note  10/26/2022   Patient Name: Ayala Cloud : 1950   Impression:  Rhinovirus Pneumonia with Superimposed Pseudomonas aeruginosa Bacterial Pneumonia with Continued Chronic Hypoxic Respiratory Failure:  Cavitary Lesions RLL and NIKI:  Afebrile   Leukocytosis trending back up, could be steroid induced  CRP and Pct remain low, patient reports he is feeling improved, oxygen remains at his baseline 3 L/min/NC, appears slightly improved  10/22-BC 0/2-NGTD  10/22-Strep pneumo ag/ Legionella ag negative  10/22-Resp panel: positive for Rhinovirus  10/22, reordered 10/26-MRSA/MssA pending  10/22-Resp culture: Pseudomonas aeruginosa  10/23-CT Chest WO Contrast:The infiltrates seen previously in the lower lobes bilaterally for the most   part have decreased in size and conspicuity. However, there are now innumerable punctate tree-in-bud centrilobular micro   nodules, which are nonspecific but suggest inflammatory/infectious   bronchiolitis. Consider both typical and atypical etiologies. In addition, cavitary lesion has developed in the periphery of the right   lower lobe and to a lesser extent within the left upper lung. Necrotizing   infection would be primarily considered given the rapid development. Consider both typical and atypical etiologies. Enlarged mediastinal lymph nodes, similar when compared to the previous exam,   process of Lia related to history of chronic lymphocytic leukemia  CLL and Hypogammaglobulinemia:  Dr. Cesar Moore onboard  [facility] anemia sec to infection  Was on ibrutinib but recent plan was to start Rituxan.      DMII:  Hypoalbuminemia:  Hyponatremia:  CAD/Chest Pain/ Elevated Troponin:  Dr. Kaylah Jenkins onboard  Imp of non-cardiac chest pain  Multi-morbidity: per PMHx    Plan:  Continue IV meropenem 1 gm q8h over 180 minutes, extended dosing  Trend CRP and Pct, ordered  Await MRSA screen 10/26  [place] patient and his son, changed ABX regimen due to sensi returned, and will await bronch, possibly tomorrow 10/27, per Dr. Anita Portillo, for further cultures  Appreciate Dr. Jose Cardozo, will follow with her plan  Family, Son of patient, is requesting if patient is needing GI this admission, would like Dr. Yen Corrales onboard  Ongoing Antimicrobial Therapy  Meropenem 10/25-  Completed Antimicrobial Therapy  Azithromycin 10/22  Cefepime 10/22  Vancomycin 10/22-24  Zosyn 10/22-25? History:? Interval history noted. Chief complaint: Rhinovirus pneumonia with bacterial superinfection. Denies n/v/d/f or untoward effects of antibiotics. States he is feeling better today with his breathing, awaiting when bronch will be done. Physical Exam:  Vital Signs: BP (!) 140/66   Pulse 82   Temp 98.1 °F (36.7 °C) (Oral)   Resp 16   Ht 6' (1.829 m)   Wt 164 lb 14.4 oz (74.8 kg)   SpO2 94%   BMI 22.36 kg/m²     Gen: A&O x 4, resting in bed  Chest: no distress and CTA. Anterior breath sounds diminished. Oxygen per NC. Heart: NSR and no MRG. Abd: soft, non-distended, no tenderness, no hepatomegaly. Normoactive bowel sounds. Ext: no clubbing, cyanosis, or edema  Neuro: Mental status intact. CN 2-12 intact and no focal sensory or motor deficits     Radiologic / Imaging / TESTING  10/22/22 XR Chest Portable:  Impression   1. Persistent patchy airspace opacities in the mid to basilar right lung   suspicious for pneumonia or aspiration given the prior CT appearance. There   may be cavitation in the right lung base. 2. Minimal left basilar airspace opacity more likely due to atelectasis than   pneumonia or aspiration. 3. At least mild peribronchial cuffing potentially due to reactive airways   disease or bronchitis. 10/23/22 CT Chest WO Contrast:  Impression   The infiltrates seen previously in the lower lobes bilaterally for the most   part have decreased in size and conspicuity.        However, there are now innumerable punctate tree-in-bud centrilobular micro   nodules, which are nonspecific but suggest inflammatory/infectious   bronchiolitis. Consider both typical and atypical etiologies. In addition, cavitary lesion has developed in the periphery of the right   lower lobe and to a lesser extent within the left upper lung. Necrotizing   infection would be primarily considered given the rapid development. Consider both typical and atypical etiologies. Enlarged mediastinal lymph nodes, similar when compared to the previous exam,   process of Lia related to history of chronic lymphocytic leukemia.         Labs:    Recent Results (from the past 24 hour(s))   POCT Glucose    Collection Time: 10/25/22 12:06 PM   Result Value Ref Range    POC Glucose 163 (H) 70 - 99 MG/DL   POCT Glucose    Collection Time: 10/25/22  4:33 PM   Result Value Ref Range    POC Glucose 264 (H) 70 - 99 MG/DL   POCT Glucose    Collection Time: 10/25/22  8:03 PM   Result Value Ref Range    POC Glucose 207 (H) 70 - 99 MG/DL   POCT Glucose    Collection Time: 10/26/22  7:59 AM   Result Value Ref Range    POC Glucose 257 (H) 70 - 99 MG/DL     CULTURE results: Invalid input(s): BLOOD CULTURE,  URINE CULTURE, SURGICAL CULTURE    Diagnosis:  Patient Active Problem List   Diagnosis    Pneumonia    Sepsis ([facility] [address]    Hypogammaglobulinemia ([facility] [address]    CLL (chronic lymphocytic leukemia) ([facility] [address]    Upper respiratory infection, acute    Generalized muscle weakness    Type 2 diabetes mellitus with hyperglycemia, with long-term current use of insulin ([facility])    Poor appetite    COPD (chronic obstructive pulmonary disease) ([facility])    Neutropenia ([facility])    Other specified disorders of bone density and structure, unspecified site    Cellulitis of right upper limb    Herpesviral infection    Intestinal malabsorption    Other nonspecific abnormal finding of lung field    Iron deficiency anemia due to chronic blood loss    Other muscle spasm    Leukemia, lymphocytic, chronic ([facility])    Selective deficiency of IgG ([facility])    Acute bronchitis    Severe malnutrition ([facility] [address]    Pneumonia due to organism    Personal history of CLL (chronic lymphocytic leukemia)    Rhinovirus infection       Active Problems  Principal Problem:    Pneumonia due to organism  Active Problems:    Personal history of CLL (chronic lymphocytic leukemia)    Rhinovirus infection  Resolved Problems:    * No resolved hospital problems. *    Electronically signed by: Electronically signed by JENNI Gomez CNP on 10/26/2022 at 10:50 AM

## 2022-10-26 NOTE — PROGRESS NOTES
V2.0  [facility] [place] Progress Note      Name:  Jessica Dodson /Age/Sex: 1950  (71 y.o. male)   MRN & CSN:  [id number] & [id number] Encounter Date/Time: 10/26/2022 10:15 AM EDT    Location:  [address] [place], [place] [address] PCP: Chin Robledo MD       Hospital Day: 5    Assessment and Plan:   Jessica Dodson is a 71 y.o. male  who presents with Pneumonia due to organism      Plan:    Persistent pneumonia-chest x-ray s/o pneumonia, possible cavitation in the right lung. Pseudomonas in sputum  in    Recent admission 2 weeks ago he was positive for rhinovirus. MRSA screen and blood cultures were negative. Patient completed 8 days of Zosyn. Respiratory panel +ve for rhinovirus. ** ID consulted. Bx no growth 48 HRs, sputum culture Gram -ve rods,    ** Pulmonology consulted, Planning for bronch, discussed with pulm today potentially tomorrow [facility], will make n.p.o. at midnight just in case. Patient currently on meropenem per ID recs, awaiting MRSA screen. Awaiting bronchoscopy. Sepsis in setting of PNA and CLL - SIRS criteria met. LA resolved. Initially received IV fluids. Now stopped. Continue antibiotics as above. Chronic Hypoxic respiratory failure - recently discharge home O2 at 3 L per nasal cannula 10/6/22, no increase in respiratory requirement at this time, monitor with continuous pulse ox     Chest pain/elevated trop - suspect pleuritic/MSK given only has pain with coughing. EKG with non specific ST changes. hx CAD LHC report not available. Recent TTE 22 EF 99-20%, grade I diastolicc dysfunction. consulted Cardiology. Deemed non cardiac chest pain     Mild Hyponatremia   -Resolved with IV fluid administration. CLL and hypogammaglobulinemia was on ibrutinib before but now plan is to start on Rituxan as per heme-onc. Recently seen by IVIG as well. Appreciate oncology help. Severe protein calorie malnutrition suspected with hypo albuminemia- nutrition consulted.  Low pre-albumin Negative for nausea and vomiting. Endocrine: Negative for polyuria. Genitourinary:  Negative for dysuria. Musculoskeletal:  Negative for back pain. Skin:  Negative for wound. Neurological:  Negative for dizziness and weakness. Psychiatric/Behavioral:  Negative for confusion. \    Objective: Intake/Output Summary (Last 24 hours) at 10/26/2022 1011  Last data filed at 10/26/2022 0859  Gross per 24 hour   Intake 125 ml   Output 2400 ml   Net -2275 ml          Vitals:   Vitals:    10/26/22 0917   BP:    Pulse:    Resp: 16   Temp:    SpO2:        Physical Exam:   Physical Exam  Constitutional:       General: He is not in acute distress. Appearance: He is ill-appearing. HENT:      Mouth/Throat:      Mouth: Mucous membranes are moist.      Pharynx: No posterior oropharyngeal erythema. Eyes:      General: No scleral icterus. Extraocular Movements: Extraocular movements intact. Pupils: Pupils are equal, round, and reactive to light. Cardiovascular:      Rate and Rhythm: Normal rate and regular rhythm. Pulses: Normal pulses. Heart sounds: No murmur heard. Pulmonary:      Effort: Pulmonary effort is normal.      Breath sounds: No wheezing or rales. Comments: On nasal cannula oxygen. Not in any acute respiratory distress. Bilateral rhonchi. Abdominal:      General: Bowel sounds are normal. There is no distension. Palpations: Abdomen is soft. Tenderness: There is no abdominal tenderness. Musculoskeletal:         General: Normal range of motion. Right lower leg: No edema. Left lower leg: No edema. Skin:     General: Skin is warm. Findings: No rash. Neurological:      General: No focal deficit present. Mental Status: He is alert and oriented to person, place, and time. Cranial Nerves: No cranial nerve deficit. Sensory: No sensory deficit. Motor: No weakness.    Psychiatric:         Mood and Affect: Mood normal. Medications:   Medications:    meropenem  1,000 mg IntraVENous Q8H    Followed by    Ramo Triana ON 11/1/2022] meropenem  1,000 mg IntraVENous Q8H    sodium chloride flush  5-40 mL IntraVENous 2 times per day    enoxaparin  40 mg SubCUTAneous Daily    insulin lispro  0-4 Units SubCUTAneous TID WC    insulin lispro  0-4 Units SubCUTAneous Nightly    guaiFENesin  600 mg Oral BID    vitamin C  250 mg Oral BID    atorvastatin  80 mg Oral Daily    ferrous gluconate  324 mg Oral BID    finasteride  5 mg Oral Daily    gabapentin  100 mg Oral TID    pantoprazole  40 mg Oral QAM AC    tamsulosin  0.4 mg Oral Daily    insulin glargine  40 Units SubCUTAneous Nightly      Infusions:    sodium chloride 25 mL (10/26/22 0858)     PRN Meds: sodium chloride flush, 5-40 mL, PRN  sodium chloride, , PRN  ondansetron, 4 mg, Q8H PRN   Or  ondansetron, 4 mg, Q6H PRN  polyethylene glycol, 17 g, Daily PRN  acetaminophen, 650 mg, Q6H PRN   Or  acetaminophen, 650 mg, Q6H PRN  benzonatate, 100 mg, TID PRN  albuterol sulfate HFA, 2 puff, Q4H PRN  HYDROcodone-acetaminophen, 1 tablet, Q12H PRN  morphine, 2 mg, Q4H PRN  ipratropium-albuterol, 1 ampule, Q4H PRN      Labs      Recent Results (from the past 24 hour(s))   POCT Glucose    Collection Time: 10/25/22 12:06 PM   Result Value Ref Range    POC Glucose 163 (H) 70 - 99 MG/DL   POCT Glucose    Collection Time: 10/25/22  4:33 PM   Result Value Ref Range    POC Glucose 264 (H) 70 - 99 MG/DL   POCT Glucose    Collection Time: 10/25/22  8:03 PM   Result Value Ref Range    POC Glucose 207 (H) 70 - 99 MG/DL   POCT Glucose    Collection Time: 10/26/22  7:59 AM   Result Value Ref Range    POC Glucose 257 (H) 70 - 99 MG/DL        Imaging/Diagnostics Last 24 Hours   CT CHEST WO CONTRAST    Result Date: 10/23/2022  EXAMINATION: CT OF THE CHEST WITHOUT CONTRAST 10/23/2022 9:59 am TECHNIQUE: CT of the chest was performed without the administration of intravenous contrast. Multiplanar reformatted images are provided for review. Automated exposure control, iterative reconstruction, and/or weight based adjustment of the mA/kV was utilized to reduce the radiation dose to as low as reasonably achievable. COMPARISON: 09/28/2022 HISTORY: ORDERING SYSTEM PROVIDED HISTORY: fu pneumonia persistent TECHNOLOGIST PROVIDED HISTORY: Reason for exam:->fu pneumonia persistent Reason for Exam: fu pneumonia persistent FINDINGS: Mediastinum: Enlarged mediastinal lymph nodes are again identified, similar when compared to the previous exam.  Largest in the subcarinal space, with a short axis measurement of approximately 3 cm. Visualized thyroid unremarkable. Esophagus unremarkable. Limited noncontrast imaging of the cardiac chambers, thoracic aorta, and pulmonary arteries unremarkable. Lungs/pleura: Infiltrates in the lower lungs seen previously are decreased in density. However, innumerable tree-in-bud centrilobular micro nodules are now present within both lungs, greatest in the mid to lower lungs. In addition, a cavitary lesion in the superior segment of the right lower lobe has developed measuring approximately 5.3 cm maximally. Additional nodular infiltrates are seen in the left upper lobe, also with early cavitation. Diffuse bronchial wall thickening is noted. No filling defects are seen within the airways. No pneumothorax. No pleural effusion. Upper Abdomen: Unremarkable. Soft Tissues/Bones: No acute or suspicious bony abnormality. Extra thoracic soft tissues unremarkable. The infiltrates seen previously in the lower lobes bilaterally for the most part have decreased in size and conspicuity. However, there are now innumerable punctate tree-in-bud centrilobular micro nodules, which are nonspecific but suggest inflammatory/infectious bronchiolitis. Consider both typical and atypical etiologies.  In addition, cavitary lesion has developed in the periphery of the right lower lobe and to a lesser extent within the left upper lung.  Necrotizing infection would be primarily considered given the rapid development. Consider both typical and atypical etiologies. Enlarged mediastinal lymph nodes, similar when compared to the previous exam, process of Lia related to history of chronic lymphocytic leukemia.        Electronically signed by Susan Hashimoto, MD on 10/26/2022 at 10:11 AM

## 2022-10-27 ENCOUNTER — ANESTHESIA EVENT (OUTPATIENT)
Dept: ENDOSCOPY | Age: 72
DRG: 871 | End: 2022-10-27
Payer: COMMERCIAL

## 2022-10-27 LAB
ALBUMIN SERPL-MCNC: 2.6 GM/DL (ref 3.4–5)
ALP BLD-CCNC: 173 IU/L (ref 40–128)
ALT SERPL-CCNC: 19 U/L (ref 10–40)
ANION GAP SERPL CALCULATED.3IONS-SCNC: 8 MMOL/L (ref 4–16)
ANISOCYTOSIS: ABNORMAL
AST SERPL-CCNC: 22 IU/L (ref 15–37)
ATYPICAL LYMPHOCYTE ABSOLUTE COUNT: ABNORMAL
BANDED NEUTROPHILS ABSOLUTE COUNT: 0.97 K/CU MM
BANDED NEUTROPHILS RELATIVE PERCENT: 4 % (ref 5–11)
BILIRUB SERPL-MCNC: 0.4 MG/DL (ref 0–1)
BUN BLDV-MCNC: 19 MG/DL (ref 6–23)
CALCIUM SERPL-MCNC: 8.3 MG/DL (ref 8.3–10.6)
CHLORIDE BLD-SCNC: 100 MMOL/L (ref 99–110)
CO2: 28 MMOL/L (ref 21–32)
CREAT SERPL-MCNC: 0.5 MG/DL (ref 0.9–1.3)
CULTURE: NORMAL
CULTURE: NORMAL
DIFFERENTIAL TYPE: ABNORMAL
GFR SERPL CREATININE-BSD FRML MDRD: >60 ML/MIN/1.73M2
GLUCOSE BLD-MCNC: 215 MG/DL (ref 70–99)
GLUCOSE BLD-MCNC: 232 MG/DL (ref 70–99)
GLUCOSE BLD-MCNC: 234 MG/DL (ref 70–99)
GLUCOSE BLD-MCNC: 263 MG/DL (ref 70–99)
GLUCOSE BLD-MCNC: 281 MG/DL (ref 70–99)
HCT VFR BLD CALC: 29.5 % (ref 42–52)
HEMOGLOBIN: 8.8 GM/DL (ref 13.5–18)
HIGH SENSITIVE C-REACTIVE PROTEIN: 6.9 MG/L (ref 0–5)
LYMPHOCYTES ABSOLUTE: 18 K/CU MM
LYMPHOCYTES RELATIVE PERCENT: 74 % (ref 24–44)
Lab: NORMAL
Lab: NORMAL
MAGNESIUM: 2.1 MG/DL (ref 1.8–2.4)
MCH RBC QN AUTO: 25.3 PG (ref 27–31)
MCHC RBC AUTO-ENTMCNC: 29.8 % (ref 32–36)
MCV RBC AUTO: 84.8 FL (ref 78–100)
MONOCYTES ABSOLUTE: 2.2 K/CU MM
MONOCYTES RELATIVE PERCENT: 9 % (ref 0–4)
MYELOCYTE PERCENT: 1 %
MYELOCYTES ABSOLUTE COUNT: 0.24 K/CU MM
NUCLEATED RBC %: 0.2 %
NUCLEATED RED BLOOD CELLS: 1
PDW BLD-RTO: 21.2 % (ref 11.7–14.9)
PHOSPHORUS: 2.3 MG/DL (ref 2.5–4.9)
PLATELET # BLD: 75 K/CU MM (ref 140–440)
PMV BLD AUTO: 11 FL (ref 7.5–11.1)
POTASSIUM SERPL-SCNC: 4.8 MMOL/L (ref 3.5–5.1)
PROCALCITONIN: 0.11
RBC # BLD: 3.48 M/CU MM (ref 4.6–6.2)
SEGMENTED NEUTROPHILS ABSOLUTE COUNT: 2.9 K/CU MM
SEGMENTED NEUTROPHILS RELATIVE PERCENT: 12 % (ref 36–66)
SODIUM BLD-SCNC: 136 MMOL/L (ref 135–145)
SPECIMEN: NORMAL
SPECIMEN: NORMAL
TOTAL NUCLEATED RBC: 0.1 K/CU MM
TOTAL PROTEIN: 5 GM/DL (ref 6.4–8.2)
WBC # BLD: 24.3 K/CU MM (ref 4–10.5)

## 2022-10-27 PROCEDURE — 6360000002 HC RX W HCPCS: Performed by: NURSE PRACTITIONER

## 2022-10-27 PROCEDURE — 2580000003 HC RX 258: Performed by: NURSE PRACTITIONER

## 2022-10-27 PROCEDURE — 85007 BL SMEAR W/DIFF WBC COUNT: CPT

## 2022-10-27 PROCEDURE — 1200000000 HC SEMI PRIVATE

## 2022-10-27 PROCEDURE — 82962 GLUCOSE BLOOD TEST: CPT

## 2022-10-27 PROCEDURE — 6370000000 HC RX 637 (ALT 250 FOR IP): Performed by: INTERNAL MEDICINE

## 2022-10-27 PROCEDURE — 6370000000 HC RX 637 (ALT 250 FOR IP): Performed by: NURSE PRACTITIONER

## 2022-10-27 PROCEDURE — 86141 C-REACTIVE PROTEIN HS: CPT

## 2022-10-27 PROCEDURE — 6360000002 HC RX W HCPCS: Performed by: HOSPITALIST

## 2022-10-27 PROCEDURE — 83735 ASSAY OF MAGNESIUM: CPT

## 2022-10-27 PROCEDURE — 94640 AIRWAY INHALATION TREATMENT: CPT

## 2022-10-27 PROCEDURE — 85027 COMPLETE CBC AUTOMATED: CPT

## 2022-10-27 PROCEDURE — 84145 PROCALCITONIN (PCT): CPT

## 2022-10-27 PROCEDURE — 99232 SBSQ HOSP IP/OBS MODERATE 35: CPT | Performed by: INTERNAL MEDICINE

## 2022-10-27 PROCEDURE — 80053 COMPREHEN METABOLIC PANEL: CPT

## 2022-10-27 PROCEDURE — 2700000000 HC OXYGEN THERAPY PER DAY

## 2022-10-27 PROCEDURE — 84100 ASSAY OF PHOSPHORUS: CPT

## 2022-10-27 PROCEDURE — 99232 SBSQ HOSP IP/OBS MODERATE 35: CPT | Performed by: NURSE PRACTITIONER

## 2022-10-27 PROCEDURE — 6360000002 HC RX W HCPCS: Performed by: INTERNAL MEDICINE

## 2022-10-27 PROCEDURE — 94761 N-INVAS EAR/PLS OXIMETRY MLT: CPT

## 2022-10-27 RX ORDER — IPRATROPIUM BROMIDE AND ALBUTEROL SULFATE 2.5; .5 MG/3ML; MG/3ML
1 SOLUTION RESPIRATORY (INHALATION)
Status: DISCONTINUED | OUTPATIENT
Start: 2022-10-27 | End: 2022-10-28

## 2022-10-27 RX ORDER — ACETYLCYSTEINE 100 MG/ML
4 SOLUTION ORAL; RESPIRATORY (INHALATION) 3 TIMES DAILY
Status: DISCONTINUED | OUTPATIENT
Start: 2022-10-27 | End: 2022-11-02 | Stop reason: HOSPADM

## 2022-10-27 RX ORDER — GUAIFENESIN 600 MG/1
600 TABLET, EXTENDED RELEASE ORAL 4 TIMES DAILY
Status: DISCONTINUED | OUTPATIENT
Start: 2022-10-27 | End: 2022-11-02 | Stop reason: HOSPADM

## 2022-10-27 RX ADMIN — ATORVASTATIN CALCIUM 80 MG: 40 TABLET, FILM COATED ORAL at 21:22

## 2022-10-27 RX ADMIN — INSULIN LISPRO 2 UNITS: 100 INJECTION, SOLUTION INTRAVENOUS; SUBCUTANEOUS at 12:57

## 2022-10-27 RX ADMIN — OXYCODONE HYDROCHLORIDE AND ACETAMINOPHEN 250 MG: 500 TABLET ORAL at 08:12

## 2022-10-27 RX ADMIN — MORPHINE SULFATE 2 MG: 2 INJECTION, SOLUTION INTRAMUSCULAR; INTRAVENOUS at 16:36

## 2022-10-27 RX ADMIN — SODIUM CHLORIDE, PRESERVATIVE FREE 10 ML: 5 INJECTION INTRAVENOUS at 21:22

## 2022-10-27 RX ADMIN — IPRATROPIUM BROMIDE AND ALBUTEROL SULFATE 1 AMPULE: .5; 2.5 SOLUTION RESPIRATORY (INHALATION) at 12:08

## 2022-10-27 RX ADMIN — HYDROCODONE BITARTRATE AND ACETAMINOPHEN 1 TABLET: 5; 325 TABLET ORAL at 21:22

## 2022-10-27 RX ADMIN — ACETYLCYSTEINE 400 MG: 100 INHALANT RESPIRATORY (INHALATION) at 20:18

## 2022-10-27 RX ADMIN — FINASTERIDE 5 MG: 5 TABLET, FILM COATED ORAL at 08:12

## 2022-10-27 RX ADMIN — GABAPENTIN 100 MG: 100 CAPSULE ORAL at 21:22

## 2022-10-27 RX ADMIN — BENZONATATE 100 MG: 100 CAPSULE ORAL at 12:57

## 2022-10-27 RX ADMIN — OXYCODONE HYDROCHLORIDE AND ACETAMINOPHEN 250 MG: 500 TABLET ORAL at 21:21

## 2022-10-27 RX ADMIN — SODIUM CHLORIDE, PRESERVATIVE FREE 10 ML: 5 INJECTION INTRAVENOUS at 08:12

## 2022-10-27 RX ADMIN — BENZONATATE 100 MG: 100 CAPSULE ORAL at 18:03

## 2022-10-27 RX ADMIN — INSULIN GLARGINE 40 UNITS: 100 INJECTION, SOLUTION SUBCUTANEOUS at 21:22

## 2022-10-27 RX ADMIN — GUAIFENESIN 600 MG: 600 TABLET, EXTENDED RELEASE ORAL at 16:25

## 2022-10-27 RX ADMIN — MEROPENEM 1000 MG: 1 INJECTION, POWDER, FOR SOLUTION INTRAVENOUS at 16:25

## 2022-10-27 RX ADMIN — GABAPENTIN 100 MG: 100 CAPSULE ORAL at 08:12

## 2022-10-27 RX ADMIN — MEROPENEM 1000 MG: 1 INJECTION, POWDER, FOR SOLUTION INTRAVENOUS at 08:16

## 2022-10-27 RX ADMIN — GUAIFENESIN 600 MG: 600 TABLET, EXTENDED RELEASE ORAL at 21:22

## 2022-10-27 RX ADMIN — PANTOPRAZOLE SODIUM 40 MG: 40 TABLET, DELAYED RELEASE ORAL at 05:28

## 2022-10-27 RX ADMIN — Medication 324 MG: at 08:12

## 2022-10-27 RX ADMIN — IPRATROPIUM BROMIDE AND ALBUTEROL SULFATE 1 AMPULE: .5; 2.5 SOLUTION RESPIRATORY (INHALATION) at 20:17

## 2022-10-27 RX ADMIN — TAMSULOSIN HYDROCHLORIDE 0.4 MG: 0.4 CAPSULE ORAL at 08:12

## 2022-10-27 RX ADMIN — Medication 324 MG: at 21:21

## 2022-10-27 RX ADMIN — HYDROCODONE BITARTRATE AND ACETAMINOPHEN 1 TABLET: 5; 325 TABLET ORAL at 05:52

## 2022-10-27 RX ADMIN — GUAIFENESIN 600 MG: 600 TABLET, EXTENDED RELEASE ORAL at 08:12

## 2022-10-27 RX ADMIN — GUAIFENESIN 600 MG: 600 TABLET, EXTENDED RELEASE ORAL at 12:56

## 2022-10-27 RX ADMIN — INSULIN LISPRO 1 UNITS: 100 INJECTION, SOLUTION INTRAVENOUS; SUBCUTANEOUS at 18:03

## 2022-10-27 RX ADMIN — GABAPENTIN 100 MG: 100 CAPSULE ORAL at 12:56

## 2022-10-27 RX ADMIN — MEROPENEM 1000 MG: 1 INJECTION, POWDER, FOR SOLUTION INTRAVENOUS at 00:33

## 2022-10-27 RX ADMIN — IPRATROPIUM BROMIDE AND ALBUTEROL SULFATE 1 AMPULE: .5; 2.5 SOLUTION RESPIRATORY (INHALATION) at 15:47

## 2022-10-27 RX ADMIN — SODIUM CHLORIDE 100 ML: 9 INJECTION, SOLUTION INTRAVENOUS at 00:33

## 2022-10-27 RX ADMIN — INSULIN LISPRO 1 UNITS: 100 INJECTION, SOLUTION INTRAVENOUS; SUBCUTANEOUS at 08:11

## 2022-10-27 ASSESSMENT — ENCOUNTER SYMPTOMS
SORE THROAT: 0
WHEEZING: 0
NAUSEA: 0
BACK PAIN: 0
SHORTNESS OF BREATH: 1
COUGH: 1
VOMITING: 0

## 2022-10-27 ASSESSMENT — PAIN SCALES - GENERAL
PAINLEVEL_OUTOF10: 4
PAINLEVEL_OUTOF10: 4
PAINLEVEL_OUTOF10: 7

## 2022-10-27 ASSESSMENT — PAIN DESCRIPTION - ORIENTATION
ORIENTATION: RIGHT
ORIENTATION: RIGHT

## 2022-10-27 ASSESSMENT — PAIN DESCRIPTION - LOCATION
LOCATION: RIB CAGE
LOCATION: ABDOMEN

## 2022-10-27 ASSESSMENT — COPD QUESTIONNAIRES: CAT_SEVERITY: SEVERE

## 2022-10-27 ASSESSMENT — PAIN DESCRIPTION - DESCRIPTORS: DESCRIPTORS: ACHING

## 2022-10-27 ASSESSMENT — PAIN SCALES - WONG BAKER: WONGBAKER_NUMERICALRESPONSE: 0

## 2022-10-27 NOTE — PLAN OF CARE
Problem: Discharge Planning  Goal: Discharge to home or other facility with appropriate resources  Outcome: Progressing  Flowsheets (Taken 10/27/2022 1031)  Discharge to home or other facility with appropriate resources: Identify barriers to discharge with patient and caregiver     Problem: Pain  Goal: Verbalizes/displays adequate comfort level or baseline comfort level  Outcome: Progressing     Problem: ABCDS Injury Assessment  Goal: Absence of physical injury  Outcome: Progressing     Problem: Skin/Tissue Integrity  Goal: Absence of new skin breakdown  Description: 1. Monitor for areas of redness and/or skin breakdown  2. Assess vascular access sites hourly  3. Every 4-6 hours minimum:  Change oxygen saturation probe site  4. Every 4-6 hours:  If on nasal continuous positive airway pressure, respiratory therapy assess nares and determine need for appliance change or resting period.   Outcome: Progressing     Problem: Safety - Adult  Goal: Free from fall injury  Outcome: Progressing     Problem: Chronic Conditions and Co-morbidities  Goal: Patient's chronic conditions and co-morbidity symptoms are monitored and maintained or improved  Outcome: Progressing  Flowsheets (Taken 10/27/2022 1031)  Care Plan - Patient's Chronic Conditions and Co-Morbidity Symptoms are Monitored and Maintained or Improved: Monitor and assess patient's chronic conditions and comorbid symptoms for stability, deterioration, or improvement     Problem: Nutrition Deficit:  Goal: Optimize nutritional status  Outcome: Progressing  Flowsheets (Taken 10/27/2022 1538 by Jesse Graham RD, LD)  Nutrient intake appropriate for improving, restoring, or maintaining nutritional needs:   Monitor oral intake, labs, and treatment plans   Recommend appropriate diets, oral nutritional supplements, and vitamin/mineral supplements

## 2022-10-27 NOTE — PROGRESS NOTES
10/25/22  0530 10/27/22  0535    136   K 4.3 4.8    100   CO2 28 28   BUN 16 19   CREATININE 0.5* 0.5*   GLUCOSE 115* 281*     Hepatic:   Recent Labs     10/25/22  0530 10/27/22  0535   AST 20 22   ALT 18 19   BILITOT 0.5 0.4   ALKPHOS 213* 173*     INR:   Recent Labs     10/26/22  1304   INR 1.14         RADIOLOGY REPORTS  Reviewed    ASSESSMENT & RECOMMENDATIONS:  ?Pneumonia: is on antimicrobials. Plan for bronch in the am . Noted ID recs. Continue aggressive pulmonary toilet    CLL:Rising leukocytosis could also be contributed by infection/steroids. Anemia could be sec to infection/abx. Was on ibrutinib before but recent plan was to start Rituxan. Received IVIG Repeat Flow cytometry ordered. Will discuss with ID as to when rituxan can be started    Continue other medical care. This plan was discussed with the patient and he seem to have verbalized  understanding. Discussed with Dr Sejal Buckley. Improve nutritional status and recommend OOB to chair and ambulate as tolerated. We will continue to follow the patient. Thank you for allowing us to participate in the care of this patient.      [address]

## 2022-10-27 NOTE — PROGRESS NOTES
Pulmonary and Critical Care  Progress Note    Subjective: The patient is unchanged. Having copious secretions. Shortness of breath is unchanged  Chest pain none  Addressing respiratory complaints Patient is negative for  hemoptysis and cyanosis  CONSTITUTIONAL:  negative for fevers and chills      Past Medical History:     has a past medical history of Arthritis, CAD (coronary artery disease), Cancer ([facility] Diabetes mellitus ([facility] History of blood transfusion, Hx of motion sickness, Hyperlipidemia, MDRO (multiple drug resistant organisms) resistance, Migraine, Pneumonia, Wears dentures, and Wears glasses. has a past surgical history that includes knee surgery (1970); Tunneled venous port placement (2013); Colonoscopy (2010); fracture surgery (Left, 1990's); Cardiac catheterization (1990's); Tonsillectomy (late 1960's); Dental surgery; eye surgery (Right, 08/2016); and other surgical history (Left, 01/18/2017). reports that he quit smoking about 35 years ago. His smoking use included cigarettes. He started smoking about 57 years ago. He has a 20.00 pack-year smoking history. He has never used smokeless tobacco. He reports that he does not drink alcohol and does not use drugs. Family history:  family history includes Asthma in his maternal uncle; Colon Cancer in his brother; Diabetes in his mother; Early Death in his brother; Heart Disease in his father; High Blood Pressure in his mother; Other in his sister.     No Known Allergies  Social History:    Reviewed; no changes    Objective:   PHYSICAL EXAM:        VITALS:  /65   Pulse 86   Temp 97.7 °F (36.5 °C) (Oral)   Resp 18   Ht 6' (1.829 m)   Wt 168 lb 3.4 oz (76.3 kg)   SpO2 97%   BMI 22.81 kg/m²     24HR INTAKE/OUTPUT:    Intake/Output Summary (Last 24 hours) at 10/27/2022 1046  Last data filed at 10/26/2022 2028  Gross per 24 hour   Intake 60 ml   Output 400 ml   Net -340 ml       CONSTITUTIONAL:  awake, alert, cooperative, no apparent distress, and appears stated age  LUNGS:  decreased breath sounds, occ basilar crackles. CARDIOVASCULAR:  normal S1 and S2 and negative JVD  ABD:Abdomen soft, non-tender. BS normal. No masses,  No organomegaly  NEURO:Alert and oriented x3. Gait normal. Reflexes and motor strength normal and symmetric. Cranial nerves 2-12 and sensation grossly intact. DATA:    CBC:  Recent Labs     10/25/22  0530 10/27/22  0535   WBC 31.9* 24.3*   RBC 3.73* 3.48*   HGB 9.2* 8.8*   HCT 30.9* 29.5*   PLT 80* 75*   MCV 82.8 84.8   MCH 24.7* 25.3*   MCHC 29.8* 29.8*   RDW 20.7* 21.2*   NRBC  --  1   SEGSPCT 10.0* 12.0*   BANDSPCT 9 4*      BMP:  Recent Labs     10/25/22  0530 10/27/22  0535    136   K 4.3 4.8    100   CO2 28 28   BUN 16 19   CREATININE 0.5* 0.5*   CALCIUM 8.0* 8.3   GLUCOSE 115* 281*      ABG:  No results for input(s): PH, PO2ART, FFG7ZMT, HCO3, BEART, O2SAT in the last 72 hours. Lab Results   Component Value Date    PROBNP 774.5 (H) 10/22/2022    PROBNP 343.3 (H) 10/03/2022    PROBNP 2,514 (H) 09/30/2022     No results found for: [facility]    Radiology Review:  Pertinent images / reports were reviewed as a part of this visit.     Assessment:     Patient Active Problem List   Diagnosis    Pneumonia    Sepsis ([address]    Hypogammaglobulinemia ([address]    CLL (chronic lymphocytic leukemia) ([address]    Upper respiratory infection, acute    Generalized muscle weakness    Type 2 diabetes mellitus with hyperglycemia, with long-term current use of insulin ([facility])    Poor appetite    COPD (chronic obstructive pulmonary disease) ([facility])    Neutropenia ([facility])    Other specified disorders of bone density and structure, unspecified site    Cellulitis of right upper limb    Herpesviral infection    Intestinal malabsorption    Other nonspecific abnormal finding of lung field    Iron deficiency anemia due to chronic blood loss    Other muscle spasm    Leukemia, lymphocytic, chronic ([facility])    Selective deficiency of IgG ([address]    Acute bronchitis    Severe malnutrition (HCC)    Pneumonia due to organism    Personal history of CLL (chronic lymphocytic leukemia)    Rhinovirus infection       Plan:   1. Overall the patient has slightly improved. 2. Bronch tomorrow. 3. Discussed with the pt and RN. 4. Start Duoneb and Mucomyst Rx.   Juanjo Kee MD   10/27/2022  10:46 AM

## 2022-10-27 NOTE — CARE COORDINATION
Reviewed chart and discussed in IDR, plan remains home with wife, now Bronch scheduled for tomorrow. CM will continue to follow.

## 2022-10-27 NOTE — PROGRESS NOTES
V2.0  [facility] [place] Hospitalist Progress Note      Name:  Glynn Gauthier /Age/Sex: 1950  (71 y.o. male)   MRN & CSN:  [id number] & [id number] Encounter Date/Time: 10/27/2022 10:15 AM EDT    Location:  [address] [place] [address] PCP: Lula Mullen MD       Hospital Day: 6    Assessment and Plan:   Glynn Gauthier is a 71 y.o. male  who presents with Pneumonia due to organism      Plan:    Persistent pneumonia-chest x-ray s/o pneumonia, possible cavitation in the right lung. Pseudomonas in sputum  in    Recent admission 2 weeks ago he was positive for rhinovirus. MRSA screen and blood cultures were negative. Patient completed 8 days of Zosyn. Respiratory panel +ve for rhinovirus. ** ID consulted. Bx no growth 48 HRs, sputum culture 10/22 with Pseudomonas aeruginosa  -MRSA screening 10/26 pending  ** Pulmonology consulted, Planning for bronch, n.p.o. at midnight for bronchoscopy tomorrow morning  Patient currently on meropenem per ID recs. Awaiting bronchoscopy. Sepsis in setting of PNA and CLL - SIRS criteria met. LA resolved. Initially received IV fluids. Now stopped. Continue antibiotics as above. Chronic Hypoxic respiratory failure - recently discharge home O2 at 3 L per nasal cannula 10/6/22, no increase in respiratory requirement at this time, monitor with continuous pulse ox     Chest pain/elevated trop - suspect pleuritic/MSK given only has pain with coughing. EKG with non specific ST changes. hx CAD LHC report not available. Recent TTE 22 EF 88-18%, grade I diastolicc dysfunction. consulted Cardiology. Deemed non cardiac chest pain     Mild Hyponatremia   -Resolved with IV fluid administration. CLL and hypogammaglobulinemia was on ibrutinib before but now plan is to start on Rituxan as per heme-onc. Recently seen by [facility]IG as well. Appreciate oncology help. Severe protein calorie malnutrition suspected with hypo albuminemia- nutrition consulted.  Low pre-albumin     Other chronic medical conditions-resume home medications unless contraindicated  COPD - not in exacerbation. Respiratory care as noted above, no bronchospasm noted, hold steroids. DM type II with neuropathy, A1C 6.7 9/28/22 - Monitor BG  AC/HS, resume pts basal and ssi, monitor for adjustment in regimen, ADA diet    Mixed hyperlipidemia - on statin  Recent pleural effusion on recent admission 2 weeks ago, requiring thoracentesis, fluid culture not available. BPH on flomax, proscar         Comment: Please note this report has been produced using speech recognition software and may contain errors related to that system including errors in grammar, punctuation, and spelling, as well as words and phrases that may be inappropriate. If there are any questions or concerns please feel free to contact the dictating provider for clarification. Diet Diet NPO   DVT Prophylaxis [x] Lovenox, []  Heparin, [] SCDs, [] Ambulation,  [] Eliquis, [] Xarelto  [] Coumadin   Code Status Full Code   Disposition From: Home  Expected Disposition: Home versus SNF  Estimated Date of Discharge: 2 to 3 days. Patient requires continued admission due to pending about bronchoscopy,   Surrogate Decision Maker/ ERLIN Ervin     Subjective:     Chief Complaint: Fever and Shortness of Breath     Feels okay. Reports marginal improvement in shortness of breath. Denies any chest pain, dizziness, nausea vomiting, abdominal pain, fevers or chills. Review of Systems:    Review of Systems   Constitutional:  Negative for chills and fever. HENT:  Negative for sore throat. Eyes:  Negative for visual disturbance. Respiratory:  Positive for cough and shortness of breath. Negative for wheezing. Cardiovascular:  Negative for palpitations and leg swelling. Gastrointestinal:  Negative for nausea and vomiting. Endocrine: Negative for polyuria. Genitourinary:  Negative for dysuria. Musculoskeletal:  Negative for back pain.    Skin:  Negative for wound.   Neurological:  Negative for dizziness and weakness. Psychiatric/Behavioral:  Negative for confusion. \    Objective: Intake/Output Summary (Last 24 hours) at 10/27/2022 0927  Last data filed at 10/26/2022 2028  Gross per 24 hour   Intake 60 ml   Output 400 ml   Net -340 ml          Vitals:   Vitals:    10/27/22 0835   BP: 126/65   Pulse: 86   Resp: 18   Temp: 97.7 °F (36.5 °C)   SpO2: 97%       Physical Exam:   Physical Exam  Constitutional:       General: He is not in acute distress. Appearance: He is ill-appearing. HENT:      Mouth/Throat:      Mouth: Mucous membranes are moist.      Pharynx: No posterior oropharyngeal erythema. Eyes:      General: No scleral icterus. Extraocular Movements: Extraocular movements intact. Pupils: Pupils are equal, round, and reactive to light. Cardiovascular:      Rate and Rhythm: Normal rate and regular rhythm. Pulses: Normal pulses. Heart sounds: No murmur heard. Pulmonary:      Effort: Pulmonary effort is normal.      Breath sounds: No wheezing or rales. Comments: On nasal cannula oxygen. Not in any acute respiratory distress. Bilateral rhonchi. Abdominal:      General: Bowel sounds are normal. There is no distension. Palpations: Abdomen is soft. Tenderness: There is no abdominal tenderness. Musculoskeletal:         General: Normal range of motion. Right lower leg: No edema. Left lower leg: No edema. Skin:     General: Skin is warm. Findings: No rash. Neurological:      General: No focal deficit present. Mental Status: He is alert and oriented to person, place, and time. Cranial Nerves: No cranial nerve deficit. Sensory: No sensory deficit. Motor: No weakness.    Psychiatric:         Mood and Affect: Mood normal.       Medications:   Medications:    meropenem  1,000 mg IntraVENous Q8H    Followed by    [START ON 11/1/2022] meropenem  1,000 mg IntraVENous Q8H    sodium chloride flush  5-40 mL IntraVENous 2 times per day    [Held by provider] enoxaparin  40 mg SubCUTAneous Daily    insulin lispro  0-4 Units SubCUTAneous TID WC    insulin lispro  0-4 Units SubCUTAneous Nightly    guaiFENesin  600 mg Oral BID    vitamin C  250 mg Oral BID    atorvastatin  80 mg Oral Daily    ferrous gluconate  324 mg Oral BID    finasteride  5 mg Oral Daily    gabapentin  100 mg Oral TID    pantoprazole  40 mg Oral QAM AC    tamsulosin  0.4 mg Oral Daily    insulin glargine  40 Units SubCUTAneous Nightly      Infusions:    sodium chloride 100 mL (10/27/22 0033)     PRN Meds: sodium chloride flush, 5-40 mL, PRN  sodium chloride, , PRN  ondansetron, 4 mg, Q8H PRN   Or  ondansetron, 4 mg, Q6H PRN  polyethylene glycol, 17 g, Daily PRN  acetaminophen, 650 mg, Q6H PRN   Or  acetaminophen, 650 mg, Q6H PRN  benzonatate, 100 mg, TID PRN  albuterol sulfate HFA, 2 puff, Q4H PRN  HYDROcodone-acetaminophen, 1 tablet, Q12H PRN  morphine, 2 mg, Q4H PRN  ipratropium-albuterol, 1 ampule, Q4H PRN      Labs      Recent Results (from the past 24 hour(s))   POCT Glucose    Collection Time: 10/26/22 11:34 AM   Result Value Ref Range    POC Glucose 363 (H) 70 - 99 MG/DL   Protime-INR    Collection Time: 10/26/22  1:04 PM   Result Value Ref Range    Protime 14.7 (H) 11.7 - 14.5 SECONDS    INR 1.14 INDEX   POCT Glucose    Collection Time: 10/26/22  3:55 PM   Result Value Ref Range    POC Glucose 337 (H) 70 - 99 MG/DL   POCT Glucose    Collection Time: 10/26/22  7:53 PM   Result Value Ref Range    POC Glucose 301 (H) 70 - 99 MG/DL   CBC with Auto Differential    Collection Time: 10/27/22  5:35 AM   Result Value Ref Range    WBC 24.3 (H) 4.0 - 10.5 K/CU MM    RBC 3.48 (L) 4.6 - 6.2 M/CU MM    Hemoglobin 8.8 (L) 13.5 - 18.0 GM/DL    Hematocrit 29.5 (L) 42 - 52 %    MCV 84.8 78 - 100 FL    MCH 25.3 (L) 27 - 31 PG    MCHC 29.8 (L) 32.0 - 36.0 %    RDW 21.2 (H) 11.7 - 14.9 %    Platelets 75 (L) 888 - 440 K/CU MM    MPV 11.0 7.5 - 11.1 FL    Differential Type MANUAL DIFFERENTIAL     Segs Relative 12.0 (L) 36 - 66 %    Lymphocytes % 74.0 (H) 24 - 44 %    Monocytes % 9.0 (H) 0 - 4 %    Segs Absolute 2.9 K/CU MM    Lymphocytes Absolute 18.0 K/CU MM    Monocytes Absolute 2.2 K/CU MM    Nucleated RBC % 0.2 %    Total Nucleated RBC 0.1 K/CU MM    Myelocyte Percent 1 (H) 0.0 %    Bands Relative 4 (L) 5 - 11 %    nRBC 1     Myelocytes Absolute 0.24 K/CU MM    Bands Absolute 0.97 K/CU MM    Anisocytosis 1+     Atypical Lymphocytes Absolute 1+    C-Reactive Protein    Collection Time: 10/27/22  5:35 AM   Result Value Ref Range    CRP, High Sensitivity 6.9 (H) 0.0 - 5.0 mg/L   Comprehensive Metabolic Panel    Collection Time: 10/27/22  5:35 AM   Result Value Ref Range    Sodium 136 135 - 145 MMOL/L    Potassium 4.8 3.5 - 5.1 MMOL/L    Chloride 100 99 - 110 mMol/L    CO2 28 21 - 32 MMOL/L    BUN 19 6 - 23 MG/DL    Creatinine 0.5 (L) 0.9 - 1.3 MG/DL    Est, Glom Filt Rate >60 >60 mL/min/1.73m2    Glucose 281 (H) 70 - 99 MG/DL    Calcium 8.3 8.3 - 10.6 MG/DL    Albumin 2.6 (L) 3.4 - 5.0 GM/DL    Total Protein 5.0 (L) 6.4 - 8.2 GM/DL    Total Bilirubin 0.4 0.0 - 1.0 MG/DL    ALT 19 10 - 40 U/L    AST 22 15 - 37 IU/L    Alkaline Phosphatase 173 (H) 40 - 128 IU/L    Anion Gap 8 4 - 16   POCT Glucose    Collection Time: 10/27/22  7:28 AM   Result Value Ref Range    POC Glucose 234 (H) 70 - 99 MG/DL        Imaging/Diagnostics Last 24 Hours   CT CHEST WO CONTRAST    Result Date: 10/23/2022  EXAMINATION: CT OF THE CHEST WITHOUT CONTRAST 10/23/2022 9:59 am TECHNIQUE: CT of the chest was performed without the administration of intravenous contrast. Multiplanar reformatted images are provided for review. Automated exposure control, iterative reconstruction, and/or weight based adjustment of the mA/kV was utilized to reduce the radiation dose to as low as reasonably achievable.  COMPARISON: 09/28/2022 HISTORY: ORDERING SYSTEM PROVIDED HISTORY: fu pneumonia persistent TECHNOLOGIST PROVIDED HISTORY: Reason for exam:->fu pneumonia persistent Reason for Exam: fu pneumonia persistent FINDINGS: Mediastinum: Enlarged mediastinal lymph nodes are again identified, similar when compared to the previous exam.  Largest in the subcarinal space, with a short axis measurement of approximately 3 cm. Visualized thyroid unremarkable. Esophagus unremarkable. Limited noncontrast imaging of the cardiac chambers, thoracic aorta, and pulmonary arteries unremarkable. Lungs/pleura: Infiltrates in the lower lungs seen previously are decreased in density. However, innumerable tree-in-bud centrilobular micro nodules are now present within both lungs, greatest in the mid to lower lungs. In addition, a cavitary lesion in the superior segment of the right lower lobe has developed measuring approximately 5.3 cm maximally. Additional nodular infiltrates are seen in the left upper lobe, also with early cavitation. Diffuse bronchial wall thickening is noted. No filling defects are seen within the airways. No pneumothorax. No pleural effusion. Upper Abdomen: Unremarkable. Soft Tissues/Bones: No acute or suspicious bony abnormality. Extra thoracic soft tissues unremarkable. The infiltrates seen previously in the lower lobes bilaterally for the most part have decreased in size and conspicuity. However, there are now innumerable punctate tree-in-bud centrilobular micro nodules, which are nonspecific but suggest inflammatory/infectious bronchiolitis. Consider both typical and atypical etiologies. In addition, cavitary lesion has developed in the periphery of the right lower lobe and to a lesser extent within the left upper lung. Necrotizing infection would be primarily considered given the rapid development. Consider both typical and atypical etiologies. Enlarged mediastinal lymph nodes, similar when compared to the previous exam, process of Lia related to history of chronic lymphocytic leukemia. Electronically signed by Alfredo Shaver MD on 10/27/2022 at 9:27 AM

## 2022-10-27 NOTE — PROGRESS NOTES
Comprehensive Nutrition Assessment    Type and Reason for Visit:  Reassess    Nutrition Recommendations/Plan:   Continue carb controlled diet   Start Low Calorie, High Protein oral nutrition supplements TID, chocolate   Encourage adequate fiber and fluid intake, start bowel movement regimen   Document all PO intakes in I/O      Malnutrition Assessment:  Malnutrition Status:  Severe malnutrition (10/24/22 1219)    Context:  Chronic Illness (acute on chronic)       Nutrition Assessment:    Pt on carb controlled diet, consuming %. Appetite is improving during stay. Blood glucose levels uncontrolled at this time, will adjust supplements as needed. Encoruage adequate protein for wound healing. Moderate nutrition risk. Nutrition Related Findings:    +rhinovirus droplet isolation, POCT 264, Phos 2.3 last BM 10/24 Wound Type: Multiple, Pressure Injury, Stage II, Diabetic Ulcer (Traumatic)       Current Nutrition Intake & Therapies:    Average Meal Intake: %  Average Supplements Intake: Unable to assess  ADULT DIET; Regular; 5 carb choices (75 gm/meal)  ADULT ORAL NUTRITION SUPPLEMENT; Breakfast, Lunch, Dinner; Standard High Calorie/High Protein Oral Supplement  Diet NPO    Anthropometric Measures:  Height: 6' (182.9 cm)  Ideal Body Weight (IBW): 178 lbs (81 kg)    Admission Body Weight: 163 lb 12.8 oz (74.3 kg)  Current Body Weight: 168 lb 3.4 oz (76.3 kg), 94.5 % IBW.  Weight Source: Bed Scale  Current BMI (kg/m2): 22.8  Usual Body Weight: 173 lb (78.5 kg) (9/29/22; pt weighed up to 198 lb 12.8 oz in November 2021)  % Weight Change (Calculated): -5.3  Weight Adjustment For: No Adjustment                 BMI Categories: Normal Weight (BMI 22.0 to 24.9) age over 72    Estimated Daily Nutrient Needs:  Energy Requirements Based On: Kcal/kg  Weight Used for Energy Requirements: Current  Energy (kcal/day): 2030-4903 (25-30 kcals/kg)  Weight Used for Protein Requirements: Ideal  Protein (g/day):  (1.2-1.4 g/kg)  Method Used for Fluid Requirements: 1 ml/kcal  Fluid (ml/day): 5862-6812    Nutrition Diagnosis:   Severe malnutrition, In context of chronic illness (acute on chronic) related to inadequate protein-energy intake, impaired nutrient utilization, increase demand for energy/nutrients, catabolic illness as evidenced by poor intake prior to admission, weight loss greater than or equal to 5% in 1 month, severe muscle loss, severe loss of subcutaneous fat    Nutrition Interventions:   Food and/or Nutrient Delivery: Continue Current Diet, Modify Oral Nutrition Supplement  Nutrition Education/Counseling: Education initiated  Coordination of Nutrition Care: Continue to monitor while inpatient, Feeding Assistance/Environment Change, Coordination of Care  Plan of Care discussed with: pt    Goals:  Previous Goal Met: Progressing toward Goal(s)  Goals: Meet at least 75% of estimated needs       Nutrition Monitoring and Evaluation:   Behavioral-Environmental Outcomes: None Identified  Food/Nutrient Intake Outcomes: Food and Nutrient Intake, Supplement Intake  Physical Signs/Symptoms Outcomes: Biochemical Data, Constipation, Meal Time Behavior, Nutrition Focused Physical Findings, Weight, Skin    Discharge Planning:    Continue current diet, Continue Oral Nutrition Supplement     Lisa Umana RD, LD  Contact: [phone]

## 2022-10-27 NOTE — ANESTHESIA PRE PROCEDURE
Department of Anesthesiology  Preprocedure Note       Name:  Jyothi Acevedo   Age:  71 y.o.  :  1950                                          MRN:  [id number]         Date:  10/27/2022      Surgeon: Sunny Gillespie):  Kev Patino MD    Procedure: Procedure(s):  BRONCHOSCOPY    Medications prior to admission:   Prior to Admission medications    Medication Sig Start Date End Date Taking? Authorizing Provider   HYDROcodone-acetaminophen (NORCO) 5-325 MG per tablet Take 1 tablet by mouth every 12 hours as needed for Pain for up to 30 days. 10/10/22 11/9/22  Flo Connolly MD   gabapentin (NEURONTIN) 100 MG capsule Take 1 capsule by mouth 3 times daily for 30 days. 10/6/22 11/5/22  Jacob Fraser MD   albuterol sulfate HFA (PROVENTIL;VENTOLIN;PROAIR) 108 (90 Base) MCG/ACT inhaler Inhale 2 puffs into the lungs every 4-6 hours as needed for Shortness of Breath or Wheezing 22   Historical Provider, MD   ipratropium-albuterol (DUONEB) 0.5-2.5 (3) MG/3ML SOLN nebulizer solution Take 1 vial by nebulization every 6 hours as needed for Shortness of Breath 22   Historical Provider, MD   Ascorbic Acid (VITAMIN C) 250 MG tablet Take 250 mg by mouth 2 times daily    Historical Provider, MD   ferrous gluconate 324 (37.5 Fe) MG TABS Take 1 tablet by mouth 2 times daily 22   Flo Connolly MD   valACYclovir (VALTREX) 500 MG tablet Take 1 tablet by mouth daily 22   Flo Connolly MD   LEVEMIR FLEXTOUCH 100 UNIT/ML injection pen Inject 40 Units into the skin nightly 3/27/22   Santiago White MD   NOVOLOG FLEXPEN 100 UNIT/ML injection pen Inject 15 Units into the skin 3 times daily (before meals) 3/27/22   Santiago White MD   atorvastatin (LIPITOR) 80 MG tablet Take 80 mg by mouth daily 10/26/21   Historical Provider, MD   acetaminophen-codeine (TYLENOL #3) 300-30 MG per tablet Take 1 tablet by mouth every 6 hours as needed for Pain.  21   Historical Provider, MD   finasteride (PROSCAR) 5 MG tablet Take 5 mg by mouth daily 5/24/21   Historical Provider, MD   tamsulosin (FLOMAX) 0.4 MG capsule Take 0.4 mg by mouth daily    Historical Provider, MD   glucose monitoring kit (FREESTYLE) monitoring kit 1 kit by Does not apply route daily as needed (glucose checks) 3/31/17   Billie Acuna MD   Insulin Syringe-Needle U-100 30G X 1/2\" 0.5 ML MISC 1 each by Does not apply route daily 3/31/17   Billie Acuna MD   metFORMIN (GLUCOPHAGE) 1000 MG tablet Take 1,000 mg by mouth 2 times daily (with meals)    Historical Provider, MD   omeprazole (PRILOSEC) 20 MG delayed release capsule Take 20 mg by mouth daily as needed (GERD)    Historical Provider, MD   Immune Globulin, Human, 20 GM/200ML SOLN solution Infuse 20 g intravenously every 30 days 05/14/19 Given through infusion therapy states he is due on the 20 of May    Historical Provider, MD       Current medications:    Current Facility-Administered Medications   Medication Dose Route Frequency Provider Last Rate Last Admin    guaiFENesin [facility]) extended release tablet 600 mg  600 mg Oral 4x daily Kaveh Grimaldo MD   600 mg at 10/27/22 1256    ipratropium-albuterol (DUONEB) nebulizer solution 1 ampule  1 ampule Inhalation Q4H PATRICIO Caba MD   1 ampule at 10/27/22 1547    acetylcysteine (MUCOMYST) 10 % solution 400 mg  4 mL Inhalation TID Kaveh Grimaldo MD        meropenem (MERREM) 1,000 mg in sodium chloride 0.9 % 100 mL IVPB (mini-bag)  1,000 mg IntraVENous Q8H Courtney Farley APRN - CNP   Stopped at 10/27/22 0909    Followed by   Immanuel Barney ON 11/1/2022] meropenem (MERREM) 1,000 mg in sodium chloride 0.9 % 100 mL IVPB (mini-bag)  1,000 mg IntraVENous Q8H Courtney Farley APRN - CNP        sodium chloride flush 0.9 % injection 5-40 mL  5-40 mL IntraVENous 2 times per day Estuardo Rowan APRN - CNP   10 mL at 10/27/22 9372    sodium chloride flush 0.9 % injection 5-40 mL  5-40 mL IntraVENous PRN Shade Harpal, APRN - CNP        0.9 % sodium chloride infusion   IntraVENous PRN Lakesha Head, APRN - CNP 25 mL/hr at 10/27/22 0033 100 mL at 10/27/22 0033    [Held by provider] enoxaparin (LOVENOX) injection 40 mg  40 mg SubCUTAneous Daily Lakesha Head, APRN - CNP   40 mg at 10/26/22 0842    ondansetron (ZOFRAN-ODT) disintegrating tablet 4 mg  4 mg Oral Q8H PRN Lakesha Head, APRN - CNP        Or    ondansetron (ZOFRAN) injection 4 mg  4 mg IntraVENous Q6H PRN [facility], APRN - CNP        polyethylene glycol (GLYCOLAX) packet 17 g  17 g Oral Daily PRN [facility], APRN - CNP        acetaminophen (TYLENOL) tablet 650 mg  650 mg Oral Q6H PRN [facility], APRN - CNP        Or    acetaminophen (TYLENOL) suppository 650 mg  650 mg Rectal Q6H PRN [facility], APRN - CNP        insulin lispro (HUMALOG) injection vial 0-4 Units  0-4 Units SubCUTAneous TID WC Lakesha Head, APRN - CNP   2 Units at 10/27/22 1257    insulin lispro (HUMALOG) injection vial 0-4 Units  0-4 Units SubCUTAneous Nightly [facility], APRN - CNP   4 Units at 10/26/22 2057    benzonatate (TESSALON) capsule 100 mg  100 mg Oral TID PRN Lakesha Head, APRN - CNP   100 mg at 10/27/22 1257    albuterol sulfate HFA (PROVENTIL;VENTOLIN;PROAIR) 108 (90 Base) MCG/ACT inhaler 2 puff  2 puff Inhalation Q4H PRN [facility], APRN - CNP        ascorbic acid (VITAMIN C) tablet 250 mg  250 mg Oral BID Lakesha Head, APRN - CNP   250 mg at 10/27/22 4530    atorvastatin (LIPITOR) tablet 80 mg  80 mg Oral Daily Lakesha Head, APRN - CNP   80 mg at 10/26/22 2039    ferrous gluconate 324 (37.5 Fe) MG tablet 324 mg  324 mg Oral BID Lakesha Head, APRN - CNP   324 mg at 10/27/22 9923    finasteride (PROSCAR) tablet 5 mg  5 mg Oral Daily [facility], APRN - CNP   5 mg at 10/27/22 9523    gabapentin (NEURONTIN) capsule 100 mg  100 mg Oral TID Lakesha Hassan, APRN - CNP   100 mg at 10/27/22 1256    HYDROcodone-acetaminophen ([address]) 5-325 MG per tablet 1 tablet  1 tablet Oral Q12H PRN Allison Showers, APRN - CNP   1 tablet at 10/27/22 0552    pantoprazole (PROTONIX) tablet 40 mg  40 mg Oral QAM AC Allison Showers, APRN - CNP   40 mg at 10/27/22 0528    tamsulosin (FLOMAX) capsule 0.4 mg  0.4 mg Oral Daily Allison Showers, APRN - CNP   0.4 mg at 10/27/22 0769    insulin glargine (LANTUS) injection vial 40 Units  40 Units SubCUTAneous Nightly Allison Showers, APRN - CNP   40 Units at 10/26/22 2057    morphine (PF) injection 2 mg  2 mg IntraVENous Q4H PRN Kota Freeman MD   2 mg at 10/26/22 1152    ipratropium-albuterol (DUONEB) nebulizer solution 1 ampule  1 ampule Inhalation Q4H PRN Kota Freeman MD         Facility-Administered Medications Ordered in Other Encounters   Medication Dose Route Frequency Provider Last Rate Last Admin    sodium chloride flush 0.9 % injection 10 mL  10 mL IntraVENous PRN Luanne Ramos MD           Allergies:  No Known Allergies    Problem List:    Patient Active Problem List   Diagnosis Code    Pneumonia J18.9    [facility] A41.9    Hypogammaglobulinemia ([facility] D80.1    CLL (chronic lymphocytic leukemia) ([facility] C91.10    Upper respiratory infection, acute J06.9    Generalized muscle weakness M62.81    Type 2 diabetes mellitus with hyperglycemia, with long-term current use of insulin ([facility]) E11.65, Z79.4    Poor appetite R63.0    COPD (chronic obstructive pulmonary disease) ([facility]) J44.9    Neutropenia ([facility]) D70.9    Other specified disorders of bone density and structure, unspecified site M85.80    Cellulitis of right upper limb L03. 80    Herpesviral infection B00.9    Intestinal malabsorption K90.9    Other nonspecific abnormal finding of lung field R91.8    Iron deficiency anemia due to chronic blood loss D50.0    Other muscle spasm M62.838    Leukemia, lymphocytic, chronic ([facility]) C91.10    Selective deficiency of IgG ([facility]) D80.3    Acute bronchitis J20.9    Severe malnutrition ([facility] E43  Pneumonia due to organism J18.9    Personal history of CLL (chronic lymphocytic leukemia) Z85.6    Rhinovirus infection B34.8       Past Medical History:        Diagnosis Date    Arthritis     knees, Rt hand/wrist    CAD (coronary artery disease)     Cancer (HCC)     CLL--Sees Dr Gaby Singleton Diabetes mellitus ([facility]     History of blood transfusion     no reaction    Hx of motion sickness     Hyperlipidemia     MDRO (multiple drug resistant organisms) resistance     abscess on buttock 10yrs ago    Migraine     last one summer 2016    Pneumonia 2016    Wears dentures     full upper--lower dentures    Wears glasses        Past Surgical History:        Procedure Laterality Date    CARDIAC CATHETERIZATION      x 2  last showed \"inflammation of heart\"    COLONOSCOPY      DENTAL SURGERY      all teeth extracted     EYE SURGERY Right 2016    Cataract removal    FRACTURE SURGERY Left     left ankle plate and screws    KNEE SURGERY  1970    left    OTHER SURGICAL HISTORY Left 2017    groin excision    TONSILLECTOMY  late     age 13    TUNNELED VENOUS PORT PLACEMENT      Right upper chest       Social History:    Social History     Tobacco Use    Smoking status: Former     Packs/day: 1.00     Years: 20.00     Pack years: 20.00     Types: Cigarettes     Start date: 10/2/1965     Quit date: 1987     Years since quittin.8    Smokeless tobacco: Never   Substance Use Topics    Alcohol use:  No                                Counseling given: Not Answered      Vital Signs (Current):   Vitals:    10/27/22 0835 10/27/22 1211 10/27/22 1434 10/27/22 1538   BP: 126/65  136/68    Pulse: 86 100 (!) 103    Resp: 18 18 18    Temp: 36.5 °C (97.7 °F)  37.1 °C (98.7 °F)    TempSrc: Oral  Oral    SpO2: 97% 97% 93%    Weight:       Height:    6' (1.829 m)                                              BP Readings from Last 3 Encounters:   10/27/22 136/68   10/11/22 106/64 10/06/22 136/86       NPO Status:                                                                                 BMI:   Wt Readings from Last 3 Encounters:   10/27/22 168 lb 3.4 oz (76.3 kg)   10/05/22 171 lb 15.3 oz (78 kg)   09/22/22 182 lb (82.6 kg)     Body mass index is 22.81 kg/m².     CBC:   Lab Results   Component Value Date/Time    WBC 24.3 10/27/2022 05:35 AM    RBC 3.48 10/27/2022 05:35 AM    RBC 3.38 08/21/2017 09:06 AM    HGB 8.8 10/27/2022 05:35 AM    HCT 29.5 10/27/2022 05:35 AM    MCV 84.8 10/27/2022 05:35 AM    RDW 21.2 10/27/2022 05:35 AM    PLT 75 10/27/2022 05:35 AM       CMP:   Lab Results   Component Value Date/Time     10/27/2022 05:35 AM    K 4.8 10/27/2022 05:35 AM     10/27/2022 05:35 AM    CO2 28 10/27/2022 05:35 AM    BUN 19 10/27/2022 05:35 AM    CREATININE 0.5 10/27/2022 05:35 AM    GFRAA >60 10/11/2022 10:30 AM    LABGLOM >60 10/27/2022 05:35 AM    GLUCOSE 281 10/27/2022 05:35 AM    PROT 5.0 10/27/2022 05:35 AM    PROT 6.1 12/27/2012 09:53 AM    CALCIUM 8.3 10/27/2022 05:35 AM    BILITOT 0.4 10/27/2022 05:35 AM    ALKPHOS 173 10/27/2022 05:35 AM    AST 22 10/27/2022 05:35 AM    ALT 19 10/27/2022 05:35 AM       POC Tests:   Recent Labs     10/27/22  1140   POCGLU 263*       Coags:   Lab Results   Component Value Date/Time    PROTIME 14.7 10/26/2022 01:04 PM    INR 1.14 10/26/2022 01:04 PM    APTT 29.9 10/02/2022 09:10 AM       HCG (If Applicable): No results found for: PREGTESTUR, PREGSERUM, HCG, HCGQUANT     ABGs: No results found for: PHART, PO2ART, EKK5OSF, DSZ0OAQ, BEART, T1BWEHEY     Type & Screen (If Applicable):  No results found for: LABABO, LABRH    Drug/Infectious Status (If Applicable):  Lab Results   Component Value Date/Time    HEPCAB NON REACTIVE 09/22/2022 02:46 PM       COVID-19 Screening (If Applicable):   Lab Results   Component Value Date/Time    COVID19 NOT DETECTED 10/22/2022 08:44 AM           Anesthesia Evaluation  Patient summary reviewed and Nursing notes reviewed no history of anesthetic complications:   Airway: Mallampati: II  TM distance: >3 FB   Neck ROM: limited  Mouth opening: > = 3 FB   Dental:    (+) upper dentures and edentulous      Pulmonary:   (+) pneumonia: unresolved,  COPD: severe,  decreased breath sounds: bilateral                           PE comment: Chronic Pneumonia Cardiovascular:  Exercise tolerance: poor (<4 METS),   (+) CAD:,       ECG reviewed  Rhythm: regular    Echocardiogram reviewed         Beta Blocker:  Not on Beta Blocker      ROS comment:  Summary   Left ventricular systolic function is normal.   Ejection fraction is visually estimated at 55-60%. Grade I diastolic dysfunction. No regional wall motion abnormalites. Left ventricle size is normal.   Heavily calcified aortic valve without stenosis. Mean gradient 10 mmHg. Aortic valve appears tricuspid. Thickened mitral valve leaflets noted. Mild mitral regurgitation. Trace tricuspid regurgitation; normal RVSP. There is a small circumferential pericardial effusion noted. Pleural effusion noted. Signature      ------------------------------------------------------------------   Electronically signed by Ne Jay MD   (Interpreting physician) on 09/30/2022 at 08:06 AM     Neuro/Psych:   (+) headaches:,             GI/Hepatic/Renal:            ROS comment: Malnutrtion . Endo/Other:    (+) DiabetesType II DM, , .          Pt had no PAT visit       Abdominal:             Vascular: negative vascular ROS. Other Findings:           Anesthesia Plan      general     ASA 4       Induction: intravenous. MIPS: Postoperative opioids intended and Prophylactic antiemetics administered. Anesthetic plan and risks discussed with patient. Plan discussed with CRNA. Pre Anesthesia Evaluation complete. Anesthesia plan, risks, benefits, alternatives, and personal involved discussed with patient.  Patients and/or legal guardian verbalized an understanding  and agreed to proceed.   Sajna Benavides DO  10/28/2022

## 2022-10-27 NOTE — PROGRESS NOTES
Infectious Disease Progress Note  10/27/2022   Patient Name: Ramo Brock : 1950   Impression:  Rhinovirus Pneumonia with Superimposed Pseudomonas aeruginosa Bacterial Pneumonia with Continued Chronic Hypoxic Respiratory Failure:  Cavitary Lesions RLL and NIKI:  Afebrile   Leukocytosis trending back up, could be steroid induced  CRP and Pct remain low, patient reports he is feeling improved, oxygen remains at his baseline 3 L/min/NC, appears slightly improved  10/22-BC 0/2-NGTD  10/22-Strep pneumo ag/ Legionella ag negative  10/22-Resp panel: positive for Rhinovirus  10/22, reordered 10/26-MRSA/MssA pending  10/22-Resp culture: Pseudomonas aeruginosa  10/23-CT Chest WO Contrast:The infiltrates seen previously in the lower lobes bilaterally for the most   part have decreased in size and conspicuity. However, there are now innumerable punctate tree-in-bud centrilobular micro   nodules, which are nonspecific but suggest inflammatory/infectious   bronchiolitis. Consider both typical and atypical etiologies. In addition, cavitary lesion has developed in the periphery of the right   lower lobe and to a lesser extent within the left upper lung. Necrotizing   infection would be primarily considered given the rapid development. Consider both typical and atypical etiologies. Enlarged mediastinal lymph nodes, similar when compared to the previous exam,   process of Lia related to history of chronic lymphocytic leukemia  CLL and Hypogammaglobulinemia:  Dr. Kalli Lopez onboard  Imp anemia sec to infection  Was on ibrutinib but recent plan was to start Rituxan.      DMII:  Hypoalbuminemia:  Hyponatremia:  CAD/Chest Pain/ Elevated Troponin:  Dr. Miki Carson onboard  Imp of non-cardiac chest pain  Multi-morbidity: per PMHx    Plan:  Continue IV meropenem 1 gm q8h over 180 minutes, extended dosing  Trend CRP and Pct, ordered  Await MRSA screen 10/26  Following with pulmonology, will await BAL cultures 10/28  Ongoing Antimicrobial Therapy  Meropenem 10/25-  Completed Antimicrobial Therapy  Azithromycin 10/22  Cefepime 10/22  Vancomycin 10/22-24  Zosyn 10/22-25? History:? Interval history noted. Chief complaint: Rhinovirus pneumonia with bacterial superinfection. Denies n/v/d/f or untoward effects of antibiotics. Physical Exam:  Vital Signs: /68   Pulse (!) 103   Temp 98.7 °F (37.1 °C) (Oral)   Resp 18   Ht 6' (1.829 m)   Wt 168 lb 3.4 oz (76.3 kg)   SpO2 93%   BMI 22.81 kg/m²     Gen: resting side-lying in bed, no distress  Chest: no distress and CTA. Anterior breath sounds diminished. Oxygen per NC. Heart: NSR and no MRG. Abd: soft, non-distended, no tenderness, no hepatomegaly. Normoactive bowel sounds. Ext: no clubbing, cyanosis, or edema  Neuro: Mental status intact. CN 2-12 intact and no focal sensory or motor deficits     Radiologic / Imaging / TESTING  10/22/22 XR Chest Portable:  Impression   1. Persistent patchy airspace opacities in the mid to basilar right lung   suspicious for pneumonia or aspiration given the prior CT appearance. There   may be cavitation in the right lung base. 2. Minimal left basilar airspace opacity more likely due to atelectasis than   pneumonia or aspiration. 3. At least mild peribronchial cuffing potentially due to reactive airways   disease or bronchitis. 10/23/22 CT Chest WO Contrast:  Impression   The infiltrates seen previously in the lower lobes bilaterally for the most   part have decreased in size and conspicuity. However, there are now innumerable punctate tree-in-bud centrilobular micro   nodules, which are nonspecific but suggest inflammatory/infectious   bronchiolitis. Consider both typical and atypical etiologies. In addition, cavitary lesion has developed in the periphery of the right   lower lobe and to a lesser extent within the left upper lung. Necrotizing   infection would be primarily considered given the rapid development. Consider both typical and atypical etiologies. Enlarged mediastinal lymph nodes, similar when compared to the previous exam,   process of Lia related to history of chronic lymphocytic leukemia. 10/26/22 XR Chest Portable:  Impression   1. Possible increase in diffuse reticulonodular opacities corresponding with   extensive infectious or inflammatory bronchiolitis seen on CT. 2. No significant change in a cavitary mass in the right lung base that is   more likely infectious or inflammatory in etiology than neoplastic given   absence on 09/28/2022.   3. At least moderate peribronchial cuffing potentially due to bronchitis or   reactive airways disease.      Labs:    Recent Results (from the past 24 hour(s))   POCT Glucose    Collection Time: 10/26/22  3:55 PM   Result Value Ref Range    POC Glucose 337 (H) 70 - 99 MG/DL   POCT Glucose    Collection Time: 10/26/22  7:53 PM   Result Value Ref Range    POC Glucose 301 (H) 70 - 99 MG/DL   CBC with Auto Differential    Collection Time: 10/27/22  5:35 AM   Result Value Ref Range    WBC 24.3 (H) 4.0 - 10.5 K/CU MM    RBC 3.48 (L) 4.6 - 6.2 M/CU MM    Hemoglobin 8.8 (L) 13.5 - 18.0 GM/DL    Hematocrit 29.5 (L) 42 - 52 %    MCV 84.8 78 - 100 FL    MCH 25.3 (L) 27 - 31 PG    MCHC 29.8 (L) 32.0 - 36.0 %    RDW 21.2 (H) 11.7 - 14.9 %    Platelets 75 (L) 383 - 440 K/CU MM    MPV 11.0 7.5 - 11.1 FL    Differential Type MANUAL DIFFERENTIAL     Segs Relative 12.0 (L) 36 - 66 %    Lymphocytes % 74.0 (H) 24 - 44 %    Monocytes % 9.0 (H) 0 - 4 %    Segs Absolute 2.9 K/CU MM    Lymphocytes Absolute 18.0 K/CU MM    Monocytes Absolute 2.2 K/CU MM    Nucleated RBC % 0.2 %    Total Nucleated RBC 0.1 K/CU MM    Myelocyte Percent 1 (H) 0.0 %    Bands Relative 4 (L) 5 - 11 %    nRBC 1     Myelocytes Absolute 0.24 K/CU MM    Bands Absolute 0.97 K/CU MM    Anisocytosis 1+     Atypical Lymphocytes Absolute 1+    C-Reactive Protein    Collection Time: 10/27/22  5:35 AM   Result Value Ref Range    CRP, High Sensitivity 6.9 (H) 0.0 - 5.0 mg/L   Comprehensive Metabolic Panel    Collection Time: 10/27/22  5:35 AM   Result Value Ref Range    Sodium 136 135 - 145 MMOL/L    Potassium 4.8 3.5 - 5.1 MMOL/L    Chloride 100 99 - 110 mMol/L    CO2 28 21 - 32 MMOL/L    BUN 19 6 - 23 MG/DL    Creatinine 0.5 (L) 0.9 - 1.3 MG/DL    Est, Glom Filt Rate >60 >60 mL/min/1.73m2    Glucose 281 (H) 70 - 99 MG/DL    Calcium 8.3 8.3 - 10.6 MG/DL    Albumin 2.6 (L) 3.4 - 5.0 GM/DL    Total Protein 5.0 (L) 6.4 - 8.2 GM/DL    Total Bilirubin 0.4 0.0 - 1.0 MG/DL    ALT 19 10 - 40 U/L    AST 22 15 - 37 IU/L    Alkaline Phosphatase 173 (H) 40 - 128 IU/L    Anion Gap 8 4 - 16   POCT Glucose    Collection Time: 10/27/22  7:28 AM   Result Value Ref Range    POC Glucose 234 (H) 70 - 99 MG/DL   POCT Glucose    Collection Time: 10/27/22 11:40 AM   Result Value Ref Range    POC Glucose 263 (H) 70 - 99 MG/DL   Magnesium    Collection Time: 10/27/22  1:33 PM   Result Value Ref Range    Magnesium 2.1 1.8 - 2.4 mg/dl   Phosphorus    Collection Time: 10/27/22  1:33 PM   Result Value Ref Range    Phosphorus 2.3 (L) 2.5 - 4.9 MG/DL   Procalcitonin    Collection Time: 10/27/22  1:33 PM   Result Value Ref Range    Procalcitonin 0.106      CULTURE results: Invalid input(s): BLOOD CULTURE,  URINE CULTURE, SURGICAL CULTURE    Diagnosis:  Patient Active Problem List   Diagnosis    Pneumonia    Sepsis ([facility]    Hypogammaglobulinemia ([facility]    CLL (chronic lymphocytic leukemia) ([facility]    Upper respiratory infection, acute    Generalized muscle weakness    Type 2 diabetes mellitus with hyperglycemia, with long-term current use of insulin (HCC)    Poor appetite    COPD (chronic obstructive pulmonary disease) (HCC)    Neutropenia (HCC)    Other specified disorders of bone density and structure, unspecified site    Cellulitis of right upper limb    Herpesviral infection    Intestinal malabsorption    Other nonspecific abnormal finding of lung field Iron deficiency anemia due to chronic blood loss    Other muscle spasm    Leukemia, lymphocytic, chronic (HCC)    Selective deficiency of IgG (HCC)    Acute bronchitis    Severe malnutrition (HCC)    Pneumonia due to organism    Personal history of CLL (chronic lymphocytic leukemia)    Rhinovirus infection       Active Problems  Principal Problem:    Pneumonia due to organism  Active Problems:    Personal history of CLL (chronic lymphocytic leukemia)    Rhinovirus infection  Resolved Problems:    * No resolved hospital problems. *    Electronically signed by: Electronically signed by JENNI Esquivel CNP on 10/27/2022 at 2:54 PM

## 2022-10-28 ENCOUNTER — APPOINTMENT (OUTPATIENT)
Dept: GENERAL RADIOLOGY | Age: 72
DRG: 871 | End: 2022-10-28
Payer: COMMERCIAL

## 2022-10-28 ENCOUNTER — ANESTHESIA (OUTPATIENT)
Dept: ENDOSCOPY | Age: 72
DRG: 871 | End: 2022-10-28
Payer: COMMERCIAL

## 2022-10-28 LAB
ALBUMIN SERPL-MCNC: 2.8 GM/DL (ref 3.4–5)
ALP BLD-CCNC: 170 IU/L (ref 40–129)
ALT SERPL-CCNC: 23 U/L (ref 10–40)
ANION GAP SERPL CALCULATED.3IONS-SCNC: 6 MMOL/L (ref 4–16)
ANISOCYTOSIS: ABNORMAL
AST SERPL-CCNC: 26 IU/L (ref 15–37)
ATYPICAL LYMPHOCYTE ABSOLUTE COUNT: ABNORMAL
BANDED NEUTROPHILS ABSOLUTE COUNT: 0.47 K/CU MM
BANDED NEUTROPHILS RELATIVE PERCENT: 1 % (ref 5–11)
BILIRUB SERPL-MCNC: 0.5 MG/DL (ref 0–1)
BUN BLDV-MCNC: 17 MG/DL (ref 6–23)
CALCIUM SERPL-MCNC: 8.2 MG/DL (ref 8.3–10.6)
CHLORIDE BLD-SCNC: 98 MMOL/L (ref 99–110)
CO2: 30 MMOL/L (ref 21–32)
CREAT SERPL-MCNC: 0.6 MG/DL (ref 0.9–1.3)
CULTURE: NORMAL
DIFFERENTIAL TYPE: ABNORMAL
FLUID TYPE: NORMAL INDEX
GFR SERPL CREATININE-BSD FRML MDRD: >60 ML/MIN/1.73M2
GLUCOSE BLD-MCNC: 149 MG/DL (ref 70–99)
GLUCOSE BLD-MCNC: 149 MG/DL (ref 70–99)
GLUCOSE BLD-MCNC: 161 MG/DL (ref 70–99)
GLUCOSE BLD-MCNC: 328 MG/DL (ref 70–99)
GLUCOSE BLD-MCNC: 361 MG/DL (ref 70–99)
HCT VFR BLD CALC: 29.9 % (ref 42–52)
HEMOGLOBIN: 8.9 GM/DL (ref 13.5–18)
HIGH SENSITIVE C-REACTIVE PROTEIN: 12.1 MG/L (ref 0–5)
HYPOCHROMIA: ABNORMAL
LYMPHOCYTES ABSOLUTE: 42.2 K/CU MM
LYMPHOCYTES RELATIVE PERCENT: 90 % (ref 24–44)
LYMPHOCYTES, BODY FLUID: 10 %
Lab: NORMAL
MAGNESIUM: 1.9 MG/DL (ref 1.8–2.4)
MCH RBC QN AUTO: 25 PG (ref 27–31)
MCHC RBC AUTO-ENTMCNC: 29.8 % (ref 32–36)
MCV RBC AUTO: 84 FL (ref 78–100)
MESOTHELIAL FLUID: 1 /100 WBC
MONOCYTE, FLUID: 8 %
NEUTROPHIL, FLUID: 82 %
NUCLEATED RED BLOOD CELLS: 1
OTHER CELLS FLUID: 0
PDW BLD-RTO: 21.7 % (ref 11.7–14.9)
PHOSPHORUS: 3.1 MG/DL (ref 2.5–4.9)
PLATELET # BLD: 79 K/CU MM (ref 140–440)
PMV BLD AUTO: 9.5 FL (ref 7.5–11.1)
POTASSIUM SERPL-SCNC: 5.3 MMOL/L (ref 3.5–5.1)
RBC # BLD: 3.56 M/CU MM (ref 4.6–6.2)
RBC FLUID: 4000 /CU MM
SEGMENTED NEUTROPHILS ABSOLUTE COUNT: 4.2 K/CU MM
SEGMENTED NEUTROPHILS RELATIVE PERCENT: 9 % (ref 36–66)
SMUDGE CELLS: PRESENT
SODIUM BLD-SCNC: 134 MMOL/L (ref 135–145)
SPECIMEN: NORMAL
TOTAL PROTEIN: 5.1 GM/DL (ref 6.4–8.2)
WBC # BLD: 46.9 K/CU MM (ref 4–10.5)
WBC # BLD: ABNORMAL 10*3/UL
WBC FLUID: 3943 /CU MM

## 2022-10-28 PROCEDURE — 86141 C-REACTIVE PROTEIN HS: CPT

## 2022-10-28 PROCEDURE — 94640 AIRWAY INHALATION TREATMENT: CPT

## 2022-10-28 PROCEDURE — 3700000001 HC ADD 15 MINUTES (ANESTHESIA): Performed by: INTERNAL MEDICINE

## 2022-10-28 PROCEDURE — 2580000003 HC RX 258: Performed by: NURSE PRACTITIONER

## 2022-10-28 PROCEDURE — 7100000001 HC PACU RECOVERY - ADDTL 15 MIN

## 2022-10-28 PROCEDURE — 6360000002 HC RX W HCPCS

## 2022-10-28 PROCEDURE — 6360000002 HC RX W HCPCS: Performed by: NURSE PRACTITIONER

## 2022-10-28 PROCEDURE — 87186 SC STD MICRODIL/AGAR DIL: CPT

## 2022-10-28 PROCEDURE — 85027 COMPLETE CBC AUTOMATED: CPT

## 2022-10-28 PROCEDURE — 87015 SPECIMEN INFECT AGNT CONCNTJ: CPT

## 2022-10-28 PROCEDURE — 6360000002 HC RX W HCPCS: Performed by: HOSPITALIST

## 2022-10-28 PROCEDURE — 85007 BL SMEAR W/DIFF WBC COUNT: CPT

## 2022-10-28 PROCEDURE — 6370000000 HC RX 637 (ALT 250 FOR IP): Performed by: NURSE PRACTITIONER

## 2022-10-28 PROCEDURE — 7100000000 HC PACU RECOVERY - FIRST 15 MIN: Performed by: INTERNAL MEDICINE

## 2022-10-28 PROCEDURE — 2709999900 HC NON-CHARGEABLE SUPPLY: Performed by: INTERNAL MEDICINE

## 2022-10-28 PROCEDURE — 2500000003 HC RX 250 WO HCPCS

## 2022-10-28 PROCEDURE — 31645 BRNCHSC W/THER ASPIR 1ST: CPT

## 2022-10-28 PROCEDURE — 0B9D8ZZ DRAINAGE OF RIGHT MIDDLE LUNG LOBE, VIA NATURAL OR ARTIFICIAL OPENING ENDOSCOPIC: ICD-10-PCS | Performed by: INTERNAL MEDICINE

## 2022-10-28 PROCEDURE — 87070 CULTURE OTHR SPECIMN AEROBIC: CPT

## 2022-10-28 PROCEDURE — 0B9G8ZZ DRAINAGE OF LEFT UPPER LUNG LOBE, VIA NATURAL OR ARTIFICIAL OPENING ENDOSCOPIC: ICD-10-PCS | Performed by: INTERNAL MEDICINE

## 2022-10-28 PROCEDURE — 31623 DX BRONCHOSCOPE/BRUSH: CPT

## 2022-10-28 PROCEDURE — 6370000000 HC RX 637 (ALT 250 FOR IP): Performed by: INTERNAL MEDICINE

## 2022-10-28 PROCEDURE — 7100000000 HC PACU RECOVERY - FIRST 15 MIN

## 2022-10-28 PROCEDURE — A4216 STERILE WATER/SALINE, 10 ML: HCPCS

## 2022-10-28 PROCEDURE — 2580000003 HC RX 258

## 2022-10-28 PROCEDURE — 31622 DX BRONCHOSCOPE/WASH: CPT

## 2022-10-28 PROCEDURE — 87181 SC STD AGAR DILUTION PER AGT: CPT

## 2022-10-28 PROCEDURE — 83735 ASSAY OF MAGNESIUM: CPT

## 2022-10-28 PROCEDURE — 2700000000 HC OXYGEN THERAPY PER DAY

## 2022-10-28 PROCEDURE — 7100000001 HC PACU RECOVERY - ADDTL 15 MIN: Performed by: INTERNAL MEDICINE

## 2022-10-28 PROCEDURE — 80053 COMPREHEN METABOLIC PANEL: CPT

## 2022-10-28 PROCEDURE — 99232 SBSQ HOSP IP/OBS MODERATE 35: CPT | Performed by: NURSE PRACTITIONER

## 2022-10-28 PROCEDURE — 0B9J8ZZ DRAINAGE OF LEFT LOWER LUNG LOBE, VIA NATURAL OR ARTIFICIAL OPENING ENDOSCOPIC: ICD-10-PCS | Performed by: INTERNAL MEDICINE

## 2022-10-28 PROCEDURE — 0B9F8ZX DRAINAGE OF RIGHT LOWER LUNG LOBE, VIA NATURAL OR ARTIFICIAL OPENING ENDOSCOPIC, DIAGNOSTIC: ICD-10-PCS | Performed by: INTERNAL MEDICINE

## 2022-10-28 PROCEDURE — 87252 VIRUS INOCULATION TISSUE: CPT

## 2022-10-28 PROCEDURE — 3700000000 HC ANESTHESIA ATTENDED CARE: Performed by: INTERNAL MEDICINE

## 2022-10-28 PROCEDURE — 0B9C8ZZ DRAINAGE OF RIGHT UPPER LUNG LOBE, VIA NATURAL OR ARTIFICIAL OPENING ENDOSCOPIC: ICD-10-PCS | Performed by: INTERNAL MEDICINE

## 2022-10-28 PROCEDURE — 87102 FUNGUS ISOLATION CULTURE: CPT

## 2022-10-28 PROCEDURE — 71045 X-RAY EXAM CHEST 1 VIEW: CPT

## 2022-10-28 PROCEDURE — 6370000000 HC RX 637 (ALT 250 FOR IP): Performed by: STUDENT IN AN ORGANIZED HEALTH CARE EDUCATION/TRAINING PROGRAM

## 2022-10-28 PROCEDURE — 0BBF8ZZ EXCISION OF RIGHT LOWER LUNG LOBE, VIA NATURAL OR ARTIFICIAL OPENING ENDOSCOPIC: ICD-10-PCS | Performed by: INTERNAL MEDICINE

## 2022-10-28 PROCEDURE — 31624 DX BRONCHOSCOPE/LAVAGE: CPT

## 2022-10-28 PROCEDURE — 88112 CYTOPATH CELL ENHANCE TECH: CPT

## 2022-10-28 PROCEDURE — 6370000000 HC RX 637 (ALT 250 FOR IP): Performed by: ANESTHESIOLOGY

## 2022-10-28 PROCEDURE — 87116 MYCOBACTERIA CULTURE: CPT

## 2022-10-28 PROCEDURE — 87077 CULTURE AEROBIC IDENTIFY: CPT

## 2022-10-28 PROCEDURE — 31625 BRONCHOSCOPY W/BIOPSY(S): CPT

## 2022-10-28 PROCEDURE — 0B9F8ZZ DRAINAGE OF RIGHT LOWER LUNG LOBE, VIA NATURAL OR ARTIFICIAL OPENING ENDOSCOPIC: ICD-10-PCS | Performed by: INTERNAL MEDICINE

## 2022-10-28 PROCEDURE — 84100 ASSAY OF PHOSPHORUS: CPT

## 2022-10-28 PROCEDURE — 2140000000 HC CCU INTERMEDIATE R&B

## 2022-10-28 PROCEDURE — 87205 SMEAR GRAM STAIN: CPT

## 2022-10-28 PROCEDURE — 76000 FLUOROSCOPY <1 HR PHYS/QHP: CPT

## 2022-10-28 PROCEDURE — 89051 BODY FLUID CELL COUNT: CPT

## 2022-10-28 PROCEDURE — 94761 N-INVAS EAR/PLS OXIMETRY MLT: CPT

## 2022-10-28 PROCEDURE — 88305 TISSUE EXAM BY PATHOLOGIST: CPT

## 2022-10-28 PROCEDURE — 82962 GLUCOSE BLOOD TEST: CPT

## 2022-10-28 RX ORDER — ONDANSETRON 2 MG/ML
INJECTION INTRAMUSCULAR; INTRAVENOUS PRN
Status: DISCONTINUED | OUTPATIENT
Start: 2022-10-28 | End: 2022-10-28 | Stop reason: SDUPTHER

## 2022-10-28 RX ORDER — MEPERIDINE HYDROCHLORIDE 25 MG/ML
6.25 INJECTION INTRAMUSCULAR; INTRAVENOUS; SUBCUTANEOUS EVERY 5 MIN PRN
Status: DISCONTINUED | OUTPATIENT
Start: 2022-10-28 | End: 2022-10-28 | Stop reason: HOSPADM

## 2022-10-28 RX ORDER — ESMOLOL HYDROCHLORIDE 10 MG/ML
INJECTION INTRAVENOUS PRN
Status: DISCONTINUED | OUTPATIENT
Start: 2022-10-28 | End: 2022-10-28 | Stop reason: SDUPTHER

## 2022-10-28 RX ORDER — DEXAMETHASONE SODIUM PHOSPHATE 4 MG/ML
INJECTION, SOLUTION INTRA-ARTICULAR; INTRALESIONAL; INTRAMUSCULAR; INTRAVENOUS; SOFT TISSUE PRN
Status: DISCONTINUED | OUTPATIENT
Start: 2022-10-28 | End: 2022-10-28 | Stop reason: SDUPTHER

## 2022-10-28 RX ORDER — HYDRALAZINE HYDROCHLORIDE 20 MG/ML
10 INJECTION INTRAMUSCULAR; INTRAVENOUS
Status: DISCONTINUED | OUTPATIENT
Start: 2022-10-28 | End: 2022-10-28 | Stop reason: HOSPADM

## 2022-10-28 RX ORDER — MIDAZOLAM HYDROCHLORIDE 2 MG/2ML
2 INJECTION, SOLUTION INTRAMUSCULAR; INTRAVENOUS
Status: DISCONTINUED | OUTPATIENT
Start: 2022-10-28 | End: 2022-10-28 | Stop reason: HOSPADM

## 2022-10-28 RX ORDER — OXYCODONE HYDROCHLORIDE 5 MG/1
5 TABLET ORAL
Status: DISCONTINUED | OUTPATIENT
Start: 2022-10-28 | End: 2022-10-28 | Stop reason: HOSPADM

## 2022-10-28 RX ORDER — PROCHLORPERAZINE EDISYLATE 5 MG/ML
5 INJECTION INTRAMUSCULAR; INTRAVENOUS
Status: DISCONTINUED | OUTPATIENT
Start: 2022-10-28 | End: 2022-10-28 | Stop reason: HOSPADM

## 2022-10-28 RX ORDER — PROPOFOL 10 MG/ML
INJECTION, EMULSION INTRAVENOUS PRN
Status: DISCONTINUED | OUTPATIENT
Start: 2022-10-28 | End: 2022-10-28 | Stop reason: SDUPTHER

## 2022-10-28 RX ORDER — ROCURONIUM BROMIDE 10 MG/ML
INJECTION, SOLUTION INTRAVENOUS PRN
Status: DISCONTINUED | OUTPATIENT
Start: 2022-10-28 | End: 2022-10-28 | Stop reason: SDUPTHER

## 2022-10-28 RX ORDER — LABETALOL HYDROCHLORIDE 5 MG/ML
10 INJECTION, SOLUTION INTRAVENOUS
Status: DISCONTINUED | OUTPATIENT
Start: 2022-10-28 | End: 2022-10-28 | Stop reason: HOSPADM

## 2022-10-28 RX ORDER — FENTANYL CITRATE 50 UG/ML
50 INJECTION, SOLUTION INTRAMUSCULAR; INTRAVENOUS EVERY 5 MIN PRN
Status: DISCONTINUED | OUTPATIENT
Start: 2022-10-28 | End: 2022-10-28 | Stop reason: HOSPADM

## 2022-10-28 RX ORDER — HALOPERIDOL 5 MG/ML
1 INJECTION INTRAMUSCULAR
Status: DISCONTINUED | OUTPATIENT
Start: 2022-10-28 | End: 2022-10-28 | Stop reason: HOSPADM

## 2022-10-28 RX ORDER — LIDOCAINE HYDROCHLORIDE 20 MG/ML
INJECTION, SOLUTION EPIDURAL; INFILTRATION; INTRACAUDAL; PERINEURAL PRN
Status: DISCONTINUED | OUTPATIENT
Start: 2022-10-28 | End: 2022-10-28 | Stop reason: SDUPTHER

## 2022-10-28 RX ORDER — DIPHENHYDRAMINE HYDROCHLORIDE 50 MG/ML
12.5 INJECTION INTRAMUSCULAR; INTRAVENOUS
Status: DISCONTINUED | OUTPATIENT
Start: 2022-10-28 | End: 2022-10-28 | Stop reason: HOSPADM

## 2022-10-28 RX ORDER — IPRATROPIUM BROMIDE AND ALBUTEROL SULFATE 2.5; .5 MG/3ML; MG/3ML
1 SOLUTION RESPIRATORY (INHALATION)
Status: COMPLETED | OUTPATIENT
Start: 2022-10-28 | End: 2022-10-28

## 2022-10-28 RX ORDER — IPRATROPIUM BROMIDE AND ALBUTEROL SULFATE 2.5; .5 MG/3ML; MG/3ML
1 SOLUTION RESPIRATORY (INHALATION) EVERY 4 HOURS
Status: DISCONTINUED | OUTPATIENT
Start: 2022-10-28 | End: 2022-11-02 | Stop reason: HOSPADM

## 2022-10-28 RX ADMIN — MEROPENEM 1000 MG: 1 INJECTION, POWDER, FOR SOLUTION INTRAVENOUS at 17:27

## 2022-10-28 RX ADMIN — DEXAMETHASONE SODIUM PHOSPHATE 4 MG: 4 INJECTION, SOLUTION INTRAMUSCULAR; INTRAVENOUS at 09:43

## 2022-10-28 RX ADMIN — GUAIFENESIN 600 MG: 600 TABLET, EXTENDED RELEASE ORAL at 13:35

## 2022-10-28 RX ADMIN — SODIUM CHLORIDE 25 ML: 9 INJECTION, SOLUTION INTRAVENOUS at 00:51

## 2022-10-28 RX ADMIN — TAMSULOSIN HYDROCHLORIDE 0.4 MG: 0.4 CAPSULE ORAL at 13:35

## 2022-10-28 RX ADMIN — MEROPENEM 1000 MG: 1 INJECTION, POWDER, FOR SOLUTION INTRAVENOUS at 23:49

## 2022-10-28 RX ADMIN — OXYCODONE HYDROCHLORIDE AND ACETAMINOPHEN 250 MG: 500 TABLET ORAL at 20:28

## 2022-10-28 RX ADMIN — ATORVASTATIN CALCIUM 80 MG: 40 TABLET, FILM COATED ORAL at 20:28

## 2022-10-28 RX ADMIN — IPRATROPIUM BROMIDE AND ALBUTEROL SULFATE 1 AMPULE: 2.5; .5 SOLUTION RESPIRATORY (INHALATION) at 19:53

## 2022-10-28 RX ADMIN — INSULIN LISPRO 4 UNITS: 100 INJECTION, SOLUTION INTRAVENOUS; SUBCUTANEOUS at 20:23

## 2022-10-28 RX ADMIN — MEROPENEM 1000 MG: 1 INJECTION, POWDER, FOR SOLUTION INTRAVENOUS at 00:51

## 2022-10-28 RX ADMIN — ACETYLCYSTEINE 400 MG: 100 INHALANT RESPIRATORY (INHALATION) at 19:53

## 2022-10-28 RX ADMIN — ONDANSETRON 4 MG: 2 INJECTION INTRAMUSCULAR; INTRAVENOUS at 09:43

## 2022-10-28 RX ADMIN — IPRATROPIUM BROMIDE AND ALBUTEROL SULFATE 1 AMPULE: .5; 2.5 SOLUTION RESPIRATORY (INHALATION) at 10:32

## 2022-10-28 RX ADMIN — MORPHINE SULFATE 2 MG: 2 INJECTION, SOLUTION INTRAMUSCULAR; INTRAVENOUS at 20:26

## 2022-10-28 RX ADMIN — IPRATROPIUM BROMIDE AND ALBUTEROL SULFATE 1 AMPULE: 2.5; .5 SOLUTION RESPIRATORY (INHALATION) at 23:18

## 2022-10-28 RX ADMIN — PHENYLEPHRINE HYDROCHLORIDE 100 MCG: 10 INJECTION INTRAVENOUS at 09:48

## 2022-10-28 RX ADMIN — ROCURONIUM BROMIDE 50 MG: 10 INJECTION INTRAVENOUS at 09:36

## 2022-10-28 RX ADMIN — INSULIN GLARGINE 40 UNITS: 100 INJECTION, SOLUTION SUBCUTANEOUS at 20:22

## 2022-10-28 RX ADMIN — GUAIFENESIN 600 MG: 600 TABLET, EXTENDED RELEASE ORAL at 17:22

## 2022-10-28 RX ADMIN — GUAIFENESIN 600 MG: 600 TABLET, EXTENDED RELEASE ORAL at 20:28

## 2022-10-28 RX ADMIN — PHENYLEPHRINE HYDROCHLORIDE 100 MCG: 10 INJECTION INTRAVENOUS at 09:40

## 2022-10-28 RX ADMIN — MORPHINE SULFATE 2 MG: 2 INJECTION, SOLUTION INTRAMUSCULAR; INTRAVENOUS at 00:53

## 2022-10-28 RX ADMIN — Medication 324 MG: at 20:28

## 2022-10-28 RX ADMIN — ESMOLOL HYDROCHLORIDE 50 MG: 10 INJECTION, SOLUTION INTRAVENOUS at 09:52

## 2022-10-28 RX ADMIN — HYDROCODONE BITARTRATE AND ACETAMINOPHEN 1 TABLET: 5; 325 TABLET ORAL at 13:35

## 2022-10-28 RX ADMIN — LIDOCAINE HYDROCHLORIDE 100 MG: 20 INJECTION, SOLUTION EPIDURAL; INFILTRATION; INTRACAUDAL; PERINEURAL at 09:36

## 2022-10-28 RX ADMIN — GABAPENTIN 100 MG: 100 CAPSULE ORAL at 20:28

## 2022-10-28 RX ADMIN — GABAPENTIN 100 MG: 100 CAPSULE ORAL at 13:35

## 2022-10-28 RX ADMIN — SUGAMMADEX 200 MG: 100 INJECTION, SOLUTION INTRAVENOUS at 10:06

## 2022-10-28 RX ADMIN — FINASTERIDE 5 MG: 5 TABLET, FILM COATED ORAL at 13:35

## 2022-10-28 RX ADMIN — INSULIN LISPRO 4 UNITS: 100 INJECTION, SOLUTION INTRAVENOUS; SUBCUTANEOUS at 17:27

## 2022-10-28 RX ADMIN — PROPOFOL 100 MG: 10 INJECTION, EMULSION INTRAVENOUS at 09:36

## 2022-10-28 ASSESSMENT — ENCOUNTER SYMPTOMS
BACK PAIN: 0
WHEEZING: 0
SHORTNESS OF BREATH: 1
COUGH: 1
SORE THROAT: 0
VOMITING: 0
NAUSEA: 0

## 2022-10-28 ASSESSMENT — PAIN SCALES - GENERAL
PAINLEVEL_OUTOF10: 6
PAINLEVEL_OUTOF10: 7
PAINLEVEL_OUTOF10: 6
PAINLEVEL_OUTOF10: 9

## 2022-10-28 ASSESSMENT — PAIN DESCRIPTION - LOCATION: LOCATION: ABDOMEN

## 2022-10-28 ASSESSMENT — PAIN DESCRIPTION - DESCRIPTORS: DESCRIPTORS: ACHING;DISCOMFORT

## 2022-10-28 NOTE — PROGRESS NOTES
Infectious Disease Progress Note  10/28/2022   Patient Name: Ramo Brock : 1950   Impression:  Rhinovirus Pneumonia with Superimposed Pseudomonas aeruginosa Bacterial Pneumonia with Continued Chronic Hypoxic Respiratory Failure:  Cavitary Lesions RLL and NIKI:  Afebrile   Leukocytosis trending back up   CRP and Pct remain low, patient reports he is feeling improved, oxygen remains at his baseline 3 L/min/NC, appears slightly improved  10/22-BC 0/2-NGTD  10/22-Strep pneumo ag/ Legionella ag negative  10/22-Resp panel: positive for Rhinovirus  10/22, reordered 10/26-MRSA/MssA pending  10/22-Resp culture: Pseudomonas aeruginosa  10/23-CT Chest WO Contrast:The infiltrates seen previously in the lower lobes bilaterally for the most   part have decreased in size and conspicuity. However, there are now innumerable punctate tree-in-bud centrilobular micro   nodules, which are nonspecific but suggest inflammatory/infectious   bronchiolitis. Consider both typical and atypical etiologies. In addition, cavitary lesion has developed in the periphery of the right   lower lobe and to a lesser extent within the left upper lung. Necrotizing   infection would be primarily considered given the rapid development. Consider both typical and atypical etiologies. Enlarged mediastinal lymph nodes, similar when compared to the previous exam,   process of Lia related to history of chronic lymphocytic leukemia  10/28-S/p per Dr. Winnie Swenson, BAL, cultures: Pending  CLL and Hypogammaglobulinemia:  Dr. Kalli Lopez onboard  Imp anemia sec to infection  Was on ibrutinib but recent plan was to start Rituxan.      DMII:  Hypoalbuminemia:  Hyponatremia:  CAD/Chest Pain/ Elevated Troponin:  Dr. Miki Carson onboard  Imp of non-cardiac chest pain  Multi-morbidity: per PMHx    Plan:  Continue IV meropenem 1 gm q8h over 180 minutes, extended dosing  Trend CRP and Pct, ordered  Reviewed increase in leukocytosis, pt has CLL, will observe closely, same ABX regimen, as inflammatory markers have trended down  Appreciate Dr. Faraz Cuadra obtaining BAL 10/28, will await cultures  Ongoing Antimicrobial Therapy  Meropenem 10/25-  Completed Antimicrobial Therapy  Azithromycin 10/22  Cefepime 10/22  Vancomycin 10/22-24  Zosyn 10/22-25? History:? Interval history noted. Chief complaint: Rhinovirus pneumonia with bacterial superinfection. Denies n/v/d/f or untoward effects of antibiotics. Physical Exam:  Vital Signs: BP (!) 102/54   Pulse 100   Temp 98.1 °F (36.7 °C) (Oral)   Resp 24   Ht 6' (1.829 m)   Wt 168 lb 3.4 oz (76.3 kg)   SpO2 95%   BMI 22.81 kg/m²     Gen: somnolent in bed side-lying  Chest: no distress and CTA. Anterior breath sounds diminished. Oxygen per NC. Heart: NSR and no MRG. Abd: soft, non-distended, no tenderness, no hepatomegaly. Normoactive bowel sounds. Ext: no clubbing, cyanosis, or edema  Neuro: Mental status intact. CN 2-12 intact and no focal sensory or motor deficits     Radiologic / Imaging / TESTING  10/22/22 XR Chest Portable:  Impression   1. Persistent patchy airspace opacities in the mid to basilar right lung   suspicious for pneumonia or aspiration given the prior CT appearance. There   may be cavitation in the right lung base. 2. Minimal left basilar airspace opacity more likely due to atelectasis than   pneumonia or aspiration. 3. At least mild peribronchial cuffing potentially due to reactive airways   disease or bronchitis. 10/23/22 CT Chest WO Contrast:  Impression   The infiltrates seen previously in the lower lobes bilaterally for the most   part have decreased in size and conspicuity. However, there are now innumerable punctate tree-in-bud centrilobular micro   nodules, which are nonspecific but suggest inflammatory/infectious   bronchiolitis. Consider both typical and atypical etiologies.        In addition, cavitary lesion has developed in the periphery of the right   lower lobe and to a lesser extent within the left upper lung. Necrotizing   infection would be primarily considered given the rapid development. Consider both typical and atypical etiologies. Enlarged mediastinal lymph nodes, similar when compared to the previous exam,   process of Lia related to history of chronic lymphocytic leukemia. 10/26/22 XR Chest Portable:  Impression   1. Possible increase in diffuse reticulonodular opacities corresponding with   extensive infectious or inflammatory bronchiolitis seen on CT. 2. No significant change in a cavitary mass in the right lung base that is   more likely infectious or inflammatory in etiology than neoplastic given   absence on 09/28/2022.   3. At least moderate peribronchial cuffing potentially due to bronchitis or   reactive airways disease. 10/28/22 XR Chest Portable:  Impression   No significant interval change.      Labs:    Recent Results (from the past 24 hour(s))   POCT Glucose    Collection Time: 10/27/22  4:30 PM   Result Value Ref Range    POC Glucose 215 (H) 70 - 99 MG/DL   POCT Glucose    Collection Time: 10/27/22  8:30 PM   Result Value Ref Range    POC Glucose 232 (H) 70 - 99 MG/DL   CBC with Auto Differential    Collection Time: 10/28/22  5:20 AM   Result Value Ref Range    WBC 46.9 (HH) 4.0 - 10.5 K/CU MM    RBC 3.56 (L) 4.6 - 6.2 M/CU MM    Hemoglobin 8.9 (L) 13.5 - 18.0 GM/DL    Hematocrit 29.9 (L) 42 - 52 %    MCV 84.0 78 - 100 FL    MCH 25.0 (L) 27 - 31 PG    MCHC 29.8 (L) 32.0 - 36.0 %    RDW 21.7 (H) 11.7 - 14.9 %    Platelets 79 (L) 867 - 440 K/CU MM    MPV 9.5 7.5 - 11.1 FL    Bands Relative 1 (L) 5 - 11 %    Segs Relative 9.0 (L) 36 - 66 %    Lymphocytes % 90.0 (H) 24 - 44 %    nRBC 1     Bands Absolute 0.47 K/CU MM    Segs Absolute 4.2 K/CU MM    Lymphocytes Absolute 42.2 K/CU MM    Differential Type MANUAL DIFFERENTIAL     Anisocytosis 1+     Hypochromia 1+     Atypical Lymphocytes Absolute 2+     Smudge Cells PRESENT     WBC Morphology ATYPCIAL LYMPHOCYTES WITH VISIBLE NUCLEOLI PRESENT    C-Reactive Protein    Collection Time: 10/28/22  5:20 AM   Result Value Ref Range    CRP, High Sensitivity 12.1 (H) 0.0 - 5.0 mg/L   Phosphorus    Collection Time: 10/28/22  5:20 AM   Result Value Ref Range    Phosphorus 3.1 2.5 - 4.9 MG/DL   Magnesium    Collection Time: 10/28/22  5:20 AM   Result Value Ref Range    Magnesium 1.9 1.8 - 2.4 mg/dl   Comprehensive Metabolic Panel    Collection Time: 10/28/22  5:20 AM   Result Value Ref Range    Sodium 134 (L) 135 - 145 MMOL/L    Potassium 5.3 (H) 3.5 - 5.1 MMOL/L    Chloride 98 (L) 99 - 110 mMol/L    CO2 30 21 - 32 MMOL/L    BUN 17 6 - 23 MG/DL    Creatinine 0.6 (L) 0.9 - 1.3 MG/DL    Est, Glom Filt Rate >60 >60 mL/min/1.73m2    Glucose 161 (H) 70 - 99 MG/DL    Calcium 8.2 (L) 8.3 - 10.6 MG/DL    Albumin 2.8 (L) 3.4 - 5.0 GM/DL    Total Protein 5.1 (L) 6.4 - 8.2 GM/DL    Total Bilirubin 0.5 0.0 - 1.0 MG/DL    ALT 23 10 - 40 U/L    AST 26 15 - 37 IU/L    Alkaline Phosphatase 170 (H) 40 - 129 IU/L    Anion Gap 6 4 - 16   POCT Glucose    Collection Time: 10/28/22  7:40 AM   Result Value Ref Range    POC Glucose 149 (H) 70 - 99 MG/DL   Cell Count with Differential, Body Fluid    Collection Time: 10/28/22 10:00 AM   Result Value Ref Range    Fluid Type BRONCHIAL LAVAGE INDEX    RBC, Fluid 4,000 /CU MM    WBC, Fluid 3,943 /CU MM    Neutrophil Count, Fluid 82 %    Lymphocytes, Body Fluid 10 %    Monocyte Count, Fluid 8 %    Mesothelial, Fluid 1 /100 WBC    Other Cells, Fluid 0    POCT Glucose    Collection Time: 10/28/22 10:25 AM   Result Value Ref Range    POC Glucose 149 (H) 70 - 99 MG/DL     CULTURE results: Invalid input(s): BLOOD CULTURE,  URINE CULTURE, SURGICAL CULTURE    Diagnosis:  Patient Active Problem List   Diagnosis    Pneumonia    Sepsis ([address]    Hypogammaglobulinemia ([facility]    CLL (chronic lymphocytic leukemia) (HCC)    Upper respiratory infection, acute    Generalized muscle weakness    Type 2 diabetes mellitus with hyperglycemia, with long-term current use of insulin (HCC)    Poor appetite    COPD (chronic obstructive pulmonary disease) (HCC)    Neutropenia (HCC)    Other specified disorders of bone density and structure, unspecified site    Cellulitis of right upper limb    Herpesviral infection    Intestinal malabsorption    Other nonspecific abnormal finding of lung field    Iron deficiency anemia due to chronic blood loss    Other muscle spasm    Leukemia, lymphocytic, chronic (HCC)    Selective deficiency of IgG (HCC)    Acute bronchitis    Severe malnutrition (HCC)    Pneumonia due to organism    Personal history of CLL (chronic lymphocytic leukemia)    Rhinovirus infection       Active Problems  Principal Problem:    Pneumonia due to organism  Active Problems:    Personal history of CLL (chronic lymphocytic leukemia)    Rhinovirus infection  Resolved Problems:    * No resolved hospital problems. *    Electronically signed by: Electronically signed by JENNI Gomez CNP on 10/28/2022 at 4:00 PM

## 2022-10-28 NOTE — PROGRESS NOTES
V2.0  [facility] Hospitalist Progress Note      Name:  Srini Reyes /Age/Sex: 1950  (71 y.o. male)   MRN & CSN:  [id number] & [id number] Encounter Date/Time: 10/28/2022 10:15 AM EDT    Location:  ENDO/NONE PCP: Jerry Lock MD       Hospital Day: 7    Assessment and Plan:   Srini Comment is a 71 y.o. male  who presents with Pneumonia due to organism      Plan:    Persistent pneumonia-chest x-ray s/o pneumonia, possible cavitation in the right lung. Pseudomonas in sputum  in    Recent admission 2 weeks ago he was positive for rhinovirus. MRSA screen and blood cultures were negative. Patient completed 8 days of Zosyn. Respiratory panel +ve for rhinovirus. ** ID consulted. Bx no growth 48 HRs, sputum culture 10/22 with Pseudomonas aeruginosa  -MRSA screening 10/26 pending  ** Pulmonology consulted, bronch , follow cultures and pulmonary recs. Patient currently on meropenem per ID recs. Awaiting bronchoscopy. Sepsis in setting of PNA and CLL - SIRS criteria met. LA resolved. Initially received IV fluids. Now stopped. Continue antibiotics as above. Chronic Hypoxic respiratory failure - recently discharge home O2 at 3 L per nasal cannula 10/6/22, no increase in respiratory requirement at this time, monitor with continuous pulse ox     Chest pain/elevated trop - suspect pleuritic/MSK given only has pain with coughing. EKG with non specific ST changes. hx CAD LHC report not available. Recent TTE 22 EF 63-11%, grade I diastolicc dysfunction. consulted Cardiology. Deemed non cardiac chest pain     Mild Hyponatremia   -Resolved with IV fluid administration. CLL and hypogammaglobulinemia was on ibrutinib before but now plan is to start on Rituxan as per heme-onc. Recently received IVIG as well, discussing with ID as when they can start Rituxan. Appreciate oncology help. Severe protein calorie malnutrition suspected with hypo albuminemia- nutrition consulted.  Low pre-albumin Other chronic medical conditions-resume home medications unless contraindicated  COPD - not in exacerbation. Respiratory care as noted above, no bronchospasm noted, hold steroids. DM type II with neuropathy, A1C 6.7 9/28/22 - Monitor BG  AC/HS, resume pts basal and ssi, monitor for adjustment in regimen, ADA diet    Mixed hyperlipidemia - on statin  Recent pleural effusion on recent admission 2 weeks ago, requiring thoracentesis, fluid culture not available. BPH on flomax, proscar         Comment: Please note this report has been produced using speech recognition software and may contain errors related to that system including errors in grammar, punctuation, and spelling, as well as words and phrases that may be inappropriate. If there are any questions or concerns please feel free to contact the dictating provider for clarification. Diet Diet NPO   DVT Prophylaxis [x] Lovenox, []  Heparin, [] SCDs, [] Ambulation,  [] Eliquis, [] Xarelto  [] Coumadin   Code Status Full Code   Disposition From: Home  Expected Disposition: Home versus SNF  Estimated Date of Discharge: 2 to 3 days. Patient requires continued admission due to pending about bronchoscopy,   Surrogate Decision Maker/ POA Josefina Cordova     Subjective:     Chief Complaint: Fever and Shortness of Breath     Seen after procedure in PACU, patient was feeling okay, little tachypneic tachycardic, trying to cough up sputum but no acute distress, he denied any chest pain and increased work of breathing or trouble breathing. Review of Systems:    Review of Systems   Constitutional:  Negative for chills and fever. HENT:  Negative for sore throat. Eyes:  Negative for visual disturbance. Respiratory:  Positive for cough and shortness of breath. Negative for wheezing. Cardiovascular:  Negative for palpitations and leg swelling. Gastrointestinal:  Negative for nausea and vomiting. Endocrine: Negative for polyuria.    Genitourinary: Negative for dysuria. Musculoskeletal:  Negative for back pain. Skin:  Negative for wound. Neurological:  Negative for dizziness and weakness. Psychiatric/Behavioral:  Negative for confusion. \    Objective: Intake/Output Summary (Last 24 hours) at 10/28/2022 0908  Last data filed at 10/28/2022 4235  Gross per 24 hour   Intake 60 ml   Output 1350 ml   Net -1290 ml          Vitals:   Vitals:    10/28/22 0846   BP: 111/77   Pulse: (!) 118   Resp: (!) 36   Temp: 100.4 °F (38 °C)   SpO2: 92%       Physical Exam:   Physical Exam  Constitutional:       General: He is not in acute distress. Appearance: He is ill-appearing. HENT:      Mouth/Throat:      Mouth: Mucous membranes are moist.      Pharynx: No posterior oropharyngeal erythema. Eyes:      General: No scleral icterus. Extraocular Movements: Extraocular movements intact. Pupils: Pupils are equal, round, and reactive to light. Cardiovascular:      Rate and Rhythm: Normal rate and regular rhythm. Pulses: Normal pulses. Heart sounds: No murmur heard. Pulmonary:      Effort: Pulmonary effort is normal.      Breath sounds: No wheezing or rales. Comments: On nasal cannula oxygen. Not in any acute respiratory distress. Bilateral rhonchi. Abdominal:      General: Bowel sounds are normal. There is no distension. Palpations: Abdomen is soft. Tenderness: There is no abdominal tenderness. Musculoskeletal:         General: Normal range of motion. Right lower leg: No edema. Left lower leg: No edema. Skin:     General: Skin is warm. Findings: No rash. Neurological:      General: No focal deficit present. Mental Status: He is alert and oriented to person, place, and time. Cranial Nerves: No cranial nerve deficit. Sensory: No sensory deficit. Motor: No weakness.    Psychiatric:         Mood and Affect: Mood normal.       Medications:   Medications:    guaiFENesin  600 mg Oral 4x daily    ipratropium-albuterol  1 ampule Inhalation Q4H WA    acetylcysteine  4 mL Inhalation TID    meropenem  1,000 mg IntraVENous Q8H    Followed by    [START ON 11/1/2022] meropenem  1,000 mg IntraVENous Q8H    sodium chloride flush  5-40 mL IntraVENous 2 times per day    [Held by provider] enoxaparin  40 mg SubCUTAneous Daily    insulin lispro  0-4 Units SubCUTAneous TID WC    insulin lispro  0-4 Units SubCUTAneous Nightly    vitamin C  250 mg Oral BID    atorvastatin  80 mg Oral Daily    ferrous gluconate  324 mg Oral BID    finasteride  5 mg Oral Daily    gabapentin  100 mg Oral TID    pantoprazole  40 mg Oral QAM AC    tamsulosin  0.4 mg Oral Daily    insulin glargine  40 Units SubCUTAneous Nightly      Infusions:    sodium chloride 25 mL (10/28/22 0051)     PRN Meds: sodium chloride flush, 5-40 mL, PRN  sodium chloride, , PRN  ondansetron, 4 mg, Q8H PRN   Or  ondansetron, 4 mg, Q6H PRN  polyethylene glycol, 17 g, Daily PRN  acetaminophen, 650 mg, Q6H PRN   Or  acetaminophen, 650 mg, Q6H PRN  benzonatate, 100 mg, TID PRN  albuterol sulfate HFA, 2 puff, Q4H PRN  HYDROcodone-acetaminophen, 1 tablet, Q12H PRN  morphine, 2 mg, Q4H PRN  ipratropium-albuterol, 1 ampule, Q4H PRN      Labs      Recent Results (from the past 24 hour(s))   POCT Glucose    Collection Time: 10/27/22 11:40 AM   Result Value Ref Range    POC Glucose 263 (H) 70 - 99 MG/DL   Magnesium    Collection Time: 10/27/22  1:33 PM   Result Value Ref Range    Magnesium 2.1 1.8 - 2.4 mg/dl   Phosphorus    Collection Time: 10/27/22  1:33 PM   Result Value Ref Range    Phosphorus 2.3 (L) 2.5 - 4.9 MG/DL   Procalcitonin    Collection Time: 10/27/22  1:33 PM   Result Value Ref Range    Procalcitonin 0.106    POCT Glucose    Collection Time: 10/27/22  4:30 PM   Result Value Ref Range    POC Glucose 215 (H) 70 - 99 MG/DL   POCT Glucose    Collection Time: 10/27/22  8:30 PM   Result Value Ref Range    POC Glucose 232 (H) 70 - 99 MG/DL   CBC with Auto Differential    Collection Time: 10/28/22  5:20 AM   Result Value Ref Range    WBC 46.9 (HH) 4.0 - 10.5 K/CU MM    RBC 3.56 (L) 4.6 - 6.2 M/CU MM    Hemoglobin 8.9 (L) 13.5 - 18.0 GM/DL    Hematocrit 29.9 (L) 42 - 52 %    MCV 84.0 78 - 100 FL    MCH 25.0 (L) 27 - 31 PG    MCHC 29.8 (L) 32.0 - 36.0 %    RDW 21.7 (H) 11.7 - 14.9 %    Platelets 79 (L) 750 - 440 K/CU MM    MPV 9.5 7.5 - 11.1 FL    Bands Relative 1 (L) 5 - 11 %    Segs Relative 9.0 (L) 36 - 66 %    Lymphocytes % 90.0 (H) 24 - 44 %    nRBC 1     Bands Absolute 0.47 K/CU MM    Segs Absolute 4.2 K/CU MM    Lymphocytes Absolute 42.2 K/CU MM    Differential Type MANUAL DIFFERENTIAL     Anisocytosis 1+     Hypochromia 1+     Atypical Lymphocytes Absolute 2+     Smudge Cells PRESENT     WBC Morphology ATYPCIAL LYMPHOCYTES WITH VISIBLE NUCLEOLI PRESENT    C-Reactive Protein    Collection Time: 10/28/22  5:20 AM   Result Value Ref Range    CRP, High Sensitivity 12.1 (H) 0.0 - 5.0 mg/L   Phosphorus    Collection Time: 10/28/22  5:20 AM   Result Value Ref Range    Phosphorus 3.1 2.5 - 4.9 MG/DL   Magnesium    Collection Time: 10/28/22  5:20 AM   Result Value Ref Range    Magnesium 1.9 1.8 - 2.4 mg/dl   Comprehensive Metabolic Panel    Collection Time: 10/28/22  5:20 AM   Result Value Ref Range    Sodium 134 (L) 135 - 145 MMOL/L    Potassium 5.3 (H) 3.5 - 5.1 MMOL/L    Chloride 98 (L) 99 - 110 mMol/L    CO2 30 21 - 32 MMOL/L    BUN 17 6 - 23 MG/DL    Creatinine 0.6 (L) 0.9 - 1.3 MG/DL    Est, Glom Filt Rate >60 >60 mL/min/1.73m2    Glucose 161 (H) 70 - 99 MG/DL    Calcium 8.2 (L) 8.3 - 10.6 MG/DL    Albumin 2.8 (L) 3.4 - 5.0 GM/DL    Total Protein 5.1 (L) 6.4 - 8.2 GM/DL    Total Bilirubin 0.5 0.0 - 1.0 MG/DL    ALT 23 10 - 40 U/L    AST 26 15 - 37 IU/L    Alkaline Phosphatase 170 (H) 40 - 129 IU/L    Anion Gap 6 4 - 16   POCT Glucose    Collection Time: 10/28/22  7:40 AM   Result Value Ref Range    POC Glucose 149 (H) 70 - 99 MG/DL        Imaging/Diagnostics Last 24 Hours   CT CHEST WO CONTRAST    Result Date: 10/23/2022  EXAMINATION: CT OF THE CHEST WITHOUT CONTRAST 10/23/2022 9:59 am TECHNIQUE: CT of the chest was performed without the administration of intravenous contrast. Multiplanar reformatted images are provided for review. Automated exposure control, iterative reconstruction, and/or weight based adjustment of the mA/kV was utilized to reduce the radiation dose to as low as reasonably achievable. COMPARISON: 09/28/2022 HISTORY: ORDERING SYSTEM PROVIDED HISTORY: fu pneumonia persistent TECHNOLOGIST PROVIDED HISTORY: Reason for exam:->fu pneumonia persistent Reason for Exam: fu pneumonia persistent FINDINGS: Mediastinum: Enlarged mediastinal lymph nodes are again identified, similar when compared to the previous exam.  Largest in the subcarinal space, with a short axis measurement of approximately 3 cm. Visualized thyroid unremarkable. Esophagus unremarkable. Limited noncontrast imaging of the cardiac chambers, thoracic aorta, and pulmonary arteries unremarkable. Lungs/pleura: Infiltrates in the lower lungs seen previously are decreased in density. However, innumerable tree-in-bud centrilobular micro nodules are now present within both lungs, greatest in the mid to lower lungs. In addition, a cavitary lesion in the superior segment of the right lower lobe has developed measuring approximately 5.3 cm maximally. Additional nodular infiltrates are seen in the left upper lobe, also with early cavitation. Diffuse bronchial wall thickening is noted. No filling defects are seen within the airways. No pneumothorax. No pleural effusion. Upper Abdomen: Unremarkable. Soft Tissues/Bones: No acute or suspicious bony abnormality. Extra thoracic soft tissues unremarkable. The infiltrates seen previously in the lower lobes bilaterally for the most part have decreased in size and conspicuity.  However, there are now innumerable punctate tree-in-bud centrilobular micro nodules, which are nonspecific but suggest inflammatory/infectious bronchiolitis. Consider both typical and atypical etiologies. In addition, cavitary lesion has developed in the periphery of the right lower lobe and to a lesser extent within the left upper lung. Necrotizing infection would be primarily considered given the rapid development. Consider both typical and atypical etiologies. Enlarged mediastinal lymph nodes, similar when compared to the previous exam, process of Lia related to history of chronic lymphocytic leukemia.        Electronically signed by Arnav Castaneda MD on 10/28/2022 at 9:08 AM

## 2022-10-28 NOTE — PROGRESS NOTES
PT TRANSFERRED TO PACU ON ENDO CART ON 6L OF 02 PER SIMPLE MASK WITH THIS NURSE AND CRNA. PT AWAKE AND RESPONSIVE. DENIES C/O OF NEEDS AT THIS TIME. REPORT GIVEN TO [facility] RN.

## 2022-10-28 NOTE — ANESTHESIA POSTPROCEDURE EVALUATION
Department of Anesthesiology  Postprocedure Note    Patient: Zabrina Luke  MRN: [id number]  YOB: 1950  Date of evaluation: 10/28/2022      Procedure Summary     Date: 10/28/22 Room / Location: [address] [place] [address]    Anesthesia Start: 6033 Anesthesia Stop: 1018    Procedure: BRONCHOSCOPY Diagnosis:       Persistent pneumonia      (Persistent pneumonia [J18.9])    Surgeons: Sharon Lloyd MD Responsible Provider: Saundra Lobo DO    Anesthesia Type: General ASA Status: 4          Anesthesia Type: General    Teto Phase I: Teto Score: 9    Teto Phase II:        Anesthesia Post Evaluation    Patient location during evaluation: PACU  Patient participation: complete - patient participated  Level of consciousness: sleepy but conscious  Airway patency: patent  Nausea & Vomiting: no nausea  Complications: no  Cardiovascular status: hemodynamically stable  Respiratory status: face mask and spontaneous ventilation (tachypnea of 30-40 bpm)  Hydration status: euvolemic  Comments: Patient's pulmonary status post-procedure is unlikely to be suitable for regular floor care. Suggest transfer to ICU due to higher acuity of care.

## 2022-10-28 NOTE — PROGRESS NOTES
Bronch:  Assisted Dr. Faraz Cuadra         with transbronchial bronchoscopy. Scope used #            . Prepared patient for bronchoscopy. See Physicians note for details.

## 2022-10-28 NOTE — PROGRESS NOTES
21 - Patient arrived to PACU from Endo, PACU monitors applied and alarms set. Bedside report from JOAN Chambers and HELGA Campbell. Patient with shallow, labored, tachypnic  breathing, moist, non-productive cough. Patient denies pain, speaking in short, broken phrases.    1032- Breathing treatment initiated  1040- Breathing treatment complete with no relief noted through vitals, patient states no relief of SOB  1046- Call to Dr. Leoncio Colon to update on patient status  1050- Dr. Leoncio Colon at bedside  [phone]- [employer] message to Dr. Winnie Swenson to update on patient condition and vitals  1054- Dr. Winnie Swenson called, no new orders  1100- Patient repositioned in bed, cool washrags applied per patient request  1104- Dr. Edmundo Lozada with hospitalist group [employer]  [phone]- Dr. Edmundo Lozada at bedside, see orders  [phone]- Call to [facility] Supervisor regarding patient, assigned room [phone]- Report to Bhavani Huddleston, [address]  [phone]- Attempted to call patient son Andrea Marshall at both numbers listed in chart x3, no answer, unable to leave message  1230- Patient report to Sathish Garza in PACU until room on inpatient 3N is ready

## 2022-10-29 PROBLEM — D69.6 THROMBOCYTOPENIA (HCC): Status: ACTIVE | Noted: 2022-10-29

## 2022-10-29 PROBLEM — D64.9 ANEMIA: Status: ACTIVE | Noted: 2022-10-29

## 2022-10-29 LAB
BANDED NEUTROPHILS ABSOLUTE COUNT: 3.63 K/CU MM
BANDED NEUTROPHILS RELATIVE PERCENT: 13 % (ref 5–11)
DIFFERENTIAL TYPE: ABNORMAL
GLUCOSE BLD-MCNC: 187 MG/DL (ref 70–99)
GLUCOSE BLD-MCNC: 214 MG/DL (ref 70–99)
GLUCOSE BLD-MCNC: 228 MG/DL (ref 70–99)
GLUCOSE BLD-MCNC: 232 MG/DL (ref 70–99)
HCT VFR BLD CALC: 28.5 % (ref 42–52)
HEMOGLOBIN: 8.6 GM/DL (ref 13.5–18)
HIGH SENSITIVE C-REACTIVE PROTEIN: 54.3 MG/L (ref 0–5)
LYMPHOCYTES ABSOLUTE: 22.1 K/CU MM
LYMPHOCYTES RELATIVE PERCENT: 79 % (ref 24–44)
MCH RBC QN AUTO: 25 PG (ref 27–31)
MCHC RBC AUTO-ENTMCNC: 30.2 % (ref 32–36)
MCV RBC AUTO: 82.8 FL (ref 78–100)
PDW BLD-RTO: 22.2 % (ref 11.7–14.9)
PLATELET # BLD: 78 K/CU MM (ref 140–440)
PMV BLD AUTO: 11.3 FL (ref 7.5–11.1)
RBC # BLD: 3.44 M/CU MM (ref 4.6–6.2)
SEGMENTED NEUTROPHILS ABSOLUTE COUNT: 2.2 K/CU MM
SEGMENTED NEUTROPHILS RELATIVE PERCENT: 8 % (ref 36–66)
WBC # BLD: 27.9 K/CU MM (ref 4–10.5)

## 2022-10-29 PROCEDURE — 2580000003 HC RX 258: Performed by: NURSE PRACTITIONER

## 2022-10-29 PROCEDURE — 2140000000 HC CCU INTERMEDIATE R&B

## 2022-10-29 PROCEDURE — 6360000002 HC RX W HCPCS: Performed by: NURSE PRACTITIONER

## 2022-10-29 PROCEDURE — 2700000000 HC OXYGEN THERAPY PER DAY

## 2022-10-29 PROCEDURE — 85027 COMPLETE CBC AUTOMATED: CPT

## 2022-10-29 PROCEDURE — 82962 GLUCOSE BLOOD TEST: CPT

## 2022-10-29 PROCEDURE — 94761 N-INVAS EAR/PLS OXIMETRY MLT: CPT

## 2022-10-29 PROCEDURE — 6370000000 HC RX 637 (ALT 250 FOR IP): Performed by: INTERNAL MEDICINE

## 2022-10-29 PROCEDURE — 94640 AIRWAY INHALATION TREATMENT: CPT

## 2022-10-29 PROCEDURE — 6370000000 HC RX 637 (ALT 250 FOR IP): Performed by: NURSE PRACTITIONER

## 2022-10-29 PROCEDURE — 85007 BL SMEAR W/DIFF WBC COUNT: CPT

## 2022-10-29 PROCEDURE — 84145 PROCALCITONIN (PCT): CPT

## 2022-10-29 PROCEDURE — 6370000000 HC RX 637 (ALT 250 FOR IP): Performed by: STUDENT IN AN ORGANIZED HEALTH CARE EDUCATION/TRAINING PROGRAM

## 2022-10-29 PROCEDURE — 86141 C-REACTIVE PROTEIN HS: CPT

## 2022-10-29 PROCEDURE — 99232 SBSQ HOSP IP/OBS MODERATE 35: CPT | Performed by: INTERNAL MEDICINE

## 2022-10-29 RX ADMIN — GABAPENTIN 100 MG: 100 CAPSULE ORAL at 09:01

## 2022-10-29 RX ADMIN — OXYCODONE HYDROCHLORIDE AND ACETAMINOPHEN 250 MG: 500 TABLET ORAL at 09:01

## 2022-10-29 RX ADMIN — ONDANSETRON 4 MG: 2 INJECTION INTRAMUSCULAR; INTRAVENOUS at 21:21

## 2022-10-29 RX ADMIN — Medication 324 MG: at 21:58

## 2022-10-29 RX ADMIN — OXYCODONE HYDROCHLORIDE AND ACETAMINOPHEN 250 MG: 500 TABLET ORAL at 21:58

## 2022-10-29 RX ADMIN — ATORVASTATIN CALCIUM 80 MG: 40 TABLET, FILM COATED ORAL at 21:58

## 2022-10-29 RX ADMIN — INSULIN LISPRO 1 UNITS: 100 INJECTION, SOLUTION INTRAVENOUS; SUBCUTANEOUS at 08:43

## 2022-10-29 RX ADMIN — SODIUM CHLORIDE, PRESERVATIVE FREE 10 ML: 5 INJECTION INTRAVENOUS at 22:04

## 2022-10-29 RX ADMIN — MEROPENEM 1000 MG: 1 INJECTION, POWDER, FOR SOLUTION INTRAVENOUS at 09:07

## 2022-10-29 RX ADMIN — INSULIN LISPRO 1 UNITS: 100 INJECTION, SOLUTION INTRAVENOUS; SUBCUTANEOUS at 17:18

## 2022-10-29 RX ADMIN — Medication 324 MG: at 09:01

## 2022-10-29 RX ADMIN — IPRATROPIUM BROMIDE AND ALBUTEROL SULFATE 1 AMPULE: 2.5; .5 SOLUTION RESPIRATORY (INHALATION) at 16:49

## 2022-10-29 RX ADMIN — TAMSULOSIN HYDROCHLORIDE 0.4 MG: 0.4 CAPSULE ORAL at 09:01

## 2022-10-29 RX ADMIN — INSULIN GLARGINE 40 UNITS: 100 INJECTION, SOLUTION SUBCUTANEOUS at 21:59

## 2022-10-29 RX ADMIN — GABAPENTIN 100 MG: 100 CAPSULE ORAL at 21:58

## 2022-10-29 RX ADMIN — GABAPENTIN 100 MG: 100 CAPSULE ORAL at 12:57

## 2022-10-29 RX ADMIN — FINASTERIDE 5 MG: 5 TABLET, FILM COATED ORAL at 09:01

## 2022-10-29 RX ADMIN — HYDROCODONE BITARTRATE AND ACETAMINOPHEN 1 TABLET: 5; 325 TABLET ORAL at 21:58

## 2022-10-29 RX ADMIN — GUAIFENESIN 600 MG: 600 TABLET, EXTENDED RELEASE ORAL at 17:19

## 2022-10-29 RX ADMIN — HYDROCODONE BITARTRATE AND ACETAMINOPHEN 1 TABLET: 5; 325 TABLET ORAL at 09:01

## 2022-10-29 RX ADMIN — SODIUM CHLORIDE, PRESERVATIVE FREE 10 ML: 5 INJECTION INTRAVENOUS at 09:01

## 2022-10-29 RX ADMIN — GUAIFENESIN 600 MG: 600 TABLET, EXTENDED RELEASE ORAL at 21:58

## 2022-10-29 RX ADMIN — GUAIFENESIN 600 MG: 600 TABLET, EXTENDED RELEASE ORAL at 09:01

## 2022-10-29 RX ADMIN — IPRATROPIUM BROMIDE AND ALBUTEROL SULFATE 1 AMPULE: 2.5; .5 SOLUTION RESPIRATORY (INHALATION) at 03:44

## 2022-10-29 RX ADMIN — IPRATROPIUM BROMIDE AND ALBUTEROL SULFATE 1 AMPULE: 2.5; .5 SOLUTION RESPIRATORY (INHALATION) at 12:12

## 2022-10-29 RX ADMIN — MEROPENEM 1000 MG: 1 INJECTION, POWDER, FOR SOLUTION INTRAVENOUS at 17:23

## 2022-10-29 RX ADMIN — IPRATROPIUM BROMIDE AND ALBUTEROL SULFATE 1 AMPULE: 2.5; .5 SOLUTION RESPIRATORY (INHALATION) at 08:01

## 2022-10-29 RX ADMIN — PANTOPRAZOLE SODIUM 40 MG: 40 TABLET, DELAYED RELEASE ORAL at 05:41

## 2022-10-29 RX ADMIN — GUAIFENESIN 600 MG: 600 TABLET, EXTENDED RELEASE ORAL at 12:57

## 2022-10-29 ASSESSMENT — PAIN SCALES - GENERAL
PAINLEVEL_OUTOF10: 4
PAINLEVEL_OUTOF10: 5
PAINLEVEL_OUTOF10: 0
PAINLEVEL_OUTOF10: 6

## 2022-10-29 ASSESSMENT — PAIN DESCRIPTION - LOCATION: LOCATION: GENERALIZED

## 2022-10-29 ASSESSMENT — PAIN SCALES - WONG BAKER: WONGBAKER_NUMERICALRESPONSE: 2

## 2022-10-29 NOTE — PROGRESS NOTES
Physical Therapy  Attempted to see pt for PT eval. Pt politely declined stating he had been up all day and was just \"too tired and all settled right now\". Agreeable to be checked on at a later date.

## 2022-10-29 NOTE — PROGRESS NOTES
V2.0  [facility] [place] Hospitalist Progress Note      Name:  Leonardo Golden /Age/Sex: 1950  (71 y.o. male)   MRN & CSN:  [id number] & [id number] Encounter Date/Time: 10/29/2022 10:15 AM EDT    Location:  [facility]0/3110-A PCP: Nathan Layne MD       Hospital Day: 8    Assessment and Plan:   Leonardo Golden is a 71 y.o. male  who presents with Pneumonia due to organism      Plan:    Rhinovirus Pneumonia with Superimposed Pseudomonas aeruginosa Bacterial Pneumonia   Persistent pneumonia-chest x-ray s/o pneumonia, possible cavitation in the right lung. Pseudomonas in sputum  in    Recent admission 2 weeks ago he was positive for rhinovirus. MRSA screen and blood cultures were negative. Patient completed 8 days of Zosyn. Respiratory panel +ve for rhinovirus. ** ID consulted. Bx no growth 48 HRs, sputum culture 10/22 with Pseudomonas aeruginosa  -MRSA screening 10/26 negative  ** Pulmonology consulted, bronch , follow cultures and pulmonary recs. Patient currently on meropenem per ID recs. Sepsis in setting of PNA and CLL - SIRS criteria met. LA resolved. Initially received IV fluids. Now stopped. Continue antibiotics as above. Chronic Hypoxic respiratory failure - recently discharge home O2 at 3 L per nasal cannula 10/6/22, no increase in respiratory requirement at this time, monitor with continuous pulse ox     Chest pain/elevated trop - suspect pleuritic/MSK given only has pain with coughing. EKG with non specific ST changes. hx CAD LHC report not available. Recent TTE 22 EF 07-47%, grade I diastolicc dysfunction. consulted Cardiology. Deemed non cardiac chest pain     Mild Hyponatremia   -Resolved with IV fluid administration. CLL and hypogammaglobulinemia was on ibrutinib before but now plan is to start on Rituxan as per heme-onc. Recently received IVIG as well, discussing with ID as when they can start Rituxan. Appreciate oncology help.      Severe protein calorie malnutrition suspected with hypo albuminemia- nutrition consulted. Low pre-albumin     Other chronic medical conditions-resume home medications unless contraindicated  COPD - not in exacerbation. Respiratory care as noted above, no bronchospasm noted, hold steroids. DM type II with neuropathy, A1C 6.7 9/28/22 - Monitor BG  AC/HS, resume pts basal and ssi, monitor for adjustment in regimen, ADA diet    Mixed hyperlipidemia - on statin  Recent pleural effusion on recent admission 2 weeks ago, requiring thoracentesis, fluid culture not available. BPH on flomax, proscar     Comment: Please note this report has been produced using speech recognition software and may contain errors related to that system including errors in grammar, punctuation, and spelling, as well as words and phrases that may be inappropriate. If there are any questions or concerns please feel free to contact the dictating provider for clarification. Diet ADULT DIET; Regular; 5 carb choices (75 gm/meal)   DVT Prophylaxis [x] Lovenox, []  Heparin, [] SCDs, [] Ambulation,  [] Eliquis, [] Xarelto  [] Coumadin   Code Status Full Code   Disposition From: Home  Expected Disposition: Home versus SNF  Estimated Date of Discharge: 2 to 3 days. Patient requires continued admission for further management of PNA    Surrogate Decision Maker/ ERLIN Hopkins     Subjective:     Chief Complaint: Fever and Shortness of Breath     Feels much better this morning, shortness of breath is better and that her breathing is better, he is willing to move around more and work on nutrition. Review of Systems:    10 point review of systems negative except as above    Objective:      Intake/Output Summary (Last 24 hours) at 10/29/2022 0923  Last data filed at 10/29/2022 0409  Gross per 24 hour   Intake 1710.08 ml   Output 1025 ml   Net 685.08 ml          Vitals:   Vitals:    10/29/22 0859   BP: (!) 95/59   Pulse: 90   Resp: 20   Temp: 97.8 °F (36.6 °C)   SpO2:        Physical Exam: Physical Exam  Constitutional:       General: He is not in acute distress. Appearance: He is ill-appearing. HENT:      Mouth/Throat:      Mouth: Mucous membranes are moist.      Pharynx: No posterior oropharyngeal erythema. Eyes:      General: No scleral icterus. Extraocular Movements: Extraocular movements intact. Pupils: Pupils are equal, round, and reactive to light. Cardiovascular:      Rate and Rhythm: Normal rate and regular rhythm. Pulses: Normal pulses. Heart sounds: No murmur heard. Pulmonary:      Effort: Pulmonary effort is normal.      Breath sounds: No wheezing or rales. Comments: On nasal cannula oxygen. Not in any acute respiratory distress. Bilateral rhonchi. Abdominal:      General: Bowel sounds are normal. There is no distension. Palpations: Abdomen is soft. Tenderness: There is no abdominal tenderness. Musculoskeletal:         General: Normal range of motion. Right lower leg: No edema. Left lower leg: No edema. Skin:     General: Skin is warm. Findings: No rash. Neurological:      General: No focal deficit present. Mental Status: He is alert and oriented to person, place, and time. Cranial Nerves: No cranial nerve deficit. Sensory: No sensory deficit. Motor: No weakness.    Psychiatric:         Mood and Affect: Mood normal.       Medications:   Medications:    ipratropium-albuterol  1 ampule Inhalation Q4H    guaiFENesin  600 mg Oral 4x daily    acetylcysteine  4 mL Inhalation TID    meropenem  1,000 mg IntraVENous Q8H    Followed by    [START ON 11/1/2022] meropenem  1,000 mg IntraVENous Q8H    sodium chloride flush  5-40 mL IntraVENous 2 times per day    [Held by provider] enoxaparin  40 mg SubCUTAneous Daily    insulin lispro  0-4 Units SubCUTAneous TID     insulin lispro  0-4 Units SubCUTAneous Nightly    vitamin C  250 mg Oral BID    atorvastatin  80 mg Oral Daily    ferrous gluconate  324 mg Oral BID finasteride  5 mg Oral Daily    gabapentin  100 mg Oral TID    pantoprazole  40 mg Oral QAM AC    tamsulosin  0.4 mg Oral Daily    insulin glargine  40 Units SubCUTAneous Nightly      Infusions:    sodium chloride Stopped (10/28/22 1017)     PRN Meds: sodium chloride flush, 5-40 mL, PRN  sodium chloride, , PRN  ondansetron, 4 mg, Q8H PRN   Or  ondansetron, 4 mg, Q6H PRN  polyethylene glycol, 17 g, Daily PRN  acetaminophen, 650 mg, Q6H PRN   Or  acetaminophen, 650 mg, Q6H PRN  benzonatate, 100 mg, TID PRN  albuterol sulfate HFA, 2 puff, Q4H PRN  HYDROcodone-acetaminophen, 1 tablet, Q12H PRN  morphine, 2 mg, Q4H PRN  ipratropium-albuterol, 1 ampule, Q4H PRN      Labs      Recent Results (from the past 24 hour(s))   Cell Count with Differential, Body Fluid    Collection Time: 10/28/22 10:00 AM   Result Value Ref Range    Fluid Type BRONCHIAL LAVAGE INDEX    RBC, Fluid 4,000 /CU MM    WBC, Fluid 3,943 /CU MM    Neutrophil Count, Fluid 82 %    Lymphocytes, Body Fluid 10 %    Monocyte Count, Fluid 8 %    Mesothelial, Fluid 1 /100 WBC    Other Cells, Fluid 0    POCT Glucose    Collection Time: 10/28/22 10:25 AM   Result Value Ref Range    POC Glucose 149 (H) 70 - 99 MG/DL   POCT Glucose    Collection Time: 10/28/22  5:18 PM   Result Value Ref Range    POC Glucose 361 (H) 70 - 99 MG/DL   POCT Glucose    Collection Time: 10/28/22  7:54 PM   Result Value Ref Range    POC Glucose 328 (H) 70 - 99 MG/DL   CBC with Auto Differential    Collection Time: 10/29/22  4:10 AM   Result Value Ref Range    WBC 27.9 (H) 4.0 - 10.5 K/CU MM    RBC 3.44 (L) 4.6 - 6.2 M/CU MM    Hemoglobin 8.6 (L) 13.5 - 18.0 GM/DL    Hematocrit 28.5 (L) 42 - 52 %    MCV 82.8 78 - 100 FL    MCH 25.0 (L) 27 - 31 PG    MCHC 30.2 (L) 32.0 - 36.0 %    RDW 22.2 (H) 11.7 - 14.9 %    Platelets 78 (L) 663 - 440 K/CU MM    MPV 11.3 (H) 7.5 - 11.1 FL    Bands Relative 13 (H) 5 - 11 %    Segs Relative 8.0 (L) 36 - 66 %    Lymphocytes % 79.0 (H) 24 - 44 %    Bands Absolute 3.63 K/CU MM    Segs Absolute 2.2 K/CU MM    Lymphocytes Absolute 22.1 K/CU MM    Differential Type MANUAL DIFFERENTIAL    POCT Glucose    Collection Time: 10/29/22  7:46 AM   Result Value Ref Range    POC Glucose 228 (H) 70 - 99 MG/DL        Imaging/Diagnostics Last 24 Hours   CT CHEST WO CONTRAST    Result Date: 10/23/2022  EXAMINATION: CT OF THE CHEST WITHOUT CONTRAST 10/23/2022 9:59 am TECHNIQUE: CT of the chest was performed without the administration of intravenous contrast. Multiplanar reformatted images are provided for review. Automated exposure control, iterative reconstruction, and/or weight based adjustment of the mA/kV was utilized to reduce the radiation dose to as low as reasonably achievable. COMPARISON: 09/28/2022 HISTORY: ORDERING SYSTEM PROVIDED HISTORY: fu pneumonia persistent TECHNOLOGIST PROVIDED HISTORY: Reason for exam:->fu pneumonia persistent Reason for Exam: fu pneumonia persistent FINDINGS: Mediastinum: Enlarged mediastinal lymph nodes are again identified, similar when compared to the previous exam.  Largest in the subcarinal space, with a short axis measurement of approximately 3 cm. Visualized thyroid unremarkable. Esophagus unremarkable. Limited noncontrast imaging of the cardiac chambers, thoracic aorta, and pulmonary arteries unremarkable. Lungs/pleura: Infiltrates in the lower lungs seen previously are decreased in density. However, innumerable tree-in-bud centrilobular micro nodules are now present within both lungs, greatest in the mid to lower lungs. In addition, a cavitary lesion in the superior segment of the right lower lobe has developed measuring approximately 5.3 cm maximally. Additional nodular infiltrates are seen in the left upper lobe, also with early cavitation. Diffuse bronchial wall thickening is noted. No filling defects are seen within the airways. No pneumothorax. No pleural effusion. Upper Abdomen: Unremarkable.  Soft Tissues/Bones: No acute or suspicious bony abnormality. Extra thoracic soft tissues unremarkable. The infiltrates seen previously in the lower lobes bilaterally for the most part have decreased in size and conspicuity. However, there are now innumerable punctate tree-in-bud centrilobular micro nodules, which are nonspecific but suggest inflammatory/infectious bronchiolitis. Consider both typical and atypical etiologies. In addition, cavitary lesion has developed in the periphery of the right lower lobe and to a lesser extent within the left upper lung. Necrotizing infection would be primarily considered given the rapid development. Consider both typical and atypical etiologies. Enlarged mediastinal lymph nodes, similar when compared to the previous exam, process of Lia related to history of chronic lymphocytic leukemia.        Electronically signed by Juany Mendez MD on 10/29/2022 at 9:23 AM

## 2022-10-29 NOTE — PROGRESS NOTES
ONCOLOGY HEMATOLOGY CARE ([facility])  PROGRESS NOTE      10/29/2022  9:48 AM    Patient:    Ramo Brock  : 1950   71 y.o. MRN: [id number]  Admitted: 10/22/2022  8:27 AM ATT: Kiel Mckay MD   3110/3110-A  AdmitDx: Pneumonia due to organism [J18.9]  Rhinovirus [B34.8]  Personal history of CLL (chronic lymphocytic leukemia) [Z85.6]  Elevated troponin [R77.8]  Pneumonia due to infectious organism, unspecified laterality, unspecified part of lung [J18.9]  PCP: Jaqui Robles MD      Patient was seen and examined today. Continues to feel better  But not much out of bed. Coughing up sputum ill  No bleeding  No fever  Ready to go home. 10/22-BC NGTD  10/22-Strep pneumo ag/ Legionella ag negative  10/22-Resp panel: positive for Rhinovirus  10/22, reordered 10/26-MRSA/MssA pending  10/22-Resp culture: Pseudomonas aeruginosa      10/23/22: Ct chest:  The infiltrates seen previously in the lower lobes bilaterally for the most   part have decreased in size and conspicuity. However, there are now innumerable punctate tree-in-bud centrilobular micro   nodules, which are nonspecific but suggest inflammatory/infectious   bronchiolitis. Consider both typical and atypical etiologies. In addition, cavitary lesion has developed in the periphery of the right   lower lobe and to a lesser extent within the left upper lung. Necrotizing   infection would be primarily considered given the rapid development. Consider both typical and atypical etiologies. Enlarged mediastinal lymph nodes, similar when compared to the previous exam,   process of Lia related to history of chronic lymphocytic leukemia.          10/28/22: bronch results noted, bal, cytology pending    PHYSICAL EXAM :    Vitals: BP (!) 95/59   Pulse 90   Temp 97.8 °F (36.6 °C)   Resp 20   Ht 6' (1.829 m)   Wt 167 lb 8.8 oz (76 kg)   SpO2 94%   BMI 22.72 kg/m²     CONSTITUTIONAL: awake, alert, ill appearing, lying on the bed    LUNGS:coarse bs juan and poor effort  CARDIOVASCULAR:s1s2 rrr   ABDOMEN: soft bs pos  NEUROLOGIC: GI  EXTREMITIES: no LE edema bilaterally    LABORATORY RESULTS  CBC:   Recent Labs     10/27/22  0535 10/28/22  0520 10/29/22  0410   WBC 24.3* 46.9* 27.9*   HGB 8.8* 8.9* 8.6*   PLT 75* 79* 78*     BMP:    Recent Labs     10/27/22  0535 10/28/22  0520    134*   K 4.8 5.3*    98*   CO2 28 30   BUN 19 17   CREATININE 0.5* 0.6*   GLUCOSE 281* 161*     Hepatic:   Recent Labs     10/27/22  0535 10/28/22  0520   AST 22 26   ALT 19 23   BILITOT 0.4 0.5   ALKPHOS 173* 170*     INR:   Recent Labs     10/26/22  1304   INR 1.14         RADIOLOGY REPORTS  Reviewed    ASSESSMENT & RECOMMENDATIONS:  Pneumonia: is onmeropenem per ID. Bronch results nored. Noted ID recs. Continue aggressive pulmonary toilet. CLL:Was on ibrutinib before but recent plan was to start Rituxan as progressive  lad, cytopenia. Received IVIG. Repeat  Flow cytometry ordered. Will discuss with ID as to when rituxan can be started. Anemia and thrombocytopenia: Could be sec to Ibrutunib vs CLL vs infection vs abx. No nutritional def or any hemolysis. Transfusion support if Hb <7 or platelets <33A or bleeding. Continue other medical care. This plan was discussed with the patient and he seem to have verbalized  understanding. Improve nutritional status and recommend OOB to chair and ambulate as tolerated. We will continue to follow the patient. Thank you for allowing us to participate in the care of this patient.      Elena Dillon

## 2022-10-29 NOTE — PROGRESS NOTES
Pulmonary and Critical Care  Progress Note    Subjective: The patient is better. Shortness of breath is better. Chest pain none  Addressing respiratory complaints Patient is negative for  hemoptysis and cyanosis  CONSTITUTIONAL:  negative for fevers and chills      Past Medical History:     has a past medical history of Arthritis, CAD (coronary artery disease), Cancer ([facility] [address] Diabetes mellitus ([facility] [address] History of blood transfusion, Hx of motion sickness, Hyperlipidemia, MDRO (multiple drug resistant organisms) resistance, Migraine, Pneumonia, Wears dentures, and Wears glasses. has a past surgical history that includes knee surgery (1970); Tunneled venous port placement (2013); Colonoscopy (2010); fracture surgery (Left, 1990's); Cardiac catheterization (1990's); Tonsillectomy (late 1960's); Dental surgery; eye surgery (Right, 08/2016); and other surgical history (Left, 01/18/2017). reports that he quit smoking about 35 years ago. His smoking use included cigarettes. He started smoking about 57 years ago. He has a 20.00 pack-year smoking history. He has never used smokeless tobacco. He reports that he does not drink alcohol and does not use drugs. Family history:  family history includes Asthma in his maternal uncle; Colon Cancer in his brother; Diabetes in his mother; Early Death in his brother; Heart Disease in his father; High Blood Pressure in his mother; Other in his sister.     No Known Allergies  Social History:    Reviewed; no changes    Objective:   PHYSICAL EXAM:        VITALS:  BP (!) 95/59   Pulse 90   Temp 97.8 °F (36.6 °C)   Resp 20   Ht 6' (1.829 m)   Wt 167 lb 8.8 oz (76 kg)   SpO2 94%   BMI 22.72 kg/m²     24HR INTAKE/OUTPUT:    Intake/Output Summary (Last 24 hours) at 10/29/2022 1057  Last data filed at 10/29/2022 0409  Gross per 24 hour   Intake 1300.08 ml   Output 825 ml   Net 475.08 ml       CONSTITUTIONAL:  awake, alert, cooperative, no apparent distress, and appears stated age  LUNGS:  decreased breath sounds, occ basilar crackles. CARDIOVASCULAR:  normal S1 and S2 and negative JVD  ABD:Abdomen soft, non-tender. BS normal. No masses,  No organomegaly  NEURO:Alert and oriented x3. Gait normal. Reflexes and motor strength normal and symmetric. Cranial nerves 2-12 and sensation grossly intact. DATA:    CBC:  Recent Labs     10/27/22  0535 10/28/22  0520 10/29/22  0410   WBC 24.3* 46.9* 27.9*   RBC 3.48* 3.56* 3.44*   HGB 8.8* 8.9* 8.6*   HCT 29.5* 29.9* 28.5*   PLT 75* 79* 78*   MCV 84.8 84.0 82.8   MCH 25.3* 25.0* 25.0*   MCHC 29.8* 29.8* 30.2*   RDW 21.2* 21.7* 22.2*   NRBC 1 1  --    SEGSPCT 12.0* 9.0* 8.0*   BANDSPCT 4* 1* 13*      BMP:  Recent Labs     10/27/22  0535 10/28/22  0520    134*   K 4.8 5.3*    98*   CO2 28 30   BUN 19 17   CREATININE 0.5* 0.6*   CALCIUM 8.3 8.2*   GLUCOSE 281* 161*      ABG:  No results for input(s): PH, PO2ART, TVT6MYZ, HCO3, BEART, O2SAT in the last 72 hours. Lab Results   Component Value Date    PROBNP 774.5 (H) 10/22/2022    PROBNP 343.3 (H) 10/03/2022    PROBNP 2,514 (H) 09/30/2022     No results found for: [facility]    Radiology Review:  Pertinent images / reports were reviewed as a part of this visit.     Assessment:     Patient Active Problem List   Diagnosis    Pneumonia    Sepsis ([facility] [address]    Hypogammaglobulinemia ([facility] [address]    CLL (chronic lymphocytic leukemia) ([facility] [address]    Upper respiratory infection, acute    Generalized muscle weakness    Type 2 diabetes mellitus with hyperglycemia, with long-term current use of insulin (HCC)    Poor appetite    COPD (chronic obstructive pulmonary disease) (HCC)    Neutropenia (HCC)    Other specified disorders of bone density and structure, unspecified site    Cellulitis of right upper limb    Herpesviral infection    Intestinal malabsorption    Other nonspecific abnormal finding of lung field    Iron deficiency anemia due to chronic blood loss    Other muscle spasm    Leukemia, lymphocytic, chronic ([address] Selective deficiency of IgG ([facility] [address]    Acute bronchitis    Severe malnutrition ([facility] [address]    Pneumonia due to organism    Personal history of CLL (chronic lymphocytic leukemia)    Rhinovirus infection    Anemia    Thrombocytopenia ([facility] [address]       Plan:   1. Overall the patient has improved. 2. Inc. Activity.   Harshad Portre MD   10/29/2022  10:57 AM

## 2022-10-29 NOTE — OP NOTE
[address], [address] [facility]                                OPERATIVE REPORT    PATIENT NAME: Orion Eid                     :        1950  MED REC NO:   [id number]                          ROOM:  ACCOUNT NO:   [de-identified]                           ADMIT DATE: 10/22/2022  PROVIDER:     Siddharth Adan MD    DATE OF PROCEDURE:  10/28/2022    PROCEDURE:  Bronchoscopy. INDICATION FOR BRONCHOSCOPY:  Persistent pneumonia, rule out  endobronchial lesion, rule out unusual pneumonia. PREOPERATIVE DIAGNOSES:  Persistent pneumonia, rule out endobronchial  lesion, rule out unusual pneumonia. POSTOPERATIVE DIAGNOSES:  Copious secretions were seen in both lower  lobes, more so in the right lower lobe which were suctioned out. Moderate endobronchitis seen right lower lobe. Brushings, washings,  micro-brushings, and bronchoalveolar lavage done right lower lobe. SURGEON:  Siddharth Adan MD    PROCEDURE DETAILS:  The patient was intubated and was on the ventilator  and anesthesia was managed by the Department of Anesthesia. The scope  was passed through the endotracheal tube and passed down. The lower  part of the trachea was normal.  The sabiha was normal and sharp. The  scope was passed on the left side. The left mainstem was normal.  The  left upper lobe had some secretions which were suctioned out. The left  lower lobe had mild-to-moderate secretions which were suctioned out. There was mild endobronchitis seen in the left lower lobe. The scope  was passed on the right side. The right mainstem was normal.  The right  upper lobe had some secretions which were suctioned out. The right  middle lobe had some secretions which were suctioned out. There were  copious secretions seen in the right lower lobe which were suctioned  out. Thorough bronchial toilet was performed.   Bronchoalveolar lavage was  done subsequently and micro-brushings and cytology brushings were done  from the right lower lobe. Mucosal and transbronchial biopsies were  done from the right lower lobe. The patient tolerated the procedure  well. The patient was monitored electrocardiographically and by  oximeter throughout the procedure. No high-grade endobronchial lesions  were seen. Estimated blood loss was less than 5 mL.         Nayan Houston MD    D: 10/28/2022 11:08:32       T: 10/28/2022 11:10:50     [email or web address]  Job#: [id number]     Doc#: [id number]    CC:

## 2022-10-30 LAB
GLUCOSE BLD-MCNC: 210 MG/DL (ref 70–99)
GLUCOSE BLD-MCNC: 242 MG/DL (ref 70–99)
GLUCOSE BLD-MCNC: 281 MG/DL (ref 70–99)
GLUCOSE BLD-MCNC: 294 MG/DL (ref 70–99)

## 2022-10-30 PROCEDURE — 2580000003 HC RX 258: Performed by: NURSE PRACTITIONER

## 2022-10-30 PROCEDURE — 6370000000 HC RX 637 (ALT 250 FOR IP): Performed by: STUDENT IN AN ORGANIZED HEALTH CARE EDUCATION/TRAINING PROGRAM

## 2022-10-30 PROCEDURE — 6360000002 HC RX W HCPCS: Performed by: NURSE PRACTITIONER

## 2022-10-30 PROCEDURE — 2140000000 HC CCU INTERMEDIATE R&B

## 2022-10-30 PROCEDURE — 94761 N-INVAS EAR/PLS OXIMETRY MLT: CPT

## 2022-10-30 PROCEDURE — 97116 GAIT TRAINING THERAPY: CPT

## 2022-10-30 PROCEDURE — 82962 GLUCOSE BLOOD TEST: CPT

## 2022-10-30 PROCEDURE — 97161 PT EVAL LOW COMPLEX 20 MIN: CPT

## 2022-10-30 PROCEDURE — 2700000000 HC OXYGEN THERAPY PER DAY

## 2022-10-30 PROCEDURE — 97110 THERAPEUTIC EXERCISES: CPT

## 2022-10-30 PROCEDURE — 6370000000 HC RX 637 (ALT 250 FOR IP): Performed by: INTERNAL MEDICINE

## 2022-10-30 PROCEDURE — 6370000000 HC RX 637 (ALT 250 FOR IP): Performed by: NURSE PRACTITIONER

## 2022-10-30 PROCEDURE — 94640 AIRWAY INHALATION TREATMENT: CPT

## 2022-10-30 RX ADMIN — GUAIFENESIN 600 MG: 600 TABLET, EXTENDED RELEASE ORAL at 08:52

## 2022-10-30 RX ADMIN — MEROPENEM 1000 MG: 1 INJECTION, POWDER, FOR SOLUTION INTRAVENOUS at 08:56

## 2022-10-30 RX ADMIN — OXYCODONE HYDROCHLORIDE AND ACETAMINOPHEN 250 MG: 500 TABLET ORAL at 08:52

## 2022-10-30 RX ADMIN — IPRATROPIUM BROMIDE AND ALBUTEROL SULFATE 1 AMPULE: 2.5; .5 SOLUTION RESPIRATORY (INHALATION) at 07:55

## 2022-10-30 RX ADMIN — Medication 324 MG: at 21:17

## 2022-10-30 RX ADMIN — PANTOPRAZOLE SODIUM 40 MG: 40 TABLET, DELAYED RELEASE ORAL at 06:16

## 2022-10-30 RX ADMIN — MEROPENEM 1000 MG: 1 INJECTION, POWDER, FOR SOLUTION INTRAVENOUS at 17:19

## 2022-10-30 RX ADMIN — HYDROCODONE BITARTRATE AND ACETAMINOPHEN 1 TABLET: 5; 325 TABLET ORAL at 08:52

## 2022-10-30 RX ADMIN — GABAPENTIN 100 MG: 100 CAPSULE ORAL at 21:09

## 2022-10-30 RX ADMIN — IPRATROPIUM BROMIDE AND ALBUTEROL SULFATE 1 AMPULE: 2.5; .5 SOLUTION RESPIRATORY (INHALATION) at 12:04

## 2022-10-30 RX ADMIN — ACETYLCYSTEINE 400 MG: 100 INHALANT RESPIRATORY (INHALATION) at 21:34

## 2022-10-30 RX ADMIN — Medication 324 MG: at 08:53

## 2022-10-30 RX ADMIN — IPRATROPIUM BROMIDE AND ALBUTEROL SULFATE 1 AMPULE: 2.5; .5 SOLUTION RESPIRATORY (INHALATION) at 21:34

## 2022-10-30 RX ADMIN — INSULIN GLARGINE 40 UNITS: 100 INJECTION, SOLUTION SUBCUTANEOUS at 21:10

## 2022-10-30 RX ADMIN — INSULIN LISPRO 2 UNITS: 100 INJECTION, SOLUTION INTRAVENOUS; SUBCUTANEOUS at 11:34

## 2022-10-30 RX ADMIN — IPRATROPIUM BROMIDE AND ALBUTEROL SULFATE 1 AMPULE: 2.5; .5 SOLUTION RESPIRATORY (INHALATION) at 15:36

## 2022-10-30 RX ADMIN — GUAIFENESIN 600 MG: 600 TABLET, EXTENDED RELEASE ORAL at 17:13

## 2022-10-30 RX ADMIN — HYDROCODONE BITARTRATE AND ACETAMINOPHEN 1 TABLET: 5; 325 TABLET ORAL at 21:10

## 2022-10-30 RX ADMIN — GUAIFENESIN 600 MG: 600 TABLET, EXTENDED RELEASE ORAL at 13:16

## 2022-10-30 RX ADMIN — TAMSULOSIN HYDROCHLORIDE 0.4 MG: 0.4 CAPSULE ORAL at 08:52

## 2022-10-30 RX ADMIN — ATORVASTATIN CALCIUM 80 MG: 40 TABLET, FILM COATED ORAL at 21:10

## 2022-10-30 RX ADMIN — INSULIN LISPRO 1 UNITS: 100 INJECTION, SOLUTION INTRAVENOUS; SUBCUTANEOUS at 18:53

## 2022-10-30 RX ADMIN — SODIUM CHLORIDE, PRESERVATIVE FREE 10 ML: 5 INJECTION INTRAVENOUS at 21:10

## 2022-10-30 RX ADMIN — OXYCODONE HYDROCHLORIDE AND ACETAMINOPHEN 250 MG: 500 TABLET ORAL at 21:09

## 2022-10-30 RX ADMIN — GABAPENTIN 100 MG: 100 CAPSULE ORAL at 08:52

## 2022-10-30 RX ADMIN — MEROPENEM 1000 MG: 1 INJECTION, POWDER, FOR SOLUTION INTRAVENOUS at 00:46

## 2022-10-30 RX ADMIN — ACETAMINOPHEN 650 MG: 325 TABLET ORAL at 17:15

## 2022-10-30 RX ADMIN — INSULIN LISPRO 1 UNITS: 100 INJECTION, SOLUTION INTRAVENOUS; SUBCUTANEOUS at 08:32

## 2022-10-30 RX ADMIN — FINASTERIDE 5 MG: 5 TABLET, FILM COATED ORAL at 08:52

## 2022-10-30 RX ADMIN — SODIUM CHLORIDE, PRESERVATIVE FREE 10 ML: 5 INJECTION INTRAVENOUS at 08:57

## 2022-10-30 RX ADMIN — IPRATROPIUM BROMIDE AND ALBUTEROL SULFATE 1 AMPULE: 2.5; .5 SOLUTION RESPIRATORY (INHALATION) at 23:48

## 2022-10-30 RX ADMIN — GUAIFENESIN 600 MG: 600 TABLET, EXTENDED RELEASE ORAL at 21:09

## 2022-10-30 RX ADMIN — GABAPENTIN 100 MG: 100 CAPSULE ORAL at 17:13

## 2022-10-30 ASSESSMENT — PAIN DESCRIPTION - LOCATION: LOCATION: GENERALIZED

## 2022-10-30 ASSESSMENT — PAIN SCALES - GENERAL: PAINLEVEL_OUTOF10: 5

## 2022-10-30 NOTE — PROGRESS NOTES
V2.0  [facility] Hospitalist Progress Note      Name:  Brenda Griffith /Age/Sex: 1950  (71 y.o. male)   MRN & CSN:  [id number] & [id number] Encounter Date/Time: 10/30/2022 10:15 AM EDT    Location:  [facility]0/3110-A PCP: Shavon Pires MD       Hospital Day: 9    Assessment and Plan:   Brenda Griffith is a 71 y.o. male  who presents with Pneumonia due to organism      Plan:    Rhinovirus Pneumonia with Superimposed Pseudomonas aeruginosa Bacterial Pneumonia   Persistent pneumonia-chest x-ray s/o pneumonia, possible cavitation in the right lung. Pseudomonas in sputum  in    Recent admission 2 weeks ago he was positive for rhinovirus. MRSA screen and blood cultures were negative. Patient completed 8 days of Zosyn. Respiratory panel +ve for rhinovirus. ** ID consulted. Bx no growth 48 HRs, sputum culture 10/22 with Pseudomonas aeruginosa  -MRSA screening 10/26 negative  ** Pulmonology consulted, bronch , bronchial lavage culture with Pseudomonas aeruginosa 10,000 50,000. Patient currently on meropenem per ID recs. Sepsis in setting of PNA and CLL - SIRS criteria met. LA resolved. Initially received IV fluids. Now stopped. Continue antibiotics as above. Chronic Hypoxic respiratory failure - recently discharge home O2 at 3 L per nasal cannula 10/6/22, no increase in respiratory requirement at this time, monitor with continuous pulse ox     Chest pain/elevated trop - suspect pleuritic/MSK given only has pain with coughing. EKG with non specific ST changes. hx CAD LHC report not available. Recent TTE 22 EF 49-92%, grade I diastolicc dysfunction. consulted Cardiology. Deemed non cardiac chest pain     Mild Hyponatremia   -Resolved with IV fluid administration. CLL and hypogammaglobulinemia was on ibrutinib before but now plan is to start on Rituxan as per heme-onc. Recently received IVIG as well, discussing with ID as when they can start Rituxan. Appreciate oncology help.      Severe protein calorie malnutrition suspected with hypo albuminemia- nutrition consulted. Low pre-albumin, ordered Ensure for patient     Other chronic medical conditions-resume home medications unless contraindicated  COPD - not in exacerbation. Respiratory care as noted above, no bronchospasm noted, hold steroids. DM type II with neuropathy, A1C 6.7 9/28/22 - Monitor BG  AC/HS, resume pts basal and ssi, monitor for adjustment in regimen, ADA diet    Mixed hyperlipidemia - on statin  Recent pleural effusion on recent admission 2 weeks ago, requiring thoracentesis, fluid culture not available. BPH on flomax, proscar       PT OT ordered, awaiting evaluation and placement recommendations. Diet ADULT ORAL NUTRITION SUPPLEMENT; Breakfast, Lunch, Dinner; Standard High Calorie/High Protein Oral Supplement  ADULT DIET; Regular   DVT Prophylaxis [x] Lovenox, []  Heparin, [] SCDs, [] Ambulation,  [] Eliquis, [] Xarelto  [] Coumadin   Code Status Full Code   Disposition From: Home  Expected Disposition: Home versus SNF  Estimated Date of Discharge: 1 to 2 days  Patient requires continued admission for further management of PNA, final ID recs, PT OT evaluation   Surrogate Decision Maker/ ERLIN Sharp     Subjective:     Chief Complaint: Fever and Shortness of Breath     No complaints, chest of breath is at baseline patient is on 3 L nasal cannula, denies pain anywhere. Review of Systems:    10 point review of systems negative except as above    Objective: Intake/Output Summary (Last 24 hours) at 10/30/2022 0902  Last data filed at 10/30/2022 3398  Gross per 24 hour   Intake --   Output 2555 ml   Net -2555 ml          Vitals:   Vitals:    10/30/22 0800   BP: 121/66   Pulse: 92   Resp: (!) 31   Temp: 97.7 °F (36.5 °C)   SpO2:        Physical Exam:   Physical Exam  Constitutional:       General: He is not in acute distress. Appearance: He is ill-appearing.    HENT:      Mouth/Throat:      Mouth: Mucous membranes are moist.      Pharynx: No posterior oropharyngeal erythema. Eyes:      General: No scleral icterus. Extraocular Movements: Extraocular movements intact. Pupils: Pupils are equal, round, and reactive to light. Cardiovascular:      Rate and Rhythm: Normal rate and regular rhythm. Pulses: Normal pulses. Heart sounds: No murmur heard. Pulmonary:      Effort: Pulmonary effort is normal.      Breath sounds: No wheezing or rales. Comments: On nasal cannula oxygen. Not in any acute respiratory distress. Bilateral rhonchi. Abdominal:      General: Bowel sounds are normal. There is no distension. Palpations: Abdomen is soft. Tenderness: There is no abdominal tenderness. Musculoskeletal:         General: Normal range of motion. Right lower leg: No edema. Left lower leg: No edema. Skin:     General: Skin is warm. Findings: No rash. Neurological:      General: No focal deficit present. Mental Status: He is alert and oriented to person, place, and time. Cranial Nerves: No cranial nerve deficit. Sensory: No sensory deficit. Motor: No weakness.    Psychiatric:         Mood and Affect: Mood normal.       Medications:   Medications:    ipratropium-albuterol  1 ampule Inhalation Q4H    guaiFENesin  600 mg Oral 4x daily    acetylcysteine  4 mL Inhalation TID    meropenem  1,000 mg IntraVENous Q8H    Followed by    [START ON 11/1/2022] meropenem  1,000 mg IntraVENous Q8H    sodium chloride flush  5-40 mL IntraVENous 2 times per day    [Held by provider] enoxaparin  40 mg SubCUTAneous Daily    insulin lispro  0-4 Units SubCUTAneous TID WC    insulin lispro  0-4 Units SubCUTAneous Nightly    vitamin C  250 mg Oral BID    atorvastatin  80 mg Oral Daily    ferrous gluconate  324 mg Oral BID    finasteride  5 mg Oral Daily    gabapentin  100 mg Oral TID    pantoprazole  40 mg Oral QAM AC    tamsulosin  0.4 mg Oral Daily    insulin glargine  40 Units SubCUTAneous Nightly      Infusions:    sodium chloride Stopped (10/28/22 1017)     PRN Meds: sodium chloride flush, 5-40 mL, PRN  sodium chloride, , PRN  ondansetron, 4 mg, Q8H PRN   Or  ondansetron, 4 mg, Q6H PRN  polyethylene glycol, 17 g, Daily PRN  acetaminophen, 650 mg, Q6H PRN   Or  acetaminophen, 650 mg, Q6H PRN  benzonatate, 100 mg, TID PRN  albuterol sulfate HFA, 2 puff, Q4H PRN  HYDROcodone-acetaminophen, 1 tablet, Q12H PRN  morphine, 2 mg, Q4H PRN  ipratropium-albuterol, 1 ampule, Q4H PRN      Labs      Recent Results (from the past 24 hour(s))   POCT Glucose    Collection Time: 10/29/22 12:00 PM   Result Value Ref Range    POC Glucose 187 (H) 70 - 99 MG/DL   POCT Glucose    Collection Time: 10/29/22  4:44 PM   Result Value Ref Range    POC Glucose 232 (H) 70 - 99 MG/DL   POCT Glucose    Collection Time: 10/29/22  9:15 PM   Result Value Ref Range    POC Glucose 214 (H) 70 - 99 MG/DL   POCT Glucose    Collection Time: 10/30/22  8:49 AM   Result Value Ref Range    POC Glucose 210 (H) 70 - 99 MG/DL        Imaging/Diagnostics Last 24 Hours   CT CHEST WO CONTRAST    Result Date: 10/23/2022  EXAMINATION: CT OF THE CHEST WITHOUT CONTRAST 10/23/2022 9:59 am TECHNIQUE: CT of the chest was performed without the administration of intravenous contrast. Multiplanar reformatted images are provided for review. Automated exposure control, iterative reconstruction, and/or weight based adjustment of the mA/kV was utilized to reduce the radiation dose to as low as reasonably achievable. COMPARISON: 09/28/2022 HISTORY: ORDERING SYSTEM PROVIDED HISTORY: fu pneumonia persistent TECHNOLOGIST PROVIDED HISTORY: Reason for exam:->fu pneumonia persistent Reason for Exam: fu pneumonia persistent FINDINGS: Mediastinum: Enlarged mediastinal lymph nodes are again identified, similar when compared to the previous exam.  Largest in the subcarinal space, with a short axis measurement of approximately 3 cm.  Visualized thyroid unremarkable. Esophagus unremarkable. Limited noncontrast imaging of the cardiac chambers, thoracic aorta, and pulmonary arteries unremarkable. Lungs/pleura: Infiltrates in the lower lungs seen previously are decreased in density. However, innumerable tree-in-bud centrilobular micro nodules are now present within both lungs, greatest in the mid to lower lungs. In addition, a cavitary lesion in the superior segment of the right lower lobe has developed measuring approximately 5.3 cm maximally. Additional nodular infiltrates are seen in the left upper lobe, also with early cavitation. Diffuse bronchial wall thickening is noted. No filling defects are seen within the airways. No pneumothorax. No pleural effusion. Upper Abdomen: Unremarkable. Soft Tissues/Bones: No acute or suspicious bony abnormality. Extra thoracic soft tissues unremarkable. The infiltrates seen previously in the lower lobes bilaterally for the most part have decreased in size and conspicuity. However, there are now innumerable punctate tree-in-bud centrilobular micro nodules, which are nonspecific but suggest inflammatory/infectious bronchiolitis. Consider both typical and atypical etiologies. In addition, cavitary lesion has developed in the periphery of the right lower lobe and to a lesser extent within the left upper lung. Necrotizing infection would be primarily considered given the rapid development. Consider both typical and atypical etiologies. Enlarged mediastinal lymph nodes, similar when compared to the previous exam, process of Lia related to history of chronic lymphocytic leukemia.        Electronically signed by Ching Oviedo MD on 10/30/2022 at 9:02 AM

## 2022-10-30 NOTE — PROGRESS NOTES
Physical Therapy  Facility/Department: [address] [place] [address]  Physical Therapy Initial Assessment    Name: Adonis Ervin  : 1950  MRN: [id number]  Date of Service: 10/30/2022    Discharge Recommendations:  Continue to assess pending progress          Patient Diagnosis(es): The primary encounter diagnosis was Pneumonia due to infectious organism, unspecified laterality, unspecified part of lung. Diagnoses of Rhinovirus, Elevated troponin, and Personal history of CLL (chronic lymphocytic leukemia) were also pertinent to this visit. Past Medical History:  has a past medical history of Arthritis, CAD (coronary artery disease), Cancer ([facility] [address] Diabetes mellitus ([facility] [address] History of blood transfusion, Hx of motion sickness, Hyperlipidemia, MDRO (multiple drug resistant organisms) resistance, Migraine, Pneumonia, Wears dentures, and Wears glasses. Past Surgical History:  has a past surgical history that includes knee surgery (); Tunneled venous port placement (); Colonoscopy (); fracture surgery (Left, ); Cardiac catheterization (); Tonsillectomy (late s); Dental surgery; eye surgery (Right, 2016); and other surgical history (Left, 2017). Assessment   Body Structures, Functions, Activity Limitations Requiring Skilled Therapeutic Intervention: Decreased functional mobility ; Decreased ADL status; Decreased strength;Decreased safe awareness;Decreased endurance;Decreased sensation;Decreased balance;Decreased high-level IADLs;Decreased posture  Assessment: 71year old admitted to [facility] with pneumonia. Based on his current impairments and functional limitations it appears this patient will benefit from skilled physical therapy intervention. Treatment Diagnosis: pneumonia, deconditioning. Specific Instructions for Next Treatment: Please work on therapeutic exercises and gait training and statir training with a wheeled walker as appropriate.   Therapy Prognosis: Good  Decision Making: Low Complexity  History: Per above. Exam: Per above. Barriers to Learning: None noted. Requires PT Follow-Up: Yes  Activity Tolerance  Activity Tolerance: Patient limited by fatigue;Patient limited by endurance     Plan   Physcial Therapy Plan  General Plan: 1 time a day 3-6 times a week  Hours Per Day: 0.5 hour  Specific Instructions for Next Treatment: Please work on therapeutic exercises and gait training and statir training with a wheeled walker as appropriate. Current Treatment Recommendations: Strengthening, Balance training, ADL/Self-care training, IADL training, Endurance training, Gait training, Stair training, Home exercise program, Safety education & training, Patient/Caregiver education & training  Safety Devices  Type of Devices: All fall risk precautions in place, Call light within reach, Gait belt, Patient at risk for falls, Left in bed, Nurse notified     Restrictions        Subjective   Pain: Patient denied pain. General  Chart Reviewed: Yes  Patient assessed for rehabilitation services?: Yes  Response To Previous Treatment: Patient reporting fatigue but able to participate. Family / Caregiver Present: No  Referring Practitioner: Dr. Comfort Espitia  Referral Date : 10/30/22  Diagnosis: Pneumonia due to organism. Follows Commands: Within Functional Limits  Subjective  Subjective: 71year old male with pneumonia admitted to [facility]. Based on current impairments and functional limitations, this patient will benefit from skilled physical therapy intervention. Patient denied pain but did report fatigue.          Social/Functional History  Social/Functional History  Lives With: Family, Son (Lives with son.)  Type of Home: House  Home Layout: One level  Home Access: Stairs to enter without rails  Entrance Stairs - Number of Steps: 2  Bathroom Shower/Tub: Tub/Shower unit  Bathroom Toilet: Standard  Home Equipment: Rollator  Has the patient had two or more falls in the past year or any fall with injury in the past year?: Unknown  Receives Help From: Family (Son assists with ADLs and homemaking as needed.)  ADL Assistance: Needs assistance (Patient requires assistance from his son with dressing and bathing.)  Homemaking Assistance: Needs assistance (Patient's son assists him with homemaking activities cooking cleaning, laundry, [employer], bill pay.)  Homemaking Responsibilities: No  Ambulation Assistance: Independent  Transfer Assistance: Independent  Active : No  Occupation: Retired  Vision/Hearing  Vision  Vision: Within Functional Limits  Hearing  Hearing: Within functional limits    Cognition   Orientation  Overall Orientation Status: Within Functional Limits  Orientation Level: Oriented X4     Objective   Heart Rate: 92  [address]: Monitor  BP: 121/66  BP Location: Right upper arm  BP Method: Automatic  MAP (Calculated): 84.33  Resp: (!) 31  SpO2: 91 %  O2 Device: Nasal cannula     Observation/Palpation  Posture: Fair  Palpation: No tenderness noted. Observation: Patient with forward head and slouched posturing. Gross Assessment  AROM: Within functional limits  PROM: Within functional limits  Strength:  Within functional limits  Coordination: Within functional limits  Tone: Normal  Sensation: Impaired (Decreased light touch sensation about both feet.)     PROM RLE (degrees)  RLE PROM: WFL  AROM RLE (degrees)  RLE AROM: WFL  PROM LLE (degrees)  LLE PROM: WFL  AROM LLE (degrees)  LLE AROM : WFL  PROM RUE (degrees)  RUE PROM: WFL  AROM RUE (degrees)  RUE AROM : WFL  PROM LUE (degrees)  LUE PROM: WFL  AROM LUE (degrees)  LUE AROM : WFL  Strength RLE  Strength RLE: WFL  Strength LLE  Strength LLE: WFL  Strength RUE  Strength RUE: WFL  Strength LUE  Strength LUE: WFL        Bed Mobility Training  Bed Mobility Training: Yes  Overall Level of Assistance: Independent  Rolling: Independent  Supine to Sit: Independent  Sit to Supine: Independent  Scooting: Independent  Balance  Sitting: Without support  Standing: With support  Transfer Training  Transfer Training: Yes  Overall Level of Assistance: Independent  Sit to Stand: Independent  Stand to Sit: Independent  Bed to Chair: Independent  Gait Training  Gait Training: Yes  Gait  Overall Level of Assistance: Contact-guard assistance;Assist X1  Interventions: Demonstration;Manual cues; Safety awareness training; Tactile cues; Verbal cues; Visual cues  Base of Support: Narrowed  Speed/Maria M: Slow  Step Length: Left shortened;Right shortened  Stance: Left increased;Right increased  Distance (ft): 15 Feet  Assistive Device: Walker, rolling  Bed mobility  Bridging: Independent  Rolling to Left: Independent  Rolling to Right: Independent  Supine to Sit: Independent  Sit to Supine: Independent  Scooting: Independent  Transfers  Sit to Stand: Independent  Stand to Sit: Independent  Bed to Chair: Independent  Ambulation  Surface: Level tile  Device: Rolling Walker  Assistance: Contact guard assistance  Quality of Gait: Patient required a wheeled walker for ambulation safety due to narrow base of support and short step length. Gait Deviations: Decreased step length;Decreased head and trunk rotation  Distance: 15     Balance  Posture: Fair  Sitting - Static: Fair;Good  Sitting - Dynamic: Fair;Good  Standing - Static: Poor  Standing - Dynamic: Poor  Exercise Treatment: Patient able to perform 1 set of 10 reps of therapeutic exercises including bilateral ankle pumps, heel slides, flexion SLRs, hip abduction, shoulder flexion, elbow curls, opening/closing. We spent 15 minutes instructing patient in therapeutic exercises as well 10 minutes instructing him in gait training with the use of a wheeled walker.       OutComes Score                                                  AM-PAC Score      17       Tinneti Score       Goals  Long Term Goals  Time Frame for Long Term Goals : 1 week  Long Term Goal 1: Patient will tolerate ambulating > or = 150 feet with appropriate assistive device independently. Long Term Goal 2: Patient will tolerate negotiating 2 steps without a handrail independently. Long Term Goal 3: Patient will tolerate performing > or = 1 set of 10 reps of therapeutic exercises for his upper and lower extremities. Patient Goals   Patient Goals : To retrun home. Education  Patient Education  Education Given To: Patient  Education Provided: Role of Therapy;Plan of Care;Home Exercise Program;Family Education  Education Provided Comments: Gait training and therapeutic exercises instruction.   Education Method: Demonstration;Verbal;Teach Back  Barriers to Learning: None  Education Outcome: Verbalized understanding;Demonstrated understanding;Continued education needed      Therapy Time   Individual Concurrent Group Co-treatment   Time In 1040         Time Out 1125         Minutes 45         Timed Code Treatment Minutes: [address] [place], [place]

## 2022-10-30 NOTE — PROGRESS NOTES
Pulmonary and Critical Care  Progress Note    Subjective: The patient is feeling better. Shortness of breath is better. Chest pain none  Addressing respiratory complaints Patient is negative for  hemoptysis and cyanosis  CONSTITUTIONAL:  negative for fevers and chills      Past Medical History:     has a past medical history of Arthritis, CAD (coronary artery disease), Cancer ([facility] [address] Diabetes mellitus ([facility] [address] History of blood transfusion, Hx of motion sickness, Hyperlipidemia, MDRO (multiple drug resistant organisms) resistance, Migraine, Pneumonia, Wears dentures, and Wears glasses. has a past surgical history that includes knee surgery (1970); Tunneled venous port placement (2013); Colonoscopy (2010); fracture surgery (Left, 1990's); Cardiac catheterization (1990's); Tonsillectomy (late 1960's); Dental surgery; eye surgery (Right, 08/2016); and other surgical history (Left, 01/18/2017). reports that he quit smoking about 35 years ago. His smoking use included cigarettes. He started smoking about 57 years ago. He has a 20.00 pack-year smoking history. He has never used smokeless tobacco. He reports that he does not drink alcohol and does not use drugs. Family history:  family history includes Asthma in his maternal uncle; Colon Cancer in his brother; Diabetes in his mother; Early Death in his brother; Heart Disease in his father; High Blood Pressure in his mother; Other in his sister.     No Known Allergies  Social History:    Reviewed; no changes    Objective:   PHYSICAL EXAM:        VITALS:  BP (!) 122/112   Pulse (!) 101   Temp (!) 95.6 °F (35.3 °C) (Axillary)   Resp 30   Ht 6' (1.829 m)   Wt 171 lb 15.3 oz (78 kg)   SpO2 93%   BMI 23.32 kg/m²     24HR INTAKE/OUTPUT:    Intake/Output Summary (Last 24 hours) at 10/30/2022 1238  Last data filed at 10/30/2022 0808  Gross per 24 hour   Intake --   Output 2555 ml   Net -2555 ml       CONSTITUTIONAL:  awake, alert, cooperative, no apparent distress, and appears stated age  LUNGS:  decreased breath sounds, occ basilar crackles. CARDIOVASCULAR:  normal S1 and S2 and negative JVD. ABD:Abdomen soft, non-tender. BS normal. No masses,  No organomegaly. NEURO:Alert and oriented x3. Gait normal. Reflexes and motor strength normal and symmetric. Cranial nerves 2-12 and sensation grossly intact. DATA:    CBC:  Recent Labs     10/28/22  0520 10/29/22  0410   WBC 46.9* 27.9*   RBC 3.56* 3.44*   HGB 8.9* 8.6*   HCT 29.9* 28.5*   PLT 79* 78*   MCV 84.0 82.8   MCH 25.0* 25.0*   MCHC 29.8* 30.2*   RDW 21.7* 22.2*   NRBC 1  --    SEGSPCT 9.0* 8.0*   BANDSPCT 1* 13*      BMP:  Recent Labs     10/28/22  0520   *   K 5.3*   CL 98*   CO2 30   BUN 17   CREATININE 0.6*   CALCIUM 8.2*   GLUCOSE 161*      ABG:  No results for input(s): PH, PO2ART, TWC5CUX, HCO3, BEART, O2SAT in the last 72 hours. Lab Results   Component Value Date    PROBNP 774.5 (H) 10/22/2022    PROBNP 343.3 (H) 10/03/2022    PROBNP 2,514 (H) 09/30/2022     No results found for: [address] [facility]    Radiology Review:  Pertinent images / reports were reviewed as a part of this visit.     Assessment:     Patient Active Problem List   Diagnosis    Pneumonia    Sepsis ([address]    Hypogammaglobulinemia ([address]    CLL (chronic lymphocytic leukemia) ([address]    Upper respiratory infection, acute    Generalized muscle weakness    Type 2 diabetes mellitus with hyperglycemia, with long-term current use of insulin (HCC)    Poor appetite    COPD (chronic obstructive pulmonary disease) (HCC)    Neutropenia (HCC)    Other specified disorders of bone density and structure, unspecified site    Cellulitis of right upper limb    Herpesviral infection    Intestinal malabsorption    Other nonspecific abnormal finding of lung field    Iron deficiency anemia due to chronic blood loss    Other muscle spasm    Leukemia, lymphocytic, chronic (HCC)    Selective deficiency of IgG ([address]    Acute bronchitis    Severe malnutrition ([address]    Pneumonia due to organism    Personal history of CLL (chronic lymphocytic leukemia)    Rhinovirus infection    Anemia    Thrombocytopenia (HCC)       Plan:   1. Overall the patient has improved. 2. Inc. Activity. 3. Discussed with the family.    Kaveh Grimaldo MD   10/30/2022  12:38 PM

## 2022-10-31 ENCOUNTER — HOSPITAL ENCOUNTER (OUTPATIENT)
Dept: INFUSION THERAPY | Age: 72
Setting detail: INFUSION SERIES
Discharge: HOME | End: 2022-10-31

## 2022-10-31 LAB
CULTURE: ABNORMAL
CULTURE: ABNORMAL
GLUCOSE BLD-MCNC: 177 MG/DL (ref 70–99)
GLUCOSE BLD-MCNC: 205 MG/DL (ref 70–99)
GLUCOSE BLD-MCNC: 291 MG/DL (ref 70–99)
GLUCOSE BLD-MCNC: 320 MG/DL (ref 70–99)
GRAM SMEAR: ABNORMAL
Lab: ABNORMAL
SPECIMEN: ABNORMAL

## 2022-10-31 PROCEDURE — 6370000000 HC RX 637 (ALT 250 FOR IP): Performed by: NURSE PRACTITIONER

## 2022-10-31 PROCEDURE — 2580000003 HC RX 258: Performed by: NURSE PRACTITIONER

## 2022-10-31 PROCEDURE — 6360000002 HC RX W HCPCS: Performed by: NURSE PRACTITIONER

## 2022-10-31 PROCEDURE — 2060000000 HC ICU INTERMEDIATE R&B

## 2022-10-31 PROCEDURE — 94761 N-INVAS EAR/PLS OXIMETRY MLT: CPT

## 2022-10-31 PROCEDURE — 2700000000 HC OXYGEN THERAPY PER DAY

## 2022-10-31 PROCEDURE — 99232 SBSQ HOSP IP/OBS MODERATE 35: CPT | Performed by: INTERNAL MEDICINE

## 2022-10-31 PROCEDURE — 6360000002 HC RX W HCPCS: Performed by: HOSPITALIST

## 2022-10-31 PROCEDURE — 6370000000 HC RX 637 (ALT 250 FOR IP): Performed by: INTERNAL MEDICINE

## 2022-10-31 PROCEDURE — 97166 OT EVAL MOD COMPLEX 45 MIN: CPT

## 2022-10-31 PROCEDURE — 82962 GLUCOSE BLOOD TEST: CPT

## 2022-10-31 PROCEDURE — 6370000000 HC RX 637 (ALT 250 FOR IP): Performed by: STUDENT IN AN ORGANIZED HEALTH CARE EDUCATION/TRAINING PROGRAM

## 2022-10-31 PROCEDURE — 6360000002 HC RX W HCPCS: Performed by: INTERNAL MEDICINE

## 2022-10-31 PROCEDURE — 94640 AIRWAY INHALATION TREATMENT: CPT

## 2022-10-31 PROCEDURE — 97530 THERAPEUTIC ACTIVITIES: CPT

## 2022-10-31 PROCEDURE — 99232 SBSQ HOSP IP/OBS MODERATE 35: CPT | Performed by: NURSE PRACTITIONER

## 2022-10-31 RX ORDER — CIPROFLOXACIN 2 MG/ML
400 INJECTION, SOLUTION INTRAVENOUS EVERY 8 HOURS SCHEDULED
Status: DISCONTINUED | OUTPATIENT
Start: 2022-10-31 | End: 2022-11-02 | Stop reason: HOSPADM

## 2022-10-31 RX ORDER — CIPROFLOXACIN 2 MG/ML
400 INJECTION, SOLUTION INTRAVENOUS EVERY 12 HOURS
Status: DISCONTINUED | OUTPATIENT
Start: 2022-10-31 | End: 2022-10-31

## 2022-10-31 RX ORDER — CIPROFLOXACIN 2 MG/ML
500 INJECTION, SOLUTION INTRAVENOUS EVERY 8 HOURS
Status: DISCONTINUED | OUTPATIENT
Start: 2022-10-31 | End: 2022-10-31 | Stop reason: DRUGHIGH

## 2022-10-31 RX ADMIN — ATORVASTATIN CALCIUM 80 MG: 40 TABLET, FILM COATED ORAL at 20:57

## 2022-10-31 RX ADMIN — BENZONATATE 100 MG: 100 CAPSULE ORAL at 10:33

## 2022-10-31 RX ADMIN — Medication 324 MG: at 20:58

## 2022-10-31 RX ADMIN — IPRATROPIUM BROMIDE AND ALBUTEROL SULFATE 1 AMPULE: 2.5; .5 SOLUTION RESPIRATORY (INHALATION) at 19:26

## 2022-10-31 RX ADMIN — POLYETHYLENE GLYCOL (3350) 17 G: 17 POWDER, FOR SOLUTION ORAL at 10:34

## 2022-10-31 RX ADMIN — IPRATROPIUM BROMIDE AND ALBUTEROL SULFATE 1 AMPULE: 2.5; .5 SOLUTION RESPIRATORY (INHALATION) at 04:00

## 2022-10-31 RX ADMIN — GUAIFENESIN 600 MG: 600 TABLET, EXTENDED RELEASE ORAL at 12:20

## 2022-10-31 RX ADMIN — CIPROFLOXACIN 400 MG: 2 INJECTION, SOLUTION INTRAVENOUS at 12:30

## 2022-10-31 RX ADMIN — IPRATROPIUM BROMIDE AND ALBUTEROL SULFATE 1 AMPULE: 2.5; .5 SOLUTION RESPIRATORY (INHALATION) at 15:10

## 2022-10-31 RX ADMIN — INSULIN GLARGINE 40 UNITS: 100 INJECTION, SOLUTION SUBCUTANEOUS at 21:05

## 2022-10-31 RX ADMIN — MORPHINE SULFATE 2 MG: 2 INJECTION, SOLUTION INTRAMUSCULAR; INTRAVENOUS at 21:14

## 2022-10-31 RX ADMIN — INSULIN LISPRO 3 UNITS: 100 INJECTION, SOLUTION INTRAVENOUS; SUBCUTANEOUS at 12:17

## 2022-10-31 RX ADMIN — GABAPENTIN 100 MG: 100 CAPSULE ORAL at 20:57

## 2022-10-31 RX ADMIN — SODIUM CHLORIDE, PRESERVATIVE FREE 10 ML: 5 INJECTION INTRAVENOUS at 20:59

## 2022-10-31 RX ADMIN — MEROPENEM 1000 MG: 1 INJECTION, POWDER, FOR SOLUTION INTRAVENOUS at 00:20

## 2022-10-31 RX ADMIN — OXYCODONE HYDROCHLORIDE AND ACETAMINOPHEN 250 MG: 500 TABLET ORAL at 10:33

## 2022-10-31 RX ADMIN — TAMSULOSIN HYDROCHLORIDE 0.4 MG: 0.4 CAPSULE ORAL at 09:34

## 2022-10-31 RX ADMIN — IPRATROPIUM BROMIDE AND ALBUTEROL SULFATE 1 AMPULE: 2.5; .5 SOLUTION RESPIRATORY (INHALATION) at 11:01

## 2022-10-31 RX ADMIN — OXYCODONE HYDROCHLORIDE AND ACETAMINOPHEN 250 MG: 500 TABLET ORAL at 20:57

## 2022-10-31 RX ADMIN — GABAPENTIN 100 MG: 100 CAPSULE ORAL at 12:20

## 2022-10-31 RX ADMIN — PIPERACILLIN AND TAZOBACTAM 4500 MG: 4; .5 INJECTION, POWDER, LYOPHILIZED, FOR SOLUTION INTRAVENOUS at 15:20

## 2022-10-31 RX ADMIN — CIPROFLOXACIN 400 MG: 2 INJECTION, SOLUTION INTRAVENOUS at 17:47

## 2022-10-31 RX ADMIN — ACETYLCYSTEINE 400 MG: 100 INHALANT RESPIRATORY (INHALATION) at 15:09

## 2022-10-31 RX ADMIN — ACETAMINOPHEN 650 MG: 325 TABLET ORAL at 15:23

## 2022-10-31 RX ADMIN — GUAIFENESIN 600 MG: 600 TABLET, EXTENDED RELEASE ORAL at 17:48

## 2022-10-31 RX ADMIN — HYDROCODONE BITARTRATE AND ACETAMINOPHEN 1 TABLET: 5; 325 TABLET ORAL at 10:34

## 2022-10-31 RX ADMIN — PANTOPRAZOLE SODIUM 40 MG: 40 TABLET, DELAYED RELEASE ORAL at 07:56

## 2022-10-31 RX ADMIN — INSULIN LISPRO 1 UNITS: 100 INJECTION, SOLUTION INTRAVENOUS; SUBCUTANEOUS at 17:53

## 2022-10-31 RX ADMIN — ACETYLCYSTEINE 400 MG: 100 INHALANT RESPIRATORY (INHALATION) at 19:27

## 2022-10-31 RX ADMIN — SODIUM CHLORIDE, PRESERVATIVE FREE 20 ML: 5 INJECTION INTRAVENOUS at 12:34

## 2022-10-31 RX ADMIN — GUAIFENESIN 600 MG: 600 TABLET, EXTENDED RELEASE ORAL at 20:58

## 2022-10-31 RX ADMIN — FINASTERIDE 5 MG: 5 TABLET, FILM COATED ORAL at 10:32

## 2022-10-31 ASSESSMENT — PAIN DESCRIPTION - DESCRIPTORS
DESCRIPTORS: ACHING
DESCRIPTORS: ACHING

## 2022-10-31 ASSESSMENT — PAIN SCALES - GENERAL
PAINLEVEL_OUTOF10: 4
PAINLEVEL_OUTOF10: 2
PAINLEVEL_OUTOF10: 4
PAINLEVEL_OUTOF10: 4

## 2022-10-31 ASSESSMENT — PAIN DESCRIPTION - LOCATION
LOCATION: RIB CAGE
LOCATION: CHEST
LOCATION: RIB CAGE

## 2022-10-31 ASSESSMENT — PAIN SCALES - WONG BAKER
WONGBAKER_NUMERICALRESPONSE: 4

## 2022-10-31 ASSESSMENT — PAIN DESCRIPTION - ORIENTATION
ORIENTATION: MID
ORIENTATION: MID

## 2022-10-31 ASSESSMENT — PAIN DESCRIPTION - ONSET: ONSET: ON-GOING

## 2022-10-31 ASSESSMENT — PAIN DESCRIPTION - PAIN TYPE
TYPE: CHRONIC PAIN
TYPE: CHRONIC PAIN

## 2022-10-31 ASSESSMENT — PAIN DESCRIPTION - FREQUENCY: FREQUENCY: INTERMITTENT

## 2022-10-31 NOTE — PROGRESS NOTES
Pulmonary and Critical Care  Progress Note    Subjective: The patient has improved. Shortness of breath better. Chest pain none  Addressing respiratory complaints Patient is negative for  hemoptysis and cyanosis  CONSTITUTIONAL:  negative for fevers and chills      Past Medical History:     has a past medical history of Arthritis, CAD (coronary artery disease), Cancer ([facility] [address] Diabetes mellitus ([facility] [address] History of blood transfusion, Hx of motion sickness, Hyperlipidemia, MDRO (multiple drug resistant organisms) resistance, Migraine, Pneumonia, Wears dentures, and Wears glasses. has a past surgical history that includes knee surgery (1970); Tunneled venous port placement (2013); Colonoscopy (2010); fracture surgery (Left, 1990's); Cardiac catheterization (1990's); Tonsillectomy (late 1960's); Dental surgery; eye surgery (Right, 08/2016); other surgical history (Left, 01/18/2017); and bronchoscopy (N/A, 10/28/2022). reports that he quit smoking about 35 years ago. His smoking use included cigarettes. He started smoking about 57 years ago. He has a 20.00 pack-year smoking history. He has never used smokeless tobacco. He reports that he does not drink alcohol and does not use drugs. Family history:  family history includes Asthma in his maternal uncle; Colon Cancer in his brother; Diabetes in his mother; Early Death in his brother; Heart Disease in his father; High Blood Pressure in his mother; Other in his sister.     No Known Allergies  Social History:    Reviewed; no changes    Objective:   PHYSICAL EXAM:        VITALS:  /75   Pulse 93   Temp 98.2 °F (36.8 °C) (Oral)   Resp 24   Ht 6' (1.829 m)   Wt 181 lb 3.5 oz (82.2 kg)   SpO2 94%   BMI 24.58 kg/m²     24HR INTAKE/OUTPUT:    Intake/Output Summary (Last 24 hours) at 10/31/2022 1021  Last data filed at 10/31/2022 0501  Gross per 24 hour   Intake --   Output 1325 ml   Net -1325 ml       CONSTITUTIONAL:  awake, alert, cooperative, no apparent distress, and appears stated age  LUNGS:  decreased breath sounds, occ basilar crackles. CARDIOVASCULAR:  normal S1 and S2 and negative JVD. ABD:Abdomen soft, non-tender. BS normal. No masses,  No organomegaly. NEURO:Alert and oriented x3. Gait normal. Reflexes and motor strength normal and symmetric. Cranial nerves 2-12 and sensation grossly intact. DATA:    CBC:  Recent Labs     10/29/22  0410   WBC 27.9*   RBC 3.44*   HGB 8.6*   HCT 28.5*   PLT 78*   MCV 82.8   MCH 25.0*   MCHC 30.2*   RDW 22.2*   SEGSPCT 8.0*   BANDSPCT 13*      BMP:  No results for input(s): NA, K, CL, CO2, BUN, CREATININE, CALCIUM, GLUCOSE in the last 72 hours. ABG:  No results for input(s): PH, PO2ART, [id number], HCO3, BEART, O2SAT in the last 72 hours. Lab Results   Component Value Date    PROBNP 774.5 (H) 10/22/2022    PROBNP 343.3 (H) 10/03/2022    PROBNP 2,514 (H) 09/30/2022     No results found for: [facility]    Radiology Review:  Pertinent images / reports were reviewed as a part of this visit.     Assessment:     Patient Active Problem List   Diagnosis    Pneumonia    Sepsis ([facility] [address]    Hypogammaglobulinemia ([facility] [address]    CLL (chronic lymphocytic leukemia) ([facility] [address]    Upper respiratory infection, acute    Generalized muscle weakness    Type 2 diabetes mellitus with hyperglycemia, with long-term current use of insulin (HCC)    Poor appetite    COPD (chronic obstructive pulmonary disease) (HCC)    Neutropenia (HCC)    Other specified disorders of bone density and structure, unspecified site    Cellulitis of right upper limb    Herpesviral infection    Intestinal malabsorption    Other nonspecific abnormal finding of lung field    Iron deficiency anemia due to chronic blood loss    Other muscle spasm    Leukemia, lymphocytic, chronic (HCC)    Selective deficiency of IgG (HCC)    Acute bronchitis    Severe malnutrition (HCC)    Pneumonia due to organism    Personal history of CLL (chronic lymphocytic leukemia)    Rhinovirus infection    Anemia Thrombocytopenia ([facility] [address]       Plan:   1. Overall the patient has improved. 2. Inc. Activity. 3. ID F/UDanii Hughes MD   10/31/2022  10:21 AM

## 2022-10-31 NOTE — PROGRESS NOTES
Occupational Therapy    Attempted to see pt twice in AM today due to urgency of case management needs. Pt not ready during initial attempt and transferring to another floor during second attempt.  Will re-attempt when pt is able to participate    David CROWE/L [id number]  12:26 PM,10/31/2022

## 2022-10-31 NOTE — PROGRESS NOTES
SIRS criteria triggered. Pt dipahoretic/ sweaty, no fever, temp WNL. Pt reports feeling same as usual. VS as charted. Pt already on ATB, and ID on pt case. Dillon Smith, NP notified of alert. No new orders at this time.

## 2022-10-31 NOTE — PROGRESS NOTES
Occupational [address] [place] [address] CARE OCCUPATIONAL THERAPY EVALUATION    Jose Cruz Cheek, 1950, 4101/4101-A, 10/31/2022    Discharge Recommendation: Home with initial 24 hour supervision/assistance, Home Health OT services (S Level 4)      History:  [place]:  The primary encounter diagnosis was Pneumonia due to infectious organism, unspecified laterality, unspecified part of lung. Diagnoses of Rhinovirus, Elevated troponin, and Personal history of CLL (chronic lymphocytic leukemia) were also pertinent to this visit. Subjective:  Patient states: \"I guess I can be a little stubborn sometimes. \"  Pain: Pt denied pain this date  Communication with other providers: RN BINA Monique  Restrictions: General Precautions, Low Fall Risk, Droplet and Contact Precautions, 2L o2, IV, Bed exit alarm    Home Setup/Prior level of function:  Social/Functional History  Lives With: Son   Type of Home: House  Home Layout: One level  Home Access: Stairs to enter without rails  Entrance Stairs - Number of Steps: 2  Bathroom Shower/Tub: Tub/Shower unit  Bathroom Toilet: Standard  Home Equipment: Rollator  Receives Help From: Family (Son assists)  ADL Assistance: Needs assistance (son assists with bathing and dressing PRN)  Homemaking Assistance: Needs assistance (son manages household IADLs)  Homemaking Responsibilities: No  Ambulation Assistance: Independent (mod I with 4ww over the past few weeks)  Transfer Assistance: Independent  Active : No  Occupation: Retired    Examination:  Observation: Supine in bed upon arrival. Agreeable to evaluation with encouragement and education. Very non-compliant with any type of recommendations.   Vision: WFL  Hearing: WFL  Vitals: Stable vitals throughout session    Body Systems and functions:  ROM: WFL all joints in BL UEs  Strength: 4+/5 MMT all major muscle groups BL UEs  Sensation: WFL (denies numbness/tingling)  Tone: Normal  Coordination: WFL for ADLs  Perception: WNL    Activities of Daily Living (ADLs):  Feeding: Independent   Grooming: SBA (able to complete in standing at sink)  UB bathing: SBA   LB bathing: Mod A (reaching distal BL LEs; has assistance from son at baseline)  UB dressing: SBA (donning clean robe seated EOB)  LB dressing: Max A (dependent with donning BL socks this date, able to manage clothing to hips in standing with CGA for steadying)  Toileting: CGA    Cognitive and Psychosocial Functioning:  Overall cognitive status: WFL (decreased overall insight/safety awareness, non-compliant behaviors)  Affect: Normal     Balance:   Sitting: SBA in unsupported sitting EOB  Standing: SBA with RW static standing, CGA for safety with dynamic standing tasks    Functional Mobility:  Bed Mobility: SBA supine to sitting EOB, SBA siting EOB to supine (HOB elevated to 30', increased time/effort required each direction)  Transfers: SBA to/from bed (min cues for safe hand placement each direction)  Ambulation: CGA progressing to SBA with RW ~75 ft in room (multiple trips from door to window); steady gait throughout, no LOB, o2 sats at 91-92% Spo2 at end of trial      AM-PAC 6 click short form for inpatient daily activity:   How much help from another person does the patient currently need. .. Unable  Dep A Lot  Max A A Lot   Mod A A Little  Min A A Little   CGA  SBA None   Mod I  Indep  Sup   1. Putting on and taking off regular lower body clothing? [] 1    [x] 2   [] 2   [] 3   [] 3   [] 4      2. Bathing (including washing, rinsing, drying)? [] 1   [] 2   [] 2 [x] 3 [] 3 [] 4   3. Toileting, which includes using toilet, bedpan, or urinal? [] 1    [] 2   [] 2   [] 3   [x] 3   [] 4     4. Putting on and taking off regular upper body clothing? [] 1   [] 2   [] 2   [] 3   [x] 3    [] 4      5. Taking care of personal grooming such as brushing teeth? [] 1   [] 2    [] 2 [] 3    [x] 3   [] 4      6. Eating meals?    [] 1   [] 2   [] 2   [] 3   [] 3   [x] 4      Raw Score: 18 [24=0% impaired(CH), 23=1-19%(CI), 20-22=20-39%(CJ), 15-19=40-59%(CK), 10-14=60-79%(CL), 7-9=80-99%(CM), 6=100%(CN)]     Treatment:  Therapeutic Activity Training:   Therapeutic activity training was instructed today. Cues were given for safety, sequence, UE/LE placement, awareness, and balance. Activities performed today included bed mobility training, UB/LB dressing tasks, safe transfer training, functional mobility with RW, education on role of OT, POC, importance of EOB/OOB activity, pursed lip breathing, d/c planning and recommendations    Safety Measures: Gait belt used, Left in Bed, Alarm in place    Assessment:  Pt is a 71year old male with a past medical history of Arthritis, CAD (coronary artery disease), Cancer ([facility] [address] Diabetes mellitus ([facility] [address] History of blood transfusion, Hx of motion sickness, Hyperlipidemia, MDRO (multiple drug resistant organisms) resistance, Migraine, Pneumonia, Wears dentures, and Wears glasses. Pt admitted with cough and shortness of breath. Pt diagnosed with acute respiratory failure secondary to pneumonia and rhinovirus. Pt lives at home with his son and reported baseline level of functioning is as above. Pt currently presenting at baseline level of functioning. Recommend discharge to home with initial 24 hour support and [facility] OT services recommended.     Complexity: Moderate  Prognosis: Good, Fair  Plan: Eval and discharge; no further acute needs    Time:   Time in: 1358  Time out: 1418  Timed treatment minutes: 10  Total time: 20    Electronically signed by:    SEBASTIEN Olivas/L, [facility], [id number]

## 2022-10-31 NOTE — PLAN OF CARE
Problem: Discharge Planning  Goal: Discharge to home or other facility with appropriate resources  Outcome: Progressing  Flowsheets (Taken 10/31/2022 1301)  Discharge to home or other facility with appropriate resources: Identify barriers to discharge with patient and caregiver     Problem: Pain  Goal: Verbalizes/displays adequate comfort level or baseline comfort level  Outcome: Progressing  Flowsheets (Taken 10/31/2022 1301)  Verbalizes/displays adequate comfort level or baseline comfort level: Encourage patient to monitor pain and request assistance     Problem: ABCDS Injury Assessment  Goal: Absence of physical injury  Outcome: Progressing     Problem: Skin/Tissue Integrity  Goal: Absence of new skin breakdown  Description: 1. Monitor for areas of redness and/or skin breakdown  2. Assess vascular access sites hourly  3. Every 4-6 hours minimum:  Change oxygen saturation probe site  4. Every 4-6 hours:  If on nasal continuous positive airway pressure, respiratory therapy assess nares and determine need for appliance change or resting period.   Outcome: Progressing     Problem: Safety - Adult  Goal: Free from fall injury  Outcome: Progressing     Problem: Chronic Conditions and Co-morbidities  Goal: Patient's chronic conditions and co-morbidity symptoms are monitored and maintained or improved  Outcome: Progressing  Flowsheets (Taken 10/31/2022 1301)  Care Plan - Patient's Chronic Conditions and Co-Morbidity Symptoms are Monitored and Maintained or Improved: Monitor and assess patient's chronic conditions and comorbid symptoms for stability, deterioration, or improvement     Problem: Nutrition Deficit:  Goal: Optimize nutritional status  Outcome: Progressing

## 2022-10-31 NOTE — PROGRESS NOTES
Physical Therapy  Attempted to see pt. Pt states he is worn out  from the move but could use a RW in the new room. Obtained a RW for pt. Will cont. Stuart Fleming.  Keena Myers PTA

## 2022-10-31 NOTE — CARE COORDINATION
Requested PT/OT to see pt asap via WB to help determine a safe d/c plan per Dr João Stubbs request in [address] [place] [address].   TE

## 2022-10-31 NOTE — PROGRESS NOTES
432)  Start IV Zosyn 4.5 gm q8h extended infusion, plan for 2 week course with end date 11/14/22  May use Mediport for IV ABX access  Trend CRP, ordered  DW Dr. Suze Ibarra, ID recommends may start Rituxin after ABX complete, may start 11/14/22  Follow up with ID in 1 week please  Weekly labs drawn on Monday during the course of treatment  CBC with differential, CMP, ESR, CRP  Fax results to Attn: [place] Staff  # [phone]   OK from ID standpoint to DC when ready    Ongoing Antimicrobial Therapy  Zosyn 10/22-25, 31-  Cipro 10/31-  Completed Antimicrobial Therapy  Azithromycin 10/22  Cefepime 10/22  Vancomycin 10/22-24? Meropenem 10/25-31  History:? Interval history noted. Chief complaint: Rhinovirus pneumonia with bacterial superinfection. Denies n/v/d/f or untoward effects of antibiotics. Physical Exam:  Vital Signs: /75   Pulse 93   Temp 98.2 °F (36.8 °C) (Oral)   Resp 24   Ht 6' (1.829 m)   Wt 181 lb 3.5 oz (82.2 kg)   SpO2 94%   BMI 24.58 kg/m²     Gen: A&O x 3, no distress  Chest: no distress and CTA. Anterior breath sounds diminished. Oxygen per NC. Heart: NSR and no MRG. Abd: soft, non-distended, no tenderness, no hepatomegaly. Normoactive bowel sounds. Ext: no clubbing, cyanosis, or edema  Neuro: Mental status intact. CN 2-12 intact and no focal sensory or motor deficits     Radiologic / Imaging / TESTING  10/22/22 XR Chest Portable:  Impression   1. Persistent patchy airspace opacities in the mid to basilar right lung   suspicious for pneumonia or aspiration given the prior CT appearance. There   may be cavitation in the right lung base. 2. Minimal left basilar airspace opacity more likely due to atelectasis than   pneumonia or aspiration. 3. At least mild peribronchial cuffing potentially due to reactive airways   disease or bronchitis.       10/23/22 CT Chest WO Contrast:  Impression   The infiltrates seen previously in the lower lobes bilaterally for the most   part have decreased in size and conspicuity. However, there are now innumerable punctate tree-in-bud centrilobular micro   nodules, which are nonspecific but suggest inflammatory/infectious   bronchiolitis. Consider both typical and atypical etiologies. In addition, cavitary lesion has developed in the periphery of the right   lower lobe and to a lesser extent within the left upper lung. Necrotizing   infection would be primarily considered given the rapid development. Consider both typical and atypical etiologies. Enlarged mediastinal lymph nodes, similar when compared to the previous exam,   process of Lia related to history of chronic lymphocytic leukemia. 10/26/22 XR Chest Portable:  Impression   1. Possible increase in diffuse reticulonodular opacities corresponding with   extensive infectious or inflammatory bronchiolitis seen on CT. 2. No significant change in a cavitary mass in the right lung base that is   more likely infectious or inflammatory in etiology than neoplastic given   absence on 09/28/2022.   3. At least moderate peribronchial cuffing potentially due to bronchitis or   reactive airways disease. 10/28/22 XR Chest Portable:  Impression   No significant interval change.      Labs:    Recent Results (from the past 24 hour(s))   POCT Glucose    Collection Time: 10/30/22 11:29 AM   Result Value Ref Range    POC Glucose 281 (H) 70 - 99 MG/DL   POCT Glucose    Collection Time: 10/30/22  4:18 PM   Result Value Ref Range    POC Glucose 242 (H) 70 - 99 MG/DL   POCT Glucose    Collection Time: 10/30/22  8:47 PM   Result Value Ref Range    POC Glucose 294 (H) 70 - 99 MG/DL   POCT Glucose    Collection Time: 10/31/22  7:50 AM   Result Value Ref Range    POC Glucose 177 (H) 70 - 99 MG/DL     CULTURE results: Invalid input(s): BLOOD CULTURE,  URINE CULTURE, SURGICAL CULTURE    Diagnosis:  Patient Active Problem List   Diagnosis    Pneumonia    Sepsis ([facility] [address] Hypogammaglobulinemia (HCC)    CLL (chronic lymphocytic leukemia) (HCC)    Upper respiratory infection, acute    Generalized muscle weakness    Type 2 diabetes mellitus with hyperglycemia, with long-term current use of insulin (HCC)    Poor appetite    COPD (chronic obstructive pulmonary disease) (HCC)    Neutropenia (HCC)    Other specified disorders of bone density and structure, unspecified site    Cellulitis of right upper limb    Herpesviral infection    Intestinal malabsorption    Other nonspecific abnormal finding of lung field    Iron deficiency anemia due to chronic blood loss    Other muscle spasm    Leukemia, lymphocytic, chronic (HCC)    Selective deficiency of IgG (HCC)    Acute bronchitis    Severe malnutrition (HCC)    Pneumonia due to organism    Personal history of CLL (chronic lymphocytic leukemia)    Rhinovirus infection    Anemia    Thrombocytopenia (HCC)       Active Problems  Principal Problem:    Pneumonia due to organism  Active Problems:    Personal history of CLL (chronic lymphocytic leukemia)    Rhinovirus infection    Anemia    Thrombocytopenia (HCC)  Resolved Problems:    * No resolved hospital problems. *    Electronically signed by: Electronically signed by JENNI Danielle CNP on 10/31/2022 at 9:55 AM

## 2022-10-31 NOTE — PROGRESS NOTES
ONCOLOGY HEMATOLOGY CARE ([place])  PROGRESS NOTE      10/31/2022  7:39 AM    Patient:    Nisa Long  : 1950   71 y.o. MRN: [id number]  Admitted: 10/22/2022  8:27 AM ATT: Ernesto Garcia MD   3110/3110-A  AdmitDx: Pneumonia due to organism [J18.9]  Rhinovirus [B34.8]  Personal history of CLL (chronic lymphocytic leukemia) [Z85.6]  Elevated troponin [R77.8]  Pneumonia due to infectious organism, unspecified laterality, unspecified part of lung [J18.9]  PCP: Santiago Huddleston MD    Late entry  Patient was seen and examined today. Feeling better day by day  But still feels poor    10/22-BC NGTD  10/22-Strep pneumo ag/ Legionella ag negative  10/22-Resp panel: positive for Rhinovirus  10/22, reordered 10/26-MRSA/MssA pending  10/22-Resp culture: Pseudomonas aeruginosa      10/23/22: Ct chest:  The infiltrates seen previously in the lower lobes bilaterally for the most   part have decreased in size and conspicuity. However, there are now innumerable punctate tree-in-bud centrilobular micro   nodules, which are nonspecific but suggest inflammatory/infectious   bronchiolitis. Consider both typical and atypical etiologies. In addition, cavitary lesion has developed in the periphery of the right   lower lobe and to a lesser extent within the left upper lung. Necrotizing   infection would be primarily considered given the rapid development. Consider both typical and atypical etiologies. Enlarged mediastinal lymph nodes, similar when compared to the previous exam,   process of Lia related to history of chronic lymphocytic leukemia.          10/28/22: bronch results noted, bal, cytology and path neg for malignancy    PHYSICAL EXAM :    Vitals: /75   Pulse 93   Temp 98.2 °F (36.8 °C) (Oral)   Resp 24   Ht 6' (1.829 m)   Wt 171 lb 15.3 oz (78 kg)   SpO2 94%   BMI 23.32 kg/m²     CONSTITUTIONAL: awake, alert, ill appearing, lying on the bed    LUNGS:coarse bs juan and poor effort  CARDIOVASCULAR:s1s2 rrr   ABDOMEN: soft bs pos  NEUROLOGIC: GI  EXTREMITIES: no LE edema bilaterally    LABORATORY RESULTS  CBC:   Recent Labs     10/29/22  0410   WBC 27.9*   HGB 8.6*   PLT 78*     BMP:    No results for input(s): NA, K, CL, CO2, BUN, CREATININE, GLUCOSE in the last 72 hours. Hepatic:   No results for input(s): AST, ALT, ALB, BILITOT, ALKPHOS in the last 72 hours. INR:   No results for input(s): INR in the last 72 hours. RADIOLOGY REPORTS  Reviewed    ASSESSMENT & RECOMMENDATIONS:  Pneumonia:Antimicrobials per ID . Bronch results nored. Continue aggressive pulmonary toilet. CLL:Was on ibrutinib before but recent plan was to start Rituxan as progressive  lad, cytopenia. Received IVIG as IP. Repeat  Flow cytometry with no increased blasts. Discussed with ID, recommend starting Rituxan in 2 weeks     Anemia and thrombocytopenia: Could be sec to Ibrutunib vs CLL vs infection vs abx. No nutritional def or any hemolysis. Transfusion support if Hb <7 or platelets <91G or bleeding. Continue other medical care. This plan was discussed with the patient and he seem to have verbalized  understanding. Improve nutritional status and recommend increased activity as tolerated. We will continue to follow the patient. Thank you for allowing us to participate in the care of this patient.      [address]

## 2022-10-31 NOTE — PROGRESS NOTES
V2.0  [facility] [place] Progress Note      Name:  Cherie Rodriguez /Age/Sex: 1950  (71 y.o. male)   MRN & CSN:  [id number] & [id number] Encounter Date/Time: 10/31/2022 10:15 AM EDT    Location:  [facility]0/3110-A PCP: Alfa Drake MD       Hospital Day: 10    Assessment and Plan:   Cherie Rodriguez is a 71 y.o. male  who presents with Pneumonia due to organism      Plan:    Rhinovirus Pneumonia with Superimposed Pseudomonas aeruginosa Bacterial Pneumonia   Persistent pneumonia-chest x-ray s/o pneumonia, possible cavitation in the right lung. Pseudomonas in sputum  in    Recent admission 2 weeks ago he was positive for rhinovirus. MRSA screen and blood cultures were negative. Patient completed 8 days of Zosyn. Respiratory panel +ve for rhinovirus. ** ID consulted. Bx no growth 48 HRs, sputum culture 10/22 with Pseudomonas aeruginosa  -MRSA screening 10/26 negative  ** Pulmonology consulted, bronch , bronchial lavage culture with Pseudomonas aeruginosa 10,000 50,000. Patient currently on meropenem per ID recs. De-escalate from meropenem 10/31. Per ID recs: Start IV Cipro 400 mg every 8, plan to transition for DC to 750 mg twice daily to complete 2-week course with end date of 22. In addition to starting IV Zosyn 4.5 g every 8 hours extended infusion, plan for 2-week course with end date of 11/14/ high 22. Discussed in IDR today, awaiting PT OT recommendations for dispo planning, and then IV antibiotics accordingly. Sepsis in setting of PNA and CLL - SIRS criteria met. LA resolved. Initially received IV fluids. Now stopped. Continue antibiotics as above. Chronic Hypoxic respiratory failure - recently discharge home O2 at 3 L per nasal cannula 10/6/22, no increase in respiratory requirement at this time, monitor with continuous pulse ox     Chest pain/elevated trop - suspect pleuritic/MSK given only has pain with coughing. EKG with non specific ST changes. hx CAD C report not available. Recent TTE 9/29/22 EF 19-11%, grade I diastolicc dysfunction. consulted Cardiology. Deemed non cardiac chest pain     Mild Hyponatremia   -Resolved with IV fluid administration. CLL and hypogammaglobulinemia was on ibrutinib before but now plan is to start on Rituxan as per heme-onc. Recently received IVIG as well, discussing with ID as when they can start Rituxan. Appreciate oncology help. Severe protein calorie malnutrition suspected with hypo albuminemia- nutrition consulted. Low pre-albumin, ordered Ensure for patient     Other chronic medical conditions-resume home medications unless contraindicated  COPD - not in exacerbation. Respiratory care as noted above, no bronchospasm noted, hold steroids. DM type II with neuropathy, A1C 6.7 9/28/22 - Monitor BG  AC/HS, resume pts basal and ssi, monitor for adjustment in regimen, ADA diet    Mixed hyperlipidemia - on statin  Recent pleural effusion on recent admission 2 weeks ago, requiring thoracentesis, fluid culture not available. BPH on flomax, proscar       PT OT ordered, awaiting evaluation and placement recommendations. Diet ADULT ORAL NUTRITION SUPPLEMENT; Breakfast, Lunch, Dinner; Standard High Calorie/High Protein Oral Supplement  ADULT DIET; Regular   DVT Prophylaxis [x] Lovenox, []  Heparin, [] SCDs, [] Ambulation,  [] Eliquis, [] Xarelto  [] Coumadin   Code Status Full Code   Disposition From: Home  Expected Disposition: Home versus SNF  Estimated Date of Discharge: 1 to 2 days  Patient requires continued admission for further management of PNA, final ID recs, PT OT evaluation   Surrogate Decision Maker/ ERLIN King     Subjective:     Chief Complaint: Fever and Shortness of Breath     No complaints, shortness of breath is at baseline patient is on 3 L nasal cannula, denies pain anywhere. Overall patient reports gradual improvement since admission. Producing less phlegm.     Review of Systems:    10 point review of systems negative except as above    Objective: Intake/Output Summary (Last 24 hours) at 10/31/2022 0823  Last data filed at 10/31/2022 0501  Gross per 24 hour   Intake --   Output 1325 ml   Net -1325 ml          Vitals:   Vitals:    10/31/22 0400   BP: 121/75   Pulse: 93   Resp: 24   Temp: 98.2 °F (36.8 °C)   SpO2: 94%       Physical Exam:   Physical Exam  Constitutional:       General: He is not in acute distress. Appearance: He is ill-appearing. HENT:      Mouth/Throat:      Mouth: Mucous membranes are moist.      Pharynx: No posterior oropharyngeal erythema. Eyes:      General: No scleral icterus. Extraocular Movements: Extraocular movements intact. Pupils: Pupils are equal, round, and reactive to light. Cardiovascular:      Rate and Rhythm: Normal rate and regular rhythm. Pulses: Normal pulses. Heart sounds: No murmur heard. Pulmonary:      Effort: Pulmonary effort is normal.      Breath sounds: No wheezing or rales. Comments: On nasal cannula oxygen. Not in any acute respiratory distress. Bilateral rhonchi. Abdominal:      General: Bowel sounds are normal. There is no distension. Palpations: Abdomen is soft. Tenderness: There is no abdominal tenderness. Musculoskeletal:         General: Normal range of motion. Right lower leg: No edema. Left lower leg: No edema. Skin:     General: Skin is warm. Findings: No rash. Neurological:      General: No focal deficit present. Mental Status: He is alert and oriented to person, place, and time. Cranial Nerves: No cranial nerve deficit. Sensory: No sensory deficit. Motor: No weakness.    Psychiatric:         Mood and Affect: Mood normal.       Medications:   Medications:    ipratropium-albuterol  1 ampule Inhalation Q4H    guaiFENesin  600 mg Oral 4x daily    acetylcysteine  4 mL Inhalation TID    meropenem  1,000 mg IntraVENous Q8H    Followed by    Karen Loya ON 11/1/2022] meropenem 1,000 mg IntraVENous Q8H    sodium chloride flush  5-40 mL IntraVENous 2 times per day    [Held by provider] enoxaparin  40 mg SubCUTAneous Daily    insulin lispro  0-4 Units SubCUTAneous TID WC    insulin lispro  0-4 Units SubCUTAneous Nightly    vitamin C  250 mg Oral BID    atorvastatin  80 mg Oral Daily    ferrous gluconate  324 mg Oral BID    finasteride  5 mg Oral Daily    gabapentin  100 mg Oral TID    pantoprazole  40 mg Oral QAM AC    tamsulosin  0.4 mg Oral Daily    insulin glargine  40 Units SubCUTAneous Nightly      Infusions:    sodium chloride Stopped (10/28/22 1017)     PRN Meds: sodium chloride flush, 5-40 mL, PRN  sodium chloride, , PRN  ondansetron, 4 mg, Q8H PRN   Or  ondansetron, 4 mg, Q6H PRN  polyethylene glycol, 17 g, Daily PRN  acetaminophen, 650 mg, Q6H PRN   Or  acetaminophen, 650 mg, Q6H PRN  benzonatate, 100 mg, TID PRN  albuterol sulfate HFA, 2 puff, Q4H PRN  HYDROcodone-acetaminophen, 1 tablet, Q12H PRN  morphine, 2 mg, Q4H PRN  ipratropium-albuterol, 1 ampule, Q4H PRN      Labs      Recent Results (from the past 24 hour(s))   POCT Glucose    Collection Time: 10/30/22  8:49 AM   Result Value Ref Range    POC Glucose 210 (H) 70 - 99 MG/DL   POCT Glucose    Collection Time: 10/30/22 11:29 AM   Result Value Ref Range    POC Glucose 281 (H) 70 - 99 MG/DL   POCT Glucose    Collection Time: 10/30/22  4:18 PM   Result Value Ref Range    POC Glucose 242 (H) 70 - 99 MG/DL   POCT Glucose    Collection Time: 10/30/22  8:47 PM   Result Value Ref Range    POC Glucose 294 (H) 70 - 99 MG/DL   POCT Glucose    Collection Time: 10/31/22  7:50 AM   Result Value Ref Range    POC Glucose 177 (H) 70 - 99 MG/DL        Imaging/Diagnostics Last 24 Hours   CT CHEST WO CONTRAST    Result Date: 10/23/2022  EXAMINATION: CT OF THE CHEST WITHOUT CONTRAST 10/23/2022 9:59 am TECHNIQUE: CT of the chest was performed without the administration of intravenous contrast. Multiplanar reformatted images are provided for review. Automated exposure control, iterative reconstruction, and/or weight based adjustment of the mA/kV was utilized to reduce the radiation dose to as low as reasonably achievable. COMPARISON: 09/28/2022 HISTORY: ORDERING SYSTEM PROVIDED HISTORY: fu pneumonia persistent TECHNOLOGIST PROVIDED HISTORY: Reason for exam:->fu pneumonia persistent Reason for Exam: fu pneumonia persistent FINDINGS: Mediastinum: Enlarged mediastinal lymph nodes are again identified, similar when compared to the previous exam.  Largest in the subcarinal space, with a short axis measurement of approximately 3 cm. Visualized thyroid unremarkable. Esophagus unremarkable. Limited noncontrast imaging of the cardiac chambers, thoracic aorta, and pulmonary arteries unremarkable. Lungs/pleura: Infiltrates in the lower lungs seen previously are decreased in density. However, innumerable tree-in-bud centrilobular micro nodules are now present within both lungs, greatest in the mid to lower lungs. In addition, a cavitary lesion in the superior segment of the right lower lobe has developed measuring approximately 5.3 cm maximally. Additional nodular infiltrates are seen in the left upper lobe, also with early cavitation. Diffuse bronchial wall thickening is noted. No filling defects are seen within the airways. No pneumothorax. No pleural effusion. Upper Abdomen: Unremarkable. Soft Tissues/Bones: No acute or suspicious bony abnormality. Extra thoracic soft tissues unremarkable. The infiltrates seen previously in the lower lobes bilaterally for the most part have decreased in size and conspicuity. However, there are now innumerable punctate tree-in-bud centrilobular micro nodules, which are nonspecific but suggest inflammatory/infectious bronchiolitis. Consider both typical and atypical etiologies. In addition, cavitary lesion has developed in the periphery of the right lower lobe and to a lesser extent within the left upper lung. Necrotizing infection would be primarily considered given the rapid development. Consider both typical and atypical etiologies. Enlarged mediastinal lymph nodes, similar when compared to the previous exam, process of Lia related to history of chronic lymphocytic leukemia.        Electronically signed by Linda Lea MD on 10/31/2022 at 8:23 AM

## 2022-11-01 LAB
GLUCOSE BLD-MCNC: 226 MG/DL (ref 70–99)
GLUCOSE BLD-MCNC: 236 MG/DL (ref 70–99)
GLUCOSE BLD-MCNC: 281 MG/DL (ref 70–99)
GLUCOSE BLD-MCNC: 308 MG/DL (ref 70–99)
HIGH SENSITIVE C-REACTIVE PROTEIN: 13.9 MG/L (ref 0–5)

## 2022-11-01 PROCEDURE — 82962 GLUCOSE BLOOD TEST: CPT

## 2022-11-01 PROCEDURE — 94664 DEMO&/EVAL PT USE INHALER: CPT

## 2022-11-01 PROCEDURE — 2060000000 HC ICU INTERMEDIATE R&B

## 2022-11-01 PROCEDURE — 6370000000 HC RX 637 (ALT 250 FOR IP): Performed by: NURSE PRACTITIONER

## 2022-11-01 PROCEDURE — 94640 AIRWAY INHALATION TREATMENT: CPT

## 2022-11-01 PROCEDURE — 6370000000 HC RX 637 (ALT 250 FOR IP): Performed by: INTERNAL MEDICINE

## 2022-11-01 PROCEDURE — 2580000003 HC RX 258: Performed by: NURSE PRACTITIONER

## 2022-11-01 PROCEDURE — 86141 C-REACTIVE PROTEIN HS: CPT

## 2022-11-01 PROCEDURE — 94761 N-INVAS EAR/PLS OXIMETRY MLT: CPT

## 2022-11-01 PROCEDURE — 6370000000 HC RX 637 (ALT 250 FOR IP): Performed by: STUDENT IN AN ORGANIZED HEALTH CARE EDUCATION/TRAINING PROGRAM

## 2022-11-01 PROCEDURE — 99232 SBSQ HOSP IP/OBS MODERATE 35: CPT | Performed by: NURSE PRACTITIONER

## 2022-11-01 PROCEDURE — 2500000003 HC RX 250 WO HCPCS: Performed by: STUDENT IN AN ORGANIZED HEALTH CARE EDUCATION/TRAINING PROGRAM

## 2022-11-01 PROCEDURE — 99211 OFF/OP EST MAY X REQ PHY/QHP: CPT

## 2022-11-01 PROCEDURE — 6360000002 HC RX W HCPCS: Performed by: NURSE PRACTITIONER

## 2022-11-01 PROCEDURE — 2700000000 HC OXYGEN THERAPY PER DAY

## 2022-11-01 PROCEDURE — 6360000002 HC RX W HCPCS: Performed by: HOSPITALIST

## 2022-11-01 RX ORDER — VALACYCLOVIR HYDROCHLORIDE 500 MG/1
TABLET, FILM COATED ORAL
Qty: 30 TABLET | Refills: 5 | Status: SHIPPED | OUTPATIENT
Start: 2022-11-01

## 2022-11-01 RX ADMIN — GABAPENTIN 100 MG: 100 CAPSULE ORAL at 07:51

## 2022-11-01 RX ADMIN — OXYCODONE HYDROCHLORIDE AND ACETAMINOPHEN 250 MG: 500 TABLET ORAL at 07:50

## 2022-11-01 RX ADMIN — INSULIN LISPRO 1 UNITS: 100 INJECTION, SOLUTION INTRAVENOUS; SUBCUTANEOUS at 08:28

## 2022-11-01 RX ADMIN — ACETYLCYSTEINE 400 MG: 100 INHALANT RESPIRATORY (INHALATION) at 20:19

## 2022-11-01 RX ADMIN — IPRATROPIUM BROMIDE AND ALBUTEROL SULFATE 1 AMPULE: 2.5; .5 SOLUTION RESPIRATORY (INHALATION) at 23:18

## 2022-11-01 RX ADMIN — IPRATROPIUM BROMIDE AND ALBUTEROL SULFATE 1 AMPULE: 2.5; .5 SOLUTION RESPIRATORY (INHALATION) at 07:18

## 2022-11-01 RX ADMIN — OXYCODONE HYDROCHLORIDE AND ACETAMINOPHEN 250 MG: 500 TABLET ORAL at 21:09

## 2022-11-01 RX ADMIN — GABAPENTIN 100 MG: 100 CAPSULE ORAL at 13:28

## 2022-11-01 RX ADMIN — ATORVASTATIN CALCIUM 80 MG: 40 TABLET, FILM COATED ORAL at 21:10

## 2022-11-01 RX ADMIN — Medication 324 MG: at 21:10

## 2022-11-01 RX ADMIN — TAMSULOSIN HYDROCHLORIDE 0.4 MG: 0.4 CAPSULE ORAL at 07:50

## 2022-11-01 RX ADMIN — CIPROFLOXACIN 400 MG: 2 INJECTION, SOLUTION INTRAVENOUS at 02:12

## 2022-11-01 RX ADMIN — INSULIN LISPRO 4 UNITS: 100 INJECTION, SOLUTION INTRAVENOUS; SUBCUTANEOUS at 21:24

## 2022-11-01 RX ADMIN — PIPERACILLIN AND TAZOBACTAM 4500 MG: 4; .5 INJECTION, POWDER, LYOPHILIZED, FOR SOLUTION INTRAVENOUS at 03:38

## 2022-11-01 RX ADMIN — INSULIN GLARGINE 40 UNITS: 100 INJECTION, SOLUTION SUBCUTANEOUS at 21:25

## 2022-11-01 RX ADMIN — IPRATROPIUM BROMIDE AND ALBUTEROL SULFATE 1 AMPULE: 2.5; .5 SOLUTION RESPIRATORY (INHALATION) at 20:18

## 2022-11-01 RX ADMIN — MICONAZOLE NITRATE: 2 POWDER TOPICAL at 14:39

## 2022-11-01 RX ADMIN — CIPROFLOXACIN 400 MG: 2 INJECTION, SOLUTION INTRAVENOUS at 17:15

## 2022-11-01 RX ADMIN — GABAPENTIN 100 MG: 100 CAPSULE ORAL at 21:10

## 2022-11-01 RX ADMIN — PANTOPRAZOLE SODIUM 40 MG: 40 TABLET, DELAYED RELEASE ORAL at 06:01

## 2022-11-01 RX ADMIN — FINASTERIDE 5 MG: 5 TABLET, FILM COATED ORAL at 07:51

## 2022-11-01 RX ADMIN — CIPROFLOXACIN 400 MG: 2 INJECTION, SOLUTION INTRAVENOUS at 10:16

## 2022-11-01 RX ADMIN — IPRATROPIUM BROMIDE AND ALBUTEROL SULFATE 1 AMPULE: 2.5; .5 SOLUTION RESPIRATORY (INHALATION) at 00:24

## 2022-11-01 RX ADMIN — MICONAZOLE NITRATE: 2 POWDER TOPICAL at 21:12

## 2022-11-01 RX ADMIN — SODIUM CHLORIDE, PRESERVATIVE FREE 10 ML: 5 INJECTION INTRAVENOUS at 21:10

## 2022-11-01 RX ADMIN — GUAIFENESIN 600 MG: 600 TABLET, EXTENDED RELEASE ORAL at 21:10

## 2022-11-01 RX ADMIN — INSULIN LISPRO 2 UNITS: 100 INJECTION, SOLUTION INTRAVENOUS; SUBCUTANEOUS at 18:19

## 2022-11-01 RX ADMIN — PIPERACILLIN AND TAZOBACTAM 4500 MG: 4; .5 INJECTION, POWDER, LYOPHILIZED, FOR SOLUTION INTRAVENOUS at 17:20

## 2022-11-01 RX ADMIN — INSULIN LISPRO 1 UNITS: 100 INJECTION, SOLUTION INTRAVENOUS; SUBCUTANEOUS at 12:22

## 2022-11-01 RX ADMIN — GUAIFENESIN 600 MG: 600 TABLET, EXTENDED RELEASE ORAL at 17:12

## 2022-11-01 RX ADMIN — HYDROCODONE BITARTRATE AND ACETAMINOPHEN 1 TABLET: 5; 325 TABLET ORAL at 02:19

## 2022-11-01 RX ADMIN — SODIUM CHLORIDE, PRESERVATIVE FREE 10 ML: 5 INJECTION INTRAVENOUS at 07:53

## 2022-11-01 RX ADMIN — IPRATROPIUM BROMIDE AND ALBUTEROL SULFATE 1 AMPULE: 2.5; .5 SOLUTION RESPIRATORY (INHALATION) at 04:25

## 2022-11-01 RX ADMIN — HYDROCODONE BITARTRATE AND ACETAMINOPHEN 1 TABLET: 5; 325 TABLET ORAL at 13:32

## 2022-11-01 RX ADMIN — Medication 324 MG: at 07:50

## 2022-11-01 RX ADMIN — MORPHINE SULFATE 2 MG: 2 INJECTION, SOLUTION INTRAMUSCULAR; INTRAVENOUS at 21:26

## 2022-11-01 RX ADMIN — BENZONATATE 100 MG: 100 CAPSULE ORAL at 02:20

## 2022-11-01 RX ADMIN — PIPERACILLIN AND TAZOBACTAM 4500 MG: 4; .5 INJECTION, POWDER, LYOPHILIZED, FOR SOLUTION INTRAVENOUS at 10:11

## 2022-11-01 RX ADMIN — SODIUM CHLORIDE 25 ML: 9 INJECTION, SOLUTION INTRAVENOUS at 03:36

## 2022-11-01 RX ADMIN — GUAIFENESIN 600 MG: 600 TABLET, EXTENDED RELEASE ORAL at 13:27

## 2022-11-01 RX ADMIN — GUAIFENESIN 600 MG: 600 TABLET, EXTENDED RELEASE ORAL at 07:50

## 2022-11-01 RX ADMIN — ACETYLCYSTEINE 400 MG: 100 INHALANT RESPIRATORY (INHALATION) at 07:20

## 2022-11-01 RX ADMIN — SODIUM CHLORIDE 25 ML: 9 INJECTION, SOLUTION INTRAVENOUS at 02:12

## 2022-11-01 ASSESSMENT — PAIN SCALES - WONG BAKER
WONGBAKER_NUMERICALRESPONSE: 4
WONGBAKER_NUMERICALRESPONSE: 0
WONGBAKER_NUMERICALRESPONSE: 4
WONGBAKER_NUMERICALRESPONSE: 4

## 2022-11-01 ASSESSMENT — PAIN DESCRIPTION - ORIENTATION
ORIENTATION: RIGHT
ORIENTATION: RIGHT
ORIENTATION: MID

## 2022-11-01 ASSESSMENT — PAIN DESCRIPTION - LOCATION
LOCATION: RIB CAGE

## 2022-11-01 ASSESSMENT — PAIN - FUNCTIONAL ASSESSMENT
PAIN_FUNCTIONAL_ASSESSMENT: PREVENTS OR INTERFERES SOME ACTIVE ACTIVITIES AND ADLS
PAIN_FUNCTIONAL_ASSESSMENT: ACTIVITIES ARE NOT PREVENTED
PAIN_FUNCTIONAL_ASSESSMENT: ACTIVITIES ARE NOT PREVENTED

## 2022-11-01 ASSESSMENT — PAIN DESCRIPTION - DESCRIPTORS
DESCRIPTORS: GNAWING
DESCRIPTORS: ACHING
DESCRIPTORS: GNAWING

## 2022-11-01 ASSESSMENT — PAIN SCALES - GENERAL
PAINLEVEL_OUTOF10: 7
PAINLEVEL_OUTOF10: 3
PAINLEVEL_OUTOF10: 6
PAINLEVEL_OUTOF10: 7
PAINLEVEL_OUTOF10: 4

## 2022-11-01 ASSESSMENT — PAIN DESCRIPTION - PAIN TYPE
TYPE: CHRONIC PAIN
TYPE: CHRONIC PAIN

## 2022-11-01 NOTE — PROGRESS NOTES
Hospitalist Progress Note      Name:  Ese Rodriguez /Age/Sex: 1950  (71 y.o. male)   MRN & CSN:  [id number] & [id number] Admission Date/Time: 10/22/2022  8:27 AM   Location:  [address] [place] [address] PCP: Krista Barron MD         Hospital Day: 11    Assessment and Plan:   Ese Rodriguez is a 71 y.o.  male  who presents with Pneumonia due to organism    1. Rhinovirus Pneumonia with Superimposed Pseudomonas aeruginosa Bacterial Pneumonia:  Respiratory panel +ve for rhinovirus. ID consulted. Bx no growth 48 HRs, sputum culture 10/22 with Pseudomonas aeruginosa. MRSA screening 10/26 negative. Pulmonology consulted, bronch , bronchial lavage culture with Pseudomonas aeruginosa. IV Cipro, on discharge transition to 750 mg bid to complete a 2 week course with end date of 22 (QTc 432). IV Zosyn  for 2 week course with end date 22    2. Sepsis in setting of PNA and CLL - SIRS criteria met. LA resolved. Initially received IV fluids. Now stopped. Continue antibiotics as above. 3. Chronic Hypoxic respiratory failure - recently discharge home O2 at 3 L per nasal cannula 10/6/22, no increase in respiratory requirement at this time, monitor with continuous pulse ox     4. Chest pain/elevated trop - suspect pleuritic/MSK given only has pain with coughing. EKG with non specific ST changes. hx CAD C report not available. Recent TTE 22 EF 03-32%, grade I diastolicc dysfunction. consulted Cardiology. Deemed non cardiac chest pain     5. Mild Hyponatremia - Likely poor oral intake     6. CLL and hypogammaglobulinemia was on ibrutinib before but now plan is to start on Rituxan as per heme-onc. Recently received IVIG as well, discussing with ID as when they can start Rituxan. Appreciate oncology help. 7. Severe protein calorie malnutrition suspected with hypo albuminemia- nutrition consulted. Low pre-albumin, ordered Ensure for patient     8.  Other chronic medical conditions-resume home medications unless contraindicated  COPD - not in exacerbation. On home O2. Respiratory care as noted above, no bronchospasm noted, hold steroids. 9. DM type II with neuropathy, A1C 6.7 9/28/22 - Monitor BG  AC/HS, resume pts basal and ssi, monitor for adjustment in regimen, ADA diet     10. Mixed hyperlipidemia - on statin  Recent pleural effusion on recent admission 2 weeks ago, requiring thoracentesis, fluid culture not available. 11. BPH on flomax, proscar       Stable for Dc. Need to arrange IV antibiotic on DC.  on board. Diet ADULT ORAL NUTRITION SUPPLEMENT; Breakfast, Lunch, Dinner; Standard High Calorie/High Protein Oral Supplement  ADULT DIET; Regular   DVT Prophylaxis [] Lovenox, []  Heparin, [] SCDs, [] Ambulation   GI Prophylaxis [] PPI,  [] H2 Blocker,  [] Carafate,  [] Diet/Tube Feeds   Code Status Full Code   Disposition Patient requires continued admission due to pending placement   MDM [] Low, [] Moderate,[]  High  Patient's risk as above due to HIV     History of Present Illness:     Chief Complaint: Pneumonia due to organism  Chaparrita Ingram is a 71 y.o.  male  who presents with SOB due to recurrent pneumonia    Patient still has some dyspnea on exertion. But feels better and wants to go home. Ten point ROS reviewed negative, unless as noted above    Objective: Intake/Output Summary (Last 24 hours) at 11/1/2022 1337  Last data filed at 11/1/2022 1214  Gross per 24 hour   Intake 120 ml   Output 2140 ml   Net -2020 ml      Vitals:   Vitals:    11/01/22 1332   BP:    Pulse:    Resp: 18   Temp:    SpO2:      Physical Exam:   GEN Awake male, sitting upright in bed in no apparent distress. Appears given age. EYES Pupils are equally round. No scleral erythema, discharge, or conjunctivitis. HENT Mucous membranes are moist. Oral pharynx without exudates, no evidence of thrush. NECK Supple, no apparent thyromegaly or masses. RESP B/l ronchi  CARDIO/VASC S1/S2 auscultated.  Regular rate without appreciable murmurs, rubs, or gallops. No JVD or carotid bruits. Peripheral pulses equal bilaterally and palpable. No peripheral edema. GI Abdomen is soft without significant tenderness, masses, or guarding. Bowel sounds are normoactive. Rectal exam deferred.  No costovertebral angle tenderness. Normal appearing external genitalia. Godfrey catheter is not present. HEME/LYMPH No palpable cervical lymphadenopathy and no hepatosplenomegaly. No petechiae or ecchymoses. MSK No gross joint deformities. SKIN Normal coloration, warm, dry. NEURO Cranial nerves appear grossly intact, normal speech, no lateralizing weakness. PSYCH Awake, alert, oriented x 4. Affect appropriate.     Medications:   Medications:    miconazole   Topical BID    piperacillin-tazobactam  4,500 mg IntraVENous Q8H    ciprofloxacin  400 mg IntraVENous 3 times per day    ipratropium-albuterol  1 ampule Inhalation Q4H    guaiFENesin  600 mg Oral 4x daily    acetylcysteine  4 mL Inhalation TID    sodium chloride flush  5-40 mL IntraVENous 2 times per day    [Held by provider] enoxaparin  40 mg SubCUTAneous Daily    insulin lispro  0-4 Units SubCUTAneous TID WC    insulin lispro  0-4 Units SubCUTAneous Nightly    vitamin C  250 mg Oral BID    atorvastatin  80 mg Oral Daily    ferrous gluconate  324 mg Oral BID    finasteride  5 mg Oral Daily    gabapentin  100 mg Oral TID    pantoprazole  40 mg Oral QAM AC    tamsulosin  0.4 mg Oral Daily    insulin glargine  40 Units SubCUTAneous Nightly      Infusions:    sodium chloride 25 mL (11/01/22 0336)     PRN Meds: sodium chloride flush, 5-40 mL, PRN  sodium chloride, , PRN  ondansetron, 4 mg, Q8H PRN   Or  ondansetron, 4 mg, Q6H PRN  polyethylene glycol, 17 g, Daily PRN  acetaminophen, 650 mg, Q6H PRN   Or  acetaminophen, 650 mg, Q6H PRN  benzonatate, 100 mg, TID PRN  albuterol sulfate HFA, 2 puff, Q4H PRN  HYDROcodone-acetaminophen, 1 tablet, Q12H PRN  morphine, 2 mg, Q4H PRN  ipratropium-albuterol, 1 ampule, Q4H PRN          Patient is still admitted because Pending placement. The anticipated discharge is in less than 24 hours.      Electronically signed by Annika Warren MD on 11/1/2022 at 1:37 PM

## 2022-11-01 NOTE — PROGRESS NOTES
Patient refused breathing treatment, due to he doesn't want his door shut. This RT educated him on policy, and we could open the door after a hour. The patient still refused. 99

## 2022-11-01 NOTE — PROGRESS NOTES
Infectious Disease Progress Note  2022   Patient Name: Renea Gonzalez : 1950   Impression:  Rhinovirus Pneumonia with Superimposed Pseudomonas aeruginosa Bacterial Pneumonia with Continued Chronic Hypoxic Respiratory Failure:  Cavitary Lesions RLL and NIKI:  Afebrile   Leukocytosis on DWT  CRP and Pct remain low, patient reports he is feeling improved, oxygen remains at his baseline 3 L/min/NC, appears slightly improved  10/22-BC 0/2-NGTD  10/22-Strep pneumo ag/ Legionella ag negative  10/22-Resp panel: positive for Rhinovirus  10/22, reordered 10/26-MRSA/MssA pending  10/22-Resp culture: Pseudomonas aeruginosa  10/23-CT Chest WO Contrast:The infiltrates seen previously in the lower lobes bilaterally for the most   part have decreased in size and conspicuity. However, there are now innumerable punctate tree-in-bud centrilobular micro   nodules, which are nonspecific but suggest inflammatory/infectious   bronchiolitis. Consider both typical and atypical etiologies. In addition, cavitary lesion has developed in the periphery of the right   lower lobe and to a lesser extent within the left upper lung. Necrotizing   infection would be primarily considered given the rapid development. Consider both typical and atypical etiologies. Enlarged mediastinal lymph nodes, similar when compared to the previous exam,   process of Lia related to history of chronic lymphocytic leukemia  10/28-S/p per Dr. Tae Gonzalez, BAL, cultures: Pseudomonas aeruginosa  CLL and Hypogammaglobulinemia:  Dr. Randy Arvizu onboard  Imp anemia sec to infection  Was on ibrutinib but recent plan was to start Rituxan.      DMII:  Hypoalbuminemia:  Hyponatremia:  CAD/Chest Pain/ Elevated Troponin:  Dr. Lori Quiñonez onboard  Imp of non-cardiac chest pain  Multi-morbidity: per PMHx    Plan:  Continue IV Cipro 400 mg q8h, will plan to transition for DC to 750 mg bid to complete a 2 week course with end date of 22 (QTc 432)  Continue IV Zosyn 4.5 gm q8h extended infusion, plan for 2 week course with end date 11/14/22  May use Mediport for IV ABX access  Trend CRP, ordered  DW Dr. Suze Ibarra, ID recommends may start Rituxin after ABX complete, may start 11/14/22  Follow up with ID in 1 week please  Weekly labs drawn on Monday during the course of treatment  CBC with differential, CMP, ESR, CRP  Fax results to Attn: [place] Staff  # [phone]   OK from ID standpoint to DC when ready    Ongoing Antimicrobial Therapy  Zosyn 10/22-25, 31-  Cipro 10/31-  Completed Antimicrobial Therapy  Azithromycin 10/22  Cefepime 10/22  Vancomycin 10/22-24? Meropenem 10/25-31  History:? Interval history noted. Chief complaint: Rhinovirus pneumonia with bacterial superinfection. Denies n/v/d/f or untoward effects of antibiotics. States is feeling well, frequent productive cough but dyspnea is improving  Physical Exam:  Vital Signs: /72   Pulse 87   Temp 97.7 °F (36.5 °C)   Resp 14   Ht 6' (1.829 m)   Wt 153 lb 12.8 oz (69.8 kg)   SpO2 95%   BMI 20.86 kg/m²     Gen: remains alert and oriented x 4  Chest: no distress and CTA. Anterior breath sounds diminished. Oxygen per NC. Heart: NSR and no MRG. Abd: soft, non-distended, no tenderness, no hepatomegaly. Normoactive bowel sounds. Ext: no clubbing, cyanosis, or edema  Neuro: Mental status intact. CN 2-12 intact and no focal sensory or motor deficits     Radiologic / Imaging / TESTING  10/22/22 XR Chest Portable:  Impression   1. Persistent patchy airspace opacities in the mid to basilar right lung   suspicious for pneumonia or aspiration given the prior CT appearance. There   may be cavitation in the right lung base. 2. Minimal left basilar airspace opacity more likely due to atelectasis than   pneumonia or aspiration. 3. At least mild peribronchial cuffing potentially due to reactive airways   disease or bronchitis.       10/23/22 CT Chest WO Contrast:  Impression   The infiltrates seen previously in the lower lobes bilaterally for the most   part have decreased in size and conspicuity. However, there are now innumerable punctate tree-in-bud centrilobular micro   nodules, which are nonspecific but suggest inflammatory/infectious   bronchiolitis. Consider both typical and atypical etiologies. In addition, cavitary lesion has developed in the periphery of the right   lower lobe and to a lesser extent within the left upper lung. Necrotizing   infection would be primarily considered given the rapid development. Consider both typical and atypical etiologies. Enlarged mediastinal lymph nodes, similar when compared to the previous exam,   process of Lia related to history of chronic lymphocytic leukemia. 10/26/22 XR Chest Portable:  Impression   1. Possible increase in diffuse reticulonodular opacities corresponding with   extensive infectious or inflammatory bronchiolitis seen on CT. 2. No significant change in a cavitary mass in the right lung base that is   more likely infectious or inflammatory in etiology than neoplastic given   absence on 09/28/2022.   3. At least moderate peribronchial cuffing potentially due to bronchitis or   reactive airways disease. 10/28/22 XR Chest Portable:  Impression   No significant interval change.      Labs:    Recent Results (from the past 24 hour(s))   POCT Glucose    Collection Time: 10/31/22 11:39 AM   Result Value Ref Range    POC Glucose 320 (H) 70 - 99 MG/DL   POCT Glucose    Collection Time: 10/31/22  4:29 PM   Result Value Ref Range    POC Glucose 205 (H) 70 - 99 MG/DL   POCT Glucose    Collection Time: 10/31/22  9:03 PM   Result Value Ref Range    POC Glucose 291 (H) 70 - 99 MG/DL   C-Reactive Protein    Collection Time: 11/01/22  6:00 AM   Result Value Ref Range    CRP, High Sensitivity 13.9 (H) 0.0 - 5.0 mg/L   POCT Glucose    Collection Time: 11/01/22  8:07 AM   Result Value Ref Range    POC Glucose 226 (H) 70 - 99 MG/DL     CULTURE results: Invalid input(s): BLOOD CULTURE,  URINE CULTURE, SURGICAL CULTURE    Diagnosis:  Patient Active Problem List   Diagnosis    Pneumonia    Sepsis ([address]    Hypogammaglobulinemia ([address]    CLL (chronic lymphocytic leukemia) ([address]    Upper respiratory infection, acute    Generalized muscle weakness    Type 2 diabetes mellitus with hyperglycemia, with long-term current use of insulin (HCC)    Poor appetite    COPD (chronic obstructive pulmonary disease) (HCC)    Neutropenia (HCC)    Other specified disorders of bone density and structure, unspecified site    Cellulitis of right upper limb    Herpesviral infection    Intestinal malabsorption    Other nonspecific abnormal finding of lung field    Iron deficiency anemia due to chronic blood loss    Other muscle spasm    Leukemia, lymphocytic, chronic (HCC)    Selective deficiency of IgG (HCC)    Acute bronchitis    Severe malnutrition (HCC)    Pneumonia due to organism    Personal history of CLL (chronic lymphocytic leukemia)    Rhinovirus infection    Anemia    Thrombocytopenia (HCC)       Active Problems  Principal Problem:    Pneumonia due to organism  Active Problems:    Personal history of CLL (chronic lymphocytic leukemia)    Rhinovirus infection    Anemia    Thrombocytopenia (HCC)  Resolved Problems:    * No resolved hospital problems. *    Electronically signed by: Electronically signed by Staci Schmid.  JENNI Farley CNP on 11/1/2022 at 10:32 AM

## 2022-11-01 NOTE — CARE COORDINATION
Son Tamara Hilario is not willing to learn IV atbs at home and states he will ask a neighbor to help out. Tamara Hilario stated that pt will go not go skilled. Asked for friends number and he stated he didn't know her number and he will talk with her later today.

## 2022-11-01 NOTE — CARE COORDINATION
Reviewed chart, spoke with Dr Kahlil Spaulding and son. Pt wanting to go home with [facility]. for iv atb.   PS to Erick LUGO

## 2022-11-01 NOTE — PROGRESS NOTES
Pulmonary and Critical Care  Progress Note    Subjective: The patient is feeling better. Anxious to go home. Shortness of breath is better. Chest pain none  Addressing respiratory complaints Patient is negative for  hemoptysis and cyanosis  CONSTITUTIONAL:  negative for fevers and chills      Past Medical History:     has a past medical history of Arthritis, CAD (coronary artery disease), Cancer ([facility] Diabetes mellitus ([facility] History of blood transfusion, Hx of motion sickness, Hyperlipidemia, MDRO (multiple drug resistant organisms) resistance, Migraine, Pneumonia, Wears dentures, and Wears glasses. has a past surgical history that includes knee surgery (1970); Tunneled venous port placement (2013); Colonoscopy (2010); fracture surgery (Left, 1990's); Cardiac catheterization (1990's); Tonsillectomy (late 1960's); Dental surgery; eye surgery (Right, 08/2016); other surgical history (Left, 01/18/2017); and bronchoscopy (N/A, 10/28/2022). reports that he quit smoking about 35 years ago. His smoking use included cigarettes. He started smoking about 57 years ago. He has a 20.00 pack-year smoking history. He has never used smokeless tobacco. He reports that he does not drink alcohol and does not use drugs. Family history:  family history includes Asthma in his maternal uncle; Colon Cancer in his brother; Diabetes in his mother; Early Death in his brother; Heart Disease in his father; High Blood Pressure in his mother; Other in his sister.     No Known Allergies  Social History:    Reviewed; no changes    Objective:   PHYSICAL EXAM:        VITALS:  /72   Pulse 87   Temp 97.7 °F (36.5 °C)   Resp 14   Ht 6' (1.829 m)   Wt 153 lb 12.8 oz (69.8 kg)   SpO2 95%   BMI 20.86 kg/m²     24HR INTAKE/OUTPUT:    Intake/Output Summary (Last 24 hours) at 11/1/2022 1127  Last data filed at 11/1/2022 0551  Gross per 24 hour   Intake 120 ml   Output 1440 ml   Net -1320 ml       CONSTITUTIONAL:  awake, alert, cooperative, no apparent distress, and appears stated age  LUNGS:  decreased breath sounds, occ basilar crackles. CARDIOVASCULAR:  normal S1 and S2 and negative JVD. ABD:Abdomen soft, non-tender. BS normal. No masses,  No organomegaly. NEURO:Alert and oriented x3. Gait normal. Reflexes and motor strength normal and symmetric. Cranial nerves 2-12 and sensation grossly intact. DATA:    CBC:  No results for input(s): WBC, RBC, HGB, HCT, PLT, MCV, MCH, MCHC, RDW, NRBC, SEGSPCT, BANDSPCT in the last 72 hours. BMP:  No results for input(s): NA, K, CL, CO2, BUN, CREATININE, CALCIUM, GLUCOSE in the last 72 hours. ABG:  No results for input(s): PH, PO2ART, MXE7LCA, HCO3, BEART, O2SAT in the last 72 hours. Lab Results   Component Value Date    PROBNP 774.5 (H) 10/22/2022    PROBNP 343.3 (H) 10/03/2022    PROBNP 2,514 (H) 09/30/2022     No results found for: [facility]    Radiology Review:  Pertinent images / reports were reviewed as a part of this visit.     Assessment:     Patient Active Problem List   Diagnosis    Pneumonia    Sepsis ([address]    Hypogammaglobulinemia ([address]    CLL (chronic lymphocytic leukemia) ([address]    Upper respiratory infection, acute    Generalized muscle weakness    Type 2 diabetes mellitus with hyperglycemia, with long-term current use of insulin (HCC)    Poor appetite    COPD (chronic obstructive pulmonary disease) (HCC)    Neutropenia (HCC)    Other specified disorders of bone density and structure, unspecified site    Cellulitis of right upper limb    Herpesviral infection    Intestinal malabsorption    Other nonspecific abnormal finding of lung field    Iron deficiency anemia due to chronic blood loss    Other muscle spasm    Leukemia, lymphocytic, chronic (HCC)    Selective deficiency of IgG (HCC)    Acute bronchitis    Severe malnutrition ([address]    Pneumonia due to organism    Personal history of CLL (chronic lymphocytic leukemia)    Rhinovirus infection    Anemia    Thrombocytopenia ([address] Plan:   1. Overall the patient has improved. 2. Inc. Activity. 3. Discussed with the family.    Cindi Jordan MD   11/1/2022  11:27 AM

## 2022-11-01 NOTE — CONSULTS
[facility] Continence Nurse  Consult Note       Gisella Millard  AGE: 71 y.o. GENDER: male  : 1950  TODAY'S DATE:  2022    Subjective:     Reason for  Evaluation and Assessment: wound care reassessment.        Gisella Millard is a 71 y.o. male referred by:   [x] Physician  [] Nursing  [] Other:     Wound Identification:  Wound Type: diabetic and pressure  Contributing Factors: diabetes        PAST MEDICAL HISTORY        Diagnosis Date    Arthritis     knees, Rt hand/wrist    CAD (coronary artery disease)     Cancer (HCC)     CLL--Sees Dr Canelo Varela    Diabetes mellitus [facility])     History of blood transfusion     no reaction    Hx of motion sickness     Hyperlipidemia     MDRO (multiple drug resistant organisms) resistance     abscess on buttock 10yrs ago    Migraine     last one summer 2016    Pneumonia 2016    Wears dentures     full upper--lower dentures    Wears glasses        PAST SURGICAL HISTORY    Past Surgical History:   Procedure Laterality Date    BRONCHOSCOPY N/A 10/28/2022    BRONCHOSCOPY performed by Minal Antoine MD at [facility]  's    x 2  last showed \"inflammation of heart\"    COLONOSCOPY      DENTAL SURGERY      all teeth extracted     EYE SURGERY Right 2016    Cataract removal    FRACTURE SURGERY Left     left ankle plate and screws    KNEE SURGERY  1970    left    OTHER SURGICAL HISTORY Left 2017    groin excision    TONSILLECTOMY  late 's    age 13    TUNNELED VENOUS PORT PLACEMENT      Right upper chest       FAMILY HISTORY    Family History   Problem Relation Age of Onset    Diabetes Mother     High Blood Pressure Mother     Heart Disease Father     Other Sister         liver problems    Early Death Brother     Asthma Maternal Uncle     Colon Cancer Brother        SOCIAL HISTORY    Social History     Tobacco Use    Smoking status: Former     Packs/day: 1.00     Years: 20.00     Pack years: 20.00     Types: Cigarettes     Start date: 10/2/1965     Quit date: 1987     Years since quittin.8    Smokeless tobacco: Never   Vaping Use    Vaping Use: Never used   Substance Use Topics    Alcohol use: No    Drug use: No       ALLERGIES    No Known Allergies    MEDICATIONS    Current Facility-Administered Medications on File Prior to Encounter   Medication Dose Route Frequency Provider Last Rate Last Admin    sodium chloride flush 0.9 % injection 10 mL  10 mL IntraVENous PRN Ludwin Arias MD         Current Outpatient Medications on File Prior to Encounter   Medication Sig Dispense Refill    HYDROcodone-acetaminophen (NORCO) 5-325 MG per tablet Take 1 tablet by mouth every 12 hours as needed for Pain for up to 30 days. 60 tablet 0    gabapentin (NEURONTIN) 100 MG capsule Take 1 capsule by mouth 3 times daily for 30 days. 90 capsule 0    albuterol sulfate HFA (PROVENTIL;VENTOLIN;PROAIR) 108 (90 Base) MCG/ACT inhaler Inhale 2 puffs into the lungs every 4-6 hours as needed for Shortness of Breath or Wheezing      ipratropium-albuterol (DUONEB) 0.5-2.5 (3) MG/3ML SOLN nebulizer solution Take 1 vial by nebulization every 6 hours as needed for Shortness of Breath      Ascorbic Acid (VITAMIN C) 250 MG tablet Take 250 mg by mouth 2 times daily      ferrous gluconate 324 (37.5 Fe) MG TABS Take 1 tablet by mouth 2 times daily 60 tablet 5    LEVEMIR FLEXTOUCH 100 UNIT/ML injection pen Inject 40 Units into the skin nightly 5 pen 3    NOVOLOG FLEXPEN 100 UNIT/ML injection pen Inject 15 Units into the skin 3 times daily (before meals) 5 pen 3    atorvastatin (LIPITOR) 80 MG tablet Take 80 mg by mouth daily      acetaminophen-codeine (TYLENOL #3) 300-30 MG per tablet Take 1 tablet by mouth every 6 hours as needed for Pain.       finasteride (PROSCAR) 5 MG tablet Take 5 mg by mouth daily      tamsulosin (FLOMAX) 0.4 MG capsule Take 0.4 mg by mouth daily      glucose monitoring kit (FREESTYLE) monitoring kit 1 kit by Does not apply route daily as needed (glucose checks) 1 kit 0    Insulin Syringe-Needle U-100 30G X 1/2\" 0.5 ML MISC 1 each by Does not apply route daily 100 each 3    metFORMIN (GLUCOPHAGE) 1000 MG tablet Take 1,000 mg by mouth 2 times daily (with meals)      omeprazole (PRILOSEC) 20 MG delayed release capsule Take 20 mg by mouth daily as needed (GERD)      Immune Globulin, Human, 20 GM/200ML SOLN solution Infuse 20 g intravenously every 30 days 05/14/19 Given through infusion therapy states he is due on the 20 of May           Objective:      /72   Pulse 87   Temp 97.7 °F (36.5 °C)   Resp 14   Ht 6' (1.829 m)   Wt 153 lb 12.8 oz (69.8 kg)   SpO2 95%   BMI 20.86 kg/m²   Russ Risk Score: Russ Scale Score: 17    LABS    CBC:   Lab Results   Component Value Date/Time    WBC 27.9 10/29/2022 04:10 AM    RBC 3.44 10/29/2022 04:10 AM    RBC 3.38 08/21/2017 09:06 AM    HGB 8.6 10/29/2022 04:10 AM    HCT 28.5 10/29/2022 04:10 AM    MCV 82.8 10/29/2022 04:10 AM    MCH 25.0 10/29/2022 04:10 AM    MCHC 30.2 10/29/2022 04:10 AM    RDW 22.2 10/29/2022 04:10 AM    PLT 78 10/29/2022 04:10 AM    MPV 11.3 10/29/2022 04:10 AM     CMP:    Lab Results   Component Value Date/Time     10/28/2022 05:20 AM    K 5.3 10/28/2022 05:20 AM    CL 98 10/28/2022 05:20 AM    CO2 30 10/28/2022 05:20 AM    BUN 17 10/28/2022 05:20 AM    CREATININE 0.6 10/28/2022 05:20 AM    GFRAA >60 10/11/2022 10:30 AM    LABGLOM >60 10/28/2022 05:20 AM    GLUCOSE 161 10/28/2022 05:20 AM    PROT 5.1 10/28/2022 05:20 AM    PROT 6.1 12/27/2012 09:53 AM    LABALBU 2.8 10/28/2022 05:20 AM    CALCIUM 8.2 10/28/2022 05:20 AM    BILITOT 0.5 10/28/2022 05:20 AM    ALKPHOS 170 10/28/2022 05:20 AM    AST 26 10/28/2022 05:20 AM    ALT 23 10/28/2022 05:20 AM     Albumin:    Lab Results   Component Value Date/Time    LABALBU 2.8 10/28/2022 05:20 AM     PT/INR:    Lab Results   Component Value Date/Time    PROTIME 14.7 10/26/2022 01:04 PM    INR 1.14 10/26/2022 01:04 PM HgBA1c:    Lab Results   Component Value Date/Time    LABA1C 6.9 10/23/2022 11:15 AM         Assessment:     Patient Active Problem List   Diagnosis    Pneumonia    Sepsis ([facility]    Hypogammaglobulinemia ([facility]    CLL (chronic lymphocytic leukemia) ([facility]    Upper respiratory infection, acute    Generalized muscle weakness    Type 2 diabetes mellitus with hyperglycemia, with long-term current use of insulin (HCC)    Poor appetite    COPD (chronic obstructive pulmonary disease) ([facility])    Neutropenia ([facility])    Other specified disorders of bone density and structure, unspecified site    Cellulitis of right upper limb    Herpesviral infection    Intestinal malabsorption    Other nonspecific abnormal finding of lung field    Iron deficiency anemia due to chronic blood loss    Other muscle spasm    Leukemia, lymphocytic, chronic ([facility])    Selective deficiency of IgG ([facility])    Acute bronchitis    Severe malnutrition ([facility]    Pneumonia due to organism    Personal history of CLL (chronic lymphocytic leukemia)    Rhinovirus infection    Anemia    Thrombocytopenia ([facility])       Measurements:  Wound 10/01/22 Sacrum Mid (Active)   Wound Image   10/24/22 1045   Wound Etiology Pressure Stage 2 11/01/22 0818   Dressing Status Intact 10/31/22 2045   Wound Cleansed Cleansed with saline 10/24/22 1045   Dressing/Treatment Silicone border 58/99/23 1120   Wound Length (cm) 0 cm 11/01/22 1120   Wound Width (cm) 0 cm 11/01/22 1120   Wound Depth (cm) 0 cm 11/01/22 1120   Wound Surface Area (cm^2) 0 cm^2 11/01/22 1120   Change in Wound Size % (l*w) 100 11/01/22 1120   Wound Volume (cm^3) 0 cm^3 11/01/22 1120   Wound Healing % 100 11/01/22 1120   Distance Tunneling (cm) 0 cm 11/01/22 1120   Tunneling Position ___ O'Clock 0 11/01/22 1120   Undermining Starts ___ O'Clock 0 11/01/22 1120   Undermining Ends___ O'Clock 0 11/01/22 1120   Undermining Maxium Distance (cm) 0 11/01/22 1120   Wound Assessment Pink/red 11/01/22 1120   Drainage Amount None 11/01/22 1120   Drainage Description Serosanguinous 10/24/22 2020   Odor None 10/24/22 1045   Glo-wound Assessment Intact 11/01/22 1120   Margins Attached edges 11/01/22 1120   Wound Thickness Description not for Pressure Injury Partial thickness 10/24/22 1045   Number of days: 30       Wound 10/01/22 Ankle Left;Lateral (Active)   Wound Image   11/01/22 1120   Wound Etiology Diabetic 11/01/22 1120   Dressing Status Intact 10/24/22 2020   Wound Cleansed Cleansed with saline 11/01/22 1120   Dressing/Treatment Betadine swabs/povidone iodine 11/01/22 1120   Wound Length (cm) 0.6 cm 11/01/22 1120   Wound Width (cm) 0.5 cm 11/01/22 1120   Wound Depth (cm) 0.1 cm 11/01/22 1120   Wound Surface Area (cm^2) 0.3 cm^2 11/01/22 1120   Change in Wound Size % (l*w) 0 11/01/22 1120   Wound Volume (cm^3) 0.03 cm^3 11/01/22 1120   Wound Healing % 0 11/01/22 1120   Distance Tunneling (cm) 0 cm 11/01/22 1120   Tunneling Position ___ O'Clock 0 11/01/22 1120   Undermining Starts ___ O'Clock 0 11/01/22 1120   Undermining Ends___ O'Clock 0 11/01/22 1120   Undermining Maxium Distance (cm) 0 11/01/22 1120   Wound Assessment Eschar dry 11/01/22 1120   Drainage Amount None 11/01/22 1120   Odor None 10/24/22 1045   Glo-wound Assessment Intact 11/01/22 1120   Margins Attached edges 11/01/22 1120   Number of days: 30       Response to treatment:  Well tolerated by patient. Pain Assessment:  Severity:  none  Quality of pain:   Wound Pain Timing/Severity:   Premedicated: no    Plan:     Plan of Care: Wound 10/01/22 Sacrum Mid-Dressing/Treatment: Silicone border  [REMOVED] Wound 10/01/22 Elbow Left aury-Dressing/Treatment: Open to air  [REMOVED] Wound 10/01/22 Elbow Right appears to be scabbed-Dressing/Treatment: Silicone border  Wound 10/01/22 Ankle Left;Lateral-Dressing/Treatment: Betadine swabs/povidone iodine    Patient in bed agreeable to wound care reassessment of sacrum, lt lateral ankle wounds.  Sacrum with dry eschar that came off wound is healed applied foam border. Left lateral ankle with dry eschar diabetic wound cleansed with NS measured and pictured. Painted with betadine and HERMELINDO. Rt elbow healed. Pt has rash/yeasty area to groin/thighs. Recommend micotin powder. Heels intact and floated. Pt turned to rt side with pillow support. Atmos air pump placed to the bed. Pt is at mild risk for skin breakdown AEB cindy. Follow cindy orders. Specialty Bed Required : yes  [] Low Air Loss   [x] Pressure Redistribution  [] Fluid Immersion  [] Bariatric  [] Total Pressure Relief  [] Other:     Discharge Plan:  Placement for patient upon discharge: tbd  Hospice Care: no  Patient appropriate for Outpatient [address]: [facility]    Patient/Caregiver Teaching:  Level of patient/caregiver understanding able to: pt voiced understanding. Electronically signed by Jeremie Murphy RN, on 11/1/2022 at 1:13 PM

## 2022-11-02 VITALS
OXYGEN SATURATION: 96 % | DIASTOLIC BLOOD PRESSURE: 63 MMHG | WEIGHT: 153.8 LBS | RESPIRATION RATE: 19 BRPM | TEMPERATURE: 97.9 F | BODY MASS INDEX: 20.83 KG/M2 | HEART RATE: 97 BPM | HEIGHT: 72 IN | SYSTOLIC BLOOD PRESSURE: 101 MMHG

## 2022-11-02 LAB
C-REACTIVE PROTEIN, HIGH SENSITIVITY: 15.1 MG/L
CULTURE: ABNORMAL
CULTURE: ABNORMAL
GLUCOSE BLD-MCNC: 228 MG/DL (ref 70–99)
GLUCOSE BLD-MCNC: 358 MG/DL (ref 70–99)
GRAM SMEAR: ABNORMAL
Lab: ABNORMAL
SPECIMEN: ABNORMAL

## 2022-11-02 PROCEDURE — 86140 C-REACTIVE PROTEIN: CPT

## 2022-11-02 PROCEDURE — 2580000003 HC RX 258: Performed by: NURSE PRACTITIONER

## 2022-11-02 PROCEDURE — 6370000000 HC RX 637 (ALT 250 FOR IP): Performed by: STUDENT IN AN ORGANIZED HEALTH CARE EDUCATION/TRAINING PROGRAM

## 2022-11-02 PROCEDURE — 99232 SBSQ HOSP IP/OBS MODERATE 35: CPT | Performed by: NURSE PRACTITIONER

## 2022-11-02 PROCEDURE — 2500000003 HC RX 250 WO HCPCS: Performed by: STUDENT IN AN ORGANIZED HEALTH CARE EDUCATION/TRAINING PROGRAM

## 2022-11-02 PROCEDURE — 6370000000 HC RX 637 (ALT 250 FOR IP): Performed by: NURSE PRACTITIONER

## 2022-11-02 PROCEDURE — 6370000000 HC RX 637 (ALT 250 FOR IP): Performed by: INTERNAL MEDICINE

## 2022-11-02 PROCEDURE — 6360000002 HC RX W HCPCS: Performed by: NURSE PRACTITIONER

## 2022-11-02 PROCEDURE — 82962 GLUCOSE BLOOD TEST: CPT

## 2022-11-02 PROCEDURE — 99231 SBSQ HOSP IP/OBS SF/LOW 25: CPT | Performed by: INTERNAL MEDICINE

## 2022-11-02 PROCEDURE — 94761 N-INVAS EAR/PLS OXIMETRY MLT: CPT

## 2022-11-02 PROCEDURE — 94640 AIRWAY INHALATION TREATMENT: CPT

## 2022-11-02 PROCEDURE — 2700000000 HC OXYGEN THERAPY PER DAY

## 2022-11-02 RX ORDER — CIPROFLOXACIN 500 MG/1
750 TABLET, FILM COATED ORAL 2 TIMES DAILY
Qty: 36 TABLET | Refills: 0 | Status: SHIPPED | OUTPATIENT
Start: 2022-11-02 | End: 2022-11-14

## 2022-11-02 RX ORDER — ALOGLIPTIN 25 MG/1
25 TABLET, FILM COATED ORAL DAILY
Qty: 60 TABLET | Refills: 0 | Status: SHIPPED | OUTPATIENT
Start: 2022-11-03 | End: 2022-12-03

## 2022-11-02 RX ORDER — INSULIN LISPRO 100 [IU]/ML
0-8 INJECTION, SOLUTION INTRAVENOUS; SUBCUTANEOUS
Status: DISCONTINUED | OUTPATIENT
Start: 2022-11-02 | End: 2022-11-02 | Stop reason: HOSPADM

## 2022-11-02 RX ORDER — DEXTROSE MONOHYDRATE 100 MG/ML
INJECTION, SOLUTION INTRAVENOUS CONTINUOUS PRN
Status: DISCONTINUED | OUTPATIENT
Start: 2022-11-02 | End: 2022-11-02 | Stop reason: HOSPADM

## 2022-11-02 RX ORDER — POLYETHYLENE GLYCOL 3350 17 G/17G
17 POWDER, FOR SOLUTION ORAL DAILY PRN
Qty: 527 G | Refills: 1 | Status: SHIPPED | OUTPATIENT
Start: 2022-11-02 | End: 2022-12-02

## 2022-11-02 RX ORDER — INSULIN ASPART 100 [IU]/ML
15 INJECTION, SOLUTION INTRAVENOUS; SUBCUTANEOUS
Qty: 13.5 ML | Refills: 0 | Status: SHIPPED | OUTPATIENT
Start: 2022-11-02 | End: 2022-12-02

## 2022-11-02 RX ORDER — INSULIN LISPRO 100 [IU]/ML
10 INJECTION, SOLUTION INTRAVENOUS; SUBCUTANEOUS
Status: DISCONTINUED | OUTPATIENT
Start: 2022-11-02 | End: 2022-11-02

## 2022-11-02 RX ORDER — INSULIN LISPRO 100 [IU]/ML
15 INJECTION, SOLUTION INTRAVENOUS; SUBCUTANEOUS
Status: DISCONTINUED | OUTPATIENT
Start: 2022-11-02 | End: 2022-11-02 | Stop reason: HOSPADM

## 2022-11-02 RX ORDER — ALOGLIPTIN 12.5 MG/1
25 TABLET, FILM COATED ORAL DAILY
Status: DISCONTINUED | OUTPATIENT
Start: 2022-11-02 | End: 2022-11-02 | Stop reason: HOSPADM

## 2022-11-02 RX ORDER — GUAIFENESIN 600 MG/1
600 TABLET, EXTENDED RELEASE ORAL 4 TIMES DAILY
Qty: 30 TABLET | Refills: 0 | Status: SHIPPED | OUTPATIENT
Start: 2022-11-02 | End: 2022-11-28 | Stop reason: SDUPTHER

## 2022-11-02 RX ORDER — INSULIN GLARGINE 100 [IU]/ML
40 INJECTION, SOLUTION SUBCUTANEOUS NIGHTLY
Status: DISCONTINUED | OUTPATIENT
Start: 2022-11-02 | End: 2022-11-02 | Stop reason: HOSPADM

## 2022-11-02 RX ORDER — INSULIN ASPART 100 [IU]/ML
10 INJECTION, SOLUTION INTRAVENOUS; SUBCUTANEOUS
Qty: 30 ML | Refills: 3 | Status: SHIPPED | OUTPATIENT
Start: 2022-11-02 | End: 2022-11-02 | Stop reason: SDUPTHER

## 2022-11-02 RX ADMIN — BENZONATATE 100 MG: 100 CAPSULE ORAL at 05:19

## 2022-11-02 RX ADMIN — IPRATROPIUM BROMIDE AND ALBUTEROL SULFATE 1 AMPULE: 2.5; .5 SOLUTION RESPIRATORY (INHALATION) at 11:41

## 2022-11-02 RX ADMIN — OXYCODONE HYDROCHLORIDE AND ACETAMINOPHEN 250 MG: 500 TABLET ORAL at 07:38

## 2022-11-02 RX ADMIN — SODIUM CHLORIDE, PRESERVATIVE FREE 10 ML: 5 INJECTION INTRAVENOUS at 02:49

## 2022-11-02 RX ADMIN — PIPERACILLIN AND TAZOBACTAM 4500 MG: 4; .5 INJECTION, POWDER, LYOPHILIZED, FOR SOLUTION INTRAVENOUS at 02:52

## 2022-11-02 RX ADMIN — GABAPENTIN 100 MG: 100 CAPSULE ORAL at 13:39

## 2022-11-02 RX ADMIN — INSULIN LISPRO 1 UNITS: 100 INJECTION, SOLUTION INTRAVENOUS; SUBCUTANEOUS at 07:44

## 2022-11-02 RX ADMIN — GUAIFENESIN 600 MG: 600 TABLET, EXTENDED RELEASE ORAL at 07:38

## 2022-11-02 RX ADMIN — Medication 324 MG: at 07:40

## 2022-11-02 RX ADMIN — MICONAZOLE NITRATE: 2 POWDER TOPICAL at 07:42

## 2022-11-02 RX ADMIN — GUAIFENESIN 600 MG: 600 TABLET, EXTENDED RELEASE ORAL at 13:39

## 2022-11-02 RX ADMIN — PIPERACILLIN AND TAZOBACTAM 4500 MG: 4; .5 INJECTION, POWDER, LYOPHILIZED, FOR SOLUTION INTRAVENOUS at 09:24

## 2022-11-02 RX ADMIN — SODIUM CHLORIDE, PRESERVATIVE FREE 10 ML: 5 INJECTION INTRAVENOUS at 07:42

## 2022-11-02 RX ADMIN — HYDROCODONE BITARTRATE AND ACETAMINOPHEN 1 TABLET: 5; 325 TABLET ORAL at 05:19

## 2022-11-02 RX ADMIN — ALOGLIPTIN 25 MG: 12.5 TABLET, FILM COATED ORAL at 13:38

## 2022-11-02 RX ADMIN — GABAPENTIN 100 MG: 100 CAPSULE ORAL at 07:38

## 2022-11-02 RX ADMIN — CIPROFLOXACIN 400 MG: 2 INJECTION, SOLUTION INTRAVENOUS at 02:54

## 2022-11-02 RX ADMIN — INSULIN LISPRO 10 UNITS: 100 INJECTION, SOLUTION INTRAVENOUS; SUBCUTANEOUS at 12:14

## 2022-11-02 RX ADMIN — FINASTERIDE 5 MG: 5 TABLET, FILM COATED ORAL at 07:39

## 2022-11-02 RX ADMIN — SODIUM CHLORIDE 25 ML: 9 INJECTION, SOLUTION INTRAVENOUS at 02:51

## 2022-11-02 RX ADMIN — TAMSULOSIN HYDROCHLORIDE 0.4 MG: 0.4 CAPSULE ORAL at 07:38

## 2022-11-02 RX ADMIN — CIPROFLOXACIN 400 MG: 2 INJECTION, SOLUTION INTRAVENOUS at 11:13

## 2022-11-02 RX ADMIN — PANTOPRAZOLE SODIUM 40 MG: 40 TABLET, DELAYED RELEASE ORAL at 05:19

## 2022-11-02 RX ADMIN — POLYETHYLENE GLYCOL (3350) 17 G: 17 POWDER, FOR SOLUTION ORAL at 05:19

## 2022-11-02 RX ADMIN — INSULIN LISPRO 4 UNITS: 100 INJECTION, SOLUTION INTRAVENOUS; SUBCUTANEOUS at 11:50

## 2022-11-02 ASSESSMENT — PAIN DESCRIPTION - DESCRIPTORS: DESCRIPTORS: GNAWING

## 2022-11-02 ASSESSMENT — PAIN SCALES - GENERAL: PAINLEVEL_OUTOF10: 6

## 2022-11-02 ASSESSMENT — PAIN DESCRIPTION - LOCATION: LOCATION: RIB CAGE

## 2022-11-02 ASSESSMENT — PAIN DESCRIPTION - ORIENTATION: ORIENTATION: RIGHT

## 2022-11-02 ASSESSMENT — PAIN - FUNCTIONAL ASSESSMENT: PAIN_FUNCTIONAL_ASSESSMENT: ACTIVITIES ARE NOT PREVENTED

## 2022-11-02 NOTE — DISCHARGE SUMMARY
V2.0  Discharge Summary    Name:  Jose Cruz Cheek /Age/Sex: 1950 (48 y.o. male)   Admit Date: 10/22/2022  Discharge Date: 22    MRN & CSN:  [id number] & [id number] Encounter Date and Time 22 1:49 PM EDT    Attending:  Dallin Tobias, * Discharging Provider: Dallin Tobias MD       Hospital Course:     Brief HPI: Jose Cruz Cheek is a 71 y.o. male who presented with sob    Brief Problem Based Course: 1. Rhinovirus Pneumonia with Superimposed Pseudomonas aeruginosa Bacterial Pneumonia:  Respiratory panel +ve for rhinovirus. ID consulted. Bx no growth 48 HRs, sputum culture 10/22 with Pseudomonas aeruginosa. MRSA screening 10/26 negative. Pulmonology consulted, bronch , bronchial lavage culture with Pseudomonas aeruginosa. IV Cipro, on discharge transition to 750 mg bid to complete a 2 week course with end date of 22 (QTc 432). IV Zosyn  for 2 week course with end date 22. Advised to f/u in ID and pulm clinic      2. Sepsis in setting of PNA and CLL - SIRS criteria met. LA resolved. Initially received IV fluids. Now stopped. Continue antibiotics as above. 3. Chronic Hypoxic respiratory failure - recently discharge home O2 at 3 L per nasal cannula 10/6/22, no increase in respiratory requirement at this time, monitor with continuous pulse ox     4. Chest pain/elevated trop - suspect pleuritic/MSK given only has pain with coughing. EKG with non specific ST changes. hx CAD LHC report not available. Recent TTE 22 EF 78-61%, grade I diastolicc dysfunction. consulted Cardiology. Deemed non cardiac chest pain     5. Mild Hyponatremia - Likely poor oral intake     6. CLL and hypogammaglobulinemia was on ibrutinib before but now plan is to start on Rituxan as per heme-onc. Recently received IVIG as well, discussing with ID as when they can start Rituxan. Appreciate oncology help.       7. Severe protein calorie malnutrition suspected with hypo albuminemia- nutrition consulted. Low pre-albumin, ordered Ensure for patient     8. Other chronic medical conditions-resume home medications unless contraindicated  COPD - not in exacerbation. On home O2. Respiratory care as noted above, no bronchospasm noted, hold steroids. 9. DM type II with neuropathy, A1C 6.7 9/28/22 - TO resume home dose of lantus and novolog on DC     10. Mixed hyperlipidemia - on statin  Recent pleural effusion on recent admission 2 weeks ago, requiring thoracentesis, fluid culture not available. 11. BPH on flomax, proscar    12. Mild hyperkalemia - can repeat BMP to monitor with PCP         The patient expressed appropriate understanding of, and agreement with the discharge recommendations, medications, and plan.      Consults this admission:  PHARMACY TO DOSE VANCOMYCIN  IP CONSULT TO HOSPITALIST  IP CONSULT TO ONCOLOGY  IP CONSULT TO INFECTIOUS DISEASES  IP CONSULT TO PULMONOLOGY  IP CONSULT TO CARDIOLOGY  IP CONSULT TO DIETITIAN  IP CONSULT TO DIETITIAN  IP CONSULT TO ENDOCRINOLOGY    Discharge Diagnosis:   Pneumonia due to organism        Discharge Instruction:   Follow up appointments: ID, Pulmonology clinic  Primary care physician: Analilia Key MD within 2 weeks  Diet: diabetic diet   Activity: activity as tolerated  Disposition: Discharged to:   []Home, [x]C, []SNF, []Acute Rehab, []Hospice   Condition on discharge: Stable  Labs and Tests to be Followed up as an outpatient by PCP or Specialist: Repeat Na and K    Discharge Medications:        Medication List        START taking these medications      alogliptin 25 MG Tabs tablet  Commonly known as: NESINA  Take 1 tablet by mouth daily  Start taking on: November 3, 2022     ciprofloxacin 500 MG tablet  Commonly known as: CIPRO  Take 1.5 tablets by mouth 2 times daily for 12 days     guaiFENesin 600 MG extended release tablet  Commonly known as: MUCINEX  Take 1 tablet by mouth in the morning, at noon, in the evening, and at bedtime miconazole 2 % powder  Commonly known as: MICOTIN  Apply topically 2 times daily. piperacillin-tazobactam  infusion  Commonly known as: ZOSYN  Infuse 4.5 g intravenously in the morning and 4.5 g at noon and 4.5 g in the evening. Do all this for 12 days. Compound per protocol. .     polyethylene glycol 17 g packet  Commonly known as: GLYCOLAX  Take 17 g by mouth daily as needed for Constipation            CHANGE how you take these medications      valACYclovir 500 MG tablet  Commonly known as: VALTREX  TAKE 1 TABLET BY MOUTH EVERY DAY  What changed: See the new instructions. CONTINUE taking these medications      acetaminophen-codeine 300-30 MG per tablet  Commonly known as: TYLENOL #3     albuterol sulfate  (90 Base) MCG/ACT inhaler  Commonly known as: PROVENTIL;VENTOLIN;PROAIR     atorvastatin 80 MG tablet  Commonly known as: LIPITOR     ferrous gluconate 324 (37.5 Fe) MG Tabs  Take 1 tablet by mouth 2 times daily     finasteride 5 MG tablet  Commonly known as: PROSCAR     gabapentin 100 MG capsule  Commonly known as: NEURONTIN  Take 1 capsule by mouth 3 times daily for 30 days. glucose monitoring kit  1 kit by Does not apply route daily as needed (glucose checks)     HYDROcodone-acetaminophen 5-325 MG per tablet  Commonly known as: NORCO  Take 1 tablet by mouth every 12 hours as needed for Pain for up to 30 days.      immune globulin (human) 20 GM/200ML Soln solution     Insulin Syringe-Needle U-100 30G X 1/2\" 0.5 ML Misc  1 each by Does not apply route daily     ipratropium-albuterol 0.5-2.5 (3) MG/3ML Soln nebulizer solution  Commonly known as: DUONEB     Levemir FlexTouch 100 UNIT/ML injection pen  Generic drug: insulin detemir  Inject 40 Units into the skin nightly     metFORMIN 1000 MG tablet  Commonly known as: GLUCOPHAGE     NovoLOG FlexPen 100 UNIT/ML injection pen  Generic drug: insulin aspart  Inject 15 Units into the skin 3 times daily (before meals)     omeprazole 20 MG delayed release capsule  Commonly known as: PRILOSEC     tamsulosin 0.4 MG capsule  Commonly known as: FLOMAX     vitamin C 250 MG tablet               Where to Get Your Medications        These medications were sent to [address] [place] [address], [address] [facility]  [address], [address] [facility] [address]      Phone: [phone]   alogliptin 25 MG Tabs tablet  guaiFENesin 600 MG extended release tablet  miconazole 2 % powder  NovoLOG FlexPen 100 UNIT/ML injection pen  polyethylene glycol 17 g packet  valACYclovir 500 MG tablet       You can get these medications from any pharmacy    Bring a paper prescription for each of these medications  ciprofloxacin 500 MG tablet  piperacillin-tazobactam  infusion        Objective Findings at Discharge:   /63   Pulse 97   Temp 97.9 °F (36.6 °C) (Oral)   Resp 19   Ht 6' (1.829 m)   Wt 153 lb 12.8 oz (69.8 kg)   SpO2 96%   BMI 20.86 kg/m²       Physical Exam:   General: NAD  Eyes: EOMI  ENT: neck supple  Cardiovascular: Regular rate. Respiratory: Clear to auscultation  Gastrointestinal: Soft, non tender  Genitourinary: no suprapubic tenderness  Musculoskeletal: No edema  Skin: warm, dry  Neuro: Alert. Psych: Mood appropriate. Labs and Imaging   [facility] LESS THAN 1 HOUR    Result Date: 10/28/2022  Radiology exam is complete. No Radiologist dictation. Please follow up with ordering provider. XR CHEST PORTABLE    Result Date: 11/1/2022  EXAMINATION: ONE XRAY VIEW OF THE CHEST 10/28/2022 11:54 am COMPARISON: 10/26/2022 HISTORY: ORDERING SYSTEM PROVIDED HISTORY: post bronch TECHNOLOGIST PROVIDED HISTORY: INSPIRATORY & EXPIRATORY 2 hours post bronchoscopy, Reason for exam:->post bronch Reason for Exam: post bronch FINDINGS: The heart size is normal.  Right IJ Port-A-Cath is in place. Bilateral interstitial and airspace opacity is noted. Right lower lobe cavitary mass is re-identified.   The appearance is similar to the previous exam.     No significant interval change. XR CHEST PORTABLE    Result Date: 10/26/2022  EXAMINATION: ONE XRAY VIEW OF THE CHEST 10/26/2022 6:04 pm COMPARISON: Chest CT 10/23/2022, chest radiograph 10/22/2022 HISTORY: ORDERING SYSTEM PROVIDED HISTORY: pneumonia TECHNOLOGIST PROVIDED HISTORY: Reason for exam:->pneumonia Reason for Exam: pneumonia Additional signs and symptoms: NA Relevant Medical/Surgical History: DIABETES FINDINGS: Unchanged right internal jugular central venous port catheter. Overlying heart monitor leads. At least moderate bilateral peribronchial cuffing. Decreased lung volumes. Similar appearance of a cavitary mass in the basilar right lung. Possible increase in diffuse reticulonodular opacities bilaterally. No definite findings of pneumothorax or pleural effusion. Normal mediastinal and cardiac contours. 1. Possible increase in diffuse reticulonodular opacities corresponding with extensive infectious or inflammatory bronchiolitis seen on CT. 2. No significant change in a cavitary mass in the right lung base that is more likely infectious or inflammatory in etiology than neoplastic given absence on 09/28/2022. 3. At least moderate peribronchial cuffing potentially due to bronchitis or reactive airways disease. CBC: No results for input(s): WBC, HGB, PLT in the last 72 hours. BMP:  No results for input(s): NA, K, CL, CO2, BUN, CREATININE, GLUCOSE in the last 72 hours. Hepatic: No results for input(s): AST, ALT, ALB, BILITOT, ALKPHOS in the last 72 hours.   Lipids:   Lab Results   Component Value Date/Time    CHOL 160 08/03/2012 02:47 PM    HDL 32 08/03/2012 02:47 PM    TRIG 274 08/03/2012 02:47 PM     Hemoglobin A1C:   Lab Results   Component Value Date/Time    LABA1C 6.9 10/23/2022 11:15 AM     TSH: No results found for: TSH  Troponin:   Lab Results   Component Value Date/Time    TROPONINT <0.010 10/23/2022 01:19 AM    TROPONINT 0.010 10/22/2022 09:05 AM TROPONINT <0.010 09/28/2022 10:48 PM     Lactic Acid: No results for input(s): LACTA in the last 72 hours. BNP: No results for input(s): PROBNP in the last 72 hours.   UA:  Lab Results   Component Value Date/Time    NITRU NEGATIVE 10/05/2022 11:20 AM    COLORU YELLOW 10/05/2022 11:20 AM    WBCUA 1 09/28/2022 12:10 PM    RBCUA 1 09/28/2022 12:10 PM    MUCUS RARE 09/28/2022 12:10 PM    TRICHOMONAS NONE SEEN 04/09/2019 01:02 PM    BACTERIA NEGATIVE 09/28/2022 12:10 PM    CLARITYU CLEAR 10/05/2022 11:20 AM    SPECGRAV 1.015 10/05/2022 11:20 AM    LEUKOCYTESUR NEGATIVE 10/05/2022 11:20 AM    UROBILINOGEN 2.0 10/05/2022 11:20 AM    BILIRUBINUR NEGATIVE 10/05/2022 11:20 AM    BLOODU NEGATIVE 10/05/2022 11:20 AM    KETUA NEGATIVE 10/05/2022 11:20 AM     Urine Cultures: No results found for: LABURIN  Blood Cultures: No results found for: BC  No results found for: BLOODCULT2  Organism: No results found for: ORG    Time Spent Discharging patient 35 minutes    Electronically signed by Caprice Simons MD on 11/2/2022 at 1:49 PM

## 2022-11-02 NOTE — PROGRESS NOTES
Physical Therapy  Attempted to see pt, pt states he is waiting for his son to come pick him up and he will be able to walk then. Offered ex but pt politely declined. Tila Gross.  Otelia Query PTA

## 2022-11-02 NOTE — CARE COORDINATION
Spoke with pt about home with [facility] [employer] [facility] he states son working on finding someone to help him. CM will revisit after son come in.      36 Spoke with Son Ed , neighbor can not help with iv atb,  but Ed is willing to learn to do iv atb at home. Discussed in IDR, pt ready for d/c once iv atb set up. PS to Shannan C.      1230 Pt on discharge , home with [facility] and [facility] for iv atb.

## 2022-11-02 NOTE — PROGRESS NOTES
ONCOLOGY HEMATOLOGY CARE ([place])  PROGRESS NOTE      2022  8:55 AM    Patient:    Nisa Long  : 1950   71 y.o. MRN: [id number]  Admitted: 10/22/2022  8:27 AM ATT: Elsa Lydia Wright, *   [id number]-A  AdmitDx: Pneumonia due to organism [J18.9]  Rhinovirus [B34.8]  Personal history of CLL (chronic lymphocytic leukemia) [Z85.6]  Elevated troponin [R77.8]  Pneumonia due to infectious organism, unspecified laterality, unspecified part of lung [J18.9]  PCP: Santiago Huddleston MD    Patient was seen and examined today. Breathing better  Productive cough improving  No fever  No bleeding    10/23/22: Ct chest:  The infiltrates seen previously in the lower lobes bilaterally for the most   part have decreased in size and conspicuity. However, there are now innumerable punctate tree-in-bud centrilobular micro   nodules, which are nonspecific but suggest inflammatory/infectious   bronchiolitis. Consider both typical and atypical etiologies. In addition, cavitary lesion has developed in the periphery of the right   lower lobe and to a lesser extent within the left upper lung. Necrotizing   infection would be primarily considered given the rapid development. Consider both typical and atypical etiologies. Enlarged mediastinal lymph nodes, similar when compared to the previous exam,   process of Lia related to history of chronic lymphocytic leukemia.          10/28/22: bronch results noted, bal, cytology and path neg for malignancy    PHYSICAL EXAM :    Vitals: /63   Pulse 97   Temp 97.9 °F (36.6 °C) (Oral)   Resp 19   Ht 6' (1.829 m)   Wt 153 lb 12.8 oz (69.8 kg)   SpO2 96%   BMI 20.86 kg/m²     CONSTITUTIONAL: awake, alert, ill appearing, lying on the bed    LUNGS:coarse bs juan and poor effort  CARDIOVASCULAR:s1s2 rrr   ABDOMEN: soft bs pos  NEUROLOGIC: GI  EXTREMITIES: no LE edema bilaterally    LABORATORY RESULTS  CBC:   No results for input(s): WBC, HGB, PLT in the last 72 hours. BMP:    No results for input(s): NA, K, CL, CO2, BUN, CREATININE, GLUCOSE in the last 72 hours. Hepatic:   No results for input(s): AST, ALT, ALB, BILITOT, ALKPHOS in the last 72 hours. INR:   No results for input(s): INR in the last 72 hours. RADIOLOGY REPORTS  Reviewed    ASSESSMENT & RECOMMENDATIONS:  Pneumonia:Antimicrobials per ID . Bronch results nored. Continue aggressive pulmonary toilet. Awaiting [facility] for IV abx    CLL:Was on ibrutinib before but recent plan was to start Rituxan as progressive  lad, cytopenia. Received IVIG as IP. Repeat  Flow cytometry with no increased blasts. Discussed with ID, recommend starting Rituxan in 2 weeks . Staff notified. Anemia and thrombocytopenia: Could be sec to Ibrutunib vs CLL vs infection vs abx. No nutritional def or any hemolysis. Transfusion support if Hb <7 or platelets <62R or bleeding. Continue other medical care. This plan was discussed with the patient and he seem to have verbalized  understanding. Improve nutritional status and recommend increased activity as tolerated. We will continue to follow the patient. Thank you for allowing us to participate in the care of this patient.      [address]

## 2022-11-02 NOTE — PROGRESS NOTES
Infectious Disease Progress Note  2022   Patient Name: Susie Rea : 1950   Impression:  Rhinovirus Pneumonia with Superimposed Pseudomonas aeruginosa Bacterial Pneumonia with Continued Chronic Hypoxic Respiratory Failure:  Cavitary Lesions RLL and NIKI:  Afebrile   Leukocytosis on DWT  CRP and Pct remain low, patient reports he is feeling improved, oxygen remains at his baseline 3 L/min/NC, appears slightly improved  10/22-BC 0-NGTD  10/22-Strep pneumo ag/ Legionella ag negative  10/22-Resp panel: positive for Rhinovirus  10/22, reordered 10/26-MRSA/MssA pending  10/22-Resp culture: Pseudomonas aeruginosa  10/23-CT Chest WO Contrast:The infiltrates seen previously in the lower lobes bilaterally for the most   part have decreased in size and conspicuity. However, there are now innumerable punctate tree-in-bud centrilobular micro   nodules, which are nonspecific but suggest inflammatory/infectious   bronchiolitis. Consider both typical and atypical etiologies. In addition, cavitary lesion has developed in the periphery of the right   lower lobe and to a lesser extent within the left upper lung. Necrotizing   infection would be primarily considered given the rapid development. Consider both typical and atypical etiologies. Enlarged mediastinal lymph nodes, similar when compared to the previous exam,   process of Lia related to history of chronic lymphocytic leukemia  10/28-S/p per Dr. Andrew Martinez, BAL, cultures: Pseudomonas aeruginosa  CLL and Hypogammaglobulinemia:  Dr. Ivet Saunders onboard  Imp anemia sec to infection  Was on ibrutinib but recent plan was to start Rituxan.      DMII:  Hypoalbuminemia:  Hyponatremia:  CAD/Chest Pain/ Elevated Troponin:  Dr. Christian Mccarthy onboard  Imp of non-cardiac chest pain  Multi-morbidity: per PMHx    Plan:  Continue IV Cipro 400 mg q8h, will plan to transition for DC to 750 mg bid to complete a 2 week course with end date of 22 (QTc 432)  Continue IV Zosyn 4.5 gm q8h extended infusion, plan for 2 week course with end date 11/14/22   JHOAN Bergeron, ID recommends may start Rituxin after ABX complete, may start 11/14/22  Follow up with ID in 1 week please  Weekly labs drawn on Monday during the course of treatment  CBC with differential, CMP, ESR, CRP  Fax results to Attn: [place] Staff  # [phone]   OK from ID standpoint to DC when ready    Ongoing Antimicrobial Therapy  Zosyn 10/22-25, 31-  Cipro 10/31-  Completed Antimicrobial Therapy  Azithromycin 10/22  Cefepime 10/22  Vancomycin 10/22-24? Meropenem 10/25-31  History:? Interval history noted. Chief complaint: Rhinovirus pneumonia with bacterial superinfection. Denies n/v/d/f or untoward effects of antibiotics. Reports no new overnight events, is continuing to feel improving, less dyspnea, more air movement in his lungs, just waiting for DC  Physical Exam:  Vital Signs: /63   Pulse 97   Temp 97.9 °F (36.6 °C) (Oral)   Resp 19   Ht 6' (1.829 m)   Wt 153 lb 12.8 oz (69.8 kg)   SpO2 96%   BMI 20.86 kg/m²     Gen: Side-lying, A&O x 4  Chest: no distress and CTA. Anterior breath sounds diminished. Oxygen per NC. Heart: NSR and no MRG. Abd: soft, non-distended, no tenderness, no hepatomegaly. Normoactive bowel sounds. Ext: no clubbing, cyanosis, or edema  Neuro: Mental status intact. CN 2-12 intact and no focal sensory or motor deficits     Radiologic / Imaging / TESTING  10/22/22 XR Chest Portable:  Impression   1. Persistent patchy airspace opacities in the mid to basilar right lung   suspicious for pneumonia or aspiration given the prior CT appearance. There   may be cavitation in the right lung base. 2. Minimal left basilar airspace opacity more likely due to atelectasis than   pneumonia or aspiration. 3. At least mild peribronchial cuffing potentially due to reactive airways   disease or bronchitis.       10/23/22 CT Chest WO Contrast:  Impression   The infiltrates seen previously in the lower lobes bilaterally for the most   part have decreased in size and conspicuity. However, there are now innumerable punctate tree-in-bud centrilobular micro   nodules, which are nonspecific but suggest inflammatory/infectious   bronchiolitis. Consider both typical and atypical etiologies. In addition, cavitary lesion has developed in the periphery of the right   lower lobe and to a lesser extent within the left upper lung. Necrotizing   infection would be primarily considered given the rapid development. Consider both typical and atypical etiologies. Enlarged mediastinal lymph nodes, similar when compared to the previous exam,   process of Lia related to history of chronic lymphocytic leukemia. 10/26/22 XR Chest Portable:  Impression   1. Possible increase in diffuse reticulonodular opacities corresponding with   extensive infectious or inflammatory bronchiolitis seen on CT. 2. No significant change in a cavitary mass in the right lung base that is   more likely infectious or inflammatory in etiology than neoplastic given   absence on 09/28/2022.   3. At least moderate peribronchial cuffing potentially due to bronchitis or   reactive airways disease. 10/28/22 XR Chest Portable:  Impression   No significant interval change.      Labs:    Recent Results (from the past 24 hour(s))   POCT Glucose    Collection Time: 11/01/22 12:09 PM   Result Value Ref Range    POC Glucose 236 (H) 70 - 99 MG/DL   POCT Glucose    Collection Time: 11/01/22  6:15 PM   Result Value Ref Range    POC Glucose 281 (H) 70 - 99 MG/DL   POCT Glucose    Collection Time: 11/01/22  9:18 PM   Result Value Ref Range    POC Glucose 308 (H) 70 - 99 MG/DL   C-Reactive Protein    Collection Time: 11/02/22  5:30 AM   Result Value Ref Range    CRP High Sensitivity 15.1 (H) <5.0 mg/L   POCT Glucose    Collection Time: 11/02/22  7:33 AM   Result Value Ref Range    POC Glucose 228 (H) 70 - 99 MG/DL     CULTURE results: Invalid input(s): BLOOD CULTURE,  URINE CULTURE, SURGICAL CULTURE    Diagnosis:  Patient Active Problem List   Diagnosis    Pneumonia    Sepsis ([facility]    Hypogammaglobulinemia ([facility]    CLL (chronic lymphocytic leukemia) ([facility]    Upper respiratory infection, acute    Generalized muscle weakness    Type 2 diabetes mellitus with hyperglycemia, with long-term current use of insulin (HCC)    Poor appetite    COPD (chronic obstructive pulmonary disease) (HCC)    Neutropenia ([facility])    Other specified disorders of bone density and structure, unspecified site    Cellulitis of right upper limb    Herpesviral infection    Intestinal malabsorption    Other nonspecific abnormal finding of lung field    Iron deficiency anemia due to chronic blood loss    Other muscle spasm    Leukemia, lymphocytic, chronic (HCC)    Selective deficiency of IgG ([facility])    Acute bronchitis    Severe malnutrition ([facility])    Pneumonia due to organism    Personal history of CLL (chronic lymphocytic leukemia)    Rhinovirus infection    Anemia    Thrombocytopenia (HCC)       Active Problems  Principal Problem:    Pneumonia due to organism  Active Problems:    Personal history of CLL (chronic lymphocytic leukemia)    Rhinovirus infection    Anemia    Thrombocytopenia (HCC)  Resolved Problems:    * No resolved hospital problems. *    Electronically signed by: Electronically signed by Michaela Gallegos.  JENNI Farley CNP on 11/2/2022 at 11:31 AM

## 2022-11-02 NOTE — CARE COORDINATION
Renny Encinas gave verbal to order. Reymundo Payton 1.IV Zosyn 4.5 gm q8h extended infusion with end date 11/14/22   2. Pt med can be on a continuous pump  3. Monday during the course of treatment draw  CBC with differential, CMP, ESR, CRP  Called the above order in to [address] [place] [address],[facility] at [address]. Spoke with son Ed he is now willing to learn atb administration. [place] [facility] [place] have a nurse see pt tonight around 7p.

## 2022-11-02 NOTE — CONSULTS
Endocrinology   Consult Note  10/22/2022  8:27 AM     Primary Care provider: Jer Sweet MD     Referring physician:  Laurel Barron MD     Dear Doctor Rosy Rodriguez for the Consult     Pt. Was Admitted for : Shortness of breath chest pain    Reason for Consult: Better control of blood glucose      History Obtained From:  Patient/ EMR       HISTORY OF PRESENT ILLNESS:                The patient is a 71 y.o. male with significant past medical history of CLL on chemotherapy including Rituxan and IVIG, hyperglobulinemia lung nodule COPD hypoxic respiratory failure on oxygen diabetes mellitus hyperlipidemia BPH discharged hospital recently for sepsis and pneumonia found to have renal enterovirus. Patient required thoracentesis as a pleural effusion. However after discharge patient continued to have shortness of breath and weight loss . this time he noted to have a cavitary lesion in the right lung. Running higher blood glucose level. I was  consulted for better control of blood glucose. ROS:   Pt's ROS done in detail. Abnormal ROS are noted in Medical and Surgical History Section below:      Other Medical History:        Diagnosis Date    Arthritis     knees, Rt hand/wrist    CAD (coronary artery disease)     Cancer (HCC)     CLL--Sees Dr Luther Lizama    Diabetes mellitus [facility])     History of blood transfusion     no reaction    Hx of motion sickness     Hyperlipidemia     MDRO (multiple drug resistant organisms) resistance     abscess on buttock 10yrs ago    Migraine     last one summer 2016    Pneumonia 2016    Wears dentures     full upper--lower dentures    Wears glasses      Surgical History:        Procedure Laterality Date    BRONCHOSCOPY N/A 10/28/2022    BRONCHOSCOPY performed by Shirley Hermosillo MD at [facility]  1990's    x 2  last showed \"inflammation of heart\"    COLONOSCOPY  2010    DENTAL SURGERY      all teeth extracted     EYE SURGERY Right 08/2016 Cataract removal    FRACTURE SURGERY Left 1990's    left ankle plate and screws    KNEE SURGERY  1970    left    OTHER SURGICAL HISTORY Left 01/18/2017    groin excision    TONSILLECTOMY  late 1960's    age 13    TUNNELED VENOUS PORT PLACEMENT  2013    Right upper chest       Allergies:  Patient has no known allergies. Family History:       Problem Relation Age of Onset    Diabetes Mother     High Blood Pressure Mother     Heart Disease Father     Other Sister         liver problems    Early Death Brother     Asthma Maternal Uncle     Colon Cancer Brother      REVIEW OF SYSTEMS:  Review of System Done as noted above     PHYSICAL EXAM:      Vitals:    /63   Pulse 97   Temp 97.9 °F (36.6 °C) (Oral)   Resp 19   Ht 6' (1.829 m)   Wt 153 lb 12.8 oz (69.8 kg)   SpO2 96%   BMI 20.86 kg/m²     CONSTITUTIONAL:  awake, alert, cooperative, appears stated age   EYES:  vision intact Fundoscopic Exam not performed   ENT:Normal  NECK:  Supple, No JVD. Thyroid Exam:Normal   LUNGS:  Has Vesicular Breath Sounds, diminished breath sounds and some rales and wheezing in the right lung  CARDIOVASCULAR:  Normal apical impulse, regular rate and rhythm, normal S1 and S2, no S3 or S4, and has no  murmur   ABDOMEN:  No scars, normal bowel sounds, soft, non-distended, non-tender, no masses palpated, no hepatolienomegaly  Musculoskeletal: Normal  Extremities: Normal, peripheral pulses normal, , has no edema   NEUROLOGIC:  Awake, alert, oriented to name, place and time. Cranial nerves II-XII are grossly intact. Motor is  intact. Sensory and neuropathy,  and gait is abnormal.  Mostly uses cane or walker    DATA:    CBC: No results for input(s): WBC, HGB, PLT in the last 72 hours. CMP:No results for input(s): NA, K, CL, CO2, BUN, CREATININE, GLU, CALCIUM, PROT, LABALBU, BILITOT, ALKPHOS, AST, ALT in the last 72 hours.   Lipids:   Lab Results   Component Value Date/Time    CHOL 160 08/03/2012 02:47 PM    HDL 32 08/03/2012 02:47 PM    TRIG 274 08/03/2012 02:47 PM     Glucose:   Recent Labs     11/01/22  2118 11/02/22  0733 11/02/22  1139   POCGLU 308* 228* 358*     Hemoglobin A1C:   Lab Results   Component Value Date/Time    LABA1C 6.9 10/23/2022 11:15 AM     Free T4:   Lab Results   Component Value Date/Time    T4FREE 1.37 04/04/2019 12:39 PM     Free T3:   Lab Results   Component Value Date/Time    FT3 3.2 03/27/2012 12:39 PM     TSH High Sensitivity:   Lab Results   Component Value Date/Time    TSHHS 3.040 04/04/2019 12:39 PM       XR CHEST PORTABLE   Final Result   No significant interval change. FL LESS THAN 1 HOUR   Final Result      XR CHEST PORTABLE   Final Result   1. Possible increase in diffuse reticulonodular opacities corresponding with   extensive infectious or inflammatory bronchiolitis seen on CT. 2. No significant change in a cavitary mass in the right lung base that is   more likely infectious or inflammatory in etiology than neoplastic given   absence on 09/28/2022.   3. At least moderate peribronchial cuffing potentially due to bronchitis or   reactive airways disease. CT CHEST WO CONTRAST   Final Result   The infiltrates seen previously in the lower lobes bilaterally for the most   part have decreased in size and conspicuity. However, there are now innumerable punctate tree-in-bud centrilobular micro   nodules, which are nonspecific but suggest inflammatory/infectious   bronchiolitis. Consider both typical and atypical etiologies. In addition, cavitary lesion has developed in the periphery of the right   lower lobe and to a lesser extent within the left upper lung. Necrotizing   infection would be primarily considered given the rapid development. Consider both typical and atypical etiologies. Enlarged mediastinal lymph nodes, similar when compared to the previous exam,   process of Lia related to history of chronic lymphocytic leukemia.          XR CHEST PORTABLE   Final Result   1. Persistent patchy airspace opacities in the mid to basilar right lung   suspicious for pneumonia or aspiration given the prior CT appearance. There   may be cavitation in the right lung base. 2. Minimal left basilar airspace opacity more likely due to atelectasis than   pneumonia or aspiration. 3. At least mild peribronchial cuffing potentially due to reactive airways   disease or bronchitis.                 Scheduled Medicines   Medications:    insulin lispro  10 Units SubCUTAneous TID WC    metFORMIN  500 mg Oral BID WC    alogliptin  25 mg Oral Daily    miconazole   Topical BID    piperacillin-tazobactam  4,500 mg IntraVENous Q8H    ciprofloxacin  400 mg IntraVENous 3 times per day    ipratropium-albuterol  1 ampule Inhalation Q4H    guaiFENesin  600 mg Oral 4x daily    acetylcysteine  4 mL Inhalation TID    sodium chloride flush  5-40 mL IntraVENous 2 times per day    [Held by provider] enoxaparin  40 mg SubCUTAneous Daily    insulin lispro  0-4 Units SubCUTAneous TID WC    insulin lispro  0-4 Units SubCUTAneous Nightly    vitamin C  250 mg Oral BID    atorvastatin  80 mg Oral Daily    ferrous gluconate  324 mg Oral BID    finasteride  5 mg Oral Daily    gabapentin  100 mg Oral TID    pantoprazole  40 mg Oral QAM AC    tamsulosin  0.4 mg Oral Daily    insulin glargine  40 Units SubCUTAneous Nightly      Infusions:    dextrose      sodium chloride 25 mL (11/02/22 0251)         IMPRESSION    Patient Active Problem List   Diagnosis    Pneumonia    Sepsis ([address]    Hypogammaglobulinemia ([address]    CLL (chronic lymphocytic leukemia) ([facility] [address]    Upper respiratory infection, acute    Generalized muscle weakness    Type 2 diabetes mellitus with hyperglycemia, with long-term current use of insulin (HCC)    Poor appetite    COPD (chronic obstructive pulmonary disease) (HCC)    Neutropenia (HCC)    Other specified disorders of bone density and structure, unspecified site    Cellulitis of right upper limb Herpesviral infection    Intestinal malabsorption    Other nonspecific abnormal finding of lung field    Iron deficiency anemia due to chronic blood loss    Other muscle spasm    Leukemia, lymphocytic, chronic (HCC)    Selective deficiency of IgG (HCC)    Acute bronchitis    Severe malnutrition (HCC)    Pneumonia due to organism    Personal history of CLL (chronic lymphocytic leukemia)    Rhinovirus infection    Anemia    Thrombocytopenia (HCC)         RECOMMENDATIONS:      Reviewed POC blood glucose . Labs and X ray results   Reviewed Home and Current Medicines   Will Start On meal/ Correction bolus Humalog/ Lantus Insulin regime / OHGD   Monitor Blood glucose frequently   Modify  the dose of Insulin/ OHGD  as needed        Will follow with you  Again thank you for sharing pt's care with me.      Truly yours,       Kendall Rooney MD

## 2022-11-02 NOTE — PROGRESS NOTES
Pulmonary and Critical Care  Progress Note    Subjective: The patient has improved. Shortness of breath better. Chest pain none  Addressing respiratory complaints Patient is negative for  hemoptysis and cyanosis  CONSTITUTIONAL:  negative for fevers and chills      Past Medical History:     has a past medical history of Arthritis, CAD (coronary artery disease), Cancer ([facility] [address] Diabetes mellitus ([facility] [address] History of blood transfusion, Hx of motion sickness, Hyperlipidemia, MDRO (multiple drug resistant organisms) resistance, Migraine, Pneumonia, Wears dentures, and Wears glasses. has a past surgical history that includes knee surgery (1970); Tunneled venous port placement (2013); Colonoscopy (2010); fracture surgery (Left, 1990's); Cardiac catheterization (1990's); Tonsillectomy (late 1960's); Dental surgery; eye surgery (Right, 08/2016); other surgical history (Left, 01/18/2017); and bronchoscopy (N/A, 10/28/2022). reports that he quit smoking about 35 years ago. His smoking use included cigarettes. He started smoking about 57 years ago. He has a 20.00 pack-year smoking history. He has never used smokeless tobacco. He reports that he does not drink alcohol and does not use drugs. Family history:  family history includes Asthma in his maternal uncle; Colon Cancer in his brother; Diabetes in his mother; Early Death in his brother; Heart Disease in his father; High Blood Pressure in his mother; Other in his sister.     No Known Allergies  Social History:    Reviewed; no changes    Objective:   PHYSICAL EXAM:        VITALS:  /63   Pulse 97   Temp 97.9 °F (36.6 °C) (Oral)   Resp 19   Ht 6' (1.829 m)   Wt 153 lb 12.8 oz (69.8 kg)   SpO2 96%   BMI 20.86 kg/m²     24HR INTAKE/OUTPUT:    Intake/Output Summary (Last 24 hours) at 11/2/2022 1125  Last data filed at 11/2/2022 0850  Gross per 24 hour   Intake 300 ml   Output 3250 ml   Net -2950 ml       CONSTITUTIONAL:  awake, alert, cooperative, no apparent distress, and appears stated age  LUNGS:  decreased breath sounds, occ basilar crackles. CARDIOVASCULAR:  normal S1 and S2 and negative JVD. ABD:Abdomen soft, non-tender. BS normal. No masses,  No organomegaly. NEURO:Alert and oriented x3. Gait normal. Reflexes and motor strength normal and symmetric. Cranial nerves 2-12 and sensation grossly intact. DATA:    CBC:  No results for input(s): WBC, RBC, HGB, HCT, PLT, MCV, MCH, MCHC, RDW, NRBC, SEGSPCT, BANDSPCT in the last 72 hours. BMP:  No results for input(s): NA, K, CL, CO2, BUN, CREATININE, CALCIUM, GLUCOSE in the last 72 hours. ABG:  No results for input(s): PH, PO2ART, UMK7WBR, HCO3, BEART, O2SAT in the last 72 hours. Lab Results   Component Value Date    PROBNP 774.5 (H) 10/22/2022    PROBNP 343.3 (H) 10/03/2022    PROBNP 2,514 (H) 09/30/2022     No results found for: [facility]    Radiology Review:  Pertinent images / reports were reviewed as a part of this visit. Assessment:     Patient Active Problem List   Diagnosis    Pneumonia    Sepsis ([address]    Hypogammaglobulinemia ([address]    CLL (chronic lymphocytic leukemia) ([address]    Upper respiratory infection, acute    Generalized muscle weakness    Type 2 diabetes mellitus with hyperglycemia, with long-term current use of insulin (HCC)    Poor appetite    COPD (chronic obstructive pulmonary disease) (HCC)    Neutropenia (HCC)    Other specified disorders of bone density and structure, unspecified site    Cellulitis of right upper limb    Herpesviral infection    Intestinal malabsorption    Other nonspecific abnormal finding of lung field    Iron deficiency anemia due to chronic blood loss    Other muscle spasm    Leukemia, lymphocytic, chronic (HCC)    Selective deficiency of IgG (HCC)    Acute bronchitis    Severe malnutrition (HCC)    Pneumonia due to organism    Personal history of CLL (chronic lymphocytic leukemia)    Rhinovirus infection    Anemia    Thrombocytopenia ([address]       Plan:   1.  Overall the patient has improved. 2. Inc. Activity. 3. ID F/U.  4. D/C soon.   Yamileth Moran MD   11/2/2022  11:25 AM

## 2022-11-07 ENCOUNTER — HOSPITAL ENCOUNTER (OUTPATIENT)
Age: 72
Setting detail: SPECIMEN
Discharge: HOME | End: 2022-11-07
Payer: COMMERCIAL

## 2022-11-07 LAB
ALBUMIN SERPL-MCNC: 3.2 GM/DL (ref 3.4–5)
ALP BLD-CCNC: 158 IU/L (ref 40–128)
ALT SERPL-CCNC: 19 U/L (ref 10–40)
ANION GAP SERPL CALCULATED.3IONS-SCNC: 13 MMOL/L (ref 4–16)
ANISOCYTOSIS: ABNORMAL
AST SERPL-CCNC: 23 IU/L (ref 15–37)
BILIRUB SERPL-MCNC: 0.8 MG/DL (ref 0–1)
BUN BLDV-MCNC: 17 MG/DL (ref 6–23)
C-REACTIVE PROTEIN, HIGH SENSITIVITY: 18.5 MG/L
CALCIUM SERPL-MCNC: 8.4 MG/DL (ref 8.3–10.6)
CHLORIDE BLD-SCNC: 101 MMOL/L (ref 99–110)
CO2: 26 MMOL/L (ref 21–32)
CREAT SERPL-MCNC: 0.6 MG/DL (ref 0.9–1.3)
DIFFERENTIAL TYPE: ABNORMAL
ELLIPTOCYTES: ABNORMAL
EOSINOPHILS ABSOLUTE: 0.4 K/CU MM
EOSINOPHILS RELATIVE PERCENT: 1 % (ref 0–3)
ERYTHROCYTE SEDIMENTATION RATE: 17 MM/HR (ref 0–20)
GFR SERPL CREATININE-BSD FRML MDRD: >60 ML/MIN/1.73M2
GLUCOSE BLD-MCNC: 78 MG/DL (ref 70–99)
HCT VFR BLD CALC: 31.5 % (ref 42–52)
HEMOGLOBIN: 9.2 GM/DL (ref 13.5–18)
LYMPHOCYTES ABSOLUTE: 36.2 K/CU MM
LYMPHOCYTES RELATIVE PERCENT: 88 % (ref 24–44)
MCH RBC QN AUTO: 26.5 PG (ref 27–31)
MCHC RBC AUTO-ENTMCNC: 29.2 % (ref 32–36)
MCV RBC AUTO: 90.8 FL (ref 78–100)
MONOCYTES ABSOLUTE: 2.9 K/CU MM
MONOCYTES RELATIVE PERCENT: 7 % (ref 0–4)
PDW BLD-RTO: 25.6 % (ref 11.7–14.9)
PLATELET # BLD: 83 K/CU MM (ref 140–440)
PMV BLD AUTO: 10 FL (ref 7.5–11.1)
POTASSIUM SERPL-SCNC: 4.1 MMOL/L (ref 3.5–5.1)
RBC # BLD: 3.47 M/CU MM (ref 4.6–6.2)
SEGMENTED NEUTROPHILS ABSOLUTE COUNT: 1.6 K/CU MM
SEGMENTED NEUTROPHILS RELATIVE PERCENT: 4 % (ref 36–66)
SODIUM BLD-SCNC: 140 MMOL/L (ref 135–145)
TEAR DROP CELLS: ABNORMAL
TOTAL PROTEIN: 5 GM/DL (ref 6.4–8.2)
WBC # BLD: 41.1 K/CU MM (ref 4–10.5)

## 2022-11-07 PROCEDURE — 86140 C-REACTIVE PROTEIN: CPT

## 2022-11-07 PROCEDURE — 85025 COMPLETE CBC W/AUTO DIFF WBC: CPT

## 2022-11-07 PROCEDURE — 80053 COMPREHEN METABOLIC PANEL: CPT

## 2022-11-07 PROCEDURE — 85652 RBC SED RATE AUTOMATED: CPT

## 2022-11-08 ENCOUNTER — TELEPHONE (OUTPATIENT)
Dept: INFECTIOUS DISEASES | Age: 72
End: 2022-11-08

## 2022-11-08 NOTE — TELEPHONE ENCOUNTER
Oriana, from [facility] called with a critical lab result for pt. Pt's WBC is @ 41.1.

## 2022-11-09 LAB
FINAL RESULT: NORMAL
PRELIMINARY: NORMAL

## 2022-11-11 ENCOUNTER — TELEPHONE (OUTPATIENT)
Dept: INFUSION THERAPY | Age: 72
End: 2022-11-11

## 2022-11-11 NOTE — TELEPHONE ENCOUNTER
Called to inform pt of education + tx dates we have scheduled however the number ([phone] [facility] return call to get information about the above.

## 2022-11-14 ENCOUNTER — TELEPHONE (OUTPATIENT)
Dept: INFECTIOUS DISEASES | Age: 72
End: 2022-11-14

## 2022-11-14 ENCOUNTER — HOSPITAL ENCOUNTER (OUTPATIENT)
Age: 72
Setting detail: SPECIMEN
Discharge: HOME | End: 2022-11-14
Payer: COMMERCIAL

## 2022-11-14 LAB
ALBUMIN SERPL-MCNC: 3.3 GM/DL (ref 3.4–5)
ALP BLD-CCNC: 180 IU/L (ref 40–128)
ALT SERPL-CCNC: 16 U/L (ref 10–40)
ANION GAP SERPL CALCULATED.3IONS-SCNC: 10 MMOL/L (ref 4–16)
ANISOCYTOSIS: ABNORMAL
AST SERPL-CCNC: 21 IU/L (ref 15–37)
ATYPICAL LYMPHOCYTE ABSOLUTE COUNT: ABNORMAL
BANDED NEUTROPHILS ABSOLUTE COUNT: 0.73 K/CU MM
BANDED NEUTROPHILS RELATIVE PERCENT: 1 % (ref 5–11)
BASOPHILS ABSOLUTE: 0.7 K/CU MM
BASOPHILS RELATIVE PERCENT: 1 % (ref 0–1)
BILIRUB SERPL-MCNC: 0.8 MG/DL (ref 0–1)
BUN BLDV-MCNC: 17 MG/DL (ref 6–23)
C-REACTIVE PROTEIN, HIGH SENSITIVITY: 14.4 MG/L
CALCIUM SERPL-MCNC: 8.4 MG/DL (ref 8.3–10.6)
CHLORIDE BLD-SCNC: 105 MMOL/L (ref 99–110)
CO2: 26 MMOL/L (ref 21–32)
CREAT SERPL-MCNC: 0.6 MG/DL (ref 0.9–1.3)
DIFFERENTIAL TYPE: ABNORMAL
ERYTHROCYTE SEDIMENTATION RATE: 8 MM/HR (ref 0–20)
GFR SERPL CREATININE-BSD FRML MDRD: >60 ML/MIN/1.73M2
GLUCOSE BLD-MCNC: 114 MG/DL (ref 70–99)
HCT VFR BLD CALC: 31.2 % (ref 42–52)
HEMOGLOBIN: 9 GM/DL (ref 13.5–18)
LYMPHOCYTES ABSOLUTE: 67.6 K/CU MM
LYMPHOCYTES RELATIVE PERCENT: 92 % (ref 24–44)
MCH RBC QN AUTO: 26.6 PG (ref 27–31)
MCHC RBC AUTO-ENTMCNC: 28.8 % (ref 32–36)
MCV RBC AUTO: 92.3 FL (ref 78–100)
NUCLEATED RED BLOOD CELLS: 1
PDW BLD-RTO: 26.7 % (ref 11.7–14.9)
PLATELET # BLD: 54 K/CU MM (ref 140–440)
PMV BLD AUTO: 9.6 FL (ref 7.5–11.1)
POTASSIUM SERPL-SCNC: 4.5 MMOL/L (ref 3.5–5.1)
RBC # BLD: 3.38 M/CU MM (ref 4.6–6.2)
SEGMENTED NEUTROPHILS ABSOLUTE COUNT: 4.4 K/CU MM
SEGMENTED NEUTROPHILS RELATIVE PERCENT: 6 % (ref 36–66)
SMUDGE CELLS: PRESENT
SODIUM BLD-SCNC: 141 MMOL/L (ref 135–145)
TOTAL PROTEIN: 4.9 GM/DL (ref 6.4–8.2)
WBC # BLD: 73.4 K/CU MM (ref 4–10.5)
WBC # BLD: ABNORMAL 10*3/UL

## 2022-11-14 PROCEDURE — 80053 COMPREHEN METABOLIC PANEL: CPT

## 2022-11-14 PROCEDURE — 85027 COMPLETE CBC AUTOMATED: CPT

## 2022-11-14 PROCEDURE — 86140 C-REACTIVE PROTEIN: CPT

## 2022-11-14 PROCEDURE — 85652 RBC SED RATE AUTOMATED: CPT

## 2022-11-14 PROCEDURE — 85007 BL SMEAR W/DIFF WBC COUNT: CPT

## 2022-11-15 ENCOUNTER — TELEPHONE (OUTPATIENT)
Dept: INFUSION THERAPY | Age: 72
End: 2022-11-15

## 2022-11-16 ENCOUNTER — HOSPITAL ENCOUNTER (OUTPATIENT)
Dept: INFUSION THERAPY | Age: 72
Discharge: HOME | DRG: 871 | End: 2022-11-16
Payer: COMMERCIAL

## 2022-11-16 ENCOUNTER — OFFICE VISIT (OUTPATIENT)
Dept: ONCOLOGY | Age: 72
End: 2022-11-16
Payer: COMMERCIAL

## 2022-11-16 VITALS
BODY MASS INDEX: 20.86 KG/M2 | OXYGEN SATURATION: 89 % | TEMPERATURE: 96.8 F | HEART RATE: 107 BPM | SYSTOLIC BLOOD PRESSURE: 114 MMHG | RESPIRATION RATE: 18 BRPM | HEIGHT: 72 IN | DIASTOLIC BLOOD PRESSURE: 67 MMHG

## 2022-11-16 DIAGNOSIS — Z11.59 NEED FOR HEPATITIS B SCREENING TEST: ICD-10-CM

## 2022-11-16 DIAGNOSIS — C91.10 CHRONIC LYMPHOCYTIC LEUKEMIA (HCC): ICD-10-CM

## 2022-11-16 DIAGNOSIS — R52 PAIN: ICD-10-CM

## 2022-11-16 DIAGNOSIS — C91.10 CHRONIC LYMPHOCYTIC LEUKEMIA (HCC): Primary | ICD-10-CM

## 2022-11-16 DIAGNOSIS — Z71.89 ADVANCE DIRECTIVE DISCUSSED WITH PATIENT: ICD-10-CM

## 2022-11-16 DIAGNOSIS — Z71.9 ENCOUNTER FOR EDUCATION: ICD-10-CM

## 2022-11-16 LAB
ALBUMIN SERPL-MCNC: 3.5 GM/DL (ref 3.4–5)
ALP BLD-CCNC: 167 IU/L (ref 40–128)
ALT SERPL-CCNC: 16 U/L (ref 10–40)
ANION GAP SERPL CALCULATED.3IONS-SCNC: 13 MMOL/L (ref 4–16)
ANISOCYTOSIS: ABNORMAL
AST SERPL-CCNC: 18 IU/L (ref 15–37)
BANDED NEUTROPHILS ABSOLUTE COUNT: 1.04 K/CU MM
BANDED NEUTROPHILS RELATIVE PERCENT: 1 % (ref 5–11)
BILIRUB SERPL-MCNC: 1.1 MG/DL (ref 0–1)
BUN BLDV-MCNC: 28 MG/DL (ref 6–23)
CALCIUM SERPL-MCNC: 8.7 MG/DL (ref 8.3–10.6)
CHLORIDE BLD-SCNC: 99 MMOL/L (ref 99–110)
CO2: 23 MMOL/L (ref 21–32)
CREAT SERPL-MCNC: 0.7 MG/DL (ref 0.9–1.3)
DIFFERENTIAL TYPE: ABNORMAL
GFR SERPL CREATININE-BSD FRML MDRD: >60 ML/MIN/1.73M2
GLUCOSE BLD-MCNC: 114 MG/DL (ref 70–99)
HBV SURFACE AB TITR SER: 237.4 {TITER}
HCT VFR BLD CALC: 33 % (ref 42–52)
HEMOGLOBIN: 9.9 GM/DL (ref 13.5–18)
HEPATITIS B SURFACE ANTIGEN: NON REACTIVE
LYMPHOCYTES ABSOLUTE: 100.1 K/CU MM
LYMPHOCYTES RELATIVE PERCENT: 96 % (ref 24–44)
MCH RBC QN AUTO: 27.3 PG (ref 27–31)
MCHC RBC AUTO-ENTMCNC: 30 % (ref 32–36)
MCV RBC AUTO: 91.2 FL (ref 78–100)
PDW BLD-RTO: 27.6 % (ref 11.7–14.9)
PLATELET # BLD: 60 K/CU MM (ref 140–440)
PMV BLD AUTO: 10.4 FL (ref 7.5–11.1)
POLYCHROMASIA: ABNORMAL
POTASSIUM SERPL-SCNC: 4.2 MMOL/L (ref 3.5–5.1)
RBC # BLD: 3.62 M/CU MM (ref 4.6–6.2)
SEGMENTED NEUTROPHILS ABSOLUTE COUNT: 3.1 K/CU MM
SEGMENTED NEUTROPHILS RELATIVE PERCENT: 3 % (ref 36–66)
SMUDGE CELLS: PRESENT
SODIUM BLD-SCNC: 135 MMOL/L (ref 135–145)
TOTAL PROTEIN: 5.1 GM/DL (ref 6.4–8.2)
WBC # BLD: 104.2 K/CU MM (ref 4–10.5)

## 2022-11-16 PROCEDURE — 86706 HEP B SURFACE ANTIBODY: CPT

## 2022-11-16 PROCEDURE — 1111F DSCHRG MED/CURRENT MED MERGE: CPT | Performed by: NURSE PRACTITIONER

## 2022-11-16 PROCEDURE — 36415 COLL VENOUS BLD VENIPUNCTURE: CPT

## 2022-11-16 PROCEDURE — 87340 HEPATITIS B SURFACE AG IA: CPT

## 2022-11-16 PROCEDURE — G8428 CUR MEDS NOT DOCUMENT: HCPCS | Performed by: NURSE PRACTITIONER

## 2022-11-16 PROCEDURE — 85027 COMPLETE CBC AUTOMATED: CPT

## 2022-11-16 PROCEDURE — G8484 FLU IMMUNIZE NO ADMIN: HCPCS | Performed by: NURSE PRACTITIONER

## 2022-11-16 PROCEDURE — 1123F ACP DISCUSS/DSCN MKR DOCD: CPT | Performed by: NURSE PRACTITIONER

## 2022-11-16 PROCEDURE — 99215 OFFICE O/P EST HI 40 MIN: CPT | Performed by: NURSE PRACTITIONER

## 2022-11-16 PROCEDURE — 85007 BL SMEAR W/DIFF WBC COUNT: CPT

## 2022-11-16 PROCEDURE — 1036F TOBACCO NON-USER: CPT | Performed by: NURSE PRACTITIONER

## 2022-11-16 PROCEDURE — 80053 COMPREHEN METABOLIC PANEL: CPT

## 2022-11-16 PROCEDURE — G8420 CALC BMI NORM PARAMETERS: HCPCS | Performed by: NURSE PRACTITIONER

## 2022-11-16 PROCEDURE — 99213 OFFICE O/P EST LOW 20 MIN: CPT

## 2022-11-16 PROCEDURE — 86704 HEP B CORE ANTIBODY TOTAL: CPT

## 2022-11-16 PROCEDURE — 3017F COLORECTAL CA SCREEN DOC REV: CPT | Performed by: NURSE PRACTITIONER

## 2022-11-16 RX ORDER — HYDROCODONE BITARTRATE AND ACETAMINOPHEN 5; 325 MG/1; MG/1
1 TABLET ORAL EVERY 12 HOURS PRN
Qty: 60 TABLET | Refills: 0 | Status: ON HOLD | OUTPATIENT
Start: 2022-11-16 | End: 2022-11-25 | Stop reason: HOSPADM

## 2022-11-16 RX ORDER — ONDANSETRON 4 MG/1
4 TABLET, FILM COATED ORAL EVERY 8 HOURS PRN
Qty: 30 TABLET | Refills: 1 | Status: SHIPPED | OUTPATIENT
Start: 2022-11-16

## 2022-11-16 NOTE — PROGRESS NOTES
Patient arrived with his son, Rand Orta for treatment planning and chemotherapy education. Discussed treatment plan, potential side effects, prevention, and symptom management. Reviewed chemotherapy education folder and drug monograph. Patient's regimen will be Rituxan weekly x8 cycles. Patient signed chemotherapy consent for Rituxan. Copy of consent given to patient. Reviewed chemotherapy schedule/calendar. Rx for Zofran sent to pharmacy per Mckay Fishman CNP. Patient given calendar and written instructions for these medications and how/when to take. Patient's premeds as follows:  Zofran 8 mg one tab every 8 hours PRN. Mediport to right upper chest intact without redness or drainage. Patient given on-call phone number for after office hours and weekends. CBC, CMP and Hep panel drawn/today. Confirmed first chemotherapy appointment for Rituxan on 11/18/2022 at 0815.

## 2022-11-16 NOTE — PROGRESS NOTES
Patient will need hep panel prior to tx in addition to CBC and CMP. Lab orders placed per physician orders.

## 2022-11-16 NOTE — PROGRESS NOTES
Patient Name: Charlene Mathews    Patient : 1950     Patient MRN: [id number]       Date: 2022                                                                                                   CHEMOTHERAPY TREATMENT PLAN    Care Team  Medical Oncologist: Yaneli Khan MD  Surgeon:   Radiation Oncologist:   Primary Care Physician: Demi Cast MD     Learning Needs Assessment  Before the treatment plan was discussed and the consent was signed, the care team assessed the patient's learning needs. This includes their ability to assume responsibility for managing their therapy. Diagnosis    CLL    The Goal of My Therapy is    (  ) To cure my cancer  (  ) To shrink the tumor prior to surgery  (  ) Increase my chance of cure after surgery  (x  ) Help me live as long as possible with the highest quality of life. I know that a cure is not possible. Prognosis    (  ) Curable  (x  ) Palliative    Plan Of Treatment    Intent:  (  ) Neoadjuvant   (  ) Adjuvant   ( x ) Control    Treatment Regimen  (including supportive meds)                             Frequency                                               Duration     Rituxan weekly x 8    Expected Response to Treatment    (  ) This therapy could cure your disease  ( x ) This therapy has a good chance of helping you live 1 year or more compared to without it  (  ) This therapy has a good chance of helping you feel better or making you live months longer compared to without it     Quality Of Life    (  ) Expect that you will be able to continue all normal activities  ( x ) Expect that you will have some side effects but should be able to maintain normal activities  (  ) Expect that you will be unable to work but able to perform light duties at home  (  ) Expect that you will be able to perform light duties and will need assistance with self care    Treatment Benefits and Harms     1.     Patient taught on all common and rare toxicities regarding their treatment, disease process and drug regimen. This includes the short and long-         term effects of therapy (including infertility risks when appropriate). This also includes drug-drug and drug-food interactions when appropriate. Patient was educated when to call [facility] with adverse effects and symptoms. 2.    Patient was taught safe handling and storage of medications in the home (when appropriate) and procedures for handling body sections and             waste in the home. 3.    Patient was taught on planned for missed doses and follow-up plans during treatment, including ryan of provider visits and labs. 4.    Patient signed consent on treatment and drug information sheets were given. Alternative To Recommended Treatment    (  ) Palliative or Hospice  (  ) Second Opinion  (x  ) Other    Patient Care Management     Advance Care Planning completed:    ( x ) Yes   (  ) No   Pain Care Plan entered (as applicable):    (x  ) Yes   (  ) No   Comments:  PHQ-9 completed (Follow-up plan as applicable):   ( x ) Yes   (  ) No   Comments:  Distress needs addressed with the patient:    ( x ) Yes   (  ) No   Distress areas needing addressed further:     Patient was educated on the expectations and their responsibility to schedule their follow-up appointments. The patient was also educated that if they refuse to show-up to their appointment or refuse to schedule a follow-up appointment that it is their responsibility to call [facility] in a timely manner to schedule their next appointment. The patient was educated on [facility] policy and that the patient is ultimately responsible for monitoring their appointments and adhering to the schedule. ( x  ) Yes   (   ) No    Estimated Out of Pocket Costs discussed per the patient per financial navigator. Patient Consented and taught per Nurse Navigator. Ese Rodriguez presented for treatment planning for rituxan.  We discussed potential AE including but not limited to: infusion reaction, TLS, fever, lymphopenia, nausea, rigors, stomatitis, asthenia, headache, arthralgia, HTN, rash, abdominal pain, leukopenia, cough, diarrhea, pruritus, URI, anemia, edema, elevated liver enzymes, thrombocytopenia, dizziness, angioedema, hypotension, etc. He is given zofran. He verbalized understanding and is willing to proceed. Advance Directive: In place   Counseling- declined  [place]- declined  Pain- medication refilled, oarrs reviewed without abnoramlity noted  Sleep-uses benadryl  Loss of physical abilities- fatigue, 02 demand, pain, has PT at home, uses portable 02  Fatigue- adequate hydration, nutrition, sleep and movement. Port - right sided, without signs of infection    /67 (Site: Left Upper Arm, Position: Sitting, Cuff Size: Medium Adult)   Pulse (!) 107   Temp 96.8 °F (36 °C) (Infrared)   Resp 18   Ht 6' (1.829 m)   SpO2 (!) 89%   PF (!) 3 L/min   BMI 20.86 kg/m²     Physical Exam  Vitals reviewed. Constitutional:       Comments: Appears ill     HENT:      Head: Normocephalic. Mouth/Throat:      Pharynx: Oropharynx is clear. Eyes:      Extraocular Movements: Extraocular movements intact. Conjunctiva/sclera: Conjunctivae normal.   Cardiovascular:      Rate and Rhythm: Normal rate and regular rhythm. Heart sounds: Normal heart sounds. Pulmonary:      Effort: Pulmonary effort is normal.      Breath sounds: Wheezing present. Abdominal:      General: Abdomen is flat. Bowel sounds are normal.   Musculoskeletal:         General: Normal range of motion. Skin:     General: Skin is warm. Neurological:      General: No focal deficit present. Mental Status: He is alert. Psychiatric:         Mood and Affect: Mood normal.         Thought Content: Thought content normal.         Judgment: Judgment normal.        .  Today, time spent with the patient discussing the intent and schedule of their treatment.  The patient was given a copy of their treatment summary. Questions and concerns were addressed with the patient. Time spent with the patient face to face today was 60 minutes, counseling and coordination of care dominated more than 50% of this patient encounter.      RTC for tox check with PA 11/28

## 2022-11-17 ENCOUNTER — OFFICE VISIT (OUTPATIENT)
Dept: INFECTIOUS DISEASES | Age: 72
End: 2022-11-17
Payer: COMMERCIAL

## 2022-11-17 VITALS
SYSTOLIC BLOOD PRESSURE: 130 MMHG | DIASTOLIC BLOOD PRESSURE: 75 MMHG | WEIGHT: 160.4 LBS | HEART RATE: 99 BPM | RESPIRATION RATE: 19 BRPM | TEMPERATURE: 97.1 F | BODY MASS INDEX: 21.75 KG/M2

## 2022-11-17 DIAGNOSIS — C91.10 CLL (CHRONIC LYMPHOCYTIC LEUKEMIA) (HCC): Primary | ICD-10-CM

## 2022-11-17 DIAGNOSIS — Z85.6 PERSONAL HISTORY OF CLL (CHRONIC LYMPHOCYTIC LEUKEMIA): Primary | ICD-10-CM

## 2022-11-17 DIAGNOSIS — Z09 HOSPITAL DISCHARGE FOLLOW-UP: ICD-10-CM

## 2022-11-17 DIAGNOSIS — B34.8 RHINOVIRUS INFECTION: ICD-10-CM

## 2022-11-17 DIAGNOSIS — C91.10 CLL (CHRONIC LYMPHOCYTIC LEUKEMIA) (HCC): ICD-10-CM

## 2022-11-17 DIAGNOSIS — J15.1 PNEUMONIA OF BOTH LOWER LOBES DUE TO PSEUDOMONAS SPECIES (HCC): ICD-10-CM

## 2022-11-17 DIAGNOSIS — D80.3 SELECTIVE DEFICIENCY OF IGG (HCC): ICD-10-CM

## 2022-11-17 PROCEDURE — 1111F DSCHRG MED/CURRENT MED MERGE: CPT | Performed by: INTERNAL MEDICINE

## 2022-11-17 PROCEDURE — 3017F COLORECTAL CA SCREEN DOC REV: CPT | Performed by: INTERNAL MEDICINE

## 2022-11-17 PROCEDURE — 1123F ACP DISCUSS/DSCN MKR DOCD: CPT | Performed by: INTERNAL MEDICINE

## 2022-11-17 PROCEDURE — G8420 CALC BMI NORM PARAMETERS: HCPCS | Performed by: INTERNAL MEDICINE

## 2022-11-17 PROCEDURE — G8484 FLU IMMUNIZE NO ADMIN: HCPCS | Performed by: INTERNAL MEDICINE

## 2022-11-17 PROCEDURE — 1036F TOBACCO NON-USER: CPT | Performed by: INTERNAL MEDICINE

## 2022-11-17 PROCEDURE — G8427 DOCREV CUR MEDS BY ELIG CLIN: HCPCS | Performed by: INTERNAL MEDICINE

## 2022-11-17 PROCEDURE — 99215 OFFICE O/P EST HI 40 MIN: CPT | Performed by: INTERNAL MEDICINE

## 2022-11-17 RX ORDER — DIPHENHYDRAMINE HYDROCHLORIDE 50 MG/ML
25 INJECTION INTRAMUSCULAR; INTRAVENOUS ONCE
Status: CANCELLED | OUTPATIENT
Start: 2022-11-18 | End: 2022-11-18

## 2022-11-17 RX ORDER — MEPERIDINE HYDROCHLORIDE 50 MG/ML
12.5 INJECTION INTRAMUSCULAR; INTRAVENOUS; SUBCUTANEOUS PRN
Status: CANCELLED | OUTPATIENT
Start: 2022-11-18

## 2022-11-17 RX ORDER — SODIUM CHLORIDE 9 MG/ML
5-250 INJECTION, SOLUTION INTRAVENOUS PRN
OUTPATIENT
Start: 2022-12-19

## 2022-11-17 RX ORDER — SODIUM CHLORIDE 9 MG/ML
5-250 INJECTION, SOLUTION INTRAVENOUS PRN
OUTPATIENT
Start: 2022-12-12

## 2022-11-17 RX ORDER — ACETAMINOPHEN 325 MG/1
650 TABLET ORAL
Status: CANCELLED | OUTPATIENT
Start: 2022-11-18

## 2022-11-17 RX ORDER — EPINEPHRINE 1 MG/ML
0.3 INJECTION, SOLUTION, CONCENTRATE INTRAVENOUS PRN
Status: CANCELLED | OUTPATIENT
Start: 2022-11-18

## 2022-11-17 RX ORDER — SODIUM CHLORIDE 9 MG/ML
5-40 INJECTION INTRAVENOUS PRN
OUTPATIENT
Start: 2022-12-26

## 2022-11-17 RX ORDER — SODIUM CHLORIDE 9 MG/ML
INJECTION, SOLUTION INTRAVENOUS CONTINUOUS
OUTPATIENT
Start: 2022-12-12

## 2022-11-17 RX ORDER — ACETAMINOPHEN 325 MG/1
650 TABLET ORAL ONCE
OUTPATIENT
Start: 2023-01-02 | End: 2022-12-23

## 2022-11-17 RX ORDER — ACETAMINOPHEN 325 MG/1
650 TABLET ORAL
OUTPATIENT
Start: 2022-12-19

## 2022-11-17 RX ORDER — HEPARIN SODIUM (PORCINE) LOCK FLUSH IV SOLN 100 UNIT/ML 100 UNIT/ML
500 SOLUTION INTRAVENOUS PRN
OUTPATIENT
Start: 2022-12-05

## 2022-11-17 RX ORDER — HEPARIN SODIUM (PORCINE) LOCK FLUSH IV SOLN 100 UNIT/ML 100 UNIT/ML
500 SOLUTION INTRAVENOUS PRN
Status: CANCELLED | OUTPATIENT
Start: 2022-11-18

## 2022-11-17 RX ORDER — DIPHENHYDRAMINE HYDROCHLORIDE 50 MG/ML
25 INJECTION INTRAMUSCULAR; INTRAVENOUS ONCE
OUTPATIENT
Start: 2022-12-12 | End: 2022-12-02

## 2022-11-17 RX ORDER — FAMOTIDINE 10 MG/ML
20 INJECTION, SOLUTION INTRAVENOUS
OUTPATIENT
Start: 2022-12-19

## 2022-11-17 RX ORDER — SODIUM CHLORIDE 9 MG/ML
5-250 INJECTION, SOLUTION INTRAVENOUS PRN
OUTPATIENT
Start: 2022-12-26

## 2022-11-17 RX ORDER — ALBUTEROL SULFATE 90 UG/1
4 AEROSOL, METERED RESPIRATORY (INHALATION) PRN
OUTPATIENT
Start: 2022-12-05

## 2022-11-17 RX ORDER — ACETAMINOPHEN 325 MG/1
650 TABLET ORAL ONCE
OUTPATIENT
Start: 2022-12-26 | End: 2022-12-16

## 2022-11-17 RX ORDER — AZITHROMYCIN 250 MG/1
250 TABLET, FILM COATED ORAL DAILY
Qty: 30 TABLET | Refills: 1 | Status: ON HOLD | OUTPATIENT
Start: 2022-11-17 | End: 2022-11-25 | Stop reason: SDUPTHER

## 2022-11-17 RX ORDER — SODIUM CHLORIDE 0.9 % (FLUSH) 0.9 %
5-40 SYRINGE (ML) INJECTION PRN
OUTPATIENT
Start: 2022-12-19

## 2022-11-17 RX ORDER — EPINEPHRINE 1 MG/ML
0.3 INJECTION, SOLUTION, CONCENTRATE INTRAVENOUS PRN
OUTPATIENT
Start: 2022-12-26

## 2022-11-17 RX ORDER — ONDANSETRON 2 MG/ML
8 INJECTION INTRAMUSCULAR; INTRAVENOUS
OUTPATIENT
Start: 2022-12-26

## 2022-11-17 RX ORDER — SODIUM CHLORIDE 9 MG/ML
5-40 INJECTION INTRAVENOUS PRN
OUTPATIENT
Start: 2023-01-16

## 2022-11-17 RX ORDER — ACETAMINOPHEN 325 MG/1
650 TABLET ORAL ONCE
Status: CANCELLED | OUTPATIENT
Start: 2022-11-18 | End: 2022-11-18

## 2022-11-17 RX ORDER — MEPERIDINE HYDROCHLORIDE 50 MG/ML
12.5 INJECTION INTRAMUSCULAR; INTRAVENOUS; SUBCUTANEOUS PRN
OUTPATIENT
Start: 2022-12-12

## 2022-11-17 RX ORDER — ACETAMINOPHEN 325 MG/1
650 TABLET ORAL
OUTPATIENT
Start: 2023-01-02

## 2022-11-17 RX ORDER — DIPHENHYDRAMINE HYDROCHLORIDE 50 MG/ML
50 INJECTION INTRAMUSCULAR; INTRAVENOUS
OUTPATIENT
Start: 2022-12-12

## 2022-11-17 RX ORDER — DIPHENHYDRAMINE HYDROCHLORIDE 50 MG/ML
50 INJECTION INTRAMUSCULAR; INTRAVENOUS
OUTPATIENT
Start: 2022-12-19

## 2022-11-17 RX ORDER — SODIUM CHLORIDE 9 MG/ML
5-250 INJECTION, SOLUTION INTRAVENOUS PRN
OUTPATIENT
Start: 2023-01-09

## 2022-11-17 RX ORDER — SODIUM CHLORIDE 9 MG/ML
INJECTION, SOLUTION INTRAVENOUS CONTINUOUS
OUTPATIENT
Start: 2022-12-19

## 2022-11-17 RX ORDER — FAMOTIDINE 10 MG/ML
20 INJECTION, SOLUTION INTRAVENOUS
OUTPATIENT
Start: 2022-12-05

## 2022-11-17 RX ORDER — ONDANSETRON 2 MG/ML
8 INJECTION INTRAMUSCULAR; INTRAVENOUS
OUTPATIENT
Start: 2022-12-19

## 2022-11-17 RX ORDER — ACETAMINOPHEN 325 MG/1
650 TABLET ORAL ONCE
OUTPATIENT
Start: 2022-12-19 | End: 2022-12-09

## 2022-11-17 RX ORDER — SODIUM CHLORIDE 0.9 % (FLUSH) 0.9 %
5-40 SYRINGE (ML) INJECTION PRN
Status: CANCELLED | OUTPATIENT
Start: 2022-11-18

## 2022-11-17 RX ORDER — DIPHENHYDRAMINE HYDROCHLORIDE 50 MG/ML
25 INJECTION INTRAMUSCULAR; INTRAVENOUS ONCE
OUTPATIENT
Start: 2023-01-09 | End: 2022-12-30

## 2022-11-17 RX ORDER — SODIUM CHLORIDE 9 MG/ML
5-250 INJECTION, SOLUTION INTRAVENOUS PRN
OUTPATIENT
Start: 2023-01-16

## 2022-11-17 RX ORDER — ACETAMINOPHEN 325 MG/1
650 TABLET ORAL
OUTPATIENT
Start: 2023-01-16

## 2022-11-17 RX ORDER — SODIUM CHLORIDE 0.9 % (FLUSH) 0.9 %
5-40 SYRINGE (ML) INJECTION PRN
OUTPATIENT
Start: 2023-01-16

## 2022-11-17 RX ORDER — MEPERIDINE HYDROCHLORIDE 50 MG/ML
12.5 INJECTION INTRAMUSCULAR; INTRAVENOUS; SUBCUTANEOUS PRN
OUTPATIENT
Start: 2022-12-05

## 2022-11-17 RX ORDER — EPINEPHRINE 1 MG/ML
0.3 INJECTION, SOLUTION, CONCENTRATE INTRAVENOUS PRN
OUTPATIENT
Start: 2022-12-19

## 2022-11-17 RX ORDER — DIPHENHYDRAMINE HYDROCHLORIDE 50 MG/ML
50 INJECTION INTRAMUSCULAR; INTRAVENOUS
Status: CANCELLED | OUTPATIENT
Start: 2022-11-18

## 2022-11-17 RX ORDER — MEPERIDINE HYDROCHLORIDE 50 MG/ML
12.5 INJECTION INTRAMUSCULAR; INTRAVENOUS; SUBCUTANEOUS PRN
OUTPATIENT
Start: 2023-01-09

## 2022-11-17 RX ORDER — ONDANSETRON 2 MG/ML
8 INJECTION INTRAMUSCULAR; INTRAVENOUS
Status: CANCELLED | OUTPATIENT
Start: 2022-11-18

## 2022-11-17 RX ORDER — SODIUM CHLORIDE 9 MG/ML
5-40 INJECTION INTRAVENOUS PRN
Status: CANCELLED | OUTPATIENT
Start: 2022-11-18

## 2022-11-17 RX ORDER — HEPARIN SODIUM (PORCINE) LOCK FLUSH IV SOLN 100 UNIT/ML 100 UNIT/ML
500 SOLUTION INTRAVENOUS PRN
OUTPATIENT
Start: 2022-12-19

## 2022-11-17 RX ORDER — SODIUM CHLORIDE 9 MG/ML
5-40 INJECTION INTRAVENOUS PRN
OUTPATIENT
Start: 2023-01-02

## 2022-11-17 RX ORDER — SODIUM CHLORIDE 9 MG/ML
INJECTION, SOLUTION INTRAVENOUS CONTINUOUS
OUTPATIENT
Start: 2023-01-09

## 2022-11-17 RX ORDER — EPINEPHRINE 1 MG/ML
0.3 INJECTION, SOLUTION, CONCENTRATE INTRAVENOUS PRN
OUTPATIENT
Start: 2023-01-02

## 2022-11-17 RX ORDER — SODIUM CHLORIDE 0.9 % (FLUSH) 0.9 %
5-40 SYRINGE (ML) INJECTION PRN
OUTPATIENT
Start: 2022-12-05

## 2022-11-17 RX ORDER — ONDANSETRON 2 MG/ML
8 INJECTION INTRAMUSCULAR; INTRAVENOUS
OUTPATIENT
Start: 2022-12-12

## 2022-11-17 RX ORDER — ACETAMINOPHEN 325 MG/1
650 TABLET ORAL ONCE
OUTPATIENT
Start: 2023-01-16 | End: 2023-01-06

## 2022-11-17 RX ORDER — HEPARIN SODIUM (PORCINE) LOCK FLUSH IV SOLN 100 UNIT/ML 100 UNIT/ML
500 SOLUTION INTRAVENOUS PRN
OUTPATIENT
Start: 2022-12-12

## 2022-11-17 RX ORDER — EPINEPHRINE 1 MG/ML
0.3 INJECTION, SOLUTION, CONCENTRATE INTRAVENOUS PRN
OUTPATIENT
Start: 2022-12-12

## 2022-11-17 RX ORDER — SODIUM CHLORIDE 9 MG/ML
5-250 INJECTION, SOLUTION INTRAVENOUS PRN
OUTPATIENT
Start: 2022-12-05

## 2022-11-17 RX ORDER — ONDANSETRON 2 MG/ML
8 INJECTION INTRAMUSCULAR; INTRAVENOUS
OUTPATIENT
Start: 2022-12-05

## 2022-11-17 RX ORDER — SODIUM CHLORIDE 0.9 % (FLUSH) 0.9 %
5-40 SYRINGE (ML) INJECTION PRN
OUTPATIENT
Start: 2023-01-02

## 2022-11-17 RX ORDER — SODIUM CHLORIDE 9 MG/ML
5-250 INJECTION, SOLUTION INTRAVENOUS PRN
Status: CANCELLED | OUTPATIENT
Start: 2022-11-18

## 2022-11-17 RX ORDER — SODIUM CHLORIDE 9 MG/ML
INJECTION, SOLUTION INTRAVENOUS CONTINUOUS
OUTPATIENT
Start: 2022-12-26

## 2022-11-17 RX ORDER — FAMOTIDINE 10 MG/ML
20 INJECTION, SOLUTION INTRAVENOUS
OUTPATIENT
Start: 2022-12-26

## 2022-11-17 RX ORDER — DIPHENHYDRAMINE HYDROCHLORIDE 50 MG/ML
50 INJECTION INTRAMUSCULAR; INTRAVENOUS
OUTPATIENT
Start: 2022-12-26

## 2022-11-17 RX ORDER — FAMOTIDINE 10 MG/ML
20 INJECTION, SOLUTION INTRAVENOUS
OUTPATIENT
Start: 2023-01-09

## 2022-11-17 RX ORDER — SODIUM CHLORIDE 0.9 % (FLUSH) 0.9 %
5-40 SYRINGE (ML) INJECTION PRN
OUTPATIENT
Start: 2022-12-26

## 2022-11-17 RX ORDER — MEPERIDINE HYDROCHLORIDE 50 MG/ML
12.5 INJECTION INTRAMUSCULAR; INTRAVENOUS; SUBCUTANEOUS PRN
OUTPATIENT
Start: 2023-01-02

## 2022-11-17 RX ORDER — ONDANSETRON 2 MG/ML
8 INJECTION INTRAMUSCULAR; INTRAVENOUS
OUTPATIENT
Start: 2023-01-16

## 2022-11-17 RX ORDER — ALBUTEROL SULFATE 90 UG/1
4 AEROSOL, METERED RESPIRATORY (INHALATION) PRN
OUTPATIENT
Start: 2023-01-09

## 2022-11-17 RX ORDER — SODIUM CHLORIDE 9 MG/ML
5-40 INJECTION INTRAVENOUS PRN
OUTPATIENT
Start: 2022-12-05

## 2022-11-17 RX ORDER — SODIUM CHLORIDE 0.9 % (FLUSH) 0.9 %
5-40 SYRINGE (ML) INJECTION PRN
OUTPATIENT
Start: 2023-01-09

## 2022-11-17 RX ORDER — HEPARIN SODIUM (PORCINE) LOCK FLUSH IV SOLN 100 UNIT/ML 100 UNIT/ML
500 SOLUTION INTRAVENOUS PRN
OUTPATIENT
Start: 2023-01-02

## 2022-11-17 RX ORDER — SODIUM CHLORIDE 9 MG/ML
INJECTION, SOLUTION INTRAVENOUS CONTINUOUS
OUTPATIENT
Start: 2022-12-05

## 2022-11-17 RX ORDER — ALBUTEROL SULFATE 90 UG/1
4 AEROSOL, METERED RESPIRATORY (INHALATION) PRN
Status: CANCELLED | OUTPATIENT
Start: 2022-11-18

## 2022-11-17 RX ORDER — DIPHENHYDRAMINE HYDROCHLORIDE 50 MG/ML
50 INJECTION INTRAMUSCULAR; INTRAVENOUS
OUTPATIENT
Start: 2023-01-16

## 2022-11-17 RX ORDER — DIPHENHYDRAMINE HYDROCHLORIDE 50 MG/ML
25 INJECTION INTRAMUSCULAR; INTRAVENOUS ONCE
OUTPATIENT
Start: 2022-12-26 | End: 2022-12-16

## 2022-11-17 RX ORDER — ALBUTEROL SULFATE 90 UG/1
4 AEROSOL, METERED RESPIRATORY (INHALATION) PRN
OUTPATIENT
Start: 2022-12-26

## 2022-11-17 RX ORDER — TOBRAMYCIN INHALATION SOLUTION 300 MG/5ML
300 INHALANT RESPIRATORY (INHALATION) 2 TIMES DAILY
Qty: 300 ML | Refills: 1 | Status: ON HOLD | OUTPATIENT
Start: 2022-11-17 | End: 2022-11-25 | Stop reason: SDUPTHER

## 2022-11-17 RX ORDER — ACETAMINOPHEN 325 MG/1
650 TABLET ORAL
OUTPATIENT
Start: 2022-12-12

## 2022-11-17 RX ORDER — ACETAMINOPHEN 325 MG/1
650 TABLET ORAL
OUTPATIENT
Start: 2023-01-09

## 2022-11-17 RX ORDER — DIPHENHYDRAMINE HYDROCHLORIDE 50 MG/ML
25 INJECTION INTRAMUSCULAR; INTRAVENOUS ONCE
OUTPATIENT
Start: 2022-12-19 | End: 2022-12-09

## 2022-11-17 RX ORDER — SODIUM CHLORIDE 9 MG/ML
INJECTION, SOLUTION INTRAVENOUS CONTINUOUS
OUTPATIENT
Start: 2023-01-02

## 2022-11-17 RX ORDER — DIPHENHYDRAMINE HYDROCHLORIDE 50 MG/ML
50 INJECTION INTRAMUSCULAR; INTRAVENOUS
OUTPATIENT
Start: 2023-01-02

## 2022-11-17 RX ORDER — DIPHENHYDRAMINE HYDROCHLORIDE 50 MG/ML
25 INJECTION INTRAMUSCULAR; INTRAVENOUS ONCE
OUTPATIENT
Start: 2022-12-05 | End: 2022-11-25

## 2022-11-17 RX ORDER — HEPARIN SODIUM (PORCINE) LOCK FLUSH IV SOLN 100 UNIT/ML 100 UNIT/ML
500 SOLUTION INTRAVENOUS PRN
OUTPATIENT
Start: 2023-01-09

## 2022-11-17 RX ORDER — ALBUTEROL SULFATE 90 UG/1
4 AEROSOL, METERED RESPIRATORY (INHALATION) PRN
OUTPATIENT
Start: 2022-12-12

## 2022-11-17 RX ORDER — DIPHENHYDRAMINE HYDROCHLORIDE 50 MG/ML
50 INJECTION INTRAMUSCULAR; INTRAVENOUS
OUTPATIENT
Start: 2022-12-05

## 2022-11-17 RX ORDER — SODIUM CHLORIDE 9 MG/ML
5-250 INJECTION, SOLUTION INTRAVENOUS PRN
OUTPATIENT
Start: 2023-01-02

## 2022-11-17 RX ORDER — ACETAMINOPHEN 325 MG/1
650 TABLET ORAL
OUTPATIENT
Start: 2022-12-05

## 2022-11-17 RX ORDER — HEPARIN SODIUM (PORCINE) LOCK FLUSH IV SOLN 100 UNIT/ML 100 UNIT/ML
500 SOLUTION INTRAVENOUS PRN
OUTPATIENT
Start: 2022-12-26

## 2022-11-17 RX ORDER — DIPHENHYDRAMINE HYDROCHLORIDE 50 MG/ML
25 INJECTION INTRAMUSCULAR; INTRAVENOUS ONCE
OUTPATIENT
Start: 2023-01-02 | End: 2022-12-23

## 2022-11-17 RX ORDER — ONDANSETRON 2 MG/ML
8 INJECTION INTRAMUSCULAR; INTRAVENOUS
OUTPATIENT
Start: 2023-01-09

## 2022-11-17 RX ORDER — SODIUM CHLORIDE 9 MG/ML
5-40 INJECTION INTRAVENOUS PRN
OUTPATIENT
Start: 2023-01-09

## 2022-11-17 RX ORDER — ALBUTEROL SULFATE 90 UG/1
4 AEROSOL, METERED RESPIRATORY (INHALATION) PRN
OUTPATIENT
Start: 2023-01-16

## 2022-11-17 RX ORDER — SODIUM CHLORIDE 0.9 % (FLUSH) 0.9 %
5-40 SYRINGE (ML) INJECTION PRN
OUTPATIENT
Start: 2022-12-12

## 2022-11-17 RX ORDER — FAMOTIDINE 10 MG/ML
20 INJECTION, SOLUTION INTRAVENOUS
Status: CANCELLED | OUTPATIENT
Start: 2022-11-18

## 2022-11-17 RX ORDER — ALBUTEROL SULFATE 90 UG/1
4 AEROSOL, METERED RESPIRATORY (INHALATION) PRN
OUTPATIENT
Start: 2023-01-02

## 2022-11-17 RX ORDER — ACETAMINOPHEN 325 MG/1
650 TABLET ORAL
OUTPATIENT
Start: 2022-12-26

## 2022-11-17 RX ORDER — SODIUM CHLORIDE 9 MG/ML
5-40 INJECTION INTRAVENOUS PRN
OUTPATIENT
Start: 2022-12-19

## 2022-11-17 RX ORDER — EPINEPHRINE 1 MG/ML
0.3 INJECTION, SOLUTION, CONCENTRATE INTRAVENOUS PRN
OUTPATIENT
Start: 2023-01-09

## 2022-11-17 RX ORDER — DIPHENHYDRAMINE HYDROCHLORIDE 50 MG/ML
25 INJECTION INTRAMUSCULAR; INTRAVENOUS ONCE
OUTPATIENT
Start: 2023-01-16 | End: 2023-01-06

## 2022-11-17 RX ORDER — FAMOTIDINE 10 MG/ML
20 INJECTION, SOLUTION INTRAVENOUS
OUTPATIENT
Start: 2023-01-02

## 2022-11-17 RX ORDER — MEPERIDINE HYDROCHLORIDE 50 MG/ML
12.5 INJECTION INTRAMUSCULAR; INTRAVENOUS; SUBCUTANEOUS PRN
OUTPATIENT
Start: 2023-01-16

## 2022-11-17 RX ORDER — SODIUM CHLORIDE 9 MG/ML
INJECTION, SOLUTION INTRAVENOUS CONTINUOUS
OUTPATIENT
Start: 2023-01-16

## 2022-11-17 RX ORDER — FAMOTIDINE 10 MG/ML
20 INJECTION, SOLUTION INTRAVENOUS
OUTPATIENT
Start: 2022-12-12

## 2022-11-17 RX ORDER — SODIUM CHLORIDE 9 MG/ML
5-40 INJECTION INTRAVENOUS PRN
OUTPATIENT
Start: 2022-12-12

## 2022-11-17 RX ORDER — MEPERIDINE HYDROCHLORIDE 50 MG/ML
12.5 INJECTION INTRAMUSCULAR; INTRAVENOUS; SUBCUTANEOUS PRN
OUTPATIENT
Start: 2022-12-19

## 2022-11-17 RX ORDER — EPINEPHRINE 1 MG/ML
0.3 INJECTION, SOLUTION, CONCENTRATE INTRAVENOUS PRN
OUTPATIENT
Start: 2023-01-16

## 2022-11-17 RX ORDER — DIPHENHYDRAMINE HYDROCHLORIDE 50 MG/ML
50 INJECTION INTRAMUSCULAR; INTRAVENOUS
OUTPATIENT
Start: 2023-01-09

## 2022-11-17 RX ORDER — ACETAMINOPHEN 325 MG/1
650 TABLET ORAL ONCE
OUTPATIENT
Start: 2023-01-09 | End: 2022-12-30

## 2022-11-17 RX ORDER — ALBUTEROL SULFATE 90 UG/1
4 AEROSOL, METERED RESPIRATORY (INHALATION) PRN
OUTPATIENT
Start: 2022-12-19

## 2022-11-17 RX ORDER — EPINEPHRINE 1 MG/ML
0.3 INJECTION, SOLUTION, CONCENTRATE INTRAVENOUS PRN
OUTPATIENT
Start: 2022-12-05

## 2022-11-17 RX ORDER — FAMOTIDINE 10 MG/ML
20 INJECTION, SOLUTION INTRAVENOUS
OUTPATIENT
Start: 2023-01-16

## 2022-11-17 RX ORDER — MEPERIDINE HYDROCHLORIDE 50 MG/ML
12.5 INJECTION INTRAMUSCULAR; INTRAVENOUS; SUBCUTANEOUS PRN
OUTPATIENT
Start: 2022-12-26

## 2022-11-17 RX ORDER — ACETAMINOPHEN 325 MG/1
650 TABLET ORAL ONCE
OUTPATIENT
Start: 2022-12-12 | End: 2022-12-02

## 2022-11-17 RX ORDER — ONDANSETRON 2 MG/ML
8 INJECTION INTRAMUSCULAR; INTRAVENOUS
OUTPATIENT
Start: 2023-01-02

## 2022-11-17 RX ORDER — SODIUM CHLORIDE 9 MG/ML
INJECTION, SOLUTION INTRAVENOUS CONTINUOUS
Status: CANCELLED | OUTPATIENT
Start: 2022-11-18

## 2022-11-17 RX ORDER — ACETAMINOPHEN 325 MG/1
650 TABLET ORAL ONCE
OUTPATIENT
Start: 2022-12-05 | End: 2022-11-25

## 2022-11-17 RX ORDER — HEPARIN SODIUM (PORCINE) LOCK FLUSH IV SOLN 100 UNIT/ML 100 UNIT/ML
500 SOLUTION INTRAVENOUS PRN
OUTPATIENT
Start: 2023-01-16

## 2022-11-17 NOTE — PROGRESS NOTES
11/19/2022         Referring Physician: No ref. provider found  Primary Care Physician: Katherine Boucher MD    Impression/Plan:   Diagnosis Orders   1. Personal history of CLL (chronic lymphocytic leukemia)        2. Rhinovirus infection        3. CLL (chronic lymphocytic leukemia) ([facility] [address]        4. Selective deficiency of IgG ([facility] [address]        5. Pneumonia of both lower lobes due to Pseudomonas species (HCC)  Culture, Respiratory    azithromycin (ZITHROMAX) 250 MG tablet    tobramycin, PF, (HARLEY) 300 MG/5ML nebulizer solution    XR CHEST STANDARD (2 VW)          Discussion:  Pneumonia  Remains symptomatic requiring oxygen. Concerned about bronchiectasis and chronic Pseudomonas colonization. Obtain respiratory culture. Azithromycin and inhaled tobramycin prescribed for eradication of Pseudomonas aeruginosa. Continue IVIG      Return in about 2 weeks (around 12/1/2022). 45 minutes was spent in the encounter; more than 50% of the face-to-face time was spent with the patient providing counseling and coordination of care. History: Adonis Ervin is a 71 y.o.  male with a medical history of T2DM, CLL, hypogammaglobulinemia requiring monthly IVIG infusions, presenting today for recurrent pneumonia. Patient reports that he was treated for pneumonia in March 2022, he felt well for a while but once more had symptoms of productive cough and fatigue. He denies night sweats. He endorses some weight loss and poor appetite. He follows with Dr. Bogdan Brandt who had ordered a CT of the chest that was done on 5/4/2022. It showed patchy bilateral airspace opacities, interval increase in intrathoracic and upper abdominal lymphadenopathy and splenomegaly. Patient was born in [place]. He reports no history of exposure to anyone with tuberculosis. 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 He has no pets. 6/8/2022: at last visit Bactrim was prescribed, tests were done.  Cough remains productive of yellow, non-bloody sputum. No chest pain, nausea or fever. Feels somewhat better and stronger. Not at baseline. 6/30/2022: at last visit ciprofloxacin was prescribed for positive sputum culture with Pseudomonas aeruginosa. He continues to have productive cough with small amounts of non-bloody sputum  11/17/2022: Recently seen during his 10/22/2022 admission for rhinovirus with bacterial superinfection. X-ray had shown right lower lobe and left upper lobe cavitary lesion. Respiratory culture was positive for Pseudomonas aeruginosa. Was discharged to complete eight 2-week course of intravenous ciprofloxacin and Zosyn on 11/14/2022. Continues to have shortness of breath and cough productive of nonbloody phlegm. Requires oxygen. Review of Systems   All other systems reviewed and are negative. No Known Allergies    Patient Active Problem List   Diagnosis    Pneumonia    Sepsis ([facility] [address]    Hypogammaglobulinemia ([facility] [address]    CLL (chronic lymphocytic leukemia) ([facility] [address]    Upper respiratory infection, acute    Generalized muscle weakness    Type 2 diabetes mellitus with hyperglycemia, with long-term current use of insulin ([facility])    Poor appetite    COPD (chronic obstructive pulmonary disease) ([facility])    Neutropenia ([facility])    Other specified disorders of bone density and structure, unspecified site    Cellulitis of right upper limb    Herpesviral infection    Intestinal malabsorption    Other nonspecific abnormal finding of lung field    Iron deficiency anemia due to chronic blood loss    Other muscle spasm    Leukemia, lymphocytic, chronic ([facility])    Selective deficiency of IgG (HCC)    Acute bronchitis    Severe malnutrition ([facility])    Pneumonia due to organism    Personal history of CLL (chronic lymphocytic leukemia)    Rhinovirus infection    Anemia    Thrombocytopenia ([facility])       No current facility-administered medications for this visit. No current outpatient medications on file.      Facility-Administered Medications Ordered in Other Visits   Medication Dose Route Frequency Provider Last Rate Last Admin    vancomycin (VANCOCIN) 1,250 mg in dextrose 5 % 250 mL IVPB ([id number])  1,250 mg IntraVENous Q12H Omar Salmeron MD        EPINEPHrine PF 1 MG/ML injection (Anaphylaxis) 0.3 mg  0.3 mg IntraMUSCular Once Carolee Kwan MD        sodium chloride flush 0.9 % injection 5-40 mL  5-40 mL IntraVENous PRN Jessie Reynoso MD        0.9 % sodium chloride infusion   IntraVENous PRN Jessie Reynoso MD        acetaminophen (TYLENOL) tablet 650 mg  650 mg Oral Q6H PRN Jessie Reynoso MD        Or    acetaminophen (TYLENOL) suppository 650 mg  650 mg Rectal Q6H PRN Jessie Reynoso MD        polyethylene glycol (GLYCOLAX) packet 17 g  17 g Oral Daily PRN Jessie Reynoso MD        ondansetron (ZOFRAN-ODT) disintegrating tablet 4 mg  4 mg Oral Q8H PRN Jessie Reynoso MD        Or    ondansetron (ZOFRAN) injection 4 mg  4 mg IntraVENous Q6H PRN Jessie Reynsoo MD        HYDROcodone-acetaminophen (NORCO) 5-325 MG per tablet 1 tablet  1 tablet Oral Q6H PRN Jessie Reynoso MD   1 tablet at 11/19/22 0848    allopurinol (ZYLOPRIM) tablet 200 mg  200 mg Oral TID Jessie Reynoso MD   200 mg at 11/19/22 0841    ascorbic acid (VITAMIN C) tablet 250 mg  250 mg Oral BID Christelle Covarrubias MD   250 mg at 11/19/22 0842    atorvastatin (LIPITOR) tablet 80 mg  80 mg Oral Daily Christelle Covarrubias MD   80 mg at 11/19/22 0841    ferrous gluconate 324 (37.5 Fe) MG tablet 324 mg  324 mg Oral BID WC Christelle Covarrubias MD   324 mg at 11/19/22 0852    finasteride (PROSCAR) tablet 5 mg  5 mg Oral Daily Christelle Covarrubias MD   5 mg at 11/19/22 0842    miconazole (MICOTIN) 2 % powder   Topical BID Jessie Reynoso MD   Given at 11/19/22 0059    pantoprazole (PROTONIX) tablet 40 mg  40 mg Oral SANKET MCFARLANE MD Satish   40 mg at 11/19/22 0703    tamsulosin (FLOMAX) capsule 0.4 mg  0.4 mg Oral Daily Mike Veras MD   0.4 mg at 11/19/22 0841    acyclovir (ZOVIRAX) capsule 400 mg  400 mg Oral TID Mike Veras MD   400 mg at 11/19/22 0909    insulin glargine (LANTUS) injection vial 30 Units  30 Units SubCUTAneous Nightly Mike Veras MD   30 Units at 11/19/22 0106    insulin lispro (HUMALOG) injection vial 0-4 Units  0-4 Units SubCUTAneous TID WC Mike Veras MD        insulin lispro (HUMALOG) injection vial 0-4 Units  0-4 Units SubCUTAneous Nightly Mike Veras MD        glucose chewable tablet 16 g  4 tablet Oral PRN Mike Veras MD        dextrose bolus 10% 125 mL  125 mL IntraVENous PRN Mike Veras MD        Or    dextrose bolus 10% 250 mL  250 mL IntraVENous PRN Christelle Covarrubias MD        glucagon (rDNA) injection 1 mg  1 mg SubCUTAneous PRN Chrisetlle Covarrubias MD        dextrose 10 % infusion   IntraVENous Continuous PRN Christelle Covarrubias MD        sodium chloride flush 0.9 % injection 10 mL  10 mL IntraVENous PRN Mercedes Gonzalez MD           Past Medical History:   Diagnosis Date    Arthritis     knees, Rt hand/wrist    BPH (benign prostatic hyperplasia)     CAD (coronary artery disease)     Cancer ([facility] [address]     CLL--Sees Dr Ranjana Pope    Diabetes mellitus [facility])     History of blood transfusion     no reaction    Hx of motion sickness     Hyperlipidemia     MDRO (multiple drug resistant organisms) resistance     abscess on buttock 10yrs ago    Migraine     last one summer 2016    Pneumonia 2016    Wears dentures     full upper--lower dentures    Wears glasses        Past Surgical History:   Procedure Laterality Date    BRONCHOSCOPY N/A 10/28/2022    BRONCHOSCOPY performed by Bibiana Esquivel MD at [facility]  1990's    x 2  last showed \"inflammation of heart\" COLONOSCOPY  2010    DENTAL SURGERY      all teeth extracted     EYE SURGERY Right 2016    Cataract removal    FRACTURE SURGERY Left     left ankle plate and screws    KNEE SURGERY  1970    left    OTHER SURGICAL HISTORY Left 2017    groin excision    TONSILLECTOMY  late     age 13    TUNNELED VENOUS PORT PLACEMENT  2013    Right upper chest       Social History     Socioeconomic History    Marital status: Single     Spouse name: Not on file    Number of children: Not on file    Years of education: Not on file    Highest education level: Not on file   Occupational History    Not on file   Tobacco Use    Smoking status: Former     Packs/day: 3.00     Years: 20.00     Pack years: 60.00     Types: Cigarettes     Start date: 10/2/1965     Quit date: 1987     Years since quittin.9    Smokeless tobacco: Never   Vaping Use    Vaping Use: Never used   Substance and Sexual Activity    Alcohol use: No    Drug use: No    Sexual activity: Not Currently   Other Topics Concern    Not on file   Social History Narrative    Not on file     Social Determinants of Health     Financial Resource Strain: Not on file   Food Insecurity: Not on file   Transportation Needs: Not on file   Physical Activity: Not on file   Stress: Not on file   Social Connections: Not on file   Intimate Partner Violence: Not on file   Housing Stability: Not on file       Family History   Problem Relation Age of Onset    Diabetes Mother     High Blood Pressure Mother     Heart Disease Father     Other Sister         liver problems    Early Death Brother     Asthma Maternal Uncle     Colon Cancer Brother        Vital Signs:  Vitals:    22 1021   BP: 130/75   Site: Right Lower Arm   Position: Sitting   Cuff Size: Small Adult   Pulse: 99   Resp: 19   Temp: 97.1 °F (36.2 °C)   TempSrc: Infrared   Weight: 160 lb 6.4 oz (72.8 kg)        Wt Readings from Last 3 Encounters:   22 161 lb 2.5 oz (73.1 kg)   22 155 lb 9.6 oz (70.6 kg)   11/17/22 160 lb 6.4 oz (72.8 kg)        Physical Exam:   Gen: alert and NAD  HEENT: sclera clear, pupils equal and reactive, extra ocular muscles intact, oropharynx clear, mucus membranes moist, tympanic membranes clear bilaterally, no cervical lymphadenopathy noted and neck supple  Neck: supple, no significant adenopathy  Chest: bibasilar crackles. Heart: regular rate and rhythm, no murmurs  ABD: abdomen is soft without significant tenderness, masses, organomegaly or guarding. EXT:peripheral pulses normal, no pedal edema, no clubbing or cyanosis  NEURO: alert, oriented, normal speech, no focal findings or movement disorder noted  Skin: well hydrated, no lesions, surgical site examined  Wounds: none  Labs:   WBC   Date Value Ref Range Status   11/19/2022 20.4 (H) 4.0 - 10.5 K/CU MM Final   11/18/2022 38.8 (HH) 4.0 - 10.5 K/CU MM Final   11/16/2022 104.2 (HH) 4.0 - 10.5 K/CU MM Final     Comment:     WBC/DIFFERENTIAL SUSPECT FLAG NOTED ON ANALYZER. PLEASE REVIEW AND CONSIDER SENDING OUT IF   CLINICALLY INDICATED. WBC GIVEN TO DR IBARRA AT 1415PM ON 11/16/22 BY ENRRIQUE BAUMAN  RESULTS READ BACK       Creatinine   Date Value Ref Range Status   11/19/2022 0.6 (L) 0.9 - 1.3 MG/DL Final   11/18/2022 0.9 0.9 - 1.3 MG/DL Final   11/16/2022 0.7 (L) 0.9 - 1.3 MG/DL Final       Cultures:  Culture   Date Value Ref Range Status   10/28/2022 Final Report Normal respiratory micheline absent  Final   10/28/2022 PSEUDOMONAS AERUGINOSA Light growth (A)  Final   10/28/2022   Preliminary    Prelim Report No growth after 1 week/s of incubation. No growth after 2 week/s of incubation. 10/28/2022   Preliminary    Prelim Report No growth after 1 week/s of incubation. No growth after 2 week/s of incubation.    10/28/2022 Final Report Normal respiratory micheline absent  Final   10/28/2022 (A)  Final    PSEUDOMONAS AERUGINOSA 10,000 to 50,000 CFU/mL No further workup Refer to culture [id number] for sensitivity results   10/28/2022 Preliminary    Prelim Report No growth after 1 week/s of incubation. No growth after 2 week/s of incubation. 10/28/2022   Preliminary    Prelim Report No growth after 1 week/s of incubation. No growth after 2 week/s of incubation.        Imaging Studies:

## 2022-11-18 ENCOUNTER — HOSPITAL ENCOUNTER (INPATIENT)
Age: 72
LOS: 7 days | Discharge: LEFT BEFORE DISCHARGE | DRG: 871 | End: 2022-11-25
Attending: EMERGENCY MEDICINE | Admitting: STUDENT IN AN ORGANIZED HEALTH CARE EDUCATION/TRAINING PROGRAM
Payer: COMMERCIAL

## 2022-11-18 ENCOUNTER — HOSPITAL ENCOUNTER (OUTPATIENT)
Dept: INFUSION THERAPY | Age: 72
Discharge: HOME | End: 2022-11-18
Payer: COMMERCIAL

## 2022-11-18 ENCOUNTER — APPOINTMENT (OUTPATIENT)
Dept: GENERAL RADIOLOGY | Age: 72
DRG: 871 | End: 2022-11-18
Payer: COMMERCIAL

## 2022-11-18 ENCOUNTER — HOSPITAL ENCOUNTER (OUTPATIENT)
Age: 72
Setting detail: SPECIMEN
Discharge: HOME | DRG: 871 | End: 2022-11-18
Payer: COMMERCIAL

## 2022-11-18 VITALS
RESPIRATION RATE: 40 BRPM | DIASTOLIC BLOOD PRESSURE: 59 MMHG | HEART RATE: 135 BPM | HEIGHT: 72 IN | BODY MASS INDEX: 21.08 KG/M2 | WEIGHT: 155.6 LBS | TEMPERATURE: 101.1 F | OXYGEN SATURATION: 85 % | SYSTOLIC BLOOD PRESSURE: 119 MMHG

## 2022-11-18 DIAGNOSIS — B34.8 RHINOVIRUS: ICD-10-CM

## 2022-11-18 DIAGNOSIS — J96.21 ACUTE ON CHRONIC RESPIRATORY FAILURE WITH HYPOXEMIA (HCC): Primary | ICD-10-CM

## 2022-11-18 DIAGNOSIS — C91.10 CLL (CHRONIC LYMPHOCYTIC LEUKEMIA) (HCC): Primary | ICD-10-CM

## 2022-11-18 DIAGNOSIS — J15.1 PNEUMONIA OF BOTH LOWER LOBES DUE TO PSEUDOMONAS SPECIES (HCC): ICD-10-CM

## 2022-11-18 LAB
ADENOVIRUS DETECTION BY PCR: NOT DETECTED
ALBUMIN SERPL-MCNC: 3.2 GM/DL (ref 3.4–5)
ALP BLD-CCNC: 159 IU/L (ref 40–129)
ALT SERPL-CCNC: 18 U/L (ref 10–40)
ANION GAP SERPL CALCULATED.3IONS-SCNC: 14 MMOL/L (ref 4–16)
ANISOCYTOSIS: ABNORMAL
AST SERPL-CCNC: 35 IU/L (ref 15–37)
BANDED NEUTROPHILS ABSOLUTE COUNT: 0.39 K/CU MM
BANDED NEUTROPHILS RELATIVE PERCENT: 1 % (ref 5–11)
BASE EXCESS: 3 (ref 0–3.3)
BILIRUB SERPL-MCNC: 0.6 MG/DL (ref 0–1)
BORDETELLA PARAPERTUSSIS BY PCR: NOT DETECTED
BORDETELLA PERTUSSIS PCR: NOT DETECTED
BUN BLDV-MCNC: 42 MG/DL (ref 6–23)
CALCIUM SERPL-MCNC: 8.6 MG/DL (ref 8.3–10.6)
CHLAMYDOPHILA PNEUMONIA PCR: NOT DETECTED
CHLORIDE BLD-SCNC: 99 MMOL/L (ref 99–110)
CO2: 21 MMOL/L (ref 21–32)
COMMENT: ABNORMAL
CORONAVIRUS 229E PCR: NOT DETECTED
CORONAVIRUS HKU1 PCR: NOT DETECTED
CORONAVIRUS NL63 PCR: NOT DETECTED
CORONAVIRUS OC43 PCR: NOT DETECTED
CREAT SERPL-MCNC: 0.9 MG/DL (ref 0.9–1.3)
DIFFERENTIAL TYPE: ABNORMAL
EKG ATRIAL RATE: 119 BPM
EKG DIAGNOSIS: NORMAL
EKG P AXIS: 28 DEGREES
EKG P-R INTERVAL: 124 MS
EKG Q-T INTERVAL: 312 MS
EKG QRS DURATION: 86 MS
EKG QTC CALCULATION (BAZETT): 438 MS
EKG R AXIS: 39 DEGREES
EKG T AXIS: 71 DEGREES
EKG VENTRICULAR RATE: 119 BPM
EOSINOPHILS ABSOLUTE: 0.4 K/CU MM
EOSINOPHILS RELATIVE PERCENT: 1 % (ref 0–3)
GFR SERPL CREATININE-BSD FRML MDRD: >60 ML/MIN/1.73M2
GLUCOSE BLD-MCNC: 159 MG/DL (ref 70–99)
HCO3 VENOUS: 21.9 MMOL/L (ref 19–25)
HCT VFR BLD CALC: 29.6 % (ref 42–52)
HEMOGLOBIN: 9.2 GM/DL (ref 13.5–18)
HUMAN METAPNEUMOVIRUS PCR: NOT DETECTED
INFLUENZA A BY PCR: NOT DETECTED
INFLUENZA A H1 (2009) PCR: NOT DETECTED
INFLUENZA A H1 PANDEMIC PCR: NOT DETECTED
INFLUENZA A H3 PCR: NOT DETECTED
INFLUENZA B BY PCR: NOT DETECTED
LACTIC ACID, SEPSIS: 0.7 MMOL/L (ref 0.5–1.9)
LACTIC ACID, SEPSIS: 1.5 MMOL/L (ref 0.5–1.9)
LYMPHOCYTES ABSOLUTE: 34.1 K/CU MM
LYMPHOCYTES RELATIVE PERCENT: 88 % (ref 24–44)
MCH RBC QN AUTO: 28 PG (ref 27–31)
MCHC RBC AUTO-ENTMCNC: 31.1 % (ref 32–36)
MCV RBC AUTO: 90.2 FL (ref 78–100)
MONOCYTES ABSOLUTE: 0.4 K/CU MM
MONOCYTES RELATIVE PERCENT: 1 % (ref 0–4)
MYCOPLASMA PNEUMONIAE PCR: NOT DETECTED
O2 SAT, VEN: 91.7 % (ref 50–70)
PARAINFLUENZA 1 PCR: NOT DETECTED
PARAINFLUENZA 2 PCR: NOT DETECTED
PARAINFLUENZA 3 PCR: NOT DETECTED
PARAINFLUENZA 4 PCR: NOT DETECTED
PCO2, VEN: 37 MMHG (ref 38–52)
PDW BLD-RTO: 26 % (ref 11.7–14.9)
PH VENOUS: 7.38 (ref 7.32–7.42)
PLATELET # BLD: 41 K/CU MM (ref 140–440)
PMV BLD AUTO: 9.8 FL (ref 7.5–11.1)
PO2, VEN: 83 MMHG (ref 28–48)
POLYCHROMASIA: ABNORMAL
POTASSIUM SERPL-SCNC: 5.1 MMOL/L (ref 3.5–5.1)
RBC # BLD: 3.28 M/CU MM (ref 4.6–6.2)
RHINOVIRUS ENTEROVIRUS PCR: ABNORMAL
RSV PCR: NOT DETECTED
SARS-COV-2: NOT DETECTED
SEGMENTED NEUTROPHILS ABSOLUTE COUNT: 3.5 K/CU MM
SEGMENTED NEUTROPHILS RELATIVE PERCENT: 9 % (ref 36–66)
SMUDGE CELLS: PRESENT
SODIUM BLD-SCNC: 134 MMOL/L (ref 135–145)
TOTAL PROTEIN: 5.4 GM/DL (ref 6.4–8.2)
TROPONIN T: 0.02 NG/ML
URIC ACID: 3.8 MG/DL (ref 3.5–7.2)
WBC # BLD: 38.8 K/CU MM (ref 4–10.5)

## 2022-11-18 PROCEDURE — 2580000003 HC RX 258: Performed by: INTERNAL MEDICINE

## 2022-11-18 PROCEDURE — 83605 ASSAY OF LACTIC ACID: CPT

## 2022-11-18 PROCEDURE — 87205 SMEAR GRAM STAIN: CPT

## 2022-11-18 PROCEDURE — 2580000003 HC RX 258: Performed by: EMERGENCY MEDICINE

## 2022-11-18 PROCEDURE — 96413 CHEMO IV INFUSION 1 HR: CPT

## 2022-11-18 PROCEDURE — 82805 BLOOD GASES W/O2 SATURATION: CPT

## 2022-11-18 PROCEDURE — 87181 SC STD AGAR DILUTION PER AGT: CPT

## 2022-11-18 PROCEDURE — 71045 X-RAY EXAM CHEST 1 VIEW: CPT

## 2022-11-18 PROCEDURE — 85007 BL SMEAR W/DIFF WBC COUNT: CPT

## 2022-11-18 PROCEDURE — 6360000002 HC RX W HCPCS: Performed by: INTERNAL MEDICINE

## 2022-11-18 PROCEDURE — 94761 N-INVAS EAR/PLS OXIMETRY MLT: CPT

## 2022-11-18 PROCEDURE — 6360000002 HC RX W HCPCS: Performed by: EMERGENCY MEDICINE

## 2022-11-18 PROCEDURE — 84484 ASSAY OF TROPONIN QUANT: CPT

## 2022-11-18 PROCEDURE — 87150 DNA/RNA AMPLIFIED PROBE: CPT

## 2022-11-18 PROCEDURE — 2140000000 HC CCU INTERMEDIATE R&B

## 2022-11-18 PROCEDURE — 2500000003 HC RX 250 WO HCPCS: Performed by: INTERNAL MEDICINE

## 2022-11-18 PROCEDURE — 94660 CPAP INITIATION&MGMT: CPT

## 2022-11-18 PROCEDURE — 84550 ASSAY OF BLOOD/URIC ACID: CPT

## 2022-11-18 PROCEDURE — 96367 TX/PROPH/DG ADDL SEQ IV INF: CPT

## 2022-11-18 PROCEDURE — 99285 EMERGENCY DEPT VISIT HI MDM: CPT

## 2022-11-18 PROCEDURE — 0202U NFCT DS 22 TRGT SARS-COV-2: CPT

## 2022-11-18 PROCEDURE — 87077 CULTURE AEROBIC IDENTIFY: CPT

## 2022-11-18 PROCEDURE — 94640 AIRWAY INHALATION TREATMENT: CPT

## 2022-11-18 PROCEDURE — 96366 THER/PROPH/DIAG IV INF ADDON: CPT

## 2022-11-18 PROCEDURE — 93005 ELECTROCARDIOGRAM TRACING: CPT | Performed by: EMERGENCY MEDICINE

## 2022-11-18 PROCEDURE — 87899 AGENT NOS ASSAY W/OPTIC: CPT

## 2022-11-18 PROCEDURE — 87186 SC STD MICRODIL/AGAR DIL: CPT

## 2022-11-18 PROCEDURE — 6370000000 HC RX 637 (ALT 250 FOR IP): Performed by: INTERNAL MEDICINE

## 2022-11-18 PROCEDURE — 87040 BLOOD CULTURE FOR BACTERIA: CPT

## 2022-11-18 PROCEDURE — 96365 THER/PROPH/DIAG IV INF INIT: CPT

## 2022-11-18 PROCEDURE — 96375 TX/PRO/DX INJ NEW DRUG ADDON: CPT

## 2022-11-18 PROCEDURE — 93010 ELECTROCARDIOGRAM REPORT: CPT | Performed by: INTERNAL MEDICINE

## 2022-11-18 PROCEDURE — 87449 NOS EACH ORGANISM AG IA: CPT

## 2022-11-18 PROCEDURE — 2700000000 HC OXYGEN THERAPY PER DAY

## 2022-11-18 PROCEDURE — 85027 COMPLETE CBC AUTOMATED: CPT

## 2022-11-18 PROCEDURE — 80053 COMPREHEN METABOLIC PANEL: CPT

## 2022-11-18 PROCEDURE — 87081 CULTURE SCREEN ONLY: CPT

## 2022-11-18 PROCEDURE — 6370000000 HC RX 637 (ALT 250 FOR IP): Performed by: EMERGENCY MEDICINE

## 2022-11-18 PROCEDURE — 87070 CULTURE OTHR SPECIMN AEROBIC: CPT

## 2022-11-18 RX ORDER — SODIUM CHLORIDE 9 MG/ML
5-250 INJECTION, SOLUTION INTRAVENOUS PRN
Status: DISCONTINUED | OUTPATIENT
Start: 2022-11-18 | End: 2022-11-19 | Stop reason: HOSPADM

## 2022-11-18 RX ORDER — SODIUM CHLORIDE 0.9 % (FLUSH) 0.9 %
5-40 SYRINGE (ML) INJECTION PRN
Status: DISCONTINUED | OUTPATIENT
Start: 2022-11-18 | End: 2022-11-25 | Stop reason: HOSPADM

## 2022-11-18 RX ORDER — ONDANSETRON 4 MG/1
4 TABLET, ORALLY DISINTEGRATING ORAL EVERY 8 HOURS PRN
Status: DISCONTINUED | OUTPATIENT
Start: 2022-11-18 | End: 2022-11-25

## 2022-11-18 RX ORDER — INSULIN LISPRO 100 [IU]/ML
0-4 INJECTION, SOLUTION INTRAVENOUS; SUBCUTANEOUS NIGHTLY
Status: DISCONTINUED | OUTPATIENT
Start: 2022-11-19 | End: 2022-11-24

## 2022-11-18 RX ORDER — SODIUM CHLORIDE 9 MG/ML
INJECTION, SOLUTION INTRAVENOUS PRN
Status: DISCONTINUED | OUTPATIENT
Start: 2022-11-18 | End: 2022-11-25 | Stop reason: HOSPADM

## 2022-11-18 RX ORDER — HYDROCODONE BITARTRATE AND ACETAMINOPHEN 5; 325 MG/1; MG/1
1 TABLET ORAL EVERY 6 HOURS PRN
Status: DISCONTINUED | OUTPATIENT
Start: 2022-11-18 | End: 2022-11-25 | Stop reason: HOSPADM

## 2022-11-18 RX ORDER — SODIUM CHLORIDE 0.9 % (FLUSH) 0.9 %
5-40 SYRINGE (ML) INJECTION PRN
Status: DISCONTINUED | OUTPATIENT
Start: 2022-11-18 | End: 2022-11-19 | Stop reason: HOSPADM

## 2022-11-18 RX ORDER — FINASTERIDE 5 MG/1
5 TABLET, FILM COATED ORAL DAILY
Status: DISCONTINUED | OUTPATIENT
Start: 2022-11-19 | End: 2022-11-25 | Stop reason: HOSPADM

## 2022-11-18 RX ORDER — DIPHENHYDRAMINE HYDROCHLORIDE 50 MG/ML
25 INJECTION INTRAMUSCULAR; INTRAVENOUS ONCE
Status: COMPLETED | OUTPATIENT
Start: 2022-11-18 | End: 2022-11-18

## 2022-11-18 RX ORDER — ALBUTEROL SULFATE 90 UG/1
2 AEROSOL, METERED RESPIRATORY (INHALATION) ONCE
Status: COMPLETED | OUTPATIENT
Start: 2022-11-18 | End: 2022-11-18

## 2022-11-18 RX ORDER — HEPARIN SODIUM (PORCINE) LOCK FLUSH IV SOLN 100 UNIT/ML 100 UNIT/ML
500 SOLUTION INTRAVENOUS PRN
Status: DISCONTINUED | OUTPATIENT
Start: 2022-11-18 | End: 2022-11-19 | Stop reason: HOSPADM

## 2022-11-18 RX ORDER — ONDANSETRON 2 MG/ML
4 INJECTION INTRAMUSCULAR; INTRAVENOUS EVERY 6 HOURS PRN
Status: DISCONTINUED | OUTPATIENT
Start: 2022-11-18 | End: 2022-11-25

## 2022-11-18 RX ORDER — 0.9 % SODIUM CHLORIDE 0.9 %
1000 INTRAVENOUS SOLUTION INTRAVENOUS ONCE
Status: COMPLETED | OUTPATIENT
Start: 2022-11-18 | End: 2022-11-18

## 2022-11-18 RX ORDER — INSULIN LISPRO 100 [IU]/ML
0-4 INJECTION, SOLUTION INTRAVENOUS; SUBCUTANEOUS
Status: DISCONTINUED | OUTPATIENT
Start: 2022-11-19 | End: 2022-11-24

## 2022-11-18 RX ORDER — ACETAMINOPHEN 325 MG/1
650 TABLET ORAL EVERY 6 HOURS PRN
Status: DISCONTINUED | OUTPATIENT
Start: 2022-11-18 | End: 2022-11-25

## 2022-11-18 RX ORDER — FAMOTIDINE 10 MG/ML
20 INJECTION, SOLUTION INTRAVENOUS
Status: COMPLETED | OUTPATIENT
Start: 2022-11-18 | End: 2022-11-18

## 2022-11-18 RX ORDER — DEXAMETHASONE SODIUM PHOSPHATE 4 MG/ML
4 INJECTION, SOLUTION INTRA-ARTICULAR; INTRALESIONAL; INTRAMUSCULAR; INTRAVENOUS; SOFT TISSUE ONCE
Start: 2022-12-05 | End: 2022-11-25

## 2022-11-18 RX ORDER — FERROUS GLUCONATE 324(37.5)
324 TABLET ORAL 2 TIMES DAILY WITH MEALS
Status: DISCONTINUED | OUTPATIENT
Start: 2022-11-19 | End: 2022-11-25 | Stop reason: HOSPADM

## 2022-11-18 RX ORDER — 0.9 % SODIUM CHLORIDE 0.9 %
1500 INTRAVENOUS SOLUTION INTRAVENOUS ONCE
Status: COMPLETED | OUTPATIENT
Start: 2022-11-18 | End: 2022-11-18

## 2022-11-18 RX ORDER — POLYETHYLENE GLYCOL 3350 17 G/17G
17 POWDER, FOR SOLUTION ORAL DAILY PRN
Status: DISCONTINUED | OUTPATIENT
Start: 2022-11-18 | End: 2022-11-25

## 2022-11-18 RX ORDER — DEXTROSE MONOHYDRATE 100 MG/ML
INJECTION, SOLUTION INTRAVENOUS CONTINUOUS PRN
Status: DISCONTINUED | OUTPATIENT
Start: 2022-11-18 | End: 2022-11-25 | Stop reason: HOSPADM

## 2022-11-18 RX ORDER — ATORVASTATIN CALCIUM 40 MG/1
80 TABLET, FILM COATED ORAL DAILY
Status: DISCONTINUED | OUTPATIENT
Start: 2022-11-19 | End: 2022-11-25 | Stop reason: HOSPADM

## 2022-11-18 RX ORDER — DEXAMETHASONE SODIUM PHOSPHATE 4 MG/ML
4 INJECTION, SOLUTION INTRA-ARTICULAR; INTRALESIONAL; INTRAMUSCULAR; INTRAVENOUS; SOFT TISSUE ONCE
Status: COMPLETED | OUTPATIENT
Start: 2022-11-18 | End: 2022-11-18

## 2022-11-18 RX ORDER — ALLOPURINOL 100 MG/1
200 TABLET ORAL 3 TIMES DAILY
Status: DISCONTINUED | OUTPATIENT
Start: 2022-11-19 | End: 2022-11-25 | Stop reason: HOSPADM

## 2022-11-18 RX ORDER — DIPHENHYDRAMINE HYDROCHLORIDE 50 MG/ML
25 INJECTION INTRAMUSCULAR; INTRAVENOUS
Status: COMPLETED | OUTPATIENT
Start: 2022-11-18 | End: 2022-11-18

## 2022-11-18 RX ORDER — ENOXAPARIN SODIUM 100 MG/ML
40 INJECTION SUBCUTANEOUS DAILY
Status: DISCONTINUED | OUTPATIENT
Start: 2022-11-19 | End: 2022-11-18

## 2022-11-18 RX ORDER — INSULIN GLARGINE 100 [IU]/ML
30 INJECTION, SOLUTION SUBCUTANEOUS NIGHTLY
Status: DISCONTINUED | OUTPATIENT
Start: 2022-11-19 | End: 2022-11-24

## 2022-11-18 RX ORDER — ALLOPURINOL 100 MG/1
200 TABLET ORAL 3 TIMES DAILY
Qty: 42 TABLET | Refills: 0 | Status: SHIPPED | OUTPATIENT
Start: 2022-11-18 | End: 2022-11-25

## 2022-11-18 RX ORDER — EPINEPHRINE 1 MG/ML
0.3 INJECTION, SOLUTION, CONCENTRATE INTRAVENOUS ONCE
Status: DISCONTINUED | OUTPATIENT
Start: 2022-11-18 | End: 2022-11-25 | Stop reason: HOSPADM

## 2022-11-18 RX ORDER — ASCORBIC ACID 500 MG
250 TABLET ORAL 2 TIMES DAILY
Status: DISCONTINUED | OUTPATIENT
Start: 2022-11-19 | End: 2022-11-25 | Stop reason: HOSPADM

## 2022-11-18 RX ORDER — ACETAMINOPHEN 325 MG/1
650 TABLET ORAL ONCE
Status: COMPLETED | OUTPATIENT
Start: 2022-11-18 | End: 2022-11-18

## 2022-11-18 RX ORDER — TAMSULOSIN HYDROCHLORIDE 0.4 MG/1
0.4 CAPSULE ORAL DAILY
Status: DISCONTINUED | OUTPATIENT
Start: 2022-11-19 | End: 2022-11-25 | Stop reason: HOSPADM

## 2022-11-18 RX ORDER — ACYCLOVIR 200 MG/1
400 CAPSULE ORAL 3 TIMES DAILY
Status: DISCONTINUED | OUTPATIENT
Start: 2022-11-19 | End: 2022-11-25 | Stop reason: HOSPADM

## 2022-11-18 RX ORDER — PANTOPRAZOLE SODIUM 40 MG/1
40 TABLET, DELAYED RELEASE ORAL
Status: DISCONTINUED | OUTPATIENT
Start: 2022-11-19 | End: 2022-11-25 | Stop reason: HOSPADM

## 2022-11-18 RX ADMIN — SODIUM CHLORIDE 20 ML/HR: 9 INJECTION, SOLUTION INTRAVENOUS at 08:49

## 2022-11-18 RX ADMIN — AZITHROMYCIN MONOHYDRATE 500 MG: 500 INJECTION, POWDER, LYOPHILIZED, FOR SOLUTION INTRAVENOUS at 15:54

## 2022-11-18 RX ADMIN — SODIUM CHLORIDE 1500 ML: 9 INJECTION, SOLUTION INTRAVENOUS at 16:24

## 2022-11-18 RX ADMIN — Medication 2 PUFF: at 14:27

## 2022-11-18 RX ADMIN — FAMOTIDINE 20 MG: 10 INJECTION, SOLUTION INTRAVENOUS at 11:27

## 2022-11-18 RX ADMIN — VANCOMYCIN HYDROCHLORIDE 1750 MG: 5 INJECTION, POWDER, LYOPHILIZED, FOR SOLUTION INTRAVENOUS at 17:11

## 2022-11-18 RX ADMIN — SODIUM CHLORIDE 750 MG: 9 INJECTION, SOLUTION INTRAVENOUS at 10:11

## 2022-11-18 RX ADMIN — ALBUTEROL SULFATE 2 PUFF: 90 AEROSOL, METERED RESPIRATORY (INHALATION) at 14:27

## 2022-11-18 RX ADMIN — DIPHENHYDRAMINE HYDROCHLORIDE 25 MG: 50 INJECTION INTRAMUSCULAR; INTRAVENOUS at 11:28

## 2022-11-18 RX ADMIN — DEXAMETHASONE SODIUM PHOSPHATE 4 MG: 4 INJECTION, SOLUTION INTRAMUSCULAR; INTRAVENOUS at 08:48

## 2022-11-18 RX ADMIN — SODIUM CHLORIDE 1000 ML: 9 INJECTION, SOLUTION INTRAVENOUS at 14:50

## 2022-11-18 RX ADMIN — CEFEPIME HYDROCHLORIDE 2000 MG: 2 INJECTION, POWDER, FOR SOLUTION INTRAVENOUS at 14:53

## 2022-11-18 RX ADMIN — ACETAMINOPHEN 650 MG: 325 TABLET ORAL at 08:48

## 2022-11-18 RX ADMIN — DIPHENHYDRAMINE HYDROCHLORIDE 25 MG: 50 INJECTION INTRAMUSCULAR; INTRAVENOUS at 08:48

## 2022-11-18 ASSESSMENT — PAIN DESCRIPTION - DESCRIPTORS: DESCRIPTORS: ACHING

## 2022-11-18 ASSESSMENT — ENCOUNTER SYMPTOMS
EYES NEGATIVE: 1
GASTROINTESTINAL NEGATIVE: 1
SHORTNESS OF BREATH: 1

## 2022-11-18 ASSESSMENT — PAIN DESCRIPTION - PAIN TYPE: TYPE: ACUTE PAIN

## 2022-11-18 ASSESSMENT — PAIN DESCRIPTION - FREQUENCY: FREQUENCY: CONTINUOUS

## 2022-11-18 ASSESSMENT — PAIN - FUNCTIONAL ASSESSMENT: PAIN_FUNCTIONAL_ASSESSMENT: 0-10

## 2022-11-18 ASSESSMENT — PAIN SCALES - GENERAL
PAINLEVEL_OUTOF10: 6
PAINLEVEL_OUTOF10: 7

## 2022-11-18 ASSESSMENT — PAIN DESCRIPTION - LOCATION: LOCATION: ABDOMEN

## 2022-11-18 ASSESSMENT — PAIN DESCRIPTION - ORIENTATION: ORIENTATION: LEFT;UPPER

## 2022-11-18 NOTE — ED NOTES
Report from Drew Marquez, [facility].      Christy Meek RN  11/18/22 1335

## 2022-11-18 NOTE — ED PROVIDER NOTES
Triage Chief Complaint:   Allergic Reaction    [place]:  Anthony Baptiste is a 71 y.o. male that presents with concern regarding allergic reaction. Patient is undergoing treatment for CLL and was at the cancer center getting Rituxan infusion. Patient tolerated the first 2 doses well but the 150 mg dose caused some increased work of breathing. Patient and staff from the cancer center upon discussion to report he was \"not feeling so well before the infusion\". Patient was having chills and rigors prior to starting this medication. Patient was noted to be febrile during the infusion. Patient was sent to the emergency department for evaluation. Per the patient and per chart patient has been in the hospital with recurrent pneumonia issues. Patient is a following our infectious disease group. Patient just finished treatment with Zosyn and Cipro after undergoing cultures and bronchoscopy for further evaluation. Patient reports that he has not been back to baseline yet since undergoing treatment. Patient does wear baseline 3 L nasal cannula oxygen.     ROS:  General:  + fevers, ++ chills, + generalized weakness  Eyes:  No recent vison changes, no discharge  ENT:  No sore throat, no nasal congestion, no hearing changes  Cardiovascular:  No chest pain, no palpitations  Respiratory:  + shortness of breath, + cough, no wheezing  Gastrointestinal:  No pain, no nausea, no vomiting, no diarrhea  Musculoskeletal:  No muscle pain, no joint pain  Skin:  + chronic rash, no pruritis, no easy bruising  Neurologic:  No speech problems, no headache, no extremity numbness, no extremity tingling, no extremity weakness  Psychiatric:  No anxiety  Genitourinary:  No dysuria, no hematuria  Endocrine:  No unexpected weight gain, no unexpected weight loss  Extremities:  no edema, no pain    Past Medical History:   Diagnosis Date    Arthritis     knees, Rt hand/wrist    BPH (benign prostatic hyperplasia)     CAD (coronary artery disease) Cancer ([facility] [address]     CLL--Sees Dr Griselda Cho    Diabetes mellitus [facility])     History of blood transfusion     no reaction    Hx of motion sickness     Hyperlipidemia     MDRO (multiple drug resistant organisms) resistance     abscess on buttock 10yrs ago    Migraine     last one summer 2016    Pneumonia 2016    Wears dentures     full upper--lower dentures    Wears glasses      Past Surgical History:   Procedure Laterality Date    BRONCHOSCOPY N/A 10/28/2022    BRONCHOSCOPY performed by Brandan Gonzalez MD at [facility]  's    x 2  last showed \"inflammation of heart\"    COLONOSCOPY  2010    DENTAL SURGERY      all teeth extracted     EYE SURGERY Right 2016    Cataract removal    FRACTURE SURGERY Left     left ankle plate and screws    KNEE SURGERY  1970    left    OTHER SURGICAL HISTORY Left 2017    groin excision    TONSILLECTOMY  late     age 13    TUNNELED VENOUS PORT PLACEMENT  2013    Right upper chest     Family History   Problem Relation Age of Onset    Diabetes Mother     High Blood Pressure Mother     Heart Disease Father     Other Sister         liver problems    Early Death Brother     Asthma Maternal Uncle     Colon Cancer Brother      Social History     Socioeconomic History    Marital status: Single     Spouse name: Not on file    Number of children: Not on file    Years of education: Not on file    Highest education level: Not on file   Occupational History    Not on file   Tobacco Use    Smoking status: Former     Packs/day: 3.00     Years: 20.00     Pack years: 60.00     Types: Cigarettes     Start date: 10/2/1965     Quit date: 1987     Years since quittin.9    Smokeless tobacco: Never   Vaping Use    Vaping Use: Never used   Substance and Sexual Activity    Alcohol use: No    Drug use: No    Sexual activity: Not Currently   Other Topics Concern    Not on file   Social History Narrative    Not on file     Social Determinants of Health Financial Resource Strain: Not on file   Food Insecurity: Not on file   Transportation Needs: Not on file   Physical Activity: Not on file   Stress: Not on file   Social Connections: Not on file   Intimate Partner Violence: Not on file   Housing Stability: Not on file     Current Facility-Administered Medications   Medication Dose Route Frequency Provider Last Rate Last Admin    EPINEPHrine PF 1 MG/ML injection (Anaphylaxis) 0.3 mg  0.3 mg IntraMUSCular Once Charity Glez MD        sodium chloride flush 0.9 % injection 5-40 mL  5-40 mL IntraVENous PRN Robert Gibson MD        0.9 % sodium chloride infusion   IntraVENous PRN Robert Gibson MD        acetaminophen (TYLENOL) tablet 650 mg  650 mg Oral Q6H PRN Robert Gibson MD        Or    acetaminophen (TYLENOL) suppository 650 mg  650 mg Rectal Q6H PRN Robert Gibson MD        polyethylene glycol (GLYCOLAX) packet 17 g  17 g Oral Daily PRN Robert Gibson MD        ondansetron (ZOFRAN-ODT) disintegrating tablet 4 mg  4 mg Oral Q8H PRN Robert Gibson MD        Or    ondansetron (ZOFRAN) injection 4 mg  4 mg IntraVENous Q6H PRN Robert Gibson MD        HYDROcodone-acetaminophen (NORCO) 5-325 MG per tablet 1 tablet  1 tablet Oral Q6H PRN Robert Gibson MD        allopurinol (ZYLOPRIM) tablet 200 mg  200 mg Oral TID Christelle Covarrubias MD        ascorbic acid (VITAMIN C) tablet 250 mg  250 mg Oral BID Christelle Covarrubias MD        atorvastatin (LIPITOR) tablet 80 mg  80 mg Oral Daily Christelle Covarrubias MD        ferrous gluconate 324 (37.5 Fe) MG tablet 324 mg  324 mg Oral BID WC Christelle Covarrubias MD        finasteride (PROSCAR) tablet 5 mg  5 mg Oral Daily Christelle Covarrubias MD        miconazole (MICOTIN) 2 % powder   Topical BID Christelle Covarrubias MD        pantoprazole (PROTONIX) tablet 40 mg  40 mg Oral [facility] Christelle MCFARLANE MD Satish        tamsulosin (FLOMAX) capsule 0.4 mg  0.4 mg Oral Daily Renae Schmidt MD        acyclovir (ZOVIRAX) capsule 400 mg  400 mg Oral TID Renae Schmidt MD        insulin glargine (LANTUS) injection vial 30 Units  30 Units SubCUTAneous Nightly Renae Schmidt MD        insulin lispro (HUMALOG) injection vial 0-4 Units  0-4 Units SubCUTAneous TID WC Renae cShmidt MD        insulin lispro (HUMALOG) injection vial 0-4 Units  0-4 Units SubCUTAneous Nightly Christelle Covarrubias MD        glucose chewable tablet 16 g  4 tablet Oral PRN Christelle Covarrubias MD        dextrose bolus 10% 125 mL  125 mL IntraVENous PRN Christelle Covarrubias MD        Or    dextrose bolus 10% 250 mL  250 mL IntraVENous PRN Christelle Covarrubias MD        glucagon (rDNA) injection 1 mg  1 mg SubCUTAneous PRN Christelle Covarrubias MD        dextrose 10 % infusion   IntraVENous Continuous PRN Renae Schmidt MD         Facility-Administered Medications Ordered in Other Encounters   Medication Dose Route Frequency Provider Last Rate Last Admin    0.9 % sodium chloride infusion  5-250 mL/hr IntraVENous PRN Doug Wong MD 20 mL/hr at 11/18/22 0849 20 mL/hr at 11/18/22 0849    sodium chloride flush 0.9 % injection 5-40 mL  5-40 mL IntraVENous PRN Doug Wong MD        heparin flush 100 UNIT/ML injection 500 Units  500 Units IntraCATHeter PRN Doug Wong MD        sodium chloride flush 0.9 % injection 10 mL  10 mL IntraVENous PRN Luanne Ramos MD         No Known Allergies    Nursing Notes Reviewed    Physical Exam:  ED Triage Vitals   Enc Vitals Group      BP 11/18/22 1401 107/69      Heart Rate 11/18/22 1343 (!) 127      Resp 11/18/22 1343 24      Temp 11/18/22 1343 (!) 101.4 °F (38.6 °C)      Temp Source 11/18/22 1343 Oral      SpO2 11/18/22 1343 97 %      Weight 11/18/22 1343 155 lb (70.3 kg)      Height 11/18/22 1343 6' (1.829 m) Head Circumference --       Peak Flow --       Pain Score --       Pain Loc --       Pain Edu? --       Excl. in [address]? --        My pulse ox interpretation is - normal on nonrebreather    General appearance: Increased work of breathing present. Appears older than stated age. Skin:  Warm. Dry. Petechial rash to the torso which is nonblanching and patient reports is chronic. Eye:  Extraocular movements intact. Ears, nose, mouth and throat: Tacky mucous membranes. Neck:  Trachea midline. Extremity:  No swelling. Normal ROM     Heart: Tachycardic but regular, normal S1 & S2, no extra heart sounds. Perfusion:  Intact   Respiratory: Tachypnea present. Coarse breath sounds bilaterally. Respiratory distress noted with patient only able to speak in 2-3 word sentences. Abdominal:  Normal bowel sounds. Soft. Nontender. Non distended. Back:  No CVA tenderness to palpation     Neurological:  Alert and oriented times 3. No focal neuro deficits. Psychiatric: Slightly anxious.     I have reviewed and interpreted all of the currently available lab results from this visit (if applicable):  Results for orders placed or performed during the hospital encounter of 11/18/22   Respiratory Panel, Molecular, with COVID-19 (Restricted: peds pts or suitable admitted adults)    Specimen: Nasopharyngeal   Result Value Ref Range    Adenovirus Detection by PCR NOT DETECTED NOT DETECTED    Coronavirus 229E PCR NOT DETECTED NOT DETECTED    Coronavirus HKU1 PCR NOT DETECTED NOT DETECTED    Coronavirus NL63 PCR NOT DETECTED NOT DETECTED    Coronavirus OC43 PCR NOT DETECTED NOT DETECTED    SARS-CoV-2 NOT DETECTED NOT DETECTED    Human Metapneumovirus PCR NOT DETECTED NOT DETECTED    Rhinovirus Enterovirus PCR DETECTED BY PCR (A) NOT DETECTED    Influenza A by PCR NOT DETECTED NOT DETECTED    Influenza A H1 Pandemic PCR NOT DETECTED NOT DETECTED    Influenza A H1 (2009) PCR NOT DETECTED NOT DETECTED    Influenza A H3 PCR NOT DETECTED NOT DETECTED    Influenza B by PCR NOT DETECTED NOT DETECTED    Parainfluenza 1 PCR NOT DETECTED NOT DETECTED    Parainfluenza 2 PCR NOT DETECTED NOT DETECTED    Parainfluenza 3 PCR NOT DETECTED NOT DETECTED    Parainfluenza 4 PCR NOT DETECTED NOT DETECTED    RSV PCR NOT DETECTED NOT DETECTED    Bordetella parapertussis by PCR NOT DETECTED NOT DETECTED    B Pertussis by PCR NOT DETECTED NOT DETECTED    Chlamydophila Pneumonia PCR NOT DETECTED NOT DETECTED    Mycoplasma pneumo by PCR NOT DETECTED NOT DETECTED   CBC with Auto Differential   Result Value Ref Range    WBC 38.8 (HH) 4.0 - 10.5 K/CU MM    RBC 3.28 (L) 4.6 - 6.2 M/CU MM    Hemoglobin 9.2 (L) 13.5 - 18.0 GM/DL    Hematocrit 29.6 (L) 42 - 52 %    MCV 90.2 78 - 100 FL    MCH 28.0 27 - 31 PG    MCHC 31.1 (L) 32.0 - 36.0 %    RDW 26.0 (H) 11.7 - 14.9 %    Platelets 41 (L) 153 - 440 K/CU MM    MPV 9.8 7.5 - 11.1 FL    Bands Relative 1 (L) 5 - 11 %    Segs Relative 9.0 (L) 36 - 66 %    Eosinophils % 1.0 0 - 3 %    Lymphocytes % 88.0 (H) 24 - 44 %    Monocytes % 1.0 0 - 4 %    Bands Absolute 0.39 K/CU MM    Segs Absolute 3.5 K/CU MM    Eosinophils Absolute 0.4 K/CU MM    Lymphocytes Absolute 34.1 K/CU MM    Monocytes Absolute 0.4 K/CU MM    Differential Type MANUAL DIFFERENTIAL     Anisocytosis 1+     Polychromasia 1+     Smudge Cells PRESENT    Comprehensive Metabolic Panel   Result Value Ref Range    Sodium 134 (L) 135 - 145 MMOL/L    Potassium 5.1 3.5 - 5.1 MMOL/L    Chloride 99 99 - 110 mMol/L    CO2 21 21 - 32 MMOL/L    BUN 42 (H) 6 - 23 MG/DL    Creatinine 0.9 0.9 - 1.3 MG/DL    Est, Glom Filt Rate >60 >60 mL/min/1.73m2    Glucose 159 (H) 70 - 99 MG/DL    Calcium 8.6 8.3 - 10.6 MG/DL    Albumin 3.2 (L) 3.4 - 5.0 GM/DL    Total Protein 5.4 (L) 6.4 - 8.2 GM/DL    Total Bilirubin 0.6 0.0 - 1.0 MG/DL    ALT 18 10 - 40 U/L    AST 35 15 - 37 IU/L    Alkaline Phosphatase 159 (H) 40 - 129 IU/L    Anion Gap 14 4 - 16   Lactate, Sepsis   Result Value Ref Range    Lactic Acid, Sepsis 1.5 0.5 - 1.9 mMOL/L   Lactate, Sepsis   Result Value Ref Range    Lactic Acid, Sepsis 0.7 0.5 - 1.9 mMOL/L   Blood Gas, Venous   Result Value Ref Range    pH, Gonzales 7.38 7.32 - 7.42    pCO2, Gonzales 37 (L) 38 - 52 mmHG    pO2, Gonzales 83 (H) 28 - 48 mmHG    Base Excess 3 0 - 3.3    HCO3, Venous 21.9 19 - 25 MMOL/L    O2 Sat, Gonzales 91.7 (H) 50 - 70 %    Comment VBG    Troponin   Result Value Ref Range    Troponin T 0.016 (H) <0.01 NG/ML   EKG 12 Lead   Result Value Ref Range    Ventricular Rate 119 BPM    Atrial Rate 119 BPM    P-R Interval 124 ms    QRS Duration 86 ms    Q-T Interval 312 ms    QTc Calculation (Bazett) 438 ms    P Axis 28 degrees    R Axis 39 degrees    T Axis 71 degrees    Diagnosis       Sinus tachycardia  Otherwise normal ECG  When compared with ECG of 22-OCT-2022 08:42,  Nonspecific T wave abnormality now evident in Lateral leads  Confirmed by Derrill Fleischer, SAYED ([id number]) on 11/18/2022 5:45:58 PM        Radiographs (if obtained):  [] The following radiograph was interpreted by myself in the absence of a radiologist:   [x] Radiologist's Report Reviewed:  XR CHEST PORTABLE   Final Result   Infiltrative changes of perihilar and basilar regions most consistent with   atypical pneumonia/pneumonitis accentuated by underlying advanced chronic   lung disease. Otherwise, radiographically nonacute portable chest.               EKG (if obtained): (All EKG's are interpreted by myself in the absence of a cardiologist)  12 lead EKG per my interpretation:  Sinus Tachycardia at 119  Axis is   Normal  QTc is  within an acceptable range  There is no specific T wave changes appreciated. There is no specific ST wave changes appreciated.   No STEMI    Prior EKG to compare with was available and sinus tachycardia seen on prior from 10/24/2022      Chart review shows recent radiographs:  CT CHEST WO CONTRAST    Result Date: 10/23/2022  EXAMINATION: CT OF THE CHEST WITHOUT CONTRAST 10/23/2022 9:59 am TECHNIQUE: CT of the chest was performed without the administration of intravenous contrast. Multiplanar reformatted images are provided for review. Automated exposure control, iterative reconstruction, and/or weight based adjustment of the mA/kV was utilized to reduce the radiation dose to as low as reasonably achievable. COMPARISON: 09/28/2022 HISTORY: ORDERING SYSTEM PROVIDED HISTORY: fu pneumonia persistent TECHNOLOGIST PROVIDED HISTORY: Reason for exam:->fu pneumonia persistent Reason for Exam: fu pneumonia persistent FINDINGS: Mediastinum: Enlarged mediastinal lymph nodes are again identified, similar when compared to the previous exam.  Largest in the subcarinal space, with a short axis measurement of approximately 3 cm. Visualized thyroid unremarkable. Esophagus unremarkable. Limited noncontrast imaging of the cardiac chambers, thoracic aorta, and pulmonary arteries unremarkable. Lungs/pleura: Infiltrates in the lower lungs seen previously are decreased in density. However, innumerable tree-in-bud centrilobular micro nodules are now present within both lungs, greatest in the mid to lower lungs. In addition, a cavitary lesion in the superior segment of the right lower lobe has developed measuring approximately 5.3 cm maximally. Additional nodular infiltrates are seen in the left upper lobe, also with early cavitation. Diffuse bronchial wall thickening is noted. No filling defects are seen within the airways. No pneumothorax. No pleural effusion. Upper Abdomen: Unremarkable. Soft Tissues/Bones: No acute or suspicious bony abnormality. Extra thoracic soft tissues unremarkable. The infiltrates seen previously in the lower lobes bilaterally for the most part have decreased in size and conspicuity. However, there are now innumerable punctate tree-in-bud centrilobular micro nodules, which are nonspecific but suggest inflammatory/infectious bronchiolitis. Consider both typical and atypical etiologies.  In addition, cavitary lesion has developed in the periphery of the right lower lobe and to a lesser extent within the left upper lung. Necrotizing infection would be primarily considered given the rapid development. Consider both typical and atypical etiologies. Enlarged mediastinal lymph nodes, similar when compared to the previous exam, process of Lia related to history of chronic lymphocytic leukemia. FL LESS THAN 1 HOUR    Result Date: 10/28/2022  Radiology exam is complete. No Radiologist dictation. Please follow up with ordering provider. XR CHEST PORTABLE    Result Date: 11/18/2022  EXAMINATION: ONE XRAY VIEW OF THE CHEST 11/18/2022 2:17 pm COMPARISON: 10/28/2022 at 1214 hours HISTORY: ORDERING SYSTEM PROVIDED HISTORY: resp distress TECHNOLOGIST PROVIDED HISTORY: Reason for exam:->resp distress Reason for Exam: resp distress Additional signs and symptoms: NA Relevant Medical/Surgical History: CAD, DIABETES FINDINGS: Diffuse bronchial pulmonary marking prominence worst in the infrahilar/basilar regions consistent with bibasilar pneumonia/pneumonitis accentuated by underlying chronic lung disease. Noncardiogenic pulmonary edema may also contribute to this appearance. Otherwise, lung fields are clear. No detectable pleural effusion, pneumothorax, pulmonary edema/vascular congestion, cardiomegaly or mediastinal widening. Infiltrative changes of perihilar and basilar regions most consistent with atypical pneumonia/pneumonitis accentuated by underlying advanced chronic lung disease.   Otherwise, radiographically nonacute portable chest.     XR CHEST PORTABLE    Result Date: 11/1/2022  EXAMINATION: ONE XRAY VIEW OF THE CHEST 10/28/2022 11:54 am COMPARISON: 10/26/2022 HISTORY: ORDERING SYSTEM PROVIDED HISTORY: post bronch TECHNOLOGIST PROVIDED HISTORY: INSPIRATORY & EXPIRATORY 2 hours post bronchoscopy, Reason for exam:->post bronch Reason for Exam: post bronch FINDINGS: The heart size is normal.  Right IJ Port-A-Cath is in place. Bilateral interstitial and airspace opacity is noted. Right lower lobe cavitary mass is re-identified. The appearance is similar to the previous exam.     No significant interval change. XR CHEST PORTABLE    Result Date: 10/26/2022  EXAMINATION: ONE XRAY VIEW OF THE CHEST 10/26/2022 6:04 pm COMPARISON: Chest CT 10/23/2022, chest radiograph 10/22/2022 HISTORY: ORDERING SYSTEM PROVIDED HISTORY: pneumonia TECHNOLOGIST PROVIDED HISTORY: Reason for exam:->pneumonia Reason for Exam: pneumonia Additional signs and symptoms: NA Relevant Medical/Surgical History: DIABETES FINDINGS: Unchanged right internal jugular central venous port catheter. Overlying heart monitor leads. At least moderate bilateral peribronchial cuffing. Decreased lung volumes. Similar appearance of a cavitary mass in the basilar right lung. Possible increase in diffuse reticulonodular opacities bilaterally. No definite findings of pneumothorax or pleural effusion. Normal mediastinal and cardiac contours. 1. Possible increase in diffuse reticulonodular opacities corresponding with extensive infectious or inflammatory bronchiolitis seen on CT. 2. No significant change in a cavitary mass in the right lung base that is more likely infectious or inflammatory in etiology than neoplastic given absence on 09/28/2022. 3. At least moderate peribronchial cuffing potentially due to bronchitis or reactive airways disease. XR CHEST PORTABLE    Result Date: 10/22/2022  EXAMINATION: ONE XRAY VIEW OF THE CHEST 10/22/2022 8:49 am COMPARISON: Chest radiograph 10/04/2022, chest CTA 09/28/2022 HISTORY: ORDERING SYSTEM PROVIDED HISTORY: Cough, left-sided chest pain TECHNOLOGIST PROVIDED HISTORY: Reason for exam:->Cough, left-sided chest pain Reason for Exam: Cough, left-sided chest pain FINDINGS: Unchanged right internal jugular central venous port catheter. Overlying tubing. At least mild bilateral peribronchial cuffing. Persistent patchy airspace opacities in the mid to basilar right lung with possible cavitation in the base. Left basilar airspace opacity. No definite findings of pneumothorax or pleural effusion. Unchanged mediastinal prominence due to aortic tortuosity. Normal hilar and cardiac contours. No obvious acute fracture. Joints maintain anatomic alignment. 1. Persistent patchy airspace opacities in the mid to basilar right lung suspicious for pneumonia or aspiration given the prior CT appearance. There may be cavitation in the right lung base. 2. Minimal left basilar airspace opacity more likely due to atelectasis than pneumonia or aspiration. 3. At least mild peribronchial cuffing potentially due to reactive airways disease or bronchitis. MDM:  Pt presents as above. Emergent conditions considered. Presentation prompted initial labs, EKG and imaging. Patient is immediately assessed on arrival and respiratory therapy is paged. Patient placed on BiPAP for respiratory distress and work of breathing. IVs established and IV fluids were initiated. Patient is already received steroids, Benadryl, Tylenol and Pepcid for possible allergic reaction to the Rituxan. Intramuscular epinephrine is ordered per anaphylaxis protocol however patient symptoms markedly improved after BiPAP and this was held. Chest x-ray demonstrating infiltrative changes of the perihilar and basilar regions most consistent with atypical pneumonia/pneumonitis accentuated by underlying advanced chronic lung disease. Given patient's fever blood cultures were obtained in addition to lactic acid which is thankfully not suggestive of tissue hypoperfusion. Broad-spectrum antibiotic therapy of cefepime, vancomycin azithromycin was pursued after discussion with ED pharmacist.  CBC is with chronic leukocytosis as well as chronic anemia and chronic thrombocytopenia.   CMP is with mild hyperglycemia without elevated anion gap metabolic acidosis. There is prerenal renal insufficiency. VBG is without acidemia. Troponin is abnormal and will need to be trended. EKG is nonischemic and suspect type II troponinemia. Respiratory panel is positive for rhinovirus. On my recheck, patient is now titrated off of BiPAP to 6 L nasal cannula and tolerating this well. I did initially discuss the case with our ICU/critical care physician and given the above improvement he does recommend admission to stepdown. Hospitalist for stepdown is consulted and they are accepting of patient. Patient admitted to medicine. Questions sought and answered with the patient. They voice understanding and agree with plan. Is this patient to be included in the SEP-1 Core Measure due to severe sepsis or septic shock? No   Exclusion criteria - the patient is NOT to be included for SEP-1 Core Measure due to:  May have criteria for sepsis, but does not meet criteria for severe sepsis or septic shock      CRITICAL CARE NOTE:  There was a high probability of clinically significant life-threatening deterioration of the patient's condition requiring my urgent intervention due to respiratory failure with hypoxemia secondary to rhinovirus and potentially HCAP versus anaphylactic type reaction. IV broad-spectrum antibiotic administration, telemetry monitoring, noninvasive positive pressure ventilation, dissection with ED pharmacist, subspecialty consultation with hematology/oncology, chart review including medication review, respite therapy consultation, direct interpretation of x-ray imaging, and frequent reassessments patient counseling throughout ED course was performed to address this. Total critical care time is 45 minutes. This includes vital sign monitoring, pulse oximetry monitoring, telemetry monitoring, clinical response to the IV medications, reviewing the nursing notes, consultation time, dictation/documentation time, and interpretation of the lab work. This time excludes time spent performing procedures and separately billable procedures and family discussion time. Care of this patient occurred during the COVID-19 pandemic. Clinical Impression:  1. Acute on chronic respiratory failure with hypoxemia (HCC)    2. Rhinovirus      Disposition referral (if applicable):  No follow-up provider specified. Disposition medications (if applicable):  Current Discharge Medication List        START taking these medications    Details   allopurinol (ZYLOPRIM) 100 MG tablet Take 2 tablets by mouth 3 times daily for 7 days  Qty: 42 tablet, Refills: 0             Comment: Please note this report has been produced using speech recognition software and may contain errors related to that system including errors in grammar, punctuation, and spelling, as well as words and phrases that may be inappropriate. If there are any questions or concerns please feel free to contact the dictating provider for clarification.         Nilesh Stone MD  11/19/22 [id number]

## 2022-11-18 NOTE — PROGRESS NOTES
Patient arrived to treatment suite for pre-medications and chemotherapy infusion. Right chest mediport accessed and good blood return noted. Patient stated feeling tired and weak. When O2 sats available, they were mid 80's on 3L of O2 (what he is on at home), but went up to 90% on 4-1/2 liters. Discussed with Dr. Josi Schafer. Uric acid and Allopurinol ordered for patient and treatment approved and given. Rituxan started at 50ml/hr and increased by 50ml/hr for a max rate of 400ml/hr. However, when patient reached 150ml/hr, he began to have tremors around 1123. Treatment stopped and Dr. Josi Schafer called to treatment suite. Benadryl 25mg and Pepcid 20mg ordered and given, fluids run wide open. Patient is diabetic, so no further dexamethasone ordered. After several minutes, patient condition was not improving much. Dr. Josi Schafer decided not to re-challenge treatment today. Vital signs are as follows:   11:38 - /82 (first available BP d/t tremors), P140, O2 89% on 4-1/2L  11:45 - /79, P144, O2 85% on 4-1/2L  11:55 - /75, P145, O2 88% on 4-1/2L  12:06 - /60, P145, O2 89% on 4-1/2L, R32, T97.6  12:15 - /70, P141, O2 88% on 4-1/2L, R32  12:31 - /59, P135, O2 85% on 4-1/2L, R40, T101.1    Patient's son was called around noon to bring patient's inhaler to the treatment suite. Upon arrival, inhaler was administered with no clear results. Dr. Josi Schafer came to treatment suite and agreed that patient should go to the ER. Squad was called. Patient then began to spike a fever and respirations increased. Upon arrival of squ, patient decided that he would refuse treatment after going out to the squad to be transported. After speaking with him and calling his son to return, patient ultimately decided to go to the ER with the paramedics after several minutes.

## 2022-11-18 NOTE — PROGRESS NOTES
RX for allopurinol 200 mg PO TID x7 days e-scribed to [facility] pharmacy per physician order. Patient to have repeat labs: CMP phosphorus and uric acid on 11/23/2022. Lab orders also placed per physician order.

## 2022-11-18 NOTE — ED TRIAGE NOTES
Pt presents to the ED by Silvia JARAMILLO from the cancer center. Pt was there receiving treatment of Rituxan 50 mg, 100mg , and 150 mg. Pt began having an allergic reaction while starting to receive the treatment of 150 mg. Pt temp increased, respirs increased and pt began experiencing SOB. Received Dexamethasone, tylenol, Pepcid and Benedryl PTA. Pt is typically on 3L NC at home, he currently on a NRB and continues to purse lip breath. Temp 101.4. Pt currently being treated for \"bacteria in my lungs. \"

## 2022-11-18 NOTE — ED NOTES
[facility] repaged Dr Ruy Li  11/18/22 [phone] repaged Dr Ruy Li  11/18/22 [phone]

## 2022-11-18 NOTE — CARE COORDINATION
CM review of pt chart for readmission risk, last admission 10/22/22, dx PNE, hx CLL Leukemia. Pt discharged home with son/Edward and [address] [place] [address] Rd. Pt returns to ER from [facility] due to reaction to new Chemo infusion, Pt initially treated at infusion oncology clinic. No improvement from allergic reaction. Dr Diana Garcia provider, no alternative to admission pt continues to has difficulty breathing placed on bi-PAP. POC for admission for further treatment.  FELICIA,RN/CM

## 2022-11-19 ENCOUNTER — APPOINTMENT (OUTPATIENT)
Dept: GENERAL RADIOLOGY | Age: 72
DRG: 871 | End: 2022-11-19
Payer: COMMERCIAL

## 2022-11-19 LAB
ANION GAP SERPL CALCULATED.3IONS-SCNC: 10 MMOL/L (ref 4–16)
ANISOCYTOSIS: ABNORMAL
BANDED NEUTROPHILS ABSOLUTE COUNT: 0.41 K/CU MM
BANDED NEUTROPHILS RELATIVE PERCENT: 2 % (ref 5–11)
BASOPHILS ABSOLUTE: 0.2 K/CU MM
BASOPHILS RELATIVE PERCENT: 1 % (ref 0–1)
BUN BLDV-MCNC: 33 MG/DL (ref 6–23)
CALCIUM SERPL-MCNC: 8.3 MG/DL (ref 8.3–10.6)
CHLORIDE BLD-SCNC: 102 MMOL/L (ref 99–110)
CO2: 23 MMOL/L (ref 21–32)
CREAT SERPL-MCNC: 0.6 MG/DL (ref 0.9–1.3)
DIFFERENTIAL TYPE: ABNORMAL
GFR SERPL CREATININE-BSD FRML MDRD: >60 ML/MIN/1.73M2
GLUCOSE BLD-MCNC: 148 MG/DL (ref 70–99)
GLUCOSE BLD-MCNC: 150 MG/DL (ref 70–99)
GLUCOSE BLD-MCNC: 157 MG/DL (ref 70–99)
GLUCOSE BLD-MCNC: 159 MG/DL (ref 70–99)
GLUCOSE BLD-MCNC: 187 MG/DL (ref 70–99)
GLUCOSE BLD-MCNC: 227 MG/DL (ref 70–99)
HCT VFR BLD CALC: 27.1 % (ref 42–52)
HEMOGLOBIN: 8.3 GM/DL (ref 13.5–18)
HEPATITIS B CORE TOTAL ANTIBODY: NEGATIVE
HIGH SENSITIVE C-REACTIVE PROTEIN: 105.7 MG/L (ref 0–5)
LEGIONELLA URINARY AG: NEGATIVE
LYMPHOCYTES ABSOLUTE: 15.7 K/CU MM
LYMPHOCYTES RELATIVE PERCENT: 77 % (ref 24–44)
MCH RBC QN AUTO: 27.7 PG (ref 27–31)
MCHC RBC AUTO-ENTMCNC: 30.6 % (ref 32–36)
MCV RBC AUTO: 90.3 FL (ref 78–100)
MYELOCYTE PERCENT: 1 %
MYELOCYTES ABSOLUTE COUNT: 0.2 K/CU MM
OVALOCYTES: ABNORMAL
PDW BLD-RTO: 26.5 % (ref 11.7–14.9)
PLATELET # BLD: 43 K/CU MM (ref 140–440)
PMV BLD AUTO: 9.1 FL (ref 7.5–11.1)
POLYCHROMASIA: ABNORMAL
POTASSIUM SERPL-SCNC: 4.1 MMOL/L (ref 3.5–5.1)
PROCALCITONIN: 5.5
RBC # BLD: 3 M/CU MM (ref 4.6–6.2)
SEGMENTED NEUTROPHILS ABSOLUTE COUNT: 3.9 K/CU MM
SEGMENTED NEUTROPHILS RELATIVE PERCENT: 19 % (ref 36–66)
SODIUM BLD-SCNC: 135 MMOL/L (ref 135–145)
STREP PNEUMONIAE ANTIGEN: NORMAL
WBC # BLD: 20.4 K/CU MM (ref 4–10.5)
WBC # BLD: ABNORMAL 10*3/UL

## 2022-11-19 PROCEDURE — 2580000003 HC RX 258: Performed by: STUDENT IN AN ORGANIZED HEALTH CARE EDUCATION/TRAINING PROGRAM

## 2022-11-19 PROCEDURE — 6370000000 HC RX 637 (ALT 250 FOR IP): Performed by: INTERNAL MEDICINE

## 2022-11-19 PROCEDURE — 85027 COMPLETE CBC AUTOMATED: CPT

## 2022-11-19 PROCEDURE — 94761 N-INVAS EAR/PLS OXIMETRY MLT: CPT

## 2022-11-19 PROCEDURE — 84145 PROCALCITONIN (PCT): CPT

## 2022-11-19 PROCEDURE — 2700000000 HC OXYGEN THERAPY PER DAY

## 2022-11-19 PROCEDURE — 82962 GLUCOSE BLOOD TEST: CPT

## 2022-11-19 PROCEDURE — 71045 X-RAY EXAM CHEST 1 VIEW: CPT

## 2022-11-19 PROCEDURE — 6360000002 HC RX W HCPCS: Performed by: INTERNAL MEDICINE

## 2022-11-19 PROCEDURE — 2140000000 HC CCU INTERMEDIATE R&B

## 2022-11-19 PROCEDURE — 99233 SBSQ HOSP IP/OBS HIGH 50: CPT | Performed by: INTERNAL MEDICINE

## 2022-11-19 PROCEDURE — 94640 AIRWAY INHALATION TREATMENT: CPT

## 2022-11-19 PROCEDURE — 85007 BL SMEAR W/DIFF WBC COUNT: CPT

## 2022-11-19 PROCEDURE — 2580000003 HC RX 258: Performed by: INTERNAL MEDICINE

## 2022-11-19 PROCEDURE — 2500000003 HC RX 250 WO HCPCS: Performed by: STUDENT IN AN ORGANIZED HEALTH CARE EDUCATION/TRAINING PROGRAM

## 2022-11-19 PROCEDURE — 80048 BASIC METABOLIC PNL TOTAL CA: CPT

## 2022-11-19 PROCEDURE — 86141 C-REACTIVE PROTEIN HS: CPT

## 2022-11-19 PROCEDURE — 6370000000 HC RX 637 (ALT 250 FOR IP): Performed by: STUDENT IN AN ORGANIZED HEALTH CARE EDUCATION/TRAINING PROGRAM

## 2022-11-19 PROCEDURE — 36591 DRAW BLOOD OFF VENOUS DEVICE: CPT

## 2022-11-19 RX ORDER — BUDESONIDE 0.5 MG/2ML
1 INHALANT ORAL 2 TIMES DAILY
Status: DISCONTINUED | OUTPATIENT
Start: 2022-11-19 | End: 2022-11-25 | Stop reason: HOSPADM

## 2022-11-19 RX ORDER — GUAIFENESIN 600 MG/1
600 TABLET, EXTENDED RELEASE ORAL 2 TIMES DAILY
Status: DISCONTINUED | OUTPATIENT
Start: 2022-11-19 | End: 2022-11-25 | Stop reason: HOSPADM

## 2022-11-19 RX ORDER — SODIUM CHLORIDE FOR INHALATION 0.9 %
3 VIAL, NEBULIZER (ML) INHALATION EVERY 12 HOURS
Status: DISCONTINUED | OUTPATIENT
Start: 2022-11-19 | End: 2022-11-19 | Stop reason: CLARIF

## 2022-11-19 RX ORDER — IPRATROPIUM BROMIDE AND ALBUTEROL SULFATE 2.5; .5 MG/3ML; MG/3ML
1 SOLUTION RESPIRATORY (INHALATION)
Status: DISCONTINUED | OUTPATIENT
Start: 2022-11-19 | End: 2022-11-24

## 2022-11-19 RX ORDER — TOBRAMYCIN INHALATION SOLUTION 300 MG/5ML
300 INHALANT RESPIRATORY (INHALATION) 2 TIMES DAILY
Status: DISCONTINUED | OUTPATIENT
Start: 2022-11-19 | End: 2022-11-19 | Stop reason: CLARIF

## 2022-11-19 RX ORDER — TOBRAMYCIN INHALATION SOLUTION 300 MG/5ML
300 INHALANT RESPIRATORY (INHALATION) 2 TIMES DAILY
Status: DISCONTINUED | OUTPATIENT
Start: 2022-11-19 | End: 2022-11-25 | Stop reason: HOSPADM

## 2022-11-19 RX ORDER — AZITHROMYCIN 250 MG/1
250 TABLET, FILM COATED ORAL DAILY
Status: DISCONTINUED | OUTPATIENT
Start: 2022-11-19 | End: 2022-11-25 | Stop reason: HOSPADM

## 2022-11-19 RX ORDER — SODIUM CHLORIDE FOR INHALATION 3 %
4 VIAL, NEBULIZER (ML) INHALATION PRN
Status: DISCONTINUED | OUTPATIENT
Start: 2022-11-19 | End: 2022-11-25 | Stop reason: HOSPADM

## 2022-11-19 RX ORDER — TOBRAMYCIN SULFATE 40 MG/ML
300 INJECTION, SOLUTION INTRAMUSCULAR; INTRAVENOUS EVERY 12 HOURS
Status: DISCONTINUED | OUTPATIENT
Start: 2022-11-19 | End: 2022-11-19 | Stop reason: CLARIF

## 2022-11-19 RX ADMIN — BUDESONIDE 1000 MCG: 0.5 INHALANT RESPIRATORY (INHALATION) at 22:21

## 2022-11-19 RX ADMIN — MICONAZOLE NITRATE: 2 POWDER TOPICAL at 20:51

## 2022-11-19 RX ADMIN — IPRATROPIUM BROMIDE AND ALBUTEROL SULFATE 1 AMPULE: .5; 2.5 SOLUTION RESPIRATORY (INHALATION) at 22:20

## 2022-11-19 RX ADMIN — HYDROCODONE BITARTRATE AND ACETAMINOPHEN 1 TABLET: 5; 325 TABLET ORAL at 20:45

## 2022-11-19 RX ADMIN — ALLOPURINOL 200 MG: 100 TABLET ORAL at 13:30

## 2022-11-19 RX ADMIN — PANTOPRAZOLE SODIUM 40 MG: 40 TABLET, DELAYED RELEASE ORAL at 07:03

## 2022-11-19 RX ADMIN — MICONAZOLE NITRATE: 2 POWDER TOPICAL at 00:59

## 2022-11-19 RX ADMIN — OXYCODONE HYDROCHLORIDE AND ACETAMINOPHEN 250 MG: 500 TABLET ORAL at 00:58

## 2022-11-19 RX ADMIN — SODIUM CHLORIDE, PRESERVATIVE FREE 10 ML: 5 INJECTION INTRAVENOUS at 20:47

## 2022-11-19 RX ADMIN — ALLOPURINOL 200 MG: 100 TABLET ORAL at 08:41

## 2022-11-19 RX ADMIN — OXYCODONE HYDROCHLORIDE AND ACETAMINOPHEN 250 MG: 500 TABLET ORAL at 08:42

## 2022-11-19 RX ADMIN — ACYCLOVIR 400 MG: 200 CAPSULE ORAL at 20:46

## 2022-11-19 RX ADMIN — ALLOPURINOL 200 MG: 100 TABLET ORAL at 20:46

## 2022-11-19 RX ADMIN — INSULIN GLARGINE 30 UNITS: 100 INJECTION, SOLUTION SUBCUTANEOUS at 20:46

## 2022-11-19 RX ADMIN — Medication 324 MG: at 08:52

## 2022-11-19 RX ADMIN — VANCOMYCIN HYDROCHLORIDE 1250 MG: 1.25 INJECTION, POWDER, LYOPHILIZED, FOR SOLUTION INTRAVENOUS at 11:58

## 2022-11-19 RX ADMIN — INSULIN LISPRO 1 UNITS: 100 INJECTION, SOLUTION INTRAVENOUS; SUBCUTANEOUS at 11:53

## 2022-11-19 RX ADMIN — AZITHROMYCIN MONOHYDRATE 250 MG: 250 TABLET ORAL at 14:42

## 2022-11-19 RX ADMIN — ALLOPURINOL 200 MG: 100 TABLET ORAL at 00:58

## 2022-11-19 RX ADMIN — ACYCLOVIR 400 MG: 200 CAPSULE ORAL at 13:30

## 2022-11-19 RX ADMIN — ATORVASTATIN CALCIUM 80 MG: 40 TABLET, FILM COATED ORAL at 08:41

## 2022-11-19 RX ADMIN — TAMSULOSIN HYDROCHLORIDE 0.4 MG: 0.4 CAPSULE ORAL at 08:41

## 2022-11-19 RX ADMIN — MICONAZOLE NITRATE: 2 POWDER TOPICAL at 13:33

## 2022-11-19 RX ADMIN — HYDROCODONE BITARTRATE AND ACETAMINOPHEN 1 TABLET: 5; 325 TABLET ORAL at 08:48

## 2022-11-19 RX ADMIN — INSULIN GLARGINE 30 UNITS: 100 INJECTION, SOLUTION SUBCUTANEOUS at 01:06

## 2022-11-19 RX ADMIN — OXYCODONE HYDROCHLORIDE AND ACETAMINOPHEN 250 MG: 500 TABLET ORAL at 20:46

## 2022-11-19 RX ADMIN — Medication 324 MG: at 17:43

## 2022-11-19 RX ADMIN — TOBRAMYCIN 300 MG: 300 SOLUTION RESPIRATORY (INHALATION) at 22:21

## 2022-11-19 RX ADMIN — GUAIFENESIN 600 MG: 600 TABLET, EXTENDED RELEASE ORAL at 20:46

## 2022-11-19 RX ADMIN — GUAIFENESIN 600 MG: 600 TABLET, EXTENDED RELEASE ORAL at 14:42

## 2022-11-19 RX ADMIN — ACYCLOVIR 400 MG: 200 CAPSULE ORAL at 09:09

## 2022-11-19 RX ADMIN — CEFEPIME HYDROCHLORIDE 2000 MG: 2 INJECTION, POWDER, FOR SOLUTION INTRAVENOUS at 09:09

## 2022-11-19 RX ADMIN — FINASTERIDE 5 MG: 5 TABLET, FILM COATED ORAL at 08:42

## 2022-11-19 RX ADMIN — ACYCLOVIR 400 MG: 200 CAPSULE ORAL at 00:58

## 2022-11-19 ASSESSMENT — PAIN DESCRIPTION - DESCRIPTORS
DESCRIPTORS: ACHING
DESCRIPTORS: SHARP
DESCRIPTORS: ACHING

## 2022-11-19 ASSESSMENT — PAIN DESCRIPTION - ORIENTATION
ORIENTATION: LEFT;UPPER
ORIENTATION: RIGHT;LEFT
ORIENTATION: RIGHT;LEFT

## 2022-11-19 ASSESSMENT — PAIN DESCRIPTION - FREQUENCY
FREQUENCY: CONTINUOUS
FREQUENCY: CONTINUOUS

## 2022-11-19 ASSESSMENT — PAIN DESCRIPTION - PAIN TYPE
TYPE: ACUTE PAIN
TYPE: ACUTE PAIN

## 2022-11-19 ASSESSMENT — PAIN DESCRIPTION - LOCATION
LOCATION: BACK;ABDOMEN
LOCATION: ABDOMEN
LOCATION: SHOULDER;FLANK

## 2022-11-19 ASSESSMENT — PAIN SCALES - GENERAL
PAINLEVEL_OUTOF10: 7
PAINLEVEL_OUTOF10: 6
PAINLEVEL_OUTOF10: 4

## 2022-11-19 ASSESSMENT — PAIN DESCRIPTION - ONSET: ONSET: ON-GOING

## 2022-11-19 NOTE — PROGRESS NOTES
Patient cough up thick yellow green sputum with small amount of bright red blood. Respiration even and labored. SPO2 86% on 4L. Resp 36-38 min. Chest has rhonchi throughout. Oxygen raised to 5l NC. SpO2 only up to 88-89%. Sputum specimen obtained. /60 . HOB elevated. Floor nurse Shanelle Holder notified.

## 2022-11-19 NOTE — CARE COORDINATION
MCG criteria for PNE reviewed at this time, criteria supports Inpatient Admission on initial review.  FELICIA,RN/CM

## 2022-11-19 NOTE — PROGRESS NOTES
Critical lab result for 1/4 positive blood culture for staph. Dr Flor Cespedes notified.  No new orders at this time Repair Type: Intermediate

## 2022-11-19 NOTE — ASSESSMENT & PLAN NOTE
Remains symptomatic requiring oxygen. Concerned about bronchiectasis and chronic Pseudomonas colonization. Obtain respiratory culture. Azithromycin and inhaled tobramycin prescribed for eradication of Pseudomonas aeruginosa.   Continue IVIG

## 2022-11-19 NOTE — CONSULTS
HEMATOLOGY ONCOLOGY  Consultation Report    REASON FOR CONSULT  To evaluate the patient with CLL    CHIEF COMPLAINT    Chief Complaint   Patient presents with    Allergic Reaction     HISTORY OF PRESENT ILLNESS   Susie Rea is a 71 y.o. male with history of CLL, hypogammaglobulinemia, lung nodule, COPD, Hypoxic respiratory failure on 3l per nc, DM type II, Hyperlipidemia, BPH, presented to [facility] on 11/18/22 with increased SOB, in the setting of rhinovirus, superimposed with atypical pneumonia. He was receiving Rituximab yesterday in our office and he started having tremors and SOB. We gave benadryl, pepcid and fluid. We gave inhaler and he is not getting better. He has SOB when he came in to cancer center. Don't think he has true reaction to rituximab. He was recently hospitalized 2 weeks ago for sepsis and pneumonia. He was found to have rhino enterovirus. Appears that he required a thoracentesis however fluid culture was not obtained. He was treated with IV Zosyn. Respiratory panel performed again on 11/18/22 showed rhino enterovirus. He was admitted for persistent pneumonia and hypoxic respiratory failure. He is known to us for CLL. He was on ibrutinib until recently and he was noted to have disease progression recently. Rituximab was just started on 10/18/22 and we have to stop it because of above. He was also on IVIG for CLL induced hypogammaglobulinemia. He received IVIG on 10/26/22. I was called to evaluate him. He still has significant cough.      PAST MEDICAL HISTORY    Past Medical History:   Diagnosis Date    Arthritis     knees, Rt hand/wrist    BPH (benign prostatic hyperplasia)     CAD (coronary artery disease)     Cancer (HCC)     CLL--Sees Dr Ami Yusuf    Diabetes mellitus [facility])     History of blood transfusion     no reaction    Hx of motion sickness     Hyperlipidemia     MDRO (multiple drug resistant organisms) resistance     abscess on buttock 10yrs ago Migraine     last one summer 2016    Pneumonia 2016    Wears dentures     full upper--lower dentures    Wears glasses        SURGICAL HISTORY    Past Surgical History:   Procedure Laterality Date    BRONCHOSCOPY N/A 10/28/2022    BRONCHOSCOPY performed by Kaveh Grimaldo MD at [facility]  's    x 2  last showed \"inflammation of heart\"    COLONOSCOPY  2010    DENTAL SURGERY      all teeth extracted     EYE SURGERY Right 2016    Cataract removal    FRACTURE SURGERY Left     left ankle plate and screws    KNEE SURGERY  1970    left    OTHER SURGICAL HISTORY Left 2017    groin excision    TONSILLECTOMY  late 's    age 13    TUNNELED VENOUS PORT PLACEMENT  2013    Right upper chest       FAMILY HISTORY    Family History   Problem Relation Age of Onset    Diabetes Mother     High Blood Pressure Mother     Heart Disease Father     Other Sister         liver problems    Early Death Brother     Asthma Maternal Uncle     Colon Cancer Brother        SOCIAL HISTORY    Social History     Socioeconomic History    Marital status: Single   Tobacco Use    Smoking status: Former     Packs/day: 3.00     Years: 20.00     Pack years: 60.00     Types: Cigarettes     Start date: 10/2/1965     Quit date: 1987     Years since quittin.9    Smokeless tobacco: Never   Vaping Use    Vaping Use: Never used   Substance and Sexual Activity    Alcohol use: No    Drug use: No    Sexual activity: Not Currently       REVIEW OF SYSTEMS    Constitutional: Has fever, chills, loss of appetite or weight loss. He has tiredness and fatigue  HEENT:  Denies swelling of neck glands  Respiratory:  Has cough, shortness of breath and chest pain. Denies hemoptysis  Cardiovascular:  Has chest pain.  Denies palpitations or swelling   GI:  Denies abdominal pain, nausea, vomiting, constipation or diarrhea   Musculoskeletal:  Denies back pain   Skin:  Denies rash   Neurologic:  Denies headache, focal weakness or sensory changes   All systems negative except as marked. PHYSICAL EXAM    Vitals: /80   Pulse 94   Temp 98.1 °F (36.7 °C) (Oral)   Resp (!) 35   Ht 6' (1.829 m)   Wt 161 lb 2.5 oz (73.1 kg)   SpO2 93%   BMI 21.86 kg/m²   CONSTITUTIONAL: awake, alert, cooperative, no apparent distress   EYES: pupils equal, round and reactive to light, sclera clear and conjunctiva normal  ENT: Normocephalic, without obvious abnormality, atraumatic  NECK: supple, symmetrical, no jugular venous distension and no carotid bruits   HEMATOLOGIC/LYMPHATIC: no cervical, supraclavicular or axillary lymphadenopathy   LUNGS: VBS, no increased work of breathing, no wheezes, coarse breath sound,    CARDIOVASCULAR: regular rate and rhythm, normal S1 and S2, no murmur noted  ABDOMEN: normal bowel sounds x 4, soft, non-distended, non-tender, no masses palpated, no hepatosplenomgaly   MUSCULOSKELETAL: full range of motion noted, tone is normal  NEUROLOGIC: awake, alert, oriented to name, place and time. Motor skills grossly intact. SKIN: Normal skin color, texture, turgor and no jaundice. Skin appears intact   EXTREMITIES: no LE edema, no cyanosis, no leg swelling, no clubbing    LABORATORY RESULTS  CBC:   Recent Labs     11/16/22  1401 11/18/22  1425 11/19/22  0440   .2* 38.8* 20.4*   HGB 9.9* 9.2* 8.3*   PLT 60* 41* 43*     BMP:    Recent Labs     11/16/22  1401 11/18/22  1425 11/19/22  0440    134* 135   K 4.2 5.1 4.1   CL 99 99 102   CO2 23 21 23   BUN 28* 42* 33*   CREATININE 0.7* 0.9 0.6*   GLUCOSE 114* 159* 159*     Hepatic:   Recent Labs     11/16/22  1401 11/18/22  1425   AST 18 35   ALT 16 18   BILITOT 1.1* 0.6   ALKPHOS 167* 159*     INR: No results for input(s): INR in the last 72 hours. RADIOLOGY REPORTS  Chest X ray  Infiltrative changes of perihilar and basilar regions most consistent with   atypical pneumonia/pneumonitis accentuated by underlying advanced chronic   lung disease. Otherwise, radiographically nonacute portable chest.     ASSESSMENT  CLL  Hypogammaglobulinemia    RECOMMENDATION  He is known to us for CLL. He was on ibrutinib until recently and he was noted to have disease progression recently. We just started rituximab and we have to stop it because of tremors, SOB and fever. We couldn't able to re challenge with rituximab since his symptom doesn't get better. We will hold rituximab until he gets better and plan to resume it after that. He was also on IVIG for CLL induced hypogammaglobulinemia. He received last IVIG on 10/26/22. He doesn't need IVIG now. Received IV antibiotics in ER yesterday and awaiting for ID evaluation. We will follow the patient. Thank you for allowing me to participate in the care of this very pleasant patient.

## 2022-11-19 NOTE — PROGRESS NOTES
Dr Placido Hutchins notified of pt labored breathing, O2 86% on 4L and sputum with scant amount of blood noted.    New orders noted

## 2022-11-19 NOTE — H&P
History and Physical 22        NAME: Radha Palacios  : 1950  MRN: [id number]      Assessment/Plan:  Radha Palacios is a 71 y.o. male with a history of CLL on Rituxan and IVIG, hypogammaglobulinemia, lung nodule, COPD, chronic hypoxic respiratory failure on 3l per nc, DM type II, Hyperlipidemia and BPH. who presented to [facility] 2022 with increased SOB in the setting of sepsis likely 2/2 rhinovirus superimposed with atypical pneumonia and CLL. #Sepsis in setting of Rhinovirus, PNA and CLL   #Rhinovirus Pneumonia with Superimposed PNA (Possibly atypical vs pneumonitis)  --Respiratory panel +ve for rhinovirus. SIRS criteria met, no lactic acid  --F/U on Bcx, sputum Cx, legionella and strep pneumo ag, and MRSA swab  --Trend inflammatory markers  --ID consulted. Recently completed IV Cipro, and IV Zosyn  on . He received azithromycin, vanc and cefepime in the ED, will hold off on further abx until ID evals in the AM    #COPD  #Chronic Hypoxic respiratory failure   --Not in exacerbation. On 3L of home O2.   --Continue to monitor  --Continue to monitor saturations and supplement O2 to maintain saturations >90%     #Mild Hyponatremia   --Likely poor oral intake, monitor at this time  --Repeat BMP in the AM    #DM type II c/b neuropathy  --Accucheks, low dose SSI  --Lantus, will start at 30units nightly (home dose of 40 units)  --Last A1C 6.7 9     #CLL and hypogammaglobulinemia   #Thrombocytopenia  --Improvement in WBC, follows with oncology in the OP setting  --Was getting Rituxan infusion when his SOB worsened, he was treated for allergic reaction  --Thrombocytopenic, lower than 50k today.  Will hold Lovenox until cleared by HemeOnc  --Hemeonc consulted    #Mixed hyperlipidemia   --Continue statin    #BPH  --Continue Tamsulosin and Finasteride       #protein calorie malnutrition   --Nutrition consulted, add on supplements       DVT Prophylaxis: SCDs  Code Status/Surrogate Decision Maker: Limited, Son is his POA      Current living situation: Home  Expected Disposition: TBD  Estimated discharge date: 2-3 days    Chief Complaint:    Chills, malaise, increasing SOB     History of Present Illness:    Glynn Gauthier is a 71 y.o. male  with history of CLL on Rituxan and IVIG, hypogammaglobulinemia, lung nodule, COPD, chronic hypoxic respiratory failure on 3l per nc, DM type II, Hyperlipidemia and BPH. He has had multiple admissions for recurrent episodes of Pneumonia and has undergone thoracentesis in the past. Recently discharged on 11/2 post admission for similar complaint Was at the infusion center, when he started to experience chills and rigors as well as increased shortness of breath. Treated for allergic reaction to Rituxan but remained short of breath and so he was sent into the ED emergency room. Per the patient's report he had been feeling ill prior to going for his infusion. On presentation, the patient is febrile to 101.4, 24 and tachycardic to 127, requiring a nonrebreather at 15 L. Positions of BiPAP with subsequent improvement in his work of breathing and in his vitals. Patient has been transitioned off of BiPAP and is currently on 6 L of oxygen saturating greater than 95%. Labs notable for mild hyponatremia to 134, troponin mildly elevated to 0.016, alk phos of 159, WBC of 38.8 down from 104.2 on 11/16, H&H of 9.2/29.6 (appears to be around his baseline), Plt count of 41. On respiratory viral panel he was found to be positive for the rhinovirus. Gas was notable for pH of 7.38, PCO2 of 37, PO2 of 83 and a bicarb of 21.9. Chest x-ray showed infiltrative changes of the perihilar and basilar regions consistent with atypical pneumonia/pneumonitis accentuated by underlying advanced chronic lung disease. He is status post 2.5 L bolus, received azithromycin and cefepime as well as vancomycin. Encounter, he is on 6 L via nasal cannula and still appears to have conversational dyspnea. Otherwise hemodynamically stable and is being admitted for sepsis in the setting of rhinovirus superimposed with atypical pneumonia. ROS:    Review of Systems   Constitutional:  Positive for chills, fatigue and fever. HENT: Negative. Eyes: Negative. Respiratory:  Positive for shortness of breath. Cardiovascular: Negative. Gastrointestinal: Negative. Genitourinary: Negative. Musculoskeletal: Negative. Skin: Negative. Neurological: Negative. Psychiatric/Behavioral: Negative.         Past Medical, Surgical, Social, Family History:   Past Medical History:   Diagnosis Date    Arthritis     knees, Rt hand/wrist    CAD (coronary artery disease)     Cancer ([facility] [address]     CLL--Sees Dr Marii Robles    Diabetes mellitus [facility])     History of blood transfusion     no reaction    Hx of motion sickness     Hyperlipidemia     MDRO (multiple drug resistant organisms) resistance     abscess on buttock 10yrs ago    Migraine     last one summer 2016    Pneumonia 2016    Wears dentures     full upper--lower dentures    Wears glasses      Past Surgical History:   Procedure Laterality Date    BRONCHOSCOPY N/A 10/28/2022    BRONCHOSCOPY performed by Matty Britt MD at [facility]  1990's    x 2  last showed \"inflammation of heart\"    COLONOSCOPY  2010    DENTAL SURGERY      all teeth extracted     EYE SURGERY Right 08/2016    Cataract removal    FRACTURE SURGERY Left 1990's    left ankle plate and screws    KNEE SURGERY  1970    left    OTHER SURGICAL HISTORY Left 01/18/2017    groin excision    TONSILLECTOMY  late 1960's    age 13    TUNNELED VENOUS PORT PLACEMENT  2013    Right upper chest     Social History     Socioeconomic History    Marital status: Single     Spouse name: Not on file    Number of children: Not on file    Years of education: Not on file    Highest education level: Not on file   Occupational History    Not on file   Tobacco Use    Smoking status: Former Packs/day: 1.00     Years: 20.00     Pack years: 20.00     Types: Cigarettes     Start date: 10/2/1965     Quit date: 1987     Years since quittin.9    Smokeless tobacco: Never   Vaping Use    Vaping Use: Never used   Substance and Sexual Activity    Alcohol use: No    Drug use: No    Sexual activity: Not Currently   Other Topics Concern    Not on file   Social History Narrative    Not on file     Social Determinants of Health     Financial Resource Strain: Not on file   Food Insecurity: Not on file   Transportation Needs: Not on file   Physical Activity: Not on file   Stress: Not on file   Social Connections: Not on file   Intimate Partner Violence: Not on file   Housing Stability: Not on file     Family History   Problem Relation Age of Onset    Diabetes Mother     High Blood Pressure Mother     Heart Disease Father     Other Sister         liver problems    Early Death Brother     Asthma Maternal Uncle     Colon Cancer Brother        Home Medications:  Prior to Admission medications    Medication Sig Start Date End Date Taking? Authorizing Provider   allopurinol (ZYLOPRIM) 100 MG tablet Take 2 tablets by mouth 3 times daily for 7 days 22  Kary Neves MD   azithromycin (ZITHROMAX) 250 MG tablet Take 1 tablet by mouth daily 22  Grisel Day MD   tobramycin, PF, (HARLEY) 300 MG/5ML nebulizer solution Take 5 mLs by nebulization in the morning and 5 mLs in the evening. 22  Grisel Day MD   HYDROcodone-acetaminophen (NORCO) 5-325 MG per tablet Take 1 tablet by mouth every 12 hours as needed for Pain for up to 30 days.  22  Darien Montoya APRN - ROSA M   ondansetron [facility]) 4 MG tablet Take 1 tablet by mouth every 8 hours as needed for Nausea or Vomiting 22   Darien Montoya APRN - ROSA M   guaiFENesin [facility]) 600 MG extended release tablet Take 1 tablet by mouth in the morning, at noon, in the evening, and at bedtime 22   [address] [facility] MD Adriana   miconazole (MICOTIN) 2 % powder Apply topically 2 times daily. 11/2/22   Romayne Sames, MD   polyethylene glycol (GLYCOLAX) 17 g packet Take 17 g by mouth daily as needed for Constipation 11/2/22 12/2/22  Romayne Sames, MD   NOVOLOG FLEXPEN 100 UNIT/ML injection pen Inject 15 Units into the skin 3 times daily (before meals) 11/2/22 12/2/22  Romayne Sames, MD   alogliptin (NESINA) 25 MG TABS tablet Take 1 tablet by mouth daily 11/3/22 12/3/22  Romayne Sames, MD   valACYclovir (VALTREX) 500 MG tablet TAKE 1 TABLET BY MOUTH EVERY DAY 11/1/22   Yaneli Khan MD   gabapentin (NEURONTIN) 100 MG capsule Take 1 capsule by mouth 3 times daily for 30 days. 10/6/22 11/5/22  Jacob Fraser MD   albuterol sulfate HFA (PROVENTIL;VENTOLIN;PROAIR) 108 (90 Base) MCG/ACT inhaler Inhale 2 puffs into the lungs every 4-6 hours as needed for Shortness of Breath or Wheezing 9/6/22   Historical Provider, MD   ipratropium-albuterol (DUONEB) 0.5-2.5 (3) MG/3ML SOLN nebulizer solution Take 1 vial by nebulization every 6 hours as needed for Shortness of Breath 9/6/22   Historical Provider, MD   Ascorbic Acid (VITAMIN C) 250 MG tablet Take 250 mg by mouth 2 times daily    Historical Provider, MD   ferrous gluconate 324 (37.5 Fe) MG TABS Take 1 tablet by mouth 2 times daily 4/28/22   Yaneli Khan MD   LEVEMIR FLEXTOUCH 100 UNIT/ML injection pen Inject 40 Units into the skin nightly 3/27/22   Gris Lovell MD   atorvastatin (LIPITOR) 80 MG tablet Take 80 mg by mouth daily 10/26/21   Historical Provider, MD   acetaminophen-codeine (TYLENOL #3) 300-30 MG per tablet Take 1 tablet by mouth every 6 hours as needed for Pain.  5/20/21   Historical Provider, MD   finasteride (PROSCAR) 5 MG tablet Take 5 mg by mouth daily 5/24/21   Historical Provider, MD   tamsulosin (FLOMAX) 0.4 MG capsule Take 0.4 mg by mouth daily    Historical Provider, MD   glucose monitoring kit (FREESTYLE) monitoring kit 1 kit by Does not apply route daily as needed (glucose checks) 3/31/17   Ana Ortiz MD   Insulin Syringe-Needle U-100 30G X 1/2\" 0.5 ML MISC 1 each by Does not apply route daily 3/31/17   Ana Ortiz MD   metFORMIN (GLUCOPHAGE) 1000 MG tablet Take 1,000 mg by mouth 2 times daily (with meals)    Historical Provider, MD   omeprazole (PRILOSEC) 20 MG delayed release capsule Take 20 mg by mouth daily as needed (GERD)    Historical Provider, MD   Immune Globulin, Human, 20 GM/200ML SOLN solution Infuse 20 g intravenously every 30 days 05/14/19 Given through infusion therapy states he is due on the 20 of May    Historical Provider, MD         Physical Exam:   /60   Pulse 71   Temp 97.9 °F (36.6 °C) (Axillary)   Resp 14   Ht 6' (1.829 m)   Wt 155 lb (70.3 kg)   SpO2 95%   BMI 21.02 kg/m²       General: On 6L of O2, conversational dyspnea, appears fatigued  Eyes: EOMI  HENT: NCAT, dry mucous membranes  Cardiovascular: Regular rate. Respiratory: Clear to auscultation, no wheezing auscultated  Gastrointestinal: Soft, non tender  Genitourinary: no suprapubic tenderness  Musculoskeletal: No edema  Skin: warm, dry  Neuro: Alert. Psych: Mood appropriate. Labs, Imaging, and Studies reviewed:    XR CHEST PORTABLE    Result Date: 11/18/2022  EXAMINATION: ONE XRAY VIEW OF THE CHEST 11/18/2022 2:17 pm COMPARISON: 10/28/2022 at 1214 hours HISTORY: ORDERING SYSTEM PROVIDED HISTORY: resp distress TECHNOLOGIST PROVIDED HISTORY: Reason for exam:->resp distress Reason for Exam: resp distress Additional signs and symptoms: NA Relevant Medical/Surgical History: CAD, DIABETES FINDINGS: Diffuse bronchial pulmonary marking prominence worst in the infrahilar/basilar regions consistent with bibasilar pneumonia/pneumonitis accentuated by underlying chronic lung disease. Noncardiogenic pulmonary edema may also contribute to this appearance. Otherwise, lung fields are clear.   No detectable pleural effusion, pneumothorax, pulmonary edema/vascular congestion, cardiomegaly or mediastinal widening. Infiltrative changes of perihilar and basilar regions most consistent with atypical pneumonia/pneumonitis accentuated by underlying advanced chronic lung disease. Otherwise, radiographically nonacute portable chest.       CBC:   Recent Labs     11/16/22  1401 11/18/22  1425   .2* 38.8*   HGB 9.9* 9.2*   PLT 60* 41*     BMP:    Recent Labs     11/16/22  1401 11/18/22  1425    134*   K 4.2 5.1   CL 99 99   CO2 23 21   BUN 28* 42*   CREATININE 0.7* 0.9   GLUCOSE 114* 159*     Hepatic:   Recent Labs     11/16/22  1401 11/18/22  1425   AST 18 35   ALT 16 18   BILITOT 1.1* 0.6   ALKPHOS 167* 159*     Lipids:   Lab Results   Component Value Date/Time    CHOL 160 08/03/2012 02:47 PM    HDL 32 08/03/2012 02:47 PM    TRIG 274 08/03/2012 02:47 PM     Hemoglobin A1C:   Lab Results   Component Value Date/Time    LABA1C 6.9 10/23/2022 11:15 AM     TSH: No results found for: TSH  Troponin:   Lab Results   Component Value Date/Time    TROPONINT 0.016 11/18/2022 02:25 PM    TROPONINT <0.010 10/23/2022 01:19 AM    TROPONINT 0.010 10/22/2022 09:05 AM     Lactic Acid: No results for input(s): LACTA in the last 72 hours. BNP: No results for input(s): PROBNP in the last 72 hours.   UA:  Lab Results   Component Value Date/Time    NITRU NEGATIVE 10/05/2022 11:20 AM    COLORU YELLOW 10/05/2022 11:20 AM    WBCUA 1 09/28/2022 12:10 PM    RBCUA 1 09/28/2022 12:10 PM    MUCUS RARE 09/28/2022 12:10 PM    TRICHOMONAS NONE SEEN 04/09/2019 01:02 PM    BACTERIA NEGATIVE 09/28/2022 12:10 PM    CLARITYU CLEAR 10/05/2022 11:20 AM    SPECGRAV 1.015 10/05/2022 11:20 AM    LEUKOCYTESUR NEGATIVE 10/05/2022 11:20 AM    UROBILINOGEN 2.0 10/05/2022 11:20 AM    BILIRUBINUR NEGATIVE 10/05/2022 11:20 AM    BLOODU NEGATIVE 10/05/2022 11:20 AM    KETUA NEGATIVE 10/05/2022 11:20 AM     Urine Cultures: No results found for: LABURIN  Blood Cultures: No results found for: BC  No results found for: BLOODCULT2  Organism: No results found for: [facility]          Electronically signed by Lore Glaser MD on 11/18/2022 at 9:33 PM

## 2022-11-19 NOTE — PLAN OF CARE
Problem: Discharge Planning  Goal: Discharge to home or other facility with appropriate resources  Outcome: Progressing   Patient and family involved in plan of care      Problem: Pain  Goal: Verbalizes/displays adequate comfort level or baseline comfort level  Outcome: Progressing   Patient reports pain is manageable with medications and interventions      Problem: Safety - Adult  Goal: Free from fall injury  Outcome: Progressing   Patient voices understanding of fall precautions

## 2022-11-19 NOTE — PROGRESS NOTES
V2.0  [facility] [place] Progress Note      Name:  Mendel Menjivar /Age/Sex: 1950  (71 y.o. male)   MRN & CSN:  [id number] & [id number] Encounter Date/Time: 2022 1:48 PM EST    Location:  [facility]-A PCP: Jordin Soares MD       Hospital Day: 2    Assessment and Plan:   Mendel Menjivar is a 71 y.o. male with pmh of CLL, chronic respiratory failure, COPD, recent history of pneumonia who presents with Sepsis ([address]    Sepsis  Likely in the setting of pneumonia  Of note, patient was recently treated with IV ciprofloxacin and IV Zosyn and completed therapy on 2022. He had ID follow-up on 2022 and patient was advised to continue azithromycin and inhaled tobramycin due to concern of bronchiectasis and chronic Pseudomonas colonization  Patient presented to infusion clinic and was found to have fevers and chills, which was initially thought to be infusion reaction to rituximab, however he had significant leukocytosis, elevated CRP making sepsis/pneumonia more likely. Will continue patient on azithromycin and inhaled tobramycin as per ID recommendation. Respiratory cultures have been sent. Procalcitonin elevated. Continue to trend CRP. Follow blood cultures, respiratory cultures. Acute on chronic hypoxic respiratory failure  Likely due to pneumonia  Continue to wean oxygen as able  Respiratory panel positive for rhinovirus which was also positive during his last admission. History of CLL  Follows with hematology/oncology  Initially he was on ibrutinib until recently, he was noted to have disease progression  2022 he started receiving rituximab but had to be stopped prior to completion due to concerns for drug reaction. Remains on monthly IVIG. Last IVIG on 2022. Type 2 diabetes mellitus  Control Lantus and low-dose sliding scale  Will follow blood sugars. BPH  Continue tamsulosin and finasteride    Protein calorie malnutrition        Diet ADULT DIET;  Regular; 4 carb choices (60 gm/meal); Low Fat/Low Chol/High Fiber/2 gm Na   DVT Prophylaxis [] Lovenox, []  Heparin, [] SCDs, [] Ambulation,  [] Eliquis, [] Xarelto  [] Coumadin   Code Status Limited   Disposition From:   Expected Disposition:   Estimated Date of Discharge:   Patient requires continued admission due to pneumonia   Surrogate Decision Maker/ POA      Subjective:     Chief Complaint: Allergic Reaction       Jessica Dodson is a 71 y.o. male who presents with fevers and cough. Seen and examined in AM.  Per patient, his cough never resolved despite of IV antibiotics. He continues to require oxygen. He is having thick purulent Sputum production. Review of Systems:    Review of Systems    As above     Objective: Intake/Output Summary (Last 24 hours) at 11/19/2022 1348  Last data filed at 11/19/2022 1344  Gross per 24 hour   Intake --   Output 1150 ml   Net -1150 ml        Vitals:   Vitals:    11/19/22 1148   BP: 118/67   Pulse:    Resp:    Temp: 97.4 °F (36.3 °C)   SpO2:        Physical Exam:     General: NAD  Eyes: EOMI  ENT: neck supple  Cardiovascular: Regular rate. Respiratory: b/l ronchi and coarse breathing   Gastrointestinal: Soft, non tender  Genitourinary: no suprapubic tenderness  Musculoskeletal: No edema  Skin: warm, dry  Neuro: Alert. Psych: Mood appropriate.      Medications:   Medications:    vancomycin  1,250 mg IntraVENous Q12H    azithromycin  250 mg Oral Daily    tobramycin (PF)  300 mg Nebulization BID    EPINEPHrine  0.3 mg IntraMUSCular Once    allopurinol  200 mg Oral TID    vitamin C  250 mg Oral BID    atorvastatin  80 mg Oral Daily    ferrous gluconate  324 mg Oral BID WC    finasteride  5 mg Oral Daily    miconazole   Topical BID    pantoprazole  40 mg Oral QAM AC    tamsulosin  0.4 mg Oral Daily    acyclovir  400 mg Oral TID    insulin glargine  30 Units SubCUTAneous Nightly    insulin lispro  0-4 Units SubCUTAneous TID WC    insulin lispro  0-4 Units SubCUTAneous Nightly Infusions:    sodium chloride      dextrose       PRN Meds: sodium chloride flush, 5-40 mL, PRN  sodium chloride, , PRN  acetaminophen, 650 mg, Q6H PRN   Or  acetaminophen, 650 mg, Q6H PRN  polyethylene glycol, 17 g, Daily PRN  ondansetron, 4 mg, Q8H PRN   Or  ondansetron, 4 mg, Q6H PRN  HYDROcodone 5 mg - acetaminophen, 1 tablet, Q6H PRN  glucose, 4 tablet, PRN  dextrose bolus, 125 mL, PRN   Or  dextrose bolus, 250 mL, PRN  glucagon (rDNA), 1 mg, PRN  dextrose, , Continuous PRN        Labs      Recent Results (from the past 24 hour(s))   EKG 12 Lead    Collection Time: 11/18/22  1:58 PM   Result Value Ref Range    Ventricular Rate 119 BPM    Atrial Rate 119 BPM    P-R Interval 124 ms    QRS Duration 86 ms    Q-T Interval 312 ms    QTc Calculation (Bazett) 438 ms    P Axis 28 degrees    R Axis 39 degrees    T Axis 71 degrees    Diagnosis       Sinus tachycardia  Otherwise normal ECG  When compared with ECG of 22-OCT-2022 08:42,  Nonspecific T wave abnormality now evident in Lateral leads  Confirmed by HARI Palma ([id number]) on 11/18/2022 5:45:58 PM     Blood Gas, Venous    Collection Time: 11/18/22  2:15 PM   Result Value Ref Range    pH, Gonzales 7.38 7.32 - 7.42    pCO2, Gonzales 37 (L) 38 - 52 mmHG    pO2, Gonzales 83 (H) 28 - 48 mmHG    Base Excess 3 0 - 3.3    HCO3, Venous 21.9 19 - 25 MMOL/L    O2 Sat, Gonzales 91.7 (H) 50 - 70 %    Comment VBG    CBC with Auto Differential    Collection Time: 11/18/22  2:25 PM   Result Value Ref Range    WBC 38.8 (HH) 4.0 - 10.5 K/CU MM    RBC 3.28 (L) 4.6 - 6.2 M/CU MM    Hemoglobin 9.2 (L) 13.5 - 18.0 GM/DL    Hematocrit 29.6 (L) 42 - 52 %    MCV 90.2 78 - 100 FL    MCH 28.0 27 - 31 PG    MCHC 31.1 (L) 32.0 - 36.0 %    RDW 26.0 (H) 11.7 - 14.9 %    Platelets 41 (L) 100 - 440 K/CU MM    MPV 9.8 7.5 - 11.1 FL    Bands Relative 1 (L) 5 - 11 %    Segs Relative 9.0 (L) 36 - 66 %    Eosinophils % 1.0 0 - 3 %    Lymphocytes % 88.0 (H) 24 - 44 %    Monocytes % 1.0 0 - 4 %    Bands Absolute 0.39 K/CU MM    Segs Absolute 3.5 K/CU MM    Eosinophils Absolute 0.4 K/CU MM    Lymphocytes Absolute 34.1 K/CU MM    Monocytes Absolute 0.4 K/CU MM    Differential Type MANUAL DIFFERENTIAL     Anisocytosis 1+     Polychromasia 1+     Smudge Cells PRESENT    Comprehensive Metabolic Panel    Collection Time: 11/18/22  2:25 PM   Result Value Ref Range    Sodium 134 (L) 135 - 145 MMOL/L    Potassium 5.1 3.5 - 5.1 MMOL/L    Chloride 99 99 - 110 mMol/L    CO2 21 21 - 32 MMOL/L    BUN 42 (H) 6 - 23 MG/DL    Creatinine 0.9 0.9 - 1.3 MG/DL    Est, Glom Filt Rate >60 >60 mL/min/1.73m2    Glucose 159 (H) 70 - 99 MG/DL    Calcium 8.6 8.3 - 10.6 MG/DL    Albumin 3.2 (L) 3.4 - 5.0 GM/DL    Total Protein 5.4 (L) 6.4 - 8.2 GM/DL    Total Bilirubin 0.6 0.0 - 1.0 MG/DL    ALT 18 10 - 40 U/L    AST 35 15 - 37 IU/L    Alkaline Phosphatase 159 (H) 40 - 129 IU/L    Anion Gap 14 4 - 16   Lactate, Sepsis    Collection Time: 11/18/22  2:25 PM   Result Value Ref Range    Lactic Acid, Sepsis 1.5 0.5 - 1.9 mMOL/L   Troponin    Collection Time: 11/18/22  2:25 PM   Result Value Ref Range    Troponin T 0.016 (H) <0.01 NG/ML   Respiratory Panel, Molecular, with COVID-19 (Restricted: peds pts or suitable admitted adults)    Collection Time: 11/18/22  4:03 PM    Specimen: Nasopharyngeal   Result Value Ref Range    Adenovirus Detection by PCR NOT DETECTED NOT DETECTED    Coronavirus 229E PCR NOT DETECTED NOT DETECTED    Coronavirus HKU1 PCR NOT DETECTED NOT DETECTED    Coronavirus NL63 PCR NOT DETECTED NOT DETECTED    Coronavirus OC43 PCR NOT DETECTED NOT DETECTED    SARS-CoV-2 NOT DETECTED NOT DETECTED    Human Metapneumovirus PCR NOT DETECTED NOT DETECTED    Rhinovirus Enterovirus PCR DETECTED BY PCR (A) NOT DETECTED    Influenza A by PCR NOT DETECTED NOT DETECTED    Influenza A H1 Pandemic PCR NOT DETECTED NOT DETECTED    Influenza A H1 (2009) PCR NOT DETECTED NOT DETECTED    Influenza A H3 PCR NOT DETECTED NOT DETECTED    Influenza B by PCR NOT DETECTED NOT DETECTED    Parainfluenza 1 PCR NOT DETECTED NOT DETECTED    Parainfluenza 2 PCR NOT DETECTED NOT DETECTED    Parainfluenza 3 PCR NOT DETECTED NOT DETECTED    Parainfluenza 4 PCR NOT DETECTED NOT DETECTED    RSV PCR NOT DETECTED NOT DETECTED    Bordetella parapertussis by PCR NOT DETECTED NOT DETECTED    B Pertussis by PCR NOT DETECTED NOT DETECTED    Chlamydophila Pneumonia PCR NOT DETECTED NOT DETECTED    Mycoplasma pneumo by PCR NOT DETECTED NOT DETECTED   Lactate, Sepsis    Collection Time: 11/18/22  4:31 PM   Result Value Ref Range    Lactic Acid, Sepsis 0.7 0.5 - 1.9 mMOL/L   Legionella antigen, urine    Collection Time: 11/18/22 10:01 PM    Specimen: Urine   Result Value Ref Range    Legionella Urinary Ag NEGATIVE NEGATIVE   Strep Pneumoniae Antigen    Collection Time: 11/18/22 10:01 PM    Specimen: CSF   Result Value Ref Range    Strep pneumo Ag URINE NEGATIVE    POCT Glucose    Collection Time: 11/19/22 12:56 AM   Result Value Ref Range    POC Glucose 157 (H) 70 - 99 MG/DL   Basic Metabolic Panel w/ Reflex to MG    Collection Time: 11/19/22  4:40 AM   Result Value Ref Range    Sodium 135 135 - 145 MMOL/L    Potassium 4.1 3.5 - 5.1 MMOL/L    Chloride 102 99 - 110 mMol/L    CO2 23 21 - 32 MMOL/L    Anion Gap 10 4 - 16    BUN 33 (H) 6 - 23 MG/DL    Creatinine 0.6 (L) 0.9 - 1.3 MG/DL    Est, Glom Filt Rate >60 >60 mL/min/1.73m2    Glucose 159 (H) 70 - 99 MG/DL    Calcium 8.3 8.3 - 10.6 MG/DL   CBC with Auto Differential    Collection Time: 11/19/22  4:40 AM   Result Value Ref Range    WBC 20.4 (H) 4.0 - 10.5 K/CU MM    RBC 3.00 (L) 4.6 - 6.2 M/CU MM    Hemoglobin 8.3 (L) 13.5 - 18.0 GM/DL    Hematocrit 27.1 (L) 42 - 52 %    MCV 90.3 78 - 100 FL    MCH 27.7 27 - 31 PG    MCHC 30.6 (L) 32.0 - 36.0 %    RDW 26.5 (H) 11.7 - 14.9 %    Platelets 43 (L) 296 - 440 K/CU MM    MPV 9.1 7.5 - 11.1 FL    Myelocyte Percent 1 (H) 0.0 %    Bands Relative 2 (L) 5 - 11 %    Segs Relative 19.0 (L) 36 - 66 % Basophils % 1.0 0 - 1 %    Lymphocytes % 77.0 (H) 24 - 44 %    Myelocytes Absolute 0.20 K/CU MM    Bands Absolute 0.41 K/CU MM    Segs Absolute 3.9 K/CU MM    Basophils Absolute 0.2 K/CU MM    Lymphocytes Absolute 15.7 K/CU MM    Differential Type MANUAL DIFFERENTIAL     Anisocytosis 1+     Polychromasia 1+     Ovalocytes 1+     WBC Morphology SMUDGE CELLS    POCT Glucose    Collection Time: 11/19/22  7:56 AM   Result Value Ref Range    POC Glucose 150 (H) 70 - 99 MG/DL   Procalcitonin    Collection Time: 11/19/22  9:05 AM   Result Value Ref Range    Procalcitonin 5.50    High sensitivity CRP    Collection Time: 11/19/22  9:05 AM   Result Value Ref Range    CRP, High Sensitivity 105.7 (H) 0.0 - 5.0 mg/L   POCT Glucose    Collection Time: 11/19/22 10:52 AM   Result Value Ref Range    POC Glucose 227 (H) 70 - 99 MG/DL        Imaging/Diagnostics Last 24 Hours   XR CHEST PORTABLE    Result Date: 11/18/2022  EXAMINATION: ONE XRAY VIEW OF THE CHEST 11/18/2022 2:17 pm COMPARISON: 10/28/2022 at 1214 hours HISTORY: ORDERING SYSTEM PROVIDED HISTORY: resp distress TECHNOLOGIST PROVIDED HISTORY: Reason for exam:->resp distress Reason for Exam: resp distress Additional signs and symptoms: NA Relevant Medical/Surgical History: CAD, DIABETES FINDINGS: Diffuse bronchial pulmonary marking prominence worst in the infrahilar/basilar regions consistent with bibasilar pneumonia/pneumonitis accentuated by underlying chronic lung disease. Noncardiogenic pulmonary edema may also contribute to this appearance. Otherwise, lung fields are clear. No detectable pleural effusion, pneumothorax, pulmonary edema/vascular congestion, cardiomegaly or mediastinal widening. Infiltrative changes of perihilar and basilar regions most consistent with atypical pneumonia/pneumonitis accentuated by underlying advanced chronic lung disease.   Otherwise, radiographically nonacute portable chest.       Electronically signed by Betty Lea MD on 11/19/2022 at 1:48 PM

## 2022-11-19 NOTE — ED NOTES
ED TO INPATIENT SBAR HANDOFF    Patient Name: Cherie Rodriguez   :  1950  71 y.o. MRN:  [id number]  Preferred Name  Fuad Thomas  ED Room #:  TR02/02TR-02  Family/Caregiver Present yes   Restraints no   Sitter no   Sepsis Risk Score Sepsis Risk Score: 6.42    Situation  Code Status: Full Code [id number]-A. Allergies: Patient has no known allergies. Weight: Patient Vitals for the past 96 hrs (Last 3 readings):   Weight   22 1343 155 lb (70.3 kg)     Arrived from: home  Chief Complaint:   Chief Complaint   Patient presents with    Allergic Reaction     Hospital Problem/Diagnosis:  Principal Problem:    Sepsis ([address]  Active Problems:    Pneumonia due to organism    Rhinovirus infection  Resolved Problems:    * No resolved hospital problems. *    Imaging:   XR CHEST PORTABLE   Final Result   Infiltrative changes of perihilar and basilar regions most consistent with   atypical pneumonia/pneumonitis accentuated by underlying advanced chronic   lung disease.   Otherwise, radiographically nonacute portable chest.           Abnormal labs:   Abnormal Labs Reviewed   RESPIRATORY PANEL, MOLECULAR, WITH COVID-19 - Abnormal; Notable for the following components:       Result Value    Rhinovirus Enterovirus PCR DETECTED BY PCR (*)     All other components within normal limits   CBC WITH AUTO DIFFERENTIAL - Abnormal; Notable for the following components:    WBC 38.8 (*)     RBC 3.28 (*)     Hemoglobin 9.2 (*)     Hematocrit 29.6 (*)     MCHC 31.1 (*)     RDW 26.0 (*)     Platelets 41 (*)     Bands Relative 1 (*)     Segs Relative 9.0 (*)     Lymphocytes % 88.0 (*)     All other components within normal limits   COMPREHENSIVE METABOLIC PANEL - Abnormal; Notable for the following components:    Sodium 134 (*)     BUN 42 (*)     Glucose 159 (*)     Albumin 3.2 (*)     Total Protein 5.4 (*)     Alkaline Phosphatase 159 (*)     All other components within normal limits   BLOOD GAS, VENOUS - Abnormal; Notable for the following components:    pCO2, Gonzales 37 (*)     pO2, Gonzales 83 (*)     O2 Sat, Gonzales 91.7 (*)     All other components within normal limits   TROPONIN - Abnormal; Notable for the following components:    Troponin T 0.016 (*)     All other components within normal limits     Critical values: yes     Abnormal Assessment Findings: WBC 38    Background  History:   Past Medical History:   Diagnosis Date    Arthritis     knees, Rt hand/wrist    CAD (coronary artery disease)     Cancer (HCC)     CLL--Sees Dr Melissa Erickson    Diabetes mellitus [facility])     History of blood transfusion     no reaction    Hx of motion sickness     Hyperlipidemia     MDRO (multiple drug resistant organisms) resistance     abscess on buttock 10yrs ago    Migraine     last one summer 2016    Pneumonia 2016    Wears dentures     full upper--lower dentures    Wears glasses        Assessment    Vitals/MEWS: MEWS Score: 1  Level of Consciousness: Alert (0)   Vitals:    11/18/22 1852 11/18/22 1912 11/18/22 2034 11/18/22 2150   BP: 99/68 93/69 101/60 104/76   Pulse: 86 81 71 72   Resp: 16 14 14 16   Temp:    97.5 °F (36.4 °C)   TempSrc:    Oral   SpO2: 99% 99% 95% 95%   Weight:       Height:         FiO2 (%): placed on CPAP and increased work of breathing  O2 Flow Rate: O2 Device: Nasal cannula O2 Flow Rate (L/min): 15 L/min  Cardiac Rhythm:    Pain Assessment:  [] Verbal [] [place] Scale  Pain Scale: Pain Assessment  Pain Assessment: 0-10  Pain Level: 7  Last documented pain score (0-10 scale) Pain Level: 7  Last documented pain medication administered:   Mental Status: alert  NIH Score:    C-SSRS: Risk of Suicide: No Risk  Bedside swallow:    Sue Coma Scale (GCS): Sue Coma Scale  Eye Opening: Spontaneous  Best Verbal Response: Oriented  Best Motor Response: Obeys commands  Sue Coma Scale Score: 15  Active LDA's:   Peripheral IV 11/18/22 Distal;Left Forearm (Active)     PO Status: Regular  Pertinent or High Risk Medications/Drips: no   If Yes, please provide

## 2022-11-19 NOTE — CONSULTS
Physical Therapy Progress Note     Visit Count: 6   Plan of Care Dates: Initial: 6/12/2018 Through: 7/24/2018  Insurance Information: THERAPY BENEFITS:  PAYOR: [employer]  VISIT LIMIT: MEDICAL NECESSITY   AUTHORIZATION NEEDED: NO  NOTES: MEDICAL NECESSITY WILL BE REVIEWED AFTER 25 VISITS     DEDUCTIBLE: $1000.00        MET: $351.90  OUT OF POCKET: $1000.00        MET: $351.90  COINSURANCE: 0%  COPAY: $0  REF #: SHAYNA [id number]  Next Referring Provider Visit: Nothing scheduled yet     Referred by: Kelvin Lane DPM  Medical Diagnosis (from order): M79.671 Right foot pain  M72.2 Plantar fasciitis, right  M21.6X1 Gastrocnemius equinus, right   Treatment Diagnosis: Ankle/Foot Symptoms with Pain, Impaired Posture, Impaired Range of Motion, Impaired Motor Function/Muscle Performance, Impaired Gait/Locomotion Deficits and Impaired Balance  Insurance: 1. B[employer]EF[employer]  2. N/A     Date of onset/injury: about 2 months ago  Diagnosis Precautions: none  Chart reviewed: Relevant co-morbidities, allergies, tests and medications: HTN, diabetes mellitus        SUBJECTIVE   Patient reports that she is not having excruciating pain getting into or out of the shower now. Patient also commented that she has no complaints of arch pain today. Patient did state that the sides of the heel and posterior heel are the most tender spots.    Current Pain: 6/10.      Functional Change: No change yet.    OBJECTIVE     Palpation: Moderate tenderness present over the posterior heel and medial/lateral sides of the heel.Decreased soft tissue restrictions and tenderness over the arch since the last and initial visits.    Gait Analysis:  Not assessed     Range of Motion (degrees) Norm Left Right   Ankle                          Date   Initial Initial   Dorsiflexion 20   +8   Plantar Flexion 50       Inversion 35       Eversion 15       First MTP Dorsiflexion 60       First MTP Plantar Flexion 45       standard  [facility]  consulted by Dr. Hattie Hughes for monitoring and adjustment. Indication for treatment: Vancomycin indication: CAP with risk factors  Goal trough: Trough Goal: 15-20 mcg/mL  AUC/MARTHA: 400-600    Risk Factors for MRSA Identified:   Hospitalization within the past 90 days    Pertinent Laboratory Values:   Temp Readings from Last 3 Encounters:   11/19/22 98.1 °F (36.7 °C) (Oral)   11/18/22 (!) 101.1 °F (38.4 °C) (Temporal)   11/17/22 97.1 °F (36.2 °C) (Infrared)     Recent Labs     11/16/22  1401 11/18/22  1425 11/19/22  0440   .2* 38.8* 20.4*     Recent Labs     11/16/22  1401 11/18/22  1425 11/19/22  0440   BUN 28* 42* 33*   CREATININE 0.7* 0.9 0.6*     Estimated Creatinine Clearance: 117 mL/min (A) (based on SCr of 0.6 mg/dL (L)). Intake/Output Summary (Last 24 hours) at 11/19/2022 0903  Last data filed at 11/19/2022 0708  Gross per 24 hour   Intake --   Output 650 ml   Net -650 ml       Pertinent Cultures:   Date    Source    Results  11/18   Blood    Ordered  11/18   Sputum/Respiratory  Ordered  11/18   MRSA Nasal   In process       Vancomycin level:   TROUGH:  No results for input(s): VANCOTROUGH in the last 72 hours. RANDOM:  No results for input(s): VANCORANDOM in the last 72 hours. Assessment:  HPI: Kannan Nagy is a 71 y.o. male with a history of CLL on Rituxan and IVIG, hypogammaglobulinemia, lung nodule, COPD, chronic hypoxic respiratory failure on 3l per nc, DM type II, Hyperlipidemia and BPH.  who presented to [facility] 11/18/2022 with increased SOB in the setting of sepsis likely 2/2 rhinovirus superimposed with atypical pneumonia and CLL  SCr, BUN, and urine output: SCR improved down close to baseline, UOP ok  Day(s) of therapy: 1 of 7  Vancomycin concentration:   11/19 - random @06:00    Plan:  The patient received vancomycin 1750mg ivpb x1 dose yesterday evening  Start vancomycin 1250mg ivpb q12h this morning for a predicted AUC of 565 at steady state.  Check the vanco level tomorrow am  Pharmacy will continue to monitor patient and adjust therapy as indicated    Miguelu 3 11/20 @06:00    Thank you for the consult. Sheryl Zavala [facility]  11/19/2022 9:03 AM testing positions unless otherwise noted; Key: ranges are reported in active range of motion unless noted as AA=active assistive or P=passive range of motion, MTP=metatarsophalangeal, * denotes pain   Comments: All motions within functional/normal limits except as noted.     Strength (out of 5) Left Right   Ankle                            Date Initial Initial   Dorsiflexion  5 5   Plantar Flexion  5 5   Inversion       Eversion       First MTP Dorsiflexion       First MTP Plantar Flexion       Hip extension  5 5   Glut med 5 5   standard testing positions unless otherwise noted, key: MTP=metatarsophalangeal,* denotes pain  Comments: Gross muscle strength within functional/normal limits except as noted.     Muscle Flexibility:  Gastrocnemius - Left: minimal tightness, Right: minimal tightness  Hamstring - Left: minimal tightness, Right: minimal tightness         Palpation:  Tender to palpation of right central heel and medial and lateral heel areas.\"Not as sore as the previous treatment.\"     Special Tests:  NT     Functional Tests:  Heel Raises: 10 x but mild soreness after finishing exercise.        Outcome Measures:   Lower Extremity Functional Scale: LEFS Calculated Total: 56 (0=extreme difficulty; 80=no difficulty) Current=62        Treatment     Manual Therapy:   Continued STM initially with IASTM over the central and medial/lateral areas of the heel,arch and gastroc/soleus muscles to decrease soft tissue restrictions,pain and improve ankle mobility.Finished with manual STM over same areas to further reduce symptoms. Also had patient dorsi flex her ankle to increase mobility and decrease gastroc/soleus restrictions. Patient was prone for treatment.       Ultrasound (40559):  Patient has been made aware of potential contraindications and possible risks associated with the use of modality and has agreed.  Location: right calcaneous  Position: prone  Duration: 8 minutes Duty Cycle: 100% duty cycle  Frequency: 1  Mhz  Intensity: 1.5 W/cm2   Results: decreased pain; no adverse reaction to treatment     Exercises: Held today-patient will do at home  Wall plantar fascia stretch-2x30 sec  Angled board gastroc stretch-2x30 sec  Fitter first rocker board-PF/DF-20x  Step hamstring stretch-2x30 sec  Supine hamstring stretch in bed-[id number]      Current Home Program (not performed this date except as noted above):   Heel raises/drops  Gastrocnemius stretch  Hamstring stretch    ASSESSMENT   Patient has demonstrated improvements with her ankle mobility,decrease restrictions and pain symptoms. Held TE's on this date but did spend extra time performing tests and measures to assess patient progress and only reviewed her HEP stretches. Patient will continue with her HEP while on vacation for the next week.    Pain after treatment: Minimal /10   Result of above outlined education: Verbalizes understanding, Demonstrates understanding and Needs reinforcement    Goals:       See initial eval     PLAN   Continue US,IASTM/MT,stretching TE's and progress to TB ankle and hip strengthening when patient RTC.    THERAPY DAILY BILLING   Primary Insurance:  [employer]  Secondary Insurance: N/A    Evaluation Procedures:  No evaluation codes were used on this date of service    Timed Procedures:  Manual Therapy, 25 minutes  Therapeutic Exercise, 10 minutes  Ultrasound, 8 minutes    Untimed Procedures:  No untimed codes were used on this date of service    Total Treatment Time: 43   minutes

## 2022-11-20 LAB
ANION GAP SERPL CALCULATED.3IONS-SCNC: 6 MMOL/L (ref 4–16)
ANISOCYTOSIS: ABNORMAL
BANDED NEUTROPHILS ABSOLUTE COUNT: 0.51 K/CU MM
BANDED NEUTROPHILS RELATIVE PERCENT: 2 % (ref 5–11)
BUN BLDV-MCNC: 18 MG/DL (ref 6–23)
CALCIUM SERPL-MCNC: 8 MG/DL (ref 8.3–10.6)
CHLORIDE BLD-SCNC: 101 MMOL/L (ref 99–110)
CO2: 27 MMOL/L (ref 21–32)
CREAT SERPL-MCNC: 0.5 MG/DL (ref 0.9–1.3)
DIFFERENTIAL TYPE: ABNORMAL
DOSE AMOUNT: NORMAL
DOSE TIME: NORMAL
GFR SERPL CREATININE-BSD FRML MDRD: >60 ML/MIN/1.73M2
GLUCOSE BLD-MCNC: 124 MG/DL (ref 70–99)
GLUCOSE BLD-MCNC: 125 MG/DL (ref 70–99)
GLUCOSE BLD-MCNC: 166 MG/DL (ref 70–99)
GLUCOSE BLD-MCNC: 232 MG/DL (ref 70–99)
GLUCOSE BLD-MCNC: 239 MG/DL (ref 70–99)
HCT VFR BLD CALC: 27.7 % (ref 42–52)
HEMOGLOBIN: 8.4 GM/DL (ref 13.5–18)
HIGH SENSITIVE C-REACTIVE PROTEIN: 69.3 MG/L (ref 0–5)
LYMPHOCYTES ABSOLUTE: 22.6 K/CU MM
LYMPHOCYTES RELATIVE PERCENT: 88 % (ref 24–44)
MCH RBC QN AUTO: 27.5 PG (ref 27–31)
MCHC RBC AUTO-ENTMCNC: 30.3 % (ref 32–36)
MCV RBC AUTO: 90.5 FL (ref 78–100)
MONOCYTES ABSOLUTE: 0.3 K/CU MM
MONOCYTES RELATIVE PERCENT: 1 % (ref 0–4)
OVALOCYTES: ABNORMAL
PDW BLD-RTO: 25.8 % (ref 11.7–14.9)
PLATELET # BLD: 43 K/CU MM (ref 140–440)
PMV BLD AUTO: 10.8 FL (ref 7.5–11.1)
POLYCHROMASIA: ABNORMAL
POTASSIUM SERPL-SCNC: 4.1 MMOL/L (ref 3.5–5.1)
PROCALCITONIN: 1.97
RBC # BLD: 3.06 M/CU MM (ref 4.6–6.2)
SEGMENTED NEUTROPHILS ABSOLUTE COUNT: 2.3 K/CU MM
SEGMENTED NEUTROPHILS RELATIVE PERCENT: 9 % (ref 36–66)
SMUDGE CELLS: PRESENT
SODIUM BLD-SCNC: 134 MMOL/L (ref 135–145)
VANCOMYCIN RANDOM: 7.9 UG/ML
WBC # BLD: 25.7 K/CU MM (ref 4–10.5)
WBC # BLD: ABNORMAL 10*3/UL

## 2022-11-20 PROCEDURE — 6360000002 HC RX W HCPCS: Performed by: INTERNAL MEDICINE

## 2022-11-20 PROCEDURE — 85007 BL SMEAR W/DIFF WBC COUNT: CPT

## 2022-11-20 PROCEDURE — 87205 SMEAR GRAM STAIN: CPT

## 2022-11-20 PROCEDURE — 6370000000 HC RX 637 (ALT 250 FOR IP): Performed by: INTERNAL MEDICINE

## 2022-11-20 PROCEDURE — 80202 ASSAY OF VANCOMYCIN: CPT

## 2022-11-20 PROCEDURE — 2700000000 HC OXYGEN THERAPY PER DAY

## 2022-11-20 PROCEDURE — 85027 COMPLETE CBC AUTOMATED: CPT

## 2022-11-20 PROCEDURE — 84145 PROCALCITONIN (PCT): CPT

## 2022-11-20 PROCEDURE — 87077 CULTURE AEROBIC IDENTIFY: CPT

## 2022-11-20 PROCEDURE — 86141 C-REACTIVE PROTEIN HS: CPT

## 2022-11-20 PROCEDURE — 82962 GLUCOSE BLOOD TEST: CPT

## 2022-11-20 PROCEDURE — 87070 CULTURE OTHR SPECIMN AEROBIC: CPT

## 2022-11-20 PROCEDURE — 94761 N-INVAS EAR/PLS OXIMETRY MLT: CPT

## 2022-11-20 PROCEDURE — 6370000000 HC RX 637 (ALT 250 FOR IP): Performed by: STUDENT IN AN ORGANIZED HEALTH CARE EDUCATION/TRAINING PROGRAM

## 2022-11-20 PROCEDURE — 94640 AIRWAY INHALATION TREATMENT: CPT

## 2022-11-20 PROCEDURE — 36591 DRAW BLOOD OFF VENOUS DEVICE: CPT

## 2022-11-20 PROCEDURE — 80048 BASIC METABOLIC PNL TOTAL CA: CPT

## 2022-11-20 PROCEDURE — 2140000000 HC CCU INTERMEDIATE R&B

## 2022-11-20 RX ORDER — LACTOSE-REDUCED FOOD
1 LIQUID (ML) ORAL
Status: DISCONTINUED | OUTPATIENT
Start: 2022-11-20 | End: 2022-11-20

## 2022-11-20 RX ADMIN — IPRATROPIUM BROMIDE AND ALBUTEROL SULFATE 1 AMPULE: .5; 2.5 SOLUTION RESPIRATORY (INHALATION) at 07:21

## 2022-11-20 RX ADMIN — ACETAMINOPHEN 650 MG: 325 TABLET ORAL at 21:03

## 2022-11-20 RX ADMIN — Medication 324 MG: at 09:34

## 2022-11-20 RX ADMIN — PANTOPRAZOLE SODIUM 40 MG: 40 TABLET, DELAYED RELEASE ORAL at 09:29

## 2022-11-20 RX ADMIN — ALLOPURINOL 200 MG: 100 TABLET ORAL at 13:55

## 2022-11-20 RX ADMIN — Medication 324 MG: at 17:43

## 2022-11-20 RX ADMIN — BUDESONIDE 1000 MCG: 0.5 INHALANT RESPIRATORY (INHALATION) at 07:21

## 2022-11-20 RX ADMIN — INSULIN LISPRO 1 UNITS: 100 INJECTION, SOLUTION INTRAVENOUS; SUBCUTANEOUS at 17:45

## 2022-11-20 RX ADMIN — ALLOPURINOL 200 MG: 100 TABLET ORAL at 20:48

## 2022-11-20 RX ADMIN — FINASTERIDE 5 MG: 5 TABLET, FILM COATED ORAL at 09:29

## 2022-11-20 RX ADMIN — ALLOPURINOL 200 MG: 100 TABLET ORAL at 09:29

## 2022-11-20 RX ADMIN — IPRATROPIUM BROMIDE AND ALBUTEROL SULFATE 1 AMPULE: .5; 2.5 SOLUTION RESPIRATORY (INHALATION) at 11:07

## 2022-11-20 RX ADMIN — HYDROCODONE BITARTRATE AND ACETAMINOPHEN 1 TABLET: 5; 325 TABLET ORAL at 09:38

## 2022-11-20 RX ADMIN — ACYCLOVIR 400 MG: 200 CAPSULE ORAL at 09:38

## 2022-11-20 RX ADMIN — ACYCLOVIR 400 MG: 200 CAPSULE ORAL at 13:55

## 2022-11-20 RX ADMIN — TOBRAMYCIN 300 MG: 300 SOLUTION RESPIRATORY (INHALATION) at 07:21

## 2022-11-20 RX ADMIN — OXYCODONE HYDROCHLORIDE AND ACETAMINOPHEN 250 MG: 500 TABLET ORAL at 20:49

## 2022-11-20 RX ADMIN — AZITHROMYCIN MONOHYDRATE 250 MG: 250 TABLET ORAL at 09:30

## 2022-11-20 RX ADMIN — OXYCODONE HYDROCHLORIDE AND ACETAMINOPHEN 250 MG: 500 TABLET ORAL at 09:30

## 2022-11-20 RX ADMIN — GUAIFENESIN 600 MG: 600 TABLET, EXTENDED RELEASE ORAL at 20:49

## 2022-11-20 RX ADMIN — ACYCLOVIR 400 MG: 200 CAPSULE ORAL at 20:50

## 2022-11-20 RX ADMIN — IPRATROPIUM BROMIDE AND ALBUTEROL SULFATE 1 AMPULE: .5; 2.5 SOLUTION RESPIRATORY (INHALATION) at 15:26

## 2022-11-20 RX ADMIN — INSULIN GLARGINE 30 UNITS: 100 INJECTION, SOLUTION SUBCUTANEOUS at 20:52

## 2022-11-20 RX ADMIN — GUAIFENESIN 600 MG: 600 TABLET, EXTENDED RELEASE ORAL at 09:29

## 2022-11-20 RX ADMIN — ATORVASTATIN CALCIUM 80 MG: 40 TABLET, FILM COATED ORAL at 09:30

## 2022-11-20 RX ADMIN — TAMSULOSIN HYDROCHLORIDE 0.4 MG: 0.4 CAPSULE ORAL at 09:29

## 2022-11-20 ASSESSMENT — PAIN - FUNCTIONAL ASSESSMENT: PAIN_FUNCTIONAL_ASSESSMENT: ACTIVITIES ARE NOT PREVENTED

## 2022-11-20 ASSESSMENT — PAIN SCALES - WONG BAKER: WONGBAKER_NUMERICALRESPONSE: 0

## 2022-11-20 ASSESSMENT — PAIN SCALES - GENERAL
PAINLEVEL_OUTOF10: 6
PAINLEVEL_OUTOF10: 0

## 2022-11-20 ASSESSMENT — PAIN DESCRIPTION - LOCATION: LOCATION: GENERALIZED

## 2022-11-20 ASSESSMENT — PAIN DESCRIPTION - DESCRIPTORS: DESCRIPTORS: ACHING;THROBBING

## 2022-11-20 NOTE — CONSULTS
Comprehensive Nutrition Assessment    Type and Reason for Visit:  Initial, Consult, Positive Nutrition Screen (General Nutrition mgt/ Other Reasons: PCM)    Nutrition Recommendations/Plan:   Start Standard High Calorie/High Protein oral nutrition supplement BID   Liberalize to Carb Control 5 diet   Consider assist feeding/meal set up PRN   Encourage adequate hydration   Document all PO intakes in I/o      Malnutrition Assessment:  Malnutrition Status:  Severe malnutrition (11/20/22 1155)    Context:  Chronic Illness     Findings of the 6 clinical characteristics of malnutrition:  Energy Intake:  Unable to assess (Predicted)  Weight Loss:  Greater than 10% over 6 months (19% in 5 months)     Body Fat Loss:  Severe body fat loss Orbital, Buccal region   Muscle Mass Loss:  Severe muscle mass loss Calf (gastrocnemius), Thigh (quadraceps), Clavicles (pectoralis & deltoids), Temples (temporalis)  Fluid Accumulation:  Unable to assess     Strength:  Not Performed    Nutrition Assessment:    Admitted with increased SOB, in the setting of rhinovirus, superimposed with sepsis s/s atypical pneumonia and CLL. Receiving IVIG for CLL. Hx COPD, DMII. Pt was on cardiac, carb controlled diet. Diet ambassador informed RD of pt upset over diet order. Will d/c cardiac restrictions and add oral nutrition supplements as pt desires. Chart review indicates up to significant (19%) weight loss over 5 months. Limited visual exam performed as pt asleep at visit, severe wasting noted. Pt meets criteria for malnutrition. Follow as high nutrition risk. Nutrition Related Findings:    A1c 6.9, POCT 166, 124, 187, Na 134 Wound Type:  (wounds on sacrum and ankle)       Current Nutrition Intake & Therapies:    Average Meal Intake: Unable to assess  Average Supplements Intake: None Ordered  ADULT DIET;  Regular; 5 carb choices (75 gm/meal)  ADULT ORAL NUTRITION SUPPLEMENT; Lunch, Dinner; Standard High Calorie/High Protein Oral Supplement    Anthropometric Measures:  Height: 6' (182.9 cm)  Ideal Body Weight (IBW): 178 lbs (81 kg)    Admission Body Weight: 161 lb 2.5 oz (73.1 kg)  Current Body Weight: 161 lb 2.5 oz (73.1 kg), 90.5 % IBW. Weight Source: Bed Scale  Current BMI (kg/m2): 21.9  Usual Body Weight: 199 lb (90.3 kg) (06/2022)  % Weight Change (Calculated): -19  Weight Adjustment For: No Adjustment                 BMI Categories: Underweight (BMI less than 22) age over 72    Estimated Daily Nutrient Needs:  Energy Requirements Based On: Kcal/kg  Weight Used for Energy Requirements: Current  Energy (kcal/day): 4604-9357 (30-35 kcals/kg)  Weight Used for Protein Requirements: Ideal  Protein (g/day):  (1.2-1.5 g/kg IBW)  Method Used for Fluid Requirements: 1 ml/kcal  Fluid (ml/day): 2200    Nutrition Diagnosis:   Severe malnutrition, In context of chronic illness related to catabolic illness, inadequate protein-energy intake as evidenced by weight loss greater than or equal to 10% in 6 months, severe muscle loss, severe loss of subcutaneous fat (19% in 5 months)    Nutrition Interventions:   Food and/or Nutrient Delivery: Modify Current Diet, Start Oral Nutrition Supplement  Nutrition Education/Counseling: Education initiated  Coordination of Nutrition Care: Continue to monitor while inpatient, Coordination of Care       Goals:  Goals: Meet at least 75% of estimated needs       Nutrition Monitoring and Evaluation:   Behavioral-Environmental Outcomes: None Identified  Food/Nutrient Intake Outcomes: Food and Nutrient Intake, Supplement Intake  Physical Signs/Symptoms Outcomes: Biochemical Data, Meal Time Behavior, Weight, Nutrition Focused Physical Findings    Discharge Planning:     Too soon to determine     Jonathan Hendrix RD, LD  Contact: [phone]

## 2022-11-20 NOTE — PROGRESS NOTES
V2.0  [facility]      Name:  Florentino Mcmanus /Age/Sex: 1950  (71 y.o. male)   MRN & CSN:  [id number] & [id number] Encounter Date/Time: 2022 1:48 PM EST    Location:  [address] [place] [address] PCP: Essence Michaels MD       Hospital Day: 3    Assessment and Plan:   Florentino Mcmanus is a 71 y.o. male with pmh of CLL, chronic respiratory failure, COPD, recent history of pneumonia who presents with Sepsis ([address]    Sepsis  Likely in the setting of pneumonia  Of note, patient was recently treated with IV ciprofloxacin and IV Zosyn and completed therapy on 2022. He had ID follow-up on 2022 and patient was advised to continue azithromycin and inhaled tobramycin due to concern of bronchiectasis and chronic Pseudomonas colonization  Patient presented to infusion clinic and was found to have fevers and chills, which was initially thought to be infusion reaction to rituximab, however he had significant leukocytosis, elevated CRP making sepsis/pneumonia more likely. Will continue patient on azithromycin and inhaled tobramycin as per ID recommendation. Respiratory cultures have been sent. CRP and procalcitonin trending down. We will continue above antibiotics. Follow blood cultures, respiratory cultures. Acute on chronic hypoxic respiratory failure  Likely due to pneumonia  Continue to wean oxygen as able  Respiratory panel positive for rhinovirus which was also positive during his last admission. History of CLL  Follows with hematology/oncology  Initially he was on ibrutinib until recently, he was noted to have disease progression  2022 he started receiving rituximab but had to be stopped prior to completion due to concerns for drug reaction. Remains on monthly IVIG. Last IVIG on 2022. Type 2 diabetes mellitus  Control Lantus and low-dose sliding scale  Will follow blood sugars.     BPH  Continue tamsulosin and finasteride    Protein calorie malnutrition        Diet ADULT DIET; Regular; 5 carb choices (75 gm/meal)  ADULT ORAL NUTRITION SUPPLEMENT; Lunch, Dinner; Standard High Calorie/High Protein Oral Supplement   DVT Prophylaxis [] Lovenox, []  Heparin, [] SCDs, [] Ambulation,  [] Eliquis, [] Xarelto  [] Coumadin   Code Status Full Code   Disposition From:   Expected Disposition:   Estimated Date of Discharge:   Patient requires continued admission due to pneumonia   Surrogate Decision Maker/ POA      Subjective:     Chief Complaint: Allergic Reaction       Charan Brunson is a 71 y.o. male who presents with fevers and cough. Seen and examined in AM.      Patient still having to take purulent sputum production, copious amount. Reports he is feeling better today compared to yesterday. No febrile episodes overnight. Slightly tachypneic this morning. Review of Systems:    Review of Systems    As above     Objective: Intake/Output Summary (Last 24 hours) at 11/20/2022 1536  Last data filed at 11/20/2022 1253  Gross per 24 hour   Intake 10 ml   Output 750 ml   Net -740 ml          Vitals:   Vitals:    11/20/22 1529   BP:    Pulse: 90   Resp: 27   Temp:    SpO2: 91%       Physical Exam:     General: NAD  Eyes: EOMI  ENT: neck supple  Cardiovascular: Regular rate. Respiratory: b/l ronchi and coarse breathing   Gastrointestinal: Soft, non tender  Genitourinary: no suprapubic tenderness  Musculoskeletal: No edema  Skin: warm, dry  Neuro: Alert. Psych: Mood appropriate.      Medications:   Medications:    Ensure  1 Can Oral TID WC    azithromycin  250 mg Oral Daily    tobramycin (PF)  300 mg Nebulization BID    guaiFENesin  600 mg Oral BID    ipratropium-albuterol  1 ampule Inhalation Q4H WA    budesonide  1 mg Nebulization BID    EPINEPHrine  0.3 mg IntraMUSCular Once    allopurinol  200 mg Oral TID    vitamin C  250 mg Oral BID    atorvastatin  80 mg Oral Daily    ferrous gluconate  324 mg Oral BID WC    finasteride  5 mg Oral Daily    miconazole   Topical BID pantoprazole  40 mg Oral QAM AC    tamsulosin  0.4 mg Oral Daily    acyclovir  400 mg Oral TID    insulin glargine  30 Units SubCUTAneous Nightly    insulin lispro  0-4 Units SubCUTAneous TID WC    insulin lispro  0-4 Units SubCUTAneous Nightly      Infusions:    sodium chloride      dextrose       PRN Meds: sodium chloride (Inhalant), 4 mL, PRN  sodium chloride flush, 5-40 mL, PRN  sodium chloride, , PRN  acetaminophen, 650 mg, Q6H PRN   Or  acetaminophen, 650 mg, Q6H PRN  polyethylene glycol, 17 g, Daily PRN  ondansetron, 4 mg, Q8H PRN   Or  ondansetron, 4 mg, Q6H PRN  HYDROcodone 5 mg - acetaminophen, 1 tablet, Q6H PRN  glucose, 4 tablet, PRN  dextrose bolus, 125 mL, PRN   Or  dextrose bolus, 250 mL, PRN  glucagon (rDNA), 1 mg, PRN  dextrose, , Continuous PRN      Labs      Recent Results (from the past 24 hour(s))   POCT Glucose    Collection Time: 11/19/22  4:07 PM   Result Value Ref Range    POC Glucose 148 (H) 70 - 99 MG/DL   POCT Glucose    Collection Time: 11/19/22  8:26 PM   Result Value Ref Range    POC Glucose 187 (H) 70 - 99 MG/DL   Basic Metabolic Panel w/ Reflex to MG    Collection Time: 11/20/22  6:25 AM   Result Value Ref Range    Sodium 134 (L) 135 - 145 MMOL/L    Potassium 4.1 3.5 - 5.1 MMOL/L    Chloride 101 99 - 110 mMol/L    CO2 27 21 - 32 MMOL/L    Anion Gap 6 4 - 16    BUN 18 6 - 23 MG/DL    Creatinine 0.5 (L) 0.9 - 1.3 MG/DL    Est, Glom Filt Rate >60 >60 mL/min/1.73m2    Glucose 125 (H) 70 - 99 MG/DL    Calcium 8.0 (L) 8.3 - 10.6 MG/DL   CBC with Auto Differential    Collection Time: 11/20/22  6:25 AM   Result Value Ref Range    WBC 25.7 (H) 4.0 - 10.5 K/CU MM    RBC 3.06 (L) 4.6 - 6.2 M/CU MM    Hemoglobin 8.4 (L) 13.5 - 18.0 GM/DL    Hematocrit 27.7 (L) 42 - 52 %    MCV 90.5 78 - 100 FL    MCH 27.5 27 - 31 PG    MCHC 30.3 (L) 32.0 - 36.0 %    RDW 25.8 (H) 11.7 - 14.9 %    Platelets 43 (L) 282 - 440 K/CU MM    MPV 10.8 7.5 - 11.1 FL    Bands Relative 2 (L) 5 - 11 %    Segs Relative 9.0 (L) 36 - 66 %    Lymphocytes % 88.0 (H) 24 - 44 %    Monocytes % 1.0 0 - 4 %    Bands Absolute 0.51 K/CU MM    Segs Absolute 2.3 K/CU MM    Lymphocytes Absolute 22.6 K/CU MM    Monocytes Absolute 0.3 K/CU MM    Differential Type MANUAL DIFFERENTIAL     Anisocytosis 2+     Polychromasia 1+     Ovalocytes 1+     Smudge Cells PRESENT     WBC Morphology ATYPICAL LYMPHS WITH VISIBLE NUCLEOLI PRESENT    Vancomycin Level, Random    Collection Time: 11/20/22  6:25 AM   Result Value Ref Range    Vancomycin Rm 7.9 UG/ML    DOSE AMOUNT DOSE AMT. GIVEN - 1250mg     DOSE TIME DOSE TIME GIVEN - 1000    POCT Glucose    Collection Time: 11/20/22  7:48 AM   Result Value Ref Range    POC Glucose 124 (H) 70 - 99 MG/DL   High sensitivity CRP    Collection Time: 11/20/22  9:45 AM   Result Value Ref Range    CRP, High Sensitivity 69.3 (H) 0.0 - 5.0 mg/L   Procalcitonin    Collection Time: 11/20/22  9:45 AM   Result Value Ref Range    Procalcitonin 1.97    POCT Glucose    Collection Time: 11/20/22 11:02 AM   Result Value Ref Range    POC Glucose 166 (H) 70 - 99 MG/DL        Imaging/Diagnostics Last 24 Hours   XR CHEST PORTABLE    Result Date: 11/18/2022  EXAMINATION: ONE XRAY VIEW OF THE CHEST 11/18/2022 2:17 pm COMPARISON: 10/28/2022 at 1214 hours HISTORY: ORDERING SYSTEM PROVIDED HISTORY: resp distress TECHNOLOGIST PROVIDED HISTORY: Reason for exam:->resp distress Reason for Exam: resp distress Additional signs and symptoms: NA Relevant Medical/Surgical History: CAD, DIABETES FINDINGS: Diffuse bronchial pulmonary marking prominence worst in the infrahilar/basilar regions consistent with bibasilar pneumonia/pneumonitis accentuated by underlying chronic lung disease. Noncardiogenic pulmonary edema may also contribute to this appearance. Otherwise, lung fields are clear. No detectable pleural effusion, pneumothorax, pulmonary edema/vascular congestion, cardiomegaly or mediastinal widening.      Infiltrative changes of perihilar and basilar regions most consistent with atypical pneumonia/pneumonitis accentuated by underlying advanced chronic lung disease.   Otherwise, radiographically nonacute portable chest.       Electronically signed by Bettie Rob MD on 11/20/2022 at 3:36 PM

## 2022-11-21 LAB
ANION GAP SERPL CALCULATED.3IONS-SCNC: 8 MMOL/L (ref 4–16)
ANISOCYTOSIS: ABNORMAL
BANDED NEUTROPHILS ABSOLUTE COUNT: 0.17 K/CU MM
BANDED NEUTROPHILS RELATIVE PERCENT: 1 % (ref 5–11)
BUN BLDV-MCNC: 17 MG/DL (ref 6–23)
CALCIUM SERPL-MCNC: 8.1 MG/DL (ref 8.3–10.6)
CHLORIDE BLD-SCNC: 100 MMOL/L (ref 99–110)
CO2: 26 MMOL/L (ref 21–32)
CREAT SERPL-MCNC: 0.5 MG/DL (ref 0.9–1.3)
CULTURE: ABNORMAL
CULTURE: ABNORMAL
CULTURE: NORMAL
DIFFERENTIAL TYPE: ABNORMAL
EOSINOPHILS ABSOLUTE: 0.3 K/CU MM
EOSINOPHILS RELATIVE PERCENT: 2 % (ref 0–3)
GFR SERPL CREATININE-BSD FRML MDRD: >60 ML/MIN/1.73M2
GLUCOSE BLD-MCNC: 184 MG/DL (ref 70–99)
GLUCOSE BLD-MCNC: 233 MG/DL (ref 70–99)
GLUCOSE BLD-MCNC: 254 MG/DL (ref 70–99)
GLUCOSE BLD-MCNC: 264 MG/DL (ref 70–99)
GLUCOSE BLD-MCNC: 317 MG/DL (ref 70–99)
GRAM SMEAR: ABNORMAL
HCT VFR BLD CALC: 27.5 % (ref 42–52)
HEMOGLOBIN: 8.4 GM/DL (ref 13.5–18)
HIGH SENSITIVE C-REACTIVE PROTEIN: 65.2 MG/L (ref 0–5)
LYMPHOCYTES ABSOLUTE: 12.9 K/CU MM
LYMPHOCYTES RELATIVE PERCENT: 74 % (ref 24–44)
Lab: ABNORMAL
Lab: NORMAL
MCH RBC QN AUTO: 27.8 PG (ref 27–31)
MCHC RBC AUTO-ENTMCNC: 30.5 % (ref 32–36)
MCV RBC AUTO: 91.1 FL (ref 78–100)
MONOCYTES ABSOLUTE: 0.3 K/CU MM
MONOCYTES RELATIVE PERCENT: 2 % (ref 0–4)
PDW BLD-RTO: 25.3 % (ref 11.7–14.9)
PLATELET # BLD: 48 K/CU MM (ref 140–440)
PMV BLD AUTO: 10.5 FL (ref 7.5–11.1)
POTASSIUM SERPL-SCNC: 4.5 MMOL/L (ref 3.5–5.1)
RBC # BLD: 3.02 M/CU MM (ref 4.6–6.2)
SEGMENTED NEUTROPHILS ABSOLUTE COUNT: 3.7 K/CU MM
SEGMENTED NEUTROPHILS RELATIVE PERCENT: 21 % (ref 36–66)
SODIUM BLD-SCNC: 134 MMOL/L (ref 135–145)
SPECIMEN: ABNORMAL
SPECIMEN: NORMAL
WBC # BLD: 17.4 K/CU MM (ref 4–10.5)

## 2022-11-21 PROCEDURE — 2500000003 HC RX 250 WO HCPCS: Performed by: STUDENT IN AN ORGANIZED HEALTH CARE EDUCATION/TRAINING PROGRAM

## 2022-11-21 PROCEDURE — 85027 COMPLETE CBC AUTOMATED: CPT

## 2022-11-21 PROCEDURE — 94640 AIRWAY INHALATION TREATMENT: CPT

## 2022-11-21 PROCEDURE — 99223 1ST HOSP IP/OBS HIGH 75: CPT | Performed by: INTERNAL MEDICINE

## 2022-11-21 PROCEDURE — 2140000000 HC CCU INTERMEDIATE R&B

## 2022-11-21 PROCEDURE — 6370000000 HC RX 637 (ALT 250 FOR IP): Performed by: INTERNAL MEDICINE

## 2022-11-21 PROCEDURE — 94761 N-INVAS EAR/PLS OXIMETRY MLT: CPT

## 2022-11-21 PROCEDURE — 6360000002 HC RX W HCPCS: Performed by: INTERNAL MEDICINE

## 2022-11-21 PROCEDURE — 6370000000 HC RX 637 (ALT 250 FOR IP): Performed by: STUDENT IN AN ORGANIZED HEALTH CARE EDUCATION/TRAINING PROGRAM

## 2022-11-21 PROCEDURE — 80048 BASIC METABOLIC PNL TOTAL CA: CPT

## 2022-11-21 PROCEDURE — 2700000000 HC OXYGEN THERAPY PER DAY

## 2022-11-21 PROCEDURE — 85007 BL SMEAR W/DIFF WBC COUNT: CPT

## 2022-11-21 PROCEDURE — 2580000003 HC RX 258: Performed by: STUDENT IN AN ORGANIZED HEALTH CARE EDUCATION/TRAINING PROGRAM

## 2022-11-21 PROCEDURE — 86141 C-REACTIVE PROTEIN HS: CPT

## 2022-11-21 PROCEDURE — 82962 GLUCOSE BLOOD TEST: CPT

## 2022-11-21 PROCEDURE — 2580000003 HC RX 258: Performed by: INTERNAL MEDICINE

## 2022-11-21 RX ADMIN — PIPERACILLIN AND TAZOBACTAM 4500 MG: 4; .5 INJECTION, POWDER, FOR SOLUTION INTRAVENOUS at 12:40

## 2022-11-21 RX ADMIN — ALLOPURINOL 200 MG: 100 TABLET ORAL at 20:43

## 2022-11-21 RX ADMIN — SODIUM CHLORIDE, PRESERVATIVE FREE 10 ML: 5 INJECTION INTRAVENOUS at 08:24

## 2022-11-21 RX ADMIN — INSULIN LISPRO 2 UNITS: 100 INJECTION, SOLUTION INTRAVENOUS; SUBCUTANEOUS at 17:34

## 2022-11-21 RX ADMIN — OXYCODONE HYDROCHLORIDE AND ACETAMINOPHEN 250 MG: 500 TABLET ORAL at 08:19

## 2022-11-21 RX ADMIN — ACYCLOVIR 400 MG: 200 CAPSULE ORAL at 08:19

## 2022-11-21 RX ADMIN — IPRATROPIUM BROMIDE AND ALBUTEROL SULFATE 1 AMPULE: .5; 2.5 SOLUTION RESPIRATORY (INHALATION) at 19:48

## 2022-11-21 RX ADMIN — FINASTERIDE 5 MG: 5 TABLET, FILM COATED ORAL at 08:20

## 2022-11-21 RX ADMIN — PANTOPRAZOLE SODIUM 40 MG: 40 TABLET, DELAYED RELEASE ORAL at 08:20

## 2022-11-21 RX ADMIN — HYDROCODONE BITARTRATE AND ACETAMINOPHEN 1 TABLET: 5; 325 TABLET ORAL at 08:19

## 2022-11-21 RX ADMIN — AZITHROMYCIN MONOHYDRATE 250 MG: 250 TABLET ORAL at 08:20

## 2022-11-21 RX ADMIN — HYDROCODONE BITARTRATE AND ACETAMINOPHEN 1 TABLET: 5; 325 TABLET ORAL at 20:44

## 2022-11-21 RX ADMIN — BUDESONIDE 500 MCG: 0.5 INHALANT RESPIRATORY (INHALATION) at 19:52

## 2022-11-21 RX ADMIN — MICONAZOLE NITRATE: 2 POWDER TOPICAL at 20:45

## 2022-11-21 RX ADMIN — IPRATROPIUM BROMIDE AND ALBUTEROL SULFATE 1 AMPULE: .5; 2.5 SOLUTION RESPIRATORY (INHALATION) at 12:12

## 2022-11-21 RX ADMIN — INSULIN LISPRO 2 UNITS: 100 INJECTION, SOLUTION INTRAVENOUS; SUBCUTANEOUS at 08:17

## 2022-11-21 RX ADMIN — INSULIN GLARGINE 30 UNITS: 100 INJECTION, SOLUTION SUBCUTANEOUS at 22:04

## 2022-11-21 RX ADMIN — SODIUM CHLORIDE, PRESERVATIVE FREE 10 ML: 5 INJECTION INTRAVENOUS at 08:18

## 2022-11-21 RX ADMIN — IPRATROPIUM BROMIDE AND ALBUTEROL SULFATE 1 AMPULE: .5; 2.5 SOLUTION RESPIRATORY (INHALATION) at 16:00

## 2022-11-21 RX ADMIN — IPRATROPIUM BROMIDE AND ALBUTEROL SULFATE 1 AMPULE: .5; 2.5 SOLUTION RESPIRATORY (INHALATION) at 08:27

## 2022-11-21 RX ADMIN — OXYCODONE HYDROCHLORIDE AND ACETAMINOPHEN 250 MG: 500 TABLET ORAL at 20:43

## 2022-11-21 RX ADMIN — GUAIFENESIN 600 MG: 600 TABLET, EXTENDED RELEASE ORAL at 08:19

## 2022-11-21 RX ADMIN — TOBRAMYCIN 300 MG: 300 SOLUTION RESPIRATORY (INHALATION) at 08:30

## 2022-11-21 RX ADMIN — TOBRAMYCIN 300 MG: 300 SOLUTION RESPIRATORY (INHALATION) at 19:52

## 2022-11-21 RX ADMIN — TAMSULOSIN HYDROCHLORIDE 0.4 MG: 0.4 CAPSULE ORAL at 08:18

## 2022-11-21 RX ADMIN — GUAIFENESIN 600 MG: 600 TABLET, EXTENDED RELEASE ORAL at 20:43

## 2022-11-21 RX ADMIN — ATORVASTATIN CALCIUM 80 MG: 40 TABLET, FILM COATED ORAL at 08:19

## 2022-11-21 RX ADMIN — Medication 324 MG: at 08:23

## 2022-11-21 RX ADMIN — Medication 324 MG: at 16:59

## 2022-11-21 RX ADMIN — ALLOPURINOL 200 MG: 100 TABLET ORAL at 08:20

## 2022-11-21 RX ADMIN — PIPERACILLIN AND TAZOBACTAM 4500 MG: 4; .5 INJECTION, POWDER, FOR SOLUTION INTRAVENOUS at 17:36

## 2022-11-21 RX ADMIN — ALLOPURINOL 200 MG: 100 TABLET ORAL at 13:14

## 2022-11-21 RX ADMIN — ACYCLOVIR 400 MG: 200 CAPSULE ORAL at 13:14

## 2022-11-21 RX ADMIN — ACYCLOVIR 400 MG: 200 CAPSULE ORAL at 21:18

## 2022-11-21 RX ADMIN — BUDESONIDE 1000 MCG: 0.5 INHALANT RESPIRATORY (INHALATION) at 08:31

## 2022-11-21 ASSESSMENT — PAIN DESCRIPTION - ORIENTATION: ORIENTATION: LEFT;UPPER

## 2022-11-21 ASSESSMENT — PAIN DESCRIPTION - LOCATION
LOCATION: RIB CAGE;SHOULDER
LOCATION: GENERALIZED
LOCATION: RIB CAGE;SHOULDER
LOCATION: GENERALIZED
LOCATION: SHOULDER;ABDOMEN

## 2022-11-21 ASSESSMENT — PAIN DESCRIPTION - DESCRIPTORS
DESCRIPTORS: ACHING

## 2022-11-21 ASSESSMENT — PAIN SCALES - GENERAL
PAINLEVEL_OUTOF10: 2
PAINLEVEL_OUTOF10: 6
PAINLEVEL_OUTOF10: 7
PAINLEVEL_OUTOF10: 6
PAINLEVEL_OUTOF10: 7

## 2022-11-21 ASSESSMENT — PAIN SCALES - WONG BAKER: WONGBAKER_NUMERICALRESPONSE: 0

## 2022-11-21 NOTE — CONSULTS
Infectious Disease Consult Note  2022   Patient Name: Kannan Nagy : 1950     Assessment  Sepsis  Rhinovirus infection  P aeruginosa bacterial superinfection pneumonia  Acute on chronic hypoxic respiratory failure  Medical history of CLL, selective IgG immunodeficiency, who developed fever at the oncology center during the course of the rituximab infusion. Overall condition likely linked to COPD, Bronchiectasis, CLL and rituximab use. Review of chart which showed that the patient has had repeated positive rhinovirus PCR tests dating as far back as 2016. It is difficult to explain this phenomenon, as genome sequencing of rhinovirus is not available to prove if it is the same rhinovirus or if this is repeated infections. P aeruginosa persists  procalcitonin and CRP is on a downward trend. Streptococcus species in blood culture  Possible contaminant but given immunocompromised state, will treat as pathogen, pending ID. 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 M  F/u: CRP and procalcitonin  Other: Thank you for allowing me to consult in the care of this patient.  ------------------------  REASON FOR CONSULT: Known to you, recurrent PNA, p/w sepsis in the setting of rhinovirus, CXR concerning for PNA   Requested by: Rabia Desouza MD   HPI:Patient is a 71 y.o. male living with CLL, selective IgG deficiency, COPD, type 2 diabetes mellitus who is known to us from previous admissions. He has a history of recurrent Pseudomonas aeruginosa pneumonia. His most recent admission was 10/22/2022 through 2022. He had rhinovirus pneumonia with superimposed Pseudomonas aeruginosa pneumonia and was discharged to complete a 2-week course of IV Zosyn on 2022.   When he was seen in the office on 2022 he still had shortness of breath. There was a suspicion of persistence Pseudomonas aeruginosa. Respiratory culture was ordered to verify that. Owing to the increased clinical suspicion azithromycin and inhaled tobramycin were ordered to help eradicate suspected Pseudomonas aeruginosa. He was admitted 11/18/2022 for further evaluation and management of shortness of breath, fever and malaise during his rituximab infusion on the same day of admission. Patient required a nonrebreather mask and was subsequently put on the BiPAP. Chest x-ray has shown infiltrative change of the perihilar and basilar regions consistent with atypical pneumonia/pneumonitis. He received vancomycin, azithromycin and cefepime. Respiratory ID panel was positive for rhinovirus. He is receiving azithromycin, inhaled tobramycin and acyclovir. He had a fever of 101.6 °F on 11/20/2022  ? Infectious diseases service was consulted to evaluate the pt, and recommend further investigative and therapeutic measures. Review and summary of old records:  ROS: Other systems reviewed Including eyes, ENT, respiratory, cardiovascular, GI, , dermatologic, neurologic, psych, hem/lymphatic, musculoskeletal and endocrine were negative other than what is mentioned above.      Patient Active Problem List    Diagnosis Date Noted    Anemia 10/29/2022    Thrombocytopenia ([address] 10/29/2022    Personal history of CLL (chronic lymphocytic leukemia) 10/24/2022    Rhinovirus infection 10/24/2022    Pneumonia due to organism 10/22/2022    Severe malnutrition ([address] 10/05/2022    Acute bronchitis 02/10/2022    Leukemia, lymphocytic, chronic ([address] 07/24/2020    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    Neutropenia ([address] 04/08/2020    Other specified disorders of bone density and structure, unspecified site 04/08/2020    Cellulitis of right upper limb 04/08/2020    Herpesviral infection 04/08/2020    Intestinal malabsorption 04/08/2020    Other nonspecific abnormal finding of lung field 04/08/2020    Iron deficiency anemia due to chronic blood loss 04/08/2020    Other muscle spasm 04/08/2020    COPD (chronic obstructive pulmonary disease) ([address] 05/14/2019    Upper respiratory infection, acute 04/09/2019    Generalized muscle weakness 04/09/2019    Type 2 diabetes mellitus with hyperglycemia, with long-term current use of insulin ([address] 04/09/2019    Poor appetite 04/09/2019    CLL (chronic lymphocytic leukemia) ([address]     Hypogammaglobulinemia ([address] 12/06/2016    Pneumonia 07/07/2016    Sepsis ([address] 07/07/2016     Past Medical History:   Diagnosis Date    Arthritis     knees, Rt hand/wrist    BPH (benign prostatic hyperplasia)     CAD (coronary artery disease)     Cancer (HCC)     CLL--Sees Dr Luther Lizama    Diabetes mellitus [facility])     History of blood transfusion     no reaction    Hx of motion sickness     Hyperlipidemia     MDRO (multiple drug resistant organisms) resistance     abscess on buttock 10yrs ago    Migraine     last one summer 2016    Pneumonia 2016    Wears dentures     full upper--lower dentures    Wears glasses       Past Surgical History:   Procedure Laterality Date    BRONCHOSCOPY N/A 10/28/2022    BRONCHOSCOPY performed by Shirley Hermosillo MD at [facility]  1990's    x 2  last showed \"inflammation of heart\"    COLONOSCOPY  2010    DENTAL SURGERY      all teeth extracted     EYE SURGERY Right 08/2016    Cataract removal    FRACTURE SURGERY Left 1990's    left ankle plate and screws    KNEE SURGERY  1970    left    OTHER SURGICAL HISTORY Left 01/18/2017    groin excision    TONSILLECTOMY  late 1960's    age 13    TUNNELED VENOUS PORT PLACEMENT  2013    Right upper chest      Family History   Problem Relation Age of Onset    Diabetes Mother     High Blood Pressure Mother     Heart Disease Father     Other Sister         liver problems    Early Death Brother     Asthma Maternal Uncle     Colon Cancer Brother       Infectious disease related family history - not contibutory. SOCIAL HISTORY  Social History     Tobacco Use    Smoking status: Former     Packs/day: 3.00     Years: 20.00     Pack years: 60.00     Types: Cigarettes     Start date: 10/2/1965     Quit date: 1987     Years since quittin.9    Smokeless tobacco: Never   Substance Use Topics    Alcohol use: No      Ancestry: White     ? ALLERGIES  No Known Allergies   MEDICATIONS  Reviewed and are per the chart/EMR. IMMUNIZATION HISTORY  Immunization History   Administered Date(s) Administered    COVID-19, MODERNA BLUE border, Primary or Immunocompromised, (age 12y+), IM, 100 mcg/0.5mL 2021, 2021, 2021    Influenza A (T2F2-48) Vaccine PF IM 2009    Influenza Vaccine, unspecified formulation 10/28/2011    Influenza Virus Vaccine 10/28/2011    Influenza, FLUARIX, FLULAVAL, FLUZONE (age 10 mo+) AND AFLURIA, (age 1 y+), PF, 0.5mL 2021    Influenza, FLUZONE (age 72 y+), High Dose, 0.7mL 2020    Pneumococcal Conjugate 13-valent ([id number]) 2020    Pneumococcal Polysaccharide ([id number]) 2016     ? Antibiotics:   Azithromycin  Inhaled tobramycin  ?  -------------------------------------------------------------------------------------------------------------------    Vital Signs:  Vitals:    22 0849   BP:    Pulse:    Resp: 20   Temp:    SpO2:          Exam:    VS: noted; wt 73.1 kg  Gen: alert and oriented X3, no distress  Skin: no stigmata of endocarditis  Wounds: C/D/I  HEMT: AT/NC Oropharynx pink, moist, and without lesions or exudates; dentures in place  Eyes: PERRLA, EOMI, conjunctiva pink, sclera anicteric. Neck: Supple. Trachea midline. No LAD. Chest: Scattered bilateral crackles  Heart: RRR and no MRG. Abd: soft, non-distended, no tenderness, no hepatomegaly. Normoactive bowel sounds.   Ext: no clubbing, cyanosis, or edema  Catheter Site: without erythema or tenderness  LDA: CVC: Single-lumen implantable port-right internal jugular,   Neuro: Mental status intact. CN 2-12 intact and no focal sensory or motor deficits    ? Diagnostic Studies: reviewed  ? ? I have examined this patient and available medical records on this date and have made the above observations, conclusions and recommendations.   Electronically signed by: Electronically signed by Ktae Garrett MD on 11/21/2022 at 10:55 AM

## 2022-11-21 NOTE — PROGRESS NOTES
V2.0  [facility] Hospitalist Progress Note      Name:  Jared Porras /Age/Sex: 1950  (71 y.o. male)   MRN & CSN:  [id number] & [id number] Encounter Date/Time: 2022 1:48 PM EST    Location:  [facility]-A PCP: Lissy Ospina MD       Hospital Day: 4    Assessment and Plan:   Jared Porras is a 71 y.o. male with pmh of CLL, chronic respiratory failure, COPD, recent history of pneumonia who presents with Sepsis ([facility] [address]    Sepsis  Likely in the setting of pneumonia  Of note, patient was recently treated with IV ciprofloxacin and IV Zosyn and completed therapy on 2022. He had ID follow-up on 2022 and patient was advised to continue azithromycin and inhaled tobramycin due to concern of bronchiectasis and chronic Pseudomonas colonization  Patient presented to infusion clinic and was found to have fevers and chills, which was initially thought to be infusion reaction to rituximab, however he had significant leukocytosis, elevated CRP making sepsis/pneumonia more likely. Will continue patient on azithromycin and inhaled tobramycin as per ID recommendation. CRP and procalcitonin trending down. Double pseudomonal coverage added by ID with IV zosyn. Acute on chronic hypoxic respiratory failure  Likely due to pneumonia  Continue to wean oxygen as able  Respiratory panel positive for rhinovirus which was also positive during his last admission. History of CLL   Follows with hematology/oncology  Initially he was on ibrutinib until recently, he was noted to have disease progression  2022 he started receiving rituximab but had to be stopped prior to completion due to concerns for drug reaction. Selective IgG deficiency   Remains on monthly IVIG. Last IVIG on 2022. Type 2 diabetes mellitus  Control Lantus and low-dose sliding scale  Will follow blood sugars. BPH  Continue tamsulosin and finasteride    Protein calorie malnutrition        Diet ADULT DIET;  Regular; 5 carb choices (75 gm/meal)  ADULT ORAL NUTRITION SUPPLEMENT; Lunch, Dinner; Standard High Calorie/High Protein Oral Supplement   DVT Prophylaxis [] Lovenox, []  Heparin, [] SCDs, [] Ambulation,  [] Eliquis, [] Xarelto  [] Coumadin   Code Status Full Code   Disposition From:   Expected Disposition:   Estimated Date of Discharge:   Patient requires continued admission due to pneumonia   Surrogate Decision Maker/ POA      Subjective:     Chief Complaint: Allergic Reaction       Brenda Griffith is a 71 y.o. male who presents with fevers and cough. Seen and examined in AM.      Seen and examined in the morning. Remains tachypneic, still having copious amount of sputum production. He had 1 episode of fevers overnight up to 101.6 F. States he is feeling better today overall. Review of Systems:    Review of Systems    As above     Objective: Intake/Output Summary (Last 24 hours) at 11/21/2022 1301  Last data filed at 11/21/2022 0828  Gross per 24 hour   Intake --   Output 325 ml   Net -325 ml          Vitals:   Vitals:    11/21/22 1111   BP: 119/73   Pulse: 91   Resp: 24   Temp: 98.1 °F (36.7 °C)   SpO2: 97%       Physical Exam:     General: NAD  Eyes: EOMI  ENT: neck supple  Cardiovascular: Regular rate. Respiratory: b/l ronchi and coarse breathing , bibasilar crackles   Gastrointestinal: Soft, non tender  Genitourinary: no suprapubic tenderness  Musculoskeletal: No edema  Skin: warm, dry  Neuro: Alert. Psych: Mood appropriate.      Medications:   Medications:    piperacillin-tazobactam  4,500 mg IntraVENous Q8H    piperacillin-tazobactam  4,500 mg IntraVENous Once    azithromycin  250 mg Oral Daily    tobramycin (PF)  300 mg Nebulization BID    guaiFENesin  600 mg Oral BID    ipratropium-albuterol  1 ampule Inhalation Q4H WA    budesonide  1 mg Nebulization BID    EPINEPHrine  0.3 mg IntraMUSCular Once    allopurinol  200 mg Oral TID    vitamin C  250 mg Oral BID    atorvastatin  80 mg Oral Daily    ferrous gluconate  324 mg Oral BID     finasteride  5 mg Oral Daily    miconazole   Topical BID    pantoprazole  40 mg Oral QAM AC    tamsulosin  0.4 mg Oral Daily    acyclovir  400 mg Oral TID    insulin glargine  30 Units SubCUTAneous Nightly    insulin lispro  0-4 Units SubCUTAneous TID     insulin lispro  0-4 Units SubCUTAneous Nightly      Infusions:    sodium chloride      dextrose       PRN Meds: sodium chloride (Inhalant), 4 mL, PRN  sodium chloride flush, 5-40 mL, PRN  sodium chloride, , PRN  acetaminophen, 650 mg, Q6H PRN   Or  acetaminophen, 650 mg, Q6H PRN  polyethylene glycol, 17 g, Daily PRN  ondansetron, 4 mg, Q8H PRN   Or  ondansetron, 4 mg, Q6H PRN  HYDROcodone 5 mg - acetaminophen, 1 tablet, Q6H PRN  glucose, 4 tablet, PRN  dextrose bolus, 125 mL, PRN   Or  dextrose bolus, 250 mL, PRN  glucagon (rDNA), 1 mg, PRN  dextrose, , Continuous PRN      Labs      Recent Results (from the past 24 hour(s))   POCT Glucose    Collection Time: 11/20/22  4:32 PM   Result Value Ref Range    POC Glucose 232 (H) 70 - 99 MG/DL   POCT Glucose    Collection Time: 11/20/22  8:09 PM   Result Value Ref Range    POC Glucose 239 (H) 70 - 99 MG/DL   Basic Metabolic Panel w/ Reflex to MG    Collection Time: 11/21/22  7:00 AM   Result Value Ref Range    Sodium 134 (L) 135 - 145 MMOL/L    Potassium 4.5 3.5 - 5.1 MMOL/L    Chloride 100 99 - 110 mMol/L    CO2 26 21 - 32 MMOL/L    Anion Gap 8 4 - 16    BUN 17 6 - 23 MG/DL    Creatinine 0.5 (L) 0.9 - 1.3 MG/DL    Est, Glom Filt Rate >60 >60 mL/min/1.73m2    Glucose 317 (H) 70 - 99 MG/DL    Calcium 8.1 (L) 8.3 - 10.6 MG/DL   CBC with Auto Differential    Collection Time: 11/21/22  7:00 AM   Result Value Ref Range    WBC 17.4 (H) 4.0 - 10.5 K/CU MM    RBC 3.02 (L) 4.6 - 6.2 M/CU MM    Hemoglobin 8.4 (L) 13.5 - 18.0 GM/DL    Hematocrit 27.5 (L) 42 - 52 %    MCV 91.1 78 - 100 FL    MCH 27.8 27 - 31 PG    MCHC 30.5 (L) 32.0 - 36.0 %    RDW 25.3 (H) 11.7 - 14.9 %    Platelets 48 (L) 931 - 440 K/CU MM    MPV 10.5 7.5 - 11.1 FL    Bands Relative 1 (L) 5 - 11 %    Segs Relative 21.0 (L) 36 - 66 %    Eosinophils % 2.0 0 - 3 %    Lymphocytes % 74.0 (H) 24 - 44 %    Monocytes % 2.0 0 - 4 %    Bands Absolute 0.17 K/CU MM    Segs Absolute 3.7 K/CU MM    Eosinophils Absolute 0.3 K/CU MM    Lymphocytes Absolute 12.9 K/CU MM    Monocytes Absolute 0.3 K/CU MM    Differential Type MANUAL DIFFERENTIAL     Anisocytosis 1+    POCT Glucose    Collection Time: 11/21/22  7:42 AM   Result Value Ref Range    POC Glucose 254 (H) 70 - 99 MG/DL   High sensitivity CRP    Collection Time: 11/21/22  8:00 AM   Result Value Ref Range    CRP, High Sensitivity 65.2 (H) 0.0 - 5.0 mg/L   POCT Glucose    Collection Time: 11/21/22 11:10 AM   Result Value Ref Range    POC Glucose 184 (H) 70 - 99 MG/DL        Imaging/Diagnostics Last 24 Hours   XR CHEST PORTABLE    Result Date: 11/18/2022  EXAMINATION: ONE XRAY VIEW OF THE CHEST 11/18/2022 2:17 pm COMPARISON: 10/28/2022 at 1214 hours HISTORY: ORDERING SYSTEM PROVIDED HISTORY: resp distress TECHNOLOGIST PROVIDED HISTORY: Reason for exam:->resp distress Reason for Exam: resp distress Additional signs and symptoms: NA Relevant Medical/Surgical History: CAD, DIABETES FINDINGS: Diffuse bronchial pulmonary marking prominence worst in the infrahilar/basilar regions consistent with bibasilar pneumonia/pneumonitis accentuated by underlying chronic lung disease. Noncardiogenic pulmonary edema may also contribute to this appearance. Otherwise, lung fields are clear. No detectable pleural effusion, pneumothorax, pulmonary edema/vascular congestion, cardiomegaly or mediastinal widening. Infiltrative changes of perihilar and basilar regions most consistent with atypical pneumonia/pneumonitis accentuated by underlying advanced chronic lung disease.   Otherwise, radiographically nonacute portable chest.       Electronically signed by Sun Chapa MD on 11/21/2022 at 1:01 PM

## 2022-11-21 NOTE — CARE COORDINATION
LSW reviewed patient medical record. LSW discussed patient in the morning IDR. LSW introduced self and role of LSW. Patient son at bedside. Patient is from home with son. Patient son provides transportation for the patient. Patient has PCP. Patient is able to afford rx. Patient has a walker and home oxygen through [employer]. Patient is active with CM. Patient discharge plan is to return home with his son and home care. Case management will continue to follow.

## 2022-11-22 PROBLEM — J96.21 ACUTE ON CHRONIC RESPIRATORY FAILURE WITH HYPOXEMIA (HCC): Status: ACTIVE | Noted: 2022-11-22

## 2022-11-22 LAB
ALBUMIN SERPL-MCNC: 3 GM/DL (ref 3.4–5)
ALP BLD-CCNC: 172 IU/L (ref 40–128)
ALT SERPL-CCNC: 32 U/L (ref 10–40)
ANION GAP SERPL CALCULATED.3IONS-SCNC: 9 MMOL/L (ref 4–16)
ANISOCYTOSIS: ABNORMAL
AST SERPL-CCNC: 31 IU/L (ref 15–37)
ATYPICAL LYMPHOCYTE ABSOLUTE COUNT: ABNORMAL
BANDED NEUTROPHILS ABSOLUTE COUNT: 0.75 K/CU MM
BANDED NEUTROPHILS RELATIVE PERCENT: 5 % (ref 5–11)
BILIRUB SERPL-MCNC: 0.6 MG/DL (ref 0–1)
BUN BLDV-MCNC: 15 MG/DL (ref 6–23)
CALCIUM SERPL-MCNC: 7.9 MG/DL (ref 8.3–10.6)
CHLORIDE BLD-SCNC: 98 MMOL/L (ref 99–110)
CO2: 29 MMOL/L (ref 21–32)
CREAT SERPL-MCNC: 0.5 MG/DL (ref 0.9–1.3)
CULTURE: ABNORMAL
DIFFERENTIAL TYPE: ABNORMAL
EOSINOPHILS ABSOLUTE: 0.2 K/CU MM
EOSINOPHILS RELATIVE PERCENT: 1 % (ref 0–3)
GFR SERPL CREATININE-BSD FRML MDRD: >60 ML/MIN/1.73M2
GLUCOSE BLD-MCNC: 140 MG/DL (ref 70–99)
GLUCOSE BLD-MCNC: 210 MG/DL (ref 70–99)
GLUCOSE BLD-MCNC: 231 MG/DL (ref 70–99)
GLUCOSE BLD-MCNC: 270 MG/DL (ref 70–99)
GLUCOSE BLD-MCNC: 295 MG/DL (ref 70–99)
HCT VFR BLD CALC: 28.1 % (ref 42–52)
HEMOGLOBIN: 8.4 GM/DL (ref 13.5–18)
HIGH SENSITIVE C-REACTIVE PROTEIN: 42.3 MG/L (ref 0–5)
IGA: <50 MG/DL (ref 69–382)
IGG,SERUM: 308 MG/DL (ref 723–1685)
IGM,SERUM: 73 MG/DL (ref 62–277)
LYMPHOCYTES ABSOLUTE: 10.6 K/CU MM
LYMPHOCYTES RELATIVE PERCENT: 71 % (ref 24–44)
Lab: ABNORMAL
Lab: ABNORMAL
MAGNESIUM: 1.8 MG/DL (ref 1.8–2.4)
MCH RBC QN AUTO: 27.6 PG (ref 27–31)
MCHC RBC AUTO-ENTMCNC: 29.9 % (ref 32–36)
MCV RBC AUTO: 92.4 FL (ref 78–100)
METAMYELOCYTES ABSOLUTE COUNT: 0.3 K/CU MM
METAMYELOCYTES PERCENT: 2 %
MONOCYTES ABSOLUTE: 1.5 K/CU MM
MONOCYTES RELATIVE PERCENT: 10 % (ref 0–4)
PDW BLD-RTO: 25.1 % (ref 11.7–14.9)
PHOSPHORUS: 3.1 MG/DL (ref 2.5–4.9)
PLATELET # BLD: 47 K/CU MM (ref 140–440)
PMV BLD AUTO: 11 FL (ref 7.5–11.1)
POTASSIUM SERPL-SCNC: 3.9 MMOL/L (ref 3.5–5.1)
RBC # BLD: 3.04 M/CU MM (ref 4.6–6.2)
SEGMENTED NEUTROPHILS ABSOLUTE COUNT: 1.7 K/CU MM
SEGMENTED NEUTROPHILS RELATIVE PERCENT: 11 % (ref 36–66)
SODIUM BLD-SCNC: 136 MMOL/L (ref 135–145)
SPECIMEN: ABNORMAL
SPECIMEN: ABNORMAL
TOTAL PROTEIN: 4.7 GM/DL (ref 6.4–8.2)
WBC # BLD: 15 K/CU MM (ref 4–10.5)

## 2022-11-22 PROCEDURE — 2700000000 HC OXYGEN THERAPY PER DAY

## 2022-11-22 PROCEDURE — 85007 BL SMEAR W/DIFF WBC COUNT: CPT

## 2022-11-22 PROCEDURE — 6360000002 HC RX W HCPCS: Performed by: INTERNAL MEDICINE

## 2022-11-22 PROCEDURE — 85027 COMPLETE CBC AUTOMATED: CPT

## 2022-11-22 PROCEDURE — 94640 AIRWAY INHALATION TREATMENT: CPT

## 2022-11-22 PROCEDURE — 83735 ASSAY OF MAGNESIUM: CPT

## 2022-11-22 PROCEDURE — 80053 COMPREHEN METABOLIC PANEL: CPT

## 2022-11-22 PROCEDURE — 86141 C-REACTIVE PROTEIN HS: CPT

## 2022-11-22 PROCEDURE — 2500000003 HC RX 250 WO HCPCS: Performed by: STUDENT IN AN ORGANIZED HEALTH CARE EDUCATION/TRAINING PROGRAM

## 2022-11-22 PROCEDURE — 6370000000 HC RX 637 (ALT 250 FOR IP): Performed by: STUDENT IN AN ORGANIZED HEALTH CARE EDUCATION/TRAINING PROGRAM

## 2022-11-22 PROCEDURE — 99231 SBSQ HOSP IP/OBS SF/LOW 25: CPT | Performed by: INTERNAL MEDICINE

## 2022-11-22 PROCEDURE — 84100 ASSAY OF PHOSPHORUS: CPT

## 2022-11-22 PROCEDURE — 1200000000 HC SEMI PRIVATE

## 2022-11-22 PROCEDURE — 2580000003 HC RX 258: Performed by: STUDENT IN AN ORGANIZED HEALTH CARE EDUCATION/TRAINING PROGRAM

## 2022-11-22 PROCEDURE — 6370000000 HC RX 637 (ALT 250 FOR IP): Performed by: INTERNAL MEDICINE

## 2022-11-22 PROCEDURE — 36591 DRAW BLOOD OFF VENOUS DEVICE: CPT

## 2022-11-22 PROCEDURE — 94761 N-INVAS EAR/PLS OXIMETRY MLT: CPT

## 2022-11-22 PROCEDURE — 99233 SBSQ HOSP IP/OBS HIGH 50: CPT | Performed by: INTERNAL MEDICINE

## 2022-11-22 PROCEDURE — 2580000003 HC RX 258: Performed by: INTERNAL MEDICINE

## 2022-11-22 PROCEDURE — 6370000000 HC RX 637 (ALT 250 FOR IP): Performed by: NURSE PRACTITIONER

## 2022-11-22 PROCEDURE — 82784 ASSAY IGA/IGD/IGG/IGM EACH: CPT

## 2022-11-22 PROCEDURE — 82962 GLUCOSE BLOOD TEST: CPT

## 2022-11-22 RX ORDER — GUAIFENESIN/DEXTROMETHORPHAN 100-10MG/5
10 SYRUP ORAL ONCE
Status: COMPLETED | OUTPATIENT
Start: 2022-11-22 | End: 2022-11-22

## 2022-11-22 RX ADMIN — PIPERACILLIN AND TAZOBACTAM 4500 MG: 4; .5 INJECTION, POWDER, FOR SOLUTION INTRAVENOUS at 16:48

## 2022-11-22 RX ADMIN — INSULIN LISPRO 2 UNITS: 100 INJECTION, SOLUTION INTRAVENOUS; SUBCUTANEOUS at 11:35

## 2022-11-22 RX ADMIN — ACYCLOVIR 400 MG: 200 CAPSULE ORAL at 13:36

## 2022-11-22 RX ADMIN — TOBRAMYCIN 300 MG: 300 SOLUTION RESPIRATORY (INHALATION) at 07:30

## 2022-11-22 RX ADMIN — AZITHROMYCIN MONOHYDRATE 250 MG: 250 TABLET ORAL at 08:54

## 2022-11-22 RX ADMIN — INSULIN LISPRO 1 UNITS: 100 INJECTION, SOLUTION INTRAVENOUS; SUBCUTANEOUS at 16:41

## 2022-11-22 RX ADMIN — IPRATROPIUM BROMIDE AND ALBUTEROL SULFATE 1 AMPULE: .5; 2.5 SOLUTION RESPIRATORY (INHALATION) at 19:27

## 2022-11-22 RX ADMIN — MICONAZOLE NITRATE: 2 POWDER TOPICAL at 21:06

## 2022-11-22 RX ADMIN — ATORVASTATIN CALCIUM 80 MG: 40 TABLET, FILM COATED ORAL at 08:54

## 2022-11-22 RX ADMIN — IPRATROPIUM BROMIDE AND ALBUTEROL SULFATE 1 AMPULE: .5; 2.5 SOLUTION RESPIRATORY (INHALATION) at 11:11

## 2022-11-22 RX ADMIN — IPRATROPIUM BROMIDE AND ALBUTEROL SULFATE 1 AMPULE: .5; 2.5 SOLUTION RESPIRATORY (INHALATION) at 07:27

## 2022-11-22 RX ADMIN — BUDESONIDE 1000 MCG: 0.5 INHALANT RESPIRATORY (INHALATION) at 19:28

## 2022-11-22 RX ADMIN — FINASTERIDE 5 MG: 5 TABLET, FILM COATED ORAL at 08:53

## 2022-11-22 RX ADMIN — INSULIN GLARGINE 30 UNITS: 100 INJECTION, SOLUTION SUBCUTANEOUS at 21:03

## 2022-11-22 RX ADMIN — PANTOPRAZOLE SODIUM 40 MG: 40 TABLET, DELAYED RELEASE ORAL at 05:20

## 2022-11-22 RX ADMIN — HYDROCODONE BITARTRATE AND ACETAMINOPHEN 1 TABLET: 5; 325 TABLET ORAL at 20:02

## 2022-11-22 RX ADMIN — ALLOPURINOL 200 MG: 100 TABLET ORAL at 13:36

## 2022-11-22 RX ADMIN — HYDROCODONE BITARTRATE AND ACETAMINOPHEN 1 TABLET: 5; 325 TABLET ORAL at 12:54

## 2022-11-22 RX ADMIN — ALLOPURINOL 200 MG: 100 TABLET ORAL at 08:53

## 2022-11-22 RX ADMIN — HYDROCODONE BITARTRATE AND ACETAMINOPHEN 1 TABLET: 5; 325 TABLET ORAL at 05:20

## 2022-11-22 RX ADMIN — Medication 324 MG: at 08:54

## 2022-11-22 RX ADMIN — ACYCLOVIR 400 MG: 200 CAPSULE ORAL at 21:03

## 2022-11-22 RX ADMIN — PIPERACILLIN AND TAZOBACTAM 4500 MG: 4; .5 INJECTION, POWDER, FOR SOLUTION INTRAVENOUS at 02:02

## 2022-11-22 RX ADMIN — OXYCODONE HYDROCHLORIDE AND ACETAMINOPHEN 250 MG: 500 TABLET ORAL at 08:54

## 2022-11-22 RX ADMIN — TAMSULOSIN HYDROCHLORIDE 0.4 MG: 0.4 CAPSULE ORAL at 08:53

## 2022-11-22 RX ADMIN — GUAIFENESIN 600 MG: 600 TABLET, EXTENDED RELEASE ORAL at 08:54

## 2022-11-22 RX ADMIN — GUAIFENESIN 600 MG: 600 TABLET, EXTENDED RELEASE ORAL at 21:05

## 2022-11-22 RX ADMIN — OXYCODONE HYDROCHLORIDE AND ACETAMINOPHEN 250 MG: 500 TABLET ORAL at 21:04

## 2022-11-22 RX ADMIN — SODIUM CHLORIDE, PRESERVATIVE FREE 10 ML: 5 INJECTION INTRAVENOUS at 21:03

## 2022-11-22 RX ADMIN — IPRATROPIUM BROMIDE AND ALBUTEROL SULFATE 1 AMPULE: .5; 2.5 SOLUTION RESPIRATORY (INHALATION) at 15:26

## 2022-11-22 RX ADMIN — GUAIFENESIN AND DEXTROMETHORPHAN 10 ML: 100; 10 SYRUP ORAL at 21:30

## 2022-11-22 RX ADMIN — TOBRAMYCIN 300 MG: 300 SOLUTION RESPIRATORY (INHALATION) at 19:29

## 2022-11-22 RX ADMIN — ACYCLOVIR 400 MG: 200 CAPSULE ORAL at 08:53

## 2022-11-22 RX ADMIN — PIPERACILLIN AND TAZOBACTAM 4500 MG: 4; .5 INJECTION, POWDER, FOR SOLUTION INTRAVENOUS at 10:48

## 2022-11-22 RX ADMIN — ALLOPURINOL 200 MG: 100 TABLET ORAL at 21:05

## 2022-11-22 RX ADMIN — SALINE NASAL SPRAY 1 SPRAY: 1.5 SOLUTION NASAL at 15:42

## 2022-11-22 RX ADMIN — Medication 324 MG: at 16:40

## 2022-11-22 ASSESSMENT — PAIN DESCRIPTION - ORIENTATION
ORIENTATION: RIGHT;LEFT
ORIENTATION: RIGHT;LEFT;OUTER

## 2022-11-22 ASSESSMENT — PAIN DESCRIPTION - LOCATION
LOCATION: BACK;RIB CAGE
LOCATION: FLANK;SHOULDER
LOCATION: SHOULDER;RIB CAGE

## 2022-11-22 ASSESSMENT — PAIN SCALES - GENERAL
PAINLEVEL_OUTOF10: 6
PAINLEVEL_OUTOF10: 3

## 2022-11-22 ASSESSMENT — PAIN DESCRIPTION - FREQUENCY: FREQUENCY: CONTINUOUS

## 2022-11-22 ASSESSMENT — PAIN DESCRIPTION - PAIN TYPE: TYPE: CHRONIC PAIN

## 2022-11-22 ASSESSMENT — PAIN DESCRIPTION - DESCRIPTORS
DESCRIPTORS: ACHING
DESCRIPTORS: ACHING;DISCOMFORT;DULL

## 2022-11-22 ASSESSMENT — PAIN - FUNCTIONAL ASSESSMENT: PAIN_FUNCTIONAL_ASSESSMENT: ACTIVITIES ARE NOT PREVENTED

## 2022-11-22 ASSESSMENT — PAIN DESCRIPTION - ONSET: ONSET: ON-GOING

## 2022-11-22 NOTE — PROGRESS NOTES
V2.0  [facility] [place] [facility] Hospitalist Progress Note      Name:  Florentino Mcmanus /Age/Sex: 1950  (71 y.o. male)   MRN & CSN:  [id number] & [id number] Encounter Date/Time: 2022 2:26 PM EST    [facility] PCP: Essence Michaels MD       Hospital Day: 5    Assessment and Plan:   Florentino Mcmanus is a 71 y.o. male with pmh of CLL on Rituxan and IVIG, hypogammaglobulinemia, lung nodule, COPD, chronic hypoxic respiratory failure on 2 L/min oxygen per nasal cannula at baseline, type 2 diabetes mellitus, HLD, BPH, multiple recurrent episodes of pneumonia and status post thoracentesis in the past.  Recently discharged on  who presents with Sepsis ([facility] [address]    Interval events:  -Patient seen and examined at bedside, vital signs stable, no acute events overnight reported  -Briefly, patient with above-mentioned history was in the infusion center and started to experience rigors and shortness of breath. Treated for allergic reaction to Rituxan initially and then transferred to the ED. On presentation patient was febrile to 101.4F, tachypneic to 24 and tachycardic to 127, requiring nonrebreather at 15 L. Patient was also placed on BiPAP for some time. Patient had leukocytosis of 38K from 104k  H&H at baseline and platelet count of 41. Respiratory viral panel on admission was positive for rhinovirus. ABG 7.3 . Chest x-ray with infiltrative changes of perihilar and basilar regions consistent with atypical pneumonia/pneumonitis accentuated by underlying advanced chronic lung disease.   Patient received cefepime/Vanco and azithromycin and ID was consulted.  - patient seen and examined at bedside, vital signs stable on 4 L/min oxygen via nasal cannula, labs with leukocytosis improving to 15k<17k<25k, platelet count 14D sputum culture from  positive for Pseudomonas, blood cultures from  positive for viridans streptococci, ID on board, patient currently on Zosyn, inhaled tobramycin and oral azithromycin, IVIG 400 mg/kg single infusion. Patient refusing PT    Plan:  Sepsis:  -Likely in the setting of pneumonia  -Of note, patient was recently treated with IV ciprofloxacin and IV Zosyn and completed therapy on 11/14/2022  -Patient has ID follow-up on 11/17/2022 and patient was advised to continue azithromycin and inhaled tobramycin due to concern for palpitations and chronic Pseudomonas colonization  -Patient presented to the infusion clinic with symptoms concerning for sepsis, elevated CRP making sepsis/pneumonia more likely  -ID on board, will appreciate recs  Acute on chronic hypoxemic respiratory failure:  -Likely in the setting of pneumonia  -Continue to wean oxygen as able  -Respiratory viral panel as above  History of CLL:  -Follows with hematology/oncology  -Initially patient was on ibrutinib until recently, he was noted to have disease progression  -11/18/2022 he was started on rituximab but had to be stopped prior to admission due to concern for drug reaction  Severe IgG deficiency:  -Remains on monthly IVIG, last IVIG 11/26  Type 2 diabetes mellitus:  -Low-dose insulin sliding scale and Lantus  -Fingerstick glucose before every meal and at bedtime  -Hypoglycemia protocol  BPH:  -Continue tamsulosin and finasteride  Protein calorie malnutrition    Diet ADULT DIET;  Regular; 5 carb choices (75 gm/meal)  ADULT ORAL NUTRITION SUPPLEMENT; Lunch, Dinner; Standard High Calorie/High Protein Oral Supplement   DVT Prophylaxis [] Lovenox, []  Heparin, [] SCDs, [] Ambulation,  [] Eliquis, [] Xarelto  [] Coumadin platelet count 72Y   Code Status Full Code   Disposition From: Home  Expected Disposition: TBD  Estimated Date of Discharge: 1-2 days  Patient requires continued admission due to waiting for final ID recs/weaning O2   Surrogate Decision Maker/ POA Son     Subjective:     Chief Complaint: Allergic Reaction       Zabrina Luke is a 71 y.o. male who presents with concern for sepsis in the setting of pneumonia     Currently patient denies any significant symptoms    Review of Systems:    Review of Systems    Review of Systems:   Constitutional: Negative. Negative for activity change, appetite change, chills, diaphoresis, fatigue, fever and unexpected weight change. HENT: Negative. Negative for congestion, dental problem, drooling, ear discharge, ear pain, facial swelling, hearing loss, mouth sores, nosebleeds, postnasal drip, rhinorrhea, sinus pressure, sinus pain, sneezing, sore throat, tinnitus, trouble swallowing and voice change. Eyes: Negative. Negative for photophobia, pain, discharge, redness, itching and visual disturbance. Respiratory: Negative. Negative for apnea, cough, choking, chest tightness, shortness of breath, wheezing and stridor. Cardiovascular: Negative. Negative for chest pain and palpitations. Gastrointestinal: Negative. Negative for abdominal distention, abdominal pain, anal bleeding, blood in stool, constipation, diarrhea, nausea, rectal pain and vomiting. Endocrine: Negative. Negative for cold intolerance, heat intolerance, polydipsia, polyphagia and polyuria. Genitourinary: Negative. Negative for decreased urine volume, difficulty urinating, dysuria, enuresis, flank pain, frequency, genital sores, hematuria, penile discharge, penile pain, penile swelling, scrotal swelling, testicular pain and urgency. Musculoskeletal: Negative. Negative for arthralgias, back pain, gait problem, joint swelling, myalgias, neck pain and neck stiffness. Skin: Negative. Negative for color change, pallor, rash and wound. Allergic/Immunologic: Negative for environmental allergies, food allergies and immunocompromised state. Neurological: Negative. Negative for dizziness, tremors, seizures, syncope, facial asymmetry, speech difficulty, weakness, light-headedness, numbness and headaches. Hematological: Negative. Negative for adenopathy. Does not bruise/bleed easily. Psychiatric/Behavioral: Negative. Negative for agitation, behavioral problems, confusion, decreased concentration, dysphoric mood, hallucinations, self-injury, sleep disturbance and suicidal ideas. The patient is not nervous/anxious and is not hyperactive. Objective: Intake/Output Summary (Last 24 hours) at 11/22/2022 1426  Last data filed at 11/22/2022 1200  Gross per 24 hour   Intake 775.66 ml   Output 1925 ml   Net -1149.34 ml        Vitals:   Vitals:    11/22/22 1254   BP:    Pulse:    Resp: 20   Temp:    SpO2:        Physical Exam:     General: NAD  Eyes: EOMI  ENT: neck supple  Cardiovascular: Regular rate. Respiratory: Clear to auscultation  Gastrointestinal: Soft, non tender  Genitourinary: no suprapubic tenderness  Musculoskeletal: No edema  Skin: warm, dry  Neuro: Alert. Psych: Mood appropriate.      Medications:   Medications:    piperacillin-tazobactam  4,500 mg IntraVENous Q8H    azithromycin  250 mg Oral Daily    tobramycin (PF)  300 mg Nebulization BID    guaiFENesin  600 mg Oral BID    ipratropium-albuterol  1 ampule Inhalation Q4H WA    budesonide  1 mg Nebulization BID    EPINEPHrine  0.3 mg IntraMUSCular Once    allopurinol  200 mg Oral TID    vitamin C  250 mg Oral BID    atorvastatin  80 mg Oral Daily    ferrous gluconate  324 mg Oral BID WC    finasteride  5 mg Oral Daily    miconazole   Topical BID    pantoprazole  40 mg Oral QAM AC    tamsulosin  0.4 mg Oral Daily    acyclovir  400 mg Oral TID    insulin glargine  30 Units SubCUTAneous Nightly    insulin lispro  0-4 Units SubCUTAneous TID WC    insulin lispro  0-4 Units SubCUTAneous Nightly      Infusions:    sodium chloride      dextrose       PRN Meds: sodium chloride, 1 spray, Q2H PRN  sodium chloride (Inhalant), 4 mL, PRN  sodium chloride flush, 5-40 mL, PRN  sodium chloride, , PRN  acetaminophen, 650 mg, Q6H PRN   Or  acetaminophen, 650 mg, Q6H PRN  polyethylene glycol, 17 g, Daily PRN  ondansetron, 4 mg, Q8H PRN Or  ondansetron, 4 mg, Q6H PRN  HYDROcodone 5 mg - acetaminophen, 1 tablet, Q6H PRN  glucose, 4 tablet, PRN  dextrose bolus, 125 mL, PRN   Or  dextrose bolus, 250 mL, PRN  glucagon (rDNA), 1 mg, PRN  dextrose, , Continuous PRN        Labs      Recent Results (from the past 24 hour(s))   POCT Glucose    Collection Time: 11/21/22  4:58 PM   Result Value Ref Range    POC Glucose 264 (H) 70 - 99 MG/DL   POCT Glucose    Collection Time: 11/21/22  8:11 PM   Result Value Ref Range    POC Glucose 233 (H) 70 - 99 MG/DL   High sensitivity CRP    Collection Time: 11/22/22  5:10 AM   Result Value Ref Range    CRP, High Sensitivity 42.3 (H) 0.0 - 5.0 mg/L   IgG, IgA, IgM    Collection Time: 11/22/22  5:10 AM   Result Value Ref Range    IgG, Serum 308 (L) 723 - 1,685 MG/DL    IgA <50 (L) 69 - 382 MG/DL    IgM,Serum 73 62 - 277 MG/DL   POCT Glucose    Collection Time: 11/22/22  6:49 AM   Result Value Ref Range    POC Glucose 140 (H) 70 - 99 MG/DL   CBC with Auto Differential    Collection Time: 11/22/22 10:06 AM   Result Value Ref Range    WBC 15.0 (H) 4.0 - 10.5 K/CU MM    RBC 3.04 (L) 4.6 - 6.2 M/CU MM    Hemoglobin 8.4 (L) 13.5 - 18.0 GM/DL    Hematocrit 28.1 (L) 42 - 52 %    MCV 92.4 78 - 100 FL    MCH 27.6 27 - 31 PG    MCHC 29.9 (L) 32.0 - 36.0 %    RDW 25.1 (H) 11.7 - 14.9 %    Platelets 47 (L) 808 - 440 K/CU MM    MPV 11.0 7.5 - 11.1 FL    Metamyelocytes Relative 2 (H) 0.0 %    Bands Relative 5 5 - 11 %    Segs Relative 11.0 (L) 36 - 66 %    Eosinophils % 1.0 0 - 3 %    Lymphocytes % 71.0 (H) 24 - 44 %    Monocytes % 10.0 (H) 0 - 4 %    Metamyelocytes Absolute 0.30 K/CU MM    Bands Absolute 0.75 K/CU MM    Segs Absolute 1.7 K/CU MM    Eosinophils Absolute 0.2 K/CU MM    Lymphocytes Absolute 10.6 K/CU MM    Monocytes Absolute 1.5 K/CU MM    Differential Type MANUAL DIFFERENTIAL     Anisocytosis 1+     Atypical Lymphocytes Absolute 1+    Comprehensive Metabolic Panel    Collection Time: 11/22/22 10:06 AM   Result Value Ref Range    Sodium 136 135 - 145 MMOL/L    Potassium 3.9 3.5 - 5.1 MMOL/L    Chloride 98 (L) 99 - 110 mMol/L    CO2 29 21 - 32 MMOL/L    BUN 15 6 - 23 MG/DL    Creatinine 0.5 (L) 0.9 - 1.3 MG/DL    Est, Glom Filt Rate >60 >60 mL/min/1.73m2    Glucose 270 (H) 70 - 99 MG/DL    Calcium 7.9 (L) 8.3 - 10.6 MG/DL    Albumin 3.0 (L) 3.4 - 5.0 GM/DL    Total Protein 4.7 (L) 6.4 - 8.2 GM/DL    Total Bilirubin 0.6 0.0 - 1.0 MG/DL    ALT 32 10 - 40 U/L    AST 31 15 - 37 IU/L    Alkaline Phosphatase 172 (H) 40 - 128 IU/L    Anion Gap 9 4 - 16   Magnesium    Collection Time: 11/22/22 10:06 AM   Result Value Ref Range    Magnesium 1.8 1.8 - 2.4 mg/dl   Phosphorus    Collection Time: 11/22/22 10:06 AM   Result Value Ref Range    Phosphorus 3.1 2.5 - 4.9 MG/DL   POCT Glucose    Collection Time: 11/22/22 11:22 AM   Result Value Ref Range    POC Glucose 295 (H) 70 - 99 MG/DL        Imaging/Diagnostics Last 24 Hours   No results found.     Electronically signed by Anais Bravo MD on 11/22/2022 at 2:26 PM

## 2022-11-22 NOTE — PROGRESS NOTES
HEMATOLOGY ONCOLOGY  Consultation Report    REASON FOR CONSULT  To evaluate the patient with CLL    CHIEF COMPLAINT    Chief Complaint   Patient presents with    Allergic Reaction     HISTORY OF PRESENT ILLNESS   Leonardo Golden is a 71 y.o. male with history of CLL, hypogammaglobulinemia, lung nodule, COPD, Hypoxic respiratory failure on 3l per nc, DM type II, Hyperlipidemia, BPH, presented to [address] [place] [address] on 11/18/22 with increased SOB, in the setting of rhinovirus, superimposed with atypical pneumonia. He was receiving Rituximab in our office and he started having tremors and SOB. We gave benadryl, pepcid and fluid. We gave inhaler and he was not getting better. He has SOB when he came in to cancer center. Don't think he has true reaction to rituximab. Respiratory panel performed again on 11/18/22 showed rhino enterovirus. He was admitted for persistent pneumonia and hypoxic respiratory failure. He is known to us for CLL. He was on ibrutinib until recently and he was noted to have disease progression recently. Rituximab was just started on 10/18/22 and we have to stop it because of above. He was also on IVIG for CLL induced hypogammaglobulinemia. He received IVIG on 10/26/22. I was called to evaluate him. He still has significant cough.      11/22/22: Pt seen and examined  Feels weak and tired  Fever  Productive cough        PAST MEDICAL HISTORY    Past Medical History:   Diagnosis Date    Arthritis     knees, Rt hand/wrist    BPH (benign prostatic hyperplasia)     CAD (coronary artery disease)     Cancer (HCC)     CLL--Sees Dr Forrest Desai    Diabetes mellitus [facility])     History of blood transfusion     no reaction    Hx of motion sickness     Hyperlipidemia     MDRO (multiple drug resistant organisms) resistance     abscess on buttock 10yrs ago    Migraine     last one summer 2016    Pneumonia 2016    Wears dentures     full upper--lower dentures    Wears glasses        SURGICAL HISTORY    Past Surgical History:   Procedure Laterality Date    BRONCHOSCOPY N/A 10/28/2022    BRONCHOSCOPY performed by Colette Ochoa MD at [facility]  's    x 2  last showed \"inflammation of heart\"    COLONOSCOPY  2010    DENTAL SURGERY      all teeth extracted     EYE SURGERY Right 2016    Cataract removal    FRACTURE SURGERY Left     left ankle plate and screws    KNEE SURGERY  1970    left    OTHER SURGICAL HISTORY Left 2017    groin excision    TONSILLECTOMY  late 's    age 13    TUNNELED VENOUS PORT PLACEMENT      Right upper chest       FAMILY HISTORY    Family History   Problem Relation Age of Onset    Diabetes Mother     High Blood Pressure Mother     Heart Disease Father     Other Sister         liver problems    Early Death Brother     Asthma Maternal Uncle     Colon Cancer Brother        SOCIAL HISTORY    Social History     Socioeconomic History    Marital status: Single   Tobacco Use    Smoking status: Former     Packs/day: 3.00     Years: 20.00     Pack years: 60.00     Types: Cigarettes     Start date: 10/2/1965     Quit date: 1987     Years since quittin.9    Smokeless tobacco: Never   Vaping Use    Vaping Use: Never used   Substance and Sexual Activity    Alcohol use: No    Drug use: No    Sexual activity: Not Currently       PHYSICAL EXAM    Vitals: /83   Pulse 77   Temp 98.3 °F (36.8 °C) (Oral)   Resp 24   Ht 6' (1.829 m)   Wt 155 lb 6.8 oz (70.5 kg)   SpO2 96%   BMI 21.08 kg/m²   CONSTITUTIONAL: awake, alert  EYES: palor   LUNGS: ctab  CARDIOVASCULAR: s1s2   ABDOMEN:soft nt nd bs pos  NEUROLOGIC: GI  SKIN: No rash  EXTREMITIES: no LE edema bilaterally     LABORATORY RESULTS  CBC:   Recent Labs     22  0625 22  0700   WBC 25.7* 17.4*   HGB 8.4* 8.4*   PLT 43* 48*     BMP:    Recent Labs     22  0625 22  0700   * 134*   K 4.1 4.5    100   CO2 27 26   BUN 18 17   CREATININE 0.5* 0.5* GLUCOSE 125* 317*     Hepatic:   No results for input(s): AST, ALT, ALB, BILITOT, ALKPHOS in the last 72 hours. INR: No results for input(s): INR in the last 72 hours. RADIOLOGY REPORTS  Chest X ray  Infiltrative changes of perihilar and basilar regions most consistent with   atypical pneumonia/pneumonitis accentuated by underlying advanced chronic   lung disease. Otherwise, radiographically nonacute portable chest.     ASSESSMENT  CLL  Hypogammaglobulinemia    RECOMMENDATION  He is known to us for CLL. He was on ibrutinib until recently and he was noted to have disease progression recently. We just started rituximab and we have to stop it because of tremors, SOB and fever. Resumes after acute issues resolve     He also receives IVIG for CLL induced hypogammaglobulinemia, IgG pending. Noted ID recs    Anemia/thrombocytopenia; Could be sec to CLL, infection, abx. No hemolysis. Anemia panel pending    We will follow the patient.      [address]

## 2022-11-22 NOTE — PROGRESS NOTES
Physical Therapy  Attempt/Refusal    PT / OT eval/tx attempted. On arrival pt is declining all OOB, insisting care should have started earlier in the week, educated on attempt only after orders received ; lacking receptiveness to education and demonstrating self-limiting tendencies. Will re-attempt as pt disposition allows.     Bisi Mchugh PT, DPT

## 2022-11-22 NOTE — PROGRESS NOTES
Infectious Disease Progress Note  2022   Patient Name: Jessie Meza : 1950     Assessment  Sepsis  Rhinovirus infection  P aeruginosa bacterial superinfection pneumonia  Acute on chronic hypoxic respiratory failure  Medical history of CLL, selective IgG immunodeficiency, who developed fever at the oncology center during the course of the rituximab infusion. Overall condition likely linked to COPD, Bronchiectasis, CLL and rituximab use. Review of chart which showed that the patient has had repeated positive rhinovirus PCR tests dating as far back as 2016. It is difficult to explain this phenomenon, as genome sequencing of rhinovirus is not available to prove if it is the same rhinovirus or if this is repeated infections. P aeruginosa persists  procalcitonin and CRP is on a downward trend. VGS blood culture  contaminant   Selective IgG deficiency:   Last IgG done on 10/25/2022 is 326. IgG 1 and IgG 3 deficiency  Low IgG and IgA  Comorbid conditions:      Plan  Therapeutic: continue Zosyn; continue inhaled tobramycin and oral azithromycin  IVIG 400 mg /kg single infusion. Will check with Dr. Shahnaz Rivero. Diagnostic:   F/u: CRP and procalcitonin  Other:     Reason for visit: F/u sepsis? History:? Interval history noted  Denies n/v/d/f or untoward effects of antimicrobials  Physical Exam:  Vital Signs: /79   Pulse 94   Temp 98.5 °F (36.9 °C) (Oral)   Resp 19   Ht 6' (1.829 m)   Wt 155 lb 6.8 oz (70.5 kg)   SpO2 98%   BMI 21.08 kg/m²     GGen: alert and oriented X3, no distress  Skin: no stigmata of endocarditis  Wounds: C/D/I  HEMT: AT/NC Oropharynx pink, moist, and without lesions or exudates; dentures in place  Eyes: PERRLA, EOMI, conjunctiva pink, sclera anicteric. Neck: Supple. Trachea midline. No LAD. Chest: Scattered bilateral crackles  Heart: RRR and no MRG. Abd: soft, non-distended, no tenderness, no hepatomegaly. Normoactive bowel sounds.   Ext: no clubbing, cyanosis, or edema  Catheter Site: without erythema or tenderness  LDA: CVC: Single-lumen implantable port-right internal jugular,   Neuro: Mental status intact. CN 2-12 intact and no focal sensory or motor deficits     Radiologic / Imaging / TESTING  No results found.      Labs:    Recent Results (from the past 24 hour(s))   POCT Glucose    Collection Time: 11/21/22 11:10 AM   Result Value Ref Range    POC Glucose 184 (H) 70 - 99 MG/DL   POCT Glucose    Collection Time: 11/21/22  4:58 PM   Result Value Ref Range    POC Glucose 264 (H) 70 - 99 MG/DL   POCT Glucose    Collection Time: 11/21/22  8:11 PM   Result Value Ref Range    POC Glucose 233 (H) 70 - 99 MG/DL   High sensitivity CRP    Collection Time: 11/22/22  5:10 AM   Result Value Ref Range    CRP, High Sensitivity 42.3 (H) 0.0 - 5.0 mg/L   IgG, IgA, IgM    Collection Time: 11/22/22  5:10 AM   Result Value Ref Range    IgG, Serum 308 (L) 723 - 1,685 MG/DL    IgA <50 (L) 69 - 382 MG/DL    IgM,Serum 73 62 - 277 MG/DL   POCT Glucose    Collection Time: 11/22/22  6:49 AM   Result Value Ref Range    POC Glucose 140 (H) 70 - 99 MG/DL   CBC with Auto Differential    Collection Time: 11/22/22 10:06 AM   Result Value Ref Range    WBC 15.0 (H) 4.0 - 10.5 K/CU MM    RBC 3.04 (L) 4.6 - 6.2 M/CU MM    Hemoglobin 8.4 (L) 13.5 - 18.0 GM/DL    Hematocrit 28.1 (L) 42 - 52 %    MCV 92.4 78 - 100 FL    MCH 27.6 27 - 31 PG    MCHC 29.9 (L) 32.0 - 36.0 %    RDW 25.1 (H) 11.7 - 14.9 %    Platelets 47 (L) 654 - 440 K/CU MM    MPV 11.0 7.5 - 11.1 FL     CULTURE results: Invalid input(s): BLOOD CULTURE,  URINE CULTURE, SURGICAL CULTURE    Diagnosis:  Patient Active Problem List   Diagnosis    Pneumonia    Sepsis ([facility] [address]    Hypogammaglobulinemia (HCC)    CLL (chronic lymphocytic leukemia) (HCC)    Upper respiratory infection, acute    Generalized muscle weakness    Type 2 diabetes mellitus with hyperglycemia, with long-term current use of insulin (HCC)    Poor appetite    COPD (chronic obstructive pulmonary disease) ([facility]    Neutropenia (HCC)    Other specified disorders of bone density and structure, unspecified site    Cellulitis of right upper limb    Herpesviral infection    Intestinal malabsorption    Other nonspecific abnormal finding of lung field    Iron deficiency anemia due to chronic blood loss    Other muscle spasm    Leukemia, lymphocytic, chronic (HCC)    Selective deficiency of IgG (HCC)    Acute bronchitis    Severe malnutrition (HCC)    Pneumonia due to organism    Personal history of CLL (chronic lymphocytic leukemia)    Rhinovirus infection    Anemia    Thrombocytopenia (HCC)       Active Problems  Principal Problem:    Sepsis ([facility]  Active Problems:    Pneumonia due to organism    Rhinovirus infection  Resolved Problems:    * No resolved hospital problems.  *              Electronically signed by: Electronically signed by Karen Rodriguez MD on 11/22/2022 at 11:01 AM

## 2022-11-22 NOTE — PLAN OF CARE
Problem: Discharge Planning  Goal: Discharge to home or other facility with appropriate resources  Outcome: Progressing     Problem: Pain  Goal: Verbalizes/displays adequate comfort level or baseline comfort level  Outcome: Progressing     Problem: Safety - Adult  Goal: Free from fall injury  Outcome: Progressing     Problem: Respiratory - Adult  Goal: Achieves optimal ventilation and oxygenation  Outcome: Progressing     Problem: Skin/Tissue Integrity - Adult  Goal: Skin integrity remains intact  Outcome: Progressing     Problem: Musculoskeletal - Adult  Goal: Return ADL status to a safe level of function  Outcome: Progressing     Problem: Infection - Adult  Goal: Absence of infection at discharge  Outcome: Progressing     Problem: Nutrition Deficit:  Goal: Optimize nutritional status  Outcome: Progressing

## 2022-11-22 NOTE — PROGRESS NOTES
Occupational Therapy      Attempted to see pt today. Pt adamantly refusing therapy despite education on importance of participating. Pt stated he would have participated but therapy did not see pt every day and now he won't participate in therapy. Educated pt that therapy order arrived 11/22 in the AM. Pt stated he was not going to work with anyone and that he did not have pneumonia. Pt most likely has skilled needs but does not want to participate in therapy and states he will not participate in therapy.      Please re-order in future if pt wants to participate in skilled therapy services    Jayde Denney OTR/L [id number]  1:41 PM,11/22/2022

## 2022-11-23 ENCOUNTER — APPOINTMENT (OUTPATIENT)
Dept: CT IMAGING | Age: 72
DRG: 871 | End: 2022-11-23
Payer: COMMERCIAL

## 2022-11-23 ENCOUNTER — APPOINTMENT (OUTPATIENT)
Dept: GENERAL RADIOLOGY | Age: 72
DRG: 871 | End: 2022-11-23
Payer: COMMERCIAL

## 2022-11-23 DIAGNOSIS — C91.10 CHRONIC LYMPHOCYTIC LEUKEMIA (HCC): Primary | ICD-10-CM

## 2022-11-23 LAB
ALBUMIN SERPL-MCNC: 2.9 GM/DL (ref 3.4–5)
ALP BLD-CCNC: 188 IU/L (ref 40–128)
ALT SERPL-CCNC: 30 U/L (ref 10–40)
ANION GAP SERPL CALCULATED.3IONS-SCNC: 8 MMOL/L (ref 4–16)
ANISOCYTOSIS: ABNORMAL
AST SERPL-CCNC: 26 IU/L (ref 15–37)
ATYPICAL LYMPHOCYTE ABSOLUTE COUNT: ABNORMAL
BANDED NEUTROPHILS ABSOLUTE COUNT: 1.32 K/CU MM
BANDED NEUTROPHILS RELATIVE PERCENT: 5 % (ref 5–11)
BASE EXCESS MIXED: 5.8 (ref 0–1.2)
BILIRUB SERPL-MCNC: 0.5 MG/DL (ref 0–1)
BUN BLDV-MCNC: 16 MG/DL (ref 6–23)
CALCIUM SERPL-MCNC: 7.9 MG/DL (ref 8.3–10.6)
CARBON MONOXIDE, BLOOD: 2.8 % (ref 0–5)
CHLORIDE BLD-SCNC: 100 MMOL/L (ref 99–110)
CO2 CONTENT: 31 MMOL/L (ref 19–24)
CO2: 31 MMOL/L (ref 21–32)
COMMENT: ABNORMAL
CREAT SERPL-MCNC: 0.5 MG/DL (ref 0.9–1.3)
CULTURE: NORMAL
D DIMER: >5250 NG/ML(DDU)
DIFFERENTIAL TYPE: ABNORMAL
EOSINOPHILS ABSOLUTE: 0.3 K/CU MM
EOSINOPHILS RELATIVE PERCENT: 1 % (ref 0–3)
FERRITIN: 299 NG/ML (ref 30–400)
FIBRINOGEN LEVEL: 296 MG/DL (ref 196.9–442.1)
GFR SERPL CREATININE-BSD FRML MDRD: >60 ML/MIN/1.73M2
GLUCOSE BLD-MCNC: 168 MG/DL (ref 70–99)
GLUCOSE BLD-MCNC: 209 MG/DL (ref 70–99)
GLUCOSE BLD-MCNC: 244 MG/DL (ref 70–99)
GLUCOSE BLD-MCNC: 368 MG/DL (ref 70–99)
GLUCOSE BLD-MCNC: 369 MG/DL (ref 70–99)
HCO3 ARTERIAL: 29.8 MMOL/L (ref 18–23)
HCT VFR BLD CALC: 27.2 % (ref 42–52)
HEMOGLOBIN: 8.3 GM/DL (ref 13.5–18)
HIGH SENSITIVE C-REACTIVE PROTEIN: 19 MG/L (ref 0–5)
LACTIC ACID, SEPSIS: 1.4 MMOL/L (ref 0.5–1.9)
LACTIC ACID, SEPSIS: 2.1 MMOL/L (ref 0.5–1.9)
LYMPHOCYTES ABSOLUTE: 19.4 K/CU MM
LYMPHOCYTES RELATIVE PERCENT: 74 % (ref 24–44)
Lab: NORMAL
MAGNESIUM: 1.8 MG/DL (ref 1.8–2.4)
MCH RBC QN AUTO: 27.9 PG (ref 27–31)
MCHC RBC AUTO-ENTMCNC: 30.5 % (ref 32–36)
MCV RBC AUTO: 91.6 FL (ref 78–100)
METAMYELOCYTES ABSOLUTE COUNT: 0.26 K/CU MM
METAMYELOCYTES PERCENT: 1 %
METHEMOGLOBIN ARTERIAL: 1.2 %
MONOCYTES ABSOLUTE: 3.2 K/CU MM
MONOCYTES RELATIVE PERCENT: 12 % (ref 0–4)
O2 SATURATION: 92.3 % (ref 96–97)
PCO2 ARTERIAL: 40 MMHG (ref 32–45)
PDW BLD-RTO: 25.2 % (ref 11.7–14.9)
PH BLOOD: 7.48 (ref 7.34–7.45)
PHOSPHORUS: 2.7 MG/DL (ref 2.5–4.9)
PLATELET # BLD: 52 K/CU MM (ref 140–440)
PMV BLD AUTO: 10.5 FL (ref 7.5–11.1)
PO2 ARTERIAL: 72 MMHG (ref 75–100)
POTASSIUM SERPL-SCNC: 4.1 MMOL/L (ref 3.5–5.1)
PRO-BNP: 2294 PG/ML
RBC # BLD: 2.97 M/CU MM (ref 4.6–6.2)
RBC # BLD: ABNORMAL 10*6/UL
RETICULOCYTE COUNT PCT: 2.1 % (ref 0.2–2.2)
SEGMENTED NEUTROPHILS ABSOLUTE COUNT: 1.8 K/CU MM
SEGMENTED NEUTROPHILS RELATIVE PERCENT: 7 % (ref 36–66)
SMUDGE CELLS: PRESENT
SODIUM BLD-SCNC: 139 MMOL/L (ref 135–145)
SPECIMEN: NORMAL
TOTAL PROTEIN: 4.5 GM/DL (ref 6.4–8.2)
TROPONIN T: <0.01 NG/ML
VITAMIN B-12: >2000 PG/ML (ref 211–911)
WBC # BLD: 26.3 K/CU MM (ref 4–10.5)

## 2022-11-23 PROCEDURE — 80053 COMPREHEN METABOLIC PANEL: CPT

## 2022-11-23 PROCEDURE — 82607 VITAMIN B-12: CPT

## 2022-11-23 PROCEDURE — 6360000002 HC RX W HCPCS: Performed by: INTERNAL MEDICINE

## 2022-11-23 PROCEDURE — 2580000003 HC RX 258: Performed by: INTERNAL MEDICINE

## 2022-11-23 PROCEDURE — 2000000000 HC ICU R&B

## 2022-11-23 PROCEDURE — 94660 CPAP INITIATION&MGMT: CPT

## 2022-11-23 PROCEDURE — 86141 C-REACTIVE PROTEIN HS: CPT

## 2022-11-23 PROCEDURE — 84484 ASSAY OF TROPONIN QUANT: CPT

## 2022-11-23 PROCEDURE — 85379 FIBRIN DEGRADATION QUANT: CPT

## 2022-11-23 PROCEDURE — 82803 BLOOD GASES ANY COMBINATION: CPT

## 2022-11-23 PROCEDURE — 99233 SBSQ HOSP IP/OBS HIGH 50: CPT | Performed by: INTERNAL MEDICINE

## 2022-11-23 PROCEDURE — 6370000000 HC RX 637 (ALT 250 FOR IP): Performed by: STUDENT IN AN ORGANIZED HEALTH CARE EDUCATION/TRAINING PROGRAM

## 2022-11-23 PROCEDURE — 6370000000 HC RX 637 (ALT 250 FOR IP): Performed by: INTERNAL MEDICINE

## 2022-11-23 PROCEDURE — 84100 ASSAY OF PHOSPHORUS: CPT

## 2022-11-23 PROCEDURE — 94150 VITAL CAPACITY TEST: CPT

## 2022-11-23 PROCEDURE — 2580000003 HC RX 258: Performed by: STUDENT IN AN ORGANIZED HEALTH CARE EDUCATION/TRAINING PROGRAM

## 2022-11-23 PROCEDURE — 83010 ASSAY OF HAPTOGLOBIN QUANT: CPT

## 2022-11-23 PROCEDURE — 94664 DEMO&/EVAL PT USE INHALER: CPT

## 2022-11-23 PROCEDURE — 83735 ASSAY OF MAGNESIUM: CPT

## 2022-11-23 PROCEDURE — 85384 FIBRINOGEN ACTIVITY: CPT

## 2022-11-23 PROCEDURE — 36600 WITHDRAWAL OF ARTERIAL BLOOD: CPT

## 2022-11-23 PROCEDURE — 85027 COMPLETE CBC AUTOMATED: CPT

## 2022-11-23 PROCEDURE — 2700000000 HC OXYGEN THERAPY PER DAY

## 2022-11-23 PROCEDURE — 2500000003 HC RX 250 WO HCPCS: Performed by: STUDENT IN AN ORGANIZED HEALTH CARE EDUCATION/TRAINING PROGRAM

## 2022-11-23 PROCEDURE — 51702 INSERT TEMP BLADDER CATH: CPT

## 2022-11-23 PROCEDURE — 82728 ASSAY OF FERRITIN: CPT

## 2022-11-23 PROCEDURE — 6360000004 HC RX CONTRAST MEDICATION: Performed by: INTERNAL MEDICINE

## 2022-11-23 PROCEDURE — 99232 SBSQ HOSP IP/OBS MODERATE 35: CPT | Performed by: INTERNAL MEDICINE

## 2022-11-23 PROCEDURE — 85007 BL SMEAR W/DIFF WBC COUNT: CPT

## 2022-11-23 PROCEDURE — 82962 GLUCOSE BLOOD TEST: CPT

## 2022-11-23 PROCEDURE — 71275 CT ANGIOGRAPHY CHEST: CPT

## 2022-11-23 PROCEDURE — 94640 AIRWAY INHALATION TREATMENT: CPT

## 2022-11-23 PROCEDURE — 94761 N-INVAS EAR/PLS OXIMETRY MLT: CPT

## 2022-11-23 PROCEDURE — 83605 ASSAY OF LACTIC ACID: CPT

## 2022-11-23 PROCEDURE — 71045 X-RAY EXAM CHEST 1 VIEW: CPT

## 2022-11-23 PROCEDURE — 85045 AUTOMATED RETICULOCYTE COUNT: CPT

## 2022-11-23 PROCEDURE — 94667 MNPJ CHEST WALL 1ST: CPT

## 2022-11-23 PROCEDURE — 6360000002 HC RX W HCPCS: Performed by: STUDENT IN AN ORGANIZED HEALTH CARE EDUCATION/TRAINING PROGRAM

## 2022-11-23 PROCEDURE — 83880 ASSAY OF NATRIURETIC PEPTIDE: CPT

## 2022-11-23 RX ORDER — FUROSEMIDE 10 MG/ML
40 INJECTION INTRAMUSCULAR; INTRAVENOUS ONCE
Status: COMPLETED | OUTPATIENT
Start: 2022-11-23 | End: 2022-11-23

## 2022-11-23 RX ORDER — SODIUM CHLORIDE, SODIUM LACTATE, POTASSIUM CHLORIDE, AND CALCIUM CHLORIDE .6; .31; .03; .02 G/100ML; G/100ML; G/100ML; G/100ML
500 INJECTION, SOLUTION INTRAVENOUS ONCE
Status: COMPLETED | OUTPATIENT
Start: 2022-11-23 | End: 2022-11-23

## 2022-11-23 RX ORDER — DIPHENHYDRAMINE HYDROCHLORIDE 50 MG/ML
25 INJECTION INTRAMUSCULAR; INTRAVENOUS ONCE
Status: COMPLETED | OUTPATIENT
Start: 2022-11-23 | End: 2022-11-23

## 2022-11-23 RX ORDER — ACETAMINOPHEN 325 MG/1
650 TABLET ORAL ONCE
Status: COMPLETED | OUTPATIENT
Start: 2022-11-23 | End: 2022-11-23

## 2022-11-23 RX ORDER — SODIUM CHLORIDE, SODIUM LACTATE, POTASSIUM CHLORIDE, CALCIUM CHLORIDE 600; 310; 30; 20 MG/100ML; MG/100ML; MG/100ML; MG/100ML
INJECTION, SOLUTION INTRAVENOUS CONTINUOUS
Status: DISCONTINUED | OUTPATIENT
Start: 2022-11-23 | End: 2022-11-23

## 2022-11-23 RX ORDER — METHYLPREDNISOLONE SODIUM SUCCINATE 40 MG/ML
40 INJECTION, POWDER, LYOPHILIZED, FOR SOLUTION INTRAMUSCULAR; INTRAVENOUS EVERY 12 HOURS
Status: DISCONTINUED | OUTPATIENT
Start: 2022-11-23 | End: 2022-11-25 | Stop reason: HOSPADM

## 2022-11-23 RX ORDER — FUROSEMIDE 10 MG/ML
20 INJECTION INTRAMUSCULAR; INTRAVENOUS ONCE
Status: DISCONTINUED | OUTPATIENT
Start: 2022-11-23 | End: 2022-11-23

## 2022-11-23 RX ORDER — METHYLPREDNISOLONE SODIUM SUCCINATE 40 MG/ML
40 INJECTION, POWDER, LYOPHILIZED, FOR SOLUTION INTRAMUSCULAR; INTRAVENOUS ONCE
Status: DISCONTINUED | OUTPATIENT
Start: 2022-11-23 | End: 2022-11-23

## 2022-11-23 RX ADMIN — INSULIN LISPRO 4 UNITS: 100 INJECTION, SOLUTION INTRAVENOUS; SUBCUTANEOUS at 22:14

## 2022-11-23 RX ADMIN — BUDESONIDE 1000 MCG: 0.5 INHALANT RESPIRATORY (INHALATION) at 19:15

## 2022-11-23 RX ADMIN — SODIUM CHLORIDE, PRESERVATIVE FREE 10 ML: 5 INJECTION INTRAVENOUS at 21:16

## 2022-11-23 RX ADMIN — IPRATROPIUM BROMIDE AND ALBUTEROL SULFATE 1 AMPULE: .5; 2.5 SOLUTION RESPIRATORY (INHALATION) at 17:07

## 2022-11-23 RX ADMIN — TOBRAMYCIN 300 MG: 300 SOLUTION RESPIRATORY (INHALATION) at 09:02

## 2022-11-23 RX ADMIN — ALLOPURINOL 200 MG: 100 TABLET ORAL at 15:40

## 2022-11-23 RX ADMIN — OXYCODONE HYDROCHLORIDE AND ACETAMINOPHEN 250 MG: 500 TABLET ORAL at 10:10

## 2022-11-23 RX ADMIN — GUAIFENESIN 600 MG: 600 TABLET, EXTENDED RELEASE ORAL at 21:16

## 2022-11-23 RX ADMIN — Medication 324 MG: at 10:10

## 2022-11-23 RX ADMIN — ATORVASTATIN CALCIUM 80 MG: 40 TABLET, FILM COATED ORAL at 10:10

## 2022-11-23 RX ADMIN — PIPERACILLIN AND TAZOBACTAM 4500 MG: 4; .5 INJECTION, POWDER, FOR SOLUTION INTRAVENOUS at 02:46

## 2022-11-23 RX ADMIN — FINASTERIDE 5 MG: 5 TABLET, FILM COATED ORAL at 10:10

## 2022-11-23 RX ADMIN — PIPERACILLIN AND TAZOBACTAM 4500 MG: 4; .5 INJECTION, POWDER, FOR SOLUTION INTRAVENOUS at 12:02

## 2022-11-23 RX ADMIN — MICONAZOLE NITRATE: 2 POWDER TOPICAL at 21:20

## 2022-11-23 RX ADMIN — ACYCLOVIR 400 MG: 200 CAPSULE ORAL at 21:17

## 2022-11-23 RX ADMIN — ACYCLOVIR 400 MG: 200 CAPSULE ORAL at 10:10

## 2022-11-23 RX ADMIN — SODIUM CHLORIDE, POTASSIUM CHLORIDE, SODIUM LACTATE AND CALCIUM CHLORIDE: 600; 310; 30; 20 INJECTION, SOLUTION INTRAVENOUS at 12:52

## 2022-11-23 RX ADMIN — IPRATROPIUM BROMIDE AND ALBUTEROL SULFATE 1 AMPULE: .5; 2.5 SOLUTION RESPIRATORY (INHALATION) at 19:14

## 2022-11-23 RX ADMIN — ALLOPURINOL 200 MG: 100 TABLET ORAL at 21:18

## 2022-11-23 RX ADMIN — HYDROCODONE BITARTRATE AND ACETAMINOPHEN 1 TABLET: 5; 325 TABLET ORAL at 21:17

## 2022-11-23 RX ADMIN — BUDESONIDE 1000 MCG: 0.5 INHALANT RESPIRATORY (INHALATION) at 09:01

## 2022-11-23 RX ADMIN — HYDROCODONE BITARTRATE AND ACETAMINOPHEN 1 TABLET: 5; 325 TABLET ORAL at 06:49

## 2022-11-23 RX ADMIN — OXYCODONE HYDROCHLORIDE AND ACETAMINOPHEN 250 MG: 500 TABLET ORAL at 21:18

## 2022-11-23 RX ADMIN — DIPHENHYDRAMINE HYDROCHLORIDE 25 MG: 50 INJECTION, SOLUTION INTRAMUSCULAR; INTRAVENOUS at 15:06

## 2022-11-23 RX ADMIN — IOPAMIDOL 75 ML: 755 INJECTION, SOLUTION INTRAVENOUS at 18:37

## 2022-11-23 RX ADMIN — ACYCLOVIR 400 MG: 200 CAPSULE ORAL at 15:40

## 2022-11-23 RX ADMIN — MICONAZOLE NITRATE: 2 POWDER TOPICAL at 11:24

## 2022-11-23 RX ADMIN — TOBRAMYCIN 300 MG: 300 SOLUTION RESPIRATORY (INHALATION) at 19:16

## 2022-11-23 RX ADMIN — ALLOPURINOL 200 MG: 100 TABLET ORAL at 10:10

## 2022-11-23 RX ADMIN — METHYLPREDNISOLONE SODIUM SUCCINATE 40 MG: 40 INJECTION, POWDER, FOR SOLUTION INTRAMUSCULAR; INTRAVENOUS at 15:07

## 2022-11-23 RX ADMIN — PIPERACILLIN AND TAZOBACTAM 4500 MG: 4; .5 INJECTION, POWDER, FOR SOLUTION INTRAVENOUS at 17:48

## 2022-11-23 RX ADMIN — SODIUM CHLORIDE, POTASSIUM CHLORIDE, SODIUM LACTATE AND CALCIUM CHLORIDE 500 ML: 600; 310; 30; 20 INJECTION, SOLUTION INTRAVENOUS at 10:36

## 2022-11-23 RX ADMIN — PANTOPRAZOLE SODIUM 40 MG: 40 TABLET, DELAYED RELEASE ORAL at 06:49

## 2022-11-23 RX ADMIN — IPRATROPIUM BROMIDE AND ALBUTEROL SULFATE 1 AMPULE: .5; 2.5 SOLUTION RESPIRATORY (INHALATION) at 12:26

## 2022-11-23 RX ADMIN — INSULIN LISPRO 4 UNITS: 100 INJECTION, SOLUTION INTRAVENOUS; SUBCUTANEOUS at 17:55

## 2022-11-23 RX ADMIN — AZITHROMYCIN MONOHYDRATE 250 MG: 250 TABLET ORAL at 10:10

## 2022-11-23 RX ADMIN — IMMUNE GLOBULIN (HUMAN) 30 G: 10 INJECTION INTRAVENOUS; SUBCUTANEOUS at 15:32

## 2022-11-23 RX ADMIN — FUROSEMIDE 40 MG: 10 INJECTION, SOLUTION INTRAMUSCULAR; INTRAVENOUS at 21:19

## 2022-11-23 RX ADMIN — ACETAMINOPHEN 650 MG: 325 TABLET ORAL at 10:13

## 2022-11-23 RX ADMIN — INSULIN GLARGINE 30 UNITS: 100 INJECTION, SOLUTION SUBCUTANEOUS at 22:13

## 2022-11-23 RX ADMIN — TAMSULOSIN HYDROCHLORIDE 0.4 MG: 0.4 CAPSULE ORAL at 10:10

## 2022-11-23 RX ADMIN — GUAIFENESIN 600 MG: 600 TABLET, EXTENDED RELEASE ORAL at 10:10

## 2022-11-23 RX ADMIN — Medication 324 MG: at 17:48

## 2022-11-23 ASSESSMENT — PAIN DESCRIPTION - ONSET: ONSET: ON-GOING

## 2022-11-23 ASSESSMENT — PAIN SCALES - GENERAL
PAINLEVEL_OUTOF10: 6
PAINLEVEL_OUTOF10: 0
PAINLEVEL_OUTOF10: 6
PAINLEVEL_OUTOF10: 0

## 2022-11-23 ASSESSMENT — PAIN SCALES - WONG BAKER
WONGBAKER_NUMERICALRESPONSE: 0

## 2022-11-23 ASSESSMENT — PAIN DESCRIPTION - DESCRIPTORS
DESCRIPTORS: ACHING

## 2022-11-23 ASSESSMENT — PAIN DESCRIPTION - ORIENTATION
ORIENTATION: MID
ORIENTATION: RIGHT;LEFT

## 2022-11-23 ASSESSMENT — PAIN DESCRIPTION - LOCATION
LOCATION: GENERALIZED
LOCATION: ABDOMEN;BACK
LOCATION: GENERALIZED

## 2022-11-23 ASSESSMENT — PAIN - FUNCTIONAL ASSESSMENT
PAIN_FUNCTIONAL_ASSESSMENT: ACTIVITIES ARE NOT PREVENTED
PAIN_FUNCTIONAL_ASSESSMENT: PREVENTS OR INTERFERES SOME ACTIVE ACTIVITIES AND ADLS
PAIN_FUNCTIONAL_ASSESSMENT: PREVENTS OR INTERFERES SOME ACTIVE ACTIVITIES AND ADLS

## 2022-11-23 ASSESSMENT — PAIN DESCRIPTION - FREQUENCY: FREQUENCY: CONTINUOUS

## 2022-11-23 ASSESSMENT — PAIN DESCRIPTION - PAIN TYPE: TYPE: CHRONIC PAIN

## 2022-11-23 NOTE — SIGNIFICANT EVENT
Pt having persistent tachypnea, ABG w PH 7.48/  PCO2 31, HCO3 29, Pro BNP 2294, lactic acid 2.1, CXR w \"Grossly stable appearance of a right infrahilar cavitary lesion, comparing with most recent CT. Unchanged diffuse interstitial prominence, which could be related to pulmonary edema or micronodularity seen on prior CT\". CTA Chst ordered. Cardiology consulted. Foleys catheter will be placed and pt s/w Lasix 40 mg IV stat. Based on response, further lasix can be considered overnight. Critical Care consulted. Pt to be started on BiPAP. Currently patient receiving Immune Globulins and Abx. Pt agreeable to be intubated or BiPAP if needed.  Pt will be endorsed to night team. TTE ordered

## 2022-11-23 NOTE — PLAN OF CARE
Problem: Discharge Planning  Goal: Discharge to home or other facility with appropriate resources  11/22/2022 2304 by Classie Litten, RN  Outcome: Progressing  11/22/2022 1251 by Cuauhtemoc Salinas. JOAN Hernandes  Outcome: Progressing     Problem: Pain  Goal: Verbalizes/displays adequate comfort level or baseline comfort level  11/22/2022 2304 by Classie Litten, RN  Outcome: Progressing  11/22/2022 1251 by Cuauhtemoc Salinas. JOAN Hernandes  Outcome: Progressing     Problem: Safety - Adult  Goal: Free from fall injury  11/22/2022 2304 by Classie Litten, RN  Outcome: Progressing  11/22/2022 1251 by Cuauhtemoc Salinas. JOAN Hernandes  Outcome: Progressing     Problem: Respiratory - Adult  Goal: Achieves optimal ventilation and oxygenation  11/22/2022 2304 by Classie Litten, RN  Outcome: Progressing  11/22/2022 1251 by Cuauhtemoc Salinas. JOAN Hernandes  Outcome: Progressing     Problem: Skin/Tissue Integrity - Adult  Goal: Skin integrity remains intact  11/22/2022 2304 by Classie Litten, RN  Outcome: Progressing  11/22/2022 1251 by Cuauhtemoc Salinas. JOAN Hernandes  Outcome: Progressing     Problem: Musculoskeletal - Adult  Goal: Return ADL status to a safe level of function  11/22/2022 2304 by Classie Litten, RN  Outcome: Progressing  11/22/2022 1251 by Cuauhtemoc Salinas. JOAN Hernandes  Outcome: Progressing     Problem: Infection - Adult  Goal: Absence of infection at discharge  11/22/2022 2304 by Classie Litten, RN  Outcome: Progressing  11/22/2022 1251 by Cuauhtemoc Salinas. JOAN Hernandes  Outcome: Progressing     Problem: Nutrition Deficit:  Goal: Optimize nutritional status  11/22/2022 2304 by Classie Litten, RN  Outcome: Progressing  11/22/2022 1251 by Cuauhtemoc Salinas.  JOAN Hernandes  Outcome: Progressing

## 2022-11-23 NOTE — CARE COORDINATION
Reviewed chart and discussed in IDR, plan is to transfer to ICU for an infusion. At this time plan remains home with son and [id number]. CM will continue to follow.

## 2022-11-23 NOTE — PROGRESS NOTES
Infectious Disease Progress Note  2022   Patient Name: Srini Comment : 1950     Assessment  Sepsis  Rhinovirus infection  P aeruginosa bacterial superinfection pneumonia  Acute on chronic hypoxic respiratory failure  Medical history of CLL, selective IgG immunodeficiency, who developed fever at the oncology center during the course of the rituximab infusion. Overall condition likely linked to COPD, Bronchiectasis, CLL and rituximab use. Review of chart which showed that the patient has had repeated positive rhinovirus PCR tests dating as far back as 2016. It is difficult to explain this phenomenon, as genome sequencing of rhinovirus is not available to prove if it is the same rhinovirus or if this is repeated infections. P aeruginosa persists  Improving. Moved to ICU to receive IVIG under supervision, since he reports having  VGS blood culture  contaminant   Selective IgG deficiency:   Last IgG done on 10/25/2022 is 326. IgG 1 and IgG 3 deficiency  Low IgG and IgA  Comorbid conditions:      Plan  Therapeutic: continue Zosyn; continue inhaled tobramycin and oral azithromycin  IVIG 30 g  Diagnostic:   F/u: CRP and procalcitonin  Other:     Reason for visit: F/u sepsis? History:? Interval history noted  Denies n/v/d/f or untoward effects of antimicrobials  Physical Exam:  Vital Signs: BP (!) 150/90   Pulse 97   Temp 97.9 °F (36.6 °C) (Oral)   Resp (!) 40   Ht 6' (1.829 m)   Wt 160 lb 15 oz (73 kg)   SpO2 97%   BMI 21.83 kg/m²     GGen: alert and oriented X3, no distress  Skin: no stigmata of endocarditis  Wounds: C/D/I  HEMT: AT/NC Oropharynx pink, moist, and without lesions or exudates; dentures in place  Eyes: PERRLA, EOMI, conjunctiva pink, sclera anicteric. Neck: Supple. Trachea midline. No LAD. Chest: Scattered bilateral crackles  Heart: RRR and no MRG. Abd: soft, non-distended, no tenderness, no hepatomegaly. Normoactive bowel sounds.   Ext: no clubbing, cyanosis, or edema  Catheter Site: without erythema or tenderness  LDA: CVC: Single-lumen implantable port-right internal jugular,   Neuro: Mental status intact. CN 2-12 intact and no focal sensory or motor deficits     Radiologic / Imaging / TESTING  No results found.      Labs:    Recent Results (from the past 24 hour(s))   POCT Glucose    Collection Time: 11/22/22  8:20 PM   Result Value Ref Range    POC Glucose 231 (H) 70 - 99 MG/DL   High sensitivity CRP    Collection Time: 11/23/22  5:37 AM   Result Value Ref Range    CRP, High Sensitivity 19.0 (H) 0.0 - 5.0 mg/L   CBC with Auto Differential    Collection Time: 11/23/22  5:37 AM   Result Value Ref Range    WBC 26.3 (H) 4.0 - 10.5 K/CU MM    RBC 2.97 (L) 4.6 - 6.2 M/CU MM    Hemoglobin 8.3 (L) 13.5 - 18.0 GM/DL    Hematocrit 27.2 (L) 42 - 52 %    MCV 91.6 78 - 100 FL    MCH 27.9 27 - 31 PG    MCHC 30.5 (L) 32.0 - 36.0 %    RDW 25.2 (H) 11.7 - 14.9 %    Platelets 52 (L) 457 - 440 K/CU MM    MPV 10.5 7.5 - 11.1 FL    Metamyelocytes Relative 1 (H) 0.0 %    Bands Relative 5 5 - 11 %    Segs Relative 7.0 (L) 36 - 66 %    Eosinophils % 1.0 0 - 3 %    Lymphocytes % 74.0 (H) 24 - 44 %    Monocytes % 12.0 (H) 0 - 4 %    Metamyelocytes Absolute 0.26 K/CU MM    Bands Absolute 1.32 K/CU MM    Segs Absolute 1.8 K/CU MM    Eosinophils Absolute 0.3 K/CU MM    Lymphocytes Absolute 19.4 K/CU MM    Monocytes Absolute 3.2 K/CU MM    Differential Type MANUAL DIFFERENTIAL     RBC Morphology OCCASIONAL     Anisocytosis 1+     Atypical Lymphocytes Absolute 1+     Smudge Cells PRESENT    Comprehensive Metabolic Panel    Collection Time: 11/23/22  5:37 AM   Result Value Ref Range    Sodium 139 135 - 145 MMOL/L    Potassium 4.1 3.5 - 5.1 MMOL/L    Chloride 100 99 - 110 mMol/L    CO2 31 21 - 32 MMOL/L    BUN 16 6 - 23 MG/DL    Creatinine 0.5 (L) 0.9 - 1.3 MG/DL    Est, Glom Filt Rate >60 >60 mL/min/1.73m2    Glucose 168 (H) 70 - 99 MG/DL    Calcium 7.9 (L) 8.3 - 10.6 MG/DL    Albumin 2.9 (L) 3.4 - 5.0 GM/DL    Total Protein 4.5 (L) 6.4 - 8.2 GM/DL    Total Bilirubin 0.5 0.0 - 1.0 MG/DL    ALT 30 10 - 40 U/L    AST 26 15 - 37 IU/L    Alkaline Phosphatase 188 (H) 40 - 128 IU/L    Anion Gap 8 4 - 16   Magnesium    Collection Time: 11/23/22  5:37 AM   Result Value Ref Range    Magnesium 1.8 1.8 - 2.4 mg/dl   Phosphorus    Collection Time: 11/23/22  5:37 AM   Result Value Ref Range    Phosphorus 2.7 2.5 - 4.9 MG/DL   POCT Glucose    Collection Time: 11/23/22  7:58 AM   Result Value Ref Range    POC Glucose 244 (H) 70 - 99 MG/DL   POCT Glucose    Collection Time: 11/23/22 11:33 AM   Result Value Ref Range    POC Glucose 209 (H) 70 - 99 MG/DL   Blood gas, arterial    Collection Time: 11/23/22  2:45 PM   Result Value Ref Range    pH, Bld 7.48 (H) 7.34 - 7.45    pCO2, Arterial 40.0 32 - 45 MMHG    pO2, Arterial 72 (L) 75 - 100 MMHG    Base Exc, Mixed 5.8 (H) 0 - 1.2    HCO3, Arterial 29.8 (H) 18 - 23 MMOL/L    CO2 Content 31.0 (H) 19 - 24 MMOL/L    O2 Sat 92.3 (L) 96 - 97 %    Carbon Monoxide, Blood 2.8 0 - 5 %    Methemoglobin, Arterial 1.2 <1.5 %    Comment 4LNC    Reticulocytes    Collection Time: 11/23/22  3:02 PM   Result Value Ref Range    Retic Ct Pct 2.1 0.2 - 2.20 %   Fibrinogen    Collection Time: 11/23/22  3:02 PM   Result Value Ref Range    Fibrinogen 296 196.9 - 442.1 MG/DL   Ferritin    Collection Time: 11/23/22  3:02 PM   Result Value Ref Range    Ferritin 299 30 - 400 NG/ML   Vitamin B12    Collection Time: 11/23/22  3:02 PM   Result Value Ref Range    Vitamin B-12 >2000 (H) 211 - 911 pg/ml   Brain Natriuretic Peptide    Collection Time: 11/23/22  3:02 PM   Result Value Ref Range    Pro-BNP 2,294 (H) <300 PG/ML   Troponin    Collection Time: 11/23/22  3:02 PM   Result Value Ref Range    Troponin T <0.010 <0.01 NG/ML   Lactate, Sepsis    Collection Time: 11/23/22  3:03 PM   Result Value Ref Range    Lactic Acid, Sepsis 2.1 (HH) 0.5 - 1.9 mMOL/L     CULTURE results: Invalid input(s): BLOOD CULTURE,  URINE CULTURE, SURGICAL CULTURE    Diagnosis:  Patient Active Problem List   Diagnosis    Pneumonia    Sepsis ([facility] [address]    Hypogammaglobulinemia ([address]    CLL (chronic lymphocytic leukemia) ([facility] [address]    Upper respiratory infection, acute    Generalized muscle weakness    Type 2 diabetes mellitus with hyperglycemia, with long-term current use of insulin (HCC)    Poor appetite    COPD (chronic obstructive pulmonary disease) (HCC)    Neutropenia (HCC)    Other specified disorders of bone density and structure, unspecified site    Cellulitis of right upper limb    Herpesviral infection    Intestinal malabsorption    Other nonspecific abnormal finding of lung field    Iron deficiency anemia due to chronic blood loss    Other muscle spasm    Leukemia, lymphocytic, chronic (HCC)    Selective deficiency of IgG (HCC)    Acute bronchitis    Severe malnutrition ([facility])    Pneumonia due to organism    Personal history of CLL (chronic lymphocytic leukemia)    Rhinovirus infection    Anemia    Thrombocytopenia ([facility])    Acute on chronic respiratory failure with hypoxemia ([facility])       Active Problems  Principal Problem:    Sepsis ([facility] [address]  Active Problems:    Pneumonia due to organism    Rhinovirus infection    Acute on chronic respiratory failure with hypoxemia ([facility])  Resolved Problems:    * No resolved hospital problems.  *              Electronically signed by: Electronically signed by Adam Sunshine MD on 11/23/2022 at 5:19 PM

## 2022-11-23 NOTE — CONSULTS
Subjective:   CHIEF COMPLAINT / HPI:  71year old male with underlying C LL and hypogammaglobulinemia. He has recurrent pseudomonas infection and last one about one month ago. He is admitted with worsening sob and has off and on wheeze. He has cough with difficulty bringing up sputum.        Past Medical History:  Past Medical History:   Diagnosis Date    Arthritis     knees, Rt hand/wrist    BPH (benign prostatic hyperplasia)     CAD (coronary artery disease)     Cancer (HCC)     CLL--Sees Dr Gabriela Ritter    Diabetes mellitus [facility])     History of blood transfusion     no reaction    Hx of motion sickness     Hyperlipidemia     MDRO (multiple drug resistant organisms) resistance     abscess on buttock 10yrs ago    Migraine     last one summer 2016    Pneumonia 2016    Wears dentures     full upper--lower dentures    Wears glasses        Past Surgical History:        Procedure Laterality Date    BRONCHOSCOPY N/A 10/28/2022    BRONCHOSCOPY performed by Conchis Mauricio MD at [facility]  1990's    x 2  last showed \"inflammation of heart\"    COLONOSCOPY  2010    DENTAL SURGERY      all teeth extracted     EYE SURGERY Right 08/2016    Cataract removal    FRACTURE SURGERY Left 1990's    left ankle plate and screws    KNEE SURGERY  1970    left    OTHER SURGICAL HISTORY Left 01/18/2017    groin excision    TONSILLECTOMY  late 1960's    age 13    TUNNELED VENOUS PORT PLACEMENT  2013    Right upper chest       Current Medications:    Current Facility-Administered Medications: lactated ringers infusion, , IntraVENous, Continuous  lactated ringers bolus, 500 mL, IntraVENous, Once  diphenhydrAMINE (BENADRYL) injection 25 mg, 25 mg, IntraVENous, Once  methylPREDNISolone sodium (SOLU-MEDROL) injection 40 mg, 40 mg, IntraVENous, Q12H  sodium chloride (OCEAN, BABY AYR) 0.65 % nasal spray 1 spray, 1 spray, Each Nostril, Q2H PRN  immune globulin (GAMUNEX-C) 10% solution 30 g, 30 g, IntraVENous, Once  piperacillin-tazobactam (ZOSYN) 4,500 mg in dextrose 5 % 100 mL IVPB (mini-bag), 4,500 mg, IntraVENous, Q8H  azithromycin (ZITHROMAX) tablet 250 mg, 250 mg, Oral, Daily  tobramycin (PF) (HARLEY) nebulizer solution 300 mg, 300 mg, Nebulization, BID  sodium chloride (Inhalant) 3 % nebulizer solution 4 mL, 4 mL, Nebulization, PRN  guaiFENesin (MUCINEX) extended release tablet 600 mg, 600 mg, Oral, BID  ipratropium-albuterol (DUONEB) nebulizer solution 1 ampule, 1 ampule, Inhalation, Q4H WA  budesonide (PULMICORT) nebulizer suspension 1,000 mcg, 1 mg, Nebulization, BID  EPINEPHrine PF 1 MG/ML injection (Anaphylaxis) 0.3 mg, 0.3 mg, IntraMUSCular, Once  sodium chloride flush 0.9 % injection 5-40 mL, 5-40 mL, IntraVENous, PRN  0.9 % sodium chloride infusion, , IntraVENous, PRN  acetaminophen (TYLENOL) tablet 650 mg, 650 mg, Oral, Q6H PRN **OR** acetaminophen (TYLENOL) suppository 650 mg, 650 mg, Rectal, Q6H PRN  polyethylene glycol (GLYCOLAX) packet 17 g, 17 g, Oral, Daily PRN  ondansetron (ZOFRAN-ODT) disintegrating tablet 4 mg, 4 mg, Oral, Q8H PRN **OR** ondansetron (ZOFRAN) injection 4 mg, 4 mg, IntraVENous, Q6H PRN  HYDROcodone-acetaminophen (NORCO) 5-325 MG per tablet 1 tablet, 1 tablet, Oral, Q6H PRN  allopurinol (ZYLOPRIM) tablet 200 mg, 200 mg, Oral, TID  ascorbic acid (VITAMIN C) tablet 250 mg, 250 mg, Oral, BID  atorvastatin (LIPITOR) tablet 80 mg, 80 mg, Oral, Daily  ferrous gluconate 324 (37.5 Fe) MG tablet 324 mg, 324 mg, Oral, BID WC  finasteride (PROSCAR) tablet 5 mg, 5 mg, Oral, Daily  miconazole (MICOTIN) 2 % powder, , Topical, BID  pantoprazole (PROTONIX) tablet 40 mg, 40 mg, Oral, QAM AC  tamsulosin (FLOMAX) capsule 0.4 mg, 0.4 mg, Oral, Daily  acyclovir (ZOVIRAX) capsule 400 mg, 400 mg, Oral, TID  insulin glargine (LANTUS) injection vial 30 Units, 30 Units, SubCUTAneous, Nightly  insulin lispro (HUMALOG) injection vial 0-4 Units, 0-4 Units, SubCUTAneous, TID WC  insulin lispro (HUMALOG) injection vial 0-4 Units, 0-4 Units, SubCUTAneous, Nightly  glucose chewable tablet 16 g, 4 tablet, Oral, PRN  dextrose bolus 10% 125 mL, 125 mL, IntraVENous, PRN **OR** dextrose bolus 10% 250 mL, 250 mL, IntraVENous, PRN  glucagon (rDNA) injection 1 mg, 1 mg, SubCUTAneous, PRN  dextrose 10 % infusion, , IntraVENous, Continuous PRN  Facility-Administered Medications Ordered in Other Encounters: sodium chloride flush 0.9 % injection 10 mL, 10 mL, IntraVENous, PRN    No Known Allergies    Social History:    Social History     Socioeconomic History    Marital status: Single   Tobacco Use    Smoking status: Former     Packs/day: 3.00     Years: 20.00     Pack years: 60.00     Types: Cigarettes     Start date: 10/2/1965     Quit date: 1987     Years since quittin.9    Smokeless tobacco: Never   Vaping Use    Vaping Use: Never used   Substance and Sexual Activity    Alcohol use: No    Drug use: No    Sexual activity: Not Currently       Family History:   Family History   Problem Relation Age of Onset    Diabetes Mother     High Blood Pressure Mother     Heart Disease Father     Other Sister         liver problems    Early Death Brother     Asthma Maternal Uncle     Colon Cancer Brother        Immunization:  Immunization History   Administered Date(s) Administered    COVID-19, MODERNA BLUE border, Primary or Immunocompromised, (age 12y+), IM, 100 mcg/0.5mL 2021, 2021, 2021    Influenza A ([id number]) Vaccine PF IM 2009    Influenza Vaccine, unspecified formulation 10/28/2011    Influenza Virus Vaccine 10/28/2011    Influenza, FLUARIX, FLULAVAL, FLUZONE (age 10 mo+) AND AFLURIA, (age 1 y+), PF, 0.5mL 2021    Influenza, FLUZONE (age 72 y+), High Dose, 0.7mL 2020    Pneumococcal Conjugate 13-valent ([id number]) 2020    Pneumococcal Polysaccharide ([id number]) 2016         REVIEW OF SYSTEMS:    CONSTITUTIONAL:  negative for fevers, chills, diaphoresis, activity change, appetite change, fatigue, night sweats and unexpected weight change. EYES:  negative for blurred vision, eye discharge, visual disturbance and icterus  HEENT:  negative for hearing loss, tinnitus, ear drainage, sinus pressure, nasal congestion, epistaxis and snoring  RESPIRATORY:  See HPI  CARDIOVASCULAR:  negative for chest pain, palpitations, exertional chest pressure/discomfort, edema, syncope  GASTROINTESTINAL:  negative for nausea, vomiting, diarrhea, constipation, blood in stool and abdominal pain  GENITOURINARY:  negative for frequency, dysuria and hematuria  HEMATOLOGIC/LYMPHATIC:  negative for easy bruising, bleeding and lymphadenopathy  ALLERGIC/IMMUNOLOGIC:  negative for recurrent infections, angioedema, anaphylaxis and drug reactions  ENDOCRINE:  negative for weight changes and diabetic symptoms including polyuria, polydipsia and polyphagia    MUSCULOSKELETAL:  negative for  pain, joint swelling, decreased range of motion and muscle weakness  NEUROLOGICAL:  negative for headaches, slurred speech, unilateral weakness  PSYCHIATRIC/BEHAVIORAL: negative for hallucinations, behavioral problems, confusion and agitation. Objective:   PHYSICAL EXAM:      VITALS:    Vitals:    11/23/22 0649 11/23/22 0832 11/23/22 0911 11/23/22 0923   BP:  134/87     Pulse:  (!) 124     Resp: 16 24     Temp:  97.3 °F (36.3 °C)     TempSrc:  Oral     SpO2:  97% 95% 98%   Weight:       Height:             CONSTITUTIONAL:  awake, alert, cooperative, no apparent distress, and appears stated age  NECK:  Supple, symmetrical, trachea midline, no adenopathy, thyroid symmetric, not enlarged and no tenderness  CHEST: Chest expansion equal and symmetrical, no intercostal retraction. LUNGS:  no increased work of breathing, has expiratory wheezes both lungs, no crackles. CARDIOVASCULAR: S1 and S2, no edema and no JVD  ABDOMEN:  normal bowel sounds, non-distended and no masses palpated, and no tenderness to palpation.  No hepatospleenomegaly  LYMPHADENOPATHY:  no axillary or supraclavicular adenopathy. No cervical adnenopathy  PSYCHIATRIC: Oriented to person place and time. No obvious depression or anxiety. MUSCULOSKELETAL: No obvious misalignment or effusion of the joints. No clubbing, cyanosis of the digits. RIGHT AND LEFT LOWER EXTREMITIES: No edema, no inflammation, no tenderness. SKIN:  normal skin color, texture, turgor and no redness, warmth, or swelling. No palpable nodules    DATA:         EXAMINATION:   ONE XRAY VIEW OF THE CHEST       11/19/2022 3:26 pm       COMPARISON:   11/18/2022       HISTORY:   ORDERING SYSTEM PROVIDED HISTORY: see medical records   TECHNOLOGIST PROVIDED HISTORY:   Reason for exam:->see medical records   Reason for Exam: respiratory failure       FINDINGS:   The cardial pericardial silhouette is stable in appearance. Perihilar   infiltrates have decreased when compared to the previous exam.  Interstitial   opacity is also a decreased when compared to the previous exam.  No   pneumothorax is found. No free air. No acute bony abnormality. Charlotte   catheter is again noted, unchanged in position.            Impression   Decreasing perihilar infiltrates, along with decreased interstitial   opacities, most likely reflecting decreasing interstitial edema, but   multifocal pneumonia still remains in the differential.                     Assessment:     Recurrent pneumonia pseudomonas  Ac bronchospasm prob copd  Ac on ch hypoxemic resp failure  CLL and hypogammaglobulinemia          Plan:     D/w pt  Adequate o2 adm  Vapotherm as needed  Cx  On  zosyn and tobra inhaled  Continue duoneb  Add iv steroids  Started on rituximab for CLL which pt having progressive disease  Pt to receive IVIG  Thanks will follow

## 2022-11-23 NOTE — PROGRESS NOTES
Patient instructed and educated on [employer]. Patient able to do 500 ml. Vital capacity. Patient's goal is 2750ml.  Electronically signed by Jimy De Paz RCP on 11/23/2022 at 9:35 AM

## 2022-11-23 NOTE — PROGRESS NOTES
Pt requires more intense monitoring while receiving IgG IV d/t previous reactions. New order received to transfer pt to ICU. Pt report called to Rajinder, pt transferred off unit via bed to ICU.

## 2022-11-23 NOTE — PROGRESS NOTES
HEMATOLOGY ONCOLOGY    Jessie Meza is a 71 y.o. male with history of CLL, hypogammaglobulinemia, lung nodule, COPD, Hypoxic respiratory failure on 3l per nc, DM type II, Hyperlipidemia, BPH, presented to [facility]22 with increased SOB, in the setting of rhinovirus, superimposed with atypical pneumonia. He was receiving Rituximab in our office and he started having tremors and SOB. We gave benadryl, pepcid and fluid. We gave inhaler and he was not getting better. He has SOB when he came in to cancer center. Don't think he has true reaction to rituximab. Respiratory panel performed again on 11/18/22 showed rhino enterovirus. He was admitted for persistent pneumonia and hypoxic respiratory failure. He is known to us for CLL. He was on ibrutinib until recently and he was noted to have disease progression recently. Rituximab was just started on 10/18/22 and we have to stop it because of above. He was also on IVIG for CLL induced hypogammaglobulinemia. He received IVIG on 10/26/22.     10/23/22: Ct chest:  The infiltrates seen previously in the lower lobes bilaterally for the most   part have decreased in size and conspicuity. However, there are now innumerable punctate tree-in-bud centrilobular micro   nodules, which are nonspecific but suggest inflammatory/infectious   bronchiolitis. Consider both typical and atypical etiologies. In addition, cavitary lesion has developed in the periphery of the right   lower lobe and to a lesser extent within the left upper lung. Necrotizing   infection would be primarily considered given the rapid development. Consider both typical and atypical etiologies. Enlarged mediastinal lymph nodes, similar when compared to the previous exam,   process of Lia related to history of chronic lymphocytic leukemia.        11/23/22: Pt seen and examined  Breathing and productive cough are better  No fever  No bleeding  Wants ensure  Still feels week        PAST MEDICAL HISTORY    Past Medical History:   Diagnosis Date    Arthritis     knees, Rt hand/wrist    BPH (benign prostatic hyperplasia)     CAD (coronary artery disease)     Cancer (HCC)     CLL--Sees Dr Indira Corrigan    Diabetes mellitus [facility])     History of blood transfusion     no reaction    Hx of motion sickness     Hyperlipidemia     MDRO (multiple drug resistant organisms) resistance     abscess on buttock 10yrs ago    Migraine     last one summer 2016    Pneumonia 2016    Wears dentures     full upper--lower dentures    Wears glasses        SURGICAL HISTORY    Past Surgical History:   Procedure Laterality Date    BRONCHOSCOPY N/A 10/28/2022    BRONCHOSCOPY performed by Atilio Yeung MD at [facility]  's    x 2  last showed \"inflammation of heart\"    COLONOSCOPY      DENTAL SURGERY      all teeth extracted     EYE SURGERY Right 2016    Cataract removal    FRACTURE SURGERY Left     left ankle plate and screws    KNEE SURGERY  1970    left    OTHER SURGICAL HISTORY Left 2017    groin excision    TONSILLECTOMY  late     age 13    TUNNELED VENOUS PORT PLACEMENT      Right upper chest       FAMILY HISTORY    Family History   Problem Relation Age of Onset    Diabetes Mother     High Blood Pressure Mother     Heart Disease Father     Other Sister         liver problems    Early Death Brother     Asthma Maternal Uncle     Colon Cancer Brother        SOCIAL HISTORY    Social History     Socioeconomic History    Marital status: Single   Tobacco Use    Smoking status: Former     Packs/day: 3.00     Years: 20.00     Pack years: 60.00     Types: Cigarettes     Start date: 10/2/1965     Quit date: 1987     Years since quittin.9    Smokeless tobacco: Never   Vaping Use    Vaping Use: Never used   Substance and Sexual Activity    Alcohol use: No    Drug use: No    Sexual activity: Not Currently       PHYSICAL EXAM    Vitals: /87 Pulse (!) 124   Temp 97.3 °F (36.3 °C) (Oral)   Resp 24   Ht 6' (1.829 m)   Wt 160 lb 15 oz (73 kg)   SpO2 97%   BMI 21.83 kg/m²   CONSTITUTIONAL: awake, alert, ill appearing, lying on the bed   EYES: palor   LUNGS: coarse bs bilaterally with wheeze  CARDIOVASCULAR: s1s2, tachycardia  ABDOMEN:soft nt nd bs pos  NEUROLOGIC: GI  SKIN: No rash  EXTREMITIES: no LE edema bilaterally     LABORATORY RESULTS  CBC:   Recent Labs     11/21/22  0700 11/22/22  1006 11/23/22  0537   WBC 17.4* 15.0* 26.3*   HGB 8.4* 8.4* 8.3*   PLT 48* 47* 52*     BMP:    Recent Labs     11/21/22  0700 11/22/22  1006 11/23/22  0537   * 136 139   K 4.5 3.9 4.1    98* 100   CO2 26 29 31   BUN 17 15 16   CREATININE 0.5* 0.5* 0.5*   GLUCOSE 317* 270* 168*     Hepatic:   Recent Labs     11/22/22  1006 11/23/22  0537   AST 31 26   ALT 32 30   BILITOT 0.6 0.5   ALKPHOS 172* 188*       INR: No results for input(s): INR in the last 72 hours. RADIOLOGY REPORTS  Chest X ray  Infiltrative changes of perihilar and basilar regions most consistent with   atypical pneumonia/pneumonitis accentuated by underlying advanced chronic   lung disease. Otherwise, radiographically nonacute portable chest.     ASSESSMENT  CLL  Hypogammaglobulinemia    RECOMMENDATION  He is known to us for CLL. He was on ibrutinib until recently and he was noted to have disease progression recently. We just started rituximab and we have to stop C1  because of tremors, SOB and fever, which may not be infusion reaction. Plan to resume Rituximab     Rhinovirus, P aeruginosa pna, is on inhaled tobra and oral azithro per ID. Anemia/thrombocytopenia:Could be sec to CLL, infection, abx. No hemolysis. W/U in progress including flow cytometry. Transfusion support as needed    Tachycardia    OK to proceed with IVIG, premeds ordered and discussed with nursing staff. Improve nutritional status    We will follow the patient.      [address]

## 2022-11-23 NOTE — PROGRESS NOTES
Comprehensive Nutrition Assessment    Type and Reason for Visit:  Reassess    Nutrition Recommendations/Plan:   Continue current diet and oral nutrition supplements   Please consider increase insulin coverage if medically able      Malnutrition Assessment:  Malnutrition Status:  Severe malnutrition (11/20/22 1155)    Context:  Chronic Illness       Nutrition Assessment:    Pt transferred to ICU for closer monitoring as pt is receiving IgG IV. Currently on carb controlled diet, continues with elevated BS related to steroid use, last po intakes of %, and receives standard oral nutrition supplements. Consider to increase insulin coverage as able as pt is severely malnourished. Follow as high nutrition risk. Nutrition Related Findings:    +solu-medrol, A1c 6.1, POCT greater then 200 Wound Type:  (wounds on sacrum and ankle)       Current Nutrition Intake & Therapies:    Average Meal Intake: %  Average Supplements Intake: Unable to assess  ADULT DIET; Regular; 5 carb choices (75 gm/meal)  ADULT ORAL NUTRITION SUPPLEMENT; Lunch, Dinner; Standard High Calorie/High Protein Oral Supplement    Anthropometric Measures:  Height: 6' (182.9 cm)  Ideal Body Weight (IBW): 178 lbs (81 kg)    Admission Body Weight: 161 lb 2.5 oz (73.1 kg)  Current Body Weight: 160 lb 15 oz (73 kg), 90.4 % IBW.  Weight Source: Bed Scale  Current BMI (kg/m2): 21.8  Usual Body Weight: 199 lb (90.3 kg) (06/2022)  % Weight Change (Calculated): -19  Weight Adjustment For: No Adjustment                 BMI Categories: Underweight (BMI less than 22) age over 72    Estimated Daily Nutrient Needs:  Energy Requirements Based On: Kcal/kg  Weight Used for Energy Requirements: Current  Energy (kcal/day): 8886-5653 (30-35 kcals/kg)  Weight Used for Protein Requirements: Ideal  Protein (g/day):  (1.2-1.5 g/kg IBW)  Method Used for Fluid Requirements: 1 ml/kcal  Fluid (ml/day): 2200    Nutrition Diagnosis:   Severe malnutrition, In context of chronic illness related to catabolic illness, inadequate protein-energy intake as evidenced by weight loss greater than or equal to 10% in 6 months, severe muscle loss, severe loss of subcutaneous fat (19% in 5 months)    Nutrition Interventions:   Food and/or Nutrient Delivery: Continue Current Diet, Continue Oral Nutrition Supplement  Nutrition Education/Counseling: Education initiated  Coordination of Nutrition Care: Continue to monitor while inpatient, Coordination of Care       Goals:  Previous Goal Met: Progressing toward Goal(s)  Goals: Meet at least 75% of estimated needs       Nutrition Monitoring and Evaluation:   Behavioral-Environmental Outcomes: None Identified  Food/Nutrient Intake Outcomes: Food and Nutrient Intake, Supplement Intake  Physical Signs/Symptoms Outcomes: Biochemical Data, Meal Time Behavior, Weight, Nutrition Focused Physical Findings    Discharge Planning:     Too soon to determine     Kim Zavala RD, LD  Contact: [phone]

## 2022-11-23 NOTE — PROGRESS NOTES
Physical Therapy  Attempt Note    PT eval / tx attempted today. Per RN, pt is being moved to ICU for medication administration and inc monitioring, pt states he is to have chemo today and is additionally not agreeable. Will cont to follow and attempt as able/agreeable.     Brody Pierce PT, DPT

## 2022-11-23 NOTE — PROGRESS NOTES
V2.0  [facility] [place] Hospitalist Progress Note      Name:  Gisella Millard /Age/Sex: 1950  (71 y.o. male)   MRN & CSN:  [id number] & [id number] Encounter Date/Time: 2022 2:26 PM EST    Location:  -A PCP: Rafa Vazquez MD       Hospital Day: 6    Assessment and Plan:   Gisella Millard is a 71 y.o. male with pmh of CLL on Rituxan and IVIG, hypogammaglobulinemia, lung nodule, COPD, chronic hypoxic respiratory failure on 2 L/min oxygen per nasal cannula at baseline, type 2 diabetes mellitus, HLD, BPH, multiple recurrent episodes of pneumonia and status post thoracentesis in the past.  Recently discharged on  who presents with Sepsis ([facility] [address]    Interval events:  -Patient seen and examined at bedside, vital signs stable, no acute events overnight reported  -Briefly, patient with above-mentioned history was in the infusion center and started to experience rigors and shortness of breath. Treated for allergic reaction to Rituxan initially and then transferred to the ED. On presentation patient was febrile to 101.4F, tachypneic to 24 and tachycardic to 127, requiring nonrebreather at 15 L. Patient was also placed on BiPAP for some time. Patient had leukocytosis of 38K from 104k  H&H at baseline and platelet count of 41. Respiratory viral panel on admission was positive for rhinovirus. ABG 7.3 . Chest x-ray with infiltrative changes of perihilar and basilar regions consistent with atypical pneumonia/pneumonitis accentuated by underlying advanced chronic lung disease.   Patient received cefepime/Vanco and azithromycin and ID was consulted.  - patient seen and examined at bedside, vital signs stable on 4 L/min oxygen via nasal cannula, labs with leukocytosis improving to 15k<17k<25k, platelet count 58D sputum culture from  positive for Pseudomonas, blood cultures from  positive for viridans streptococci, ID on board, patient currently on Zosyn, inhaled tobramycin and oral azithromycin, IVIG 400 mg/kg single infusion. Patient refusing PT  -11/23-patient seen and examined at bedside, vital signs with saturation stable on 3 L/min oxygen via nasal cannula down from 7 L/min on 11/20. However, patient having some tachycardia and tachypnea per morning readings. Chest x-ray was ordered showing grossly stable appearance of right infrahilar cavitary lesion, compared with most recent CT and unchanged diffuse interstitial prominence which might be related to pulmonary edema or micro nodularity seen on prior CT. Pulm was consulted and IV methylprednisolone 40 mg every 12 hours to be started. Patient having leukocytosis 26k<15k<17k, currently on Zosyn per ID. Patient to receive IVIG. Patient appeared dry for started with IV LR at 100 mill per hour for 24 hours. Patient will be monitored in ICU or stepdown. Labs with CRP downtrending 19<42<65<69<105. Ordered ABG/BNP/lactate and consulted cardiology given possible pulmonary edema.   Most recent echo 9/22 with EF 55%    Plan:  Sepsis:  -Likely in the setting of pneumonia  -Of note, patient was recently treated with IV ciprofloxacin and IV Zosyn and completed therapy on 11/14/2022  -Patient has ID follow-up on 11/17/2022 and patient was advised to continue azithromycin and inhaled tobramycin due to concern for palpitations and chronic Pseudomonas colonization  -Patient presented to the infusion clinic with symptoms concerning for sepsis, elevated CRP making sepsis/pneumonia more likely  -ID on board, will appreciate recs  Acute on chronic hypoxemic respiratory failure:  -Likely in the setting of pneumonia  -Continue to wean oxygen as able  -Respiratory viral panel as above  History of CLL:  -Follows with hematology/oncology  -Initially patient was on ibrutinib until recently, he was noted to have disease progression  -11/18/2022 he was started on rituximab but had to be stopped prior to admission due to concern for drug reaction  Severe IgG deficiency:  -Remains on monthly IVIG, last IVIG 11/26  Type 2 diabetes mellitus:  -Low-dose insulin sliding scale and Lantus  -Fingerstick glucose before every meal and at bedtime  -Hypoglycemia protocol  BPH:  -Continue tamsulosin and finasteride  Protein calorie malnutrition    Diet ADULT DIET; Regular; 5 carb choices (75 gm/meal)  ADULT ORAL NUTRITION SUPPLEMENT; Lunch, Dinner; Standard High Calorie/High Protein Oral Supplement   DVT Prophylaxis [] Lovenox, []  Heparin, [] SCDs, [] Ambulation,  [] Eliquis, [] Xarelto  [] Coumadin platelet count 18Q   Code Status Full Code   Disposition From: Home  Expected Disposition: TBD  Estimated Date of Discharge: 1-2 days  Patient requires continued admission due to waiting for final ID recs/weaning O2   Surrogate Decision Maker/ POA Son     Subjective:     Chief Complaint: Allergic Reaction       Paris Hutchison is a 71 y.o. male who presents with concern for sepsis in the setting of pneumonia     Currently patient denies any significant symptoms    Review of Systems:    Review of Systems    Review of Systems:   Constitutional: Negative. Negative for activity change, appetite change, chills, diaphoresis, fatigue, fever and unexpected weight change. HENT: Negative. Negative for congestion, dental problem, drooling, ear discharge, ear pain, facial swelling, hearing loss, mouth sores, nosebleeds, postnasal drip, rhinorrhea, sinus pressure, sinus pain, sneezing, sore throat, tinnitus, trouble swallowing and voice change. Eyes: Negative. Negative for photophobia, pain, discharge, redness, itching and visual disturbance. Respiratory: Negative. Negative for apnea, cough, choking, chest tightness, shortness of breath, wheezing and stridor. Cardiovascular: Negative. Negative for chest pain and palpitations. Gastrointestinal: Negative.   Negative for abdominal distention, abdominal pain, anal bleeding, blood in stool, constipation, diarrhea, nausea, rectal pain and vomiting. Endocrine: Negative. Negative for cold intolerance, heat intolerance, polydipsia, polyphagia and polyuria. Genitourinary: Negative. Negative for decreased urine volume, difficulty urinating, dysuria, enuresis, flank pain, frequency, genital sores, hematuria, penile discharge, penile pain, penile swelling, scrotal swelling, testicular pain and urgency. Musculoskeletal: Negative. Negative for arthralgias, back pain, gait problem, joint swelling, myalgias, neck pain and neck stiffness. Skin: Negative. Negative for color change, pallor, rash and wound. Allergic/Immunologic: Negative for environmental allergies, food allergies and immunocompromised state. Neurological: Negative. Negative for dizziness, tremors, seizures, syncope, facial asymmetry, speech difficulty, weakness, light-headedness, numbness and headaches. Hematological: Negative. Negative for adenopathy. Does not bruise/bleed easily. Psychiatric/Behavioral: Negative. Negative for agitation, behavioral problems, confusion, decreased concentration, dysphoric mood, hallucinations, self-injury, sleep disturbance and suicidal ideas. The patient is not nervous/anxious and is not hyperactive. Objective: Intake/Output Summary (Last 24 hours) at 11/23/2022 1404  Last data filed at 11/23/2022 0641  Gross per 24 hour   Intake 455 ml   Output 900 ml   Net -445 ml          Vitals:   Vitals:    11/23/22 1227   BP:    Pulse:    Resp:    Temp:    SpO2: 97%       Physical Exam:     General: NAD  Eyes: EOMI  ENT: neck supple  Cardiovascular: Regular rate. Respiratory: Clear to auscultation  Gastrointestinal: Soft, non tender  Genitourinary: no suprapubic tenderness  Musculoskeletal: No edema  Skin: warm, dry  Neuro: Alert. Psych: Mood appropriate.      Medications:   Medications:    diphenhydrAMINE  25 mg IntraVENous Once    methylPREDNISolone  40 mg IntraVENous Q12H    immune globulin  30 g IntraVENous Once piperacillin-tazobactam  4,500 mg IntraVENous Q8H    azithromycin  250 mg Oral Daily    tobramycin (PF)  300 mg Nebulization BID    guaiFENesin  600 mg Oral BID    ipratropium-albuterol  1 ampule Inhalation Q4H WA    budesonide  1 mg Nebulization BID    EPINEPHrine  0.3 mg IntraMUSCular Once    allopurinol  200 mg Oral TID    vitamin C  250 mg Oral BID    atorvastatin  80 mg Oral Daily    ferrous gluconate  324 mg Oral BID WC    finasteride  5 mg Oral Daily    miconazole   Topical BID    pantoprazole  40 mg Oral QAM AC    tamsulosin  0.4 mg Oral Daily    acyclovir  400 mg Oral TID    insulin glargine  30 Units SubCUTAneous Nightly    insulin lispro  0-4 Units SubCUTAneous TID WC    insulin lispro  0-4 Units SubCUTAneous Nightly      Infusions:    lactated ringers 100 mL/hr at 11/23/22 1252    sodium chloride 25 mL/hr at 11/23/22 1201    dextrose       PRN Meds: sodium chloride, 1 spray, Q2H PRN  sodium chloride (Inhalant), 4 mL, PRN  sodium chloride flush, 5-40 mL, PRN  sodium chloride, , PRN  acetaminophen, 650 mg, Q6H PRN   Or  acetaminophen, 650 mg, Q6H PRN  polyethylene glycol, 17 g, Daily PRN  ondansetron, 4 mg, Q8H PRN   Or  ondansetron, 4 mg, Q6H PRN  HYDROcodone 5 mg - acetaminophen, 1 tablet, Q6H PRN  glucose, 4 tablet, PRN  dextrose bolus, 125 mL, PRN   Or  dextrose bolus, 250 mL, PRN  glucagon (rDNA), 1 mg, PRN  dextrose, , Continuous PRN      Labs      Recent Results (from the past 24 hour(s))   POCT Glucose    Collection Time: 11/22/22  4:34 PM   Result Value Ref Range    POC Glucose 210 (H) 70 - 99 MG/DL   POCT Glucose    Collection Time: 11/22/22  8:20 PM   Result Value Ref Range    POC Glucose 231 (H) 70 - 99 MG/DL   High sensitivity CRP    Collection Time: 11/23/22  5:37 AM   Result Value Ref Range    CRP, High Sensitivity 19.0 (H) 0.0 - 5.0 mg/L   CBC with Auto Differential    Collection Time: 11/23/22  5:37 AM   Result Value Ref Range    WBC 26.3 (H) 4.0 - 10.5 K/CU MM    RBC 2.97 (L) 4.6 - 6.2 M/CU MM    Hemoglobin 8.3 (L) 13.5 - 18.0 GM/DL    Hematocrit 27.2 (L) 42 - 52 %    MCV 91.6 78 - 100 FL    MCH 27.9 27 - 31 PG    MCHC 30.5 (L) 32.0 - 36.0 %    RDW 25.2 (H) 11.7 - 14.9 %    Platelets 52 (L) 765 - 440 K/CU MM    MPV 10.5 7.5 - 11.1 FL    Metamyelocytes Relative 1 (H) 0.0 %    Bands Relative 5 5 - 11 %    Segs Relative 7.0 (L) 36 - 66 %    Eosinophils % 1.0 0 - 3 %    Lymphocytes % 74.0 (H) 24 - 44 %    Monocytes % 12.0 (H) 0 - 4 %    Metamyelocytes Absolute 0.26 K/CU MM    Bands Absolute 1.32 K/CU MM    Segs Absolute 1.8 K/CU MM    Eosinophils Absolute 0.3 K/CU MM    Lymphocytes Absolute 19.4 K/CU MM    Monocytes Absolute 3.2 K/CU MM    Differential Type MANUAL DIFFERENTIAL     RBC Morphology OCCASIONAL     Anisocytosis 1+     Atypical Lymphocytes Absolute 1+     Smudge Cells PRESENT    Comprehensive Metabolic Panel    Collection Time: 11/23/22  5:37 AM   Result Value Ref Range    Sodium 139 135 - 145 MMOL/L    Potassium 4.1 3.5 - 5.1 MMOL/L    Chloride 100 99 - 110 mMol/L    CO2 31 21 - 32 MMOL/L    BUN 16 6 - 23 MG/DL    Creatinine 0.5 (L) 0.9 - 1.3 MG/DL    Est, Glom Filt Rate >60 >60 mL/min/1.73m2    Glucose 168 (H) 70 - 99 MG/DL    Calcium 7.9 (L) 8.3 - 10.6 MG/DL    Albumin 2.9 (L) 3.4 - 5.0 GM/DL    Total Protein 4.5 (L) 6.4 - 8.2 GM/DL    Total Bilirubin 0.5 0.0 - 1.0 MG/DL    ALT 30 10 - 40 U/L    AST 26 15 - 37 IU/L    Alkaline Phosphatase 188 (H) 40 - 128 IU/L    Anion Gap 8 4 - 16   Magnesium    Collection Time: 11/23/22  5:37 AM   Result Value Ref Range    Magnesium 1.8 1.8 - 2.4 mg/dl   Phosphorus    Collection Time: 11/23/22  5:37 AM   Result Value Ref Range    Phosphorus 2.7 2.5 - 4.9 MG/DL   POCT Glucose    Collection Time: 11/23/22  7:58 AM   Result Value Ref Range    POC Glucose 244 (H) 70 - 99 MG/DL   POCT Glucose    Collection Time: 11/23/22 11:33 AM   Result Value Ref Range    POC Glucose 209 (H) 70 - 99 MG/DL        Imaging/Diagnostics Last 24 Hours   No results found.    Electronically signed by Anton Garzon MD on 11/23/2022 at 2:04 PM

## 2022-11-24 LAB
ALBUMIN SERPL-MCNC: 2.8 GM/DL (ref 3.4–5)
ALP BLD-CCNC: 187 IU/L (ref 40–128)
ALT SERPL-CCNC: 24 U/L (ref 10–40)
ANION GAP SERPL CALCULATED.3IONS-SCNC: 13 MMOL/L (ref 4–16)
ANISOCYTOSIS: ABNORMAL
AST SERPL-CCNC: 14 IU/L (ref 15–37)
BANDED NEUTROPHILS ABSOLUTE COUNT: 1.31 K/CU MM
BANDED NEUTROPHILS RELATIVE PERCENT: 3 % (ref 5–11)
BASE EXCESS MIXED: 6.4 (ref 0–1.2)
BILIRUB SERPL-MCNC: 0.6 MG/DL (ref 0–1)
BUN BLDV-MCNC: 28 MG/DL (ref 6–23)
C-REACTIVE PROTEIN, HIGH SENSITIVITY: 9.4 MG/L
CALCIUM SERPL-MCNC: 7.8 MG/DL (ref 8.3–10.6)
CARBON MONOXIDE, BLOOD: 3.3 % (ref 0–5)
CHLORIDE BLD-SCNC: 93 MMOL/L (ref 99–110)
CO2 CONTENT: 31.8 MMOL/L (ref 19–24)
CO2: 26 MMOL/L (ref 21–32)
COMMENT: ABNORMAL
CREAT SERPL-MCNC: 0.8 MG/DL (ref 0.9–1.3)
DIFFERENTIAL TYPE: ABNORMAL
EKG ATRIAL RATE: 94 BPM
EKG DIAGNOSIS: NORMAL
EKG P AXIS: 56 DEGREES
EKG P-R INTERVAL: 148 MS
EKG Q-T INTERVAL: 364 MS
EKG QRS DURATION: 86 MS
EKG QTC CALCULATION (BAZETT): 455 MS
EKG R AXIS: 39 DEGREES
EKG T AXIS: 62 DEGREES
EKG VENTRICULAR RATE: 94 BPM
GFR SERPL CREATININE-BSD FRML MDRD: >60 ML/MIN/1.73M2
GLUCOSE BLD-MCNC: 286 MG/DL (ref 70–99)
GLUCOSE BLD-MCNC: 352 MG/DL (ref 70–99)
GLUCOSE BLD-MCNC: 368 MG/DL (ref 70–99)
GLUCOSE BLD-MCNC: 420 MG/DL (ref 70–99)
GLUCOSE BLD-MCNC: 479 MG/DL (ref 70–99)
GLUCOSE BLD-MCNC: 528 MG/DL (ref 70–99)
GLUCOSE BLD-MCNC: 531 MG/DL (ref 70–99)
GLUCOSE BLD-MCNC: 547 MG/DL (ref 70–99)
HCO3 ARTERIAL: 30.5 MMOL/L (ref 18–23)
HCT VFR BLD CALC: 29.1 % (ref 42–52)
HEMOGLOBIN: 8.5 GM/DL (ref 13.5–18)
LYMPHOCYTES ABSOLUTE: 36.5 K/CU MM
LYMPHOCYTES RELATIVE PERCENT: 84 % (ref 24–44)
MAGNESIUM: 1.9 MG/DL (ref 1.8–2.4)
MCH RBC QN AUTO: 27.3 PG (ref 27–31)
MCHC RBC AUTO-ENTMCNC: 29.2 % (ref 32–36)
MCV RBC AUTO: 93.6 FL (ref 78–100)
METHEMOGLOBIN ARTERIAL: 0.6 %
MONOCYTES ABSOLUTE: 0.9 K/CU MM
MONOCYTES RELATIVE PERCENT: 2 % (ref 0–4)
O2 SATURATION: 93 % (ref 96–97)
PCO2 ARTERIAL: 41 MMHG (ref 32–45)
PDW BLD-RTO: 24.8 % (ref 11.7–14.9)
PH BLOOD: 7.48 (ref 7.34–7.45)
PHOSPHORUS: 3.7 MG/DL (ref 2.5–4.9)
PLATELET # BLD: 58 K/CU MM (ref 140–440)
PMV BLD AUTO: 10.7 FL (ref 7.5–11.1)
PO2 ARTERIAL: 70 MMHG (ref 75–100)
POLYCHROMASIA: ABNORMAL
POTASSIUM SERPL-SCNC: 5.1 MMOL/L (ref 3.5–5.1)
PRO-BNP: 2312 PG/ML
PROCALCITONIN: 0.18
RBC # BLD: 3.11 M/CU MM (ref 4.6–6.2)
SEGMENTED NEUTROPHILS ABSOLUTE COUNT: 4.8 K/CU MM
SEGMENTED NEUTROPHILS RELATIVE PERCENT: 11 % (ref 36–66)
SMUDGE CELLS: PRESENT
SODIUM BLD-SCNC: 132 MMOL/L (ref 135–145)
TOTAL PROTEIN: 5 GM/DL (ref 6.4–8.2)
TROPONIN T: <0.01 NG/ML
WBC # BLD: 43.5 K/CU MM (ref 4–10.5)
WBC # BLD: ABNORMAL 10*3/UL

## 2022-11-24 PROCEDURE — 83880 ASSAY OF NATRIURETIC PEPTIDE: CPT

## 2022-11-24 PROCEDURE — 6360000002 HC RX W HCPCS: Performed by: INTERNAL MEDICINE

## 2022-11-24 PROCEDURE — 93005 ELECTROCARDIOGRAM TRACING: CPT | Performed by: STUDENT IN AN ORGANIZED HEALTH CARE EDUCATION/TRAINING PROGRAM

## 2022-11-24 PROCEDURE — 6360000002 HC RX W HCPCS: Performed by: STUDENT IN AN ORGANIZED HEALTH CARE EDUCATION/TRAINING PROGRAM

## 2022-11-24 PROCEDURE — 6370000000 HC RX 637 (ALT 250 FOR IP): Performed by: INTERNAL MEDICINE

## 2022-11-24 PROCEDURE — 87070 CULTURE OTHR SPECIMN AEROBIC: CPT

## 2022-11-24 PROCEDURE — 84100 ASSAY OF PHOSPHORUS: CPT

## 2022-11-24 PROCEDURE — 82962 GLUCOSE BLOOD TEST: CPT

## 2022-11-24 PROCEDURE — 85025 COMPLETE CBC W/AUTO DIFF WBC: CPT

## 2022-11-24 PROCEDURE — 86140 C-REACTIVE PROTEIN: CPT

## 2022-11-24 PROCEDURE — 36591 DRAW BLOOD OFF VENOUS DEVICE: CPT

## 2022-11-24 PROCEDURE — 87449 NOS EACH ORGANISM AG IA: CPT

## 2022-11-24 PROCEDURE — 2580000003 HC RX 258: Performed by: INTERNAL MEDICINE

## 2022-11-24 PROCEDURE — 85007 BL SMEAR W/DIFF WBC COUNT: CPT

## 2022-11-24 PROCEDURE — 94761 N-INVAS EAR/PLS OXIMETRY MLT: CPT

## 2022-11-24 PROCEDURE — 80053 COMPREHEN METABOLIC PANEL: CPT

## 2022-11-24 PROCEDURE — 87205 SMEAR GRAM STAIN: CPT

## 2022-11-24 PROCEDURE — 83735 ASSAY OF MAGNESIUM: CPT

## 2022-11-24 PROCEDURE — 85027 COMPLETE CBC AUTOMATED: CPT

## 2022-11-24 PROCEDURE — 2500000003 HC RX 250 WO HCPCS: Performed by: STUDENT IN AN ORGANIZED HEALTH CARE EDUCATION/TRAINING PROGRAM

## 2022-11-24 PROCEDURE — 87102 FUNGUS ISOLATION CULTURE: CPT

## 2022-11-24 PROCEDURE — 93010 ELECTROCARDIOGRAM REPORT: CPT | Performed by: INTERNAL MEDICINE

## 2022-11-24 PROCEDURE — 6370000000 HC RX 637 (ALT 250 FOR IP): Performed by: STUDENT IN AN ORGANIZED HEALTH CARE EDUCATION/TRAINING PROGRAM

## 2022-11-24 PROCEDURE — 2700000000 HC OXYGEN THERAPY PER DAY

## 2022-11-24 PROCEDURE — 99232 SBSQ HOSP IP/OBS MODERATE 35: CPT | Performed by: INTERNAL MEDICINE

## 2022-11-24 PROCEDURE — 99223 1ST HOSP IP/OBS HIGH 75: CPT | Performed by: INTERNAL MEDICINE

## 2022-11-24 PROCEDURE — 94150 VITAL CAPACITY TEST: CPT

## 2022-11-24 PROCEDURE — 2000000000 HC ICU R&B

## 2022-11-24 PROCEDURE — 87385 HISTOPLASMA CAPSUL AG IA: CPT

## 2022-11-24 PROCEDURE — 84484 ASSAY OF TROPONIN QUANT: CPT

## 2022-11-24 PROCEDURE — 87305 ASPERGILLUS AG IA: CPT

## 2022-11-24 PROCEDURE — 94640 AIRWAY INHALATION TREATMENT: CPT

## 2022-11-24 PROCEDURE — 2060000000 HC ICU INTERMEDIATE R&B

## 2022-11-24 PROCEDURE — 36600 WITHDRAWAL OF ARTERIAL BLOOD: CPT

## 2022-11-24 PROCEDURE — 94669 MECHANICAL CHEST WALL OSCILL: CPT

## 2022-11-24 PROCEDURE — 94664 DEMO&/EVAL PT USE INHALER: CPT

## 2022-11-24 PROCEDURE — 89220 SPUTUM SPECIMEN COLLECTION: CPT

## 2022-11-24 PROCEDURE — 94660 CPAP INITIATION&MGMT: CPT

## 2022-11-24 PROCEDURE — 82803 BLOOD GASES ANY COMBINATION: CPT

## 2022-11-24 PROCEDURE — 84145 PROCALCITONIN (PCT): CPT

## 2022-11-24 RX ORDER — IPRATROPIUM BROMIDE AND ALBUTEROL SULFATE 2.5; .5 MG/3ML; MG/3ML
1 SOLUTION RESPIRATORY (INHALATION) EVERY 6 HOURS PRN
Status: DISCONTINUED | OUTPATIENT
Start: 2022-11-24 | End: 2022-11-25 | Stop reason: HOSPADM

## 2022-11-24 RX ORDER — INSULIN LISPRO 100 [IU]/ML
16 INJECTION, SOLUTION INTRAVENOUS; SUBCUTANEOUS ONCE
Status: COMPLETED | OUTPATIENT
Start: 2022-11-24 | End: 2022-11-24

## 2022-11-24 RX ORDER — INSULIN GLARGINE 100 [IU]/ML
45 INJECTION, SOLUTION SUBCUTANEOUS NIGHTLY
Status: DISCONTINUED | OUTPATIENT
Start: 2022-11-24 | End: 2022-11-25

## 2022-11-24 RX ORDER — ALBUTEROL SULFATE 90 UG/1
2 AEROSOL, METERED RESPIRATORY (INHALATION) 3 TIMES DAILY
Status: DISCONTINUED | OUTPATIENT
Start: 2022-11-24 | End: 2022-11-25 | Stop reason: HOSPADM

## 2022-11-24 RX ORDER — FUROSEMIDE 10 MG/ML
40 INJECTION INTRAMUSCULAR; INTRAVENOUS 2 TIMES DAILY
Status: DISCONTINUED | OUTPATIENT
Start: 2022-11-24 | End: 2022-11-25 | Stop reason: HOSPADM

## 2022-11-24 RX ORDER — INSULIN LISPRO 100 [IU]/ML
0-16 INJECTION, SOLUTION INTRAVENOUS; SUBCUTANEOUS
Status: DISCONTINUED | OUTPATIENT
Start: 2022-11-24 | End: 2022-11-25 | Stop reason: HOSPADM

## 2022-11-24 RX ORDER — INSULIN LISPRO 100 [IU]/ML
0-8 INJECTION, SOLUTION INTRAVENOUS; SUBCUTANEOUS
Status: DISCONTINUED | OUTPATIENT
Start: 2022-11-24 | End: 2022-11-24

## 2022-11-24 RX ORDER — INSULIN LISPRO 100 [IU]/ML
15 INJECTION, SOLUTION INTRAVENOUS; SUBCUTANEOUS
Status: DISCONTINUED | OUTPATIENT
Start: 2022-11-24 | End: 2022-11-25

## 2022-11-24 RX ORDER — INSULIN LISPRO 100 [IU]/ML
0-4 INJECTION, SOLUTION INTRAVENOUS; SUBCUTANEOUS NIGHTLY
Status: DISCONTINUED | OUTPATIENT
Start: 2022-11-24 | End: 2022-11-24

## 2022-11-24 RX ORDER — FUROSEMIDE 10 MG/ML
40 INJECTION INTRAMUSCULAR; INTRAVENOUS ONCE
Status: COMPLETED | OUTPATIENT
Start: 2022-11-24 | End: 2022-11-24

## 2022-11-24 RX ADMIN — INSULIN LISPRO 16 UNITS: 100 INJECTION, SOLUTION INTRAVENOUS; SUBCUTANEOUS at 16:34

## 2022-11-24 RX ADMIN — MICONAZOLE NITRATE: 2 POWDER TOPICAL at 20:46

## 2022-11-24 RX ADMIN — GUAIFENESIN 600 MG: 600 TABLET, EXTENDED RELEASE ORAL at 08:51

## 2022-11-24 RX ADMIN — HYDROCODONE BITARTRATE AND ACETAMINOPHEN 1 TABLET: 5; 325 TABLET ORAL at 18:10

## 2022-11-24 RX ADMIN — OXYCODONE HYDROCHLORIDE AND ACETAMINOPHEN 250 MG: 500 TABLET ORAL at 08:51

## 2022-11-24 RX ADMIN — PANTOPRAZOLE SODIUM 40 MG: 40 TABLET, DELAYED RELEASE ORAL at 08:51

## 2022-11-24 RX ADMIN — TOBRAMYCIN 300 MG: 300 SOLUTION RESPIRATORY (INHALATION) at 21:11

## 2022-11-24 RX ADMIN — ACYCLOVIR 400 MG: 200 CAPSULE ORAL at 20:11

## 2022-11-24 RX ADMIN — INSULIN LISPRO 4 UNITS: 100 INJECTION, SOLUTION INTRAVENOUS; SUBCUTANEOUS at 08:52

## 2022-11-24 RX ADMIN — ALBUTEROL SULFATE 2 PUFF: 90 AEROSOL, METERED RESPIRATORY (INHALATION) at 21:03

## 2022-11-24 RX ADMIN — INSULIN HUMAN 20 UNITS: 100 INJECTION, SUSPENSION SUBCUTANEOUS at 14:01

## 2022-11-24 RX ADMIN — PIPERACILLIN AND TAZOBACTAM 4500 MG: 4; .5 INJECTION, POWDER, FOR SOLUTION INTRAVENOUS at 03:10

## 2022-11-24 RX ADMIN — IPRATROPIUM BROMIDE AND ALBUTEROL SULFATE 1 AMPULE: .5; 2.5 SOLUTION RESPIRATORY (INHALATION) at 07:19

## 2022-11-24 RX ADMIN — Medication 324 MG: at 16:35

## 2022-11-24 RX ADMIN — METHYLPREDNISOLONE SODIUM SUCCINATE 40 MG: 40 INJECTION, POWDER, FOR SOLUTION INTRAMUSCULAR; INTRAVENOUS at 00:34

## 2022-11-24 RX ADMIN — HYDROCODONE BITARTRATE AND ACETAMINOPHEN 1 TABLET: 5; 325 TABLET ORAL at 08:51

## 2022-11-24 RX ADMIN — INSULIN LISPRO 15 UNITS: 100 INJECTION, SOLUTION INTRAVENOUS; SUBCUTANEOUS at 13:52

## 2022-11-24 RX ADMIN — GUAIFENESIN 600 MG: 600 TABLET, EXTENDED RELEASE ORAL at 20:11

## 2022-11-24 RX ADMIN — ALBUTEROL SULFATE 2 PUFF: 90 AEROSOL, METERED RESPIRATORY (INHALATION) at 14:54

## 2022-11-24 RX ADMIN — Medication 324 MG: at 08:52

## 2022-11-24 RX ADMIN — TAMSULOSIN HYDROCHLORIDE 0.4 MG: 0.4 CAPSULE ORAL at 08:51

## 2022-11-24 RX ADMIN — MICONAZOLE NITRATE: 2 POWDER TOPICAL at 10:51

## 2022-11-24 RX ADMIN — ACYCLOVIR 400 MG: 200 CAPSULE ORAL at 14:12

## 2022-11-24 RX ADMIN — FUROSEMIDE 40 MG: 10 INJECTION, SOLUTION INTRAMUSCULAR; INTRAVENOUS at 18:05

## 2022-11-24 RX ADMIN — PIPERACILLIN AND TAZOBACTAM 4500 MG: 4; .5 INJECTION, POWDER, FOR SOLUTION INTRAVENOUS at 20:08

## 2022-11-24 RX ADMIN — INSULIN GLARGINE 45 UNITS: 100 INJECTION, SOLUTION SUBCUTANEOUS at 20:42

## 2022-11-24 RX ADMIN — INSULIN LISPRO 15 UNITS: 100 INJECTION, SOLUTION INTRAVENOUS; SUBCUTANEOUS at 16:34

## 2022-11-24 RX ADMIN — OXYCODONE HYDROCHLORIDE AND ACETAMINOPHEN 250 MG: 500 TABLET ORAL at 20:11

## 2022-11-24 RX ADMIN — ACYCLOVIR 400 MG: 200 CAPSULE ORAL at 08:51

## 2022-11-24 RX ADMIN — INSULIN LISPRO 16 UNITS: 100 INJECTION, SOLUTION INTRAVENOUS; SUBCUTANEOUS at 18:05

## 2022-11-24 RX ADMIN — AZITHROMYCIN MONOHYDRATE 250 MG: 250 TABLET ORAL at 08:52

## 2022-11-24 RX ADMIN — METHYLPREDNISOLONE SODIUM SUCCINATE 40 MG: 40 INJECTION, POWDER, FOR SOLUTION INTRAMUSCULAR; INTRAVENOUS at 10:49

## 2022-11-24 RX ADMIN — ALLOPURINOL 200 MG: 100 TABLET ORAL at 14:12

## 2022-11-24 RX ADMIN — Medication 2 PUFF: at 14:54

## 2022-11-24 RX ADMIN — INSULIN LISPRO 16 UNITS: 100 INJECTION, SOLUTION INTRAVENOUS; SUBCUTANEOUS at 20:41

## 2022-11-24 RX ADMIN — FUROSEMIDE 40 MG: 10 INJECTION, SOLUTION INTRAMUSCULAR; INTRAVENOUS at 08:52

## 2022-11-24 RX ADMIN — Medication 2 PUFF: at 21:04

## 2022-11-24 RX ADMIN — TOBRAMYCIN 300 MG: 300 SOLUTION RESPIRATORY (INHALATION) at 07:19

## 2022-11-24 RX ADMIN — ALLOPURINOL 200 MG: 100 TABLET ORAL at 20:11

## 2022-11-24 RX ADMIN — INSULIN LISPRO 8 UNITS: 100 INJECTION, SOLUTION INTRAVENOUS; SUBCUTANEOUS at 12:26

## 2022-11-24 RX ADMIN — FINASTERIDE 5 MG: 5 TABLET, FILM COATED ORAL at 08:52

## 2022-11-24 RX ADMIN — ATORVASTATIN CALCIUM 80 MG: 40 TABLET, FILM COATED ORAL at 08:52

## 2022-11-24 RX ADMIN — PIPERACILLIN AND TAZOBACTAM 4500 MG: 4; .5 INJECTION, POWDER, FOR SOLUTION INTRAVENOUS at 10:51

## 2022-11-24 RX ADMIN — BUDESONIDE 1000 MCG: 0.5 INHALANT RESPIRATORY (INHALATION) at 07:19

## 2022-11-24 RX ADMIN — ALLOPURINOL 200 MG: 100 TABLET ORAL at 08:51

## 2022-11-24 RX ADMIN — BUDESONIDE 1000 MCG: 0.5 INHALANT RESPIRATORY (INHALATION) at 21:09

## 2022-11-24 ASSESSMENT — PAIN SCALES - WONG BAKER

## 2022-11-24 ASSESSMENT — PAIN DESCRIPTION - ORIENTATION: ORIENTATION: RIGHT;LEFT

## 2022-11-24 ASSESSMENT — PAIN DESCRIPTION - LOCATION: LOCATION: SHOULDER

## 2022-11-24 ASSESSMENT — PAIN DESCRIPTION - DESCRIPTORS: DESCRIPTORS: ACHING

## 2022-11-24 ASSESSMENT — PAIN SCALES - GENERAL: PAINLEVEL_OUTOF10: 6

## 2022-11-24 NOTE — PROGRESS NOTES
V2.0  [facility] [place] Note      Name:  Chaparrita Ingram /Age/Sex: 1950  (71 y.o. male)   MRN & CSN:  [id number] & [id number] Encounter Date/Time: 2022 2:26 PM EST    Location:  -A PCP: Julio Franz MD       Hospital Day: 7    Assessment and Plan:   Chaparrita Ingram is a 71 y.o. male with pmh of CLL on Rituxan and IVIG, hypogammaglobulinemia, lung nodule, COPD, chronic hypoxic respiratory failure on 2 L/min oxygen per nasal cannula at baseline, type 2 diabetes mellitus, HLD, BPH, multiple recurrent episodes of pneumonia and status post thoracentesis in the past.  Recently discharged on  who presents with Sepsis ([facility] [address]    Interval events:  -Patient seen and examined at bedside, vital signs stable, no acute events overnight reported  -Briefly, patient with above-mentioned history was in the infusion center and started to experience rigors and shortness of breath. Treated for allergic reaction to Rituxan initially and then transferred to the ED. On presentation patient was febrile to 101.4F, tachypneic to 24 and tachycardic to 127, requiring nonrebreather at 15 L. Patient was also placed on BiPAP for some time. Patient had leukocytosis of 38K from 104k  H&H at baseline and platelet count of 41. Respiratory viral panel on admission was positive for rhinovirus. ABG 7.3 . Chest x-ray with infiltrative changes of perihilar and basilar regions consistent with atypical pneumonia/pneumonitis accentuated by underlying advanced chronic lung disease.   Patient received cefepime/Vanco and azithromycin and ID was consulted.  - patient seen and examined at bedside, vital signs stable on 4 L/min oxygen via nasal cannula, labs with leukocytosis improving to 15k<17k<25k, platelet count 45B sputum culture from  positive for Pseudomonas, blood cultures from  positive for viridans streptococci, ID on board, patient currently on Zosyn, inhaled tobramycin and oral azithromycin, IVIG 400 mg/kg single infusion. Patient refusing PT  -11/23-VSS 3 L/min oxygen via nasal cannula down from 7 L/min on 11/20. Tachycardic/ tachypnic. CXR-stable appearance of R-infrahilar cavitary lesion, compared with most recent CT and unchanged diffuse interstitial prominence which might be related to pulmonary edema or micro nodularity seen on prior CT. Pulm Cx ,pt s/w IV methylprednisolone 40 mg every 12h . Leukocytosis 26k<15k<17k, currently on Zosyn per ID. Pt rec'd IVIg ithout complications. Transferred to ICU for monitoring as Stepdown. Labs CRP downtrending 19<42<65<69<105. Ordered ABG/BNP/lactate(PH 7.48/  PCO2 31, HCO3 29, Pro BNP 2294, lactic acid 2.1)  and consulted cardiology given possible pulmonary edema. Most recent echo 9/22 with EF 55%. Pt having persistent tachypnea, CTPA ordered. [facility] catheter placed and pt s/w Lasix 40 mg IV stat. Case d/w  Critical Care consulted. Pt started on BiPAP. Pt agreeable to be intubated  if needed. 11/24-Pt seen & examined at bedside, VSS currently on BiPAP, weaned to , tachypnea slightly improved, Alert and oriented and responds accordingly/follows commands, labs from AM pending, ABG ordered, CTPA- \"1. Negative for pulmonary embolus. 2. Multiple large mediastinal lymph nodes. These appear larger than previously seen. 3. Extensive bilateral ground-glass opacities and tiny lung nodules. Possibility of acute respiratory distress syndrome 4. Probable atypical pneumonia. Aspergillosis could be in the differential. This is similar in appearance to the previous study. 5. Splenomegaly without change\"Sputum Cx/ Fungitel/ GM / Sputum smear ordered. No new growth on Septic workup. Pt s/p IgG infusion with no acute complications in PM 45/72, currently on Zosyn IV/ Acyclovir and Inhaled tobramycin per ID, s/w steroids, Pulm onboard. Pt s/p 40 mg IV lasix 11/24, will give lasix 40 mg IV today, and follow TTE, will touch base with cardiology.  Will order EKG and trops. Hyperglycemia to 470's reported, given 5u Insulin R, switched to medium dose insulin SS, Fsg to [facility] for now and Endocrine consulted and paged. Leukocytosis worsening WBC count 43k<26k, d/w Hem- Ordered for manual differential and Flow cytometery . Pt s/w Lasix 40 mg IV Bid per cards. Crp downtrending 9.4<19<42<65, procal 0.176<<1.97<5.5    Plan:  Sepsis:  Worsening b/l PNA , possible ARDS in evolution  -Likely in the setting of pneumonia  -Of note, patient was recently treated with IV ciprofloxacin and IV Zosyn and completed therapy on 11/14/2022  -Patient has ID follow-up on 11/17/2022 and patient was advised to continue azithromycin and inhaled tobramycin due to concern for palpitations and chronic Pseudomonas colonization  -Patient presented to the infusion clinic with symptoms concerning for sepsis, elevated CRP making sepsis/pneumonia more likely  -ID on board, will appreciate recs        -Critical care consult        -If patient meets intubation criterion, will need lung protective strategy    per ARDS protocol/ low tidal volume mechanical ventilation/ High PEEP        - Curently, patiant on BiPAP  Leukocytosis: likely iso PNA, contributed by steroids  Acute on chronic hypoxemic respiratory failure:  ADHF  -Likely in the setting of pneumonia , worsening , c/f ARDS vs ADHF  -BiPAP as needed  -Respiratory viral panel as above        -TTE         -Cardiology Consult        -s/p Lasix 40 mg IV        -BNP 2200<<<700's   History of CLL:  -Follows with hematology/oncology  -Initially patient was on ibrutinib until recently, he was noted to have disease progression  -11/18/2022 he was started on rituximab but had to be stopped prior to admission due to concern for drug reaction  Severe IgG deficiency:  -Remains on monthly IVIG, last IVIG 11/26  Type 2 diabetes mellitus:  -Low-dose insulin sliding scale and Lantus  -Fingerstick glucose before every meal and at bedtime  -Hypoglycemia protocol  BPH:  -Continue tamsulosin and finasteride  Protein calorie malnutrition    Diet ADULT DIET; Regular; 5 carb choices (75 gm/meal)  ADULT ORAL NUTRITION SUPPLEMENT; Lunch, Dinner; Standard High Calorie/High Protein Oral Supplement   DVT Prophylaxis [] Lovenox, []  Heparin, [] SCDs, [] Ambulation,  [] Eliquis, [] Xarelto  [] Coumadin platelet count 30Q   Code Status Full Code   Disposition From: Home  Expected Disposition: TBD  Estimated Date of Discharge: TBD  Patient requires continued admission due to currently requiring BiPAP,    Surrogate Decision Maker/ POA Son     Subjective:     Chief Complaint: Allergic Reaction       Danielle Joel is a 71 y.o. male who presents with concern for sepsis in the setting of pneumonia     Currently patient denies any significant symptoms    Review of Systems:    Review of Systems    Review of Systems:   Constitutional: Negative. Negative for activity change, appetite change, chills, diaphoresis, fatigue, fever and unexpected weight change. HENT: Negative. Negative for congestion, dental problem, drooling, ear discharge, ear pain, facial swelling, hearing loss, mouth sores, nosebleeds, postnasal drip, rhinorrhea, sinus pressure, sinus pain, sneezing, sore throat, tinnitus, trouble swallowing and voice change. Eyes: Negative. Negative for photophobia, pain, discharge, redness, itching and visual disturbance. Respiratory: Negative. Negative for apnea, cough, choking, chest tightness, shortness of breath, wheezing and stridor. Cardiovascular: Negative. Negative for chest pain and palpitations. Gastrointestinal: Negative. Negative for abdominal distention, abdominal pain, anal bleeding, blood in stool, constipation, diarrhea, nausea, rectal pain and vomiting. Endocrine: Negative. Negative for cold intolerance, heat intolerance, polydipsia, polyphagia and polyuria. Genitourinary: Negative.   Negative for decreased urine volume, difficulty urinating, dysuria, enuresis, flank pain, frequency, genital sores, hematuria, penile discharge, penile pain, penile swelling, scrotal swelling, testicular pain and urgency. Musculoskeletal: Negative. Negative for arthralgias, back pain, gait problem, joint swelling, myalgias, neck pain and neck stiffness. Skin: Negative. Negative for color change, pallor, rash and wound. Allergic/Immunologic: Negative for environmental allergies, food allergies and immunocompromised state. Neurological: Negative. Negative for dizziness, tremors, seizures, syncope, facial asymmetry, speech difficulty, weakness, light-headedness, numbness and headaches. Hematological: Negative. Negative for adenopathy. Does not bruise/bleed easily. Psychiatric/Behavioral: Negative. Negative for agitation, behavioral problems, confusion, decreased concentration, dysphoric mood, hallucinations, self-injury, sleep disturbance and suicidal ideas. The patient is not nervous/anxious and is not hyperactive. Objective: Intake/Output Summary (Last 24 hours) at 11/24/2022 0748  Last data filed at 11/24/2022 0630  Gross per 24 hour   Intake 1534.86 ml   Output 4100 ml   Net -2565.14 ml          Vitals:   Vitals:    11/24/22 0702   BP:    Pulse: 93   Resp: 23   Temp:    SpO2: 100%       Physical Exam:     General: NAD  Eyes: EOMI  ENT: neck supple  Cardiovascular: Regular rate. Respiratory: Clear to auscultation  Gastrointestinal: Soft, non tender  Genitourinary: no suprapubic tenderness  Musculoskeletal: No edema  Skin: warm, dry  Neuro: Alert. Psych: Mood appropriate.      Medications:   Medications:    methylPREDNISolone  40 mg IntraVENous Q12H    piperacillin-tazobactam  4,500 mg IntraVENous Q8H    azithromycin  250 mg Oral Daily    tobramycin (PF)  300 mg Nebulization BID    guaiFENesin  600 mg Oral BID    ipratropium-albuterol  1 ampule Inhalation Q4H WA    budesonide  1 mg Nebulization BID    EPINEPHrine  0.3 mg IntraMUSCular Once allopurinol  200 mg Oral TID    vitamin C  250 mg Oral BID    atorvastatin  80 mg Oral Daily    ferrous gluconate  324 mg Oral BID WC    finasteride  5 mg Oral Daily    miconazole   Topical BID    pantoprazole  40 mg Oral QAM AC    tamsulosin  0.4 mg Oral Daily    acyclovir  400 mg Oral TID    insulin glargine  30 Units SubCUTAneous Nightly    insulin lispro  0-4 Units SubCUTAneous TID     insulin lispro  0-4 Units SubCUTAneous Nightly      Infusions:    sodium chloride Stopped (11/23/22 1526)    dextrose       PRN Meds: sodium chloride, 1 spray, Q2H PRN  sodium chloride (Inhalant), 4 mL, PRN  sodium chloride flush, 5-40 mL, PRN  sodium chloride, , PRN  acetaminophen, 650 mg, Q6H PRN   Or  acetaminophen, 650 mg, Q6H PRN  polyethylene glycol, 17 g, Daily PRN  ondansetron, 4 mg, Q8H PRN   Or  ondansetron, 4 mg, Q6H PRN  HYDROcodone 5 mg - acetaminophen, 1 tablet, Q6H PRN  glucose, 4 tablet, PRN  dextrose bolus, 125 mL, PRN   Or  dextrose bolus, 250 mL, PRN  glucagon (rDNA), 1 mg, PRN  dextrose, , Continuous PRN      Labs      Recent Results (from the past 24 hour(s))   POCT Glucose    Collection Time: 11/23/22  7:58 AM   Result Value Ref Range    POC Glucose 244 (H) 70 - 99 MG/DL   POCT Glucose    Collection Time: 11/23/22 11:33 AM   Result Value Ref Range    POC Glucose 209 (H) 70 - 99 MG/DL   Blood gas, arterial    Collection Time: 11/23/22  2:45 PM   Result Value Ref Range    pH, Bld 7.48 (H) 7.34 - 7.45    pCO2, Arterial 40.0 32 - 45 MMHG    pO2, Arterial 72 (L) 75 - 100 MMHG    Base Exc, Mixed 5.8 (H) 0 - 1.2    HCO3, Arterial 29.8 (H) 18 - 23 MMOL/L    CO2 Content 31.0 (H) 19 - 24 MMOL/L    O2 Sat 92.3 (L) 96 - 97 %    Carbon Monoxide, Blood 2.8 0 - 5 %    Methemoglobin, Arterial 1.2 <1.5 %    Comment 4LNC    Reticulocytes    Collection Time: 11/23/22  3:02 PM   Result Value Ref Range    Retic Ct Pct 2.1 0.2 - 2.20 %   Fibrinogen    Collection Time: 11/23/22  3:02 PM   Result Value Ref Range    Fibrinogen 296 196.9 - 442.1 MG/DL   Ferritin    Collection Time: 11/23/22  3:02 PM   Result Value Ref Range    Ferritin 299 30 - 400 NG/ML   Vitamin B12    Collection Time: 11/23/22  3:02 PM   Result Value Ref Range    Vitamin B-12 >2000 (H) 211 - 911 pg/ml   Brain Natriuretic Peptide    Collection Time: 11/23/22  3:02 PM   Result Value Ref Range    Pro-BNP 2,294 (H) <300 PG/ML   Troponin    Collection Time: 11/23/22  3:02 PM   Result Value Ref Range    Troponin T <0.010 <0.01 NG/ML   Lactate, Sepsis    Collection Time: 11/23/22  3:03 PM   Result Value Ref Range    Lactic Acid, Sepsis 2.1 (HH) 0.5 - 1.9 mMOL/L   POCT Glucose    Collection Time: 11/23/22  5:47 PM   Result Value Ref Range    POC Glucose 368 (H) 70 - 99 MG/DL   D-Dimer, Quantitative    Collection Time: 11/23/22  7:00 PM   Result Value Ref Range    D-Dimer, Quant >5250 (H) <230 NG/mL(DDU)   POCT Glucose    Collection Time: 11/23/22  9:43 PM   Result Value Ref Range    POC Glucose 369 (H) 70 - 99 MG/DL   Lactate, Sepsis    Collection Time: 11/23/22 10:26 PM   Result Value Ref Range    Lactic Acid, Sepsis 1.4 0.5 - 1.9 mMOL/L        Imaging/Diagnostics Last 24 Hours   No results found.     Electronically signed by Ailyn Gillespie MD on 11/24/2022 at 7:48 AM

## 2022-11-24 NOTE — PROGRESS NOTES
Hosp updated on pts sustained increased resp. O2 ok, no distress noted, pt with no c/o but pt now having some bilat crackles with his rhonchi. New orders received. Hospitalist  currently at bedside.  Radiology on their way for stat CTA

## 2022-11-24 NOTE — CONSULTS
CARDIOLOGY CONSULT NOTE   Reason for consultation:  SOB/CHF    Referring physician:  Rosana Walden MD     Primary care physician: Nahed Johnston MD      Dear  Dr. Rosana Walden MD   Thanks for the consult. Chief Complaints :  Chief Complaint   Patient presents with    Allergic Reaction        History of present illness:Cornel is a 71 y. o.year old who presents with signs for reaction possibly in the setting of getting rituximab associated fevers rigors chills shortness of breath for CLL and hypogammaglobinemia with lung nodule. During hospitalization his respiratory status is gotten worse associated with fevers and chills got transferred to ICU with concerns of possible sepsis and worsening chest x-ray CT scan was concerning for groundglass opacities versus CHF BNP is gradually elevated his last echo in September showed preserved EF he has no significant cardiac history as such  He is feeling better now after supportive care cardiology asked to evaluate him for possible CHF? He does have history of recent pneumonia and thoracentesis in the setting of sepsis    Past medical history:    has a past medical history of Arthritis, BPH (benign prostatic hyperplasia), CAD (coronary artery disease), Cancer ([address] Diabetes mellitus ([facility] [address] History of blood transfusion, Hx of motion sickness, Hyperlipidemia, MDRO (multiple drug resistant organisms) resistance, Migraine, Pneumonia, Wears dentures, and Wears glasses. Past surgical history:   has a past surgical history that includes knee surgery (1970); Tunneled venous port placement (2013); Colonoscopy (2010); fracture surgery (Left, 1990's); Cardiac catheterization (1990's); Tonsillectomy (late 1960's); Dental surgery; eye surgery (Right, 08/2016); other surgical history (Left, 01/18/2017); and bronchoscopy (N/A, 10/28/2022). Social History:   reports that he quit smoking about 35 years ago. His smoking use included cigarettes. He started smoking about 57 years ago.  He has a 60.00 pack-year smoking history. He has never used smokeless tobacco. He reports that he does not drink alcohol and does not use drugs.   Family history:   no family history of CAD, STROKE of DM at early age    No Known Allergies    albuterol sulfate HFA (PROVENTIL;VENTOLIN;PROAIR) 108 (90 Base) MCG/ACT inhaler 2 puff, TID  ipratropium (ATROVENT HFA) 17 MCG/ACT inhaler 2 puff, TID  ipratropium-albuterol (DUONEB) nebulizer solution 1 ampule, Q6H PRN  insulin regular (HUMULIN R;NOVOLIN R) injection 5 Units, Once  insulin NPH (HUMULIN N;NOVOLIN N) injection vial 20 Units, QAM  insulin lispro (HUMALOG) injection vial 0-16 Units, TID WC  insulin lispro (HUMALOG) injection vial 0-16 Units, 2 times per day  insulin lispro (HUMALOG) injection vial 15 Units, TID WC  insulin glargine (LANTUS) injection vial 45 Units, Nightly  furosemide (LASIX) injection 40 mg, BID  methylPREDNISolone sodium (SOLU-MEDROL) injection 40 mg, Q12H  sodium chloride (OCEAN, BABY AYR) 0.65 % nasal spray 1 spray, Q2H PRN  piperacillin-tazobactam (ZOSYN) 4,500 mg in dextrose 5 % 100 mL IVPB (mini-bag), Q8H  azithromycin (ZITHROMAX) tablet 250 mg, Daily  tobramycin (PF) (HARLEY) nebulizer solution 300 mg, BID  sodium chloride (Inhalant) 3 % nebulizer solution 4 mL, PRN  guaiFENesin (MUCINEX) extended release tablet 600 mg, BID  budesonide (PULMICORT) nebulizer suspension 1,000 mcg, BID  EPINEPHrine PF 1 MG/ML injection (Anaphylaxis) 0.3 mg, Once  sodium chloride flush 0.9 % injection 5-40 mL, PRN  0.9 % sodium chloride infusion, PRN  acetaminophen (TYLENOL) tablet 650 mg, Q6H PRN   Or  acetaminophen (TYLENOL) suppository 650 mg, Q6H PRN  polyethylene glycol (GLYCOLAX) packet 17 g, Daily PRN  ondansetron (ZOFRAN-ODT) disintegrating tablet 4 mg, Q8H PRN   Or  ondansetron (ZOFRAN) injection 4 mg, Q6H PRN  HYDROcodone-acetaminophen (NORCO) 5-325 MG per tablet 1 tablet, Q6H PRN  allopurinol (ZYLOPRIM) tablet 200 mg, TID  ascorbic acid (VITAMIN C) tablet 250 mg, BID  atorvastatin (LIPITOR) tablet 80 mg, Daily  ferrous gluconate 324 (37.5 Fe) MG tablet 324 mg, BID WC  finasteride (PROSCAR) tablet 5 mg, Daily  miconazole (MICOTIN) 2 % powder, BID  pantoprazole (PROTONIX) tablet 40 mg, QAM AC  tamsulosin (FLOMAX) capsule 0.4 mg, Daily  acyclovir (ZOVIRAX) capsule 400 mg, TID  glucose chewable tablet 16 g, PRN  dextrose bolus 10% 125 mL, PRN   Or  dextrose bolus 10% 250 mL, PRN  glucagon (rDNA) injection 1 mg, PRN  dextrose 10 % infusion, Continuous PRN    sodium chloride flush 0.9 % injection 10 mL, PRN      Current Facility-Administered Medications   Medication Dose Route Frequency Provider Last Rate Last Admin    albuterol sulfate HFA (PROVENTIL;VENTOLIN;PROAIR) 108 (90 Base) MCG/ACT inhaler 2 puff  2 puff Inhalation TID Keith Neri MD        ipratropium (ATROVENT HFA) 17 MCG/ACT inhaler 2 puff  2 puff Inhalation TID Keith Neri MD        ipratropium-albuterol (DUONEB) nebulizer solution 1 ampule  1 ampule Inhalation Q6H PRN Keith Neri MD        insulin regular (HUMULIN R;NOVOLIN R) injection 5 Units  5 Units SubCUTAneous Once Keith Neri MD        insulin NPH (HUMULIN N;NOVOLIN N) injection vial 20 Units  20 Units SubCUTAneous QAM Fela Castanon MD   20 Units at 11/24/22 1401    insulin lispro (HUMALOG) injection vial 0-16 Units  0-16 Units SubCUTAneous TID  SAMI Smith MD        insulin lispro (HUMALOG) injection vial 0-16 Units  0-16 Units SubCUTAneous 2 times per day Fela Castanon MD        insulin lispro (HUMALOG) injection vial 15 Units  15 Units SubCUTAneous TID  Fela Castanon MD   15 Units at 11/24/22 1352    insulin glargine (LANTUS) injection vial 45 Units  45 Units SubCUTAneous Nightly SAMI Smith MD        furosemide (LASIX) injection 40 mg  40 mg IntraVENous BID Emil Bartlett MD        methylPREDNISolone sodium (SOLU-MEDROL) injection 40 mg  40 mg IntraVENous Q12H John Grimes MD   40 mg at 11/24/22 1049    sodium chloride (OCEAN, BABY AYR) 0.65 % nasal spray 1 spray  1 spray Each Nostril Q2H PRN Adam Sunshine MD   1 spray at 11/22/22 1542    piperacillin-tazobactam (ZOSYN) 4,500 mg in dextrose 5 % 100 mL IVPB (mini-bag)  4,500 mg IntraVENous Q8H Adam Sunshine MD 25 mL/hr at 11/24/22 1051 4,500 mg at 11/24/22 1051    azithromycin (ZITHROMAX) tablet 250 mg  250 mg Oral Daily Addy Mack MD   250 mg at 11/24/22 0852    tobramycin (PF) (HARLEY) nebulizer solution 300 mg  300 mg Nebulization BID Addy Mack MD   300 mg at 11/24/22 0719    sodium chloride (Inhalant) 3 % nebulizer solution 4 mL  4 mL Nebulization PRN Addy Mack MD        guaiFENesin [facility]) extended release tablet 600 mg  600 mg Oral BID Addy Mack MD   600 mg at 11/24/22 0851    budesonide (PULMICORT) nebulizer suspension 1,000 mcg  1 mg Nebulization BID Addy Mack MD   1,000 mcg at 11/24/22 0719    EPINEPHrine PF 1 MG/ML injection (Anaphylaxis) 0.3 mg  0.3 mg IntraMUSCular Once Donta Pritchard MD        sodium chloride flush 0.9 % injection 5-40 mL  5-40 mL IntraVENous PRN Sonia Tobias MD   10 mL at 11/23/22 2116    0.9 % sodium chloride infusion   IntraVENous PRN Sonia Tobias MD   Stopped at 11/23/22 1526    acetaminophen (TYLENOL) tablet 650 mg  650 mg Oral Q6H PRN Sonia Tobias MD   650 mg at 11/20/22 2103    Or    acetaminophen (TYLENOL) suppository 650 mg  650 mg Rectal Q6H PRN Christelle Covarrubias MD        polyethylene glycol (GLYCOLAX) packet 17 g  17 g Oral Daily PRN Christelle Covarrubias MD        ondansetron (ZOFRAN-ODT) disintegrating tablet 4 mg  4 mg Oral Q8H PRN Christelle Covarrubias MD        Or    ondansetron (ZOFRAN) injection 4 mg  4 mg IntraVENous Q6H PRN Christelle Covarrubias MD        HYDROcodone-acetaminophen (NORCO) 5-325 MG per tablet 1 tablet  1 tablet Oral Q6H PRN Sonia Tobias MD   1 tablet at 11/24/22 9701    allopurinol (ZYLOPRIM) tablet 200 mg  200 mg Oral TID Chastity Ojeda MD   200 mg at 11/24/22 1412    ascorbic acid (VITAMIN C) tablet 250 mg  250 mg Oral BID Chastity Ojeda MD   250 mg at 11/24/22 0851    atorvastatin (LIPITOR) tablet 80 mg  80 mg Oral Daily Chastity Ojeda MD   80 mg at 11/24/22 0852    ferrous gluconate 324 (37.5 Fe) MG tablet 324 mg  324 mg Oral BID WC Christelle Covarrubias MD   324 mg at 11/24/22 0852    finasteride (PROSCAR) tablet 5 mg  5 mg Oral Daily Chastity Ojeda MD   5 mg at 11/24/22 0852    miconazole (MICOTIN) 2 % powder   Topical BID Chastity Ojeda MD   Given at 11/24/22 1051    pantoprazole (PROTONIX) tablet 40 mg  40 mg Oral QAM AC Christelle Covarrubias MD   40 mg at 11/24/22 0851    tamsulosin (FLOMAX) capsule 0.4 mg  0.4 mg Oral Daily Christelle Covarrubias MD   0.4 mg at 11/24/22 0851    acyclovir (ZOVIRAX) capsule 400 mg  400 mg Oral TID Chastity Ojeda MD   400 mg at 11/24/22 1412    glucose chewable tablet 16 g  4 tablet Oral PRN Chritselle Covarrubias MD        dextrose bolus 10% 125 mL  125 mL IntraVENous PRN Chastity Ojeda MD        Or    dextrose bolus 10% 250 mL  250 mL IntraVENous PRN Chastity Ojeda MD        glucagon (rDNA) injection 1 mg  1 mg SubCUTAneous PRN Chastity Ojeda MD        dextrose 10 % infusion   IntraVENous Continuous PRN Chastity Ojeda MD         Facility-Administered Medications Ordered in Other Encounters   Medication Dose Route Frequency Provider Last Rate Last Admin    sodium chloride flush 0.9 % injection 10 mL  10 mL IntraVENous PRN Ivonne Serrato MD         Review of Systems:   Constitutional: No Fever or Weight Loss   Eyes: No Decreased Vision  ENT: No Headaches, Hearing Loss or Vertigo  Cardiovascular: As per HPI  Respiratory: As per HPI  Gastrointestinal: No abdominal pain, appetite loss, blood in stools, constipation, diarrhea or heartburn  Genitourinary: No dysuria, trouble voiding, or hematuria  Musculoskeletal:  No gait disturbance, weakness or joint complaints  Integumentary: No rash or pruritis  Neurological: No TIA or stroke symptoms  Psychiatric: No anxiety or depression  Endocrine: No malaise, fatigue or temperature intolerance  Hematologic/Lymphatic: No bleeding problems, blood clots or swollen lymph nodes  Allergic/Immunologic: No nasal congestion or hives  All systems negative except as marked. Physical Examination:    Vitals:    11/24/22 0812 11/24/22 0850 11/24/22 0921 11/24/22 1200   BP:    119/79   Pulse:  90  (!) 107   Resp: 22 18 20 (!) 33   Temp:    98.2 °F (36.8 °C)   TempSrc:    Oral   SpO2:  96%     Weight:       Height:           General Appearance:  No distress, conversant    Constitutional:  Well developed, Well nourished, No acute distress, Non-toxic appearance. HENT:  Normocephalic, Atraumatic, Bilateral external ears normal, Oropharynx moist, No oral exudates, Nose normal. Neck- Normal range of motion, No tenderness, Supple, No stridor,no apical-carotid delay  Lymphatics : no palpable lymph nodes  Eyes:  PERRL, EOMI, Conjunctiva normal, No discharge. Respiratory:  Normal breath sounds, No respiratory distress, No wheezing, No chest tenderness. ,no use of accessory muscles, crackles Absent   Cardiovascular: (PMI) apex non displaced,no lifts no thrills, ankle swelling Absent  , 1+, s1 and s2 audible,Murmur. Present, JVD not noted    Abdomen /GI:  Bowel sounds normal, Soft, No tenderness, No masses, No gross visceromegaly   :  No costovertebral angle tenderness   Musculoskeletal:  No edema, no tenderness, no deformities.  Back- no tenderness  Integument:  Well hydrated, no rash   Lymphatic:  No lymphadenopathy noted   Neurologic:  Alert & oriented x 3, CN 2-12 normal, normal motor function, normal sensory function, no focal deficits noted           Medical decision making and Data review:    Lab Review   Recent Labs     11/24/22  1411   WBC 43.5*   HGB 8.5*   HCT 29.1*   PLT 58*      Recent Labs     11/23/22  0537      K 4.1      CO2 31   PHOS 2.7   BUN 16   CREATININE 0.5*     Recent Labs     11/23/22  0537   AST 26   ALT 30   BILITOT 0.5   ALKPHOS 188*     Recent Labs     11/23/22  1502 11/24/22  0902   TROPONINT <0.010 <0.010       Recent Labs     11/23/22  1502   PROBNP 2,294*     Lab Results   Component Value Date    INR 1.14 10/26/2022    PROTIME 14.7 (H) 10/26/2022       EKG: (reviewed by myself)    ECHO:(reviewed by myself)    Chest Xray:(reviewed by myself)  FL LESS THAN 1 HOUR    Result Date: 10/28/2022  Radiology exam is complete. No Radiologist dictation. Please follow up with ordering provider. XR CHEST PORTABLE    Result Date: 11/23/2022  EXAMINATION: ONE XRAY VIEW OF THE CHEST 11/23/2022 9:57 am COMPARISON: 11/19/2022 HISTORY: ORDERING SYSTEM PROVIDED HISTORY: follow up cxr , TECHNOLOGIST PROVIDED HISTORY: Reason for exam:->follow up cxr , Reason for Exam: follow up cxr , FINDINGS: Right chest Port-A-Cath tip overlies the SVC. Diffuse reticulonodular opacities are stable. Focal cavitary opacity in the infrahilar right lung is grossly stable. No pleural effusion or pneumothorax. Heart size is within normal limits. Grossly stable appearance of a right infrahilar cavitary lesion, comparing with most recent CT. Unchanged diffuse interstitial prominence, which could be related to pulmonary edema or micronodularity seen on prior CT. XR CHEST PORTABLE    Result Date: 11/19/2022  EXAMINATION: ONE XRAY VIEW OF THE CHEST 11/19/2022 3:26 pm COMPARISON: 11/18/2022 HISTORY: ORDERING SYSTEM PROVIDED HISTORY: see medical records TECHNOLOGIST PROVIDED HISTORY: Reason for exam:->see medical records Reason for Exam: respiratory failure FINDINGS: The cardial pericardial silhouette is stable in appearance.   Perihilar infiltrates have decreased when compared to the previous exam. Interstitial opacity is also a decreased when compared to the previous exam.  No pneumothorax is found. No free air. No acute bony abnormality. [place] catheter is again noted, unchanged in position. Decreasing perihilar infiltrates, along with decreased interstitial opacities, most likely reflecting decreasing interstitial edema, but multifocal pneumonia still remains in the differential.     XR CHEST PORTABLE    Result Date: 11/18/2022  EXAMINATION: ONE XRAY VIEW OF THE CHEST 11/18/2022 2:17 pm COMPARISON: 10/28/2022 at 1214 hours HISTORY: ORDERING SYSTEM PROVIDED HISTORY: resp distress TECHNOLOGIST PROVIDED HISTORY: Reason for exam:->resp distress Reason for Exam: resp distress Additional signs and symptoms: NA Relevant Medical/Surgical History: CAD, DIABETES FINDINGS: Diffuse bronchial pulmonary marking prominence worst in the infrahilar/basilar regions consistent with bibasilar pneumonia/pneumonitis accentuated by underlying chronic lung disease. Noncardiogenic pulmonary edema may also contribute to this appearance. Otherwise, lung fields are clear. No detectable pleural effusion, pneumothorax, pulmonary edema/vascular congestion, cardiomegaly or mediastinal widening. Infiltrative changes of perihilar and basilar regions most consistent with atypical pneumonia/pneumonitis accentuated by underlying advanced chronic lung disease. Otherwise, radiographically nonacute portable chest.     XR CHEST PORTABLE    Result Date: 11/1/2022  EXAMINATION: ONE XRAY VIEW OF THE CHEST 10/28/2022 11:54 am COMPARISON: 10/26/2022 HISTORY: ORDERING SYSTEM PROVIDED HISTORY: post bronch TECHNOLOGIST PROVIDED HISTORY: INSPIRATORY & EXPIRATORY 2 hours post bronchoscopy, Reason for exam:->post bronch Reason for Exam: post bronch FINDINGS: The heart size is normal.  Right IJ Port-A-Cath is in place. Bilateral interstitial and airspace opacity is noted. Right lower lobe cavitary mass is re-identified.   The appearance is similar to the previous exam.     No significant interval change. XR CHEST PORTABLE    Result Date: 10/26/2022  EXAMINATION: ONE XRAY VIEW OF THE CHEST 10/26/2022 6:04 pm COMPARISON: Chest CT 10/23/2022, chest radiograph 10/22/2022 HISTORY: ORDERING SYSTEM PROVIDED HISTORY: pneumonia TECHNOLOGIST PROVIDED HISTORY: Reason for exam:->pneumonia Reason for Exam: pneumonia Additional signs and symptoms: NA Relevant Medical/Surgical History: DIABETES FINDINGS: Unchanged right internal jugular central venous port catheter. Overlying heart monitor leads. At least moderate bilateral peribronchial cuffing. Decreased lung volumes. Similar appearance of a cavitary mass in the basilar right lung. Possible increase in diffuse reticulonodular opacities bilaterally. No definite findings of pneumothorax or pleural effusion. Normal mediastinal and cardiac contours. 1. Possible increase in diffuse reticulonodular opacities corresponding with extensive infectious or inflammatory bronchiolitis seen on CT. 2. No significant change in a cavitary mass in the right lung base that is more likely infectious or inflammatory in etiology than neoplastic given absence on 09/28/2022. 3. At least moderate peribronchial cuffing potentially due to bronchitis or reactive airways disease. CTA PULMONARY W CONTRAST    Result Date: 11/23/2022  EXAMINATION: CTA OF THE CHEST 11/23/2022 6:37 pm TECHNIQUE: CTA of the chest was performed after the administration of intravenous contrast.  Multiplanar reformatted images are provided for review. MIP images are provided for review. Automated exposure control, iterative reconstruction, and/or weight based adjustment of the mA/kV was utilized to reduce the radiation dose to as low as reasonably achievable. COMPARISON: 10/23/2022.  HISTORY: ORDERING SYSTEM PROVIDED HISTORY: r/o PE, pt currently having AHRF TECHNOLOGIST PROVIDED HISTORY: Reason for exam:->r/o PE, pt currently having AHRF Reason for Exam: r/o PE, pt currently having AHRF FINDINGS: Pulmonary Arteries: Pulmonary arteries are adequately opacified for evaluation. No evidence of intraluminal filling defect to suggest pulmonary embolism. Main pulmonary artery is normal in caliber. Mediastinum: Multiple enlarged lymph nodes are seen throughout the mediastinum. These are all levels. These are not necrotic but they are concerning. Central venous catheter is in place. Lungs/pleura: Mild elevation of the left hemidiaphragm. Ground-glass opacities. Mixed solid ground-glass nodule in the right upper lobe. This adjacent to bronchi. The entire area measures approximately 1.2 cm by 1.8 cm. The solid component is approximately 7 mm by 6 mm. Bronchiectasis. The ground-glass opacities are innumerable. Consolidation in the right lower lobe with a large subpleural lung cyst.  Diffuse bronchial wall thickening. Innumerable tiny lung nodules. These are predominately in the lower lobes. Subpleural cysts also in the left upper lobe. This is the typical appearance ARDS. The cavitary lesion in the right lower lobe is similar in appearance. The ground-glass opacities are new. The tiny nodules are unchanged from the previous study. Upper Abdomen: Spleen is enlarged but incompletely evaluated. No large mass in the upper abdomen. Soft Tissues/Bones: Advanced osteoarthritic change of the sternoclavicular joints. No lytic or sclerotic lesions. Spondylosis. 1. Negative for pulmonary embolus. 2. Multiple large mediastinal lymph nodes. These appear larger than previously seen. 3. Extensive bilateral ground-glass opacities and tiny lung nodules. Possibility of acute respiratory distress syndrome 4. Probable atypical pneumonia. Aspergillosis could be in the differential. This is similar in appearance to the previous study.  5. Splenomegaly without change       All labs, medications and tests reviewed by myself including data  from outside source , patient and available family . Continue all other medications of all above medical condition listed as is. Impression:  Principal Problem:    Sepsis ([address]  Active Problems:    Pneumonia due to organism    Rhinovirus infection    Acute on chronic respiratory failure with hypoxemia ([address]  Resolved Problems:    * No resolved hospital problems. *      Assessment: 71 y. o.year old with PMH of  has a past medical history of Arthritis, BPH (benign prostatic hyperplasia), CAD (coronary artery disease), Cancer ([address] Diabetes mellitus ([address] History of blood transfusion, Hx of motion sickness, Hyperlipidemia, MDRO (multiple drug resistant organisms) resistance, Migraine, Pneumonia, Wears dentures, and Wears glasses. Plan and Recommendations:    Acute respiratory failure with concerning for groundglass opacity versus pulmonary edema agree with supportive care and antibiotics  Try Lasix 40 mg twice daily  Recent echo within the month showed normal EF but we will repeat limited echo to evaluate EF  Check BNP again  HTN: stable, continue present medications   Empiric antibiotics as per primary team  CLL oncology chemotherapy as per primary team and oncology  DVT prophylaxis if no contraindication  6. Dyslipidemia: continue statins           Thank you  much for consult and giving us the opportunity in contributing in the care of this patient. Please feel free to call me for any questions.        Jose Raul Hayes MD, 11/24/2022 2:31 PM

## 2022-11-24 NOTE — RT PROTOCOL NOTE
RT Inhaler-Nebulizer Bronchodilator Protocol Note    There is a bronchodilator order in the chart from a provider indicating to follow the RT Bronchodilator Protocol and there is an Initiate RT Inhaler-Nebulizer Bronchodilator Protocol order as well (see protocol at bottom of note). CXR Findings:  XR CHEST PORTABLE    Result Date: 11/23/2022  Grossly stable appearance of a right infrahilar cavitary lesion, comparing with most recent CT. Unchanged diffuse interstitial prominence, which could be related to pulmonary edema or micronodularity seen on prior CT. The findings from the last RT Protocol Assessment were as follows:   History Pulmonary Disease: Chronic pulmonary disease  Respiratory Pattern: Dyspnea on exertion or RR 21-25 bpm  Breath Sounds: Slightly diminished and/or crackles  Cough: Weak, non-productive  Indication for Bronchodilator Therapy:    Bronchodilator Assessment Score: 9    Aerosolized bronchodilator medication orders have been revised according to the RT Inhaler-Nebulizer Bronchodilator Protocol below. Respiratory Therapist to perform RT Therapy Protocol Assessment initially then follow the protocol. Repeat RT Therapy Protocol Assessment PRN for score 0-3 or on second treatment, BID, and PRN for scores above 3. No Indications - adjust the frequency to every 6 hours PRN wheezing or bronchospasm, if no treatments needed after 48 hours then discontinue using Per Protocol order mode. If indication present, adjust the RT bronchodilator orders based on the Bronchodilator Assessment Score as indicated below. Use Inhaler orders unless patient has one or more of the following: on home nebulizer, not able to hold breath for 10 seconds, is not alert and oriented, cannot activate and use MDI correctly, or respiratory rate 25 breaths per minute or more, then use the equivalent nebulizer order(s) with same Frequency and PRN reasons based on the score.   If a patient is on this medication at home then do not decrease Frequency below that used at home. 0-3 - enter or revise RT bronchodilator order(s) to equivalent RT Bronchodilator order with Frequency of every 4 hours PRN for wheezing or increased work of breathing using Per Protocol order mode. 4-6 - enter or revise RT Bronchodilator order(s) to two equivalent RT bronchodilator orders with one order with BID Frequency and one order with Frequency of every 4 hours PRN wheezing or increased work of breathing using Per Protocol order mode. 7-10 - enter or revise RT Bronchodilator order(s) to two equivalent RT bronchodilator orders with one order with TID Frequency and one order with Frequency of every 4 hours PRN wheezing or increased work of breathing using Per Protocol order mode. 11-13 - enter or revise RT Bronchodilator order(s) to one equivalent RT bronchodilator order with QID Frequency and an Albuterol order with Frequency of every 4 hours PRN wheezing or increased work of breathing using Per Protocol order mode. Greater than 13 - enter or revise RT Bronchodilator order(s) to one equivalent RT bronchodilator order with every 4 hours Frequency and an Albuterol order with Frequency of every 2 hours PRN wheezing or increased work of breathing using Per Protocol order mode. RT to enter RT Home Evaluation for COPD & MDI Assessment order using Per Protocol order mode.     Electronically signed by Pooja George RCP on 11/24/2022 at 10:41 AM

## 2022-11-24 NOTE — PROGRESS NOTES
Pulmonary and Critical Care  Progress Note      VITALS:  /70   Pulse 90   Temp 97.9 °F (36.6 °C) (Oral)   Resp 20   Ht 6' (1.829 m)   Wt 161 lb 6 oz (73.2 kg)   SpO2 96%   BMI 21.89 kg/m²     Subjective:   CHIEF COMPLAINT :SOB and Wheeze     HPI:                The patient is a 71 y.o. male is lying in the bed. He is looking weak.  He is in mild resp distress    Objective:   PHYSICAL EXAM:    LUNGS:Occasional basal crackles  Abd-soft, BS+,NT  Ext- no pedal edema  CVS-s1s2, no murmurs      DATA:    CBC:  Recent Labs     11/22/22  1006 11/23/22  0537   WBC 15.0* 26.3*   RBC 3.04* 2.97*   HGB 8.4* 8.3*   HCT 28.1* 27.2*   PLT 47* 52*   MCV 92.4 91.6   MCH 27.6 27.9   MCHC 29.9* 30.5*   RDW 25.1* 25.2*   SEGSPCT 11.0* 7.0*   BANDSPCT 5 5      BMP:  Recent Labs     11/22/22  1006 11/23/22  0537    139   K 3.9 4.1   CL 98* 100   CO2 29 31   BUN 15 16   CREATININE 0.5* 0.5*   CALCIUM 7.9* 7.9*   GLUCOSE 270* 168*      ABG:  Recent Labs     11/23/22  1445 11/24/22  0800   PH 7.48* 7.48*   PO2ART 72* 70*   GGW7PDR 40.0 41.0   O2SAT 92.3* 93.0*     BNP  No results found for: BNP   D-Dimer:  Lab Results   Component Value Date    DDIMER >5250 (H) 11/23/2022      Radiology: None      Assessment/Plan     Patient Active Problem List    Diagnosis Date Noted    Acute on chronic respiratory failure with hypoxemia (HCC) 11/22/2022     Priority: Medium    Anemia 10/29/2022     Priority: Medium    Thrombocytopenia ([address] 10/29/2022     Priority: Medium    Personal history of CLL (chronic lymphocytic leukemia) 10/24/2022     Priority: Medium    Rhinovirus infection 10/24/2022     Priority: Medium    Pneumonia due to organism 10/22/2022     Priority: Medium    Severe malnutrition ([address] 10/05/2022     Priority: Medium    Acute bronchitis 02/10/2022    Leukemia, lymphocytic, chronic ([facility] 07/24/2020    Selective deficiency of IgG ([facility] 07/24/2020    Neutropenia ([facility] 04/08/2020    Other specified disorders of bone density and structure, unspecified site 04/08/2020    Cellulitis of right upper limb 04/08/2020    Herpesviral infection 04/08/2020    Intestinal malabsorption 04/08/2020    Other nonspecific abnormal finding of lung field 04/08/2020    Iron deficiency anemia due to chronic blood loss 04/08/2020    Other muscle spasm 04/08/2020    COPD (chronic obstructive pulmonary disease) ([facility] 05/14/2019    Upper respiratory infection, acute 04/09/2019    Generalized muscle weakness 04/09/2019    Type 2 diabetes mellitus with hyperglycemia, with long-term current use of insulin ([facility] 04/09/2019    Poor appetite 04/09/2019    CLL (chronic lymphocytic leukemia) ([facility]     Hypogammaglobulinemia ([facility] 12/06/2016     Overview Note:     Updating deleted diagnoses      Pneumonia 07/07/2016    Sepsis ([facility] 07/07/2016     Recurrent pneumonia pseudomonas  Ac bronchospasm prob copd  Ac on ch hypoxemic resp failure  CLL and hypogammaglobulinemia  leukocytosis     Abx  F/u C&S  ICS  OOB  Keep sats > 92%  Solumedrol  Rituximab per Oncology  IVIG  Inhalers  C/w present management    Electronically signed by Robin Cordero MD on 11/24/2022 at 11:25 AM

## 2022-11-25 ENCOUNTER — APPOINTMENT (OUTPATIENT)
Dept: GENERAL RADIOLOGY | Age: 72
DRG: 871 | End: 2022-11-25
Payer: COMMERCIAL

## 2022-11-25 VITALS
BODY MASS INDEX: 20.96 KG/M2 | OXYGEN SATURATION: 97 % | HEIGHT: 72 IN | TEMPERATURE: 98 F | DIASTOLIC BLOOD PRESSURE: 77 MMHG | RESPIRATION RATE: 43 BRPM | WEIGHT: 154.76 LBS | HEART RATE: 122 BPM | SYSTOLIC BLOOD PRESSURE: 118 MMHG

## 2022-11-25 LAB
ALBUMIN SERPL-MCNC: 3 GM/DL (ref 3.4–5)
ALP BLD-CCNC: 176 IU/L (ref 40–128)
ALT SERPL-CCNC: 24 U/L (ref 10–40)
ANION GAP SERPL CALCULATED.3IONS-SCNC: 9 MMOL/L (ref 4–16)
ANISOCYTOSIS: ABNORMAL
AST SERPL-CCNC: 16 IU/L (ref 15–37)
ATYPICAL LYMPHOCYTE ABSOLUTE COUNT: ABNORMAL
BANDED NEUTROPHILS ABSOLUTE COUNT: 2.29 K/CU MM
BANDED NEUTROPHILS RELATIVE PERCENT: 4 % (ref 5–11)
BILIRUB SERPL-MCNC: 0.5 MG/DL (ref 0–1)
BUN BLDV-MCNC: 32 MG/DL (ref 6–23)
CALCIUM SERPL-MCNC: 8.2 MG/DL (ref 8.3–10.6)
CHLORIDE BLD-SCNC: 97 MMOL/L (ref 99–110)
CO2: 31 MMOL/L (ref 21–32)
CREAT SERPL-MCNC: 0.7 MG/DL (ref 0.9–1.3)
DIFFERENTIAL TYPE: ABNORMAL
GFR SERPL CREATININE-BSD FRML MDRD: >60 ML/MIN/1.73M2
GLUCOSE BLD-MCNC: 172 MG/DL (ref 70–99)
GLUCOSE BLD-MCNC: 201 MG/DL (ref 70–99)
GLUCOSE BLD-MCNC: 224 MG/DL (ref 70–99)
GLUCOSE BLD-MCNC: 265 MG/DL (ref 70–99)
GLUCOSE BLD-MCNC: 298 MG/DL (ref 70–99)
GLUCOSE BLD-MCNC: 483 MG/DL (ref 70–99)
HCT VFR BLD CALC: 28.6 % (ref 42–52)
HEMOGLOBIN: 8.4 GM/DL (ref 13.5–18)
LYMPHOCYTES ABSOLUTE: 51.6 K/CU MM
LYMPHOCYTES RELATIVE PERCENT: 90 % (ref 24–44)
MAGNESIUM: 2.1 MG/DL (ref 1.8–2.4)
MCH RBC QN AUTO: 27.5 PG (ref 27–31)
MCHC RBC AUTO-ENTMCNC: 29.4 % (ref 32–36)
MCV RBC AUTO: 93.5 FL (ref 78–100)
PDW BLD-RTO: 25.6 % (ref 11.7–14.9)
PHOSPHORUS: 4 MG/DL (ref 2.5–4.9)
PLATELET # BLD: 51 K/CU MM (ref 140–440)
PMV BLD AUTO: 10.1 FL (ref 7.5–11.1)
POTASSIUM SERPL-SCNC: 5.2 MMOL/L (ref 3.5–5.1)
RBC # BLD: 3.06 M/CU MM (ref 4.6–6.2)
SEGMENTED NEUTROPHILS ABSOLUTE COUNT: 3.4 K/CU MM
SEGMENTED NEUTROPHILS RELATIVE PERCENT: 6 % (ref 36–66)
SMUDGE CELLS: PRESENT
SODIUM BLD-SCNC: 137 MMOL/L (ref 135–145)
TOTAL PROTEIN: 5.1 GM/DL (ref 6.4–8.2)
TROPONIN T: <0.01 NG/ML
TROPONIN T: <0.01 NG/ML
WBC # BLD: 57.3 K/CU MM (ref 4–10.5)

## 2022-11-25 PROCEDURE — 99232 SBSQ HOSP IP/OBS MODERATE 35: CPT | Performed by: INTERNAL MEDICINE

## 2022-11-25 PROCEDURE — 6360000002 HC RX W HCPCS: Performed by: INTERNAL MEDICINE

## 2022-11-25 PROCEDURE — 6370000000 HC RX 637 (ALT 250 FOR IP): Performed by: INTERNAL MEDICINE

## 2022-11-25 PROCEDURE — 82962 GLUCOSE BLOOD TEST: CPT

## 2022-11-25 PROCEDURE — 94640 AIRWAY INHALATION TREATMENT: CPT

## 2022-11-25 PROCEDURE — 85007 BL SMEAR W/DIFF WBC COUNT: CPT

## 2022-11-25 PROCEDURE — 84100 ASSAY OF PHOSPHORUS: CPT

## 2022-11-25 PROCEDURE — 2580000003 HC RX 258: Performed by: INTERNAL MEDICINE

## 2022-11-25 PROCEDURE — 2500000003 HC RX 250 WO HCPCS: Performed by: STUDENT IN AN ORGANIZED HEALTH CARE EDUCATION/TRAINING PROGRAM

## 2022-11-25 PROCEDURE — 2700000000 HC OXYGEN THERAPY PER DAY

## 2022-11-25 PROCEDURE — 6370000000 HC RX 637 (ALT 250 FOR IP): Performed by: STUDENT IN AN ORGANIZED HEALTH CARE EDUCATION/TRAINING PROGRAM

## 2022-11-25 PROCEDURE — 71045 X-RAY EXAM CHEST 1 VIEW: CPT

## 2022-11-25 PROCEDURE — 84484 ASSAY OF TROPONIN QUANT: CPT

## 2022-11-25 PROCEDURE — 93306 TTE W/DOPPLER COMPLETE: CPT

## 2022-11-25 PROCEDURE — 80053 COMPREHEN METABOLIC PANEL: CPT

## 2022-11-25 PROCEDURE — 83735 ASSAY OF MAGNESIUM: CPT

## 2022-11-25 PROCEDURE — 85027 COMPLETE CBC AUTOMATED: CPT

## 2022-11-25 PROCEDURE — 89220 SPUTUM SPECIMEN COLLECTION: CPT

## 2022-11-25 RX ORDER — PREDNISONE 10 MG/1
30 TABLET ORAL DAILY
Qty: 6 TABLET | Refills: 0 | Status: SHIPPED | OUTPATIENT
Start: 2022-11-25 | End: 2022-11-27

## 2022-11-25 RX ORDER — ONDANSETRON 2 MG/ML
4 INJECTION INTRAMUSCULAR; INTRAVENOUS EVERY 6 HOURS PRN
Status: DISCONTINUED | OUTPATIENT
Start: 2022-11-25 | End: 2022-11-25 | Stop reason: HOSPADM

## 2022-11-25 RX ORDER — POLYETHYLENE GLYCOL 3350 17 G/17G
17 POWDER, FOR SOLUTION ORAL DAILY PRN
Status: DISCONTINUED | OUTPATIENT
Start: 2022-11-25 | End: 2022-11-25 | Stop reason: HOSPADM

## 2022-11-25 RX ORDER — INSULIN LISPRO 100 [IU]/ML
20 INJECTION, SOLUTION INTRAVENOUS; SUBCUTANEOUS
Status: DISCONTINUED | OUTPATIENT
Start: 2022-11-25 | End: 2022-11-25 | Stop reason: HOSPADM

## 2022-11-25 RX ORDER — PREDNISONE 10 MG/1
40 TABLET ORAL DAILY
Qty: 8 TABLET | Refills: 0 | Status: SHIPPED | OUTPATIENT
Start: 2022-11-25 | End: 2022-11-27

## 2022-11-25 RX ORDER — PREDNISONE 10 MG/1
20 TABLET ORAL DAILY
Qty: 4 TABLET | Refills: 0 | Status: SHIPPED | OUTPATIENT
Start: 2022-11-25 | End: 2022-11-27

## 2022-11-25 RX ORDER — PREDNISONE 10 MG/1
10 TABLET ORAL DAILY
Qty: 2 TABLET | Refills: 0 | Status: SHIPPED | OUTPATIENT
Start: 2022-11-25 | End: 2022-11-27

## 2022-11-25 RX ORDER — SODIUM CHLORIDE 0.9 % (FLUSH) 0.9 %
5-40 SYRINGE (ML) INJECTION PRN
Status: DISCONTINUED | OUTPATIENT
Start: 2022-11-25 | End: 2022-11-25 | Stop reason: HOSPADM

## 2022-11-25 RX ORDER — ACETAMINOPHEN 325 MG/1
650 TABLET ORAL EVERY 6 HOURS PRN
Status: DISCONTINUED | OUTPATIENT
Start: 2022-11-25 | End: 2022-11-25 | Stop reason: HOSPADM

## 2022-11-25 RX ORDER — SODIUM CHLORIDE 9 MG/ML
INJECTION, SOLUTION INTRAVENOUS PRN
Status: DISCONTINUED | OUTPATIENT
Start: 2022-11-25 | End: 2022-11-25 | Stop reason: HOSPADM

## 2022-11-25 RX ORDER — SODIUM CHLORIDE 0.9 % (FLUSH) 0.9 %
5-40 SYRINGE (ML) INJECTION EVERY 12 HOURS SCHEDULED
Status: DISCONTINUED | OUTPATIENT
Start: 2022-11-25 | End: 2022-11-25 | Stop reason: HOSPADM

## 2022-11-25 RX ORDER — TOBRAMYCIN INHALATION SOLUTION 300 MG/5ML
300 INHALANT RESPIRATORY (INHALATION) 2 TIMES DAILY
Qty: 70 ML | Refills: 0 | Status: SHIPPED | OUTPATIENT
Start: 2022-11-25 | End: 2022-11-30 | Stop reason: SDUPTHER

## 2022-11-25 RX ORDER — BUDESONIDE 0.5 MG/2ML
1 INHALANT ORAL 2 TIMES DAILY
Qty: 60 EACH | Refills: 3 | Status: CANCELLED | OUTPATIENT
Start: 2022-11-25

## 2022-11-25 RX ORDER — BUDESONIDE 0.5 MG/2ML
1 INHALANT ORAL 2 TIMES DAILY
Qty: 60 EACH | Refills: 3 | Status: SHIPPED | OUTPATIENT
Start: 2022-11-25 | End: 2022-11-25 | Stop reason: HOSPADM

## 2022-11-25 RX ORDER — ENOXAPARIN SODIUM 100 MG/ML
40 INJECTION SUBCUTANEOUS DAILY
Status: DISCONTINUED | OUTPATIENT
Start: 2022-11-25 | End: 2022-11-25 | Stop reason: HOSPADM

## 2022-11-25 RX ORDER — HEPARIN SODIUM (PORCINE) LOCK FLUSH IV SOLN 100 UNIT/ML 100 UNIT/ML
100 SOLUTION INTRAVENOUS PRN
Status: DISCONTINUED | OUTPATIENT
Start: 2022-11-25 | End: 2022-11-25 | Stop reason: HOSPADM

## 2022-11-25 RX ORDER — AZITHROMYCIN 250 MG/1
250 TABLET, FILM COATED ORAL DAILY
Qty: 7 TABLET | Refills: 0 | Status: SHIPPED | OUTPATIENT
Start: 2022-11-25 | End: 2022-11-30 | Stop reason: SDUPTHER

## 2022-11-25 RX ORDER — INSULIN GLARGINE 100 [IU]/ML
50 INJECTION, SOLUTION SUBCUTANEOUS NIGHTLY
Status: DISCONTINUED | OUTPATIENT
Start: 2022-11-25 | End: 2022-11-25 | Stop reason: HOSPADM

## 2022-11-25 RX ORDER — LEVOFLOXACIN 750 MG/1
750 TABLET ORAL DAILY
Qty: 7 TABLET | Refills: 0 | Status: SHIPPED | OUTPATIENT
Start: 2022-11-25 | End: 2022-12-02

## 2022-11-25 RX ORDER — ONDANSETRON 4 MG/1
4 TABLET, ORALLY DISINTEGRATING ORAL EVERY 8 HOURS PRN
Status: DISCONTINUED | OUTPATIENT
Start: 2022-11-25 | End: 2022-11-25 | Stop reason: HOSPADM

## 2022-11-25 RX ADMIN — HYDROCODONE BITARTRATE AND ACETAMINOPHEN 1 TABLET: 5; 325 TABLET ORAL at 08:23

## 2022-11-25 RX ADMIN — FINASTERIDE 5 MG: 5 TABLET, FILM COATED ORAL at 08:17

## 2022-11-25 RX ADMIN — ALBUTEROL SULFATE 2 PUFF: 90 AEROSOL, METERED RESPIRATORY (INHALATION) at 08:51

## 2022-11-25 RX ADMIN — ACYCLOVIR 400 MG: 200 CAPSULE ORAL at 08:15

## 2022-11-25 RX ADMIN — METHYLPREDNISOLONE SODIUM SUCCINATE 40 MG: 40 INJECTION, POWDER, FOR SOLUTION INTRAMUSCULAR; INTRAVENOUS at 12:30

## 2022-11-25 RX ADMIN — Medication 324 MG: at 08:17

## 2022-11-25 RX ADMIN — INSULIN HUMAN 10 UNITS: 100 INJECTION, SUSPENSION SUBCUTANEOUS at 12:30

## 2022-11-25 RX ADMIN — FUROSEMIDE 40 MG: 10 INJECTION, SOLUTION INTRAMUSCULAR; INTRAVENOUS at 08:20

## 2022-11-25 RX ADMIN — TAMSULOSIN HYDROCHLORIDE 0.4 MG: 0.4 CAPSULE ORAL at 08:15

## 2022-11-25 RX ADMIN — INSULIN LISPRO 20 UNITS: 100 INJECTION, SOLUTION INTRAVENOUS; SUBCUTANEOUS at 12:29

## 2022-11-25 RX ADMIN — PANTOPRAZOLE SODIUM 40 MG: 40 TABLET, DELAYED RELEASE ORAL at 07:12

## 2022-11-25 RX ADMIN — GUAIFENESIN 600 MG: 600 TABLET, EXTENDED RELEASE ORAL at 08:15

## 2022-11-25 RX ADMIN — BUDESONIDE 1000 MCG: 0.5 INHALANT RESPIRATORY (INHALATION) at 08:55

## 2022-11-25 RX ADMIN — INSULIN HUMAN 20 UNITS: 100 INJECTION, SUSPENSION SUBCUTANEOUS at 08:33

## 2022-11-25 RX ADMIN — HYDROCODONE BITARTRATE AND ACETAMINOPHEN 1 TABLET: 5; 325 TABLET ORAL at 02:55

## 2022-11-25 RX ADMIN — INSULIN LISPRO 8 UNITS: 100 INJECTION, SOLUTION INTRAVENOUS; SUBCUTANEOUS at 08:32

## 2022-11-25 RX ADMIN — OXYCODONE HYDROCHLORIDE AND ACETAMINOPHEN 250 MG: 500 TABLET ORAL at 08:16

## 2022-11-25 RX ADMIN — INSULIN LISPRO 16 UNITS: 100 INJECTION, SOLUTION INTRAVENOUS; SUBCUTANEOUS at 12:28

## 2022-11-25 RX ADMIN — PIPERACILLIN AND TAZOBACTAM 4500 MG: 4; .5 INJECTION, POWDER, FOR SOLUTION INTRAVENOUS at 03:29

## 2022-11-25 RX ADMIN — AZITHROMYCIN MONOHYDRATE 250 MG: 250 TABLET ORAL at 08:17

## 2022-11-25 RX ADMIN — ATORVASTATIN CALCIUM 80 MG: 40 TABLET, FILM COATED ORAL at 08:15

## 2022-11-25 RX ADMIN — ALLOPURINOL 200 MG: 100 TABLET ORAL at 08:16

## 2022-11-25 RX ADMIN — TOBRAMYCIN 300 MG: 300 SOLUTION RESPIRATORY (INHALATION) at 08:53

## 2022-11-25 RX ADMIN — INSULIN LISPRO 15 UNITS: 100 INJECTION, SOLUTION INTRAVENOUS; SUBCUTANEOUS at 08:32

## 2022-11-25 RX ADMIN — PIPERACILLIN AND TAZOBACTAM 4500 MG: 4; .5 INJECTION, POWDER, FOR SOLUTION INTRAVENOUS at 11:31

## 2022-11-25 RX ADMIN — METHYLPREDNISOLONE SODIUM SUCCINATE 40 MG: 40 INJECTION, POWDER, FOR SOLUTION INTRAMUSCULAR; INTRAVENOUS at 01:02

## 2022-11-25 RX ADMIN — MICONAZOLE NITRATE: 2 POWDER TOPICAL at 08:20

## 2022-11-25 RX ADMIN — Medication 2 PUFF: at 08:50

## 2022-11-25 ASSESSMENT — PAIN SCALES - GENERAL
PAINLEVEL_OUTOF10: 8
PAINLEVEL_OUTOF10: 6
PAINLEVEL_OUTOF10: 0
PAINLEVEL_OUTOF10: 5

## 2022-11-25 ASSESSMENT — PAIN SCALES - WONG BAKER
WONGBAKER_NUMERICALRESPONSE: 0

## 2022-11-25 ASSESSMENT — PAIN DESCRIPTION - DESCRIPTORS
DESCRIPTORS: DISCOMFORT
DESCRIPTORS: DISCOMFORT
DESCRIPTORS: ACHING

## 2022-11-25 ASSESSMENT — PAIN DESCRIPTION - LOCATION
LOCATION: ABDOMEN;SHOULDER
LOCATION: ABDOMEN;BACK
LOCATION: ABDOMEN;SHOULDER

## 2022-11-25 ASSESSMENT — PAIN DESCRIPTION - FREQUENCY: FREQUENCY: CONTINUOUS

## 2022-11-25 ASSESSMENT — PAIN DESCRIPTION - ORIENTATION
ORIENTATION: LEFT;MID
ORIENTATION: LOWER
ORIENTATION: LEFT;MID

## 2022-11-25 ASSESSMENT — PAIN DESCRIPTION - ONSET: ONSET: ON-GOING

## 2022-11-25 NOTE — PROGRESS NOTES
V2.0  [facility] [place] Hospitalist Progress Note      Name:  Briana King /Age/Sex: 1950  (71 y.o. male)   MRN & CSN:  [id number] & [id number] Encounter Date/Time: 2022 2:26 PM EST    Location:  -A PCP: Sara Briceno MD       Hospital Day: 8    Assessment and Plan:   Briana King is a 71 y.o. male with pmh of CLL on Rituxan and IVIG, hypogammaglobulinemia, lung nodule, COPD, chronic hypoxic respiratory failure on 2 L/min oxygen per nasal cannula at baseline, type 2 diabetes mellitus, HLD, BPH, multiple recurrent episodes of pneumonia and status post thoracentesis in the past.  Recently discharged on  who presents with Sepsis ([facility]    Interval events:  -Patient seen and examined at bedside, vital signs stable, no acute events overnight reported  -Briefly, patient with above-mentioned history was in the infusion center and started to experience rigors and shortness of breath. Treated for allergic reaction to Rituxan initially and then transferred to the ED. On presentation patient was febrile to 101.4F, tachypneic to 24 and tachycardic to 127, requiring nonrebreather at 15 L. Patient was also placed on BiPAP for some time. Patient had leukocytosis of 38K from 104k  H&H at baseline and platelet count of 41. Respiratory viral panel on admission was positive for rhinovirus. ABG 7.3 . Chest x-ray with infiltrative changes of perihilar and basilar regions consistent with atypical pneumonia/pneumonitis accentuated by underlying advanced chronic lung disease.   Patient received cefepime/Vanco and azithromycin and ID was consulted.  - patient seen and examined at bedside, vital signs stable on 4 L/min oxygen via nasal cannula, labs with leukocytosis improving to 15k<17k<25k, platelet count 71A sputum culture from  positive for Pseudomonas, blood cultures from  positive for viridans streptococci, ID on board, patient currently on Zosyn, inhaled tobramycin and oral azithromycin, IVIG 400 mg/kg single infusion. Patient refusing PT  -11/23-VSS 3 L/min oxygen via nasal cannula down from 7 L/min on 11/20. Tachycardic/ tachypnic. CXR-stable appearance of R-infrahilar cavitary lesion, compared with most recent CT and unchanged diffuse interstitial prominence which might be related to pulmonary edema or micro nodularity seen on prior CT. Pulm Cx ,pt s/w IV methylprednisolone 40 mg every 12h . Leukocytosis 26k<15k<17k, currently on Zosyn per ID. Pt rec'd IVIg ithout complications. Transferred to ICU for monitoring as Stepdown. Labs CRP downtrending 19<42<65<69<105. Ordered ABG/BNP/lactate(PH 7.48/  PCO2 31, HCO3 29, Pro BNP 2294, lactic acid 2.1)  and consulted cardiology given possible pulmonary edema. Most recent echo 9/22 with EF 55%. Pt having persistent tachypnea, CTPA ordered. Sherry Lawson catheter placed and pt s/w Lasix 40 mg IV stat. Case d/w  Critical Care consulted. Pt started on BiPAP. Pt agreeable to be intubated  if needed. 11/24-Pt seen & examined at bedside, VSS currently on BiPAP, weaned to , tachypnea slightly improved, Alert and oriented and responds accordingly/follows commands, labs from AM pending, ABG ordered, CTPA- \"1. Negative for pulmonary embolus. 2. Multiple large mediastinal lymph nodes. These appear larger than previously seen. 3. Extensive bilateral ground-glass opacities and tiny lung nodules. Possibility of acute respiratory distress syndrome 4. Probable atypical pneumonia. Aspergillosis could be in the differential. This is similar in appearance to the previous study. 5. Splenomegaly without change\"Sputum Cx/ Fungitel/ GM / Sputum smear ordered. No new growth on Septic workup. Pt s/p IgG infusion with no acute complications in PM 18/89, currently on Zosyn IV/ Acyclovir and Inhaled tobramycin per ID, s/w steroids, Pulm onboard. Pt s/p 40 mg IV lasix 11/24, will give lasix 40 mg IV today, and follow TTE, will touch base with cardiology.  Will order EKG and trops. Hyperglycemia to 470's reported, given 5u Insulin R, switched to medium dose insulin SS, Fsg to [facility] for now and Endocrine consulted and paged. Leukocytosis worsening WBC count 43k<26k, d/w Hem- Ordered for manual differential and Flow cytometery . Pt s/w Lasix 40 mg IV Bid per cards. Crp downtrending 9.4<19<42<65, procal 0.176<<1.97<5.5  11/25-Pt seen & examined, VS  w persistent tachypnea upto 20's-38/min at time of examination, Saturating well currently on 4L, labs w K 5.2- pt s/w low K diet, FSG upo 500's, Endo onboard, s/p 45U Glasrgine, NPH x 20U, Lispro 36U+ in past 24 Hours, Pro-BNP 2312,   <187, worsening leukocytosis 57.3k<43k<26k, persistent crackles on exam pending Echo, currently Lasix 40 IV Bid, Critical Care consulted given multiple comorbidities and increased likelihood of worsening/ new BiPAP/ Heart failure vs impending ARDS.  Repeat CXR pending    Plan:  Sepsis:  Worsening b/l PNA , possible ARDS in evolution  -Likely in the setting of pneumonia  -Of note, patient was recently treated with IV ciprofloxacin and IV Zosyn and completed therapy on 11/14/2022  -Patient has ID follow-up on 11/17/2022 and patient was advised to continue azithromycin and inhaled tobramycin due to concern for palpitations and chronic Pseudomonas colonization  -Patient presented to the infusion clinic with symptoms concerning for sepsis, elevated CRP making sepsis/pneumonia more likely  -ID on board, will appreciate recs        -Critical care consult        -If patient meets intubation criterion, will need lung protective strategy    per ARDS protocol/ low tidal volume mechanical ventilation/ High PEEP        - Curently, patiant on BiPAP  Leukocytosis: likely iso PNA, contributed by steroids  Acute on chronic hypoxemic respiratory failure:  ADHF  -Likely in the setting of pneumonia , worsening , c/f ARDS vs ADHF  -BiPAP as needed  -Respiratory viral panel as above        -TTE         -Cardiology Consult -s/p Lasix 40 mg IV        -BNP 2200<<<700's   History of CLL:  -Follows with hematology/oncology  -Initially patient was on ibrutinib until recently, he was noted to have disease progression  -11/18/2022 he was started on rituximab but had to be stopped prior to admission due to concern for drug reaction  Severe IgG deficiency:  -Remains on monthly IVIG, last IVIG 11/26  Type 2 diabetes mellitus:  -Low-dose insulin sliding scale and Lantus  -Fingerstick glucose before every meal and at bedtime  -Hypoglycemia protocol  BPH:  -Continue tamsulosin and finasteride  Protein calorie malnutrition    Diet ADULT ORAL NUTRITION SUPPLEMENT; Lunch, Dinner; Standard High Calorie/High Protein Oral Supplement  ADULT DIET; Regular; 4 carb choices (60 gm/meal); Low Fat/Low Chol/High Fiber/2 gm Na; 2000 ml   DVT Prophylaxis [] Lovenox, []  Heparin, [] SCDs, [] Ambulation,  [] Eliquis, [] Xarelto  [] Coumadin platelet count 98U   Code Status Full Code   Disposition From: Home  Expected Disposition: TBD  Estimated Date of Discharge: TBD  Patient requires continued admission due to currently requiring BiPAP,    Surrogate Decision Maker/ POA Son     Subjective:     Chief Complaint: Allergic Reaction       Chaparrita Ingram is a 71 y.o. male who presents with concern for sepsis in the setting of pneumonia     Currently patient denies any significant symptoms    Review of Systems:    Review of Systems    Review of Systems:   Constitutional: Negative. Negative for activity change, appetite change, chills, diaphoresis, fatigue, fever and unexpected weight change. HENT: Negative. Negative for congestion, dental problem, drooling, ear discharge, ear pain, facial swelling, hearing loss, mouth sores, nosebleeds, postnasal drip, rhinorrhea, sinus pressure, sinus pain, sneezing, sore throat, tinnitus, trouble swallowing and voice change. Eyes: Negative. Negative for photophobia, pain, discharge, redness, itching and visual disturbance. Respiratory: Negative. Negative for apnea, cough, choking, chest tightness, shortness of breath, wheezing and stridor. Cardiovascular: Negative. Negative for chest pain and palpitations. Gastrointestinal: Negative. Negative for abdominal distention, abdominal pain, anal bleeding, blood in stool, constipation, diarrhea, nausea, rectal pain and vomiting. Endocrine: Negative. Negative for cold intolerance, heat intolerance, polydipsia, polyphagia and polyuria. Genitourinary: Negative. Negative for decreased urine volume, difficulty urinating, dysuria, enuresis, flank pain, frequency, genital sores, hematuria, penile discharge, penile pain, penile swelling, scrotal swelling, testicular pain and urgency. Musculoskeletal: Negative. Negative for arthralgias, back pain, gait problem, joint swelling, myalgias, neck pain and neck stiffness. Skin: Negative. Negative for color change, pallor, rash and wound. Allergic/Immunologic: Negative for environmental allergies, food allergies and immunocompromised state. Neurological: Negative. Negative for dizziness, tremors, seizures, syncope, facial asymmetry, speech difficulty, weakness, light-headedness, numbness and headaches. Hematological: Negative. Negative for adenopathy. Does not bruise/bleed easily. Psychiatric/Behavioral: Negative. Negative for agitation, behavioral problems, confusion, decreased concentration, dysphoric mood, hallucinations, self-injury, sleep disturbance and suicidal ideas. The patient is not nervous/anxious and is not hyperactive. Objective: Intake/Output Summary (Last 24 hours) at 11/25/2022 0803  Last data filed at 11/25/2022 0536  Gross per 24 hour   Intake 517.5 ml   Output 2750 ml   Net -2232.5 ml          Vitals:   Vitals:    11/25/22 0400   BP: 131/86   Pulse: 78   Resp: 30   Temp:    SpO2: 100%       Physical Exam:     General: NAD  Eyes: EOMI  ENT: neck supple  Cardiovascular: Regular rate.   Respiratory: Clear to auscultation  Gastrointestinal: Soft, non tender  Genitourinary: no suprapubic tenderness  Musculoskeletal: No edema  Skin: warm, dry  Neuro: Alert. Psych: Mood appropriate.      Medications:   Medications:    albuterol sulfate HFA  2 puff Inhalation TID    ipratropium  2 puff Inhalation TID    insulin regular  5 Units SubCUTAneous Once    insulin NPH  20 Units SubCUTAneous QAM    insulin lispro  0-16 Units SubCUTAneous TID     insulin lispro  0-16 Units SubCUTAneous 2 times per day    insulin lispro  15 Units SubCUTAneous TID     insulin glargine  45 Units SubCUTAneous Nightly    furosemide  40 mg IntraVENous BID    methylPREDNISolone  40 mg IntraVENous Q12H    piperacillin-tazobactam  4,500 mg IntraVENous Q8H    azithromycin  250 mg Oral Daily    tobramycin (PF)  300 mg Nebulization BID    guaiFENesin  600 mg Oral BID    budesonide  1 mg Nebulization BID    EPINEPHrine  0.3 mg IntraMUSCular Once    allopurinol  200 mg Oral TID    vitamin C  250 mg Oral BID    atorvastatin  80 mg Oral Daily    ferrous gluconate  324 mg Oral BID WC    finasteride  5 mg Oral Daily    miconazole   Topical BID    pantoprazole  40 mg Oral QAM AC    tamsulosin  0.4 mg Oral Daily    acyclovir  400 mg Oral TID      Infusions:    sodium chloride Stopped (11/23/22 1526)    dextrose       PRN Meds: ipratropium-albuterol, 1 ampule, Q6H PRN  sodium chloride, 1 spray, Q2H PRN  sodium chloride (Inhalant), 4 mL, PRN  sodium chloride flush, 5-40 mL, PRN  sodium chloride, , PRN  acetaminophen, 650 mg, Q6H PRN   Or  acetaminophen, 650 mg, Q6H PRN  polyethylene glycol, 17 g, Daily PRN  ondansetron, 4 mg, Q8H PRN   Or  ondansetron, 4 mg, Q6H PRN  HYDROcodone 5 mg - acetaminophen, 1 tablet, Q6H PRN  glucose, 4 tablet, PRN  dextrose bolus, 125 mL, PRN   Or  dextrose bolus, 250 mL, PRN  glucagon (rDNA), 1 mg, PRN  dextrose, , Continuous PRN      Labs      Recent Results (from the past 24 hour(s))   POCT Glucose    Collection Time: 11/24/22 8:47 AM   Result Value Ref Range    POC Glucose 352 (H) 70 - 99 MG/DL   Troponin    Collection Time: 11/24/22  9:02 AM   Result Value Ref Range    Troponin T <0.010 <0.01 NG/ML   EKG 12 Lead    Collection Time: 11/24/22  9:12 AM   Result Value Ref Range    Ventricular Rate 94 BPM    Atrial Rate 94 BPM    P-R Interval 148 ms    QRS Duration 86 ms    Q-T Interval 364 ms    QTc Calculation (Bazett) 455 ms    P Axis 56 degrees    R Axis 39 degrees    T Axis 62 degrees    Diagnosis       Normal sinus rhythm  Normal ECG  When compared with ECG of 18-NOV-2022 13:58,  No significant change was found  Confirmed by HARI Carrasquillo ([id number]) on 11/24/2022 3:18:52 PM     POCT Glucose    Collection Time: 11/24/22 11:43 AM   Result Value Ref Range    POC Glucose 479 (H) 70 - 99 MG/DL   POCT Glucose    Collection Time: 11/24/22  1:50 PM   Result Value Ref Range    POC Glucose 531 (H) 70 - 99 MG/DL   Troponin    Collection Time: 11/24/22  2:11 PM   Result Value Ref Range    Troponin T <0.010 <0.01 NG/ML   CBC with Auto Differential    Collection Time: 11/24/22  2:11 PM   Result Value Ref Range    WBC 43.5 (HH) 4.0 - 10.5 K/CU MM    RBC 3.11 (L) 4.6 - 6.2 M/CU MM    Hemoglobin 8.5 (L) 13.5 - 18.0 GM/DL    Hematocrit 29.1 (L) 42 - 52 %    MCV 93.6 78 - 100 FL    MCH 27.3 27 - 31 PG    MCHC 29.2 (L) 32.0 - 36.0 %    RDW 24.8 (H) 11.7 - 14.9 %    Platelets 58 (L) 976 - 440 K/CU MM    MPV 10.7 7.5 - 11.1 FL    Bands Relative 3 (L) 5 - 11 %    Segs Relative 11.0 (L) 36 - 66 %    Lymphocytes % 84.0 (H) 24 - 44 %    Monocytes % 2.0 0 - 4 %    Bands Absolute 1.31 K/CU MM    Segs Absolute 4.8 K/CU MM    Lymphocytes Absolute 36.5 K/CU MM    Monocytes Absolute 0.9 K/CU MM    Differential Type MANUAL DIFFERENTIAL     Anisocytosis 1+     Polychromasia 1+     Smudge Cells PRESENT     WBC Morphology ATYPICAL LYMPHS    Comprehensive Metabolic Panel    Collection Time: 11/24/22  2:11 PM   Result Value Ref Range    Sodium 132 (L) 135 - 145 MMOL/L Potassium 5.1 3.5 - 5.1 MMOL/L    Chloride 93 (L) 99 - 110 mMol/L    CO2 26 21 - 32 MMOL/L    BUN 28 (H) 6 - 23 MG/DL    Creatinine 0.8 (L) 0.9 - 1.3 MG/DL    Est, Glom Filt Rate >60 >60 mL/min/1.73m2    Glucose 547 (HH) 70 - 99 MG/DL    Calcium 7.8 (L) 8.3 - 10.6 MG/DL    Albumin 2.8 (L) 3.4 - 5.0 GM/DL    Total Protein 5.0 (L) 6.4 - 8.2 GM/DL    Total Bilirubin 0.6 0.0 - 1.0 MG/DL    ALT 24 10 - 40 U/L    AST 14 (L) 15 - 37 IU/L    Alkaline Phosphatase 187 (H) 40 - 128 IU/L    Anion Gap 13 4 - 16   Magnesium    Collection Time: 11/24/22  2:11 PM   Result Value Ref Range    Magnesium 1.9 1.8 - 2.4 mg/dl   Phosphorus    Collection Time: 11/24/22  2:11 PM   Result Value Ref Range    Phosphorus 3.7 2.5 - 4.9 MG/DL   Procalcitonin    Collection Time: 11/24/22  2:11 PM   Result Value Ref Range    Procalcitonin 0.176    C-Reactive Protein    Collection Time: 11/24/22  2:11 PM   Result Value Ref Range    CRP High Sensitivity 9.4 (H) <5.0 mg/L   Brain Natriuretic Peptide    Collection Time: 11/24/22  2:11 PM   Result Value Ref Range    Pro-BNP 2,312 (H) <300 PG/ML   POCT Glucose    Collection Time: 11/24/22  4:11 PM   Result Value Ref Range    POC Glucose 528 (H) 70 - 99 MG/DL   POCT Glucose    Collection Time: 11/24/22  5:48 PM   Result Value Ref Range    POC Glucose 420 (H) 70 - 99 MG/DL   POCT Glucose    Collection Time: 11/24/22  8:33 PM   Result Value Ref Range    POC Glucose 368 (H) 70 - 99 MG/DL   Troponin    Collection Time: 11/24/22  8:45 PM   Result Value Ref Range    Troponin T <0.010 <0.01 NG/ML   POCT Glucose    Collection Time: 11/24/22 10:32 PM   Result Value Ref Range    POC Glucose 286 (H) 70 - 99 MG/DL   POCT Glucose    Collection Time: 11/25/22  2:41 AM   Result Value Ref Range    POC Glucose 172 (H) 70 - 99 MG/DL   Troponin    Collection Time: 11/25/22  3:10 AM   Result Value Ref Range    Troponin T <0.010 <0.01 NG/ML   POCT Glucose    Collection Time: 11/25/22  4:55 AM   Result Value Ref Range    POC Glucose 224 (H) 70 - 99 MG/DL   CBC with Auto Differential    Collection Time: 11/25/22  5:30 AM   Result Value Ref Range    WBC 57.3 (HH) 4.0 - 10.5 K/CU MM    RBC 3.06 (L) 4.6 - 6.2 M/CU MM    Hemoglobin 8.4 (L) 13.5 - 18.0 GM/DL    Hematocrit 28.6 (L) 42 - 52 %    MCV 93.5 78 - 100 FL    MCH 27.5 27 - 31 PG    MCHC 29.4 (L) 32.0 - 36.0 %    RDW 25.6 (H) 11.7 - 14.9 %    Platelets 51 (L) 282 - 440 K/CU MM    MPV 10.1 7.5 - 11.1 FL   Comprehensive Metabolic Panel    Collection Time: 11/25/22  5:30 AM   Result Value Ref Range    Sodium 137 135 - 145 MMOL/L    Potassium 5.2 (H) 3.5 - 5.1 MMOL/L    Chloride 97 (L) 99 - 110 mMol/L    CO2 31 21 - 32 MMOL/L    BUN 32 (H) 6 - 23 MG/DL    Creatinine 0.7 (L) 0.9 - 1.3 MG/DL    Est, Glom Filt Rate >60 >60 mL/min/1.73m2    Glucose 201 (H) 70 - 99 MG/DL    Calcium 8.2 (L) 8.3 - 10.6 MG/DL    Albumin 3.0 (L) 3.4 - 5.0 GM/DL    Total Protein 5.1 (L) 6.4 - 8.2 GM/DL    Total Bilirubin 0.5 0.0 - 1.0 MG/DL    ALT 24 10 - 40 U/L    AST 16 15 - 37 IU/L    Alkaline Phosphatase 176 (H) 40 - 128 IU/L    Anion Gap 9 4 - 16   Magnesium    Collection Time: 11/25/22  5:30 AM   Result Value Ref Range    Magnesium 2.1 1.8 - 2.4 mg/dl   Phosphorus    Collection Time: 11/25/22  5:30 AM   Result Value Ref Range    Phosphorus 4.0 2.5 - 4.9 MG/DL   POCT Glucose    Collection Time: 11/25/22  7:13 AM   Result Value Ref Range    POC Glucose 265 (H) 70 - 99 MG/DL        Imaging/Diagnostics Last 24 Hours   No results found.     Electronically signed by Sharon Tapia MD on 11/25/2022 at 8:03 AM

## 2022-11-25 NOTE — CONSULTS
Patient's port needle due to be changed today however patient is leaving AMA. [address] RN instructed to instill Heparin 500U/5mL (100U/mL) per protocol prior to de-accessing port.

## 2022-11-25 NOTE — PROGRESS NOTES
Outpatient Pharmacy Progress Note for Meds-to-Beds    Total number of Prescriptions Filled: 1  The following medications were dispensed to the patient during the discharge process:  Prednisone 10mg  Levofloxacin 750mg    Additional Documentation:  Prescriptions picked up by son in outpatient pharmacy. He has tobramycin ready for pick-up at [facility]  He picked up 30 day supply of azithromycin 250mg at [facility] before admitted       Thank you for letting us serve your patients.   [facility]    [address] [facility] [address], [address] [facility]    Phone: [phone]    Fax: [phone]

## 2022-11-25 NOTE — DISCHARGE SUMMARY
V2.0  Discharge Summary    Name:  Paul Arciniega /Age/Sex: 1950 (71 y.o. male)   Admit Date: 2022  Discharge Date: 22    MRN & CSN:  [id number] & [id number] Encounter Date and Time 22 3:40 PM EST    Attending:  No att. providers found Discharging Provider: Keith Neri MD       Hospital Course:     Brief HPI: Paul Arciniega is a 71 y.o. male with pmh of CLL on Rituxan and IVIG, hypogammaglobulinemia, lung nodule, COPD, chronic hypoxic respiratory failure on 2 L/min oxygen per nasal cannula at baseline, type 2 diabetes mellitus, HLD, BPH, multiple recurrent episodes of pneumonia and status post thoracentesis in the past.  Recently discharged on  who presents with Sepsis ([facility] [address]     Interval events:  -Patient seen and examined at bedside, vital signs stable, no acute events overnight reported  -Briefly, patient with above-mentioned history was in the infusion center and started to experience rigors and shortness of breath. Treated for allergic reaction to Rituxan initially and then transferred to the ED. On presentation patient was febrile to 101.4F, tachypneic to 24 and tachycardic to 127, requiring nonrebreather at 15 L. Patient was also placed on BiPAP for some time. Patient had leukocytosis of 38K from 104k  H&H at baseline and platelet count of 41. Respiratory viral panel on admission was positive for rhinovirus. ABG 7.3 . Chest x-ray with infiltrative changes of perihilar and basilar regions consistent with atypical pneumonia/pneumonitis accentuated by underlying advanced chronic lung disease.   Patient received cefepime/Vanco and azithromycin and ID was consulted.  - patient seen and examined at bedside, vital signs stable on 4 L/min oxygen via nasal cannula, labs with leukocytosis improving to 15k<17k<25k, platelet count 15X sputum culture from  positive for Pseudomonas, blood cultures from  positive for viridans streptococci, ID on board, patient currently on Zosyn, inhaled tobramycin and oral azithromycin, IVIG 400 mg/kg single infusion. Patient refusing PT  -11/23-VSS 3 L/min oxygen via nasal cannula down from 7 L/min on 11/20. Tachycardic/ tachypnic. CXR-stable appearance of R-infrahilar cavitary lesion, compared with most recent CT and unchanged diffuse interstitial prominence which might be related to pulmonary edema or micro nodularity seen on prior CT. Pulm Cx ,pt s/w IV methylprednisolone 40 mg every 12h . Leukocytosis 26k<15k<17k, currently on Zosyn per ID. Pt rec'd IVIg ithout complications. Transferred to ICU for monitoring as Stepdown. Labs CRP downtrending 19<42<65<69<105. Ordered ABG/BNP/lactate(PH 7.48/  PCO2 31, HCO3 29, Pro BNP 2294, lactic acid 2.1)  and consulted cardiology given possible pulmonary edema. Most recent echo 9/22 with EF 55%. Pt having persistent tachypnea, CTPA ordered. Osvaldo Dach catheter placed and pt s/w Lasix 40 mg IV stat. Case d/w  Critical Care consulted. Pt started on BiPAP. Pt agreeable to be intubated  if needed. 11/24-Pt seen & examined at bedside, VSS currently on BiPAP, weaned to , tachypnea slightly improved, Alert and oriented and responds accordingly/follows commands, labs from AM pending, ABG ordered, CTPA- \"1. Negative for pulmonary embolus. 2. Multiple large mediastinal lymph nodes. These appear larger than previously seen. 3. Extensive bilateral ground-glass opacities and tiny lung nodules. Possibility of acute respiratory distress syndrome 4. Probable atypical pneumonia. Aspergillosis could be in the differential. This is similar in appearance to the previous study. 5. Splenomegaly without change\"Sputum Cx/ Fungitel/ GM / Sputum smear ordered. No new growth on Septic workup. Pt s/p IgG infusion with no acute complications in PM 85/85, currently on Zosyn IV/ Acyclovir and Inhaled tobramycin per ID, s/w steroids, Pulm onboard.  Pt s/p 40 mg IV lasix 11/24, will give lasix 40 mg IV today, and follow TTE, will touch base with cardiology. Will order EKG and trops. Hyperglycemia to 470's reported, given 5u Insulin R, switched to medium dose insulin SS, Fsg to [facility] for now and Endocrine consulted and paged. Leukocytosis worsening WBC count 43k<26k, d/w Hem- Ordered for manual differential and Flow cytometery . Pt s/w Lasix 40 mg IV Bid per cards. Crp downtrending 9.4<19<42<65, procal 0.176<<1.97<5.5  11/25-Pt seen & examined, VS  w persistent tachypnea upto 20's-38/min at time of examination, Saturating well currently on 4L, labs w K 5.2- pt s/w low K diet, FSG upo 500's, Endo onboard, s/p 45U Glasrgine, NPH x 20U, Lispro 36U+ in past 24 Hours, Pro-BNP 2312,   <187, worsening leukocytosis 57.3k<43k<26k, persistent crackles on exam pending Echo, currently Lasix 40 IV Bid, Critical Care consulted given multiple comorbidities and increased likelihood of worsening/ new BiPAP/ Heart failure vs impending ARDS. Repeat CXR pending  Patient requesting to leave to home during the morning rounds/interview. Patient was voicing similar wishes 2 days decided to stay in the hospital after conversation with the author of the note. Patient again requesting today to leave hospital.  Patient is AAO X3, appears lucid on interview, able to tell his name, location and current date. Son was present at bedside and nursing home was also present. Son confirmed that his mental status is baseline and he makes his own decisions. Patient was asked about the reason he wants to leave and he says that he feels tired and would just like to go home if he will eventually be dying because of his comorbidities. Author of the note brief the patient that currently he is on 4 L/min oxygen via nasal cannula and is having some tachypnea and currently being managed for pneumonia with multiple antibiotics, possible new heart failure with diuresis and worsening leukocytosis for which heme-onc is actively giving recommendations.   Patient understood his situation but requested that he be released. Consequences of his decision were discussed with the patient which may lead to worsening breathing, change in mental status, worsening infection, worsening heart failure, and even possibly death. Patient understood the risks associated with his decision. But he requested that he be allowed to go. Patient was asked how he will go home and patient said that his son will bring his home oxygen and will take him home. Plan was discussed with patient as to what course he will take in case he gets worse around. Patient responded that he will come back to the ED in case of worsening. Charge nurse/pulmonary service/ICU and ICU management were updated about the situation. Staff discussed case w risk management. Patient was asked about any symptoms of depression leading to his decision but he denied any symptoms of depression. Patient was offered psychiatry consult multiple times just to monitor for any depression which he refused clearly. Hospice options were discussed with the patient and patient refused the hospice option multiple times. Pt's son seemed to have some concerns about hospice so [place] was informed to see patient for possible Hospice. Patient decided that he wants to be limited code/DNI. Patient is comfortable with compressions if it Would save his life but does not want to be on a ventilator. Patient says that he feels very tired because he has been in the hospital for many days. Patient however agreeable for oral antibiotics and following up with infectious disease/pulmonology/heme-onc/PCP. After discussion with pulm, if patient decides to go home, will send him on levofloxacin for 7 days, inhaled tobramycin for 7 days and will place acyclovir for 14 days in anticipation of infectious disease follow-up. Patient will also be given a steroid taper for 8 days in case he finally decides to leave [address]     Patient left AMA    Brief Problem Based Course:   Sepsis:  Worsening b/l PNA , possible ARDS in evolution  -Likely in the setting of pneumonia  -Of note, patient was recently treated with IV ciprofloxacin and IV Zosyn and completed therapy on 11/14/2022  -Patient has ID follow-up on 11/17/2022 and patient was advised to continue azithromycin and inhaled tobramycin due to concern for palpitations and chronic Pseudomonas colonization  -Patient presented to the infusion clinic with symptoms concerning for sepsis, elevated CRP making sepsis/pneumonia more likely  -ID on board, will appreciate recs        -Critical care consult              -If patient meets intubation criterion, will need lung protective      strategy    per ARDS protocol/ low tidal volume mechanical ventilation/ High PEEP             -Curently, patiant on BiPAP   Leukocytosis: likely iso CLL/ PNA/steroids contributing  Acute on chronic hypoxemic respiratory failure:  ADHF  -Likely in the setting of pneumonia , worsening , c/f ARDS vs ADHF  -BiPAP as needed  -Respiratory viral panel as above         -TTE        -Cardiology Consult              -Lasix 40 mg IV BiD        -BNP 2200's<<<700's   History of CLL:  -Follows with hematology/oncology  -Initially patient was on ibrutinib until recently, he was noted to have disease progression  -11/18/2022 he was started on rituximab but had to be stopped prior to admission due to concern for drug reaction  Severe IgG deficiency:  -Remains on monthly IVIG, last IVIG 11/26  Type 2 diabetes mellitus:  -Low-dose insulin sliding scale and Lantus  -Fingerstick glucose before every meal and at bedtime  -Hypoglycemia protocol  BPH:  -Continue tamsulosin and finasteride  Protein calorie malnutrition      The patient expressed appropriate understanding of, and agreement with the discharge recommendations, medications, and plan.      Consults this admission:  IP CONSULT TO ONCOLOGY  PHARMACY TO DOSE VANCOMYCIN  IP CONSULT TO CRITICAL CARE  IP CONSULT TO INFECTIOUS DISEASES  IP CONSULT TO DIETITIAN  PHARMACY TO DOSE VANCOMYCIN  IP CONSULT TO PULMONOLOGY  IP CONSULT TO CARDIOLOGY  IP CONSULT TO IV TEAM  IP CONSULT TO ENDOCRINOLOGY  IP CONSULT TO CRITICAL CARE  IP CONSULT TO HOSPICE    Discharge Diagnosis:   Sepsis ([address]      Discharge Instruction:   Follow up appointments: PCP/ ID/ Pulm/ Endo/ Cardiology  Primary care physician: Gaynell Bence, MD within 2 weeks  Diet: regular diet   Activity: activity as tolerated  Disposition: Discharged to:   [x]Home, [][facility], []SNF, []Acute Rehab, [][facility]osp[facility]  Condition on discharge: Stable  Labs and Tests to be Followed up as an outpatient by PCP or Specialist: CBC/ CVMP/ Mag/ Phos / Crp/ Procal    Discharge Medications:        Medication List        START taking these medications      allopurinol 100 MG tablet  Commonly known as: ZYLOPRIM  Take 2 tablets by mouth 3 times daily for 7 days     levoFLOXacin 750 MG tablet  Commonly known as: LEVAQUIN  Take 1 tablet by mouth daily for 7 days     * predniSONE 10 MG tablet  Commonly known as: DELTASONE  Take 4 tablets by mouth daily for 2 days     * predniSONE 10 MG tablet  Commonly known as: DELTASONE  Take 3 tablets by mouth daily for 2 days     * predniSONE 10 MG tablet  Commonly known as: DELTASONE  Take 2 tablets by mouth daily for 2 days     * predniSONE 10 MG tablet  Commonly known as: DELTASONE  Take 1 tablet by mouth daily for 2 days           * This list has 4 medication(s) that are the same as other medications prescribed for you. Read the directions carefully, and ask your doctor or other care provider to review them with you.                 CONTINUE taking these medications      acetaminophen-codeine 300-30 MG per tablet  Commonly known as: TYLENOL #3     albuterol sulfate  (90 Base) MCG/ACT inhaler  Commonly known as: PROVENTIL;VENTOLIN;PROAIR     alogliptin 25 MG Tabs tablet  Commonly known as: NESINA  Take 1 tablet by mouth daily     atorvastatin 80 MG tablet  Commonly known as: LIPITOR     azithromycin 250 MG tablet  Commonly known as: ZITHROMAX  Take 1 tablet by mouth daily for 7 days     ferrous gluconate 324 (37.5 Fe) MG Tabs  Take 1 tablet by mouth 2 times daily     finasteride 5 MG tablet  Commonly known as: PROSCAR     gabapentin 100 MG capsule  Commonly known as: NEURONTIN  Take 1 capsule by mouth 3 times daily for 30 days. glucose monitoring kit  1 kit by Does not apply route daily as needed (glucose checks)     guaiFENesin 600 MG extended release tablet  Commonly known as: MUCINEX  Take 1 tablet by mouth in the morning, at noon, in the evening, and at bedtime     Insulin Syringe-Needle U-100 30G X 1/2\" 0.5 ML Misc  1 each by Does not apply route daily     ipratropium-albuterol 0.5-2.5 (3) MG/3ML Soln nebulizer solution  Commonly known as: DUONEB     Levemir FlexTouch 100 UNIT/ML injection pen  Generic drug: insulin detemir  Inject 40 Units into the skin nightly     metFORMIN 1000 MG tablet  Commonly known as: GLUCOPHAGE     miconazole 2 % powder  Commonly known as: MICOTIN  Apply topically 2 times daily. NovoLOG FlexPen 100 UNIT/ML injection pen  Generic drug: insulin aspart  Inject 15 Units into the skin 3 times daily (before meals)     omeprazole 20 MG delayed release capsule  Commonly known as: PRILOSEC     ondansetron 4 MG tablet  Commonly known as: Zofran  Take 1 tablet by mouth every 8 hours as needed for Nausea or Vomiting     polyethylene glycol 17 g packet  Commonly known as: GLYCOLAX  Take 17 g by mouth daily as needed for Constipation     tamsulosin 0.4 MG capsule  Commonly known as: FLOMAX     tobramycin (PF) 300 MG/5ML nebulizer solution  Commonly known as: Carmine  Take 5 mLs by nebulization in the morning and 5 mLs in the evening. Do all this for 7 days.      valACYclovir 500 MG tablet  Commonly known as: VALTREX  TAKE 1 TABLET BY MOUTH EVERY DAY     vitamin C 250 MG tablet            STOP taking these medications      HYDROcodone-acetaminophen 5-325 MG per tablet  Commonly known as: NORCO     immune globulin (human) 20 GM/200ML Soln solution               Where to Get Your Medications        These medications were sent to [address] [facility], [address] [facility] [address], [address] [facility] [address] [address]      Phone: [phone]   azithromycin 250 MG tablet  levoFLOXacin 750 MG tablet  predniSONE 10 MG tablet  predniSONE 10 MG tablet  predniSONE 10 MG tablet  predniSONE 10 MG tablet  tobramycin (PF) 300 MG/5ML nebulizer solution       These medications were sent to [address] [facility], [address] [facility]  [address], [address] [facility] [address] [address]      Phone: [phone]   allopurinol 100 MG tablet        Objective Findings at Discharge:   /77   Pulse (!) 122   Temp 98 °F (36.7 °C) (Oral)   Resp (!) 43   Ht 6' (1.829 m)   Wt 154 lb 12.2 oz (70.2 kg)   SpO2 97%   BMI 20.99 kg/m²       Physical Exam:   General: NAD  Eyes: EOMI  ENT: neck supple  Cardiovascular: Regular rate. Respiratory: Clear to auscultation  Gastrointestinal: Soft, non tender  Genitourinary: no suprapubic tenderness  Musculoskeletal: No edema  Skin: warm, dry  Neuro: Alert. Psych: Mood appropriate. Labs and Imaging   XR CHEST PORTABLE    Result Date: 11/23/2022  EXAMINATION: ONE XRAY VIEW OF THE CHEST 11/23/2022 9:57 am COMPARISON: 11/19/2022 HISTORY: ORDERING SYSTEM PROVIDED HISTORY: follow up cxr , TECHNOLOGIST PROVIDED HISTORY: Reason for exam:->follow up cxr , Reason for Exam: follow up cxr , FINDINGS: Right chest Port-A-Cath tip overlies the SVC. Diffuse reticulonodular opacities are stable. Focal cavitary opacity in the infrahilar right lung is grossly stable. No pleural effusion or pneumothorax. Heart size is within normal limits.      Grossly stable appearance of a right infrahilar cavitary lesion, comparing with most recent CT. Unchanged diffuse interstitial prominence, which could be related to pulmonary edema or micronodularity seen on prior CT. XR CHEST PORTABLE    Result Date: 11/19/2022  EXAMINATION: ONE XRAY VIEW OF THE CHEST 11/19/2022 3:26 pm COMPARISON: 11/18/2022 HISTORY: ORDERING SYSTEM PROVIDED HISTORY: see medical records TECHNOLOGIST PROVIDED HISTORY: Reason for exam:->see medical records Reason for Exam: respiratory failure FINDINGS: The cardial pericardial silhouette is stable in appearance. Perihilar infiltrates have decreased when compared to the previous exam.  Interstitial opacity is also a decreased when compared to the previous exam.  No pneumothorax is found. No free air. No acute bony abnormality. Charlotte catheter is again noted, unchanged in position. Decreasing perihilar infiltrates, along with decreased interstitial opacities, most likely reflecting decreasing interstitial edema, but multifocal pneumonia still remains in the differential.     XR CHEST 1 VIEW    Result Date: 11/25/2022  EXAMINATION: ONE XRAY VIEW OF THE CHEST 11/25/2022 8:58 am COMPARISON: Chest CT 11/23/2022, chest radiographs 11/23/2022, 10/28/2022 at 0951 hours HISTORY: ORDERING SYSTEM PROVIDED HISTORY: sob TECHNOLOGIST PROVIDED HISTORY: Reason for exam:->sob Reason for Exam: sob FINDINGS: Persistent diffuse trait changes consistent with atypical pneumonia/pneumonitis and underlying chronic lung disease. Focal subsegmental airspace consolidation noted in the right infrahilar/basilar region which may reflect atelectasis, focus of pneumonia or neoplasm. Otherwise, lung fields are clear. No detectable pleural effusion, pneumothorax, pulmonary edema, cardiomegaly or mediastinal widening.      Persistent diffuse infiltrative changes consistent with atypical pneumonia/pneumonitis and underlying chronic lung disease subjectively improved compared to 10/28/2022 but unchanged compared to chest CT 11/23/2022. CTA PULMONARY W CONTRAST    Result Date: 11/23/2022  EXAMINATION: CTA OF THE CHEST 11/23/2022 6:37 pm TECHNIQUE: CTA of the chest was performed after the administration of intravenous contrast.  Multiplanar reformatted images are provided for review. MIP images are provided for review. Automated exposure control, iterative reconstruction, and/or weight based adjustment of the mA/kV was utilized to reduce the radiation dose to as low as reasonably achievable. COMPARISON: 10/23/2022. HISTORY: ORDERING SYSTEM PROVIDED HISTORY: r/o PE, pt currently having AHRF TECHNOLOGIST PROVIDED HISTORY: Reason for exam:->r/o PE, pt currently having AHRF Reason for Exam: r/o PE, pt currently having AHRF FINDINGS: Pulmonary Arteries: Pulmonary arteries are adequately opacified for evaluation. No evidence of intraluminal filling defect to suggest pulmonary embolism. Main pulmonary artery is normal in caliber. Mediastinum: Multiple enlarged lymph nodes are seen throughout the mediastinum. These are all levels. These are not necrotic but they are concerning. Central venous catheter is in place. Lungs/pleura: Mild elevation of the left hemidiaphragm. Ground-glass opacities. Mixed solid ground-glass nodule in the right upper lobe. This adjacent to bronchi. The entire area measures approximately 1.2 cm by 1.8 cm. The solid component is approximately 7 mm by 6 mm. Bronchiectasis. The ground-glass opacities are innumerable. Consolidation in the right lower lobe with a large subpleural lung cyst.  Diffuse bronchial wall thickening. Innumerable tiny lung nodules. These are predominately in the lower lobes. Subpleural cysts also in the left upper lobe. This is the typical appearance ARDS. The cavitary lesion in the right lower lobe is similar in appearance. The ground-glass opacities are new. The tiny nodules are unchanged from the previous study. Upper Abdomen: Spleen is enlarged but incompletely evaluated. No large mass in the upper abdomen. Soft Tissues/Bones: Advanced osteoarthritic change of the sternoclavicular joints. No lytic or sclerotic lesions. Spondylosis. 1. Negative for pulmonary embolus. 2. Multiple large mediastinal lymph nodes. These appear larger than previously seen. 3. Extensive bilateral ground-glass opacities and tiny lung nodules. Possibility of acute respiratory distress syndrome 4. Probable atypical pneumonia. Aspergillosis could be in the differential. This is similar in appearance to the previous study. 5. Splenomegaly without change       CBC:   Recent Labs     11/23/22  0537 11/24/22 1411 11/25/22  0530   WBC 26.3* 43.5* 57.3*   HGB 8.3* 8.5* 8.4*   PLT 52* 58* 51*     BMP:    Recent Labs     11/23/22  0537 11/24/22 1411 11/25/22  0530    132* 137   K 4.1 5.1 5.2*    93* 97*   CO2 31 26 31   BUN 16 28* 32*   CREATININE 0.5* 0.8* 0.7*   GLUCOSE 168* 547* 201*     Hepatic:   Recent Labs     11/23/22  0537 11/24/22  1411 11/25/22  0530   AST 26 14* 16   ALT 30 24 24   BILITOT 0.5 0.6 0.5   ALKPHOS 188* 187* 176*     Lipids:   Lab Results   Component Value Date/Time    CHOL 160 08/03/2012 02:47 PM    HDL 32 08/03/2012 02:47 PM    TRIG 274 08/03/2012 02:47 PM     Hemoglobin A1C:   Lab Results   Component Value Date/Time    LABA1C 6.9 10/23/2022 11:15 AM     TSH: No results found for: TSH  Troponin:   Lab Results   Component Value Date/Time    TROPONINT <0.010 11/25/2022 01:15 PM    TROPONINT <0.010 11/25/2022 03:10 AM    TROPONINT <0.010 11/24/2022 08:45 PM     Lactic Acid: No results for input(s): LACTA in the last 72 hours.   BNP:   Recent Labs     11/23/22  1502 11/24/22  1411   PROBNP 2,294* 2,312*     UA:  Lab Results   Component Value Date/Time    NITRU NEGATIVE 10/05/2022 11:20 AM    COLORU YELLOW 10/05/2022 11:20 AM    WBCUA 1 09/28/2022 12:10 PM    RBCUA 1 09/28/2022 12:10 PM    MUCUS RARE 09/28/2022 12:10 PM    TRICHOMONAS NONE SEEN 04/09/2019 01:02 PM    BACTERIA NEGATIVE 09/28/2022 12:10 PM    CLARITYU CLEAR 10/05/2022 11:20 AM    SPECGRAV 1.015 10/05/2022 11:20 AM    LEUKOCYTESUR NEGATIVE 10/05/2022 11:20 AM    UROBILINOGEN 2.0 10/05/2022 11:20 AM    BILIRUBINUR NEGATIVE 10/05/2022 11:20 AM    BLOODU NEGATIVE 10/05/2022 11:20 AM    KETUA NEGATIVE 10/05/2022 11:20 AM     Urine Cultures: No results found for: LABURIN  Blood Cultures: No results found for: BC  No results found for: BLOODCULT2  Organism: No results found for: ORG    Time Spent Discharging patient 60 minutes    Electronically signed by Shara Goodpasture, MD on 11/25/2022 at 3:40 PM

## 2022-11-25 NOTE — PROGRESS NOTES
Pt has been adamant about going home today. This nurse tried to explain the risks involved with leaving AMA, but the patient was not responsive to any suggestions regarding his care. I notified Dr Kirill Fung and my Unit manager, Shona, of the patients request. Dr Kirill Fung assessed the patient, who stated he wasn't depressed, said he wanted to go home and if he was going to die, wanted to do so at home. He expressed no interest in hospice, but his son, Brandi JONES did, so Dr. Kirill Fung ordered a consult for them. The son has a concentrator and portable O2 tank and is willing to take his father home. Dr. Kirill Fung stated the patient was of sound mind and able to make decisions for himself and the observation was echoed by his son, Tamara Hilario. Dr. Kirill Fung stated that he would be leaving against medical advice and both the patient and son acknowledged that. Dr. Kirill Fung stated he will be placing orders for him to fill after leaving. Dr. Kirill Fung discharged patient. Avs reviewed, all meds explained and patient belongings taken with son. Pt DC'd with meds to beds, and left via wheelchair and son with portable O2.

## 2022-11-25 NOTE — SIGNIFICANT EVENT
Patient requesting to leave to home during the morning rounds/interview. Patient was voicing similar wishes 2 days decided to stay in the hospital after conversation with the author of the note. Patient again requesting today to leave hospital.  Patient is AAO X3, appears lucid on interview, able to tell his name, location and current date. Son was present at bedside and nursing home was also present. Son confirmed that his mental status is baseline and he makes his own decisions. Patient was asked about the reason he wants to leave and he says that he feels tired and would just like to go home if he will eventually be dying because of his comorbidities. Author of the note brief the patient that currently he is on 4 L/min oxygen via nasal cannula and is having some tachypnea and currently being managed for pneumonia with multiple antibiotics, possible new heart failure with diuresis and worsening leukocytosis for which heme-onc is actively giving recommendations. Patient understood his situation but requested that he be released. Consequences of his decision were discussed with the patient which may lead to worsening breathing, change in mental status, worsening infection, worsening heart failure, and even possibly death. Patient understood the risks associated with his decision. But he requested that he be allowed to go. Patient was asked how he will go home and patient said that his son will bring his home oxygen and will take him home. Plan was discussed with patient as to what course he will take in case he gets worse around. Patient responded that he will come back to the ED in case of worsening. Charge nurse/pulmonary service/ICU and ICU management were updated about the situation. Staff discussed case w risk management. Patient was asked about any symptoms of depression leading to his decision but he denied any symptoms of depression.   Patient was offered psychiatry consult multiple times just to monitor for any depression which he refused clearly. Hospice options were discussed with the patient and patient refused the hospice option multiple times. Pt's son seemed to have some concerns about hospice so [facility] was informed to see patient for possible Hospice. Patient decided that he wants to be limited code/DNI. Patient is comfortable with compressions if it Would save his life but does not want to be on a ventilator. Patient says that he feels very tired because he has been in the hospital for many days. Patient however agreeable for oral antibiotics and following up with infectious disease/pulmonology/heme-onc/PCP. After discussion with pulm, if patient decides to go home, will send him on levofloxacin for 7 days, inhaled tobramycin for 7 days and will place acyclovir for 14 days in anticipation of infectious disease follow-up. Patient will also be given a steroid taper for 8 days in case he finally decides to leave [address]

## 2022-11-25 NOTE — PROGRESS NOTES
Pulmonary and Critical Care  Progress Note      VITALS:  /86   Pulse 91   Temp 97.6 °F (36.4 °C) (Oral)   Resp 16   Ht 6' (1.829 m)   Wt 154 lb 12.2 oz (70.2 kg)   SpO2 99%   BMI 20.99 kg/m²     Subjective:   CHIEF COMPLAINT :SOB and Wheeze     HPI:                The patient is a 71 y.o. male is sitting in the bed.  He is not in acute resp distress    Objective:   PHYSICAL EXAM:    LUNGS:Occasional basal crackles  Abd-soft, BS+,NT  Ext- no pedal edema  CVS-s1s2, no murmurs      DATA:    CBC:  Recent Labs     11/23/22  0537 11/24/22  1411 11/25/22  0530   WBC 26.3* 43.5* 57.3*   RBC 2.97* 3.11* 3.06*   HGB 8.3* 8.5* 8.4*   HCT 27.2* 29.1* 28.6*   PLT 52* 58* 51*   MCV 91.6 93.6 93.5   MCH 27.9 27.3 27.5   MCHC 30.5* 29.2* 29.4*   RDW 25.2* 24.8* 25.6*   SEGSPCT 7.0* 11.0* 6.0*   BANDSPCT 5 3* 4*      BMP:  Recent Labs     11/23/22  0537 11/24/22  1411 11/25/22  0530    132* 137   K 4.1 5.1 5.2*    93* 97*   CO2 31 26 31   BUN 16 28* 32*   CREATININE 0.5* 0.8* 0.7*   CALCIUM 7.9* 7.8* 8.2*   GLUCOSE 168* 547* 201*      ABG:  Recent Labs     11/23/22  1445 11/24/22  0800   PH 7.48* 7.48*   PO2ART 72* 70*   DVT4KNR 40.0 41.0   O2SAT 92.3* 93.0*     BNP  No results found for: BNP   D-Dimer:  Lab Results   Component Value Date    DDIMER >5250 (H) 11/23/2022      Radiology: None      Assessment/Plan     Patient Active Problem List    Diagnosis Date Noted    Acute on chronic respiratory failure with hypoxemia (HCC) 11/22/2022     Priority: Medium    Anemia 10/29/2022     Priority: Medium    Thrombocytopenia ([facility] 10/29/2022     Priority: Medium    Personal history of CLL (chronic lymphocytic leukemia) 10/24/2022     Priority: Medium    Rhinovirus infection 10/24/2022     Priority: Medium    Pneumonia due to organism 10/22/2022     Priority: Medium    Severe malnutrition ([facility] 10/05/2022     Priority: Medium    Acute bronchitis 02/10/2022    Leukemia, lymphocytic, chronic ([facility] 07/24/2020 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    Neutropenia ([address] 04/08/2020    Other specified disorders of bone density and structure, unspecified site 04/08/2020    Cellulitis of right upper limb 04/08/2020    Herpesviral infection 04/08/2020    Intestinal malabsorption 04/08/2020    Other nonspecific abnormal finding of lung field 04/08/2020    Iron deficiency anemia due to chronic blood loss 04/08/2020    Other muscle spasm 04/08/2020    COPD (chronic obstructive pulmonary disease) ([address] 05/14/2019    Upper respiratory infection, acute 04/09/2019    Generalized muscle weakness 04/09/2019    Type 2 diabetes mellitus with hyperglycemia, with long-term current use of insulin ([address] 04/09/2019    Poor appetite 04/09/2019    CLL (chronic lymphocytic leukemia) ([address]     Hypogammaglobulinemia ([address] 12/06/2016     Overview Note:     Updating deleted diagnoses      Pneumonia 07/07/2016    Sepsis ([address] 07/07/2016   Recurrent pneumonia pseudomonas  Ac bronchospasm prob copd  Ac on ch hypoxemic resp failure  CLL and hypogammaglobulinemia  leukocytosis       ICS  OOB  Levaquin and inhaled Tobramycin  Acyclovir Prophylaxis  Prednisone taper  Inhalers  Await placement  OP ID follow up  If no better at home advised to come back to the hospital'  C/w present management    Electronically signed by Antoine Kirk MD on 11/25/2022 at 9:27 AM

## 2022-11-26 LAB
1,3 BETA-D-GLUCAN INTERP: POSITIVE
1,3 BETA-D-GLUCAN: 421 PG/ML
CULTURE: NORMAL
GRAM SMEAR: NORMAL
HAPTOGLOBIN: 216 MG/DL (ref 30–200)
Lab: NORMAL
SPECIMEN: NORMAL

## 2022-11-27 LAB
ASPERGILLUS GALACTO AG: NEGATIVE
ASPERGILLUS GALACTO INDEX: 0.09
HISTOPLASMA ANTIGEN URINE INTERP: NOT DETECTED
HISTOPLASMA ANTIGEN URINE: NOT DETECTED NG/ML

## 2022-11-28 ENCOUNTER — HOSPITAL ENCOUNTER (OUTPATIENT)
Dept: INFUSION THERAPY | Age: 72
Discharge: HOME | End: 2022-11-28
Payer: COMMERCIAL

## 2022-11-28 ENCOUNTER — OFFICE VISIT (OUTPATIENT)
Dept: ONCOLOGY | Age: 72
End: 2022-11-28
Payer: COMMERCIAL

## 2022-11-28 VITALS
TEMPERATURE: 97.5 F | DIASTOLIC BLOOD PRESSURE: 69 MMHG | HEART RATE: 116 BPM | SYSTOLIC BLOOD PRESSURE: 106 MMHG | OXYGEN SATURATION: 96 %

## 2022-11-28 DIAGNOSIS — J18.9 PNEUMONIA DUE TO INFECTIOUS ORGANISM, UNSPECIFIED LATERALITY, UNSPECIFIED PART OF LUNG: ICD-10-CM

## 2022-11-28 DIAGNOSIS — C91.10 CHRONIC LYMPHOCYTIC LEUKEMIA (HCC): Primary | ICD-10-CM

## 2022-11-28 DIAGNOSIS — C91.10 CHRONIC LYMPHOCYTIC LEUKEMIA (HCC): ICD-10-CM

## 2022-11-28 LAB
ATYPICAL LYMPHOCYTE ABSOLUTE COUNT: ABNORMAL
BANDED NEUTROPHILS ABSOLUTE COUNT: 0.77 K/CU MM
BANDED NEUTROPHILS RELATIVE PERCENT: 2 % (ref 5–11)
DIFFERENTIAL TYPE: ABNORMAL
EOSINOPHILS ABSOLUTE: 0.4 K/CU MM
EOSINOPHILS RELATIVE PERCENT: 1 % (ref 0–3)
HCT VFR BLD CALC: 29.6 % (ref 42–52)
HEMOGLOBIN: 8.5 GM/DL (ref 13.5–18)
LYMPHOCYTES ABSOLUTE: 32.8 K/CU MM
LYMPHOCYTES RELATIVE PERCENT: 85 % (ref 24–44)
MCH RBC QN AUTO: 28.5 PG (ref 27–31)
MCHC RBC AUTO-ENTMCNC: 28.7 % (ref 32–36)
MCV RBC AUTO: 99.3 FL (ref 78–100)
MONOCYTES ABSOLUTE: 0.4 K/CU MM
MONOCYTES RELATIVE PERCENT: 1 % (ref 0–4)
PDW BLD-RTO: 27.3 % (ref 11.7–14.9)
PLATELET # BLD: 83 K/CU MM (ref 140–440)
PMV BLD AUTO: 10.7 FL (ref 7.5–11.1)
RBC # BLD: 2.98 M/CU MM (ref 4.6–6.2)
SEGMENTED NEUTROPHILS ABSOLUTE COUNT: 4.3 K/CU MM
SEGMENTED NEUTROPHILS RELATIVE PERCENT: 11 % (ref 36–66)
SMUDGE CELLS: PRESENT
WBC # BLD: 38.7 K/CU MM (ref 4–10.5)
WBC # BLD: ABNORMAL 10*3/UL

## 2022-11-28 PROCEDURE — 1036F TOBACCO NON-USER: CPT | Performed by: INTERNAL MEDICINE

## 2022-11-28 PROCEDURE — G8484 FLU IMMUNIZE NO ADMIN: HCPCS | Performed by: INTERNAL MEDICINE

## 2022-11-28 PROCEDURE — G8420 CALC BMI NORM PARAMETERS: HCPCS | Performed by: INTERNAL MEDICINE

## 2022-11-28 PROCEDURE — 85007 BL SMEAR W/DIFF WBC COUNT: CPT

## 2022-11-28 PROCEDURE — 85027 COMPLETE CBC AUTOMATED: CPT

## 2022-11-28 PROCEDURE — G8427 DOCREV CUR MEDS BY ELIG CLIN: HCPCS | Performed by: INTERNAL MEDICINE

## 2022-11-28 PROCEDURE — 99214 OFFICE O/P EST MOD 30 MIN: CPT | Performed by: INTERNAL MEDICINE

## 2022-11-28 PROCEDURE — 1123F ACP DISCUSS/DSCN MKR DOCD: CPT | Performed by: INTERNAL MEDICINE

## 2022-11-28 PROCEDURE — 1111F DSCHRG MED/CURRENT MED MERGE: CPT | Performed by: INTERNAL MEDICINE

## 2022-11-28 PROCEDURE — 36415 COLL VENOUS BLD VENIPUNCTURE: CPT

## 2022-11-28 PROCEDURE — 3017F COLORECTAL CA SCREEN DOC REV: CPT | Performed by: INTERNAL MEDICINE

## 2022-11-28 RX ORDER — ATORVASTATIN CALCIUM 80 MG/1
40 TABLET, FILM COATED ORAL DAILY
Qty: 30 TABLET | Refills: 5 | Status: SHIPPED | OUTPATIENT
Start: 2022-11-28 | End: 2022-12-28

## 2022-11-28 RX ORDER — GUAIFENESIN 600 MG/1
600 TABLET, EXTENDED RELEASE ORAL 4 TIMES DAILY
Qty: 120 TABLET | Refills: 5 | Status: SHIPPED | OUTPATIENT
Start: 2022-11-28 | End: 2022-12-28

## 2022-11-28 NOTE — PROGRESS NOTES
MA Rooming Questions  Patient: Ayala Cloud  MRN: [id number]    Date: 11/28/2022        1. Do you have any new issues?   no         2. Do you need any refills on medications?    no    3. Have you had any imaging done since your last visit? yes - Xrays in hospital     4. Have you been hospitalized or seen in the emergency room since your last visit here?   yes - 11/18-11/25    5. Did the patient have a depression screening completed today?  No    No data recorded     PHQ-9 Given to (if applicable):               PHQ-9 Score (if applicable):                     [] Positive     []  Negative              Does question #9 need addressed (if applicable)                     [] Yes    []  No               Harjit Cannon MA

## 2022-11-28 NOTE — PROGRESS NOTES
Patient Name: Woodrow Stringer  Patient : 1950  Patient MRN: [id number]     Primary Oncologist: Nedra Brock MD  Referring Physician: Renetta Osman MD       Date of Service: 2022      Chief Complaint:   Chief Complaint   Patient presents with    Follow-up        Active Problem list  1. Chronic lymphocytic leukemia ([address]    2. Pneumonia due to infectious organism, unspecified laterality, unspecified part of lung           HPI:        Mr. Carin Rock ( 52) was diagnosed with stage I CLL in , when he presented with infection and lymphocytosis. His peripheral blood immunophenotyping was characteristic of B-cell CLL. It was CD38 negative, ZAP-70 positive, CD19 and 20 positive, CD5 positive. Karyotyping was normal in 2006.,  Because of his infection and associated acquired immune deficiency(hypogammaglobulinemia), he was given IVIG for a year. He was started back on IVIG therapy monthly since 2013 to prevent future major infections continuing for now. Also had minor Infusional reaction to IVIG in 2016 responded to Steroids & Benadryl., No further problems after that. CLL was treated only with Leukeran intermittently from 2007 until 2011 and again from 2011 until 2012. 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 Flow studies still showed the same and FISH in 2012 showed deletion of tp53 (17p) and MYB (6q). ,  He was thus treated with Fludara and Rituxan for six months between April and 2012 with excellent clinical and hematological response.  . In 2013, he developed Multiple b/l nodes in axilla and groin area, CAT scan on 13, Bulky lymphadenopathy within the chest, abdomen, and pelvis mildly increased as compared from previous CAT scan in March 2012.,  Starting in Jan 2014 he was initiated on 2nd line chemo with Bendamustine and Rituxan with good initial response. Continued till Nov 2014, CAT scan done on 8/14/14 showed an interval decrease in generalized adenopathy compared to previous study In December 2013, suggesting good response to therapy. ,  CAT scan done on 12/23/14 showed Minimal interval increase in size of at least two periportal and retrocaval lymph nodes. Otherwise stable thoracic, abdominal and pelvic adenopathy. Also mild interval increase in splenomegaly measuring 19 cm, previously 17 cm. CAT scan done on 6/25/15 showed mild interval decrease in the splenomegaly and also in retroperitoneal mesenteric lymph nodes. Started Idelalisib [Zydelig] on 20th Jan 2015 at 150mg po bid. with good initial response. He did remarkably well with Zydelig from January of 2015 until June of 2016, after he was hospitalized for Rhinovirus Pneumonia in May of 2016. Also discussed Living Will, [facility], DNR status, et cetera. In April 2016, chest CT showed stable  , 13. In July 2016, His FISH testing on peripheral blood showed abnormal results, with 6q deletion, 17p deletion, and 11q centromere signal in 3 percent of cells. The 17p deletion (Tp53) in 80 percent of cells is associated with unfavorable prognosis. Because of his CLL with poor prognostic markers, including 17p deletion detected in 2012 & again in July of 2016. He was started on ibrutinib in September 2016 140mg/d increased to 280 mg/ after 4 weeks. The abdominal CT 7/5/16 is showing no acute abnormalities, stable splenomegaly, and one periportal lymph node measuring 2.2 by 3.7 cm which is unchanged from before. CAT scan of the chest August 5, 2016, showed axillary, mediastinal, hilar, and upper abdominal lymphadenopathy consistent with his CLL. The maximum size of the nodes is 2.4 cm.  A 1 cm infiltrate in the lateral segment of the right middle lobe possibly infectious in nature, segmental calcification of left coronary artery. CAT scan of the chest, abdomen, and pelvis December 7,2016 2016, which revealed a mild mediastinal right hilar adenopathy, decreased in size from the previous examination of April and August of 2016, .5. His ibrutinib was stopped in December 2016 when he was feeling bad and it was started back at one a day, 140 mg daily instead of 280 mg that he was taking prior to that  In January 2017, he developed progressive lump in his left inguinal area. That being the only area of progressive adenopathy with multiple nodes coalesced together, a question of Pimentels transformation versus more aggressive histology conversion was considered as a possibility. Dr. Mihir Thomas, did a biopsy of the lymph node on January 18, 2017. The final pathology was reported as B-cell small lymphocytic lymphoma (B-cell CLL/SLL), with no evidence of any large cell OR Pimentels transformation. There was some evidence of localized herpetic simplex lymphadenitis. Because of possibility of localized infection with herpes. I started him on Valtrex 500 t.i.d. for two to three weeks then tapered to 1/d as maintenance therapy. Lymphadenopathy in left groin almost completely resolved. His ibrutinib was stopped in December 2016 when he was feeling bad and it was started back at one a day, 140 mg daily as maintenance dose instead of 280 mg that he was taking prior to that. His quantitative immunoglobulin on February 17, 2017, IgG 600, IgA less than 10, IgM less than 4. Serum protein electrophoresis was within the normal range. Gammaglobulin was continued monthly since it has helped any serious infections under control. August 2017 he had a IgG that was 503  October 2017 started a ibrutinib he was off for 2 months due to his insurance issues  5-18 CT chest: Showed some mild decrease in mediastinal and hilar lymphadenopathy. . Regards to his abdomen also there is decrease in his lymphadenopathy. Although there is a left inguinal node that has enlarged. Section that no obvious source of his abdominal cramping. 10/10/2018 EGD showed severe ulcerative esophagitis with slight narrowing in the GE junction small hiatal hernia mild superficial gastritis, Colonoscope revealed 2 mm polyp in the sigmoid colon, diverticulosis, hemorrhoids, moderate stool  1/2019; Colonoscopy with two diminutive polyps, diverticulosis and hemorrhoids, path with TA above IC valve and HP distal sigmoid  10-19 iron def based on labs refered to GI , stool occult negative x #3 , NO PICA   11-19 saw Dr. [facility]rmond, [facility]  11/7/2019: Ct chest:Ground-glass nodule seen within the right upper lobe the largest measuring 17  mm in diameter. 12-6-19 capsule endoscopy, results unavailable   2/2020: CT chest:IMPRESSION:  Previously noted ground-glass opacities in the right lung have resolved. However, there is a new cluster of tiny pulmonary nodules in the left lower  lobe which could represent an infectious or inflammatory etiology. Short-term follow-up chest CT is recommended in 3-6 months. Multiple additional tiny pulmonary nodules are overall stable. Stable mediastinal lymph nodes without new lymphadenopathy. Previous Therapies    May 2020: Ct chest:  1. Interval resolution of the previously described cluster of pulmonary    nodules in the left lower lobe. Findings are most compatible with resolved    infectious/inflammatory etiology. 2. No acute cardiopulmonary disease. 3. COPD. 4. Stable subcentimeter mediastinal lymph nodes. 8/28/20: US RP normal    August 2020 cystoscopy revealed enlarged prostate. Was Recommended TURBT    3/24/22: CXR:  Bilateral interstitial opacity. Nodular consolidation at the left lung base. Pneumonia is favored over metastatic/lymphangitic disease. Consider atypical   etiologies.    Was treated with IV abx    But was given abx again and completed 4/21/22.    5/4/22: CT chest:  1. Patchy bilateral airspace opacities, most prominent in the left lower lobe   concerning for multifocal pneumonia. Follow-up examination in 4-6 weeks is   recommended to assess for resolution. 2. Interval increase in intrathoracic and upper abdominal lymphadenopathy   consistent with patient's history of CLL. 3. Splenomegaly. 5/28/22: he was seen by Dr Antonio Ellington who ordered bactrim     June 8 2022 he was seen by him again and was prescribed 2 week course of cipro    July 28 2022 CT chest:  1. Overall improvement in multifocal pneumonia. However there has been   interval development of multifocal tree-in-bud opacities which either reflect   residual pneumonia versus new infectious bronchiolitis. 2. There are scattered new solid nodular opacities as well the largest   measuring up to 8 mm also likely infectious or inflammatory in etiology. Continued follow-up in 3 months is recommended to ensure resolution and/or   stability. 3. Slight interval increase in size of a left hilar lymph node as well as a   periportal and gastrohepatic lymph nodes. Attention on follow-up exams is   recommended. Otherwise stable to slight interval decrease in size of   mediastinal and hilar adenopathy. 4. Probable splenomegaly. 9/13/2022 CBC with WBC of 11.5 hemoglobin of 11.4 hematocrit of 37 MCV of 82 and platelets of 44LUT with creatinine 1.7 albumin 3.9  ferritin of 25 iron sats of 10% B12 1217 IgG 559    He was admitted on 9/28/2022 with severe sepsis in the setting of rhinovirus infection with superimposed bacterial pneumonia. Completed 8-day course of Zosyn. Also acute hypoxic respiratory failure in the setting of bowel and component of CHF as well. Was evaluated by cardiology as well. Has been holding ibrutinib.    9/28/2022 CT scan of the head:  Chronic microvascular disease.   Negative acute bleed, midline shift or mass effect. Findings worrisome for acute on chronic maxillary sinus disease. Please   correlate exam findings. CTA of the chest, CT scan of the abdomen pelvis:  No evidence of pulmonary embolism. Right greater than left lower lobe pneumonia. Diffuse hilar and mediastinal lymphadenopathy, concerning for malignancy with   history of CLL, also likely progressed from prior exam.       Diffuse peritoneal and retroperitoneal lymphadenopathy in the upper abdomen   most significant near the cassi hepatis and IVC where there is a large mass   which is favored to be a lymph node measuring up to 6.2 x 3.5 cm, also likely   progressed from exam.       Splenomegaly, could also be related to malignancy. Mild gallbladder wall thickening and pericholecystic fluid, could be related   to passive congestion or mass effect from adjacent lymphadenopathy. There is   mild intrahepatic bile duct dilatation. 9/29/2022 ultrasound of the abdomen:  1. Hepatomegaly and hepatic steatosis. 2. Masses most compatible with lymphadenopathy in the cassi hepatis, better   seen on recent CT. 3. Hyperechoic focus in the liver could be focal fatty infiltration,   hemangioma or other mass. This is too small to characterize on recent CT. Correlate with MRI hepatic mass protocol if indicated. 4. Gallbladder wall thickening with possible adenomyomatosis. HIDA scan with no convincing evidence of acute cholecystitis. 10/5/2022 underwent right thoracentesis, cytology negative for carcinoma. Many small lymphoid cells were seen    Postthoracentesis chest x-ray with slight interval decrease in size of the right pleural effusion. No pneumothorax. 10/23/22; Ct chest:  The infiltrates seen previously in the lower lobes bilaterally for the most   part have decreased in size and conspicuity.        However, there are now innumerable punctate tree-in-bud centrilobular micro   nodules, which are nonspecific but suggest inflammatory/infectious   bronchiolitis. Consider both typical and atypical etiologies. In addition, cavitary lesion has developed in the periphery of the right   lower lobe and to a lesser extent within the left upper lung. Necrotizing   infection would be primarily considered given the rapid development. Consider both typical and atypical etiologies. Enlarged mediastinal lymph nodes, similar when compared to the previous exam,   process of Lia related to history of chronic lymphocytic leukemia. 11/23/22: was admitted with Increased sob  CTA chest:   1. Negative for pulmonary embolus. 2. Multiple large mediastinal lymph nodes. These appear larger than   previously seen. 3. Extensive bilateral ground-glass opacities and tiny lung nodules. Possibility of acute respiratory distress syndrome   4. Probable atypical pneumonia. Aspergillosis could be in the differential.   This is similar in appearance to the previous study. 5. Splenomegaly without change       PAST MEDICAL HISTORY:   1. Stage I CLL with conversion from good to poor prognostic factors. ,  2. History of hypertension for many years. ,  3. 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 Arthritis in the past. ,  5. Frozen shoulder for which he had treatment including injection by Dr. Jimbo Barfield. ,  6. Cellulitis with Zoster type lesion Left thigh resolved with Bactrim and Valtrex in March 2016.,  7. abdominal illness, with fever, nausea, and discomfort, suggestive of infectious etiology in May 2016. ,  8. hospitalization between July 5 and July 8 for what seems like atypical rhinovirus pneumonia involving right middle and lower lobe and left lower lobe with sepsis,  9. 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 Dr. Zuleyka Myers is referring him for MOHS surgery.  ,  10. hospitalized from March 29 to March 2017, for hyperosmolar hyperglycemia with hyponatremia and hyperkalemia and UTI. He was seen by Dr. Davey Rosales, who put him on insulin. He was also seen by Dr. Idalmis Diaz. He felt much better after his sugar got under better control and electrolyte imbalanced reversed,  11. Ultrasound Doppler 2017, was negative for any DVT in either leg. Chest x-ray showed no acute process. PAST SURGICAL HISTORY:   1. knee operation,  2. tonsillectomy,  3. broken ankle times two requiring open reduction. ,  4. Vision better after cataract surgery    FAMILY HISTORY:   His paternal aunt had colon cancer. Uncle also had some form of cancer. Father had heart disease. Sister  12 of Liver disease     SOCIAL HISTORY:   He has a 40-pack-year history of smoking, quit in . He denies alcohol use. He is not . Has one son. Interval History  2022: Arrived with his son to the clinic today. Continues to have RUQ pain, little better. Left AMA from the hospital. Productive cough with mostly clear sputum now. No fever. Breathing is also not heavy and is on 3 L. Appetite is better. He thinks that he might have gained some weight. Night sweats have almost resolved. Received IVIG prior to discharge.     Review of Systems   Per interval history; otherwise 10 point ROS is negative              Vital Signs:  /69 (Site: Left Upper Arm, Position: Sitting, Cuff Size: Medium Adult)   Pulse (!) 116   Temp 97.5 °F (36.4 °C) (Infrared)   SpO2 96% Comment: Pt is on 3 liters through nasal canula    Physical Exam:  CONSTITUTIONAL: alert and awake, tired appearing, arrived on wheelchair and was on supplemental oxygen  EYES: No palor or any icetrus   ENT: ATNC   NECK: No JVD   HEMATOLOGIC/LYMPHATIC: no cervical, supraclavicular or axillary lymphadenopathy   LUNGS: poor effort but ctab  CARDIOVASCULAR:s1s2 SM+ tachycardia  ABDOMEN:soft ntnd bs pos  NEUROLOGIC: GI   SKIN: skin tags   EXTREMITIES: no LE edema bilaterally      Labs:    Hematology:  Lab Results   Component Value Date    WBC 57.3 (HH) 11/25/2022    RBC 3.06 (L) 11/25/2022    HGB 8.4 (L) 11/25/2022    HCT 28.6 (L) 11/25/2022    MCV 93.5 11/25/2022    MCH 27.5 11/25/2022    MCHC 29.4 (L) 11/25/2022    RDW 25.6 (H) 11/25/2022    PLT 51 (L) 11/25/2022    MPV 10.1 11/25/2022    BANDSPCT 4 (L) 11/25/2022    SEGSPCT 6.0 (L) 11/25/2022    EOSRELPCT 1.0 11/23/2022    BASOPCT 1.0 11/19/2022    LYMPHOPCT 90.0 (H) 11/25/2022    MONOPCT 2.0 11/24/2022    BANDABS 2.29 11/25/2022    SEGSABS 3.4 11/25/2022    EOSABS 0.3 11/23/2022    BASOSABS 0.2 11/19/2022    LYMPHSABS 51.6 11/25/2022    MONOSABS 0.9 11/24/2022    DIFFTYPE MANUAL DIFFERENTIAL 11/25/2022    ANISOCYTOSIS 2+ 11/25/2022    POLYCHROM 1+ 11/24/2022    WBCMORP ATYPICAL LYMPHS 11/24/2022    PLTM SEVERAL LARGE PLATELETS 31/63/2391     Lab Results   Component Value Date    ESR 8 11/14/2022       Chemistry:  Lab Results   Component Value Date     11/25/2022    K 5.2 (H) 11/25/2022    CL 97 (L) 11/25/2022    CO2 31 11/25/2022    BUN 32 (H) 11/25/2022    CREATININE 0.7 (L) 11/25/2022    GLUCOSE 201 (H) 11/25/2022    CALCIUM 8.2 (L) 11/25/2022    PROT 5.1 (L) 11/25/2022    LABALBU 3.0 (L) 11/25/2022    BILITOT 0.5 11/25/2022    ALKPHOS 176 (H) 11/25/2022    AST 16 11/25/2022    ALT 24 11/25/2022    LABGLOM >60 11/25/2022    GFRAA >60 10/11/2022    PHOS 4.0 11/25/2022    MG 2.1 11/25/2022    POCGLU 483 (H) 11/25/2022     Lab Results   Component Value Date     (H) 10/11/2022     No components found for: LD  Lab Results   Component Value Date    TSHHS 3.040 04/04/2019    T4FREE 1.37 04/04/2019    FT3 3.2 03/27/2012       Immunology:  Lab Results   Component Value Date    PROT 5.1 (L) 11/25/2022    SPEP  09/22/2022     INTERPRETATION - Slightly decreased albumin and decreased total proteins.   LP.    ALBUMINELP 3.1 (L) 09/22/2022    LABALPH 0.2 09/22/2022    LABALPH 0.8 09/22/2022    LABBETA 0.6 09/22/2022 GAMGLOB 0.6 09/22/2022     No results found for: Izabel Hives, KLFLCR  No results found for: B2M    Coagulation Panel:  Lab Results   Component Value Date    PROTIME 14.7 (H) 10/26/2022    INR 1.14 10/26/2022    APTT 29.9 10/02/2022    DDIMER >5250 (H) 11/23/2022       Anemia Panel:  Lab Results   Component Value Date    VVFRWMFH14 >2000 (H) 11/23/2022    FOLATE 3.4 10/11/2022       Tumor Markers:  Lab Results   Component Value Date    PSA 1.9 10/27/2020         Imaging: Reviewed     Pathology:Reviewed     Observations:  Performance Status: ECOG 1  Depression Status: No data recorded          Assessment & Plan:  B-cell CLL, stage I with conversion from good to poor prognostic factors, with 17p deletion:   His diagnosis of CLL since 2007.   17p deletion since 2015. Initial treatment with Leukeran from 2000 to 2011 intermittently and FCR regimen in 2012, bendamustine/Rituxan in 2014. Idelalisib from January 2015 until June of 2016. On ibrutinib since September 2016. Ibrutinib therapy seems to be helping him, overall improvement. Was Only able to tolerate 140 mg p.o. every other day  He was off for a couple months During the fall 2017  Resumed Ibrutinib and  on 140 mg po daily. Plan to Continue current dose as no major B sx, stable WBC. CT imaging in July 2022 with slightly increasing size of intraabdominal LN. plan was to monitor. But note declining platelet count, probably secondary to ibrutinib versus progressive CLL versus underlying infection and antibiotics. Note CT scan of the chest abdomen pelvis also with progressive lymphadenopathy. Discussed the findings and discussed systemic treatment with single agent Rituxan(preferable secondary to his performance status) versus BR and he wanted to proceed with single agent Rituxan. Discussed adverse effects. Recommend holding Ibrutinib. After C1 he was admitted with fever, hypotenson which I dont believe is infusion reaction.  Will re challenge again    Thrombocytopenia, no hemolysis, monoclonal gammopathy, nutritional deficiency. Could be secondary to ibrutinib versus progressive CLL versus infection versus antibiotics. Plan as above. Rhinovirus and P aeriginosa: is on Tobra inhaler and oral azithromycin. Follows with ID, has an appt on wed. RUQ pain:? sec to lymphadenopathy. Note above plan for Rituxan    Acquired hypogammaglobulinemia:   Continue IVIG,    BPH: is being followed by Urology    Iron def anemia and esophagitis: follows with GI, Dr Reyes Ozuna. Reported that he had colonoscopy 2018 with polyps detected. EGD was normal except for H. pylori which was treated. Was supposed to have VCE which was unsuccessful once. He is on oral iron twice daily, recommend one tab every other day instead. Repeat ferritin pending. UA with no blood. Recommend follow-up with GI once other acute issues resolve    Lung Nodule RUL: Stable findings, will follow    Continue other medical care. Discussed above findings and plan with him and he verbalized understanding. Answered all his questions. Discussed healthy lifestyle, smoking cessation, healthy diet and exercise as tolerated. Also discussed importance of being up-to-date with age-appropriate screening tools. Is going to follow with need for colonoscopy    Recommend follow-up with primary care physician and other specialist.    Please do not hesitate to contact us if you need any further information.     Return to clinic next week or earlier if new symptoms    ANUPAM

## 2022-11-29 NOTE — ADT AUTH CERT
Utilization Reviews       Viral Illness, Acute - Care Day 6 (11/23/2022) by Jason Beck RN       Review Status Review Entered   Completed 11/28/2022 1659       Created By   Jason Beck RN      Criteria Review      Care Day: 6 Care Date: 11/23/2022 Level of Care: Telemetry    Guideline Day 2    Clinical Status    (X) * Hypotension absent    11/28/2022 4:53 PM EST by Rozina Velez      /90 133/87 133/87    (X) * No requirement for mechanical ventilation    11/28/2022 4:53 PM EST by Rozina Velez      AT 3-4 LPM NASAL CANNULA    (X) * Oxygenation at baseline or improved    11/28/2022 4:53 PM EST by Rozina Velez      SPO2 96% 93% 92% AT 3-4 LPM NASAL CANNULA    (X) * Mental status at baseline    11/28/2022 4:53 PM EST by Ayad Yeung.     Activity    (X) Advance activity as tolerated    11/28/2022 4:53 PM EST by Rozina Velez      Up with assistance    Routes    (X) * Oral hydration    11/28/2022 4:53 PM EST by Rozina Velez       mL    (X) Oral or IV medications    11/28/2022 4:53 PM EST by Rozina Velez      (LASIX) injection 40 mg ONCE  IV  (SOLU-MEDROL) injection 40 mg EVERY 12 HOURS IV    (X) Usual diet    11/28/2022 4:53 PM EST by Ward [address] [place] [address]; Lunch, Dinner; Standard High Calorie/High Protein Oral Supplement    Interventions    (X) Possible Isolation    11/28/2022 4:53 PM EST by Rozina Velez      Droplet isolation  Contact isolation    (X) Pulse oximetry    11/28/2022 4:53 PM EST by Rozina Velez      Continuous    (X) Possible oxygen    Medications    (X) Possible antiviral medication    11/28/2022 4:53 PM EST by Rozina Velez      (ZOVIRAX) capsule 400 mg 3 TIMES DAILY  PO    (X) Possible antibiotic (eg, for bacterial coinfection or superinfection)    11/28/2022 4:53 PM EST by Rozina Velez      [facility]) tablet 250 mg DAILY PO  (ZOSYN) 4,500 mg in dextrose 5 % 100 mL EVERY 8 HOURS IV (X) Possible antipyretic medication    11/28/2022 4:53 PM EST by Jose Alfredo Marshall      (TYLENOL) tablet 650 mg ONCE PO    (X) Possible corticosteroid    11/28/2022 4:53 PM EST by Jose Alfredo Marshall      (SOLU-MEDROL) injection 40 mg EVERY 12 HOURS IV    ( ) Possible DVT prophylaxis    11/28/2022 4:53 PM EST by Jose Alfredo Marshall      None noted       Definitions for Care Day 6    Hypotension absent    (X) Hypotension absent, as indicated by  1 or more  of the following  (1) (2) (3) (4):       (X) SBP greater than or equal to 90 mm Hg and without recent decrease greater than 40 mm Hg from       baseline in adult or child 10 years or older       Isolation    (X) Isolation, as indicated by  1 or more  of the following  [A] [B] (2):       (X) Droplet precautions (known or suspected infection with pathogen transmittable by large particle       droplets from respiratory secretions), as indicated by  1 or more  of the following  [D]:          (X) Other pathogenic agent transmittable by droplets       * Milestone   Additional Notes   DATE: 11/23/2022         PERTINENT UPDATES:   Remains on oxygen support.   + Tachycardia  109 108   + Tachypnea RR 42 40 34   Elevated Lactic Acid, Sepsis: 2.1 (HH)         VITALS:  TEMP 97.9 (36.6) ORAL RR 42   /90 SPO2 92% AT 3-4 LPM NASAL CANNULA          ABNL/PERTINENT LABS/RADIOLOGY/DIAGNOSTIC STUDIES:   Creatinine: 0.5 (L)   Glucose, Random: 168 (H)   CALCIUM, SERUM, [id number]: 7.9 (L)   Total Protein: 4.5 (L)   CRP, High Sensitivity: 19.0 (H)   POC Glucose: 209 (H)   pH, Bld: 7.48 (H)   Vitamin B-12: >2000 (H)   POC Glucose: 369 368 244 209 (H)   CALCIUM, SERUM, 875920: 7.9 (L)   Total Protein: 4.5 (L)   CRP, High Sensitivity: 19.0 (H)   pH, Bld: 7.48 (H)   Vitamin B-12: >2000 (H)   Pro-BNP: 2,294 (H)   Albumin: 2.9 (L)   Alk Phos: 188 (H)   Total Protein: 4.5 (L)   Haptoglobin: 216 (H)   WBC: 26.3 (H)   RBC: 2.97 (L)   Hemoglobin Quant: 8.3 (L)   Hematocrit: 27.2 (L) MCHC: 30.5 (L)   RDW: 25.2 (H)   Platelet Count: 52 (L)   Lymphocyte %: 74.0 (H)   Monocytes %: 12.0 (H)   Segs Relative: 7.0 (L)   Metamyelocytes Relative: 1 (H)   Vitamin B-12: >2000 (H)   Haptoglobin: 216 (H)   D-Dimer, Quant: >5250 (H)         PHYSICAL EXAM:   LUNGS: coarse bs bilaterally with wheeze         MD CONSULTS/ASSESSMENT AND PLAN:   HEMATOLOGY   ASSESSMENT   CLL   Hypogammaglobulinemia   RECOMMENDATION   He is known to us for CLL. He was on ibrutinib until recently and he was noted to have disease progression recently. We just started rituximab and we have to stop C1  because of tremors, SOB and fever      PULMO   Assessment:   1. Recurrent pneumonia pseudomonas   2. Ac bronchospasm prob copd   3. Ac on ch hypoxemic resp failure   4. CLL and hypogammaglobulinemia   Plan:   1. D/w pt   2. Adequate o2 adm   3. Vapotherm as needed   4. Cx   5. On  zosyn and tobra inhaled   6. Continue duoneb   7. Add iv steroids   8. Started on rituximab for CLL which pt having progressive disease   9. Pt to receive IVIG   10. Thanks will follow      IM   Assessment/Plan:   1. Sepsis:   -Likely in the setting of pneumonia   -Of note, patient was recently treated with IV ciprofloxacin and IV Zosyn and completed therapy    -Patient has ID follow-up and patient was advised to continue azithromycin and inhaled tobramycin due to concern for palpitations and chronic Pseudomonas colonization   -Patient presented to the infusion clinic with symptoms concerning for sepsis, elevated CRP making sepsis/pneumonia more likely   -ID on board, will appreciate recs   2. Acute on chronic hypoxemic respiratory failure:   -Likely in the setting of pneumonia   -Continue to wean oxygen as able   -Respiratory viral panel as above   3.  History of CLL:   -Follows with hematology/oncology   -Initially patient was on ibrutinib until recently, he was noted to have disease progression   - he was started on rituximab but had to be stopped prior to admission due to concern for drug reaction   4 . Severe IgG deficiency:   -Remains on monthly IVIG, last IVIG    5. Type 2 diabetes mellitus:   -Low-dose insulin sliding scale and Lantus   -Fingerstick glucose before every meal and at bedtime   -Hypoglycemia protocol   6. BPH:   -Continue tamsulosin and finasteride   7. Protein calorie malnutrition      ID   Assessment   1. Sepsis   2. Rhinovirus infection   3. P aeruginosa bacterial superinfection pneumonia   4. Acute on chronic hypoxic respiratory failure   Medical history of CLL, selective IgG immunodeficiency, who developed fever at the oncology center during the course of the rituximab infusion. Overall condition likely linked to COPD, Bronchiectasis, CLL and rituximab use. Review of chart which showed that the patient has had repeated positive rhinovirus PCR tests dating as far back . It is difficult to explain this phenomenon, as genome sequencing of rhinovirus is not available to prove if it is the same rhinovirus or if this is repeated infections. P aeruginosa persists   Improving. Moved to ICU to receive IVIG under supervision, since he reports having   5. VGS blood culture   contaminant    6. Selective IgG deficiency:    Last IgG done2 is 326. IgG 1 and IgG 3 deficiency   Low IgG and IgA   7. Comorbid conditions:    Plan   Therapeutic: continue Zosyn; continue inhaled tobramycin and oral azithromycin   IVIG 30 g   Diagnostic:    F/u: CRP and procalcitonin         MEDICATIONS:   lactated ringers bolus 500 mL ONCE IV      (NORCO) 5-325 MG per tablet 1 tablet EVERY 6 HOURS PRN  PO x2         ORDERS:   Inpatient consult to Cardiology   Inpatient consult to Pulmonology    Catheter care   Intake and output   Transfer patient [facility] ICU         PT/OT/SLP/CM ASSESSMENT OR NOTES:   CM   Reviewed chart and discussed in IDR, plan is to transfer to ICU for an infusion. At this time plan remains home with son and [id number].   CM will continue to follow      DIETITIAN Nutrition Recommendations/Plan:    1. Continue current diet and oral nutrition supplements    2. Please consider increase insulin coverage if medically able            Viral Illness, Acute - Care Day 3 (11/20/2022) by Gunnar Bean RN       Review Status Review Entered   Completed 11/28/2022 1607       Created By   Gunnar Bean RN      Criteria Review      Care Day: 3 Care Date: 11/20/2022 Level of Care: Telemetry    Guideline Day 2    Clinical Status    (X) * Hypotension absent    11/28/2022 4:07 PM EST by Winnie Roman      /74 140/68 121/69    (X) * No requirement for mechanical ventilation    11/28/2022 4:07 PM EST by Winnie Roman      AT 6 LPM HIGH FLOW NASAL CANNULA    (X) * Oxygenation at baseline or improved    11/28/2022 4:07 PM EST by Winnie Roman      SPO2 92%91% 90% AT 6 LPM HIGH FLOW NASAL CANNULA    (X) * Mental status at baseline    11/28/2022 4:07 PM EST by Lizzeth Araya.     Activity    (X) Advance activity as tolerated    11/28/2022 4:07 PM EST by Winnie Roman      Up with assistance    Routes    ( ) * Oral hydration    11/28/2022 4:07 PM EST by Winnie Roman      PO 0    (X) Oral or IV medications    11/28/2022 4:07 PM EST by Winnie Roman      [facility]) extended release tablet 600 mg 2 TIMES DAILY PO    (X) Usual diet    11/28/2022 4:07 PM EST by Ward [address] [place] [address]; Lunch, Dinner; Standard High Calorie/High Protein Oral Supplement    Interventions    (X) Possible Isolation    11/28/2022 4:07 PM EST by Winnie Roman      Droplet isolation  Contact isolation    (X) Pulse oximetry    11/28/2022 4:07 PM EST by Winnie Roman      Continuous    (X) Possible oxygen    Medications    (X) Possible antiviral medication    11/28/2022 4:07 PM EST by Winnie Roman      (ZOVIRAX) capsule 400 mg 3 TIMES DAILY  PO    (X) Possible antibiotic (eg, for bacterial coinfection or superinfection)    11/28/2022 4:07 PM EST by Мария Saenz      [facility]) tablet 250 mg DAILY  PO    (X) Possible antipyretic medication    11/28/2022 4:07 PM EST by Мария Saenz      (TYLENOL) tablet 650 mg EVERY 6 HOURS PRN  PO X1    ( ) Possible corticosteroid    11/28/2022 4:07 PM EST by Мария Saenz      None noted    ( ) Possible DVT prophylaxis    11/28/2022 4:07 PM EST by Мария Saenz      None noted       Definitions for Care Day 3    Hypotension absent    (X) Hypotension absent, as indicated by  1 or more  of the following  (1) (2) (3) (4):       (X) SBP greater than or equal to 90 mm Hg and without recent decrease greater than 40 mm Hg from       baseline in adult or child 10 years or older       Isolation    (X) Isolation, as indicated by  1 or more  of the following  [A] [B] (2):       (X) Droplet precautions (known or suspected infection with pathogen transmittable by large particle       droplets from respiratory secretions), as indicated by  1 or more  of the following  [D]:          (X) Other pathogenic agent transmittable by droplets       * Milestone   Additional Notes   DATE: 11/20/2022         PERTINENT UPDATES:   Remains on oxygen support.   + Tachypnea RR 42 41 39    SPUTUM EXPECTORANT   PSEUDOMONAS AERUGINOSA Rare growth    With episode of elevated body temperature   Given TYLENOL PRN  PO          VITALS:ACJO875.6 (38.7) ORAL RR 42  HR 97 /74 SPO2 90% AT 6 LPM HIGH FLOW NASAL CANNULA          ABNL/PERTINENT LABS/RADIOLOGY/DIAGNOSTIC STUDIES:   Sodium: 134 (L)   Creatinine: 0.5 (L)   Glucose, Random: 125 (H)   CALCIUM, SERUM, 834159: 8.0 (L)   POC Glucose: 239 232 166 124 (H)   CRP, High Sensitivity: 69.3 (H)   Glucose, Random: 125 (H)   WBC: 25.7 (H)   RBC: 3.06 (L)   Hemoglobin Quant: 8.4 (L)   Hematocrit: 27.7 (L)   MCHC: 30.3 (L)   RDW: 25.8 (H)   Platelet Count: 43 (L)   Lymphocyte %: 88.0 (H)   Segs Relative: 9.0 (L)   Bands Relative: 2 (L)         PHYSICAL EXAM:   Respiratory: b/l ronchi and coarse breathing          MD CONSULTS/ASSESSMENT AND PLAN:   IM   Assessment and Plan:   Sepsis   Likely in the setting of pneumonia   Of note, patient was recently treated with IV ciprofloxacin and IV Zosyn and completed therapy. He had ID follow-up and patient was advised to continue azithromycin and inhaled tobramycin due to concern of bronchiectasis and chronic Pseudomonas colonization   Patient presented to infusion clinic and was found to have fevers and chills, which was initially thought to be infusion reaction to rituximab, however he had significant leukocytosis, elevated CRP making sepsis/pneumonia more likely. Will continue patient on azithromycin and inhaled tobramycin as per ID recommendation. Respiratory cultures have been sent. CRP and procalcitonin trending down. We will continue above antibiotics. Follow blood cultures, respiratory cultures. Acute on chronic hypoxic respiratory failure   Likely due to pneumonia   Continue to wean oxygen as able   Respiratory panel positive for rhinovirus which was also positive during his last admission. History of CLL   Follows with hematology/oncology   Initially he was on ibrutinib until recently, he was noted to have disease progression   He started receiving rituximab but had to be stopped prior to completion due to concerns for drug reaction. Remains on monthly IVIG. Last IVIG   Type 2 diabetes mellitus   Control Lantus and low-dose sliding scale   Will follow blood sugars. BPH   Continue tamsulosin and finasteride         MEDICATIONS:   (NORCO) 5-325 MG per tablet 1 tablet EVERY 6 HOURS PRN PO x1         ORDERS:   Full code   Intake and output          PT/OT/SLP/CM ASSESSMENT OR NOTES:   DIETITIAN   Nutrition Recommendations/Plan:    1. Start Standard High Calorie/High Protein oral nutrition supplement BID    2. Liberalize to Carb Control 5 diet    3. Consider assist feeding/meal set up PRN    4. Encourage adequate hydration    5.  Document all PO intakes in I/o

## 2022-11-30 ENCOUNTER — OFFICE VISIT (OUTPATIENT)
Dept: INFECTIOUS DISEASES | Age: 72
End: 2022-11-30
Payer: COMMERCIAL

## 2022-11-30 VITALS
TEMPERATURE: 96.5 F | SYSTOLIC BLOOD PRESSURE: 120 MMHG | DIASTOLIC BLOOD PRESSURE: 66 MMHG | RESPIRATION RATE: 19 BRPM | HEART RATE: 97 BPM

## 2022-11-30 DIAGNOSIS — J47.0 BRONCHIECTASIS WITH ACUTE LOWER RESPIRATORY INFECTION (HCC): ICD-10-CM

## 2022-11-30 DIAGNOSIS — D80.1 HYPOGAMMAGLOBULINEMIA (HCC): ICD-10-CM

## 2022-11-30 DIAGNOSIS — J96.21 ACUTE ON CHRONIC RESPIRATORY FAILURE WITH HYPOXEMIA (HCC): ICD-10-CM

## 2022-11-30 DIAGNOSIS — J15.1 PNEUMONIA OF BOTH LOWER LOBES DUE TO PSEUDOMONAS SPECIES (HCC): ICD-10-CM

## 2022-11-30 DIAGNOSIS — Z85.6 PERSONAL HISTORY OF CLL (CHRONIC LYMPHOCYTIC LEUKEMIA): Primary | ICD-10-CM

## 2022-11-30 PROCEDURE — 3017F COLORECTAL CA SCREEN DOC REV: CPT | Performed by: INTERNAL MEDICINE

## 2022-11-30 PROCEDURE — 1036F TOBACCO NON-USER: CPT | Performed by: INTERNAL MEDICINE

## 2022-11-30 PROCEDURE — 99215 OFFICE O/P EST HI 40 MIN: CPT | Performed by: INTERNAL MEDICINE

## 2022-11-30 PROCEDURE — 1123F ACP DISCUSS/DSCN MKR DOCD: CPT | Performed by: INTERNAL MEDICINE

## 2022-11-30 PROCEDURE — G8484 FLU IMMUNIZE NO ADMIN: HCPCS | Performed by: INTERNAL MEDICINE

## 2022-11-30 PROCEDURE — G8427 DOCREV CUR MEDS BY ELIG CLIN: HCPCS | Performed by: INTERNAL MEDICINE

## 2022-11-30 PROCEDURE — G8420 CALC BMI NORM PARAMETERS: HCPCS | Performed by: INTERNAL MEDICINE

## 2022-11-30 PROCEDURE — 1111F DSCHRG MED/CURRENT MED MERGE: CPT | Performed by: INTERNAL MEDICINE

## 2022-11-30 RX ORDER — TOBRAMYCIN INHALATION SOLUTION 300 MG/5ML
300 INHALANT RESPIRATORY (INHALATION) 2 TIMES DAILY
Qty: 300 ML | Refills: 2 | Status: SHIPPED | OUTPATIENT
Start: 2022-11-30 | End: 2022-11-30

## 2022-11-30 RX ORDER — AZITHROMYCIN 250 MG/1
250 TABLET, FILM COATED ORAL DAILY
Qty: 30 TABLET | Refills: 2 | Status: SHIPPED | OUTPATIENT
Start: 2022-11-30 | End: 2023-02-28

## 2022-11-30 RX ORDER — TOBRAMYCIN INHALATION SOLUTION 300 MG/5ML
300 INHALANT RESPIRATORY (INHALATION) 2 TIMES DAILY
Qty: 300 ML | Refills: 2 | Status: SHIPPED | OUTPATIENT
Start: 2022-11-30 | End: 2023-02-28

## 2022-11-30 NOTE — PROGRESS NOTES
11/30/2022         Referring Physician: No ref. provider found  Primary Care Physician: Roxanne Garcia MD    Impression/Plan:   Diagnosis Orders   1. Personal history of CLL (chronic lymphocytic leukemia)        2. Acute on chronic respiratory failure with hypoxemia (HCC)        3. Hypogammaglobulinemia ([address]        4. Pneumonia of both lower lobes due to Pseudomonas species (HCC)  azithromycin (ZITHROMAX) 250 MG tablet    C-Reactive Protein    CBC    Basic Metabolic Panel    Hepatic Function Panel    Sedimentation Rate    IgG    tobramycin, PF, (HARLEY) 300 MG/5ML nebulizer solution    DISCONTINUED: tobramycin, PF, (HARLEY) 300 MG/5ML nebulizer solution      5. Bronchiectasis with acute lower respiratory infection ([address]  Culture, Respiratory          Discussion:  Acute on chronic respiratory failure with hypoxemia ([address]  Follow up with Dr. Tu Biggs     [facility]  Follow up with Dr. Jessy Osullivan. Will check IgG level today. Bronchiectasis with acute lower respiratory infection ([address]  Improving but needs to see Dr. Tu Biggs for pulmonary toileting procedures. Return in about 6 weeks (around 1/11/2023). 45 minutes was spent in the encounter; more than 50% of the face-to-face time was spent with the patient providing counseling and coordination of care. History: Morris George is a 71 y.o.  male with a medical history of T2DM, CLL, hypogammaglobulinemia requiring monthly IVIG infusions, presenting today for recurrent pneumonia. Patient reports that he was treated for pneumonia in March 2022, he felt well for a while but once more had symptoms of productive cough and fatigue. He denies night sweats. He endorses some weight loss and poor appetite. He follows with Dr. Fifi Chavez who had ordered a CT of the chest that was done on 5/4/2022. It showed patchy bilateral airspace opacities, interval increase in intrathoracic and upper abdominal lymphadenopathy and splenomegaly.   [place] He reports no history of exposure to anyone with tuberculosis. 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 He has no pets. 6/8/2022: at last visit Bactrim was prescribed, tests were done. Cough remains productive of yellow, non-bloody sputum. No chest pain, nausea or fever. Feels somewhat better and stronger. Not at baseline. 6/30/2022: at last visit ciprofloxacin was prescribed for positive sputum culture with Pseudomonas aeruginosa. He continues to have productive cough with small amounts of non-bloody sputum  11/17/2022: Recently seen during his 10/22/2022 admission for rhinovirus with bacterial superinfection. X-ray had shown right lower lobe and left upper lobe cavitary lesion. Respiratory culture was positive for Pseudomonas aeruginosa. Was discharged to complete eight 2-week course of intravenous ciprofloxacin and Zosyn on 11/14/2022. Continues to have shortness of breath and cough productive of nonbloody phlegm. Requires oxygen. 11/30/2022: After his last visit he was admitted into the hospital on 11/18/2022 for worsening shortness of breath and rigors during his received of rituximab infusion. On admission he had worsening respiratory status and required a BiPAP. He was diagnosed with Pseudomonas aeruginosa pneumonia. He received inhaled tobramycin, oral azithromycin, IV Zosyn and IVIG. He he was discharged on 11/25/2022 to receive a 7-day course of levofloxacin to end on 12/2/2022. He is doing better. Cough is less productive. Reports that Dr. Heriberto Pond would like him to wait another week. He has leg weakness. Review of Systems   All other systems reviewed and are negative.     No Known Allergies    Patient Active Problem List   Diagnosis    Pneumonia    Sepsis ([address]    Hypogammaglobulinemia ([address]    CLL (chronic lymphocytic leukemia) ([address]    Upper respiratory infection, acute    Generalized muscle weakness    Type 2 diabetes mellitus with valACYclovir (VALTREX) 500 MG tablet TAKE 1 TABLET BY MOUTH EVERY DAY 30 tablet 5    albuterol sulfate HFA (PROVENTIL;VENTOLIN;PROAIR) 108 (90 Base) MCG/ACT inhaler Inhale 2 puffs into the lungs every 4-6 hours as needed for Shortness of Breath or Wheezing      ipratropium-albuterol (DUONEB) 0.5-2.5 (3) MG/3ML SOLN nebulizer solution Take 1 vial by nebulization every 6 hours as needed for Shortness of Breath      Ascorbic Acid (VITAMIN C) 250 MG tablet Take 250 mg by mouth 2 times daily      ferrous gluconate 324 (37.5 Fe) MG TABS Take 1 tablet by mouth 2 times daily 60 tablet 5    LEVEMIR FLEXTOUCH 100 UNIT/ML injection pen Inject 40 Units into the skin nightly 5 pen 3    acetaminophen-codeine (TYLENOL #3) 300-30 MG per tablet Take 1 tablet by mouth every 6 hours as needed for Pain. finasteride (PROSCAR) 5 MG tablet Take 5 mg by mouth daily      tamsulosin (FLOMAX) 0.4 MG capsule Take 0.4 mg by mouth daily      glucose monitoring kit (FREESTYLE) monitoring kit 1 kit by Does not apply route daily as needed (glucose checks) 1 kit 0    Insulin Syringe-Needle U-100 30G X 1/2\" 0.5 ML MISC 1 each by Does not apply route daily 100 each 3    metFORMIN (GLUCOPHAGE) 1000 MG tablet Take 1,000 mg by mouth 2 times daily (with meals)      omeprazole (PRILOSEC) 20 MG delayed release capsule Take 20 mg by mouth daily as needed (GERD)      allopurinol (ZYLOPRIM) 100 MG tablet Take 2 tablets by mouth 3 times daily for 7 days 42 tablet 0    gabapentin (NEURONTIN) 100 MG capsule Take 1 capsule by mouth 3 times daily for 30 days. 90 capsule 0     No current facility-administered medications for this visit.      Facility-Administered Medications Ordered in Other Visits   Medication Dose Route Frequency Provider Last Rate Last Admin    sodium chloride flush 0.9 % injection 10 mL  10 mL IntraVENous PRN Luanne Ramos MD           Past Medical History:   Diagnosis Date    Arthritis     knees, Rt hand/wrist    BPH (benign prostatic Not on file   Intimate Partner Violence: Not on file   Housing Stability: Not on file       Family History   Problem Relation Age of Onset    Diabetes Mother     High Blood Pressure Mother     Heart Disease Father     Other Sister         liver problems    Early Death Brother     Asthma Maternal Uncle     Colon Cancer Brother        Vital Signs:  Vitals:    11/30/22 1007   BP: 120/66   Site: Left Upper Arm   Position: Sitting   Cuff Size: Medium Adult   Pulse: 97   Resp: 19   Temp: (!) 96.5 °F (35.8 °C)   TempSrc: Infrared        Wt Readings from Last 3 Encounters:   11/25/22 154 lb 12.2 oz (70.2 kg)   11/18/22 155 lb 9.6 oz (70.6 kg)   11/17/22 160 lb 6.4 oz (72.8 kg)        Physical Exam:   Gen: alert and NAD  HEENT: sclera clear, pupils equal and reactive, extra ocular muscles intact, oropharynx clear, mucus membranes moist, tympanic membranes clear bilaterally, no cervical lymphadenopathy noted and neck supple  Neck: supple, no significant adenopathy  Chest: occasional crackles  Heart: regular rate and rhythm, no murmurs  ABD: abdomen is soft without significant tenderness, masses, organomegaly or guarding. EXT:peripheral pulses normal, no pedal edema, no clubbing or cyanosis  NEURO: alert, oriented, normal speech, no focal findings or movement disorder noted  Skin: well hydrated, no lesions, surgical site examined  Wounds: none  Labs:   WBC   Date Value Ref Range Status   11/28/2022 38.7 (HH) 4.0 - 10.5 K/CU MM Final     Comment:     WBC GIVEN TO DR Dejon Walsh 60[facility]20[facility]2032 [facility]   RESULTS READ BACK  WBC/DIFFERENTIAL SUSPECT FLAG NOTED ON ANALYZER.  PLEASE REVIEW AND CONSIDER SENDING OUT IF   CLINICALLY INDICATED.     11/25/2022 57.3 (HH) 4.0 - 10.5 K/CU MM Final   11/24/2022 43.5 (HH) 4.0 - 10.5 K/CU MM Final     Creatinine   Date Value Ref Range Status   11/25/2022 0.7 (L) 0.9 - 1.3 MG/DL Final   11/24/2022 0.8 (L) 0.9 - 1.3 MG/DL Final   11/23/2022 0.5 (L) 0.9 - 1.3 MG/DL Final Cultures:  Culture   Date Value Ref Range Status   11/24/2022 Final Report Normal respiratory michleine  Final   11/24/2022 Prelim Report Yeast Isolated, culture in progress  Preliminary   11/20/2022   Final    Final Report Rare growth normal respiratory micheline with   11/20/2022 (A)  Final    PSEUDOMONAS AERUGINOSA Rare growth No further workup Refer to culture [id number] ([id number] 11/18/22) for sensitivity results       Imaging Studies:

## 2022-12-01 ENCOUNTER — HOSPITAL ENCOUNTER (OUTPATIENT)
Age: 72
Setting detail: SPECIMEN
Discharge: HOME | End: 2022-12-01
Payer: COMMERCIAL

## 2022-12-01 LAB
ALBUMIN SERPL-MCNC: 3.6 GM/DL (ref 3.4–5)
ALP BLD-CCNC: 147 IU/L (ref 40–129)
ALT SERPL-CCNC: 28 U/L (ref 10–40)
ANION GAP SERPL CALCULATED.3IONS-SCNC: 13 MMOL/L (ref 4–16)
ANISOCYTOSIS: ABNORMAL
AST SERPL-CCNC: 22 IU/L (ref 15–37)
ATYPICAL LYMPHOCYTE ABSOLUTE COUNT: ABNORMAL
BANDED NEUTROPHILS ABSOLUTE COUNT: 0.24 K/CU MM
BANDED NEUTROPHILS RELATIVE PERCENT: 1 % (ref 5–11)
BILIRUB SERPL-MCNC: 0.6 MG/DL (ref 0–1)
BILIRUBIN DIRECT: 0.2 MG/DL (ref 0–0.3)
BILIRUBIN, INDIRECT: 0.4 MG/DL (ref 0–0.7)
BUN BLDV-MCNC: 22 MG/DL (ref 6–23)
C-REACTIVE PROTEIN, HIGH SENSITIVITY: 4.9 MG/L
CALCIUM SERPL-MCNC: 9.6 MG/DL (ref 8.3–10.6)
CHLORIDE BLD-SCNC: 100 MMOL/L (ref 99–110)
CO2: 28 MMOL/L (ref 21–32)
CREAT SERPL-MCNC: 0.9 MG/DL (ref 0.9–1.3)
CULTURE: ABNORMAL
CULTURE: ABNORMAL
DIFFERENTIAL TYPE: ABNORMAL
EOSINOPHILS ABSOLUTE: 0.2 K/CU MM
EOSINOPHILS RELATIVE PERCENT: 1 % (ref 0–3)
ERYTHROCYTE SEDIMENTATION RATE: 2 MM/HR (ref 0–20)
FUNGUS STAIN: ABNORMAL
GFR SERPL CREATININE-BSD FRML MDRD: >60 ML/MIN/1.73M2
GLUCOSE BLD-MCNC: 91 MG/DL (ref 70–99)
HCT VFR BLD CALC: 30.6 % (ref 42–52)
HEMOGLOBIN: 8.9 GM/DL (ref 13.5–18)
IGG,SERUM: 532 MG/DL (ref 723–1685)
LYMPHOCYTES ABSOLUTE: 20.2 K/CU MM
LYMPHOCYTES RELATIVE PERCENT: 84 % (ref 24–44)
Lab: ABNORMAL
MCH RBC QN AUTO: 29.4 PG (ref 27–31)
MCHC RBC AUTO-ENTMCNC: 29.1 % (ref 32–36)
MCV RBC AUTO: 101 FL (ref 78–100)
MONOCYTES ABSOLUTE: 0.7 K/CU MM
MONOCYTES RELATIVE PERCENT: 3 % (ref 0–4)
PDW BLD-RTO: 28.3 % (ref 11.7–14.9)
PLATELET # BLD: 36 K/CU MM (ref 140–440)
POTASSIUM SERPL-SCNC: 4.8 MMOL/L (ref 3.5–5.1)
RBC # BLD: 3.03 M/CU MM (ref 4.6–6.2)
SEGMENTED NEUTROPHILS ABSOLUTE COUNT: 2.6 K/CU MM
SEGMENTED NEUTROPHILS RELATIVE PERCENT: 11 % (ref 36–66)
SODIUM BLD-SCNC: 141 MMOL/L (ref 135–145)
SPECIMEN: ABNORMAL
TOTAL PROTEIN: 5.5 GM/DL (ref 6.4–8.2)
WBC # BLD: 23.9 K/CU MM (ref 4–10.5)

## 2022-12-01 PROCEDURE — 82248 BILIRUBIN DIRECT: CPT

## 2022-12-01 PROCEDURE — 85027 COMPLETE CBC AUTOMATED: CPT

## 2022-12-01 PROCEDURE — 82784 ASSAY IGA/IGD/IGG/IGM EACH: CPT

## 2022-12-01 PROCEDURE — 85007 BL SMEAR W/DIFF WBC COUNT: CPT

## 2022-12-01 PROCEDURE — 86140 C-REACTIVE PROTEIN: CPT

## 2022-12-01 PROCEDURE — 80053 COMPREHEN METABOLIC PANEL: CPT

## 2022-12-01 PROCEDURE — 85652 RBC SED RATE AUTOMATED: CPT

## 2022-12-05 ENCOUNTER — TELEPHONE (OUTPATIENT)
Dept: ONCOLOGY | Age: 72
End: 2022-12-05

## 2022-12-05 ENCOUNTER — HOSPITAL ENCOUNTER (OUTPATIENT)
Dept: INFUSION THERAPY | Age: 72
Discharge: HOME | End: 2022-12-05
Payer: COMMERCIAL

## 2022-12-05 ENCOUNTER — OFFICE VISIT (OUTPATIENT)
Dept: ONCOLOGY | Age: 72
End: 2022-12-05
Payer: COMMERCIAL

## 2022-12-05 VITALS
BODY MASS INDEX: 21.4 KG/M2 | HEIGHT: 72 IN | RESPIRATION RATE: 24 BRPM | SYSTOLIC BLOOD PRESSURE: 97 MMHG | OXYGEN SATURATION: 98 % | WEIGHT: 158 LBS | TEMPERATURE: 97.2 F | HEART RATE: 104 BPM | DIASTOLIC BLOOD PRESSURE: 60 MMHG

## 2022-12-05 VITALS
BODY MASS INDEX: 21.4 KG/M2 | WEIGHT: 158 LBS | HEIGHT: 72 IN | RESPIRATION RATE: 28 BRPM | OXYGEN SATURATION: 90 % | DIASTOLIC BLOOD PRESSURE: 59 MMHG | TEMPERATURE: 98 F | HEART RATE: 121 BPM | SYSTOLIC BLOOD PRESSURE: 106 MMHG

## 2022-12-05 DIAGNOSIS — E86.0 DEHYDRATION: ICD-10-CM

## 2022-12-05 DIAGNOSIS — C91.10 CLL (CHRONIC LYMPHOCYTIC LEUKEMIA) (HCC): Primary | ICD-10-CM

## 2022-12-05 DIAGNOSIS — C91.10 CHRONIC LYMPHOCYTIC LEUKEMIA (HCC): Primary | ICD-10-CM

## 2022-12-05 DIAGNOSIS — D69.6 THROMBOCYTOPENIA (HCC): ICD-10-CM

## 2022-12-05 LAB
ALBUMIN SERPL-MCNC: 3.5 GM/DL (ref 3.4–5)
ALP BLD-CCNC: 123 IU/L (ref 40–128)
ALT SERPL-CCNC: 18 U/L (ref 10–40)
ANION GAP SERPL CALCULATED.3IONS-SCNC: 11 MMOL/L (ref 4–16)
ANISOCYTOSIS: ABNORMAL
AST SERPL-CCNC: 17 IU/L (ref 15–37)
BILIRUB SERPL-MCNC: 0.5 MG/DL (ref 0–1)
BUN BLDV-MCNC: 23 MG/DL (ref 6–23)
CALCIUM SERPL-MCNC: 8.6 MG/DL (ref 8.3–10.6)
CHLORIDE BLD-SCNC: 100 MMOL/L (ref 99–110)
CO2: 28 MMOL/L (ref 21–32)
CREAT SERPL-MCNC: 0.6 MG/DL (ref 0.9–1.3)
DIFFERENTIAL TYPE: ABNORMAL
ELLIPTOCYTES: ABNORMAL
GFR SERPL CREATININE-BSD FRML MDRD: >60 ML/MIN/1.73M2
GLUCOSE BLD-MCNC: 190 MG/DL (ref 70–99)
HCT VFR BLD CALC: 28.7 % (ref 42–52)
HEMOGLOBIN: 8.5 GM/DL (ref 13.5–18)
LYMPHOCYTES ABSOLUTE: 20.6 K/CU MM
LYMPHOCYTES RELATIVE PERCENT: 88 % (ref 24–44)
MACROCYTES: ABNORMAL
MCH RBC QN AUTO: 30 PG (ref 27–31)
MCHC RBC AUTO-ENTMCNC: 29.6 % (ref 32–36)
MCV RBC AUTO: 101.4 FL (ref 78–100)
MONOCYTES ABSOLUTE: 0.7 K/CU MM
MONOCYTES RELATIVE PERCENT: 3 % (ref 0–4)
PDW BLD-RTO: 28.2 % (ref 11.7–14.9)
PLATELET # BLD: 27 K/CU MM (ref 140–440)
PMV BLD AUTO: 9.4 FL (ref 7.5–11.1)
POLYCHROMASIA: ABNORMAL
POTASSIUM SERPL-SCNC: 3.7 MMOL/L (ref 3.5–5.1)
RBC # BLD: 2.83 M/CU MM (ref 4.6–6.2)
SEGMENTED NEUTROPHILS ABSOLUTE COUNT: 2.1 K/CU MM
SEGMENTED NEUTROPHILS RELATIVE PERCENT: 9 % (ref 36–66)
SODIUM BLD-SCNC: 139 MMOL/L (ref 135–145)
TOTAL PROTEIN: 5.4 GM/DL (ref 6.4–8.2)
WBC # BLD: 23.4 K/CU MM (ref 4–10.5)
WBC # BLD: ABNORMAL 10*3/UL

## 2022-12-05 PROCEDURE — G8484 FLU IMMUNIZE NO ADMIN: HCPCS | Performed by: PHYSICIAN ASSISTANT

## 2022-12-05 PROCEDURE — 6370000000 HC RX 637 (ALT 250 FOR IP): Performed by: INTERNAL MEDICINE

## 2022-12-05 PROCEDURE — 2580000003 HC RX 258: Performed by: INTERNAL MEDICINE

## 2022-12-05 PROCEDURE — 1036F TOBACCO NON-USER: CPT | Performed by: PHYSICIAN ASSISTANT

## 2022-12-05 PROCEDURE — 85007 BL SMEAR W/DIFF WBC COUNT: CPT

## 2022-12-05 PROCEDURE — G8428 CUR MEDS NOT DOCUMENT: HCPCS | Performed by: PHYSICIAN ASSISTANT

## 2022-12-05 PROCEDURE — 2500000003 HC RX 250 WO HCPCS

## 2022-12-05 PROCEDURE — G8420 CALC BMI NORM PARAMETERS: HCPCS | Performed by: PHYSICIAN ASSISTANT

## 2022-12-05 PROCEDURE — 96361 HYDRATE IV INFUSION ADD-ON: CPT

## 2022-12-05 PROCEDURE — 85027 COMPLETE CBC AUTOMATED: CPT

## 2022-12-05 PROCEDURE — 6360000002 HC RX W HCPCS: Performed by: INTERNAL MEDICINE

## 2022-12-05 PROCEDURE — 99213 OFFICE O/P EST LOW 20 MIN: CPT | Performed by: PHYSICIAN ASSISTANT

## 2022-12-05 PROCEDURE — 3017F COLORECTAL CA SCREEN DOC REV: CPT | Performed by: PHYSICIAN ASSISTANT

## 2022-12-05 PROCEDURE — 96375 TX/PRO/DX INJ NEW DRUG ADDON: CPT

## 2022-12-05 PROCEDURE — 2500000003 HC RX 250 WO HCPCS: Performed by: INTERNAL MEDICINE

## 2022-12-05 PROCEDURE — 1111F DSCHRG MED/CURRENT MED MERGE: CPT | Performed by: PHYSICIAN ASSISTANT

## 2022-12-05 PROCEDURE — 96413 CHEMO IV INFUSION 1 HR: CPT

## 2022-12-05 PROCEDURE — 1123F ACP DISCUSS/DSCN MKR DOCD: CPT | Performed by: PHYSICIAN ASSISTANT

## 2022-12-05 PROCEDURE — 6360000002 HC RX W HCPCS

## 2022-12-05 PROCEDURE — 80053 COMPREHEN METABOLIC PANEL: CPT

## 2022-12-05 RX ORDER — FAMOTIDINE 10 MG/ML
20 INJECTION, SOLUTION INTRAVENOUS ONCE
Status: COMPLETED | OUTPATIENT
Start: 2022-12-05 | End: 2022-12-05

## 2022-12-05 RX ORDER — DIPHENHYDRAMINE HYDROCHLORIDE 50 MG/ML
25 INJECTION INTRAMUSCULAR; INTRAVENOUS ONCE
Status: COMPLETED | OUTPATIENT
Start: 2022-12-05 | End: 2022-12-05

## 2022-12-05 RX ORDER — SODIUM CHLORIDE 9 MG/ML
5-250 INJECTION, SOLUTION INTRAVENOUS PRN
Status: DISCONTINUED | OUTPATIENT
Start: 2022-12-05 | End: 2022-12-06 | Stop reason: HOSPADM

## 2022-12-05 RX ORDER — HEPARIN SODIUM (PORCINE) LOCK FLUSH IV SOLN 100 UNIT/ML 100 UNIT/ML
500 SOLUTION INTRAVENOUS PRN
Status: DISCONTINUED | OUTPATIENT
Start: 2022-12-05 | End: 2022-12-06 | Stop reason: HOSPADM

## 2022-12-05 RX ORDER — DIPHENHYDRAMINE HYDROCHLORIDE 50 MG/ML
INJECTION INTRAMUSCULAR; INTRAVENOUS
Status: DISPENSED
Start: 2022-12-05 | End: 2022-12-05

## 2022-12-05 RX ORDER — FAMOTIDINE 10 MG/ML
20 INJECTION, SOLUTION INTRAVENOUS
Status: COMPLETED | OUTPATIENT
Start: 2022-12-05 | End: 2022-12-05

## 2022-12-05 RX ORDER — DEXAMETHASONE SODIUM PHOSPHATE 4 MG/ML
4 INJECTION, SOLUTION INTRA-ARTICULAR; INTRALESIONAL; INTRAMUSCULAR; INTRAVENOUS; SOFT TISSUE ONCE
Status: COMPLETED | OUTPATIENT
Start: 2022-12-05 | End: 2022-12-05

## 2022-12-05 RX ORDER — FAMOTIDINE 10 MG/ML
INJECTION, SOLUTION INTRAVENOUS
Status: DISPENSED
Start: 2022-12-05 | End: 2022-12-05

## 2022-12-05 RX ORDER — 0.9 % SODIUM CHLORIDE 0.9 %
500 INTRAVENOUS SOLUTION INTRAVENOUS ONCE
Status: COMPLETED | OUTPATIENT
Start: 2022-12-05 | End: 2022-12-05

## 2022-12-05 RX ORDER — ACETAMINOPHEN 325 MG/1
650 TABLET ORAL ONCE
Status: COMPLETED | OUTPATIENT
Start: 2022-12-05 | End: 2022-12-05

## 2022-12-05 RX ORDER — DIPHENHYDRAMINE HYDROCHLORIDE 50 MG/ML
50 INJECTION INTRAMUSCULAR; INTRAVENOUS
Status: COMPLETED | OUTPATIENT
Start: 2022-12-05 | End: 2022-12-05

## 2022-12-05 RX ADMIN — FAMOTIDINE 20 MG: 10 INJECTION, SOLUTION INTRAVENOUS at 11:44

## 2022-12-05 RX ADMIN — SODIUM CHLORIDE 750 MG: 9 INJECTION, SOLUTION INTRAVENOUS at 10:28

## 2022-12-05 RX ADMIN — DIPHENHYDRAMINE HYDROCHLORIDE 25 MG: 50 INJECTION INTRAMUSCULAR; INTRAVENOUS at 11:42

## 2022-12-05 RX ADMIN — SODIUM CHLORIDE 20 ML/HR: 9 INJECTION, SOLUTION INTRAVENOUS at 10:13

## 2022-12-05 RX ADMIN — HEPARIN 500 UNITS: 100 SYRINGE at 13:08

## 2022-12-05 RX ADMIN — SODIUM CHLORIDE 500 ML: 9 INJECTION, SOLUTION INTRAVENOUS at 09:31

## 2022-12-05 RX ADMIN — FAMOTIDINE 20 MG: 10 INJECTION, SOLUTION INTRAVENOUS at 09:54

## 2022-12-05 RX ADMIN — ACETAMINOPHEN 650 MG: 325 TABLET ORAL at 09:52

## 2022-12-05 RX ADMIN — DIPHENHYDRAMINE HYDROCHLORIDE 25 MG: 50 INJECTION INTRAMUSCULAR; INTRAVENOUS at 09:54

## 2022-12-05 RX ADMIN — DEXAMETHASONE SODIUM PHOSPHATE 4 MG: 4 INJECTION, SOLUTION INTRAMUSCULAR; INTRAVENOUS at 09:54

## 2022-12-05 ASSESSMENT — PAIN DESCRIPTION - LOCATION: LOCATION: ABDOMEN

## 2022-12-05 ASSESSMENT — PAIN SCALES - GENERAL: PAINLEVEL_OUTOF10: 6

## 2022-12-05 NOTE — PROGRESS NOTES
Assisted to infusion area, here today for chemotherapy. Patient has no complaints at this time. [place], [place] at chairside for OV with patient. Right chest mediport accessed, positive blood return noted. Labs drawn. Platlets 27. . Blood pressure mildly decreased, per Emy, will give 1L hydration bolus/500mL today prior to treatment. Ok to continue with treatment today. While Rituxan running at 150mL per hour, Patient c/o chills and requesting a blanket. Rituxan stopped, fluids opened to gravity, and Dr. Laura Pak notified. Dr. Laura Pak at chairside. VS 1142: /72, T 98, P 116, o2 96%. 25 Benadryl given by Davina Carias  1143  60mg Solucortef given by Lizy Boo RN.  1144 20mg of Pepcid given by Lizy Boo RN. VS @ 1145: 97.7, P 129, o2 94%, /96. Per dr. Laura Pak, will monitor for now, but will not re-challenge at this time. Will have patient RTC next week for OV with Dr. Laura Pak. VS 1151: T 98.2, P 137, o2 94%, /102. Patient still having chills at this time. VS 1201: T 97.8, P 148, /81, P 94%, chills present, but lessening, will continue to monitor at this time. Son contacted, and will be back to clinic to sit with patient. VS 1300: Patient requesting to go home, son at chairside. P 121, /59. Patient stable for discharge. AVS printed, and discharged from treatment suite via wheelchair with son. RTC 12/13 for OV with Dr. Laura Pak. Status appropriately assessed and documented. All required labs and results reviewed. Treatment approved by provider. Treatment orders and medications verified by 2 Registered Nurses where applicable.  Treatment plan was confirmed with patient prior to administration, and educated the need to report any treatment-related symptoms      Prior to administration, when applicable, the following 8 elements of medication administration were reviewed with 2nd Registered Nurse prior to dosing: drug name, drug dose, infusion volume when prepared in a syringe, rate of administration, expiration dates and/or times, appearance and integrity of drug(s), and rate of pump for infusion. The 5 rights of medication administration have been verified.

## 2022-12-05 NOTE — PROGRESS NOTES
[facility] Rooming Questions  Patient: Marcelo Webb  MRN: [id number]    Date: 12/5/2022        1. Do you have any new issues?   no         2. Do you need any refills on medications?    no    3. Have you had any imaging done since your last visit? yes -     4. Have you been hospitalized or seen in the emergency room since your last visit here?   yes -     5. Did the patient have a depression screening completed today?  No    No data recorded     PHQ-9 Given to (if applicable):               PHQ-9 Score (if applicable):                     [] Positive     []  Negative              Does question #9 need addressed (if applicable)                     [] Yes    []  No               Javier Pierre CMA

## 2022-12-07 PROBLEM — J96.10 CHRONIC RESPIRATORY FAILURE (HCC): Status: ACTIVE | Noted: 2022-12-07

## 2022-12-08 ENCOUNTER — OFFICE VISIT (OUTPATIENT)
Dept: CARDIOLOGY CLINIC | Age: 72
End: 2022-12-08
Payer: COMMERCIAL

## 2022-12-08 VITALS
WEIGHT: 162.6 LBS | DIASTOLIC BLOOD PRESSURE: 60 MMHG | HEIGHT: 72 IN | HEART RATE: 81 BPM | SYSTOLIC BLOOD PRESSURE: 108 MMHG | BODY MASS INDEX: 22.02 KG/M2 | OXYGEN SATURATION: 99 %

## 2022-12-08 DIAGNOSIS — R00.0 TACHYCARDIA: Primary | ICD-10-CM

## 2022-12-08 DIAGNOSIS — R07.89 OTHER CHEST PAIN: ICD-10-CM

## 2022-12-08 DIAGNOSIS — Z09 HOSPITAL DISCHARGE FOLLOW-UP: ICD-10-CM

## 2022-12-08 PROCEDURE — G8420 CALC BMI NORM PARAMETERS: HCPCS | Performed by: NURSE PRACTITIONER

## 2022-12-08 PROCEDURE — 1111F DSCHRG MED/CURRENT MED MERGE: CPT | Performed by: NURSE PRACTITIONER

## 2022-12-08 PROCEDURE — 1123F ACP DISCUSS/DSCN MKR DOCD: CPT | Performed by: NURSE PRACTITIONER

## 2022-12-08 PROCEDURE — 3017F COLORECTAL CA SCREEN DOC REV: CPT | Performed by: NURSE PRACTITIONER

## 2022-12-08 PROCEDURE — G8427 DOCREV CUR MEDS BY ELIG CLIN: HCPCS | Performed by: NURSE PRACTITIONER

## 2022-12-08 PROCEDURE — 99213 OFFICE O/P EST LOW 20 MIN: CPT | Performed by: NURSE PRACTITIONER

## 2022-12-08 PROCEDURE — 1036F TOBACCO NON-USER: CPT | Performed by: NURSE PRACTITIONER

## 2022-12-08 PROCEDURE — G8484 FLU IMMUNIZE NO ADMIN: HCPCS | Performed by: NURSE PRACTITIONER

## 2022-12-08 ASSESSMENT — ENCOUNTER SYMPTOMS
ORTHOPNEA: 0
SHORTNESS OF BREATH: 1

## 2022-12-08 NOTE — PROGRESS NOTES
Patient Name: Adonis Ervin  Patient : 1950  Patient MRN: [id number]     Primary Oncologist: Shweta Cano MD  Referring Physician: eJsse Pompa MD       Date of Service: 2022      Chief Complaint:   Chief Complaint   Patient presents with    Follow-up    Chemotherapy        Active Problem list  1. Chronic lymphocytic leukemia ([address]    2. Thrombocytopenia ([address]    3. Dehydration             HPI:        Mr. Krista Mckeon ( 52) was diagnosed with stage I CLL in , when he presented with infection and lymphocytosis. His peripheral blood immunophenotyping was characteristic of B-cell CLL. It was CD38 negative, ZAP-70 positive, CD19 and 20 positive, CD5 positive. Karyotyping was normal in 2006.,  Because of his infection and associated acquired immune deficiency(hypogammaglobulinemia), he was given IVIG for a year. He was started back on IVIG therapy monthly since 2013 to prevent future major infections continuing for now. Also had minor Infusional reaction to IVIG in 2016 responded to Steroids & Benadryl., No further problems after that. CLL was treated only with Leukeran intermittently from 2007 until 2011 and again from 2011 until 2012. 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 Flow studies still showed the same and FISH in 2012 showed deletion of tp53 (17p) and MYB (6q). ,  He was thus treated with Fludara and Rituxan for six months between April and 2012 with excellent clinical and hematological response.  . In 2013, he developed Multiple b/l nodes in axilla and groin area, CAT scan on 13, Bulky lymphadenopathy within the chest, abdomen, and pelvis mildly increased as compared from previous CAT scan in March 2012.,  Starting in Jan 2014 he was initiated on 2nd line chemo with Bendamustine and Rituxan with good initial response. Continued till Nov 2014, CAT scan done on 8/14/14 showed an interval decrease in generalized adenopathy compared to previous study In December 2013, suggesting good response to therapy. ,  CAT scan done on 12/23/14 showed Minimal interval increase in size of at least two periportal and retrocaval lymph nodes. Otherwise stable thoracic, abdominal and pelvic adenopathy. Also mild interval increase in splenomegaly measuring 19 cm, previously 17 cm. CAT scan done on 6/25/15 showed mild interval decrease in the splenomegaly and also in retroperitoneal mesenteric lymph nodes. Started Idelalisib [Zydelig] on 20th Jan 2015 at 150mg po bid. with good initial response. He did remarkably well with Zydelig from January of 2015 until June of 2016, after he was hospitalized for Rhinovirus Pneumonia in May of 2016. Also discussed Living Will, Power of [employer], DNR status, et cetera. In April 2016, chest CT showed stable  , 13. In July 2016, His FISH testing on peripheral blood showed abnormal results, with 6q deletion, 17p deletion, and 11q centromere signal in 3 percent of cells. The 17p deletion (Tp53) in 80 percent of cells is associated with unfavorable prognosis. Because of his CLL with poor prognostic markers, including 17p deletion detected in 2012 & again in July of 2016. He was started on ibrutinib in September 2016 140mg/d increased to 280 mg/ after 4 weeks. The abdominal CT 7/5/16 is showing no acute abnormalities, stable splenomegaly, and one periportal lymph node measuring 2.2 by 3.7 cm which is unchanged from before. CAT scan of the chest August 5, 2016, showed axillary, mediastinal, hilar, and upper abdominal lymphadenopathy consistent with his CLL. The maximum size of the nodes is 2.4 cm.  A 1 cm infiltrate in the lateral segment of the right middle lobe possibly infectious in nature, segmental calcification of left coronary artery. CAT scan of the chest, abdomen, and pelvis December 7,2016 2016, which revealed a mild mediastinal right hilar adenopathy, decreased in size from the previous examination of April and August of 2016, .5. His ibrutinib was stopped in December 2016 when he was feeling bad and it was started back at one a day, 140 mg daily instead of 280 mg that he was taking prior to that  In January 2017, he developed progressive lump in his left inguinal area. That being the only area of progressive adenopathy with multiple nodes coalesced together, a question of Pimentels transformation versus more aggressive histology conversion was considered as a possibility. Dr. Jonathon Ledesma, did a biopsy of the lymph node on January 18, 2017. The final pathology was reported as B-cell small lymphocytic lymphoma (B-cell CLL/SLL), with no evidence of any large cell OR Pimentels transformation. There was some evidence of localized herpetic simplex lymphadenitis. Because of possibility of localized infection with herpes. I started him on Valtrex 500 t.i.d. for two to three weeks then tapered to 1/d as maintenance therapy. Lymphadenopathy in left groin almost completely resolved. His ibrutinib was stopped in December 2016 when he was feeling bad and it was started back at one a day, 140 mg daily as maintenance dose instead of 280 mg that he was taking prior to that. His quantitative immunoglobulin on February 17, 2017, IgG 600, IgA less than 10, IgM less than 4. Serum protein electrophoresis was within the normal range. Gammaglobulin was continued monthly since it has helped any serious infections under control. August 2017 he had a IgG that was 503  October 2017 started a ibrutinib he was off for 2 months due to his insurance issues  5-18 CT chest: Showed some mild decrease in mediastinal and hilar lymphadenopathy. . Regards to his abdomen also there is decrease in his lymphadenopathy. Although there is a left inguinal node that has enlarged. Section that no obvious source of his abdominal cramping. 10/10/2018 EGD showed severe ulcerative esophagitis with slight narrowing in the GE junction small hiatal hernia mild superficial gastritis, Colonoscope revealed 2 mm polyp in the sigmoid colon, diverticulosis, hemorrhoids, moderate stool  1/2019; Colonoscopy with two diminutive polyps, diverticulosis and hemorrhoids, path with TA above IC valve and HP distal sigmoid  10-19 iron def based on labs refered to GI , stool occult negative x #3 , NO PICA   11-19 saw Dr. Carmen Cunha, and referred to [facility] for capsule endoscopy  11/7/2019: Ct chest:Ground-glass nodule seen within the right upper lobe the largest measuring 17  mm in diameter. 12-6-19 capsule endoscopy, results unavailable   2/2020: CT chest:IMPRESSION:  Previously noted ground-glass opacities in the right lung have resolved. However, there is a new cluster of tiny pulmonary nodules in the left lower  lobe which could represent an infectious or inflammatory etiology. Short-term follow-up chest CT is recommended in 3-6 months. Multiple additional tiny pulmonary nodules are overall stable. Stable mediastinal lymph nodes without new lymphadenopathy. Previous Therapies    May 2020: Ct chest:  1. Interval resolution of the previously described cluster of pulmonary    nodules in the left lower lobe. Findings are most compatible with resolved    infectious/inflammatory etiology. 2. No acute cardiopulmonary disease. 3. COPD. 4. Stable subcentimeter mediastinal lymph nodes. 8/28/20: US RP normal    August 2020 cystoscopy revealed enlarged prostate. Was Recommended TURBT    3/24/22: CXR:  Bilateral interstitial opacity. Nodular consolidation at the left lung base. Pneumonia is favored over metastatic/lymphangitic disease. Consider atypical   etiologies.    Was treated with IV abx    But was given abx again and completed 4/21/22.    5/4/22: CT chest:  1. Patchy bilateral airspace opacities, most prominent in the left lower lobe   concerning for multifocal pneumonia. Follow-up examination in 4-6 weeks is   recommended to assess for resolution. 2. Interval increase in intrathoracic and upper abdominal lymphadenopathy   consistent with patient's history of CLL. 3. Splenomegaly. 5/28/22: he was seen by Dr Farrah Rosales who ordered bactrim     June 8 2022 he was seen by him again and was prescribed 2 week course of cipro    July 28 2022 CT chest:  1. Overall improvement in multifocal pneumonia. However there has been   interval development of multifocal tree-in-bud opacities which either reflect   residual pneumonia versus new infectious bronchiolitis. 2. There are scattered new solid nodular opacities as well the largest   measuring up to 8 mm also likely infectious or inflammatory in etiology. Continued follow-up in 3 months is recommended to ensure resolution and/or   stability. 3. Slight interval increase in size of a left hilar lymph node as well as a   periportal and gastrohepatic lymph nodes. Attention on follow-up exams is   recommended. Otherwise stable to slight interval decrease in size of   mediastinal and hilar adenopathy. 4. Probable splenomegaly. 9/13/2022 CBC with WBC of 11.5 hemoglobin of 11.4 hematocrit of 37 MCV of 82 and platelets of 73RKM with creatinine 1.7 albumin 3.9  ferritin of 25 iron sats of 10% B12 1217 IgG 559    He was admitted on 9/28/2022 with severe sepsis in the setting of rhinovirus infection with superimposed bacterial pneumonia. Completed 8-day course of Zosyn. Also acute hypoxic respiratory failure in the setting of bowel and component of CHF as well. Was evaluated by cardiology as well. Has been holding ibrutinib.    9/28/2022 CT scan of the head:  Chronic microvascular disease. Negative acute bleed, midline shift or mass   effect.        Findings worrisome for acute on chronic maxillary sinus disease. Please   correlate exam findings. CTA of the chest, CT scan of the abdomen pelvis:  No evidence of pulmonary embolism. Right greater than left lower lobe pneumonia. Diffuse hilar and mediastinal lymphadenopathy, concerning for malignancy with   history of CLL, also likely progressed from prior exam.       Diffuse peritoneal and retroperitoneal lymphadenopathy in the upper abdomen   most significant near the cassi hepatis and IVC where there is a large mass   which is favored to be a lymph node measuring up to 6.2 x 3.5 cm, also likely   progressed from exam.       Splenomegaly, could also be related to malignancy. Mild gallbladder wall thickening and pericholecystic fluid, could be related   to passive congestion or mass effect from adjacent lymphadenopathy. There is   mild intrahepatic bile duct dilatation. 9/29/2022 ultrasound of the abdomen:  1. Hepatomegaly and hepatic steatosis. 2. Masses most compatible with lymphadenopathy in the cassi hepatis, better   seen on recent CT. 3. Hyperechoic focus in the liver could be focal fatty infiltration,   hemangioma or other mass. This is too small to characterize on recent CT. Correlate with MRI hepatic mass protocol if indicated. 4. Gallbladder wall thickening with possible adenomyomatosis. HIDA scan with no convincing evidence of acute cholecystitis. 10/5/2022 underwent right thoracentesis, cytology negative for carcinoma. Many small lymphoid cells were seen    Postthoracentesis chest x-ray with slight interval decrease in size of the right pleural effusion. No pneumothorax. 10/23/22; Ct chest:  The infiltrates seen previously in the lower lobes bilaterally for the most   part have decreased in size and conspicuity.        However, there are now innumerable punctate tree-in-bud centrilobular micro   nodules, which are nonspecific but suggest inflammatory/infectious bronchiolitis. Consider both typical and atypical etiologies. In addition, cavitary lesion has developed in the periphery of the right   lower lobe and to a lesser extent within the left upper lung. Necrotizing   infection would be primarily considered given the rapid development. Consider both typical and atypical etiologies. Enlarged mediastinal lymph nodes, similar when compared to the previous exam,   process of Lia related to history of chronic lymphocytic leukemia. 11/23/22: was admitted with Increased sob  CTA chest:   1. Negative for pulmonary embolus. 2. Multiple large mediastinal lymph nodes. These appear larger than   previously seen. 3. Extensive bilateral ground-glass opacities and tiny lung nodules. Possibility of acute respiratory distress syndrome   4. Probable atypical pneumonia. Aspergillosis could be in the differential.   This is similar in appearance to the previous study. 5. Splenomegaly without change     Flow Cytometry 11/24/2022    Final Pathologic Diagnosis:   Peripheral blood; Flow Cytometry Analysis:   -     CD5+ monoclonal B-cell population detected, consistent   with chronic lymphocytic leukemia. Comments: The absolute count of the clonal cells is ~37,000   cells/cumm. 12/5/2022 C1D8 Rituxan retrial. Developed chills/rigors shortly into infusion. Solumedrol, Pepcid, and Benedryl given with resolution. Will discuss further tx plan. PAST MEDICAL HISTORY:   1. Stage I CLL with conversion from good to poor prognostic factors. ,  2. History of hypertension for many years. ,  3. 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 Arthritis in the past. ,  5. Frozen shoulder for which he had treatment including injection by Dr. Jaylyn Banuelos. ,  6. Cellulitis with Zoster type lesion Left thigh resolved with Bactrim and Valtrex in March 2016.,  7. abdominal illness, with fever, nausea, and discomfort, suggestive of infectious etiology in May 2016. ,  8. hospitalization between  and  for what seems like atypical rhinovirus pneumonia involving right middle and lower lobe and left lower lobe with sepsis,  9. 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 Dr. Carley Knox [facility]. ,  10. hospitalized from  to 2017, for hyperosmolar hyperglycemia with hyponatremia and hyperkalemia and UTI. He was seen by Dr. Car Lea, who put him on insulin. He was also seen by Dr. Jamie Broussard. He felt much better after his sugar got under better control and electrolyte imbalanced reversed,  11. Ultrasound Doppler 2017, was negative for any DVT in either leg. Chest x-ray showed no acute process. PAST SURGICAL HISTORY:   1. knee operation,  2. tonsillectomy,  3. broken ankle times two requiring open reduction. ,  4. Vision better after cataract surgery    FAMILY HISTORY:   His paternal aunt had colon cancer. Uncle also had some form of cancer. Father had heart disease. Sister  12 of Liver disease     SOCIAL HISTORY:   He has a 40-pack-year history of smoking, quit in . He denies alcohol use. He is not . Has one son. Interval History  2022: Arrived alone to clinic today. Feels mostly improved from hospitalization. He is mostly frustrated and hopes to restart treatment ASAP. His breathing is stable on his home oxygen dose. He admits he has not been taking in enough PO fluids. No fever/chills, CP/SOB. Having regular BM, pain well controlled. He is agreeable to additional fluids with chemo today.       Review of Systems   Per interval history; otherwise 10 point ROS is negative              Vital Signs:  BP 97/60 (Position: Sitting)   Pulse (!) 104   Temp 97.2 °F (36.2 °C) (Temporal)   Resp 24   Ht 6' (1.829 m)   Wt 158 lb (71.7 kg)   SpO2 98%   BMI 21.43 kg/m² Physical Exam:  CONSTITUTIONAL: alert and oriented, cachectic in wheelchair, flat affect  EYES: No palor or any icterus  ENT: ATNC   NECK: No JVD   HEMATOLOGIC/LYMPHATIC: no cervical, supraclavicular or axillary lymphadenopathy   LUNGS: CTA with poor inspiratory effort  CARDIOVASCULAR:s1s2 SM+ tachycardia  ABDOMEN:Soft, Non-tender non-distended, NABS  NEUROLOGIC: GI   SKIN: skin tags   EXTREMITIES: no LE edema bilaterally      Labs:    Hematology:  Lab Results   Component Value Date    WBC 23.4 (H) 12/05/2022    RBC 2.83 (L) 12/05/2022    HGB 8.5 (L) 12/05/2022    HCT 28.7 (L) 12/05/2022    .4 (H) 12/05/2022    MCH 30.0 12/05/2022    MCHC 29.6 (L) 12/05/2022    RDW 28.2 (H) 12/05/2022    PLT 27 (L) 12/05/2022    MPV 9.4 12/05/2022    BANDSPCT 1 (L) 12/01/2022    SEGSPCT 9.0 (L) 12/05/2022    EOSRELPCT 1.0 12/01/2022    BASOPCT 1.0 11/19/2022    LYMPHOPCT 88.0 (H) 12/05/2022    MONOPCT 3.0 12/05/2022    BANDABS 0.24 12/01/2022    SEGSABS 2.1 12/05/2022    EOSABS 0.2 12/01/2022    BASOSABS 0.2 11/19/2022    LYMPHSABS 20.6 12/05/2022    MONOSABS 0.7 12/05/2022    DIFFTYPE MANUAL DIFFERENTIAL 12/05/2022    ANISOCYTOSIS 1+ 12/05/2022    POLYCHROM 1+ 12/05/2022    WBCMORP MANY 12/05/2022    PLTM SEVERAL LARGE PLATELETS 63/54/4378     Lab Results   Component Value Date    ESR 2 12/01/2022       Chemistry:  Lab Results   Component Value Date     12/05/2022    K 3.7 12/05/2022     12/05/2022    CO2 28 12/05/2022    BUN 23 12/05/2022    CREATININE 0.6 (L) 12/05/2022    GLUCOSE 190 (H) 12/05/2022    CALCIUM 8.6 12/05/2022    PROT 5.4 (L) 12/05/2022    LABALBU 3.5 12/05/2022    BILITOT 0.5 12/05/2022    ALKPHOS 123 12/05/2022    AST 17 12/05/2022    ALT 18 12/05/2022    LABGLOM >60 12/05/2022    GFRAA >60 10/11/2022    PHOS 4.0 11/25/2022    MG 2.1 11/25/2022    POCGLU 483 (H) 11/25/2022     Lab Results   Component Value Date     (H) 10/11/2022     No components found for: LD  Lab Results Component Value Date    TSHHS 3.040 04/04/2019    T4FREE 1.37 04/04/2019    FT3 3.2 03/27/2012       Immunology:  Lab Results   Component Value Date    PROT 5.4 (L) 12/05/2022    SPEP  09/22/2022     INTERPRETATION - Slightly decreased albumin and decreased total proteins. LP.    ALBUMINELP 3.1 (L) 09/22/2022    LABALPH 0.2 09/22/2022    LABALPH 0.8 09/22/2022    LABBETA 0.6 09/22/2022    GAMGLOB 0.6 09/22/2022     No results found for: Izabel Hives, KLFLCR  No results found for: B2M    Coagulation Panel:  Lab Results   Component Value Date    PROTIME 14.7 (H) 10/26/2022    INR 1.14 10/26/2022    APTT 29.9 10/02/2022    DDIMER >5250 (H) 11/23/2022       Anemia Panel:  Lab Results   Component Value Date    ETVPPVZR40 >2000 (H) 11/23/2022    FOLATE 3.4 10/11/2022       Tumor Markers:  Lab Results   Component Value Date    PSA 1.9 10/27/2020         Imaging: Reviewed     Pathology:Reviewed     Observations:  Performance Status: ECOG 1  Depression Status: No data recorded     Assessment & Plan:  B-cell CLL, stage I with conversion from good to poor prognostic factors, with 17p deletion:   His diagnosis of CLL since 2007.   17p deletion since 2015. Initial treatment with Leukeran from 2000 to 2011 intermittently and FCR regimen in 2012, bendamustine/Rituxan in 2014. Idelalisib from January 2015 until June of 2016. On ibrutinib since September 2016. Ibrutinib therapy seems to be helping him, overall improvement. Was Only able to tolerate 140 mg p.o. every other day  He was off for a couple months During the fall 2017  Resumed Ibrutinib and  on 140 mg po daily. Plan to Continue current dose as no major B sx, stable WBC. CT imaging in July 2022 with slightly increasing size of intraabdominal LN. plan was to monitor. But note declining platelet count, probably secondary to ibrutinib versus progressive CLL versus underlying infection and antibiotics.   Note CT scan of the chest abdomen pelvis also with progressive lymphadenopathy. Discussed the findings and discussed systemic treatment with single agent Rituxan(preferable secondary to his performance status) versus BR and he wanted to proceed with single agent Rituxan. Discussed adverse effects. Recommend holding Ibrutinib. After C1D1 he was admitted with fever, hypotenson which I dont believe is infusion reaction. Will re challenge again  C1D8 he developed rigors shortly into infusion and it was was stopped, resolved with supportive care. Thrombocytopenia, no hemolysis, monoclonal gammopathy, nutritional deficiency. Could be secondary to ibrutinib versus progressive CLL versus infection versus antibiotics. BMBx in IR ordered. Rhinovirus and P aeriginosa: is on Tobra inhaler and oral azithromycin. Last saw ID on 11/30/2022    RUQ pain:? sec to lymphadenopathy. Note above plan for Rituxan    Acquired hypogammaglobulinemia:   Continue IVIG,    BPH: is being followed by Urology    Iron def anemia and esophagitis: follows with GI, Dr Evi Smith. Reported that he had colonoscopy 2018 with polyps detected. EGD was normal except for H. pylori which was treated. Was supposed to have VCE which was unsuccessful once. He is on oral iron twice daily, recommend one tab every other day instead. Repeat ferritin pending. UA with no blood. Recommend follow-up with GI once other acute issues resolve    Lung Nodule RUL: Stable findings, will follow    Continue other medical care. Discussed above findings and plan with him and he verbalized understanding. Answered all his questions. Discussed healthy lifestyle, smoking cessation, healthy diet and exercise as tolerated. Also discussed importance of being up-to-date with age-appropriate screening tools. Is going to follow with need for colonoscopy    Recommend follow-up with primary care physician and other specialist.    Please do not hesitate to contact us if you need any further information.     Return to clinic next week or earlier if new symptoms    Emy Diaz PA-C    Portions of this note are copied forward from previous clinic note. Interval history, ROS, physical exam, assessment and plan has been reviewed and updated for accuracy by this provider.

## 2022-12-08 NOTE — PROGRESS NOTES
12/8/2022  Primary cardiologist: Dr. Julio Santana:   Lalo Mullins  is an established 71 y.o.  male here for a follow up hospital for CHF      SUBJECTIVE/OBJECTIVE:  Lalo Mullins is a 71 y.o. male with a history of CLL, diabetes mellitus type 2, hypogammaglobulinemia, lung nodule, COPD, chronic hypoxic respiratory failure on 3 L/min oxygen per nasal cannula at baseline, HLD, BPH adn multiple recurrent episodes of pneumonia       HPI :   Lalo Mullins was recently in [facility] with cough/ weakness/ fall/ sepsis/ rhabdomyolysis. His BNP was noted to be markedly elevated. Non invasive testing with echo showed normal EF and no wall motion abnormality   He is currently being treated for CLL- he is not tolerating his chemo. He continues with shortness of breath ans is using oxygen at 3L per NC  No further chest    Review of Systems   Constitutional: Positive for malaise/fatigue. Negative for diaphoresis. Cardiovascular:  Negative for chest pain, claudication, dyspnea on exertion, irregular heartbeat, leg swelling, near-syncope, orthopnea, palpitations and paroxysmal nocturnal dyspnea. Respiratory:  Positive for shortness of breath. Neurological:  Negative for dizziness and light-headedness. Vitals:    12/08/22 1017   BP: 108/60   Site: Left Upper Arm   Position: Sitting   Cuff Size: Medium Adult   Pulse: 81   SpO2: 99%   Weight: 162 lb 9.6 oz (73.8 kg)   Height: 6' (1.829 m)     No flowsheet data found. Wt Readings from Last 3 Encounters:   12/08/22 162 lb 9.6 oz (73.8 kg)   12/07/22 164 lb (74.4 kg)   12/05/22 158 lb (71.7 kg)     Body mass index is 22.05 kg/m². Physical Exam  Vitals reviewed. Constitutional:       Appearance: He is ill-appearing. HENT:      Head: Normocephalic and atraumatic. Eyes:      Extraocular Movements: Extraocular movements intact. Pupils: Pupils are equal, round, and reactive to light. Neck:      Vascular: No carotid bruit.    Cardiovascular:      Rate and Rhythm: Normal rate and regular rhythm. Pulses: Normal pulses. Pulmonary:      Effort: Pulmonary effort is normal.      Breath sounds: Normal breath sounds. Chest:      Comments: Infusion port to right upper chest  Abdominal:      General: There is no distension. Palpations: Abdomen is soft. Tenderness: There is no abdominal tenderness. Musculoskeletal:         General: Normal range of motion. Cervical back: No tenderness. Right lower leg: No edema. Left lower leg: No edema. Skin:     General: Skin is warm and dry. Capillary Refill: Capillary refill takes less than 2 seconds. Neurological:      General: No focal deficit present. Mental Status: He is alert and oriented to person, place, and time. Psychiatric:         Mood and Affect: Mood normal.         Behavior: Behavior normal.              Current Outpatient Medications   Medication Sig Dispense Refill    azithromycin (ZITHROMAX) 250 MG tablet Take 1 tablet by mouth daily 30 tablet 2    tobramycin, PF, (HARLEY) 300 MG/5ML nebulizer solution Take 5 mLs by nebulization in the morning and 5 mLs in the evening. 300 mL 2    guaiFENesin (MUCINEX) 600 MG extended release tablet Take 1 tablet by mouth in the morning, at noon, in the evening, and at bedtime 120 tablet 5    atorvastatin (LIPITOR) 80 MG tablet Take 0.5 tablets by mouth daily 30 tablet 5    ondansetron (ZOFRAN) 4 MG tablet Take 1 tablet by mouth every 8 hours as needed for Nausea or Vomiting 30 tablet 1    NOVOLOG FLEXPEN 100 UNIT/ML injection pen Inject 15 Units into the skin 3 times daily (before meals) 13.5 mL 0    valACYclovir (VALTREX) 500 MG tablet TAKE 1 TABLET BY MOUTH EVERY DAY 30 tablet 5    gabapentin (NEURONTIN) 100 MG capsule Take 1 capsule by mouth 3 times daily for 30 days.  90 capsule 0    albuterol sulfate HFA (PROVENTIL;VENTOLIN;PROAIR) 108 (90 Base) MCG/ACT inhaler Inhale 2 puffs into the lungs every 4-6 hours as needed for Shortness of Breath or Wheezing ipratropium-albuterol (DUONEB) 0.5-2.5 (3) MG/3ML SOLN nebulizer solution Take 1 vial by nebulization every 6 hours as needed for Shortness of Breath      ferrous gluconate 324 (37.5 Fe) MG TABS Take 1 tablet by mouth 2 times daily 60 tablet 5    LEVEMIR FLEXTOUCH 100 UNIT/ML injection pen Inject 40 Units into the skin nightly 5 pen 3    finasteride (PROSCAR) 5 MG tablet Take 5 mg by mouth daily      tamsulosin (FLOMAX) 0.4 MG capsule Take 0.4 mg by mouth daily      glucose monitoring kit (FREESTYLE) monitoring kit 1 kit by Does not apply route daily as needed (glucose checks) 1 kit 0    Insulin Syringe-Needle U-100 30G X 1/2\" 0.5 ML MISC 1 each by Does not apply route daily 100 each 3    metFORMIN (GLUCOPHAGE) 1000 MG tablet Take 1,000 mg by mouth 2 times daily (with meals)      omeprazole (PRILOSEC) 20 MG delayed release capsule Take 20 mg by mouth daily as needed (GERD)      allopurinol (ZYLOPRIM) 100 MG tablet Take 2 tablets by mouth 3 times daily for 7 days (Patient not taking: Reported on 12/8/2022) 42 tablet 0    miconazole (MICOTIN) 2 % powder Apply topically 2 times daily. (Patient not taking: Reported on 12/8/2022) 45 g 1    alogliptin (NESINA) 25 MG TABS tablet Take 1 tablet by mouth daily (Patient not taking: Reported on 12/8/2022) 60 tablet 0    Ascorbic Acid (VITAMIN C) 250 MG tablet Take 250 mg by mouth 2 times daily (Patient not taking: Reported on 12/8/2022)      acetaminophen-codeine (TYLENOL #3) 300-30 MG per tablet Take 1 tablet by mouth every 6 hours as needed for Pain. (Patient not taking: Reported on 12/8/2022)       No current facility-administered medications for this visit.      Facility-Administered Medications Ordered in Other Visits   Medication Dose Route Frequency Provider Last Rate Last Admin    sodium chloride flush 0.9 % injection 10 mL  10 mL IntraVENous PRN Malick Molina MD              All pertinent data reviewed and discussed with patient  Echocardiogram 11/25/2022  Summary Technically difficult examination (all images obtained from the subcostal   window) due to patient position and lung interface. Left ventricular systolic function is low normal.   Ejection fraction is visually estimated at 45-50%. Septal bounce noted. Mild left ventricular hypertrophy. Heavily calcified aortic valve with mild aortic stenosis; mean gradient of 8   mmHg, JÚNIOR: 1.82 cm sq. No evidence of any pericardial effusion. ASSESSMENT/PLAN:    Shortness of breath  Related to COPD chronic respiratory failure/ recurrent pneumonia     Chest pain  No further chest pain  Has a family history of CAD-offered stress test-declines    Elevated BNP  In the setting of sepsis / pneumonia   Echo mildly reduced EF 45-50 %      CLL  Following with hemoc      Tests ordered:  none  Follow-up  1 year     Signed:  JENNI Vegas CNP, 12/8/2022, 10:28 AM    An electronic signature was used to authenticate this note. Please note this report has been partially produced using speech recognition software and may contain errors related to that system including errors in grammar, punctuation, and spelling, as well as words and phrases that may be inappropriate. If there are any questions or concerns please feel free to contact the dictating provider for clarification.

## 2022-12-11 RX ORDER — FERROUS GLUCONATE 324(37.5)
324 TABLET ORAL 2 TIMES DAILY
Qty: 60 TABLET | Refills: 5 | Status: SHIPPED | OUTPATIENT
Start: 2022-12-11

## 2022-12-11 RX ORDER — GABAPENTIN 100 MG/1
100 CAPSULE ORAL 3 TIMES DAILY
Qty: 90 CAPSULE | Refills: 0 | Status: SHIPPED | OUTPATIENT
Start: 2022-12-11 | End: 2023-01-10

## 2022-12-12 ENCOUNTER — HOSPITAL ENCOUNTER (OUTPATIENT)
Dept: CT IMAGING | Age: 72
Discharge: HOME | End: 2022-12-12
Payer: COMMERCIAL

## 2022-12-12 DIAGNOSIS — C91.10 CHRONIC LYMPHOCYTIC LEUKEMIA (HCC): ICD-10-CM

## 2022-12-12 PROCEDURE — 6360000004 HC RX CONTRAST MEDICATION: Performed by: INTERNAL MEDICINE

## 2022-12-12 PROCEDURE — 71260 CT THORAX DX C+: CPT

## 2022-12-12 RX ADMIN — IOPAMIDOL 80 ML: 755 INJECTION, SOLUTION INTRAVENOUS at 11:22

## 2022-12-13 ENCOUNTER — OFFICE VISIT (OUTPATIENT)
Dept: ONCOLOGY | Age: 72
End: 2022-12-13
Payer: COMMERCIAL

## 2022-12-13 ENCOUNTER — HOSPITAL ENCOUNTER (OUTPATIENT)
Dept: INFUSION THERAPY | Age: 72
Discharge: HOME | End: 2022-12-13
Payer: COMMERCIAL

## 2022-12-13 ENCOUNTER — CLINICAL DOCUMENTATION (OUTPATIENT)
Dept: ONCOLOGY | Age: 72
End: 2022-12-13

## 2022-12-13 VITALS
WEIGHT: 164 LBS | HEART RATE: 93 BPM | OXYGEN SATURATION: 96 % | HEIGHT: 72 IN | TEMPERATURE: 97.1 F | SYSTOLIC BLOOD PRESSURE: 120 MMHG | DIASTOLIC BLOOD PRESSURE: 64 MMHG | BODY MASS INDEX: 22.21 KG/M2

## 2022-12-13 DIAGNOSIS — C91.10 CHRONIC LYMPHOCYTIC LEUKEMIA (HCC): ICD-10-CM

## 2022-12-13 DIAGNOSIS — D69.6 THROMBOCYTOPENIA (HCC): ICD-10-CM

## 2022-12-13 DIAGNOSIS — E53.8 B12 DEFICIENCY: ICD-10-CM

## 2022-12-13 DIAGNOSIS — C91.10 CHRONIC LYMPHOCYTIC LEUKEMIA (HCC): Primary | ICD-10-CM

## 2022-12-13 LAB
ALBUMIN SERPL-MCNC: 3.8 GM/DL (ref 3.4–5)
ALP BLD-CCNC: 176 IU/L (ref 40–129)
ALT SERPL-CCNC: 20 U/L (ref 10–40)
ANION GAP SERPL CALCULATED.3IONS-SCNC: 11 MMOL/L (ref 4–16)
ANISOCYTOSIS: ABNORMAL
APTT: 27.7 SECONDS (ref 25.1–37.1)
AST SERPL-CCNC: 19 IU/L (ref 15–37)
BANDED NEUTROPHILS ABSOLUTE COUNT: 0.89 K/CU MM
BANDED NEUTROPHILS RELATIVE PERCENT: 2 % (ref 5–11)
BILIRUB SERPL-MCNC: 0.8 MG/DL (ref 0–1)
BUN BLDV-MCNC: 20 MG/DL (ref 6–23)
CALCIUM SERPL-MCNC: 8.3 MG/DL (ref 8.3–10.6)
CHLORIDE BLD-SCNC: 104 MMOL/L (ref 99–110)
CO2: 23 MMOL/L (ref 21–32)
CREAT SERPL-MCNC: 0.6 MG/DL (ref 0.9–1.3)
DIFFERENTIAL TYPE: ABNORMAL
EOSINOPHILS ABSOLUTE: 0.4 K/CU MM
EOSINOPHILS RELATIVE PERCENT: 1 % (ref 0–3)
FERRITIN: 81 NG/ML (ref 30–400)
FOLATE: 4.4 NG/ML (ref 3.1–17.5)
GFR SERPL CREATININE-BSD FRML MDRD: >60 ML/MIN/1.73M2
GLUCOSE BLD-MCNC: 111 MG/DL (ref 70–99)
HCT VFR BLD CALC: 30.4 % (ref 42–52)
HEMOGLOBIN: 9.2 GM/DL (ref 13.5–18)
INR BLD: 1.03 INDEX
IRON: 102 UG/DL (ref 59–158)
LYMPHOCYTES ABSOLUTE: 41 K/CU MM
LYMPHOCYTES RELATIVE PERCENT: 92 % (ref 24–44)
MCH RBC QN AUTO: 30.2 PG (ref 27–31)
MCHC RBC AUTO-ENTMCNC: 30.3 % (ref 32–36)
MCV RBC AUTO: 99.7 FL (ref 78–100)
MONOCYTES ABSOLUTE: 0.9 K/CU MM
MONOCYTES RELATIVE PERCENT: 2 % (ref 0–4)
OVALOCYTES: ABNORMAL
PCT TRANSFERRIN: 27 % (ref 10–44)
PDW BLD-RTO: 25 % (ref 11.7–14.9)
PLATELET # BLD: 39 K/CU MM (ref 140–440)
PMV BLD AUTO: 10.6 FL (ref 7.5–11.1)
POLYCHROMASIA: ABNORMAL
POTASSIUM SERPL-SCNC: 4.6 MMOL/L (ref 3.5–5.1)
PROTHROMBIN TIME: 13.3 SECONDS (ref 11.7–14.5)
RBC # BLD: 3.05 M/CU MM (ref 4.6–6.2)
RETICULOCYTE COUNT PCT: 4.2 % (ref 0.2–2.2)
SEGMENTED NEUTROPHILS ABSOLUTE COUNT: 1.3 K/CU MM
SEGMENTED NEUTROPHILS RELATIVE PERCENT: 3 % (ref 36–66)
SODIUM BLD-SCNC: 138 MMOL/L (ref 135–145)
TEAR DROP CELLS: ABNORMAL
TOTAL IRON BINDING CAPACITY: 375 UG/DL (ref 250–450)
TOTAL PROTEIN: 5.5 GM/DL (ref 6.4–8.2)
UNSATURATED IRON BINDING CAPACITY: 273 UG/DL (ref 110–370)
VITAMIN B-12: 1052 PG/ML (ref 211–911)
WBC # BLD: 44.5 K/CU MM (ref 4–10.5)
WBC # BLD: ABNORMAL 10*3/UL

## 2022-12-13 PROCEDURE — 99214 OFFICE O/P EST MOD 30 MIN: CPT | Performed by: INTERNAL MEDICINE

## 2022-12-13 PROCEDURE — 3017F COLORECTAL CA SCREEN DOC REV: CPT | Performed by: INTERNAL MEDICINE

## 2022-12-13 PROCEDURE — G8420 CALC BMI NORM PARAMETERS: HCPCS | Performed by: INTERNAL MEDICINE

## 2022-12-13 PROCEDURE — 80053 COMPREHEN METABOLIC PANEL: CPT

## 2022-12-13 PROCEDURE — G8484 FLU IMMUNIZE NO ADMIN: HCPCS | Performed by: INTERNAL MEDICINE

## 2022-12-13 PROCEDURE — 1123F ACP DISCUSS/DSCN MKR DOCD: CPT | Performed by: INTERNAL MEDICINE

## 2022-12-13 PROCEDURE — 84155 ASSAY OF PROTEIN SERUM: CPT

## 2022-12-13 PROCEDURE — G8427 DOCREV CUR MEDS BY ELIG CLIN: HCPCS | Performed by: INTERNAL MEDICINE

## 2022-12-13 PROCEDURE — 36415 COLL VENOUS BLD VENIPUNCTURE: CPT

## 2022-12-13 PROCEDURE — 83550 IRON BINDING TEST: CPT

## 2022-12-13 PROCEDURE — 82746 ASSAY OF FOLIC ACID SERUM: CPT

## 2022-12-13 PROCEDURE — 84165 PROTEIN E-PHORESIS SERUM: CPT

## 2022-12-13 PROCEDURE — 82728 ASSAY OF FERRITIN: CPT

## 2022-12-13 PROCEDURE — 83010 ASSAY OF HAPTOGLOBIN QUANT: CPT

## 2022-12-13 PROCEDURE — 83540 ASSAY OF IRON: CPT

## 2022-12-13 PROCEDURE — 85045 AUTOMATED RETICULOCYTE COUNT: CPT

## 2022-12-13 PROCEDURE — 85007 BL SMEAR W/DIFF WBC COUNT: CPT

## 2022-12-13 PROCEDURE — 86320 SERUM IMMUNOELECTROPHORESIS: CPT

## 2022-12-13 PROCEDURE — 85027 COMPLETE CBC AUTOMATED: CPT

## 2022-12-13 PROCEDURE — 85610 PROTHROMBIN TIME: CPT

## 2022-12-13 PROCEDURE — 1036F TOBACCO NON-USER: CPT | Performed by: INTERNAL MEDICINE

## 2022-12-13 PROCEDURE — 85730 THROMBOPLASTIN TIME PARTIAL: CPT

## 2022-12-13 PROCEDURE — 80074 ACUTE HEPATITIS PANEL: CPT

## 2022-12-13 PROCEDURE — 82607 VITAMIN B-12: CPT

## 2022-12-13 PROCEDURE — 1111F DSCHRG MED/CURRENT MED MERGE: CPT | Performed by: INTERNAL MEDICINE

## 2022-12-13 PROCEDURE — 99211 OFF/OP EST MAY X REQ PHY/QHP: CPT

## 2022-12-13 RX ORDER — ACETAMINOPHEN 325 MG/1
650 TABLET ORAL
OUTPATIENT
Start: 2022-12-20

## 2022-12-13 RX ORDER — ACETAMINOPHEN 325 MG/1
325 TABLET ORAL
Status: CANCELLED | OUTPATIENT
Start: 2022-12-20

## 2022-12-13 RX ORDER — SODIUM CHLORIDE 9 MG/ML
INJECTION, SOLUTION INTRAVENOUS CONTINUOUS
OUTPATIENT
Start: 2022-12-20

## 2022-12-13 RX ORDER — DIPHENHYDRAMINE HYDROCHLORIDE 50 MG/ML
50 INJECTION INTRAMUSCULAR; INTRAVENOUS
OUTPATIENT
Start: 2022-12-20

## 2022-12-13 RX ORDER — CYANOCOBALAMIN 1000 UG/ML
1000 INJECTION, SOLUTION INTRAMUSCULAR; SUBCUTANEOUS ONCE
Start: 2022-12-20

## 2022-12-13 RX ORDER — FAMOTIDINE 10 MG/ML
20 INJECTION, SOLUTION INTRAVENOUS
OUTPATIENT
Start: 2022-12-20

## 2022-12-13 RX ORDER — EPINEPHRINE 1 MG/ML
0.3 INJECTION, SOLUTION, CONCENTRATE INTRAVENOUS PRN
OUTPATIENT
Start: 2022-12-20

## 2022-12-13 RX ORDER — ONDANSETRON 2 MG/ML
8 INJECTION INTRAMUSCULAR; INTRAVENOUS
OUTPATIENT
Start: 2022-12-20

## 2022-12-13 RX ORDER — ALBUTEROL SULFATE 90 UG/1
4 AEROSOL, METERED RESPIRATORY (INHALATION) PRN
OUTPATIENT
Start: 2022-12-20

## 2022-12-13 NOTE — PROGRESS NOTES
Called patient @ [phone] and advised to take ferous gluconate 324 mg QOD per physician instruction. Patient voices understanding. No further needs addressed at this time.

## 2022-12-13 NOTE — PROGRESS NOTES
MA Rooming Questions  Patient: Woodrow Stringer  MRN: [id number]    Date: 12/13/2022        1. Do you have any new issues?   no         2. Do you need any refills on medications?    no    3. Have you had any imaging done since your last visit?   no    4. Have you been hospitalized or seen in the emergency room since your last visit here?   no    5. Did the patient have a depression screening completed today?  No    No data recorded     PHQ-9 Given to (if applicable):               PHQ-9 Score (if applicable):                     [] Positive     []  Negative              Does question #9 need addressed (if applicable)                     [] Yes    []  No               Luster Plvince, ANIYA

## 2022-12-13 NOTE — PROGRESS NOTES
Patient Name: Radha Palacios  Patient : 1950  Patient MRN: [id number]     Primary Oncologist: Laurel Acosta MD  Referring Physician: Chano Rivera MD       Date of Service: 2022      Chief Complaint:   Chief Complaint   Patient presents with    Follow-up        Active Problem list  1. Chronic lymphocytic leukemia ([facility]    2. Thrombocytopenia ([facility]           HPI:        Mr. Jim Hopkins ( 52) was diagnosed with stage I CLL in , when he presented with infection and lymphocytosis. His peripheral blood immunophenotyping was characteristic of B-cell CLL. It was CD38 negative, ZAP-70 positive, CD19 and 20 positive, CD5 positive. Karyotyping was normal in 2006.,  Because of his infection and associated acquired immune deficiency(hypogammaglobulinemia), he was given IVIG for a year. He was started back on IVIG therapy monthly since 2013 to prevent future major infections continuing for now. Also had minor Infusional reaction to IVIG in 2016 responded to Steroids & Benadryl., No further problems after that. CLL was treated only with Leukeran intermittently from 2007 until 2011 and again from 2011 until 2012. 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 Flow studies still showed the same and FISH in 2012 showed deletion of tp53 (17p) and MYB (6q). ,  He was thus treated with Fludara and Rituxan for six months between April and 2012 with excellent clinical and hematological response.  . In 2013, he developed Multiple b/l nodes in axilla and groin area, CAT scan on 13, Bulky lymphadenopathy within the chest, abdomen, and pelvis mildly increased as compared from previous CAT scan in March 2012.,  Starting in Jan 2014 he was initiated on 2nd line chemo with Bendamustine and Rituxan with good initial response. Continued till Nov 2014, CAT scan done on 8/14/14 showed an interval decrease in generalized adenopathy compared to previous study In December 2013, suggesting good response to therapy. ,  CAT scan done on 12/23/14 showed Minimal interval increase in size of at least two periportal and retrocaval lymph nodes. Otherwise stable thoracic, abdominal and pelvic adenopathy. Also mild interval increase in splenomegaly measuring 19 cm, previously 17 cm. CAT scan done on 6/25/15 showed mild interval decrease in the splenomegaly and also in retroperitoneal mesenteric lymph nodes. Started Idelalisib [Zydelig] on 20th Jan 2015 at 150mg po bid. with good initial response. He did remarkably well with Zydelig from January of 2015 until June of 2016, after he was hospitalized for Rhinovirus Pneumonia in May of 2016. Also discussed Living Will, Power of Heriberto, DNR status, et cetera. In April 2016, chest CT showed stable  , 13. In July 2016, His FISH testing on peripheral blood showed abnormal results, with 6q deletion, 17p deletion, and 11q centromere signal in 3 percent of cells. The 17p deletion (Tp53) in 80 percent of cells is associated with unfavorable prognosis. Because of his CLL with poor prognostic markers, including 17p deletion detected in 2012 & again in July of 2016. He was started on ibrutinib in September 2016 140mg/d increased to 280 mg/ after 4 weeks. The abdominal CT 7/5/16 is showing no acute abnormalities, stable splenomegaly, and one periportal lymph node measuring 2.2 by 3.7 cm which is unchanged from before. CAT scan of the chest August 5, 2016, showed axillary, mediastinal, hilar, and upper abdominal lymphadenopathy consistent with his CLL. The maximum size of the nodes is 2.4 cm.  A 1 cm infiltrate in the lateral segment of the right middle lobe possibly infectious in nature, segmental calcification of left coronary artery. CAT scan of the chest, abdomen, and pelvis December 7,2016 2016, which revealed a mild mediastinal right hilar adenopathy, decreased in size from the previous examination of April and August of 2016, .5. His ibrutinib was stopped in December 2016 when he was feeling bad and it was started back at one a day, 140 mg daily instead of 280 mg that he was taking prior to that  In January 2017, he developed progressive lump in his left inguinal area. That being the only area of progressive adenopathy with multiple nodes coalesced together, a question of Pimentels transformation versus more aggressive histology conversion was considered as a possibility. Dr. Karina Gray, did a biopsy of the lymph node on January 18, 2017. The final pathology was reported as B-cell small lymphocytic lymphoma (B-cell CLL/SLL), with no evidence of any large cell OR Pimentels transformation. There was some evidence of localized herpetic simplex lymphadenitis. Because of possibility of localized infection with herpes. I started him on Valtrex 500 t.i.d. for two to three weeks then tapered to 1/d as maintenance therapy. Lymphadenopathy in left groin almost completely resolved. His ibrutinib was stopped in December 2016 when he was feeling bad and it was started back at one a day, 140 mg daily as maintenance dose instead of 280 mg that he was taking prior to that. His quantitative immunoglobulin on February 17, 2017, IgG 600, IgA less than 10, IgM less than 4. Serum protein electrophoresis was within the normal range. Gammaglobulin was continued monthly since it has helped any serious infections under control. August 2017 he had a IgG that was 503  October 2017 started a ibrutinib he was off for 2 months due to his insurance issues  5-18 CT chest: Showed some mild decrease in mediastinal and hilar lymphadenopathy. . Regards to his abdomen also there is decrease in his lymphadenopathy.  Although there is a left inguinal node that has enlarged. Section that no obvious source of his abdominal cramping. 10/10/2018 EGD showed severe ulcerative esophagitis with slight narrowing in the GE junction small hiatal hernia mild superficial gastritis, Colonoscope revealed 2 mm polyp in the sigmoid colon, diverticulosis, hemorrhoids, moderate stool  1/2019; Colonoscopy with two diminutive polyps, diverticulosis and hemorrhoids, path with TA above IC valve and HP distal sigmoid  10-19 iron def based on labs refered to GI , stool occult negative x #3 , NO PICA   11-19 saw Dr. Carmen Cunha, and referred to [facility] for capsule endoscopy  11/7/2019: Ct chest:Ground-glass nodule seen within the right upper lobe the largest measuring 17  mm in diameter. 12-6-19 capsule endoscopy, results unavailable   2/2020: CT chest:IMPRESSION:  Previously noted ground-glass opacities in the right lung have resolved. However, there is a new cluster of tiny pulmonary nodules in the left lower  lobe which could represent an infectious or inflammatory etiology. Short-term follow-up chest CT is recommended in 3-6 months. Multiple additional tiny pulmonary nodules are overall stable. Stable mediastinal lymph nodes without new lymphadenopathy. Previous Therapies    May 2020: Ct chest:  1. Interval resolution of the previously described cluster of pulmonary    nodules in the left lower lobe. Findings are most compatible with resolved    infectious/inflammatory etiology. 2. No acute cardiopulmonary disease. 3. COPD. 4. Stable subcentimeter mediastinal lymph nodes. 8/28/20: US RP normal    August 2020 cystoscopy revealed enlarged prostate. Was Recommended TURBT    3/24/22: CXR:  Bilateral interstitial opacity. Nodular consolidation at the left lung base. Pneumonia is favored over metastatic/lymphangitic disease. Consider atypical   etiologies.    Was treated with IV abx    But was given abx again and completed 4/21/22.    5/4/22: CT chest:  1. Patchy bilateral airspace opacities, most prominent in the left lower lobe   concerning for multifocal pneumonia. Follow-up examination in 4-6 weeks is   recommended to assess for resolution. 2. Interval increase in intrathoracic and upper abdominal lymphadenopathy   consistent with patient's history of CLL. 3. Splenomegaly. 5/28/22: he was seen by Dr Kori Hernandez who ordered bactrim     June 8 2022 he was seen by him again and was prescribed 2 week course of cipro    July 28 2022 CT chest:  1. Overall improvement in multifocal pneumonia. However there has been   interval development of multifocal tree-in-bud opacities which either reflect   residual pneumonia versus new infectious bronchiolitis. 2. There are scattered new solid nodular opacities as well the largest   measuring up to 8 mm also likely infectious or inflammatory in etiology. Continued follow-up in 3 months is recommended to ensure resolution and/or   stability. 3. Slight interval increase in size of a left hilar lymph node as well as a   periportal and gastrohepatic lymph nodes. Attention on follow-up exams is   recommended. Otherwise stable to slight interval decrease in size of   mediastinal and hilar adenopathy. 4. Probable splenomegaly. 9/13/2022 CBC with WBC of 11.5 hemoglobin of 11.4 hematocrit of 37 MCV of 82 and platelets of 09RRK with creatinine 1.7 albumin 3.9  ferritin of 25 iron sats of 10% B12 1217 IgG 559    He was admitted on 9/28/2022 with severe sepsis in the setting of rhinovirus infection with superimposed bacterial pneumonia. Completed 8-day course of Zosyn. Also acute hypoxic respiratory failure in the setting of bowel and component of CHF as well. Was evaluated by cardiology as well. Has been holding ibrutinib.    9/28/2022 CT scan of the head:  Chronic microvascular disease. Negative acute bleed, midline shift or mass   effect.        Findings worrisome for acute on chronic maxillary sinus disease. Please   correlate exam findings. CTA of the chest, CT scan of the abdomen pelvis:  No evidence of pulmonary embolism. Right greater than left lower lobe pneumonia. Diffuse hilar and mediastinal lymphadenopathy, concerning for malignancy with   history of CLL, also likely progressed from prior exam.       Diffuse peritoneal and retroperitoneal lymphadenopathy in the upper abdomen   most significant near the cassi hepatis and IVC where there is a large mass   which is favored to be a lymph node measuring up to 6.2 x 3.5 cm, also likely   progressed from exam.       Splenomegaly, could also be related to malignancy. Mild gallbladder wall thickening and pericholecystic fluid, could be related   to passive congestion or mass effect from adjacent lymphadenopathy. There is   mild intrahepatic bile duct dilatation. 9/29/2022 ultrasound of the abdomen:  1. Hepatomegaly and hepatic steatosis. 2. Masses most compatible with lymphadenopathy in the cassi hepatis, better   seen on recent CT. 3. Hyperechoic focus in the liver could be focal fatty infiltration,   hemangioma or other mass. This is too small to characterize on recent CT. Correlate with MRI hepatic mass protocol if indicated. 4. Gallbladder wall thickening with possible adenomyomatosis. HIDA scan with no convincing evidence of acute cholecystitis. 10/5/2022 underwent right thoracentesis, cytology negative for carcinoma. Many small lymphoid cells were seen    Postthoracentesis chest x-ray with slight interval decrease in size of the right pleural effusion. No pneumothorax. 10/23/22; Ct chest:  The infiltrates seen previously in the lower lobes bilaterally for the most   part have decreased in size and conspicuity. However, there are now innumerable punctate tree-in-bud centrilobular micro   nodules, which are nonspecific but suggest inflammatory/infectious   bronchiolitis.   Consider both typical and atypical etiologies. In addition, cavitary lesion has developed in the periphery of the right   lower lobe and to a lesser extent within the left upper lung. Necrotizing   infection would be primarily considered given the rapid development. Consider both typical and atypical etiologies. Enlarged mediastinal lymph nodes, similar when compared to the previous exam,   process of Lia related to history of chronic lymphocytic leukemia. 11/23/22: was admitted with Increased sob  CTA chest:   1. Negative for pulmonary embolus. 2. Multiple large mediastinal lymph nodes. These appear larger than   previously seen. 3. Extensive bilateral ground-glass opacities and tiny lung nodules. Possibility of acute respiratory distress syndrome   4. Probable atypical pneumonia. Aspergillosis could be in the differential.   This is similar in appearance to the previous study. 5. Splenomegaly without change     12/12/22; CT chest results pending    PAST MEDICAL HISTORY:   1. Stage I CLL with conversion from good to poor prognostic factors. ,  2. History of hypertension for many years. ,  3. 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 Arthritis in the past. ,  5. Frozen shoulder for which he had treatment including injection by Dr. Tl Germain. ,  6. Cellulitis with Zoster type lesion Left thigh resolved with Bactrim and Valtrex in March 2016.,  7. abdominal illness, with fever, nausea, and discomfort, suggestive of infectious etiology in May 2016. ,  8. hospitalization between July 5 and July 8 for what seems like atypical rhinovirus pneumonia involving right middle and lower lobe and left lower lobe with sepsis,  9. 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 Dr. Parveen Rucker is referring him for MOHS surgery.  ,  10. hospitalized from March 29 to March 31, 2017, for hyperosmolar hyperglycemia with hyponatremia and hyperkalemia and UTI. He was seen by Dr. Ángela Avina, who put him on insulin. He was also seen by Dr. Amarilis Padron. He felt much better after his sugar got under better control and electrolyte imbalanced reversed,  11. Ultrasound Doppler 2017, was negative for any DVT in either leg. Chest x-ray showed no acute process. PAST SURGICAL HISTORY:   1. knee operation,  2. tonsillectomy,  3. broken ankle times two requiring open reduction. ,  4. Vision better after cataract surgery    FAMILY HISTORY:   His paternal aunt had colon cancer. Uncle also had some form of cancer. Father had heart disease. Sister  12 of Liver disease     SOCIAL HISTORY:   He has a 40-pack-year history of smoking, quit in . He denies alcohol use. He is not . Has one son. Interval History  2022: Arrived with his son to the clinic today. Has been evaluated by cardiology, doing fine according to them, follow up in a year. SOB is stable, is on 3 L. Will be evaluated by pulm again in 3M. Productive cough with clear sputum. No chest pain. No fever. Night sweats few times this week. Appetite is good and no further weight loss. Mild epistaxis. No other overt bleeding. Pain across the abdomen. Feels tired and has not been active.  PT starting tomorrow     Review of Systems   Per interval history; otherwise 10 point ROS is negative              Vital Signs:  /64 (Site: Right Upper Arm, Position: Sitting, Cuff Size: Medium Adult)   Pulse 93   Temp 97.1 °F (36.2 °C) (Infrared)   Ht 6' (1.829 m)   Wt 164 lb (74.4 kg)   SpO2 96%   PF (!) 3 L/min   BMI 22.24 kg/m²     Physical Exam:  CONSTITUTIONAL: alert and awake, tired appearing, arrived on wheelchair and was on supplemental oxygen  EYES: No palor or any icetrus   ENT: ATNC   NECK: No JVD   HEMATOLOGIC/LYMPHATIC: no cervical, supraclavicular or axillary lymphadenopathy   LUNGS: poor effort but ctab  CARDIOVASCULAR:s1s2 SM+ tachycardia  ABDOMEN:soft nd bs pos, mild ttp  NEUROLOGIC: GI   SKIN: skin tags   EXTREMITIES: no LE edema bilaterally      Labs:    Hematology:  Lab Results   Component Value Date    WBC 23.4 (H) 12/05/2022    RBC 2.83 (L) 12/05/2022    HGB 8.5 (L) 12/05/2022    HCT 28.7 (L) 12/05/2022    .4 (H) 12/05/2022    MCH 30.0 12/05/2022    MCHC 29.6 (L) 12/05/2022    RDW 28.2 (H) 12/05/2022    PLT 27 (L) 12/05/2022    MPV 9.4 12/05/2022    BANDSPCT 1 (L) 12/01/2022    SEGSPCT 9.0 (L) 12/05/2022    EOSRELPCT 1.0 12/01/2022    BASOPCT 1.0 11/19/2022    LYMPHOPCT 88.0 (H) 12/05/2022    MONOPCT 3.0 12/05/2022    BANDABS 0.24 12/01/2022    SEGSABS 2.1 12/05/2022    EOSABS 0.2 12/01/2022    BASOSABS 0.2 11/19/2022    LYMPHSABS 20.6 12/05/2022    MONOSABS 0.7 12/05/2022    DIFFTYPE MANUAL DIFFERENTIAL 12/05/2022    ANISOCYTOSIS 1+ 12/05/2022    POLYCHROM 1+ 12/05/2022    WBCMORP MANY 12/05/2022    PLTM SEVERAL LARGE PLATELETS 41/13/1780     Lab Results   Component Value Date    ESR 2 12/01/2022       Chemistry:  Lab Results   Component Value Date     12/05/2022    K 3.7 12/05/2022     12/05/2022    CO2 28 12/05/2022    BUN 23 12/05/2022    CREATININE 0.6 (L) 12/05/2022    GLUCOSE 190 (H) 12/05/2022    CALCIUM 8.6 12/05/2022    PROT 5.4 (L) 12/05/2022    LABALBU 3.5 12/05/2022    BILITOT 0.5 12/05/2022    ALKPHOS 123 12/05/2022    AST 17 12/05/2022    ALT 18 12/05/2022    LABGLOM >60 12/05/2022    GFRAA >60 10/11/2022    PHOS 4.0 11/25/2022    MG 2.1 11/25/2022    POCGLU 483 (H) 11/25/2022     Lab Results   Component Value Date     (H) 10/11/2022     No components found for: LD  Lab Results   Component Value Date    TSHHS 3.040 04/04/2019    T4FREE 1.37 04/04/2019    FT3 3.2 03/27/2012       Immunology:  Lab Results   Component Value Date    PROT 5.4 (L) 12/05/2022    SPEP  09/22/2022     INTERPRETATION - Slightly decreased albumin and decreased total proteins.   LP.    ALBUMINELP 3.1 (L) 09/22/2022 LABALPH 0.2 09/22/2022    LABALPH 0.8 09/22/2022    LABBETA 0.6 09/22/2022    GAMGLOB 0.6 09/22/2022     No results found for: Marylu Henley, KLFLCR  No results found for: B2M    Coagulation Panel:  Lab Results   Component Value Date    PROTIME 14.7 (H) 10/26/2022    INR 1.14 10/26/2022    APTT 29.9 10/02/2022    DDIMER >5250 (H) 11/23/2022       Anemia Panel:  Lab Results   Component Value Date    AXBEKEEH75 >2000 (H) 11/23/2022    FOLATE 3.4 10/11/2022       Tumor Markers:  Lab Results   Component Value Date    PSA 1.9 10/27/2020         Imaging: Reviewed     Pathology:Reviewed     Observations:  Performance Status: ECOG 1  Depression Status: No data recorded          Assessment & Plan:  B-cell CLL, stage I with conversion from good to poor prognostic factors, with 17p deletion:   His diagnosis of CLL since 2007.   17p deletion since 2015. Initial treatment with Leukeran from 2000 to 2011 intermittently and FCR regimen in 2012, bendamustine/Rituxan in 2014. Idelalisib from January 2015 until June of 2016. On ibrutinib since September 2016. Ibrutinib therapy seems to be helping him, overall improvement. Was Only able to tolerate 140 mg p.o. every other day  He was off for a couple months During the fall 2017  Resumed Ibrutinib and  on 140 mg po daily. Plan to Continue current dose as no major B sx, stable WBC. CT imaging in July 2022 with slightly increasing size of intraabdominal LN. plan was to monitor. But note declining platelet count, probably secondary to ibrutinib versus progressive CLL versus underlying infection and antibiotics. Note CT scan of the chest abdomen pelvis also with progressive lymphadenopathy. Discussed the findings and discussed systemic treatment with single agent Rituxan(preferable secondary to his performance status) versus BR and he wanted to proceed with single agent Rituxan. Discussed adverse effects. Holding Ibrutinib.   After C1 he was admitted with fever, hypotenson which I dont believe is infusion reaction. Re challenge with rituxan again with same reaction  Discussed LN biopsy but he defers at this pointy  Discussed treatment with venetoclax and gazyva. Discussed adverse effects  OCM requested. Thrombocytopenia, no hemolysis, monoclonal gammopathy, nutritional deficiency, coagulopathy in the past.   Could be secondary to ibrutinib versus progressive CLL versus infection versus antibiotics. May have ITP. Recommend BMB for further evaluation. Recommend tpo    Rhinovirus and P aeriginosa: is on Tobra inhaler and oral azithromycin. Follows with ID    RUQ pain:? sec to lymphadenopathy. Note above, plan to continue Rituxan    Acquired hypogammaglobulinemia:   Continue IVIG monthly    BPH: is being followed by Urology    Iron def anemia and esophagitis: follows with GI, Dr Ray Colon. Reported that he had colonoscopy 2018 with polyps detected. EGD was normal except for H. pylori which was treated. Was supposed to have VCE which was unsuccessful once. He is on oral iron twice daily, recommend one tab every other day instead. Repeat ferritin pending. UA with no blood. Recommend follow-up with GI once other acute issues resolve    Lung Nodule RUL: Stable findings, will follow    Continue other medical care. Discussed above findings and plan with him and he verbalized understanding. Answered all his questions. Discussed healthy lifestyle, smoking cessation, healthy diet and exercise as tolerated. Also discussed importance of being up-to-date with age-appropriate screening tools. Is going to follow with need for colonoscopy    Recommend follow-up with primary care physician and other specialist.    Please do not hesitate to contact us if you need any further information.     Return to clinic 2-3 weeks or earlier if new symptoms    ANUPAM

## 2022-12-14 LAB
HAV IGM SER IA-ACNC: NON REACTIVE
HEPATITIS B CORE IGM ANTIBODY: ABNORMAL
HEPATITIS B SURFACE ANTIGEN: NON REACTIVE
HEPATITIS C ANTIBODY: NON REACTIVE

## 2022-12-15 DIAGNOSIS — C91.10 CHRONIC LYMPHOCYTIC LEUKEMIA (HCC): Primary | ICD-10-CM

## 2022-12-15 DIAGNOSIS — D69.3 IMMUNE THROMBOCYTOPENIA (HCC): Primary | ICD-10-CM

## 2022-12-15 LAB
ALBUMIN ELP: 3.2 GM/DL (ref 3.2–5.6)
ALPHA-1-GLOBULIN: 0.3 GM/DL (ref 0.1–0.4)
ALPHA-2-GLOBULIN: 0.7 GM/DL (ref 0.4–1.2)
BETA GLOBULIN: 0.8 GM/DL (ref 0.5–1.3)
GAMMA GLOBULIN: 0.5 GM/DL (ref 0.5–1.6)
HAPTOGLOBIN: 91 MG/DL (ref 30–200)
SPEP INTERPRETATION: ABNORMAL
SPEP INTERPRETATION: NORMAL
TOTAL PROTEIN: 5.5 GM/DL (ref 6.4–8.2)

## 2022-12-15 RX ORDER — ALBUTEROL SULFATE 90 UG/1
4 AEROSOL, METERED RESPIRATORY (INHALATION) PRN
OUTPATIENT
Start: 2022-12-22

## 2022-12-15 RX ORDER — ACETAMINOPHEN 325 MG/1
650 TABLET ORAL
OUTPATIENT
Start: 2022-12-22

## 2022-12-15 RX ORDER — SODIUM CHLORIDE 9 MG/ML
INJECTION, SOLUTION INTRAVENOUS CONTINUOUS
OUTPATIENT
Start: 2022-12-22

## 2022-12-15 RX ORDER — ALLOPURINOL 300 MG/1
300 TABLET ORAL DAILY
Qty: 30 TABLET | Refills: 0 | Status: SHIPPED | OUTPATIENT
Start: 2022-12-15

## 2022-12-15 RX ORDER — ONDANSETRON 2 MG/ML
8 INJECTION INTRAMUSCULAR; INTRAVENOUS
OUTPATIENT
Start: 2022-12-22

## 2022-12-15 RX ORDER — EPINEPHRINE 1 MG/ML
0.3 INJECTION, SOLUTION, CONCENTRATE INTRAVENOUS PRN
OUTPATIENT
Start: 2022-12-22

## 2022-12-15 RX ORDER — DIPHENHYDRAMINE HYDROCHLORIDE 50 MG/ML
50 INJECTION INTRAMUSCULAR; INTRAVENOUS
OUTPATIENT
Start: 2022-12-22

## 2022-12-15 NOTE — PROGRESS NOTES
RX for venetoclax 20 mg PO daily e-scribed to [facility] per physician order. Physician also recommends that patient take allopurinol 300 mg PO daily upon start of venetoclax. Attempted to call POA @ [phone] to notify and advise not to start taking oral medication until education provided; however, no answer. VM left with this RN direct number requesting call back.

## 2022-12-16 ENCOUNTER — CLINICAL DOCUMENTATION (OUTPATIENT)
Dept: INFUSION THERAPY | Age: 72
End: 2022-12-16

## 2022-12-16 DIAGNOSIS — C91.10 CHRONIC LYMPHOCYTIC LEUKEMIA (HCC): Primary | ICD-10-CM

## 2022-12-16 RX ORDER — HYDROCODONE BITARTRATE AND ACETAMINOPHEN 10; 325 MG/1; MG/1
1 TABLET ORAL EVERY 12 HOURS PRN
Qty: 60 TABLET | Refills: 0 | Status: SHIPPED | OUTPATIENT
Start: 2022-12-16 | End: 2023-01-15

## 2022-12-16 NOTE — TELEPHONE ENCOUNTER
Patient's son, Armen Rae, called requesting refill on pain medication. Order for norco 5-325 mg Q12 hours PRN pended for physician. Son also inquiring about venetoclax. Oral medication has been routed to [address] [place] [address]. [facility] [address] [facility]. PA approved and went into effect on 12/15/2022. Attempted to call son back @ [phone] with update; however, no answer. VM left with this RN direct contact information. Also reiterated that patient should not start taking venetoclax until education provided. Will contact son once appointment scheduled.

## 2022-12-20 ENCOUNTER — TELEPHONE (OUTPATIENT)
Dept: INFUSION THERAPY | Age: 72
End: 2022-12-20

## 2022-12-20 NOTE — TELEPHONE ENCOUNTER
12/20/22 spoke w pt son, Milton Diez gave him BMB scheduled on 12/29/22 att [facility] arrival time of 9:30 am. NPO after midnight.

## 2022-12-27 NOTE — PATIENT INSTRUCTIONS
IR Procedure at [address] [place] [address]:  Spoke with patient's son Scarlett Navarrete and patient will arrive  at 0930 at [facility] on 12/29/2022 for his procedure at 1130, also went over below instructions. NPO at [address]       2. Follow your directions as prescribed by the doctor for your procedure and medications. 3.   Consult your provider as to when to stop blood thinner  4. Do not take any pain medication within 6 hours of your procedure  5. Do not drink any alcoholic beverages or use any street drugs 24 hours before procedure. 6.   Please wear simple, loose fitting clothing to the hospital.  Do not bring valuables (money,             credit cards, checkbooks, etc.)     7. If you  have a Living Will and Durable Power of  for Healthcare, please bring in a copy. 8.   Please bring picture ID,  insurance card, paperwork from the doctors office            (H & P, Consent,  & card for implantable devices). 9.   Report to the information desk on the ground floor. 10. Take a shower the night before or morning of your procedure, do not apply any lotion, oil or powder. 11. If you are going to be sedated for the procedure, you will need a responsible adult to drive you home.

## 2022-12-28 ENCOUNTER — TELEPHONE (OUTPATIENT)
Dept: ONCOLOGY | Age: 72
End: 2022-12-28

## 2022-12-28 NOTE — TELEPHONE ENCOUNTER
Called patient's son, Andrea Marshall regarding chemo ed and start date of tx, patient is scheduled for chemo ed on Fri. 01/06 @ 0900 and tx on Mon and Tues 01/09 01/10 @ 0830, advised them that 1 visitor allowed @ time of education and day of their treatments, patient states understanding

## 2022-12-29 ENCOUNTER — CLINICAL DOCUMENTATION (OUTPATIENT)
Dept: ONCOLOGY | Age: 72
End: 2022-12-29

## 2022-12-29 ENCOUNTER — HOSPITAL ENCOUNTER (OUTPATIENT)
Dept: INTERVENTIONAL RADIOLOGY/VASCULAR | Age: 72
Discharge: HOME | End: 2022-12-29
Payer: COMMERCIAL

## 2022-12-29 VITALS
WEIGHT: 164 LBS | RESPIRATION RATE: 16 BRPM | OXYGEN SATURATION: 96 % | HEIGHT: 72 IN | BODY MASS INDEX: 22.21 KG/M2 | DIASTOLIC BLOOD PRESSURE: 72 MMHG | HEART RATE: 84 BPM | SYSTOLIC BLOOD PRESSURE: 136 MMHG | TEMPERATURE: 98.2 F

## 2022-12-29 DIAGNOSIS — C91.10 LEUKEMIA, LYMPHOCYTIC, CHRONIC (HCC): ICD-10-CM

## 2022-12-29 DIAGNOSIS — C91.10 CHRONIC LYMPHOCYTIC LEUKEMIA (HCC): Primary | ICD-10-CM

## 2022-12-29 LAB
ANISOCYTOSIS: ABNORMAL
ATYPICAL LYMPHOCYTE ABSOLUTE COUNT: ABNORMAL
DIFFERENTIAL TYPE: ABNORMAL
HCT VFR BLD CALC: 31.7 % (ref 42–52)
HEMOGLOBIN: 9.8 GM/DL (ref 13.5–18)
LYMPHOCYTES ABSOLUTE: 71.5 K/CU MM
LYMPHOCYTES RELATIVE PERCENT: 96 % (ref 24–44)
MACROCYTES: ABNORMAL
MCH RBC QN AUTO: 31.4 PG (ref 27–31)
MCHC RBC AUTO-ENTMCNC: 30.9 % (ref 32–36)
MCV RBC AUTO: 101.6 FL (ref 78–100)
PDW BLD-RTO: 20.5 % (ref 11.7–14.9)
PLATELET # BLD: 61 K/CU MM (ref 140–440)
PMV BLD AUTO: 10.1 FL (ref 7.5–11.1)
RBC # BLD: 3.12 M/CU MM (ref 4.6–6.2)
RBC # BLD: ABNORMAL 10*6/UL
RETICULOCYTE COUNT PCT: 3.9 % (ref 0.2–2.2)
SEGMENTED NEUTROPHILS ABSOLUTE COUNT: 3 K/CU MM
SEGMENTED NEUTROPHILS RELATIVE PERCENT: 4 % (ref 36–66)
SMUDGE CELLS: PRESENT
WBC # BLD: 74.5 K/CU MM (ref 4–10.5)
WBC # BLD: ABNORMAL 10*3/UL

## 2022-12-29 PROCEDURE — 6360000002 HC RX W HCPCS: Performed by: RADIOLOGY

## 2022-12-29 PROCEDURE — 85045 AUTOMATED RETICULOCYTE COUNT: CPT

## 2022-12-29 PROCEDURE — 38222 DX BONE MARROW BX & ASPIR: CPT

## 2022-12-29 PROCEDURE — 6360000002 HC RX W HCPCS

## 2022-12-29 PROCEDURE — 85027 COMPLETE CBC AUTOMATED: CPT

## 2022-12-29 PROCEDURE — 7100000011 HC PHASE II RECOVERY - ADDTL 15 MIN

## 2022-12-29 PROCEDURE — 2580000003 HC RX 258: Performed by: RADIOLOGY

## 2022-12-29 PROCEDURE — 7100000010 HC PHASE II RECOVERY - FIRST 15 MIN

## 2022-12-29 PROCEDURE — C1830 POWER BONE MARROW BX NEEDLE: HCPCS

## 2022-12-29 PROCEDURE — 77002 NEEDLE LOCALIZATION BY XRAY: CPT

## 2022-12-29 RX ORDER — SODIUM CHLORIDE 0.9 % (FLUSH) 0.9 %
10 SYRINGE (ML) INJECTION PRN
Status: DISCONTINUED | OUTPATIENT
Start: 2022-12-29 | End: 2022-12-30 | Stop reason: HOSPADM

## 2022-12-29 RX ORDER — MIDAZOLAM HYDROCHLORIDE 5 MG/ML
INJECTION INTRAMUSCULAR; INTRAVENOUS
Status: COMPLETED | OUTPATIENT
Start: 2022-12-29 | End: 2022-12-29

## 2022-12-29 RX ORDER — FENTANYL CITRATE 50 UG/ML
INJECTION, SOLUTION INTRAMUSCULAR; INTRAVENOUS
Status: COMPLETED | OUTPATIENT
Start: 2022-12-29 | End: 2022-12-29

## 2022-12-29 RX ORDER — HEPARIN SODIUM (PORCINE) LOCK FLUSH IV SOLN 100 UNIT/ML 100 UNIT/ML
500 SOLUTION INTRAVENOUS ONCE
Status: DISCONTINUED | OUTPATIENT
Start: 2022-12-29 | End: 2022-12-30 | Stop reason: HOSPADM

## 2022-12-29 RX ADMIN — MIDAZOLAM 1 MG: 5 INJECTION INTRAMUSCULAR; INTRAVENOUS at 11:33

## 2022-12-29 RX ADMIN — Medication 10 ML: at 10:07

## 2022-12-29 RX ADMIN — FENTANYL CITRATE 100 MCG: 50 INJECTION, SOLUTION INTRAMUSCULAR; INTRAVENOUS at 11:33

## 2022-12-29 ASSESSMENT — PAIN - FUNCTIONAL ASSESSMENT
PAIN_FUNCTIONAL_ASSESSMENT: 0-10
PAIN_FUNCTIONAL_ASSESSMENT: 0-10

## 2022-12-29 ASSESSMENT — PAIN DESCRIPTION - DESCRIPTORS: DESCRIPTORS: STABBING

## 2022-12-29 NOTE — OR NURSING
PROCEDURE PERFORMED: Bone Marrow Biopsy        INFORMED CONSENT:  Obtained prior to procedure. Consent placed in chart. PT TRANSPORTED FROM: [facility]                 TO THE IR ROOM:  Large room                     ARRIVED TO ROOM: 1115    ASSESSMENT: A&O x4 with VSS     Staff Present: Sarah Beth Cuba RT, Demetria Lundberg/Pradip RN, Yaakov Barnes Rn,[address]:               Pt transferred to the table and positioned prone for comfort @1124. Warm blankets given. Pt placed on Cardiac Monitor.  Pt prepped and draped in a sterile fashion with chlorhexadine @1129    TIME OUT:  1133        PAIN/LOCAL ANESTHESIA/SEDATION MANAGEMENT:           Local: Lidocaine 1% given by Dr. Melecio Nash @3807              INTRAOPERATIVE:           ACCESS TIME: 8874          US/FLUORO: FT- 0.2/ K- 7          WIRE USED:           SHEATH USED:           CATHETER USED:           FINAL IMAGE TAKEN TO CONFIRM PLACEMENT OF:           CONTRAST/CC:     11g x4.0 in bone marrow biopsy tray   @1138-20 cc of aspirate and 1 core obtained        STERILE DRESSINGS: band aide     SPECIMENS: 20cc of aspirate and 1 core  Sent with hematology  EBL:     >1 cc    COMPLICATIONS/ OUTCOME: Patient remained A&O x4 with no signs or symptoms of distress         LEFT ROOM:1150     REPORT CALLED TO: Jerome Simpson RN at 0 by Jay Srinivasan

## 2022-12-29 NOTE — POST SEDATION
BSedation Post Procedure Note    Patient Name: Charlene Mathews   YOB: 1950  Room/Bed: Room/bed info not found  Medical Record Number: [id number]  Date: 12/29/2022   Time: 2:47 PM         Physicians/Assistants: Jesusita Salazar MD, MD    Procedure Performed:  Bone marrow biopsy    Post-Sedation Vital Signs:  Vitals:    12/29/22 1308   BP: 136/72   Pulse: 84   Resp: 16   Temp:    SpO2: 96%      Vital signs were reviewed and were stable after the procedure (see flow sheet for vitals)            Post-Sedation Exam: Cardiovascular: normal           Complications: none    Electronically signed by Jesusita Salazar MD on 12/29/2022 at 2:47 PM

## 2022-12-29 NOTE — PROGRESS NOTES
This nurse faxed a fast appeal letter to [facility], requesting an appeal for the NPLATE denial.

## 2022-12-29 NOTE — PROGRESS NOTES
1320:  Pt discharged to home alert and oriented with no c/o discomfort. Puncture site covered with band aide, no bleeding noted. Pt tolerating PO, VSS.

## 2022-12-29 NOTE — PRE SEDATION
Sedation Pre-Procedure Note    Patient Name: Chaparrita Ingram   YOB: 1950  Room/Bed: Room/bed info not found  Medical Record Number: [id number]  Date: 12/29/2022   Time: 2:46 PM       Indication: Thrombocytopenia    Consent: I have discussed with the patient and/or the patient representative the indication, alternatives, and the possible risks and/or complications of the planned procedure and the anesthesia methods. The patient and/or patient representative appear to understand and agree to proceed. Vital Signs:   Vitals:    12/29/22 1308   BP: 136/72   Pulse: 84   Resp: 16   Temp:    SpO2: 96%       Past Medical History:   has a past medical history of Arthritis, BPH (benign prostatic hyperplasia), CAD (coronary artery disease), Cancer ([facility] [address] Diabetes mellitus ([facility] [address] History of blood transfusion, Hx of motion sickness, Hyperlipidemia, MDRO (multiple drug resistant organisms) resistance, Migraine, Pneumonia, Wears dentures, and Wears glasses. Past Surgical History:   has a past surgical history that includes knee surgery (1970); Tunneled venous port placement (2013); Colonoscopy (2010); fracture surgery (Left, 1990's); Cardiac catheterization (1990's); Tonsillectomy (late 1960's); Dental surgery; eye surgery (Right, 08/2016); other surgical history (Left, 01/18/2017); bronchoscopy (N/A, 10/28/2022); and IR BIOPSY BONE MARROW (12/29/2022). Medications:   Scheduled Meds:    heparin flush  500 Units IntraCATHeter Once     Continuous Infusions:   PRN Meds: sodium chloride flush  Home Meds:   Prior to Admission medications    Medication Sig Start Date End Date Taking? Authorizing Provider   HYDROcodone-acetaminophen (NORCO)  MG per tablet Take 1 tablet by mouth every 12 hours as needed for Pain for up to 30 days.  Intended supply: 30 days 12/16/22 1/15/23  Eugene Espino MD   venetoclax (VENCLEXTA) 10 MG TABS chemo tablet Take 2 tablets by mouth daily 12/15/22   Eugene Espino MD   allopurinol (ZYLOPRIM) 300 MG tablet Take 1 tablet by mouth daily x30 days upon starting venetoclax 12/15/22   Lynda Andres MD   ferrous gluconate 324 (37.5 Fe) MG TABS Take 1 tablet by mouth 2 times daily 12/11/22   Lynda Andres MD   gabapentin (NEURONTIN) 100 MG capsule Take 1 capsule by mouth 3 times daily for 30 days. 12/11/22 1/10/23  Lynda Andres MD   tobramycin, PF, (HARLEY) 300 MG/5ML nebulizer solution Take 5 mLs by nebulization in the morning and 5 mLs in the evening.  11/30/22 2/28/23  Cassius Hernandez MD   atorvastatin (LIPITOR) 80 MG tablet Take 0.5 tablets by mouth daily 11/28/22 12/29/22  Lynda Andres MD   ondansetron (ZOFRAN) 4 MG tablet Take 1 tablet by mouth every 8 hours as needed for Nausea or Vomiting 11/16/22   JENNI Nicholas - [facility]   NOVOLOG FLEXPEN 100 UNIT/ML injection pen Inject 15 Units into the skin 3 times daily (before meals) 11/2/22 12/29/22  Charline Gordon MD   valACYclovir (VALTREX) 500 MG tablet TAKE 1 TABLET BY MOUTH EVERY DAY 11/1/22   Lynda Andres MD   albuterol sulfate HFA (PROVENTIL;VENTOLIN;PROAIR) 108 (90 Base) MCG/ACT inhaler Inhale 2 puffs into the lungs every 4-6 hours as needed for Shortness of Breath or Wheezing 9/6/22   Historical Provider, MD   ipratropium-albuterol (DUONEB) 0.5-2.5 (3) MG/3ML SOLN nebulizer solution Take 1 vial by nebulization every 6 hours as needed for Shortness of Breath 9/6/22   Historical Provider, MD   LEVEMIR FLEXTOUCH 100 UNIT/ML injection pen Inject 40 Units into the skin nightly 3/27/22   Sebastian Alvarez MD   finasteride (PROSCAR) 5 MG tablet Take 5 mg by mouth daily 5/24/21   Historical Provider, MD   tamsulosin (FLOMAX) 0.4 MG capsule Take 0.4 mg by mouth daily    Historical Provider, MD   glucose monitoring kit (FREESTYLE) monitoring kit 1 kit by Does not apply route daily as needed (glucose checks) 3/31/17   Nidhi Castle MD   Insulin Syringe-Needle U-100 30G X 1/2\" 0.5 ML MISC 1 each by Does not apply route daily 3/31/17   Jose Tripathi Etienne Thakur MD   metFORMIN (GLUCOPHAGE) 1000 MG tablet Take 1,000 mg by mouth 2 times daily (with meals)    Historical Provider, MD   omeprazole (PRILOSEC) 20 MG delayed release capsule Take 20 mg by mouth daily as needed (GERD)    Historical Provider, MD     Coumadin Use Last 7 Days:  no  Antiplatelet drug therapy use last 7 days: no  Other anticoagulant use last 7 days: no  Additional Medication Information:  n/a      Pre-Sedation Documentation and Exam:   Vital signs have been reviewed (see flow sheet for vitals).     Mallampati Airway Assessment:  normal    Prior History of Anesthesia Complications:   none    ASA Classification:  Class 2 - A normal healthy patient with mild systemic disease    Sedation/ Anesthesia Plan:   intravenous sedation    Medications Planned:   midazolam (Versed) intravenously and fentanyl intravenously    Patient is an appropriate candidate for plan of sedation: yes    Electronically signed by Zeinba Hancock MD on 12/29/2022 at 2:46 PM

## 2023-01-03 ENCOUNTER — HOSPITAL ENCOUNTER (OUTPATIENT)
Age: 73
Setting detail: SPECIMEN
Discharge: HOME | End: 2023-01-03
Payer: COMMERCIAL

## 2023-01-03 PROCEDURE — 87205 SMEAR GRAM STAIN: CPT

## 2023-01-03 PROCEDURE — 87070 CULTURE OTHR SPECIMN AEROBIC: CPT

## 2023-01-04 ENCOUNTER — OFFICE VISIT (OUTPATIENT)
Dept: INFECTIOUS DISEASES | Age: 73
End: 2023-01-04

## 2023-01-04 VITALS
TEMPERATURE: 97 F | RESPIRATION RATE: 18 BRPM | HEART RATE: 94 BPM | DIASTOLIC BLOOD PRESSURE: 72 MMHG | SYSTOLIC BLOOD PRESSURE: 118 MMHG

## 2023-01-04 DIAGNOSIS — D80.1 HYPOGAMMAGLOBULINEMIA (HCC): ICD-10-CM

## 2023-01-04 DIAGNOSIS — J47.0 BRONCHIECTASIS WITH ACUTE LOWER RESPIRATORY INFECTION (HCC): Primary | ICD-10-CM

## 2023-01-04 DIAGNOSIS — C91.10 CLL (CHRONIC LYMPHOCYTIC LEUKEMIA) (HCC): ICD-10-CM

## 2023-01-04 DIAGNOSIS — Z85.6 PERSONAL HISTORY OF CLL (CHRONIC LYMPHOCYTIC LEUKEMIA): ICD-10-CM

## 2023-01-04 DIAGNOSIS — J96.21 ACUTE ON CHRONIC RESPIRATORY FAILURE WITH HYPOXEMIA (HCC): ICD-10-CM

## 2023-01-04 NOTE — PROGRESS NOTES
1/8/2023         Referring Physician: No ref. provider found  Primary Care Physician: Jan Martinez MD    Impression/Plan:   Diagnosis Orders   1. Bronchiectasis with acute lower respiratory infection ([facility] [address]        2. Personal history of CLL (chronic lymphocytic leukemia)        3. Acute on chronic respiratory failure with hypoxemia (HCC)        4. CLL (chronic lymphocytic leukemia) ([address]        5. Hypogammaglobulinemia ([address]            Discussion:  Hypogammaglobulinemia ([facility] [address]  IgG is 532 mg/dL on 12/11/2022    Bronchiectasis with acute lower respiratory infection ([facility] [address]  Await respiratory cx. Continue neb tobramycin through 2/23/23 . Return in about 4 weeks (around 2/1/2023). 45 minutes was spent in the encounter; more than 50% of the face-to-face time was spent with the patient providing counseling and coordination of care. History: Yemi Hawley is a 71 y.o.  male with a medical history of T2DM, CLL, hypogammaglobulinemia requiring monthly IVIG infusions, presenting today for recurrent pneumonia. Patient reports that he was treated for pneumonia in March 2022, he felt well for a while but once more had symptoms of productive cough and fatigue. He denies night sweats. He endorses some weight loss and poor appetite. He follows with Dr. Steve Saini who had ordered a CT of the chest that was done on 5/4/2022. It showed patchy bilateral airspace opacities, interval increase in intrathoracic and upper abdominal lymphadenopathy and splenomegaly. [place] was [place] He reports no history of exposure to anyone with tuberculosis. 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 He has no pets. 6/8/2022: at last visit Bactrim was prescribed, tests were done. Cough remains productive of yellow, non-bloody sputum. No chest pain, nausea or fever. Feels somewhat better and stronger. Not at baseline.   6/30/2022: at last visit ciprofloxacin was prescribed for positive sputum culture with Pseudomonas aeruginosa. He continues to have productive cough with small amounts of non-bloody sputum  11/17/2022: Recently seen during his 10/22/2022 admission for rhinovirus with bacterial superinfection. X-ray had shown right lower lobe and left upper lobe cavitary lesion. Respiratory culture was positive for Pseudomonas aeruginosa. Was discharged to complete eight 2-week course of intravenous ciprofloxacin and Zosyn on 11/14/2022. Continues to have shortness of breath and cough productive of nonbloody phlegm. Requires oxygen. 11/30/2022: After his last visit he was admitted into the hospital on 11/18/2022 for worsening shortness of breath and rigors during his received of rituximab infusion. On admission he had worsening respiratory status and required a BiPAP. He was diagnosed with Pseudomonas aeruginosa pneumonia. He received inhaled tobramycin, oral azithromycin, IV Zosyn and IVIG. He he was discharged on 11/25/2022 to receive a 7-day course of levofloxacin to end on 12/2/2022. He is doing better. Cough is less productive. Reports that Dr. Martha Escobedo would like him to wait another week. He has leg weakness. 1/4/2023: yet to get sputum cx. His appetite has improved and he has put on some weight. Adherent to neb preeti. Tolerating it. Yet to receive IVIG since his last dose. Requires corticosteroid premedication before IVIG receipt. Review of Systems   All other systems reviewed and are negative.     No Known Allergies    Patient Active Problem List   Diagnosis    Pneumonia    Sepsis ([facility] [address]    Hypogammaglobulinemia ([address]    CLL (chronic lymphocytic leukemia) ([facility] [address]    Upper respiratory infection, acute    Generalized muscle weakness    Type 2 diabetes mellitus with hyperglycemia, with long-term current use of insulin (HCC)    Poor appetite    Chronic obstructive pulmonary disease (HCC)    Neutropenia (HCC)    Other specified disorders of bone density and structure, unspecified site Cellulitis of right upper limb    Herpesviral infection    Intestinal malabsorption    Other nonspecific abnormal finding of lung field    Iron deficiency anemia due to chronic blood loss    Other muscle spasm    Leukemia, lymphocytic, chronic (HCC)    Selective deficiency of IgG (HCC)    Acute bronchitis    Severe malnutrition (HCC)    Pneumonia due to organism    Personal history of CLL (chronic lymphocytic leukemia)    Rhinovirus infection    Anemia    Thrombocytopenia (HCC)    Acute on chronic respiratory failure with hypoxemia (HCC)    Bronchiectasis with acute lower respiratory infection (HCC)    Chronic respiratory failure (HCC)    Other chest pain    B12 deficiency    Immune thrombocytopenia (HCC)       Current Outpatient Medications   Medication Sig Dispense Refill    HYDROcodone-acetaminophen (NORCO)  MG per tablet Take 1 tablet by mouth every 12 hours as needed for Pain for up to 30 days. Intended supply: 30 days 60 tablet 0    venetoclax (VENCLEXTA) 10 MG TABS chemo tablet Take 2 tablets by mouth daily 60 tablet 2    allopurinol (ZYLOPRIM) 300 MG tablet Take 1 tablet by mouth daily x30 days upon starting venetoclax 30 tablet 0    ferrous gluconate 324 (37.5 Fe) MG TABS Take 1 tablet by mouth 2 times daily 60 tablet 5    gabapentin (NEURONTIN) 100 MG capsule Take 1 capsule by mouth 3 times daily for 30 days. 90 capsule 0    tobramycin, PF, (HARLEY) 300 MG/5ML nebulizer solution Take 5 mLs by nebulization in the morning and 5 mLs in the evening.  300 mL 2    ondansetron (ZOFRAN) 4 MG tablet Take 1 tablet by mouth every 8 hours as needed for Nausea or Vomiting 30 tablet 1    valACYclovir (VALTREX) 500 MG tablet TAKE 1 TABLET BY MOUTH EVERY DAY 30 tablet 5    albuterol sulfate HFA (PROVENTIL;VENTOLIN;PROAIR) 108 (90 Base) MCG/ACT inhaler Inhale 2 puffs into the lungs every 4-6 hours as needed for Shortness of Breath or Wheezing      ipratropium-albuterol (DUONEB) 0.5-2.5 (3) MG/3ML SOLN nebulizer solution Take 1 vial by nebulization every 6 hours as needed for Shortness of Breath      LEVEMIR FLEXTOUCH 100 UNIT/ML injection pen Inject 40 Units into the skin nightly 5 pen 3    finasteride (PROSCAR) 5 MG tablet Take 5 mg by mouth daily      tamsulosin (FLOMAX) 0.4 MG capsule Take 0.4 mg by mouth daily      glucose monitoring kit (FREESTYLE) monitoring kit 1 kit by Does not apply route daily as needed (glucose checks) 1 kit 0    Insulin Syringe-Needle U-100 30G X 1/2\" 0.5 ML MISC 1 each by Does not apply route daily 100 each 3    metFORMIN (GLUCOPHAGE) 1000 MG tablet Take 1,000 mg by mouth 2 times daily (with meals)      omeprazole (PRILOSEC) 20 MG delayed release capsule Take 20 mg by mouth daily as needed (GERD)      allopurinol (ZYLOPRIM) 300 MG tablet Take 1 tablet by mouth daily 30 tablet 0    ondansetron (ZOFRAN) 8 MG tablet Take 1 tablet by mouth every 8 hours as needed for Nausea or Vomiting 90 tablet 3    atorvastatin (LIPITOR) 80 MG tablet Take 0.5 tablets by mouth daily 30 tablet 5    NOVOLOG FLEXPEN 100 UNIT/ML injection pen Inject 15 Units into the skin 3 times daily (before meals) 13.5 mL 0     No current facility-administered medications for this visit.      Facility-Administered Medications Ordered in Other Visits   Medication Dose Route Frequency Provider Last Rate Last Admin    sodium chloride flush 0.9 % injection 10 mL  10 mL IntraVENous PRN Josefina Casey MD           Past Medical History:   Diagnosis Date    Arthritis     knees, Rt hand/wrist    BPH (benign prostatic hyperplasia)     CAD (coronary artery disease)     Cancer (HCC)     CLL--Sees Dr Peralta Bhumi    Diabetes mellitus [facility])     History of blood transfusion     no reaction    Hx of motion sickness     Hyperlipidemia     MDRO (multiple drug resistant organisms) resistance     abscess on buttock 10yrs ago    Migraine     last one summer 2016    Pneumonia 2016    Wears dentures     full upper--lower dentures    Wears glasses        Past Surgical History:   Procedure Laterality Date    BRONCHOSCOPY N/A 10/28/2022    BRONCHOSCOPY performed by Kelli Olivarez MD at [facility]      x 2  last showed \"inflammation of heart\"    COLONOSCOPY  2010    DENTAL SURGERY      all teeth extracted     EYE SURGERY Right 2016    Cataract removal    FRACTURE SURGERY Left     left ankle plate and screws    IR BIOPSY PERC SUPERF BONE  2022    IR BIOPSY PERC SUPERF BONE 2022 [facility] SPECIAL PROCEDURES    KNEE SURGERY  1970    left    OTHER SURGICAL HISTORY Left 2017    groin excision    TONSILLECTOMY  late     age 13    TUNNELED VENOUS PORT PLACEMENT  2013    Right upper chest       Social History     Socioeconomic History    Marital status: Single     Spouse name: Not on file    Number of children: Not on file    Years of education: Not on file    Highest education level: Not on file   Occupational History    Not on file   Tobacco Use    Smoking status: Former     Packs/day: 3.00     Years: 20.00     Pack years: 60.00     Types: Cigarettes     Start date: 10/2/1965     Quit date: 1987     Years since quittin.0    Smokeless tobacco: Never   Vaping Use    Vaping Use: Never used   Substance and Sexual Activity    Alcohol use: No    Drug use: No    Sexual activity: Not Currently   Other Topics Concern    Not on file   Social History Narrative    Not on file     Social Determinants of Health     Financial Resource Strain: Not on file   Food Insecurity: Not on file   Transportation Needs: Not on file   Physical Activity: Not on file   Stress: Not on file   Social Connections: Not on file   Intimate Partner Violence: Not on file   Housing Stability: Not on file       Family History   Problem Relation Age of Onset    Diabetes Mother     High Blood Pressure Mother     Heart Disease Father     Other Sister         liver problems    Early Death Brother     Asthma Maternal Uncle     Colon Cancer Brother        Vital Signs:  Vitals:    01/04/23 1031   BP: 118/72   Site: Right Upper Arm   Position: Sitting   Cuff Size: Medium Adult   Pulse: 94   Resp: 18   Temp: 97 °F (36.1 °C)   TempSrc: Infrared        Wt Readings from Last 3 Encounters:   01/06/23 161 lb (73 kg)   12/29/22 164 lb (74.4 kg)   12/13/22 164 lb (74.4 kg)        Physical Exam:   Gen: alert and NAD  HEENT: sclera clear, pupils equal and reactive, extra ocular muscles intact, oropharynx clear, mucus membranes moist, tympanic membranes clear bilaterally, no cervical lymphadenopathy noted and neck supple  Neck: supple, no significant adenopathy  Chest: occasional crackles  Heart: regular rate and rhythm, no murmurs  ABD: abdomen is soft without significant tenderness, masses, organomegaly or guarding. EXT:peripheral pulses normal, no pedal edema, no clubbing or cyanosis  NEURO: alert, oriented, normal speech, no focal findings or movement disorder noted  Skin: well hydrated, no lesions, surgical site examined  Wounds: none  Labs:   WBC   Date Value Ref Range Status   01/06/2023 80.3 (HH) 4.0 - 10.5 K/CU MM Final     Comment:     WBC GIVEN TO DR POST [phone] @1008AM BY [employer] LTA   RESULTS READ BACK  WBC/DIFFERENTIAL SUSPECT FLAG NOTED ON ANALYZER. PLEASE REVIEW AND CONSIDER SENDING OUT IF   CLINICALLY INDICATED.     12/29/2022 74.5 (HH) 4.0 - 10.5 K/CU MM Final   12/13/2022 44.5 (HH) 4.0 - 10.5 K/CU MM Final     Comment:     WBC GIVEN TO DR POST [phone] @1116AM BY [facility]   RESULTS READ BACK  WBC/DIFFERENTIAL SUSPECT FLAG NOTED ON ANALYZER. PLEASE REVIEW AND CONSIDER SENDING OUT IF   CLINICALLY INDICATED.        Creatinine   Date Value Ref Range Status   01/06/2023 0.9 0.9 - 1.3 MG/DL Final   12/13/2022 0.6 (L) 0.9 - 1.3 MG/DL Final   12/05/2022 0.6 (L) 0.9 - 1.3 MG/DL Final       Cultures:  Culture   Date Value Ref Range Status   01/03/2023   Preliminary    Final Report Light growth normal respiratory micheline with   01/03/2023 SANDRA ALBICANS Moderate growth No further workup (A)  Preliminary   11/24/2022 Final Report Normal respiratory micheline  Final   11/24/2022 Final Report  Final   11/24/2022 (A)  Final    YEAST Light growth Unable to further identify No further workup       Imaging Studies:

## 2023-01-05 DIAGNOSIS — C91.10 CHRONIC LYMPHOCYTIC LEUKEMIA (HCC): Primary | ICD-10-CM

## 2023-01-05 LAB
CULTURE: ABNORMAL
CULTURE: ABNORMAL
Lab: ABNORMAL
SPECIMEN: ABNORMAL

## 2023-01-06 ENCOUNTER — NURSE ONLY (OUTPATIENT)
Dept: ONCOLOGY | Age: 73
End: 2023-01-06
Payer: COMMERCIAL

## 2023-01-06 ENCOUNTER — HOSPITAL ENCOUNTER (OUTPATIENT)
Dept: INFUSION THERAPY | Age: 73
Discharge: HOME | End: 2023-01-06
Payer: COMMERCIAL

## 2023-01-06 VITALS
BODY MASS INDEX: 21.81 KG/M2 | WEIGHT: 161 LBS | HEART RATE: 101 BPM | OXYGEN SATURATION: 96 % | SYSTOLIC BLOOD PRESSURE: 139 MMHG | HEIGHT: 72 IN | TEMPERATURE: 97.9 F | DIASTOLIC BLOOD PRESSURE: 69 MMHG

## 2023-01-06 DIAGNOSIS — D69.3 IDIOPATHIC THROMBOCYTOPENIA (HCC): Primary | ICD-10-CM

## 2023-01-06 DIAGNOSIS — C91.10 CHRONIC LYMPHOCYTIC LEUKEMIA (HCC): ICD-10-CM

## 2023-01-06 DIAGNOSIS — C91.10 CHRONIC LYMPHOCYTIC LEUKEMIA (HCC): Primary | ICD-10-CM

## 2023-01-06 LAB
ALBUMIN SERPL-MCNC: 3.9 GM/DL (ref 3.4–5)
ALP BLD-CCNC: 210 IU/L (ref 40–129)
ALT SERPL-CCNC: 25 U/L (ref 10–40)
ANION GAP SERPL CALCULATED.3IONS-SCNC: 10 MMOL/L (ref 4–16)
ANISOCYTOSIS: ABNORMAL
AST SERPL-CCNC: 21 IU/L (ref 15–37)
BANDED NEUTROPHILS ABSOLUTE COUNT: 2.41 K/CU MM
BANDED NEUTROPHILS RELATIVE PERCENT: 3 % (ref 5–11)
BILIRUB SERPL-MCNC: 0.6 MG/DL (ref 0–1)
BUN BLDV-MCNC: 30 MG/DL (ref 6–23)
CALCIUM SERPL-MCNC: 8.8 MG/DL (ref 8.3–10.6)
CHLORIDE BLD-SCNC: 105 MMOL/L (ref 99–110)
CO2: 22 MMOL/L (ref 21–32)
CREAT SERPL-MCNC: 0.9 MG/DL (ref 0.9–1.3)
DIFFERENTIAL TYPE: ABNORMAL
GFR SERPL CREATININE-BSD FRML MDRD: >60 ML/MIN/1.73M2
GLUCOSE BLD-MCNC: 165 MG/DL (ref 70–99)
HCT VFR BLD CALC: 30.9 % (ref 42–52)
HEMOGLOBIN: 9.5 GM/DL (ref 13.5–18)
LYMPHOCYTES ABSOLUTE: 74.7 K/CU MM
LYMPHOCYTES RELATIVE PERCENT: 93 % (ref 24–44)
MCH RBC QN AUTO: 31.9 PG (ref 27–31)
MCHC RBC AUTO-ENTMCNC: 30.7 % (ref 32–36)
MCV RBC AUTO: 103.7 FL (ref 78–100)
MONOCYTES ABSOLUTE: 0.8 K/CU MM
MONOCYTES RELATIVE PERCENT: 1 % (ref 0–4)
OVALOCYTES: ABNORMAL
PDW BLD-RTO: 19.6 % (ref 11.7–14.9)
PLATELET # BLD: 51 K/CU MM (ref 140–440)
PMV BLD AUTO: 9.1 FL (ref 7.5–11.1)
POTASSIUM SERPL-SCNC: 4.8 MMOL/L (ref 3.5–5.1)
RBC # BLD: 2.98 M/CU MM (ref 4.6–6.2)
SEGMENTED NEUTROPHILS ABSOLUTE COUNT: 2.4 K/CU MM
SEGMENTED NEUTROPHILS RELATIVE PERCENT: 3 % (ref 36–66)
SODIUM BLD-SCNC: 137 MMOL/L (ref 135–145)
TOTAL PROTEIN: 5.8 GM/DL (ref 6.4–8.2)
WBC # BLD: 80.3 K/CU MM (ref 4–10.5)

## 2023-01-06 PROCEDURE — 85007 BL SMEAR W/DIFF WBC COUNT: CPT

## 2023-01-06 PROCEDURE — 85027 COMPLETE CBC AUTOMATED: CPT

## 2023-01-06 PROCEDURE — 36415 COLL VENOUS BLD VENIPUNCTURE: CPT

## 2023-01-06 PROCEDURE — 99211 OFF/OP EST MAY X REQ PHY/QHP: CPT | Performed by: INTERNAL MEDICINE

## 2023-01-06 PROCEDURE — 99213 OFFICE O/P EST LOW 20 MIN: CPT

## 2023-01-06 PROCEDURE — 80053 COMPREHEN METABOLIC PANEL: CPT

## 2023-01-06 RX ORDER — SODIUM CHLORIDE 9 MG/ML
5-250 INJECTION, SOLUTION INTRAVENOUS PRN
Status: CANCELLED | OUTPATIENT
Start: 2023-01-10

## 2023-01-06 RX ORDER — ACETAMINOPHEN 325 MG/1
650 TABLET ORAL ONCE
OUTPATIENT
Start: 2023-01-23 | End: 2023-01-23

## 2023-01-06 RX ORDER — SODIUM CHLORIDE 9 MG/ML
5-40 INJECTION INTRAVENOUS PRN
Status: CANCELLED | OUTPATIENT
Start: 2023-01-16

## 2023-01-06 RX ORDER — FAMOTIDINE 10 MG/ML
20 INJECTION, SOLUTION INTRAVENOUS
Status: CANCELLED | OUTPATIENT
Start: 2023-01-16

## 2023-01-06 RX ORDER — ACETAMINOPHEN 325 MG/1
650 TABLET ORAL ONCE
Status: CANCELLED | OUTPATIENT
Start: 2023-01-10 | End: 2023-01-10

## 2023-01-06 RX ORDER — DIPHENHYDRAMINE HYDROCHLORIDE 50 MG/ML
50 INJECTION INTRAMUSCULAR; INTRAVENOUS
Status: CANCELLED | OUTPATIENT
Start: 2023-01-10

## 2023-01-06 RX ORDER — SODIUM CHLORIDE 9 MG/ML
5-250 INJECTION, SOLUTION INTRAVENOUS PRN
Status: CANCELLED | OUTPATIENT
Start: 2023-01-09

## 2023-01-06 RX ORDER — SODIUM CHLORIDE 9 MG/ML
5-250 INJECTION, SOLUTION INTRAVENOUS PRN
Status: CANCELLED | OUTPATIENT
Start: 2023-01-16

## 2023-01-06 RX ORDER — SODIUM CHLORIDE 9 MG/ML
INJECTION, SOLUTION INTRAVENOUS CONTINUOUS
Status: CANCELLED | OUTPATIENT
Start: 2023-01-16

## 2023-01-06 RX ORDER — DIPHENHYDRAMINE HYDROCHLORIDE 50 MG/ML
50 INJECTION INTRAMUSCULAR; INTRAVENOUS
Status: CANCELLED | OUTPATIENT
Start: 2023-01-09

## 2023-01-06 RX ORDER — ACETAMINOPHEN 325 MG/1
650 TABLET ORAL ONCE
Status: CANCELLED | OUTPATIENT
Start: 2023-01-16 | End: 2023-01-16

## 2023-01-06 RX ORDER — ONDANSETRON HYDROCHLORIDE 8 MG/1
8 TABLET, FILM COATED ORAL EVERY 8 HOURS PRN
Qty: 90 TABLET | Refills: 3 | Status: SHIPPED | OUTPATIENT
Start: 2023-01-06

## 2023-01-06 RX ORDER — SODIUM CHLORIDE 9 MG/ML
INJECTION, SOLUTION INTRAVENOUS CONTINUOUS
OUTPATIENT
Start: 2023-01-23

## 2023-01-06 RX ORDER — ACETAMINOPHEN 325 MG/1
650 TABLET ORAL
Status: CANCELLED | OUTPATIENT
Start: 2023-01-09

## 2023-01-06 RX ORDER — ALLOPURINOL 300 MG/1
300 TABLET ORAL DAILY
Qty: 30 TABLET | Refills: 0 | Status: SHIPPED | OUTPATIENT
Start: 2023-01-06

## 2023-01-06 RX ORDER — SODIUM CHLORIDE 9 MG/ML
5-250 INJECTION, SOLUTION INTRAVENOUS PRN
OUTPATIENT
Start: 2023-01-23

## 2023-01-06 RX ORDER — EPINEPHRINE 1 MG/ML
0.3 INJECTION, SOLUTION, CONCENTRATE INTRAVENOUS PRN
Status: CANCELLED | OUTPATIENT
Start: 2023-01-16

## 2023-01-06 RX ORDER — DIPHENHYDRAMINE HYDROCHLORIDE 50 MG/ML
50 INJECTION INTRAMUSCULAR; INTRAVENOUS
Status: CANCELLED | OUTPATIENT
Start: 2023-01-16

## 2023-01-06 RX ORDER — ONDANSETRON 2 MG/ML
8 INJECTION INTRAMUSCULAR; INTRAVENOUS
Status: CANCELLED | OUTPATIENT
Start: 2023-01-09

## 2023-01-06 RX ORDER — ACETAMINOPHEN 325 MG/1
650 TABLET ORAL
Status: CANCELLED | OUTPATIENT
Start: 2023-01-16

## 2023-01-06 RX ORDER — MEPERIDINE HYDROCHLORIDE 25 MG/ML
12.5 INJECTION INTRAMUSCULAR; INTRAVENOUS; SUBCUTANEOUS PRN
Status: CANCELLED | OUTPATIENT
Start: 2023-01-16

## 2023-01-06 RX ORDER — DIPHENHYDRAMINE HYDROCHLORIDE 50 MG/ML
50 INJECTION INTRAMUSCULAR; INTRAVENOUS ONCE
Status: CANCELLED | OUTPATIENT
Start: 2023-01-16 | End: 2023-01-16

## 2023-01-06 RX ORDER — DIPHENHYDRAMINE HYDROCHLORIDE 50 MG/ML
50 INJECTION INTRAMUSCULAR; INTRAVENOUS
OUTPATIENT
Start: 2023-01-23

## 2023-01-06 RX ORDER — ONDANSETRON 2 MG/ML
8 INJECTION INTRAMUSCULAR; INTRAVENOUS
Status: CANCELLED | OUTPATIENT
Start: 2023-01-16

## 2023-01-06 RX ORDER — ACETAMINOPHEN 325 MG/1
650 TABLET ORAL
OUTPATIENT
Start: 2023-01-23

## 2023-01-06 RX ORDER — DIPHENHYDRAMINE HYDROCHLORIDE 50 MG/ML
50 INJECTION INTRAMUSCULAR; INTRAVENOUS ONCE
Status: CANCELLED | OUTPATIENT
Start: 2023-01-10 | End: 2023-01-10

## 2023-01-06 RX ORDER — ONDANSETRON 2 MG/ML
8 INJECTION INTRAMUSCULAR; INTRAVENOUS
Status: CANCELLED | OUTPATIENT
Start: 2023-01-10

## 2023-01-06 RX ORDER — MEPERIDINE HYDROCHLORIDE 25 MG/ML
12.5 INJECTION INTRAMUSCULAR; INTRAVENOUS; SUBCUTANEOUS PRN
Status: CANCELLED | OUTPATIENT
Start: 2023-01-10

## 2023-01-06 RX ORDER — FAMOTIDINE 10 MG/ML
20 INJECTION, SOLUTION INTRAVENOUS
Status: CANCELLED | OUTPATIENT
Start: 2023-01-10

## 2023-01-06 RX ORDER — FAMOTIDINE 10 MG/ML
20 INJECTION, SOLUTION INTRAVENOUS
OUTPATIENT
Start: 2023-01-23

## 2023-01-06 RX ORDER — MEPERIDINE HYDROCHLORIDE 25 MG/ML
12.5 INJECTION INTRAMUSCULAR; INTRAVENOUS; SUBCUTANEOUS PRN
OUTPATIENT
Start: 2023-01-23

## 2023-01-06 RX ORDER — EPINEPHRINE 1 MG/ML
0.3 INJECTION, SOLUTION, CONCENTRATE INTRAVENOUS PRN
OUTPATIENT
Start: 2023-01-23

## 2023-01-06 RX ORDER — ALBUTEROL SULFATE 90 UG/1
4 AEROSOL, METERED RESPIRATORY (INHALATION) PRN
OUTPATIENT
Start: 2023-01-23

## 2023-01-06 RX ORDER — SODIUM CHLORIDE 9 MG/ML
5-40 INJECTION INTRAVENOUS PRN
OUTPATIENT
Start: 2023-01-23

## 2023-01-06 RX ORDER — SODIUM CHLORIDE 9 MG/ML
INJECTION, SOLUTION INTRAVENOUS CONTINUOUS
Status: CANCELLED | OUTPATIENT
Start: 2023-01-10

## 2023-01-06 RX ORDER — ALBUTEROL SULFATE 90 UG/1
4 AEROSOL, METERED RESPIRATORY (INHALATION) PRN
Status: CANCELLED | OUTPATIENT
Start: 2023-01-10

## 2023-01-06 RX ORDER — ACETAMINOPHEN 325 MG/1
650 TABLET ORAL
Status: CANCELLED | OUTPATIENT
Start: 2023-01-10

## 2023-01-06 RX ORDER — MEPERIDINE HYDROCHLORIDE 25 MG/ML
12.5 INJECTION INTRAMUSCULAR; INTRAVENOUS; SUBCUTANEOUS PRN
Status: CANCELLED | OUTPATIENT
Start: 2023-01-09

## 2023-01-06 RX ORDER — HEPARIN SODIUM (PORCINE) LOCK FLUSH IV SOLN 100 UNIT/ML 100 UNIT/ML
500 SOLUTION INTRAVENOUS PRN
Status: CANCELLED | OUTPATIENT
Start: 2023-01-10

## 2023-01-06 RX ORDER — EPINEPHRINE 1 MG/ML
0.3 INJECTION, SOLUTION, CONCENTRATE INTRAVENOUS PRN
Status: CANCELLED | OUTPATIENT
Start: 2023-01-09

## 2023-01-06 RX ORDER — DIPHENHYDRAMINE HYDROCHLORIDE 50 MG/ML
50 INJECTION INTRAMUSCULAR; INTRAVENOUS ONCE
OUTPATIENT
Start: 2023-01-23 | End: 2023-01-23

## 2023-01-06 RX ORDER — EPINEPHRINE 1 MG/ML
0.3 INJECTION, SOLUTION, CONCENTRATE INTRAVENOUS PRN
Status: CANCELLED | OUTPATIENT
Start: 2023-01-10

## 2023-01-06 RX ORDER — SODIUM CHLORIDE 9 MG/ML
5-40 INJECTION INTRAVENOUS PRN
Status: CANCELLED | OUTPATIENT
Start: 2023-01-10

## 2023-01-06 RX ORDER — ONDANSETRON 2 MG/ML
8 INJECTION INTRAMUSCULAR; INTRAVENOUS
OUTPATIENT
Start: 2023-01-23

## 2023-01-06 RX ORDER — SODIUM CHLORIDE 9 MG/ML
INJECTION, SOLUTION INTRAVENOUS CONTINUOUS
Status: CANCELLED | OUTPATIENT
Start: 2023-01-09

## 2023-01-06 RX ORDER — ALBUTEROL SULFATE 90 UG/1
4 AEROSOL, METERED RESPIRATORY (INHALATION) PRN
Status: CANCELLED | OUTPATIENT
Start: 2023-01-16

## 2023-01-06 RX ORDER — HEPARIN SODIUM (PORCINE) LOCK FLUSH IV SOLN 100 UNIT/ML 100 UNIT/ML
500 SOLUTION INTRAVENOUS PRN
OUTPATIENT
Start: 2023-01-23

## 2023-01-06 RX ORDER — HEPARIN SODIUM (PORCINE) LOCK FLUSH IV SOLN 100 UNIT/ML 100 UNIT/ML
500 SOLUTION INTRAVENOUS PRN
Status: CANCELLED | OUTPATIENT
Start: 2023-01-16

## 2023-01-06 NOTE — PROGRESS NOTES
Patient arrived with son for chemotherapy education. Discussed treatment plan, potential side effects, prevention and symptom management. Reviewed in detail chemotherapy education folder and drug monograph. Patient's regimen will be Gazyva (D1,2,8,15) Cycle 1 then D1 Cycles 2-6 Q28D + Venetoclax 20 mg daily. Consent signed by patient and will be scanned into patient's chart. Copy given to patient. Instructed patient to take allopurinol 300 mg daily x30 days starting the first day of tx. RX for allopurinol and zofran 8 mg PP Q8 hours PRN for N/V e-scribed to [facility] on [address] per physician order. Patient voices understanding on how and when to take these medications. C1 calendar provided with tx regimen, appointment times and written instructions. Contact information to [facility] and this RN as well as on-call phone number for after office hours/weekends provided to patient. CBC and CMP will be drawn today. Last hep panel on 12/13/2022. No need for another hep panel at this time per physician. Confirmed first appointment at 0830 on 01/09/2023. Distress management reviewed. Patient's only concern is pain. Denies support services at this time. Encouraged patient to ask questions and allowed patient to express feelings during visit. All needs addressed. Patient denies any further questions or concerns at this time.

## 2023-01-09 ENCOUNTER — HOSPITAL ENCOUNTER (OUTPATIENT)
Dept: INFUSION THERAPY | Age: 73
Discharge: HOME | End: 2023-01-09
Payer: COMMERCIAL

## 2023-01-09 VITALS
SYSTOLIC BLOOD PRESSURE: 119 MMHG | DIASTOLIC BLOOD PRESSURE: 64 MMHG | BODY MASS INDEX: 22.35 KG/M2 | HEIGHT: 72 IN | OXYGEN SATURATION: 90 % | TEMPERATURE: 97.4 F | WEIGHT: 165 LBS | HEART RATE: 101 BPM

## 2023-01-09 DIAGNOSIS — C91.10 CLL (CHRONIC LYMPHOCYTIC LEUKEMIA) (HCC): Primary | ICD-10-CM

## 2023-01-09 DIAGNOSIS — D50.0 IRON DEFICIENCY ANEMIA DUE TO CHRONIC BLOOD LOSS: ICD-10-CM

## 2023-01-09 DIAGNOSIS — E53.8 B12 DEFICIENCY: ICD-10-CM

## 2023-01-09 DIAGNOSIS — K90.9 INTESTINAL MALABSORPTION, UNSPECIFIED TYPE: ICD-10-CM

## 2023-01-09 LAB
PHOSPHORUS: 4.6 MG/DL (ref 2.5–4.9)
URIC ACID: 8 MG/DL (ref 3.5–7.2)

## 2023-01-09 PROCEDURE — 6360000002 HC RX W HCPCS

## 2023-01-09 PROCEDURE — 84100 ASSAY OF PHOSPHORUS: CPT

## 2023-01-09 PROCEDURE — 6370000000 HC RX 637 (ALT 250 FOR IP): Performed by: INTERNAL MEDICINE

## 2023-01-09 PROCEDURE — 96367 TX/PROPH/DG ADDL SEQ IV INF: CPT

## 2023-01-09 PROCEDURE — 96372 THER/PROPH/DIAG INJ SC/IM: CPT

## 2023-01-09 PROCEDURE — 96375 TX/PRO/DX INJ NEW DRUG ADDON: CPT

## 2023-01-09 PROCEDURE — 2500000003 HC RX 250 WO HCPCS

## 2023-01-09 PROCEDURE — 96415 CHEMO IV INFUSION ADDL HR: CPT

## 2023-01-09 PROCEDURE — 6360000002 HC RX W HCPCS: Performed by: INTERNAL MEDICINE

## 2023-01-09 PROCEDURE — 2580000003 HC RX 258: Performed by: INTERNAL MEDICINE

## 2023-01-09 PROCEDURE — 96413 CHEMO IV INFUSION 1 HR: CPT

## 2023-01-09 PROCEDURE — 84550 ASSAY OF BLOOD/URIC ACID: CPT

## 2023-01-09 RX ORDER — CYANOCOBALAMIN 1000 UG/ML
1000 INJECTION, SOLUTION INTRAMUSCULAR; SUBCUTANEOUS ONCE
Start: 2023-02-06

## 2023-01-09 RX ORDER — ALBUTEROL SULFATE 90 UG/1
4 AEROSOL, METERED RESPIRATORY (INHALATION) PRN
OUTPATIENT
Start: 2023-02-06

## 2023-01-09 RX ORDER — SODIUM CHLORIDE 9 MG/ML
INJECTION, SOLUTION INTRAVENOUS CONTINUOUS
OUTPATIENT
Start: 2023-02-06

## 2023-01-09 RX ORDER — ONDANSETRON 2 MG/ML
8 INJECTION INTRAMUSCULAR; INTRAVENOUS
OUTPATIENT
Start: 2023-02-06

## 2023-01-09 RX ORDER — EPINEPHRINE 1 MG/ML
0.3 INJECTION, SOLUTION, CONCENTRATE INTRAVENOUS PRN
OUTPATIENT
Start: 2023-02-06

## 2023-01-09 RX ORDER — FAMOTIDINE 10 MG/ML
INJECTION, SOLUTION INTRAVENOUS
Status: DISCONTINUED
Start: 2023-01-09 | End: 2023-01-10 | Stop reason: HOSPADM

## 2023-01-09 RX ORDER — SODIUM CHLORIDE 9 MG/ML
5-40 INJECTION INTRAVENOUS PRN
Status: DISCONTINUED | OUTPATIENT
Start: 2023-01-09 | End: 2023-01-10 | Stop reason: HOSPADM

## 2023-01-09 RX ORDER — ACETAMINOPHEN 325 MG/1
650 TABLET ORAL ONCE
Status: COMPLETED | OUTPATIENT
Start: 2023-01-09 | End: 2023-01-09

## 2023-01-09 RX ORDER — ACETAMINOPHEN 325 MG/1
650 TABLET ORAL
OUTPATIENT
Start: 2023-02-06

## 2023-01-09 RX ORDER — FAMOTIDINE 10 MG/ML
20 INJECTION, SOLUTION INTRAVENOUS
OUTPATIENT
Start: 2023-02-06

## 2023-01-09 RX ORDER — FAMOTIDINE 10 MG/ML
20 INJECTION, SOLUTION INTRAVENOUS
Status: COMPLETED | OUTPATIENT
Start: 2023-01-09 | End: 2023-01-09

## 2023-01-09 RX ORDER — SODIUM CHLORIDE 9 MG/ML
5-250 INJECTION, SOLUTION INTRAVENOUS PRN
Status: DISCONTINUED | OUTPATIENT
Start: 2023-01-09 | End: 2023-01-10 | Stop reason: HOSPADM

## 2023-01-09 RX ORDER — DIPHENHYDRAMINE HYDROCHLORIDE 50 MG/ML
50 INJECTION INTRAMUSCULAR; INTRAVENOUS ONCE
Status: COMPLETED | OUTPATIENT
Start: 2023-01-09 | End: 2023-01-09

## 2023-01-09 RX ORDER — CYANOCOBALAMIN 1000 UG/ML
1000 INJECTION, SOLUTION INTRAMUSCULAR; SUBCUTANEOUS ONCE
Status: COMPLETED
Start: 2023-01-09 | End: 2023-01-09

## 2023-01-09 RX ORDER — DIPHENHYDRAMINE HYDROCHLORIDE 50 MG/ML
50 INJECTION INTRAMUSCULAR; INTRAVENOUS
OUTPATIENT
Start: 2023-02-06

## 2023-01-09 RX ORDER — ALBUTEROL SULFATE 90 UG/1
4 AEROSOL, METERED RESPIRATORY (INHALATION) PRN
Status: DISCONTINUED | OUTPATIENT
Start: 2023-01-09 | End: 2023-01-10 | Stop reason: HOSPADM

## 2023-01-09 RX ORDER — HEPARIN SODIUM (PORCINE) LOCK FLUSH IV SOLN 100 UNIT/ML 100 UNIT/ML
500 SOLUTION INTRAVENOUS PRN
Status: DISCONTINUED | OUTPATIENT
Start: 2023-01-09 | End: 2023-01-10 | Stop reason: HOSPADM

## 2023-01-09 RX ADMIN — DIPHENHYDRAMINE HYDROCHLORIDE 50 MG: 50 INJECTION INTRAMUSCULAR; INTRAVENOUS at 08:57

## 2023-01-09 RX ADMIN — SODIUM CHLORIDE 20 ML/HR: 9 INJECTION, SOLUTION INTRAVENOUS at 08:59

## 2023-01-09 RX ADMIN — DEXAMETHASONE SODIUM PHOSPHATE 20 MG: 10 INJECTION INTRAMUSCULAR; INTRAVENOUS at 09:00

## 2023-01-09 RX ADMIN — OBINUTUZUMAB 100 MG: 1000 INJECTION, SOLUTION, CONCENTRATE INTRAVENOUS at 10:00

## 2023-01-09 RX ADMIN — FAMOTIDINE 20 MG: 10 INJECTION, SOLUTION INTRAVENOUS at 11:10

## 2023-01-09 RX ADMIN — ALBUTEROL SULFATE 4 PUFF: 90 AEROSOL, METERED RESPIRATORY (INHALATION) at 11:05

## 2023-01-09 RX ADMIN — ACETAMINOPHEN 650 MG: 325 TABLET ORAL at 08:57

## 2023-01-09 RX ADMIN — CYANOCOBALAMIN 1000 MCG: 1000 INJECTION, SOLUTION INTRAMUSCULAR at 08:58

## 2023-01-09 RX ADMIN — HEPARIN 500 UNITS: 100 SYRINGE at 16:28

## 2023-01-09 ASSESSMENT — PAIN DESCRIPTION - LOCATION: LOCATION: RIB CAGE

## 2023-01-09 ASSESSMENT — PAIN SCALES - GENERAL: PAINLEVEL_OUTOF10: 6

## 2023-01-09 NOTE — PROGRESS NOTES
Pt here for new Gazyva infusion and B-12 injection. Pt has  no new complaints. Port accessed with blood return noted. Labs sent. Pt states numbness to bilateral feet has  not worsened since previous tx. SOB has not worsened since previous tx pt states he is SOB and pt is on 3L of O2. Labs reviewed from 1/6/23 and I spoke to [facility] KOO PEAK PA about CBC results platelets of 51, also spoke to [facility] HAYES GARNER about Acute Hepatitis panel with Hep B Core Ab IGM result of repeatedly reactive. Per [facility] HAYES GARNER ok to proceed with tx. I also spoke  to [facility] pharmacist about Acute hepatitis panel results and per [facility] Dr Sravanthi Deras was aware of acute hepatitis panel results and a Total hepatitis B panel was drawn which was negative. B-12 injection given IM to right deltoid. Pt tolerated with  no complaints. Patient's status assessed and documented appropriately. All labs and required results were also reviewed today. Treatment parameters have been reviewed. Today's treatment has been approved by the provider. Treatment orders and medication sequencing (when applicable) was verified by 2 registered nurses. The treatment plan was confirmed with the patient prior to administration, and the patient understands the need to report any treatment-related symptoms. Prior to administration, when applicable, the following 8 elements of medication administration were reviewed with 2nd Registered Nurse prior to dosing: drug name, drug dose, infusion volume when prepared in a syringe, rate of administration, expiration dates and/or times, appearance and integrity of drug(s), and rate of pump for infusion. The 5 rights of medication administration have been verified. 1100- pt called out stating he felt warm all over, and had increased SOB. Gazyva infusion stopped and NS fluids wide opened. [facility] KOO PEAK PA at chairside to assess pt.  Vitals were 117/58 SATS 88% on 3L heart rate 112 temp 97.4.    1105- Pt given 4 puffs of Albuterol inhaler per [facility] KOO PEAK PA orders. Vitals were 113/56 SATS 90% on 3l heart rate 114.     1110-  pt given Pepcid 20mg per Emy GIBBS order. Vitals were heart rate 112, SATS 90% on 3L /60. Pt states he feels a  little better. SOB has decreased and he dont feel as warm. 52361- per Emy GIBBS pt to be observed for 30 minutes and will re-evaluate if infusion to be re-started. 1120- pt states he is feeling better. 1130- pt has no complaints. 1140- pt called out stating he was chilled and pt observed to be shaking. Dr Jenny Washington at chairside to assess pt. Per Dr Jenny Washington pt to be given 100mg of solu-cortef. Vitals 188/98, SATS 93% temp 97.8 heart rate 130. Per Dr Jenny Washintgon observe pt and then re-challenge pt with infusion. 1145- vitals 124/81 SATS 94% heart rate     1144. Pt states he is SOB pt on 3L. Pt is shaking and chilled. Heart rate 145    1155- pt still with chills and shaking pt SOB. SATS of 93% on 3L. /77 heart rate 146.     1200- pt states feeling better. 1230- Gazyva infusion restarted at 13ml/hr. Pt is resting comfortably in chair pt has no complaints. 1300- pt has  no complaints. Resting in chair infusion increased to 26ml/hr    Pt tolerated infusion after it was restarted with no complaints. Left via wheelchair with son.  Discharge instructions given

## 2023-01-10 ENCOUNTER — HOSPITAL ENCOUNTER (OUTPATIENT)
Dept: INFUSION THERAPY | Age: 73
Discharge: HOME | End: 2023-01-10
Payer: COMMERCIAL

## 2023-01-10 VITALS
OXYGEN SATURATION: 94 % | HEIGHT: 72 IN | DIASTOLIC BLOOD PRESSURE: 66 MMHG | BODY MASS INDEX: 22.38 KG/M2 | SYSTOLIC BLOOD PRESSURE: 118 MMHG | TEMPERATURE: 96.9 F | HEART RATE: 98 BPM

## 2023-01-10 DIAGNOSIS — C91.10 CLL (CHRONIC LYMPHOCYTIC LEUKEMIA) (HCC): Primary | ICD-10-CM

## 2023-01-10 PROCEDURE — 96415 CHEMO IV INFUSION ADDL HR: CPT

## 2023-01-10 PROCEDURE — 96413 CHEMO IV INFUSION 1 HR: CPT

## 2023-01-10 PROCEDURE — 96375 TX/PRO/DX INJ NEW DRUG ADDON: CPT

## 2023-01-10 PROCEDURE — 2580000003 HC RX 258: Performed by: INTERNAL MEDICINE

## 2023-01-10 PROCEDURE — 96367 TX/PROPH/DG ADDL SEQ IV INF: CPT

## 2023-01-10 PROCEDURE — 6370000000 HC RX 637 (ALT 250 FOR IP): Performed by: INTERNAL MEDICINE

## 2023-01-10 PROCEDURE — 6360000002 HC RX W HCPCS: Performed by: INTERNAL MEDICINE

## 2023-01-10 RX ORDER — HEPARIN SODIUM (PORCINE) LOCK FLUSH IV SOLN 100 UNIT/ML 100 UNIT/ML
500 SOLUTION INTRAVENOUS PRN
Status: DISCONTINUED | OUTPATIENT
Start: 2023-01-10 | End: 2023-01-11 | Stop reason: HOSPADM

## 2023-01-10 RX ORDER — SODIUM CHLORIDE 9 MG/ML
5-250 INJECTION, SOLUTION INTRAVENOUS PRN
Status: DISCONTINUED | OUTPATIENT
Start: 2023-01-10 | End: 2023-01-11 | Stop reason: HOSPADM

## 2023-01-10 RX ORDER — ACETAMINOPHEN 325 MG/1
650 TABLET ORAL ONCE
Status: COMPLETED | OUTPATIENT
Start: 2023-01-10 | End: 2023-01-10

## 2023-01-10 RX ORDER — DIPHENHYDRAMINE HYDROCHLORIDE 50 MG/ML
50 INJECTION INTRAMUSCULAR; INTRAVENOUS ONCE
Status: COMPLETED | OUTPATIENT
Start: 2023-01-10 | End: 2023-01-10

## 2023-01-10 RX ORDER — SODIUM CHLORIDE 9 MG/ML
5-40 INJECTION INTRAVENOUS PRN
Status: DISCONTINUED | OUTPATIENT
Start: 2023-01-10 | End: 2023-01-11 | Stop reason: HOSPADM

## 2023-01-10 RX ADMIN — SODIUM CHLORIDE 20 ML/HR: 9 INJECTION, SOLUTION INTRAVENOUS at 08:33

## 2023-01-10 RX ADMIN — DEXAMETHASONE SODIUM PHOSPHATE 20 MG: 10 INJECTION INTRAMUSCULAR; INTRAVENOUS at 08:35

## 2023-01-10 RX ADMIN — HEPARIN 500 UNITS: 100 SYRINGE at 15:05

## 2023-01-10 RX ADMIN — ACETAMINOPHEN 650 MG: 325 TABLET ORAL at 08:33

## 2023-01-10 RX ADMIN — OBINUTUZUMAB 900 MG: 1000 INJECTION, SOLUTION, CONCENTRATE INTRAVENOUS at 09:26

## 2023-01-10 RX ADMIN — DIPHENHYDRAMINE HYDROCHLORIDE 50 MG: 50 INJECTION INTRAMUSCULAR; INTRAVENOUS at 08:33

## 2023-01-10 RX ADMIN — SODIUM CHLORIDE, PRESERVATIVE FREE 10 ML: 5 INJECTION INTRAVENOUS at 15:05

## 2023-01-16 ENCOUNTER — CLINICAL DOCUMENTATION (OUTPATIENT)
Dept: ONCOLOGY | Age: 73
End: 2023-01-16

## 2023-01-16 ENCOUNTER — HOSPITAL ENCOUNTER (OUTPATIENT)
Dept: INFUSION THERAPY | Age: 73
Discharge: HOME | End: 2023-01-16
Payer: COMMERCIAL

## 2023-01-16 VITALS
RESPIRATION RATE: 18 BRPM | SYSTOLIC BLOOD PRESSURE: 136 MMHG | WEIGHT: 165 LBS | HEIGHT: 72 IN | BODY MASS INDEX: 22.35 KG/M2 | HEART RATE: 111 BPM | OXYGEN SATURATION: 90 % | DIASTOLIC BLOOD PRESSURE: 69 MMHG | TEMPERATURE: 97 F

## 2023-01-16 DIAGNOSIS — K90.9 INTESTINAL MALABSORPTION, UNSPECIFIED TYPE: Primary | ICD-10-CM

## 2023-01-16 DIAGNOSIS — D69.3 IDIOPATHIC THROMBOCYTOPENIA (HCC): Primary | ICD-10-CM

## 2023-01-16 DIAGNOSIS — C91.10 CLL (CHRONIC LYMPHOCYTIC LEUKEMIA) (HCC): Primary | ICD-10-CM

## 2023-01-16 DIAGNOSIS — C91.10 CHRONIC LYMPHOCYTIC LEUKEMIA (HCC): ICD-10-CM

## 2023-01-16 LAB
ALBUMIN SERPL-MCNC: 3.4 GM/DL (ref 3.4–5)
ALP BLD-CCNC: 152 IU/L (ref 40–129)
ALT SERPL-CCNC: 29 U/L (ref 10–40)
ANION GAP SERPL CALCULATED.3IONS-SCNC: 11 MMOL/L (ref 4–16)
AST SERPL-CCNC: 20 IU/L (ref 15–37)
BASOPHILS ABSOLUTE: 0 K/CU MM
BASOPHILS RELATIVE PERCENT: 0.2 % (ref 0–1)
BILIRUB SERPL-MCNC: 0.3 MG/DL (ref 0–1)
BUN BLDV-MCNC: 23 MG/DL (ref 6–23)
CALCIUM SERPL-MCNC: 8 MG/DL (ref 8.3–10.6)
CHLORIDE BLD-SCNC: 102 MMOL/L (ref 99–110)
CO2: 23 MMOL/L (ref 21–32)
CREAT SERPL-MCNC: 0.9 MG/DL (ref 0.9–1.3)
DIFFERENTIAL TYPE: ABNORMAL
EGFR, POC: >60 ML/MIN/1.73M2
EOSINOPHILS ABSOLUTE: 0.1 K/CU MM
EOSINOPHILS RELATIVE PERCENT: 1.3 % (ref 0–3)
GFR SERPL CREATININE-BSD FRML MDRD: >60 ML/MIN/1.73M2
GLUCOSE BLD-MCNC: 179 MG/DL (ref 70–99)
GLUCOSE BLD-MCNC: 199 MG/DL (ref 70–99)
HCT VFR BLD CALC: 25.1 % (ref 42–52)
HEMOGLOBIN: 7.9 GM/DL (ref 13.5–18)
LYMPHOCYTES ABSOLUTE: 3.7 K/CU MM
LYMPHOCYTES RELATIVE PERCENT: 70.7 % (ref 24–44)
MCH RBC QN AUTO: 31.6 PG (ref 27–31)
MCHC RBC AUTO-ENTMCNC: 31.5 % (ref 32–36)
MCV RBC AUTO: 100.4 FL (ref 78–100)
MONOCYTES ABSOLUTE: 0.5 K/CU MM
MONOCYTES RELATIVE PERCENT: 9.4 % (ref 0–4)
PDW BLD-RTO: 18.2 % (ref 11.7–14.9)
PHOSPHORUS: 3.1 MG/DL (ref 2.5–4.9)
PLATELET # BLD: 39 K/CU MM (ref 140–440)
PMV BLD AUTO: 10.1 FL (ref 7.5–11.1)
POC BUN: 22 MG/DL (ref 8–26)
POC CALCIUM: 1.15 MMOL/L (ref 1.12–1.32)
POC CHLORIDE: 102 MMOL/L (ref 98–109)
POC CO2: 21 MMOL/L (ref 21–32)
POC CREATININE: 1.1 MG/DL (ref 0.9–1.3)
POTASSIUM SERPL-SCNC: 4.5 MMOL/L (ref 3.5–4.5)
POTASSIUM SERPL-SCNC: 4.7 MMOL/L (ref 3.5–5.1)
RBC # BLD: 2.5 M/CU MM (ref 4.6–6.2)
SEGMENTED NEUTROPHILS ABSOLUTE COUNT: 1 K/CU MM
SEGMENTED NEUTROPHILS RELATIVE PERCENT: 18.4 % (ref 36–66)
SODIUM BLD-SCNC: 134 MMOL/L (ref 138–146)
SODIUM BLD-SCNC: 136 MMOL/L (ref 135–145)
SOURCE, BLOOD GAS: ABNORMAL
TOTAL PROTEIN: 5 GM/DL (ref 6.4–8.2)
URIC ACID: 5.4 MG/DL (ref 3.5–7.2)
WBC # BLD: 5.2 K/CU MM (ref 4–10.5)

## 2023-01-16 PROCEDURE — 96375 TX/PRO/DX INJ NEW DRUG ADDON: CPT

## 2023-01-16 PROCEDURE — 85025 COMPLETE CBC W/AUTO DIFF WBC: CPT

## 2023-01-16 PROCEDURE — 2580000003 HC RX 258: Performed by: INTERNAL MEDICINE

## 2023-01-16 PROCEDURE — 84550 ASSAY OF BLOOD/URIC ACID: CPT

## 2023-01-16 PROCEDURE — 6370000000 HC RX 637 (ALT 250 FOR IP): Performed by: INTERNAL MEDICINE

## 2023-01-16 PROCEDURE — 84100 ASSAY OF PHOSPHORUS: CPT

## 2023-01-16 PROCEDURE — 80053 COMPREHEN METABOLIC PANEL: CPT

## 2023-01-16 PROCEDURE — 96367 TX/PROPH/DG ADDL SEQ IV INF: CPT

## 2023-01-16 PROCEDURE — 96415 CHEMO IV INFUSION ADDL HR: CPT

## 2023-01-16 PROCEDURE — 96413 CHEMO IV INFUSION 1 HR: CPT

## 2023-01-16 PROCEDURE — 36591 DRAW BLOOD OFF VENOUS DEVICE: CPT

## 2023-01-16 PROCEDURE — 6360000002 HC RX W HCPCS: Performed by: INTERNAL MEDICINE

## 2023-01-16 RX ORDER — 0.9 % SODIUM CHLORIDE 0.9 %
500 INTRAVENOUS SOLUTION INTRAVENOUS ONCE
Status: COMPLETED | OUTPATIENT
Start: 2023-01-16 | End: 2023-01-16

## 2023-01-16 RX ORDER — DIPHENHYDRAMINE HYDROCHLORIDE 50 MG/ML
50 INJECTION INTRAMUSCULAR; INTRAVENOUS ONCE
Status: COMPLETED | OUTPATIENT
Start: 2023-01-16 | End: 2023-01-16

## 2023-01-16 RX ORDER — SODIUM CHLORIDE 9 MG/ML
5-40 INJECTION INTRAVENOUS PRN
Status: DISCONTINUED | OUTPATIENT
Start: 2023-01-16 | End: 2023-01-17 | Stop reason: HOSPADM

## 2023-01-16 RX ORDER — SODIUM CHLORIDE 9 MG/ML
5-250 INJECTION, SOLUTION INTRAVENOUS PRN
Status: DISCONTINUED | OUTPATIENT
Start: 2023-01-16 | End: 2023-01-17 | Stop reason: HOSPADM

## 2023-01-16 RX ORDER — HEPARIN SODIUM (PORCINE) LOCK FLUSH IV SOLN 100 UNIT/ML 100 UNIT/ML
500 SOLUTION INTRAVENOUS PRN
Status: DISCONTINUED | OUTPATIENT
Start: 2023-01-16 | End: 2023-01-17 | Stop reason: HOSPADM

## 2023-01-16 RX ORDER — ACETAMINOPHEN 325 MG/1
650 TABLET ORAL ONCE
Status: COMPLETED | OUTPATIENT
Start: 2023-01-16 | End: 2023-01-16

## 2023-01-16 RX ADMIN — DEXAMETHASONE SODIUM PHOSPHATE 20 MG: 4 INJECTION INTRA-ARTICULAR; INTRALESIONAL; INTRAMUSCULAR; INTRAVENOUS; SOFT TISSUE at 09:17

## 2023-01-16 RX ADMIN — DIPHENHYDRAMINE HYDROCHLORIDE 50 MG: 50 INJECTION INTRAMUSCULAR; INTRAVENOUS at 09:18

## 2023-01-16 RX ADMIN — SODIUM CHLORIDE 500 ML: 9 INJECTION, SOLUTION INTRAVENOUS at 09:17

## 2023-01-16 RX ADMIN — SODIUM CHLORIDE, PRESERVATIVE FREE 10 ML: 5 INJECTION INTRAVENOUS at 15:00

## 2023-01-16 RX ADMIN — ACETAMINOPHEN 650 MG: 325 TABLET ORAL at 09:18

## 2023-01-16 RX ADMIN — OBINUTUZUMAB 1000 MG: 1000 INJECTION, SOLUTION, CONCENTRATE INTRAVENOUS at 09:43

## 2023-01-16 RX ADMIN — HEPARIN 500 UNITS: 100 SYRINGE at 15:00

## 2023-01-16 ASSESSMENT — PAIN DESCRIPTION - LOCATION: LOCATION: SHOULDER

## 2023-01-16 ASSESSMENT — PAIN SCALES - GENERAL
PAINLEVEL_OUTOF10: 0
PAINLEVEL_OUTOF10: 6

## 2023-01-16 NOTE — PROGRESS NOTES
Patient arrived to treatment suite for blood draw, pre-medications and chemotherapy infusion. Right chest mediport accessed and blood drawn from site and sent to lab for processing. Patient states no questions or concerns for the doctor at this time. WBC 5.2, Platelets 39, ANC 1.0, Cr 1.1. Dr. Vineet Medina approved treatment and wants 500ml of normal saline to infuse today with treatment. She also wanted to know if patient was taking Allopurinol (he is) and not yet taking Venclexta until day 22 (states they do not have that medication yet), and is on Nplate [employer] denied - need to appeal). Treatment approved and given. Titration method starting at 12.5ml/hr and increasing by 12/5ml/hr used. Patient tolerated well. Left treatment suite with assistance in wheelchair. Discharge instructions provided. Patient's status assessed and documented appropriately. All labs and required results were also reviewed today. Treatment parameters have been reviewed. Today's treatment has been approved by the provider. Treatment orders and medication sequencing (when applicable) was verified by 2 registered nurses. The treatment plan was confirmed with the patient prior to administration, and the patient understands the need to report any treatment-related symptoms. Prior to administration, when applicable, the following 8 elements of medication administration were reviewed with 2nd Registered Nurse prior to dosing: drug name, drug dose, infusion volume when prepared in a syringe, rate of administration, expiration dates and/or times, appearance and integrity of drug(s), and rate of pump for infusion. The 5 rights of medication administration have been verified.

## 2023-01-16 NOTE — PROGRESS NOTES
[facility] [place] [place] [facility]. Advised that typically, dose is increased weekly. Confirmed with physician and recommending to keep patient at low dose for now. Called pharmacy back @ [phone] opt 2 and spoke with [facility] to clarify order. Voices understanding and will annotate the RX noting to continue venetoclax at 20 mg PO daily for now. Patient has been instructed to take venetoclax by mouth D22-28 of C1, then daily of C2.

## 2023-01-17 ENCOUNTER — OFFICE VISIT (OUTPATIENT)
Dept: ONCOLOGY | Age: 73
End: 2023-01-17
Payer: COMMERCIAL

## 2023-01-17 ENCOUNTER — HOSPITAL ENCOUNTER (OUTPATIENT)
Dept: INFUSION THERAPY | Age: 73
Discharge: HOME | End: 2023-01-17
Payer: COMMERCIAL

## 2023-01-17 VITALS
HEART RATE: 100 BPM | RESPIRATION RATE: 18 BRPM | BODY MASS INDEX: 22.35 KG/M2 | TEMPERATURE: 97.5 F | OXYGEN SATURATION: 98 % | HEIGHT: 72 IN | DIASTOLIC BLOOD PRESSURE: 89 MMHG | SYSTOLIC BLOOD PRESSURE: 122 MMHG | WEIGHT: 165 LBS

## 2023-01-17 DIAGNOSIS — C91.10 CHRONIC LYMPHOCYTIC LEUKEMIA (HCC): Primary | ICD-10-CM

## 2023-01-17 DIAGNOSIS — D69.6 THROMBOCYTOPENIA (HCC): ICD-10-CM

## 2023-01-17 DIAGNOSIS — C91.10 CLL (CHRONIC LYMPHOCYTIC LEUKEMIA) (HCC): ICD-10-CM

## 2023-01-17 DIAGNOSIS — E53.8 B12 DEFICIENCY: ICD-10-CM

## 2023-01-17 PROCEDURE — 3017F COLORECTAL CA SCREEN DOC REV: CPT | Performed by: INTERNAL MEDICINE

## 2023-01-17 PROCEDURE — G8420 CALC BMI NORM PARAMETERS: HCPCS | Performed by: INTERNAL MEDICINE

## 2023-01-17 PROCEDURE — 1036F TOBACCO NON-USER: CPT | Performed by: INTERNAL MEDICINE

## 2023-01-17 PROCEDURE — G8428 CUR MEDS NOT DOCUMENT: HCPCS | Performed by: INTERNAL MEDICINE

## 2023-01-17 PROCEDURE — 99211 OFF/OP EST MAY X REQ PHY/QHP: CPT

## 2023-01-17 PROCEDURE — 1123F ACP DISCUSS/DSCN MKR DOCD: CPT | Performed by: INTERNAL MEDICINE

## 2023-01-17 PROCEDURE — 99214 OFFICE O/P EST MOD 30 MIN: CPT | Performed by: INTERNAL MEDICINE

## 2023-01-17 PROCEDURE — G8484 FLU IMMUNIZE NO ADMIN: HCPCS | Performed by: INTERNAL MEDICINE

## 2023-01-17 RX ORDER — GABAPENTIN 100 MG/1
100 CAPSULE ORAL 3 TIMES DAILY
Qty: 90 CAPSULE | Refills: 0 | Status: SHIPPED | OUTPATIENT
Start: 2023-01-17 | End: 2023-02-16

## 2023-01-17 RX ORDER — AZITHROMYCIN 250 MG/1
TABLET, FILM COATED ORAL
COMMUNITY
Start: 2023-01-16

## 2023-01-17 RX ORDER — HYDROCODONE BITARTRATE AND ACETAMINOPHEN 10; 325 MG/1; MG/1
1 TABLET ORAL EVERY 12 HOURS PRN
Qty: 60 TABLET | Refills: 0 | Status: SHIPPED | OUTPATIENT
Start: 2023-01-17 | End: 2023-02-16

## 2023-01-17 NOTE — PROGRESS NOTES
[place] Rooming Questions  Patient: Shelley Hernandez  MRN: [id number]    Date: 1/17/2023        1. Do you have any new issues?   no         2. Do you need any refills on medications?    no    3. Have you had any imaging done since your last visit?   no    4. Have you been hospitalized or seen in the emergency room since your last visit here?   no    5. Did the patient have a depression screening completed today?  No    No data recorded     PHQ-9 Given to (if applicable):               PHQ-9 Score (if applicable):                     [] Positive     []  Negative              Does question #9 need addressed (if applicable)                     [] Yes    []  No               Lotus Duron CMA

## 2023-01-17 NOTE — PROGRESS NOTES
Patient Name: Chucky Truong  Patient : 1950  Patient MRN: [id number]     Primary Oncologist: Guille Pinedo MD  Referring Physician: Emmanuel Kiser MD       Date of Service: 2023      Chief Complaint:   Chief Complaint   Patient presents with    Follow-up        Active Problem list  1. Chronic lymphocytic leukemia ([facility] [address]    2. Thrombocytopenia ([address]    3. B12 deficiency    4. CLL (chronic lymphocytic leukemia) (HCC)             HPI:        Mr. Rashard Stringer ( 52) was diagnosed with stage I CLL in , when he presented with infection and lymphocytosis. His peripheral blood immunophenotyping was characteristic of B-cell CLL. It was CD38 negative, ZAP-70 positive, CD19 and 20 positive, CD5 positive. Karyotyping was normal in 2006.,  Because of his infection and associated acquired immune deficiency(hypogammaglobulinemia), he was given IVIG for a year. He was started back on IVIG therapy monthly since 2013 to prevent future major infections continuing for now. Also had minor Infusional reaction to IVIG in 2016 responded to Steroids & Benadryl., No further problems after that. CLL was treated only with Leukeran intermittently from 2007 until 2011 and again from 2011 until 2012. 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 Flow studies still showed the same and FISH in 2012 showed deletion of tp53 (17p) and MYB (6q). ,  He was thus treated with Fludara and Rituxan for six months between April and 2012 with excellent clinical and hematological response.  . In 2013, he developed Multiple b/l nodes in axilla and groin area, CAT scan on 13, Bulky lymphadenopathy within the chest, abdomen, and pelvis mildly increased as compared from previous CAT scan in March 2012.,  Starting in Jan 2014 he was initiated on 2nd line chemo with Bendamustine and Rituxan with good initial response. Continued till Nov 2014, CAT scan done on 8/14/14 showed an interval decrease in generalized adenopathy compared to previous study In December 2013, suggesting good response to therapy. ,  CAT scan done on 12/23/14 showed Minimal interval increase in size of at least two periportal and retrocaval lymph nodes. Otherwise stable thoracic, abdominal and pelvic adenopathy. Also mild interval increase in splenomegaly measuring 19 cm, previously 17 cm. CAT scan done on 6/25/15 showed mild interval decrease in the splenomegaly and also in retroperitoneal mesenteric lymph nodes. Started Idelalisib [Zydelig] on 20th Jan 2015 at 150mg po bid. with good initial response. He did remarkably well with Zydelig from January of 2015 until June of 2016, after he was hospitalized for Rhinovirus Pneumonia in May of 2016. Also discussed Living Will, Power of [facility], DNR status, et cetera. In April 2016, chest CT showed stable  , 13. In July 2016, His FISH testing on peripheral blood showed abnormal results, with 6q deletion, 17p deletion, and 11q centromere signal in 3 percent of cells. The 17p deletion (Tp53) in 80 percent of cells is associated with unfavorable prognosis. Because of his CLL with poor prognostic markers, including 17p deletion detected in 2012 & again in July of 2016. He was started on ibrutinib in September 2016 140mg/d increased to 280 mg/ after 4 weeks. The abdominal CT 7/5/16 is showing no acute abnormalities, stable splenomegaly, and one periportal lymph node measuring 2.2 by 3.7 cm which is unchanged from before. CAT scan of the chest August 5, 2016, showed axillary, mediastinal, hilar, and upper abdominal lymphadenopathy consistent with his CLL. The maximum size of the nodes is 2.4 cm.  A 1 cm infiltrate in the lateral segment of the right middle lobe possibly infectious in nature, segmental calcification of left coronary artery. CAT scan of the chest, abdomen, and pelvis December 7,2016 2016, which revealed a mild mediastinal right hilar adenopathy, decreased in size from the previous examination of April and August of 2016, .5. His ibrutinib was stopped in December 2016 when he was feeling bad and it was started back at one a day, 140 mg daily instead of 280 mg that he was taking prior to that  In January 2017, he developed progressive lump in his left inguinal area. That being the only area of progressive adenopathy with multiple nodes coalesced together, a question of Pimentels transformation versus more aggressive histology conversion was considered as a possibility. Dr. Wray Schirmer, did a biopsy of the lymph node on January 18, 2017. The final pathology was reported as B-cell small lymphocytic lymphoma (B-cell CLL/SLL), with no evidence of any large cell OR Pimentels transformation. There was some evidence of localized herpetic simplex lymphadenitis. Because of possibility of localized infection with herpes. I started him on Valtrex 500 t.i.d. for two to three weeks then tapered to 1/d as maintenance therapy. Lymphadenopathy in left groin almost completely resolved. His ibrutinib was stopped in December 2016 when he was feeling bad and it was started back at one a day, 140 mg daily as maintenance dose instead of 280 mg that he was taking prior to that. His quantitative immunoglobulin on February 17, 2017, IgG 600, IgA less than 10, IgM less than 4. Serum protein electrophoresis was within the normal range. Gammaglobulin was continued monthly since it has helped any serious infections under control. August 2017 he had a IgG that was 503  October 2017 started a ibrutinib he was off for 2 months due to his insurance issues  5-18 CT chest: Showed some mild decrease in mediastinal and hilar lymphadenopathy. . Regards to his abdomen also there is decrease in his lymphadenopathy. Although there is a left inguinal node that has enlarged. Section that no obvious source of his abdominal cramping. 10/10/2018 EGD showed severe ulcerative esophagitis with slight narrowing in the GE junction small hiatal hernia mild superficial gastritis, Colonoscope revealed 2 mm polyp in the sigmoid colon, diverticulosis, hemorrhoids, moderate stool  1/2019; Colonoscopy with two diminutive polyps, diverticulosis and hemorrhoids, path with TA above IC valve and HP distal sigmoid  10-19 iron def based on labs refered to GI , stool occult negative x #3 , NO PICA   11-19 saw Dr. Stephan Ravi, [facility]  11/7/2019: Ct chest:Ground-glass nodule seen within the right upper lobe the largest measuring 17  mm in diameter. 12-6-19 capsule endoscopy, results unavailable   2/2020: CT chest:IMPRESSION:  Previously noted ground-glass opacities in the right lung have resolved. However, there is a new cluster of tiny pulmonary nodules in the left lower  lobe which could represent an infectious or inflammatory etiology. Short-term follow-up chest CT is recommended in 3-6 months. Multiple additional tiny pulmonary nodules are overall stable. Stable mediastinal lymph nodes without new lymphadenopathy. Previous Therapies    May 2020: Ct chest:  1. Interval resolution of the previously described cluster of pulmonary    nodules in the left lower lobe. Findings are most compatible with resolved    infectious/inflammatory etiology. 2. No acute cardiopulmonary disease. 3. COPD. 4. Stable subcentimeter mediastinal lymph nodes. 8/28/20: US RP normal    August 2020 cystoscopy revealed enlarged prostate. Was Recommended TURBT    3/24/22: CXR:  Bilateral interstitial opacity. Nodular consolidation at the left lung base. Pneumonia is favored over metastatic/lymphangitic disease. Consider atypical   etiologies.    Was treated with IV abx    But was given abx again and completed 4/21/22.    5/4/22: CT chest:  1. Patchy bilateral airspace opacities, most prominent in the left lower lobe   concerning for multifocal pneumonia. Follow-up examination in 4-6 weeks is   recommended to assess for resolution. 2. Interval increase in intrathoracic and upper abdominal lymphadenopathy   consistent with patient's history of CLL. 3. Splenomegaly. 5/28/22: he was seen by Dr Juliane Biggs who ordered bactrim     June 8 2022 he was seen by him again and was prescribed 2 week course of cipro    July 28 2022 CT chest:  1. Overall improvement in multifocal pneumonia. However there has been   interval development of multifocal tree-in-bud opacities which either reflect   residual pneumonia versus new infectious bronchiolitis. 2. There are scattered new solid nodular opacities as well the largest   measuring up to 8 mm also likely infectious or inflammatory in etiology. Continued follow-up in 3 months is recommended to ensure resolution and/or   stability. 3. Slight interval increase in size of a left hilar lymph node as well as a   periportal and gastrohepatic lymph nodes. Attention on follow-up exams is   recommended. Otherwise stable to slight interval decrease in size of   mediastinal and hilar adenopathy. 4. Probable splenomegaly. 9/13/2022 CBC with WBC of 11.5 hemoglobin of 11.4 hematocrit of 37 MCV of 82 and platelets of 39JCL with creatinine 1.7 albumin 3.9  ferritin of 25 iron sats of 10% B12 1217 IgG 559    He was admitted on 9/28/2022 with severe sepsis in the setting of rhinovirus infection with superimposed bacterial pneumonia. Completed 8-day course of Zosyn. Also acute hypoxic respiratory failure in the setting of bowel and component of CHF as well. Was evaluated by cardiology as well. Has been holding ibrutinib.    9/28/2022 CT scan of the head:  Chronic microvascular disease.   Negative acute bleed, midline shift or mass effect. Findings worrisome for acute on chronic maxillary sinus disease. Please   correlate exam findings. CTA of the chest, CT scan of the abdomen pelvis:  No evidence of pulmonary embolism. Right greater than left lower lobe pneumonia. Diffuse hilar and mediastinal lymphadenopathy, concerning for malignancy with   history of CLL, also likely progressed from prior exam.       Diffuse peritoneal and retroperitoneal lymphadenopathy in the upper abdomen   most significant near the cassi hepatis and IVC where there is a large mass   which is favored to be a lymph node measuring up to 6.2 x 3.5 cm, also likely   progressed from exam.       Splenomegaly, could also be related to malignancy. Mild gallbladder wall thickening and pericholecystic fluid, could be related   to passive congestion or mass effect from adjacent lymphadenopathy. There is   mild intrahepatic bile duct dilatation. 9/29/2022 ultrasound of the abdomen:  1. Hepatomegaly and hepatic steatosis. 2. Masses most compatible with lymphadenopathy in the cassi hepatis, better   seen on recent CT. 3. Hyperechoic focus in the liver could be focal fatty infiltration,   hemangioma or other mass. This is too small to characterize on recent CT. Correlate with MRI hepatic mass protocol if indicated. 4. Gallbladder wall thickening with possible adenomyomatosis. HIDA scan with no convincing evidence of acute cholecystitis. 10/5/2022 underwent right thoracentesis, cytology negative for carcinoma. Many small lymphoid cells were seen    Postthoracentesis chest x-ray with slight interval decrease in size of the right pleural effusion. No pneumothorax. 10/23/22; Ct chest:  The infiltrates seen previously in the lower lobes bilaterally for the most   part have decreased in size and conspicuity.        However, there are now innumerable punctate tree-in-bud centrilobular micro   nodules, which are nonspecific but suggest inflammatory/infectious   bronchiolitis. Consider both typical and atypical etiologies. In addition, cavitary lesion has developed in the periphery of the right   lower lobe and to a lesser extent within the left upper lung. Necrotizing   infection would be primarily considered given the rapid development. Consider both typical and atypical etiologies. Enlarged mediastinal lymph nodes, similar when compared to the previous exam,   process of Lia related to history of chronic lymphocytic leukemia. 11/23/22: was admitted with Increased sob  CTA chest:   1. Negative for pulmonary embolus. 2. Multiple large mediastinal lymph nodes. These appear larger than   previously seen. 3. Extensive bilateral ground-glass opacities and tiny lung nodules. Possibility of acute respiratory distress syndrome   4. Probable atypical pneumonia. Aspergillosis could be in the differential.   This is similar in appearance to the previous study. 5. Splenomegaly without change     12/12/22; CT chest results:  1. Overall stable to mild interval decrease in intrathoracic lymphadenopathy. 2.  Interval decrease in previously noted bilateral ground-glass airspace   opacities and tree-in-bud nodularity consistent with improving infectious or   inflammatory process. Interval decrease in wall thickness of right lower   lobe cavitary lesion since prior examination. Follow-up to resolution is   recommended. 3.  Marked splenomegaly measuring up to 18 cm.     12/29/22: BMB:  Final Pathologic Diagnosis:   A-B. Bone marrow, biopsy and aspirate smears:   -     EXTENSIVE INVOLVEMENT BY CHRONIC LYMPHOCYTIC LEUKEMIA   (~90%). Peripheral blood smear:   -     CHRONIC LYMPHOCYTIC LEUKEMIA. -     Macrocytic anemia. -     Thrombocytopenia. Flow cytometry: CD5+ monoclonal B-cell population,   consistent with CLL/SLL.      Karyotype:   45,XY,del(6)(q13q25),liborio(17;18)(q10;q10)[15]/46,XY[5]     FISH: 6q-: Del 4E55-X78.1 DETECTED   Chromosome 12: No abnormalities detected   Chromosome 13: No abnormalities detected   Chromosome 11: No abnormalities detected   Chromosome 17: TP53 deletion DETECTED   CCND1/IgH t(11;14): No abnormalities detected     C1D1 Obinutuzumab started    PAST MEDICAL HISTORY:   1. Stage I CLL with conversion from good to poor prognostic factors. ,  2. History of hypertension for many years. ,  3. 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 Arthritis in the past. ,  5. Frozen shoulder for which he had treatment including injection by Dr. Hans Gitelman. ,  6. Cellulitis with Zoster type lesion Left thigh resolved with Bactrim and Valtrex in 2016.,  7. abdominal illness, with fever, nausea, and discomfort, suggestive of infectious etiology in May 2016. ,  8. hospitalization between  and  for what seems like atypical rhinovirus pneumonia involving right middle and lower lobe and left lower lobe with sepsis,  9. 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 Dr. Dannis Goldberg is referring him for MOHS surgery. ,  10. hospitalized from  to 2017, for hyperosmolar hyperglycemia with hyponatremia and hyperkalemia and UTI. He was seen by Dr. Mary Anne Hernandez, who put him on insulin. He was also seen by Dr. Karlo Matos. He felt much better after his sugar got under better control and electrolyte imbalanced reversed,  11. Ultrasound Doppler 2017, was negative for any DVT in either leg. Chest x-ray showed no acute process. PAST SURGICAL HISTORY:   1. knee operation,  2. tonsillectomy,  3. broken ankle times two requiring open reduction. ,  4. Vision better after cataract surgery    FAMILY HISTORY:   His paternal aunt had colon cancer. Uncle also had some form of cancer. Father had heart disease.  Sister  12 of Liver disease     SOCIAL HISTORY:   He has a 40-pack-year history of smoking, quit in 1986. He denies alcohol use. He is not . Has one son. Interval History  1/17/2023: Arrived with his son to the clinic today. Reported that he started taking venetoclax already, did not really knoe that he was not supposed to start until day 21. Reported that he is feeling a little better. On oxygen. Coughing spells are better as well. White sputum production. No fever. Energy levels are better. Appetite is good and has gained some weight. Has been using inhaler.      Review of Systems   Per interval history; otherwise 10 point ROS is negative              Vital Signs:  /89 (Site: Left Upper Arm, Position: Sitting, Cuff Size: Medium Adult)   Pulse 100   Temp 97.5 °F (36.4 °C) (Temporal)   Resp 18   Ht 6' (1.829 m)   Wt 165 lb (74.8 kg)   SpO2 98%   BMI 22.38 kg/m²       Physical Exam:  CONSTITUTIONAL: alert and awake, tired appearing, arrived on wheelchair and was on supplemental oxygen  EYES: No palor or any icetrus   ENT: ATNC   NECK: No JVD   HEMATOLOGIC/LYMPHATIC: no cervical, supraclavicular or axillary lymphadenopathy   LUNGS: poor effort but ctab  CARDIOVASCULAR:s1s2 SM+ tachycardia  ABDOMEN:soft nd bs pos, mild ttp  NEUROLOGIC: GI   SKIN: skin tags   EXTREMITIES: no LE edema bilaterally      Labs:    Hematology:  Lab Results   Component Value Date    WBC 5.2 01/16/2023    RBC 2.50 (L) 01/16/2023    HGB 7.9 (L) 01/16/2023    HCT 25.1 (L) 01/16/2023    .4 (H) 01/16/2023    MCH 31.6 (H) 01/16/2023    MCHC 31.5 (L) 01/16/2023    RDW 18.2 (H) 01/16/2023    PLT 39 (L) 01/16/2023    MPV 10.1 01/16/2023    BANDSPCT 3 (L) 01/06/2023    SEGSPCT 18.4 (L) 01/16/2023    EOSRELPCT 1.3 01/16/2023    BASOPCT 0.2 01/16/2023    LYMPHOPCT 70.7 (H) 01/16/2023    MONOPCT 9.4 (H) 01/16/2023    BANDABS 2.41 01/06/2023    SEGSABS 1.0 01/16/2023    EOSABS 0.1 01/16/2023    BASOSABS 0.0 01/16/2023    LYMPHSABS 3.7 01/16/2023    MONOSABS 0.5 01/16/2023 DIFFTYPE AUTOMATED DIFFERENTIAL 01/16/2023    ANISOCYTOSIS 1+ 01/06/2023    POLYCHROM 1+ 12/13/2022    WBCMORP  12/29/2022     LAGRE POPULATION OF LYMPHOCYTES WITH DENSE, CLUMPED CHROMATIN AND VISIBLE NUCLEOLI. PLTM SEVERAL LARGE PLATELETS 75/00/4096     Lab Results   Component Value Date    ESR 2 12/01/2022       Chemistry:  Lab Results   Component Value Date     (L) 01/16/2023    K 4.5 01/16/2023     01/16/2023    CO2 23 01/16/2023    BUN 23 01/16/2023    CREATININE 1.1 01/16/2023    GLUCOSE 199 (H) 01/16/2023    CALCIUM 8.0 (L) 01/16/2023    PROT 5.0 (L) 01/16/2023    LABALBU 3.4 01/16/2023    BILITOT 0.3 01/16/2023    ALKPHOS 152 (H) 01/16/2023    AST 20 01/16/2023    ALT 29 01/16/2023    LABGLOM >60 01/16/2023    GFRAA >60 10/11/2022    PHOS 3.1 01/16/2023    MG 2.1 11/25/2022    POCCA 1.15 01/16/2023    POCGLU 179 (H) 01/16/2023     Lab Results   Component Value Date     (H) 10/11/2022     No components found for: LD  Lab Results   Component Value Date    TSHHS 3.040 04/04/2019    T4FREE 1.37 04/04/2019    FT3 3.2 03/27/2012       Immunology:  Lab Results   Component Value Date    PROT 5.0 (L) 01/16/2023    SPEP  12/13/2022     INTERPRETATION - The SPEP shows a monoclonal like spike in the gamma region, measuring approximately 200 mg/dL. The concurrent CHRISTINA Shows a faint possible free lambda proteins. Decreased total proteins. Suggest follow up. LP.    SPEP  12/13/2022     INTERPRETATION - A faint possible monoclonal free lambda light chain is noted. Suggest follow up.   LP.    ALBUMINELP 3.2 12/13/2022    LABALPH 0.3 12/13/2022    LABALPH 0.7 12/13/2022    LABBETA 0.8 12/13/2022    GAMGLOB 0.5 12/13/2022     No results found for: Chito Stapler, KLFLCR  No results found for: B2M    Coagulation Panel:  Lab Results   Component Value Date    PROTIME 13.3 12/13/2022    INR 1.03 12/13/2022    APTT 27.7 12/13/2022    DDIMER >5250 (H) 11/23/2022       Anemia Panel:  Lab Results Component Value Date    TSIYVEFT62 2837 (H) 12/13/2022    FOLATE 4.4 12/13/2022       Tumor Markers:  Lab Results   Component Value Date    PSA 1.9 10/27/2020         Imaging: Reviewed     Pathology:Reviewed     Observations:  Performance Status: ECOG 1  Depression Status: No data recorded          Assessment & Plan:  B-cell CLL, stage I with conversion from good to poor prognostic factors, with 17p deletion:   His diagnosis of CLL since 2007.   17p deletion since 2015. Initial treatment with Leukeran from 2000 to 2011 intermittently and FCR regimen in 2012, bendamustine/Rituxan in 2014. Idelalisib from January 2015 until June of 2016. On ibrutinib since September 2016. Ibrutinib therapy seems to be helping him, overall improvement. Was Only able to tolerate 140 mg p.o. every other day  He was off for a couple months During the fall 2017  Resumed Ibrutinib and  on 140 mg po daily. Plan to Continue current dose as no major B sx, stable WBC. CT imaging in July 2022 with slightly increasing size of intraabdominal LN. plan was to monitor. But note declining platelet count, probably secondary to ibrutinib versus progressive CLL versus underlying infection and antibiotics. Note CT scan of the chest abdomen pelvis also with progressive lymphadenopathy. Discussed the findings and discussed systemic treatment with single agent Rituxan(preferable secondary to his performance status) versus BR and he wanted to proceed with single agent Rituxan. Discussed adverse effects. Holding Ibrutinib. After C1 he was admitted with fever, hypotenson which I dont believe is infusion reaction. Re challenge with rituxan again with same reaction  Discussed LN biopsy but he deferred   BMB with extensive involvement with CLL. Complex cytogenetics. Discussed treatment with venetoclax and Ramila Wills and he wanted to proceed   Discussed adverse effects  OCM requested. C1D1 Obinutuzumab started 1/9/23. Tolerating failry well.  Dose adjustments as needed  Add low dose venetoclax in 2 weeks. Thrombocytopenia, no hemolysis, monoclonal gammopathy, nutritional deficiency, coagulopathy in the past.  Looks like sec to extensive BM infiltration with CLL. Monitor for now. TPO -A denied. Avoid AC, NSAIDS and monitor for any bleeding/     Rhinovirus and P aeriginosa: is on Tobra inhaler and oral azithromycin. Follows with ID    RUQ pain:? sec to lymphadenopathy. Note above, plan to continue Rituxan    Acquired hypogammaglobulinemia:   Continue IVIG monthly    BPH: is being followed by Urology    Iron def anemia and esophagitis: follows with GI, Dr Cheryl Hdez. Reported that he had colonoscopy 2018 with polyps detected. EGD was normal except for H. pylori which was treated. Was supposed to have VCE which was unsuccessful once. He is on oral iron EOD, adequate bowel regimen. Follow with GI    Lung Nodule RUL: Stable findings, will follow    Continue other medical care. Discussed above findings and plan with him and he verbalized understanding. Answered all his questions. Recommend follow-up with primary care physician and other specialist.    Please do not hesitate to contact us if you need any further information.     Return to clinic feb 2023  or earlier if new symptoms    ANUPAM

## 2023-01-23 ENCOUNTER — HOSPITAL ENCOUNTER (OUTPATIENT)
Dept: INFUSION THERAPY | Age: 73
Discharge: HOME | End: 2023-01-23
Payer: COMMERCIAL

## 2023-01-23 VITALS
WEIGHT: 166.6 LBS | OXYGEN SATURATION: 95 % | SYSTOLIC BLOOD PRESSURE: 136 MMHG | HEIGHT: 72 IN | DIASTOLIC BLOOD PRESSURE: 64 MMHG | HEART RATE: 96 BPM | BODY MASS INDEX: 22.57 KG/M2 | TEMPERATURE: 97.2 F

## 2023-01-23 DIAGNOSIS — C91.10 CLL (CHRONIC LYMPHOCYTIC LEUKEMIA) (HCC): Primary | ICD-10-CM

## 2023-01-23 LAB
ALBUMIN SERPL-MCNC: 3.5 GM/DL (ref 3.4–5)
ALP BLD-CCNC: 148 IU/L (ref 40–129)
ALT SERPL-CCNC: 23 U/L (ref 10–40)
ANION GAP SERPL CALCULATED.3IONS-SCNC: 11 MMOL/L (ref 4–16)
AST SERPL-CCNC: 15 IU/L (ref 15–37)
BASOPHILS ABSOLUTE: 0 K/CU MM
BASOPHILS RELATIVE PERCENT: 0.2 % (ref 0–1)
BILIRUB SERPL-MCNC: 0.5 MG/DL (ref 0–1)
BUN BLDV-MCNC: 21 MG/DL (ref 6–23)
CALCIUM SERPL-MCNC: 8 MG/DL (ref 8.3–10.6)
CHLORIDE BLD-SCNC: 103 MMOL/L (ref 99–110)
CO2: 22 MMOL/L (ref 21–32)
CREAT SERPL-MCNC: 1 MG/DL (ref 0.9–1.3)
DIFFERENTIAL TYPE: ABNORMAL
EOSINOPHILS ABSOLUTE: 0 K/CU MM
EOSINOPHILS RELATIVE PERCENT: 0.4 % (ref 0–3)
GFR SERPL CREATININE-BSD FRML MDRD: >60 ML/MIN/1.73M2
GLUCOSE BLD-MCNC: 175 MG/DL (ref 70–99)
HCT VFR BLD CALC: 25.2 % (ref 42–52)
HEMOGLOBIN: 7.9 GM/DL (ref 13.5–18)
LYMPHOCYTES ABSOLUTE: 4.2 K/CU MM
LYMPHOCYTES RELATIVE PERCENT: 75.9 % (ref 24–44)
MCH RBC QN AUTO: 30.6 PG (ref 27–31)
MCHC RBC AUTO-ENTMCNC: 31.3 % (ref 32–36)
MCV RBC AUTO: 97.7 FL (ref 78–100)
MONOCYTES ABSOLUTE: 0.7 K/CU MM
MONOCYTES RELATIVE PERCENT: 13.2 % (ref 0–4)
PDW BLD-RTO: 17.9 % (ref 11.7–14.9)
PHOSPHORUS: 2.9 MG/DL (ref 2.5–4.9)
PLATELET # BLD: 49 K/CU MM (ref 140–440)
PMV BLD AUTO: 11.1 FL (ref 7.5–11.1)
POTASSIUM SERPL-SCNC: 4.4 MMOL/L (ref 3.5–5.1)
RBC # BLD: 2.58 M/CU MM (ref 4.6–6.2)
SEGMENTED NEUTROPHILS ABSOLUTE COUNT: 0.6 K/CU MM
SEGMENTED NEUTROPHILS RELATIVE PERCENT: 10.3 % (ref 36–66)
SODIUM BLD-SCNC: 136 MMOL/L (ref 135–145)
TOTAL PROTEIN: 5 GM/DL (ref 6.4–8.2)
URIC ACID: 5 MG/DL (ref 3.5–7.2)
WBC # BLD: 5.5 K/CU MM (ref 4–10.5)

## 2023-01-23 PROCEDURE — 84550 ASSAY OF BLOOD/URIC ACID: CPT

## 2023-01-23 PROCEDURE — 2580000003 HC RX 258: Performed by: INTERNAL MEDICINE

## 2023-01-23 PROCEDURE — 6360000002 HC RX W HCPCS: Performed by: INTERNAL MEDICINE

## 2023-01-23 PROCEDURE — 84100 ASSAY OF PHOSPHORUS: CPT

## 2023-01-23 PROCEDURE — 6370000000 HC RX 637 (ALT 250 FOR IP): Performed by: INTERNAL MEDICINE

## 2023-01-23 PROCEDURE — 80053 COMPREHEN METABOLIC PANEL: CPT

## 2023-01-23 PROCEDURE — 85025 COMPLETE CBC W/AUTO DIFF WBC: CPT

## 2023-01-23 RX ORDER — SODIUM CHLORIDE 9 MG/ML
5-250 INJECTION, SOLUTION INTRAVENOUS PRN
Status: DISCONTINUED | OUTPATIENT
Start: 2023-01-23 | End: 2023-01-24 | Stop reason: HOSPADM

## 2023-01-23 RX ORDER — SODIUM CHLORIDE 9 MG/ML
5-250 INJECTION, SOLUTION INTRAVENOUS PRN
OUTPATIENT
Start: 2023-02-06

## 2023-01-23 RX ORDER — ACETAMINOPHEN 325 MG/1
650 TABLET ORAL ONCE
OUTPATIENT
Start: 2023-02-06 | End: 2023-01-30

## 2023-01-23 RX ORDER — HEPARIN SODIUM (PORCINE) LOCK FLUSH IV SOLN 100 UNIT/ML 100 UNIT/ML
500 SOLUTION INTRAVENOUS PRN
OUTPATIENT
Start: 2023-02-06

## 2023-01-23 RX ORDER — DIPHENHYDRAMINE HYDROCHLORIDE 50 MG/ML
50 INJECTION INTRAMUSCULAR; INTRAVENOUS ONCE
Status: COMPLETED | OUTPATIENT
Start: 2023-01-23 | End: 2023-01-23

## 2023-01-23 RX ORDER — ACETAMINOPHEN 325 MG/1
650 TABLET ORAL ONCE
Status: COMPLETED | OUTPATIENT
Start: 2023-01-23 | End: 2023-01-23

## 2023-01-23 RX ORDER — DIPHENHYDRAMINE HYDROCHLORIDE 50 MG/ML
50 INJECTION INTRAMUSCULAR; INTRAVENOUS ONCE
OUTPATIENT
Start: 2023-02-06 | End: 2023-01-30

## 2023-01-23 RX ORDER — HEPARIN SODIUM (PORCINE) LOCK FLUSH IV SOLN 100 UNIT/ML 100 UNIT/ML
500 SOLUTION INTRAVENOUS PRN
Status: DISCONTINUED | OUTPATIENT
Start: 2023-01-23 | End: 2023-01-24 | Stop reason: HOSPADM

## 2023-01-23 RX ADMIN — SODIUM CHLORIDE 20 ML/HR: 9 INJECTION, SOLUTION INTRAVENOUS at 09:05

## 2023-01-23 RX ADMIN — DIPHENHYDRAMINE HYDROCHLORIDE 50 MG: 50 INJECTION INTRAMUSCULAR; INTRAVENOUS at 09:00

## 2023-01-23 RX ADMIN — DEXAMETHASONE SODIUM PHOSPHATE 20 MG: 4 INJECTION INTRA-ARTICULAR; INTRALESIONAL; INTRAMUSCULAR; INTRAVENOUS; SOFT TISSUE at 09:06

## 2023-01-23 RX ADMIN — ACETAMINOPHEN 650 MG: 325 TABLET ORAL at 09:00

## 2023-01-23 RX ADMIN — HEPARIN 500 UNITS: 100 SYRINGE at 09:38

## 2023-01-23 ASSESSMENT — PAIN SCALES - GENERAL: PAINLEVEL_OUTOF10: 7

## 2023-01-23 ASSESSMENT — PAIN DESCRIPTION - LOCATION: LOCATION: LEG;SHOULDER;RIB CAGE

## 2023-01-23 NOTE — PROGRESS NOTES
Assisted to infusion area, here today for chemotherapy. No concerns at this time. Patient's son asking if chemo pill was due to start this week or next week, as he was unsure. Right chest mediport accessed, positive blood return noted. Labs drawn. Plt 49, Seg 0.6. Discussed with Dr. Sunny Pires, hold venetoclax until next week when he is scheduled to come back. Hold treatment today. Discussed with nurse navigator, will give son and patient a new calendar next week. Scheduled updated. RTC 01/30 at 0915.

## 2023-01-30 ENCOUNTER — HOSPITAL ENCOUNTER (OUTPATIENT)
Dept: INFUSION THERAPY | Age: 73
Discharge: HOME | End: 2023-01-30
Payer: COMMERCIAL

## 2023-01-30 VITALS
WEIGHT: 166 LBS | TEMPERATURE: 99.1 F | DIASTOLIC BLOOD PRESSURE: 68 MMHG | OXYGEN SATURATION: 91 % | RESPIRATION RATE: 18 BRPM | BODY MASS INDEX: 22.48 KG/M2 | SYSTOLIC BLOOD PRESSURE: 121 MMHG | HEIGHT: 72 IN | HEART RATE: 101 BPM

## 2023-01-30 DIAGNOSIS — C91.10 CLL (CHRONIC LYMPHOCYTIC LEUKEMIA) (HCC): Primary | ICD-10-CM

## 2023-01-30 LAB
ALBUMIN SERPL-MCNC: 3.6 GM/DL (ref 3.4–5)
ALP BLD-CCNC: 152 IU/L (ref 40–128)
ALT SERPL-CCNC: 20 U/L (ref 10–40)
ANION GAP SERPL CALCULATED.3IONS-SCNC: 10 MMOL/L (ref 4–16)
AST SERPL-CCNC: 14 IU/L (ref 15–37)
BASOPHILS ABSOLUTE: 0 K/CU MM
BASOPHILS RELATIVE PERCENT: 0.2 % (ref 0–1)
BILIRUB SERPL-MCNC: 0.4 MG/DL (ref 0–1)
BUN BLDV-MCNC: 21 MG/DL (ref 6–23)
CALCIUM SERPL-MCNC: 8.2 MG/DL (ref 8.3–10.6)
CHLORIDE BLD-SCNC: 101 MMOL/L (ref 99–110)
CO2: 24 MMOL/L (ref 21–32)
CREAT SERPL-MCNC: 0.9 MG/DL (ref 0.9–1.3)
DIFFERENTIAL TYPE: ABNORMAL
EOSINOPHILS ABSOLUTE: 0 K/CU MM
EOSINOPHILS RELATIVE PERCENT: 0.5 % (ref 0–3)
GFR SERPL CREATININE-BSD FRML MDRD: >60 ML/MIN/1.73M2
GLUCOSE BLD-MCNC: 188 MG/DL (ref 70–99)
HCT VFR BLD CALC: 26.3 % (ref 42–52)
HEMOGLOBIN: 8.2 GM/DL (ref 13.5–18)
LYMPHOCYTES ABSOLUTE: 4.6 K/CU MM
LYMPHOCYTES RELATIVE PERCENT: 79.6 % (ref 24–44)
MCH RBC QN AUTO: 30.3 PG (ref 27–31)
MCHC RBC AUTO-ENTMCNC: 31.2 % (ref 32–36)
MCV RBC AUTO: 97 FL (ref 78–100)
MONOCYTES ABSOLUTE: 0.7 K/CU MM
MONOCYTES RELATIVE PERCENT: 12.1 % (ref 0–4)
PDW BLD-RTO: 17.5 % (ref 11.7–14.9)
PHOSPHORUS: 3.3 MG/DL (ref 2.5–4.9)
PLATELET # BLD: 88 K/CU MM (ref 140–440)
PMV BLD AUTO: 11.2 FL (ref 7.5–11.1)
POTASSIUM SERPL-SCNC: 4.3 MMOL/L (ref 3.5–5.1)
RBC # BLD: 2.71 M/CU MM (ref 4.6–6.2)
SEGMENTED NEUTROPHILS ABSOLUTE COUNT: 0.4 K/CU MM
SEGMENTED NEUTROPHILS RELATIVE PERCENT: 7.6 % (ref 36–66)
SODIUM BLD-SCNC: 135 MMOL/L (ref 135–145)
TOTAL PROTEIN: 4.9 GM/DL (ref 6.4–8.2)
URIC ACID: 4.1 MG/DL (ref 3.5–7.2)
WBC # BLD: 5.8 K/CU MM (ref 4–10.5)

## 2023-01-30 PROCEDURE — 84100 ASSAY OF PHOSPHORUS: CPT

## 2023-01-30 PROCEDURE — 84550 ASSAY OF BLOOD/URIC ACID: CPT

## 2023-01-30 PROCEDURE — 36591 DRAW BLOOD OFF VENOUS DEVICE: CPT

## 2023-01-30 PROCEDURE — 85025 COMPLETE CBC W/AUTO DIFF WBC: CPT

## 2023-01-30 PROCEDURE — 6360000002 HC RX W HCPCS: Performed by: INTERNAL MEDICINE

## 2023-01-30 PROCEDURE — 80053 COMPREHEN METABOLIC PANEL: CPT

## 2023-01-30 RX ORDER — SODIUM CHLORIDE 0.9 % (FLUSH) 0.9 %
5-40 SYRINGE (ML) INJECTION PRN
OUTPATIENT
Start: 2023-01-30

## 2023-01-30 RX ORDER — HEPARIN SODIUM (PORCINE) LOCK FLUSH IV SOLN 100 UNIT/ML 100 UNIT/ML
500 SOLUTION INTRAVENOUS PRN
Status: DISCONTINUED | OUTPATIENT
Start: 2023-01-30 | End: 2023-01-31 | Stop reason: HOSPADM

## 2023-01-30 RX ORDER — SODIUM CHLORIDE 9 MG/ML
25 INJECTION, SOLUTION INTRAVENOUS PRN
OUTPATIENT
Start: 2023-01-30

## 2023-01-30 RX ORDER — HEPARIN SODIUM (PORCINE) LOCK FLUSH IV SOLN 100 UNIT/ML 100 UNIT/ML
500 SOLUTION INTRAVENOUS PRN
OUTPATIENT
Start: 2023-01-30

## 2023-01-30 RX ADMIN — HEPARIN 500 UNITS: 100 SYRINGE at 09:38

## 2023-01-30 NOTE — PROGRESS NOTES
Wheeled to infusion area, here today for chemotherapy. Patient reports he has occasional bone pain in right lower extremity, no swelling or redness to site. Reports it started when started chemo. Takes Vicodin once a day which helps, and Tylenol if needed. Also reports he started chemo pills day 1 this morning. Right chest mediport accessed, positive blood return noted. Labs drawn. Labs reviewed, platelets 88, Segs 0.4. Discussed with Dr. Brigitte Shearer, will hold treatment x1 week, including oral chemo. New calendar given to patient by NN. Instructed to hold oral chemo but bring it with him until he returns next week to re-check labs in case they are low again. Patient verbalized understanding. RTC 02/06 for next treatment.

## 2023-02-06 ENCOUNTER — HOSPITAL ENCOUNTER (OUTPATIENT)
Dept: INFUSION THERAPY | Age: 73
Discharge: HOME | End: 2023-02-06
Payer: COMMERCIAL

## 2023-02-06 ENCOUNTER — OFFICE VISIT (OUTPATIENT)
Dept: ONCOLOGY | Age: 73
End: 2023-02-06
Payer: COMMERCIAL

## 2023-02-06 VITALS
HEART RATE: 101 BPM | BODY MASS INDEX: 22.48 KG/M2 | HEIGHT: 72 IN | DIASTOLIC BLOOD PRESSURE: 75 MMHG | TEMPERATURE: 97.5 F | WEIGHT: 166 LBS | OXYGEN SATURATION: 94 % | SYSTOLIC BLOOD PRESSURE: 123 MMHG

## 2023-02-06 VITALS
SYSTOLIC BLOOD PRESSURE: 123 MMHG | HEART RATE: 101 BPM | WEIGHT: 166 LBS | BODY MASS INDEX: 22.48 KG/M2 | HEIGHT: 72 IN | DIASTOLIC BLOOD PRESSURE: 75 MMHG | TEMPERATURE: 97.5 F | OXYGEN SATURATION: 94 %

## 2023-02-06 DIAGNOSIS — E53.8 B12 DEFICIENCY: ICD-10-CM

## 2023-02-06 DIAGNOSIS — D50.0 IRON DEFICIENCY ANEMIA DUE TO CHRONIC BLOOD LOSS: ICD-10-CM

## 2023-02-06 DIAGNOSIS — C91.10 CHRONIC LYMPHOCYTIC LEUKEMIA (HCC): Primary | ICD-10-CM

## 2023-02-06 DIAGNOSIS — C91.10 CLL (CHRONIC LYMPHOCYTIC LEUKEMIA) (HCC): Primary | ICD-10-CM

## 2023-02-06 DIAGNOSIS — K90.9 INTESTINAL MALABSORPTION, UNSPECIFIED TYPE: ICD-10-CM

## 2023-02-06 DIAGNOSIS — D69.6 THROMBOCYTOPENIA (HCC): ICD-10-CM

## 2023-02-06 LAB
ALBUMIN SERPL-MCNC: 3.6 GM/DL (ref 3.4–5)
ALP BLD-CCNC: 171 IU/L (ref 40–128)
ALT SERPL-CCNC: 21 U/L (ref 10–40)
ANION GAP SERPL CALCULATED.3IONS-SCNC: 14 MMOL/L (ref 4–16)
AST SERPL-CCNC: 17 IU/L (ref 15–37)
BASOPHILS ABSOLUTE: 0 K/CU MM
BASOPHILS RELATIVE PERCENT: 0.4 % (ref 0–1)
BILIRUB SERPL-MCNC: 0.5 MG/DL (ref 0–1)
BUN SERPL-MCNC: 26 MG/DL (ref 6–23)
CALCIUM SERPL-MCNC: 8.2 MG/DL (ref 8.3–10.6)
CHLORIDE BLD-SCNC: 100 MMOL/L (ref 99–110)
CO2: 22 MMOL/L (ref 21–32)
CREAT SERPL-MCNC: 1.1 MG/DL (ref 0.9–1.3)
DIFFERENTIAL TYPE: ABNORMAL
EOSINOPHILS ABSOLUTE: 0 K/CU MM
EOSINOPHILS RELATIVE PERCENT: 0.4 % (ref 0–3)
GFR SERPL CREATININE-BSD FRML MDRD: >60 ML/MIN/1.73M2
GLUCOSE SERPL-MCNC: 303 MG/DL (ref 70–99)
HCT VFR BLD CALC: 28.5 % (ref 42–52)
HEMOGLOBIN: 8.8 GM/DL (ref 13.5–18)
LYMPHOCYTES ABSOLUTE: 4.7 K/CU MM
LYMPHOCYTES RELATIVE PERCENT: 69.2 % (ref 24–44)
MCH RBC QN AUTO: 29.7 PG (ref 27–31)
MCHC RBC AUTO-ENTMCNC: 30.9 % (ref 32–36)
MCV RBC AUTO: 96.3 FL (ref 78–100)
MONOCYTES ABSOLUTE: 0.8 K/CU MM
MONOCYTES RELATIVE PERCENT: 12.3 % (ref 0–4)
PDW BLD-RTO: 17.1 % (ref 11.7–14.9)
PHOSPHORUS: 3.4 MG/DL (ref 2.5–4.9)
PLATELET # BLD: 94 K/CU MM (ref 140–440)
PMV BLD AUTO: 9.5 FL (ref 7.5–11.1)
POTASSIUM SERPL-SCNC: 4.3 MMOL/L (ref 3.5–5.1)
RBC # BLD: 2.96 M/CU MM (ref 4.6–6.2)
SEGMENTED NEUTROPHILS ABSOLUTE COUNT: 1.2 K/CU MM
SEGMENTED NEUTROPHILS RELATIVE PERCENT: 17.7 % (ref 36–66)
SODIUM BLD-SCNC: 136 MMOL/L (ref 135–145)
TOTAL PROTEIN: 4.9 GM/DL (ref 6.4–8.2)
URATE SERPL-MCNC: 4.4 MG/DL (ref 3.5–7.2)
WBC # BLD: 6.8 K/CU MM (ref 4–10.5)

## 2023-02-06 PROCEDURE — 96375 TX/PRO/DX INJ NEW DRUG ADDON: CPT

## 2023-02-06 PROCEDURE — 6370000000 HC RX 637 (ALT 250 FOR IP): Performed by: INTERNAL MEDICINE

## 2023-02-06 PROCEDURE — G8484 FLU IMMUNIZE NO ADMIN: HCPCS | Performed by: PHYSICIAN ASSISTANT

## 2023-02-06 PROCEDURE — 84550 ASSAY OF BLOOD/URIC ACID: CPT

## 2023-02-06 PROCEDURE — 96415 CHEMO IV INFUSION ADDL HR: CPT

## 2023-02-06 PROCEDURE — 1123F ACP DISCUSS/DSCN MKR DOCD: CPT | Performed by: PHYSICIAN ASSISTANT

## 2023-02-06 PROCEDURE — 96413 CHEMO IV INFUSION 1 HR: CPT

## 2023-02-06 PROCEDURE — 6360000002 HC RX W HCPCS: Performed by: INTERNAL MEDICINE

## 2023-02-06 PROCEDURE — 85025 COMPLETE CBC W/AUTO DIFF WBC: CPT

## 2023-02-06 PROCEDURE — G8427 DOCREV CUR MEDS BY ELIG CLIN: HCPCS | Performed by: PHYSICIAN ASSISTANT

## 2023-02-06 PROCEDURE — 84100 ASSAY OF PHOSPHORUS: CPT

## 2023-02-06 PROCEDURE — 36591 DRAW BLOOD OFF VENOUS DEVICE: CPT

## 2023-02-06 PROCEDURE — 3017F COLORECTAL CA SCREEN DOC REV: CPT | Performed by: PHYSICIAN ASSISTANT

## 2023-02-06 PROCEDURE — 96372 THER/PROPH/DIAG INJ SC/IM: CPT

## 2023-02-06 PROCEDURE — 99213 OFFICE O/P EST LOW 20 MIN: CPT | Performed by: PHYSICIAN ASSISTANT

## 2023-02-06 PROCEDURE — 96367 TX/PROPH/DG ADDL SEQ IV INF: CPT

## 2023-02-06 PROCEDURE — 2580000003 HC RX 258: Performed by: INTERNAL MEDICINE

## 2023-02-06 PROCEDURE — 80053 COMPREHEN METABOLIC PANEL: CPT

## 2023-02-06 PROCEDURE — 1036F TOBACCO NON-USER: CPT | Performed by: PHYSICIAN ASSISTANT

## 2023-02-06 PROCEDURE — G8420 CALC BMI NORM PARAMETERS: HCPCS | Performed by: PHYSICIAN ASSISTANT

## 2023-02-06 RX ORDER — SODIUM CHLORIDE 9 MG/ML
INJECTION, SOLUTION INTRAVENOUS CONTINUOUS
OUTPATIENT
Start: 2023-03-06

## 2023-02-06 RX ORDER — DIPHENHYDRAMINE HYDROCHLORIDE 50 MG/ML
50 INJECTION INTRAMUSCULAR; INTRAVENOUS
OUTPATIENT
Start: 2023-03-06

## 2023-02-06 RX ORDER — ONDANSETRON 2 MG/ML
8 INJECTION INTRAMUSCULAR; INTRAVENOUS
OUTPATIENT
Start: 2023-03-06

## 2023-02-06 RX ORDER — FAMOTIDINE 10 MG/ML
20 INJECTION, SOLUTION INTRAVENOUS
OUTPATIENT
Start: 2023-03-06

## 2023-02-06 RX ORDER — EPINEPHRINE 1 MG/ML
0.3 INJECTION, SOLUTION, CONCENTRATE INTRAVENOUS PRN
OUTPATIENT
Start: 2023-03-06

## 2023-02-06 RX ORDER — ALBUTEROL SULFATE 90 UG/1
4 AEROSOL, METERED RESPIRATORY (INHALATION) PRN
OUTPATIENT
Start: 2023-03-06

## 2023-02-06 RX ORDER — CYANOCOBALAMIN 1000 UG/ML
1000 INJECTION, SOLUTION INTRAMUSCULAR; SUBCUTANEOUS ONCE
Status: COMPLETED
Start: 2023-02-06 | End: 2023-02-06

## 2023-02-06 RX ORDER — ACETAMINOPHEN 325 MG/1
650 TABLET ORAL
OUTPATIENT
Start: 2023-03-06

## 2023-02-06 RX ORDER — SODIUM CHLORIDE 9 MG/ML
5-40 INJECTION INTRAVENOUS PRN
Status: DISCONTINUED | OUTPATIENT
Start: 2023-02-06 | End: 2023-02-07 | Stop reason: HOSPADM

## 2023-02-06 RX ORDER — ACETAMINOPHEN 325 MG/1
650 TABLET ORAL ONCE
Status: COMPLETED | OUTPATIENT
Start: 2023-02-06 | End: 2023-02-06

## 2023-02-06 RX ORDER — CYANOCOBALAMIN 1000 UG/ML
1000 INJECTION, SOLUTION INTRAMUSCULAR; SUBCUTANEOUS ONCE
Start: 2023-03-06

## 2023-02-06 RX ORDER — SODIUM CHLORIDE 9 MG/ML
5-250 INJECTION, SOLUTION INTRAVENOUS PRN
Status: DISCONTINUED | OUTPATIENT
Start: 2023-02-06 | End: 2023-02-07 | Stop reason: HOSPADM

## 2023-02-06 RX ORDER — HEPARIN SODIUM (PORCINE) LOCK FLUSH IV SOLN 100 UNIT/ML 100 UNIT/ML
500 SOLUTION INTRAVENOUS PRN
Status: DISCONTINUED | OUTPATIENT
Start: 2023-02-06 | End: 2023-02-07 | Stop reason: HOSPADM

## 2023-02-06 RX ORDER — DIPHENHYDRAMINE HYDROCHLORIDE 50 MG/ML
50 INJECTION INTRAMUSCULAR; INTRAVENOUS ONCE
Status: COMPLETED | OUTPATIENT
Start: 2023-02-06 | End: 2023-02-06

## 2023-02-06 RX ADMIN — SODIUM CHLORIDE, PRESERVATIVE FREE 10 ML: 5 INJECTION INTRAVENOUS at 13:49

## 2023-02-06 RX ADMIN — DIPHENHYDRAMINE HYDROCHLORIDE 50 MG: 50 INJECTION INTRAMUSCULAR; INTRAVENOUS at 09:18

## 2023-02-06 RX ADMIN — ACETAMINOPHEN 650 MG: 325 TABLET ORAL at 09:17

## 2023-02-06 RX ADMIN — DEXAMETHASONE SODIUM PHOSPHATE 20 MG: 10 INJECTION INTRAMUSCULAR; INTRAVENOUS at 09:21

## 2023-02-06 RX ADMIN — HEPARIN 500 UNITS: 100 SYRINGE at 13:50

## 2023-02-06 RX ADMIN — OBINUTUZUMAB 1000 MG: 1000 INJECTION, SOLUTION, CONCENTRATE INTRAVENOUS at 09:38

## 2023-02-06 RX ADMIN — SODIUM CHLORIDE 20 ML/HR: 9 INJECTION, SOLUTION INTRAVENOUS at 09:18

## 2023-02-06 RX ADMIN — CYANOCOBALAMIN 1000 MCG: 1000 INJECTION, SOLUTION INTRAMUSCULAR at 09:17

## 2023-02-06 ASSESSMENT — PAIN SCALES - GENERAL: PAINLEVEL_OUTOF10: 0

## 2023-02-06 ASSESSMENT — PAIN DESCRIPTION - LOCATION: LOCATION: SHOULDER

## 2023-02-06 ASSESSMENT — PAIN DESCRIPTION - ORIENTATION: ORIENTATION: RIGHT;LEFT

## 2023-02-06 NOTE — PROGRESS NOTES
Patient Name: Julissa Fink  Patient : 1950  Patient MRN: [id number]     Primary Oncologist: Gonzalez Emanuel MD  Referring Physician: Radha Leblanc MD   Date of Service: 2023      Chief Complaint:   Chief Complaint   Patient presents with    Follow-up    Chemotherapy       Active Problem list  1. Chronic lymphocytic leukemia ([facility] [address]    2. Thrombocytopenia ([facility] [address]        HPI:        Mr. Young Smith ( 52) was diagnosed with stage I CLL in , when he presented with infection and lymphocytosis. His peripheral blood immunophenotyping was characteristic of B-cell CLL. It was CD38 negative, ZAP-70 positive, CD19 and 20 positive, CD5 positive. Karyotyping was normal in 2006.,  Because of his infection and associated acquired immune deficiency(hypogammaglobulinemia), he was given IVIG for a year. He was started back on IVIG therapy monthly since 2013 to prevent future major infections continuing for now. Also had minor Infusional reaction to IVIG in 2016 responded to Steroids & Benadryl., No further problems after that. CLL was treated only with Leukeran intermittently from 2007 until 2011 and again from 2011 until 2012. 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 Flow studies still showed the same and FISH in 2012 showed deletion of tp53 (17p) and MYB (6q). ,  He was thus treated with Fludara and Rituxan for six months between April and 2012 with excellent clinical and hematological response.  . In 2013, he developed Multiple b/l nodes in axilla and groin area, CAT scan on 13, Bulky lymphadenopathy within the chest, abdomen, and pelvis mildly increased as compared from previous CAT scan in March 2012.,  Starting in Jan 2014 he was initiated on 2nd line chemo with Bendamustine and Rituxan with good initial response. Continued till Nov 2014, CAT scan done on 8/14/14 showed an interval decrease in generalized adenopathy compared to previous study In December 2013, suggesting good response to therapy. ,  CAT scan done on 12/23/14 showed Minimal interval increase in size of at least two periportal and retrocaval lymph nodes. Otherwise stable thoracic, abdominal and pelvic adenopathy. Also mild interval increase in splenomegaly measuring 19 cm, previously 17 cm. CAT scan done on 6/25/15 showed mild interval decrease in the splenomegaly and also in retroperitoneal mesenteric lymph nodes. Started Idelalisib [Zydelig] on 20th Jan 2015 at 150mg po bid. with good initial response. He did remarkably well with Zydelig from January of 2015 until June of 2016, after he was hospitalized for Rhinovirus Pneumonia in May of 2016. Also discussed Living Will, Power of Melina, DNR status, et cetera. In April 2016, chest CT showed stable  , 13. In July 2016, His FISH testing on peripheral blood showed abnormal results, with 6q deletion, 17p deletion, and 11q centromere signal in 3 percent of cells. The 17p deletion (Tp53) in 80 percent of cells is associated with unfavorable prognosis. Because of his CLL with poor prognostic markers, including 17p deletion detected in 2012 & again in July of 2016. He was started on ibrutinib in September 2016 140mg/d increased to 280 mg/ after 4 weeks. The abdominal CT 7/5/16 is showing no acute abnormalities, stable splenomegaly, and one periportal lymph node measuring 2.2 by 3.7 cm which is unchanged from before. CAT scan of the chest August 5, 2016, showed axillary, mediastinal, hilar, and upper abdominal lymphadenopathy consistent with his CLL. The maximum size of the nodes is 2.4 cm.  A 1 cm infiltrate in the lateral segment of the right middle lobe possibly infectious in nature, segmental calcification of left coronary artery. CAT scan of the chest, abdomen, and pelvis December 7,2016 2016, which revealed a mild mediastinal right hilar adenopathy, decreased in size from the previous examination of April and August of 2016, .5. His ibrutinib was stopped in December 2016 when he was feeling bad and it was started back at one a day, 140 mg daily instead of 280 mg that he was taking prior to that  In January 2017, he developed progressive lump in his left inguinal area. That being the only area of progressive adenopathy with multiple nodes coalesced together, a question of Pimentels transformation versus more aggressive histology conversion was considered as a possibility. Dr. Brenna Alegria, did a biopsy of the lymph node on January 18, 2017. The final pathology was reported as B-cell small lymphocytic lymphoma (B-cell CLL/SLL), with no evidence of any large cell OR Pimentels transformation. There was some evidence of localized herpetic simplex lymphadenitis. Because of possibility of localized infection with herpes. I started him on Valtrex 500 t.i.d. for two to three weeks then tapered to 1/d as maintenance therapy. Lymphadenopathy in left groin almost completely resolved. His ibrutinib was stopped in December 2016 when he was feeling bad and it was started back at one a day, 140 mg daily as maintenance dose instead of 280 mg that he was taking prior to that. His quantitative immunoglobulin on February 17, 2017, IgG 600, IgA less than 10, IgM less than 4. Serum protein electrophoresis was within the normal range. Gammaglobulin was continued monthly since it has helped any serious infections under control. August 2017 he had a IgG that was 503  October 2017 started a ibrutinib he was off for 2 months due to his insurance issues  5-18 CT chest: Showed some mild decrease in mediastinal and hilar lymphadenopathy. . Regards to his abdomen also there is decrease in his lymphadenopathy. Although there is a left inguinal node that has enlarged. Section that no obvious source of his abdominal cramping. 10/10/2018 EGD showed severe ulcerative esophagitis with slight narrowing in the GE junction small hiatal hernia mild superficial gastritis, Colonoscope revealed 2 mm polyp in the sigmoid colon, diverticulosis, hemorrhoids, moderate stool  1/2019; Colonoscopy with two diminutive polyps, diverticulosis and hemorrhoids, path with TA above IC valve and HP distal sigmoid  10-19 iron def based on labs refered to GI , stool occult negative x #3 , NO PICA   11-19 saw Dr. Kathryn Lloyd, and referred to [place] for capsule endoscopy  11/7/2019: Ct chest:Ground-glass nodule seen within the right upper lobe the largest measuring 17  mm in diameter. 12-6-19 capsule endoscopy, results unavailable   2/2020: CT chest:IMPRESSION:  Previously noted ground-glass opacities in the right lung have resolved. However, there is a new cluster of tiny pulmonary nodules in the left lower  lobe which could represent an infectious or inflammatory etiology. Short-term follow-up chest CT is recommended in 3-6 months. Multiple additional tiny pulmonary nodules are overall stable. Stable mediastinal lymph nodes without new lymphadenopathy. Previous Therapies    May 2020: Ct chest:  1. Interval resolution of the previously described cluster of pulmonary    nodules in the left lower lobe. Findings are most compatible with resolved    infectious/inflammatory etiology. 2. No acute cardiopulmonary disease. 3. COPD. 4. Stable subcentimeter mediastinal lymph nodes. 8/28/20: US RP normal    August 2020 cystoscopy revealed enlarged prostate. Was Recommended TURBT    3/24/22: CXR:  Bilateral interstitial opacity. Nodular consolidation at the left lung base. Pneumonia is favored over metastatic/lymphangitic disease. Consider atypical   etiologies.    Was treated with IV abx    But was given abx again and completed 4/21/22.    5/4/22: CT chest:  1. Patchy bilateral airspace opacities, most prominent in the left lower lobe   concerning for multifocal pneumonia. Follow-up examination in 4-6 weeks is   recommended to assess for resolution. 2. Interval increase in intrathoracic and upper abdominal lymphadenopathy   consistent with patient's history of CLL. 3. Splenomegaly. 5/28/22: he was seen by Dr Robert Ybarra who ordered bactrim     June 8 2022 he was seen by him again and was prescribed 2 week course of cipro    July 28 2022 CT chest:  1. Overall improvement in multifocal pneumonia. However there has been   interval development of multifocal tree-in-bud opacities which either reflect   residual pneumonia versus new infectious bronchiolitis. 2. There are scattered new solid nodular opacities as well the largest   measuring up to 8 mm also likely infectious or inflammatory in etiology. Continued follow-up in 3 months is recommended to ensure resolution and/or   stability. 3. Slight interval increase in size of a left hilar lymph node as well as a   periportal and gastrohepatic lymph nodes. Attention on follow-up exams is   recommended. Otherwise stable to slight interval decrease in size of   mediastinal and hilar adenopathy. 4. Probable splenomegaly. 9/13/2022 CBC with WBC of 11.5 hemoglobin of 11.4 hematocrit of 37 MCV of 82 and platelets of 50RCI with creatinine 1.7 albumin 3.9  ferritin of 25 iron sats of 10% B12 1217 IgG 559    He was admitted on 9/28/2022 with severe sepsis in the setting of rhinovirus infection with superimposed bacterial pneumonia. Completed 8-day course of Zosyn. Also acute hypoxic respiratory failure in the setting of bowel and component of CHF as well. Was evaluated by cardiology as well. Has been holding ibrutinib.    9/28/2022 CT scan of the head:  Chronic microvascular disease. Negative acute bleed, midline shift or mass   effect.        Findings worrisome for acute on chronic maxillary sinus disease. Please   correlate exam findings. CTA of the chest, CT scan of the abdomen pelvis:  No evidence of pulmonary embolism. Right greater than left lower lobe pneumonia. Diffuse hilar and mediastinal lymphadenopathy, concerning for malignancy with   history of CLL, also likely progressed from prior exam.       Diffuse peritoneal and retroperitoneal lymphadenopathy in the upper abdomen   most significant near the cassi hepatis and IVC where there is a large mass   which is favored to be a lymph node measuring up to 6.2 x 3.5 cm, also likely   progressed from exam.       Splenomegaly, could also be related to malignancy. Mild gallbladder wall thickening and pericholecystic fluid, could be related   to passive congestion or mass effect from adjacent lymphadenopathy. There is   mild intrahepatic bile duct dilatation. 9/29/2022 ultrasound of the abdomen:  1. Hepatomegaly and hepatic steatosis. 2. Masses most compatible with lymphadenopathy in the cassi hepatis, better   seen on recent CT. 3. Hyperechoic focus in the liver could be focal fatty infiltration,   hemangioma or other mass. This is too small to characterize on recent CT. Correlate with MRI hepatic mass protocol if indicated. 4. Gallbladder wall thickening with possible adenomyomatosis. HIDA scan with no convincing evidence of acute cholecystitis. 10/5/2022 underwent right thoracentesis, cytology negative for carcinoma. Many small lymphoid cells were seen    Postthoracentesis chest x-ray with slight interval decrease in size of the right pleural effusion. No pneumothorax. 10/23/22; Ct chest:  The infiltrates seen previously in the lower lobes bilaterally for the most   part have decreased in size and conspicuity. However, there are now innumerable punctate tree-in-bud centrilobular micro   nodules, which are nonspecific but suggest inflammatory/infectious   bronchiolitis. Consider both typical and atypical etiologies. In addition, cavitary lesion has developed in the periphery of the right   lower lobe and to a lesser extent within the left upper lung. Necrotizing   infection would be primarily considered given the rapid development. Consider both typical and atypical etiologies. Enlarged mediastinal lymph nodes, similar when compared to the previous exam,   process of Lia related to history of chronic lymphocytic leukemia. 11/23/22: was admitted with Increased sob  CTA chest:   1. Negative for pulmonary embolus. 2. Multiple large mediastinal lymph nodes. These appear larger than   previously seen. 3. Extensive bilateral ground-glass opacities and tiny lung nodules. Possibility of acute respiratory distress syndrome   4. Probable atypical pneumonia. Aspergillosis could be in the differential.   This is similar in appearance to the previous study. 5. Splenomegaly without change     12/12/22; CT chest results:  1. Overall stable to mild interval decrease in intrathoracic lymphadenopathy. 2.  Interval decrease in previously noted bilateral ground-glass airspace   opacities and tree-in-bud nodularity consistent with improving infectious or   inflammatory process. Interval decrease in wall thickness of right lower   lobe cavitary lesion since prior examination. Follow-up to resolution is   recommended. 3.  Marked splenomegaly measuring up to 18 cm.     12/29/22: BMB:  Final Pathologic Diagnosis:   A-B. Bone marrow, biopsy and aspirate smears:   -     EXTENSIVE INVOLVEMENT BY CHRONIC LYMPHOCYTIC LEUKEMIA   (~90%). Peripheral blood smear:   -     CHRONIC LYMPHOCYTIC LEUKEMIA. -     Macrocytic anemia. -     Thrombocytopenia. Flow cytometry: CD5+ monoclonal B-cell population,   consistent with CLL/SLL. Karyotype:   45,XY,del(6)(q13q25),liborio(17;18)(q10;q10)[15]/46,XY[5]     FISH:   6q-: Del 4g88-c34.2 DETECTED   Chromosome 12:  No abnormalities detected   Chromosome 13: No abnormalities detected   Chromosome 11: No abnormalities detected   Chromosome 17: TP53 deletion DETECTED   CCND1/IgH t(11;14): No abnormalities detected     C1D1 Obinutuzumab started 2023      PAST MEDICAL HISTORY:   1. Stage I CLL with conversion from good to poor prognostic factors. ,  2. History of hypertension for many years. ,  3. 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 Arthritis in the past. ,  5. Frozen shoulder for which he had treatment including injection by Dr. Don Phillips. ,  6. Cellulitis with Zoster type lesion Left thigh resolved with Bactrim and Valtrex in 2016.,  7. abdominal illness, with fever, nausea, and discomfort, suggestive of infectious etiology in May 2016. ,  8. hospitalization between  and  for what seems like atypical rhinovirus pneumonia involving right middle and lower lobe and left lower lobe with sepsis,  9. 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 Dr. Deangelo Jarvis is referring him for MOHS surgery. ,  10. hospitalized from  to 2017, for hyperosmolar hyperglycemia with hyponatremia and hyperkalemia and UTI. He was seen by Dr. Trish Philip, who put him on insulin. He was also seen by Dr. Laith Madden. He felt much better after his sugar got under better control and electrolyte imbalanced reversed,  11. Ultrasound Doppler 2017, was negative for any DVT in either leg. Chest x-ray showed no acute process. PAST SURGICAL HISTORY:   1. knee operation,  2. tonsillectomy,  3. broken ankle times two requiring open reduction. ,  4. Vision better after cataract surgery    FAMILY HISTORY:   His paternal aunt had colon cancer. Uncle also had some form of cancer. Father had heart disease.  Sister  12 of Liver disease     SOCIAL HISTORY:   He has a 40-pack-year history of smoking, quit in 1986.  He denies alcohol use. He is not . Has one son. Interval History  2/6/2023: Arrived to clinic alone today. Has been tolerating treatment very well, feels improved from prior. Does have SOB at baseline which is stable right now, is on 3L oxygen chronically. No reactions. Plts and Hgb trending up, discussed with patient. Energy and appetite are poor however he is able to perform ADLs and weight is stable. No CP, no change in bowel or bladder habits. Denies further needs at this time. Review of Systems   Per interval history; otherwise 10 point ROS is negative              Vital Signs:  /75 (Site: Right Upper Arm, Position: Sitting, Cuff Size: Medium Adult)   Pulse (!) 101   Temp 97.5 °F (36.4 °C) (Infrared)   Ht 6' (1.829 m)   Wt 166 lb (75.3 kg)   SpO2 94%   BMI 22.51 kg/m²       Physical Exam:  CONSTITUTIONAL: awake, alert, cooperative, no apparent distress, chronically ill appearing  EYES: EOM grossly intact, +conjunctival pallor, no scleral icterus  ENT: Normocephalic, without obvious abnormality, atraumatic  NECK: supple, symmetrical, no jugular venous distension  HEMATOLOGIC/LYMPHATIC: no cervical, supraclavicular or axillary lymphadenopathy   LUNGS: Poor inspiratory effort, no wheezes/rhonci/rales, unlabored on baseline 3L NC  CARDIOVASCULAR: tachycardic with regular rhythm, normal S1 and S2, no murmur noted  ABDOMEN: Non-tender, non-distended, NABS  MUSCULOSKELETAL: full range of motion noted, tone is normal  NEUROLOGIC: awake, alert, oriented to name, place and time. Motor skills grossly intact. SKIN: warm and dry, no jaundice, no bruising or petechiae.   EXTREMITIES: no peripheral edema, no clubbing or cyanosis      Labs:    Hematology:  Lab Results   Component Value Date    WBC 6.8 02/06/2023    RBC 2.96 (L) 02/06/2023    HGB 8.8 (L) 02/06/2023    HCT 28.5 (L) 02/06/2023    MCV 96.3 02/06/2023    MCH 29.7 02/06/2023    MCHC 30.9 (L) 02/06/2023    RDW 17.1 (H) 02/06/2023    PLT 94 (L) 02/06/2023    MPV 9.5 02/06/2023    BANDSPCT 3 (L) 01/06/2023    SEGSPCT 17.7 (L) 02/06/2023    EOSRELPCT 0.4 02/06/2023    BASOPCT 0.4 02/06/2023    LYMPHOPCT 69.2 (H) 02/06/2023    MONOPCT 12.3 (H) 02/06/2023    BANDABS 2.41 01/06/2023    SEGSABS 1.2 02/06/2023    EOSABS 0.0 02/06/2023    BASOSABS 0.0 02/06/2023    LYMPHSABS 4.7 02/06/2023    MONOSABS 0.8 02/06/2023    DIFFTYPE AUTOMATED DIFFERENTIAL 02/06/2023    ANISOCYTOSIS 1+ 01/06/2023    POLYCHROM 1+ 12/13/2022    WBCMORP  12/29/2022     LAGRE POPULATION OF LYMPHOCYTES WITH DENSE, CLUMPED CHROMATIN AND VISIBLE NUCLEOLI. PLTM SEVERAL LARGE PLATELETS 48/04/9506     Lab Results   Component Value Date    ESR 2 12/01/2022       Chemistry:  Lab Results   Component Value Date     02/06/2023    K 4.3 02/06/2023     02/06/2023    CO2 22 02/06/2023    BUN 26 (H) 02/06/2023    CREATININE 1.1 02/06/2023    GLUCOSE 303 (H) 02/06/2023    CALCIUM 8.2 (L) 02/06/2023    PROT 4.9 (L) 02/06/2023    LABALBU 3.6 02/06/2023    BILITOT 0.5 02/06/2023    ALKPHOS 171 (H) 02/06/2023    AST 17 02/06/2023    ALT 21 02/06/2023    LABGLOM >60 02/06/2023    GFRAA >60 10/11/2022    PHOS 3.4 02/06/2023    MG 2.1 11/25/2022    POCCA 1.15 01/16/2023    POCGLU 179 (H) 01/16/2023     Lab Results   Component Value Date     (H) 10/11/2022     No components found for: LD  Lab Results   Component Value Date    TSHHS 3.040 04/04/2019    T4FREE 1.37 04/04/2019    FT3 3.2 03/27/2012       Immunology:  Lab Results   Component Value Date    PROT 4.9 (L) 02/06/2023    SPEP  12/13/2022     INTERPRETATION - The SPEP shows a monoclonal like spike in the gamma region, measuring approximately 200 mg/dL. The concurrent CHRISTINA Shows a faint possible free lambda proteins. Decreased total proteins. Suggest follow up. LP.    SPEP  12/13/2022     INTERPRETATION - A faint possible monoclonal free lambda light chain is noted. Suggest follow up.   LP.    ALBUMINELP 3.2 12/13/2022    LABALPH 0.3 12/13/2022    LABALPH 0.7 12/13/2022    LABBETA 0.8 12/13/2022    GAMGLOB 0.5 12/13/2022     No results found for: Akash Hurtado, KLFLCR  No results found for: B2M    Coagulation Panel:  Lab Results   Component Value Date    PROTIME 13.3 12/13/2022    INR 1.03 12/13/2022    APTT 27.7 12/13/2022    DDIMER >5250 (H) 11/23/2022       Anemia Panel:  Lab Results   Component Value Date    GTSQTOFL07 1052 (H) 12/13/2022    FOLATE 4.4 12/13/2022       Tumor Markers:  Lab Results   Component Value Date    PSA 1.9 10/27/2020         Imaging: Reviewed     Pathology:Reviewed     Observations:  Performance Status: ECOG 1  Depression Status: No data recorded          Assessment & Plan:  B-cell CLL, stage I with conversion from good to poor prognostic factors, with 17p deletion:   His diagnosis of CLL since 2007.   17p deletion since 2015. Initial treatment with Leukeran from 2000 to 2011 intermittently and FCR regimen in 2012, bendamustine/Rituxan in 2014. Idelalisib from January 2015 until June of 2016. On ibrutinib since September 2016. Ibrutinib therapy seems to be helping him, overall improvement. Was Only able to tolerate 140 mg p.o. every other day  He was off for a couple months During the fall 2017  Resumed Ibrutinib and  on 140 mg po daily. Plan to Continue current dose as no major B sx, stable WBC. CT imaging in July 2022 with slightly increasing size of intraabdominal LN. plan was to monitor. But note declining platelet count, probably secondary to ibrutinib versus progressive CLL versus underlying infection and antibiotics. Note CT scan of the chest abdomen pelvis also with progressive lymphadenopathy. Discussed the findings and discussed systemic treatment with single agent Rituxan(preferable secondary to his performance status) versus BR and he wanted to proceed with single agent Rituxan. Discussed adverse effects. Holding Ibrutinib.   After C1 he was admitted with fever, hypotenson which I dont believe is infusion reaction. Re challenge with rituxan again with same reaction  Discussed LN biopsy but he deferred   BMB with extensive involvement with CLL. Complex cytogenetics. Discussed treatment with venetoclax and Ifeoma Cones and he wanted to proceed   Discussed adverse effects  OCM requested. C1D1 Obinutuzumab started 1/9/23. Tolerating fairly well. Dose adjustments as needed  Started Low Dose Venetoclax in last Jan 2023. Thrombocytopenia, no hemolysis, monoclonal gammopathy, nutritional deficiency, coagulopathy in the past.  Looks like sec to extensive BM infiltration with CLL. Monitor for now. TPO -A denied. Avoid AC, NSAIDS and monitor for any bleeding. Stable to improving. Rhinovirus and P aeriginosa: is on Tobra inhaler and oral azithromycin. Follows with ID, next appt in 2 weeks. On 3L NC chronically. Acquired hypogammaglobulinemia:   Continue IVIG monthly    BPH: is being followed by Urology    Iron def anemia and esophagitis: follows with GI, Dr Zeferino Shearer. Reported that he had colonoscopy 2018 with polyps detected. EGD was normal except for H. pylori which was treated. Was supposed to have VCE which was unsuccessful once. He is on oral iron EOD, adequate bowel regimen. Follow with GI    Lung Nodule RUL: Stable findings, will follow    Discussed above findings and plan with him and he verbalized understanding. Answered all his questions. Recommend follow-up with primary care physician and other specialist.    Please do not hesitate to contact us if you need any further information. Return to clinic Feb 2023  or earlier if new symptoms    Emy Diaz PA-C    Portions of this note are copied forward from previous clinic note. Interval history, ROS, physical exam, assessment and plan has been reviewed and updated for accuracy by this provider.

## 2023-02-06 NOTE — PROGRESS NOTES
Patient arrived to treatment suite for blood draw, pre-medications, vitamin B-12 injection and chemotherapy infusion. Right chest mediport accessed and blood drawn from site and sent to lab for processing. Patient states no questions or concerns for the doctor at this time. Treatment approved and given. Titration method starting at 25ml/hr and increasing by 25ml/hr for max rate 100ml/hr. Vitamin B-12 injection given IM in right deltoid, band-aid applied. Patient tolerated well. Left treatment suite with assistance in wheelchair. Discharge instructions provided. Patient's status assessed and documented appropriately. All labs and required results were also reviewed today. Treatment parameters have been reviewed. Today's treatment has been approved by the provider. Treatment orders and medication sequencing (when applicable) was verified by 2 registered nurses. The treatment plan was confirmed with the patient prior to administration, and the patient understands the need to report any treatment-related symptoms. Prior to administration, when applicable, the following 8 elements of medication administration were reviewed with 2nd Registered Nurse prior to dosing: drug name, drug dose, infusion volume when prepared in a syringe, rate of administration, expiration dates and/or times, appearance and integrity of drug(s), and rate of pump for infusion. The 5 rights of medication administration have been verified.

## 2023-02-06 NOTE — PROGRESS NOTES
MA Rooming Questions  Patient: Cornel Quiros  MRN: [id number]    Date: 2/6/2023        1. Do you have any new issues?   no         2. Do you need any refills on medications?    no    3. Have you had any imaging done since your last visit?   no    4. Have you been hospitalized or seen in the emergency room since your last visit here?   no    5. Did the patient have a depression screening completed today? No    No data recorded     PHQ-9 Given to (if applicable):               PHQ-9 Score (if applicable):                     [] Positive     []  Negative              Does question #9 need addressed (if applicable)                     [] Yes    []  No               Tesha Wong CMA

## 2023-02-08 ENCOUNTER — HOSPITAL ENCOUNTER (OUTPATIENT)
Dept: PULMONOLOGY | Age: 73
Discharge: HOME | End: 2023-02-08
Payer: COMMERCIAL

## 2023-02-08 DIAGNOSIS — R06.02 SOB (SHORTNESS OF BREATH): ICD-10-CM

## 2023-02-08 LAB
DLCO %PRED: 35 %
DLCO PRED: NORMAL
DLCO/VA %PRED: 58 %
DLCO/VA PRED: NORMAL
DLCO/VA: NORMAL
DLCO: NORMAL
EXPIRATORY TIME-POST: NORMAL
EXPIRATORY TIME: NORMAL
FEF 25-75% %CHNG: NORMAL
FEF 25-75% %PRED-POST: 32 %
FEF 25-75% %PRED-PRE: 28 L/SEC
FEF 25-75% PRED: NORMAL
FEF 25-75%-POST: NORMAL
FEF 25-75%-PRE: NORMAL
FEV1 %PRED-POST: 55 %
FEV1 %PRED-PRE: 51 %
FEV1 PRED: NORMAL
FEV1-POST: NORMAL
FEV1-PRE: NORMAL
FEV1/FVC %PRED-POST: 73 %
FEV1/FVC %PRED-PRE: 72 %
FEV1/FVC PRED: NORMAL
FEV1/FVC-POST: NORMAL
FEV1/FVC-PRE: NORMAL
FVC %PRED-POST: 75 L
FVC %PRED-PRE: 70 %
FVC PRED: NORMAL
FVC-POST: NORMAL
FVC-PRE: NORMAL
GAW %PRED: NORMAL
GAW PRED: NORMAL
GAW: NORMAL
IC %PRED: 75 %
IC PRED: NORMAL
IC: NORMAL
MEP: NORMAL
MIP: NORMAL
MVV %PRED-PRE: NORMAL
MVV PRED: NORMAL
MVV-PRE: NORMAL
PEF %PRED-POST: NORMAL
PEF %PRED-PRE: NORMAL
PEF PRED: NORMAL
PEF%CHNG: NORMAL
PEF-POST: NORMAL
PEF-PRE: NORMAL
RAW %PRED: NORMAL
RAW PRED: NORMAL
RAW: NORMAL
RV %PRED: 72 %
RV PRED: NORMAL
RV: NORMAL
SVC %PRED: 72 %
SVC PRED: NORMAL
SVC: NORMAL
TLC %PRED: 69 %
TLC PRED: NORMAL
TLC: NORMAL
VA %PRED: NORMAL
VA PRED: NORMAL
VA: NORMAL
VTG %PRED: NORMAL
VTG PRED: NORMAL
VTG: NORMAL

## 2023-02-08 PROCEDURE — 94060 EVALUATION OF WHEEZING: CPT

## 2023-02-08 PROCEDURE — 94727 GAS DIL/WSHOT DETER LNG VOL: CPT

## 2023-02-08 PROCEDURE — 94729 DIFFUSING CAPACITY: CPT

## 2023-02-08 ASSESSMENT — PULMONARY FUNCTION TESTS
FEV1/FVC_PERCENT_PREDICTED_PRE: 72
FEV1_PERCENT_PREDICTED_PRE: 51
FVC_PERCENT_PREDICTED_PRE: 70
FEV1/FVC_PERCENT_PREDICTED_POST: 73
FEV1_PERCENT_PREDICTED_POST: 55
FVC_PERCENT_PREDICTED_POST: 75

## 2023-02-13 DIAGNOSIS — C91.10 CHRONIC LYMPHOCYTIC LEUKEMIA (HCC): Primary | ICD-10-CM

## 2023-02-16 ENCOUNTER — OFFICE VISIT (OUTPATIENT)
Dept: INFECTIOUS DISEASES | Age: 73
End: 2023-02-16
Payer: COMMERCIAL

## 2023-02-16 VITALS
DIASTOLIC BLOOD PRESSURE: 65 MMHG | HEART RATE: 97 BPM | SYSTOLIC BLOOD PRESSURE: 115 MMHG | TEMPERATURE: 97 F | RESPIRATION RATE: 18 BRPM

## 2023-02-16 DIAGNOSIS — K90.9 INTESTINAL MALABSORPTION, UNSPECIFIED TYPE: ICD-10-CM

## 2023-02-16 DIAGNOSIS — C91.10 CHRONIC LYMPHOCYTIC LEUKEMIA (HCC): ICD-10-CM

## 2023-02-16 DIAGNOSIS — J47.0 BRONCHIECTASIS WITH ACUTE LOWER RESPIRATORY INFECTION (HCC): Primary | ICD-10-CM

## 2023-02-16 DIAGNOSIS — C91.10 CLL (CHRONIC LYMPHOCYTIC LEUKEMIA) (HCC): ICD-10-CM

## 2023-02-16 DIAGNOSIS — D80.1 HYPOGAMMAGLOBULINEMIA (HCC): ICD-10-CM

## 2023-02-16 PROCEDURE — 1036F TOBACCO NON-USER: CPT | Performed by: INTERNAL MEDICINE

## 2023-02-16 PROCEDURE — G8484 FLU IMMUNIZE NO ADMIN: HCPCS | Performed by: INTERNAL MEDICINE

## 2023-02-16 PROCEDURE — 3017F COLORECTAL CA SCREEN DOC REV: CPT | Performed by: INTERNAL MEDICINE

## 2023-02-16 PROCEDURE — 1123F ACP DISCUSS/DSCN MKR DOCD: CPT | Performed by: INTERNAL MEDICINE

## 2023-02-16 PROCEDURE — 99214 OFFICE O/P EST MOD 30 MIN: CPT | Performed by: INTERNAL MEDICINE

## 2023-02-16 PROCEDURE — G8420 CALC BMI NORM PARAMETERS: HCPCS | Performed by: INTERNAL MEDICINE

## 2023-02-16 PROCEDURE — G8427 DOCREV CUR MEDS BY ELIG CLIN: HCPCS | Performed by: INTERNAL MEDICINE

## 2023-02-16 RX ORDER — GABAPENTIN 100 MG/1
100 CAPSULE ORAL 3 TIMES DAILY
Qty: 90 CAPSULE | Refills: 0 | Status: SHIPPED | OUTPATIENT
Start: 2023-02-16 | End: 2023-03-18

## 2023-02-16 RX ORDER — HYDROCODONE BITARTRATE AND ACETAMINOPHEN 10; 325 MG/1; MG/1
1 TABLET ORAL EVERY 12 HOURS PRN
Qty: 60 TABLET | Refills: 0 | Status: SHIPPED | OUTPATIENT
Start: 2023-02-16 | End: 2023-03-18

## 2023-02-16 RX ORDER — ALLOPURINOL 300 MG/1
300 TABLET ORAL DAILY
Qty: 30 TABLET | Refills: 0 | Status: SHIPPED | OUTPATIENT
Start: 2023-02-16

## 2023-02-16 NOTE — TELEPHONE ENCOUNTER
Patient contacted our office in need of refill for the following medications listed below. Patient has a scheduled appointment on 02/20/23. Patients preferred pharmacy is [facility] [address]. Pending for provider Bri Wilcox since Jose Angel Andrew is out of the office.

## 2023-02-16 NOTE — PROGRESS NOTES
2/18/2023         Referring Physician: No ref. provider found  Primary Care Physician: Ej Danielson MD    Impression/Plan:   Diagnosis Orders   1. Bronchiectasis with acute lower respiratory infection ([address]        2. Hypogammaglobulinemia ([address]        3. CLL (chronic lymphocytic leukemia) ([facility])            Discussion:  Bronchiectasis with acute lower respiratory infection ([address]  Improving, continue inhaled tobramycin. Previous cx negative for P aeruginosa. See determine if test is needed at follow up. Return in about 6 weeks (around 3/30/2023). 35 minutes was spent in the encounter; more than 50% of the face-to-face time was spent with the patient providing counseling and coordination of care. History: Jeane Torres is a 71 y.o.  male with a medical history of T2DM, CLL, hypogammaglobulinemia requiring monthly IVIG infusions, presenting today for recurrent pneumonia. Patient reports that he was treated for pneumonia in March 2022, he felt well for a while but once more had symptoms of productive cough and fatigue. He denies night sweats. He endorses some weight loss and poor appetite. He follows with Dr. Jodee Armenta who had ordered a CT of the chest that was done on 5/4/2022. It showed patchy bilateral airspace opacities, interval increase in intrathoracic and upper abdominal lymphadenopathy and splenomegaly. Patient was born in [place]. He reports no history of exposure to anyone with tuberculosis. 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 He has no pets. 6/8/2022: at last visit Bactrim was prescribed, tests were done. Cough remains productive of yellow, non-bloody sputum. No chest pain, nausea or fever. Feels somewhat better and stronger. Not at baseline. 6/30/2022: at last visit ciprofloxacin was prescribed for positive sputum culture with Pseudomonas aeruginosa.  He continues to have productive cough with small amounts of non-bloody sputum  11/17/2022: Recently seen during his 10/22/2022 admission for rhinovirus with bacterial superinfection. X-ray had shown right lower lobe and left upper lobe cavitary lesion. Respiratory culture was positive for Pseudomonas aeruginosa. Was discharged to complete eight 2-week course of intravenous ciprofloxacin and Zosyn on 11/14/2022. Continues to have shortness of breath and cough productive of nonbloody phlegm. Requires oxygen. 11/30/2022: After his last visit he was admitted into the hospital on 11/18/2022 for worsening shortness of breath and rigors during his received of rituximab infusion. On admission he had worsening respiratory status and required a BiPAP. He was diagnosed with Pseudomonas aeruginosa pneumonia. He received inhaled tobramycin, oral azithromycin, IV Zosyn and IVIG. He he was discharged on 11/25/2022 to receive a 7-day course of levofloxacin to end on 12/2/2022. He is doing better. Cough is less productive. Reports that Dr. Sarahi Esquivel would like him to wait another week. He has leg weakness. 1/4/2023: yet to get sputum cx. His appetite has improved and he has put on some weight. Adherent to neb preeti. Tolerating it. Yet to receive IVIG since his last dose. Requires corticosteroid premedication before IVIG receipt. 2/16/2023: on inhaled tobramycin through 2/23/2023. Respiratory culture from 1/3/2023 was negative for Pseudomonas aeruginosa. Candida albicans was grown but this is a commensal. Cough has improved. Still on 3L/min of oxygen      Review of Systems   All other systems reviewed and are negative.     No Known Allergies    Patient Active Problem List   Diagnosis    Pneumonia    Sepsis ([address]    Hypogammaglobulinemia ([address]    CLL (chronic lymphocytic leukemia) ([address]    Upper respiratory infection, acute    Generalized muscle weakness    Type 2 diabetes mellitus with hyperglycemia, with long-term current use of insulin (HCC)    Poor appetite    Chronic obstructive pulmonary disease ([address] Neutropenia ([facility] [address]    Other specified disorders of bone density and structure, unspecified site    Cellulitis of right upper limb    Herpesviral infection    Intestinal malabsorption    Other nonspecific abnormal finding of lung field    Iron deficiency anemia due to chronic blood loss    Other muscle spasm    Leukemia, lymphocytic, chronic (HCC)    Selective deficiency of IgG (HCC)    Acute bronchitis    Severe malnutrition (HCC)    Pneumonia due to organism    Personal history of CLL (chronic lymphocytic leukemia)    Rhinovirus infection    Anemia    Thrombocytopenia (HCC)    Acute on chronic respiratory failure with hypoxemia (HCC)    Bronchiectasis with acute lower respiratory infection (HCC)    Chronic respiratory failure (HCC)    Other chest pain    B12 deficiency    Immune thrombocytopenia (HCC)       Current Outpatient Medications   Medication Sig Dispense Refill    OXYGEN Inhale 3 L into the lungs continuous      ipratropium-albuterol (DUONEB) 0.5-2.5 (3) MG/3ML SOLN nebulizer solution Inhale 3 mLs into the lungs every 4 hours 120 each 5    ondansetron (ZOFRAN) 8 MG tablet Take 1 tablet by mouth every 8 hours as needed for Nausea or Vomiting 90 tablet 3    venetoclax (VENCLEXTA) 10 MG TABS chemo tablet Take 2 tablets by mouth daily 60 tablet 2    allopurinol (ZYLOPRIM) 300 MG tablet Take 1 tablet by mouth daily x30 days upon starting venetoclax 30 tablet 0    ferrous gluconate 324 (37.5 Fe) MG TABS Take 1 tablet by mouth 2 times daily (Patient taking differently: Take 324 mg by mouth every other day) 60 tablet 5    tobramycin, PF, (HARLEY) 300 MG/5ML nebulizer solution Take 5 mLs by nebulization in the morning and 5 mLs in the evening.  300 mL 2    ondansetron (ZOFRAN) 4 MG tablet Take 1 tablet by mouth every 8 hours as needed for Nausea or Vomiting 30 tablet 1    valACYclovir (VALTREX) 500 MG tablet TAKE 1 TABLET BY MOUTH EVERY DAY 30 tablet 5    albuterol sulfate HFA (PROVENTIL;VENTOLIN;PROAIR) 108 (90 Base) MCG/ACT inhaler Inhale 2 puffs into the lungs every 4-6 hours as needed for Shortness of Breath or Wheezing      ipratropium-albuterol (DUONEB) 0.5-2.5 (3) MG/3ML SOLN nebulizer solution Take 1 vial by nebulization every 6 hours as needed for Shortness of Breath      LEVEMIR FLEXTOUCH 100 UNIT/ML injection pen Inject 40 Units into the skin nightly 5 pen 3    finasteride (PROSCAR) 5 MG tablet Take 5 mg by mouth daily      tamsulosin (FLOMAX) 0.4 MG capsule Take 0.4 mg by mouth daily      glucose monitoring kit (FREESTYLE) monitoring kit 1 kit by Does not apply route daily as needed (glucose checks) 1 kit 0    Insulin Syringe-Needle U-100 30G X 1/2\" 0.5 ML MISC 1 each by Does not apply route daily 100 each 3    metFORMIN (GLUCOPHAGE) 1000 MG tablet Take 1,000 mg by mouth 2 times daily (with meals)      omeprazole (PRILOSEC) 20 MG delayed release capsule Take 20 mg by mouth daily as needed (GERD)      allopurinol (ZYLOPRIM) 300 MG tablet Take 1 tablet by mouth daily 30 tablet 0    gabapentin (NEURONTIN) 100 MG capsule Take 1 capsule by mouth 3 times daily for 30 days. 90 capsule 0    HYDROcodone-acetaminophen (NORCO)  MG per tablet Take 1 tablet by mouth every 12 hours as needed for Pain for up to 30 days. Intended supply: 30 days Max Daily Amount: 2 tablets 60 tablet 0    atorvastatin (LIPITOR) 80 MG tablet Take 0.5 tablets by mouth daily 30 tablet 5    NOVOLOG FLEXPEN 100 UNIT/ML injection pen Inject 15 Units into the skin 3 times daily (before meals) 13.5 mL 0     No current facility-administered medications for this visit.      Facility-Administered Medications Ordered in Other Visits   Medication Dose Route Frequency Provider Last Rate Last Admin    sodium chloride flush 0.9 % injection 10 mL  10 mL IntraVENous PRN Hardeep Thomas MD           Past Medical History:   Diagnosis Date    Arthritis     knees, Rt hand/wrist    BPH (benign prostatic hyperplasia)     CAD (coronary artery disease)     Cancer ([facility] [address] CLL--Sees Dr Kuldeep Carter    Diabetes mellitus [facility])     History of blood transfusion     no reaction    Hx of motion sickness     Hyperlipidemia     MDRO (multiple drug resistant organisms) resistance     abscess on buttock 10yrs ago    Migraine     last one summer 2016    Pneumonia 2016    Wears dentures     full upper--lower dentures    Wears glasses        Past Surgical History:   Procedure Laterality Date    BRONCHOSCOPY N/A 10/28/2022    BRONCHOSCOPY performed by Bert Goodson MD at [facility]  's    x 2  last showed \"inflammation of heart\"    COLONOSCOPY  2010    DENTAL SURGERY      all teeth extracted     EYE SURGERY Right 2016    Cataract removal    FRACTURE SURGERY Left     left ankle plate and screws    IR BIOPSY PERC SUPERF BONE  2022    IR BIOPSY PERC SUPERF BONE 2022 [facility] SPECIAL PROCEDURES    KNEE SURGERY  1970    left    OTHER SURGICAL HISTORY Left 2017    groin excision    TONSILLECTOMY  late     age 13    TUNNELED VENOUS PORT PLACEMENT      Right upper chest       Social History     Socioeconomic History    Marital status: Single     Spouse name: Not on file    Number of children: Not on file    Years of education: Not on file    Highest education level: Not on file   Occupational History    Not on file   Tobacco Use    Smoking status: Former     Packs/day: 3.00     Years: 20.00     Pack years: 60.00     Types: Cigarettes     Start date: 10/2/1965     Quit date: 1987     Years since quittin.1    Smokeless tobacco: Never   Vaping Use    Vaping Use: Never used   Substance and Sexual Activity    Alcohol use: No    Drug use: No    Sexual activity: Not Currently   Other Topics Concern    Not on file   Social History Narrative    Not on file     Social Determinants of Health     Financial Resource Strain: Not on file   Food Insecurity: Not on file   Transportation Needs: Not on file   Physical Activity: Not on file Stress: Not on file   Social Connections: Not on file   Intimate Partner Violence: Not on file   Housing Stability: Not on file       Family History   Problem Relation Age of Onset    Diabetes Mother     High Blood Pressure Mother     Heart Disease Father     Other Sister         liver problems    Early Death Brother     Asthma Maternal Uncle     Colon Cancer Brother        Vital Signs:  Vitals:    02/16/23 1153   BP: 115/65   Site: Left Upper Arm   Position: Sitting   Cuff Size: Medium Adult   Pulse: 97   Resp: 18   Temp: 97 °F (36.1 °C)   TempSrc: Infrared        Wt Readings from Last 3 Encounters:   02/15/23 166 lb (75.3 kg)   02/06/23 166 lb (75.3 kg)   02/06/23 166 lb (75.3 kg)        Physical Exam:   Gen: alert and NAD  HEENT: sclera clear, pupils equal and reactive, extra ocular muscles intact, oropharynx clear, mucus membranes moist, tympanic membranes clear bilaterally, no cervical lymphadenopathy noted and neck supple  Neck: supple, no significant adenopathy  Chest: occasional crackles  Heart: regular rate and rhythm, no murmurs  ABD: abdomen is soft without significant tenderness, masses, organomegaly or guarding. EXT:peripheral pulses normal, no pedal edema, no clubbing or cyanosis  NEURO: alert, oriented, normal speech, no focal findings or movement disorder noted  Skin: well hydrated, no lesions, surgical site examined  Wounds: none  Labs:   WBC   Date Value Ref Range Status   02/06/2023 6.8 4.0 - 10.5 K/CU MM Final     Comment:     WBC/DIFFERENTIAL SUSPECT FLAG NOTED ON ANALYZER.  PLEASE REVIEW AND CONSIDER SENDING OUT IF   CLINICALLY INDICATED.     01/30/2023 5.8 4.0 - 10.5 K/CU MM Final   01/23/2023 5.5 4.0 - 10.5 K/CU MM Final     Creatinine   Date Value Ref Range Status   02/06/2023 1.1 0.9 - 1.3 MG/DL Final   01/30/2023 0.9 0.9 - 1.3 MG/DL Final   01/23/2023 1.0 0.9 - 1.3 MG/DL Final       Cultures:  Culture   Date Value Ref Range Status   01/03/2023   Final    Final Report Light growth normal respiratory micheline with   01/03/2023 SANDRA ALBICANS Moderate growth No further workup (A)  Final   11/24/2022 Final Report Normal respiratory micheline  Final   11/24/2022 Final Report  Final   11/24/2022 (A)  Final    YEAST Light growth Unable to further identify No further workup       Imaging Studies:

## 2023-02-18 NOTE — ASSESSMENT & PLAN NOTE
Improving, continue inhaled tobramycin. Previous cx negative for P aeruginosa. See determine if test is needed at follow up.

## 2023-02-20 ENCOUNTER — HOSPITAL ENCOUNTER (OUTPATIENT)
Dept: INFUSION THERAPY | Age: 73
Discharge: HOME | End: 2023-02-20
Payer: COMMERCIAL

## 2023-02-20 ENCOUNTER — OFFICE VISIT (OUTPATIENT)
Dept: ONCOLOGY | Age: 73
End: 2023-02-20
Payer: COMMERCIAL

## 2023-02-20 VITALS
DIASTOLIC BLOOD PRESSURE: 74 MMHG | RESPIRATION RATE: 18 BRPM | OXYGEN SATURATION: 93 % | HEIGHT: 72 IN | SYSTOLIC BLOOD PRESSURE: 125 MMHG | BODY MASS INDEX: 22.48 KG/M2 | WEIGHT: 166 LBS | HEART RATE: 96 BPM | TEMPERATURE: 97.5 F

## 2023-02-20 VITALS
RESPIRATION RATE: 18 BRPM | TEMPERATURE: 97.5 F | SYSTOLIC BLOOD PRESSURE: 125 MMHG | WEIGHT: 166 LBS | DIASTOLIC BLOOD PRESSURE: 74 MMHG | HEART RATE: 96 BPM | HEIGHT: 72 IN | BODY MASS INDEX: 22.48 KG/M2 | OXYGEN SATURATION: 93 %

## 2023-02-20 DIAGNOSIS — C91.10 CHRONIC LYMPHOCYTIC LEUKEMIA (HCC): Primary | ICD-10-CM

## 2023-02-20 DIAGNOSIS — D69.6 THROMBOCYTOPENIA (HCC): ICD-10-CM

## 2023-02-20 DIAGNOSIS — D64.9 ANEMIA, UNSPECIFIED TYPE: ICD-10-CM

## 2023-02-20 DIAGNOSIS — E53.8 B12 DEFICIENCY: ICD-10-CM

## 2023-02-20 DIAGNOSIS — C91.10 CLL (CHRONIC LYMPHOCYTIC LEUKEMIA) (HCC): Primary | ICD-10-CM

## 2023-02-20 DIAGNOSIS — C91.10 CLL (CHRONIC LYMPHOCYTIC LEUKEMIA) (HCC): ICD-10-CM

## 2023-02-20 LAB
ALBUMIN SERPL-MCNC: 3.7 GM/DL (ref 3.4–5)
ALP BLD-CCNC: 177 IU/L (ref 40–128)
ALT SERPL-CCNC: 21 U/L (ref 10–40)
ANION GAP SERPL CALCULATED.3IONS-SCNC: 11 MMOL/L (ref 4–16)
AST SERPL-CCNC: 20 IU/L (ref 15–37)
BASOPHILS ABSOLUTE: 0 K/CU MM
BASOPHILS RELATIVE PERCENT: 0.2 % (ref 0–1)
BILIRUB SERPL-MCNC: 0.4 MG/DL (ref 0–1)
BUN SERPL-MCNC: 25 MG/DL (ref 6–23)
CALCIUM SERPL-MCNC: 8.2 MG/DL (ref 8.3–10.6)
CHLORIDE BLD-SCNC: 101 MMOL/L (ref 99–110)
CO2: 25 MMOL/L (ref 21–32)
CREAT SERPL-MCNC: 1.1 MG/DL (ref 0.9–1.3)
DIFFERENTIAL TYPE: ABNORMAL
EOSINOPHILS ABSOLUTE: 0 K/CU MM
EOSINOPHILS RELATIVE PERCENT: 0.6 % (ref 0–3)
GFR SERPL CREATININE-BSD FRML MDRD: >60 ML/MIN/1.73M2
GLUCOSE SERPL-MCNC: 260 MG/DL (ref 70–99)
HCT VFR BLD CALC: 29.5 % (ref 42–52)
HEMOGLOBIN: 9.1 GM/DL (ref 13.5–18)
LYMPHOCYTES ABSOLUTE: 3.3 K/CU MM
LYMPHOCYTES RELATIVE PERCENT: 64.7 % (ref 24–44)
MCH RBC QN AUTO: 28.7 PG (ref 27–31)
MCHC RBC AUTO-ENTMCNC: 30.8 % (ref 32–36)
MCV RBC AUTO: 93.1 FL (ref 78–100)
MONOCYTES ABSOLUTE: 0.7 K/CU MM
MONOCYTES RELATIVE PERCENT: 14.1 % (ref 0–4)
PDW BLD-RTO: 16.3 % (ref 11.7–14.9)
PLATELET # BLD: 53 K/CU MM (ref 140–440)
PMV BLD AUTO: 9.1 FL (ref 7.5–11.1)
POTASSIUM SERPL-SCNC: 4.8 MMOL/L (ref 3.5–5.1)
RBC # BLD: 3.17 M/CU MM (ref 4.6–6.2)
SEGMENTED NEUTROPHILS ABSOLUTE COUNT: 1 K/CU MM
SEGMENTED NEUTROPHILS RELATIVE PERCENT: 20.4 % (ref 36–66)
SODIUM BLD-SCNC: 137 MMOL/L (ref 135–145)
TOTAL PROTEIN: 5.1 GM/DL (ref 6.4–8.2)
WBC # BLD: 5 K/CU MM (ref 4–10.5)

## 2023-02-20 PROCEDURE — 80053 COMPREHEN METABOLIC PANEL: CPT

## 2023-02-20 PROCEDURE — 2580000003 HC RX 258: Performed by: PHYSICIAN ASSISTANT

## 2023-02-20 PROCEDURE — 96415 CHEMO IV INFUSION ADDL HR: CPT

## 2023-02-20 PROCEDURE — 1036F TOBACCO NON-USER: CPT | Performed by: NURSE PRACTITIONER

## 2023-02-20 PROCEDURE — G8427 DOCREV CUR MEDS BY ELIG CLIN: HCPCS | Performed by: NURSE PRACTITIONER

## 2023-02-20 PROCEDURE — 96413 CHEMO IV INFUSION 1 HR: CPT

## 2023-02-20 PROCEDURE — 6370000000 HC RX 637 (ALT 250 FOR IP): Performed by: PHYSICIAN ASSISTANT

## 2023-02-20 PROCEDURE — 96367 TX/PROPH/DG ADDL SEQ IV INF: CPT

## 2023-02-20 PROCEDURE — 99214 OFFICE O/P EST MOD 30 MIN: CPT | Performed by: NURSE PRACTITIONER

## 2023-02-20 PROCEDURE — 1123F ACP DISCUSS/DSCN MKR DOCD: CPT | Performed by: NURSE PRACTITIONER

## 2023-02-20 PROCEDURE — 85025 COMPLETE CBC W/AUTO DIFF WBC: CPT

## 2023-02-20 PROCEDURE — 6360000002 HC RX W HCPCS: Performed by: PHYSICIAN ASSISTANT

## 2023-02-20 PROCEDURE — G8484 FLU IMMUNIZE NO ADMIN: HCPCS | Performed by: NURSE PRACTITIONER

## 2023-02-20 PROCEDURE — G8420 CALC BMI NORM PARAMETERS: HCPCS | Performed by: NURSE PRACTITIONER

## 2023-02-20 PROCEDURE — 3017F COLORECTAL CA SCREEN DOC REV: CPT | Performed by: NURSE PRACTITIONER

## 2023-02-20 PROCEDURE — 36591 DRAW BLOOD OFF VENOUS DEVICE: CPT

## 2023-02-20 PROCEDURE — 96375 TX/PRO/DX INJ NEW DRUG ADDON: CPT

## 2023-02-20 RX ORDER — ONDANSETRON 2 MG/ML
8 INJECTION INTRAMUSCULAR; INTRAVENOUS
Status: CANCELLED | OUTPATIENT
Start: 2023-02-20

## 2023-02-20 RX ORDER — DIPHENHYDRAMINE HYDROCHLORIDE 50 MG/ML
50 INJECTION INTRAMUSCULAR; INTRAVENOUS ONCE
Status: COMPLETED | OUTPATIENT
Start: 2023-02-20 | End: 2023-02-20

## 2023-02-20 RX ORDER — FAMOTIDINE 10 MG/ML
20 INJECTION, SOLUTION INTRAVENOUS
Status: CANCELLED | OUTPATIENT
Start: 2023-02-20

## 2023-02-20 RX ORDER — MEPERIDINE HYDROCHLORIDE 25 MG/ML
12.5 INJECTION INTRAMUSCULAR; INTRAVENOUS; SUBCUTANEOUS PRN
Status: CANCELLED | OUTPATIENT
Start: 2023-02-20

## 2023-02-20 RX ORDER — ACETAMINOPHEN 325 MG/1
650 TABLET ORAL
Status: CANCELLED | OUTPATIENT
Start: 2023-02-20

## 2023-02-20 RX ORDER — ACETAMINOPHEN 325 MG/1
650 TABLET ORAL ONCE
Status: COMPLETED | OUTPATIENT
Start: 2023-02-20 | End: 2023-02-20

## 2023-02-20 RX ORDER — GUAIFENESIN 600 MG/1
600 TABLET, EXTENDED RELEASE ORAL 4 TIMES DAILY
Qty: 120 TABLET | Refills: 5 | Status: SHIPPED | OUTPATIENT
Start: 2023-02-20 | End: 2023-03-22

## 2023-02-20 RX ORDER — ALBUTEROL SULFATE 90 UG/1
4 AEROSOL, METERED RESPIRATORY (INHALATION) PRN
Status: CANCELLED | OUTPATIENT
Start: 2023-02-20

## 2023-02-20 RX ORDER — SODIUM CHLORIDE 9 MG/ML
5-250 INJECTION, SOLUTION INTRAVENOUS PRN
Status: CANCELLED | OUTPATIENT
Start: 2023-02-20

## 2023-02-20 RX ORDER — DIPHENHYDRAMINE HYDROCHLORIDE 50 MG/ML
50 INJECTION INTRAMUSCULAR; INTRAVENOUS
Status: CANCELLED | OUTPATIENT
Start: 2023-02-20

## 2023-02-20 RX ORDER — HEPARIN SODIUM (PORCINE) LOCK FLUSH IV SOLN 100 UNIT/ML 100 UNIT/ML
500 SOLUTION INTRAVENOUS PRN
Status: DISCONTINUED | OUTPATIENT
Start: 2023-02-20 | End: 2023-02-21 | Stop reason: HOSPADM

## 2023-02-20 RX ORDER — EPINEPHRINE 1 MG/ML
0.3 INJECTION, SOLUTION, CONCENTRATE INTRAVENOUS PRN
Status: CANCELLED | OUTPATIENT
Start: 2023-02-20

## 2023-02-20 RX ORDER — SODIUM CHLORIDE 9 MG/ML
5-250 INJECTION, SOLUTION INTRAVENOUS PRN
Status: DISCONTINUED | OUTPATIENT
Start: 2023-02-20 | End: 2023-02-21 | Stop reason: HOSPADM

## 2023-02-20 RX ORDER — SODIUM CHLORIDE 9 MG/ML
5-40 INJECTION INTRAVENOUS PRN
Status: DISCONTINUED | OUTPATIENT
Start: 2023-02-20 | End: 2023-02-21 | Stop reason: HOSPADM

## 2023-02-20 RX ORDER — SODIUM CHLORIDE 9 MG/ML
INJECTION, SOLUTION INTRAVENOUS CONTINUOUS
Status: CANCELLED | OUTPATIENT
Start: 2023-02-20

## 2023-02-20 RX ADMIN — HEPARIN 500 UNITS: 100 SYRINGE at 14:35

## 2023-02-20 RX ADMIN — SODIUM CHLORIDE 20 ML/HR: 9 INJECTION, SOLUTION INTRAVENOUS at 09:18

## 2023-02-20 RX ADMIN — DIPHENHYDRAMINE HYDROCHLORIDE 50 MG: 50 INJECTION INTRAMUSCULAR; INTRAVENOUS at 09:18

## 2023-02-20 RX ADMIN — SODIUM CHLORIDE, PRESERVATIVE FREE 10 ML: 5 INJECTION INTRAVENOUS at 14:35

## 2023-02-20 RX ADMIN — ACETAMINOPHEN 650 MG: 325 TABLET ORAL at 09:18

## 2023-02-20 RX ADMIN — OBINUTUZUMAB 1000 MG: 1000 INJECTION, SOLUTION, CONCENTRATE INTRAVENOUS at 10:17

## 2023-02-20 RX ADMIN — DEXAMETHASONE SODIUM PHOSPHATE 20 MG: 10 INJECTION INTRAMUSCULAR; INTRAVENOUS at 09:20

## 2023-02-20 ASSESSMENT — PAIN SCALES - GENERAL
PAINLEVEL_OUTOF10: 6
PAINLEVEL_OUTOF10: 0

## 2023-02-20 ASSESSMENT — PAIN DESCRIPTION - LOCATION: LOCATION: SHOULDER

## 2023-02-20 NOTE — PROGRESS NOTES
MA Rooming Questions  Patient: Joseph Damico  MRN: [id number]    Date: 2/20/2023         1. Do you have any new issues?   no         2. Do you need any refills on medications? yes - Mucinex (Sent to Dr. Jamie Ocasio in separate encounter))    3. Have you had any imaging done since your last visit?   no    4. Have you been hospitalized or seen in the emergency room since your last visit here?   no    5. Did the patient have a depression screening completed today?  No    No data recorded     PHQ-9 Given to (if applicable):               PHQ-9 Score (if applicable):                     [] Positive     []  Negative              Does question #9 need addressed (if applicable)                     [] Yes    []  No               Arley Zavala MA Detail Level: Zone Plan: IPL recommended. Discontinue Regimen: doxycycline 50 pt felt unchanged by therapy

## 2023-02-20 NOTE — PROGRESS NOTES
Patient arrived to treatment suite for blood draw, pre-medications, and chemotherapy infusion. Right chest mediport accessed and blood drawn from site and sent to lab for processing. Patient states no questions or concerns for the doctor at this time. Platelets 53, discussed with Dr. Fatemeh Lou. Treatment approved and given. Titration method starting at 25ml/hr and increasing by 25ml/hr for max rate 100ml/hr. Patient tolerated well. Left treatment suite with assistance in wheelchair. Discharge instructions provided. Patient's status assessed and documented appropriately. All labs and required results were also reviewed today. Treatment parameters have been reviewed. Today's treatment has been approved by the provider. Treatment orders and medication sequencing (when applicable) was verified by 2 registered nurses. The treatment plan was confirmed with the patient prior to administration, and the patient understands the need to report any treatment-related symptoms. Prior to administration, when applicable, the following 8 elements of medication administration were reviewed with 2nd Registered Nurse prior to dosing: drug name, drug dose, infusion volume when prepared in a syringe, rate of administration, expiration dates and/or times, appearance and integrity of drug(s), and rate of pump for infusion. The 5 rights of medication administration have been verified.

## 2023-02-20 NOTE — PROGRESS NOTES
Patient Name: Fay Huerta  Patient : 1950  Patient MRN: [id number]     Primary Oncologist: Terrence Cartagena MD  Referring Physician: Selena Heller MD   Date of Service: 2023      Chief Complaint:   No chief complaint on file. Active Problem list  No diagnosis found. HPI:        Mr. Karen Alvarez ( 52) was diagnosed with stage I CLL in , when he presented with infection and lymphocytosis. His peripheral blood immunophenotyping was characteristic of B-cell CLL. It was CD38 negative, ZAP-70 positive, CD19 and 20 positive, CD5 positive. Karyotyping was normal in 2006.,  Because of his infection and associated acquired immune deficiency(hypogammaglobulinemia), he was given IVIG for a year. He was started back on IVIG therapy monthly since 2013 to prevent future major infections continuing for now. Also had minor Infusional reaction to IVIG in 2016 responded to Steroids & Benadryl., No further problems after that. CLL was treated only with Leukeran intermittently from 2007 until 2011 and again from 2011 until 2012. 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 Flow studies still showed the same and FISH in 2012 showed deletion of tp53 (17p) and MYB (6q). ,  He was thus treated with Fludara and Rituxan for six months between April and 2012 with excellent clinical and hematological response.  . In 2013, he developed Multiple b/l nodes in axilla and groin area, CAT scan on 13, Bulky lymphadenopathy within the chest, abdomen, and pelvis mildly increased as compared from previous CAT scan in 2012.,  Starting in 2014 he was initiated on 2nd line chemo with Bendamustine and Rituxan with good initial response. Continued till Nov 2014, CAT scan done on 8/14/14 showed an interval decrease in generalized adenopathy compared to previous study In December 2013, suggesting good response to therapy. ,  CAT scan done on 12/23/14 showed Minimal interval increase in size of at least two periportal and retrocaval lymph nodes. Otherwise stable thoracic, abdominal and pelvic adenopathy. Also mild interval increase in splenomegaly measuring 19 cm, previously 17 cm. CAT scan done on 6/25/15 showed mild interval decrease in the splenomegaly and also in retroperitoneal mesenteric lymph nodes. Started Idelalisib [Zydelig] on 20th Jan 2015 at 150mg po bid. with good initial response. He did remarkably well with Zydelig from January of 2015 until June of 2016, after he was hospitalized for Rhinovirus Pneumonia in May of 2016. Also discussed Living Will, Power of Heriberto, DNR status, et cetera. In April 2016, chest CT showed stable  , 13. In July 2016, His FISH testing on peripheral blood showed abnormal results, with 6q deletion, 17p deletion, and 11q centromere signal in 3 percent of cells. The 17p deletion (Tp53) in 80 percent of cells is associated with unfavorable prognosis. Because of his CLL with poor prognostic markers, including 17p deletion detected in 2012 & again in July of 2016. He was started on ibrutinib in September 2016 140mg/d increased to 280 mg/ after 4 weeks. The abdominal CT 7/5/16 is showing no acute abnormalities, stable splenomegaly, and one periportal lymph node measuring 2.2 by 3.7 cm which is unchanged from before. CAT scan of the chest August 5, 2016, showed axillary, mediastinal, hilar, and upper abdominal lymphadenopathy consistent with his CLL. The maximum size of the nodes is 2.4 cm. A 1 cm infiltrate in the lateral segment of the right middle lobe possibly infectious in nature, segmental calcification of left coronary artery.  CAT scan of the chest, abdomen, and pelvis December 7,2016 2016, which revealed a mild mediastinal right hilar adenopathy, decreased in size from the previous examination of April and August of 2016, .5. His ibrutinib was stopped in December 2016 when he was feeling bad and it was started back at one a day, 140 mg daily instead of 280 mg that he was taking prior to that  In January 2017, he developed progressive lump in his left inguinal area. That being the only area of progressive adenopathy with multiple nodes coalesced together, a question of Pimentels transformation versus more aggressive histology conversion was considered as a possibility. Dr. Mikel Rivera, did a biopsy of the lymph node on January 18, 2017. The final pathology was reported as B-cell small lymphocytic lymphoma (B-cell CLL/SLL), with no evidence of any large cell OR Pimentels transformation. There was some evidence of localized herpetic simplex lymphadenitis. Because of possibility of localized infection with herpes. I started him on Valtrex 500 t.i.d. for two to three weeks then tapered to 1/d as maintenance therapy. Lymphadenopathy in left groin almost completely resolved. His ibrutinib was stopped in December 2016 when he was feeling bad and it was started back at one a day, 140 mg daily as maintenance dose instead of 280 mg that he was taking prior to that. His quantitative immunoglobulin on February 17, 2017, IgG 600, IgA less than 10, IgM less than 4. Serum protein electrophoresis was within the normal range. Gammaglobulin was continued monthly since it has helped any serious infections under control. August 2017 he had a IgG that was 503  October 2017 started a ibrutinib he was off for 2 months due to his insurance issues  5-18 CT chest: Showed some mild decrease in mediastinal and hilar lymphadenopathy. . Regards to his abdomen also there is decrease in his lymphadenopathy. Although there is a left inguinal node that has enlarged.  Section that no obvious source of his abdominal cramping. 10/10/2018 EGD showed severe ulcerative esophagitis with slight narrowing in the GE junction small hiatal hernia mild superficial gastritis, Colonoscope revealed 2 mm polyp in the sigmoid colon, diverticulosis, hemorrhoids, moderate stool  1/2019; Colonoscopy with two diminutive polyps, diverticulosis and hemorrhoids, path with TA above IC valve and HP distal sigmoid  10-19 iron def based on labs refered to GI , stool occult negative x #3 , NO PICA   11-19 saw Dr. Josue Emerson, and referred to [place] for capsule endoscopy  11/7/2019: Ct chest:Ground-glass nodule seen within the right upper lobe the largest measuring 17  mm in diameter. 12-6-19 capsule endoscopy, results unavailable   2/2020: CT chest:IMPRESSION:  Previously noted ground-glass opacities in the right lung have resolved. However, there is a new cluster of tiny pulmonary nodules in the left lower  lobe which could represent an infectious or inflammatory etiology. Short-term follow-up chest CT is recommended in 3-6 months. Multiple additional tiny pulmonary nodules are overall stable. Stable mediastinal lymph nodes without new lymphadenopathy. Previous Therapies    May 2020: Ct chest:  1. Interval resolution of the previously described cluster of pulmonary    nodules in the left lower lobe. Findings are most compatible with resolved    infectious/inflammatory etiology. 2. No acute cardiopulmonary disease. 3. COPD. 4. Stable subcentimeter mediastinal lymph nodes. 8/28/20: US RP normal    August 2020 cystoscopy revealed enlarged prostate. Was Recommended TURBT    3/24/22: CXR:  Bilateral interstitial opacity. Nodular consolidation at the left lung base. Pneumonia is favored over metastatic/lymphangitic disease. Consider atypical   etiologies. Was treated with IV abx    But was given abx again and completed 4/21/22.    5/4/22: CT chest:  1.  Patchy bilateral airspace opacities, most prominent in the left lower lobe   concerning for multifocal pneumonia. Follow-up examination in 4-6 weeks is   recommended to assess for resolution. 2. Interval increase in intrathoracic and upper abdominal lymphadenopathy   consistent with patient's history of CLL. 3. Splenomegaly. 5/28/22: he was seen by Dr Monserrat Walsh who ordered bactrim     June 8 2022 he was seen by him again and was prescribed 2 week course of cipro    July 28 2022 CT chest:  1. Overall improvement in multifocal pneumonia. However there has been   interval development of multifocal tree-in-bud opacities which either reflect   residual pneumonia versus new infectious bronchiolitis. 2. There are scattered new solid nodular opacities as well the largest   measuring up to 8 mm also likely infectious or inflammatory in etiology. Continued follow-up in 3 months is recommended to ensure resolution and/or   stability. 3. Slight interval increase in size of a left hilar lymph node as well as a   periportal and gastrohepatic lymph nodes. Attention on follow-up exams is   recommended. Otherwise stable to slight interval decrease in size of   mediastinal and hilar adenopathy. 4. Probable splenomegaly. 9/13/2022 CBC with WBC of 11.5 hemoglobin of 11.4 hematocrit of 37 MCV of 82 and platelets of 79OJX with creatinine 1.7 albumin 3.9  ferritin of 25 iron sats of 10% B12 1217 IgG 559    He was admitted on 9/28/2022 with severe sepsis in the setting of rhinovirus infection with superimposed bacterial pneumonia. Completed 8-day course of Zosyn. Also acute hypoxic respiratory failure in the setting of bowel and component of CHF as well. Was evaluated by cardiology as well. Has been holding ibrutinib.    9/28/2022 CT scan of the head:  Chronic microvascular disease. Negative acute bleed, midline shift or mass   effect. Findings worrisome for acute on chronic maxillary sinus disease. Please   correlate exam findings.        CTA of the chest, CT scan of the abdomen pelvis:  No evidence of pulmonary embolism. Right greater than left lower lobe pneumonia. Diffuse hilar and mediastinal lymphadenopathy, concerning for malignancy with   history of CLL, also likely progressed from prior exam.       Diffuse peritoneal and retroperitoneal lymphadenopathy in the upper abdomen   most significant near the cassi hepatis and IVC where there is a large mass   which is favored to be a lymph node measuring up to 6.2 x 3.5 cm, also likely   progressed from exam.       Splenomegaly, could also be related to malignancy. Mild gallbladder wall thickening and pericholecystic fluid, could be related   to passive congestion or mass effect from adjacent lymphadenopathy. There is   mild intrahepatic bile duct dilatation. 9/29/2022 ultrasound of the abdomen:  1. Hepatomegaly and hepatic steatosis. 2. Masses most compatible with lymphadenopathy in the cassi hepatis, better   seen on recent CT. 3. Hyperechoic focus in the liver could be focal fatty infiltration,   hemangioma or other mass. This is too small to characterize on recent CT. Correlate with MRI hepatic mass protocol if indicated. 4. Gallbladder wall thickening with possible adenomyomatosis. HIDA scan with no convincing evidence of acute cholecystitis. 10/5/2022 underwent right thoracentesis, cytology negative for carcinoma. Many small lymphoid cells were seen    Postthoracentesis chest x-ray with slight interval decrease in size of the right pleural effusion. No pneumothorax. 10/23/22; Ct chest:  The infiltrates seen previously in the lower lobes bilaterally for the most   part have decreased in size and conspicuity. However, there are now innumerable punctate tree-in-bud centrilobular micro   nodules, which are nonspecific but suggest inflammatory/infectious   bronchiolitis. Consider both typical and atypical etiologies.        In addition, cavitary lesion has developed in the periphery of the right   lower lobe and to a lesser extent within the left upper lung. Necrotizing   infection would be primarily considered given the rapid development. Consider both typical and atypical etiologies. Enlarged mediastinal lymph nodes, similar when compared to the previous exam,   process of Lia related to history of chronic lymphocytic leukemia. 11/23/22: was admitted with Increased sob  CTA chest:   1. Negative for pulmonary embolus. 2. Multiple large mediastinal lymph nodes. These appear larger than   previously seen. 3. Extensive bilateral ground-glass opacities and tiny lung nodules. Possibility of acute respiratory distress syndrome   4. Probable atypical pneumonia. Aspergillosis could be in the differential.   This is similar in appearance to the previous study. 5. Splenomegaly without change     12/12/22; CT chest results:  1. Overall stable to mild interval decrease in intrathoracic lymphadenopathy. 2.  Interval decrease in previously noted bilateral ground-glass airspace   opacities and tree-in-bud nodularity consistent with improving infectious or   inflammatory process. Interval decrease in wall thickness of right lower   lobe cavitary lesion since prior examination. Follow-up to resolution is   recommended. 3.  Marked splenomegaly measuring up to 18 cm.     12/29/22: BMB:  Final Pathologic Diagnosis:   A-B. Bone marrow, biopsy and aspirate smears:   -     EXTENSIVE INVOLVEMENT BY CHRONIC LYMPHOCYTIC LEUKEMIA   (~90%). Peripheral blood smear:   -     CHRONIC LYMPHOCYTIC LEUKEMIA. -     Macrocytic anemia. -     Thrombocytopenia. Flow cytometry: CD5+ monoclonal B-cell population,   consistent with CLL/SLL. Karyotype:   45,XY,del(6)(q13q25),liborio(17;18)(q10;q10)[15]/46,XY[5]     FISH:   6q-: Del 8d33-x54.2 DETECTED   Chromosome 12:  No abnormalities detected   Chromosome 13: No abnormalities detected   Chromosome 11: No abnormalities detected   Chromosome 17: TP53 deletion DETECTED   CCND1/IgH t(11;14): No abnormalities detected     C1D1 Obinutuzumab started 2023      PAST MEDICAL HISTORY:   1. Stage I CLL with conversion from good to poor prognostic factors. ,  2. History of hypertension for many years. ,  3. 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 Arthritis in the past. ,  5. Frozen shoulder for which he had treatment including injection by Dr. Jaylyn Banuelos. ,  6. Cellulitis with Zoster type lesion Left thigh resolved with Bactrim and Valtrex in 2016.,  7. abdominal illness, with fever, nausea, and discomfort, suggestive of infectious etiology in May 2016. ,  8. hospitalization between  and  for what seems like atypical rhinovirus pneumonia involving right middle and lower lobe and left lower lobe with sepsis,  9. 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 Dr. Sary Snell is referring him for MOHS surgery. ,  10. hospitalized from  to 2017, for hyperosmolar hyperglycemia with hyponatremia and hyperkalemia and UTI. He was seen by Dr. Sydney Luna, who put him on insulin. He was also seen by Dr. Frank Schmitt. He felt much better after his sugar got under better control and electrolyte imbalanced reversed,  11. Ultrasound Doppler 2017, was negative for any DVT in either leg. Chest x-ray showed no acute process. PAST SURGICAL HISTORY:   1. knee operation,  2. tonsillectomy,  3. broken ankle times two requiring open reduction. ,  4. Vision better after cataract surgery    FAMILY HISTORY:   His paternal aunt had colon cancer. Uncle also had some form of cancer. Father had heart disease. Sister  12 of Liver disease     SOCIAL HISTORY:   He has a 40-pack-year history of smoking, quit in . He denies alcohol use. He is not . Has one son.     Interval History  2023: Arrived to clinic alone today. Has been tolerating treatment very well, feels improved from prior. Does have SOB at baseline which is stable right now, is on 3L oxygen chronically. No reactions. Plts and Hgb trending up, discussed with patient. Energy and appetite are poor however he is able to perform ADLs and weight is stable. No CP, no change in bowel or bladder habits. Denies further needs at this time. Review of Systems   Per interval history; otherwise 10 point ROS is negative              Vital Signs: There were no vitals taken for this visit. Physical Exam:  CONSTITUTIONAL: awake, alert, cooperative, no apparent distress, chronically ill appearing  EYES: EOM grossly intact, +conjunctival pallor, no scleral icterus  ENT: Normocephalic, without obvious abnormality, atraumatic  NECK: supple, symmetrical, no jugular venous distension  HEMATOLOGIC/LYMPHATIC: no cervical, supraclavicular or axillary lymphadenopathy   LUNGS: Poor inspiratory effort, no wheezes/rhonci/rales, unlabored on baseline 3L NC  CARDIOVASCULAR: tachycardic with regular rhythm, normal S1 and S2, no murmur noted  ABDOMEN: Non-tender, non-distended, NABS  MUSCULOSKELETAL: full range of motion noted, tone is normal  NEUROLOGIC: awake, alert, oriented to name, place and time. Motor skills grossly intact. SKIN: warm and dry, no jaundice, no bruising or petechiae.   EXTREMITIES: no peripheral edema, no clubbing or cyanosis      Labs:    Hematology:  Lab Results   Component Value Date    WBC 6.8 02/06/2023    RBC 2.96 (L) 02/06/2023    HGB 8.8 (L) 02/06/2023    HCT 28.5 (L) 02/06/2023    MCV 96.3 02/06/2023    MCH 29.7 02/06/2023    MCHC 30.9 (L) 02/06/2023    RDW 17.1 (H) 02/06/2023    PLT 94 (L) 02/06/2023    MPV 9.5 02/06/2023    BANDSPCT 3 (L) 01/06/2023    SEGSPCT 17.7 (L) 02/06/2023    EOSRELPCT 0.4 02/06/2023    BASOPCT 0.4 02/06/2023    LYMPHOPCT 69.2 (H) 02/06/2023    MONOPCT 12.3 (H) 02/06/2023    BANDABS 2.41 01/06/2023 SEGSABS 1.2 02/06/2023    EOSABS 0.0 02/06/2023    BASOSABS 0.0 02/06/2023    LYMPHSABS 4.7 02/06/2023    MONOSABS 0.8 02/06/2023    DIFFTYPE AUTOMATED DIFFERENTIAL 02/06/2023    ANISOCYTOSIS 1+ 01/06/2023    POLYCHROM 1+ 12/13/2022    WBCMORP  12/29/2022     LAGRE POPULATION OF LYMPHOCYTES WITH DENSE, CLUMPED CHROMATIN AND VISIBLE NUCLEOLI. PLTM SEVERAL LARGE PLATELETS 44/07/7389     Lab Results   Component Value Date    ESR 2 12/01/2022       Chemistry:  Lab Results   Component Value Date     02/06/2023    K 4.3 02/06/2023     02/06/2023    CO2 22 02/06/2023    BUN 26 (H) 02/06/2023    CREATININE 1.1 02/06/2023    GLUCOSE 303 (H) 02/06/2023    CALCIUM 8.2 (L) 02/06/2023    PROT 4.9 (L) 02/06/2023    LABALBU 3.6 02/06/2023    BILITOT 0.5 02/06/2023    ALKPHOS 171 (H) 02/06/2023    AST 17 02/06/2023    ALT 21 02/06/2023    LABGLOM >60 02/06/2023    GFRAA >60 10/11/2022    PHOS 3.4 02/06/2023    MG 2.1 11/25/2022    POCCA 1.15 01/16/2023    POCGLU 179 (H) 01/16/2023     Lab Results   Component Value Date     (H) 10/11/2022     No components found for: LD  Lab Results   Component Value Date    TSHHS 3.040 04/04/2019    T4FREE 1.37 04/04/2019    FT3 3.2 03/27/2012       Immunology:  Lab Results   Component Value Date    PROT 4.9 (L) 02/06/2023    SPEP  12/13/2022     INTERPRETATION - The SPEP shows a monoclonal like spike in the gamma region, measuring approximately 200 mg/dL. The concurrent CHRISTINA Shows a faint possible free lambda proteins. Decreased total proteins. Suggest follow up. LP.    SPEP  12/13/2022     INTERPRETATION - A faint possible monoclonal free lambda light chain is noted. Suggest follow up.   LP.    ALBUMINELP 3.2 12/13/2022    LABALPH 0.3 12/13/2022    LABALPH 0.7 12/13/2022    LABBETA 0.8 12/13/2022    GAMGLOB 0.5 12/13/2022     No results found for: Ed CARLOTTA Dominguez  No results found for: B2M    Coagulation Panel:  Lab Results   Component Value Date PROTIME 13.3 12/13/2022    INR 1.03 12/13/2022    APTT 27.7 12/13/2022    DDIMER >5250 (H) 11/23/2022       Anemia Panel:  Lab Results   Component Value Date    VLLOCYGJ09 2320 (H) 12/13/2022    FOLATE 4.4 12/13/2022       Tumor Markers:  Lab Results   Component Value Date    PSA 1.9 10/27/2020         Imaging: Reviewed     Pathology:Reviewed     Observations:  Performance Status: ECOG 1  Depression Status: No data recorded          Assessment & Plan:  B-cell CLL, stage I with conversion from good to poor prognostic factors, with 17p deletion:   His diagnosis of CLL since 2007.   17p deletion since 2015. Initial treatment with Leukeran from 2000 to 2011 intermittently and FCR regimen in 2012, bendamustine/Rituxan in 2014. Idelalisib from January 2015 until June of 2016. On ibrutinib since September 2016. Ibrutinib therapy seems to be helping him, overall improvement. Was Only able to tolerate 140 mg p.o. every other day  He was off for a couple months During the fall 2017  Resumed Ibrutinib and  on 140 mg po daily. Plan to Continue current dose as no major B sx, stable WBC. CT imaging in July 2022 with slightly increasing size of intraabdominal LN. plan was to monitor. But note declining platelet count, probably secondary to ibrutinib versus progressive CLL versus underlying infection and antibiotics. Note CT scan of the chest abdomen pelvis also with progressive lymphadenopathy. Discussed the findings and discussed systemic treatment with single agent Rituxan(preferable secondary to his performance status) versus BR and he wanted to proceed with single agent Rituxan. Discussed adverse effects. Holding Ibrutinib. After C1 he was admitted with fever, hypotenson which I dont believe is infusion reaction. Re challenge with rituxan again with same reaction  Discussed LN biopsy but he deferred   BMB with extensive involvement with CLL. Complex cytogenetics.    Discussed treatment with venetoclax and [place] and he wanted to proceed   Discussed adverse effects  OCM requested. C1D1 Obinutuzumab started 1/9/23. Tolerating fairly well. Dose adjustments as needed  Started Low Dose Venetoclax in last Jan 2023. Thrombocytopenia, no hemolysis, monoclonal gammopathy, nutritional deficiency, coagulopathy in the past.  Looks like sec to extensive BM infiltration with CLL. Monitor for now. TPO -A denied. Avoid AC, NSAIDS and monitor for any bleeding. Stable to improving. Rhinovirus and P aeriginosa: is on Tobra inhaler and oral azithromycin. Follows with ID, next appt in 2 weeks. On 3L NC chronically. Acquired hypogammaglobulinemia:   Continue IVIG monthly    BPH: is being followed by Urology    Iron def anemia and esophagitis: follows with GI, Dr Lore Wilkins. Reported that he had colonoscopy 2018 with polyps detected. EGD was normal except for H. pylori which was treated. Was supposed to have VCE which was unsuccessful once. He is on oral iron EOD, adequate bowel regimen. Follow with GI    Lung Nodule RUL: Stable findings, will follow    Discussed above findings and plan with him and he verbalized understanding. Answered all his questions. Recommend follow-up with primary care physician and other specialist.    Please do not hesitate to contact us if you need any further information. Return to clinic Feb 2023  or earlier if new symptoms    JENNI Madrid - CNP    Portions of this note are copied forward from previous clinic note. Interval history, ROS, physical exam, assessment and plan has been reviewed and updated for accuracy by this provider.

## 2023-02-20 NOTE — PROGRESS NOTES
Patient Name: Paul Arciniega  Patient : 1950  Patient MRN: [id number]     Primary Oncologist: Geroldine Cockayne, MD  Referring Physician: Funmi Collins MD   Date of Service: 2023      Chief Complaint:   Chief Complaint   Patient presents with    Follow-up       Active Problem list  No diagnosis found. HPI:        Mr. Dilia Stroud ( 52) was diagnosed with stage I CLL in , when he presented with infection and lymphocytosis. His peripheral blood immunophenotyping was characteristic of B-cell CLL. It was CD38 negative, ZAP-70 positive, CD19 and 20 positive, CD5 positive. Karyotyping was normal in 2006.,  Because of his infection and associated acquired immune deficiency(hypogammaglobulinemia), he was given IVIG for a year. He was started back on IVIG therapy monthly since 2013 to prevent future major infections continuing for now. Also had minor Infusional reaction to IVIG in 2016 responded to Steroids & Benadryl., No further problems after that. CLL was treated only with Leukeran intermittently from 2007 until 2011 and again from 2011 until 2012. 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 Flow studies still showed the same and FISH in 2012 showed deletion of tp53 (17p) and MYB (6q). ,  He was thus treated with Fludara and Rituxan for six months between April and 2012 with excellent clinical and hematological response.  . In 2013, he developed Multiple b/l nodes in axilla and groin area, CAT scan on 13, Bulky lymphadenopathy within the chest, abdomen, and pelvis mildly increased as compared from previous CAT scan in 2012.,  Starting in 2014 he was initiated on  line chemo with Bendamustine and Rituxan with good initial response. Continued till Nov 2014, CAT scan done on 8/14/14 showed an interval decrease in generalized adenopathy compared to previous study In December 2013, suggesting good response to therapy. ,  CAT scan done on 12/23/14 showed Minimal interval increase in size of at least two periportal and retrocaval lymph nodes. Otherwise stable thoracic, abdominal and pelvic adenopathy. Also mild interval increase in splenomegaly measuring 19 cm, previously 17 cm. CAT scan done on 6/25/15 showed mild interval decrease in the splenomegaly and also in retroperitoneal mesenteric lymph nodes. Started Idelalisib [Zydelig] on 20th Jan 2015 at 150mg po bid. with good initial response. He did remarkably well with Zydelig from January of 2015 until June of 2016, after he was hospitalized for Rhinovirus Pneumonia in May of 2016. Also discussed Living Will, Power of Heriberto, DNR status, et cetera. In April 2016, chest CT showed stable  , 13. In July 2016, His FISH testing on peripheral blood showed abnormal results, with 6q deletion, 17p deletion, and 11q centromere signal in 3 percent of cells. The 17p deletion (Tp53) in 80 percent of cells is associated with unfavorable prognosis. Because of his CLL with poor prognostic markers, including 17p deletion detected in 2012 & again in July of 2016. He was started on ibrutinib in September 2016 140mg/d increased to 280 mg/ after 4 weeks. The abdominal CT 7/5/16 is showing no acute abnormalities, stable splenomegaly, and one periportal lymph node measuring 2.2 by 3.7 cm which is unchanged from before. CAT scan of the chest August 5, 2016, showed axillary, mediastinal, hilar, and upper abdominal lymphadenopathy consistent with his CLL. The maximum size of the nodes is 2.4 cm. A 1 cm infiltrate in the lateral segment of the right middle lobe possibly infectious in nature, segmental calcification of left coronary artery.  CAT scan of the chest, abdomen, and pelvis December 7,2016 2016, which revealed a mild mediastinal right hilar adenopathy, decreased in size from the previous examination of April and August of 2016, .5. His ibrutinib was stopped in December 2016 when he was feeling bad and it was started back at one a day, 140 mg daily instead of 280 mg that he was taking prior to that  In January 2017, he developed progressive lump in his left inguinal area. That being the only area of progressive adenopathy with multiple nodes coalesced together, a question of Pimentels transformation versus more aggressive histology conversion was considered as a possibility. Dr. Lauren Stokes, did a biopsy of the lymph node on January 18, 2017. The final pathology was reported as B-cell small lymphocytic lymphoma (B-cell CLL/SLL), with no evidence of any large cell OR Pimentels transformation. There was some evidence of localized herpetic simplex lymphadenitis. Because of possibility of localized infection with herpes. I started him on Valtrex 500 t.i.d. for two to three weeks then tapered to 1/d as maintenance therapy. Lymphadenopathy in left groin almost completely resolved. His ibrutinib was stopped in December 2016 when he was feeling bad and it was started back at one a day, 140 mg daily as maintenance dose instead of 280 mg that he was taking prior to that. His quantitative immunoglobulin on February 17, 2017, IgG 600, IgA less than 10, IgM less than 4. Serum protein electrophoresis was within the normal range. Gammaglobulin was continued monthly since it has helped any serious infections under control. August 2017 he had a IgG that was 503  October 2017 started a ibrutinib he was off for 2 months due to his insurance issues  5-18 CT chest: Showed some mild decrease in mediastinal and hilar lymphadenopathy. . Regards to his abdomen also there is decrease in his lymphadenopathy. Although there is a left inguinal node that has enlarged.  Section that no obvious source of his abdominal cramping.  10/10/2018 EGD showed severe ulcerative esophagitis with slight narrowing in the GE junction small hiatal hernia mild superficial gastritis, Colonoscope revealed 2 mm polyp in the sigmoid colon, diverticulosis, hemorrhoids, moderate stool  1/2019; Colonoscopy with two diminutive polyps, diverticulosis and hemorrhoids, path with TA above IC valve and HP distal sigmoid  10-19 iron def based on labs refered to GI , stool occult negative x #3 , NO PICA   11-19 saw Dr. Floyd, and referred to subhash for capsule endoscopy  11/7/2019: Ct chest:Ground-glass nodule seen within the right upper lobe the largest measuring 17  mm in diameter.  12-6-19 capsule endoscopy, results unavailable   2/2020: CT chest:IMPRESSION:  Previously noted ground-glass opacities in the right lung have resolved.  However, there is a new cluster of tiny pulmonary nodules in the left lower  lobe which could represent an infectious or inflammatory etiology.  Short-term follow-up chest CT is recommended in 3-6 months.  Multiple additional tiny pulmonary nodules are overall stable.  Stable mediastinal lymph nodes without new lymphadenopathy.  Previous Therapies    May 2020: Ct chest:  1. Interval resolution of the previously described cluster of pulmonary    nodules in the left lower lobe.  Findings are most compatible with resolved    infectious/inflammatory etiology.    2. No acute cardiopulmonary disease.    3. COPD.    4. Stable subcentimeter mediastinal lymph nodes.      8/28/20: US RP normal    August 2020 cystoscopy revealed enlarged prostate. Was Recommended TURBT    3/24/22: CXR:  Bilateral interstitial opacity.  Nodular consolidation at the left lung base.   Pneumonia is favored over metastatic/lymphangitic disease.  Consider atypical   etiologies.   Was treated with IV abx    But was given abx again and completed 4/21/22.    5/4/22: CT chest:  1. Patchy bilateral airspace opacities, most  prominent in the left lower lobe   concerning for multifocal pneumonia. Follow-up examination in 4-6 weeks is   recommended to assess for resolution. 2. Interval increase in intrathoracic and upper abdominal lymphadenopathy   consistent with patient's history of CLL. 3. Splenomegaly. 5/28/22: he was seen by Dr Georgianne Peabody who ordered bactrim     June 8 2022 he was seen by him again and was prescribed 2 week course of cipro    July 28 2022 CT chest:  1. Overall improvement in multifocal pneumonia. However there has been   interval development of multifocal tree-in-bud opacities which either reflect   residual pneumonia versus new infectious bronchiolitis. 2. There are scattered new solid nodular opacities as well the largest   measuring up to 8 mm also likely infectious or inflammatory in etiology. Continued follow-up in 3 months is recommended to ensure resolution and/or   stability. 3. Slight interval increase in size of a left hilar lymph node as well as a   periportal and gastrohepatic lymph nodes. Attention on follow-up exams is   recommended. Otherwise stable to slight interval decrease in size of   mediastinal and hilar adenopathy. 4. Probable splenomegaly. 9/13/2022 CBC with WBC of 11.5 hemoglobin of 11.4 hematocrit of 37 MCV of 82 and platelets of 76ICJ with creatinine 1.7 albumin 3.9  ferritin of 25 iron sats of 10% B12 1217 IgG 559    He was admitted on 9/28/2022 with severe sepsis in the setting of rhinovirus infection with superimposed bacterial pneumonia. Completed 8-day course of Zosyn. Also acute hypoxic respiratory failure in the setting of bowel and component of CHF as well. Was evaluated by cardiology as well. Has been holding ibrutinib.    9/28/2022 CT scan of the head:  Chronic microvascular disease. Negative acute bleed, midline shift or mass   effect. Findings worrisome for acute on chronic maxillary sinus disease. Please   correlate exam findings. CTA of the chest, CT scan of the abdomen pelvis:  No evidence of pulmonary embolism. Right greater than left lower lobe pneumonia. Diffuse hilar and mediastinal lymphadenopathy, concerning for malignancy with   history of CLL, also likely progressed from prior exam.       Diffuse peritoneal and retroperitoneal lymphadenopathy in the upper abdomen   most significant near the cassi hepatis and IVC where there is a large mass   which is favored to be a lymph node measuring up to 6.2 x 3.5 cm, also likely   progressed from exam.       Splenomegaly, could also be related to malignancy. Mild gallbladder wall thickening and pericholecystic fluid, could be related   to passive congestion or mass effect from adjacent lymphadenopathy. There is   mild intrahepatic bile duct dilatation. 9/29/2022 ultrasound of the abdomen:  1. Hepatomegaly and hepatic steatosis. 2. Masses most compatible with lymphadenopathy in the cassi hepatis, better   seen on recent CT. 3. Hyperechoic focus in the liver could be focal fatty infiltration,   hemangioma or other mass. This is too small to characterize on recent CT. Correlate with MRI hepatic mass protocol if indicated. 4. Gallbladder wall thickening with possible adenomyomatosis. HIDA scan with no convincing evidence of acute cholecystitis. 10/5/2022 underwent right thoracentesis, cytology negative for carcinoma. Many small lymphoid cells were seen    Postthoracentesis chest x-ray with slight interval decrease in size of the right pleural effusion. No pneumothorax. 10/23/22; Ct chest:  The infiltrates seen previously in the lower lobes bilaterally for the most   part have decreased in size and conspicuity. However, there are now innumerable punctate tree-in-bud centrilobular micro   nodules, which are nonspecific but suggest inflammatory/infectious   bronchiolitis. Consider both typical and atypical etiologies.        In addition, cavitary lesion has developed in the periphery of the right   lower lobe and to a lesser extent within the left upper lung. Necrotizing   infection would be primarily considered given the rapid development. Consider both typical and atypical etiologies. Enlarged mediastinal lymph nodes, similar when compared to the previous exam,   process of Lia related to history of chronic lymphocytic leukemia. 11/23/22: was admitted with Increased sob  CTA chest:   1. Negative for pulmonary embolus. 2. Multiple large mediastinal lymph nodes. These appear larger than   previously seen. 3. Extensive bilateral ground-glass opacities and tiny lung nodules. Possibility of acute respiratory distress syndrome   4. Probable atypical pneumonia. Aspergillosis could be in the differential.   This is similar in appearance to the previous study. 5. Splenomegaly without change     12/12/22; CT chest results:  1. Overall stable to mild interval decrease in intrathoracic lymphadenopathy. 2.  Interval decrease in previously noted bilateral ground-glass airspace   opacities and tree-in-bud nodularity consistent with improving infectious or   inflammatory process. Interval decrease in wall thickness of right lower   lobe cavitary lesion since prior examination. Follow-up to resolution is   recommended. 3.  Marked splenomegaly measuring up to 18 cm.     12/29/22: BMB:  Final Pathologic Diagnosis:   A-B. Bone marrow, biopsy and aspirate smears:   -     EXTENSIVE INVOLVEMENT BY CHRONIC LYMPHOCYTIC LEUKEMIA   (~90%). Peripheral blood smear:   -     CHRONIC LYMPHOCYTIC LEUKEMIA. -     Macrocytic anemia. -     Thrombocytopenia. Flow cytometry: CD5+ monoclonal B-cell population,   consistent with CLL/SLL. Karyotype:   45,XY,del(6)(q13q25),liborio(17;18)(q10;q10)[15]/46,XY[5]     FISH:   6q-: Del 6u61-u29.2 DETECTED   Chromosome 12:  No abnormalities detected   Chromosome 13: No abnormalities detected Chromosome 11: No abnormalities detected   Chromosome 17: TP53 deletion DETECTED   CCND1/IgH t(11;14): No abnormalities detected     C1D1 Obinutuzumab started 2023      PAST MEDICAL HISTORY:   1. Stage I CLL with conversion from good to poor prognostic factors. ,  2. History of hypertension for many years. ,  3. 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 Arthritis in the past. ,  5. Frozen shoulder for which he had treatment including injection by Dr. Mae Lares. ,  6. Cellulitis with Zoster type lesion Left thigh resolved with Bactrim and Valtrex in 2016.,  7. abdominal illness, with fever, nausea, and discomfort, suggestive of infectious etiology in May 2016. ,  8. hospitalization between  and  for what seems like atypical rhinovirus pneumonia involving right middle and lower lobe and left lower lobe with sepsis,  9. 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 Dr. Yuniel Raza is referring him for MOHS surgery. ,  10. hospitalized from  to 2017, for hyperosmolar hyperglycemia with hyponatremia and hyperkalemia and UTI. He was seen by Dr. Carolina Dominique, who put him on insulin. He was also seen by Dr. Makayla Jonas. He felt much better after his sugar got under better control and electrolyte imbalanced reversed,  11. Ultrasound Doppler 2017, was negative for any DVT in either leg. Chest x-ray showed no acute process. PAST SURGICAL HISTORY:   1. knee operation,  2. tonsillectomy,  3. broken ankle times two requiring open reduction. ,  4. Vision better after cataract surgery    FAMILY HISTORY:   His paternal aunt had colon cancer. Uncle also had some form of cancer. Father had heart disease. Sister  12 of Liver disease     SOCIAL HISTORY:   He has a 40-pack-year history of smoking, quit in . He denies alcohol use. He is not .   Has one son.    Interval History  2/20/2023: Seen with son, has been tolerating treatment without significant AE. Remains on tobra inhaler with anticpated completion date 2/23. Continues on 02 per NC. Review of CBC stable, ongoing thrombocytopenia with associated bruising to upper extremities, no signs of new infection, denies new musculoskeletal pain, denies headache, constipation, edema, no worsening fatigue. Continues to be able to complete ADL independently. Review of Systems   Per interval history; otherwise 10 point ROS is negative              Vital Signs:  /74   Pulse 96   Temp 97.5 °F (36.4 °C) (Infrared)   Resp 18   Ht 6' (1.829 m)   Wt 166 lb (75.3 kg)   SpO2 93%   BMI 22.51 kg/m²       Physical Exam:  CONSTITUTIONAL: awake, alert, cooperative, no apparent distress, chronically ill appearing  EYES: EOM grossly intact, +conjunctival pallor, no scleral icterus  ENT: Normocephalic, without obvious abnormality, atraumatic  NECK: supple, symmetrical, no jugular venous distension  HEMATOLOGIC/LYMPHATIC: no cervical, supraclavicular or axillary lymphadenopathy   LUNGS: Poor inspiratory effort, no wheezes/rhonci/rales, unlabored on baseline 3L NC  CARDIOVASCULAR: tachycardic with regular rhythm, normal S1 and S2, no murmur noted  ABDOMEN: Non-tender, non-distended, +BS  MUSCULOSKELETAL: full range of motion noted, tone is normal  NEUROLOGIC: awake, alert, oriented to name, place and time. Motor skills grossly intact. SKIN: warm and dry, no jaundice, no bruising or petechiae.   EXTREMITIES: no peripheral edema, no clubbing or cyanosis      Labs:    Hematology:  Lab Results   Component Value Date    WBC 5.0 02/20/2023    RBC 3.17 (L) 02/20/2023    HGB 9.1 (L) 02/20/2023    HCT 29.5 (L) 02/20/2023    MCV 93.1 02/20/2023    MCH 28.7 02/20/2023    MCHC 30.8 (L) 02/20/2023    RDW 16.3 (H) 02/20/2023    PLT 53 (L) 02/20/2023    MPV 9.1 02/20/2023    BANDSPCT 3 (L) 01/06/2023    SEGSPCT 20.4 (L) 02/20/2023 EOSRELPCT 0.6 02/20/2023    BASOPCT 0.2 02/20/2023    LYMPHOPCT 64.7 (H) 02/20/2023    MONOPCT 14.1 (H) 02/20/2023    BANDABS 2.41 01/06/2023    SEGSABS 1.0 02/20/2023    EOSABS 0.0 02/20/2023    BASOSABS 0.0 02/20/2023    LYMPHSABS 3.3 02/20/2023    MONOSABS 0.7 02/20/2023    DIFFTYPE AUTOMATED DIFFERENTIAL 02/20/2023    ANISOCYTOSIS 1+ 01/06/2023    POLYCHROM 1+ 12/13/2022    WBCMORP  12/29/2022     LAGRE POPULATION OF LYMPHOCYTES WITH DENSE, CLUMPED CHROMATIN AND VISIBLE NUCLEOLI. PLTM SEVERAL LARGE PLATELETS 74/25/6773     Lab Results   Component Value Date    ESR 2 12/01/2022       Chemistry:  Lab Results   Component Value Date     02/20/2023    K 4.8 02/20/2023     02/20/2023    CO2 25 02/20/2023    BUN 25 (H) 02/20/2023    CREATININE 1.1 02/20/2023    GLUCOSE 260 (H) 02/20/2023    CALCIUM 8.2 (L) 02/20/2023    PROT 5.1 (L) 02/20/2023    LABALBU 3.7 02/20/2023    BILITOT 0.4 02/20/2023    ALKPHOS 177 (H) 02/20/2023    AST 20 02/20/2023    ALT 21 02/20/2023    LABGLOM >60 02/20/2023    GFRAA >60 10/11/2022    PHOS 3.4 02/06/2023    MG 2.1 11/25/2022    POCCA 1.15 01/16/2023    POCGLU 179 (H) 01/16/2023     Lab Results   Component Value Date     (H) 10/11/2022     No components found for: LD  Lab Results   Component Value Date    TSHHS 3.040 04/04/2019    T4FREE 1.37 04/04/2019    FT3 3.2 03/27/2012       Immunology:  Lab Results   Component Value Date    PROT 5.1 (L) 02/20/2023    SPEP  12/13/2022     INTERPRETATION - The SPEP shows a monoclonal like spike in the gamma region, measuring approximately 200 mg/dL. The concurrent CHRISTINA Shows a faint possible free lambda proteins. Decreased total proteins. Suggest follow up. LP.    SPEP  12/13/2022     INTERPRETATION - A faint possible monoclonal free lambda light chain is noted. Suggest follow up.   LP.    ALBUMINELP 3.2 12/13/2022    LABALPH 0.3 12/13/2022    LABALPH 0.7 12/13/2022    LABBETA 0.8 12/13/2022    GAMGLOB 0.5 12/13/2022 No results found for: Adhikari Erp, KLFLCR  No results found for: B2M    Coagulation Panel:  Lab Results   Component Value Date    PROTIME 13.3 12/13/2022    INR 1.03 12/13/2022    APTT 27.7 12/13/2022    DDIMER >5250 (H) 11/23/2022       Anemia Panel:  Lab Results   Component Value Date    SKVXSCHC20 1052 (H) 12/13/2022    FOLATE 4.4 12/13/2022       Tumor Markers:  Lab Results   Component Value Date    PSA 1.9 10/27/2020         Imaging: Reviewed     Pathology:Reviewed     Observations:  Performance Status: ECOG 1  Depression Status: No data recorded          Assessment & Plan:  B-cell CLL, stage I with conversion from good to poor prognostic factors, with 17p deletion:   His diagnosis of CLL since 2007.   17p deletion since 2015. Initial treatment with Leukeran from 2000 to 2011 intermittently and FCR regimen in 2012, bendamustine/Rituxan in 2014. Idelalisib from January 2015 until June of 2016. On ibrutinib since September 2016. Ibrutinib therapy seems to be helping him, overall improvement. Was Only able to tolerate 140 mg p.o. every other day  He was off for a couple months During the fall 2017  Resumed Ibrutinib and  on 140 mg po daily. Plan to Continue current dose as no major B sx, stable WBC. CT imaging in July 2022 with slightly increasing size of intraabdominal LN. plan was to monitor. But note declining platelet count, probably secondary to ibrutinib versus progressive CLL versus underlying infection and antibiotics. Note CT scan of the chest abdomen pelvis also with progressive lymphadenopathy. Discussed the findings and discussed systemic treatment with single agent Rituxan(preferable secondary to his performance status) versus BR and he wanted to proceed with single agent Rituxan. Discussed adverse effects. Holding Ibrutinib. After C1 he was admitted with fever, hypotenson which I dont believe is infusion reaction.   Re challenge with rituxan again with same reaction  Discussed LN biopsy but he deferred   BMB with extensive involvement with CLL. Complex cytogenetics. Discussed treatment with venetoclax and Roberta Moons and he wanted to proceed   Discussed adverse effects  OCM requested. C1D1 Obinutuzumab started 1/9/23. Tolerating fairly well. Dose adjustments as needed  Started Low Dose Venetoclax in last Jan 2023. Confirms taking allopurinol, valacyclovir    Thrombocytopenia, no hemolysis, monoclonal gammopathy, nutritional deficiency, coagulopathy in the past.  Looks like sec to extensive BM infiltration with CLL. Monitor for now. TPO -A denied. Avoid AC, NSAIDS and monitor for any bleeding. Stable to improving. Rhinovirus and P aeriginosa: is on Tobra inhaler and oral azithromycin. Follows with ID, next appt in 2 weeks. On 3L NC chronically. Taking mucinex daily. Acquired hypogammaglobulinemia:   Continue IVIG monthly  12/1/22 IgG 532    BPH: is being followed by Urology, to follow up 8/23. Iron def anemia and esophagitis: follows with GI, Dr Franko Myers. Reported that he had colonoscopy 2018 with polyps detected. EGD was normal except for H. pylori which was treated. Was supposed to have VCE which was unsuccessful once. He is on oral iron EOD, adequate bowel regimen. Follow with GI. Oral iron every other day. Lung Nodule RUL: Stable findings, will follow    Discussed above findings and plan with him and he verbalized understanding. Answered all his questions. Recommend follow-up with primary care physician and other specialist.    Please do not hesitate to contact us if you need any further information. Return to clinic Feb 2023  or earlier if new symptoms    JENNI Ackerman - CNP    Portions of this note are copied forward from previous clinic note. Interval history, ROS, physical exam, assessment and plan has been reviewed and updated for accuracy by this provider.

## 2023-02-21 RX ORDER — METHYLPREDNISOLONE SODIUM SUCCINATE 40 MG/ML
40 INJECTION, POWDER, LYOPHILIZED, FOR SOLUTION INTRAMUSCULAR; INTRAVENOUS ONCE
Start: 2023-02-21 | End: 2023-02-21

## 2023-02-21 RX ORDER — DIPHENHYDRAMINE HYDROCHLORIDE 50 MG/ML
25 INJECTION INTRAMUSCULAR; INTRAVENOUS ONCE
Start: 2023-02-21 | End: 2023-02-21

## 2023-02-28 ENCOUNTER — HOSPITAL ENCOUNTER (OUTPATIENT)
Dept: GENERAL RADIOLOGY | Age: 73
Discharge: HOME | End: 2023-02-28
Payer: COMMERCIAL

## 2023-02-28 ENCOUNTER — TELEPHONE (OUTPATIENT)
Dept: ONCOLOGY | Age: 73
End: 2023-02-28

## 2023-02-28 ENCOUNTER — HOSPITAL ENCOUNTER (OUTPATIENT)
Dept: INFUSION THERAPY | Age: 73
Discharge: HOME | End: 2023-02-28
Payer: COMMERCIAL

## 2023-02-28 ENCOUNTER — HOSPITAL ENCOUNTER (OUTPATIENT)
Age: 73
Discharge: HOME | End: 2023-02-28
Payer: COMMERCIAL

## 2023-02-28 VITALS
DIASTOLIC BLOOD PRESSURE: 81 MMHG | OXYGEN SATURATION: 90 % | TEMPERATURE: 97.7 F | SYSTOLIC BLOOD PRESSURE: 125 MMHG | HEART RATE: 86 BPM | RESPIRATION RATE: 16 BRPM

## 2023-02-28 DIAGNOSIS — R09.02 HYPOXIA: ICD-10-CM

## 2023-02-28 DIAGNOSIS — E86.0 DEHYDRATION: Primary | ICD-10-CM

## 2023-02-28 LAB
ALBUMIN SERPL-MCNC: 3.9 GM/DL (ref 3.4–5)
ALP BLD-CCNC: 173 IU/L (ref 40–128)
ALT SERPL-CCNC: 22 U/L (ref 10–40)
ANION GAP SERPL CALCULATED.3IONS-SCNC: 11 MMOL/L (ref 4–16)
AST SERPL-CCNC: 20 IU/L (ref 15–37)
BILIRUB SERPL-MCNC: 0.6 MG/DL (ref 0–1)
BUN SERPL-MCNC: 24 MG/DL (ref 6–23)
CALCIUM SERPL-MCNC: 8.3 MG/DL (ref 8.3–10.6)
CHLORIDE BLD-SCNC: 101 MMOL/L (ref 99–110)
CO2: 26 MMOL/L (ref 21–32)
CREAT SERPL-MCNC: 0.9 MG/DL (ref 0.9–1.3)
DIFFERENTIAL TYPE: ABNORMAL
EOSINOPHILS ABSOLUTE: 0.1 K/CU MM
EOSINOPHILS RELATIVE PERCENT: 1 % (ref 0–3)
GFR SERPL CREATININE-BSD FRML MDRD: >60 ML/MIN/1.73M2
GLUCOSE SERPL-MCNC: 69 MG/DL (ref 70–99)
HCT VFR BLD CALC: 31.7 % (ref 42–52)
HEMOGLOBIN: 9.9 GM/DL (ref 13.5–18)
HYPOCHROMIA: ABNORMAL
LYMPHOCYTES ABSOLUTE: 6.1 K/CU MM
LYMPHOCYTES RELATIVE PERCENT: 78 % (ref 24–44)
MCH RBC QN AUTO: 27.3 PG (ref 27–31)
MCHC RBC AUTO-ENTMCNC: 31.2 % (ref 32–36)
MCV RBC AUTO: 87.6 FL (ref 78–100)
MONOCYTES ABSOLUTE: 0.5 K/CU MM
MONOCYTES RELATIVE PERCENT: 6 % (ref 0–4)
OVALOCYTES: ABNORMAL
PDW BLD-RTO: 16.2 % (ref 11.7–14.9)
PLATELET # BLD: 49 K/CU MM (ref 140–440)
PMV BLD AUTO: ABNORMAL FL (ref 7.5–11.1)
POTASSIUM SERPL-SCNC: 4.4 MMOL/L (ref 3.5–5.1)
RBC # BLD: 3.62 M/CU MM (ref 4.6–6.2)
RBC # BLD: ABNORMAL 10*6/UL
SEGMENTED NEUTROPHILS ABSOLUTE COUNT: 1.2 K/CU MM
SEGMENTED NEUTROPHILS RELATIVE PERCENT: 15 % (ref 36–66)
SMUDGE CELLS: PRESENT
SODIUM BLD-SCNC: 138 MMOL/L (ref 135–145)
TEAR DROP CELLS: ABNORMAL
TOTAL PROTEIN: 5.5 GM/DL (ref 6.4–8.2)
WBC # BLD: 7.9 K/CU MM (ref 4–10.5)

## 2023-02-28 PROCEDURE — 85007 BL SMEAR W/DIFF WBC COUNT: CPT

## 2023-02-28 PROCEDURE — 36415 COLL VENOUS BLD VENIPUNCTURE: CPT

## 2023-02-28 PROCEDURE — 80053 COMPREHEN METABOLIC PANEL: CPT

## 2023-02-28 PROCEDURE — 71046 X-RAY EXAM CHEST 2 VIEWS: CPT

## 2023-02-28 PROCEDURE — 2580000003 HC RX 258: Performed by: INTERNAL MEDICINE

## 2023-02-28 PROCEDURE — 85027 COMPLETE CBC AUTOMATED: CPT

## 2023-02-28 PROCEDURE — 96360 HYDRATION IV INFUSION INIT: CPT

## 2023-02-28 PROCEDURE — 6360000002 HC RX W HCPCS: Performed by: INTERNAL MEDICINE

## 2023-02-28 RX ORDER — SODIUM CHLORIDE 0.9 % (FLUSH) 0.9 %
5-40 SYRINGE (ML) INJECTION PRN
OUTPATIENT
Start: 2023-02-28

## 2023-02-28 RX ORDER — HEPARIN SODIUM (PORCINE) LOCK FLUSH IV SOLN 100 UNIT/ML 100 UNIT/ML
500 SOLUTION INTRAVENOUS PRN
Status: CANCELLED | OUTPATIENT
Start: 2023-02-28

## 2023-02-28 RX ORDER — AZITHROMYCIN 500 MG/1
500 TABLET, FILM COATED ORAL DAILY
Qty: 3 TABLET | Refills: 0 | Status: SHIPPED | OUTPATIENT
Start: 2023-02-28 | End: 2023-03-03

## 2023-02-28 RX ORDER — 0.9 % SODIUM CHLORIDE 0.9 %
1000 INTRAVENOUS SOLUTION INTRAVENOUS ONCE
Status: CANCELLED | OUTPATIENT
Start: 2023-02-28 | End: 2023-02-28

## 2023-02-28 RX ORDER — HEPARIN SODIUM (PORCINE) LOCK FLUSH IV SOLN 100 UNIT/ML 100 UNIT/ML
500 SOLUTION INTRAVENOUS PRN
OUTPATIENT
Start: 2023-02-28

## 2023-02-28 RX ORDER — PREDNISONE 20 MG/1
40 TABLET ORAL DAILY
Qty: 10 TABLET | Refills: 0 | Status: SHIPPED | OUTPATIENT
Start: 2023-02-28 | End: 2023-03-05

## 2023-02-28 RX ORDER — 0.9 % SODIUM CHLORIDE 0.9 %
1000 INTRAVENOUS SOLUTION INTRAVENOUS ONCE
Status: COMPLETED | OUTPATIENT
Start: 2023-02-28 | End: 2023-02-28

## 2023-02-28 RX ORDER — SODIUM CHLORIDE 9 MG/ML
5-250 INJECTION, SOLUTION INTRAVENOUS PRN
OUTPATIENT
Start: 2023-02-28

## 2023-02-28 RX ORDER — HEPARIN SODIUM (PORCINE) LOCK FLUSH IV SOLN 100 UNIT/ML 100 UNIT/ML
500 SOLUTION INTRAVENOUS PRN
Status: DISCONTINUED | OUTPATIENT
Start: 2023-02-28 | End: 2023-03-01 | Stop reason: HOSPADM

## 2023-02-28 RX ADMIN — HEPARIN 500 UNITS: 100 SYRINGE at 15:11

## 2023-02-28 RX ADMIN — SODIUM CHLORIDE 1000 ML: 9 INJECTION, SOLUTION INTRAVENOUS at 14:09

## 2023-02-28 NOTE — TELEPHONE ENCOUNTER
Patient son called and lvm that patient has dark urine, sore on the buttock, increased tiredness and decreased appetite.  Please advise [phone]

## 2023-02-28 NOTE — PROGRESS NOTES
Assisted to infusion area, here today for hydration. Patient reports feeling dehydrated, decreased appetite, and sore to left hip that appeared yesterday. O2 90% on 3L, patient does have COPD. Reports he has a cough, that is productive, bringing up white phlegm. Denies fevers. Right chest mediport accessed, positive blood return noted. Labs drawn. Redness to left hip, approximately 3inches in length, and blanchable. Patient reports he does lay on the left side and it is a stinging pain to site. SAI Quevedo to chairside to assess. 1L administered as ordered. SAI Quevedo to order CXR, steroids, and antbiotic. Call light within reach. Tolerated infusion without incident. RTC 03/20 for next infusion, b12 injection and OV with Dr. La Zepeda.

## 2023-02-28 NOTE — TELEPHONE ENCOUNTER
This nurse discussed the patient's symptoms with Dr Yuliya Kelley and she would like for him to receive IV fluids. This nurse had the patient placed on the schedule for IVF and labs at [phone] on 02/28/2023. This nurse called the patient's son @ [phone] and notified him of the plan, he and the patient are agreeable. The patient will be in a private room so staff can assess his wound.

## 2023-02-28 NOTE — PLAN OF CARE
Called to bedside for pt with wheezing on exam, oxygenation 90% on his baseline 3L NC. Pt states he has productive cough, no fever/chills or other URI symptoms. Is using nebulizer at home, on inhaled tobramycin at baseline. Has COPD hx.      PE:  Gen: thin, chronically ill appearing but non toxic  ENT: no congestion, post nasal drip  Cards: tachycardic  Pulm: no respiratory distress, significant wheezing in bilateral upper lung fields, no rales    Plan:  COPD exacerbation: 40mg pred x 5 days, Azithro x 3 days. STAT CXR, will d/w ID if + for pneumonia, has hx of pseudomonal pna for which he was on inhaled tobramycin. Was pansensitive in November 2022. Does not appear acutely ill with pna at this time however low threshold to start empiric therapy.

## 2023-03-03 ENCOUNTER — CLINICAL DOCUMENTATION (OUTPATIENT)
Dept: ONCOLOGY | Age: 73
End: 2023-03-03

## 2023-03-03 DIAGNOSIS — J98.4 CAVITARY LESION OF LUNG: Primary | ICD-10-CM

## 2023-03-03 NOTE — PROGRESS NOTES
Called patient @ [phone] to notify that PA has ordered chest CT to better evaluate and make sure he is improving on the COPD exacerbation treatment. Patient aware that he will be contacted once appointment scheduled. Voices understanding. No further needs addressed at this time.

## 2023-03-17 ENCOUNTER — HOSPITAL ENCOUNTER (OUTPATIENT)
Dept: CT IMAGING | Age: 73
Discharge: HOME | End: 2023-03-17
Payer: COMMERCIAL

## 2023-03-17 DIAGNOSIS — K90.9 INTESTINAL MALABSORPTION, UNSPECIFIED TYPE: ICD-10-CM

## 2023-03-17 DIAGNOSIS — J98.4 CAVITARY LESION OF LUNG: ICD-10-CM

## 2023-03-17 PROCEDURE — 71260 CT THORAX DX C+: CPT

## 2023-03-17 PROCEDURE — 6360000004 HC RX CONTRAST MEDICATION: Performed by: PHYSICIAN ASSISTANT

## 2023-03-17 RX ORDER — HYDROCODONE BITARTRATE AND ACETAMINOPHEN 10; 325 MG/1; MG/1
1 TABLET ORAL EVERY 12 HOURS PRN
Qty: 60 TABLET | Refills: 0 | Status: SHIPPED | OUTPATIENT
Start: 2023-03-17 | End: 2023-04-16

## 2023-03-17 RX ORDER — GABAPENTIN 100 MG/1
100 CAPSULE ORAL 3 TIMES DAILY
Qty: 90 CAPSULE | Refills: 0 | Status: SHIPPED | OUTPATIENT
Start: 2023-03-17 | End: 2023-04-16

## 2023-03-17 RX ORDER — SODIUM CHLORIDE 0.9 % (FLUSH) 0.9 %
5-40 SYRINGE (ML) INJECTION PRN
Status: DISCONTINUED | OUTPATIENT
Start: 2023-03-17 | End: 2023-03-18 | Stop reason: HOSPADM

## 2023-03-17 RX ORDER — FERROUS GLUCONATE 324(37.5)
324 TABLET ORAL EVERY OTHER DAY
Qty: 15 TABLET | Refills: 0 | Status: SHIPPED | OUTPATIENT
Start: 2023-03-17 | End: 2023-04-16

## 2023-03-17 RX ADMIN — IOPAMIDOL 75 ML: 755 INJECTION, SOLUTION INTRAVENOUS at 09:10

## 2023-03-19 RX ORDER — DIPHENHYDRAMINE HYDROCHLORIDE 50 MG/ML
50 INJECTION INTRAMUSCULAR; INTRAVENOUS
Status: CANCELLED | OUTPATIENT
Start: 2023-03-20

## 2023-03-19 RX ORDER — MEPERIDINE HYDROCHLORIDE 25 MG/ML
12.5 INJECTION INTRAMUSCULAR; INTRAVENOUS; SUBCUTANEOUS PRN
Status: CANCELLED | OUTPATIENT
Start: 2023-03-20

## 2023-03-19 RX ORDER — FAMOTIDINE 10 MG/ML
20 INJECTION, SOLUTION INTRAVENOUS
Status: CANCELLED | OUTPATIENT
Start: 2023-03-20

## 2023-03-19 RX ORDER — ONDANSETRON 2 MG/ML
8 INJECTION INTRAMUSCULAR; INTRAVENOUS
Status: CANCELLED | OUTPATIENT
Start: 2023-03-20

## 2023-03-19 RX ORDER — ACETAMINOPHEN 325 MG/1
650 TABLET ORAL
Status: CANCELLED | OUTPATIENT
Start: 2023-03-20

## 2023-03-19 RX ORDER — SODIUM CHLORIDE 9 MG/ML
5-40 INJECTION INTRAVENOUS PRN
Status: CANCELLED | OUTPATIENT
Start: 2023-03-20

## 2023-03-19 RX ORDER — SODIUM CHLORIDE 9 MG/ML
5-250 INJECTION, SOLUTION INTRAVENOUS PRN
Status: CANCELLED | OUTPATIENT
Start: 2023-03-20

## 2023-03-19 RX ORDER — ALBUTEROL SULFATE 90 UG/1
4 AEROSOL, METERED RESPIRATORY (INHALATION) PRN
Status: CANCELLED | OUTPATIENT
Start: 2023-03-20

## 2023-03-19 RX ORDER — EPINEPHRINE 1 MG/ML
0.3 INJECTION, SOLUTION, CONCENTRATE INTRAVENOUS PRN
Status: CANCELLED | OUTPATIENT
Start: 2023-03-20

## 2023-03-19 RX ORDER — SODIUM CHLORIDE 9 MG/ML
INJECTION, SOLUTION INTRAVENOUS CONTINUOUS
Status: CANCELLED | OUTPATIENT
Start: 2023-03-20

## 2023-03-20 ENCOUNTER — OFFICE VISIT (OUTPATIENT)
Dept: ONCOLOGY | Age: 73
End: 2023-03-20
Payer: COMMERCIAL

## 2023-03-20 ENCOUNTER — HOSPITAL ENCOUNTER (OUTPATIENT)
Dept: INFUSION THERAPY | Age: 73
Discharge: HOME | End: 2023-03-20
Payer: COMMERCIAL

## 2023-03-20 VITALS
RESPIRATION RATE: 16 BRPM | HEART RATE: 99 BPM | BODY MASS INDEX: 22.75 KG/M2 | OXYGEN SATURATION: 90 % | WEIGHT: 168 LBS | SYSTOLIC BLOOD PRESSURE: 122 MMHG | DIASTOLIC BLOOD PRESSURE: 67 MMHG | HEIGHT: 72 IN | TEMPERATURE: 97.9 F

## 2023-03-20 VITALS
WEIGHT: 168 LBS | OXYGEN SATURATION: 90 % | TEMPERATURE: 97.9 F | HEART RATE: 99 BPM | HEIGHT: 72 IN | SYSTOLIC BLOOD PRESSURE: 122 MMHG | DIASTOLIC BLOOD PRESSURE: 67 MMHG | BODY MASS INDEX: 22.75 KG/M2

## 2023-03-20 DIAGNOSIS — C91.10 CLL (CHRONIC LYMPHOCYTIC LEUKEMIA) (HCC): ICD-10-CM

## 2023-03-20 DIAGNOSIS — D50.0 IRON DEFICIENCY ANEMIA DUE TO CHRONIC BLOOD LOSS: ICD-10-CM

## 2023-03-20 DIAGNOSIS — D64.9 ANEMIA, UNSPECIFIED TYPE: ICD-10-CM

## 2023-03-20 DIAGNOSIS — D69.3 IMMUNE THROMBOCYTOPENIA (HCC): ICD-10-CM

## 2023-03-20 DIAGNOSIS — D80.1 HYPOGAMMAGLOBULINEMIA (HCC): ICD-10-CM

## 2023-03-20 DIAGNOSIS — E53.8 B12 DEFICIENCY: ICD-10-CM

## 2023-03-20 DIAGNOSIS — K90.9 INTESTINAL MALABSORPTION, UNSPECIFIED TYPE: ICD-10-CM

## 2023-03-20 DIAGNOSIS — K90.9 INTESTINAL MALABSORPTION, UNSPECIFIED TYPE: Primary | ICD-10-CM

## 2023-03-20 DIAGNOSIS — D80.3 SELECTIVE DEFICIENCY OF IGG (HCC): ICD-10-CM

## 2023-03-20 DIAGNOSIS — D69.6 THROMBOCYTOPENIA (HCC): ICD-10-CM

## 2023-03-20 DIAGNOSIS — C91.10 LEUKEMIA, LYMPHOCYTIC, CHRONIC (HCC): ICD-10-CM

## 2023-03-20 DIAGNOSIS — C91.10 CLL (CHRONIC LYMPHOCYTIC LEUKEMIA) (HCC): Primary | ICD-10-CM

## 2023-03-20 LAB
ALBUMIN SERPL-MCNC: 3.9 GM/DL (ref 3.4–5)
ALP BLD-CCNC: 183 IU/L (ref 40–128)
ALT SERPL-CCNC: 24 U/L (ref 10–40)
ANION GAP SERPL CALCULATED.3IONS-SCNC: 10 MMOL/L (ref 4–16)
AST SERPL-CCNC: 21 IU/L (ref 15–37)
BASOPHILS ABSOLUTE: 0 K/CU MM
BASOPHILS RELATIVE PERCENT: 0.4 % (ref 0–1)
BILIRUB SERPL-MCNC: 0.4 MG/DL (ref 0–1)
BUN SERPL-MCNC: 21 MG/DL (ref 6–23)
CALCIUM SERPL-MCNC: 7.8 MG/DL (ref 8.3–10.6)
CHLORIDE BLD-SCNC: 102 MMOL/L (ref 99–110)
CO2: 25 MMOL/L (ref 21–32)
CREAT SERPL-MCNC: 1 MG/DL (ref 0.9–1.3)
DIFFERENTIAL TYPE: ABNORMAL
EOSINOPHILS ABSOLUTE: 0 K/CU MM
EOSINOPHILS RELATIVE PERCENT: 0.6 % (ref 0–3)
FERRITIN: 42 NG/ML (ref 30–400)
FOLATE SERPL-MCNC: 7.1 NG/ML (ref 3.1–17.5)
GFR SERPL CREATININE-BSD FRML MDRD: >60 ML/MIN/1.73M2
GLUCOSE SERPL-MCNC: 240 MG/DL (ref 70–99)
HCT VFR BLD CALC: 32.6 % (ref 42–52)
HEMOGLOBIN: 9.9 GM/DL (ref 13.5–18)
LYMPHOCYTES ABSOLUTE: 3.2 K/CU MM
LYMPHOCYTES RELATIVE PERCENT: 65.4 % (ref 24–44)
MCH RBC QN AUTO: 26.6 PG (ref 27–31)
MCHC RBC AUTO-ENTMCNC: 30.4 % (ref 32–36)
MCV RBC AUTO: 87.6 FL (ref 78–100)
MONOCYTES ABSOLUTE: 0.5 K/CU MM
MONOCYTES RELATIVE PERCENT: 11.2 % (ref 0–4)
PDW BLD-RTO: 16.8 % (ref 11.7–14.9)
PLATELET # BLD: 58 K/CU MM (ref 140–440)
PMV BLD AUTO: ABNORMAL FL (ref 7.5–11.1)
POTASSIUM SERPL-SCNC: 4.8 MMOL/L (ref 3.5–5.1)
RBC # BLD: 3.72 M/CU MM (ref 4.6–6.2)
SEGMENTED NEUTROPHILS ABSOLUTE COUNT: 1.1 K/CU MM
SEGMENTED NEUTROPHILS RELATIVE PERCENT: 22.4 % (ref 36–66)
SODIUM BLD-SCNC: 137 MMOL/L (ref 135–145)
TOTAL PROTEIN: 5.2 GM/DL (ref 6.4–8.2)
VITAMIN B-12: 1062 PG/ML (ref 211–911)
WBC # BLD: 4.8 K/CU MM (ref 4–10.5)

## 2023-03-20 PROCEDURE — 96415 CHEMO IV INFUSION ADDL HR: CPT

## 2023-03-20 PROCEDURE — 1123F ACP DISCUSS/DSCN MKR DOCD: CPT | Performed by: INTERNAL MEDICINE

## 2023-03-20 PROCEDURE — 3017F COLORECTAL CA SCREEN DOC REV: CPT | Performed by: INTERNAL MEDICINE

## 2023-03-20 PROCEDURE — 6360000002 HC RX W HCPCS: Performed by: PHYSICIAN ASSISTANT

## 2023-03-20 PROCEDURE — 96375 TX/PRO/DX INJ NEW DRUG ADDON: CPT

## 2023-03-20 PROCEDURE — 82746 ASSAY OF FOLIC ACID SERUM: CPT

## 2023-03-20 PROCEDURE — 82728 ASSAY OF FERRITIN: CPT

## 2023-03-20 PROCEDURE — 2580000003 HC RX 258: Performed by: PHYSICIAN ASSISTANT

## 2023-03-20 PROCEDURE — 6360000002 HC RX W HCPCS: Performed by: INTERNAL MEDICINE

## 2023-03-20 PROCEDURE — 96367 TX/PROPH/DG ADDL SEQ IV INF: CPT

## 2023-03-20 PROCEDURE — 6360000002 HC RX W HCPCS: Performed by: NURSE PRACTITIONER

## 2023-03-20 PROCEDURE — 6370000000 HC RX 637 (ALT 250 FOR IP): Performed by: PHYSICIAN ASSISTANT

## 2023-03-20 PROCEDURE — G8484 FLU IMMUNIZE NO ADMIN: HCPCS | Performed by: INTERNAL MEDICINE

## 2023-03-20 PROCEDURE — 99214 OFFICE O/P EST MOD 30 MIN: CPT | Performed by: INTERNAL MEDICINE

## 2023-03-20 PROCEDURE — 82607 VITAMIN B-12: CPT

## 2023-03-20 PROCEDURE — G8420 CALC BMI NORM PARAMETERS: HCPCS | Performed by: INTERNAL MEDICINE

## 2023-03-20 PROCEDURE — 96413 CHEMO IV INFUSION 1 HR: CPT

## 2023-03-20 PROCEDURE — G8428 CUR MEDS NOT DOCUMENT: HCPCS | Performed by: INTERNAL MEDICINE

## 2023-03-20 PROCEDURE — 1036F TOBACCO NON-USER: CPT | Performed by: INTERNAL MEDICINE

## 2023-03-20 PROCEDURE — 80053 COMPREHEN METABOLIC PANEL: CPT

## 2023-03-20 PROCEDURE — 85025 COMPLETE CBC W/AUTO DIFF WBC: CPT

## 2023-03-20 PROCEDURE — 96372 THER/PROPH/DIAG INJ SC/IM: CPT

## 2023-03-20 RX ORDER — ONDANSETRON 2 MG/ML
8 INJECTION INTRAMUSCULAR; INTRAVENOUS
OUTPATIENT
Start: 2023-03-26

## 2023-03-20 RX ORDER — SODIUM CHLORIDE 0.9 % (FLUSH) 0.9 %
10 SYRINGE (ML) INJECTION PRN
OUTPATIENT
Start: 2023-04-17

## 2023-03-20 RX ORDER — FAMOTIDINE 10 MG/ML
20 INJECTION, SOLUTION INTRAVENOUS
OUTPATIENT
Start: 2023-03-26

## 2023-03-20 RX ORDER — EPINEPHRINE 1 MG/ML
0.3 INJECTION, SOLUTION, CONCENTRATE INTRAVENOUS PRN
OUTPATIENT
Start: 2023-03-26

## 2023-03-20 RX ORDER — ALBUTEROL SULFATE 90 UG/1
4 AEROSOL, METERED RESPIRATORY (INHALATION) PRN
OUTPATIENT
Start: 2023-03-26

## 2023-03-20 RX ORDER — DIPHENHYDRAMINE HYDROCHLORIDE 50 MG/ML
50 INJECTION INTRAMUSCULAR; INTRAVENOUS ONCE
Status: COMPLETED | OUTPATIENT
Start: 2023-03-20 | End: 2023-03-20

## 2023-03-20 RX ORDER — ACETAMINOPHEN 325 MG/1
650 TABLET ORAL
OUTPATIENT
Start: 2023-03-26

## 2023-03-20 RX ORDER — EPINEPHRINE 1 MG/ML
0.3 INJECTION, SOLUTION, CONCENTRATE INTRAVENOUS PRN
OUTPATIENT
Start: 2023-04-03

## 2023-03-20 RX ORDER — DIPHENHYDRAMINE HYDROCHLORIDE 50 MG/ML
25 INJECTION INTRAMUSCULAR; INTRAVENOUS ONCE
Start: 2023-04-17 | End: 2023-04-17

## 2023-03-20 RX ORDER — SODIUM CHLORIDE 9 MG/ML
INJECTION, SOLUTION INTRAVENOUS CONTINUOUS
OUTPATIENT
Start: 2023-04-03

## 2023-03-20 RX ORDER — ACETAMINOPHEN 325 MG/1
650 TABLET ORAL ONCE
Status: COMPLETED | OUTPATIENT
Start: 2023-03-20 | End: 2023-03-20

## 2023-03-20 RX ORDER — CYANOCOBALAMIN 1000 UG/ML
1000 INJECTION, SOLUTION INTRAMUSCULAR; SUBCUTANEOUS ONCE
Start: 2023-04-03

## 2023-03-20 RX ORDER — HEPARIN SODIUM (PORCINE) LOCK FLUSH IV SOLN 100 UNIT/ML 100 UNIT/ML
500 SOLUTION INTRAVENOUS PRN
Start: 2023-04-17

## 2023-03-20 RX ORDER — ONDANSETRON 2 MG/ML
8 INJECTION INTRAMUSCULAR; INTRAVENOUS
OUTPATIENT
Start: 2023-04-03

## 2023-03-20 RX ORDER — DIPHENHYDRAMINE HYDROCHLORIDE 50 MG/ML
50 INJECTION INTRAMUSCULAR; INTRAVENOUS
OUTPATIENT
Start: 2023-03-26

## 2023-03-20 RX ORDER — ACETAMINOPHEN 325 MG/1
650 TABLET ORAL
OUTPATIENT
Start: 2023-04-03

## 2023-03-20 RX ORDER — SODIUM CHLORIDE 9 MG/ML
5-250 INJECTION, SOLUTION INTRAVENOUS PRN
Status: DISCONTINUED | OUTPATIENT
Start: 2023-03-20 | End: 2023-03-21 | Stop reason: HOSPADM

## 2023-03-20 RX ORDER — HEPARIN SODIUM (PORCINE) LOCK FLUSH IV SOLN 100 UNIT/ML 100 UNIT/ML
500 SOLUTION INTRAVENOUS PRN
Status: DISCONTINUED | OUTPATIENT
Start: 2023-03-20 | End: 2023-03-21 | Stop reason: HOSPADM

## 2023-03-20 RX ORDER — CYANOCOBALAMIN 1000 UG/ML
1000 INJECTION, SOLUTION INTRAMUSCULAR; SUBCUTANEOUS ONCE
Status: COMPLETED
Start: 2023-03-20 | End: 2023-03-20

## 2023-03-20 RX ORDER — ALBUTEROL SULFATE 90 UG/1
4 AEROSOL, METERED RESPIRATORY (INHALATION) PRN
OUTPATIENT
Start: 2023-04-03

## 2023-03-20 RX ORDER — DIPHENHYDRAMINE HYDROCHLORIDE 50 MG/ML
50 INJECTION INTRAMUSCULAR; INTRAVENOUS
OUTPATIENT
Start: 2023-04-03

## 2023-03-20 RX ORDER — HEPARIN SODIUM (PORCINE) LOCK FLUSH IV SOLN 100 UNIT/ML 100 UNIT/ML
500 SOLUTION INTRAVENOUS PRN
OUTPATIENT
Start: 2023-04-17

## 2023-03-20 RX ORDER — METHYLPREDNISOLONE SODIUM SUCCINATE 40 MG/ML
40 INJECTION, POWDER, LYOPHILIZED, FOR SOLUTION INTRAMUSCULAR; INTRAVENOUS ONCE
Start: 2023-04-17 | End: 2023-04-17

## 2023-03-20 RX ORDER — SODIUM CHLORIDE 9 MG/ML
INJECTION, SOLUTION INTRAVENOUS CONTINUOUS
OUTPATIENT
Start: 2023-03-26

## 2023-03-20 RX ORDER — FAMOTIDINE 10 MG/ML
20 INJECTION, SOLUTION INTRAVENOUS
OUTPATIENT
Start: 2023-04-03

## 2023-03-20 RX ADMIN — HEPARIN 500 UNITS: 100 SYRINGE at 16:23

## 2023-03-20 RX ADMIN — DIPHENHYDRAMINE HYDROCHLORIDE 50 MG: 50 INJECTION INTRAMUSCULAR; INTRAVENOUS at 09:13

## 2023-03-20 RX ADMIN — CYANOCOBALAMIN 1000 MCG: 1000 INJECTION, SOLUTION INTRAMUSCULAR at 14:26

## 2023-03-20 RX ADMIN — ROMIPLOSTIM 75 MCG: 250 INJECTION, POWDER, LYOPHILIZED, FOR SOLUTION SUBCUTANEOUS at 14:28

## 2023-03-20 RX ADMIN — IMMUNE GLOBULIN (HUMAN) 30 G: 10 INJECTION INTRAVENOUS; SUBCUTANEOUS at 14:25

## 2023-03-20 RX ADMIN — SODIUM CHLORIDE 20 ML/HR: 9 INJECTION, SOLUTION INTRAVENOUS at 09:17

## 2023-03-20 RX ADMIN — DEXAMETHASONE SODIUM PHOSPHATE 20 MG: 10 INJECTION INTRAMUSCULAR; INTRAVENOUS at 09:19

## 2023-03-20 RX ADMIN — OBINUTUZUMAB 1000 MG: 1000 INJECTION, SOLUTION, CONCENTRATE INTRAVENOUS at 10:11

## 2023-03-20 RX ADMIN — ACETAMINOPHEN 650 MG: 325 TABLET ORAL at 09:11

## 2023-03-20 ASSESSMENT — PATIENT HEALTH QUESTIONNAIRE - PHQ9
SUM OF ALL RESPONSES TO PHQ QUESTIONS 1-9: 0
1. LITTLE INTEREST OR PLEASURE IN DOING THINGS: 0
SUM OF ALL RESPONSES TO PHQ QUESTIONS 1-9: 0
SUM OF ALL RESPONSES TO PHQ QUESTIONS 1-9: 0
2. FEELING DOWN, DEPRESSED OR HOPELESS: 0
SUM OF ALL RESPONSES TO PHQ9 QUESTIONS 1 & 2: 0
SUM OF ALL RESPONSES TO PHQ QUESTIONS 1-9: 0

## 2023-03-20 NOTE — PROGRESS NOTES
MA Rooming Questions  Patient: Jerry Azul  MRN: [id number]    Date: 3/20/2023        1. Do you have any new issues? Wound on right leg is healing, needs refill on mepilex? 2. Do you need any refills on medications?    no    3. Have you had any imaging done since your last visit? yes - CT    4. Have you been hospitalized or seen in the emergency room since your last visit here?   no    5. Did the patient have a depression screening completed today?  Yes    PHQ-9 Total Score: 0 (3/20/2023  9:27 AM)       PHQ-9 Given to (if applicable):               PHQ-9 Score (if applicable):                     [] Positive     []  Negative              Does question #9 need addressed (if applicable)                     [] Yes    []  No               Bony Mendez CMA
reduction. ,  4. Vision better after cataract surgery    FAMILY HISTORY:   His paternal aunt had colon cancer. Uncle also had some form of cancer. Father had heart disease. Sister  12 of Liver disease     SOCIAL HISTORY:   He has a 40-pack-year history of smoking, quit in . He denies alcohol use. He is not . Has one son. Interval History  3/20/2023: He arrived alone on the wheelchair today. Denied any chest pain or increase shortness of breath. Continues to have productive cough. No fever. No hemoptysis. Reported pain in both shoulders and also neck. Also reported pain in her extremity. Also reported pain in the bilateral lower abdominal areas. No nausea or vomiting. No diarrhea or constipation. No  symptoms. No bleeding. No rash.          Review of Systems   Per interval history; otherwise 10 point ROS is negative              Vital Signs:  /67 (Position: Sitting)   Pulse 99   Temp 97.9 °F (36.6 °C) (Temporal)   Resp 16   Ht 6' (1.829 m)   Wt 168 lb (76.2 kg)   SpO2 90%   BMI 22.78 kg/m²   CONSTITUTIONAL: alert and awake, tired appearing, arrived on wheelchair and was on supplemental oxygen  EYES: No palor or any icetrus   ENT: ATNC   NECK: No JVD   HEMATOLOGIC/LYMPHATIC: no cervical, supraclavicular or axillary lymphadenopathy   LUNGS: poor effort few coarse bs   CARDIOVASCULAR:s1s2 SM+ tachycardia  ABDOMEN:soft nd bs pos, mild ttp bilateral lower abd   NEUROLOGIC: GI   SKIN: skin tags   EXTREMITIES: no LE edema bilaterally          Labs:    Hematology:  Lab Results   Component Value Date    WBC 4.8 2023    RBC 3.72 (L) 2023    HGB 9.9 (L) 2023    HCT 32.6 (L) 2023    MCV 87.6 2023    MCH 26.6 (L) 2023    MCHC 30.4 (L) 2023    RDW 16.8 (H) 2023    PLT 58 (L) 2023    MPV INSTRUMENT UNABLE TO MEASURE OR CALCULATE. 2023    BANDSPCT 3 (L) 2023    SEGSPCT 22.4 (L) 2023    EOSRELPCT 0.6 2023

## 2023-03-20 NOTE — PROGRESS NOTES
Ambulated to infusion area, here today for chemotherapy, IVIG, Nplate, and I54 injection. Has an OV with Dr. Lukasz Baldwin today. Patient reports he is not sleeping well. Previously had pressure area to left hip. Reports SOB and cough, but is feeling better. Finished course of antibiotics. Right chest mediport accessed, positive blood return noted. Labs drawn. Platelets 58, Segs 1.1. Discussed with Dr. Lukasz Baldwin, will continue with treatment today. No new orders at this time. Chemo and IVIG administered as ordered. Injections given as ordered. Call light within reach. Tolerated infusion without incident. Discharge instructions provided. RTC 04/17 for next infusion and B12 injection. Status appropriately assessed and documented. All required labs and results reviewed. Treatment approved by provider. Treatment orders and medications verified by 2 Registered Nurses where applicable. Treatment plan was confirmed with patient prior to administration, and educated the need to report any treatment-related symptoms      Prior to administration, when applicable, the following 8 elements of medication administration were reviewed with 2nd Registered Nurse prior to dosing: drug name, drug dose, infusion volume when prepared in a syringe, rate of administration, expiration dates and/or times, appearance and integrity of drug(s), and rate of pump for infusion. The 5 rights of medication administration have been verified.

## 2023-03-24 DIAGNOSIS — C91.10 CHRONIC LYMPHOCYTIC LEUKEMIA (HCC): ICD-10-CM

## 2023-03-24 RX ORDER — ALLOPURINOL 300 MG/1
300 TABLET ORAL DAILY
Qty: 30 TABLET | Refills: 3 | Status: SHIPPED | OUTPATIENT
Start: 2023-03-24

## 2023-03-24 NOTE — TELEPHONE ENCOUNTER
Patient left message requesting a refill for Allopurinol to be sent to [facility] pharmacy. Pending RX to Provider to be sent to pharmacy.

## 2023-03-27 ENCOUNTER — HOSPITAL ENCOUNTER (OUTPATIENT)
Dept: INFUSION THERAPY | Age: 73
Discharge: HOME | End: 2023-03-27
Payer: COMMERCIAL

## 2023-03-27 VITALS
OXYGEN SATURATION: 94 % | WEIGHT: 167 LBS | HEIGHT: 72 IN | BODY MASS INDEX: 22.62 KG/M2 | DIASTOLIC BLOOD PRESSURE: 65 MMHG | SYSTOLIC BLOOD PRESSURE: 115 MMHG | HEART RATE: 102 BPM | TEMPERATURE: 97.6 F | RESPIRATION RATE: 18 BRPM

## 2023-03-27 DIAGNOSIS — D69.3 IMMUNE THROMBOCYTOPENIA (HCC): Primary | ICD-10-CM

## 2023-03-27 LAB
BASOPHILS ABSOLUTE: 0 K/CU MM
BASOPHILS RELATIVE PERCENT: 0.1 % (ref 0–1)
DIFFERENTIAL TYPE: ABNORMAL
EOSINOPHILS ABSOLUTE: 0 K/CU MM
EOSINOPHILS RELATIVE PERCENT: 0.6 % (ref 0–3)
HCT VFR BLD CALC: 33.6 % (ref 42–52)
HEMOGLOBIN: 10.4 GM/DL (ref 13.5–18)
LYMPHOCYTES ABSOLUTE: 4.5 K/CU MM
LYMPHOCYTES RELATIVE PERCENT: 66.8 % (ref 24–44)
MCH RBC QN AUTO: 26.3 PG (ref 27–31)
MCHC RBC AUTO-ENTMCNC: 31 % (ref 32–36)
MCV RBC AUTO: 84.8 FL (ref 78–100)
MONOCYTES ABSOLUTE: 0.9 K/CU MM
MONOCYTES RELATIVE PERCENT: 13.3 % (ref 0–4)
PDW BLD-RTO: 16.3 % (ref 11.7–14.9)
PLATELET # BLD: 84 K/CU MM (ref 140–440)
PMV BLD AUTO: 11.2 FL (ref 7.5–11.1)
RBC # BLD: 3.96 M/CU MM (ref 4.6–6.2)
SEGMENTED NEUTROPHILS ABSOLUTE COUNT: 1.3 K/CU MM
SEGMENTED NEUTROPHILS RELATIVE PERCENT: 19.2 % (ref 36–66)
WBC # BLD: 6.7 K/CU MM (ref 4–10.5)

## 2023-03-27 PROCEDURE — 85025 COMPLETE CBC W/AUTO DIFF WBC: CPT

## 2023-03-27 PROCEDURE — 96372 THER/PROPH/DIAG INJ SC/IM: CPT

## 2023-03-27 PROCEDURE — 6360000002 HC RX W HCPCS: Performed by: INTERNAL MEDICINE

## 2023-03-27 PROCEDURE — 36415 COLL VENOUS BLD VENIPUNCTURE: CPT

## 2023-03-27 RX ORDER — DIPHENHYDRAMINE HYDROCHLORIDE 50 MG/ML
50 INJECTION INTRAMUSCULAR; INTRAVENOUS
Status: CANCELLED | OUTPATIENT
Start: 2023-04-03

## 2023-03-27 RX ORDER — FAMOTIDINE 10 MG/ML
20 INJECTION, SOLUTION INTRAVENOUS
Status: CANCELLED | OUTPATIENT
Start: 2023-04-03

## 2023-03-27 RX ORDER — ACETAMINOPHEN 325 MG/1
650 TABLET ORAL
Status: CANCELLED | OUTPATIENT
Start: 2023-04-03

## 2023-03-27 RX ORDER — EPINEPHRINE 1 MG/ML
0.3 INJECTION, SOLUTION, CONCENTRATE INTRAVENOUS PRN
Status: CANCELLED | OUTPATIENT
Start: 2023-04-03

## 2023-03-27 RX ORDER — SODIUM CHLORIDE 9 MG/ML
INJECTION, SOLUTION INTRAVENOUS CONTINUOUS
Status: CANCELLED | OUTPATIENT
Start: 2023-04-03

## 2023-03-27 RX ORDER — ALBUTEROL SULFATE 90 UG/1
4 AEROSOL, METERED RESPIRATORY (INHALATION) PRN
Status: CANCELLED | OUTPATIENT
Start: 2023-04-03

## 2023-03-27 RX ORDER — ONDANSETRON 2 MG/ML
8 INJECTION INTRAMUSCULAR; INTRAVENOUS
Status: CANCELLED | OUTPATIENT
Start: 2023-04-03

## 2023-03-27 RX ADMIN — ROMIPLOSTIM 75 MCG: 250 INJECTION, POWDER, LYOPHILIZED, FOR SOLUTION SUBCUTANEOUS at 11:01

## 2023-03-27 ASSESSMENT — PAIN SCALES - GENERAL: PAINLEVEL_OUTOF10: 6

## 2023-03-27 NOTE — PROGRESS NOTES
To treatment suite via wheelchair with family at side. Pt on 2L NC. Pt here for NPlate injection. Labs drawn via venipuncture to left forearm. Labs resulted, treatment plan released. NPlate injection given SC in left arm. Band aid applied to injection site. Pt tolerated well. AVS declined. Discharge in wheelchair with family.   Abdulaziz Presley RN

## 2023-03-30 ENCOUNTER — OFFICE VISIT (OUTPATIENT)
Dept: INFECTIOUS DISEASES | Age: 73
End: 2023-03-30
Payer: COMMERCIAL

## 2023-03-30 VITALS — HEART RATE: 96 BPM | SYSTOLIC BLOOD PRESSURE: 145 MMHG | DIASTOLIC BLOOD PRESSURE: 77 MMHG | RESPIRATION RATE: 18 BRPM

## 2023-03-30 DIAGNOSIS — C91.10 CLL (CHRONIC LYMPHOCYTIC LEUKEMIA) (HCC): ICD-10-CM

## 2023-03-30 DIAGNOSIS — J47.0 BRONCHIECTASIS WITH ACUTE LOWER RESPIRATORY INFECTION (HCC): Primary | ICD-10-CM

## 2023-03-30 DIAGNOSIS — J96.10 CHRONIC RESPIRATORY FAILURE, UNSPECIFIED WHETHER WITH HYPOXIA OR HYPERCAPNIA (HCC): ICD-10-CM

## 2023-03-30 DIAGNOSIS — D80.1 HYPOGAMMAGLOBULINEMIA (HCC): ICD-10-CM

## 2023-03-30 PROCEDURE — 3017F COLORECTAL CA SCREEN DOC REV: CPT | Performed by: INTERNAL MEDICINE

## 2023-03-30 PROCEDURE — G8484 FLU IMMUNIZE NO ADMIN: HCPCS | Performed by: INTERNAL MEDICINE

## 2023-03-30 PROCEDURE — 99213 OFFICE O/P EST LOW 20 MIN: CPT | Performed by: INTERNAL MEDICINE

## 2023-03-30 PROCEDURE — 1036F TOBACCO NON-USER: CPT | Performed by: INTERNAL MEDICINE

## 2023-03-30 PROCEDURE — G8420 CALC BMI NORM PARAMETERS: HCPCS | Performed by: INTERNAL MEDICINE

## 2023-03-30 PROCEDURE — G8427 DOCREV CUR MEDS BY ELIG CLIN: HCPCS | Performed by: INTERNAL MEDICINE

## 2023-03-30 PROCEDURE — 1123F ACP DISCUSS/DSCN MKR DOCD: CPT | Performed by: INTERNAL MEDICINE

## 2023-03-30 NOTE — PATIENT INSTRUCTIONS
If you notice worsening breathing or sputum production then call the office to set up an appointment.

## 2023-03-30 NOTE — PROGRESS NOTES
Frequency Provider Last Rate Last Admin    sodium chloride flush 0.9 % injection 10 mL  10 mL IntraVENous PRN Ayse Mendieta MD           Past Medical History:   Diagnosis Date    Arthritis     knees, Rt hand/wrist    BPH (benign prostatic hyperplasia)     CAD (coronary artery disease)     Cancer (HCC)     CLL--Sees Dr Vaibhav Shoemaker    Diabetes mellitus [facility])     History of blood transfusion     no reaction    Hx of motion sickness     Hyperlipidemia     MDRO (multiple drug resistant organisms) resistance     abscess on buttock 10yrs ago    Migraine     last one summer 2016    Pneumonia 2016    Wears dentures     full upper--lower dentures    Wears glasses        Past Surgical History:   Procedure Laterality Date    BRONCHOSCOPY N/A 10/28/2022    BRONCHOSCOPY performed by Ruta Krabbe, MD at [facility]      x 2  last showed \"inflammation of heart\"    COLONOSCOPY      DENTAL SURGERY      all teeth extracted     EYE SURGERY Right 2016    Cataract removal    FRACTURE SURGERY Left     left ankle plate and screws    IR BIOPSY PERC SUPERF BONE  2022    IR BIOPSY PERC SUPERF BONE 2022 [facility] SPECIAL PROCEDURES    KNEE SURGERY  1970    left    OTHER SURGICAL HISTORY Left 2017    groin excision    TONSILLECTOMY  late     age 13    TUNNELED VENOUS PORT PLACEMENT  2013    Right upper chest       Social History     Socioeconomic History    Marital status: Single     Spouse name: Not on file    Number of children: Not on file    Years of education: Not on file    Highest education level: Not on file   Occupational History    Not on file   Tobacco Use    Smoking status: Former     Packs/day: 3.00     Years: 20.00     Pack years: 60.00     Types: Cigarettes     Start date: 10/2/1965     Quit date: 1987     Years since quittin.2    Smokeless tobacco: Never   Vaping Use    Vaping Use: Never used   Substance and Sexual Activity    Alcohol use:  No

## 2023-03-30 NOTE — ASSESSMENT & PLAN NOTE
Due to soon finish the inhaled tobramycin. Instructed to set up an appointment if he begins to have more difficulty with breathing.

## 2023-04-03 ENCOUNTER — HOSPITAL ENCOUNTER (OUTPATIENT)
Dept: INFUSION THERAPY | Age: 73
Discharge: HOME | End: 2023-04-03
Payer: COMMERCIAL

## 2023-04-03 VITALS — BODY MASS INDEX: 22.89 KG/M2 | WEIGHT: 169 LBS | HEIGHT: 72 IN

## 2023-04-03 DIAGNOSIS — C91.10 CHRONIC LYMPHOCYTIC LEUKEMIA (HCC): Primary | ICD-10-CM

## 2023-04-03 DIAGNOSIS — D69.3 IMMUNE THROMBOCYTOPENIA (HCC): ICD-10-CM

## 2023-04-03 LAB
ALBUMIN SERPL-MCNC: 4 GM/DL (ref 3.4–5)
ALP BLD-CCNC: 178 IU/L (ref 40–128)
ALT SERPL-CCNC: 18 U/L (ref 10–40)
ANION GAP SERPL CALCULATED.3IONS-SCNC: 10 MMOL/L (ref 4–16)
ANISOCYTOSIS: ABNORMAL
AST SERPL-CCNC: 18 IU/L (ref 15–37)
BANDED NEUTROPHILS ABSOLUTE COUNT: 0.08 K/CU MM
BANDED NEUTROPHILS RELATIVE PERCENT: 1 % (ref 5–11)
BASOPHILS ABSOLUTE: 0.1 K/CU MM
BASOPHILS RELATIVE PERCENT: 1 % (ref 0–1)
BILIRUB SERPL-MCNC: 0.4 MG/DL (ref 0–1)
BUN SERPL-MCNC: 20 MG/DL (ref 6–23)
CALCIUM SERPL-MCNC: 8.9 MG/DL (ref 8.3–10.6)
CHLORIDE BLD-SCNC: 103 MMOL/L (ref 99–110)
CO2: 25 MMOL/L (ref 21–32)
CREAT SERPL-MCNC: 1.2 MG/DL (ref 0.9–1.3)
DIFFERENTIAL TYPE: ABNORMAL
GFR SERPL CREATININE-BSD FRML MDRD: >60 ML/MIN/1.73M2
GLUCOSE SERPL-MCNC: 216 MG/DL (ref 70–99)
HCT VFR BLD CALC: 34.2 % (ref 42–52)
HEMOGLOBIN: 10.3 GM/DL (ref 13.5–18)
LYMPHOCYTES ABSOLUTE: 6.4 K/CU MM
LYMPHOCYTES RELATIVE PERCENT: 81 % (ref 24–44)
MCH RBC QN AUTO: 25.6 PG (ref 27–31)
MCHC RBC AUTO-ENTMCNC: 30.1 % (ref 32–36)
MCV RBC AUTO: 85.1 FL (ref 78–100)
MICROCYTES: ABNORMAL
MONOCYTES ABSOLUTE: 0.4 K/CU MM
MONOCYTES RELATIVE PERCENT: 5 % (ref 0–4)
NUCLEATED RED BLOOD CELLS: 1
PDW BLD-RTO: 16.1 % (ref 11.7–14.9)
PLATELET # BLD: 91 K/CU MM (ref 140–440)
PMV BLD AUTO: 10.7 FL (ref 7.5–11.1)
POLYCHROMASIA: ABNORMAL
POTASSIUM SERPL-SCNC: 5.5 MMOL/L (ref 3.5–5.1)
RBC # BLD: 4.02 M/CU MM (ref 4.6–6.2)
SCHISTOCYTES: ABNORMAL
SEGMENTED NEUTROPHILS ABSOLUTE COUNT: 0.9 K/CU MM
SEGMENTED NEUTROPHILS RELATIVE PERCENT: 12 % (ref 36–66)
SODIUM BLD-SCNC: 138 MMOL/L (ref 135–145)
TEAR DROP CELLS: ABNORMAL
TOTAL PROTEIN: 5.8 GM/DL (ref 6.4–8.2)
WBC # BLD: 7.9 K/CU MM (ref 4–10.5)

## 2023-04-03 PROCEDURE — 6360000002 HC RX W HCPCS: Performed by: INTERNAL MEDICINE

## 2023-04-03 PROCEDURE — 96372 THER/PROPH/DIAG INJ SC/IM: CPT

## 2023-04-03 PROCEDURE — 80053 COMPREHEN METABOLIC PANEL: CPT

## 2023-04-03 PROCEDURE — 36415 COLL VENOUS BLD VENIPUNCTURE: CPT

## 2023-04-03 PROCEDURE — 85027 COMPLETE CBC AUTOMATED: CPT

## 2023-04-03 PROCEDURE — 85007 BL SMEAR W/DIFF WBC COUNT: CPT

## 2023-04-03 RX ORDER — ACETAMINOPHEN 325 MG/1
650 TABLET ORAL
OUTPATIENT
Start: 2023-04-10

## 2023-04-03 RX ORDER — ONDANSETRON 2 MG/ML
8 INJECTION INTRAMUSCULAR; INTRAVENOUS
OUTPATIENT
Start: 2023-04-10

## 2023-04-03 RX ORDER — EPINEPHRINE 1 MG/ML
0.3 INJECTION, SOLUTION, CONCENTRATE INTRAVENOUS PRN
OUTPATIENT
Start: 2023-04-10

## 2023-04-03 RX ORDER — DIPHENHYDRAMINE HYDROCHLORIDE 50 MG/ML
50 INJECTION INTRAMUSCULAR; INTRAVENOUS
OUTPATIENT
Start: 2023-04-10

## 2023-04-03 RX ORDER — SODIUM POLYSTYRENE SULFONATE 15 G/60ML
15 SUSPENSION ORAL; RECTAL ONCE
Qty: 240 ML | Refills: 3 | Status: SHIPPED | OUTPATIENT
Start: 2023-04-03 | End: 2023-04-03

## 2023-04-03 RX ORDER — SODIUM CHLORIDE 9 MG/ML
INJECTION, SOLUTION INTRAVENOUS CONTINUOUS
OUTPATIENT
Start: 2023-04-10

## 2023-04-03 RX ORDER — FAMOTIDINE 10 MG/ML
20 INJECTION, SOLUTION INTRAVENOUS
OUTPATIENT
Start: 2023-04-10

## 2023-04-03 RX ORDER — ALBUTEROL SULFATE 90 UG/1
4 AEROSOL, METERED RESPIRATORY (INHALATION) PRN
OUTPATIENT
Start: 2023-04-10

## 2023-04-03 RX ADMIN — ROMIPLOSTIM 75 MCG: 250 INJECTION, POWDER, LYOPHILIZED, FOR SOLUTION SUBCUTANEOUS at 10:12

## 2023-04-03 NOTE — PROGRESS NOTES
Patient wheeled to infusion area, here today for an Nplate injection. No concerns at this time. Platelets 91. Treatment approved and given in left arm SQ. Patient tolerated well. Patient provided discharge instructions. RTC 04/10 for next injection.

## 2023-04-04 ENCOUNTER — CLINICAL DOCUMENTATION (OUTPATIENT)
Dept: ONCOLOGY | Age: 73
End: 2023-04-04

## 2023-04-04 RX ORDER — SODIUM CHLORIDE 0.9 % (FLUSH) 0.9 %
5-40 SYRINGE (ML) INJECTION PRN
Status: CANCELLED | OUTPATIENT
Start: 2023-04-04

## 2023-04-04 RX ORDER — HEPARIN SODIUM (PORCINE) LOCK FLUSH IV SOLN 100 UNIT/ML 100 UNIT/ML
500 SOLUTION INTRAVENOUS PRN
Status: CANCELLED | OUTPATIENT
Start: 2023-04-04

## 2023-04-04 RX ORDER — SODIUM CHLORIDE 9 MG/ML
5-250 INJECTION, SOLUTION INTRAVENOUS PRN
Status: CANCELLED | OUTPATIENT
Start: 2023-04-04

## 2023-04-04 RX ORDER — 0.9 % SODIUM CHLORIDE 0.9 %
500 INTRAVENOUS SOLUTION INTRAVENOUS ONCE
Status: CANCELLED | OUTPATIENT
Start: 2023-04-04 | End: 2023-04-04

## 2023-04-04 NOTE — PROGRESS NOTES
Received  patient's son, Emiliano Montero returning this RN's call from earlier today (please see previous note). Attempted to call son back @ [phone]; however, no answer. Called patient @ [phone] and advised that physician recommending IVF. Patient voices understanding, but states he is unavailable today 04/04/2023. Patient states he can come to clinic tomorrow morning for IVF. Infusion RN aware. Patient will be placed on schedule for 04/05/2023 @ 1000. No further needs addressed at this time.

## 2023-04-04 NOTE — PROGRESS NOTES
Lab results from 04/03/2023 reviewed. Physician recommending IVF: 500 mL 0.9% NaCl bolus today and repeat BMP afterwards. Also, RX for kayexalate 15gm/60mL x1 dose e-scribed to [facility] pharmacy d/t K 5.6 per physician. Attempted to call patient @[phone] to notify and inquire what time he could come to clinic for IVF; however, no answer. VM left with this RN direct number requesting call back as soon as possible.

## 2023-04-05 ENCOUNTER — HOSPITAL ENCOUNTER (OUTPATIENT)
Dept: INFUSION THERAPY | Age: 73
Discharge: HOME | End: 2023-04-05
Payer: COMMERCIAL

## 2023-04-05 VITALS
TEMPERATURE: 97.7 F | SYSTOLIC BLOOD PRESSURE: 105 MMHG | DIASTOLIC BLOOD PRESSURE: 57 MMHG | OXYGEN SATURATION: 93 % | HEART RATE: 84 BPM

## 2023-04-05 DIAGNOSIS — C91.10 CHRONIC LYMPHOCYTIC LEUKEMIA (HCC): ICD-10-CM

## 2023-04-05 DIAGNOSIS — E86.0 DEHYDRATION: Primary | ICD-10-CM

## 2023-04-05 LAB
ALBUMIN SERPL-MCNC: 3.5 GM/DL (ref 3.4–5)
ALP BLD-CCNC: 163 IU/L (ref 40–128)
ALT SERPL-CCNC: 18 U/L (ref 10–40)
ANION GAP SERPL CALCULATED.3IONS-SCNC: 10 MMOL/L (ref 4–16)
AST SERPL-CCNC: 19 IU/L (ref 15–37)
BILIRUB SERPL-MCNC: 0.4 MG/DL (ref 0–1)
BUN SERPL-MCNC: 22 MG/DL (ref 6–23)
CALCIUM SERPL-MCNC: 8.4 MG/DL (ref 8.3–10.6)
CHLORIDE BLD-SCNC: 104 MMOL/L (ref 99–110)
CO2: 26 MMOL/L (ref 21–32)
CREAT SERPL-MCNC: 1.2 MG/DL (ref 0.9–1.3)
GFR SERPL CREATININE-BSD FRML MDRD: >60 ML/MIN/1.73M2
GLUCOSE SERPL-MCNC: 154 MG/DL (ref 70–99)
POTASSIUM SERPL-SCNC: 4.4 MMOL/L (ref 3.5–5.1)
SODIUM BLD-SCNC: 140 MMOL/L (ref 135–145)
TOTAL PROTEIN: 5 GM/DL (ref 6.4–8.2)

## 2023-04-05 PROCEDURE — 96360 HYDRATION IV INFUSION INIT: CPT

## 2023-04-05 PROCEDURE — 2580000003 HC RX 258: Performed by: INTERNAL MEDICINE

## 2023-04-05 PROCEDURE — 6360000002 HC RX W HCPCS: Performed by: INTERNAL MEDICINE

## 2023-04-05 PROCEDURE — 80053 COMPREHEN METABOLIC PANEL: CPT

## 2023-04-05 RX ORDER — HEPARIN SODIUM (PORCINE) LOCK FLUSH IV SOLN 100 UNIT/ML 100 UNIT/ML
500 SOLUTION INTRAVENOUS PRN
OUTPATIENT
Start: 2023-04-05

## 2023-04-05 RX ORDER — 0.9 % SODIUM CHLORIDE 0.9 %
500 INTRAVENOUS SOLUTION INTRAVENOUS ONCE
Status: CANCELLED | OUTPATIENT
Start: 2023-04-05 | End: 2023-04-05

## 2023-04-05 RX ORDER — 0.9 % SODIUM CHLORIDE 0.9 %
500 INTRAVENOUS SOLUTION INTRAVENOUS ONCE
Status: COMPLETED | OUTPATIENT
Start: 2023-04-05 | End: 2023-04-05

## 2023-04-05 RX ORDER — HEPARIN SODIUM (PORCINE) LOCK FLUSH IV SOLN 100 UNIT/ML 100 UNIT/ML
500 SOLUTION INTRAVENOUS PRN
Status: DISCONTINUED | OUTPATIENT
Start: 2023-04-05 | End: 2023-04-06 | Stop reason: HOSPADM

## 2023-04-05 RX ORDER — SODIUM CHLORIDE 9 MG/ML
5-250 INJECTION, SOLUTION INTRAVENOUS PRN
OUTPATIENT
Start: 2023-04-05

## 2023-04-05 RX ORDER — SODIUM CHLORIDE 0.9 % (FLUSH) 0.9 %
5-40 SYRINGE (ML) INJECTION PRN
OUTPATIENT
Start: 2023-04-05

## 2023-04-05 RX ADMIN — HEPARIN 500 UNITS: 100 SYRINGE at 11:14

## 2023-04-05 RX ADMIN — SODIUM CHLORIDE 500 ML: 9 INJECTION, SOLUTION INTRAVENOUS at 10:07

## 2023-04-05 ASSESSMENT — PAIN DESCRIPTION - LOCATION: LOCATION: RIB CAGE;SHOULDER

## 2023-04-05 ASSESSMENT — PAIN SCALES - GENERAL: PAINLEVEL_OUTOF10: 6

## 2023-04-05 NOTE — PROGRESS NOTES
Assisted to infusion area, here today for hydration. Patient has no concerns at this time. Right chest mediport accessed, positive blood return noted. Will draw CMP post hydration. 500mL bolus administered as ordered. Call light within reach. Tolerated infusion without incident. CMP drawn prior to discharge. Called patient's son, will  [facility] today as he has not picked it up yet. Discharge instructions provided. RTC 04/10 for nplate injection.

## 2023-04-06 ENCOUNTER — CLINICAL DOCUMENTATION (OUTPATIENT)
Dept: ONCOLOGY | Age: 73
End: 2023-04-06

## 2023-04-06 NOTE — PROGRESS NOTES
Confirmed does of venetoclax with physician. Labs results reviewed PLT 91. Advised to continue with current dose.  RX [address]scribed [address] with below instructions:     Venetoclax (venclexta) 10 mg - Take 2 tablets (20 mg) by mouth daily

## 2023-04-06 NOTE — PROGRESS NOTES
Received VM from patient's son inquiring about kayexalate dose. Called son back @ [phone] to clarify order. Patient to take 60 mL x1 dose. Son voices understanding and states that patient already took 61 mL's, but wanted to confirm since there was medication leftover. Son denies further needs at this time.

## 2023-04-10 ENCOUNTER — HOSPITAL ENCOUNTER (OUTPATIENT)
Dept: INFUSION THERAPY | Age: 73
Discharge: HOME | End: 2023-04-10
Payer: COMMERCIAL

## 2023-04-10 VITALS
HEIGHT: 72 IN | BODY MASS INDEX: 22.89 KG/M2 | OXYGEN SATURATION: 93 % | TEMPERATURE: 98.1 F | WEIGHT: 169 LBS | DIASTOLIC BLOOD PRESSURE: 67 MMHG | SYSTOLIC BLOOD PRESSURE: 120 MMHG | HEART RATE: 92 BPM | RESPIRATION RATE: 18 BRPM

## 2023-04-10 DIAGNOSIS — D69.3 IMMUNE THROMBOCYTOPENIA (HCC): ICD-10-CM

## 2023-04-10 DIAGNOSIS — C91.10 CHRONIC LYMPHOCYTIC LEUKEMIA (HCC): Primary | ICD-10-CM

## 2023-04-10 LAB
ALBUMIN SERPL-MCNC: 4.1 GM/DL (ref 3.4–5)
ALP BLD-CCNC: 196 IU/L (ref 40–128)
ALT SERPL-CCNC: 18 U/L (ref 10–40)
ANION GAP SERPL CALCULATED.3IONS-SCNC: 11 MMOL/L (ref 4–16)
ANISOCYTOSIS: ABNORMAL
AST SERPL-CCNC: 20 IU/L (ref 15–37)
BANDED NEUTROPHILS ABSOLUTE COUNT: 0.55 K/CU MM
BANDED NEUTROPHILS RELATIVE PERCENT: 6 % (ref 5–11)
BASOPHILS ABSOLUTE: 0.1 K/CU MM
BASOPHILS RELATIVE PERCENT: 1 % (ref 0–1)
BILIRUB SERPL-MCNC: 0.4 MG/DL (ref 0–1)
BUN SERPL-MCNC: 23 MG/DL (ref 6–23)
CALCIUM SERPL-MCNC: 8.9 MG/DL (ref 8.3–10.6)
CHLORIDE BLD-SCNC: 102 MMOL/L (ref 99–110)
CO2: 25 MMOL/L (ref 21–32)
CREAT SERPL-MCNC: 1 MG/DL (ref 0.9–1.3)
DIFFERENTIAL TYPE: ABNORMAL
GFR SERPL CREATININE-BSD FRML MDRD: >60 ML/MIN/1.73M2
GLUCOSE SERPL-MCNC: 154 MG/DL (ref 70–99)
HCT VFR BLD CALC: 34.8 % (ref 42–52)
HEMOGLOBIN: 10.6 GM/DL (ref 13.5–18)
LYMPHOCYTES ABSOLUTE: 6.9 K/CU MM
LYMPHOCYTES RELATIVE PERCENT: 76 % (ref 24–44)
MCH RBC QN AUTO: 25.6 PG (ref 27–31)
MCHC RBC AUTO-ENTMCNC: 30.5 % (ref 32–36)
MCV RBC AUTO: 84.1 FL (ref 78–100)
MONOCYTES ABSOLUTE: 0.3 K/CU MM
MONOCYTES RELATIVE PERCENT: 3 % (ref 0–4)
PDW BLD-RTO: 16.4 % (ref 11.7–14.9)
PLATELET # BLD: 82 K/CU MM (ref 140–440)
PMV BLD AUTO: 9 FL (ref 7.5–11.1)
POLYCHROMASIA: ABNORMAL
POTASSIUM SERPL-SCNC: 5 MMOL/L (ref 3.5–5.1)
RBC # BLD: 4.14 M/CU MM (ref 4.6–6.2)
SEGMENTED NEUTROPHILS ABSOLUTE COUNT: 1.3 K/CU MM
SEGMENTED NEUTROPHILS RELATIVE PERCENT: 14 % (ref 36–66)
SMUDGE CELLS: PRESENT
SODIUM BLD-SCNC: 138 MMOL/L (ref 135–145)
TEAR DROP CELLS: ABNORMAL
TOTAL PROTEIN: 5.7 GM/DL (ref 6.4–8.2)
WBC # BLD: 9.2 K/CU MM (ref 4–10.5)

## 2023-04-10 PROCEDURE — 6360000002 HC RX W HCPCS: Performed by: INTERNAL MEDICINE

## 2023-04-10 PROCEDURE — 2580000003 HC RX 258: Performed by: INTERNAL MEDICINE

## 2023-04-10 PROCEDURE — 96372 THER/PROPH/DIAG INJ SC/IM: CPT

## 2023-04-10 PROCEDURE — 36415 COLL VENOUS BLD VENIPUNCTURE: CPT

## 2023-04-10 PROCEDURE — 80053 COMPREHEN METABOLIC PANEL: CPT

## 2023-04-10 PROCEDURE — 85007 BL SMEAR W/DIFF WBC COUNT: CPT

## 2023-04-10 PROCEDURE — 85027 COMPLETE CBC AUTOMATED: CPT

## 2023-04-10 RX ORDER — FAMOTIDINE 10 MG/ML
20 INJECTION, SOLUTION INTRAVENOUS
OUTPATIENT
Start: 2023-04-17

## 2023-04-10 RX ORDER — DIPHENHYDRAMINE HYDROCHLORIDE 50 MG/ML
50 INJECTION INTRAMUSCULAR; INTRAVENOUS
OUTPATIENT
Start: 2023-04-17

## 2023-04-10 RX ORDER — EPINEPHRINE 1 MG/ML
0.3 INJECTION, SOLUTION, CONCENTRATE INTRAVENOUS PRN
OUTPATIENT
Start: 2023-04-17

## 2023-04-10 RX ORDER — SODIUM CHLORIDE 9 MG/ML
INJECTION, SOLUTION INTRAVENOUS CONTINUOUS
OUTPATIENT
Start: 2023-04-17

## 2023-04-10 RX ORDER — ALBUTEROL SULFATE 90 UG/1
4 AEROSOL, METERED RESPIRATORY (INHALATION) PRN
OUTPATIENT
Start: 2023-04-17

## 2023-04-10 RX ORDER — ACETAMINOPHEN 325 MG/1
650 TABLET ORAL
OUTPATIENT
Start: 2023-04-17

## 2023-04-10 RX ORDER — ONDANSETRON 2 MG/ML
8 INJECTION INTRAMUSCULAR; INTRAVENOUS
OUTPATIENT
Start: 2023-04-17

## 2023-04-10 RX ADMIN — ROMIPLOSTIM 75 MCG: 250 INJECTION, POWDER, LYOPHILIZED, FOR SOLUTION SUBCUTANEOUS at 10:37

## 2023-04-10 NOTE — PROGRESS NOTES
Patient wheeled to infusion area, here today for an Nplate injection. No concerns at this time. Platlets 82. Treatment approved and given in left arm SQ. Patient tolerated well. Patient provided discharge instructions. RTC 04/17 for next infusion, B12, and Nplate injection, and OV with Dr. Melody Simpson.

## 2023-04-14 RX ORDER — ONDANSETRON 2 MG/ML
8 INJECTION INTRAMUSCULAR; INTRAVENOUS
Status: CANCELLED | OUTPATIENT
Start: 2023-04-17

## 2023-04-14 RX ORDER — SODIUM CHLORIDE 9 MG/ML
5-250 INJECTION, SOLUTION INTRAVENOUS PRN
Status: CANCELLED | OUTPATIENT
Start: 2023-04-17

## 2023-04-14 RX ORDER — FAMOTIDINE 10 MG/ML
20 INJECTION, SOLUTION INTRAVENOUS
Status: CANCELLED | OUTPATIENT
Start: 2023-04-17

## 2023-04-14 RX ORDER — ACETAMINOPHEN 325 MG/1
650 TABLET ORAL
Status: CANCELLED | OUTPATIENT
Start: 2023-04-17

## 2023-04-14 RX ORDER — ALBUTEROL SULFATE 90 UG/1
4 AEROSOL, METERED RESPIRATORY (INHALATION) PRN
Status: CANCELLED | OUTPATIENT
Start: 2023-04-17

## 2023-04-14 RX ORDER — EPINEPHRINE 1 MG/ML
0.3 INJECTION, SOLUTION, CONCENTRATE INTRAVENOUS PRN
Status: CANCELLED | OUTPATIENT
Start: 2023-04-17

## 2023-04-14 RX ORDER — MEPERIDINE HYDROCHLORIDE 25 MG/ML
12.5 INJECTION INTRAMUSCULAR; INTRAVENOUS; SUBCUTANEOUS PRN
Status: CANCELLED | OUTPATIENT
Start: 2023-04-17

## 2023-04-14 RX ORDER — DIPHENHYDRAMINE HYDROCHLORIDE 50 MG/ML
50 INJECTION INTRAMUSCULAR; INTRAVENOUS
Status: CANCELLED | OUTPATIENT
Start: 2023-04-17

## 2023-04-14 RX ORDER — SODIUM CHLORIDE 9 MG/ML
INJECTION, SOLUTION INTRAVENOUS CONTINUOUS
Status: CANCELLED | OUTPATIENT
Start: 2023-04-17

## 2023-04-17 ENCOUNTER — OFFICE VISIT (OUTPATIENT)
Dept: ONCOLOGY | Age: 73
End: 2023-04-17
Payer: COMMERCIAL

## 2023-04-17 ENCOUNTER — TELEPHONE (OUTPATIENT)
Dept: ONCOLOGY | Age: 73
End: 2023-04-17

## 2023-04-17 ENCOUNTER — HOSPITAL ENCOUNTER (OUTPATIENT)
Dept: INFUSION THERAPY | Age: 73
Discharge: HOME | End: 2023-04-17
Payer: COMMERCIAL

## 2023-04-17 VITALS
RESPIRATION RATE: 18 BRPM | SYSTOLIC BLOOD PRESSURE: 138 MMHG | WEIGHT: 169 LBS | OXYGEN SATURATION: 93 % | BODY MASS INDEX: 22.89 KG/M2 | TEMPERATURE: 98.2 F | DIASTOLIC BLOOD PRESSURE: 79 MMHG | HEIGHT: 72 IN | HEART RATE: 103 BPM

## 2023-04-17 VITALS
OXYGEN SATURATION: 93 % | TEMPERATURE: 98.2 F | SYSTOLIC BLOOD PRESSURE: 138 MMHG | DIASTOLIC BLOOD PRESSURE: 79 MMHG | HEIGHT: 72 IN | WEIGHT: 169.8 LBS | BODY MASS INDEX: 23 KG/M2 | HEART RATE: 100 BPM

## 2023-04-17 DIAGNOSIS — D69.6 THROMBOCYTOPENIA (HCC): ICD-10-CM

## 2023-04-17 DIAGNOSIS — C91.10 CHRONIC LYMPHOCYTIC LEUKEMIA (HCC): ICD-10-CM

## 2023-04-17 DIAGNOSIS — C91.10 CHRONIC LYMPHOCYTIC LEUKEMIA (HCC): Primary | ICD-10-CM

## 2023-04-17 DIAGNOSIS — D69.3 IMMUNE THROMBOCYTOPENIA (HCC): ICD-10-CM

## 2023-04-17 DIAGNOSIS — D64.9 ANEMIA, UNSPECIFIED TYPE: ICD-10-CM

## 2023-04-17 DIAGNOSIS — D50.0 IRON DEFICIENCY ANEMIA DUE TO CHRONIC BLOOD LOSS: ICD-10-CM

## 2023-04-17 DIAGNOSIS — E53.8 B12 DEFICIENCY: ICD-10-CM

## 2023-04-17 DIAGNOSIS — K90.9 INTESTINAL MALABSORPTION, UNSPECIFIED TYPE: ICD-10-CM

## 2023-04-17 DIAGNOSIS — C91.10 CLL (CHRONIC LYMPHOCYTIC LEUKEMIA) (HCC): Primary | ICD-10-CM

## 2023-04-17 LAB
ALBUMIN SERPL-MCNC: 4 GM/DL (ref 3.4–5)
ALP BLD-CCNC: 202 IU/L (ref 40–129)
ALT SERPL-CCNC: 17 U/L (ref 10–40)
ANION GAP SERPL CALCULATED.3IONS-SCNC: 11 MMOL/L (ref 4–16)
AST SERPL-CCNC: 20 IU/L (ref 15–37)
BASOPHILS ABSOLUTE: 0 K/CU MM
BASOPHILS RELATIVE PERCENT: 0.4 % (ref 0–1)
BILIRUB SERPL-MCNC: 0.4 MG/DL (ref 0–1)
BUN SERPL-MCNC: 21 MG/DL (ref 6–23)
CALCIUM SERPL-MCNC: 8.6 MG/DL (ref 8.3–10.6)
CHLORIDE BLD-SCNC: 104 MMOL/L (ref 99–110)
CO2: 23 MMOL/L (ref 21–32)
CREAT SERPL-MCNC: 1.1 MG/DL (ref 0.9–1.3)
DIFFERENTIAL TYPE: ABNORMAL
EOSINOPHILS ABSOLUTE: 0.1 K/CU MM
EOSINOPHILS RELATIVE PERCENT: 1 % (ref 0–3)
FERRITIN: 21 NG/ML (ref 30–400)
FOLATE SERPL-MCNC: 5.7 NG/ML (ref 3.1–17.5)
GFR SERPL CREATININE-BSD FRML MDRD: >60 ML/MIN/1.73M2
GLUCOSE SERPL-MCNC: 178 MG/DL (ref 70–99)
HCT VFR BLD CALC: 35 % (ref 42–52)
HEMOGLOBIN: 10.9 GM/DL (ref 13.5–18)
IGG,SERUM: 342 MG/DL (ref 723–1685)
LYMPHOCYTES ABSOLUTE: 6.8 K/CU MM
LYMPHOCYTES RELATIVE PERCENT: 64.3 % (ref 24–44)
MCH RBC QN AUTO: 25.5 PG (ref 27–31)
MCHC RBC AUTO-ENTMCNC: 31.1 % (ref 32–36)
MCV RBC AUTO: 81.8 FL (ref 78–100)
MONOCYTES ABSOLUTE: 1.5 K/CU MM
MONOCYTES RELATIVE PERCENT: 14.5 % (ref 0–4)
PDW BLD-RTO: 16.4 % (ref 11.7–14.9)
PLATELET # BLD: 122 K/CU MM (ref 140–440)
PMV BLD AUTO: 11.9 FL (ref 7.5–11.1)
POTASSIUM SERPL-SCNC: 4.6 MMOL/L (ref 3.5–5.1)
RBC # BLD: 4.28 M/CU MM (ref 4.6–6.2)
SEGMENTED NEUTROPHILS ABSOLUTE COUNT: 2.1 K/CU MM
SEGMENTED NEUTROPHILS RELATIVE PERCENT: 19.8 % (ref 36–66)
SODIUM BLD-SCNC: 138 MMOL/L (ref 135–145)
TOTAL PROTEIN: 5.6 GM/DL (ref 6.4–8.2)
VITAMIN B-12: >2000 PG/ML (ref 211–911)
WBC # BLD: 10.5 K/CU MM (ref 4–10.5)

## 2023-04-17 PROCEDURE — 2580000003 HC RX 258: Performed by: NURSE PRACTITIONER

## 2023-04-17 PROCEDURE — 96372 THER/PROPH/DIAG INJ SC/IM: CPT

## 2023-04-17 PROCEDURE — 6370000000 HC RX 637 (ALT 250 FOR IP): Performed by: NURSE PRACTITIONER

## 2023-04-17 PROCEDURE — 99214 OFFICE O/P EST MOD 30 MIN: CPT | Performed by: INTERNAL MEDICINE

## 2023-04-17 PROCEDURE — 82746 ASSAY OF FOLIC ACID SERUM: CPT

## 2023-04-17 PROCEDURE — 96415 CHEMO IV INFUSION ADDL HR: CPT

## 2023-04-17 PROCEDURE — 82607 VITAMIN B-12: CPT

## 2023-04-17 PROCEDURE — 1123F ACP DISCUSS/DSCN MKR DOCD: CPT | Performed by: INTERNAL MEDICINE

## 2023-04-17 PROCEDURE — 96375 TX/PRO/DX INJ NEW DRUG ADDON: CPT

## 2023-04-17 PROCEDURE — G8420 CALC BMI NORM PARAMETERS: HCPCS | Performed by: INTERNAL MEDICINE

## 2023-04-17 PROCEDURE — 3017F COLORECTAL CA SCREEN DOC REV: CPT | Performed by: INTERNAL MEDICINE

## 2023-04-17 PROCEDURE — G8428 CUR MEDS NOT DOCUMENT: HCPCS | Performed by: INTERNAL MEDICINE

## 2023-04-17 PROCEDURE — 1036F TOBACCO NON-USER: CPT | Performed by: INTERNAL MEDICINE

## 2023-04-17 PROCEDURE — 82784 ASSAY IGA/IGD/IGG/IGM EACH: CPT

## 2023-04-17 PROCEDURE — 6360000002 HC RX W HCPCS: Performed by: NURSE PRACTITIONER

## 2023-04-17 PROCEDURE — 85025 COMPLETE CBC W/AUTO DIFF WBC: CPT

## 2023-04-17 PROCEDURE — 82728 ASSAY OF FERRITIN: CPT

## 2023-04-17 PROCEDURE — 96413 CHEMO IV INFUSION 1 HR: CPT

## 2023-04-17 PROCEDURE — 80053 COMPREHEN METABOLIC PANEL: CPT

## 2023-04-17 PROCEDURE — 96367 TX/PROPH/DG ADDL SEQ IV INF: CPT

## 2023-04-17 PROCEDURE — 6360000002 HC RX W HCPCS: Performed by: INTERNAL MEDICINE

## 2023-04-17 RX ORDER — EPINEPHRINE 1 MG/ML
0.3 INJECTION, SOLUTION, CONCENTRATE INTRAVENOUS PRN
OUTPATIENT
Start: 2023-05-15

## 2023-04-17 RX ORDER — FERROUS GLUCONATE 324(37.5)
324 TABLET ORAL EVERY OTHER DAY
Qty: 15 TABLET | Refills: 0 | Status: SHIPPED | OUTPATIENT
Start: 2023-04-17 | End: 2023-05-17

## 2023-04-17 RX ORDER — EPINEPHRINE 1 MG/ML
0.3 INJECTION, SOLUTION, CONCENTRATE INTRAVENOUS PRN
Status: CANCELLED | OUTPATIENT
Start: 2023-04-24

## 2023-04-17 RX ORDER — ONDANSETRON 2 MG/ML
8 INJECTION INTRAMUSCULAR; INTRAVENOUS
OUTPATIENT
Start: 2023-05-15

## 2023-04-17 RX ORDER — ACETAMINOPHEN 325 MG/1
650 TABLET ORAL
OUTPATIENT
Start: 2023-05-15

## 2023-04-17 RX ORDER — ACETAMINOPHEN 325 MG/1
650 TABLET ORAL
Status: CANCELLED | OUTPATIENT
Start: 2023-04-24

## 2023-04-17 RX ORDER — FAMOTIDINE 10 MG/ML
20 INJECTION, SOLUTION INTRAVENOUS
Status: CANCELLED | OUTPATIENT
Start: 2023-04-24

## 2023-04-17 RX ORDER — ALBUTEROL SULFATE 90 UG/1
4 AEROSOL, METERED RESPIRATORY (INHALATION) PRN
OUTPATIENT
Start: 2023-05-15

## 2023-04-17 RX ORDER — DIPHENHYDRAMINE HYDROCHLORIDE 50 MG/ML
50 INJECTION INTRAMUSCULAR; INTRAVENOUS ONCE
Status: COMPLETED | OUTPATIENT
Start: 2023-04-17 | End: 2023-04-17

## 2023-04-17 RX ORDER — FAMOTIDINE 10 MG/ML
20 INJECTION, SOLUTION INTRAVENOUS
OUTPATIENT
Start: 2023-05-15

## 2023-04-17 RX ORDER — ACETAMINOPHEN 325 MG/1
650 TABLET ORAL ONCE
Status: COMPLETED | OUTPATIENT
Start: 2023-04-17 | End: 2023-04-17

## 2023-04-17 RX ORDER — CYANOCOBALAMIN 1000 UG/ML
1000 INJECTION, SOLUTION INTRAMUSCULAR; SUBCUTANEOUS ONCE
Start: 2023-05-15

## 2023-04-17 RX ORDER — SODIUM CHLORIDE 9 MG/ML
INJECTION, SOLUTION INTRAVENOUS CONTINUOUS
Status: CANCELLED | OUTPATIENT
Start: 2023-04-24

## 2023-04-17 RX ORDER — CYANOCOBALAMIN 1000 UG/ML
1000 INJECTION, SOLUTION INTRAMUSCULAR; SUBCUTANEOUS ONCE
Status: COMPLETED
Start: 2023-04-17 | End: 2023-04-17

## 2023-04-17 RX ORDER — DIPHENHYDRAMINE HYDROCHLORIDE 50 MG/ML
50 INJECTION INTRAMUSCULAR; INTRAVENOUS
OUTPATIENT
Start: 2023-05-15

## 2023-04-17 RX ORDER — DIPHENHYDRAMINE HYDROCHLORIDE 50 MG/ML
50 INJECTION INTRAMUSCULAR; INTRAVENOUS
Status: CANCELLED | OUTPATIENT
Start: 2023-04-24

## 2023-04-17 RX ORDER — HYDROCODONE BITARTRATE AND ACETAMINOPHEN 10; 325 MG/1; MG/1
1 TABLET ORAL EVERY 12 HOURS PRN
Qty: 60 TABLET | Refills: 0 | Status: SHIPPED | OUTPATIENT
Start: 2023-04-17 | End: 2023-05-17

## 2023-04-17 RX ORDER — SODIUM CHLORIDE 9 MG/ML
5-250 INJECTION, SOLUTION INTRAVENOUS PRN
Status: DISCONTINUED | OUTPATIENT
Start: 2023-04-17 | End: 2023-04-18 | Stop reason: HOSPADM

## 2023-04-17 RX ORDER — SODIUM CHLORIDE 0.9 % (FLUSH) 0.9 %
5-40 SYRINGE (ML) INJECTION PRN
Status: DISCONTINUED | OUTPATIENT
Start: 2023-04-17 | End: 2023-04-18 | Stop reason: HOSPADM

## 2023-04-17 RX ORDER — ONDANSETRON 2 MG/ML
8 INJECTION INTRAMUSCULAR; INTRAVENOUS
Status: CANCELLED | OUTPATIENT
Start: 2023-04-24

## 2023-04-17 RX ORDER — ALBUTEROL SULFATE 90 UG/1
4 AEROSOL, METERED RESPIRATORY (INHALATION) PRN
Status: CANCELLED | OUTPATIENT
Start: 2023-04-24

## 2023-04-17 RX ORDER — HEPARIN SODIUM (PORCINE) LOCK FLUSH IV SOLN 100 UNIT/ML 100 UNIT/ML
500 SOLUTION INTRAVENOUS PRN
Status: DISCONTINUED | OUTPATIENT
Start: 2023-04-17 | End: 2023-04-18 | Stop reason: HOSPADM

## 2023-04-17 RX ORDER — SODIUM CHLORIDE 9 MG/ML
INJECTION, SOLUTION INTRAVENOUS CONTINUOUS
OUTPATIENT
Start: 2023-05-15

## 2023-04-17 RX ADMIN — SODIUM CHLORIDE 20 ML/HR: 9 INJECTION, SOLUTION INTRAVENOUS at 08:58

## 2023-04-17 RX ADMIN — OBINUTUZUMAB 1000 MG: 1000 INJECTION, SOLUTION, CONCENTRATE INTRAVENOUS at 10:02

## 2023-04-17 RX ADMIN — DEXAMETHASONE SODIUM PHOSPHATE 20 MG: 10 INJECTION, SOLUTION INTRAMUSCULAR; INTRAVENOUS at 08:59

## 2023-04-17 RX ADMIN — HEPARIN 500 UNITS: 100 SYRINGE at 14:09

## 2023-04-17 RX ADMIN — ACETAMINOPHEN 650 MG: 325 TABLET ORAL at 08:58

## 2023-04-17 RX ADMIN — SODIUM CHLORIDE, PRESERVATIVE FREE 20 ML: 5 INJECTION INTRAVENOUS at 14:07

## 2023-04-17 RX ADMIN — CYANOCOBALAMIN 1000 MCG: 1000 INJECTION, SOLUTION INTRAMUSCULAR at 08:59

## 2023-04-17 RX ADMIN — ROMIPLOSTIM 75 MCG: 250 INJECTION, POWDER, LYOPHILIZED, FOR SOLUTION SUBCUTANEOUS at 10:32

## 2023-04-17 RX ADMIN — DIPHENHYDRAMINE HYDROCHLORIDE 50 MG: 50 INJECTION INTRAMUSCULAR; INTRAVENOUS at 08:57

## 2023-04-17 NOTE — PROGRESS NOTES
Pt here for Gazyva infison B-12 and N-plate injections and OV. Port accessed with blood return noted. Labs sent. Pt states SOB numbness to bilateral feet has not changed or worsened since prieovus tx. Pt states bilateral toes are swollen. Both feet/toes assessed by me and found to have slight swelling. Pt instructed to inform physician of swelling at [address] today. B-12 given IM to right deltoid. N-plate given SQ to left upper arm. Pt tolerated both without incident. Patient's status assessed and documented appropriately. All labs and required results were also reviewed today. Treatment parameters have been reviewed. Today's treatment has been approved by the provider. Treatment orders and medication sequencing (when applicable) was verified by 2 registered nurses. The treatment plan was confirmed with the patient prior to administration, and the patient understands the need to report any treatment-related symptoms. Prior to administration, when applicable, the following 8 elements of medication administration were reviewed with 2nd Registered Nurse prior to dosing: drug name, drug dose, infusion volume when prepared in a syringe, rate of administration, expiration dates and/or times, appearance and integrity of drug(s), and rate of pump for infusion. The 5 rights of medication administration have been verified. Pt tolerated tx without incident left via wheelchair with son discharge instructions given.

## 2023-04-17 NOTE — PROGRESS NOTES
MA Rooming Questions  Patient: Vlad Damico  MRN: [id number]    Date: 4/17/2023        1. Do you have any new issues? yes - swelling in feet         2. Do you need any refills on medications? yes - Gabapentin, Vicodin    3. Have you had any imaging done since your last visit?   no    4. Have you been hospitalized or seen in the emergency room since your last visit here?   no    5. Did the patient have a depression screening completed today?  No    No data recorded     PHQ-9 Given to (if applicable):               PHQ-9 Score (if applicable):                     [] Positive     []  Negative              Does question #9 need addressed (if applicable)                     [] Yes    []  No               Mary Jo Steinberg CMA

## 2023-04-18 ENCOUNTER — CLINICAL DOCUMENTATION (OUTPATIENT)
Dept: ONCOLOGY | Age: 73
End: 2023-04-18

## 2023-04-18 DIAGNOSIS — D50.0 IRON DEFICIENCY ANEMIA DUE TO CHRONIC BLOOD LOSS: ICD-10-CM

## 2023-04-18 DIAGNOSIS — K90.9 INTESTINAL MALABSORPTION, UNSPECIFIED TYPE: Primary | ICD-10-CM

## 2023-04-18 RX ORDER — SODIUM CHLORIDE 9 MG/ML
INJECTION, SOLUTION INTRAVENOUS CONTINUOUS
OUTPATIENT
Start: 2023-04-18

## 2023-04-18 RX ORDER — ONDANSETRON 2 MG/ML
8 INJECTION INTRAMUSCULAR; INTRAVENOUS
OUTPATIENT
Start: 2023-04-18

## 2023-04-18 RX ORDER — HEPARIN SODIUM (PORCINE) LOCK FLUSH IV SOLN 100 UNIT/ML 100 UNIT/ML
500 SOLUTION INTRAVENOUS PRN
OUTPATIENT
Start: 2023-04-18

## 2023-04-18 RX ORDER — SODIUM CHLORIDE 9 MG/ML
5-250 INJECTION, SOLUTION INTRAVENOUS PRN
OUTPATIENT
Start: 2023-04-18

## 2023-04-18 RX ORDER — GABAPENTIN 100 MG/1
100 CAPSULE ORAL 3 TIMES DAILY
Qty: 90 CAPSULE | Refills: 0 | Status: SHIPPED | OUTPATIENT
Start: 2023-04-18 | End: 2023-05-18

## 2023-04-18 RX ORDER — SODIUM CHLORIDE 0.9 % (FLUSH) 0.9 %
5-40 SYRINGE (ML) INJECTION PRN
OUTPATIENT
Start: 2023-04-18

## 2023-04-18 RX ORDER — FAMOTIDINE 10 MG/ML
20 INJECTION, SOLUTION INTRAVENOUS
OUTPATIENT
Start: 2023-04-18

## 2023-04-18 RX ORDER — ALBUTEROL SULFATE 90 UG/1
4 AEROSOL, METERED RESPIRATORY (INHALATION) PRN
OUTPATIENT
Start: 2023-04-18

## 2023-04-18 RX ORDER — DIPHENHYDRAMINE HYDROCHLORIDE 50 MG/ML
50 INJECTION INTRAMUSCULAR; INTRAVENOUS
OUTPATIENT
Start: 2023-04-18

## 2023-04-18 RX ORDER — ACETAMINOPHEN 325 MG/1
650 TABLET ORAL
OUTPATIENT
Start: 2023-04-18

## 2023-04-18 RX ORDER — EPINEPHRINE 1 MG/ML
0.3 INJECTION, SOLUTION, CONCENTRATE INTRAVENOUS PRN
OUTPATIENT
Start: 2023-04-18

## 2023-04-18 NOTE — PROGRESS NOTES
Patient needs iron infusion for iron deficiency anemia d/t chronic blood loss. Order for venofer 300 mg weekly x3 and NN referral placed per physician order. Therapy plan added. Once medication approved by insurance, patient will be scheduled for infusions and notified of appointment times.

## 2023-04-18 NOTE — TELEPHONE ENCOUNTER
Patient left message requesting a refill for gabapentin to be sent to pharmacy. Pending RX to Provider to be sent to pharmacy.

## 2023-04-24 ENCOUNTER — HOSPITAL ENCOUNTER (OUTPATIENT)
Dept: INFUSION THERAPY | Age: 73
Discharge: HOME | End: 2023-04-24
Payer: COMMERCIAL

## 2023-04-24 VITALS — HEIGHT: 72 IN | BODY MASS INDEX: 22.75 KG/M2 | WEIGHT: 168 LBS

## 2023-04-24 DIAGNOSIS — D69.3 IMMUNE THROMBOCYTOPENIA (HCC): Primary | ICD-10-CM

## 2023-04-24 DIAGNOSIS — C91.10 CHRONIC LYMPHOCYTIC LEUKEMIA (HCC): ICD-10-CM

## 2023-04-24 LAB
BASOPHILS ABSOLUTE: 0 K/CU MM
BASOPHILS RELATIVE PERCENT: 0.1 % (ref 0–1)
DIFFERENTIAL TYPE: ABNORMAL
EOSINOPHILS ABSOLUTE: 0.1 K/CU MM
EOSINOPHILS RELATIVE PERCENT: 0.7 % (ref 0–3)
HCT VFR BLD CALC: 33.8 % (ref 42–52)
HEMOGLOBIN: 10.2 GM/DL (ref 13.5–18)
LYMPHOCYTES ABSOLUTE: 3.9 K/CU MM
LYMPHOCYTES RELATIVE PERCENT: 57.6 % (ref 24–44)
MCH RBC QN AUTO: 24.7 PG (ref 27–31)
MCHC RBC AUTO-ENTMCNC: 30.2 % (ref 32–36)
MCV RBC AUTO: 81.8 FL (ref 78–100)
MONOCYTES ABSOLUTE: 0.7 K/CU MM
MONOCYTES RELATIVE PERCENT: 10.2 % (ref 0–4)
PDW BLD-RTO: 16.5 % (ref 11.7–14.9)
PLATELET # BLD: 73 K/CU MM (ref 140–440)
PMV BLD AUTO: ABNORMAL FL (ref 7.5–11.1)
RBC # BLD: 4.13 M/CU MM (ref 4.6–6.2)
SEGMENTED NEUTROPHILS ABSOLUTE COUNT: 2.1 K/CU MM
SEGMENTED NEUTROPHILS RELATIVE PERCENT: 31.4 % (ref 36–66)
WBC # BLD: 6.7 K/CU MM (ref 4–10.5)

## 2023-04-24 PROCEDURE — 85025 COMPLETE CBC W/AUTO DIFF WBC: CPT

## 2023-04-24 PROCEDURE — 6360000002 HC RX W HCPCS: Performed by: INTERNAL MEDICINE

## 2023-04-24 PROCEDURE — 96372 THER/PROPH/DIAG INJ SC/IM: CPT

## 2023-04-24 PROCEDURE — 36415 COLL VENOUS BLD VENIPUNCTURE: CPT

## 2023-04-24 RX ORDER — DIPHENHYDRAMINE HYDROCHLORIDE 50 MG/ML
50 INJECTION INTRAMUSCULAR; INTRAVENOUS
Status: CANCELLED | OUTPATIENT
Start: 2023-05-01

## 2023-04-24 RX ORDER — ALLOPURINOL 300 MG/1
300 TABLET ORAL DAILY
Qty: 30 TABLET | Refills: 0 | Status: SHIPPED | OUTPATIENT
Start: 2023-04-24

## 2023-04-24 RX ORDER — SODIUM CHLORIDE 9 MG/ML
INJECTION, SOLUTION INTRAVENOUS CONTINUOUS
Status: CANCELLED | OUTPATIENT
Start: 2023-05-01

## 2023-04-24 RX ORDER — EPINEPHRINE 1 MG/ML
0.3 INJECTION, SOLUTION, CONCENTRATE INTRAVENOUS PRN
Status: CANCELLED | OUTPATIENT
Start: 2023-05-01

## 2023-04-24 RX ORDER — ALBUTEROL SULFATE 90 UG/1
4 AEROSOL, METERED RESPIRATORY (INHALATION) PRN
Status: CANCELLED | OUTPATIENT
Start: 2023-05-01

## 2023-04-24 RX ORDER — FAMOTIDINE 10 MG/ML
20 INJECTION, SOLUTION INTRAVENOUS
Status: CANCELLED | OUTPATIENT
Start: 2023-05-01

## 2023-04-24 RX ORDER — ONDANSETRON 2 MG/ML
8 INJECTION INTRAMUSCULAR; INTRAVENOUS
Status: CANCELLED | OUTPATIENT
Start: 2023-05-01

## 2023-04-24 RX ORDER — ACETAMINOPHEN 325 MG/1
650 TABLET ORAL
Status: CANCELLED | OUTPATIENT
Start: 2023-05-01

## 2023-04-24 RX ADMIN — ROMIPLOSTIM 75 MCG: 250 INJECTION, POWDER, LYOPHILIZED, FOR SOLUTION SUBCUTANEOUS at 09:48

## 2023-04-24 NOTE — PROGRESS NOTES
Patient arrived to treatment suite from the lab for Nplate injection. No questions or concerns for the doctor at this time. Treatment approved and given subcutaneously in right arm, band-aid applied. Patient tolerated well. Left treatment suite with assistance in wheelchair with family member. Discharge instructions provided.

## 2023-04-27 ENCOUNTER — TELEPHONE (OUTPATIENT)
Dept: ONCOLOGY | Age: 73
End: 2023-04-27

## 2023-04-27 NOTE — TELEPHONE ENCOUNTER
Pt's son, Gabe Contreras, states that pt has no appetite and is experiencing diarrhea, body aches, and doesn't feel well at all x a few days. Gabe Contreras denies pt experiencing fever or SOB.

## 2023-04-28 NOTE — TELEPHONE ENCOUNTER
Called patient's son, Thomas Benjamin @ [phone] to address symptoms. Advised to have patient hold venetoclax at this time. Patient scheduled for Nplate injection and labs on Monday 05/01/2023. Follow up appointment with Dr. Cesar Moore added to visit @ 0930. Son instructed to take patient to ER if symptoms worsen. Son voices understanding. No further needs addressed at this time.

## 2023-05-01 ENCOUNTER — OFFICE VISIT (OUTPATIENT)
Dept: ONCOLOGY | Age: 73
End: 2023-05-01
Payer: COMMERCIAL

## 2023-05-01 ENCOUNTER — HOSPITAL ENCOUNTER (OUTPATIENT)
Dept: INFUSION THERAPY | Age: 73
Discharge: HOME | End: 2023-05-01
Payer: COMMERCIAL

## 2023-05-01 VITALS
HEART RATE: 89 BPM | SYSTOLIC BLOOD PRESSURE: 130 MMHG | TEMPERATURE: 98 F | BODY MASS INDEX: 22.78 KG/M2 | DIASTOLIC BLOOD PRESSURE: 63 MMHG | HEIGHT: 72 IN | OXYGEN SATURATION: 94 % | RESPIRATION RATE: 18 BRPM

## 2023-05-01 VITALS — WEIGHT: 168 LBS | BODY MASS INDEX: 22.78 KG/M2

## 2023-05-01 DIAGNOSIS — D69.3 IMMUNE THROMBOCYTOPENIA (HCC): ICD-10-CM

## 2023-05-01 DIAGNOSIS — E53.8 B12 DEFICIENCY: ICD-10-CM

## 2023-05-01 DIAGNOSIS — C91.10 CHRONIC LYMPHOCYTIC LEUKEMIA (HCC): Primary | ICD-10-CM

## 2023-05-01 DIAGNOSIS — K90.9 INTESTINAL MALABSORPTION, UNSPECIFIED TYPE: ICD-10-CM

## 2023-05-01 DIAGNOSIS — D69.6 THROMBOCYTOPENIA (HCC): ICD-10-CM

## 2023-05-01 DIAGNOSIS — D64.9 ANEMIA, UNSPECIFIED TYPE: ICD-10-CM

## 2023-05-01 LAB
ALBUMIN SERPL-MCNC: 3.6 GM/DL (ref 3.4–5)
ALP BLD-CCNC: 203 IU/L (ref 40–128)
ALT SERPL-CCNC: 16 U/L (ref 10–40)
ANION GAP SERPL CALCULATED.3IONS-SCNC: 11 MMOL/L (ref 4–16)
AST SERPL-CCNC: 16 IU/L (ref 15–37)
ATYPICAL LYMPHOCYTE ABSOLUTE COUNT: ABNORMAL
BANDED NEUTROPHILS ABSOLUTE COUNT: 0.41 K/CU MM
BANDED NEUTROPHILS RELATIVE PERCENT: 6 % (ref 5–11)
BILIRUB SERPL-MCNC: 0.4 MG/DL (ref 0–1)
BUN SERPL-MCNC: 21 MG/DL (ref 6–23)
CALCIUM SERPL-MCNC: 8.8 MG/DL (ref 8.3–10.6)
CHLORIDE BLD-SCNC: 99 MMOL/L (ref 99–110)
CO2: 24 MMOL/L (ref 21–32)
CREAT SERPL-MCNC: 1 MG/DL (ref 0.9–1.3)
DIFFERENTIAL TYPE: ABNORMAL
GFR SERPL CREATININE-BSD FRML MDRD: >60 ML/MIN/1.73M2
GLUCOSE SERPL-MCNC: 200 MG/DL (ref 70–99)
HCT VFR BLD CALC: 34.1 % (ref 42–52)
HEMOGLOBIN: 10.3 GM/DL (ref 13.5–18)
LYMPHOCYTES ABSOLUTE: 3.3 K/CU MM
LYMPHOCYTES RELATIVE PERCENT: 48 % (ref 24–44)
MCH RBC QN AUTO: 24.5 PG (ref 27–31)
MCHC RBC AUTO-ENTMCNC: 30.2 % (ref 32–36)
MCV RBC AUTO: 81 FL (ref 78–100)
METAMYELOCYTES ABSOLUTE COUNT: 0.07 K/CU MM
METAMYELOCYTES PERCENT: 1 %
MONOCYTES ABSOLUTE: 0.3 K/CU MM
MONOCYTES RELATIVE PERCENT: 5 % (ref 0–4)
PDW BLD-RTO: 16.4 % (ref 11.7–14.9)
PLATELET # BLD: 86 K/CU MM (ref 140–440)
PMV BLD AUTO: 9.5 FL (ref 7.5–11.1)
POLYCHROMASIA: ABNORMAL
POTASSIUM SERPL-SCNC: 4.7 MMOL/L (ref 3.5–5.1)
RBC # BLD: 4.21 M/CU MM (ref 4.6–6.2)
SCHISTOCYTES: ABNORMAL
SEGMENTED NEUTROPHILS ABSOLUTE COUNT: 2.8 K/CU MM
SEGMENTED NEUTROPHILS RELATIVE PERCENT: 40 % (ref 36–66)
SODIUM BLD-SCNC: 134 MMOL/L (ref 135–145)
TEAR DROP CELLS: ABNORMAL
TOTAL PROTEIN: 5.3 GM/DL (ref 6.4–8.2)
WBC # BLD: 6.9 K/CU MM (ref 4–10.5)

## 2023-05-01 PROCEDURE — G8427 DOCREV CUR MEDS BY ELIG CLIN: HCPCS | Performed by: INTERNAL MEDICINE

## 2023-05-01 PROCEDURE — 1036F TOBACCO NON-USER: CPT | Performed by: INTERNAL MEDICINE

## 2023-05-01 PROCEDURE — 80053 COMPREHEN METABOLIC PANEL: CPT

## 2023-05-01 PROCEDURE — 99214 OFFICE O/P EST MOD 30 MIN: CPT | Performed by: INTERNAL MEDICINE

## 2023-05-01 PROCEDURE — 6360000002 HC RX W HCPCS: Performed by: INTERNAL MEDICINE

## 2023-05-01 PROCEDURE — 85027 COMPLETE CBC AUTOMATED: CPT

## 2023-05-01 PROCEDURE — G8420 CALC BMI NORM PARAMETERS: HCPCS | Performed by: INTERNAL MEDICINE

## 2023-05-01 PROCEDURE — 36415 COLL VENOUS BLD VENIPUNCTURE: CPT

## 2023-05-01 PROCEDURE — 1123F ACP DISCUSS/DSCN MKR DOCD: CPT | Performed by: INTERNAL MEDICINE

## 2023-05-01 PROCEDURE — 85007 BL SMEAR W/DIFF WBC COUNT: CPT

## 2023-05-01 PROCEDURE — 3017F COLORECTAL CA SCREEN DOC REV: CPT | Performed by: INTERNAL MEDICINE

## 2023-05-01 PROCEDURE — 96372 THER/PROPH/DIAG INJ SC/IM: CPT

## 2023-05-01 RX ORDER — ACETAMINOPHEN 325 MG/1
650 TABLET ORAL
Status: CANCELLED | OUTPATIENT
Start: 2023-05-08

## 2023-05-01 RX ORDER — ALBUTEROL SULFATE 90 UG/1
4 AEROSOL, METERED RESPIRATORY (INHALATION) PRN
Status: CANCELLED | OUTPATIENT
Start: 2023-05-08

## 2023-05-01 RX ORDER — ONDANSETRON 2 MG/ML
8 INJECTION INTRAMUSCULAR; INTRAVENOUS
Status: CANCELLED | OUTPATIENT
Start: 2023-05-08

## 2023-05-01 RX ORDER — DIPHENHYDRAMINE HYDROCHLORIDE 50 MG/ML
50 INJECTION INTRAMUSCULAR; INTRAVENOUS
Status: CANCELLED | OUTPATIENT
Start: 2023-05-08

## 2023-05-01 RX ORDER — FAMOTIDINE 10 MG/ML
20 INJECTION, SOLUTION INTRAVENOUS
Status: CANCELLED | OUTPATIENT
Start: 2023-05-08

## 2023-05-01 RX ORDER — SODIUM CHLORIDE 9 MG/ML
INJECTION, SOLUTION INTRAVENOUS CONTINUOUS
Status: CANCELLED | OUTPATIENT
Start: 2023-05-08

## 2023-05-01 RX ORDER — EPINEPHRINE 1 MG/ML
0.3 INJECTION, SOLUTION, CONCENTRATE INTRAVENOUS PRN
Status: CANCELLED | OUTPATIENT
Start: 2023-05-08

## 2023-05-01 RX ADMIN — ROMIPLOSTIM 75 MCG: 250 INJECTION, POWDER, LYOPHILIZED, FOR SOLUTION SUBCUTANEOUS at 10:38

## 2023-05-01 NOTE — PROGRESS NOTES
Pt. Here for nplate injection following lab work. PLT 86. Injection given as ordered in left arm, pt. Tolerated well. Discharge AVS given.

## 2023-05-01 NOTE — PROGRESS NOTES
MA Rooming Questions  Patient: Janusz Franco  MRN: [id number]    Date: 5/1/2023        1. Do you have any new issues? yes - nausea, pain         2. Do you need any refills on medications?    no    3. Have you had any imaging done since your last visit?   no    4. Have you been hospitalized or seen in the emergency room since your last visit here?   no    5. Did the patient have a depression screening completed today?  No    No data recorded     PHQ-9 Given to (if applicable):               PHQ-9 Score (if applicable):                     [] Positive     []  Negative              Does question #9 need addressed (if applicable)                     [] Yes    []  No               Austin Baldwin CMA

## 2023-05-08 ENCOUNTER — HOSPITAL ENCOUNTER (OUTPATIENT)
Dept: INFUSION THERAPY | Age: 73
Discharge: HOME | End: 2023-05-08
Payer: COMMERCIAL

## 2023-05-08 VITALS
DIASTOLIC BLOOD PRESSURE: 66 MMHG | OXYGEN SATURATION: 92 % | TEMPERATURE: 97.5 F | SYSTOLIC BLOOD PRESSURE: 104 MMHG | HEART RATE: 102 BPM

## 2023-05-08 DIAGNOSIS — D50.0 IRON DEFICIENCY ANEMIA DUE TO CHRONIC BLOOD LOSS: ICD-10-CM

## 2023-05-08 DIAGNOSIS — D69.3 IMMUNE THROMBOCYTOPENIA (HCC): Primary | ICD-10-CM

## 2023-05-08 DIAGNOSIS — K90.9 INTESTINAL MALABSORPTION, UNSPECIFIED TYPE: ICD-10-CM

## 2023-05-08 LAB
BANDED NEUTROPHILS ABSOLUTE COUNT: 0.19 K/CU MM
BANDED NEUTROPHILS RELATIVE PERCENT: 2 % (ref 5–11)
BASOPHILS ABSOLUTE: 0.2 K/CU MM
BASOPHILS RELATIVE PERCENT: 2 % (ref 0–1)
DIFFERENTIAL TYPE: ABNORMAL
EOSINOPHILS ABSOLUTE: 0.1 K/CU MM
EOSINOPHILS RELATIVE PERCENT: 1 % (ref 0–3)
HCT VFR BLD CALC: 35.4 % (ref 42–52)
HEMOGLOBIN: 10.8 GM/DL (ref 13.5–18)
LYMPHOCYTES ABSOLUTE: 4.8 K/CU MM
LYMPHOCYTES RELATIVE PERCENT: 50 % (ref 24–44)
MCH RBC QN AUTO: 24.4 PG (ref 27–31)
MCHC RBC AUTO-ENTMCNC: 30.5 % (ref 32–36)
MCV RBC AUTO: 79.9 FL (ref 78–100)
METAMYELOCYTES ABSOLUTE COUNT: 0.19 K/CU MM
METAMYELOCYTES PERCENT: 2 %
MONOCYTES ABSOLUTE: 0.6 K/CU MM
MONOCYTES RELATIVE PERCENT: 6 % (ref 0–4)
OVALOCYTES: ABNORMAL
PDW BLD-RTO: 16.6 % (ref 11.7–14.9)
PLATELET # BLD: 168 K/CU MM (ref 140–440)
PMV BLD AUTO: 11.3 FL (ref 7.5–11.1)
RBC # BLD: 4.43 M/CU MM (ref 4.6–6.2)
SCHISTOCYTES: ABNORMAL
SEGMENTED NEUTROPHILS ABSOLUTE COUNT: 3.6 K/CU MM
SEGMENTED NEUTROPHILS RELATIVE PERCENT: 37 % (ref 36–66)
TEAR DROP CELLS: ABNORMAL
WBC # BLD: 9.7 K/CU MM (ref 4–10.5)

## 2023-05-08 PROCEDURE — 6360000002 HC RX W HCPCS: Performed by: INTERNAL MEDICINE

## 2023-05-08 PROCEDURE — 96367 TX/PROPH/DG ADDL SEQ IV INF: CPT

## 2023-05-08 PROCEDURE — 85007 BL SMEAR W/DIFF WBC COUNT: CPT

## 2023-05-08 PROCEDURE — 96374 THER/PROPH/DIAG INJ IV PUSH: CPT

## 2023-05-08 PROCEDURE — 2580000003 HC RX 258: Performed by: INTERNAL MEDICINE

## 2023-05-08 PROCEDURE — 96366 THER/PROPH/DIAG IV INF ADDON: CPT

## 2023-05-08 PROCEDURE — 85027 COMPLETE CBC AUTOMATED: CPT

## 2023-05-08 PROCEDURE — 96372 THER/PROPH/DIAG INJ SC/IM: CPT

## 2023-05-08 RX ORDER — HEPARIN SODIUM (PORCINE) LOCK FLUSH IV SOLN 100 UNIT/ML 100 UNIT/ML
500 SOLUTION INTRAVENOUS PRN
Status: CANCELLED | OUTPATIENT
Start: 2023-05-15

## 2023-05-08 RX ORDER — DEXAMETHASONE SODIUM PHOSPHATE 4 MG/ML
8 INJECTION, SOLUTION INTRA-ARTICULAR; INTRALESIONAL; INTRAMUSCULAR; INTRAVENOUS; SOFT TISSUE ONCE
Status: COMPLETED | OUTPATIENT
Start: 2023-05-08 | End: 2023-05-08

## 2023-05-08 RX ORDER — EPINEPHRINE 1 MG/ML
0.3 INJECTION, SOLUTION, CONCENTRATE INTRAVENOUS PRN
Status: CANCELLED | OUTPATIENT
Start: 2023-05-15

## 2023-05-08 RX ORDER — SODIUM CHLORIDE 9 MG/ML
INJECTION, SOLUTION INTRAVENOUS CONTINUOUS
Status: CANCELLED | OUTPATIENT
Start: 2023-05-15

## 2023-05-08 RX ORDER — SODIUM CHLORIDE 9 MG/ML
5-250 INJECTION, SOLUTION INTRAVENOUS PRN
Status: CANCELLED | OUTPATIENT
Start: 2023-05-15

## 2023-05-08 RX ORDER — ONDANSETRON 2 MG/ML
8 INJECTION INTRAMUSCULAR; INTRAVENOUS
Status: CANCELLED | OUTPATIENT
Start: 2023-05-15

## 2023-05-08 RX ORDER — ALBUTEROL SULFATE 90 UG/1
4 AEROSOL, METERED RESPIRATORY (INHALATION) PRN
Status: CANCELLED | OUTPATIENT
Start: 2023-05-15

## 2023-05-08 RX ORDER — ACETAMINOPHEN 325 MG/1
650 TABLET ORAL
Status: CANCELLED | OUTPATIENT
Start: 2023-05-15

## 2023-05-08 RX ORDER — DIPHENHYDRAMINE HYDROCHLORIDE 50 MG/ML
50 INJECTION INTRAMUSCULAR; INTRAVENOUS
Status: CANCELLED | OUTPATIENT
Start: 2023-05-15

## 2023-05-08 RX ORDER — DEXAMETHASONE SODIUM PHOSPHATE 10 MG/ML
8 INJECTION, SOLUTION INTRAMUSCULAR; INTRAVENOUS ONCE
Status: CANCELLED
Start: 2023-05-15 | End: 2023-05-15

## 2023-05-08 RX ORDER — SODIUM CHLORIDE 9 MG/ML
5-250 INJECTION, SOLUTION INTRAVENOUS PRN
Status: DISCONTINUED | OUTPATIENT
Start: 2023-05-08 | End: 2023-05-09 | Stop reason: HOSPADM

## 2023-05-08 RX ORDER — FAMOTIDINE 10 MG/ML
20 INJECTION, SOLUTION INTRAVENOUS
Status: CANCELLED | OUTPATIENT
Start: 2023-05-15

## 2023-05-08 RX ORDER — SODIUM CHLORIDE 0.9 % (FLUSH) 0.9 %
5-40 SYRINGE (ML) INJECTION PRN
Status: DISCONTINUED | OUTPATIENT
Start: 2023-05-08 | End: 2023-05-09 | Stop reason: HOSPADM

## 2023-05-08 RX ORDER — SODIUM CHLORIDE 0.9 % (FLUSH) 0.9 %
5-40 SYRINGE (ML) INJECTION PRN
Status: CANCELLED | OUTPATIENT
Start: 2023-05-15

## 2023-05-08 RX ORDER — HEPARIN SODIUM (PORCINE) LOCK FLUSH IV SOLN 100 UNIT/ML 100 UNIT/ML
500 SOLUTION INTRAVENOUS PRN
Status: DISCONTINUED | OUTPATIENT
Start: 2023-05-08 | End: 2023-05-09 | Stop reason: HOSPADM

## 2023-05-08 RX ADMIN — ROMIPLOSTIM 75 MCG: 250 INJECTION, POWDER, LYOPHILIZED, FOR SOLUTION SUBCUTANEOUS at 13:17

## 2023-05-08 RX ADMIN — HEPARIN 500 UNITS: 100 SYRINGE at 13:17

## 2023-05-08 RX ADMIN — SODIUM CHLORIDE 20 ML/HR: 9 INJECTION, SOLUTION INTRAVENOUS at 11:00

## 2023-05-08 RX ADMIN — IRON SUCROSE 300 MG: 20 INJECTION, SOLUTION INTRAVENOUS at 11:01

## 2023-05-08 RX ADMIN — DEXAMETHASONE SODIUM PHOSPHATE 8 MG: 4 INJECTION, SOLUTION INTRAMUSCULAR; INTRAVENOUS at 10:55

## 2023-05-08 ASSESSMENT — PAIN SCALES - GENERAL: PAINLEVEL_OUTOF10: 6

## 2023-05-08 ASSESSMENT — PAIN DESCRIPTION - LOCATION: LOCATION: SHOULDER

## 2023-05-08 ASSESSMENT — PAIN DESCRIPTION - ORIENTATION: ORIENTATION: RIGHT;LEFT

## 2023-05-08 NOTE — PROGRESS NOTES
Pt assisted into treatment area by son for Venofer infusion and N-Plate injection. Mediport on Rt chest accessed. Good blood return noted. Labs drawn and sent to lab for processing. Treatment authorized and administered as ordered. 30 min post infusion observation complete. PLT: 168. N-Plate given in  LT arm by Remington Cheek RN. Pt tolerated well and left treatment area in wheelchair with son. AVS provided.

## 2023-05-15 ENCOUNTER — HOSPITAL ENCOUNTER (OUTPATIENT)
Dept: INFUSION THERAPY | Age: 73
Discharge: HOME | End: 2023-05-15
Payer: COMMERCIAL

## 2023-05-15 VITALS
OXYGEN SATURATION: 93 % | TEMPERATURE: 97.7 F | BODY MASS INDEX: 22.24 KG/M2 | HEART RATE: 98 BPM | WEIGHT: 164.2 LBS | DIASTOLIC BLOOD PRESSURE: 70 MMHG | SYSTOLIC BLOOD PRESSURE: 130 MMHG | HEIGHT: 72 IN

## 2023-05-15 DIAGNOSIS — D69.3 IMMUNE THROMBOCYTOPENIA (HCC): ICD-10-CM

## 2023-05-15 DIAGNOSIS — C91.10 CLL (CHRONIC LYMPHOCYTIC LEUKEMIA) (HCC): Primary | ICD-10-CM

## 2023-05-15 DIAGNOSIS — K90.9 INTESTINAL MALABSORPTION, UNSPECIFIED TYPE: ICD-10-CM

## 2023-05-15 DIAGNOSIS — D50.0 IRON DEFICIENCY ANEMIA DUE TO CHRONIC BLOOD LOSS: ICD-10-CM

## 2023-05-15 DIAGNOSIS — E53.8 B12 DEFICIENCY: ICD-10-CM

## 2023-05-15 LAB
ALBUMIN SERPL-MCNC: 3.8 GM/DL (ref 3.4–5)
ALP BLD-CCNC: 228 IU/L (ref 40–129)
ALT SERPL-CCNC: 23 U/L (ref 10–40)
ANION GAP SERPL CALCULATED.3IONS-SCNC: 11 MMOL/L (ref 4–16)
AST SERPL-CCNC: 21 IU/L (ref 15–37)
BASOPHILS ABSOLUTE: 0 K/CU MM
BASOPHILS RELATIVE PERCENT: 0.2 % (ref 0–1)
BILIRUB SERPL-MCNC: 0.3 MG/DL (ref 0–1)
BUN SERPL-MCNC: 20 MG/DL (ref 6–23)
CALCIUM SERPL-MCNC: 8.6 MG/DL (ref 8.3–10.6)
CHLORIDE BLD-SCNC: 101 MMOL/L (ref 99–110)
CO2: 25 MMOL/L (ref 21–32)
CREAT SERPL-MCNC: 0.8 MG/DL (ref 0.9–1.3)
DIFFERENTIAL TYPE: ABNORMAL
EOSINOPHILS ABSOLUTE: 0.1 K/CU MM
EOSINOPHILS RELATIVE PERCENT: 0.6 % (ref 0–3)
GFR SERPL CREATININE-BSD FRML MDRD: >60 ML/MIN/1.73M2
GLUCOSE SERPL-MCNC: 221 MG/DL (ref 70–99)
HCT VFR BLD CALC: 35.5 % (ref 42–52)
HEMOGLOBIN: 10.7 GM/DL (ref 13.5–18)
LYMPHOCYTES ABSOLUTE: 5 K/CU MM
LYMPHOCYTES RELATIVE PERCENT: 53.6 % (ref 24–44)
MCH RBC QN AUTO: 24.2 PG (ref 27–31)
MCHC RBC AUTO-ENTMCNC: 30.1 % (ref 32–36)
MCV RBC AUTO: 80.3 FL (ref 78–100)
MONOCYTES ABSOLUTE: 1 K/CU MM
MONOCYTES RELATIVE PERCENT: 11 % (ref 0–4)
PDW BLD-RTO: 17.4 % (ref 11.7–14.9)
PLATELET # BLD: 137 K/CU MM (ref 140–440)
PMV BLD AUTO: 9.6 FL (ref 7.5–11.1)
POTASSIUM SERPL-SCNC: 5 MMOL/L (ref 3.5–5.1)
RBC # BLD: 4.42 M/CU MM (ref 4.6–6.2)
SEGMENTED NEUTROPHILS ABSOLUTE COUNT: 3.2 K/CU MM
SEGMENTED NEUTROPHILS RELATIVE PERCENT: 34.6 % (ref 36–66)
SODIUM BLD-SCNC: 137 MMOL/L (ref 135–145)
TOTAL PROTEIN: 5.5 GM/DL (ref 6.4–8.2)
WBC # BLD: 9.3 K/CU MM (ref 4–10.5)

## 2023-05-15 PROCEDURE — 96413 CHEMO IV INFUSION 1 HR: CPT

## 2023-05-15 PROCEDURE — 2580000003 HC RX 258: Performed by: INTERNAL MEDICINE

## 2023-05-15 PROCEDURE — 85025 COMPLETE CBC W/AUTO DIFF WBC: CPT

## 2023-05-15 PROCEDURE — 96415 CHEMO IV INFUSION ADDL HR: CPT

## 2023-05-15 PROCEDURE — 96375 TX/PRO/DX INJ NEW DRUG ADDON: CPT

## 2023-05-15 PROCEDURE — 2580000003 HC RX 258: Performed by: PHYSICIAN ASSISTANT

## 2023-05-15 PROCEDURE — 96366 THER/PROPH/DIAG IV INF ADDON: CPT

## 2023-05-15 PROCEDURE — 80053 COMPREHEN METABOLIC PANEL: CPT

## 2023-05-15 PROCEDURE — 6370000000 HC RX 637 (ALT 250 FOR IP): Performed by: PHYSICIAN ASSISTANT

## 2023-05-15 PROCEDURE — 6360000002 HC RX W HCPCS: Performed by: INTERNAL MEDICINE

## 2023-05-15 PROCEDURE — 6360000002 HC RX W HCPCS: Performed by: PHYSICIAN ASSISTANT

## 2023-05-15 PROCEDURE — 96367 TX/PROPH/DG ADDL SEQ IV INF: CPT

## 2023-05-15 PROCEDURE — 96372 THER/PROPH/DIAG INJ SC/IM: CPT

## 2023-05-15 RX ORDER — SODIUM CHLORIDE 9 MG/ML
5-250 INJECTION, SOLUTION INTRAVENOUS PRN
Status: CANCELLED | OUTPATIENT
Start: 2023-05-22

## 2023-05-15 RX ORDER — EPINEPHRINE 1 MG/ML
0.3 INJECTION, SOLUTION, CONCENTRATE INTRAVENOUS PRN
Status: CANCELLED | OUTPATIENT
Start: 2023-05-22

## 2023-05-15 RX ORDER — FAMOTIDINE 10 MG/ML
20 INJECTION, SOLUTION INTRAVENOUS
Status: CANCELLED | OUTPATIENT
Start: 2023-05-22

## 2023-05-15 RX ORDER — SODIUM CHLORIDE 9 MG/ML
5-250 INJECTION, SOLUTION INTRAVENOUS PRN
Status: DISCONTINUED | OUTPATIENT
Start: 2023-05-15 | End: 2023-05-16 | Stop reason: HOSPADM

## 2023-05-15 RX ORDER — DIPHENHYDRAMINE HYDROCHLORIDE 50 MG/ML
50 INJECTION INTRAMUSCULAR; INTRAVENOUS
Status: CANCELLED | OUTPATIENT
Start: 2023-05-22

## 2023-05-15 RX ORDER — DIPHENHYDRAMINE HYDROCHLORIDE 50 MG/ML
50 INJECTION INTRAMUSCULAR; INTRAVENOUS
OUTPATIENT
Start: 2023-06-12

## 2023-05-15 RX ORDER — GABAPENTIN 100 MG/1
100 CAPSULE ORAL 3 TIMES DAILY
Qty: 90 CAPSULE | Refills: 3 | Status: SHIPPED | OUTPATIENT
Start: 2023-05-15 | End: 2023-06-14

## 2023-05-15 RX ORDER — FAMOTIDINE 10 MG/ML
20 INJECTION, SOLUTION INTRAVENOUS
OUTPATIENT
Start: 2023-06-12

## 2023-05-15 RX ORDER — DIPHENHYDRAMINE HYDROCHLORIDE 50 MG/ML
50 INJECTION INTRAMUSCULAR; INTRAVENOUS
Status: CANCELLED | OUTPATIENT
Start: 2023-05-15

## 2023-05-15 RX ORDER — SODIUM CHLORIDE 9 MG/ML
INJECTION, SOLUTION INTRAVENOUS CONTINUOUS
Status: CANCELLED | OUTPATIENT
Start: 2023-05-22

## 2023-05-15 RX ORDER — FAMOTIDINE 10 MG/ML
20 INJECTION, SOLUTION INTRAVENOUS
Status: CANCELLED | OUTPATIENT
Start: 2023-05-15

## 2023-05-15 RX ORDER — HEPARIN SODIUM (PORCINE) LOCK FLUSH IV SOLN 100 UNIT/ML 100 UNIT/ML
500 SOLUTION INTRAVENOUS PRN
Status: DISCONTINUED | OUTPATIENT
Start: 2023-05-15 | End: 2023-05-16 | Stop reason: HOSPADM

## 2023-05-15 RX ORDER — ONDANSETRON 2 MG/ML
8 INJECTION INTRAMUSCULAR; INTRAVENOUS
Status: CANCELLED | OUTPATIENT
Start: 2023-05-22

## 2023-05-15 RX ORDER — ACETAMINOPHEN 325 MG/1
650 TABLET ORAL
Status: CANCELLED | OUTPATIENT
Start: 2023-05-22

## 2023-05-15 RX ORDER — ONDANSETRON 2 MG/ML
8 INJECTION INTRAMUSCULAR; INTRAVENOUS
Status: CANCELLED | OUTPATIENT
Start: 2023-05-15

## 2023-05-15 RX ORDER — SODIUM CHLORIDE 9 MG/ML
5-250 INJECTION, SOLUTION INTRAVENOUS PRN
Status: CANCELLED | OUTPATIENT
Start: 2023-05-15

## 2023-05-15 RX ORDER — ACETAMINOPHEN 325 MG/1
650 TABLET ORAL
OUTPATIENT
Start: 2023-06-12

## 2023-05-15 RX ORDER — SODIUM CHLORIDE 9 MG/ML
INJECTION, SOLUTION INTRAVENOUS CONTINUOUS
Status: CANCELLED | OUTPATIENT
Start: 2023-05-15

## 2023-05-15 RX ORDER — CYANOCOBALAMIN 1000 UG/ML
1000 INJECTION, SOLUTION INTRAMUSCULAR; SUBCUTANEOUS ONCE
Status: COMPLETED
Start: 2023-05-15 | End: 2023-05-15

## 2023-05-15 RX ORDER — ACETAMINOPHEN 325 MG/1
650 TABLET ORAL ONCE
Status: COMPLETED | OUTPATIENT
Start: 2023-05-15 | End: 2023-05-15

## 2023-05-15 RX ORDER — ACETAMINOPHEN 325 MG/1
650 TABLET ORAL
Status: CANCELLED | OUTPATIENT
Start: 2023-05-15

## 2023-05-15 RX ORDER — ALBUTEROL SULFATE 90 UG/1
4 AEROSOL, METERED RESPIRATORY (INHALATION) PRN
Status: CANCELLED | OUTPATIENT
Start: 2023-05-15

## 2023-05-15 RX ORDER — DEXAMETHASONE SODIUM PHOSPHATE 10 MG/ML
8 INJECTION, SOLUTION INTRAMUSCULAR; INTRAVENOUS ONCE
Status: CANCELLED
Start: 2023-05-22 | End: 2023-05-22

## 2023-05-15 RX ORDER — HYDROCODONE BITARTRATE AND ACETAMINOPHEN 10; 325 MG/1; MG/1
1 TABLET ORAL EVERY 8 HOURS PRN
Qty: 90 TABLET | Refills: 0 | Status: SHIPPED | OUTPATIENT
Start: 2023-05-15 | End: 2023-06-14

## 2023-05-15 RX ORDER — ONDANSETRON 2 MG/ML
8 INJECTION INTRAMUSCULAR; INTRAVENOUS
OUTPATIENT
Start: 2023-06-12

## 2023-05-15 RX ORDER — MEPERIDINE HYDROCHLORIDE 25 MG/ML
12.5 INJECTION INTRAMUSCULAR; INTRAVENOUS; SUBCUTANEOUS PRN
Status: CANCELLED | OUTPATIENT
Start: 2023-05-15

## 2023-05-15 RX ORDER — ALBUTEROL SULFATE 90 UG/1
4 AEROSOL, METERED RESPIRATORY (INHALATION) PRN
Status: CANCELLED | OUTPATIENT
Start: 2023-05-22

## 2023-05-15 RX ORDER — EPINEPHRINE 1 MG/ML
0.3 INJECTION, SOLUTION, CONCENTRATE INTRAVENOUS PRN
Status: CANCELLED | OUTPATIENT
Start: 2023-05-15

## 2023-05-15 RX ORDER — SODIUM CHLORIDE 9 MG/ML
INJECTION, SOLUTION INTRAVENOUS CONTINUOUS
OUTPATIENT
Start: 2023-06-12

## 2023-05-15 RX ORDER — DIPHENHYDRAMINE HYDROCHLORIDE 50 MG/ML
50 INJECTION INTRAMUSCULAR; INTRAVENOUS ONCE
Status: COMPLETED | OUTPATIENT
Start: 2023-05-15 | End: 2023-05-15

## 2023-05-15 RX ORDER — SODIUM CHLORIDE 0.9 % (FLUSH) 0.9 %
5-40 SYRINGE (ML) INJECTION PRN
Status: CANCELLED | OUTPATIENT
Start: 2023-05-22

## 2023-05-15 RX ORDER — HEPARIN SODIUM (PORCINE) LOCK FLUSH IV SOLN 100 UNIT/ML 100 UNIT/ML
500 SOLUTION INTRAVENOUS PRN
Status: CANCELLED | OUTPATIENT
Start: 2023-05-22

## 2023-05-15 RX ORDER — EPINEPHRINE 1 MG/ML
0.3 INJECTION, SOLUTION, CONCENTRATE INTRAVENOUS PRN
OUTPATIENT
Start: 2023-06-12

## 2023-05-15 RX ORDER — SODIUM CHLORIDE 0.9 % (FLUSH) 0.9 %
5-40 SYRINGE (ML) INJECTION PRN
Status: DISCONTINUED | OUTPATIENT
Start: 2023-05-15 | End: 2023-05-16 | Stop reason: HOSPADM

## 2023-05-15 RX ORDER — ALBUTEROL SULFATE 90 UG/1
4 AEROSOL, METERED RESPIRATORY (INHALATION) PRN
OUTPATIENT
Start: 2023-06-12

## 2023-05-15 RX ORDER — CYANOCOBALAMIN 1000 UG/ML
1000 INJECTION, SOLUTION INTRAMUSCULAR; SUBCUTANEOUS ONCE
Start: 2023-06-12

## 2023-05-15 RX ORDER — GUAIFENESIN 600 MG/1
600 TABLET, EXTENDED RELEASE ORAL 2 TIMES DAILY
Qty: 30 TABLET | Refills: 0 | Status: SHIPPED | OUTPATIENT
Start: 2023-05-15 | End: 2023-05-30

## 2023-05-15 RX ADMIN — HEPARIN 500 UNITS: 100 SYRINGE at 16:07

## 2023-05-15 RX ADMIN — IRON SUCROSE 300 MG: 20 INJECTION, SOLUTION INTRAVENOUS at 13:55

## 2023-05-15 RX ADMIN — SODIUM CHLORIDE, PRESERVATIVE FREE 20 ML: 5 INJECTION INTRAVENOUS at 16:07

## 2023-05-15 RX ADMIN — OBINUTUZUMAB 1000 MG: 1000 INJECTION, SOLUTION, CONCENTRATE INTRAVENOUS at 09:49

## 2023-05-15 RX ADMIN — SODIUM CHLORIDE 20 ML/HR: 9 INJECTION, SOLUTION INTRAVENOUS at 09:23

## 2023-05-15 RX ADMIN — ACETAMINOPHEN 650 MG: 325 TABLET ORAL at 09:20

## 2023-05-15 RX ADMIN — DEXAMETHASONE SODIUM PHOSPHATE 20 MG: 10 INJECTION INTRAMUSCULAR; INTRAVENOUS at 09:24

## 2023-05-15 RX ADMIN — DIPHENHYDRAMINE HYDROCHLORIDE 50 MG: 50 INJECTION INTRAMUSCULAR; INTRAVENOUS at 09:21

## 2023-05-15 RX ADMIN — CYANOCOBALAMIN 1000 MCG: 1000 INJECTION, SOLUTION INTRAMUSCULAR at 09:18

## 2023-05-15 RX ADMIN — ROMIPLOSTIM 75 MCG: 250 INJECTION, POWDER, LYOPHILIZED, FOR SOLUTION SUBCUTANEOUS at 09:28

## 2023-05-15 ASSESSMENT — PAIN SCALES - GENERAL: PAINLEVEL_OUTOF10: 8

## 2023-05-15 ASSESSMENT — PAIN DESCRIPTION - ORIENTATION: ORIENTATION: RIGHT;LEFT

## 2023-05-15 NOTE — PROGRESS NOTES
Pt assisted into treatment area via wheelchair by son, for Mediport access, Blood draw, pre medications, Chemotherapy infusion, Venofer infusion, B-12 injection and N-Plate injection. Pt states that he is having pain in legs, hips, shoulder and back and needs a refill of Pain medications, Neurontin and Mucinex. Notified Dr. Jessica Moses who placed prescriptions for patient. Pt currently has no other issues to address with physician at this time. Right chest mediport accessed, positive blood return noted, labs drawn and sent to lab for processing. Treatment authorized and administered as ordered. B-12 injection administered in LT deltoid. N-Plate administered in RT arm. Pt tolerated well and left treatment area via wheelchair with son. AVS provided. Status appropriately assessed and documented. All required labs and results reviewed. Treatment approved by provider. Treatment orders and medications verified by 2 Registered Nurses where applicable. Treatment plan was confirmed with patient prior to administration, and educated the need to report any treatment-related symptoms      Prior to administration, when applicable, the following 8 elements of medication administration were reviewed with 2nd Registered Nurse prior to dosing: drug name, drug dose, infusion volume when prepared in a syringe, rate of administration, expiration dates and/or times, appearance and integrity of drug(s), and rate of pump for infusion. The 5 rights of medication administration have been verified.

## 2023-05-22 ENCOUNTER — HOSPITAL ENCOUNTER (OUTPATIENT)
Dept: INFUSION THERAPY | Age: 73
Discharge: HOME | End: 2023-05-22
Payer: COMMERCIAL

## 2023-05-22 VITALS
TEMPERATURE: 97.3 F | HEIGHT: 72 IN | BODY MASS INDEX: 21.56 KG/M2 | HEART RATE: 98 BPM | WEIGHT: 159.2 LBS | OXYGEN SATURATION: 94 % | DIASTOLIC BLOOD PRESSURE: 74 MMHG | RESPIRATION RATE: 18 BRPM | SYSTOLIC BLOOD PRESSURE: 119 MMHG

## 2023-05-22 DIAGNOSIS — C91.10 LEUKEMIA, LYMPHOCYTIC, CHRONIC (HCC): ICD-10-CM

## 2023-05-22 DIAGNOSIS — K90.9 INTESTINAL MALABSORPTION, UNSPECIFIED TYPE: ICD-10-CM

## 2023-05-22 DIAGNOSIS — D69.3 IMMUNE THROMBOCYTOPENIA (HCC): Primary | ICD-10-CM

## 2023-05-22 DIAGNOSIS — D50.0 IRON DEFICIENCY ANEMIA DUE TO CHRONIC BLOOD LOSS: ICD-10-CM

## 2023-05-22 DIAGNOSIS — D80.3 SELECTIVE DEFICIENCY OF IGG (HCC): ICD-10-CM

## 2023-05-22 DIAGNOSIS — D80.1 HYPOGAMMAGLOBULINEMIA (HCC): ICD-10-CM

## 2023-05-22 LAB
ANISOCYTOSIS: ABNORMAL
BANDED NEUTROPHILS ABSOLUTE COUNT: 0.66 K/CU MM
BANDED NEUTROPHILS RELATIVE PERCENT: 5 % (ref 5–11)
DIFFERENTIAL TYPE: ABNORMAL
EOSINOPHILS ABSOLUTE: 0.1 K/CU MM
EOSINOPHILS RELATIVE PERCENT: 1 % (ref 0–3)
HCT VFR BLD CALC: 37.4 % (ref 42–52)
HEMOGLOBIN: 11.4 GM/DL (ref 13.5–18)
LYMPHOCYTES ABSOLUTE: 6.5 K/CU MM
LYMPHOCYTES RELATIVE PERCENT: 49 % (ref 24–44)
MCH RBC QN AUTO: 24.3 PG (ref 27–31)
MCHC RBC AUTO-ENTMCNC: 30.5 % (ref 32–36)
MCV RBC AUTO: 79.7 FL (ref 78–100)
METAMYELOCYTES ABSOLUTE COUNT: 0.13 K/CU MM
METAMYELOCYTES PERCENT: 1 %
MONOCYTES ABSOLUTE: 0.1 K/CU MM
MONOCYTES RELATIVE PERCENT: 1 % (ref 0–4)
OVALOCYTES: ABNORMAL
PDW BLD-RTO: 18.3 % (ref 11.7–14.9)
PLATELET # BLD: 85 K/CU MM (ref 140–440)
PMV BLD AUTO: 10.2 FL (ref 7.5–11.1)
POLYCHROMASIA: ABNORMAL
RBC # BLD: 4.69 M/CU MM (ref 4.6–6.2)
SEGMENTED NEUTROPHILS ABSOLUTE COUNT: 5.6 K/CU MM
SEGMENTED NEUTROPHILS RELATIVE PERCENT: 43 % (ref 36–66)
TEAR DROP CELLS: ABNORMAL
WBC # BLD: 13.1 K/CU MM (ref 4–10.5)

## 2023-05-22 PROCEDURE — 6360000002 HC RX W HCPCS: Performed by: INTERNAL MEDICINE

## 2023-05-22 PROCEDURE — 6360000002 HC RX W HCPCS: Performed by: NURSE PRACTITIONER

## 2023-05-22 PROCEDURE — 96367 TX/PROPH/DG ADDL SEQ IV INF: CPT

## 2023-05-22 PROCEDURE — 85007 BL SMEAR W/DIFF WBC COUNT: CPT

## 2023-05-22 PROCEDURE — 96372 THER/PROPH/DIAG INJ SC/IM: CPT

## 2023-05-22 PROCEDURE — 96375 TX/PRO/DX INJ NEW DRUG ADDON: CPT

## 2023-05-22 PROCEDURE — 2580000003 HC RX 258: Performed by: INTERNAL MEDICINE

## 2023-05-22 PROCEDURE — 96366 THER/PROPH/DIAG IV INF ADDON: CPT

## 2023-05-22 PROCEDURE — 85027 COMPLETE CBC AUTOMATED: CPT

## 2023-05-22 PROCEDURE — 96365 THER/PROPH/DIAG IV INF INIT: CPT

## 2023-05-22 RX ORDER — ONDANSETRON 2 MG/ML
8 INJECTION INTRAMUSCULAR; INTRAVENOUS
OUTPATIENT
Start: 2023-05-29

## 2023-05-22 RX ORDER — SODIUM CHLORIDE 9 MG/ML
INJECTION, SOLUTION INTRAVENOUS CONTINUOUS
Status: CANCELLED | OUTPATIENT
Start: 2023-05-29

## 2023-05-22 RX ORDER — ALBUTEROL SULFATE 90 UG/1
4 AEROSOL, METERED RESPIRATORY (INHALATION) PRN
OUTPATIENT
Start: 2023-05-29

## 2023-05-22 RX ORDER — DIPHENHYDRAMINE HYDROCHLORIDE 50 MG/ML
25 INJECTION INTRAMUSCULAR; INTRAVENOUS ONCE
Start: 2023-06-12 | End: 2023-06-12

## 2023-05-22 RX ORDER — ACETAMINOPHEN 325 MG/1
650 TABLET ORAL
Status: CANCELLED | OUTPATIENT
Start: 2023-05-29

## 2023-05-22 RX ORDER — HEPARIN SODIUM (PORCINE) LOCK FLUSH IV SOLN 100 UNIT/ML 100 UNIT/ML
500 SOLUTION INTRAVENOUS PRN
Status: DISCONTINUED | OUTPATIENT
Start: 2023-05-22 | End: 2023-05-23 | Stop reason: HOSPADM

## 2023-05-22 RX ORDER — DIPHENHYDRAMINE HYDROCHLORIDE 50 MG/ML
25 INJECTION INTRAMUSCULAR; INTRAVENOUS ONCE
Status: COMPLETED | OUTPATIENT
Start: 2023-05-22 | End: 2023-05-22

## 2023-05-22 RX ORDER — SODIUM CHLORIDE 0.9 % (FLUSH) 0.9 %
5-40 SYRINGE (ML) INJECTION PRN
OUTPATIENT
Start: 2023-05-29

## 2023-05-22 RX ORDER — HEPARIN SODIUM (PORCINE) LOCK FLUSH IV SOLN 100 UNIT/ML 100 UNIT/ML
500 SOLUTION INTRAVENOUS PRN
Start: 2023-06-12

## 2023-05-22 RX ORDER — FAMOTIDINE 10 MG/ML
20 INJECTION, SOLUTION INTRAVENOUS
Status: CANCELLED | OUTPATIENT
Start: 2023-05-29

## 2023-05-22 RX ORDER — SODIUM CHLORIDE 0.9 % (FLUSH) 0.9 %
5-40 SYRINGE (ML) INJECTION PRN
Status: DISCONTINUED | OUTPATIENT
Start: 2023-05-22 | End: 2023-05-23 | Stop reason: HOSPADM

## 2023-05-22 RX ORDER — DIPHENHYDRAMINE HYDROCHLORIDE 50 MG/ML
50 INJECTION INTRAMUSCULAR; INTRAVENOUS
Status: CANCELLED | OUTPATIENT
Start: 2023-05-29

## 2023-05-22 RX ORDER — SODIUM CHLORIDE 9 MG/ML
5-250 INJECTION, SOLUTION INTRAVENOUS PRN
OUTPATIENT
Start: 2023-05-29

## 2023-05-22 RX ORDER — HEPARIN SODIUM (PORCINE) LOCK FLUSH IV SOLN 100 UNIT/ML 100 UNIT/ML
500 SOLUTION INTRAVENOUS PRN
OUTPATIENT
Start: 2023-06-12

## 2023-05-22 RX ORDER — HEPARIN SODIUM (PORCINE) LOCK FLUSH IV SOLN 100 UNIT/ML 100 UNIT/ML
500 SOLUTION INTRAVENOUS PRN
OUTPATIENT
Start: 2023-05-29

## 2023-05-22 RX ORDER — SODIUM CHLORIDE 9 MG/ML
5-250 INJECTION, SOLUTION INTRAVENOUS PRN
Status: DISCONTINUED | OUTPATIENT
Start: 2023-05-22 | End: 2023-05-23 | Stop reason: HOSPADM

## 2023-05-22 RX ORDER — ALBUTEROL SULFATE 90 UG/1
4 AEROSOL, METERED RESPIRATORY (INHALATION) PRN
Status: CANCELLED | OUTPATIENT
Start: 2023-05-29

## 2023-05-22 RX ORDER — SODIUM CHLORIDE 9 MG/ML
INJECTION, SOLUTION INTRAVENOUS CONTINUOUS
OUTPATIENT
Start: 2023-05-29

## 2023-05-22 RX ORDER — DIPHENHYDRAMINE HYDROCHLORIDE 50 MG/ML
50 INJECTION INTRAMUSCULAR; INTRAVENOUS
OUTPATIENT
Start: 2023-05-29

## 2023-05-22 RX ORDER — DEXAMETHASONE SODIUM PHOSPHATE 4 MG/ML
8 INJECTION, SOLUTION INTRA-ARTICULAR; INTRALESIONAL; INTRAMUSCULAR; INTRAVENOUS; SOFT TISSUE ONCE
Status: COMPLETED | OUTPATIENT
Start: 2023-05-22 | End: 2023-05-22

## 2023-05-22 RX ORDER — METHYLPREDNISOLONE SODIUM SUCCINATE 40 MG/ML
40 INJECTION, POWDER, LYOPHILIZED, FOR SOLUTION INTRAMUSCULAR; INTRAVENOUS ONCE
Start: 2023-06-12 | End: 2023-06-12

## 2023-05-22 RX ORDER — METHYLPREDNISOLONE SODIUM SUCCINATE 40 MG/ML
40 INJECTION, POWDER, LYOPHILIZED, FOR SOLUTION INTRAMUSCULAR; INTRAVENOUS ONCE
Status: DISCONTINUED | OUTPATIENT
Start: 2023-05-22 | End: 2023-05-22 | Stop reason: SDUPTHER

## 2023-05-22 RX ORDER — DEXAMETHASONE SODIUM PHOSPHATE 10 MG/ML
8 INJECTION, SOLUTION INTRAMUSCULAR; INTRAVENOUS ONCE
Start: 2023-05-29 | End: 2023-05-29

## 2023-05-22 RX ORDER — SODIUM CHLORIDE 0.9 % (FLUSH) 0.9 %
10 SYRINGE (ML) INJECTION PRN
OUTPATIENT
Start: 2023-06-12

## 2023-05-22 RX ORDER — EPINEPHRINE 1 MG/ML
0.3 INJECTION, SOLUTION, CONCENTRATE INTRAVENOUS PRN
Status: CANCELLED | OUTPATIENT
Start: 2023-05-29

## 2023-05-22 RX ORDER — ACETAMINOPHEN 325 MG/1
650 TABLET ORAL
OUTPATIENT
Start: 2023-05-29

## 2023-05-22 RX ORDER — EPINEPHRINE 1 MG/ML
0.3 INJECTION, SOLUTION, CONCENTRATE INTRAVENOUS PRN
OUTPATIENT
Start: 2023-05-29

## 2023-05-22 RX ORDER — FAMOTIDINE 10 MG/ML
20 INJECTION, SOLUTION INTRAVENOUS
OUTPATIENT
Start: 2023-05-29

## 2023-05-22 RX ORDER — ONDANSETRON 2 MG/ML
8 INJECTION INTRAMUSCULAR; INTRAVENOUS
Status: CANCELLED | OUTPATIENT
Start: 2023-05-29

## 2023-05-22 RX ADMIN — DEXAMETHASONE SODIUM PHOSPHATE 8 MG: 4 INJECTION, SOLUTION INTRAMUSCULAR; INTRAVENOUS at 10:59

## 2023-05-22 RX ADMIN — DIPHENHYDRAMINE HYDROCHLORIDE 25 MG: 50 INJECTION INTRAMUSCULAR; INTRAVENOUS at 13:14

## 2023-05-22 RX ADMIN — ROMIPLOSTIM 70 MCG: 250 INJECTION, POWDER, LYOPHILIZED, FOR SOLUTION SUBCUTANEOUS at 12:38

## 2023-05-22 RX ADMIN — HEPARIN 500 UNITS: 100 SYRINGE at 15:24

## 2023-05-22 RX ADMIN — IRON SUCROSE 300 MG: 20 INJECTION, SOLUTION INTRAVENOUS at 10:59

## 2023-05-22 RX ADMIN — SODIUM CHLORIDE, PRESERVATIVE FREE 10 ML: 5 INJECTION INTRAVENOUS at 15:24

## 2023-05-22 RX ADMIN — SODIUM CHLORIDE 20 ML/HR: 9 INJECTION, SOLUTION INTRAVENOUS at 10:59

## 2023-05-22 RX ADMIN — IMMUNE GLOBULIN (HUMAN) 20 G: 10 INJECTION INTRAVENOUS; SUBCUTANEOUS at 13:16

## 2023-05-22 ASSESSMENT — PAIN SCALES - GENERAL: PAINLEVEL_OUTOF10: 5

## 2023-05-22 ASSESSMENT — PAIN DESCRIPTION - LOCATION: LOCATION: SHOULDER

## 2023-05-22 NOTE — PROGRESS NOTES
Pt here for Venofer and IVG and N-plate injection. Port accessed with blood return noted. CBC sent. Pt has  no concerns at this time. N-plate given SQ to right upper arm. Pt tolerated without incident. Patient's status assessed and documented appropriately. All labs and required results were also reviewed today. Treatment parameters have been reviewed. Today's treatment has been approved by the provider. Treatment orders and medication sequencing (when applicable) was verified by 2 registered nurses. The treatment plan was confirmed with the patient prior to administration, and the patient understands the need to report any treatment-related symptoms. Prior to administration, when applicable, the following 8 elements of medication administration were reviewed with 2nd Registered Nurse prior to dosing: drug name, drug dose, infusion volume when prepared in a syringe, rate of administration, expiration dates and/or times, appearance and integrity of drug(s), and rate of pump for infusion. The 5 rights of medication administration have been verified. Pt tolerated infusions without incident.  Left via wheelchair with son discharge instructions given

## 2023-05-23 DIAGNOSIS — C91.10 CHRONIC LYMPHOCYTIC LEUKEMIA (HCC): Primary | ICD-10-CM

## 2023-05-30 ENCOUNTER — HOSPITAL ENCOUNTER (OUTPATIENT)
Dept: INFUSION THERAPY | Age: 73
Discharge: HOME | End: 2023-05-30
Payer: COMMERCIAL

## 2023-05-30 DIAGNOSIS — C91.10 CHRONIC LYMPHOCYTIC LEUKEMIA (HCC): Primary | ICD-10-CM

## 2023-05-30 DIAGNOSIS — D69.3 IMMUNE THROMBOCYTOPENIA (HCC): ICD-10-CM

## 2023-05-30 LAB
ALBUMIN SERPL-MCNC: 3.6 GM/DL (ref 3.4–5)
ALP BLD-CCNC: 225 IU/L (ref 40–128)
ALT SERPL-CCNC: 26 U/L (ref 10–40)
ANION GAP SERPL CALCULATED.3IONS-SCNC: 15 MMOL/L (ref 4–16)
ANISOCYTOSIS: ABNORMAL
AST SERPL-CCNC: 23 IU/L (ref 15–37)
ATYPICAL LYMPHOCYTE ABSOLUTE COUNT: ABNORMAL
BANDED NEUTROPHILS ABSOLUTE COUNT: 0.56 K/CU MM
BANDED NEUTROPHILS RELATIVE PERCENT: 6 % (ref 5–11)
BILIRUB SERPL-MCNC: 0.4 MG/DL (ref 0–1)
BUN SERPL-MCNC: 24 MG/DL (ref 6–23)
CALCIUM SERPL-MCNC: 8.8 MG/DL (ref 8.3–10.6)
CHLORIDE BLD-SCNC: 98 MMOL/L (ref 99–110)
CO2: 21 MMOL/L (ref 21–32)
CREAT SERPL-MCNC: 1 MG/DL (ref 0.9–1.3)
DIFFERENTIAL TYPE: ABNORMAL
ELLIPTOCYTES: ABNORMAL
EOSINOPHILS ABSOLUTE: 0.2 K/CU MM
EOSINOPHILS RELATIVE PERCENT: 2 % (ref 0–3)
GFR SERPL CREATININE-BSD FRML MDRD: >60 ML/MIN/1.73M2
GLUCOSE SERPL-MCNC: 193 MG/DL (ref 70–99)
HCT VFR BLD CALC: 36.8 % (ref 42–52)
HEMOGLOBIN: 11.4 GM/DL (ref 13.5–18)
LYMPHOCYTES ABSOLUTE: 5.8 K/CU MM
LYMPHOCYTES RELATIVE PERCENT: 62 % (ref 24–44)
MCH RBC QN AUTO: 24.8 PG (ref 27–31)
MCHC RBC AUTO-ENTMCNC: 31 % (ref 32–36)
MCV RBC AUTO: 80.2 FL (ref 78–100)
MICROCYTES: ABNORMAL
MONOCYTES ABSOLUTE: 0.3 K/CU MM
MONOCYTES RELATIVE PERCENT: 3 % (ref 0–4)
OVALOCYTES: ABNORMAL
PDW BLD-RTO: 19 % (ref 11.7–14.9)
PLATELET # BLD: ADEQUATE K/CU MM (ref 140–440)
PMV BLD AUTO: 9.5 FL (ref 7.5–11.1)
POTASSIUM SERPL-SCNC: 4.9 MMOL/L (ref 3.5–5.1)
RBC # BLD: 4.59 M/CU MM (ref 4.6–6.2)
SCHISTOCYTES: ABNORMAL
SEGMENTED NEUTROPHILS ABSOLUTE COUNT: 2.5 K/CU MM
SEGMENTED NEUTROPHILS RELATIVE PERCENT: 27 % (ref 36–66)
SODIUM BLD-SCNC: 134 MMOL/L (ref 135–145)
TEAR DROP CELLS: ABNORMAL
TOTAL PROTEIN: 5.5 GM/DL (ref 6.4–8.2)
WBC # BLD: 9.4 K/CU MM (ref 4–10.5)

## 2023-05-30 PROCEDURE — 96372 THER/PROPH/DIAG INJ SC/IM: CPT

## 2023-05-30 PROCEDURE — 80053 COMPREHEN METABOLIC PANEL: CPT

## 2023-05-30 PROCEDURE — 36415 COLL VENOUS BLD VENIPUNCTURE: CPT

## 2023-05-30 PROCEDURE — 6360000002 HC RX W HCPCS: Performed by: INTERNAL MEDICINE

## 2023-05-30 PROCEDURE — 85027 COMPLETE CBC AUTOMATED: CPT

## 2023-05-30 PROCEDURE — 85007 BL SMEAR W/DIFF WBC COUNT: CPT

## 2023-05-30 RX ORDER — SODIUM CHLORIDE 9 MG/ML
INJECTION, SOLUTION INTRAVENOUS CONTINUOUS
Status: CANCELLED | OUTPATIENT
Start: 2023-06-05

## 2023-05-30 RX ORDER — ALBUTEROL SULFATE 90 UG/1
4 AEROSOL, METERED RESPIRATORY (INHALATION) PRN
Status: CANCELLED | OUTPATIENT
Start: 2023-06-05

## 2023-05-30 RX ORDER — ONDANSETRON 2 MG/ML
8 INJECTION INTRAMUSCULAR; INTRAVENOUS
Status: CANCELLED | OUTPATIENT
Start: 2023-06-05

## 2023-05-30 RX ORDER — EPINEPHRINE 1 MG/ML
0.3 INJECTION, SOLUTION, CONCENTRATE INTRAVENOUS PRN
Status: CANCELLED | OUTPATIENT
Start: 2023-06-05

## 2023-05-30 RX ORDER — ACETAMINOPHEN 325 MG/1
650 TABLET ORAL
Status: CANCELLED | OUTPATIENT
Start: 2023-06-05

## 2023-05-30 RX ORDER — DIPHENHYDRAMINE HYDROCHLORIDE 50 MG/ML
50 INJECTION INTRAMUSCULAR; INTRAVENOUS
Status: CANCELLED | OUTPATIENT
Start: 2023-06-05

## 2023-05-30 RX ORDER — FAMOTIDINE 10 MG/ML
20 INJECTION, SOLUTION INTRAVENOUS
Status: CANCELLED | OUTPATIENT
Start: 2023-06-05

## 2023-05-30 RX ADMIN — ROMIPLOSTIM 70 MCG: 250 INJECTION, POWDER, LYOPHILIZED, FOR SOLUTION SUBCUTANEOUS at 10:22

## 2023-05-30 NOTE — PROGRESS NOTES
Pt assisted into treatment area by son for an N-Plate injection. Labs drawn in lab. PLT: 180   Treatment authorized and administered as ordered. N-Plate given in  LT arm. Pt tolerated well and left treatment area in wheelchair with son.  AVS provided

## 2023-06-02 RX ORDER — VALACYCLOVIR HYDROCHLORIDE 500 MG/1
TABLET, FILM COATED ORAL
Qty: 30 TABLET | Refills: 5 | Status: SHIPPED | OUTPATIENT
Start: 2023-06-02

## 2023-06-05 ENCOUNTER — HOSPITAL ENCOUNTER (OUTPATIENT)
Dept: INFUSION THERAPY | Age: 73
Discharge: HOME | End: 2023-06-05
Payer: COMMERCIAL

## 2023-06-05 VITALS — BODY MASS INDEX: 21.7 KG/M2 | WEIGHT: 160.2 LBS | HEIGHT: 72 IN

## 2023-06-05 DIAGNOSIS — E53.8 B12 DEFICIENCY: ICD-10-CM

## 2023-06-05 DIAGNOSIS — D69.3 IMMUNE THROMBOCYTOPENIA (HCC): ICD-10-CM

## 2023-06-05 DIAGNOSIS — D64.9 ANEMIA, UNSPECIFIED TYPE: ICD-10-CM

## 2023-06-05 DIAGNOSIS — D69.6 THROMBOCYTOPENIA (HCC): ICD-10-CM

## 2023-06-05 DIAGNOSIS — C91.10 CHRONIC LYMPHOCYTIC LEUKEMIA (HCC): Primary | ICD-10-CM

## 2023-06-05 DIAGNOSIS — K90.9 INTESTINAL MALABSORPTION, UNSPECIFIED TYPE: ICD-10-CM

## 2023-06-05 LAB
ALBUMIN SERPL-MCNC: 3.8 GM/DL (ref 3.4–5)
ALP BLD-CCNC: 243 IU/L (ref 40–129)
ALT SERPL-CCNC: 28 U/L (ref 10–40)
ANION GAP SERPL CALCULATED.3IONS-SCNC: 13 MMOL/L (ref 4–16)
ANISOCYTOSIS: ABNORMAL
AST SERPL-CCNC: 26 IU/L (ref 15–37)
BANDED NEUTROPHILS ABSOLUTE COUNT: 0.46 K/CU MM
BANDED NEUTROPHILS RELATIVE PERCENT: 4 % (ref 5–11)
BASOPHILS ABSOLUTE: 0.2 K/CU MM
BASOPHILS RELATIVE PERCENT: 2 % (ref 0–1)
BILIRUB SERPL-MCNC: 0.6 MG/DL (ref 0–1)
BUN SERPL-MCNC: 22 MG/DL (ref 6–23)
CALCIUM SERPL-MCNC: 8.7 MG/DL (ref 8.3–10.6)
CHLORIDE BLD-SCNC: 104 MMOL/L (ref 99–110)
CO2: 22 MMOL/L (ref 21–32)
CREAT SERPL-MCNC: 0.9 MG/DL (ref 0.9–1.3)
DIFFERENTIAL TYPE: ABNORMAL
EOSINOPHILS ABSOLUTE: 0.1 K/CU MM
EOSINOPHILS RELATIVE PERCENT: 1 % (ref 0–3)
FERRITIN: 232 NG/ML (ref 30–400)
FOLATE SERPL-MCNC: 6.5 NG/ML (ref 3.1–17.5)
GFR SERPL CREATININE-BSD FRML MDRD: >60 ML/MIN/1.73M2
GLUCOSE SERPL-MCNC: 96 MG/DL (ref 70–99)
HCT VFR BLD CALC: 38.5 % (ref 42–52)
HEMOGLOBIN: 11.7 GM/DL (ref 13.5–18)
HYPOCHROMIA: ABNORMAL
IRON: 77 UG/DL (ref 59–158)
LYMPHOCYTES ABSOLUTE: 7.7 K/CU MM
LYMPHOCYTES RELATIVE PERCENT: 66 % (ref 24–44)
MCH RBC QN AUTO: 24.7 PG (ref 27–31)
MCHC RBC AUTO-ENTMCNC: 30.4 % (ref 32–36)
MCV RBC AUTO: 81.2 FL (ref 78–100)
METAMYELOCYTES ABSOLUTE COUNT: 0.23 K/CU MM
METAMYELOCYTES PERCENT: 2 %
MONOCYTES ABSOLUTE: 0.3 K/CU MM
MONOCYTES RELATIVE PERCENT: 3 % (ref 0–4)
OVALOCYTES: ABNORMAL
PCT TRANSFERRIN: 24 % (ref 10–44)
PDW BLD-RTO: 19.6 % (ref 11.7–14.9)
PLATELET # BLD: 112 K/CU MM (ref 140–440)
PMV BLD AUTO: 9.9 FL (ref 7.5–11.1)
POTASSIUM SERPL-SCNC: 4.8 MMOL/L (ref 3.5–5.1)
RBC # BLD: 4.74 M/CU MM (ref 4.6–6.2)
RBC # BLD: ABNORMAL 10*6/UL
SCHISTOCYTES: ABNORMAL
SEGMENTED NEUTROPHILS ABSOLUTE COUNT: 2.6 K/CU MM
SEGMENTED NEUTROPHILS RELATIVE PERCENT: 22 % (ref 36–66)
SODIUM BLD-SCNC: 139 MMOL/L (ref 135–145)
TEAR DROP CELLS: ABNORMAL
TOTAL IRON BINDING CAPACITY: 318 UG/DL (ref 250–450)
TOTAL PROTEIN: 5.7 GM/DL (ref 6.4–8.2)
UNSATURATED IRON BINDING CAPACITY: 241 UG/DL (ref 110–370)
VITAMIN B-12: 1204 PG/ML (ref 211–911)
WBC # BLD: 11.6 K/CU MM (ref 4–10.5)

## 2023-06-05 PROCEDURE — 82607 VITAMIN B-12: CPT

## 2023-06-05 PROCEDURE — 83550 IRON BINDING TEST: CPT

## 2023-06-05 PROCEDURE — 85027 COMPLETE CBC AUTOMATED: CPT

## 2023-06-05 PROCEDURE — 82746 ASSAY OF FOLIC ACID SERUM: CPT

## 2023-06-05 PROCEDURE — 83540 ASSAY OF IRON: CPT

## 2023-06-05 PROCEDURE — 82728 ASSAY OF FERRITIN: CPT

## 2023-06-05 PROCEDURE — 36415 COLL VENOUS BLD VENIPUNCTURE: CPT

## 2023-06-05 PROCEDURE — 85007 BL SMEAR W/DIFF WBC COUNT: CPT

## 2023-06-05 PROCEDURE — 96372 THER/PROPH/DIAG INJ SC/IM: CPT

## 2023-06-05 PROCEDURE — 80053 COMPREHEN METABOLIC PANEL: CPT

## 2023-06-05 PROCEDURE — 2580000003 HC RX 258: Performed by: INTERNAL MEDICINE

## 2023-06-05 PROCEDURE — 6360000002 HC RX W HCPCS: Performed by: INTERNAL MEDICINE

## 2023-06-05 RX ORDER — FERROUS GLUCONATE 324(37.5)
324 TABLET ORAL EVERY OTHER DAY
Qty: 15 TABLET | Refills: 0 | Status: SHIPPED | OUTPATIENT
Start: 2023-06-05 | End: 2023-07-05

## 2023-06-05 RX ORDER — SODIUM CHLORIDE 9 MG/ML
INJECTION, SOLUTION INTRAVENOUS CONTINUOUS
OUTPATIENT
Start: 2023-06-12

## 2023-06-05 RX ORDER — FAMOTIDINE 10 MG/ML
20 INJECTION, SOLUTION INTRAVENOUS
OUTPATIENT
Start: 2023-06-12

## 2023-06-05 RX ORDER — ACETAMINOPHEN 325 MG/1
650 TABLET ORAL
OUTPATIENT
Start: 2023-06-12

## 2023-06-05 RX ORDER — ALBUTEROL SULFATE 90 UG/1
4 AEROSOL, METERED RESPIRATORY (INHALATION) PRN
OUTPATIENT
Start: 2023-06-12

## 2023-06-05 RX ORDER — ONDANSETRON 2 MG/ML
8 INJECTION INTRAMUSCULAR; INTRAVENOUS
OUTPATIENT
Start: 2023-06-12

## 2023-06-05 RX ORDER — DIPHENHYDRAMINE HYDROCHLORIDE 50 MG/ML
50 INJECTION INTRAMUSCULAR; INTRAVENOUS
OUTPATIENT
Start: 2023-06-12

## 2023-06-05 RX ORDER — EPINEPHRINE 1 MG/ML
0.3 INJECTION, SOLUTION, CONCENTRATE INTRAVENOUS PRN
OUTPATIENT
Start: 2023-06-12

## 2023-06-05 RX ADMIN — ROMIPLOSTIM 75 MCG: 250 INJECTION, POWDER, LYOPHILIZED, FOR SOLUTION SUBCUTANEOUS at 10:35

## 2023-06-06 ENCOUNTER — CLINICAL DOCUMENTATION (OUTPATIENT)
Dept: ONCOLOGY | Age: 73
End: 2023-06-06

## 2023-06-06 NOTE — PROGRESS NOTES
Lab results from 06/05/2023 reviewed by physician and recommending that patient stop oral iron supplement if currently taking. Attempted to call patient @ [phone] to notify; however, no answer. VM left with this RN direct number should patient have any questions.

## 2023-06-11 RX ORDER — DIPHENHYDRAMINE HYDROCHLORIDE 50 MG/ML
50 INJECTION INTRAMUSCULAR; INTRAVENOUS
Status: CANCELLED | OUTPATIENT
Start: 2023-06-12

## 2023-06-11 RX ORDER — SODIUM CHLORIDE 9 MG/ML
INJECTION, SOLUTION INTRAVENOUS CONTINUOUS
Status: CANCELLED | OUTPATIENT
Start: 2023-06-12

## 2023-06-11 RX ORDER — ALBUTEROL SULFATE 90 UG/1
4 AEROSOL, METERED RESPIRATORY (INHALATION) PRN
Status: CANCELLED | OUTPATIENT
Start: 2023-06-12

## 2023-06-11 RX ORDER — SODIUM CHLORIDE 9 MG/ML
5-250 INJECTION, SOLUTION INTRAVENOUS PRN
Status: CANCELLED | OUTPATIENT
Start: 2023-06-12

## 2023-06-11 RX ORDER — FAMOTIDINE 10 MG/ML
20 INJECTION, SOLUTION INTRAVENOUS
Status: CANCELLED | OUTPATIENT
Start: 2023-06-12

## 2023-06-11 RX ORDER — ONDANSETRON 2 MG/ML
8 INJECTION INTRAMUSCULAR; INTRAVENOUS
Status: CANCELLED | OUTPATIENT
Start: 2023-06-12

## 2023-06-11 RX ORDER — EPINEPHRINE 1 MG/ML
0.3 INJECTION, SOLUTION, CONCENTRATE INTRAVENOUS PRN
Status: CANCELLED | OUTPATIENT
Start: 2023-06-12

## 2023-06-11 RX ORDER — ACETAMINOPHEN 325 MG/1
650 TABLET ORAL
Status: CANCELLED | OUTPATIENT
Start: 2023-06-12

## 2023-06-11 RX ORDER — MEPERIDINE HYDROCHLORIDE 25 MG/ML
12.5 INJECTION INTRAMUSCULAR; INTRAVENOUS; SUBCUTANEOUS PRN
Status: CANCELLED | OUTPATIENT
Start: 2023-06-12

## 2023-06-12 ENCOUNTER — HOSPITAL ENCOUNTER (OUTPATIENT)
Dept: INFUSION THERAPY | Age: 73
Discharge: HOME | End: 2023-06-12
Payer: COMMERCIAL

## 2023-06-12 VITALS
WEIGHT: 161.4 LBS | DIASTOLIC BLOOD PRESSURE: 76 MMHG | TEMPERATURE: 97.6 F | BODY MASS INDEX: 21.86 KG/M2 | HEART RATE: 109 BPM | OXYGEN SATURATION: 94 % | HEIGHT: 72 IN | SYSTOLIC BLOOD PRESSURE: 139 MMHG

## 2023-06-12 DIAGNOSIS — D69.3 IMMUNE THROMBOCYTOPENIA (HCC): ICD-10-CM

## 2023-06-12 DIAGNOSIS — C91.10 CLL (CHRONIC LYMPHOCYTIC LEUKEMIA) (HCC): Primary | ICD-10-CM

## 2023-06-12 LAB
ALBUMIN SERPL-MCNC: 3.9 GM/DL (ref 3.4–5)
ALP BLD-CCNC: 252 IU/L (ref 40–129)
ALT SERPL-CCNC: 25 U/L (ref 10–40)
ANION GAP SERPL CALCULATED.3IONS-SCNC: 16 MMOL/L (ref 4–16)
ANISOCYTOSIS: ABNORMAL
AST SERPL-CCNC: 23 IU/L (ref 15–37)
BANDED NEUTROPHILS ABSOLUTE COUNT: 0.11 K/CU MM
BANDED NEUTROPHILS RELATIVE PERCENT: 1 % (ref 5–11)
BASOPHILS ABSOLUTE: 0.1 K/CU MM
BASOPHILS RELATIVE PERCENT: 1 % (ref 0–1)
BILIRUB SERPL-MCNC: 0.4 MG/DL (ref 0–1)
BUN SERPL-MCNC: 21 MG/DL (ref 6–23)
CALCIUM SERPL-MCNC: 8.6 MG/DL (ref 8.3–10.6)
CHLORIDE BLD-SCNC: 102 MMOL/L (ref 99–110)
CO2: 19 MMOL/L (ref 21–32)
CREAT SERPL-MCNC: 0.9 MG/DL (ref 0.9–1.3)
DIFFERENTIAL TYPE: ABNORMAL
GFR SERPL CREATININE-BSD FRML MDRD: >60 ML/MIN/1.73M2
GLUCOSE SERPL-MCNC: 172 MG/DL (ref 70–99)
HCT VFR BLD CALC: 40.2 % (ref 42–52)
HEMOGLOBIN: 12.4 GM/DL (ref 13.5–18)
LYMPHOCYTES ABSOLUTE: 7.7 K/CU MM
LYMPHOCYTES RELATIVE PERCENT: 71 % (ref 24–44)
MCH RBC QN AUTO: 25.2 PG (ref 27–31)
MCHC RBC AUTO-ENTMCNC: 30.8 % (ref 32–36)
MCV RBC AUTO: 81.5 FL (ref 78–100)
METAMYELOCYTES ABSOLUTE COUNT: 0.11 K/CU MM
METAMYELOCYTES PERCENT: 1 %
MICROCYTES: ABNORMAL
MONOCYTES ABSOLUTE: 0.3 K/CU MM
MONOCYTES RELATIVE PERCENT: 3 % (ref 0–4)
OVALOCYTES: ABNORMAL
PDW BLD-RTO: 21.3 % (ref 11.7–14.9)
PLATELET # BLD: 97 K/CU MM (ref 140–440)
PMV BLD AUTO: 8.8 FL (ref 7.5–11.1)
POTASSIUM SERPL-SCNC: 4.3 MMOL/L (ref 3.5–5.1)
RBC # BLD: 4.93 M/CU MM (ref 4.6–6.2)
SCHISTOCYTES: ABNORMAL
SEGMENTED NEUTROPHILS ABSOLUTE COUNT: 2.5 K/CU MM
SEGMENTED NEUTROPHILS RELATIVE PERCENT: 23 % (ref 36–66)
SODIUM BLD-SCNC: 137 MMOL/L (ref 135–145)
TEAR DROP CELLS: ABNORMAL
TOTAL PROTEIN: 6 GM/DL (ref 6.4–8.2)
WBC # BLD: 10.8 K/CU MM (ref 4–10.5)

## 2023-06-12 PROCEDURE — 80053 COMPREHEN METABOLIC PANEL: CPT

## 2023-06-12 PROCEDURE — 2580000003 HC RX 258: Performed by: PHYSICIAN ASSISTANT

## 2023-06-12 PROCEDURE — 96372 THER/PROPH/DIAG INJ SC/IM: CPT

## 2023-06-12 PROCEDURE — 6360000002 HC RX W HCPCS: Performed by: PHYSICIAN ASSISTANT

## 2023-06-12 PROCEDURE — 85007 BL SMEAR W/DIFF WBC COUNT: CPT

## 2023-06-12 PROCEDURE — 96415 CHEMO IV INFUSION ADDL HR: CPT

## 2023-06-12 PROCEDURE — 96367 TX/PROPH/DG ADDL SEQ IV INF: CPT

## 2023-06-12 PROCEDURE — 85027 COMPLETE CBC AUTOMATED: CPT

## 2023-06-12 PROCEDURE — 6370000000 HC RX 637 (ALT 250 FOR IP): Performed by: PHYSICIAN ASSISTANT

## 2023-06-12 PROCEDURE — 96413 CHEMO IV INFUSION 1 HR: CPT

## 2023-06-12 PROCEDURE — 96375 TX/PRO/DX INJ NEW DRUG ADDON: CPT

## 2023-06-12 PROCEDURE — 6360000002 HC RX W HCPCS: Performed by: INTERNAL MEDICINE

## 2023-06-12 RX ORDER — DIPHENHYDRAMINE HYDROCHLORIDE 50 MG/ML
50 INJECTION INTRAMUSCULAR; INTRAVENOUS
Status: CANCELLED | OUTPATIENT
Start: 2023-06-19

## 2023-06-12 RX ORDER — DIPHENHYDRAMINE HYDROCHLORIDE 50 MG/ML
50 INJECTION INTRAMUSCULAR; INTRAVENOUS ONCE
Status: COMPLETED | OUTPATIENT
Start: 2023-06-12 | End: 2023-06-12

## 2023-06-12 RX ORDER — SODIUM CHLORIDE 9 MG/ML
INJECTION, SOLUTION INTRAVENOUS CONTINUOUS
Status: CANCELLED | OUTPATIENT
Start: 2023-06-19

## 2023-06-12 RX ORDER — ONDANSETRON 2 MG/ML
8 INJECTION INTRAMUSCULAR; INTRAVENOUS
Status: CANCELLED | OUTPATIENT
Start: 2023-06-19

## 2023-06-12 RX ORDER — ACETAMINOPHEN 325 MG/1
650 TABLET ORAL ONCE
Status: COMPLETED | OUTPATIENT
Start: 2023-06-12 | End: 2023-06-12

## 2023-06-12 RX ORDER — SODIUM CHLORIDE 0.9 % (FLUSH) 0.9 %
5-40 SYRINGE (ML) INJECTION PRN
Status: DISCONTINUED | OUTPATIENT
Start: 2023-06-12 | End: 2023-06-13 | Stop reason: HOSPADM

## 2023-06-12 RX ORDER — EPINEPHRINE 1 MG/ML
0.3 INJECTION, SOLUTION, CONCENTRATE INTRAVENOUS PRN
Status: CANCELLED | OUTPATIENT
Start: 2023-06-19

## 2023-06-12 RX ORDER — FAMOTIDINE 10 MG/ML
20 INJECTION, SOLUTION INTRAVENOUS
Status: CANCELLED | OUTPATIENT
Start: 2023-06-19

## 2023-06-12 RX ORDER — ACETAMINOPHEN 325 MG/1
650 TABLET ORAL
Status: CANCELLED | OUTPATIENT
Start: 2023-06-19

## 2023-06-12 RX ORDER — HEPARIN SODIUM 100 [USP'U]/ML
500 INJECTION, SOLUTION INTRAVENOUS PRN
Status: DISCONTINUED | OUTPATIENT
Start: 2023-06-12 | End: 2023-06-13 | Stop reason: HOSPADM

## 2023-06-12 RX ORDER — SODIUM CHLORIDE 9 MG/ML
5-250 INJECTION, SOLUTION INTRAVENOUS PRN
Status: DISCONTINUED | OUTPATIENT
Start: 2023-06-12 | End: 2023-06-13 | Stop reason: HOSPADM

## 2023-06-12 RX ORDER — ALBUTEROL SULFATE 90 UG/1
4 AEROSOL, METERED RESPIRATORY (INHALATION) PRN
Status: CANCELLED | OUTPATIENT
Start: 2023-06-19

## 2023-06-12 RX ADMIN — DEXAMETHASONE SODIUM PHOSPHATE 20 MG: 10 INJECTION, SOLUTION INTRAMUSCULAR; INTRAVENOUS at 09:26

## 2023-06-12 RX ADMIN — ROMIPLOSTIM 75 MCG: 250 INJECTION, POWDER, LYOPHILIZED, FOR SOLUTION SUBCUTANEOUS at 10:30

## 2023-06-12 RX ADMIN — SODIUM CHLORIDE, PRESERVATIVE FREE 10 ML: 5 INJECTION INTRAVENOUS at 14:30

## 2023-06-12 RX ADMIN — DIPHENHYDRAMINE HYDROCHLORIDE 50 MG: 50 INJECTION INTRAMUSCULAR; INTRAVENOUS at 09:24

## 2023-06-12 RX ADMIN — ACETAMINOPHEN 650 MG: 325 TABLET ORAL at 09:23

## 2023-06-12 RX ADMIN — OBINUTUZUMAB 1000 MG: 1000 INJECTION, SOLUTION, CONCENTRATE INTRAVENOUS at 10:29

## 2023-06-12 RX ADMIN — SODIUM CHLORIDE 20 ML/HR: 9 INJECTION, SOLUTION INTRAVENOUS at 09:24

## 2023-06-12 RX ADMIN — HEPARIN 500 UNITS: 100 SYRINGE at 14:30

## 2023-06-12 ASSESSMENT — PAIN DESCRIPTION - LOCATION: LOCATION: HIP;BACK;SHOULDER

## 2023-06-12 ASSESSMENT — PAIN SCALES - GENERAL: PAINLEVEL_OUTOF10: 6

## 2023-06-19 ENCOUNTER — HOSPITAL ENCOUNTER (OUTPATIENT)
Dept: INFUSION THERAPY | Age: 73
Discharge: HOME | End: 2023-06-19
Payer: COMMERCIAL

## 2023-06-19 VITALS — BODY MASS INDEX: 21.86 KG/M2 | WEIGHT: 161.4 LBS | HEIGHT: 72 IN

## 2023-06-19 DIAGNOSIS — D69.3 IMMUNE THROMBOCYTOPENIA (HCC): ICD-10-CM

## 2023-06-19 DIAGNOSIS — C91.10 CHRONIC LYMPHOCYTIC LEUKEMIA (HCC): Primary | ICD-10-CM

## 2023-06-19 LAB
ALBUMIN SERPL-MCNC: 3.6 GM/DL (ref 3.4–5)
ALP BLD-CCNC: 236 IU/L (ref 40–128)
ALT SERPL-CCNC: 23 U/L (ref 10–40)
ANION GAP SERPL CALCULATED.3IONS-SCNC: 14 MMOL/L (ref 4–16)
AST SERPL-CCNC: 21 IU/L (ref 15–37)
BASOPHILS ABSOLUTE: 0 K/CU MM
BASOPHILS RELATIVE PERCENT: 0.2 % (ref 0–1)
BILIRUB SERPL-MCNC: 0.5 MG/DL (ref 0–1)
BUN SERPL-MCNC: 21 MG/DL (ref 6–23)
CALCIUM SERPL-MCNC: 8.3 MG/DL (ref 8.3–10.6)
CHLORIDE BLD-SCNC: 100 MMOL/L (ref 99–110)
CO2: 22 MMOL/L (ref 21–32)
CREAT SERPL-MCNC: 1 MG/DL (ref 0.9–1.3)
DIFFERENTIAL TYPE: ABNORMAL
EOSINOPHILS ABSOLUTE: 0.1 K/CU MM
EOSINOPHILS RELATIVE PERCENT: 0.7 % (ref 0–3)
GFR SERPL CREATININE-BSD FRML MDRD: >60 ML/MIN/1.73M2
GLUCOSE SERPL-MCNC: 183 MG/DL (ref 70–99)
HCT VFR BLD CALC: 38.7 % (ref 42–52)
HEMOGLOBIN: 12 GM/DL (ref 13.5–18)
LYMPHOCYTES ABSOLUTE: 7.3 K/CU MM
LYMPHOCYTES RELATIVE PERCENT: 59.4 % (ref 24–44)
MCH RBC QN AUTO: 25.7 PG (ref 27–31)
MCHC RBC AUTO-ENTMCNC: 31 % (ref 32–36)
MCV RBC AUTO: 82.9 FL (ref 78–100)
MONOCYTES ABSOLUTE: 1.6 K/CU MM
MONOCYTES RELATIVE PERCENT: 13 % (ref 0–4)
PDW BLD-RTO: 21.2 % (ref 11.7–14.9)
PLATELET # BLD: 80 K/CU MM (ref 140–440)
PMV BLD AUTO: ABNORMAL FL (ref 7.5–11.1)
POTASSIUM SERPL-SCNC: 4.6 MMOL/L (ref 3.5–5.1)
RBC # BLD: 4.67 M/CU MM (ref 4.6–6.2)
SEGMENTED NEUTROPHILS ABSOLUTE COUNT: 3.3 K/CU MM
SEGMENTED NEUTROPHILS RELATIVE PERCENT: 26.7 % (ref 36–66)
SODIUM BLD-SCNC: 136 MMOL/L (ref 135–145)
TOTAL PROTEIN: 5.2 GM/DL (ref 6.4–8.2)
WBC # BLD: 12.2 K/CU MM (ref 4–10.5)

## 2023-06-19 PROCEDURE — 96372 THER/PROPH/DIAG INJ SC/IM: CPT

## 2023-06-19 PROCEDURE — 36415 COLL VENOUS BLD VENIPUNCTURE: CPT

## 2023-06-19 PROCEDURE — 80053 COMPREHEN METABOLIC PANEL: CPT

## 2023-06-19 PROCEDURE — 85025 COMPLETE CBC W/AUTO DIFF WBC: CPT

## 2023-06-19 PROCEDURE — 6360000002 HC RX W HCPCS: Performed by: INTERNAL MEDICINE

## 2023-06-19 RX ORDER — EPINEPHRINE 1 MG/ML
0.3 INJECTION, SOLUTION, CONCENTRATE INTRAVENOUS PRN
Status: CANCELLED | OUTPATIENT
Start: 2023-06-26

## 2023-06-19 RX ORDER — FAMOTIDINE 10 MG/ML
20 INJECTION, SOLUTION INTRAVENOUS
Status: CANCELLED | OUTPATIENT
Start: 2023-06-26

## 2023-06-19 RX ORDER — SODIUM CHLORIDE 9 MG/ML
INJECTION, SOLUTION INTRAVENOUS CONTINUOUS
Status: CANCELLED | OUTPATIENT
Start: 2023-06-26

## 2023-06-19 RX ORDER — ALBUTEROL SULFATE 90 UG/1
4 AEROSOL, METERED RESPIRATORY (INHALATION) PRN
Status: CANCELLED | OUTPATIENT
Start: 2023-06-26

## 2023-06-19 RX ORDER — DIPHENHYDRAMINE HYDROCHLORIDE 50 MG/ML
50 INJECTION INTRAMUSCULAR; INTRAVENOUS
Status: CANCELLED | OUTPATIENT
Start: 2023-06-26

## 2023-06-19 RX ORDER — ONDANSETRON 2 MG/ML
8 INJECTION INTRAMUSCULAR; INTRAVENOUS
Status: CANCELLED | OUTPATIENT
Start: 2023-06-26

## 2023-06-19 RX ORDER — ACETAMINOPHEN 325 MG/1
650 TABLET ORAL
Status: CANCELLED | OUTPATIENT
Start: 2023-06-26

## 2023-06-19 RX ADMIN — ROMIPLOSTIM 75 MCG: 250 INJECTION, POWDER, LYOPHILIZED, FOR SOLUTION SUBCUTANEOUS at 15:15

## 2023-06-19 NOTE — PROGRESS NOTES
Pt assisted into treatment area by son for an N-Plate injection. Labs drawn in lab. PLT: 80 Treatment authorized and administered as ordered. N-Plate given in  LT arm. Pt tolerated well and left treatment area in wheelchair with son.  AVS provided

## 2023-06-26 ENCOUNTER — HOSPITAL ENCOUNTER (OUTPATIENT)
Dept: INFUSION THERAPY | Age: 73
Discharge: HOME | End: 2023-06-26
Payer: COMMERCIAL

## 2023-06-26 VITALS — BODY MASS INDEX: 21.84 KG/M2 | WEIGHT: 161 LBS

## 2023-06-26 DIAGNOSIS — C91.10 CHRONIC LYMPHOCYTIC LEUKEMIA (HCC): Primary | ICD-10-CM

## 2023-06-26 DIAGNOSIS — D69.3 IMMUNE THROMBOCYTOPENIA (HCC): ICD-10-CM

## 2023-06-26 LAB
ALBUMIN SERPL-MCNC: 3.8 GM/DL (ref 3.4–5)
ALP BLD-CCNC: 262 IU/L (ref 40–128)
ALT SERPL-CCNC: 23 U/L (ref 10–40)
ANION GAP SERPL CALCULATED.3IONS-SCNC: 16 MMOL/L (ref 4–16)
ANISOCYTOSIS: ABNORMAL
AST SERPL-CCNC: 21 IU/L (ref 15–37)
BANDED NEUTROPHILS ABSOLUTE COUNT: 0.41 K/CU MM
BANDED NEUTROPHILS RELATIVE PERCENT: 3 % (ref 5–11)
BILIRUB SERPL-MCNC: 0.5 MG/DL (ref 0–1)
BUN SERPL-MCNC: 20 MG/DL (ref 6–23)
CALCIUM SERPL-MCNC: 8.8 MG/DL (ref 8.3–10.6)
CHLORIDE BLD-SCNC: 98 MMOL/L (ref 99–110)
CO2: 20 MMOL/L (ref 21–32)
CREAT SERPL-MCNC: 0.8 MG/DL (ref 0.9–1.3)
DIFFERENTIAL TYPE: ABNORMAL
ELLIPTOCYTES: ABNORMAL
GFR SERPL CREATININE-BSD FRML MDRD: >60 ML/MIN/1.73M2
GLUCOSE SERPL-MCNC: 121 MG/DL (ref 70–99)
HCT VFR BLD CALC: 40.1 % (ref 42–52)
HEMOGLOBIN: 12.5 GM/DL (ref 13.5–18)
LYMPHOCYTES ABSOLUTE: 8.8 K/CU MM
LYMPHOCYTES RELATIVE PERCENT: 65 % (ref 24–44)
MCH RBC QN AUTO: 25.4 PG (ref 27–31)
MCHC RBC AUTO-ENTMCNC: 31.2 % (ref 32–36)
MCV RBC AUTO: 81.3 FL (ref 78–100)
METAMYELOCYTES ABSOLUTE COUNT: 0.14 K/CU MM
METAMYELOCYTES PERCENT: 1 %
MONOCYTES ABSOLUTE: 0.4 K/CU MM
MONOCYTES RELATIVE PERCENT: 3 % (ref 0–4)
OVALOCYTES: ABNORMAL
PDW BLD-RTO: 21.3 % (ref 11.7–14.9)
PLATELET # BLD: 86 K/CU MM (ref 140–440)
PMV BLD AUTO: ABNORMAL FL (ref 7.5–11.1)
POTASSIUM SERPL-SCNC: 4.4 MMOL/L (ref 3.5–5.1)
RBC # BLD: 4.93 M/CU MM (ref 4.6–6.2)
SCHISTOCYTES: ABNORMAL
SEGMENTED NEUTROPHILS ABSOLUTE COUNT: 3.8 K/CU MM
SEGMENTED NEUTROPHILS RELATIVE PERCENT: 28 % (ref 36–66)
SODIUM BLD-SCNC: 134 MMOL/L (ref 135–145)
TEAR DROP CELLS: ABNORMAL
TOTAL PROTEIN: 5.5 GM/DL (ref 6.4–8.2)
WBC # BLD: 13.5 K/CU MM (ref 4–10.5)

## 2023-06-26 PROCEDURE — 85027 COMPLETE CBC AUTOMATED: CPT

## 2023-06-26 PROCEDURE — 96372 THER/PROPH/DIAG INJ SC/IM: CPT

## 2023-06-26 PROCEDURE — 6360000002 HC RX W HCPCS: Performed by: INTERNAL MEDICINE

## 2023-06-26 PROCEDURE — 85007 BL SMEAR W/DIFF WBC COUNT: CPT

## 2023-06-26 PROCEDURE — 36415 COLL VENOUS BLD VENIPUNCTURE: CPT

## 2023-06-26 PROCEDURE — 80053 COMPREHEN METABOLIC PANEL: CPT

## 2023-06-26 RX ORDER — FAMOTIDINE 10 MG/ML
20 INJECTION, SOLUTION INTRAVENOUS
Status: CANCELLED | OUTPATIENT
Start: 2023-07-03

## 2023-06-26 RX ORDER — ALBUTEROL SULFATE 90 UG/1
4 AEROSOL, METERED RESPIRATORY (INHALATION) PRN
Status: CANCELLED | OUTPATIENT
Start: 2023-07-03

## 2023-06-26 RX ORDER — ACETAMINOPHEN 325 MG/1
650 TABLET ORAL
Status: CANCELLED | OUTPATIENT
Start: 2023-07-03

## 2023-06-26 RX ORDER — ONDANSETRON 2 MG/ML
8 INJECTION INTRAMUSCULAR; INTRAVENOUS
Status: CANCELLED | OUTPATIENT
Start: 2023-07-03

## 2023-06-26 RX ORDER — DIPHENHYDRAMINE HYDROCHLORIDE 50 MG/ML
50 INJECTION INTRAMUSCULAR; INTRAVENOUS
Status: CANCELLED | OUTPATIENT
Start: 2023-07-03

## 2023-06-26 RX ORDER — EPINEPHRINE 1 MG/ML
0.3 INJECTION, SOLUTION, CONCENTRATE INTRAVENOUS PRN
Status: CANCELLED | OUTPATIENT
Start: 2023-07-03

## 2023-06-26 RX ORDER — SODIUM CHLORIDE 9 MG/ML
INJECTION, SOLUTION INTRAVENOUS CONTINUOUS
Status: CANCELLED | OUTPATIENT
Start: 2023-07-03

## 2023-06-26 RX ADMIN — ROMIPLOSTIM 75 MCG: 250 INJECTION, POWDER, LYOPHILIZED, FOR SOLUTION SUBCUTANEOUS at 10:34

## 2023-06-28 ENCOUNTER — TELEPHONE (OUTPATIENT)
Dept: ONCOLOGY | Age: 73
End: 2023-06-28

## 2023-07-03 ENCOUNTER — CLINICAL DOCUMENTATION (OUTPATIENT)
Dept: ONCOLOGY | Age: 73
End: 2023-07-03

## 2023-07-03 ENCOUNTER — HOSPITAL ENCOUNTER (OUTPATIENT)
Dept: INFUSION THERAPY | Age: 73
Discharge: HOME | End: 2023-07-03
Payer: COMMERCIAL

## 2023-07-03 VITALS — WEIGHT: 154.8 LBS | HEIGHT: 72 IN | BODY MASS INDEX: 20.97 KG/M2

## 2023-07-03 DIAGNOSIS — C91.10 CHRONIC LYMPHOCYTIC LEUKEMIA (HCC): Primary | ICD-10-CM

## 2023-07-03 DIAGNOSIS — C91.10 CHRONIC LYMPHOCYTIC LEUKEMIA (HCC): ICD-10-CM

## 2023-07-03 DIAGNOSIS — D69.3 IMMUNE THROMBOCYTOPENIA (HCC): ICD-10-CM

## 2023-07-03 LAB
ALBUMIN SERPL-MCNC: 3.9 GM/DL (ref 3.4–5)
ALP BLD-CCNC: 283 IU/L (ref 40–128)
ALT SERPL-CCNC: 23 U/L (ref 10–40)
ANION GAP SERPL CALCULATED.3IONS-SCNC: 16 MMOL/L (ref 4–16)
ANISOCYTOSIS: ABNORMAL
AST SERPL-CCNC: 21 IU/L (ref 15–37)
BANDED NEUTROPHILS ABSOLUTE COUNT: 0.23 K/CU MM
BANDED NEUTROPHILS RELATIVE PERCENT: 2 % (ref 5–11)
BASOPHILS ABSOLUTE: 0.1 K/CU MM
BASOPHILS RELATIVE PERCENT: 1 % (ref 0–1)
BILIRUB SERPL-MCNC: 0.7 MG/DL (ref 0–1)
BUN SERPL-MCNC: 20 MG/DL (ref 6–23)
CALCIUM SERPL-MCNC: 9.1 MG/DL (ref 8.3–10.6)
CHLORIDE BLD-SCNC: 99 MMOL/L (ref 99–110)
CO2: 21 MMOL/L (ref 21–32)
CREAT SERPL-MCNC: 0.9 MG/DL (ref 0.9–1.3)
DIFFERENTIAL TYPE: ABNORMAL
GFR SERPL CREATININE-BSD FRML MDRD: >60 ML/MIN/1.73M2
GLUCOSE SERPL-MCNC: 126 MG/DL (ref 70–99)
HCT VFR BLD CALC: 39 % (ref 42–52)
HEMOGLOBIN: 12.4 GM/DL (ref 13.5–18)
LYMPHOCYTES ABSOLUTE: 8.1 K/CU MM
LYMPHOCYTES RELATIVE PERCENT: 69 % (ref 24–44)
MCH RBC QN AUTO: 25.8 PG (ref 27–31)
MCHC RBC AUTO-ENTMCNC: 31.8 % (ref 32–36)
MCV RBC AUTO: 81.1 FL (ref 78–100)
METAMYELOCYTES ABSOLUTE COUNT: 0.12 K/CU MM
METAMYELOCYTES PERCENT: 1 %
MICROCYTES: ABNORMAL
MONOCYTES ABSOLUTE: 0.2 K/CU MM
MONOCYTES RELATIVE PERCENT: 2 % (ref 0–4)
OVALOCYTES: ABNORMAL
PDW BLD-RTO: 20.9 % (ref 11.7–14.9)
PLATELET # BLD: 134 K/CU MM (ref 140–440)
PMV BLD AUTO: 9.9 FL (ref 7.5–11.1)
POTASSIUM SERPL-SCNC: 4.6 MMOL/L (ref 3.5–5.1)
RBC # BLD: 4.81 M/CU MM (ref 4.6–6.2)
SCHISTOCYTES: ABNORMAL
SEGMENTED NEUTROPHILS ABSOLUTE COUNT: 2.9 K/CU MM
SEGMENTED NEUTROPHILS RELATIVE PERCENT: 25 % (ref 36–66)
SMUDGE CELLS: PRESENT
SODIUM BLD-SCNC: 136 MMOL/L (ref 135–145)
TEAR DROP CELLS: ABNORMAL
TOTAL PROTEIN: 5.7 GM/DL (ref 6.4–8.2)
WBC # BLD: 11.7 K/CU MM (ref 4–10.5)

## 2023-07-03 PROCEDURE — 96372 THER/PROPH/DIAG INJ SC/IM: CPT

## 2023-07-03 PROCEDURE — 36415 COLL VENOUS BLD VENIPUNCTURE: CPT

## 2023-07-03 PROCEDURE — 6360000002 HC RX W HCPCS: Performed by: INTERNAL MEDICINE

## 2023-07-03 PROCEDURE — 85027 COMPLETE CBC AUTOMATED: CPT

## 2023-07-03 PROCEDURE — 80053 COMPREHEN METABOLIC PANEL: CPT

## 2023-07-03 PROCEDURE — 85007 BL SMEAR W/DIFF WBC COUNT: CPT

## 2023-07-03 RX ORDER — FAMOTIDINE 10 MG/ML
20 INJECTION, SOLUTION INTRAVENOUS
Status: CANCELLED | OUTPATIENT
Start: 2023-07-10

## 2023-07-03 RX ORDER — SODIUM CHLORIDE 9 MG/ML
INJECTION, SOLUTION INTRAVENOUS CONTINUOUS
Status: CANCELLED | OUTPATIENT
Start: 2023-07-10

## 2023-07-03 RX ORDER — EPINEPHRINE 1 MG/ML
0.3 INJECTION, SOLUTION, CONCENTRATE INTRAVENOUS PRN
Status: CANCELLED | OUTPATIENT
Start: 2023-07-10

## 2023-07-03 RX ORDER — ACETAMINOPHEN 325 MG/1
650 TABLET ORAL
Status: CANCELLED | OUTPATIENT
Start: 2023-07-10

## 2023-07-03 RX ORDER — ONDANSETRON 2 MG/ML
8 INJECTION INTRAMUSCULAR; INTRAVENOUS
Status: CANCELLED | OUTPATIENT
Start: 2023-07-10

## 2023-07-03 RX ORDER — DIPHENHYDRAMINE HYDROCHLORIDE 50 MG/ML
50 INJECTION INTRAMUSCULAR; INTRAVENOUS
Status: CANCELLED | OUTPATIENT
Start: 2023-07-10

## 2023-07-03 RX ORDER — ALBUTEROL SULFATE 90 UG/1
4 AEROSOL, METERED RESPIRATORY (INHALATION) PRN
Status: CANCELLED | OUTPATIENT
Start: 2023-07-10

## 2023-07-03 RX ADMIN — ROMIPLOSTIM 75 MCG: 250 INJECTION, POWDER, LYOPHILIZED, FOR SOLUTION SUBCUTANEOUS at 09:58

## 2023-07-03 NOTE — PROGRESS NOTES
RX for venetoclax (venclexta) 10 mg e-scribed to [facility] per physician order. Patient takes 2 tablets (20 mg) PO QOD.

## 2023-07-03 NOTE — PROGRESS NOTES
Ambulated to infusion area after labs. CBC results reviewed with patient. Nplate injection administered as ordered. Tolerated well. Discharged in stable condition.

## 2023-07-06 ENCOUNTER — HOSPITAL ENCOUNTER (OUTPATIENT)
Dept: CT IMAGING | Age: 73
Discharge: HOME | End: 2023-07-06
Payer: COMMERCIAL

## 2023-07-06 DIAGNOSIS — J43.9 PULMONARY EMPHYSEMA, UNSPECIFIED EMPHYSEMA TYPE (HCC): ICD-10-CM

## 2023-07-06 DIAGNOSIS — D64.9 ANEMIA, UNSPECIFIED TYPE: ICD-10-CM

## 2023-07-06 DIAGNOSIS — D80.1 HYPOGAMMAGLOBULINEMIA (HCC): ICD-10-CM

## 2023-07-06 DIAGNOSIS — D69.6 THROMBOCYTOPENIA (HCC): ICD-10-CM

## 2023-07-06 DIAGNOSIS — R91.1 LUNG NODULE: ICD-10-CM

## 2023-07-06 DIAGNOSIS — C91.10 CHRONIC LYMPHOCYTIC LEUKEMIA (HCC): ICD-10-CM

## 2023-07-06 DIAGNOSIS — M19.90 ARTHRITIS: ICD-10-CM

## 2023-07-06 DIAGNOSIS — R10.13 EPIGASTRIC PAIN: ICD-10-CM

## 2023-07-06 DIAGNOSIS — K90.9 INTESTINAL MALABSORPTION, UNSPECIFIED TYPE: ICD-10-CM

## 2023-07-06 DIAGNOSIS — E53.8 B12 DEFICIENCY: ICD-10-CM

## 2023-07-06 PROCEDURE — 71260 CT THORAX DX C+: CPT

## 2023-07-06 PROCEDURE — 6360000004 HC RX CONTRAST MEDICATION: Performed by: INTERNAL MEDICINE

## 2023-07-06 PROCEDURE — 74177 CT ABD & PELVIS W/CONTRAST: CPT

## 2023-07-06 RX ADMIN — IOPAMIDOL 75 ML: 755 INJECTION, SOLUTION INTRAVENOUS at 11:16

## 2023-07-10 ENCOUNTER — HOSPITAL ENCOUNTER (OUTPATIENT)
Dept: INFUSION THERAPY | Age: 73
Discharge: HOME | End: 2023-07-10
Payer: COMMERCIAL

## 2023-07-10 DIAGNOSIS — D69.6 THROMBOCYTOPENIA (HCC): ICD-10-CM

## 2023-07-10 DIAGNOSIS — K90.9 INTESTINAL MALABSORPTION, UNSPECIFIED TYPE: ICD-10-CM

## 2023-07-10 DIAGNOSIS — M19.90 ARTHRITIS: ICD-10-CM

## 2023-07-10 DIAGNOSIS — C91.10 CHRONIC LYMPHOCYTIC LEUKEMIA (HCC): Primary | ICD-10-CM

## 2023-07-10 DIAGNOSIS — D64.9 ANEMIA, UNSPECIFIED TYPE: ICD-10-CM

## 2023-07-10 DIAGNOSIS — E53.8 B12 DEFICIENCY: ICD-10-CM

## 2023-07-10 DIAGNOSIS — D69.3 IMMUNE THROMBOCYTOPENIA (HCC): ICD-10-CM

## 2023-07-10 LAB
ALBUMIN SERPL-MCNC: 3.9 GM/DL (ref 3.4–5)
ALP BLD-CCNC: 272 IU/L (ref 40–129)
ALT SERPL-CCNC: 19 U/L (ref 10–40)
ANION GAP SERPL CALCULATED.3IONS-SCNC: 16 MMOL/L (ref 4–16)
ANISOCYTOSIS: ABNORMAL
AST SERPL-CCNC: 16 IU/L (ref 15–37)
BANDED NEUTROPHILS ABSOLUTE COUNT: 1.47 K/CU MM
BANDED NEUTROPHILS RELATIVE PERCENT: 11 % (ref 5–11)
BILIRUB SERPL-MCNC: 0.5 MG/DL (ref 0–1)
BUN SERPL-MCNC: 21 MG/DL (ref 6–23)
CALCIUM SERPL-MCNC: 8.6 MG/DL (ref 8.3–10.6)
CHLORIDE BLD-SCNC: 98 MMOL/L (ref 99–110)
CO2: 21 MMOL/L (ref 21–32)
CREAT SERPL-MCNC: 0.9 MG/DL (ref 0.9–1.3)
DIFFERENTIAL TYPE: ABNORMAL
ELLIPTOCYTES: ABNORMAL
EOSINOPHILS ABSOLUTE: 0.1 K/CU MM
EOSINOPHILS RELATIVE PERCENT: 1 % (ref 0–3)
FERRITIN: 162 NG/ML (ref 30–400)
GFR SERPL CREATININE-BSD FRML MDRD: >60 ML/MIN/1.73M2
GLUCOSE SERPL-MCNC: 130 MG/DL (ref 70–99)
HCT VFR BLD CALC: 39.9 % (ref 42–52)
HEMOGLOBIN: 12.9 GM/DL (ref 13.5–18)
IRON: 43 UG/DL (ref 59–158)
LACTATE DEHYDROGENASE: 161 IU/L (ref 120–246)
LYMPHOCYTES ABSOLUTE: 8.1 K/CU MM
LYMPHOCYTES RELATIVE PERCENT: 60 % (ref 24–44)
MCH RBC QN AUTO: 25.9 PG (ref 27–31)
MCHC RBC AUTO-ENTMCNC: 32.3 % (ref 32–36)
MCV RBC AUTO: 80 FL (ref 78–100)
MONOCYTES ABSOLUTE: 0.5 K/CU MM
MONOCYTES RELATIVE PERCENT: 4 % (ref 0–4)
OVALOCYTES: ABNORMAL
PCT TRANSFERRIN: 16 % (ref 10–44)
PDW BLD-RTO: 20.8 % (ref 11.7–14.9)
PLATELET # BLD: 152 K/CU MM (ref 140–440)
PMV BLD AUTO: 8.7 FL (ref 7.5–11.1)
POLYCHROMASIA: ABNORMAL
POTASSIUM SERPL-SCNC: 4.4 MMOL/L (ref 3.5–5.1)
RBC # BLD: 4.99 M/CU MM (ref 4.6–6.2)
SEGMENTED NEUTROPHILS ABSOLUTE COUNT: 3.2 K/CU MM
SEGMENTED NEUTROPHILS RELATIVE PERCENT: 24 % (ref 36–66)
SMUDGE CELLS: PRESENT
SODIUM BLD-SCNC: 135 MMOL/L (ref 135–145)
TEAR DROP CELLS: ABNORMAL
TOTAL IRON BINDING CAPACITY: 271 UG/DL (ref 250–450)
TOTAL PROTEIN: 5.5 GM/DL (ref 6.4–8.2)
UNSATURATED IRON BINDING CAPACITY: 228 UG/DL (ref 110–370)
WBC # BLD: 13.4 K/CU MM (ref 4–10.5)

## 2023-07-10 PROCEDURE — 83540 ASSAY OF IRON: CPT

## 2023-07-10 PROCEDURE — 96372 THER/PROPH/DIAG INJ SC/IM: CPT

## 2023-07-10 PROCEDURE — 36415 COLL VENOUS BLD VENIPUNCTURE: CPT

## 2023-07-10 PROCEDURE — 82728 ASSAY OF FERRITIN: CPT

## 2023-07-10 PROCEDURE — 6360000002 HC RX W HCPCS: Performed by: INTERNAL MEDICINE

## 2023-07-10 PROCEDURE — 85007 BL SMEAR W/DIFF WBC COUNT: CPT

## 2023-07-10 PROCEDURE — 83550 IRON BINDING TEST: CPT

## 2023-07-10 PROCEDURE — 85027 COMPLETE CBC AUTOMATED: CPT

## 2023-07-10 PROCEDURE — 83615 LACTATE (LD) (LDH) ENZYME: CPT

## 2023-07-10 PROCEDURE — 80053 COMPREHEN METABOLIC PANEL: CPT

## 2023-07-10 RX ORDER — FAMOTIDINE 10 MG/ML
20 INJECTION, SOLUTION INTRAVENOUS
Status: CANCELLED | OUTPATIENT
Start: 2023-07-17

## 2023-07-10 RX ORDER — ACETAMINOPHEN 325 MG/1
650 TABLET ORAL
Status: CANCELLED | OUTPATIENT
Start: 2023-07-17

## 2023-07-10 RX ORDER — EPINEPHRINE 1 MG/ML
0.3 INJECTION, SOLUTION, CONCENTRATE INTRAVENOUS PRN
Status: CANCELLED | OUTPATIENT
Start: 2023-07-17

## 2023-07-10 RX ORDER — ONDANSETRON 2 MG/ML
8 INJECTION INTRAMUSCULAR; INTRAVENOUS
Status: CANCELLED | OUTPATIENT
Start: 2023-07-17

## 2023-07-10 RX ORDER — ALBUTEROL SULFATE 90 UG/1
4 AEROSOL, METERED RESPIRATORY (INHALATION) PRN
Status: CANCELLED | OUTPATIENT
Start: 2023-07-17

## 2023-07-10 RX ORDER — SODIUM CHLORIDE 9 MG/ML
INJECTION, SOLUTION INTRAVENOUS CONTINUOUS
Status: CANCELLED | OUTPATIENT
Start: 2023-07-17

## 2023-07-10 RX ORDER — DIPHENHYDRAMINE HYDROCHLORIDE 50 MG/ML
50 INJECTION INTRAMUSCULAR; INTRAVENOUS
Status: CANCELLED | OUTPATIENT
Start: 2023-07-17

## 2023-07-10 RX ADMIN — ROMIPLOSTIM 70 MCG: 250 INJECTION, POWDER, LYOPHILIZED, FOR SOLUTION SUBCUTANEOUS at 09:34

## 2023-07-10 NOTE — PROGRESS NOTES
Assisted to infusion area after labs. CBC results reviewed with patient. Nplate injection administered as ordered. Tolerated well. Discharged in stable condition. AVS provided.

## 2023-07-12 ENCOUNTER — OFFICE VISIT (OUTPATIENT)
Dept: ORTHOPEDIC SURGERY | Age: 73
End: 2023-07-12

## 2023-07-12 VITALS — OXYGEN SATURATION: 90 % | HEIGHT: 72 IN | HEART RATE: 103 BPM | RESPIRATION RATE: 11 BRPM | BODY MASS INDEX: 20.99 KG/M2

## 2023-07-12 DIAGNOSIS — M75.41 ROTATOR CUFF IMPINGEMENT SYNDROME OF RIGHT SHOULDER: Primary | ICD-10-CM

## 2023-07-12 DIAGNOSIS — M25.511 RIGHT SHOULDER PAIN, UNSPECIFIED CHRONICITY: ICD-10-CM

## 2023-07-12 ASSESSMENT — ENCOUNTER SYMPTOMS
EYES NEGATIVE: 1
GASTROINTESTINAL NEGATIVE: 1
SHORTNESS OF BREATH: 1

## 2023-07-12 NOTE — PROGRESS NOTES
Patient presents to the office with right shoulder pain accompanied by his son. Pt stated that his shoulder pain has been consistent but worsening since October of last year. Pt stated that his pain is deep and sharp at times. Pt stated the pain is worsened with at night while laying. Pt has range of motion about to shoulder level. Pt at times will take norco for pain with mild relief.

## 2023-07-12 NOTE — PATIENT INSTRUCTIONS
Continue weight-bearing as tolerated. Ice and elevate as needed. Tylenol or Motrin for pain. Steroid injection given into the right shoulder today. If pain returns you may repeat steroid injection in 3 months.

## 2023-07-12 NOTE — PROGRESS NOTES
[facility]      HPI:  Samanta Jimenes is a 68 y.o. male that presents to the office today with complaints of right shoulder pain has been going on for several months with no known injury. He states that back in October he was in the hospital for over a week with double pneumonia. States that since that time his shoulder has actually bothered him some but it is gotten worse over the last several months. Previously he has had steroid injection in the left shoulder by another provider years ago and that seemed to help his left shoulder. He is right shoulder hurts whenever he tries to raise the arm away from his body or above his head. He also complains of pain in the right shoulder if the arm drops suddenly. He rates his pain today at a 7 or an 8/10 worse with motion. Pain is aching, throbbing. He has a complicated medical history and has been diagnosed with CLL for the last 16 years. Recently he had to start a different chemotherapy which seems to have helped. He is on chronic oxygen. I reviewed and agree with the portions of the HPI, review of systems, vital documentation and plan performed by my staff and have added/addended where appropriate. The patient was referred by Nilesh Gauthier MD for evaluation of right shoulder pain.     Past Medical History:   Diagnosis Date    Arthritis     knees, Rt hand/wrist    BPH (benign prostatic hyperplasia)     CAD (coronary artery disease)     Cancer (HCC)     CLL--Sees Dr Laverne Westfall    Diabetes mellitus [facility])     History of blood transfusion     no reaction    Hx of motion sickness     Hyperlipidemia     MDRO (multiple drug resistant organisms) resistance     abscess on buttock 10yrs ago    Migraine     last one summer 2016    Pneumonia 2016    Wears dentures     full upper--lower dentures    Wears glasses        Past Surgical History:   Procedure Laterality Date    BRONCHOSCOPY N/A 10/28/2022    BRONCHOSCOPY performed by Alicia Joel

## 2023-07-17 ENCOUNTER — HOSPITAL ENCOUNTER (OUTPATIENT)
Dept: INFUSION THERAPY | Age: 73
Discharge: HOME | End: 2023-07-17
Payer: COMMERCIAL

## 2023-07-17 ENCOUNTER — OFFICE VISIT (OUTPATIENT)
Dept: ONCOLOGY | Age: 73
End: 2023-07-17
Payer: COMMERCIAL

## 2023-07-17 VITALS
TEMPERATURE: 97.2 F | SYSTOLIC BLOOD PRESSURE: 139 MMHG | DIASTOLIC BLOOD PRESSURE: 76 MMHG | WEIGHT: 150 LBS | HEART RATE: 113 BPM | OXYGEN SATURATION: 92 % | HEIGHT: 72 IN | RESPIRATION RATE: 18 BRPM | BODY MASS INDEX: 20.32 KG/M2

## 2023-07-17 VITALS
TEMPERATURE: 97.2 F | HEIGHT: 72 IN | RESPIRATION RATE: 18 BRPM | WEIGHT: 150.2 LBS | OXYGEN SATURATION: 92 % | BODY MASS INDEX: 20.34 KG/M2 | HEART RATE: 113 BPM

## 2023-07-17 DIAGNOSIS — K90.9 INTESTINAL MALABSORPTION, UNSPECIFIED TYPE: ICD-10-CM

## 2023-07-17 DIAGNOSIS — D64.9 ANEMIA, UNSPECIFIED TYPE: ICD-10-CM

## 2023-07-17 DIAGNOSIS — C91.10 CHRONIC LYMPHOCYTIC LEUKEMIA (HCC): Primary | ICD-10-CM

## 2023-07-17 DIAGNOSIS — C91.10 LEUKEMIA, LYMPHOCYTIC, CHRONIC (HCC): ICD-10-CM

## 2023-07-17 DIAGNOSIS — D69.6 THROMBOCYTOPENIA (HCC): ICD-10-CM

## 2023-07-17 DIAGNOSIS — D80.1 HYPOGAMMAGLOBULINEMIA (HCC): ICD-10-CM

## 2023-07-17 DIAGNOSIS — D80.3 SELECTIVE DEFICIENCY OF IGG (HCC): ICD-10-CM

## 2023-07-17 DIAGNOSIS — D69.3 IMMUNE THROMBOCYTOPENIA (HCC): ICD-10-CM

## 2023-07-17 DIAGNOSIS — E53.8 B12 DEFICIENCY: ICD-10-CM

## 2023-07-17 LAB
ALBUMIN SERPL-MCNC: 3.5 GM/DL (ref 3.4–5)
ALP BLD-CCNC: 297 IU/L (ref 40–129)
ALT SERPL-CCNC: 18 U/L (ref 10–40)
ANION GAP SERPL CALCULATED.3IONS-SCNC: 11 MMOL/L (ref 4–16)
AST SERPL-CCNC: 17 IU/L (ref 15–37)
BASOPHILS ABSOLUTE: 0 K/CU MM
BASOPHILS RELATIVE PERCENT: 0.1 % (ref 0–1)
BILIRUB SERPL-MCNC: 0.5 MG/DL (ref 0–1)
BUN SERPL-MCNC: 21 MG/DL (ref 6–23)
CALCIUM SERPL-MCNC: 8.6 MG/DL (ref 8.3–10.6)
CHLORIDE BLD-SCNC: 98 MMOL/L (ref 99–110)
CO2: 22 MMOL/L (ref 21–32)
CREAT SERPL-MCNC: 0.8 MG/DL (ref 0.9–1.3)
DIFFERENTIAL TYPE: ABNORMAL
EOSINOPHILS ABSOLUTE: 0 K/CU MM
EOSINOPHILS RELATIVE PERCENT: 0.3 % (ref 0–3)
GFR SERPL CREATININE-BSD FRML MDRD: >60 ML/MIN/1.73M2
GLUCOSE SERPL-MCNC: 107 MG/DL (ref 70–99)
HCT VFR BLD CALC: 38.7 % (ref 42–52)
HEMOGLOBIN: 12.5 GM/DL (ref 13.5–18)
LYMPHOCYTES ABSOLUTE: 6.5 K/CU MM
LYMPHOCYTES RELATIVE PERCENT: 48.7 % (ref 24–44)
MCH RBC QN AUTO: 25.8 PG (ref 27–31)
MCHC RBC AUTO-ENTMCNC: 32.3 % (ref 32–36)
MCV RBC AUTO: 79.8 FL (ref 78–100)
MONOCYTES ABSOLUTE: 1 K/CU MM
MONOCYTES RELATIVE PERCENT: 7.3 % (ref 0–4)
PDW BLD-RTO: 20.2 % (ref 11.7–14.9)
PLATELET # BLD: 153 K/CU MM (ref 140–440)
PMV BLD AUTO: 8.8 FL (ref 7.5–11.1)
POTASSIUM SERPL-SCNC: 4.4 MMOL/L (ref 3.5–5.1)
RBC # BLD: 4.85 M/CU MM (ref 4.6–6.2)
SEGMENTED NEUTROPHILS ABSOLUTE COUNT: 5.8 K/CU MM
SEGMENTED NEUTROPHILS RELATIVE PERCENT: 43.6 % (ref 36–66)
SODIUM BLD-SCNC: 131 MMOL/L (ref 135–145)
TOTAL PROTEIN: 5.1 GM/DL (ref 6.4–8.2)
WBC # BLD: 13.4 K/CU MM (ref 4–10.5)

## 2023-07-17 PROCEDURE — 2580000003 HC RX 258: Performed by: INTERNAL MEDICINE

## 2023-07-17 PROCEDURE — 1123F ACP DISCUSS/DSCN MKR DOCD: CPT | Performed by: INTERNAL MEDICINE

## 2023-07-17 PROCEDURE — 80053 COMPREHEN METABOLIC PANEL: CPT

## 2023-07-17 PROCEDURE — 6360000002 HC RX W HCPCS: Performed by: NURSE PRACTITIONER

## 2023-07-17 PROCEDURE — 3017F COLORECTAL CA SCREEN DOC REV: CPT | Performed by: INTERNAL MEDICINE

## 2023-07-17 PROCEDURE — G8428 CUR MEDS NOT DOCUMENT: HCPCS | Performed by: INTERNAL MEDICINE

## 2023-07-17 PROCEDURE — 96366 THER/PROPH/DIAG IV INF ADDON: CPT

## 2023-07-17 PROCEDURE — G8420 CALC BMI NORM PARAMETERS: HCPCS | Performed by: INTERNAL MEDICINE

## 2023-07-17 PROCEDURE — 99214 OFFICE O/P EST MOD 30 MIN: CPT | Performed by: INTERNAL MEDICINE

## 2023-07-17 PROCEDURE — 96365 THER/PROPH/DIAG IV INF INIT: CPT

## 2023-07-17 PROCEDURE — 6360000002 HC RX W HCPCS: Performed by: INTERNAL MEDICINE

## 2023-07-17 PROCEDURE — 96372 THER/PROPH/DIAG INJ SC/IM: CPT

## 2023-07-17 PROCEDURE — 96375 TX/PRO/DX INJ NEW DRUG ADDON: CPT

## 2023-07-17 PROCEDURE — 85025 COMPLETE CBC W/AUTO DIFF WBC: CPT

## 2023-07-17 PROCEDURE — 1036F TOBACCO NON-USER: CPT | Performed by: INTERNAL MEDICINE

## 2023-07-17 RX ORDER — HEPARIN SODIUM (PORCINE) LOCK FLUSH IV SOLN 100 UNIT/ML 100 UNIT/ML
500 SOLUTION INTRAVENOUS PRN
Start: 2023-09-11

## 2023-07-17 RX ORDER — ONDANSETRON 2 MG/ML
8 INJECTION INTRAMUSCULAR; INTRAVENOUS
Status: CANCELLED | OUTPATIENT
Start: 2023-07-24

## 2023-07-17 RX ORDER — MEGESTROL ACETATE 40 MG/ML
200 SUSPENSION ORAL DAILY
Qty: 240 ML | Refills: 3 | Status: SHIPPED | OUTPATIENT
Start: 2023-07-17

## 2023-07-17 RX ORDER — DIPHENHYDRAMINE HYDROCHLORIDE 50 MG/ML
50 INJECTION INTRAMUSCULAR; INTRAVENOUS
Status: CANCELLED | OUTPATIENT
Start: 2023-07-24

## 2023-07-17 RX ORDER — SODIUM CHLORIDE 9 MG/ML
INJECTION, SOLUTION INTRAVENOUS CONTINUOUS
Status: CANCELLED | OUTPATIENT
Start: 2023-07-24

## 2023-07-17 RX ORDER — HEPARIN 100 UNIT/ML
500 SYRINGE INTRAVENOUS PRN
OUTPATIENT
Start: 2023-09-11

## 2023-07-17 RX ORDER — HEPARIN 100 UNIT/ML
500 SYRINGE INTRAVENOUS PRN
Status: DISCONTINUED | OUTPATIENT
Start: 2023-07-17 | End: 2023-07-18 | Stop reason: HOSPADM

## 2023-07-17 RX ORDER — ALBUTEROL SULFATE 90 UG/1
4 AEROSOL, METERED RESPIRATORY (INHALATION) PRN
Status: CANCELLED | OUTPATIENT
Start: 2023-07-24

## 2023-07-17 RX ORDER — DIPHENHYDRAMINE HYDROCHLORIDE 50 MG/ML
25 INJECTION INTRAMUSCULAR; INTRAVENOUS ONCE
Start: 2023-09-11 | End: 2023-09-11

## 2023-07-17 RX ORDER — SODIUM CHLORIDE 0.9 % (FLUSH) 0.9 %
10 SYRINGE (ML) INJECTION PRN
OUTPATIENT
Start: 2023-09-11

## 2023-07-17 RX ORDER — FAMOTIDINE 10 MG/ML
20 INJECTION, SOLUTION INTRAVENOUS
Status: CANCELLED | OUTPATIENT
Start: 2023-07-24

## 2023-07-17 RX ORDER — ACETAMINOPHEN 325 MG/1
650 TABLET ORAL
Status: CANCELLED | OUTPATIENT
Start: 2023-07-24

## 2023-07-17 RX ORDER — SODIUM CHLORIDE 0.9 % (FLUSH) 0.9 %
10 SYRINGE (ML) INJECTION PRN
Status: DISCONTINUED | OUTPATIENT
Start: 2023-07-17 | End: 2023-07-18 | Stop reason: HOSPADM

## 2023-07-17 RX ORDER — DIPHENHYDRAMINE HYDROCHLORIDE 50 MG/ML
25 INJECTION INTRAMUSCULAR; INTRAVENOUS ONCE
Status: COMPLETED | OUTPATIENT
Start: 2023-07-17 | End: 2023-07-17

## 2023-07-17 RX ORDER — HYDROCODONE BITARTRATE AND ACETAMINOPHEN 10; 325 MG/1; MG/1
1 TABLET ORAL EVERY 8 HOURS PRN
Qty: 90 TABLET | Refills: 0 | Status: SHIPPED | OUTPATIENT
Start: 2023-07-17 | End: 2023-08-16

## 2023-07-17 RX ORDER — EPINEPHRINE 1 MG/ML
0.3 INJECTION, SOLUTION, CONCENTRATE INTRAVENOUS PRN
Status: CANCELLED | OUTPATIENT
Start: 2023-07-24

## 2023-07-17 RX ADMIN — IMMUNE GLOBULIN (HUMAN) 30 G: 10 INJECTION INTRAVENOUS; SUBCUTANEOUS at 09:06

## 2023-07-17 RX ADMIN — ROMIPLOSTIM 70 MCG: 250 INJECTION, POWDER, LYOPHILIZED, FOR SOLUTION SUBCUTANEOUS at 10:08

## 2023-07-17 RX ADMIN — METHYLPREDNISOLONE SODIUM SUCCINATE 40 MG: 40 INJECTION, POWDER, FOR SOLUTION INTRAMUSCULAR; INTRAVENOUS at 08:49

## 2023-07-17 RX ADMIN — DIPHENHYDRAMINE HYDROCHLORIDE 25 MG: 50 INJECTION INTRAMUSCULAR; INTRAVENOUS at 08:49

## 2023-07-17 RX ADMIN — HEPARIN 500 UNITS: 100 SYRINGE at 11:14

## 2023-07-17 RX ADMIN — SODIUM CHLORIDE, PRESERVATIVE FREE 10 ML: 5 INJECTION INTRAVENOUS at 11:14

## 2023-07-17 NOTE — TELEPHONE ENCOUNTER
Patient  requesting a refill for Sparkill to be sent to [facility] . Pending RX to Provider to be sent to pharmacy.

## 2023-07-17 NOTE — PROGRESS NOTES
Patient arrived to treatment suite for blood draw, IVIG infusion, and Nplate injection. Right chest mediport accessed and blood drawn from site and sent to lab for processing. Patient has no questions or concerns for the doctor at this time. Treatment approved and given. IVIG given using titration method per protocol. Nplate given subcutaneously in right arm, band-aid applied. Patient tolerated well. Left treatment suite ambulatory. Discharge instructions provided.

## 2023-07-24 ENCOUNTER — HOSPITAL ENCOUNTER (OUTPATIENT)
Dept: INFUSION THERAPY | Age: 73
Discharge: HOME | End: 2023-07-24
Payer: COMMERCIAL

## 2023-07-24 VITALS — BODY MASS INDEX: 19.91 KG/M2 | WEIGHT: 146.8 LBS

## 2023-07-24 DIAGNOSIS — C91.10 CHRONIC LYMPHOCYTIC LEUKEMIA (HCC): Primary | ICD-10-CM

## 2023-07-24 DIAGNOSIS — D69.3 IMMUNE THROMBOCYTOPENIA (HCC): ICD-10-CM

## 2023-07-24 LAB
BASOPHILS ABSOLUTE: 0 K/CU MM
BASOPHILS RELATIVE PERCENT: 0.1 % (ref 0–1)
DIFFERENTIAL TYPE: ABNORMAL
EOSINOPHILS ABSOLUTE: 0 K/CU MM
EOSINOPHILS RELATIVE PERCENT: 0.4 % (ref 0–3)
HCT VFR BLD CALC: 36.7 % (ref 42–52)
HEMOGLOBIN: 11.9 GM/DL (ref 13.5–18)
LYMPHOCYTES ABSOLUTE: 4.8 K/CU MM
LYMPHOCYTES RELATIVE PERCENT: 51.7 % (ref 24–44)
MCH RBC QN AUTO: 25.9 PG (ref 27–31)
MCHC RBC AUTO-ENTMCNC: 32.4 % (ref 32–36)
MCV RBC AUTO: 79.8 FL (ref 78–100)
MONOCYTES ABSOLUTE: 0.4 K/CU MM
MONOCYTES RELATIVE PERCENT: 4.5 % (ref 0–4)
PDW BLD-RTO: 19.8 % (ref 11.7–14.9)
PLATELET # BLD: 140 K/CU MM (ref 140–440)
PMV BLD AUTO: 10.4 FL (ref 7.5–11.1)
RBC # BLD: 4.6 M/CU MM (ref 4.6–6.2)
SEGMENTED NEUTROPHILS ABSOLUTE COUNT: 4.1 K/CU MM
SEGMENTED NEUTROPHILS RELATIVE PERCENT: 43.3 % (ref 36–66)
WBC # BLD: 9.4 K/CU MM (ref 4–10.5)

## 2023-07-24 PROCEDURE — 96372 THER/PROPH/DIAG INJ SC/IM: CPT

## 2023-07-24 PROCEDURE — 85025 COMPLETE CBC W/AUTO DIFF WBC: CPT

## 2023-07-24 PROCEDURE — 2580000003 HC RX 258: Performed by: INTERNAL MEDICINE

## 2023-07-24 PROCEDURE — 6360000002 HC RX W HCPCS: Performed by: INTERNAL MEDICINE

## 2023-07-24 PROCEDURE — 36415 COLL VENOUS BLD VENIPUNCTURE: CPT

## 2023-07-24 RX ORDER — EPINEPHRINE 1 MG/ML
0.3 INJECTION, SOLUTION, CONCENTRATE INTRAVENOUS PRN
Status: CANCELLED | OUTPATIENT
Start: 2023-07-31

## 2023-07-24 RX ORDER — DIPHENHYDRAMINE HYDROCHLORIDE 50 MG/ML
50 INJECTION INTRAMUSCULAR; INTRAVENOUS
Status: CANCELLED | OUTPATIENT
Start: 2023-07-31

## 2023-07-24 RX ORDER — FAMOTIDINE 10 MG/ML
20 INJECTION, SOLUTION INTRAVENOUS
Status: CANCELLED | OUTPATIENT
Start: 2023-07-31

## 2023-07-24 RX ORDER — ALBUTEROL SULFATE 90 UG/1
4 AEROSOL, METERED RESPIRATORY (INHALATION) PRN
Status: CANCELLED | OUTPATIENT
Start: 2023-07-31

## 2023-07-24 RX ORDER — SODIUM CHLORIDE 9 MG/ML
INJECTION, SOLUTION INTRAVENOUS CONTINUOUS
Status: CANCELLED | OUTPATIENT
Start: 2023-07-31

## 2023-07-24 RX ORDER — ACETAMINOPHEN 325 MG/1
650 TABLET ORAL
Status: CANCELLED | OUTPATIENT
Start: 2023-07-31

## 2023-07-24 RX ORDER — ONDANSETRON 2 MG/ML
8 INJECTION INTRAMUSCULAR; INTRAVENOUS
Status: CANCELLED | OUTPATIENT
Start: 2023-07-31

## 2023-07-24 RX ADMIN — ROMIPLOSTIM 65 MCG: 250 INJECTION, POWDER, LYOPHILIZED, FOR SOLUTION SUBCUTANEOUS at 09:38

## 2023-07-24 NOTE — PROGRESS NOTES
Pt here for N-plate injection. CBC reviewed from 7/24 and tx released. Pt states he feels fatigued which is not new. Injection given SQ to right upper arm.  Pt tolerated without incident left ambulatory discharge instructions given

## 2023-07-31 ENCOUNTER — HOSPITAL ENCOUNTER (OUTPATIENT)
Dept: INFUSION THERAPY | Age: 73
Discharge: HOME | End: 2023-07-31
Payer: COMMERCIAL

## 2023-07-31 VITALS
RESPIRATION RATE: 20 BRPM | BODY MASS INDEX: 19.91 KG/M2 | OXYGEN SATURATION: 92 % | SYSTOLIC BLOOD PRESSURE: 134 MMHG | DIASTOLIC BLOOD PRESSURE: 79 MMHG | HEART RATE: 119 BPM | HEIGHT: 72 IN

## 2023-07-31 DIAGNOSIS — C91.10 CHRONIC LYMPHOCYTIC LEUKEMIA (HCC): Primary | ICD-10-CM

## 2023-07-31 DIAGNOSIS — D69.3 IMMUNE THROMBOCYTOPENIA (HCC): ICD-10-CM

## 2023-07-31 LAB
BANDED NEUTROPHILS ABSOLUTE COUNT: 0.9 K/CU MM
BANDED NEUTROPHILS RELATIVE PERCENT: 10 % (ref 5–11)
BASOPHILS ABSOLUTE: 0.1 K/CU MM
BASOPHILS RELATIVE PERCENT: 1 % (ref 0–1)
DIFFERENTIAL TYPE: ABNORMAL
ELLIPTOCYTES: ABNORMAL
EOSINOPHILS ABSOLUTE: 0.2 K/CU MM
EOSINOPHILS RELATIVE PERCENT: 2 % (ref 0–3)
HCT VFR BLD CALC: 37.6 % (ref 42–52)
HEMOGLOBIN: 11.9 GM/DL (ref 13.5–18)
LYMPHOCYTES ABSOLUTE: 4.8 K/CU MM
LYMPHOCYTES RELATIVE PERCENT: 55 % (ref 24–44)
MCH RBC QN AUTO: 25.9 PG (ref 27–31)
MCHC RBC AUTO-ENTMCNC: 31.6 % (ref 32–36)
MCV RBC AUTO: 81.7 FL (ref 78–100)
METAMYELOCYTES ABSOLUTE COUNT: 0.27 K/CU MM
METAMYELOCYTES PERCENT: 3 %
MONOCYTES ABSOLUTE: 0.4 K/CU MM
MONOCYTES RELATIVE PERCENT: 4 % (ref 0–4)
PDW BLD-RTO: 20 % (ref 11.7–14.9)
PLATELET # BLD: 130 K/CU MM (ref 140–440)
PMV BLD AUTO: 10.2 FL (ref 7.5–11.1)
POLYCHROMASIA: ABNORMAL
RBC # BLD: 4.6 M/CU MM (ref 4.6–6.2)
RBC # BLD: ABNORMAL 10*6/UL
SCHISTOCYTES: ABNORMAL
SEGMENTED NEUTROPHILS ABSOLUTE COUNT: 2.3 K/CU MM
SEGMENTED NEUTROPHILS RELATIVE PERCENT: 25 % (ref 36–66)
SMUDGE CELLS: PRESENT
TEAR DROP CELLS: ABNORMAL
WBC # BLD: 9 K/CU MM (ref 4–10.5)

## 2023-07-31 PROCEDURE — 85007 BL SMEAR W/DIFF WBC COUNT: CPT

## 2023-07-31 PROCEDURE — 6360000002 HC RX W HCPCS: Performed by: INTERNAL MEDICINE

## 2023-07-31 PROCEDURE — 96372 THER/PROPH/DIAG INJ SC/IM: CPT

## 2023-07-31 PROCEDURE — 85027 COMPLETE CBC AUTOMATED: CPT

## 2023-07-31 PROCEDURE — 36415 COLL VENOUS BLD VENIPUNCTURE: CPT

## 2023-07-31 RX ORDER — DIPHENHYDRAMINE HYDROCHLORIDE 50 MG/ML
50 INJECTION INTRAMUSCULAR; INTRAVENOUS
Status: CANCELLED | OUTPATIENT
Start: 2023-08-07

## 2023-07-31 RX ORDER — ONDANSETRON 2 MG/ML
8 INJECTION INTRAMUSCULAR; INTRAVENOUS
Status: CANCELLED | OUTPATIENT
Start: 2023-08-07

## 2023-07-31 RX ORDER — SODIUM CHLORIDE 9 MG/ML
INJECTION, SOLUTION INTRAVENOUS CONTINUOUS
Status: CANCELLED | OUTPATIENT
Start: 2023-08-07

## 2023-07-31 RX ORDER — ALBUTEROL SULFATE 90 UG/1
4 AEROSOL, METERED RESPIRATORY (INHALATION) PRN
Status: CANCELLED | OUTPATIENT
Start: 2023-08-07

## 2023-07-31 RX ORDER — EPINEPHRINE 1 MG/ML
0.3 INJECTION, SOLUTION, CONCENTRATE INTRAVENOUS PRN
Status: CANCELLED | OUTPATIENT
Start: 2023-08-07

## 2023-07-31 RX ORDER — ACETAMINOPHEN 325 MG/1
650 TABLET ORAL
Status: CANCELLED | OUTPATIENT
Start: 2023-08-07

## 2023-07-31 RX ORDER — AZITHROMYCIN 250 MG/1
TABLET, FILM COATED ORAL
Qty: 6 TABLET | Refills: 0 | Status: SHIPPED | OUTPATIENT
Start: 2023-07-31

## 2023-07-31 RX ORDER — FAMOTIDINE 10 MG/ML
20 INJECTION, SOLUTION INTRAVENOUS
Status: CANCELLED | OUTPATIENT
Start: 2023-08-07

## 2023-07-31 RX ADMIN — ROMIPLOSTIM 65 MCG: 250 INJECTION, POWDER, LYOPHILIZED, FOR SOLUTION SUBCUTANEOUS at 09:18

## 2023-07-31 NOTE — PROGRESS NOTES
Patient arrived to treatment suite for blood draw, and Nplate injection. Patient has no questions or concerns for the doctor at this time. Treatment approved and given. Nplate given subcutaneously in left arm, band-aid applied. Patient tolerated well. Left treatment suite ambulatory. Discharge instructions provided.

## 2023-08-02 ENCOUNTER — TELEPHONE (OUTPATIENT)
Dept: ONCOLOGY | Age: 73
End: 2023-08-02

## 2023-08-02 NOTE — TELEPHONE ENCOUNTER
8/2/23 - left pt vm for the 8/18/23 US of abd at [facility] arrival time of 10:00 am and NPO 8 hours prior. I left my direct line and asked pt to call back and confirm appt.

## 2023-08-07 ENCOUNTER — HOSPITAL ENCOUNTER (OUTPATIENT)
Dept: INFUSION THERAPY | Age: 73
Discharge: HOME | End: 2023-08-07
Payer: COMMERCIAL

## 2023-08-07 VITALS — BODY MASS INDEX: 19.39 KG/M2 | WEIGHT: 143 LBS

## 2023-08-07 DIAGNOSIS — D69.3 IMMUNE THROMBOCYTOPENIA (HCC): Primary | ICD-10-CM

## 2023-08-07 LAB
BASOPHILS ABSOLUTE: 0 K/CU MM
BASOPHILS RELATIVE PERCENT: 0.1 % (ref 0–1)
DIFFERENTIAL TYPE: ABNORMAL
EOSINOPHILS ABSOLUTE: 0 K/CU MM
EOSINOPHILS RELATIVE PERCENT: 0.3 % (ref 0–3)
HCT VFR BLD CALC: 38.4 % (ref 42–52)
HEMOGLOBIN: 12.3 GM/DL (ref 13.5–18)
LYMPHOCYTES ABSOLUTE: 5.8 K/CU MM
LYMPHOCYTES RELATIVE PERCENT: 55.2 % (ref 24–44)
MCH RBC QN AUTO: 26.1 PG (ref 27–31)
MCHC RBC AUTO-ENTMCNC: 32 % (ref 32–36)
MCV RBC AUTO: 81.5 FL (ref 78–100)
MONOCYTES ABSOLUTE: 0.9 K/CU MM
MONOCYTES RELATIVE PERCENT: 8.5 % (ref 0–4)
PDW BLD-RTO: 20.6 % (ref 11.7–14.9)
PLATELET # BLD: 139 K/CU MM (ref 140–440)
PMV BLD AUTO: 9.7 FL (ref 7.5–11.1)
RBC # BLD: 4.71 M/CU MM (ref 4.6–6.2)
SEGMENTED NEUTROPHILS ABSOLUTE COUNT: 3.8 K/CU MM
SEGMENTED NEUTROPHILS RELATIVE PERCENT: 35.9 % (ref 36–66)
WBC # BLD: 10.5 K/CU MM (ref 4–10.5)

## 2023-08-07 PROCEDURE — 6360000002 HC RX W HCPCS: Performed by: INTERNAL MEDICINE

## 2023-08-07 PROCEDURE — 85025 COMPLETE CBC W/AUTO DIFF WBC: CPT

## 2023-08-07 PROCEDURE — 36415 COLL VENOUS BLD VENIPUNCTURE: CPT

## 2023-08-07 PROCEDURE — 96372 THER/PROPH/DIAG INJ SC/IM: CPT

## 2023-08-07 RX ORDER — EPINEPHRINE 1 MG/ML
0.3 INJECTION, SOLUTION, CONCENTRATE INTRAVENOUS PRN
OUTPATIENT
Start: 2023-08-14

## 2023-08-07 RX ORDER — SODIUM CHLORIDE 9 MG/ML
INJECTION, SOLUTION INTRAVENOUS CONTINUOUS
OUTPATIENT
Start: 2023-08-14

## 2023-08-07 RX ORDER — ONDANSETRON 2 MG/ML
8 INJECTION INTRAMUSCULAR; INTRAVENOUS
OUTPATIENT
Start: 2023-08-14

## 2023-08-07 RX ORDER — ALBUTEROL SULFATE 90 UG/1
4 AEROSOL, METERED RESPIRATORY (INHALATION) PRN
OUTPATIENT
Start: 2023-08-14

## 2023-08-07 RX ORDER — DIPHENHYDRAMINE HYDROCHLORIDE 50 MG/ML
50 INJECTION INTRAMUSCULAR; INTRAVENOUS
OUTPATIENT
Start: 2023-08-14

## 2023-08-07 RX ORDER — ACETAMINOPHEN 325 MG/1
650 TABLET ORAL
OUTPATIENT
Start: 2023-08-14

## 2023-08-07 RX ORDER — FAMOTIDINE 10 MG/ML
20 INJECTION, SOLUTION INTRAVENOUS
OUTPATIENT
Start: 2023-08-14

## 2023-08-07 RX ADMIN — ROMIPLOSTIM 65 MCG: 250 INJECTION, POWDER, LYOPHILIZED, FOR SOLUTION SUBCUTANEOUS at 11:11

## 2023-08-07 NOTE — PROGRESS NOTES
Pt here for N-plate injection. CBC reviewed from 8/7. Tx released. Pt has no concerns at this time for physician. Injection given SQ to right upper arm.  Pt tolerated without incident left via wheelchair discharge instructions given

## 2023-08-14 ENCOUNTER — HOSPITAL ENCOUNTER (OUTPATIENT)
Dept: INFUSION THERAPY | Age: 73
Discharge: HOME | End: 2023-08-14
Payer: COMMERCIAL

## 2023-08-14 VITALS — WEIGHT: 140 LBS | BODY MASS INDEX: 18.99 KG/M2

## 2023-08-14 DIAGNOSIS — K90.9 INTESTINAL MALABSORPTION, UNSPECIFIED TYPE: ICD-10-CM

## 2023-08-14 DIAGNOSIS — D69.3 IMMUNE THROMBOCYTOPENIA (HCC): Primary | ICD-10-CM

## 2023-08-14 LAB
BANDED NEUTROPHILS ABSOLUTE COUNT: 0.36 K/CU MM
BANDED NEUTROPHILS RELATIVE PERCENT: 3 % (ref 5–11)
BASOPHILS ABSOLUTE: 0.1 K/CU MM
BASOPHILS RELATIVE PERCENT: 1 % (ref 0–1)
DIFFERENTIAL TYPE: ABNORMAL
ELLIPTOCYTES: ABNORMAL
HCT VFR BLD CALC: 38.8 % (ref 42–52)
HEMOGLOBIN: 12.6 GM/DL (ref 13.5–18)
LYMPHOCYTES ABSOLUTE: 6.8 K/CU MM
LYMPHOCYTES RELATIVE PERCENT: 57 % (ref 24–44)
MCH RBC QN AUTO: 26.6 PG (ref 27–31)
MCHC RBC AUTO-ENTMCNC: 32.5 % (ref 32–36)
MCV RBC AUTO: 81.9 FL (ref 78–100)
MONOCYTES ABSOLUTE: 0.1 K/CU MM
MONOCYTES RELATIVE PERCENT: 1 % (ref 0–4)
OVALOCYTES: ABNORMAL
PDW BLD-RTO: 20 % (ref 11.7–14.9)
PLATELET # BLD: 129 K/CU MM (ref 140–440)
PMV BLD AUTO: 9.3 FL (ref 7.5–11.1)
RBC # BLD: 4.74 M/CU MM (ref 4.6–6.2)
RBC # BLD: ABNORMAL 10*6/UL
SEGMENTED NEUTROPHILS ABSOLUTE COUNT: 4.5 K/CU MM
SEGMENTED NEUTROPHILS RELATIVE PERCENT: 38 % (ref 36–66)
TEAR DROP CELLS: ABNORMAL
WBC # BLD: 11.9 K/CU MM (ref 4–10.5)

## 2023-08-14 PROCEDURE — 96372 THER/PROPH/DIAG INJ SC/IM: CPT

## 2023-08-14 PROCEDURE — 36415 COLL VENOUS BLD VENIPUNCTURE: CPT

## 2023-08-14 PROCEDURE — 85007 BL SMEAR W/DIFF WBC COUNT: CPT

## 2023-08-14 PROCEDURE — 6360000002 HC RX W HCPCS: Performed by: INTERNAL MEDICINE

## 2023-08-14 PROCEDURE — 85027 COMPLETE CBC AUTOMATED: CPT

## 2023-08-14 RX ORDER — HYDROCODONE BITARTRATE AND ACETAMINOPHEN 10; 325 MG/1; MG/1
1 TABLET ORAL EVERY 8 HOURS PRN
Qty: 90 TABLET | Refills: 0 | Status: SHIPPED | OUTPATIENT
Start: 2023-08-14 | End: 2023-09-13

## 2023-08-14 RX ORDER — ACETAMINOPHEN 325 MG/1
650 TABLET ORAL
Status: CANCELLED | OUTPATIENT
Start: 2023-08-21

## 2023-08-14 RX ORDER — ALBUTEROL SULFATE 90 UG/1
4 AEROSOL, METERED RESPIRATORY (INHALATION) PRN
Status: CANCELLED | OUTPATIENT
Start: 2023-08-21

## 2023-08-14 RX ORDER — EPINEPHRINE 1 MG/ML
0.3 INJECTION, SOLUTION, CONCENTRATE INTRAVENOUS PRN
Status: CANCELLED | OUTPATIENT
Start: 2023-08-21

## 2023-08-14 RX ORDER — FAMOTIDINE 10 MG/ML
20 INJECTION, SOLUTION INTRAVENOUS
Status: CANCELLED | OUTPATIENT
Start: 2023-08-21

## 2023-08-14 RX ORDER — DIPHENHYDRAMINE HYDROCHLORIDE 50 MG/ML
50 INJECTION INTRAMUSCULAR; INTRAVENOUS
Status: CANCELLED | OUTPATIENT
Start: 2023-08-21

## 2023-08-14 RX ORDER — ONDANSETRON 2 MG/ML
8 INJECTION INTRAMUSCULAR; INTRAVENOUS
Status: CANCELLED | OUTPATIENT
Start: 2023-08-21

## 2023-08-14 RX ORDER — SODIUM CHLORIDE 9 MG/ML
INJECTION, SOLUTION INTRAVENOUS CONTINUOUS
Status: CANCELLED | OUTPATIENT
Start: 2023-08-21

## 2023-08-14 RX ADMIN — ROMIPLOSTIM 65 MCG: 250 INJECTION, POWDER, LYOPHILIZED, FOR SOLUTION SUBCUTANEOUS at 09:22

## 2023-08-14 NOTE — PROGRESS NOTES
Pt here for N-plate injection. CBC reviewed from 8/14 and within defined limits. Pt states he is fatigued and not eating well. Pt encouraged to drink protein shakes and eat small frequent meals. Pt requesting refills on his pain medication. Request sent to Dr Joanne Encinas for approval.     Injection given SQ to right upper arm.  Pt left via wheelchair discharge instructions given

## 2023-08-14 NOTE — TELEPHONE ENCOUNTER
Patient left message requesting a refill for [facility] to be sent to PHARMACY. Pending RX to Provider to be sent to pharmacy.

## 2023-08-18 ENCOUNTER — HOSPITAL ENCOUNTER (EMERGENCY)
Age: 73
Discharge: HOME | End: 2023-08-18
Attending: EMERGENCY MEDICINE
Payer: COMMERCIAL

## 2023-08-18 VITALS
SYSTOLIC BLOOD PRESSURE: 101 MMHG | WEIGHT: 140 LBS | HEIGHT: 72 IN | HEART RATE: 95 BPM | OXYGEN SATURATION: 94 % | TEMPERATURE: 98 F | DIASTOLIC BLOOD PRESSURE: 76 MMHG | RESPIRATION RATE: 17 BRPM | BODY MASS INDEX: 18.96 KG/M2

## 2023-08-18 DIAGNOSIS — E16.2 HYPOGLYCEMIA: Primary | ICD-10-CM

## 2023-08-18 LAB
ALBUMIN SERPL-MCNC: 3.5 GM/DL (ref 3.4–5)
ALP BLD-CCNC: 365 IU/L (ref 40–128)
ALT SERPL-CCNC: 33 U/L (ref 10–40)
ANION GAP SERPL CALCULATED.3IONS-SCNC: 13 MMOL/L (ref 4–16)
AST SERPL-CCNC: 31 IU/L (ref 15–37)
BILIRUB SERPL-MCNC: 0.4 MG/DL (ref 0–1)
BUN SERPL-MCNC: 20 MG/DL (ref 6–23)
CALCIUM SERPL-MCNC: 8.9 MG/DL (ref 8.3–10.6)
CHLORIDE BLD-SCNC: 100 MMOL/L (ref 99–110)
CO2: 21 MMOL/L (ref 21–32)
CREAT SERPL-MCNC: 0.6 MG/DL (ref 0.9–1.3)
GFR SERPL CREATININE-BSD FRML MDRD: >60 ML/MIN/1.73M2
GLUCOSE BLD-MCNC: 125 MG/DL (ref 70–99)
GLUCOSE BLD-MCNC: 138 MG/DL
GLUCOSE BLD-MCNC: 138 MG/DL (ref 70–99)
GLUCOSE BLD-MCNC: 220 MG/DL (ref 70–99)
GLUCOSE BLD-MCNC: 228 MG/DL (ref 70–99)
GLUCOSE BLD-MCNC: 271 MG/DL (ref 70–99)
GLUCOSE BLD-MCNC: 419 MG/DL
GLUCOSE BLD-MCNC: 419 MG/DL (ref 70–99)
GLUCOSE BLD-MCNC: 44 MG/DL (ref 70–99)
GLUCOSE BLD-MCNC: 66 MG/DL (ref 70–99)
GLUCOSE BLD-MCNC: 79 MG/DL (ref 70–99)
GLUCOSE SERPL-MCNC: 59 MG/DL (ref 70–99)
POTASSIUM SERPL-SCNC: 4 MMOL/L (ref 3.5–5.1)
SODIUM BLD-SCNC: 134 MMOL/L (ref 135–145)
TOTAL PROTEIN: 5.6 GM/DL (ref 6.4–8.2)

## 2023-08-18 PROCEDURE — 99284 EMERGENCY DEPT VISIT MOD MDM: CPT

## 2023-08-18 PROCEDURE — 2500000003 HC RX 250 WO HCPCS: Performed by: EMERGENCY MEDICINE

## 2023-08-18 PROCEDURE — 80053 COMPREHEN METABOLIC PANEL: CPT

## 2023-08-18 PROCEDURE — 82962 GLUCOSE BLOOD TEST: CPT

## 2023-08-18 PROCEDURE — 2500000003 HC RX 250 WO HCPCS: Performed by: STUDENT IN AN ORGANIZED HEALTH CARE EDUCATION/TRAINING PROGRAM

## 2023-08-18 RX ORDER — ATORVASTATIN CALCIUM 40 MG/1
40 TABLET, FILM COATED ORAL DAILY
COMMUNITY
Start: 2023-08-05

## 2023-08-18 RX ORDER — DEXTROSE MONOHYDRATE 25 G/50ML
25 INJECTION, SOLUTION INTRAVENOUS ONCE
Status: COMPLETED | OUTPATIENT
Start: 2023-08-18 | End: 2023-08-18

## 2023-08-18 RX ORDER — INSULIN DEGLUDEC INJECTION 100 U/ML
40 INJECTION, SOLUTION SUBCUTANEOUS NIGHTLY
COMMUNITY
Start: 2023-07-13

## 2023-08-18 RX ORDER — GUAIFENESIN 600 MG/1
600 TABLET, EXTENDED RELEASE ORAL 4 TIMES DAILY
COMMUNITY
Start: 2023-07-24

## 2023-08-18 RX ADMIN — DEXTROSE MONOHYDRATE 25 G: 25 INJECTION, SOLUTION INTRAVENOUS at 05:05

## 2023-08-18 RX ADMIN — DEXTROSE MONOHYDRATE 25 G: 25 INJECTION, SOLUTION INTRAVENOUS at 06:50

## 2023-08-18 ASSESSMENT — PAIN - FUNCTIONAL ASSESSMENT: PAIN_FUNCTIONAL_ASSESSMENT: NONE - DENIES PAIN

## 2023-08-18 NOTE — ED NOTES
Medication History  [facility]    Patient Name: Ronny Krishnan 1950     Medication history has been completed by: Jerome Monsalve CPhT    Source(s) of information: patient, family and insurance claims     Primary Care Physician: Iris Desouza MD     Pharmacy: [facility]    Allergies as of 08/18/2023    (No Known Allergies)        Prior to Admission medications    Medication Sig Start Date End Date Taking? Authorizing Provider   TRESIBA FLEXTOUCH 100 UNIT/ML SOPN Inject 40 Units into the skin nightly 7/13/23   Historical Provider, MD   MUCUS RELIEF 600 MG extended release tablet Take 1 tablet by mouth 4 times daily 7/24/23   Historical Provider, MD   atorvastatin (LIPITOR) 40 MG tablet Take 1 tablet by mouth daily 8/5/23   Historical Provider, MD   HYDROcodone-acetaminophen (NORCO)  MG per tablet Take 1 tablet by mouth in the morning and 1 tablet in the evening. 08/18/23 Patient reports this is ordered for 3 times daily but usually only takes this twice daily. 8/14/23 9/13/23  Amy Beckman MD   megestrol (MEGACE) 40 MG/ML suspension Take 5 mLs by mouth daily 7/17/23   Alfa Lazo MD   venetoclax (VENCLEXTA) 10 MG TABS chemo tablet TAKE TWO TABLETS BY MOUTH EVERY DAY 7/3/23   Alfa Lazo MD   valACYclovir (VALTREX) 500 MG tablet TAKE 1 TABLET BY MOUTH EVERY DAY 6/2/23   Alfa Lazo MD   gabapentin (NEURONTIN) 100 MG capsule Take 1 capsule by mouth 3 times daily for 30 days.  5/15/23   Alfa Lazo MD   allopurinol (ZYLOPRIM) 300 MG tablet Take 1 tablet by mouth daily x30 days upon starting venetoclax 4/24/23   Alfa Lazo MD   empagliflozin (JARDIANCE) 10 MG tablet Take 1 tablet by mouth daily    Historical Provider, MD   OXYGEN Inhale 3 L into the lungs continuous    Historical Provider, MD   ipratropium-albuterol (DUONEB) 0.5-2.5 (3) MG/3ML SOLN nebulizer solution Inhale 3 mLs into the lungs every 4 hours 2/15/23   Kortney Claire MD   ondansetron (ZOFRAN) 8 MG tablet Take 1 tablet by mouth every 8 hours as needed for Nausea or Vomiting 1/6/23   Lin De Luna MD   NOVOLOG FLEXPEN 100 UNIT/ML injection pen  Inject 5 Units into the skin 3 times daily (before meals) 11/2/22   Charline Mckeon MD   albuterol sulfate HFA (PROVENTIL;VENTOLIN;PROAIR) 108 (90 Base) MCG/ACT inhaler Inhale 2 puffs into the lungs every 4-6 hours as needed for Shortness of Breath or Wheezing 9/6/22   Historical Provider, MD   finasteride (PROSCAR) 5 MG tablet Take 1 tablet by mouth daily 5/24/21   Historical Provider, MD   tamsulosin (FLOMAX) 0.4 MG capsule Take 1 capsule by mouth daily    Historical Provider, MD   glucose monitoring kit (FREESTYLE) monitoring kit 1 kit by Does not apply route daily as needed (glucose checks) 3/31/17   Matthew Salcedo MD   Insulin Syringe-Needle U-100 30G X 1/2\" 0.5 ML MISC 1 each by Does not apply route daily 3/31/17   Matthew Salcedo MD   metFORMIN (GLUCOPHAGE) 1000 MG tablet Take 1 tablet by mouth 2 times daily (with meals)    Historical Provider, MD   omeprazole (PRILOSEC) 20 MG delayed release capsule Take 1 capsule by mouth daily as needed (GERD)    Historical Provider, MD     Medications added or changed (ex. new medication, dosage change, interval change, formulation change):  Levemir changed to [place]  Guaifenesin (added)  Atorvastatin dosage clarified    Medications removed from list (include reason, ex. noncompliance, medication cost, therapy complete etc.):   Azithromycin therapy complete  Duoneb duplicate  Allopurinol duplicate  Sodium polystyrene therapy complete    Comments:  Medication list reviewed with patient, family and insurance claims verified. Insulin dosage updated per information received. Per patient he does Tresiba 40 units at HS and Novovlog 5 units before meals. Venetoclax therapy updated, takes 2-10 mg tablets q am, family to supply. No medications taken piror to ER visit.   Claims for ferrous gluconate patient reports he is no longer taking this.     To my knowledge the above medication history is accurate as of 8/18/2023 8:50 AM.   Benedicto Rowe CPhT   8/18/2023 8:50 AM

## 2023-08-18 NOTE — ED TRIAGE NOTES
Pt to ED via EMS with c/o hypoglycemia. Per medics, pts sugar was 30 on their arrival. They gave 150 of D10 en route and sugar is up to 84. Pt states he is supposed to have an ultrasound this AM and he was supposed to be fasting.

## 2023-08-18 NOTE — ED PROVIDER NOTES
ED Course as of 08/18/23 1413   Fri Aug 18, 2023   0633 CLL, DM  Here with hypoglycemia  Scheduled study today (10:30), stayed NPO but took insulin as normal  Lantus, novalog, no sulfonylureas  Felt lightheaded, 30 of glucose  Dispo: if stable in 2 hours he can go home.  [AR]   0646 Alk Phos(!): 365  Elevated in the past [AR]   0648 Glucose of 44 per RN, patient given one amp of D50 [AR]   0807 POC Glucose: 79 [AR]   0917 Glucose, Random: 419  After breakfast [AR]   0959 POC Glucose(!): 271 [AR]   1112 POC Glucose(!): 220 [AR]   1412 Reevaluation patient reports feeling well.   He was educated on checking his blood sugars every 1-2 hours, and return precautions were given. [AR]      ED Course User Index  [AR] MD Wanda Gomes MD  08/18/23 0387

## 2023-08-18 NOTE — DISCHARGE INSTRUCTIONS
Hi Dr. Chávez,   I saw Emilee today, she very sweetly expressed that she is unsure why she needs us (didn't want a breast exam or labs here). I did let her know guidelines recommend annual follow up with oncology so if you're not comfortable with managing her mammograms, please send her back to us!  Rylee  Please check your blood sugars routinely for the appropriate administration of your insulin. Check every 1-2 hours for the next 24 hours. Please follow-up with your primary care physician for further medication management. If you develop any worsening or concerning symptoms, please seek immediate medical attention.

## 2023-08-18 NOTE — ED NOTES
Repeat POC glucose 44.  Dr. Borrero Pert aware, D50 given at this time     Low Manuel RN  08/18/23 [id number]

## 2023-08-18 NOTE — ED PROVIDER NOTES
[address] ENCOUNTER      Pt Name: Dana Novoa  MRN: [id number]  [address] [facility] 1950  Date of evaluation: 8/18/2023  Provider: Florence Vang MD    CHIEF COMPLAINT       Chief Complaint   Patient presents with    Hypoglycemia         HISTORY OF PRESENT ILLNESS      Dana Novoa is a 68 y.o. male who presents to the emergency department  for   Chief Complaint   Patient presents with    Hypoglycemia       43-year-old male presents with hypoglycemia. He does have a history of insulin-dependent diabetes. He also has a history of CLL. He states that he had not eaten much over the past day because he is supposed to be undergoing an MRI later today. He reports taking his typical amounts of insulin. He reports awaking at about 2:00 in the morning and feeling as if his sugar was low because he was \"seeing spots. \"  He did call his son. EMS was called to his home. He was found to have a fingerstick blood sugar of 30. He was administered 150 mL of D10 with improvement in his sugar. He presents emergency department ANO x3. No remarkable acute complaints. Not having abdominal pain or chest pain. No constitutional infectious symptoms. Nursing Notes, Triage Notes & Vital Signs were reviewed. REVIEW OF SYSTEMS    (2-9 systems for level 4, 10 or more for level 5)     Review of Systems    Except as noted above the remainder of the review of systems was reviewed and negative.        PAST MEDICAL HISTORY     Past Medical History:   Diagnosis Date    Arthritis     knees, Rt hand/wrist    BPH (benign prostatic hyperplasia)     CAD (coronary artery disease)     Cancer (HCC)     CLL--Sees Dr Bethanie Fagan    Diabetes mellitus [facility])     History of blood transfusion     no reaction    Hx of motion sickness     Hyperlipidemia     MDRO (multiple drug resistant organisms) resistance     abscess on buttock 10yrs ago    Migraine     last

## 2023-08-21 ENCOUNTER — HOSPITAL ENCOUNTER (OUTPATIENT)
Dept: INFUSION THERAPY | Age: 73
Discharge: HOME | End: 2023-08-21
Payer: COMMERCIAL

## 2023-08-21 VITALS
TEMPERATURE: 98 F | SYSTOLIC BLOOD PRESSURE: 110 MMHG | DIASTOLIC BLOOD PRESSURE: 70 MMHG | BODY MASS INDEX: 19.02 KG/M2 | WEIGHT: 140.4 LBS | HEART RATE: 117 BPM | OXYGEN SATURATION: 89 % | HEIGHT: 72 IN

## 2023-08-21 DIAGNOSIS — D69.3 IMMUNE THROMBOCYTOPENIA (HCC): Primary | ICD-10-CM

## 2023-08-21 LAB
ANISOCYTOSIS: ABNORMAL
BANDED NEUTROPHILS ABSOLUTE COUNT: 0.12 K/CU MM
BANDED NEUTROPHILS RELATIVE PERCENT: 1 % (ref 5–11)
BURR CELLS: ABNORMAL
DIFFERENTIAL TYPE: ABNORMAL
ELLIPTOCYTES: ABNORMAL
HCT VFR BLD CALC: 37 % (ref 42–52)
HEMOGLOBIN: 12.1 GM/DL (ref 13.5–18)
LYMPHOCYTES ABSOLUTE: 6.7 K/CU MM
LYMPHOCYTES RELATIVE PERCENT: 54 % (ref 24–44)
MCH RBC QN AUTO: 26.8 PG (ref 27–31)
MCHC RBC AUTO-ENTMCNC: 32.7 % (ref 32–36)
MCV RBC AUTO: 81.9 FL (ref 78–100)
METAMYELOCYTES ABSOLUTE COUNT: 0.12 K/CU MM
METAMYELOCYTES PERCENT: 1 %
MONOCYTES ABSOLUTE: 0.5 K/CU MM
MONOCYTES RELATIVE PERCENT: 4 % (ref 0–4)
OVALOCYTES: ABNORMAL
PDW BLD-RTO: 19.4 % (ref 11.7–14.9)
PLATELET # BLD: 125 K/CU MM (ref 140–440)
PMV BLD AUTO: 8.7 FL (ref 7.5–11.1)
POLYCHROMASIA: ABNORMAL
RBC # BLD: 4.52 M/CU MM (ref 4.6–6.2)
RBC # BLD: ABNORMAL 10*6/UL
SEGMENTED NEUTROPHILS ABSOLUTE COUNT: 5 K/CU MM
SEGMENTED NEUTROPHILS RELATIVE PERCENT: 40 % (ref 36–66)
TEAR DROP CELLS: ABNORMAL
WBC # BLD: 12.4 K/CU MM (ref 4–10.5)

## 2023-08-21 PROCEDURE — 96372 THER/PROPH/DIAG INJ SC/IM: CPT

## 2023-08-21 PROCEDURE — 85007 BL SMEAR W/DIFF WBC COUNT: CPT

## 2023-08-21 PROCEDURE — 85027 COMPLETE CBC AUTOMATED: CPT

## 2023-08-21 PROCEDURE — 36415 COLL VENOUS BLD VENIPUNCTURE: CPT

## 2023-08-21 PROCEDURE — 6360000002 HC RX W HCPCS: Performed by: INTERNAL MEDICINE

## 2023-08-21 RX ORDER — EPINEPHRINE 1 MG/ML
0.3 INJECTION, SOLUTION, CONCENTRATE INTRAVENOUS PRN
Status: CANCELLED | OUTPATIENT
Start: 2023-08-28

## 2023-08-21 RX ORDER — ALBUTEROL SULFATE 90 UG/1
4 AEROSOL, METERED RESPIRATORY (INHALATION) PRN
Status: CANCELLED | OUTPATIENT
Start: 2023-08-28

## 2023-08-21 RX ORDER — FAMOTIDINE 10 MG/ML
20 INJECTION, SOLUTION INTRAVENOUS
Status: CANCELLED | OUTPATIENT
Start: 2023-08-28

## 2023-08-21 RX ORDER — SODIUM CHLORIDE 9 MG/ML
INJECTION, SOLUTION INTRAVENOUS CONTINUOUS
Status: CANCELLED | OUTPATIENT
Start: 2023-08-28

## 2023-08-21 RX ORDER — DIPHENHYDRAMINE HYDROCHLORIDE 50 MG/ML
50 INJECTION INTRAMUSCULAR; INTRAVENOUS
Status: CANCELLED | OUTPATIENT
Start: 2023-08-28

## 2023-08-21 RX ORDER — ACETAMINOPHEN 325 MG/1
650 TABLET ORAL
Status: CANCELLED | OUTPATIENT
Start: 2023-08-28

## 2023-08-21 RX ORDER — ONDANSETRON 2 MG/ML
8 INJECTION INTRAMUSCULAR; INTRAVENOUS
Status: CANCELLED | OUTPATIENT
Start: 2023-08-28

## 2023-08-21 RX ADMIN — ROMIPLOSTIM 65 MCG: 250 INJECTION, POWDER, LYOPHILIZED, FOR SOLUTION SUBCUTANEOUS at 09:43

## 2023-08-21 ASSESSMENT — PAIN SCALES - GENERAL: PAINLEVEL_OUTOF10: 5

## 2023-08-21 NOTE — PROGRESS NOTES
Ambulated to infusion area after labs. CBC results reviewed with patient. Nplate injection administered as ordered. Tolerated well. Discharged in stable condition. AVS provided.

## 2023-08-28 ENCOUNTER — HOSPITAL ENCOUNTER (OUTPATIENT)
Dept: INFUSION THERAPY | Age: 73
Discharge: HOME | End: 2023-08-28
Payer: COMMERCIAL

## 2023-08-28 ENCOUNTER — TELEPHONE (OUTPATIENT)
Dept: ONCOLOGY | Age: 73
End: 2023-08-28

## 2023-08-28 VITALS
BODY MASS INDEX: 19.23 KG/M2 | DIASTOLIC BLOOD PRESSURE: 64 MMHG | SYSTOLIC BLOOD PRESSURE: 112 MMHG | HEIGHT: 72 IN | WEIGHT: 142 LBS

## 2023-08-28 DIAGNOSIS — D69.3 IMMUNE THROMBOCYTOPENIA (HCC): Primary | ICD-10-CM

## 2023-08-28 LAB
BASOPHILS ABSOLUTE: 0 K/CU MM
BASOPHILS RELATIVE PERCENT: 0.1 % (ref 0–1)
DIFFERENTIAL TYPE: ABNORMAL
EOSINOPHILS ABSOLUTE: 0 K/CU MM
EOSINOPHILS RELATIVE PERCENT: 0.2 % (ref 0–3)
HCT VFR BLD CALC: 39.1 % (ref 42–52)
HEMOGLOBIN: 12.6 GM/DL (ref 13.5–18)
LYMPHOCYTES ABSOLUTE: 7.1 K/CU MM
LYMPHOCYTES RELATIVE PERCENT: 58.2 % (ref 24–44)
MCH RBC QN AUTO: 27.3 PG (ref 27–31)
MCHC RBC AUTO-ENTMCNC: 32.2 % (ref 32–36)
MCV RBC AUTO: 84.6 FL (ref 78–100)
MONOCYTES ABSOLUTE: 1 K/CU MM
MONOCYTES RELATIVE PERCENT: 8.6 % (ref 0–4)
PDW BLD-RTO: 20 % (ref 11.7–14.9)
PLATELET # BLD: 124 K/CU MM (ref 140–440)
PMV BLD AUTO: 8.9 FL (ref 7.5–11.1)
RBC # BLD: 4.62 M/CU MM (ref 4.6–6.2)
SEGMENTED NEUTROPHILS ABSOLUTE COUNT: 4 K/CU MM
SEGMENTED NEUTROPHILS RELATIVE PERCENT: 32.9 % (ref 36–66)
WBC # BLD: 12.2 K/CU MM (ref 4–10.5)

## 2023-08-28 PROCEDURE — 6360000002 HC RX W HCPCS: Performed by: INTERNAL MEDICINE

## 2023-08-28 PROCEDURE — 36415 COLL VENOUS BLD VENIPUNCTURE: CPT

## 2023-08-28 PROCEDURE — 85025 COMPLETE CBC W/AUTO DIFF WBC: CPT

## 2023-08-28 PROCEDURE — 96372 THER/PROPH/DIAG INJ SC/IM: CPT

## 2023-08-28 RX ORDER — SODIUM CHLORIDE 9 MG/ML
INJECTION, SOLUTION INTRAVENOUS CONTINUOUS
Status: CANCELLED | OUTPATIENT
Start: 2023-09-04

## 2023-08-28 RX ORDER — EPINEPHRINE 1 MG/ML
0.3 INJECTION, SOLUTION, CONCENTRATE INTRAVENOUS PRN
Status: CANCELLED | OUTPATIENT
Start: 2023-09-04

## 2023-08-28 RX ORDER — ONDANSETRON 2 MG/ML
8 INJECTION INTRAMUSCULAR; INTRAVENOUS
Status: CANCELLED | OUTPATIENT
Start: 2023-09-04

## 2023-08-28 RX ORDER — ACETAMINOPHEN 325 MG/1
650 TABLET ORAL
Status: CANCELLED | OUTPATIENT
Start: 2023-09-04

## 2023-08-28 RX ORDER — DIPHENHYDRAMINE HYDROCHLORIDE 50 MG/ML
50 INJECTION INTRAMUSCULAR; INTRAVENOUS
Status: CANCELLED | OUTPATIENT
Start: 2023-09-04

## 2023-08-28 RX ORDER — FAMOTIDINE 10 MG/ML
20 INJECTION, SOLUTION INTRAVENOUS
Status: CANCELLED | OUTPATIENT
Start: 2023-09-04

## 2023-08-28 RX ORDER — ALBUTEROL SULFATE 90 UG/1
4 AEROSOL, METERED RESPIRATORY (INHALATION) PRN
Status: CANCELLED | OUTPATIENT
Start: 2023-09-04

## 2023-08-28 RX ADMIN — ROMIPLOSTIM 65 MCG: 250 INJECTION, POWDER, LYOPHILIZED, FOR SOLUTION SUBCUTANEOUS at 09:36

## 2023-08-28 NOTE — TELEPHONE ENCOUNTER
8/28/23 - left vm for the 9/6/23 US of abdomen at [facility] arrival time of 8:30 am and NPO 8 hours prior. I also mailed the appt information. I asked the pt to call back and confirm the appt.

## 2023-08-28 NOTE — PROGRESS NOTES
Pt assisted into treatment area by son for an N-Plate injection. Labs drawn in lab. PLT: 124 Treatment authorized and administered as ordered. N-Plate given in  LT arm. Pt tolerated well and left treatment area in wheelchair with son.  AVS provided

## 2023-09-05 ENCOUNTER — HOSPITAL ENCOUNTER (OUTPATIENT)
Dept: INFUSION THERAPY | Age: 73
Discharge: HOME | End: 2023-09-05
Payer: COMMERCIAL

## 2023-09-05 VITALS — WEIGHT: 139 LBS | BODY MASS INDEX: 18.83 KG/M2 | HEIGHT: 72 IN

## 2023-09-05 DIAGNOSIS — D69.3 IMMUNE THROMBOCYTOPENIA (HCC): Primary | ICD-10-CM

## 2023-09-05 LAB
ANISOCYTOSIS: ABNORMAL
BANDED NEUTROPHILS ABSOLUTE COUNT: 0.35 K/CU MM
BANDED NEUTROPHILS RELATIVE PERCENT: 3 % (ref 5–11)
DIFFERENTIAL TYPE: ABNORMAL
HCT VFR BLD CALC: 36.3 % (ref 42–52)
HEMOGLOBIN: 11.7 GM/DL (ref 13.5–18)
LYMPHOCYTES ABSOLUTE: 6.8 K/CU MM
LYMPHOCYTES RELATIVE PERCENT: 57 % (ref 24–44)
MCH RBC QN AUTO: 27.2 PG (ref 27–31)
MCHC RBC AUTO-ENTMCNC: 32.2 % (ref 32–36)
MCV RBC AUTO: 84.4 FL (ref 78–100)
MONOCYTES ABSOLUTE: 0.2 K/CU MM
MONOCYTES RELATIVE PERCENT: 2 % (ref 0–4)
OVALOCYTES: ABNORMAL
PDW BLD-RTO: 19 % (ref 11.7–14.9)
PLATELET # BLD: 146 K/CU MM (ref 140–440)
PMV BLD AUTO: 9 FL (ref 7.5–11.1)
RBC # BLD: 4.3 M/CU MM (ref 4.6–6.2)
RBC # BLD: ABNORMAL 10*6/UL
SEGMENTED NEUTROPHILS ABSOLUTE COUNT: 4.4 K/CU MM
SEGMENTED NEUTROPHILS RELATIVE PERCENT: 38 % (ref 36–66)
TEAR DROP CELLS: ABNORMAL
WBC # BLD: 11.7 K/CU MM (ref 4–10.5)

## 2023-09-05 PROCEDURE — 96372 THER/PROPH/DIAG INJ SC/IM: CPT

## 2023-09-05 PROCEDURE — 36415 COLL VENOUS BLD VENIPUNCTURE: CPT

## 2023-09-05 PROCEDURE — 6360000002 HC RX W HCPCS: Performed by: INTERNAL MEDICINE

## 2023-09-05 PROCEDURE — 85027 COMPLETE CBC AUTOMATED: CPT

## 2023-09-05 PROCEDURE — 85007 BL SMEAR W/DIFF WBC COUNT: CPT

## 2023-09-05 RX ORDER — ONDANSETRON 2 MG/ML
8 INJECTION INTRAMUSCULAR; INTRAVENOUS
Status: CANCELLED | OUTPATIENT
Start: 2023-09-11

## 2023-09-05 RX ORDER — SODIUM CHLORIDE 9 MG/ML
INJECTION, SOLUTION INTRAVENOUS CONTINUOUS
Status: CANCELLED | OUTPATIENT
Start: 2023-09-11

## 2023-09-05 RX ORDER — EPINEPHRINE 1 MG/ML
0.3 INJECTION, SOLUTION, CONCENTRATE INTRAVENOUS PRN
Status: CANCELLED | OUTPATIENT
Start: 2023-09-11

## 2023-09-05 RX ORDER — DIPHENHYDRAMINE HYDROCHLORIDE 50 MG/ML
50 INJECTION INTRAMUSCULAR; INTRAVENOUS
Status: CANCELLED | OUTPATIENT
Start: 2023-09-11

## 2023-09-05 RX ORDER — ACETAMINOPHEN 325 MG/1
650 TABLET ORAL
Status: CANCELLED | OUTPATIENT
Start: 2023-09-11

## 2023-09-05 RX ORDER — ALBUTEROL SULFATE 90 UG/1
4 AEROSOL, METERED RESPIRATORY (INHALATION) PRN
Status: CANCELLED | OUTPATIENT
Start: 2023-09-11

## 2023-09-05 RX ORDER — FAMOTIDINE 10 MG/ML
20 INJECTION, SOLUTION INTRAVENOUS
Status: CANCELLED | OUTPATIENT
Start: 2023-09-11

## 2023-09-05 RX ADMIN — ROMIPLOSTIM 65 MCG: 250 INJECTION, POWDER, LYOPHILIZED, FOR SOLUTION SUBCUTANEOUS at 09:54

## 2023-09-11 ENCOUNTER — HOSPITAL ENCOUNTER (OUTPATIENT)
Dept: INFUSION THERAPY | Age: 73
Discharge: HOME | End: 2023-09-11
Payer: COMMERCIAL

## 2023-09-11 VITALS
HEART RATE: 82 BPM | HEIGHT: 72 IN | SYSTOLIC BLOOD PRESSURE: 129 MMHG | WEIGHT: 138 LBS | TEMPERATURE: 96.5 F | DIASTOLIC BLOOD PRESSURE: 73 MMHG | BODY MASS INDEX: 18.69 KG/M2 | OXYGEN SATURATION: 95 %

## 2023-09-11 DIAGNOSIS — D80.3 SELECTIVE DEFICIENCY OF IGG (HCC): ICD-10-CM

## 2023-09-11 DIAGNOSIS — D80.1 HYPOGAMMAGLOBULINEMIA (HCC): ICD-10-CM

## 2023-09-11 DIAGNOSIS — C91.10 CHRONIC LYMPHOCYTIC LEUKEMIA (HCC): ICD-10-CM

## 2023-09-11 DIAGNOSIS — D69.3 IMMUNE THROMBOCYTOPENIA (HCC): Primary | ICD-10-CM

## 2023-09-11 LAB
BASOPHILS ABSOLUTE: 0 K/CU MM
BASOPHILS RELATIVE PERCENT: 0.1 % (ref 0–1)
DIFFERENTIAL TYPE: ABNORMAL
EOSINOPHILS ABSOLUTE: 0 K/CU MM
EOSINOPHILS RELATIVE PERCENT: 0.2 % (ref 0–3)
HCT VFR BLD CALC: 38.1 % (ref 42–52)
HEMOGLOBIN: 11.9 GM/DL (ref 13.5–18)
LYMPHOCYTES ABSOLUTE: 6.2 K/CU MM
LYMPHOCYTES RELATIVE PERCENT: 47.4 % (ref 24–44)
MCH RBC QN AUTO: 26.9 PG (ref 27–31)
MCHC RBC AUTO-ENTMCNC: 31.2 % (ref 32–36)
MCV RBC AUTO: 86.2 FL (ref 78–100)
MONOCYTES ABSOLUTE: 1.2 K/CU MM
MONOCYTES RELATIVE PERCENT: 8.8 % (ref 0–4)
PDW BLD-RTO: 18.8 % (ref 11.7–14.9)
PLATELET # BLD: 171 K/CU MM (ref 140–440)
PMV BLD AUTO: 9.4 FL (ref 7.5–11.1)
RBC # BLD: 4.42 M/CU MM (ref 4.6–6.2)
SEGMENTED NEUTROPHILS ABSOLUTE COUNT: 5.7 K/CU MM
SEGMENTED NEUTROPHILS RELATIVE PERCENT: 43.5 % (ref 36–66)
WBC # BLD: 13.1 K/CU MM (ref 4–10.5)

## 2023-09-11 PROCEDURE — 96375 TX/PRO/DX INJ NEW DRUG ADDON: CPT

## 2023-09-11 PROCEDURE — 6360000002 HC RX W HCPCS: Performed by: INTERNAL MEDICINE

## 2023-09-11 PROCEDURE — 85025 COMPLETE CBC W/AUTO DIFF WBC: CPT

## 2023-09-11 PROCEDURE — 96372 THER/PROPH/DIAG INJ SC/IM: CPT

## 2023-09-11 PROCEDURE — 96366 THER/PROPH/DIAG IV INF ADDON: CPT

## 2023-09-11 PROCEDURE — 6360000002 HC RX W HCPCS: Performed by: NURSE PRACTITIONER

## 2023-09-11 PROCEDURE — 96365 THER/PROPH/DIAG IV INF INIT: CPT

## 2023-09-11 PROCEDURE — 2580000003 HC RX 258: Performed by: INTERNAL MEDICINE

## 2023-09-11 RX ORDER — ONDANSETRON 2 MG/ML
8 INJECTION INTRAMUSCULAR; INTRAVENOUS
Status: CANCELLED | OUTPATIENT
Start: 2023-09-18

## 2023-09-11 RX ORDER — HEPARIN SODIUM (PORCINE) LOCK FLUSH IV SOLN 100 UNIT/ML 100 UNIT/ML
500 SOLUTION INTRAVENOUS PRN
Start: 2023-11-06

## 2023-09-11 RX ORDER — ALBUTEROL SULFATE 90 UG/1
4 AEROSOL, METERED RESPIRATORY (INHALATION) PRN
Status: CANCELLED | OUTPATIENT
Start: 2023-09-18

## 2023-09-11 RX ORDER — SODIUM CHLORIDE 0.9 % (FLUSH) 0.9 %
10 SYRINGE (ML) INJECTION PRN
Status: DISCONTINUED | OUTPATIENT
Start: 2023-09-11 | End: 2023-09-12 | Stop reason: HOSPADM

## 2023-09-11 RX ORDER — SODIUM CHLORIDE 9 MG/ML
INJECTION, SOLUTION INTRAVENOUS CONTINUOUS
Status: CANCELLED | OUTPATIENT
Start: 2023-09-18

## 2023-09-11 RX ORDER — DIPHENHYDRAMINE HYDROCHLORIDE 50 MG/ML
50 INJECTION INTRAMUSCULAR; INTRAVENOUS
Status: CANCELLED | OUTPATIENT
Start: 2023-09-18

## 2023-09-11 RX ORDER — FAMOTIDINE 10 MG/ML
20 INJECTION, SOLUTION INTRAVENOUS
Status: CANCELLED | OUTPATIENT
Start: 2023-09-18

## 2023-09-11 RX ORDER — SODIUM CHLORIDE 0.9 % (FLUSH) 0.9 %
10 SYRINGE (ML) INJECTION PRN
OUTPATIENT
Start: 2023-11-06

## 2023-09-11 RX ORDER — DIPHENHYDRAMINE HYDROCHLORIDE 50 MG/ML
25 INJECTION INTRAMUSCULAR; INTRAVENOUS ONCE
Status: COMPLETED | OUTPATIENT
Start: 2023-09-11 | End: 2023-09-11

## 2023-09-11 RX ORDER — HEPARIN 100 UNIT/ML
500 SYRINGE INTRAVENOUS PRN
Status: DISCONTINUED | OUTPATIENT
Start: 2023-09-11 | End: 2023-09-12 | Stop reason: HOSPADM

## 2023-09-11 RX ORDER — EPINEPHRINE 1 MG/ML
0.3 INJECTION, SOLUTION, CONCENTRATE INTRAVENOUS PRN
Status: CANCELLED | OUTPATIENT
Start: 2023-09-18

## 2023-09-11 RX ORDER — DIPHENHYDRAMINE HYDROCHLORIDE 50 MG/ML
25 INJECTION INTRAMUSCULAR; INTRAVENOUS ONCE
Start: 2023-11-06 | End: 2023-11-06

## 2023-09-11 RX ORDER — ACETAMINOPHEN 325 MG/1
650 TABLET ORAL
Status: CANCELLED | OUTPATIENT
Start: 2023-09-18

## 2023-09-11 RX ORDER — HEPARIN 100 UNIT/ML
500 SYRINGE INTRAVENOUS PRN
OUTPATIENT
Start: 2023-11-06

## 2023-09-11 RX ADMIN — METHYLPREDNISOLONE SODIUM SUCCINATE 40 MG: 40 INJECTION, POWDER, FOR SOLUTION INTRAMUSCULAR; INTRAVENOUS at 09:31

## 2023-09-11 RX ADMIN — IMMUNE GLOBULIN (HUMAN) 30 G: 10 INJECTION INTRAVENOUS; SUBCUTANEOUS at 09:58

## 2023-09-11 RX ADMIN — SODIUM CHLORIDE, PRESERVATIVE FREE 10 ML: 5 INJECTION INTRAVENOUS at 12:06

## 2023-09-11 RX ADMIN — DIPHENHYDRAMINE HYDROCHLORIDE 25 MG: 50 INJECTION INTRAMUSCULAR; INTRAVENOUS at 09:31

## 2023-09-11 RX ADMIN — ROMIPLOSTIM 65 MCG: 250 INJECTION, POWDER, LYOPHILIZED, FOR SOLUTION SUBCUTANEOUS at 12:06

## 2023-09-11 RX ADMIN — HEPARIN 500 UNITS: 100 SYRINGE at 12:06

## 2023-09-11 NOTE — PROGRESS NOTES
Pt here for IVIG  and N-plate injection. Port accessed with blood return noted. CBC drawn and sent. Pt states he has no concerns or issues to discuss with physician at this time. Patient's status assessed and documented appropriately. All labs and required results were also reviewed today. Treatment parameters have been reviewed. Today's treatment has been approved by the provider. Treatment orders and medication sequencing (when applicable) was verified by 2 registered nurses. The treatment plan was confirmed with the patient prior to administration, and the patient understands the need to report any treatment-related symptoms. Prior to administration, when applicable, the following 8 elements of medication administration were reviewed with 2nd Registered Nurse prior to dosing: drug name, drug dose, infusion volume when prepared in a syringe, rate of administration, expiration dates and/or times, appearance and integrity of drug(s), and rate of pump for infusion. The 5 rights of medication administration have been verified. N-plate given SQ to left upper arm.  IVIG infusion completed and pt tolerated without incident left via wheelchair discharge instructions given

## 2023-09-12 DIAGNOSIS — K90.9 INTESTINAL MALABSORPTION, UNSPECIFIED TYPE: ICD-10-CM

## 2023-09-12 LAB — COMMENT: NORMAL

## 2023-09-12 RX ORDER — HYDROCODONE BITARTRATE AND ACETAMINOPHEN 10; 325 MG/1; MG/1
1 TABLET ORAL EVERY 8 HOURS PRN
Qty: 90 TABLET | Refills: 0 | Status: SHIPPED | OUTPATIENT
Start: 2023-09-12 | End: 2023-10-12

## 2023-09-12 RX ORDER — ALLOPURINOL 300 MG/1
TABLET ORAL
Qty: 30 TABLET | Refills: 0 | Status: SHIPPED | OUTPATIENT
Start: 2023-09-12

## 2023-09-15 RX ORDER — GABAPENTIN 100 MG/1
100 CAPSULE ORAL 3 TIMES DAILY
Qty: 90 CAPSULE | Refills: 3 | Status: SHIPPED | OUTPATIENT
Start: 2023-09-15 | End: 2023-10-15

## 2023-09-19 ENCOUNTER — HOSPITAL ENCOUNTER (OUTPATIENT)
Dept: INFUSION THERAPY | Age: 73
Discharge: HOME | End: 2023-09-19
Payer: COMMERCIAL

## 2023-09-19 VITALS — BODY MASS INDEX: 19.07 KG/M2 | WEIGHT: 140.6 LBS

## 2023-09-19 DIAGNOSIS — E53.8 B12 DEFICIENCY: ICD-10-CM

## 2023-09-19 DIAGNOSIS — D69.3 IMMUNE THROMBOCYTOPENIA (HCC): ICD-10-CM

## 2023-09-19 DIAGNOSIS — C91.10 CHRONIC LYMPHOCYTIC LEUKEMIA (HCC): Primary | ICD-10-CM

## 2023-09-19 DIAGNOSIS — D64.9 ANEMIA, UNSPECIFIED TYPE: ICD-10-CM

## 2023-09-19 DIAGNOSIS — K90.9 INTESTINAL MALABSORPTION, UNSPECIFIED TYPE: ICD-10-CM

## 2023-09-19 DIAGNOSIS — D69.6 THROMBOCYTOPENIA (HCC): ICD-10-CM

## 2023-09-19 LAB
ALBUMIN SERPL-MCNC: 3.4 GM/DL (ref 3.4–5)
ALP BLD-CCNC: 346 IU/L (ref 40–129)
ALT SERPL-CCNC: 27 U/L (ref 10–40)
ANION GAP SERPL CALCULATED.3IONS-SCNC: 15 MMOL/L (ref 4–16)
ANISOCYTOSIS: ABNORMAL
AST SERPL-CCNC: 27 IU/L (ref 15–37)
ATYPICAL LYMPHOCYTE ABSOLUTE COUNT: ABNORMAL
BANDED NEUTROPHILS ABSOLUTE COUNT: 0.37 K/CU MM
BANDED NEUTROPHILS RELATIVE PERCENT: 3 % (ref 5–11)
BILIRUB SERPL-MCNC: 0.5 MG/DL (ref 0–1)
BUN SERPL-MCNC: 19 MG/DL (ref 6–23)
CALCIUM SERPL-MCNC: 8.5 MG/DL (ref 8.3–10.6)
CHLORIDE BLD-SCNC: 101 MMOL/L (ref 99–110)
CO2: 22 MMOL/L (ref 21–32)
CREAT SERPL-MCNC: 0.6 MG/DL (ref 0.9–1.3)
DIFFERENTIAL TYPE: ABNORMAL
ELLIPTOCYTES: ABNORMAL
FERRITIN: 157 NG/ML (ref 30–400)
FOLATE SERPL-MCNC: 4.5 NG/ML (ref 3.1–17.5)
GFR SERPL CREATININE-BSD FRML MDRD: >60 ML/MIN/1.73M2
GLUCOSE SERPL-MCNC: 97 MG/DL (ref 70–99)
HCT VFR BLD CALC: 34.9 % (ref 42–52)
HEMOGLOBIN: 11.2 GM/DL (ref 13.5–18)
IRON: 48 UG/DL (ref 59–158)
LACTATE DEHYDROGENASE: 138 IU/L (ref 120–246)
LYMPHOCYTES ABSOLUTE: 8.2 K/CU MM
LYMPHOCYTES RELATIVE PERCENT: 67 % (ref 24–44)
MCH RBC QN AUTO: 27.5 PG (ref 27–31)
MCHC RBC AUTO-ENTMCNC: 32.1 % (ref 32–36)
MCV RBC AUTO: 85.5 FL (ref 78–100)
METAMYELOCYTES ABSOLUTE COUNT: 0.12 K/CU MM
METAMYELOCYTES PERCENT: 1 %
MONOCYTES ABSOLUTE: 0.1 K/CU MM
MONOCYTES RELATIVE PERCENT: 1 % (ref 0–4)
OVALOCYTES: ABNORMAL
PCT TRANSFERRIN: 20 % (ref 10–44)
PDW BLD-RTO: 18.4 % (ref 11.7–14.9)
PLATELET # BLD: 130 K/CU MM (ref 140–440)
PMV BLD AUTO: 9.4 FL (ref 7.5–11.1)
POLYCHROMASIA: ABNORMAL
POTASSIUM SERPL-SCNC: 4.4 MMOL/L (ref 3.5–5.1)
RBC # BLD: 4.08 M/CU MM (ref 4.6–6.2)
SEGMENTED NEUTROPHILS ABSOLUTE COUNT: 3.4 K/CU MM
SEGMENTED NEUTROPHILS RELATIVE PERCENT: 28 % (ref 36–66)
SODIUM BLD-SCNC: 138 MMOL/L (ref 135–145)
TEAR DROP CELLS: ABNORMAL
TOTAL IRON BINDING CAPACITY: 241 UG/DL (ref 250–450)
TOTAL PROTEIN: 5.3 GM/DL (ref 6.4–8.2)
UNSATURATED IRON BINDING CAPACITY: 193 UG/DL (ref 110–370)
VITAMIN B-12: 1989 PG/ML (ref 211–911)
WBC # BLD: 12.2 K/CU MM (ref 4–10.5)

## 2023-09-19 PROCEDURE — 36415 COLL VENOUS BLD VENIPUNCTURE: CPT

## 2023-09-19 PROCEDURE — 82746 ASSAY OF FOLIC ACID SERUM: CPT

## 2023-09-19 PROCEDURE — 6360000002 HC RX W HCPCS: Performed by: INTERNAL MEDICINE

## 2023-09-19 PROCEDURE — 83550 IRON BINDING TEST: CPT

## 2023-09-19 PROCEDURE — 83615 LACTATE (LD) (LDH) ENZYME: CPT

## 2023-09-19 PROCEDURE — 82728 ASSAY OF FERRITIN: CPT

## 2023-09-19 PROCEDURE — 85027 COMPLETE CBC AUTOMATED: CPT

## 2023-09-19 PROCEDURE — 83540 ASSAY OF IRON: CPT

## 2023-09-19 PROCEDURE — 82607 VITAMIN B-12: CPT

## 2023-09-19 PROCEDURE — 85007 BL SMEAR W/DIFF WBC COUNT: CPT

## 2023-09-19 PROCEDURE — 80053 COMPREHEN METABOLIC PANEL: CPT

## 2023-09-19 PROCEDURE — 96372 THER/PROPH/DIAG INJ SC/IM: CPT

## 2023-09-19 RX ORDER — EPINEPHRINE 1 MG/ML
0.3 INJECTION, SOLUTION, CONCENTRATE INTRAVENOUS PRN
OUTPATIENT
Start: 2023-09-25

## 2023-09-19 RX ORDER — ALBUTEROL SULFATE 90 UG/1
4 AEROSOL, METERED RESPIRATORY (INHALATION) PRN
OUTPATIENT
Start: 2023-09-25

## 2023-09-19 RX ORDER — ONDANSETRON 2 MG/ML
8 INJECTION INTRAMUSCULAR; INTRAVENOUS
OUTPATIENT
Start: 2023-09-25

## 2023-09-19 RX ORDER — SODIUM CHLORIDE 9 MG/ML
INJECTION, SOLUTION INTRAVENOUS CONTINUOUS
OUTPATIENT
Start: 2023-09-25

## 2023-09-19 RX ORDER — FAMOTIDINE 10 MG/ML
20 INJECTION, SOLUTION INTRAVENOUS
OUTPATIENT
Start: 2023-09-25

## 2023-09-19 RX ORDER — DIPHENHYDRAMINE HYDROCHLORIDE 50 MG/ML
50 INJECTION INTRAMUSCULAR; INTRAVENOUS
OUTPATIENT
Start: 2023-09-25

## 2023-09-19 RX ORDER — ACETAMINOPHEN 325 MG/1
650 TABLET ORAL
OUTPATIENT
Start: 2023-09-25

## 2023-09-19 RX ADMIN — ROMIPLOSTIM 65 MCG: 250 INJECTION, POWDER, LYOPHILIZED, FOR SOLUTION SUBCUTANEOUS at 15:46

## 2023-09-19 NOTE — PROGRESS NOTES
Pt here for N-plate injection. CBC results reviewed and tx released. Injection given SQ to left upper arm.  Pt tolerated without incident left via wheelchair discharge instructions given

## 2023-09-24 ENCOUNTER — APPOINTMENT (OUTPATIENT)
Dept: GENERAL RADIOLOGY | Age: 73
End: 2023-09-24
Payer: COMMERCIAL

## 2023-09-24 ENCOUNTER — APPOINTMENT (OUTPATIENT)
Dept: CT IMAGING | Age: 73
End: 2023-09-24
Payer: COMMERCIAL

## 2023-09-24 ENCOUNTER — HOSPITAL ENCOUNTER (INPATIENT)
Age: 73
LOS: 13 days | Discharge: HOME | End: 2023-10-07
Attending: STUDENT IN AN ORGANIZED HEALTH CARE EDUCATION/TRAINING PROGRAM
Payer: COMMERCIAL

## 2023-09-24 DIAGNOSIS — J18.9 PNEUMONIA OF RIGHT MIDDLE LOBE DUE TO INFECTIOUS ORGANISM: ICD-10-CM

## 2023-09-24 DIAGNOSIS — E16.2 HYPOGLYCEMIA: Primary | ICD-10-CM

## 2023-09-24 DIAGNOSIS — R40.4 TRANSIENT ALTERATION OF AWARENESS: ICD-10-CM

## 2023-09-24 LAB
ALBUMIN SERPL-MCNC: 3.3 GM/DL (ref 3.4–5)
ALCOHOL SCREEN SERUM: <0.01 %WT/VOL
ALP BLD-CCNC: 328 IU/L (ref 40–129)
ALT SERPL-CCNC: 26 U/L (ref 10–40)
AMPHETAMINES: NEGATIVE
ANION GAP SERPL CALCULATED.3IONS-SCNC: 8 MMOL/L (ref 4–16)
AST SERPL-CCNC: 27 IU/L (ref 15–37)
B PARAP IS1001 DNA NPH QL NAA+NON-PROBE: NOT DETECTED
B PERT.PT PRMT NPH QL NAA+NON-PROBE: NOT DETECTED
BACTERIA: NEGATIVE /HPF
BARBITURATE SCREEN URINE: NEGATIVE
BASE EXCESS MIXED: 5.3 (ref 0–1.2)
BASOPHILS ABSOLUTE: 0.1 K/CU MM
BASOPHILS RELATIVE PERCENT: 0.5 % (ref 0–1)
BENZODIAZEPINE SCREEN, URINE: NEGATIVE
BILIRUB SERPL-MCNC: 0.6 MG/DL (ref 0–1)
BILIRUBIN URINE: NEGATIVE MG/DL
BLOOD, URINE: NEGATIVE
BUN SERPL-MCNC: 20 MG/DL (ref 6–23)
C PNEUM DNA NPH QL NAA+NON-PROBE: NOT DETECTED
CALCIUM SERPL-MCNC: 8.8 MG/DL (ref 8.3–10.6)
CANNABINOID SCREEN URINE: NEGATIVE
CARBON MONOXIDE, BLOOD: 2.5 % (ref 0–5)
CHLORIDE BLD-SCNC: 101 MMOL/L (ref 99–110)
CHP ED QC CHECK: NORMAL
CHP ED QC CHECK: YES
CLARITY: CLEAR
CO2 CONTENT: 31.9 MMOL/L (ref 19–24)
CO2: 29 MMOL/L (ref 21–32)
COCAINE METABOLITE: NEGATIVE
COLOR: YELLOW
COMMENT: ABNORMAL
CREAT SERPL-MCNC: 0.5 MG/DL (ref 0.9–1.3)
DIFFERENTIAL TYPE: ABNORMAL
EKG ATRIAL RATE: 83 BPM
EKG DIAGNOSIS: NORMAL
EKG Q-T INTERVAL: 368 MS
EKG QRS DURATION: 86 MS
EKG QTC CALCULATION (BAZETT): 440 MS
EKG R AXIS: 55 DEGREES
EKG T AXIS: 86 DEGREES
EKG VENTRICULAR RATE: 86 BPM
EOSINOPHILS ABSOLUTE: 0 K/CU MM
EOSINOPHILS RELATIVE PERCENT: 0.1 % (ref 0–3)
ESTIMATED AVERAGE GLUCOSE: 97 MG/DL
FENTANYL URINE: NEGATIVE
FLUAV H1 2009 PAN RNA NPH NAA+NON-PROBE: NOT DETECTED
FLUAV H1 RNA NPH QL NAA+NON-PROBE: NOT DETECTED
FLUAV H3 RNA NPH QL NAA+NON-PROBE: NOT DETECTED
FLUAV RNA NPH QL NAA+NON-PROBE: NOT DETECTED
FLUBV RNA NPH QL NAA+NON-PROBE: NOT DETECTED
GFR SERPL CREATININE-BSD FRML MDRD: >60 ML/MIN/1.73M2
GLUCOSE BLD-MCNC: 141 MG/DL (ref 70–99)
GLUCOSE BLD-MCNC: 159 MG/DL (ref 70–99)
GLUCOSE BLD-MCNC: 164 MG/DL (ref 70–99)
GLUCOSE BLD-MCNC: 37 MG/DL
GLUCOSE BLD-MCNC: 37 MG/DL (ref 70–99)
GLUCOSE BLD-MCNC: 53 MG/DL (ref 70–99)
GLUCOSE BLD-MCNC: 59 MG/DL (ref 70–99)
GLUCOSE BLD-MCNC: 63 MG/DL (ref 70–99)
GLUCOSE BLD-MCNC: 81 MG/DL (ref 70–99)
GLUCOSE BLD-MCNC: 89 MG/DL (ref 70–99)
GLUCOSE BLD-MCNC: 93 MG/DL
GLUCOSE SERPL-MCNC: 29 MG/DL (ref 70–99)
GLUCOSE, URINE: 100 MG/DL
HADV DNA NPH QL NAA+NON-PROBE: NOT DETECTED
HBA1C MFR BLD: 5 % (ref 4.2–6.3)
HCO3 ARTERIAL: 30.5 MMOL/L (ref 18–23)
HCOV 229E RNA NPH QL NAA+NON-PROBE: NOT DETECTED
HCOV HKU1 RNA NPH QL NAA+NON-PROBE: NOT DETECTED
HCOV NL63 RNA NPH QL NAA+NON-PROBE: NOT DETECTED
HCOV OC43 RNA NPH QL NAA+NON-PROBE: NOT DETECTED
HCT VFR BLD CALC: 35.3 % (ref 42–52)
HEMOGLOBIN: 11.1 GM/DL (ref 13.5–18)
HMPV RNA NPH QL NAA+NON-PROBE: NOT DETECTED
HPIV1 RNA NPH QL NAA+NON-PROBE: NOT DETECTED
HPIV2 RNA NPH QL NAA+NON-PROBE: NOT DETECTED
HPIV3 RNA NPH QL NAA+NON-PROBE: NOT DETECTED
HPIV4 RNA NPH QL NAA+NON-PROBE: NOT DETECTED
IMMATURE NEUTROPHIL %: 3 % (ref 0–0.43)
KETONES, URINE: NEGATIVE MG/DL
LACTATE: 0.6 MMOL/L (ref 0.5–1.9)
LACTIC ACID, SEPSIS: 2.2 MMOL/L (ref 0.5–1.9)
LEUKOCYTE ESTERASE, URINE: NEGATIVE
LYMPHOCYTES ABSOLUTE: 8.4 K/CU MM
LYMPHOCYTES RELATIVE PERCENT: 49 % (ref 24–44)
M PNEUMO DNA NPH QL NAA+NON-PROBE: NOT DETECTED
MCH RBC QN AUTO: 27.1 PG (ref 27–31)
MCHC RBC AUTO-ENTMCNC: 31.4 % (ref 32–36)
MCV RBC AUTO: 86.3 FL (ref 78–100)
METHEMOGLOBIN ARTERIAL: 1.4 %
MONOCYTES ABSOLUTE: 1.1 K/CU MM
MONOCYTES RELATIVE PERCENT: 6.2 % (ref 0–4)
MUCUS: ABNORMAL HPF
NITRITE URINE, QUANTITATIVE: NEGATIVE
NUCLEATED RBC %: 0 %
O2 SATURATION: 93.7 % (ref 96–97)
OPIATES, URINE: ABNORMAL
OXYCODONE: NEGATIVE
PCO2 ARTERIAL: 46 MMHG (ref 32–45)
PDW BLD-RTO: 18.6 % (ref 11.7–14.9)
PH BLOOD: 7.43 (ref 7.34–7.45)
PH, URINE: 7 (ref 5–8)
PLATELET # BLD: 111 K/CU MM (ref 140–440)
PMV BLD AUTO: 9.8 FL (ref 7.5–11.1)
PO2 ARTERIAL: 86 MMHG (ref 75–100)
POTASSIUM SERPL-SCNC: 4.2 MMOL/L (ref 3.5–5.1)
PROTEIN UA: ABNORMAL MG/DL
RBC # BLD: 4.09 M/CU MM (ref 4.6–6.2)
RBC URINE: 2 /HPF (ref 0–3)
RSV RNA NPH QL NAA+NON-PROBE: NOT DETECTED
RV+EV RNA NPH QL NAA+NON-PROBE: NOT DETECTED
SARS-COV-2 RNA NPH QL NAA+NON-PROBE: NOT DETECTED
SEGMENTED NEUTROPHILS ABSOLUTE COUNT: 7 K/CU MM
SEGMENTED NEUTROPHILS RELATIVE PERCENT: 41.2 % (ref 36–66)
SODIUM BLD-SCNC: 138 MMOL/L (ref 135–145)
SPECIFIC GRAVITY UA: 1.01 (ref 1–1.03)
SQUAMOUS EPITHELIAL: <1 /HPF
TOTAL IMMATURE NEUTOROPHIL: 0.52 K/CU MM
TOTAL NUCLEATED RBC: 0 K/CU MM
TOTAL PROTEIN: 5.5 GM/DL (ref 6.4–8.2)
TRICHOMONAS: ABNORMAL /HPF
TSH SERPL DL<=0.005 MIU/L-ACNC: 3.93 UIU/ML (ref 0.27–4.2)
UROBILINOGEN, URINE: 1 MG/DL (ref 0.2–1)
WBC # BLD: 17.1 K/CU MM (ref 4–10.5)
WBC UA: 2 /HPF (ref 0–2)

## 2023-09-24 PROCEDURE — 93005 ELECTROCARDIOGRAM TRACING: CPT | Performed by: STUDENT IN AN ORGANIZED HEALTH CARE EDUCATION/TRAINING PROGRAM

## 2023-09-24 PROCEDURE — 87150 DNA/RNA AMPLIFIED PROBE: CPT

## 2023-09-24 PROCEDURE — 85025 COMPLETE CBC W/AUTO DIFF WBC: CPT

## 2023-09-24 PROCEDURE — 2060000000 HC ICU INTERMEDIATE R&B

## 2023-09-24 PROCEDURE — 82803 BLOOD GASES ANY COMBINATION: CPT

## 2023-09-24 PROCEDURE — 84443 ASSAY THYROID STIM HORMONE: CPT

## 2023-09-24 PROCEDURE — 94640 AIRWAY INHALATION TREATMENT: CPT

## 2023-09-24 PROCEDURE — 96375 TX/PRO/DX INJ NEW DRUG ADDON: CPT

## 2023-09-24 PROCEDURE — 71045 X-RAY EXAM CHEST 1 VIEW: CPT

## 2023-09-24 PROCEDURE — 87641 MR-STAPH DNA AMP PROBE: CPT

## 2023-09-24 PROCEDURE — 83036 HEMOGLOBIN GLYCOSYLATED A1C: CPT

## 2023-09-24 PROCEDURE — 6360000002 HC RX W HCPCS: Performed by: INTERNAL MEDICINE

## 2023-09-24 PROCEDURE — G0480 DRUG TEST DEF 1-7 CLASSES: HCPCS

## 2023-09-24 PROCEDURE — 2580000003 HC RX 258: Performed by: INTERNAL MEDICINE

## 2023-09-24 PROCEDURE — 6370000000 HC RX 637 (ALT 250 FOR IP): Performed by: STUDENT IN AN ORGANIZED HEALTH CARE EDUCATION/TRAINING PROGRAM

## 2023-09-24 PROCEDURE — 6370000000 HC RX 637 (ALT 250 FOR IP): Performed by: INTERNAL MEDICINE

## 2023-09-24 PROCEDURE — 2500000003 HC RX 250 WO HCPCS: Performed by: STUDENT IN AN ORGANIZED HEALTH CARE EDUCATION/TRAINING PROGRAM

## 2023-09-24 PROCEDURE — 6360000002 HC RX W HCPCS: Performed by: STUDENT IN AN ORGANIZED HEALTH CARE EDUCATION/TRAINING PROGRAM

## 2023-09-24 PROCEDURE — 96376 TX/PRO/DX INJ SAME DRUG ADON: CPT

## 2023-09-24 PROCEDURE — 99285 EMERGENCY DEPT VISIT HI MDM: CPT

## 2023-09-24 PROCEDURE — 83605 ASSAY OF LACTIC ACID: CPT

## 2023-09-24 PROCEDURE — 87186 SC STD MICRODIL/AGAR DIL: CPT

## 2023-09-24 PROCEDURE — 70450 CT HEAD/BRAIN W/O DYE: CPT

## 2023-09-24 PROCEDURE — 0202U NFCT DS 22 TRGT SARS-COV-2: CPT

## 2023-09-24 PROCEDURE — 96365 THER/PROPH/DIAG IV INF INIT: CPT

## 2023-09-24 PROCEDURE — 87899 AGENT NOS ASSAY W/OPTIC: CPT

## 2023-09-24 PROCEDURE — 82962 GLUCOSE BLOOD TEST: CPT

## 2023-09-24 PROCEDURE — 80307 DRUG TEST PRSMV CHEM ANLYZR: CPT

## 2023-09-24 PROCEDURE — 2700000000 HC OXYGEN THERAPY PER DAY

## 2023-09-24 PROCEDURE — 93010 ELECTROCARDIOGRAM REPORT: CPT | Performed by: INTERNAL MEDICINE

## 2023-09-24 PROCEDURE — 80053 COMPREHEN METABOLIC PANEL: CPT

## 2023-09-24 PROCEDURE — 87040 BLOOD CULTURE FOR BACTERIA: CPT

## 2023-09-24 PROCEDURE — 2580000003 HC RX 258: Performed by: STUDENT IN AN ORGANIZED HEALTH CARE EDUCATION/TRAINING PROGRAM

## 2023-09-24 PROCEDURE — 36600 WITHDRAWAL OF ARTERIAL BLOOD: CPT

## 2023-09-24 PROCEDURE — 81001 URINALYSIS AUTO W/SCOPE: CPT

## 2023-09-24 PROCEDURE — 87449 NOS EACH ORGANISM AG IA: CPT

## 2023-09-24 RX ORDER — GUAIFENESIN 600 MG/1
1200 TABLET, EXTENDED RELEASE ORAL EVERY 12 HOURS
Status: DISCONTINUED | OUTPATIENT
Start: 2023-09-24 | End: 2023-10-07 | Stop reason: HOSPADM

## 2023-09-24 RX ORDER — SODIUM CHLORIDE, SODIUM LACTATE, POTASSIUM CHLORIDE, AND CALCIUM CHLORIDE .6; .31; .03; .02 G/100ML; G/100ML; G/100ML; G/100ML
1000 INJECTION, SOLUTION INTRAVENOUS ONCE
Status: COMPLETED | OUTPATIENT
Start: 2023-09-24 | End: 2023-09-24

## 2023-09-24 RX ORDER — INSULIN LISPRO 100 [IU]/ML
0-4 INJECTION, SOLUTION INTRAVENOUS; SUBCUTANEOUS EVERY 4 HOURS
Status: DISCONTINUED | OUTPATIENT
Start: 2023-09-24 | End: 2023-09-26

## 2023-09-24 RX ORDER — TAMSULOSIN HYDROCHLORIDE 0.4 MG/1
0.4 CAPSULE ORAL DAILY
Status: DISCONTINUED | OUTPATIENT
Start: 2023-09-24 | End: 2023-10-07 | Stop reason: HOSPADM

## 2023-09-24 RX ORDER — ATORVASTATIN CALCIUM 40 MG/1
40 TABLET, FILM COATED ORAL DAILY
Status: DISCONTINUED | OUTPATIENT
Start: 2023-09-24 | End: 2023-10-07 | Stop reason: HOSPADM

## 2023-09-24 RX ORDER — DEXTROSE MONOHYDRATE 100 MG/ML
INJECTION, SOLUTION INTRAVENOUS CONTINUOUS
Status: DISCONTINUED | OUTPATIENT
Start: 2023-09-24 | End: 2023-09-25

## 2023-09-24 RX ORDER — SODIUM CHLORIDE 0.9 % (FLUSH) 0.9 %
5-40 SYRINGE (ML) INJECTION EVERY 12 HOURS SCHEDULED
Status: DISCONTINUED | OUTPATIENT
Start: 2023-09-24 | End: 2023-10-07 | Stop reason: HOSPADM

## 2023-09-24 RX ORDER — INSULIN LISPRO 100 [IU]/ML
0-4 INJECTION, SOLUTION INTRAVENOUS; SUBCUTANEOUS
Status: DISCONTINUED | OUTPATIENT
Start: 2023-09-25 | End: 2023-09-24

## 2023-09-24 RX ORDER — FINASTERIDE 5 MG/1
5 TABLET, FILM COATED ORAL DAILY
Status: DISCONTINUED | OUTPATIENT
Start: 2023-09-25 | End: 2023-10-07 | Stop reason: HOSPADM

## 2023-09-24 RX ORDER — DEXTROSE MONOHYDRATE 25 G/50ML
25 INJECTION, SOLUTION INTRAVENOUS ONCE
Status: COMPLETED | OUTPATIENT
Start: 2023-09-24 | End: 2023-09-24

## 2023-09-24 RX ORDER — ALLOPURINOL 100 MG/1
300 TABLET ORAL DAILY
Status: DISCONTINUED | OUTPATIENT
Start: 2023-09-24 | End: 2023-10-07 | Stop reason: HOSPADM

## 2023-09-24 RX ORDER — ALBUTEROL SULFATE 90 UG/1
2 AEROSOL, METERED RESPIRATORY (INHALATION) EVERY 4 HOURS PRN
Status: DISCONTINUED | OUTPATIENT
Start: 2023-09-24 | End: 2023-10-07 | Stop reason: HOSPADM

## 2023-09-24 RX ORDER — ONDANSETRON 2 MG/ML
4 INJECTION INTRAMUSCULAR; INTRAVENOUS EVERY 6 HOURS PRN
Status: DISCONTINUED | OUTPATIENT
Start: 2023-09-24 | End: 2023-10-07 | Stop reason: HOSPADM

## 2023-09-24 RX ORDER — OMEPRAZOLE 20 MG/1
20 CAPSULE, DELAYED RELEASE ORAL DAILY PRN
Status: DISCONTINUED | OUTPATIENT
Start: 2023-09-24 | End: 2023-09-24

## 2023-09-24 RX ORDER — DEXTROSE MONOHYDRATE 100 MG/ML
INJECTION, SOLUTION INTRAVENOUS CONTINUOUS
Status: DISCONTINUED | OUTPATIENT
Start: 2023-09-24 | End: 2023-09-24

## 2023-09-24 RX ORDER — ACETAMINOPHEN 325 MG/1
650 TABLET ORAL EVERY 6 HOURS PRN
Status: DISCONTINUED | OUTPATIENT
Start: 2023-09-24 | End: 2023-10-07 | Stop reason: HOSPADM

## 2023-09-24 RX ORDER — IPRATROPIUM BROMIDE AND ALBUTEROL SULFATE 2.5; .5 MG/3ML; MG/3ML
1 SOLUTION RESPIRATORY (INHALATION)
Status: DISCONTINUED | OUTPATIENT
Start: 2023-09-24 | End: 2023-09-24

## 2023-09-24 RX ORDER — HYDROCODONE BITARTRATE AND ACETAMINOPHEN 10; 325 MG/1; MG/1
1 TABLET ORAL EVERY 8 HOURS PRN
Status: DISCONTINUED | OUTPATIENT
Start: 2023-09-24 | End: 2023-10-07 | Stop reason: HOSPADM

## 2023-09-24 RX ORDER — SODIUM CHLORIDE 9 MG/ML
INJECTION, SOLUTION INTRAVENOUS PRN
Status: DISCONTINUED | OUTPATIENT
Start: 2023-09-24 | End: 2023-10-07 | Stop reason: HOSPADM

## 2023-09-24 RX ORDER — POLYETHYLENE GLYCOL 3350 17 G/17G
17 POWDER, FOR SOLUTION ORAL DAILY PRN
Status: DISCONTINUED | OUTPATIENT
Start: 2023-09-24 | End: 2023-10-07 | Stop reason: HOSPADM

## 2023-09-24 RX ORDER — ACETAMINOPHEN 650 MG/1
650 SUPPOSITORY RECTAL EVERY 6 HOURS PRN
Status: DISCONTINUED | OUTPATIENT
Start: 2023-09-24 | End: 2023-10-07 | Stop reason: HOSPADM

## 2023-09-24 RX ORDER — METHYLPREDNISOLONE SODIUM SUCCINATE 40 MG/ML
40 INJECTION, POWDER, LYOPHILIZED, FOR SOLUTION INTRAMUSCULAR; INTRAVENOUS ONCE
Status: COMPLETED | OUTPATIENT
Start: 2023-09-25 | End: 2023-09-25

## 2023-09-24 RX ORDER — DEXTROSE AND SODIUM CHLORIDE 5; .45 G/100ML; G/100ML
INJECTION, SOLUTION INTRAVENOUS CONTINUOUS
Status: DISCONTINUED | OUTPATIENT
Start: 2023-09-24 | End: 2023-09-24

## 2023-09-24 RX ORDER — SODIUM CHLORIDE 0.9 % (FLUSH) 0.9 %
5-40 SYRINGE (ML) INJECTION PRN
Status: DISCONTINUED | OUTPATIENT
Start: 2023-09-24 | End: 2023-10-07 | Stop reason: HOSPADM

## 2023-09-24 RX ORDER — ENOXAPARIN SODIUM 100 MG/ML
40 INJECTION SUBCUTANEOUS DAILY
Status: DISCONTINUED | OUTPATIENT
Start: 2023-09-25 | End: 2023-10-07 | Stop reason: HOSPADM

## 2023-09-24 RX ORDER — GABAPENTIN 100 MG/1
100 CAPSULE ORAL 3 TIMES DAILY
Status: DISCONTINUED | OUTPATIENT
Start: 2023-09-24 | End: 2023-10-07 | Stop reason: HOSPADM

## 2023-09-24 RX ORDER — IPRATROPIUM BROMIDE AND ALBUTEROL SULFATE 2.5; .5 MG/3ML; MG/3ML
1 SOLUTION RESPIRATORY (INHALATION)
Status: DISCONTINUED | OUTPATIENT
Start: 2023-09-24 | End: 2023-10-07 | Stop reason: HOSPADM

## 2023-09-24 RX ORDER — DEXTROSE MONOHYDRATE 25 G/50ML
12.5 INJECTION, SOLUTION INTRAVENOUS ONCE
Status: COMPLETED | OUTPATIENT
Start: 2023-09-24 | End: 2023-09-24

## 2023-09-24 RX ORDER — OXYCODONE HYDROCHLORIDE AND ACETAMINOPHEN 5; 325 MG/1; MG/1
2 TABLET ORAL ONCE
Status: COMPLETED | OUTPATIENT
Start: 2023-09-24 | End: 2023-09-24

## 2023-09-24 RX ORDER — ONDANSETRON 4 MG/1
4 TABLET, ORALLY DISINTEGRATING ORAL EVERY 8 HOURS PRN
Status: DISCONTINUED | OUTPATIENT
Start: 2023-09-24 | End: 2023-10-07 | Stop reason: HOSPADM

## 2023-09-24 RX ORDER — VALACYCLOVIR HYDROCHLORIDE 500 MG/1
500 TABLET, FILM COATED ORAL DAILY
Status: DISCONTINUED | OUTPATIENT
Start: 2023-09-25 | End: 2023-10-07 | Stop reason: HOSPADM

## 2023-09-24 RX ORDER — PANTOPRAZOLE SODIUM 40 MG/1
40 TABLET, DELAYED RELEASE ORAL DAILY PRN
Status: DISCONTINUED | OUTPATIENT
Start: 2023-09-24 | End: 2023-10-07 | Stop reason: HOSPADM

## 2023-09-24 RX ADMIN — ATORVASTATIN CALCIUM 40 MG: 40 TABLET, FILM COATED ORAL at 21:03

## 2023-09-24 RX ADMIN — CEFEPIME 2000 MG: 2 INJECTION, POWDER, FOR SOLUTION INTRAVENOUS at 10:47

## 2023-09-24 RX ADMIN — DEXTROSE MONOHYDRATE: 100 INJECTION, SOLUTION INTRAVENOUS at 17:05

## 2023-09-24 RX ADMIN — IPRATROPIUM BROMIDE AND ALBUTEROL SULFATE 1 DOSE: 2.5; .5 SOLUTION RESPIRATORY (INHALATION) at 19:57

## 2023-09-24 RX ADMIN — DEXTROSE MONOHYDRATE: 100 INJECTION, SOLUTION INTRAVENOUS at 13:41

## 2023-09-24 RX ADMIN — DEXTROSE AND SODIUM CHLORIDE: 5; 450 INJECTION, SOLUTION INTRAVENOUS at 13:35

## 2023-09-24 RX ADMIN — SODIUM CHLORIDE, PRESERVATIVE FREE 10 ML: 5 INJECTION INTRAVENOUS at 21:04

## 2023-09-24 RX ADMIN — CEFEPIME 2000 MG: 2 INJECTION, POWDER, FOR SOLUTION INTRAVENOUS at 18:45

## 2023-09-24 RX ADMIN — TAMSULOSIN HYDROCHLORIDE 0.4 MG: 0.4 CAPSULE ORAL at 18:40

## 2023-09-24 RX ADMIN — SODIUM CHLORIDE, POTASSIUM CHLORIDE, SODIUM LACTATE AND CALCIUM CHLORIDE 1000 ML: 600; 310; 30; 20 INJECTION, SOLUTION INTRAVENOUS at 10:53

## 2023-09-24 RX ADMIN — VANCOMYCIN HYDROCHLORIDE 1000 MG: 1 INJECTION, POWDER, LYOPHILIZED, FOR SOLUTION INTRAVENOUS at 12:27

## 2023-09-24 RX ADMIN — ALLOPURINOL 300 MG: 100 TABLET ORAL at 18:40

## 2023-09-24 RX ADMIN — DEXTROSE MONOHYDRATE 25 G: 25 INJECTION, SOLUTION INTRAVENOUS at 10:53

## 2023-09-24 RX ADMIN — GABAPENTIN 100 MG: 100 CAPSULE ORAL at 21:03

## 2023-09-24 RX ADMIN — OXYCODONE HYDROCHLORIDE AND ACETAMINOPHEN 2 TABLET: 5; 325 TABLET ORAL at 12:21

## 2023-09-24 RX ADMIN — GUAIFENESIN 1200 MG: 600 TABLET, EXTENDED RELEASE ORAL at 21:03

## 2023-09-24 RX ADMIN — DEXTROSE MONOHYDRATE 12.5 G: 25 INJECTION, SOLUTION INTRAVENOUS at 09:54

## 2023-09-24 RX ADMIN — GABAPENTIN 100 MG: 100 CAPSULE ORAL at 18:40

## 2023-09-24 ASSESSMENT — PAIN DESCRIPTION - LOCATION: LOCATION: BACK

## 2023-09-24 ASSESSMENT — PAIN SCALES - GENERAL
PAINLEVEL_OUTOF10: 0
PAINLEVEL_OUTOF10: 8

## 2023-09-24 NOTE — PLAN OF CARE
Problem: Discharge Planning  Goal: Discharge to home or other facility with appropriate resources  Outcome: Progressing     Problem: Safety - Adult  Goal: Free from fall injury  Outcome: Progressing     Problem: Skin/Tissue Integrity  Goal: Absence of new skin breakdown  Description: 1. Monitor for areas of redness and/or skin breakdown  2. Assess vascular access sites hourly  3. Every 4-6 hours minimum:  Change oxygen saturation probe site  4. Every 4-6 hours:  If on nasal continuous positive airway pressure, respiratory therapy assess nares and determine need for appliance change or resting period. Outcome: Progressing     Problem: Confusion  Goal: Confusion, delirium, dementia, or psychosis is improved or at baseline  Description: INTERVENTIONS:  1. Assess for possible contributors to thought disturbance, including medications, impaired vision or hearing, underlying metabolic abnormalities, dehydration, psychiatric diagnoses, and notify attending LIP  2. [place] high risk fall precautions, as indicated  3. Provide frequent short contacts to provide reality reorientation, refocusing and direction  4. Decrease environmental stimuli, including noise as appropriate  5. Monitor and intervene to maintain adequate nutrition, hydration, elimination, sleep and activity  6. If unable to ensure safety without constant attention obtain sitter and review sitter guidelines with assigned personnel  7.  Initiate Psychosocial CNS and Spiritual Care consult, as indicated  Outcome: Progressing

## 2023-09-24 NOTE — ED NOTES
Unable to assess patient NIH, pt not alert to perform scale. Dr. Brandi Scherer notified.       Peng Denney RN  09/24/23 3398

## 2023-09-24 NOTE — ED NOTES
Dextrose 5% infusion verbal order given via Dr. Tala Hart. 50ml/hr.  Infusion started at this time     Geovanna Denney RN  09/24/23 0870

## 2023-09-24 NOTE — ED NOTES
Pt to ED via [facility] and EMS from home with c/o hypoglycemia and was unresponsive upon arrival. Pt son was initially unable to get a blood sugar on him, gave him 1 vial of glucose. EMS initial BG was 77. Pt has increased work of breathing, pt was being bagged upon arrival. EMS had concern for possible aspiration and suctioned patient. Dr. Christopher Castro and RT at bedside upon arrival to ED.       Neetu Polk RN  09/24/23 1537

## 2023-09-24 NOTE — ED NOTES
Report received from [facility], [address] [place] [address]. Assumed care @ this time.       Felecia Lombardo RN  09/24/23 0803

## 2023-09-24 NOTE — H&P
V2.0  History and Physical      Name:  Anayeli Ellis /Age/Sex: 1950  (68 y.o. male)   MRN & CSN:  [id number] & [id number] Encounter Date/Time: 2023 1:30 PM EDT   Location:   PCP: Mitul Maria MD       Hospital Day: 1    Assessment and Plan:   Anayeli Ellis is a 68 y.o. male     Severe hypoglycemia-blood sugar 29  -He has a chronically poor appetite he stated  -He is not sure why his blood sugar dropped  -He reports 1 prior episode of hypoglycemia  -He has evidence of pneumonia on imaging  -D10 drip at 100 mL/h for now  -Consult endocrinology  -On insulin at home, obviously will hold insulin     Acute respiratory failure with hypoxia  -On 6 L oxygen today     Chronic respiratory failure with hypoxia-on 3 L oxygen at home     COPD-he sees Dr. Guan Salvage history of Pseudomonas in the sputum on 5 different sputum cultures in EHR with dates being May 2022, 2022, in 2022.   In 2022 the organism was sensitive to Cipro, and only had an intermediate sensitivity to cefepime and meropenem  -He saw infectious disease in the office, per last office visit in March he was treated with inhaled tobramycin  -Good pulmonary hygiene and secretion management is important     Recurrent pneumonia-with possible acute pneumonia-see below     CLL  -Long history of CLL for many years since   -Converted from good to poor prognostic factors  -On venetoclax since 2023, most recent CT scan in July with stable findings, and per oncology the plan was to reimage in 6 months  -Consider oncology consult     Thrombocytopenia  -Has bone marrow infiltration with CLL  -On ROMIPLOSTIM (NPLATE) subcutaneous injections -Treatment 27 given 2023     Hypogammaglobulinemia  -Last time he received IV immunoglobulin (Gamunex-C) was on 2023      Abnormal chest x-ray-Acute interstitial edema or pneumonia is suspected superimposed on chronic lung disease/COPD per

## 2023-09-24 NOTE — ED NOTES
BS of 37. Dr Barry Hays notified. New orders received. Pt given OJ & peanut butter with some crackers. Pt A&O states he feels just a little sweaty at this time.      Felecia Lombardo RN  09/24/23 7485

## 2023-09-24 NOTE — CONSULTS
Endocrinology   Consult Note  9/24/2023  9:13 AM     Primary Care provider: Altagracia Peters MD     Referring physician:  Arnold Amaya MD     Dear Doctor Rashard Avendaño    Thank You for the Consult     Pt. Was Admitted for : Shortness of breath, has hypoglycemia    Reason for Consult: Better control of blood glucose      History Obtained From:  Patient/ EMR       HISTORY OF PRESENT ILLNESS:                The patient is a 68 y.o. male with significant past medical history of arthritis, CAD, CLL, diabetes mellitus, hyperlipidemia, migraine headache has been on chemo therapy for his recurrent CLL,. Patient has been very extremely tired and fatigued and not eating very well. Patient has been on insulin takes NovoLog insulin with each meal Lantus insulin at bedtime. Patient was given Lantus last night but has not been eating very well and stated he went to mid the ER his blood sugar was extremely low. I was  consulted for better control of blood glucose. ROS:   Pt's ROS done in detail. Abnormal ROS are noted in Medical and Surgical History Section below:      Other Medical History:        Diagnosis Date    Arthritis     knees, Rt hand/wrist    BPH (benign prostatic hyperplasia)     CAD (coronary artery disease)     Cancer (HCC)     CLL--Sees Dr Naomi Gibbons    Diabetes mellitus [facility])     History of blood transfusion     no reaction    Hx of motion sickness     Hyperlipidemia     MDRO (multiple drug resistant organisms) resistance     abscess on buttock 10yrs ago    Migraine     last one summer 2016    Pneumonia 2016    Wears dentures     full upper--lower dentures    Wears glasses      Surgical History:        Procedure Laterality Date    BRONCHOSCOPY N/A 10/28/2022    BRONCHOSCOPY performed by Trisha Bynum MD at [facility]  1990's    x 2  last showed \"inflammation of heart\"    COLONOSCOPY  2010    DENTAL SURGERY      all teeth extracted     EYE SURGERY Right 08/2016

## 2023-09-24 NOTE — PROGRESS NOTES
4 Eyes Skin Assessment     NAME:  Ranjith Cantrell OF BIRTH:  1950  MEDICAL RECORD NUMBER:  [id number]    The patient is being assessed for  Admission    I agree that at least one RN has performed a thorough Head to Toe Skin Assessment on the patient. ALL assessment sites listed below have been assessed. Areas assessed by both nurses:    Head, Face, Ears, Shoulders, Back, Chest, Arms, Elbows, Hands, Sacrum. Buttock, Coccyx, Ischium, and Legs. Feet and Heels        Does the Patient have a Wound? Yes wound(s) were present on assessment.  LDA wound assessment was Initiated and completed by RN   scattered skin tears, bruising, redness to buttocks, raw area on R ear        Russ Prevention initiated by RN: Yes  Wound Care Orders initiated by RN: Yes    Pressure Injury (Stage 3,4, Unstageable, DTI, NWPT, and Complex wounds) if present, place Wound referral order by RN under : No    New Ostomies, if present place, Ostomy referral order under : No     Nurse 1 eSignature: Electronically signed by Nitin Wallace RN on 9/24/23 at 6:14 PM EDT    **SHARE this note so that the co-signing nurse can place an eSignature**    Nurse 2 eSignature: Electronically signed by Tina Anne RN on 9/24/23 at 6:50 PM EDT

## 2023-09-24 NOTE — ED PROVIDER NOTES
Emergency Department Encounter    Patient: Mani Dolan  MRN: [id number]  : 1950  Date of Evaluation: 2023  ED Provider:  Dontae Bhandari MD    Triage Chief Complaint:   Hypoglycemia    [place]:  Mani Dolan is a 68 y.o. male with medical history of leukemia, diabetes, recurrent hypoglycemia that presents with abdomen status. According to EMS patient was altered on arrival, hypoglycemia, he was given glucose at the scene, they had concern for possible aspiration. Here at the ED patient was following very simple commands, but was not speaking and not collaborating with history.     ROS - see HPI    Past Medical History:   Diagnosis Date    Arthritis     knees, Rt hand/wrist    BPH (benign prostatic hyperplasia)     CAD (coronary artery disease)     Cancer (HCC)     CLL--Sees Dr Tami Rubio    Diabetes mellitus [facility])     History of blood transfusion     no reaction    Hx of motion sickness     Hyperlipidemia     MDRO (multiple drug resistant organisms) resistance     abscess on buttock 10yrs ago    Migraine     last one summer 2016    Pneumonia 2016    Wears dentures     full upper--lower dentures    Wears glasses      Past Surgical History:   Procedure Laterality Date    BRONCHOSCOPY N/A 10/28/2022    BRONCHOSCOPY performed by Samra Jerome MD at [facility]      x 2  last showed \"inflammation of heart\"    COLONOSCOPY      DENTAL SURGERY      all teeth extracted     EYE SURGERY Right 2016    Cataract removal    FRACTURE SURGERY Left     left ankle plate and screws    IR BIOPSY PERC SUPERF BONE  2022    IR BIOPSY PERC SUPERF BONE 2022 [address] SPECIAL PROCEDURES    KNEE SURGERY  1970    left    OTHER SURGICAL HISTORY Left 2017    groin excision    TONSILLECTOMY  late 's    age 13    TUNNELED VENOUS PORT PLACEMENT      Right upper chest     Family History   Problem Relation Age of Onset    Diabetes Mother     High Blood

## 2023-09-25 ENCOUNTER — APPOINTMENT (OUTPATIENT)
Dept: GENERAL RADIOLOGY | Age: 73
End: 2023-09-25
Payer: COMMERCIAL

## 2023-09-25 LAB
ALBUMIN SERPL-MCNC: 3 GM/DL (ref 3.4–5)
ALP BLD-CCNC: 257 IU/L (ref 40–128)
ALT SERPL-CCNC: 22 U/L (ref 10–40)
ANION GAP SERPL CALCULATED.3IONS-SCNC: 10 MMOL/L (ref 4–16)
AST SERPL-CCNC: 21 IU/L (ref 15–37)
BILIRUB SERPL-MCNC: 0.7 MG/DL (ref 0–1)
BUN SERPL-MCNC: 16 MG/DL (ref 6–23)
CALCIUM SERPL-MCNC: 7.8 MG/DL (ref 8.3–10.6)
CHLORIDE BLD-SCNC: 99 MMOL/L (ref 99–110)
CO2: 26 MMOL/L (ref 21–32)
CREAT SERPL-MCNC: 0.6 MG/DL (ref 0.9–1.3)
DOSE AMOUNT: NORMAL
DOSE TIME: NORMAL
GFR SERPL CREATININE-BSD FRML MDRD: >60 ML/MIN/1.73M2
GLUCOSE BLD-MCNC: 108 MG/DL (ref 70–99)
GLUCOSE BLD-MCNC: 145 MG/DL (ref 70–99)
GLUCOSE BLD-MCNC: 442 MG/DL (ref 70–99)
GLUCOSE BLD-MCNC: 480 MG/DL (ref 70–99)
GLUCOSE BLD-MCNC: 483 MG/DL (ref 70–99)
GLUCOSE BLD-MCNC: 64 MG/DL (ref 70–99)
GLUCOSE BLD-MCNC: 96 MG/DL (ref 70–99)
GLUCOSE SERPL-MCNC: 115 MG/DL (ref 70–99)
L PNEUMO AG UR QL IA: NEGATIVE
LACTIC ACID, SEPSIS: 1.3 MMOL/L (ref 0.5–1.9)
MRSA, DNA, NASAL: NEGATIVE
POTASSIUM SERPL-SCNC: 4.5 MMOL/L (ref 3.5–5.1)
PROCALCITONIN SERPL-MCNC: 0.2 NG/ML
S PNEUM AG CSF QL: NORMAL
SODIUM BLD-SCNC: 135 MMOL/L (ref 135–145)
SPECIMEN DESCRIPTION: NORMAL
TOTAL PROTEIN: 4.5 GM/DL (ref 6.4–8.2)
VANCOMYCIN RANDOM: 10.3 UG/ML

## 2023-09-25 PROCEDURE — 87899 AGENT NOS ASSAY W/OPTIC: CPT

## 2023-09-25 PROCEDURE — 82962 GLUCOSE BLOOD TEST: CPT

## 2023-09-25 PROCEDURE — 85027 COMPLETE CBC AUTOMATED: CPT

## 2023-09-25 PROCEDURE — 2580000003 HC RX 258: Performed by: STUDENT IN AN ORGANIZED HEALTH CARE EDUCATION/TRAINING PROGRAM

## 2023-09-25 PROCEDURE — 73502 X-RAY EXAM HIP UNI 2-3 VIEWS: CPT

## 2023-09-25 PROCEDURE — 2060000000 HC ICU INTERMEDIATE R&B

## 2023-09-25 PROCEDURE — 6370000000 HC RX 637 (ALT 250 FOR IP): Performed by: INTERNAL MEDICINE

## 2023-09-25 PROCEDURE — 80053 COMPREHEN METABOLIC PANEL: CPT

## 2023-09-25 PROCEDURE — 2580000003 HC RX 258: Performed by: INTERNAL MEDICINE

## 2023-09-25 PROCEDURE — 6360000002 HC RX W HCPCS: Performed by: STUDENT IN AN ORGANIZED HEALTH CARE EDUCATION/TRAINING PROGRAM

## 2023-09-25 PROCEDURE — 36592 COLLECT BLOOD FROM PICC: CPT

## 2023-09-25 PROCEDURE — 80202 ASSAY OF VANCOMYCIN: CPT

## 2023-09-25 PROCEDURE — 94640 AIRWAY INHALATION TREATMENT: CPT

## 2023-09-25 PROCEDURE — 6360000002 HC RX W HCPCS: Performed by: INTERNAL MEDICINE

## 2023-09-25 PROCEDURE — 83605 ASSAY OF LACTIC ACID: CPT

## 2023-09-25 PROCEDURE — 99211 OFF/OP EST MAY X REQ PHY/QHP: CPT

## 2023-09-25 PROCEDURE — 2700000000 HC OXYGEN THERAPY PER DAY

## 2023-09-25 PROCEDURE — 84145 PROCALCITONIN (PCT): CPT

## 2023-09-25 PROCEDURE — 99223 1ST HOSP IP/OBS HIGH 75: CPT | Performed by: INTERNAL MEDICINE

## 2023-09-25 PROCEDURE — 6370000000 HC RX 637 (ALT 250 FOR IP): Performed by: STUDENT IN AN ORGANIZED HEALTH CARE EDUCATION/TRAINING PROGRAM

## 2023-09-25 PROCEDURE — 2500000003 HC RX 250 WO HCPCS: Performed by: FAMILY MEDICINE

## 2023-09-25 PROCEDURE — 94761 N-INVAS EAR/PLS OXIMETRY MLT: CPT

## 2023-09-25 PROCEDURE — 85007 BL SMEAR W/DIFF WBC COUNT: CPT

## 2023-09-25 RX ORDER — TOBRAMYCIN 40 MG/ML
80 INJECTION INTRAMUSCULAR; INTRAVENOUS EVERY 12 HOURS
Status: DISCONTINUED | OUTPATIENT
Start: 2023-09-25 | End: 2023-09-25 | Stop reason: CLARIF

## 2023-09-25 RX ORDER — SODIUM CHLORIDE, SODIUM LACTATE, POTASSIUM CHLORIDE, CALCIUM CHLORIDE 600; 310; 30; 20 MG/100ML; MG/100ML; MG/100ML; MG/100ML
INJECTION, SOLUTION INTRAVENOUS CONTINUOUS
Status: DISPENSED | OUTPATIENT
Start: 2023-09-25 | End: 2023-09-26

## 2023-09-25 RX ORDER — SODIUM CHLORIDE FOR INHALATION 0.9 %
3 VIAL, NEBULIZER (ML) INHALATION EVERY 12 HOURS
Status: DISCONTINUED | OUTPATIENT
Start: 2023-09-25 | End: 2023-09-25 | Stop reason: CLARIF

## 2023-09-25 RX ORDER — INSULIN GLARGINE 100 [IU]/ML
20 INJECTION, SOLUTION SUBCUTANEOUS NIGHTLY
Status: DISCONTINUED | OUTPATIENT
Start: 2023-09-25 | End: 2023-09-26

## 2023-09-25 RX ORDER — DEXTROSE MONOHYDRATE 25 G/50ML
25 INJECTION, SOLUTION INTRAVENOUS ONCE
Status: COMPLETED | OUTPATIENT
Start: 2023-09-25 | End: 2023-09-25

## 2023-09-25 RX ORDER — TOBRAMYCIN INHALATION SOLUTION 300 MG/5ML
80 INHALANT RESPIRATORY (INHALATION)
Status: DISCONTINUED | OUTPATIENT
Start: 2023-09-25 | End: 2023-09-26

## 2023-09-25 RX ADMIN — VANCOMYCIN HYDROCHLORIDE 1250 MG: 1.25 INJECTION, POWDER, LYOPHILIZED, FOR SOLUTION INTRAVENOUS at 13:04

## 2023-09-25 RX ADMIN — IPRATROPIUM BROMIDE AND ALBUTEROL SULFATE 1 DOSE: 2.5; .5 SOLUTION RESPIRATORY (INHALATION) at 19:59

## 2023-09-25 RX ADMIN — ENOXAPARIN SODIUM 40 MG: 100 INJECTION SUBCUTANEOUS at 09:25

## 2023-09-25 RX ADMIN — IPRATROPIUM BROMIDE AND ALBUTEROL SULFATE 1 DOSE: 2.5; .5 SOLUTION RESPIRATORY (INHALATION) at 15:20

## 2023-09-25 RX ADMIN — INSULIN LISPRO 4 UNITS: 100 INJECTION, SOLUTION INTRAVENOUS; SUBCUTANEOUS at 12:04

## 2023-09-25 RX ADMIN — SODIUM CHLORIDE 25 ML/HR: 9 INJECTION, SOLUTION INTRAVENOUS at 13:03

## 2023-09-25 RX ADMIN — CEFEPIME 2000 MG: 2 INJECTION, POWDER, FOR SOLUTION INTRAVENOUS at 20:30

## 2023-09-25 RX ADMIN — CEFEPIME 2000 MG: 2 INJECTION, POWDER, FOR SOLUTION INTRAVENOUS at 12:10

## 2023-09-25 RX ADMIN — SODIUM CHLORIDE, POTASSIUM CHLORIDE, SODIUM LACTATE AND CALCIUM CHLORIDE: 600; 310; 30; 20 INJECTION, SOLUTION INTRAVENOUS at 12:55

## 2023-09-25 RX ADMIN — FINASTERIDE 5 MG: 5 TABLET, FILM COATED ORAL at 09:25

## 2023-09-25 RX ADMIN — INSULIN LISPRO 4 UNITS: 100 INJECTION, SOLUTION INTRAVENOUS; SUBCUTANEOUS at 20:42

## 2023-09-25 RX ADMIN — CEFEPIME 2000 MG: 2 INJECTION, POWDER, FOR SOLUTION INTRAVENOUS at 03:03

## 2023-09-25 RX ADMIN — DEXTROSE MONOHYDRATE: 100 INJECTION, SOLUTION INTRAVENOUS at 02:19

## 2023-09-25 RX ADMIN — SODIUM CHLORIDE, PRESERVATIVE FREE 10 ML: 5 INJECTION INTRAVENOUS at 09:28

## 2023-09-25 RX ADMIN — GABAPENTIN 100 MG: 100 CAPSULE ORAL at 13:54

## 2023-09-25 RX ADMIN — ALLOPURINOL 300 MG: 100 TABLET ORAL at 09:25

## 2023-09-25 RX ADMIN — IPRATROPIUM BROMIDE AND ALBUTEROL SULFATE 1 DOSE: 2.5; .5 SOLUTION RESPIRATORY (INHALATION) at 11:15

## 2023-09-25 RX ADMIN — SODIUM CHLORIDE 200 ML: 9 INJECTION, SOLUTION INTRAVENOUS at 01:31

## 2023-09-25 RX ADMIN — VANCOMYCIN HYDROCHLORIDE 1000 MG: 1 INJECTION, POWDER, LYOPHILIZED, FOR SOLUTION INTRAVENOUS at 01:34

## 2023-09-25 RX ADMIN — GUAIFENESIN 1200 MG: 600 TABLET, EXTENDED RELEASE ORAL at 09:25

## 2023-09-25 RX ADMIN — GABAPENTIN 100 MG: 100 CAPSULE ORAL at 20:37

## 2023-09-25 RX ADMIN — TOBRAMYCIN 78 MG: 300 SOLUTION RESPIRATORY (INHALATION) at 19:59

## 2023-09-25 RX ADMIN — GUAIFENESIN 1200 MG: 600 TABLET, EXTENDED RELEASE ORAL at 20:37

## 2023-09-25 RX ADMIN — GABAPENTIN 100 MG: 100 CAPSULE ORAL at 09:43

## 2023-09-25 RX ADMIN — METHYLPREDNISOLONE SODIUM SUCCINATE 40 MG: 40 INJECTION INTRAMUSCULAR; INTRAVENOUS at 01:29

## 2023-09-25 RX ADMIN — ATORVASTATIN CALCIUM 40 MG: 40 TABLET, FILM COATED ORAL at 20:37

## 2023-09-25 RX ADMIN — VALACYCLOVIR HYDROCHLORIDE 500 MG: 500 TABLET, FILM COATED ORAL at 09:43

## 2023-09-25 RX ADMIN — TAMSULOSIN HYDROCHLORIDE 0.4 MG: 0.4 CAPSULE ORAL at 09:25

## 2023-09-25 RX ADMIN — SODIUM CHLORIDE: 9 INJECTION, SOLUTION INTRAVENOUS at 12:08

## 2023-09-25 RX ADMIN — SODIUM CHLORIDE, PRESERVATIVE FREE 10 ML: 5 INJECTION INTRAVENOUS at 21:00

## 2023-09-25 RX ADMIN — DEXTROSE MONOHYDRATE 25 G: 25 INJECTION, SOLUTION INTRAVENOUS at 02:17

## 2023-09-25 RX ADMIN — INSULIN GLARGINE 20 UNITS: 100 INJECTION, SOLUTION SUBCUTANEOUS at 20:41

## 2023-09-25 RX ADMIN — INSULIN LISPRO 4 UNITS: 100 INJECTION, SOLUTION INTRAVENOUS; SUBCUTANEOUS at 16:36

## 2023-09-25 RX ADMIN — PANTOPRAZOLE SODIUM 40 MG: 40 TABLET, DELAYED RELEASE ORAL at 16:50

## 2023-09-25 ASSESSMENT — PAIN SCALES - GENERAL
PAINLEVEL_OUTOF10: 0
PAINLEVEL_OUTOF10: 0

## 2023-09-25 NOTE — CONSULTS
Starts ___ O'Clock 0 09/25/23 1100   Undermining Ends___ O'Clock 0 09/25/23 1100   Undermining Maxium Distance (cm) 0 09/25/23 1100   Wound Assessment Pink/red;Purple/maroon 09/25/23 1100   Drainage Amount Moderate (25-50%) 09/25/23 1100   Drainage Description Serosanguinous 09/25/23 1100   Odor None 09/25/23 1100   Glo-wound Assessment Fragile;Ecchymosis 09/25/23 1100   Margins Defined edges 09/25/23 1100   Wound Thickness Description not for Pressure Injury Partial thickness 09/25/23 1100   Number of days: 1       Wound 09/24/23 Ear Right (Active)   Wound Image   09/25/23 1100   Wound Etiology Pressure Unstageable 09/25/23 1100   Wound Cleansed Cleansed with saline 09/25/23 1100   Dressing/Treatment Open to air 09/25/23 1100   Wound Length (cm) 0.5 cm 09/25/23 1100   Wound Width (cm) 0.5 cm 09/25/23 1100   Wound Depth (cm) 0.1 cm 09/25/23 1100   Wound Surface Area (cm^2) 0.25 cm^2 09/25/23 1100   Wound Volume (cm^3) 0.025 cm^3 09/25/23 1100   Distance Tunneling (cm) 0 cm 09/25/23 1100   Tunneling Position ___ O'Clock 0 09/25/23 1100   Undermining Starts ___ O'Clock 0 09/25/23 1100   Undermining Ends___ O'Clock 0 09/25/23 1100   Undermining Maxium Distance (cm) 0 09/25/23 1100   Wound Assessment Eschar dry 09/25/23 1100   Drainage Amount Small (< 25%) 09/25/23 1100   Drainage Description Yellow 09/25/23 1100   Odor None 09/25/23 1100   Glo-wound Assessment Blanchable erythema 09/25/23 1100   Margins Attached edges 09/25/23 1100   Number of days: 0       Response to treatment:  Well tolerated by patient., With complaints of pain. Pain Assessment:  Severity:  mild  Quality of pain: sharp  Wound Pain Timing/Severity: intermittent  Premedicated: no    Plan:     Plan of Care: Wound 09/24/23 Shoulder Right-Dressing/Treatment: Silicone border  Wound 09/24/23 Radial Proximal;Right cluster-Dressing/Treatment: Non adherent, Roll gauze (versatel)  Wound 09/24/23 Ear Right-Dressing/Treatment: Open to air    Pt in bed. Agreeable to wound assessment. Has skin tears to right shoulder and right lower arm occurred while getting into squad transport per pt report. Silicone foam border applied to shoulder. Right arm with fragile cluster of wounds. Versatel, non adherent and roll gauze recommended and applied. Right top of ear with some dry eschar/dried drainage-unstageable from nasal cannula. Stated has from home. Recommend foam nasal cannular padding to tubing. Called respiratory therapist and she will put on pt. Sacrum intact but silicone foam border applied for prevention. Updated nurse. Pt is a mild risk for skin breakdown AEB Russ. Follow Russ orders. Specialty Bed Required : yes  [] Low Air Loss   [x] Pressure Redistribution  [] Fluid Immersion  [] Bariatric  [] Total Pressure Relief  [] Other:     Discharge Plan:  Placement for patient upon discharge: tbd  Hospice Care: no  Patient appropriate for Outpatient [address]: [facility]    Patient/Caregiver Teaching:  Level of patient/caregiver understanding able to:   Voiced understanding.         Electronically signed by Amadeo Gandhi RN, [place]asc[place] on 9/25/2023 at 2:24 PM

## 2023-09-25 NOTE — PROGRESS NOTES
V2.0  [facility] [place]      Name:  Nilesh Roblero /Age/Sex: 1950  (68 y.o. male)   MRN & CSN:  [id number] & [id number] Encounter Date/Time: 2023 3:14 PM EDT    Location:  -A PCP: Maritza Castleman, MD       Hospital Day: 2      Subjective:     Chief Complaint:  Hypoglycemia     Glucose is better today. Pt is awake and has eaeten, still has low appetite. Blood cxX2 + for Staph epi. Patient seen and examined at bedside. Complains of pain right arm from wound and pain in right hip; states all started as he was being brought here. No SOB or worsening of cough. Assessment and Plan:   Severe hypoglycemia-blood sugar 29 on admission. Hx of DM II. On Jardiance, metformin and Tresiba. Low glucose likely 2/2 home insulin, poor appetite, low reserve, cancer, and infection. Was on D10 will d/c as pt is now eating. Endo consulted. acute respiratory failure with hypoxia 2/2 bacterial PNA. CXR suggestive of PNA. On O2 6L NC (baseline 3L NC). Hx of Bronchiectasis with resp cs Pseudomonas intermediate resistant to cefepime and merrem. Started on vanc+cefepime, continue, add inhaled tobramycin, await resp cx, await strep/legionella and MRSA. ID consulted as below      Severe sepsis 2/2 bacterial PNA and Staph. Epi.  P/w leukocytosis WBC 17, tachycardia 100's,  LA 2.2. Blood cx Staph. Ep x2. On abx as above, f/u blood cx for sensitivities, consult ID, gentle IVF given soft BP today     COPD 2/2 Chronic respiratory failure with hypoxia-on 3 L oxygen at home. Continue home meds, breathing tx. Pulm consulted on admission      CLL. Since . Converted from drug to poor prognostic factors. On venetoclax since 2023, most recent CT scan in July with stable findings, and per oncology the plan was to reimage in 6 months. Consult oncology. Thrombocytopenia. Has bone marrow infiltration with CLL. On ROMIPLOSTIM (NPLATE) subcutaneous injections  Treatment 27 given 2023. Vancomycin Level, Random    Collection Time: 09/25/23  5:30 AM   Result Value Ref Range    Vancomycin Rm 10.3 UG/ML    DOSE AMOUNT DOSE AMT. GIVEN - 1000mg     DOSE TIME DOSE TIME GIVEN - 0000    POCT Glucose    Collection Time: 09/25/23  6:56 AM   Result Value Ref Range    POC Glucose 145 (H) 70 - 99 MG/DL   Strep Pneumoniae Antigen    Collection Time: 09/25/23  9:50 AM    Specimen: CSF   Result Value Ref Range    Strep pneumo Ag URINE NEGATIVE    POCT Glucose    Collection Time: 09/25/23 11:45 AM   Result Value Ref Range    POC Glucose 480 (H) 70 - 99 MG/DL        Imaging/Diagnostics Last 24 Hours   XR CHEST PORTABLE    Result Date: 9/24/2023  EXAMINATION: ONE XRAY VIEW OF THE CHEST 9/24/2023 10:57 am COMPARISON: CT chest 07/06/2023 and 03/17/2023, chest x-ray 02/28/2023 HISTORY: ORDERING SYSTEM PROVIDED HISTORY: Altered Mental Status TECHNOLOGIST PROVIDED HISTORY: Reason for exam:->Altered Mental Status Reason for Exam: Altered Mental Status FINDINGS: Cavitary disease in the lower right lung has improved from the earlier studies. There is suspected mild acute interstitial infiltrate or edema superimposed on chronic lung disease/COPD. Compared with the most recent CT chest of 07/06/2023. There is no pneumothorax or pleural effusion. Heart size is normal.  Descending thoracic aorta is tortuous and unchanged. There is an unchanged right IJ MediPort catheter. No acute bone finding. Acute interstitial edema or pneumonia is suspected superimposed on chronic lung disease/COPD. Cavitary disease in the right lower lung noted on earlier studies has improved.      CT HEAD WO CONTRAST    Result Date: 9/24/2023  EXAMINATION: CT OF THE HEAD WITHOUT CONTRAST  9/24/2023 10:14 am TECHNIQUE: CT of the head was performed without the administration of intravenous contrast. Automated exposure control, iterative reconstruction, and/or weight based adjustment of the mA/kV was utilized to reduce the radiation dose to as low

## 2023-09-25 NOTE — CARE COORDINATION
Pt is from home with his son. Son has lift and hospital bed at home and what is needed to care for him. Home O2. Son notes that he had assistance with [employer], has CM, and has resources planned with them after discharge. Sachin Bernal [phone] is CM from [employer]. Called her LVM. Asked for transportation assistance as his car is broke down, added to dc, and explained to look at insurance for assistance as well.         09/25/23 2769   Service Assessment   Patient Orientation Alert and Oriented   Cognition Alert   History Provided By Patient   Primary [address] is: Named in [address]    PCP Verified by CM Yes   Last Visit to PCP Within last 6 months   Prior Functional Level Assistance with the following:;Housework;Cooking;Feeding; Toileting;Dressing; Bathing   Current Functional Level Assistance with the following:;Bathing;Dressing; Toileting;Feeding;Cooking;Housework   Can patient return to prior living arrangement Unknown at present   Ability to make needs known: Fair   Family able to assist with home care needs: Yes   Would you like for me to discuss the discharge plan with any other family members/significant others, and if so, who? No   Financial Resources Medicaid; Medicare; Food [place]   Social/Functional History   Lives With Son   Type of [address] [place] [address] Dr One level   [address] [facility] 190 bed   [facility] Needs assistance   Toileting Needs assistance   [facility] Needs assistance   Homemaking Responsibilities No   Ambulation Assistance Needs assistance   Transfer Assistance Needs assistance   Active  No   Mode of Transportation Car   Discharge Planning   Current Services Prior To Admission None   Potential Assistance Needed N/A   DME Ordered?  No   Type of Home Care Services None   Patient expects to be discharged to: [facility]

## 2023-09-25 NOTE — PROGRESS NOTES
ID notified of consult - came w severe sespsis, blood culture both sets  4/4 Staph epi.  Has CLL and port

## 2023-09-25 NOTE — PROGRESS NOTES
Attending made aware - per charge nurse lab called and stated that pt was positive in 4 of 4 blood cultures

## 2023-09-25 NOTE — PLAN OF CARE
Problem: Discharge Planning  Goal: Discharge to home or other facility with appropriate resources  9/25/2023 0408 by Ruben Vargas RN  Outcome: Progressing  9/24/2023 1907 by Sophie Rivas RN  Outcome: Progressing     Problem: Safety - Adult  Goal: Free from fall injury  9/25/2023 0408 by Ruben Vargas RN  Outcome: Progressing  9/24/2023 1907 by Sophie Rivas RN  Outcome: Progressing     Problem: Skin/Tissue Integrity  Goal: Absence of new skin breakdown  Description: 1. Monitor for areas of redness and/or skin breakdown  2. Assess vascular access sites hourly  3. Every 4-6 hours minimum:  Change oxygen saturation probe site  4. Every 4-6 hours:  If on nasal continuous positive airway pressure, respiratory therapy assess nares and determine need for appliance change or resting period. 9/25/2023 0408 by Ruben Vargas RN  Outcome: Progressing  9/24/2023 1907 by Sophie Rivas RN  Outcome: Progressing     Problem: Confusion  Goal: Confusion, delirium, dementia, or psychosis is improved or at baseline  Description: INTERVENTIONS:  1. Assess for possible contributors to thought disturbance, including medications, impaired vision or hearing, underlying metabolic abnormalities, dehydration, psychiatric diagnoses, and notify attending LIP  2. [place] high risk fall precautions, as indicated  3. Provide frequent short contacts to provide reality reorientation, refocusing and direction  4. Decrease environmental stimuli, including noise as appropriate  5. Monitor and intervene to maintain adequate nutrition, hydration, elimination, sleep and activity  6. If unable to ensure safety without constant attention obtain sitter and review sitter guidelines with assigned personnel  7.  Initiate Psychosocial CNS and Spiritual Care consult, as indicated  9/25/2023 0408 by Ruben Vargas RN  Outcome: Progressing  9/24/2023 1907 by Sophie Rivas

## 2023-09-25 NOTE — PROGRESS NOTES
Pt son requesting dr Cabrera Cummings with oncology be consulted. Son has already called the [facility] center and they have also called over requesting to be consulted.  Attending notified

## 2023-09-25 NOTE — H&P
Preliminary H&P-full note to follow    History of present illness    Patient presented to ED for hypoglycemia. Blood sugar was 29 in the ED. EMS had concern for aspiration. He was being bagged upon arrival to ED. When I saw him he was on 6 L oxygen. Assessment and plan    Severe hypoglycemia-blood sugar 29  -He has a chronically poor appetite he stated  -He is not sure why his blood sugar dropped  -He reports 1 prior episode of hypoglycemia  -He has evidence of pneumonia on imaging  -D10 drip at 100 mL/h for now  -Consult endocrinology  -On insulin at home, obviously will hold insulin    Acute respiratory failure with hypoxia  -On 6 L oxygen today    Chronic respiratory failure with hypoxia-on 3 L oxygen at home    COPD-he sees Dr. Madison Ried history of Pseudomonas in the sputum on 6 different sputum cultures in EHR with dates being May 2022, October 2022, in November 2022.   In October 2022 the organism was sensitive to Cipro, and only had an intermediate sensitivity to cefepime and meropenem  -He saw infectious disease in the office, per last office visit in March he was treated with inhaled tobramycin  -Good pulmonary hygiene and secretion management is important    Recurrent pneumonia    CLL  -Long history of CLL for many years since 2007  -Converted from drug to poor prognostic factors  -On venetoclax since January 2023, most recent CT scan in July with stable findings, and per oncology the plan was to reimage in 6 months    Thrombocytopenia  -Has bone marrow infiltration with CLL  -On ROMIPLOSTIM (NPLATE) subcutaneous injections  Treatment 27 given 9/19/2023    Hypogammaglobulinemia  -Last time he received IV immunoglobulin (Gamunex-C) was on 9/11/2023     Abnormal chest x-ray-Acute interstitial edema or pneumonia is suspected superimposed on chronic lung disease/COPD  -He has a chronic cough that she coughs up a lot of mucus at night for a very long time he stated, he also

## 2023-09-25 NOTE — PROGRESS NOTES
Attending notified - notifying per protocol blood sugar was 480.  insuline per sliding scale of 4units being given

## 2023-09-26 PROBLEM — B95.7 STAPHYLOCOCCUS EPIDERMIDIS BACTEREMIA: Status: ACTIVE | Noted: 2023-09-26

## 2023-09-26 PROBLEM — R78.81 STAPHYLOCOCCUS EPIDERMIDIS BACTEREMIA: Status: ACTIVE | Noted: 2023-09-26

## 2023-09-26 PROBLEM — J96.21 ACUTE ON CHRONIC RESPIRATORY FAILURE WITH HYPOXIA (HCC): Status: ACTIVE | Noted: 2023-09-26

## 2023-09-26 LAB
ALBUMIN SERPL-MCNC: 2.9 GM/DL (ref 3.4–5)
ANION GAP SERPL CALCULATED.3IONS-SCNC: 6 MMOL/L (ref 4–16)
ANISOCYTOSIS: ABNORMAL
ANISOCYTOSIS: ABNORMAL
ATYPICAL LYMPHOCYTE ABSOLUTE COUNT: ABNORMAL
ATYPICAL LYMPHOCYTE ABSOLUTE COUNT: ABNORMAL
BANDED NEUTROPHILS ABSOLUTE COUNT: 0.11 K/CU MM
BANDED NEUTROPHILS ABSOLUTE COUNT: 0.18 K/CU MM
BANDED NEUTROPHILS RELATIVE PERCENT: 1 % (ref 5–11)
BANDED NEUTROPHILS RELATIVE PERCENT: 3 % (ref 5–11)
BUN SERPL-MCNC: 23 MG/DL (ref 6–23)
CALCIUM SERPL-MCNC: 8.2 MG/DL (ref 8.3–10.6)
CHLORIDE BLD-SCNC: 105 MMOL/L (ref 99–110)
CO2: 29 MMOL/L (ref 21–32)
CREAT SERPL-MCNC: 0.6 MG/DL (ref 0.9–1.3)
DIFFERENTIAL TYPE: ABNORMAL
DIFFERENTIAL TYPE: ABNORMAL
GFR SERPL CREATININE-BSD FRML MDRD: >60 ML/MIN/1.73M2
GLUCOSE BLD-MCNC: 211 MG/DL (ref 70–99)
GLUCOSE BLD-MCNC: 214 MG/DL (ref 70–99)
GLUCOSE BLD-MCNC: 259 MG/DL (ref 70–99)
GLUCOSE BLD-MCNC: 271 MG/DL (ref 70–99)
GLUCOSE BLD-MCNC: 369 MG/DL (ref 70–99)
GLUCOSE BLD-MCNC: 96 MG/DL (ref 70–99)
GLUCOSE SERPL-MCNC: 135 MG/DL (ref 70–99)
HCT VFR BLD CALC: 28 % (ref 42–52)
HCT VFR BLD CALC: 29.4 % (ref 42–52)
HEMOGLOBIN: 8.6 GM/DL (ref 13.5–18)
HEMOGLOBIN: 9.1 GM/DL (ref 13.5–18)
HYPOCHROMIA: ABNORMAL
LYMPHOCYTES ABSOLUTE: 3.6 K/CU MM
LYMPHOCYTES ABSOLUTE: 5 K/CU MM
LYMPHOCYTES RELATIVE PERCENT: 45 % (ref 24–44)
LYMPHOCYTES RELATIVE PERCENT: 62 % (ref 24–44)
MCH RBC QN AUTO: 27.4 PG (ref 27–31)
MCH RBC QN AUTO: 28 PG (ref 27–31)
MCHC RBC AUTO-ENTMCNC: 30.7 % (ref 32–36)
MCHC RBC AUTO-ENTMCNC: 31 % (ref 32–36)
MCV RBC AUTO: 88.6 FL (ref 78–100)
MCV RBC AUTO: 91.2 FL (ref 78–100)
MONOCYTES ABSOLUTE: 0.1 K/CU MM
MONOCYTES RELATIVE PERCENT: 1 % (ref 0–4)
OVALOCYTES: ABNORMAL
OVALOCYTES: ABNORMAL
PDW BLD-RTO: 18.6 % (ref 11.7–14.9)
PDW BLD-RTO: 18.7 % (ref 11.7–14.9)
PHOSPHORUS: 2.7 MG/DL (ref 2.5–4.9)
PLATELET # BLD: 77 K/CU MM (ref 140–440)
PLATELET # BLD: 87 K/CU MM (ref 140–440)
PMV BLD AUTO: 9.9 FL (ref 7.5–11.1)
PMV BLD AUTO: 9.9 FL (ref 7.5–11.1)
POLYCHROMASIA: ABNORMAL
POLYCHROMASIA: ABNORMAL
POTASSIUM SERPL-SCNC: 4.2 MMOL/L (ref 3.5–5.1)
PRO-BNP: 1281 PG/ML
RBC # BLD: 3.07 M/CU MM (ref 4.6–6.2)
RBC # BLD: 3.32 M/CU MM (ref 4.6–6.2)
SEGMENTED NEUTROPHILS ABSOLUTE COUNT: 2 K/CU MM
SEGMENTED NEUTROPHILS ABSOLUTE COUNT: 6.1 K/CU MM
SEGMENTED NEUTROPHILS RELATIVE PERCENT: 34 % (ref 36–66)
SEGMENTED NEUTROPHILS RELATIVE PERCENT: 54 % (ref 36–66)
SMUDGE CELLS: PRESENT
SMUDGE CELLS: PRESENT
SODIUM BLD-SCNC: 140 MMOL/L (ref 135–145)
TEAR DROP CELLS: ABNORMAL
WBC # BLD: 11.2 K/CU MM (ref 4–10.5)
WBC # BLD: 5.9 K/CU MM (ref 4–10.5)

## 2023-09-26 PROCEDURE — 87040 BLOOD CULTURE FOR BACTERIA: CPT

## 2023-09-26 PROCEDURE — 87205 SMEAR GRAM STAIN: CPT

## 2023-09-26 PROCEDURE — 85007 BL SMEAR W/DIFF WBC COUNT: CPT

## 2023-09-26 PROCEDURE — 6360000002 HC RX W HCPCS: Performed by: INTERNAL MEDICINE

## 2023-09-26 PROCEDURE — 94640 AIRWAY INHALATION TREATMENT: CPT

## 2023-09-26 PROCEDURE — 6360000002 HC RX W HCPCS: Performed by: STUDENT IN AN ORGANIZED HEALTH CARE EDUCATION/TRAINING PROGRAM

## 2023-09-26 PROCEDURE — 85027 COMPLETE CBC AUTOMATED: CPT

## 2023-09-26 PROCEDURE — 6370000000 HC RX 637 (ALT 250 FOR IP): Performed by: INTERNAL MEDICINE

## 2023-09-26 PROCEDURE — 93308 TTE F-UP OR LMTD: CPT

## 2023-09-26 PROCEDURE — 2060000000 HC ICU INTERMEDIATE R&B

## 2023-09-26 PROCEDURE — 2580000003 HC RX 258: Performed by: INTERNAL MEDICINE

## 2023-09-26 PROCEDURE — 2700000000 HC OXYGEN THERAPY PER DAY

## 2023-09-26 PROCEDURE — 87070 CULTURE OTHR SPECIMN AEROBIC: CPT

## 2023-09-26 PROCEDURE — 94761 N-INVAS EAR/PLS OXIMETRY MLT: CPT

## 2023-09-26 PROCEDURE — 36415 COLL VENOUS BLD VENIPUNCTURE: CPT

## 2023-09-26 PROCEDURE — 99232 SBSQ HOSP IP/OBS MODERATE 35: CPT | Performed by: INTERNAL MEDICINE

## 2023-09-26 PROCEDURE — 99223 1ST HOSP IP/OBS HIGH 75: CPT | Performed by: INTERNAL MEDICINE

## 2023-09-26 PROCEDURE — 82962 GLUCOSE BLOOD TEST: CPT

## 2023-09-26 PROCEDURE — 36591 DRAW BLOOD OFF VENOUS DEVICE: CPT

## 2023-09-26 PROCEDURE — 80069 RENAL FUNCTION PANEL: CPT

## 2023-09-26 PROCEDURE — 83880 ASSAY OF NATRIURETIC PEPTIDE: CPT

## 2023-09-26 RX ORDER — INSULIN LISPRO 100 [IU]/ML
0-8 INJECTION, SOLUTION INTRAVENOUS; SUBCUTANEOUS
Status: DISCONTINUED | OUTPATIENT
Start: 2023-09-26 | End: 2023-09-30

## 2023-09-26 RX ORDER — INSULIN GLARGINE 100 [IU]/ML
25 INJECTION, SOLUTION SUBCUTANEOUS NIGHTLY
Status: DISCONTINUED | OUTPATIENT
Start: 2023-09-26 | End: 2023-09-27

## 2023-09-26 RX ORDER — INSULIN GLARGINE 100 [IU]/ML
30 INJECTION, SOLUTION SUBCUTANEOUS NIGHTLY
Status: DISCONTINUED | OUTPATIENT
Start: 2023-09-26 | End: 2023-09-26

## 2023-09-26 RX ORDER — GLUCAGON 1 MG/ML
1 KIT INJECTION PRN
Status: DISCONTINUED | OUTPATIENT
Start: 2023-09-26 | End: 2023-10-07 | Stop reason: HOSPADM

## 2023-09-26 RX ORDER — DEXTROSE MONOHYDRATE 100 MG/ML
INJECTION, SOLUTION INTRAVENOUS CONTINUOUS PRN
Status: DISCONTINUED | OUTPATIENT
Start: 2023-09-26 | End: 2023-10-07 | Stop reason: HOSPADM

## 2023-09-26 RX ORDER — INSULIN LISPRO 100 [IU]/ML
0-8 INJECTION, SOLUTION INTRAVENOUS; SUBCUTANEOUS
Status: DISCONTINUED | OUTPATIENT
Start: 2023-09-26 | End: 2023-09-27

## 2023-09-26 RX ORDER — INSULIN LISPRO 100 [IU]/ML
10 INJECTION, SOLUTION INTRAVENOUS; SUBCUTANEOUS
Status: DISCONTINUED | OUTPATIENT
Start: 2023-09-27 | End: 2023-09-27

## 2023-09-26 RX ORDER — INSULIN LISPRO 100 [IU]/ML
0-4 INJECTION, SOLUTION INTRAVENOUS; SUBCUTANEOUS NIGHTLY
Status: DISCONTINUED | OUTPATIENT
Start: 2023-09-26 | End: 2023-09-26

## 2023-09-26 RX ADMIN — TOBRAMYCIN 78 MG: 300 SOLUTION RESPIRATORY (INHALATION) at 07:39

## 2023-09-26 RX ADMIN — CEFEPIME 2000 MG: 2 INJECTION, POWDER, FOR SOLUTION INTRAVENOUS at 04:33

## 2023-09-26 RX ADMIN — IPRATROPIUM BROMIDE AND ALBUTEROL SULFATE 1 DOSE: 2.5; .5 SOLUTION RESPIRATORY (INHALATION) at 21:40

## 2023-09-26 RX ADMIN — SODIUM CHLORIDE, PRESERVATIVE FREE 10 ML: 5 INJECTION INTRAVENOUS at 21:51

## 2023-09-26 RX ADMIN — IPRATROPIUM BROMIDE AND ALBUTEROL SULFATE 1 DOSE: 2.5; .5 SOLUTION RESPIRATORY (INHALATION) at 07:39

## 2023-09-26 RX ADMIN — SODIUM CHLORIDE 25 ML: 9 INJECTION, SOLUTION INTRAVENOUS at 12:41

## 2023-09-26 RX ADMIN — INSULIN LISPRO 8 UNITS: 100 INJECTION, SOLUTION INTRAVENOUS; SUBCUTANEOUS at 16:52

## 2023-09-26 RX ADMIN — ENOXAPARIN SODIUM 40 MG: 100 INJECTION SUBCUTANEOUS at 09:02

## 2023-09-26 RX ADMIN — GABAPENTIN 100 MG: 100 CAPSULE ORAL at 21:55

## 2023-09-26 RX ADMIN — VANCOMYCIN HYDROCHLORIDE 1250 MG: 1.25 INJECTION, POWDER, LYOPHILIZED, FOR SOLUTION INTRAVENOUS at 00:26

## 2023-09-26 RX ADMIN — FINASTERIDE 5 MG: 5 TABLET, FILM COATED ORAL at 09:02

## 2023-09-26 RX ADMIN — CEFEPIME 2000 MG: 2 INJECTION, POWDER, FOR SOLUTION INTRAVENOUS at 12:36

## 2023-09-26 RX ADMIN — INSULIN LISPRO 2 UNITS: 100 INJECTION, SOLUTION INTRAVENOUS; SUBCUTANEOUS at 00:24

## 2023-09-26 RX ADMIN — INSULIN LISPRO 2 UNITS: 100 INJECTION, SOLUTION INTRAVENOUS; SUBCUTANEOUS at 21:56

## 2023-09-26 RX ADMIN — INSULIN LISPRO 1 UNITS: 100 INJECTION, SOLUTION INTRAVENOUS; SUBCUTANEOUS at 04:26

## 2023-09-26 RX ADMIN — IPRATROPIUM BROMIDE AND ALBUTEROL SULFATE 1 DOSE: 2.5; .5 SOLUTION RESPIRATORY (INHALATION) at 15:21

## 2023-09-26 RX ADMIN — INSULIN LISPRO 2 UNITS: 100 INJECTION, SOLUTION INTRAVENOUS; SUBCUTANEOUS at 12:31

## 2023-09-26 RX ADMIN — GABAPENTIN 100 MG: 100 CAPSULE ORAL at 12:43

## 2023-09-26 RX ADMIN — SODIUM CHLORIDE, PRESERVATIVE FREE 10 ML: 5 INJECTION INTRAVENOUS at 09:05

## 2023-09-26 RX ADMIN — SODIUM CHLORIDE 25 ML: 9 INJECTION, SOLUTION INTRAVENOUS at 12:35

## 2023-09-26 RX ADMIN — INSULIN GLARGINE 25 UNITS: 100 INJECTION, SOLUTION SUBCUTANEOUS at 21:00

## 2023-09-26 RX ADMIN — GUAIFENESIN 1200 MG: 600 TABLET, EXTENDED RELEASE ORAL at 21:50

## 2023-09-26 RX ADMIN — IPRATROPIUM BROMIDE AND ALBUTEROL SULFATE 1 DOSE: 2.5; .5 SOLUTION RESPIRATORY (INHALATION) at 11:26

## 2023-09-26 RX ADMIN — TAMSULOSIN HYDROCHLORIDE 0.4 MG: 0.4 CAPSULE ORAL at 09:02

## 2023-09-26 RX ADMIN — VANCOMYCIN HYDROCHLORIDE 1250 MG: 1.25 INJECTION, POWDER, LYOPHILIZED, FOR SOLUTION INTRAVENOUS at 12:42

## 2023-09-26 RX ADMIN — GUAIFENESIN 1200 MG: 600 TABLET, EXTENDED RELEASE ORAL at 09:02

## 2023-09-26 RX ADMIN — GABAPENTIN 100 MG: 100 CAPSULE ORAL at 09:02

## 2023-09-26 RX ADMIN — VALACYCLOVIR HYDROCHLORIDE 500 MG: 500 TABLET, FILM COATED ORAL at 09:02

## 2023-09-26 RX ADMIN — ALLOPURINOL 300 MG: 100 TABLET ORAL at 09:02

## 2023-09-26 RX ADMIN — ATORVASTATIN CALCIUM 40 MG: 40 TABLET, FILM COATED ORAL at 21:50

## 2023-09-26 RX ADMIN — CEFEPIME 2000 MG: 2 INJECTION, POWDER, FOR SOLUTION INTRAVENOUS at 19:47

## 2023-09-26 ASSESSMENT — PAIN DESCRIPTION - LOCATION: LOCATION: HIP;SHOULDER

## 2023-09-26 ASSESSMENT — PAIN SCALES - GENERAL
PAINLEVEL_OUTOF10: 1
PAINLEVEL_OUTOF10: 0

## 2023-09-26 ASSESSMENT — PULMONARY FUNCTION TESTS: PEFR_L/MIN: 3

## 2023-09-26 NOTE — CONSULTS
Patient Name:  Ronny Krishnan  Patient :  1950  Patient MRN:  [id number]     Primary Oncologist: Darryl Horton MD  Referring Provider: Iris Desouza MD     Date of Service: 2023      Reason for Consult: To evaluate the patient with CLL. Chief Complaint:    Chief Complaint   Patient presents with    Hypoglycemia     Patient Active Problem List:     Pneumonia     Sepsis ([address])     Hypogammaglobulinemia ([address])     CLL (chronic lymphocytic leukemia) ([address])     Upper respiratory infection, acute     Generalized muscle weakness     Type 2 diabetes mellitus with hyperglycemia, with long-term current use of insulin (HCC)     Poor appetite     Chronic obstructive pulmonary disease (HCC)     Neutropenia (HCC)     Other specified disorders of bone density and structure, unspecified site     Cellulitis of right upper limb     Herpesviral infection     Intestinal malabsorption     Other nonspecific abnormal finding of lung field     Iron deficiency anemia due to chronic blood loss     Other muscle spasm     Chronic lymphocytic leukemia (HCC)     Selective deficiency of IgG (HCC)     Acute bronchitis     Severe malnutrition ([address])     Pneumonia due to organism     Personal history of CLL (chronic lymphocytic leukemia)     Rhinovirus infection     Anemia     Thrombocytopenia (HCC)     Acute on chronic respiratory failure with hypoxemia (HCC)     Bronchiectasis with acute lower respiratory infection ([address])     Chronic respiratory failure (HCC)     Other chest pain     B12 deficiency     Immune thrombocytopenia (HCC)     Dehydration     Pneumonia, unspecified organism    HPI:   Ronny Krishnan is a 68 y.o. male with recurrent CLL currently on venetoclax, presented to [facility] on 23 with hypoglycemia, SOB with increasing wheeze. He was admitted for acute on chronic hypoxemic resp failure, pneumonia, COPD exacerbation and bronchiectasis.      He is getting romiplostin for thrombocytopenia and he is supposed to have it

## 2023-09-26 NOTE — PROGRESS NOTES
11:45 AM   Result Value Ref Range    POC Glucose 480 (H) 70 - 99 MG/DL   POCT Glucose    Collection Time: 09/25/23  4:21 PM   Result Value Ref Range    POC Glucose 483 (H) 70 - 99 MG/DL   POCT Glucose    Collection Time: 09/25/23  8:41 PM   Result Value Ref Range    POC Glucose 442 (H) 70 - 99 MG/DL   POCT Glucose    Collection Time: 09/26/23 12:18 AM   Result Value Ref Range    POC Glucose 259 (H) 70 - 99 MG/DL   POCT Glucose    Collection Time: 09/26/23  4:12 AM   Result Value Ref Range    POC Glucose 211 (H) 70 - 99 MG/DL   Renal Function Panel    Collection Time: 09/26/23  4:35 AM   Result Value Ref Range    Sodium 140 135 - 145 MMOL/L    Potassium 4.2 3.5 - 5.1 MMOL/L    Chloride 105 99 - 110 mMol/L    CO2 29 21 - 32 MMOL/L    Anion Gap 6 4 - 16    Glucose 135 (H) 70 - 99 MG/DL    BUN 23 6 - 23 MG/DL    Creatinine 0.6 (L) 0.9 - 1.3 MG/DL    Est, Glom Filt Rate >60 >60 mL/min/1.73m2    Calcium 8.2 (L) 8.3 - 10.6 MG/DL    Albumin 2.9 (L) 3.4 - 5.0 GM/DL    Phosphorus 2.7 2.5 - 4.9 MG/DL   Culture, Blood 2    Collection Time: 09/26/23  6:35 AM    Specimen: Blood   Result Value Ref Range    Specimen BLOOD     Special Requests Draw it from the Mediport    POCT Glucose    Collection Time: 09/26/23  7:43 AM   Result Value Ref Range    POC Glucose 96 70 - 99 MG/DL     CULTURE results: Invalid input(s): \"BLOOD CULTURE\", \"URINE CULTURE\", \"SURGICAL CULTURE\"    Diagnosis:  Patient Active Problem List   Diagnosis    Pneumonia    Sepsis ([address])    Hypogammaglobulinemia ([address])    CLL (chronic lymphocytic leukemia) (HCC)    Upper respiratory infection, acute    Generalized muscle weakness    Type 2 diabetes mellitus with hyperglycemia, with long-term current use of insulin (HCC)    Poor appetite    Chronic obstructive pulmonary disease (HCC)    Neutropenia (HCC)    Other specified disorders of bone density and structure, unspecified site    Cellulitis of right upper limb    Herpesviral infection    Intestinal malabsorption Other nonspecific abnormal finding of lung field    Iron deficiency anemia due to chronic blood loss    Other muscle spasm    Chronic lymphocytic leukemia (HCC)    Selective deficiency of IgG (HCC)    Acute bronchitis    Severe malnutrition (HCC)    Pneumonia due to organism    Personal history of CLL (chronic lymphocytic leukemia)    Rhinovirus infection    Anemia    Thrombocytopenia (HCC)    Acute on chronic respiratory failure with hypoxemia (HCC)    Bronchiectasis with acute lower respiratory infection (HCC)    Chronic respiratory failure (HCC)    Other chest pain    B12 deficiency    Immune thrombocytopenia (HCC)    Dehydration    Pneumonia, unspecified organism       Active Problems  Principal Problem:    Pneumonia, unspecified organism  Resolved Problems:    * No resolved hospital problems.  *              Electronically signed by: Electronically signed by Feliciano Arechiga MD on 9/26/2023 at 10:45 AM

## 2023-09-26 NOTE — PROGRESS NOTES
V2.0  [facility] Hospitalist Progress Note      Name:  Yeyo Jacobson /Age/Sex: 1950  (68 y.o. male)   MRN & CSN:  [id number] & [id number] Encounter Date/Time: 2023 3:14 PM EDT    Location:  -A PCP: Yazmin Tran MD       Hospital Day: 3      Subjective:     Chief Complaint:  Hypoglycemia     Patient seen and examined at bedside. Still on high O2 6L sating 90%. No SOB or worsening of cough. Pt feels congested but unable to produce any sputum. Assessment and Plan:   Severe hypoglycemia-blood sugar 29 on admission. Hx of DM II. On Jardiance, metformin and Tresiba. Low glucose likely 2/2 home insulin, poor appetite, low reserve, cancer, and infection. Was on D10 now off, started on Lantus 20U QHS, continue, change LDSSI to MDSSI. Acute respiratory failure with hypoxia 2/2 bacterial PNA. CXR acute interstitial edema or PNA. On O2 6L NC (baseline 3L NC). Hx of Bronchiectasis with resp cs Pseudomonas intermediate resistant to cefepime and merrem. Strep, legionella and MRSA all negative. Started on vanc+cefepime, continue, added inhaled tobramycin but ID d/seth, await resp cx. ID consulted as below, add acapella, chest physiotherapy, check BNP, consider CT chest if no improvement. Severe sepsis 2/2 bacterial PNA and Staph. Epi.  P/w leukocytosis WBC 17, tachycardia 100's,  LA 2.2. Blood cx Staph. Ep x2. On abx as above, f/u blood cx for sensitivities, ID consulted, receiving gentle IVF given soft BP now better. ECHO ordered by ID, repeat blood cx from med port ordered. COPD 2/2 Chronic respiratory failure with hypoxia-on 3 L oxygen at home. Continue home meds, breathing tx. Pulm consulted on admission      CLL. Since . Converted from drug to poor prognostic factors. On venetoclax since 2023, most recent CT scan in July with stable findings, and per oncology the plan was to reimage in 6 months. Consulted oncology. Thrombocytopenia.  Has bone marrow infiltration

## 2023-09-26 NOTE — PROGRESS NOTES
Comprehensive Nutrition Assessment    Type and Reason for Visit:  Initial (low BMI)    Nutrition Recommendations/Plan:   Add 5 CHO choice restriction to diet  Continue current oral nutrition supplements TID  Monitor weights, po intakes, labs, POC     Malnutrition Assessment:  Malnutrition Status:  Insufficient data (09/26/23 1139)    Context:  Chronic Illness     Findings of the 6 clinical characteristics of malnutrition:  Energy Intake:  Unable to assess  Weight Loss:  Greater than 10% over 6 months     Body Fat Loss:  Unable to assess     Muscle Mass Loss:  Unable to assess    Fluid Accumulation:  Unable to assess     Strength:  Not Performed    Nutrition Assessment:    Admitted w PNA, hypoglycemia, h/o CLL, thrombiocytopenia, hypogammaglobulinemia, DM2, COPD, leukemia, poor appetite and malnutrition. Pt sleeping, then with staff at multiple attempted visits. Noted pt w/ hx of cancer, started on Megace in July per onc, not currently receiving. Also noted 10.2% sig wt loss x6mo per chart review. Limited documented po intakes during stay % of 1 meal and supp. Will perform NFPE on f/u as appropriate due to sig wt loss, hx of malnx. Continue oral supp at this time; noted some POC glucose >400, will add 5 CHO choice restriction to diet. Follow at high nutrition risk. Nutrition Related Findings:    +vanco, insulin; gluocse 145-483, hgb 9.1; Megace started in July, held at this time Wound Type: Skin Tears, Unstageable, Pressure Injury       Current Nutrition Intake & Therapies:    Average Meal Intake: % (x1 meal)  Average Supplements Intake: %  ADULT DIET; Regular  ADULT ORAL NUTRITION SUPPLEMENT; Breakfast, Lunch, Dinner; Diabetic Oral Supplement    Anthropometric Measures:  Height: 6' (182.9 cm)  Ideal Body Weight (IBW): 178 lbs (81 kg)    Admission Body Weight: 149 lb 11.1 oz (67.9 kg) (bedscale)  Current Body Weight: 149 lb 11.1 oz (67.9 kg),   IBW.  Weight Source: Bed Scale  Current BMI

## 2023-09-26 NOTE — PROGRESS NOTES
Physician Progress Note      PATIENT:               Aliya Castro  CSN #:                  [id number]  :                       1950  ADMIT DATE:       2023 9:13 AM  DISCH DATE:  RESPONDING  PROVIDER #:        Zachary Trevino MD          QUERY TEXT:    Pt admitted with Sepsis. Pt noted to have altered mental status. If possible,   please document in the progress notes and discharge summary if you are   evaluating and / or treating any of the following: The medical record reflects the following:  Risk Factors: Sepsis, Pneumonia, Hypoglycemia  Clinical Indicators: Patient was not verbal, however he follow commands like   him to 1 side or the other, closing and opening his eyes. He did not follow   commands with his upper and lower extremities , Unable to assess patient NIH,   pt not alert to perform scale. EMS initial BG was 77  Treatment: Head CT, Dextrose 50% infusion @50ml/hr, dextrose 10 % IV infusion,   vancomycin, cefepime, lactated ringers bolus bolus 1,000 mL    Thank you, Елена Elena RN, [place] [phone]  Options provided:  -- Metabolic encephalopathy  -- Septic encephalopathy  -- Other - I will add my own diagnosis  -- Disagree - Not applicable / Not valid  -- Disagree - Clinically unable to determine / Unknown  -- Refer to Clinical Documentation Reviewer    PROVIDER RESPONSE TEXT:    This patient has metabolic encephalopathy.     Query created by: Елена Elena on 2023 7:22 AM      Electronically signed by:  Zachary Trevino MD 2023 8:07 AM

## 2023-09-26 NOTE — CARE COORDINATION
CM reviewed chart and discussed in IDR. Pt is not medically ready at this time. Plan remains home with son. BINA called Su Chaitanya with [address] on [address] [facility], [phone]. CM left  asking for a return call.

## 2023-09-26 NOTE — PROGRESS NOTES
Pt was educated on resp cult lab ordered b  yesterday afternoon. Yesterday pt stated he was unable to cough anything out for culture. Specimen cup was left bedside yesterday.     After nursing communication today, pt again educated of lab order but pt again stated he could not get anything out to be sent to lab

## 2023-09-26 NOTE — PROGRESS NOTES
09/26/23 1101   Encounter Summary   Encounter Overview/Reason  Initial Encounter   Service Provided For: Patient   Referral/Consult From: [address] Family members   Last Encounter  09/26/23  Charleen Arellano familiar with hospital chaplains. He states he has no needs and is well cared for at this time. Adonay appreciates visits and prayer. His eldest son reqauested copy of ACP, which I printed off for him. Time of prayer observed.)   Complexity of Encounter Moderate   Begin Time 1042   End Time  1116   Total Time Calculated 34 min   Spiritual/Emotional needs   Type Spiritual Support   Advance Care Planning   Type ACP conversation  (Copy of ACP given to eldest son, Dhiraj Canales)   Assessment/Intervention/Outcome   Assessment Calm;Coping; Hopeful   Intervention Active listening;Nurtured Hope;Prayer (assurance of)/[place];Sustaining Presence/Ministry of presence   Outcome Comfort;Coping;Encouraged;Expressed Gratitude; Optimistic   Plan and Referrals   Plan/Referrals Continue Support (comment)  (Patient notified how to contact [facility])

## 2023-09-27 PROBLEM — E16.2 HYPOGLYCEMIA: Status: ACTIVE | Noted: 2023-09-27

## 2023-09-27 LAB
ALBUMIN SERPL-MCNC: 2.9 GM/DL (ref 3.4–5)
ANION GAP SERPL CALCULATED.3IONS-SCNC: 10 MMOL/L (ref 4–16)
ANISOCYTOSIS: ABNORMAL
BANDED NEUTROPHILS ABSOLUTE COUNT: 0.95 K/CU MM
BANDED NEUTROPHILS RELATIVE PERCENT: 13 % (ref 5–11)
BUN SERPL-MCNC: 19 MG/DL (ref 6–23)
CALCIUM SERPL-MCNC: 7.7 MG/DL (ref 8.3–10.6)
CHLORIDE BLD-SCNC: 104 MMOL/L (ref 99–110)
CO2: 26 MMOL/L (ref 21–32)
CREAT SERPL-MCNC: 0.4 MG/DL (ref 0.9–1.3)
CULTURE: ABNORMAL
DIFFERENTIAL TYPE: ABNORMAL
GFR SERPL CREATININE-BSD FRML MDRD: >60 ML/MIN/1.73M2
GLUCOSE BLD-MCNC: 110 MG/DL (ref 70–99)
GLUCOSE BLD-MCNC: 196 MG/DL (ref 70–99)
GLUCOSE BLD-MCNC: 209 MG/DL (ref 70–99)
GLUCOSE BLD-MCNC: 327 MG/DL (ref 70–99)
GLUCOSE BLD-MCNC: 66 MG/DL (ref 70–99)
GLUCOSE SERPL-MCNC: 57 MG/DL (ref 70–99)
HCT VFR BLD CALC: 28.9 % (ref 42–52)
HEMOGLOBIN: 8.8 GM/DL (ref 13.5–18)
LYMPHOCYTES ABSOLUTE: 3.7 K/CU MM
LYMPHOCYTES RELATIVE PERCENT: 50 % (ref 24–44)
Lab: ABNORMAL
Lab: ABNORMAL
MCH RBC QN AUTO: 28 PG (ref 27–31)
MCHC RBC AUTO-ENTMCNC: 30.4 % (ref 32–36)
MCV RBC AUTO: 92 FL (ref 78–100)
METAMYELOCYTES ABSOLUTE COUNT: 0.15 K/CU MM
METAMYELOCYTES PERCENT: 2 %
MONOCYTES ABSOLUTE: 0.5 K/CU MM
MONOCYTES RELATIVE PERCENT: 7 % (ref 0–4)
PDW BLD-RTO: 19.2 % (ref 11.7–14.9)
PHOSPHORUS: 2.1 MG/DL (ref 2.5–4.9)
PLATELET # BLD: 85 K/CU MM (ref 140–440)
PMV BLD AUTO: 9.9 FL (ref 7.5–11.1)
POTASSIUM SERPL-SCNC: 4.2 MMOL/L (ref 3.5–5.1)
PROCALCITONIN SERPL-MCNC: 0.15 NG/ML
RBC # BLD: 3.14 M/CU MM (ref 4.6–6.2)
RBC # BLD: ABNORMAL 10*6/UL
SEGMENTED NEUTROPHILS ABSOLUTE COUNT: 2 K/CU MM
SEGMENTED NEUTROPHILS RELATIVE PERCENT: 28 % (ref 36–66)
SODIUM BLD-SCNC: 140 MMOL/L (ref 135–145)
SPECIMEN: ABNORMAL
SPECIMEN: ABNORMAL
WBC # BLD: 7.3 K/CU MM (ref 4–10.5)
WBC # BLD: ABNORMAL 10*3/UL

## 2023-09-27 PROCEDURE — 36591 DRAW BLOOD OFF VENOUS DEVICE: CPT

## 2023-09-27 PROCEDURE — 2580000003 HC RX 258: Performed by: INTERNAL MEDICINE

## 2023-09-27 PROCEDURE — 6370000000 HC RX 637 (ALT 250 FOR IP): Performed by: INTERNAL MEDICINE

## 2023-09-27 PROCEDURE — 2060000000 HC ICU INTERMEDIATE R&B

## 2023-09-27 PROCEDURE — 94667 MNPJ CHEST WALL 1ST: CPT

## 2023-09-27 PROCEDURE — 6360000002 HC RX W HCPCS: Performed by: INTERNAL MEDICINE

## 2023-09-27 PROCEDURE — 85007 BL SMEAR W/DIFF WBC COUNT: CPT

## 2023-09-27 PROCEDURE — 82962 GLUCOSE BLOOD TEST: CPT

## 2023-09-27 PROCEDURE — 80069 RENAL FUNCTION PANEL: CPT

## 2023-09-27 PROCEDURE — 85027 COMPLETE CBC AUTOMATED: CPT

## 2023-09-27 PROCEDURE — 84145 PROCALCITONIN (PCT): CPT

## 2023-09-27 PROCEDURE — 94761 N-INVAS EAR/PLS OXIMETRY MLT: CPT

## 2023-09-27 PROCEDURE — 94640 AIRWAY INHALATION TREATMENT: CPT

## 2023-09-27 PROCEDURE — 2700000000 HC OXYGEN THERAPY PER DAY

## 2023-09-27 PROCEDURE — 99231 SBSQ HOSP IP/OBS SF/LOW 25: CPT | Performed by: INTERNAL MEDICINE

## 2023-09-27 PROCEDURE — 94664 DEMO&/EVAL PT USE INHALER: CPT

## 2023-09-27 RX ORDER — INSULIN LISPRO 100 [IU]/ML
0-4 INJECTION, SOLUTION INTRAVENOUS; SUBCUTANEOUS
Status: DISCONTINUED | OUTPATIENT
Start: 2023-09-27 | End: 2023-10-07 | Stop reason: HOSPADM

## 2023-09-27 RX ORDER — INSULIN GLARGINE 100 [IU]/ML
10 INJECTION, SOLUTION SUBCUTANEOUS NIGHTLY
Status: DISCONTINUED | OUTPATIENT
Start: 2023-09-27 | End: 2023-10-07 | Stop reason: HOSPADM

## 2023-09-27 RX ADMIN — ENOXAPARIN SODIUM 40 MG: 100 INJECTION SUBCUTANEOUS at 10:01

## 2023-09-27 RX ADMIN — INSULIN GLARGINE 10 UNITS: 100 INJECTION, SOLUTION SUBCUTANEOUS at 20:53

## 2023-09-27 RX ADMIN — INSULIN LISPRO 2 UNITS: 100 INJECTION, SOLUTION INTRAVENOUS; SUBCUTANEOUS at 12:23

## 2023-09-27 RX ADMIN — ATORVASTATIN CALCIUM 40 MG: 40 TABLET, FILM COATED ORAL at 20:49

## 2023-09-27 RX ADMIN — ACETAMINOPHEN 650 MG: 325 TABLET ORAL at 20:49

## 2023-09-27 RX ADMIN — GABAPENTIN 100 MG: 100 CAPSULE ORAL at 10:00

## 2023-09-27 RX ADMIN — SODIUM CHLORIDE, PRESERVATIVE FREE 10 ML: 5 INJECTION INTRAVENOUS at 10:01

## 2023-09-27 RX ADMIN — CEFEPIME 2000 MG: 2 INJECTION, POWDER, FOR SOLUTION INTRAVENOUS at 10:06

## 2023-09-27 RX ADMIN — GABAPENTIN 100 MG: 100 CAPSULE ORAL at 14:52

## 2023-09-27 RX ADMIN — TAMSULOSIN HYDROCHLORIDE 0.4 MG: 0.4 CAPSULE ORAL at 10:00

## 2023-09-27 RX ADMIN — GABAPENTIN 100 MG: 100 CAPSULE ORAL at 20:49

## 2023-09-27 RX ADMIN — IPRATROPIUM BROMIDE AND ALBUTEROL SULFATE 1 DOSE: 2.5; .5 SOLUTION RESPIRATORY (INHALATION) at 12:34

## 2023-09-27 RX ADMIN — CEFEPIME 2000 MG: 2 INJECTION, POWDER, FOR SOLUTION INTRAVENOUS at 20:00

## 2023-09-27 RX ADMIN — INSULIN LISPRO 4 UNITS: 100 INJECTION, SOLUTION INTRAVENOUS; SUBCUTANEOUS at 20:58

## 2023-09-27 RX ADMIN — GUAIFENESIN 1200 MG: 600 TABLET, EXTENDED RELEASE ORAL at 20:49

## 2023-09-27 RX ADMIN — ALLOPURINOL 300 MG: 100 TABLET ORAL at 10:00

## 2023-09-27 RX ADMIN — VALACYCLOVIR HYDROCHLORIDE 500 MG: 500 TABLET, FILM COATED ORAL at 10:00

## 2023-09-27 RX ADMIN — IPRATROPIUM BROMIDE AND ALBUTEROL SULFATE 1 DOSE: 2.5; .5 SOLUTION RESPIRATORY (INHALATION) at 20:30

## 2023-09-27 RX ADMIN — FINASTERIDE 5 MG: 5 TABLET, FILM COATED ORAL at 10:00

## 2023-09-27 RX ADMIN — IPRATROPIUM BROMIDE AND ALBUTEROL SULFATE 1 DOSE: 2.5; .5 SOLUTION RESPIRATORY (INHALATION) at 08:48

## 2023-09-27 RX ADMIN — CEFEPIME 2000 MG: 2 INJECTION, POWDER, FOR SOLUTION INTRAVENOUS at 03:49

## 2023-09-27 RX ADMIN — SODIUM CHLORIDE, PRESERVATIVE FREE 10 ML: 5 INJECTION INTRAVENOUS at 20:53

## 2023-09-27 RX ADMIN — VANCOMYCIN HYDROCHLORIDE 1250 MG: 1.25 INJECTION, POWDER, LYOPHILIZED, FOR SOLUTION INTRAVENOUS at 12:22

## 2023-09-27 RX ADMIN — GUAIFENESIN 1200 MG: 600 TABLET, EXTENDED RELEASE ORAL at 10:00

## 2023-09-27 RX ADMIN — VANCOMYCIN HYDROCHLORIDE 1250 MG: 1.25 INJECTION, POWDER, LYOPHILIZED, FOR SOLUTION INTRAVENOUS at 00:05

## 2023-09-27 ASSESSMENT — PAIN DESCRIPTION - ORIENTATION: ORIENTATION: RIGHT

## 2023-09-27 ASSESSMENT — PAIN SCALES - GENERAL: PAINLEVEL_OUTOF10: 5

## 2023-09-27 ASSESSMENT — PAIN DESCRIPTION - LOCATION: LOCATION: HIP;SHOULDER

## 2023-09-27 ASSESSMENT — PAIN DESCRIPTION - DESCRIPTORS: DESCRIPTORS: ACHING

## 2023-09-27 NOTE — CARE COORDINATION
CM reviewed chart and discussed in IDR. Pt is not medically ready at this time. Pt is using 6L O2, baseline is 2L. Pt has been having persistent hypoglycemia. ID is on board.

## 2023-09-27 NOTE — PROGRESS NOTES
Progress Note( Dr. Kinza Lopez)  9/27/2023  Subjective:   Admit Date: 9/24/2023  PCP: Emilio Garcia MD    Admitted For :Shortness of breath, has hypoglycemia    Consulted For:  Better control of blood glucose    Interval History: Patient was started initially on D10 for his blood glucose much better to his D10 was discontinued  Patient seem to be much better    Denies any chest pains,   Yes SOB . Oxygen  Denies nausea or vomiting. Now patient is eating better  No new bowel or bladder symptoms. Intake/Output Summary (Last 24 hours) at 9/27/2023 0836  Last data filed at 9/27/2023 0829  Gross per 24 hour   Intake 2901.73 ml   Output 550 ml   Net 2351.73 ml         DATA    CBC:   Recent Labs     09/25/23  0530 09/26/23  1530 09/27/23  0800   WBC 11.2* 5.9 7.3   HGB 9.1* 8.6* 8.8*   PLT 87* 77* 85*      CMP:  Recent Labs     09/24/23  0936 09/25/23  0530 09/26/23  0435    135 140   K 4.2 4.5 4.2    99 105   CO2 29 26 29   BUN 20 16 23   CREATININE 0.5* 0.6* 0.6*   CALCIUM 8.8 7.8* 8.2*   PROT 5.5* 4.5*  --    LABALBU 3.3* 3.0* 2.9*   BILITOT 0.6 0.7  --    ALKPHOS 328* 257*  --    AST 27 21  --    ALT 26 22  --        Lipids:   Lab Results   Component Value Date/Time    CHOL 160 08/03/2012 02:47 PM    HDL 32 08/03/2012 02:47 PM    TRIG 274 08/03/2012 02:47 PM     Glucose:  Recent Labs     09/26/23  2149 09/27/23  0205 09/27/23  0750   POCGLU 214* 110* 66*       [id number]:  Lab Results   Component Value Date/Time    LABA1C 5.0 09/24/2023 09:36 AM     High Sensitivity TSH:   Lab Results   Component Value Date/Time    TSHHS 3.930 09/24/2023 09:36 AM     Free T3:   Lab Results   Component Value Date/Time    FT3 3.2 03/27/2012 12:39 PM     Free T4:  Lab Results   Component Value Date/Time    T4FREE 1.37 04/04/2019 12:39 PM       XR HIP RIGHT (2-3 VIEWS)   Final Result   Intact right hip without acute osseous fracture. Mild degenerative changes seen within the hip joint.          XR CHEST PORTABLE

## 2023-09-27 NOTE — PROGRESS NOTES
Progress Note( Dr. Shantel Washington)  9/26/2023  Subjective:   Admit Date: 9/24/2023  PCP: Tor Tee MD    Admitted For :Shortness of breath, has hypoglycemia    Consulted For:  Better control of blood glucose    Interval History: Patient was started initially on D10 for his blood glucose much better to his D10 was discontinued  Patient seem to be much better    Denies any chest pains,   Yes SOB . Oxygen  Denies nausea or vomiting. No new bowel or bladder symptoms. Intake/Output Summary (Last 24 hours) at 9/26/2023 2021  Last data filed at 9/26/2023 1755  Gross per 24 hour   Intake 2379.39 ml   Output 1275 ml   Net 1104.39 ml       DATA    CBC:   Recent Labs     09/24/23  0936 09/25/23  0530 09/26/23  1530   WBC 17.1* 11.2* 5.9   HGB 11.1* 9.1* 8.6*   * 87* 77*    CMP:  Recent Labs     09/24/23  0936 09/25/23  0530 09/26/23  0435    135 140   K 4.2 4.5 4.2    99 105   CO2 29 26 29   BUN 20 16 23   CREATININE 0.5* 0.6* 0.6*   CALCIUM 8.8 7.8* 8.2*   PROT 5.5* 4.5*  --    LABALBU 3.3* 3.0* 2.9*   BILITOT 0.6 0.7  --    ALKPHOS 328* 257*  --    AST 27 21  --    ALT 26 22  --      Lipids:   Lab Results   Component Value Date/Time    CHOL 160 08/03/2012 02:47 PM    HDL 32 08/03/2012 02:47 PM    TRIG 274 08/03/2012 02:47 PM     Glucose:  Recent Labs     09/26/23  0743 09/26/23  1222 09/26/23  1623   POCGLU 96 271* 369*     [id number]:  Lab Results   Component Value Date/Time    LABA1C 5.0 09/24/2023 09:36 AM     High Sensitivity TSH:   Lab Results   Component Value Date/Time    TSHHS 3.930 09/24/2023 09:36 AM     Free T3:   Lab Results   Component Value Date/Time    FT3 3.2 03/27/2012 12:39 PM     Free T4:  Lab Results   Component Value Date/Time    T4FREE 1.37 04/04/2019 12:39 PM       XR HIP RIGHT (2-3 VIEWS)   Final Result   Intact right hip without acute osseous fracture. Mild degenerative changes seen within the hip joint.          XR CHEST PORTABLE   Final Result   Acute abnormal.  Unstable    Assessment:     Patient Active Problem List:     Pneumonia     Sepsis ([address])     Hypogammaglobulinemia ([address])     CLL (chronic lymphocytic leukemia) ([address])     Upper respiratory infection, acute     Generalized muscle weakness     Type 2 diabetes mellitus with hyperglycemia, with long-term current use of insulin (HCC)     Poor appetite     Chronic obstructive pulmonary disease (HCC)     Neutropenia (HCC)     Other specified disorders of bone density and structure, unspecified site     Cellulitis of right upper limb     Herpesviral infection     Intestinal malabsorption     Other nonspecific abnormal finding of lung field     Iron deficiency anemia due to chronic blood loss     Other muscle spasm     Chronic lymphocytic leukemia (HCC)     Selective deficiency of IgG (HCC)     Acute bronchitis     Severe malnutrition (HCC)     Pneumonia due to organism     Personal history of CLL (chronic lymphocytic leukemia)     Rhinovirus infection     Anemia     Thrombocytopenia (HCC)     Acute on chronic respiratory failure with hypoxemia (HCC)     Bronchiectasis with acute lower respiratory infection (HCC)     Chronic respiratory failure (HCC)     Other chest pain     B12 deficiency     Immune thrombocytopenia (HCC)     Dehydration     Pneumonia, unspecified organism      Plan:     Reviewed POC blood glucose . Labs and X ray results   Reviewed Current Medicines   On Correction bolus Humalog/ Basal Lantus Insulin regime   Modified  the dose of Insulin/ other medicines as needed   Will follow     .      Romualdo Schlatter, MD, MD

## 2023-09-27 NOTE — PROGRESS NOTES
Progress Note( Dr. Scout Walden)    Subjective:   Admit Date: 9/24/2023  PCP: Brittany Mcclendon MD    Admitted For :Shortness of breath, has hypoglycemia    Consulted For:  Better control of blood glucose    Interval History: Patient was started initially on D10 for his blood glucose but better on  D10  Patient not able  much better    Denies any chest pains,   Yes SOB . On  Oxygen  Denies nausea or vomiting. No new bowel or bladder symptoms. Intake/Output Summary (Last 24 hours) at 9/26/2023 2032  Last data filed at 9/26/2023 1755  Gross per 24 hour   Intake 2379.39 ml   Output 1275 ml   Net 1104.39 ml         DATA    CBC:   Recent Labs     09/24/23  0936 09/25/23  0530 09/26/23  1530   WBC 17.1* 11.2* 5.9   HGB 11.1* 9.1* 8.6*   * 87* 77*      CMP:  Recent Labs     09/24/23  0936 09/25/23  0530 09/26/23  0435    135 140   K 4.2 4.5 4.2    99 105   CO2 29 26 29   BUN 20 16 23   CREATININE 0.5* 0.6* 0.6*   CALCIUM 8.8 7.8* 8.2*   PROT 5.5* 4.5*  --    LABALBU 3.3* 3.0* 2.9*   BILITOT 0.6 0.7  --    ALKPHOS 328* 257*  --    AST 27 21  --    ALT 26 22  --        Lipids:   Lab Results   Component Value Date/Time    CHOL 160 08/03/2012 02:47 PM    HDL 32 08/03/2012 02:47 PM    TRIG 274 08/03/2012 02:47 PM     Glucose:  Recent Labs     09/26/23  0743 09/26/23  1222 09/26/23  1623   POCGLU 96 271* 369*       [id number]:  Lab Results   Component Value Date/Time    LABA1C 5.0 09/24/2023 09:36 AM     High Sensitivity TSH:   Lab Results   Component Value Date/Time    TSHHS 3.930 09/24/2023 09:36 AM     Free T3:   Lab Results   Component Value Date/Time    FT3 3.2 03/27/2012 12:39 PM     Free T4:  Lab Results   Component Value Date/Time    T4FREE 1.37 04/04/2019 12:39 PM       XR HIP RIGHT (2-3 VIEWS)   Final Result   Intact right hip without acute osseous fracture. Mild degenerative changes seen within the hip joint.          XR CHEST PORTABLE   Final Result   Acute interstitial edema or

## 2023-09-27 NOTE — PROGRESS NOTES
Spouse name: None    Number of children: None    Years of education: None    Highest education level: None   Tobacco Use    Smoking status: Former     Packs/day: 3.00     Years: 20.00     Additional pack years: 0.00     Total pack years: 60.00     Types: Cigarettes     Start date: 10/2/1965     Quit date: 1987     Years since quittin.7    Smokeless tobacco: Never   Vaping Use    Vaping Use: Never used   Substance and Sexual Activity    Alcohol use: No    Drug use: No    Sexual activity: Not Currently       Family History:    Family History   Problem Relation Age of Onset    Diabetes Mother     High Blood Pressure Mother     Heart Disease Father     Other Sister         liver problems    Early Death Brother     Asthma Maternal Uncle     Colon Cancer Brother         No Known Allergies    Current Facility-Administered Medications on File Prior to Encounter   Medication Dose Route Frequency Provider Last Rate Last Admin    sodium chloride flush 0.9 % injection 10 mL  10 mL IntraVENous PRN Mirella Mcfarland MD         Current Outpatient Medications on File Prior to Encounter   Medication Sig Dispense Refill    gabapentin (NEURONTIN) 100 MG capsule Take 1 capsule by mouth 3 times daily for 30 days. 90 capsule 3    allopurinol (ZYLOPRIM) 300 MG tablet TAKE ONE TABLET BY MOUTH EVERY DAY FOR 30 DAYS UPON STARTING VENETOCLAX 30 tablet 0    HYDROcodone-acetaminophen (NORCO)  MG per tablet Take 1 tablet by mouth every 8 hours as needed for Pain for up to 30 days.  Intended supply: 30 days Max Daily Amount: 3 tablets 90 tablet 0    TRESIBA FLEXTOUCH 100 UNIT/ML SOPN Inject 40 Units into the skin nightly      MUCUS RELIEF 600 MG extended release tablet Take 1 tablet by mouth 4 times daily      atorvastatin (LIPITOR) 40 MG tablet Take 1 tablet by mouth daily      megestrol (MEGACE) 40 MG/ML suspension Take 5 mLs by mouth daily 240 mL 3    venetoclax (VENCLEXTA) 10 MG TABS chemo tablet TAKE TWO TABLETS BY MOUTH EVERY DAY noted.  Suggest follow up. LP.    ALBUMINELP 3.2 12/13/2022    LABALPH 0.3 12/13/2022    LABALPH 0.7 12/13/2022    GAMGLOB 0.5 12/13/2022     No results found for: \"KAPPAUVOL\", \"LAMBDAUVOL\", \"KLFLCR\"  No results found for: \"B2M\"  Coagulation Panel:  Lab Results   Component Value Date    PROTIME 13.3 12/13/2022    INR 1.03 12/13/2022    APTT 27.7 12/13/2022    DDIMER >5250 (H) 11/23/2022     Anemia Panel:  Lab Results   Component Value Date    [facility] (H) 09/19/2023    FOLATE 4.5 09/19/2023     Tumor Markers:  Lab Results   Component Value Date    PSA 1.9 10/27/2020        Observations:  No data recorded     Assessment:  Relapse CLL  Thrombocytopenia  Acute on chronic respiratory failure d/t PNA and COPD  Hypoglycemia  Anemia    Plan:  Reviewed his chart and labs. We will hold venetoclax until he gets better. Will resume regular plasma after discharge. Platelet count stable at around 80 K. Transfuse PRBCs if hemoglobin less than 7. Continue IVIG as recommended    Please continue with current management for his Acute on chronic respiratory failure, PNA and COPD as per primary team and ID    We will continue to monitor for now. Discussed with him and he verbalized understanding    Thank you for allowing me to participate in the care of this very pleasant patient.

## 2023-09-28 LAB
ALBUMIN SERPL-MCNC: 2.9 GM/DL (ref 3.4–5)
ANION GAP SERPL CALCULATED.3IONS-SCNC: 3 MMOL/L (ref 4–16)
BASOPHILS ABSOLUTE: 0 K/CU MM
BASOPHILS RELATIVE PERCENT: 0.5 % (ref 0–1)
BUN SERPL-MCNC: 16 MG/DL (ref 6–23)
CALCIUM SERPL-MCNC: 7.9 MG/DL (ref 8.3–10.6)
CHLORIDE BLD-SCNC: 105 MMOL/L (ref 99–110)
CO2: 32 MMOL/L (ref 21–32)
CREAT SERPL-MCNC: 0.4 MG/DL (ref 0.9–1.3)
CULTURE: ABNORMAL
CULTURE: ABNORMAL
DIFFERENTIAL TYPE: ABNORMAL
EOSINOPHILS ABSOLUTE: 0 K/CU MM
EOSINOPHILS RELATIVE PERCENT: 0.5 % (ref 0–3)
GFR SERPL CREATININE-BSD FRML MDRD: >60 ML/MIN/1.73M2
GLUCOSE BLD-MCNC: 128 MG/DL (ref 70–99)
GLUCOSE BLD-MCNC: 146 MG/DL (ref 70–99)
GLUCOSE BLD-MCNC: 161 MG/DL (ref 70–99)
GLUCOSE BLD-MCNC: 225 MG/DL (ref 70–99)
GLUCOSE BLD-MCNC: 242 MG/DL (ref 70–99)
GLUCOSE BLD-MCNC: 272 MG/DL (ref 70–99)
GLUCOSE SERPL-MCNC: 120 MG/DL (ref 70–99)
HCT VFR BLD CALC: 28.5 % (ref 42–52)
HEMOGLOBIN: 8.7 GM/DL (ref 13.5–18)
IMMATURE NEUTROPHIL %: 2.9 % (ref 0–0.43)
LYMPHOCYTES ABSOLUTE: 3.1 K/CU MM
LYMPHOCYTES RELATIVE PERCENT: 49.4 % (ref 24–44)
Lab: ABNORMAL
MCH RBC QN AUTO: 27.9 PG (ref 27–31)
MCHC RBC AUTO-ENTMCNC: 30.5 % (ref 32–36)
MCV RBC AUTO: 91.3 FL (ref 78–100)
MONOCYTES ABSOLUTE: 0.4 K/CU MM
MONOCYTES RELATIVE PERCENT: 6.3 % (ref 0–4)
NUCLEATED RBC %: 0 %
PDW BLD-RTO: 19 % (ref 11.7–14.9)
PHOSPHORUS: 2.4 MG/DL (ref 2.5–4.9)
PLATELET # BLD: 74 K/CU MM (ref 140–440)
PMV BLD AUTO: 9.2 FL (ref 7.5–11.1)
POTASSIUM SERPL-SCNC: 4.4 MMOL/L (ref 3.5–5.1)
RBC # BLD: 3.12 M/CU MM (ref 4.6–6.2)
SEGMENTED NEUTROPHILS ABSOLUTE COUNT: 2.5 K/CU MM
SEGMENTED NEUTROPHILS RELATIVE PERCENT: 40.4 % (ref 36–66)
SMEAR REVIEW: NORMAL
SODIUM BLD-SCNC: 140 MMOL/L (ref 135–145)
SPECIMEN: ABNORMAL
TOTAL IMMATURE NEUTOROPHIL: 0.18 K/CU MM
TOTAL NUCLEATED RBC: 0 K/CU MM
WBC # BLD: 6.2 K/CU MM (ref 4–10.5)

## 2023-09-28 PROCEDURE — 2700000000 HC OXYGEN THERAPY PER DAY

## 2023-09-28 PROCEDURE — 6370000000 HC RX 637 (ALT 250 FOR IP): Performed by: INTERNAL MEDICINE

## 2023-09-28 PROCEDURE — 36591 DRAW BLOOD OFF VENOUS DEVICE: CPT

## 2023-09-28 PROCEDURE — 2580000003 HC RX 258: Performed by: INTERNAL MEDICINE

## 2023-09-28 PROCEDURE — 2060000000 HC ICU INTERMEDIATE R&B

## 2023-09-28 PROCEDURE — 94668 MNPJ CHEST WALL SBSQ: CPT

## 2023-09-28 PROCEDURE — 6360000002 HC RX W HCPCS: Performed by: INTERNAL MEDICINE

## 2023-09-28 PROCEDURE — 82962 GLUCOSE BLOOD TEST: CPT

## 2023-09-28 PROCEDURE — 94640 AIRWAY INHALATION TREATMENT: CPT

## 2023-09-28 PROCEDURE — 94761 N-INVAS EAR/PLS OXIMETRY MLT: CPT

## 2023-09-28 PROCEDURE — 99232 SBSQ HOSP IP/OBS MODERATE 35: CPT | Performed by: INTERNAL MEDICINE

## 2023-09-28 PROCEDURE — 85025 COMPLETE CBC W/AUTO DIFF WBC: CPT

## 2023-09-28 PROCEDURE — 80069 RENAL FUNCTION PANEL: CPT

## 2023-09-28 PROCEDURE — 99223 1ST HOSP IP/OBS HIGH 75: CPT | Performed by: INTERNAL MEDICINE

## 2023-09-28 RX ADMIN — ALLOPURINOL 300 MG: 100 TABLET ORAL at 09:20

## 2023-09-28 RX ADMIN — GUAIFENESIN 1200 MG: 600 TABLET, EXTENDED RELEASE ORAL at 09:20

## 2023-09-28 RX ADMIN — GABAPENTIN 100 MG: 100 CAPSULE ORAL at 09:20

## 2023-09-28 RX ADMIN — INSULIN LISPRO 4 UNITS: 100 INJECTION, SOLUTION INTRAVENOUS; SUBCUTANEOUS at 17:40

## 2023-09-28 RX ADMIN — CEFEPIME 2000 MG: 2 INJECTION, POWDER, FOR SOLUTION INTRAVENOUS at 09:35

## 2023-09-28 RX ADMIN — VALACYCLOVIR HYDROCHLORIDE 500 MG: 500 TABLET, FILM COATED ORAL at 09:20

## 2023-09-28 RX ADMIN — FINASTERIDE 5 MG: 5 TABLET, FILM COATED ORAL at 09:20

## 2023-09-28 RX ADMIN — GABAPENTIN 100 MG: 100 CAPSULE ORAL at 20:36

## 2023-09-28 RX ADMIN — IPRATROPIUM BROMIDE AND ALBUTEROL SULFATE 1 DOSE: 2.5; .5 SOLUTION RESPIRATORY (INHALATION) at 07:21

## 2023-09-28 RX ADMIN — SODIUM CHLORIDE, PRESERVATIVE FREE 10 ML: 5 INJECTION INTRAVENOUS at 09:20

## 2023-09-28 RX ADMIN — ATORVASTATIN CALCIUM 40 MG: 40 TABLET, FILM COATED ORAL at 20:36

## 2023-09-28 RX ADMIN — IPRATROPIUM BROMIDE AND ALBUTEROL SULFATE 1 DOSE: 2.5; .5 SOLUTION RESPIRATORY (INHALATION) at 20:00

## 2023-09-28 RX ADMIN — INSULIN GLARGINE 10 UNITS: 100 INJECTION, SOLUTION SUBCUTANEOUS at 20:36

## 2023-09-28 RX ADMIN — INSULIN LISPRO 2 UNITS: 100 INJECTION, SOLUTION INTRAVENOUS; SUBCUTANEOUS at 12:35

## 2023-09-28 RX ADMIN — IPRATROPIUM BROMIDE AND ALBUTEROL SULFATE 1 DOSE: 2.5; .5 SOLUTION RESPIRATORY (INHALATION) at 11:09

## 2023-09-28 RX ADMIN — CEFEPIME 2000 MG: 2 INJECTION, POWDER, FOR SOLUTION INTRAVENOUS at 17:45

## 2023-09-28 RX ADMIN — GABAPENTIN 100 MG: 100 CAPSULE ORAL at 14:27

## 2023-09-28 RX ADMIN — SODIUM CHLORIDE, PRESERVATIVE FREE 10 ML: 5 INJECTION INTRAVENOUS at 21:34

## 2023-09-28 RX ADMIN — CEFEPIME 2000 MG: 2 INJECTION, POWDER, FOR SOLUTION INTRAVENOUS at 03:39

## 2023-09-28 RX ADMIN — TAMSULOSIN HYDROCHLORIDE 0.4 MG: 0.4 CAPSULE ORAL at 09:20

## 2023-09-28 RX ADMIN — GUAIFENESIN 1200 MG: 600 TABLET, EXTENDED RELEASE ORAL at 20:36

## 2023-09-28 NOTE — CONSULTS
Daily Danish Barrera, JENNI - CNP   20 mg at 09/27/23 7198    insulin glargine (LANTUS) injection vial 10 Units  10 Units SubCUTAneous Nightly Enrique Tovar MD   10 Units at 09/27/23 2053    insulin lispro (HUMALOG) injection vial 0-4 Units  0-4 Units SubCUTAneous 2 times per day Enrique Tovar MD   4 Units at 09/27/23 2058    glucose chewable tablet 16 g  4 tablet Oral PRN Zachary Trevino MD        dextrose bolus 10% 125 mL  125 mL IntraVENous PRJORDAN Trevino MD        Or    dextrose bolus 10% 250 mL  250 mL IntraVENous PRN Zachary Trevino MD        glucagon injection 1 mg  1 mg SubCUTAneous PRN Zachary Trevino MD        dextrose 10 % infusion   IntraVENous Continuous PRJORDAN Trevino MD        insulin lispro (HUMALOG) injection vial 0-8 Units  0-8 Units SubCUTAneous TID WC Enrique Tovar MD   2 Units at 09/28/23 1235    ceFEPIme (MAXIPIME) 2,000 mg in sodium chloride 0.9 % 100 mL IVPB (mini-bag)  2,000 mg IntraVENous Q8H Jany Boyd MD   Stopped at 09/28/23 1005    albuterol sulfate HFA (PROVENTIL;VENTOLIN;PROAIR) 108 (90 Base) MCG/ACT inhaler 2 puff  2 puff Inhalation Q4H PRN Jany Boyd MD        atorvastatin (LIPITOR) tablet 40 mg  40 mg Oral Daily Jany Boyd MD   40 mg at 09/27/23 2049    finasteride (PROSCAR) tablet 5 mg  5 mg Oral Daily Jany Boyd MD   5 mg at 09/28/23 0920    gabapentin (NEURONTIN) capsule 100 mg  100 mg Oral TID Jany Boyd MD   100 mg at 09/28/23 0920    tamsulosin (FLOMAX) capsule 0.4 mg  0.4 mg Oral Daily Jany Boyd MD   0.4 mg at 09/28/23 0920    allopurinol (ZYLOPRIM) tablet 300 mg  300 mg Oral Daily Jany Boyd MD   300 mg at 09/28/23 0920    HYDROcodone-acetaminophen (NORCO)  MG per tablet 1 tablet  1 tablet Oral Q8H PRN Jany Boyd MD        guaiFENesin [facility]) extended release tablet 1,200 mg  1,200 mg Oral Q12H Jany Boyd MD   1,200 mg at 09/28/23 0920    valACYclovir (VALTREX) tablet 500 mg  500 mg 32   PHOS 2.4*   BUN 16   CREATININE 0.4*     No results for input(s): \"AST\", \"ALT\", \"ALB\", \"BILIDIR\", \"BILITOT\", \"ALKPHOS\" in the last 72 hours. No results for input(s): \"TROPONINT\" in the last 72 hours. Recent Labs     09/26/23  1711   PROBNP 1,281*     Lab Results   Component Value Date    INR 1.03 12/13/2022    PROTIME 13.3 12/13/2022       EKG: (reviewed by myself)    ECHO:(reviewed by myself)    Chest Xray:(reviewed by myself)  XR HIP RIGHT (2-3 VIEWS)    Result Date: 9/25/2023  EXAMINATION: TWO XRAY VIEWS OF THE RIGHT HIP 9/25/2023 11:59 am COMPARISON: None. HISTORY: ORDERING SYSTEM PROVIDED HISTORY: r hip pain TECHNOLOGIST PROVIDED HISTORY: Reason for exam:->r hip pain Reason for Exam: r hip pain FINDINGS: No fracture or dislocation is identified. No osseous destructive process. Mild degenerative changes are seen within the hip joint. No osseous erosive changes are identified. Intact right hip without acute osseous fracture. Mild degenerative changes seen within the hip joint. XR CHEST PORTABLE    Result Date: 9/24/2023  EXAMINATION: ONE XRAY VIEW OF THE CHEST 9/24/2023 10:57 am COMPARISON: CT chest 07/06/2023 and 03/17/2023, chest x-ray 02/28/2023 HISTORY: ORDERING SYSTEM PROVIDED HISTORY: Altered Mental Status TECHNOLOGIST PROVIDED HISTORY: Reason for exam:->Altered Mental Status Reason for Exam: Altered Mental Status FINDINGS: Cavitary disease in the lower right lung has improved from the earlier studies. There is suspected mild acute interstitial infiltrate or edema superimposed on chronic lung disease/COPD. Compared with the most recent CT chest of 07/06/2023. There is no pneumothorax or pleural effusion. Heart size is normal.  Descending thoracic aorta is tortuous and unchanged. There is an unchanged right IJ MediPort catheter. No acute bone finding. Acute interstitial edema or pneumonia is suspected superimposed on chronic lung disease/COPD.  Cavitary disease in the right lower

## 2023-09-28 NOTE — PROGRESS NOTES
Resource RN rounding on pt with elevated DI score. VSS on 5L NC , baseline is 3L NC. Pts resp sustaining in the high 20's- 30's . Pt reports that his work of breathing is unchanged from his baseline and denies SOB. He denies needs and states he is feeling pretty good. No signs or symptoms of distress noted.  He is repositioning his self in bed without issue and used the urinal.

## 2023-09-28 NOTE — PROGRESS NOTES
V2.0  [facility] Hospitalist Progress Note      Name:  Florentino Lu /Age/Sex: 1950  (68 y.o. male)   MRN & CSN:  [id number] & [id number] Encounter Date/Time: 2023 3:14 PM EDT    Location:  -A PCP: Emilio Garcia MD       Hospital Day: 4      Subjective:     Chief Complaint:  Hypoglycemia     Patient seen and examined at bedside. Still on high O2 6L sating 90%. No SOB or worsening of cough. Pt feels congested but unable to produce any sputum. Assessment and Plan:     Severe hypoglycemia-blood sugar 29 on admission. Hx of DM II. On Jardiance, metformin and Tresiba. Low glucose likely 2/2 home insulin, poor appetite, low reserve, cancer, and infection. Was on D10 now off, started on Lantus 25U nightly (from home 40U nightly) but still hypoglycemic, now changed to 10U nightly with hold precautions. Discussed with endocrinology. Will monitor closely. Acute respiratory failure with hypoxia 2/2 bacterial PNA. CXR acute interstitial edema or PNA. On O2 6L NC (baseline 3L NC). Hx of Bronchiectasis with resp cs Pseudomonas intermediate resistant to cefepime and merrem. Strep, legionella and MRSA all negative. S/p vanc+cefepime, stopped vancomycin as MRSA nares negative and continue cefepime, added inhaled tobramycin but ID d/seth, await resp cx. Continue acapella, chest physiotherapy, check BNP, consider CT chest if no improvement. Severe sepsis 2/2 bacterial PNA and Staph. Epi bacteremia P/w leukocytosis WBC 17, tachycardia 100's,  LA 2.2. Blood cx Staph. Ep x2.   -On abx as above    -TTE with echodensity on aortic valves, healed vegetation vs calcification  -ID following  -F/u repeat Bcx from mediport and peripheral BCx     COPD 2/2 Chronic respiratory failure with hypoxia-on 3 L oxygen at home. Continue home meds, breathing tx.   -Pulmonology following      CLL. Since . Converted from drug to poor prognostic factors.  On venetoclax since 2023, most recent CT scan in July

## 2023-09-28 NOTE — CARE COORDINATION
CM reviewed chart and discussed in IDR. Pt is not medically ready at this time. Pt will have ANTONY today to evaluate for endocarditis. CM received call from pts CM with [address] on [address] [facility], [phone], regarding the services that pt receives & will receive. Pt receives meals, a medical alert button and they are in the process of getting an aide, nurse and transportation. Plan remains home with son.

## 2023-09-28 NOTE — PLAN OF CARE
Problem: Discharge Planning  Goal: Discharge to home or other facility with appropriate resources  Outcome: Progressing     Problem: Safety - Adult  Goal: Free from fall injury  Outcome: Progressing     Problem: Skin/Tissue Integrity  Goal: Absence of new skin breakdown  Description: 1. Monitor for areas of redness and/or skin breakdown  2. Assess vascular access sites hourly  3. Every 4-6 hours minimum:  Change oxygen saturation probe site  4. Every 4-6 hours:  If on nasal continuous positive airway pressure, respiratory therapy assess nares and determine need for appliance change or resting period. Outcome: Progressing     Problem: Confusion  Goal: Confusion, delirium, dementia, or psychosis is improved or at baseline  Description: INTERVENTIONS:  1. Assess for possible contributors to thought disturbance, including medications, impaired vision or hearing, underlying metabolic abnormalities, dehydration, psychiatric diagnoses, and notify attending LIP  2. [place] high risk fall precautions, as indicated  3. Provide frequent short contacts to provide reality reorientation, refocusing and direction  4. Decrease environmental stimuli, including noise as appropriate  5. Monitor and intervene to maintain adequate nutrition, hydration, elimination, sleep and activity  6. If unable to ensure safety without constant attention obtain sitter and review sitter guidelines with assigned personnel  7.  Initiate Psychosocial CNS and Spiritual Care consult, as indicated  Outcome: Progressing     Problem: Chronic Conditions and Co-morbidities  Goal: Patient's chronic conditions and co-morbidity symptoms are monitored and maintained or improved  Outcome: Progressing     Problem: Respiratory - Adult  Goal: Achieves optimal ventilation and oxygenation  Outcome: Progressing     Problem: Skin/Tissue Integrity - Adult  Goal: Skin integrity remains intact  Outcome: Progressing  Goal: Incisions, wounds, or drain sites healing

## 2023-09-28 NOTE — PROGRESS NOTES
V2.0  [facility] Hospitalist Progress Note      Name:  Pauly Phan /Age/Sex: 1950  (68 y.o. male)   MRN & CSN:  [id number] & [id number] Encounter Date/Time: 2023 3:14 PM EDT    Location:  -A PCP: Unruly Heller MD       Hospital Day: 5      Subjective:     Chief Complaint:  Hypoglycemia     Patient seen and examined at bedside. Still on high O2 6L sating low 90s. No SOB or worsening of cough. Pt feels congested but unable to produce any sputum. Assessment and Plan:     Severe hypoglycemia-blood sugar 29 on admission. Hx of DM II. On Jardiance, metformin and Tresiba. Low glucose likely 2/2 home insulin, poor appetite, low reserve, cancer, and infection. Was on D10 now off, started on Lantus 25U nightly (from home 40U nightly) but still hypoglycemic, now changed to 10U nightly with hold precautions. Discussed with endocrinology. Will monitor closely. Acute respiratory failure with hypoxia 2/2 bacterial PNA. CXR acute interstitial edema or PNA. On O2 6L NC (baseline 3L NC). Hx of Bronchiectasis with resp cs Pseudomonas intermediate resistant to cefepime and merrem. Strep, legionella and MRSA all negative. S/p vanc+cefepime, stopped vancomycin as MRSA nares negative and continue cefepime, added inhaled tobramycin but ID d/seth, await resp cx. Continue acapella, chest physiotherapy, consider CT chest if no improvement. Severe sepsis 2/2 bacterial PNA and Staph. Epi bacteremia P/w leukocytosis WBC 17, tachycardia 100's,  LA 2.2. Blood cx Staph. Ep x2.   -On abx as above    -TTE with echodensity on aortic valves, healed vegetation vs calcification  -ID following  -F/u repeat Bcx from mediport and peripheral BCx     COPD 2/2 Chronic respiratory failure with hypoxia-on 3 L oxygen at home. Continue home meds, breathing tx.   -Pulmonology following      CLL. Since . Converted from drug to poor prognostic factors.  On venetoclax since 2023, most recent CT scan in July with right lung has improved from the earlier studies. There is suspected mild acute interstitial infiltrate or edema superimposed on chronic lung disease/COPD. Compared with the most recent CT chest of 07/06/2023. There is no pneumothorax or pleural effusion. Heart size is normal.  Descending thoracic aorta is tortuous and unchanged. There is an unchanged right IJ MediPort catheter. No acute bone finding. Acute interstitial edema or pneumonia is suspected superimposed on chronic lung disease/COPD. Cavitary disease in the right lower lung noted on earlier studies has improved. CT HEAD WO CONTRAST    Result Date: 9/24/2023  EXAMINATION: CT OF THE HEAD WITHOUT CONTRAST  9/24/2023 10:14 am TECHNIQUE: CT of the head was performed without the administration of intravenous contrast. Automated exposure control, iterative reconstruction, and/or weight based adjustment of the mA/kV was utilized to reduce the radiation dose to as low as reasonably achievable. COMPARISON: 09/28/2022 CT head HISTORY: ORDERING SYSTEM PROVIDED HISTORY: AMS TECHNOLOGIST PROVIDED HISTORY: Reason for exam:->AMS Has a \"code stroke\" or \"stroke alert\" been called? ->No Decision Support Exception - unselect if not a suspected or confirmed emergency medical condition->Emergency Medical Condition (MA) Reason for Exam: AMS  HYPOGLYCEMIA Additional signs and symptoms: PT MOTION  PT UNABLE TO UNDERSTAND ALL COMMANDS FINDINGS: BRAIN/VENTRICLES: There is no acute intracranial hemorrhage, mass effect or midline shift. No abnormal extra-axial fluid collection. The gray-white differentiation is maintained without evidence of an acute infarct. There are nonspecific areas of hypoattenuation within the periventricular and subcortical white matter, which likely represent chronic microvascular ischemic change. There is prominence of the ventricles and sulci due to global parenchymal volume loss.  ORBITS: The visualized portion of the orbits demonstrate no

## 2023-09-29 LAB
GLUCOSE BLD-MCNC: 134 MG/DL (ref 70–99)
GLUCOSE BLD-MCNC: 191 MG/DL (ref 70–99)
GLUCOSE BLD-MCNC: 202 MG/DL (ref 70–99)
GLUCOSE BLD-MCNC: 259 MG/DL (ref 70–99)
GLUCOSE BLD-MCNC: 271 MG/DL (ref 70–99)

## 2023-09-29 PROCEDURE — 6360000002 HC RX W HCPCS: Performed by: INTERNAL MEDICINE

## 2023-09-29 PROCEDURE — 6370000000 HC RX 637 (ALT 250 FOR IP): Performed by: INTERNAL MEDICINE

## 2023-09-29 PROCEDURE — 94640 AIRWAY INHALATION TREATMENT: CPT

## 2023-09-29 PROCEDURE — 2580000003 HC RX 258: Performed by: INTERNAL MEDICINE

## 2023-09-29 PROCEDURE — 2060000000 HC ICU INTERMEDIATE R&B

## 2023-09-29 PROCEDURE — 2700000000 HC OXYGEN THERAPY PER DAY

## 2023-09-29 PROCEDURE — 94761 N-INVAS EAR/PLS OXIMETRY MLT: CPT

## 2023-09-29 PROCEDURE — 99232 SBSQ HOSP IP/OBS MODERATE 35: CPT | Performed by: INTERNAL MEDICINE

## 2023-09-29 PROCEDURE — 94669 MECHANICAL CHEST WALL OSCILL: CPT

## 2023-09-29 PROCEDURE — 94010 BREATHING CAPACITY TEST: CPT

## 2023-09-29 PROCEDURE — 82962 GLUCOSE BLOOD TEST: CPT

## 2023-09-29 RX ADMIN — TAMSULOSIN HYDROCHLORIDE 0.4 MG: 0.4 CAPSULE ORAL at 09:20

## 2023-09-29 RX ADMIN — SODIUM CHLORIDE, PRESERVATIVE FREE 10 ML: 5 INJECTION INTRAVENOUS at 21:15

## 2023-09-29 RX ADMIN — GUAIFENESIN 1200 MG: 600 TABLET, EXTENDED RELEASE ORAL at 09:20

## 2023-09-29 RX ADMIN — ENOXAPARIN SODIUM 40 MG: 100 INJECTION SUBCUTANEOUS at 09:19

## 2023-09-29 RX ADMIN — ALLOPURINOL 300 MG: 100 TABLET ORAL at 09:20

## 2023-09-29 RX ADMIN — ATORVASTATIN CALCIUM 40 MG: 40 TABLET, FILM COATED ORAL at 21:16

## 2023-09-29 RX ADMIN — INSULIN LISPRO 4 UNITS: 100 INJECTION, SOLUTION INTRAVENOUS; SUBCUTANEOUS at 12:18

## 2023-09-29 RX ADMIN — CEFEPIME 2000 MG: 2 INJECTION, POWDER, FOR SOLUTION INTRAVENOUS at 10:50

## 2023-09-29 RX ADMIN — IPRATROPIUM BROMIDE AND ALBUTEROL SULFATE 1 DOSE: 2.5; .5 SOLUTION RESPIRATORY (INHALATION) at 15:48

## 2023-09-29 RX ADMIN — HYDROCODONE BITARTRATE AND ACETAMINOPHEN 1 TABLET: 10; 325 TABLET ORAL at 21:19

## 2023-09-29 RX ADMIN — GUAIFENESIN 1200 MG: 600 TABLET, EXTENDED RELEASE ORAL at 21:19

## 2023-09-29 RX ADMIN — HYDROCODONE BITARTRATE AND ACETAMINOPHEN 1 TABLET: 10; 325 TABLET ORAL at 09:20

## 2023-09-29 RX ADMIN — SODIUM CHLORIDE, PRESERVATIVE FREE 10 ML: 5 INJECTION INTRAVENOUS at 09:20

## 2023-09-29 RX ADMIN — CEFEPIME 2000 MG: 2 INJECTION, POWDER, FOR SOLUTION INTRAVENOUS at 02:57

## 2023-09-29 RX ADMIN — VALACYCLOVIR HYDROCHLORIDE 500 MG: 500 TABLET, FILM COATED ORAL at 09:20

## 2023-09-29 RX ADMIN — FINASTERIDE 5 MG: 5 TABLET, FILM COATED ORAL at 09:20

## 2023-09-29 RX ADMIN — GABAPENTIN 100 MG: 100 CAPSULE ORAL at 21:16

## 2023-09-29 RX ADMIN — CEFEPIME 2000 MG: 2 INJECTION, POWDER, FOR SOLUTION INTRAVENOUS at 19:38

## 2023-09-29 RX ADMIN — INSULIN LISPRO 4 UNITS: 100 INJECTION, SOLUTION INTRAVENOUS; SUBCUTANEOUS at 16:30

## 2023-09-29 RX ADMIN — IPRATROPIUM BROMIDE AND ALBUTEROL SULFATE 1 DOSE: 2.5; .5 SOLUTION RESPIRATORY (INHALATION) at 20:26

## 2023-09-29 RX ADMIN — IPRATROPIUM BROMIDE AND ALBUTEROL SULFATE 1 DOSE: 2.5; .5 SOLUTION RESPIRATORY (INHALATION) at 08:01

## 2023-09-29 RX ADMIN — GABAPENTIN 100 MG: 100 CAPSULE ORAL at 09:20

## 2023-09-29 RX ADMIN — GABAPENTIN 100 MG: 100 CAPSULE ORAL at 13:53

## 2023-09-29 ASSESSMENT — COPD QUESTIONNAIRES
QUESTION5_HOMEACTIVITIES: 0
GOLD_GROUP: GROUP B
QUESTION2_CHESTPHLEGM: 2
QUESTION8_ENERGYLEVEL: 0
QUESTION1_COUGHFREQUENCY: 3
QUESTION3_CHESTTIGHTNESS: 0
QUESTION7_SLEEPQUALITY: 5
QUESTION6_LEAVINGHOUSE: 0
TOTAL_EXACERBATIONS_PASTYEAR: 1
CAT_TOTALSCORE: 10
QUESTION4_WALKINCLINE: 0

## 2023-09-29 ASSESSMENT — PAIN - FUNCTIONAL ASSESSMENT: PAIN_FUNCTIONAL_ASSESSMENT: ACTIVITIES ARE NOT PREVENTED

## 2023-09-29 ASSESSMENT — PAIN SCALES - GENERAL
PAINLEVEL_OUTOF10: 7
PAINLEVEL_OUTOF10: 0
PAINLEVEL_OUTOF10: 6
PAINLEVEL_OUTOF10: 2
PAINLEVEL_OUTOF10: 0

## 2023-09-29 ASSESSMENT — PAIN DESCRIPTION - LOCATION
LOCATION: HIP;SHOULDER
LOCATION: HIP;SHOULDER

## 2023-09-29 ASSESSMENT — PULMONARY FUNCTION TESTS
PIF_VALUE: 60
FEV1 (%PREDICTED): 51
PEFR_L/MIN: 32
POST BRONCHODILATOR FEV1/FVC: 72

## 2023-09-29 ASSESSMENT — PAIN DESCRIPTION - ORIENTATION
ORIENTATION: RIGHT
ORIENTATION: RIGHT

## 2023-09-29 ASSESSMENT — PAIN DESCRIPTION - DESCRIPTORS
DESCRIPTORS: ACHING
DESCRIPTORS: ACHING

## 2023-09-29 NOTE — CARE COORDINATION
CM reviewed chart and discussed in IDR. Pt is not medically ready at this time. Possible discharge Monday or Tuesday.

## 2023-09-29 NOTE — PROGRESS NOTES
Comprehensive Nutrition Assessment    Type and Reason for Visit:  Reassess    Nutrition Recommendations/Plan:   Continue current oral diet and supplement   Please continue to encourage and document po intakes TID  Monitor weights, po intakes, glucose, labs, POC     Malnutrition Assessment:  Malnutrition Status:  Insufficient data (09/26/23 1139)    Context:  Chronic Illness       Nutrition Assessment:    Pt w/ staff at multiple attempted visits. Noted plan for ANTONY today, however pt respiratory status inadequate for procedure today; on 4L NC. Diet reordered, breakfast tray ordered per RN. Documented intakes, 26-50%, % of recent meals. No updated wt. Will continue current diet and oral supplements at this time. Continue to follow at high nutrition risk. Nutrition Related Findings:    glucose 161-242, P 2.4, albumin 2.9, hgb 8.7; hgbA1C 5.0%  Wound Type: Skin Tears, Unstageable, Pressure Injury       Current Nutrition Intake & Therapies:    Average Meal Intake: 26-50%, %  Average Supplements Intake: %  ADULT DIET; Regular; 5 carb choices (75 gm/meal)  ADULT ORAL NUTRITION SUPPLEMENT; Breakfast, Lunch, Dinner; Diabetic Oral Supplement    Anthropometric Measures:  Height: 6' (182.9 cm)  Ideal Body Weight (IBW): 178 lbs (81 kg)    Admission Body Weight: 149 lb 11.1 oz (67.9 kg) (bedscale)  Current Body Weight: 149 lb 11.1 oz (67.9 kg),   IBW.  Weight Source: Bed Scale  Current BMI (kg/m2): 20.3  Usual Body Weight: 167 lb (75.8 kg) (3/20/23)  % Weight Change (Calculated): -10.4  Weight Adjustment For: No Adjustment                 BMI Categories: Underweight (BMI less than 22) age over 72    Estimated Daily Nutrient Needs:  Energy Requirements Based On: Kcal/kg  Weight Used for Energy Requirements: Current  Energy (kcal/day): 4899-2040 (30-35kcal/kg)  Weight Used for Protein Requirements: Ideal  Protein (g/day): 101-122 (1.25-1.5g/kg IBW)  Method Used for Fluid Requirements: 1 ml/kcal  Fluid (ml/day):

## 2023-09-29 NOTE — PROGRESS NOTES
Cardiology Progress Note    Subjective/Overnight Events:  Patient feeling okay today. Unable to perform ANTONY today with anesthesia.  Will attempt to perform ANTONY on Monday    Assessment/Plan:  Sepsis with methicillin susceptible Staphylococcus epidermidis bacteremia a.m. bacterial pneumonia  -Echo concerning for healed vegetation versus calcification of the aortic valve  -Attempt to perform ANTONY on Monday, unable to perform today with anesthesia      Eugene Tavera PA-C 09/29/23 2:28 PM

## 2023-09-29 NOTE — PLAN OF CARE
Problem: Discharge Planning  Goal: Discharge to home or other facility with appropriate resources  Outcome: Progressing     Problem: Safety - Adult  Goal: Free from fall injury  Outcome: Progressing     Problem: Skin/Tissue Integrity  Goal: Absence of new skin breakdown  Description: 1. Monitor for areas of redness and/or skin breakdown  2. Assess vascular access sites hourly  3. Every 4-6 hours minimum:  Change oxygen saturation probe site  4. Every 4-6 hours:  If on nasal continuous positive airway pressure, respiratory therapy assess nares and determine need for appliance change or resting period. Outcome: Progressing     Problem: Confusion  Goal: Confusion, delirium, dementia, or psychosis is improved or at baseline  Description: INTERVENTIONS:  1. Assess for possible contributors to thought disturbance, including medications, impaired vision or hearing, underlying metabolic abnormalities, dehydration, psychiatric diagnoses, and notify attending LIP  2. [place] high risk fall precautions, as indicated  3. Provide frequent short contacts to provide reality reorientation, refocusing and direction  4. Decrease environmental stimuli, including noise as appropriate  5. Monitor and intervene to maintain adequate nutrition, hydration, elimination, sleep and activity  6. If unable to ensure safety without constant attention obtain sitter and review sitter guidelines with assigned personnel  7.  Initiate Psychosocial CNS and Spiritual Care consult, as indicated  Outcome: Progressing     Problem: Chronic Conditions and Co-morbidities  Goal: Patient's chronic conditions and co-morbidity symptoms are monitored and maintained or improved  Outcome: Progressing     Problem: Respiratory - Adult  Goal: Achieves optimal ventilation and oxygenation  Outcome: Progressing     Problem: Skin/Tissue Integrity - Adult  Goal: Skin integrity remains intact  Outcome: Progressing  Goal: Incisions, wounds, or drain sites healing

## 2023-09-29 NOTE — PROGRESS NOTES
Anesthesia does not feel the pt resp status is good enough to proceed with echo at this time, Dr Kayce Alicea notified, diet re-ordered,

## 2023-09-29 NOTE — PROGRESS NOTES
09/29/23 1042   Spirometry Assessment   FEV1 (%PRED) 51   Post Bronchodilator FEV/FVC 72   COPD Exacerbations in last year 1   PIF 60 L/min   COPD Assessment (CAT Score)   Cough Assessment 3   Phlegm Assessment 2   Chest tightness 0   Walking on an incline 0   Home Activities 0   Confident Leaving The Home 0   Sleeping Soundly 5   Have Energy 0   Assessment Score 10   $RT COPD Assessment Yes   GOLD Staging   Group Group B     Current GOLD classification for Daniel Anderson      GOLD Stage:  No data recorded  Group: Group B  Recorded domestic exacerbations past 12 months: 1  Current recorded COPD Assessment Tool (CAT) score of 10  Current eosinophil count: 0     Inhaler Device   Acceptable for Use   Respimat  Not Breath Actuated Yes   MDI  Not Breath Actuated Yes           DPI  Observed PIF   using  In-Check Meter   Optimal PIF   Acceptable for Use   HANDIHALER 60 >30 YES   Pressair 60 >45 YES   NEOHALER 60 >50 YES   Diskus 60 >60 YES   ELLIPTA 60 >60 YES     Records show Daniel Anderson was not using maintenance therapy prior to admission.  Will request LABA+LAMA from attending for maintenance medications          LONG-ACTING (LABA)   Arformoterol (Brovana) NEBULIZER   Indacaterol (Arcapta) NEOHALER   Olodaterol (Striverdi) Respimat   Salmeterol (Serevent) MDI, DISKUS   LONG-ACTING (LAMA)   Aclidinium bromide (Tudorza) PRESSAIR   Glycopyrronium bromide [place] Lili) NEOHALER   Tiotropium (Spiriva) Respimat, HANDIHALER   Umeclidinium (Incruse) ELLIPTA   (LABA/LAMA)   Formoterol/glycopyrronium (Bevespi) MDI   Indacaterol/glycopyrronium (Utibron) NEOHALER   Vilanterol/umeclidinium (Anoro) ELLIPTA   Olodaterol/tiotropium (Stiolto) Respimat   (LABA/ICS)   Formoterol/budesomide (Symbicort) MDI   Formoterol/mometasone (Dulera) MDI   Salmeterol/fluticasone (Advair) MDI, DISKUS   Vilanterol/fluticasone (Breo) ELLIPTA   (LABA/LAMA/ICS)   Fluticasone/umeclidinium/vilanterol (Trelegy) ELLIPTA   Budesonide/glycopyrrolate/formoterol

## 2023-09-30 LAB
ANION GAP SERPL CALCULATED.3IONS-SCNC: 3 MMOL/L (ref 4–16)
ANION GAP SERPL CALCULATED.3IONS-SCNC: 7 MMOL/L (ref 4–16)
ANISOCYTOSIS: ABNORMAL
BANDED NEUTROPHILS ABSOLUTE COUNT: 0.6 K/CU MM
BANDED NEUTROPHILS RELATIVE PERCENT: 9 % (ref 5–11)
BASOPHILS ABSOLUTE: 0 K/CU MM
BASOPHILS RELATIVE PERCENT: 0.5 % (ref 0–1)
BUN SERPL-MCNC: 17 MG/DL (ref 6–23)
BUN SERPL-MCNC: 20 MG/DL (ref 6–23)
CALCIUM SERPL-MCNC: 8.2 MG/DL (ref 8.3–10.6)
CALCIUM SERPL-MCNC: 8.2 MG/DL (ref 8.3–10.6)
CHLORIDE BLD-SCNC: 100 MMOL/L (ref 99–110)
CHLORIDE BLD-SCNC: 98 MMOL/L (ref 99–110)
CO2: 30 MMOL/L (ref 21–32)
CO2: 35 MMOL/L (ref 21–32)
CREAT SERPL-MCNC: 0.5 MG/DL (ref 0.9–1.3)
CREAT SERPL-MCNC: 0.5 MG/DL (ref 0.9–1.3)
DIFFERENTIAL TYPE: ABNORMAL
DIFFERENTIAL TYPE: ABNORMAL
EOSINOPHILS ABSOLUTE: 0 K/CU MM
EOSINOPHILS RELATIVE PERCENT: 0.6 % (ref 0–3)
GFR SERPL CREATININE-BSD FRML MDRD: >60 ML/MIN/1.73M2
GFR SERPL CREATININE-BSD FRML MDRD: >60 ML/MIN/1.73M2
GLUCOSE BLD-MCNC: 189 MG/DL (ref 70–99)
GLUCOSE BLD-MCNC: 199 MG/DL (ref 70–99)
GLUCOSE BLD-MCNC: 220 MG/DL (ref 70–99)
GLUCOSE BLD-MCNC: 269 MG/DL (ref 70–99)
GLUCOSE BLD-MCNC: 342 MG/DL (ref 70–99)
GLUCOSE BLD-MCNC: 361 MG/DL (ref 70–99)
GLUCOSE SERPL-MCNC: 183 MG/DL (ref 70–99)
GLUCOSE SERPL-MCNC: 227 MG/DL (ref 70–99)
HCT VFR BLD CALC: 28.7 % (ref 42–52)
HCT VFR BLD CALC: 28.7 % (ref 42–52)
HEMOGLOBIN: 8.6 GM/DL (ref 13.5–18)
HEMOGLOBIN: 8.7 GM/DL (ref 13.5–18)
IMMATURE NEUTROPHIL %: 4.8 % (ref 0–0.43)
LYMPHOCYTES ABSOLUTE: 2.9 K/CU MM
LYMPHOCYTES ABSOLUTE: 3.1 K/CU MM
LYMPHOCYTES RELATIVE PERCENT: 43 % (ref 24–44)
LYMPHOCYTES RELATIVE PERCENT: 47.4 % (ref 24–44)
MCH RBC QN AUTO: 27.7 PG (ref 27–31)
MCH RBC QN AUTO: 27.8 PG (ref 27–31)
MCHC RBC AUTO-ENTMCNC: 30 % (ref 32–36)
MCHC RBC AUTO-ENTMCNC: 30.3 % (ref 32–36)
MCV RBC AUTO: 91.7 FL (ref 78–100)
MCV RBC AUTO: 92.3 FL (ref 78–100)
METAMYELOCYTES ABSOLUTE COUNT: 0.13 K/CU MM
METAMYELOCYTES PERCENT: 2 %
MONOCYTES ABSOLUTE: 0.7 K/CU MM
MONOCYTES ABSOLUTE: 0.7 K/CU MM
MONOCYTES RELATIVE PERCENT: 10 % (ref 0–4)
MONOCYTES RELATIVE PERCENT: 10.3 % (ref 0–4)
NUCLEATED RBC %: 0 %
PDW BLD-RTO: 19.1 % (ref 11.7–14.9)
PDW BLD-RTO: 19.3 % (ref 11.7–14.9)
PLATELET # BLD: 79 K/CU MM (ref 140–440)
PLATELET # BLD: 79 K/CU MM (ref 140–440)
PMV BLD AUTO: 10.2 FL (ref 7.5–11.1)
PMV BLD AUTO: 9.4 FL (ref 7.5–11.1)
POTASSIUM SERPL-SCNC: 4.6 MMOL/L (ref 3.5–5.1)
POTASSIUM SERPL-SCNC: 4.7 MMOL/L (ref 3.5–5.1)
RBC # BLD: 3.11 M/CU MM (ref 4.6–6.2)
RBC # BLD: 3.13 M/CU MM (ref 4.6–6.2)
RBC # BLD: ABNORMAL 10*6/UL
SEGMENTED NEUTROPHILS ABSOLUTE COUNT: 2.4 K/CU MM
SEGMENTED NEUTROPHILS ABSOLUTE COUNT: 2.4 K/CU MM
SEGMENTED NEUTROPHILS RELATIVE PERCENT: 36 % (ref 36–66)
SEGMENTED NEUTROPHILS RELATIVE PERCENT: 36.4 % (ref 36–66)
SODIUM BLD-SCNC: 135 MMOL/L (ref 135–145)
SODIUM BLD-SCNC: 138 MMOL/L (ref 135–145)
TOTAL IMMATURE NEUTOROPHIL: 0.32 K/CU MM
TOTAL NUCLEATED RBC: 0 K/CU MM
TOXIC GRANULATION: PRESENT
WBC # BLD: 6.6 K/CU MM (ref 4–10.5)
WBC # BLD: 6.7 K/CU MM (ref 4–10.5)

## 2023-09-30 PROCEDURE — 2060000000 HC ICU INTERMEDIATE R&B

## 2023-09-30 PROCEDURE — 6360000002 HC RX W HCPCS: Performed by: INTERNAL MEDICINE

## 2023-09-30 PROCEDURE — 82962 GLUCOSE BLOOD TEST: CPT

## 2023-09-30 PROCEDURE — 80048 BASIC METABOLIC PNL TOTAL CA: CPT

## 2023-09-30 PROCEDURE — 6370000000 HC RX 637 (ALT 250 FOR IP): Performed by: INTERNAL MEDICINE

## 2023-09-30 PROCEDURE — 2700000000 HC OXYGEN THERAPY PER DAY

## 2023-09-30 PROCEDURE — 85027 COMPLETE CBC AUTOMATED: CPT

## 2023-09-30 PROCEDURE — 85025 COMPLETE CBC W/AUTO DIFF WBC: CPT

## 2023-09-30 PROCEDURE — 94761 N-INVAS EAR/PLS OXIMETRY MLT: CPT

## 2023-09-30 PROCEDURE — 99232 SBSQ HOSP IP/OBS MODERATE 35: CPT | Performed by: INTERNAL MEDICINE

## 2023-09-30 PROCEDURE — 94640 AIRWAY INHALATION TREATMENT: CPT

## 2023-09-30 PROCEDURE — 6370000000 HC RX 637 (ALT 250 FOR IP): Performed by: STUDENT IN AN ORGANIZED HEALTH CARE EDUCATION/TRAINING PROGRAM

## 2023-09-30 PROCEDURE — 85007 BL SMEAR W/DIFF WBC COUNT: CPT

## 2023-09-30 PROCEDURE — 2580000003 HC RX 258: Performed by: INTERNAL MEDICINE

## 2023-09-30 RX ORDER — INSULIN LISPRO 100 [IU]/ML
0-4 INJECTION, SOLUTION INTRAVENOUS; SUBCUTANEOUS
Status: DISCONTINUED | OUTPATIENT
Start: 2023-09-30 | End: 2023-10-01

## 2023-09-30 RX ORDER — INSULIN LISPRO 100 [IU]/ML
5 INJECTION, SOLUTION INTRAVENOUS; SUBCUTANEOUS
Status: DISCONTINUED | OUTPATIENT
Start: 2023-09-30 | End: 2023-10-03

## 2023-09-30 RX ADMIN — SODIUM CHLORIDE, PRESERVATIVE FREE 10 ML: 5 INJECTION INTRAVENOUS at 20:23

## 2023-09-30 RX ADMIN — IPRATROPIUM BROMIDE AND ALBUTEROL SULFATE 1 DOSE: 2.5; .5 SOLUTION RESPIRATORY (INHALATION) at 15:57

## 2023-09-30 RX ADMIN — ATORVASTATIN CALCIUM 40 MG: 40 TABLET, FILM COATED ORAL at 20:23

## 2023-09-30 RX ADMIN — GABAPENTIN 100 MG: 100 CAPSULE ORAL at 12:45

## 2023-09-30 RX ADMIN — INSULIN GLARGINE 10 UNITS: 100 INJECTION, SOLUTION SUBCUTANEOUS at 20:23

## 2023-09-30 RX ADMIN — GABAPENTIN 100 MG: 100 CAPSULE ORAL at 09:42

## 2023-09-30 RX ADMIN — IPRATROPIUM BROMIDE AND ALBUTEROL SULFATE 1 DOSE: 2.5; .5 SOLUTION RESPIRATORY (INHALATION) at 22:36

## 2023-09-30 RX ADMIN — INSULIN LISPRO 4 UNITS: 100 INJECTION, SOLUTION INTRAVENOUS; SUBCUTANEOUS at 20:23

## 2023-09-30 RX ADMIN — ALLOPURINOL 300 MG: 100 TABLET ORAL at 09:42

## 2023-09-30 RX ADMIN — FINASTERIDE 5 MG: 5 TABLET, FILM COATED ORAL at 09:42

## 2023-09-30 RX ADMIN — TAMSULOSIN HYDROCHLORIDE 0.4 MG: 0.4 CAPSULE ORAL at 09:43

## 2023-09-30 RX ADMIN — SODIUM CHLORIDE 25 ML/HR: 9 INJECTION, SOLUTION INTRAVENOUS at 18:23

## 2023-09-30 RX ADMIN — CEFEPIME 2000 MG: 2 INJECTION, POWDER, FOR SOLUTION INTRAVENOUS at 18:27

## 2023-09-30 RX ADMIN — IPRATROPIUM BROMIDE AND ALBUTEROL SULFATE 1 DOSE: 2.5; .5 SOLUTION RESPIRATORY (INHALATION) at 09:00

## 2023-09-30 RX ADMIN — GUAIFENESIN 1200 MG: 600 TABLET, EXTENDED RELEASE ORAL at 20:23

## 2023-09-30 RX ADMIN — HYDROCODONE BITARTRATE AND ACETAMINOPHEN 1 TABLET: 10; 325 TABLET ORAL at 12:45

## 2023-09-30 RX ADMIN — GABAPENTIN 100 MG: 100 CAPSULE ORAL at 20:23

## 2023-09-30 RX ADMIN — SODIUM CHLORIDE, PRESERVATIVE FREE 10 ML: 5 INJECTION INTRAVENOUS at 09:44

## 2023-09-30 RX ADMIN — CEFEPIME 2000 MG: 2 INJECTION, POWDER, FOR SOLUTION INTRAVENOUS at 03:44

## 2023-09-30 RX ADMIN — VALACYCLOVIR HYDROCHLORIDE 500 MG: 500 TABLET, FILM COATED ORAL at 09:43

## 2023-09-30 RX ADMIN — HYDROCODONE BITARTRATE AND ACETAMINOPHEN 1 TABLET: 10; 325 TABLET ORAL at 20:24

## 2023-09-30 RX ADMIN — ENOXAPARIN SODIUM 40 MG: 100 INJECTION SUBCUTANEOUS at 09:43

## 2023-09-30 RX ADMIN — IPRATROPIUM BROMIDE AND ALBUTEROL SULFATE 1 DOSE: 2.5; .5 SOLUTION RESPIRATORY (INHALATION) at 12:44

## 2023-09-30 RX ADMIN — CEFEPIME 2000 MG: 2 INJECTION, POWDER, FOR SOLUTION INTRAVENOUS at 10:05

## 2023-09-30 RX ADMIN — GUAIFENESIN 1200 MG: 600 TABLET, EXTENDED RELEASE ORAL at 09:43

## 2023-09-30 RX ADMIN — TIOTROPIUM BROMIDE AND OLODATEROL 2 PUFF: 3.124; 2.736 SPRAY, METERED RESPIRATORY (INHALATION) at 09:14

## 2023-09-30 RX ADMIN — INSULIN LISPRO 5 UNITS: 100 INJECTION, SOLUTION INTRAVENOUS; SUBCUTANEOUS at 12:45

## 2023-09-30 RX ADMIN — INSULIN LISPRO 1 UNITS: 100 INJECTION, SOLUTION INTRAVENOUS; SUBCUTANEOUS at 12:46

## 2023-09-30 ASSESSMENT — PAIN DESCRIPTION - ORIENTATION
ORIENTATION: RIGHT
ORIENTATION: LEFT

## 2023-09-30 ASSESSMENT — PAIN SCALES - GENERAL
PAINLEVEL_OUTOF10: 6
PAINLEVEL_OUTOF10: 2
PAINLEVEL_OUTOF10: 1
PAINLEVEL_OUTOF10: 5

## 2023-09-30 ASSESSMENT — PAIN SCALES - WONG BAKER: WONGBAKER_NUMERICALRESPONSE: 2

## 2023-09-30 ASSESSMENT — PAIN DESCRIPTION - DESCRIPTORS
DESCRIPTORS: ACHING
DESCRIPTORS: ACHING

## 2023-09-30 ASSESSMENT — PAIN DESCRIPTION - LOCATION
LOCATION: RIB CAGE
LOCATION: HIP;LEG;SHOULDER

## 2023-09-30 ASSESSMENT — PAIN DESCRIPTION - FREQUENCY: FREQUENCY: CONTINUOUS

## 2023-09-30 ASSESSMENT — PAIN DESCRIPTION - ONSET: ONSET: ON-GOING

## 2023-09-30 ASSESSMENT — PAIN DESCRIPTION - PAIN TYPE: TYPE: ACUTE PAIN

## 2023-09-30 NOTE — PROGRESS NOTES
Sepsis with methicillin susceptible staphylococcus epidermidis bacteremia- ANTONY planned for Monday to rule out vegetation vs calcified aortic valve. HTN- BP is stable.      Dyslipidemia- continue statins    Diabetes per endocrinology

## 2023-09-30 NOTE — PROGRESS NOTES
V2.0  [facility] Hospitalist Progress Note      Name:  Ronny Krishnan /Age/Sex: 1950  (68 y.o. male)   MRN & CSN:  [id number] & [id number] Encounter Date/Time: 2023 3:14 PM EDT    Location:  -A PCP: Iris Desouza MD       Hospital Day: 6      Subjective:     Chief Complaint:  Hypoglycemia     Patient seen and examined at bedside. Still on high O2 4L sating low 90s. No SOB or worsening of cough. Pt feels congested but unable to produce any sputum. ANTONY canceled by anesthesia due to respiratory status. Potential plan to attempt again on Monday or Tuesday per my discussion with cardiology. Assessment and Plan:     Severe hypoglycemia-blood sugar 29 on admission. Hx of DM II. On Jardiance, metformin and Tresiba. Low glucose likely 2/2 home insulin, poor appetite, low reserve, cancer, and infection. Was on D10 now off, started on Lantus 25U nightly (from home 40U nightly) but still hypoglycemic, now changed to 10U nightly with hold precautions and glucose much improved. Discussed with endocrinology. Will monitor closely. Acute respiratory failure with hypoxia 2/2 bacterial PNA. CXR acute interstitial edema or PNA. On O2 6L NC (baseline 3L NC). Hx of Bronchiectasis with resp cs Pseudomonas intermediate resistant to cefepime and merrem. Strep, legionella and MRSA all negative. S/p vanc+cefepime, stopped vancomycin as MRSA nares negative and continue cefepime, added inhaled tobramycin but ID d/seth, await resp cx. Continue acapella, chest physiotherapy, consider CT chest if no improvement. Severe sepsis 2/2 bacterial PNA and Staph. Epi bacteremia P/w leukocytosis WBC 17, tachycardia 100's,  LA 2.2. Blood cx Staph.  Ep x2.   -On abx as above    -TTE with echodensity on aortic valves, healed vegetation vs calcification  -ID following: continue cefepime to compete 7 day course on , cefazolin will be started after completion of cefepime  -F/u repeat Bcx from [facility] and peripheral

## 2023-09-30 NOTE — PROGRESS NOTES
PT BS @ 342 no coverage at this time. Dr. Camilo Tsai notified via perfect serve and NNO. Stated no coverage at this time.

## 2023-09-30 NOTE — PROGRESS NOTES
Progress Note( Dr. Yuliya Escobar)  9/30/2023  Subjective:   Admit Date: 9/24/2023  PCP: Yazmin Tran MD    Admitted For :Shortness of breath, has hypoglycemia    Consulted For:  Better control of blood glucose    Interval History patient is not doing well this morning short of breath not interested in eating his breakfast this morning  Been running higher blood glucose yesterday    Denies any chest pains,   Yes SOB . Oxygen  Denies nausea or vomiting. Now patient is eating better  No new bowel or bladder symptoms. Intake/Output Summary (Last 24 hours) at 9/30/2023 0949  Last data filed at 9/30/2023 0349  Gross per 24 hour   Intake --   Output 1075 ml   Net -1075 ml         DATA    CBC:   Recent Labs     09/28/23  0645 09/30/23  0035 09/30/23  0620   WBC 6.2 6.7 6.6   HGB 8.7* 8.6* 8.7*   PLT 74* 79* 79*      CMP:  Recent Labs     09/28/23  0645 09/30/23  0035 09/30/23  0620    135 138   K 4.4 4.6 4.7    98* 100   CO2 32 30 35*   BUN 16 20 17   CREATININE 0.4* 0.5* 0.5*   CALCIUM 7.9* 8.2* 8.2*   LABALBU 2.9*  --   --        Lipids:   Lab Results   Component Value Date/Time    CHOL 160 08/03/2012 02:47 PM    HDL 32 08/03/2012 02:47 PM    TRIG 274 08/03/2012 02:47 PM     Glucose:  Recent Labs     09/29/23  2006 09/30/23  0206 09/30/23  0649   POCGLU 202* 220* 199*       [id number]:  Lab Results   Component Value Date/Time    LABA1C 5.0 09/24/2023 09:36 AM     High Sensitivity TSH:   Lab Results   Component Value Date/Time    TSHHS 3.930 09/24/2023 09:36 AM     Free T3:   Lab Results   Component Value Date/Time    FT3 3.2 03/27/2012 12:39 PM     Free T4:  Lab Results   Component Value Date/Time    T4FREE 1.37 04/04/2019 12:39 PM       XR HIP RIGHT (2-3 VIEWS)   Final Result   Intact right hip without acute osseous fracture. Mild degenerative changes seen within the hip joint.          XR CHEST PORTABLE   Final Result   Acute interstitial edema or pneumonia is suspected superimposed on chronic

## 2023-09-30 NOTE — PLAN OF CARE
Problem: Discharge Planning  Goal: Discharge to home or other facility with appropriate resources  9/29/2023 2238 by Santa Kawasaki, RN  Outcome: Progressing  9/29/2023 1517 by Edelmira Frankel RN  Outcome: Progressing     Problem: Safety - Adult  Goal: Free from fall injury  9/29/2023 2238 by Santa Kawasaki, RN  Outcome: Progressing  9/29/2023 1517 by Edelmira Frankel RN  Outcome: Progressing     Problem: Skin/Tissue Integrity  Goal: Absence of new skin breakdown  Description: 1. Monitor for areas of redness and/or skin breakdown  2. Assess vascular access sites hourly  3. Every 4-6 hours minimum:  Change oxygen saturation probe site  4. Every 4-6 hours:  If on nasal continuous positive airway pressure, respiratory therapy assess nares and determine need for appliance change or resting period. 9/29/2023 2238 by Santa Kawasaki, RN  Outcome: Progressing  9/29/2023 1517 by Edelmira Frankel RN  Outcome: Progressing     Problem: Confusion  Goal: Confusion, delirium, dementia, or psychosis is improved or at baseline  Description: INTERVENTIONS:  1. Assess for possible contributors to thought disturbance, including medications, impaired vision or hearing, underlying metabolic abnormalities, dehydration, psychiatric diagnoses, and notify attending LIP  2. [place] high risk fall precautions, as indicated  3. Provide frequent short contacts to provide reality reorientation, refocusing and direction  4. Decrease environmental stimuli, including noise as appropriate  5. Monitor and intervene to maintain adequate nutrition, hydration, elimination, sleep and activity  6. If unable to ensure safety without constant attention obtain sitter and review sitter guidelines with assigned personnel  7.  Initiate Psychosocial CNS and Spiritual Care consult, as indicated  9/29/2023 2238 by Santa Kawasaki, RN  Outcome: Progressing  9/29/2023 1517 by Edelmira Frankel RN  Outcome: Progressing     Problem: Chronic Conditions and Co-morbidities  Goal: Patient's chronic nutritional status and recommend course of action   Monitor oral intake, labs, and treatment plans   Recommend appropriate diets, oral nutritional supplements, and vitamin/mineral supplements     Problem: Musculoskeletal - Adult  Goal: Return mobility to safest level of function  Outcome: Progressing  Goal: Maintain proper alignment of affected body part  Outcome: Progressing  Goal: Return ADL status to a safe level of function  Outcome: Progressing

## 2023-10-01 LAB
ANION GAP SERPL CALCULATED.3IONS-SCNC: 4 MMOL/L (ref 4–16)
BASOPHILS ABSOLUTE: 0.1 K/CU MM
BASOPHILS RELATIVE PERCENT: 0.6 % (ref 0–1)
BUN SERPL-MCNC: 20 MG/DL (ref 6–23)
CALCIUM SERPL-MCNC: 8.4 MG/DL (ref 8.3–10.6)
CHLORIDE BLD-SCNC: 104 MMOL/L (ref 99–110)
CO2: 34 MMOL/L (ref 21–32)
CREAT SERPL-MCNC: 0.5 MG/DL (ref 0.9–1.3)
CULTURE: NORMAL
DIFFERENTIAL TYPE: ABNORMAL
EOSINOPHILS ABSOLUTE: 0.1 K/CU MM
EOSINOPHILS RELATIVE PERCENT: 0.6 % (ref 0–3)
GFR SERPL CREATININE-BSD FRML MDRD: >60 ML/MIN/1.73M2
GLUCOSE BLD-MCNC: 164 MG/DL (ref 70–99)
GLUCOSE BLD-MCNC: 174 MG/DL (ref 70–99)
GLUCOSE BLD-MCNC: 188 MG/DL (ref 70–99)
GLUCOSE BLD-MCNC: 269 MG/DL (ref 70–99)
GLUCOSE BLD-MCNC: 277 MG/DL (ref 70–99)
GLUCOSE BLD-MCNC: 301 MG/DL (ref 70–99)
GLUCOSE SERPL-MCNC: 159 MG/DL (ref 70–99)
HCT VFR BLD CALC: 29.7 % (ref 42–52)
HEMOGLOBIN: 8.8 GM/DL (ref 13.5–18)
IMMATURE NEUTROPHIL %: 3.3 % (ref 0–0.43)
LYMPHOCYTES ABSOLUTE: 3.8 K/CU MM
LYMPHOCYTES RELATIVE PERCENT: 49.2 % (ref 24–44)
Lab: NORMAL
MCH RBC QN AUTO: 27.6 PG (ref 27–31)
MCHC RBC AUTO-ENTMCNC: 29.6 % (ref 32–36)
MCV RBC AUTO: 93.1 FL (ref 78–100)
MONOCYTES ABSOLUTE: 0.8 K/CU MM
MONOCYTES RELATIVE PERCENT: 10.8 % (ref 0–4)
NUCLEATED RBC %: 0 %
PDW BLD-RTO: 19.6 % (ref 11.7–14.9)
PLATELET # BLD: 82 K/CU MM (ref 140–440)
PMV BLD AUTO: 9.1 FL (ref 7.5–11.1)
POTASSIUM SERPL-SCNC: 4.8 MMOL/L (ref 3.5–5.1)
RBC # BLD: 3.19 M/CU MM (ref 4.6–6.2)
SEGMENTED NEUTROPHILS ABSOLUTE COUNT: 2.8 K/CU MM
SEGMENTED NEUTROPHILS RELATIVE PERCENT: 35.5 % (ref 36–66)
SODIUM BLD-SCNC: 142 MMOL/L (ref 135–145)
SPECIMEN: NORMAL
TOTAL IMMATURE NEUTOROPHIL: 0.26 K/CU MM
TOTAL NUCLEATED RBC: 0 K/CU MM
WBC # BLD: 7.8 K/CU MM (ref 4–10.5)

## 2023-10-01 PROCEDURE — 80048 BASIC METABOLIC PNL TOTAL CA: CPT

## 2023-10-01 PROCEDURE — 99232 SBSQ HOSP IP/OBS MODERATE 35: CPT | Performed by: INTERNAL MEDICINE

## 2023-10-01 PROCEDURE — 6370000000 HC RX 637 (ALT 250 FOR IP): Performed by: INTERNAL MEDICINE

## 2023-10-01 PROCEDURE — 82962 GLUCOSE BLOOD TEST: CPT

## 2023-10-01 PROCEDURE — 2580000003 HC RX 258: Performed by: INTERNAL MEDICINE

## 2023-10-01 PROCEDURE — 6360000002 HC RX W HCPCS: Performed by: INTERNAL MEDICINE

## 2023-10-01 PROCEDURE — 2060000000 HC ICU INTERMEDIATE R&B

## 2023-10-01 PROCEDURE — 94761 N-INVAS EAR/PLS OXIMETRY MLT: CPT

## 2023-10-01 PROCEDURE — 85025 COMPLETE CBC W/AUTO DIFF WBC: CPT

## 2023-10-01 PROCEDURE — 6370000000 HC RX 637 (ALT 250 FOR IP): Performed by: STUDENT IN AN ORGANIZED HEALTH CARE EDUCATION/TRAINING PROGRAM

## 2023-10-01 PROCEDURE — 2700000000 HC OXYGEN THERAPY PER DAY

## 2023-10-01 PROCEDURE — 94640 AIRWAY INHALATION TREATMENT: CPT

## 2023-10-01 RX ORDER — INSULIN LISPRO 100 [IU]/ML
0-8 INJECTION, SOLUTION INTRAVENOUS; SUBCUTANEOUS
Status: DISCONTINUED | OUTPATIENT
Start: 2023-10-01 | End: 2023-10-07 | Stop reason: HOSPADM

## 2023-10-01 RX ADMIN — TAMSULOSIN HYDROCHLORIDE 0.4 MG: 0.4 CAPSULE ORAL at 09:18

## 2023-10-01 RX ADMIN — SODIUM CHLORIDE, PRESERVATIVE FREE 10 ML: 5 INJECTION INTRAVENOUS at 09:17

## 2023-10-01 RX ADMIN — FINASTERIDE 5 MG: 5 TABLET, FILM COATED ORAL at 09:17

## 2023-10-01 RX ADMIN — CEFEPIME 2000 MG: 2 INJECTION, POWDER, FOR SOLUTION INTRAVENOUS at 04:31

## 2023-10-01 RX ADMIN — INSULIN LISPRO 5 UNITS: 100 INJECTION, SOLUTION INTRAVENOUS; SUBCUTANEOUS at 16:59

## 2023-10-01 RX ADMIN — GUAIFENESIN 1200 MG: 600 TABLET, EXTENDED RELEASE ORAL at 09:17

## 2023-10-01 RX ADMIN — HYDROCODONE BITARTRATE AND ACETAMINOPHEN 1 TABLET: 10; 325 TABLET ORAL at 20:59

## 2023-10-01 RX ADMIN — SODIUM CHLORIDE, PRESERVATIVE FREE 10 ML: 5 INJECTION INTRAVENOUS at 21:41

## 2023-10-01 RX ADMIN — INSULIN LISPRO 5 UNITS: 100 INJECTION, SOLUTION INTRAVENOUS; SUBCUTANEOUS at 12:31

## 2023-10-01 RX ADMIN — GABAPENTIN 100 MG: 100 CAPSULE ORAL at 09:18

## 2023-10-01 RX ADMIN — INSULIN LISPRO 4 UNITS: 100 INJECTION, SOLUTION INTRAVENOUS; SUBCUTANEOUS at 17:00

## 2023-10-01 RX ADMIN — GABAPENTIN 100 MG: 100 CAPSULE ORAL at 20:59

## 2023-10-01 RX ADMIN — GUAIFENESIN 1200 MG: 600 TABLET, EXTENDED RELEASE ORAL at 20:59

## 2023-10-01 RX ADMIN — TIOTROPIUM BROMIDE AND OLODATEROL 2 PUFF: 3.124; 2.736 SPRAY, METERED RESPIRATORY (INHALATION) at 08:43

## 2023-10-01 RX ADMIN — ATORVASTATIN CALCIUM 40 MG: 40 TABLET, FILM COATED ORAL at 20:59

## 2023-10-01 RX ADMIN — VALACYCLOVIR HYDROCHLORIDE 500 MG: 500 TABLET, FILM COATED ORAL at 09:17

## 2023-10-01 RX ADMIN — GABAPENTIN 100 MG: 100 CAPSULE ORAL at 14:08

## 2023-10-01 RX ADMIN — INSULIN GLARGINE 10 UNITS: 100 INJECTION, SOLUTION SUBCUTANEOUS at 20:59

## 2023-10-01 RX ADMIN — IPRATROPIUM BROMIDE AND ALBUTEROL SULFATE 1 DOSE: 2.5; .5 SOLUTION RESPIRATORY (INHALATION) at 12:06

## 2023-10-01 RX ADMIN — INSULIN LISPRO 2 UNITS: 100 INJECTION, SOLUTION INTRAVENOUS; SUBCUTANEOUS at 12:31

## 2023-10-01 RX ADMIN — ALLOPURINOL 300 MG: 100 TABLET ORAL at 09:18

## 2023-10-01 RX ADMIN — IPRATROPIUM BROMIDE AND ALBUTEROL SULFATE 1 DOSE: 2.5; .5 SOLUTION RESPIRATORY (INHALATION) at 23:00

## 2023-10-01 RX ADMIN — CEFEPIME 2000 MG: 2 INJECTION, POWDER, FOR SOLUTION INTRAVENOUS at 11:22

## 2023-10-01 RX ADMIN — IPRATROPIUM BROMIDE AND ALBUTEROL SULFATE 1 DOSE: 2.5; .5 SOLUTION RESPIRATORY (INHALATION) at 08:35

## 2023-10-01 RX ADMIN — IPRATROPIUM BROMIDE AND ALBUTEROL SULFATE 1 DOSE: 2.5; .5 SOLUTION RESPIRATORY (INHALATION) at 15:50

## 2023-10-01 RX ADMIN — ENOXAPARIN SODIUM 40 MG: 100 INJECTION SUBCUTANEOUS at 09:18

## 2023-10-01 ASSESSMENT — PAIN DESCRIPTION - LOCATION: LOCATION: LEG;HIP;SHOULDER

## 2023-10-01 ASSESSMENT — PAIN DESCRIPTION - DESCRIPTORS: DESCRIPTORS: ACHING

## 2023-10-01 ASSESSMENT — PAIN SCALES - GENERAL
PAINLEVEL_OUTOF10: 2
PAINLEVEL_OUTOF10: 7

## 2023-10-01 ASSESSMENT — PAIN - FUNCTIONAL ASSESSMENT: PAIN_FUNCTIONAL_ASSESSMENT: ACTIVITIES ARE NOT PREVENTED

## 2023-10-01 ASSESSMENT — PAIN DESCRIPTION - ORIENTATION: ORIENTATION: RIGHT;LEFT

## 2023-10-01 NOTE — PROGRESS NOTES
Resource RN rounding on pt with Di score of 67. Pt is alert and oriented x4 resting in bed with VSS on 4L NC. Respirations in the 20's-40's which per pt his breathing is at his baseline. Pt denies needs besides Ice. Ice retrieved for pt's water. DI is now 50.

## 2023-10-01 NOTE — FLOWSHEET NOTE
Rapid Resource rounded on pt for DI score of 66. Pt is in bed resting quietly with eyes closed. Pt VSS. Spoke to pt Nurse Lesley. Pt to have a ANTONY Monday.

## 2023-10-01 NOTE — PROGRESS NOTES
V2.0  [facility] [place] Hospitalist Progress Note      Name:  Kerwin Cowden /Age/Sex: 1950  (68 y.o. male)   MRN & CSN:  [id number] & [id number] Encounter Date/Time: 2023 3:14 PM EDT    Location:  -A PCP: Stephan Hu MD       Hospital Day: 7      Subjective:     Chief Complaint:  Hypoglycemia     Patient seen and examined at bedside. Still on high O2 4L sating low 90s. No SOB or worsening of cough. Pt feels congested but unable to produce any sputum. ANTONY canceled by anesthesia due to respiratory status. Potential plan to attempt again on Monday or Tuesday per my discussion with cardiology. Pt refusing inhaler and acapella, does not wish to explain reasoning. Counseled on importance following which pt willing to do this for now. Discussed to administer with RT who is at bedside immediately for treatment. Assessment and Plan:     Severe hypoglycemia-blood sugar 29 on admission. Hx of DM II. On Jardiance, metformin and Tresiba. Low glucose likely 2/2 home insulin, poor appetite, low reserve, cancer, and infection. Was on D10 now off, started on Lantus 25U nightly (from home 40U nightly) but still hypoglycemic, now changed to 10U nightly with hold precautions and glucose much improved. Discussed with endocrinology. Will monitor closely. Acute respiratory failure with hypoxia 2/2 bacterial PNA. CXR acute interstitial edema or PNA. On O2 6L NC (baseline 3L NC). Hx of Bronchiectasis with resp cs Pseudomonas intermediate resistant to cefepime and merrem. Strep, legionella and MRSA all negative. S/p vanc+cefepime, stopped vancomycin as MRSA nares negative and continue cefepime, added inhaled tobramycin but ID d/seth, await resp cx. Continue acapella, chest physiotherapy, consider CT chest if no improvement. Severe sepsis 2/2 bacterial PNA and Staph. Epi bacteremia P/w leukocytosis WBC 17, tachycardia 100's,  LA 2.2. Blood cx Staph.  Ep x2.   -On abx as above    -TTE with echodensity on

## 2023-10-01 NOTE — PROGRESS NOTES
Progress Note( Dr. Napoles Pod)  10/1/2023  Subjective:   Admit Date: 9/24/2023  PCP: Cierra Lenz MD    Admitted For :Shortness of breath, has hypoglycemia    Consulted For:  Better control of blood glucose    Interval History patient is not doing well this morning short of breath not interested in eating his breakfast this morning  Been running higher blood glucose yesterday    Denies any chest pains,   Yes SOB . Oxygen  Denies nausea or vomiting. Now patient is eating better  No new bowel or bladder symptoms. Intake/Output Summary (Last 24 hours) at 10/1/2023 1600  Last data filed at 10/1/2023 0002  Gross per 24 hour   Intake 811.54 ml   Output 650 ml   Net 161.54 ml         DATA    CBC:   Recent Labs     09/30/23  0035 09/30/23  0620 10/01/23  0600   WBC 6.7 6.6 7.8   HGB 8.6* 8.7* 8.8*   PLT 79* 79* 82*      CMP:  Recent Labs     09/30/23  0035 09/30/23  0620 10/01/23  0600    138 142   K 4.6 4.7 4.8   CL 98* 100 104   CO2 30 35* 34*   BUN 20 17 20   CREATININE 0.5* 0.5* 0.5*   CALCIUM 8.2* 8.2* 8.4       Lipids:   Lab Results   Component Value Date/Time    CHOL 160 08/03/2012 02:47 PM    HDL 32 08/03/2012 02:47 PM    TRIG 274 08/03/2012 02:47 PM     Glucose:  Recent Labs     10/01/23  0435 10/01/23  0628 10/01/23  1139   POCGLU 188* 164* 301*       [id number]:  Lab Results   Component Value Date/Time    LABA1C 5.0 09/24/2023 09:36 AM     High Sensitivity TSH:   Lab Results   Component Value Date/Time    TSHHS 3.930 09/24/2023 09:36 AM     Free T3:   Lab Results   Component Value Date/Time    FT3 3.2 03/27/2012 12:39 PM     Free T4:  Lab Results   Component Value Date/Time    T4FREE 1.37 04/04/2019 12:39 PM       XR HIP RIGHT (2-3 VIEWS)   Final Result   Intact right hip without acute osseous fracture. Mild degenerative changes seen within the hip joint.          XR CHEST PORTABLE   Final Result   Acute interstitial edema or pneumonia is suspected superimposed on chronic   lung intact. Motor weakness, sensory neuropathy,  and gait is abnormal.  Unstable    Assessment:     Patient Active Problem List:     Pneumonia     Sepsis ([address])     Hypogammaglobulinemia ([address])     CLL (chronic lymphocytic leukemia) ([address])     Upper respiratory infection, acute     Generalized muscle weakness     Type 2 diabetes mellitus with hyperglycemia, with long-term current use of insulin (HCC)     Poor appetite     Chronic obstructive pulmonary disease (HCC)     Neutropenia (HCC)     Other specified disorders of bone density and structure, unspecified site     Cellulitis of right upper limb     Herpesviral infection     Intestinal malabsorption     Other nonspecific abnormal finding of lung field     Iron deficiency anemia due to chronic blood loss     Other muscle spasm     Chronic lymphocytic leukemia (HCC)     Selective deficiency of IgG (HCC)     Acute bronchitis     Severe malnutrition (HCC)     Pneumonia due to organism     Personal history of CLL (chronic lymphocytic leukemia)     Rhinovirus infection     Anemia     Thrombocytopenia (HCC)     Acute on chronic respiratory failure with hypoxemia (HCC)     Bronchiectasis with acute lower respiratory infection (HCC)     Chronic respiratory failure (HCC)     Other chest pain     B12 deficiency     Immune thrombocytopenia (HCC)     Dehydration     Pneumonia, unspecified organism      Plan:     Reviewed POC blood glucose . Labs and X ray results   Reviewed Current Medicines   Started back on meal plus correction bolus Humalog/ Basal Lantus Insulin regime change the parameters to a higher level  Modified  the dose of Insulin/ other medicines as needed   Allowed to bring food from his home. Prepared by his family. Will follow     .      Sindi Farnsworth MD, MD

## 2023-10-01 NOTE — PROGRESS NOTES
V2.0  [facility] [place] Hospitalist Progress Note      Name:  Ricky Organ /Age/Sex: 1950  (68 y.o. male)   MRN & CSN:  [id number] & [id number] Encounter Date/Time: 10/1/2023 3:14 PM EDT    Location:  -A PCP: Madhu Billy MD       Hospital Day: 8      Subjective:     Chief Complaint:  Hypoglycemia     Patient seen and examined at bedside. Still on high O2 4L sating low 90s. No SOB or worsening of cough. Feels improved overall. Pt feels congested but unable to produce any sputum. ANTONY canceled by anesthesia due to respiratory status. Potential plan to attempt again on Monday or Tuesday per my discussion with cardiology. Assessment and Plan:     Severe hypoglycemia-blood sugar 29 on admission. Hx of DM II. On Jardiance, metformin and Tresiba. Low glucose likely 2/2 home insulin, poor appetite, low reserve, cancer, and infection. Was on D10 now off, started on Lantus 25U nightly (from home 40U nightly) but still hypoglycemic, now changed to 10U nightly with hold precautions and glucose much improved. Discussed with endocrinology. Will monitor closely. Acute respiratory failure with hypoxia 2/2 bacterial PNA. CXR acute interstitial edema or PNA. On O2 6L NC (baseline 3L NC). Hx of Bronchiectasis with resp cs Pseudomonas intermediate resistant to cefepime and merrem. Strep, legionella and MRSA all negative. S/p vanc+cefepime, stopped vancomycin as MRSA nares negative and continue cefepime, added inhaled tobramycin but ID d/seth, await resp cx. Continue acapella, chest physiotherapy, consider CT chest if no improvement. Severe sepsis 2/2 bacterial PNA and Staph. Epi bacteremia P/w leukocytosis WBC 17, tachycardia 100's,  LA 2.2. Blood cx Staph.  Ep x2.   -On abx as above    -TTE with echodensity on aortic valves, healed vegetation vs calcification  -ID following: continue cefepime to compete 7 day course on , cefazolin will be started after completion of cefepime  -F/u repeat Bcx from

## 2023-10-01 NOTE — PLAN OF CARE
Problem: Discharge Planning  Goal: Discharge to home or other facility with appropriate resources  Outcome: Progressing  Flowsheets (Taken 9/30/2023 0800 by Zaira Porter RN)  Discharge to home or other facility with appropriate resources:   Identify barriers to discharge with patient and caregiver   Identify discharge learning needs (meds, wound care, etc)   Arrange for needed discharge resources and transportation as appropriate     Problem: Safety - Adult  Goal: Free from fall injury  Outcome: Progressing     Problem: Skin/Tissue Integrity  Goal: Absence of new skin breakdown  Description: 1. Monitor for areas of redness and/or skin breakdown  2. Assess vascular access sites hourly  3. Every 4-6 hours minimum:  Change oxygen saturation probe site  4. Every 4-6 hours:  If on nasal continuous positive airway pressure, respiratory therapy assess nares and determine need for appliance change or resting period. Outcome: Progressing     Problem: Confusion  Goal: Confusion, delirium, dementia, or psychosis is improved or at baseline  Description: INTERVENTIONS:  1. Assess for possible contributors to thought disturbance, including medications, impaired vision or hearing, underlying metabolic abnormalities, dehydration, psychiatric diagnoses, and notify attending LIP  2. [place] high risk fall precautions, as indicated  3. Provide frequent short contacts to provide reality reorientation, refocusing and direction  4. Decrease environmental stimuli, including noise as appropriate  5. Monitor and intervene to maintain adequate nutrition, hydration, elimination, sleep and activity  6. If unable to ensure safety without constant attention obtain sitter and review sitter guidelines with assigned personnel  7.  Initiate Psychosocial CNS and Spiritual Care consult, as indicated  Outcome: Progressing  Flowsheets (Taken 9/30/2023 0800 by Zaira Porter RN)  Effect of thought disturbance (confusion, delirium, dementia,

## 2023-10-01 NOTE — PLAN OF CARE
Problem: Discharge Planning  Goal: Discharge to home or other facility with appropriate resources  10/1/2023 0914 by Makayla Frazier RN  Outcome: Progressing  9/30/2023 2148 by Santa Kawasaki, RN  Outcome: Progressing  Flowsheets (Taken 9/30/2023 0800 by Ronak Gonzalez RN)  Discharge to home or other facility with appropriate resources:   Identify barriers to discharge with patient and caregiver   Identify discharge learning needs (meds, wound care, etc)   Arrange for needed discharge resources and transportation as appropriate     Problem: Safety - Adult  Goal: Free from fall injury  10/1/2023 0914 by Makayla Frazier RN  Outcome: Progressing  9/30/2023 2148 by Santa Kawasaki, RN  Outcome: Progressing     Problem: Skin/Tissue Integrity  Goal: Absence of new skin breakdown  Description: 1. Monitor for areas of redness and/or skin breakdown  2. Assess vascular access sites hourly  3. Every 4-6 hours minimum:  Change oxygen saturation probe site  4. Every 4-6 hours:  If on nasal continuous positive airway pressure, respiratory therapy assess nares and determine need for appliance change or resting period. 10/1/2023 0914 by Makayla Frazier RN  Outcome: Progressing  9/30/2023 2148 by Santa Kawasaki, RN  Outcome: Progressing     Problem: Confusion  Goal: Confusion, delirium, dementia, or psychosis is improved or at baseline  Description: INTERVENTIONS:  1. Assess for possible contributors to thought disturbance, including medications, impaired vision or hearing, underlying metabolic abnormalities, dehydration, psychiatric diagnoses, and notify attending LIP  2. [place] high risk fall precautions, as indicated  3. Provide frequent short contacts to provide reality reorientation, refocusing and direction  4. Decrease environmental stimuli, including noise as appropriate  5. Monitor and intervene to maintain adequate nutrition, hydration, elimination, sleep and activity  6.  If unable to ensure safety without constant attention monitor for signs and symptoms of infection   Monitor lab/diagnostic results     Problem: Pain  Goal: Verbalizes/displays adequate comfort level or baseline comfort level  10/1/2023 0914 by Faby Huynh RN  Outcome: Progressing  9/30/2023 2148 by Lalo Purvis RN  Outcome: Progressing  Flowsheets (Taken 9/30/2023 1234 by Eusebio Spivey, RN)  Verbalizes/displays adequate comfort level or baseline comfort level: Encourage patient to monitor pain and request assistance     Problem: Nutrition Deficit:  Goal: Optimize nutritional status  10/1/2023 0914 by Faby Huynh RN  Outcome: Progressing  9/30/2023 2148 by Lalo Purvis RN  Outcome: Progressing     Problem: Musculoskeletal - Adult  Goal: Return mobility to safest level of function  10/1/2023 0914 by Faby Huynh RN  Outcome: Progressing  9/30/2023 2148 by Lalo Purvis RN  Outcome: Progressing  Flowsheets (Taken 9/30/2023 0800 by Eusebio Spivey, RN)  Return Mobility to Safest Level of Function: Assess patient stability and activity tolerance for standing, transferring and ambulating with or without assistive devices  Goal: Maintain proper alignment of affected body part  10/1/2023 0914 by Faby Huynh RN  Outcome: Progressing  9/30/2023 2148 by Lalo Purvis RN  Outcome: Progressing  Flowsheets (Taken 9/30/2023 0800 by Eusebio Spivey, RN)  Maintain proper alignment of affected body part: Support and protect limb and body alignment per provider's orders  Goal: Return ADL status to a safe level of function  10/1/2023 0914 by Faby Huynh RN  Outcome: Progressing  9/30/2023 2148 by Lalo Purvis RN  Outcome: Progressing  Flowsheets (Taken 9/30/2023 0800 by Eusebio Spivey, RN)  Return ADL Status to a Safe Level of Function:   Administer medication as ordered   Assess activities of daily living deficits and provide assistive devices as needed

## 2023-10-02 DIAGNOSIS — C91.10 CHRONIC LYMPHOCYTIC LEUKEMIA (HCC): ICD-10-CM

## 2023-10-02 PROBLEM — I33.0 INFECTIVE ENDOCARDITIS: Status: ACTIVE | Noted: 2023-10-02

## 2023-10-02 LAB
ANION GAP SERPL CALCULATED.3IONS-SCNC: 4 MMOL/L (ref 4–16)
BANDED NEUTROPHILS ABSOLUTE COUNT: 0.96 K/CU MM
BANDED NEUTROPHILS RELATIVE PERCENT: 11 % (ref 5–11)
BUN SERPL-MCNC: 19 MG/DL (ref 6–23)
CALCIUM SERPL-MCNC: 8.5 MG/DL (ref 8.3–10.6)
CHLORIDE BLD-SCNC: 103 MMOL/L (ref 99–110)
CO2: 36 MMOL/L (ref 21–32)
CREAT SERPL-MCNC: 0.5 MG/DL (ref 0.9–1.3)
DIFFERENTIAL TYPE: ABNORMAL
EOSINOPHILS ABSOLUTE: 0.2 K/CU MM
EOSINOPHILS RELATIVE PERCENT: 2 % (ref 0–3)
GFR SERPL CREATININE-BSD FRML MDRD: >60 ML/MIN/1.73M2
GLUCOSE BLD-MCNC: 118 MG/DL (ref 70–99)
GLUCOSE BLD-MCNC: 172 MG/DL (ref 70–99)
GLUCOSE BLD-MCNC: 197 MG/DL (ref 70–99)
GLUCOSE BLD-MCNC: 216 MG/DL (ref 70–99)
GLUCOSE BLD-MCNC: 274 MG/DL (ref 70–99)
GLUCOSE SERPL-MCNC: 168 MG/DL (ref 70–99)
HCT VFR BLD CALC: 29.7 % (ref 42–52)
HEMOGLOBIN: 8.8 GM/DL (ref 13.5–18)
HYPOCHROMIA: ABNORMAL
LYMPHOCYTES ABSOLUTE: 5.1 K/CU MM
LYMPHOCYTES RELATIVE PERCENT: 58 % (ref 24–44)
MCH RBC QN AUTO: 27.4 PG (ref 27–31)
MCHC RBC AUTO-ENTMCNC: 29.6 % (ref 32–36)
MCV RBC AUTO: 92.5 FL (ref 78–100)
METAMYELOCYTES ABSOLUTE COUNT: 0.26 K/CU MM
METAMYELOCYTES PERCENT: 3 %
MONOCYTES ABSOLUTE: 0.5 K/CU MM
MONOCYTES RELATIVE PERCENT: 6 % (ref 0–4)
PDW BLD-RTO: 19.7 % (ref 11.7–14.9)
PLATELET # BLD: 91 K/CU MM (ref 140–440)
PMV BLD AUTO: 9.6 FL (ref 7.5–11.1)
POTASSIUM SERPL-SCNC: 4.5 MMOL/L (ref 3.5–5.1)
RBC # BLD: 3.21 M/CU MM (ref 4.6–6.2)
RBC # BLD: ABNORMAL 10*6/UL
SEGMENTED NEUTROPHILS ABSOLUTE COUNT: 1.7 K/CU MM
SEGMENTED NEUTROPHILS RELATIVE PERCENT: 20 % (ref 36–66)
SODIUM BLD-SCNC: 143 MMOL/L (ref 135–145)
WBC # BLD: 8.7 K/CU MM (ref 4–10.5)

## 2023-10-02 PROCEDURE — 6370000000 HC RX 637 (ALT 250 FOR IP): Performed by: INTERNAL MEDICINE

## 2023-10-02 PROCEDURE — 2580000003 HC RX 258: Performed by: INTERNAL MEDICINE

## 2023-10-02 PROCEDURE — 80048 BASIC METABOLIC PNL TOTAL CA: CPT

## 2023-10-02 PROCEDURE — 82962 GLUCOSE BLOOD TEST: CPT

## 2023-10-02 PROCEDURE — 2060000000 HC ICU INTERMEDIATE R&B

## 2023-10-02 PROCEDURE — 2700000000 HC OXYGEN THERAPY PER DAY

## 2023-10-02 PROCEDURE — 99232 SBSQ HOSP IP/OBS MODERATE 35: CPT | Performed by: INTERNAL MEDICINE

## 2023-10-02 PROCEDURE — B24BZZ4 ULTRASONOGRAPHY OF HEART WITH AORTA, TRANSESOPHAGEAL: ICD-10-PCS | Performed by: HOSPITALIST

## 2023-10-02 PROCEDURE — 93312 ECHO TRANSESOPHAGEAL: CPT

## 2023-10-02 PROCEDURE — 85027 COMPLETE CBC AUTOMATED: CPT

## 2023-10-02 PROCEDURE — 85007 BL SMEAR W/DIFF WBC COUNT: CPT

## 2023-10-02 PROCEDURE — 7100000000 HC PACU RECOVERY - FIRST 15 MIN

## 2023-10-02 PROCEDURE — 7100000001 HC PACU RECOVERY - ADDTL 15 MIN

## 2023-10-02 PROCEDURE — 6360000002 HC RX W HCPCS: Performed by: INTERNAL MEDICINE

## 2023-10-02 PROCEDURE — 94761 N-INVAS EAR/PLS OXIMETRY MLT: CPT

## 2023-10-02 PROCEDURE — 94640 AIRWAY INHALATION TREATMENT: CPT

## 2023-10-02 PROCEDURE — 99231 SBSQ HOSP IP/OBS SF/LOW 25: CPT | Performed by: INTERNAL MEDICINE

## 2023-10-02 RX ADMIN — INSULIN GLARGINE 10 UNITS: 100 INJECTION, SOLUTION SUBCUTANEOUS at 20:32

## 2023-10-02 RX ADMIN — IPRATROPIUM BROMIDE AND ALBUTEROL SULFATE 1 DOSE: 2.5; .5 SOLUTION RESPIRATORY (INHALATION) at 21:38

## 2023-10-02 RX ADMIN — SODIUM CHLORIDE, PRESERVATIVE FREE 10 ML: 5 INJECTION INTRAVENOUS at 08:21

## 2023-10-02 RX ADMIN — CEFAZOLIN 2000 MG: 2 INJECTION, POWDER, FOR SOLUTION INTRAMUSCULAR; INTRAVENOUS at 00:46

## 2023-10-02 RX ADMIN — GABAPENTIN 100 MG: 100 CAPSULE ORAL at 20:31

## 2023-10-02 RX ADMIN — GUAIFENESIN 1200 MG: 600 TABLET, EXTENDED RELEASE ORAL at 20:31

## 2023-10-02 RX ADMIN — ALLOPURINOL 300 MG: 100 TABLET ORAL at 08:20

## 2023-10-02 RX ADMIN — CEFAZOLIN 2000 MG: 2 INJECTION, POWDER, FOR SOLUTION INTRAMUSCULAR; INTRAVENOUS at 17:17

## 2023-10-02 RX ADMIN — ATORVASTATIN CALCIUM 40 MG: 40 TABLET, FILM COATED ORAL at 20:31

## 2023-10-02 RX ADMIN — VALACYCLOVIR HYDROCHLORIDE 500 MG: 500 TABLET, FILM COATED ORAL at 08:20

## 2023-10-02 RX ADMIN — CEFAZOLIN 2000 MG: 2 INJECTION, POWDER, FOR SOLUTION INTRAMUSCULAR; INTRAVENOUS at 08:28

## 2023-10-02 RX ADMIN — SODIUM CHLORIDE: 9 INJECTION, SOLUTION INTRAVENOUS at 08:26

## 2023-10-02 RX ADMIN — GUAIFENESIN 1200 MG: 600 TABLET, EXTENDED RELEASE ORAL at 08:21

## 2023-10-02 RX ADMIN — HYDROCODONE BITARTRATE AND ACETAMINOPHEN 1 TABLET: 10; 325 TABLET ORAL at 18:20

## 2023-10-02 RX ADMIN — GABAPENTIN 100 MG: 100 CAPSULE ORAL at 08:20

## 2023-10-02 RX ADMIN — IPRATROPIUM BROMIDE AND ALBUTEROL SULFATE 1 DOSE: 2.5; .5 SOLUTION RESPIRATORY (INHALATION) at 11:27

## 2023-10-02 RX ADMIN — INSULIN LISPRO 5 UNITS: 100 INJECTION, SOLUTION INTRAVENOUS; SUBCUTANEOUS at 17:14

## 2023-10-02 RX ADMIN — SODIUM CHLORIDE, PRESERVATIVE FREE 10 ML: 5 INJECTION INTRAVENOUS at 20:33

## 2023-10-02 RX ADMIN — CEFAZOLIN 2000 MG: 2 INJECTION, POWDER, FOR SOLUTION INTRAMUSCULAR; INTRAVENOUS at 23:14

## 2023-10-02 RX ADMIN — GABAPENTIN 100 MG: 100 CAPSULE ORAL at 15:57

## 2023-10-02 RX ADMIN — FINASTERIDE 5 MG: 5 TABLET, FILM COATED ORAL at 08:20

## 2023-10-02 RX ADMIN — TAMSULOSIN HYDROCHLORIDE 0.4 MG: 0.4 CAPSULE ORAL at 08:20

## 2023-10-02 RX ADMIN — INSULIN LISPRO 2 UNITS: 100 INJECTION, SOLUTION INTRAVENOUS; SUBCUTANEOUS at 17:13

## 2023-10-02 RX ADMIN — SODIUM CHLORIDE: 9 INJECTION, SOLUTION INTRAVENOUS at 17:16

## 2023-10-02 ASSESSMENT — PAIN SCALES - WONG BAKER
WONGBAKER_NUMERICALRESPONSE: 4

## 2023-10-02 ASSESSMENT — PAIN DESCRIPTION - DESCRIPTORS: DESCRIPTORS: ACHING

## 2023-10-02 ASSESSMENT — PAIN DESCRIPTION - LOCATION: LOCATION: GENERALIZED;HIP;LEG

## 2023-10-02 ASSESSMENT — PAIN SCALES - GENERAL: PAINLEVEL_OUTOF10: 6

## 2023-10-02 ASSESSMENT — PAIN - FUNCTIONAL ASSESSMENT: PAIN_FUNCTIONAL_ASSESSMENT: ACTIVITIES ARE NOT PREVENTED

## 2023-10-02 ASSESSMENT — PAIN DESCRIPTION - ORIENTATION: ORIENTATION: MID

## 2023-10-02 NOTE — PROGRESS NOTES
Patient Name:  Tone Borden  Patient :  1950  Patient MRN:  [id number]     Primary Oncologist: Paris Harris MD  Referring Provider: Aamir Ospina MD     Date of Service: 2023      Reason for Consult: To evaluate the patient with CLL. Chief Complaint:    Chief Complaint   Patient presents with    Hypoglycemia     Patient Active Problem List:     Pneumonia     Sepsis ([address])     Hypogammaglobulinemia ([address])     CLL (chronic lymphocytic leukemia) ([address])     Upper respiratory infection, acute     Generalized muscle weakness     Type 2 diabetes mellitus with hyperglycemia, with long-term current use of insulin (HCC)     Poor appetite     Chronic obstructive pulmonary disease (HCC)     Neutropenia (HCC)     Other specified disorders of bone density and structure, unspecified site     Cellulitis of right upper limb     Herpesviral infection     Intestinal malabsorption     Other nonspecific abnormal finding of lung field     Iron deficiency anemia due to chronic blood loss     Other muscle spasm     Chronic lymphocytic leukemia (HCC)     Selective deficiency of IgG (HCC)     Acute bronchitis     Severe malnutrition ([address])     Pneumonia due to organism     Personal history of CLL (chronic lymphocytic leukemia)     Rhinovirus infection     Anemia     Thrombocytopenia (HCC)     Acute on chronic respiratory failure with hypoxemia (HCC)     Bronchiectasis with acute lower respiratory infection ([address])     Chronic respiratory failure (HCC)     Other chest pain     B12 deficiency     Immune thrombocytopenia (HCC)     Dehydration     Pneumonia, unspecified organism    HPI:   Tone Borden is a 68 y.o. male with recurrent CLL currently on venetoclax, presented to [facility] on 23 with hypoglycemia, SOB with increasing wheeze. He was admitted for acute on chronic hypoxemic resp failure, pneumonia, COPD exacerbation and bronchiectasis.      He is getting romiplostin for thrombocytopenia and he was  supposed to have it participate in the care of this very pleasant patient.     Nathaly

## 2023-10-02 NOTE — PROGRESS NOTES
V2.0  [facility] [place]      Name:  Estephania Goldsmith /Age/Sex: 1950  (68 y.o. male)   MRN & CSN:  [id number] & [id number] Encounter Date/Time: 10/2/2023 3:14 PM EDT    Location:  -A PCP: Altagracia Peters MD       Hospital Day: 9      Subjective:     Chief Complaint:  Hypoglycemia     Patient seen and examined at bedside. Still on high O2 4L sating low 90s. No SOB or worsening of cough. Feels improved overall. Pt feels congested but unable to produce any sputum. ANTONY canceled by anesthesia due to respiratory status. Potential plan to attempt again on Monday or Tuesday per my discussion with cardiology on Friday. Assessment and Plan:     Severe hypoglycemia-blood sugar 29 on admission. Hx of DM II. On Jardiance, metformin and Tresiba. Low glucose likely 2/2 home insulin, poor appetite, low reserve, cancer, and infection. Was on D10 now off, started on Lantus 25U nightly (from home 40U nightly) but still hypoglycemic, now changed to 10U nightly with hold precautions and glucose much improved. Discussed with endocrinology. Will monitor closely. Acute respiratory failure with hypoxia 2/2 bacterial PNA. CXR acute interstitial edema or PNA. On O2 6L NC (baseline 3L NC). Hx of Bronchiectasis with resp cs Pseudomonas intermediate resistant to cefepime and merrem. Strep, legionella and MRSA all negative. S/p vanc+cefepime, stopped vancomycin as MRSA nares negative and continue cefepime, added inhaled tobramycin but ID d/seth, await resp cx. Continue acapella, chest physiotherapy, consider CT chest if no improvement. Severe sepsis 2/2 bacterial PNA and Staph. Epi bacteremia P/w leukocytosis WBC 17, tachycardia 100's,  LA 2.2. Blood cx Staph.  Ep x2.   -On abx as above    -TTE with echodensity on aortic valves, healed vegetation vs calcification  -ID following: continue cefepime to compete 7 day course on , cefazolin will be started after completion of cefepime  -F/u repeat

## 2023-10-02 NOTE — PLAN OF CARE
Problem: Discharge Planning  Goal: Discharge to home or other facility with appropriate resources  10/1/2023 2119 by Neli Roberson RN  Outcome: Progressing  10/1/2023 0914 by Genesis Hamilton RN  Outcome: Progressing     Problem: Safety - Adult  Goal: Free from fall injury  10/1/2023 2119 by Neli Roberson RN  Outcome: Progressing  10/1/2023 0914 by Genesis Hamilton RN  Outcome: Progressing     Problem: Skin/Tissue Integrity  Goal: Absence of new skin breakdown  Description: 1. Monitor for areas of redness and/or skin breakdown  2. Assess vascular access sites hourly  3. Every 4-6 hours minimum:  Change oxygen saturation probe site  4. Every 4-6 hours:  If on nasal continuous positive airway pressure, respiratory therapy assess nares and determine need for appliance change or resting period. 10/1/2023 2119 by Neli Roberson RN  Outcome: Progressing  10/1/2023 0914 by Genesis Hamilton RN  Outcome: Progressing     Problem: Confusion  Goal: Confusion, delirium, dementia, or psychosis is improved or at baseline  Description: INTERVENTIONS:  1. Assess for possible contributors to thought disturbance, including medications, impaired vision or hearing, underlying metabolic abnormalities, dehydration, psychiatric diagnoses, and notify attending LIP  2. [place] high risk fall precautions, as indicated  3. Provide frequent short contacts to provide reality reorientation, refocusing and direction  4. Decrease environmental stimuli, including noise as appropriate  5. Monitor and intervene to maintain adequate nutrition, hydration, elimination, sleep and activity  6. If unable to ensure safety without constant attention obtain sitter and review sitter guidelines with assigned personnel  7.  Initiate Psychosocial CNS and Spiritual Care consult, as indicated  10/1/2023 2119 by Neli Roberson RN  Outcome: Progressing  10/1/2023 0914 by Genesis Hamilton RN  Outcome: Progressing     Problem: Chronic Conditions and Co-morbidities  Goal: by August Fairy, RN  Outcome: Progressing  10/1/2023 0914 by Linda Corral RN  Outcome: Progressing     Problem: Musculoskeletal - Adult  Goal: Return mobility to safest level of function  10/1/2023 2119 by August Muir RN  Outcome: Progressing  10/1/2023 0914 by Linda Corral RN  Outcome: Progressing  Goal: Maintain proper alignment of affected body part  10/1/2023 2119 by August Muir RN  Outcome: Progressing  10/1/2023 0914 by Linda Corral RN  Outcome: Progressing  Goal: Return ADL status to a safe level of function  10/1/2023 2119 by August Muir RN  Outcome: Progressing  10/1/2023 0914 by Linda Corral RN  Outcome: Progressing

## 2023-10-02 NOTE — PLAN OF CARE
Problem: Discharge Planning  Goal: Discharge to home or other facility with appropriate resources  10/2/2023 0818 by Clyde Cai RN  Outcome: Progressing  10/1/2023 2119 by Lukas Parks RN  Outcome: Progressing     Problem: Safety - Adult  Goal: Free from fall injury  10/2/2023 0818 by Clyde Cai RN  Outcome: Progressing  10/1/2023 2119 by Lukas Parks RN  Outcome: Progressing     Problem: Skin/Tissue Integrity  Goal: Absence of new skin breakdown  Description: 1. Monitor for areas of redness and/or skin breakdown  2. Assess vascular access sites hourly  3. Every 4-6 hours minimum:  Change oxygen saturation probe site  4. Every 4-6 hours:  If on nasal continuous positive airway pressure, respiratory therapy assess nares and determine need for appliance change or resting period. 10/2/2023 0818 by Clyde Cai RN  Outcome: Progressing  10/1/2023 2119 by Lukas Parks RN  Outcome: Progressing     Problem: Confusion  Goal: Confusion, delirium, dementia, or psychosis is improved or at baseline  Description: INTERVENTIONS:  1. Assess for possible contributors to thought disturbance, including medications, impaired vision or hearing, underlying metabolic abnormalities, dehydration, psychiatric diagnoses, and notify attending LIP  2. [place] high risk fall precautions, as indicated  3. Provide frequent short contacts to provide reality reorientation, refocusing and direction  4. Decrease environmental stimuli, including noise as appropriate  5. Monitor and intervene to maintain adequate nutrition, hydration, elimination, sleep and activity  6. If unable to ensure safety without constant attention obtain sitter and review sitter guidelines with assigned personnel  7.  Initiate Psychosocial CNS and Spiritual Care consult, as indicated  10/2/2023 0818 by Clyde Cai RN  Outcome: Progressing  10/1/2023 2119 by Lukas Parks RN  Outcome: Progressing     Problem: Chronic Conditions and Co-morbidities  Goal: by Amari Marin RN  Outcome: Progressing  10/1/2023 2119 by Evelia Nash RN  Outcome: Progressing     Problem: Musculoskeletal - Adult  Goal: Return mobility to safest level of function  10/2/2023 0818 by Amari Marin RN  Outcome: Progressing  10/1/2023 2119 by Evelia Nash RN  Outcome: Progressing  Goal: Maintain proper alignment of affected body part  10/2/2023 0818 by Amari Marin RN  Outcome: Progressing  10/1/2023 2119 by Evelia Nash RN  Outcome: Progressing  Goal: Return ADL status to a safe level of function  10/2/2023 0818 by Amari Marin RN  Outcome: Progressing  10/1/2023 2119 by Evelia Nash RN  Outcome: Progressing

## 2023-10-03 PROBLEM — E43 SEVERE MALNUTRITION (HCC): Chronic | Status: ACTIVE | Noted: 2022-10-05

## 2023-10-03 LAB
ANION GAP SERPL CALCULATED.3IONS-SCNC: 5 MMOL/L (ref 4–16)
ANISOCYTOSIS: ABNORMAL
BANDED NEUTROPHILS ABSOLUTE COUNT: 1.06 K/CU MM
BANDED NEUTROPHILS RELATIVE PERCENT: 12 % (ref 5–11)
BUN SERPL-MCNC: 19 MG/DL (ref 6–23)
CALCIUM SERPL-MCNC: 8.6 MG/DL (ref 8.3–10.6)
CHLORIDE BLD-SCNC: 102 MMOL/L (ref 99–110)
CO2: 33 MMOL/L (ref 21–32)
CREAT SERPL-MCNC: 0.4 MG/DL (ref 0.9–1.3)
DIFFERENTIAL TYPE: ABNORMAL
GFR SERPL CREATININE-BSD FRML MDRD: >60 ML/MIN/1.73M2
GLUCOSE BLD-MCNC: 163 MG/DL (ref 70–99)
GLUCOSE BLD-MCNC: 173 MG/DL (ref 70–99)
GLUCOSE BLD-MCNC: 227 MG/DL (ref 70–99)
GLUCOSE BLD-MCNC: 317 MG/DL (ref 70–99)
GLUCOSE BLD-MCNC: 360 MG/DL (ref 70–99)
GLUCOSE SERPL-MCNC: 162 MG/DL (ref 70–99)
HCT VFR BLD CALC: 31 % (ref 42–52)
HEMOGLOBIN: 9.2 GM/DL (ref 13.5–18)
LYMPHOCYTES ABSOLUTE: 4.4 K/CU MM
LYMPHOCYTES RELATIVE PERCENT: 50 % (ref 24–44)
MCH RBC QN AUTO: 27.8 PG (ref 27–31)
MCHC RBC AUTO-ENTMCNC: 29.7 % (ref 32–36)
MCV RBC AUTO: 93.7 FL (ref 78–100)
METAMYELOCYTES ABSOLUTE COUNT: 0.18 K/CU MM
METAMYELOCYTES PERCENT: 2 %
MONOCYTES ABSOLUTE: 0.9 K/CU MM
MONOCYTES RELATIVE PERCENT: 10 % (ref 0–4)
PDW BLD-RTO: 19.9 % (ref 11.7–14.9)
PLATELET # BLD: 83 K/CU MM (ref 140–440)
PMV BLD AUTO: 9.3 FL (ref 7.5–11.1)
POTASSIUM SERPL-SCNC: 4.7 MMOL/L (ref 3.5–5.1)
RBC # BLD: 3.31 M/CU MM (ref 4.6–6.2)
RBC # BLD: ABNORMAL 10*6/UL
SEGMENTED NEUTROPHILS ABSOLUTE COUNT: 2.3 K/CU MM
SEGMENTED NEUTROPHILS RELATIVE PERCENT: 26 % (ref 36–66)
SODIUM BLD-SCNC: 140 MMOL/L (ref 135–145)
WBC # BLD: 8.8 K/CU MM (ref 4–10.5)

## 2023-10-03 PROCEDURE — 85027 COMPLETE CBC AUTOMATED: CPT

## 2023-10-03 PROCEDURE — 6370000000 HC RX 637 (ALT 250 FOR IP): Performed by: INTERNAL MEDICINE

## 2023-10-03 PROCEDURE — 2580000003 HC RX 258: Performed by: INTERNAL MEDICINE

## 2023-10-03 PROCEDURE — 94761 N-INVAS EAR/PLS OXIMETRY MLT: CPT

## 2023-10-03 PROCEDURE — 99213 OFFICE O/P EST LOW 20 MIN: CPT

## 2023-10-03 PROCEDURE — 82962 GLUCOSE BLOOD TEST: CPT

## 2023-10-03 PROCEDURE — 94640 AIRWAY INHALATION TREATMENT: CPT

## 2023-10-03 PROCEDURE — 2060000000 HC ICU INTERMEDIATE R&B

## 2023-10-03 PROCEDURE — 99232 SBSQ HOSP IP/OBS MODERATE 35: CPT | Performed by: INTERNAL MEDICINE

## 2023-10-03 PROCEDURE — 6360000002 HC RX W HCPCS: Performed by: INTERNAL MEDICINE

## 2023-10-03 PROCEDURE — 80048 BASIC METABOLIC PNL TOTAL CA: CPT

## 2023-10-03 PROCEDURE — 2700000000 HC OXYGEN THERAPY PER DAY

## 2023-10-03 PROCEDURE — 36415 COLL VENOUS BLD VENIPUNCTURE: CPT

## 2023-10-03 PROCEDURE — 94668 MNPJ CHEST WALL SBSQ: CPT

## 2023-10-03 PROCEDURE — 85007 BL SMEAR W/DIFF WBC COUNT: CPT

## 2023-10-03 PROCEDURE — 6370000000 HC RX 637 (ALT 250 FOR IP): Performed by: STUDENT IN AN ORGANIZED HEALTH CARE EDUCATION/TRAINING PROGRAM

## 2023-10-03 PROCEDURE — 99231 SBSQ HOSP IP/OBS SF/LOW 25: CPT | Performed by: PHYSICIAN ASSISTANT

## 2023-10-03 RX ORDER — INSULIN LISPRO 100 [IU]/ML
8 INJECTION, SOLUTION INTRAVENOUS; SUBCUTANEOUS
Status: DISCONTINUED | OUTPATIENT
Start: 2023-10-03 | End: 2023-10-04

## 2023-10-03 RX ADMIN — CEFAZOLIN 2000 MG: 2 INJECTION, POWDER, FOR SOLUTION INTRAMUSCULAR; INTRAVENOUS at 18:04

## 2023-10-03 RX ADMIN — IPRATROPIUM BROMIDE AND ALBUTEROL SULFATE 1 DOSE: 2.5; .5 SOLUTION RESPIRATORY (INHALATION) at 11:15

## 2023-10-03 RX ADMIN — INSULIN GLARGINE 10 UNITS: 100 INJECTION, SOLUTION SUBCUTANEOUS at 21:14

## 2023-10-03 RX ADMIN — GABAPENTIN 100 MG: 100 CAPSULE ORAL at 12:58

## 2023-10-03 RX ADMIN — GUAIFENESIN 1200 MG: 600 TABLET, EXTENDED RELEASE ORAL at 21:12

## 2023-10-03 RX ADMIN — ATORVASTATIN CALCIUM 40 MG: 40 TABLET, FILM COATED ORAL at 21:12

## 2023-10-03 RX ADMIN — TIOTROPIUM BROMIDE AND OLODATEROL 2 PUFF: 3.124; 2.736 SPRAY, METERED RESPIRATORY (INHALATION) at 07:08

## 2023-10-03 RX ADMIN — HYDROCODONE BITARTRATE AND ACETAMINOPHEN 1 TABLET: 10; 325 TABLET ORAL at 12:58

## 2023-10-03 RX ADMIN — INSULIN LISPRO 8 UNITS: 100 INJECTION, SOLUTION INTRAVENOUS; SUBCUTANEOUS at 12:58

## 2023-10-03 RX ADMIN — FINASTERIDE 5 MG: 5 TABLET, FILM COATED ORAL at 09:58

## 2023-10-03 RX ADMIN — TAMSULOSIN HYDROCHLORIDE 0.4 MG: 0.4 CAPSULE ORAL at 09:58

## 2023-10-03 RX ADMIN — IPRATROPIUM BROMIDE AND ALBUTEROL SULFATE 1 DOSE: 2.5; .5 SOLUTION RESPIRATORY (INHALATION) at 15:03

## 2023-10-03 RX ADMIN — HYDROCODONE BITARTRATE AND ACETAMINOPHEN 1 TABLET: 10; 325 TABLET ORAL at 21:20

## 2023-10-03 RX ADMIN — SODIUM CHLORIDE, PRESERVATIVE FREE 10 ML: 5 INJECTION INTRAVENOUS at 09:59

## 2023-10-03 RX ADMIN — VALACYCLOVIR HYDROCHLORIDE 500 MG: 500 TABLET, FILM COATED ORAL at 09:59

## 2023-10-03 RX ADMIN — CEFAZOLIN 2000 MG: 2 INJECTION, POWDER, FOR SOLUTION INTRAMUSCULAR; INTRAVENOUS at 10:07

## 2023-10-03 RX ADMIN — IPRATROPIUM BROMIDE AND ALBUTEROL SULFATE 1 DOSE: 2.5; .5 SOLUTION RESPIRATORY (INHALATION) at 20:42

## 2023-10-03 RX ADMIN — SODIUM CHLORIDE, PRESERVATIVE FREE 10 ML: 5 INJECTION INTRAVENOUS at 21:12

## 2023-10-03 RX ADMIN — GUAIFENESIN 1200 MG: 600 TABLET, EXTENDED RELEASE ORAL at 09:58

## 2023-10-03 RX ADMIN — CEFAZOLIN 2000 MG: 2 INJECTION, POWDER, FOR SOLUTION INTRAMUSCULAR; INTRAVENOUS at 23:41

## 2023-10-03 RX ADMIN — ALLOPURINOL 300 MG: 100 TABLET ORAL at 09:58

## 2023-10-03 RX ADMIN — GABAPENTIN 100 MG: 100 CAPSULE ORAL at 21:12

## 2023-10-03 RX ADMIN — INSULIN LISPRO 4 UNITS: 100 INJECTION, SOLUTION INTRAVENOUS; SUBCUTANEOUS at 21:14

## 2023-10-03 RX ADMIN — IPRATROPIUM BROMIDE AND ALBUTEROL SULFATE 1 DOSE: 2.5; .5 SOLUTION RESPIRATORY (INHALATION) at 07:10

## 2023-10-03 RX ADMIN — ENOXAPARIN SODIUM 40 MG: 100 INJECTION SUBCUTANEOUS at 09:58

## 2023-10-03 RX ADMIN — GABAPENTIN 100 MG: 100 CAPSULE ORAL at 09:58

## 2023-10-03 ASSESSMENT — PAIN SCALES - WONG BAKER
WONGBAKER_NUMERICALRESPONSE: 4

## 2023-10-03 ASSESSMENT — PAIN SCALES - GENERAL
PAINLEVEL_OUTOF10: 7
PAINLEVEL_OUTOF10: 4
PAINLEVEL_OUTOF10: 3
PAINLEVEL_OUTOF10: 6

## 2023-10-03 ASSESSMENT — PAIN DESCRIPTION - DESCRIPTORS
DESCRIPTORS: ACHING
DESCRIPTORS: ACHING

## 2023-10-03 ASSESSMENT — PAIN DESCRIPTION - LOCATION
LOCATION: GENERALIZED;HIP;LEG
LOCATION: SHOULDER;LEG

## 2023-10-03 ASSESSMENT — PAIN DESCRIPTION - ORIENTATION
ORIENTATION: MID
ORIENTATION: RIGHT

## 2023-10-03 ASSESSMENT — PAIN DESCRIPTION - PAIN TYPE
TYPE: ACUTE PAIN;CHRONIC PAIN
TYPE: ACUTE PAIN

## 2023-10-03 ASSESSMENT — PAIN DESCRIPTION - FREQUENCY: FREQUENCY: CONTINUOUS

## 2023-10-03 ASSESSMENT — PAIN DESCRIPTION - ONSET: ONSET: ON-GOING

## 2023-10-03 ASSESSMENT — PAIN - FUNCTIONAL ASSESSMENT: PAIN_FUNCTIONAL_ASSESSMENT: ACTIVITIES ARE NOT PREVENTED

## 2023-10-03 NOTE — PROGRESS NOTES
Progress Note( Dr. Myriam Hernandez)  10/3/2023  Subjective:   Admit Date: 9/24/2023  PCP: Calos Flores MD    Admitted For :Shortness of breath, has hypoglycemia    Consulted For:  Better control of blood glucose    Interval History patient is not doing well this morning short of breath not interested in eating his breakfast this morning  ANTONY 10/2/2023 and negative for endocarditis summary report below      Denies any chest pains,   Yes SOB . Oxygen  Denies nausea or vomiting. Now patient is eating better home food  No new bowel or bladder symptoms. Intake/Output Summary (Last 24 hours) at 10/3/2023 0859  Last data filed at 10/3/2023 0443  Gross per 24 hour   Intake 760 ml   Output 1250 ml   Net -490 ml         DATA    CBC:   Recent Labs     10/01/23  0600 10/02/23  0540 10/03/23  0516   WBC 7.8 8.7 8.8   HGB 8.8* 8.8* 9.2*   PLT 82* 91* 83*      CMP:  Recent Labs     10/01/23  0600 10/02/23  0540 10/03/23  0516    143 140   K 4.8 4.5 4.7    103 102   CO2 34* 36* 33*   BUN 20 19 19   CREATININE 0.5* 0.5* 0.4*   CALCIUM 8.4 8.5 8.6       Lipids:   Lab Results   Component Value Date/Time    CHOL 160 08/03/2012 02:47 PM    HDL 32 08/03/2012 02:47 PM    TRIG 274 08/03/2012 02:47 PM     Glucose:  Recent Labs     10/02/23  1944 10/03/23  0220 10/03/23  0649   POCGLU 274* 227* 173*       AvicwpaninG5X:  Lab Results   Component Value Date/Time    LABA1C 5.0 09/24/2023 09:36 AM     High Sensitivity TSH:   Lab Results   Component Value Date/Time    TSHHS 3.930 09/24/2023 09:36 AM     Free T3:   Lab Results   Component Value Date/Time    FT3 3.2 03/27/2012 12:39 PM     Free T4:  Lab Results   Component Value Date/Time    T4FREE 1.37 04/04/2019 12:39 PM       XR HIP RIGHT (2-3 VIEWS)   Final Result   Intact right hip without acute osseous fracture. Mild degenerative changes seen within the hip joint.          XR CHEST PORTABLE   Final Result   Acute interstitial edema or pneumonia is suspected superimposed

## 2023-10-03 NOTE — PLAN OF CARE
Problem: Discharge Planning  Goal: Discharge to home or other facility with appropriate resources  Outcome: Progressing  Flowsheets (Taken 10/3/2023 0800)  Discharge to home or other facility with appropriate resources:   Identify barriers to discharge with patient and caregiver   Arrange for needed discharge resources and transportation as appropriate   Identify discharge learning needs (meds, wound care, etc)   Refer to discharge planning if patient needs post-hospital services based on physician order or complex needs related to functional status, cognitive ability or social support system     Problem: Safety - Adult  Goal: Free from fall injury  Outcome: Progressing     Problem: Skin/Tissue Integrity  Goal: Absence of new skin breakdown  Description: 1. Monitor for areas of redness and/or skin breakdown  2. Assess vascular access sites hourly  3. Every 4-6 hours minimum:  Change oxygen saturation probe site  4. Every 4-6 hours:  If on nasal continuous positive airway pressure, respiratory therapy assess nares and determine need for appliance change or resting period. Outcome: Progressing     Problem: Confusion  Goal: Confusion, delirium, dementia, or psychosis is improved or at baseline  Description: INTERVENTIONS:  1. Assess for possible contributors to thought disturbance, including medications, impaired vision or hearing, underlying metabolic abnormalities, dehydration, psychiatric diagnoses, and notify attending LIP  2. [place] high risk fall precautions, as indicated  3. Provide frequent short contacts to provide reality reorientation, refocusing and direction  4. Decrease environmental stimuli, including noise as appropriate  5. Monitor and intervene to maintain adequate nutrition, hydration, elimination, sleep and activity  6. If unable to ensure safety without constant attention obtain sitter and review sitter guidelines with assigned personnel  7.  Initiate Psychosocial CNS and Spiritual Care prevent overall improvement and discharge     Problem: Respiratory - Adult  Goal: Achieves optimal ventilation and oxygenation  Outcome: Progressing  Flowsheets (Taken 10/3/2023 0800)  Achieves optimal ventilation and oxygenation:   Assess for changes in respiratory status   Assess for changes in mentation and behavior   Position to facilitate oxygenation and minimize respiratory effort   Oxygen supplementation based on oxygen saturation or arterial blood gases   Initiate smoking cessation protocol as indicated   Encourage broncho-pulmonary hygiene including cough, deep breathe, incentive spirometry   Assess the need for suctioning and aspirate as needed   Assess and instruct to report shortness of breath or any respiratory difficulty   Respiratory therapy support as indicated     Problem: Skin/Tissue Integrity - Adult  Goal: Skin integrity remains intact  Outcome: Progressing  Flowsheets (Taken 10/3/2023 0800)  Skin Integrity Remains Intact:   Monitor for areas of redness and/or skin breakdown   Assess vascular access sites hourly   Every 4-6 hours minimum: Change oxygen saturation probe site   Every 4-6 hours: If on nasal continuous positive airway pressure, respiratory therapy assesses nares and determine need for appliance change or resting period  Goal: Incisions, wounds, or drain sites healing without S/S of infection  Outcome: Progressing  Flowsheets (Taken 10/3/2023 0800)  Incisions, Wounds, or Drain Sites Healing Without Sign and Symptoms of Infection:   ADMISSION and DAILY: Assess and document risk factors for pressure ulcer development   TWICE DAILY: Assess and document skin integrity   TWICE DAILY: Assess and document dressing/incision, wound bed, drain sites and surrounding tissue   Implement wound care per orders   Initiate isolation precautions as appropriate   Initiate pressure ulcer prevention bundle as indicated  Goal: Oral mucous membranes remain intact  Outcome: Progressing  Flowsheets (Taken

## 2023-10-03 NOTE — PROGRESS NOTES
Comprehensive Nutrition Assessment    Type and Reason for Visit:  Reassess    Nutrition Recommendations/Plan:   Add Easy To Chew to Carb Control 5  Increase diabetic oral nutrition supplement to tid, chocolate  Add frozen supplement trial bid     Malnutrition Assessment:  Malnutrition Status:  Severe malnutrition (10/03/23 1221)    Context:  Chronic Illness     Findings of the 6 clinical characteristics of malnutrition:  Energy Intake:  Mild decrease in energy intake  Weight Loss:  Greater than 10% over 6 months     Body Fat Loss:  Severe body fat loss Orbital, Triceps   Muscle Mass Loss:  Severe muscle mass loss Calf (gastrocnemius), Thigh (quadraceps), Clavicles (pectoralis & deltoids), Hand (interosseous)  Fluid Accumulation:  No significant fluid accumulation Extremities   Strength:  Not Performed    Nutrition Assessment:    Pt feeding self during visit, fair intake, struggling to chew with ill fitting dentures, will modify diet to Easy To Chew for him. Consuming greater than half to all of meals and supplements at this time. Willing to take oral supplements here, as he does at home, prefers chocolate. Has lost 15% over the past 7 mos. Pt meets criteria for severe malnutrition. Will continue to follow as moderate nutrition risk. Nutrition Related Findings:    Glu 173, A1c down to 5   Wound Type: Skin Tears, Unstageable, Pressure Injury       Current Nutrition Intake & Therapies:    Average Meal Intake: 51-75%, %  Average Supplements Intake: %  ADULT DIET; Regular; 5 carb choices (75 gm/meal)  ADULT ORAL NUTRITION SUPPLEMENT; Breakfast, Lunch, Dinner; Diabetic Oral Supplement    Anthropometric Measures:  Height: 6' (182.9 cm)  Ideal Body Weight (IBW): 178 lbs (81 kg)    Admission Body Weight: 149 lb 11.1 oz (67.9 kg) (bedscale)  Current Body Weight: 141 lb 8.6 oz (64.2 kg),   IBW.  Weight Source: Bed Scale  Current BMI (kg/m2): 19.2  Usual Body Weight: 167 lb (75.8 kg) (3/20/23)  % Weight Change

## 2023-10-03 NOTE — CARE COORDINATION
CM reviewed chart and discussed in IDR. Pt is not medically ready at this time. CM spoke with pt who stated that he does not need a SNF as he is unable to walk and has not for the last year. Pt is active with [facility]. CM sent PS to [facility] with [facility] to see if pt is appropriate for [facility].

## 2023-10-03 NOTE — PROGRESS NOTES
Progress Note( Dr. Robert Caldwell)  10/2/2023  Subjective:   Admit Date: 9/24/2023  PCP: Yunior Self MD    Admitted For :Shortness of breath, has hypoglycemia    Consulted For:  Better control of blood glucose    Interval History patient is not doing well this morning short of breath not interested in eating his breakfast this morning    Denies any chest pains,   Yes SOB . Oxygen  Denies nausea or vomiting. Now patient is eating better  No new bowel or bladder symptoms. Intake/Output Summary (Last 24 hours) at 10/2/2023 2013  Last data filed at 10/2/2023 1820  Gross per 24 hour   Intake 360 ml   Output 2450 ml   Net -2090 ml         DATA    CBC:   Recent Labs     09/30/23  0620 10/01/23  0600 10/02/23  0540   WBC 6.6 7.8 8.7   HGB 8.7* 8.8* 8.8*   PLT 79* 82* 91*      CMP:  Recent Labs     09/30/23  0620 10/01/23  0600 10/02/23  0540    142 143   K 4.7 4.8 4.5    104 103   CO2 35* 34* 36*   BUN 17 20 19   CREATININE 0.5* 0.5* 0.5*   CALCIUM 8.2* 8.4 8.5       Lipids:   Lab Results   Component Value Date/Time    CHOL 160 08/03/2012 02:47 PM    HDL 32 08/03/2012 02:47 PM    TRIG 274 08/03/2012 02:47 PM     Glucose:  Recent Labs     10/02/23  1132 10/02/23  1648 10/02/23  1944   POCGLU 118* 216* 274*       RpwvbcfsuhT2I:  Lab Results   Component Value Date/Time    LABA1C 5.0 09/24/2023 09:36 AM     High Sensitivity TSH:   Lab Results   Component Value Date/Time    TSHHS 3.930 09/24/2023 09:36 AM     Free T3:   Lab Results   Component Value Date/Time    FT3 3.2 03/27/2012 12:39 PM     Free T4:  Lab Results   Component Value Date/Time    T4FREE 1.37 04/04/2019 12:39 PM       XR HIP RIGHT (2-3 VIEWS)   Final Result   Intact right hip without acute osseous fracture. Mild degenerative changes seen within the hip joint. XR CHEST PORTABLE   Final Result   Acute interstitial edema or pneumonia is suspected superimposed on chronic   lung disease/COPD.       Cavitary disease in the right lower

## 2023-10-03 NOTE — PROGRESS NOTES
Patient Name:  Jonathan Chakraborty  Patient :  1950  Patient MRN:  [id number]     Primary Oncologist: Jesús Newman MD  Referring Provider: Flori Fleming MD     Date of Service: 2023      Reason for Consult: To evaluate the patient with CLL. Chief Complaint:    Chief Complaint   Patient presents with    Hypoglycemia     Patient Active Problem List:     Pneumonia     Sepsis ([address])     Hypogammaglobulinemia ([address])     CLL (chronic lymphocytic leukemia) ([address])     Upper respiratory infection, acute     Generalized muscle weakness     Type 2 diabetes mellitus with hyperglycemia, with long-term current use of insulin (HCC)     Poor appetite     Chronic obstructive pulmonary disease (HCC)     Neutropenia (HCC)     Other specified disorders of bone density and structure, unspecified site     Cellulitis of right upper limb     Herpesviral infection     Intestinal malabsorption     Other nonspecific abnormal finding of lung field     Iron deficiency anemia due to chronic blood loss     Other muscle spasm     Chronic lymphocytic leukemia (HCC)     Selective deficiency of IgG (HCC)     Acute bronchitis     Severe malnutrition ([address])     Pneumonia due to organism     Personal history of CLL (chronic lymphocytic leukemia)     Rhinovirus infection     Anemia     Thrombocytopenia (HCC)     Acute on chronic respiratory failure with hypoxemia (HCC)     Bronchiectasis with acute lower respiratory infection ([address])     Chronic respiratory failure (HCC)     Other chest pain     B12 deficiency     Immune thrombocytopenia (HCC)     Dehydration     Pneumonia, unspecified organism    HPI:   Jonathan Chakraborty is a 68 y.o. male with recurrent CLL currently on venetoclax, presented to [facility] on 23 with hypoglycemia, SOB with increasing wheeze. He was admitted for acute on chronic hypoxemic resp failure, pneumonia, COPD exacerbation and bronchiectasis.      He is getting romiplostin for thrombocytopenia and he was  supposed to Ursula Palacios MD at [facility]      x 2  last showed \"inflammation of heart\"    COLONOSCOPY  2010    DENTAL SURGERY      all teeth extracted     EYE SURGERY Right 2016    Cataract removal    FRACTURE SURGERY Left     left ankle plate and screws    IR BIOPSY PERC SUPERF BONE  2022    IR BIOPSY PERC SUPERF BONE 2022 [address] [facility]    KNEE SURGERY  1970    left    OTHER SURGICAL HISTORY Left 2017    groin excision    TONSILLECTOMY  late 's    age 13    TUNNELED VENOUS PORT PLACEMENT  2013    Right upper chest       Social History:   Social History     Socioeconomic History    Marital status: Single     Spouse name: None    Number of children: None    Years of education: None    Highest education level: None   Tobacco Use    Smoking status: Former     Packs/day: 3.00     Years: 20.00     Additional pack years: 0.00     Total pack years: 60.00     Types: Cigarettes     Start date: 10/2/1965     Quit date: 1987     Years since quittin.7    Smokeless tobacco: Never   Vaping Use    Vaping Use: Never used   Substance and Sexual Activity    Alcohol use: No    Drug use: No    Sexual activity: Not Currently       Family History:    Family History   Problem Relation Age of Onset    Diabetes Mother     High Blood Pressure Mother     Heart Disease Father     Other Sister         liver problems    Early Death Brother     Asthma Maternal Uncle     Colon Cancer Brother         No Known Allergies    Current Facility-Administered Medications on File Prior to Encounter   Medication Dose Route Frequency Provider Last Rate Last Admin    sodium chloride flush 0.9 % injection 10 mL  10 mL IntraVENous PRN Dimas Goode MD         Current Outpatient Medications on File Prior to Encounter   Medication Sig Dispense Refill    gabapentin (NEURONTIN) 100 MG capsule Take 1 capsule by mouth 3 times daily for 30 days.  90 capsule 3    allopurinol (ZYLOPRIM) 300 MG

## 2023-10-03 NOTE — PROGRESS NOTES
V2.0  [facility] Hospitalist Progress Note      Name:  Karen Peace /Age/Sex: 1950  (68 y.o. male)   MRN & CSN:  [id number] & [id number] Encounter Date/Time: 10/3/2023 3:14 PM EDT    Location:  -A PCP: Osvaldo Gross MD       Hospital Day: 10      Subjective:     Chief Complaint:  Hypoglycemia     Patient states his breathing is doing ok  He does not get out of bed for the last year. Assessment and Plan:     Severe hypoglycemia  -blood sugar 29 on admission. Hx of DM II. Low glucose likely 2/2 home insulin, poor appetite, low reserve, cancer, and infection. Was on D10 now off, started on Lantus 25U nightly (from home 40U nightly) but still hypoglycemic, now changed to 10U nightly with hold precautions and glucose much improved. Discussed with endocrinology. Will monitor closely.  -On prandial insulin. Off Jardiance and metformin and Tresiba. Acute respiratory failure with hypoxia 2/2 bacterial PNA. CXR acute interstitial edema or PNA. (baseline 3L NC). Hx of Bronchiectasis with resp cs Pseudomonas intermediate resistant to cefepime and merrem. Strep, legionella and MRSA all negative. S/p vanc+cefepime, stopped vancomycin as MRSA nares negative and continue cefepime, added inhaled tobramycin but ID d/seth, await resp cx. Continue acapella, chest physiotherapy, consider CT chest if no improvement.   -On 4 L nasal cannula. Severe sepsis 2/2 bacterial PNA and Staph. Epi bacteremia   P/w leukocytosis WBC 17, tachycardia 100's,  LA 2.2. Blood cx Staph. Ep x2.   -On abx as above    -TTE with echodensity on aortic valves, healed vegetation vs calcification  -ID following: continue cefepime to compete 7 day course on , cefazolin will be started after completion of cefepime  -F/u repeat Bcx from  peripheral Bcx (pending?)  -ANTONY 10/2: Negative for vegetations only calcified aortic valve leaflet. ID recommends IV cefazolin 2 g every 8 hours for 10 days end date 10/5/2023.   [facility] culture negative as per ID. Follow-up with ID in 1 week after discharge. COPD 2/2 Chronic respiratory failure with hypoxia  -on 3 L oxygen at home. Continue home meds, breathing tx.   -Pulmonology following      CLL. Since 2007. Converted from drug to poor prognostic factors. On venetoclax since January 2023, most recent CT scan in July with stable findings, and per oncology the plan was to reimage in 6 months. Consulted oncology.  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 On ROMIPLOSTIM (NPLATE) subcutaneous injections  Treatment 27 given 9/19/2023.   - Oncology following:   -Platelets 83. Holding venetoclax    Severe malnutrition  - Diet and supplements as per dietitian. Hypogammaglobulinemia. Last time he received IV immunoglobulin (Gamunex-C) was on 9/11/2023      Poor appetite  -started on Megace in July by oncology-held for now      Iron deficiency anemia  - hgb 9.1 from 11. No sign of GI bleed. await today CBC. -Hemoglobin 9.2. BPH. Continue home meds     Gout. On allopurinol-continue     Neuropathy  -On gabapentin-continue     Chronic pain syndrome  -On Norco every 30 days per oncology-continue         Diet ADULT DIET; Easy to Chew; 5 carb choices (75 gm/meal)  ADULT ORAL NUTRITION SUPPLEMENT; Breakfast, Lunch, Dinner; Diabetic Oral Supplement  ADULT ORAL NUTRITION SUPPLEMENT; Lunch, Dinner; Frozen Oral Supplement   DVT Prophylaxis [x] Lovenox, []  Heparin, [] SCDs, [] Ambulation,  [] Eliquis, [] Xarelto  [] Coumadin   Code Status Full Code   Disposition From: home  Expected Disposition: [address]  Estimated Date of Discharge: 10/5  Patient requires continued admission due to hypoxia, sepsis   Surrogate Decision Maker/ POA      Review of Systems:    ROS negative except for as above. Objective:      Intake/Output Summary (Last 24 hours) at 10/3/2023 1448  Last data filed at 10/3/2023 1225  Gross per 24 hour   Intake 1240 ml   Output 1050 ml

## 2023-10-03 NOTE — CONSULTS
complaints of pain. Pain Assessment:  Severity:  mild  Quality of pain: sore  Wound Pain Timing/Severity: wound care  Premedicated: no    Plan:     Plan of Care: Wound 09/24/23 Shoulder Right-Dressing/Treatment: Collagen, Silicone border  Wound 09/24/23 Radial Proximal;Right cluster-Dressing/Treatment: Non adherent, Collagen, ABD, Roll gauze  Wound 09/24/23 Ear Right-Dressing/Treatment: Open to air    Patient in bed agreeable to wound care reassessment. Pt has skin tears to rt shoulder/rt arm cluster, rt ear pressure injury unstageable. Cleansed with NS measured and pictured. Applied new dressings to rt arm versatel, collagen. Rt shoulder applied optifoam border. Rt ear HERMELINDO. Sacrum intact applied foam border. Pt is incontinent of stool, [place]ico care done and depends changed. Pt turned to lt side with pillow support. Heels intact and floated. Discussed with pt's nurse to use foam pad for oxygen tubing over ears. Pt is at moderate risk for skin breakdown AEB cindy. Follow cindy orders. Specialty Bed Required : yes  [x] Low Air Loss   [] Pressure Redistribution  [] Fluid Immersion  [] Bariatric  [] Total Pressure Relief  [] Other:     Discharge Plan:  Placement for patient upon discharge: tbd  Hospice Care: no  Patient appropriate for Outpatient [facility]: [facility]    Patient/Caregiver Teaching:  Level of patient/caregiver understanding able to: pt voiced understanding. Electronically signed by Stefan Pavon.  JOAN Murphy, on 10/3/2023 at 11:33 AM

## 2023-10-04 LAB
ANION GAP SERPL CALCULATED.3IONS-SCNC: 8 MMOL/L (ref 4–16)
ANISOCYTOSIS: ABNORMAL
BUN SERPL-MCNC: 22 MG/DL (ref 6–23)
CALCIUM SERPL-MCNC: 8.5 MG/DL (ref 8.3–10.6)
CHLORIDE BLD-SCNC: 103 MMOL/L (ref 99–110)
CO2: 31 MMOL/L (ref 21–32)
CREAT SERPL-MCNC: 0.5 MG/DL (ref 0.9–1.3)
DIFFERENTIAL TYPE: ABNORMAL
EOSINOPHILS ABSOLUTE: 0.2 K/CU MM
EOSINOPHILS RELATIVE PERCENT: 2 % (ref 0–3)
GFR SERPL CREATININE-BSD FRML MDRD: >60 ML/MIN/1.73M2
GLUCOSE BLD-MCNC: 141 MG/DL (ref 70–99)
GLUCOSE BLD-MCNC: 172 MG/DL (ref 70–99)
GLUCOSE BLD-MCNC: 177 MG/DL (ref 70–99)
GLUCOSE BLD-MCNC: 256 MG/DL (ref 70–99)
GLUCOSE BLD-MCNC: 297 MG/DL (ref 70–99)
GLUCOSE SERPL-MCNC: 152 MG/DL (ref 70–99)
HCT VFR BLD CALC: 30.3 % (ref 42–52)
HEMOGLOBIN: 8.9 GM/DL (ref 13.5–18)
HYPOCHROMIA: ABNORMAL
LYMPHOCYTES ABSOLUTE: 7 K/CU MM
LYMPHOCYTES RELATIVE PERCENT: 75 % (ref 24–44)
MCH RBC QN AUTO: 27.9 PG (ref 27–31)
MCHC RBC AUTO-ENTMCNC: 29.4 % (ref 32–36)
MCV RBC AUTO: 95 FL (ref 78–100)
MONOCYTES ABSOLUTE: 0.1 K/CU MM
MONOCYTES RELATIVE PERCENT: 1 % (ref 0–4)
PDW BLD-RTO: 20.2 % (ref 11.7–14.9)
PLATELET # BLD: 83 K/CU MM (ref 140–440)
PLT MORPHOLOGY: ABNORMAL
PMV BLD AUTO: 9.6 FL (ref 7.5–11.1)
POTASSIUM SERPL-SCNC: 4.7 MMOL/L (ref 3.5–5.1)
RBC # BLD: 3.19 M/CU MM (ref 4.6–6.2)
SEGMENTED NEUTROPHILS ABSOLUTE COUNT: 2.1 K/CU MM
SEGMENTED NEUTROPHILS RELATIVE PERCENT: 22 % (ref 36–66)
SODIUM BLD-SCNC: 142 MMOL/L (ref 135–145)
WBC # BLD: 9.4 K/CU MM (ref 4–10.5)

## 2023-10-04 PROCEDURE — 80048 BASIC METABOLIC PNL TOTAL CA: CPT

## 2023-10-04 PROCEDURE — 36415 COLL VENOUS BLD VENIPUNCTURE: CPT

## 2023-10-04 PROCEDURE — 99231 SBSQ HOSP IP/OBS SF/LOW 25: CPT | Performed by: PHYSICIAN ASSISTANT

## 2023-10-04 PROCEDURE — 82962 GLUCOSE BLOOD TEST: CPT

## 2023-10-04 PROCEDURE — 6370000000 HC RX 637 (ALT 250 FOR IP): Performed by: STUDENT IN AN ORGANIZED HEALTH CARE EDUCATION/TRAINING PROGRAM

## 2023-10-04 PROCEDURE — 2580000003 HC RX 258: Performed by: INTERNAL MEDICINE

## 2023-10-04 PROCEDURE — 85025 COMPLETE CBC W/AUTO DIFF WBC: CPT

## 2023-10-04 PROCEDURE — 85007 BL SMEAR W/DIFF WBC COUNT: CPT

## 2023-10-04 PROCEDURE — 6370000000 HC RX 637 (ALT 250 FOR IP): Performed by: INTERNAL MEDICINE

## 2023-10-04 PROCEDURE — 6360000002 HC RX W HCPCS: Performed by: INTERNAL MEDICINE

## 2023-10-04 PROCEDURE — 2060000000 HC ICU INTERMEDIATE R&B

## 2023-10-04 PROCEDURE — 85027 COMPLETE CBC AUTOMATED: CPT

## 2023-10-04 PROCEDURE — 99232 SBSQ HOSP IP/OBS MODERATE 35: CPT | Performed by: INTERNAL MEDICINE

## 2023-10-04 PROCEDURE — 2700000000 HC OXYGEN THERAPY PER DAY

## 2023-10-04 PROCEDURE — 94640 AIRWAY INHALATION TREATMENT: CPT

## 2023-10-04 PROCEDURE — 94761 N-INVAS EAR/PLS OXIMETRY MLT: CPT

## 2023-10-04 PROCEDURE — 94668 MNPJ CHEST WALL SBSQ: CPT

## 2023-10-04 RX ORDER — INSULIN LISPRO 100 [IU]/ML
5 INJECTION, SOLUTION INTRAVENOUS; SUBCUTANEOUS
Status: DISCONTINUED | OUTPATIENT
Start: 2023-10-04 | End: 2023-10-07 | Stop reason: HOSPADM

## 2023-10-04 RX ADMIN — HYDROCODONE BITARTRATE AND ACETAMINOPHEN 1 TABLET: 10; 325 TABLET ORAL at 21:11

## 2023-10-04 RX ADMIN — TIOTROPIUM BROMIDE AND OLODATEROL 2 PUFF: 3.124; 2.736 SPRAY, METERED RESPIRATORY (INHALATION) at 08:11

## 2023-10-04 RX ADMIN — GABAPENTIN 100 MG: 100 CAPSULE ORAL at 21:12

## 2023-10-04 RX ADMIN — ALLOPURINOL 300 MG: 100 TABLET ORAL at 08:28

## 2023-10-04 RX ADMIN — GUAIFENESIN 1200 MG: 600 TABLET, EXTENDED RELEASE ORAL at 21:12

## 2023-10-04 RX ADMIN — ONDANSETRON 4 MG: 4 TABLET, ORALLY DISINTEGRATING ORAL at 21:12

## 2023-10-04 RX ADMIN — INSULIN GLARGINE 10 UNITS: 100 INJECTION, SOLUTION SUBCUTANEOUS at 21:12

## 2023-10-04 RX ADMIN — IPRATROPIUM BROMIDE AND ALBUTEROL SULFATE 1 DOSE: 2.5; .5 SOLUTION RESPIRATORY (INHALATION) at 16:22

## 2023-10-04 RX ADMIN — GABAPENTIN 100 MG: 100 CAPSULE ORAL at 13:02

## 2023-10-04 RX ADMIN — IPRATROPIUM BROMIDE AND ALBUTEROL SULFATE 1 DOSE: 2.5; .5 SOLUTION RESPIRATORY (INHALATION) at 21:27

## 2023-10-04 RX ADMIN — INSULIN LISPRO 5 UNITS: 100 INJECTION, SOLUTION INTRAVENOUS; SUBCUTANEOUS at 13:02

## 2023-10-04 RX ADMIN — IPRATROPIUM BROMIDE AND ALBUTEROL SULFATE 1 DOSE: 2.5; .5 SOLUTION RESPIRATORY (INHALATION) at 12:09

## 2023-10-04 RX ADMIN — TAMSULOSIN HYDROCHLORIDE 0.4 MG: 0.4 CAPSULE ORAL at 08:28

## 2023-10-04 RX ADMIN — CEFAZOLIN 2000 MG: 2 INJECTION, POWDER, FOR SOLUTION INTRAMUSCULAR; INTRAVENOUS at 17:22

## 2023-10-04 RX ADMIN — IPRATROPIUM BROMIDE AND ALBUTEROL SULFATE 1 DOSE: 2.5; .5 SOLUTION RESPIRATORY (INHALATION) at 08:11

## 2023-10-04 RX ADMIN — GUAIFENESIN 1200 MG: 600 TABLET, EXTENDED RELEASE ORAL at 08:28

## 2023-10-04 RX ADMIN — HYDROCODONE BITARTRATE AND ACETAMINOPHEN 1 TABLET: 10; 325 TABLET ORAL at 07:20

## 2023-10-04 RX ADMIN — CEFAZOLIN 2000 MG: 2 INJECTION, POWDER, FOR SOLUTION INTRAMUSCULAR; INTRAVENOUS at 08:36

## 2023-10-04 RX ADMIN — SODIUM CHLORIDE, PRESERVATIVE FREE 10 ML: 5 INJECTION INTRAVENOUS at 08:29

## 2023-10-04 RX ADMIN — ENOXAPARIN SODIUM 40 MG: 100 INJECTION SUBCUTANEOUS at 08:28

## 2023-10-04 RX ADMIN — FINASTERIDE 5 MG: 5 TABLET, FILM COATED ORAL at 08:29

## 2023-10-04 RX ADMIN — VALACYCLOVIR HYDROCHLORIDE 500 MG: 500 TABLET, FILM COATED ORAL at 08:29

## 2023-10-04 RX ADMIN — ATORVASTATIN CALCIUM 40 MG: 40 TABLET, FILM COATED ORAL at 21:12

## 2023-10-04 RX ADMIN — GABAPENTIN 100 MG: 100 CAPSULE ORAL at 08:28

## 2023-10-04 RX ADMIN — SODIUM CHLORIDE, PRESERVATIVE FREE 10 ML: 5 INJECTION INTRAVENOUS at 21:17

## 2023-10-04 ASSESSMENT — PAIN DESCRIPTION - PAIN TYPE: TYPE: CHRONIC PAIN

## 2023-10-04 ASSESSMENT — PAIN SCALES - GENERAL
PAINLEVEL_OUTOF10: 6
PAINLEVEL_OUTOF10: 2
PAINLEVEL_OUTOF10: 6
PAINLEVEL_OUTOF10: 0

## 2023-10-04 ASSESSMENT — PAIN DESCRIPTION - DESCRIPTORS: DESCRIPTORS: ACHING

## 2023-10-04 ASSESSMENT — PAIN DESCRIPTION - LOCATION
LOCATION: HIP;LEG;SHOULDER
LOCATION: LEG;HIP;SHOULDER

## 2023-10-04 ASSESSMENT — PAIN DESCRIPTION - ORIENTATION
ORIENTATION: RIGHT
ORIENTATION: RIGHT

## 2023-10-04 NOTE — CONSULTS
IV Consult cancelled, No lab work currently due, recommend pulsatile flushing with 20cc NS prior to next blood draw from port.

## 2023-10-04 NOTE — PLAN OF CARE
Problem: Discharge Planning  Goal: Discharge to home or other facility with appropriate resources  Outcome: Progressing  Flowsheets (Taken 10/4/2023 0800)  Discharge to home or other facility with appropriate resources:   Identify barriers to discharge with patient and caregiver   Arrange for needed discharge resources and transportation as appropriate   Identify discharge learning needs (meds, wound care, etc)   Refer to discharge planning if patient needs post-hospital services based on physician order or complex needs related to functional status, cognitive ability or social support system     Problem: Safety - Adult  Goal: Free from fall injury  Outcome: Progressing     Problem: Skin/Tissue Integrity  Goal: Absence of new skin breakdown  Description: 1. Monitor for areas of redness and/or skin breakdown  2. Assess vascular access sites hourly  3. Every 4-6 hours minimum:  Change oxygen saturation probe site  4. Every 4-6 hours:  If on nasal continuous positive airway pressure, respiratory therapy assess nares and determine need for appliance change or resting period. Outcome: Progressing     Problem: Confusion  Goal: Confusion, delirium, dementia, or psychosis is improved or at baseline  Description: INTERVENTIONS:  1. Assess for possible contributors to thought disturbance, including medications, impaired vision or hearing, underlying metabolic abnormalities, dehydration, psychiatric diagnoses, and notify attending LIP  2. [place] high risk fall precautions, as indicated  3. Provide frequent short contacts to provide reality reorientation, refocusing and direction  4. Decrease environmental stimuli, including noise as appropriate  5. Monitor and intervene to maintain adequate nutrition, hydration, elimination, sleep and activity  6. If unable to ensure safety without constant attention obtain sitter and review sitter guidelines with assigned personnel  7.  Initiate Psychosocial CNS and Spiritual Care patient and family to use good hand hygiene technique   Identify and instruct in appropriate isolation precautions for identified infection/condition     Problem: Pain  Goal: Verbalizes/displays adequate comfort level or baseline comfort level  Outcome: Progressing  Flowsheets (Taken 10/4/2023 0815)  Verbalizes/displays adequate comfort level or baseline comfort level:   Encourage patient to monitor pain and request assistance   Assess pain using appropriate pain scale   Administer analgesics based on type and severity of pain and evaluate response   Implement non-pharmacological measures as appropriate and evaluate response   Consider cultural and social influences on pain and pain management   Notify Licensed Independent Practitioner if interventions unsuccessful or patient reports new pain     Problem: Nutrition Deficit:  Goal: Optimize nutritional status  Outcome: Progressing     Problem: Musculoskeletal - Adult  Goal: Return mobility to safest level of function  Outcome: Progressing  Flowsheets (Taken 10/4/2023 0800)  Return Mobility to Safest Level of Function:   Assess patient stability and activity tolerance for standing, transferring and ambulating with or without assistive devices   Assist with transfers and ambulation using safe patient handling equipment as needed   Ensure adequate protection for wounds/incisions during mobilization   Obtain physical therapy/occupational therapy consults as needed   Apply continuous passive motion per provider or physical therapy orders to increase flexion toward goal   Instruct patient/family in ordered activity level  Goal: Maintain proper alignment of affected body part  Outcome: Progressing  Flowsheets (Taken 10/4/2023 0800)  Maintain proper alignment of affected body part:   Support and protect limb and body alignment per provider's orders   Instruct and reinforce with patient and family use of appropriate assistive device and precautions (e.g. spinal or hip

## 2023-10-04 NOTE — PROGRESS NOTES
Patient Name:  Julio César Deluna  Patient :  1950  Patient MRN:  [id number]     Primary Oncologist: Armida Davidson MD  Referring Provider: Jacob Marin MD     Date of Service: 2023      Reason for Consult: To evaluate the patient with CLL. Chief Complaint:    Chief Complaint   Patient presents with    Hypoglycemia     Patient Active Problem List:     Pneumonia     Sepsis ([address])     Hypogammaglobulinemia ([address])     CLL (chronic lymphocytic leukemia) ([address])     Upper respiratory infection, acute     Generalized muscle weakness     Type 2 diabetes mellitus with hyperglycemia, with long-term current use of insulin (HCC)     Poor appetite     Chronic obstructive pulmonary disease (HCC)     Neutropenia (HCC)     Other specified disorders of bone density and structure, unspecified site     Cellulitis of right upper limb     Herpesviral infection     Intestinal malabsorption     Other nonspecific abnormal finding of lung field     Iron deficiency anemia due to chronic blood loss     Other muscle spasm     Chronic lymphocytic leukemia (HCC)     Selective deficiency of IgG (HCC)     Acute bronchitis     Severe malnutrition ([address])     Pneumonia due to organism     Personal history of CLL (chronic lymphocytic leukemia)     Rhinovirus infection     Anemia     Thrombocytopenia (HCC)     Acute on chronic respiratory failure with hypoxemia (HCC)     Bronchiectasis with acute lower respiratory infection ([address])     Chronic respiratory failure (HCC)     Other chest pain     B12 deficiency     Immune thrombocytopenia (HCC)     Dehydration     Pneumonia, unspecified organism    HPI:   Julio César Deluna is a 68 y.o. male with recurrent CLL currently on venetoclax, presented to [facility] on 23 with hypoglycemia, SOB with increasing wheeze. He was admitted for acute on chronic hypoxemic resp failure, pneumonia, COPD exacerbation and bronchiectasis.      He is getting romiplostin for thrombocytopenia and he was  supposed to

## 2023-10-04 NOTE — CARE COORDINATION
CM reviewed chart and discussed in IDR. Pt is not medically ready at this time. Possible discharge tomorrow. Plan home with [id number] Will need home care order.

## 2023-10-04 NOTE — PROGRESS NOTES
ORDERING SYSTEM PROVIDED HISTORY: Altered Mental Status TECHNOLOGIST PROVIDED HISTORY: Reason for exam:->Altered Mental Status Reason for Exam: Altered Mental Status FINDINGS: Cavitary disease in the lower right lung has improved from the earlier studies. There is suspected mild acute interstitial infiltrate or edema superimposed on chronic lung disease/COPD. Compared with the most recent CT chest of 07/06/2023. There is no pneumothorax or pleural effusion. Heart size is normal.  Descending thoracic aorta is tortuous and unchanged. There is an unchanged right IJ MediPort catheter. No acute bone finding. Acute interstitial edema or pneumonia is suspected superimposed on chronic lung disease/COPD. Cavitary disease in the right lower lung noted on earlier studies has improved. CT HEAD WO CONTRAST    Result Date: 9/24/2023  EXAMINATION: CT OF THE HEAD WITHOUT CONTRAST  9/24/2023 10:14 am TECHNIQUE: CT of the head was performed without the administration of intravenous contrast. Automated exposure control, iterative reconstruction, and/or weight based adjustment of the mA/kV was utilized to reduce the radiation dose to as low as reasonably achievable. COMPARISON: 09/28/2022 CT head HISTORY: ORDERING SYSTEM PROVIDED HISTORY: AMS TECHNOLOGIST PROVIDED HISTORY: Reason for exam:->AMS Has a \"code stroke\" or \"stroke alert\" been called? ->No Decision Support Exception - unselect if not a suspected or confirmed emergency medical condition->Emergency Medical Condition (MA) Reason for Exam: AMS  HYPOGLYCEMIA Additional signs and symptoms: PT MOTION  PT UNABLE TO UNDERSTAND ALL COMMANDS FINDINGS: BRAIN/VENTRICLES: There is no acute intracranial hemorrhage, mass effect or midline shift. No abnormal extra-axial fluid collection. The gray-white differentiation is maintained without evidence of an acute infarct.   There are nonspecific areas of hypoattenuation within the periventricular and subcortical white matter, which likely represent chronic microvascular ischemic change. There is prominence of the ventricles and sulci due to global parenchymal volume loss. ORBITS: The visualized portion of the orbits demonstrate no acute abnormality. SINUSES: There is significant mucosal thickening of the ethmoid air cells and maxillary sinuses. Small left and moderate right mastoid effusions. SOFT TISSUES/SKULL:  No acute abnormality of the visualized skull or soft tissues. 1.  No acute intracranial abnormality. 2.  Findings compatible with chronic microvascular ischemic disease and involutional change. 3.  Significant mucosal thickening of the ethmoid air cells and maxillary sinuses. Correlate for clinical evidence of sinusitis.        Electronically signed by Maricel Horowitz MD on 10/4/2023 at 12:07 PM

## 2023-10-05 LAB
ANION GAP SERPL CALCULATED.3IONS-SCNC: 9 MMOL/L (ref 4–16)
ANISOCYTOSIS: ABNORMAL
BASOPHILIC STIPPLING: PRESENT
BUN SERPL-MCNC: 21 MG/DL (ref 6–23)
CALCIUM SERPL-MCNC: 8.6 MG/DL (ref 8.3–10.6)
CHLORIDE BLD-SCNC: 101 MMOL/L (ref 99–110)
CO2: 31 MMOL/L (ref 21–32)
CREAT SERPL-MCNC: 0.5 MG/DL (ref 0.9–1.3)
DIFFERENTIAL TYPE: ABNORMAL
EOSINOPHILS ABSOLUTE: 0.2 K/CU MM
EOSINOPHILS RELATIVE PERCENT: 2 % (ref 0–3)
GFR SERPL CREATININE-BSD FRML MDRD: >60 ML/MIN/1.73M2
GLUCOSE BLD-MCNC: 132 MG/DL (ref 70–99)
GLUCOSE BLD-MCNC: 150 MG/DL (ref 70–99)
GLUCOSE BLD-MCNC: 176 MG/DL (ref 70–99)
GLUCOSE BLD-MCNC: 178 MG/DL (ref 70–99)
GLUCOSE BLD-MCNC: 272 MG/DL (ref 70–99)
GLUCOSE BLD-MCNC: 494 MG/DL (ref 70–99)
GLUCOSE SERPL-MCNC: 213 MG/DL (ref 70–99)
HCT VFR BLD CALC: 31.6 % (ref 42–52)
HEMOGLOBIN: 9.4 GM/DL (ref 13.5–18)
LYMPHOCYTES ABSOLUTE: 6.8 K/CU MM
LYMPHOCYTES RELATIVE PERCENT: 67 % (ref 24–44)
MACROCYTES: ABNORMAL
MCH RBC QN AUTO: 28.1 PG (ref 27–31)
MCHC RBC AUTO-ENTMCNC: 29.7 % (ref 32–36)
MCV RBC AUTO: 94.3 FL (ref 78–100)
MONOCYTES ABSOLUTE: 0.6 K/CU MM
MONOCYTES RELATIVE PERCENT: 6 % (ref 0–4)
OVALOCYTES: ABNORMAL
PDW BLD-RTO: 20.5 % (ref 11.7–14.9)
PLATELET # BLD: 90 K/CU MM (ref 140–440)
PMV BLD AUTO: 9.6 FL (ref 7.5–11.1)
POLYCHROMASIA: ABNORMAL
POTASSIUM SERPL-SCNC: 5.4 MMOL/L (ref 3.5–5.1)
RBC # BLD: 3.35 M/CU MM (ref 4.6–6.2)
SEGMENTED NEUTROPHILS ABSOLUTE COUNT: 2.6 K/CU MM
SEGMENTED NEUTROPHILS RELATIVE PERCENT: 25 % (ref 36–66)
SODIUM BLD-SCNC: 141 MMOL/L (ref 135–145)
WBC # BLD: 10.2 K/CU MM (ref 4–10.5)

## 2023-10-05 PROCEDURE — 94640 AIRWAY INHALATION TREATMENT: CPT

## 2023-10-05 PROCEDURE — 6370000000 HC RX 637 (ALT 250 FOR IP): Performed by: INTERNAL MEDICINE

## 2023-10-05 PROCEDURE — 80048 BASIC METABOLIC PNL TOTAL CA: CPT

## 2023-10-05 PROCEDURE — 94761 N-INVAS EAR/PLS OXIMETRY MLT: CPT

## 2023-10-05 PROCEDURE — 2060000000 HC ICU INTERMEDIATE R&B

## 2023-10-05 PROCEDURE — 36415 COLL VENOUS BLD VENIPUNCTURE: CPT

## 2023-10-05 PROCEDURE — 85027 COMPLETE CBC AUTOMATED: CPT

## 2023-10-05 PROCEDURE — 6360000002 HC RX W HCPCS: Performed by: INTERNAL MEDICINE

## 2023-10-05 PROCEDURE — 99232 SBSQ HOSP IP/OBS MODERATE 35: CPT | Performed by: INTERNAL MEDICINE

## 2023-10-05 PROCEDURE — 6370000000 HC RX 637 (ALT 250 FOR IP): Performed by: HOSPITALIST

## 2023-10-05 PROCEDURE — 85007 BL SMEAR W/DIFF WBC COUNT: CPT

## 2023-10-05 PROCEDURE — 2580000003 HC RX 258: Performed by: INTERNAL MEDICINE

## 2023-10-05 PROCEDURE — 2700000000 HC OXYGEN THERAPY PER DAY

## 2023-10-05 PROCEDURE — 82962 GLUCOSE BLOOD TEST: CPT

## 2023-10-05 PROCEDURE — 94668 MNPJ CHEST WALL SBSQ: CPT

## 2023-10-05 RX ADMIN — FINASTERIDE 5 MG: 5 TABLET, FILM COATED ORAL at 08:25

## 2023-10-05 RX ADMIN — ATORVASTATIN CALCIUM 40 MG: 40 TABLET, FILM COATED ORAL at 21:51

## 2023-10-05 RX ADMIN — HYDROCODONE BITARTRATE AND ACETAMINOPHEN 1 TABLET: 10; 325 TABLET ORAL at 08:23

## 2023-10-05 RX ADMIN — VALACYCLOVIR HYDROCHLORIDE 500 MG: 500 TABLET, FILM COATED ORAL at 08:25

## 2023-10-05 RX ADMIN — GUAIFENESIN 1200 MG: 600 TABLET, EXTENDED RELEASE ORAL at 21:51

## 2023-10-05 RX ADMIN — GABAPENTIN 100 MG: 100 CAPSULE ORAL at 15:17

## 2023-10-05 RX ADMIN — SODIUM ZIRCONIUM CYCLOSILICATE 10 G: 10 POWDER, FOR SUSPENSION ORAL at 11:49

## 2023-10-05 RX ADMIN — ALLOPURINOL 300 MG: 100 TABLET ORAL at 08:25

## 2023-10-05 RX ADMIN — IPRATROPIUM BROMIDE AND ALBUTEROL SULFATE 1 DOSE: 2.5; .5 SOLUTION RESPIRATORY (INHALATION) at 11:38

## 2023-10-05 RX ADMIN — GABAPENTIN 100 MG: 100 CAPSULE ORAL at 08:24

## 2023-10-05 RX ADMIN — CEFAZOLIN 2000 MG: 2 INJECTION, POWDER, FOR SOLUTION INTRAMUSCULAR; INTRAVENOUS at 08:32

## 2023-10-05 RX ADMIN — TAMSULOSIN HYDROCHLORIDE 0.4 MG: 0.4 CAPSULE ORAL at 08:24

## 2023-10-05 RX ADMIN — INSULIN GLARGINE 10 UNITS: 100 INJECTION, SOLUTION SUBCUTANEOUS at 21:52

## 2023-10-05 RX ADMIN — GUAIFENESIN 1200 MG: 600 TABLET, EXTENDED RELEASE ORAL at 08:25

## 2023-10-05 RX ADMIN — CEFAZOLIN 2000 MG: 2 INJECTION, POWDER, FOR SOLUTION INTRAMUSCULAR; INTRAVENOUS at 15:24

## 2023-10-05 RX ADMIN — ENOXAPARIN SODIUM 40 MG: 100 INJECTION SUBCUTANEOUS at 08:23

## 2023-10-05 RX ADMIN — GABAPENTIN 100 MG: 100 CAPSULE ORAL at 21:51

## 2023-10-05 RX ADMIN — INSULIN LISPRO 5 UNITS: 100 INJECTION, SOLUTION INTRAVENOUS; SUBCUTANEOUS at 17:08

## 2023-10-05 RX ADMIN — INSULIN LISPRO 4 UNITS: 100 INJECTION, SOLUTION INTRAVENOUS; SUBCUTANEOUS at 02:41

## 2023-10-05 RX ADMIN — CEFAZOLIN 2000 MG: 2 INJECTION, POWDER, FOR SOLUTION INTRAMUSCULAR; INTRAVENOUS at 00:37

## 2023-10-05 RX ADMIN — HYDROCODONE BITARTRATE AND ACETAMINOPHEN 1 TABLET: 10; 325 TABLET ORAL at 17:16

## 2023-10-05 RX ADMIN — SODIUM ZIRCONIUM CYCLOSILICATE 10 G: 10 POWDER, FOR SUSPENSION ORAL at 21:51

## 2023-10-05 RX ADMIN — IPRATROPIUM BROMIDE AND ALBUTEROL SULFATE 1 DOSE: 2.5; .5 SOLUTION RESPIRATORY (INHALATION) at 15:41

## 2023-10-05 ASSESSMENT — PAIN DESCRIPTION - LOCATION: LOCATION: HIP

## 2023-10-05 ASSESSMENT — PAIN SCALES - GENERAL: PAINLEVEL_OUTOF10: 6

## 2023-10-05 ASSESSMENT — PAIN DESCRIPTION - DESCRIPTORS: DESCRIPTORS: ACHING

## 2023-10-05 NOTE — PLAN OF CARE
Problem: Discharge Planning  Goal: Discharge to home or other facility with appropriate resources  Outcome: Progressing     Problem: Safety - Adult  Goal: Free from fall injury  Outcome: Progressing     Problem: Skin/Tissue Integrity  Goal: Absence of new skin breakdown  Description: 1. Monitor for areas of redness and/or skin breakdown  2. Assess vascular access sites hourly  3. Every 4-6 hours minimum:  Change oxygen saturation probe site  4. Every 4-6 hours:  If on nasal continuous positive airway pressure, respiratory therapy assess nares and determine need for appliance change or resting period. Outcome: Progressing     Problem: Confusion  Goal: Confusion, delirium, dementia, or psychosis is improved or at baseline  Description: INTERVENTIONS:  1. Assess for possible contributors to thought disturbance, including medications, impaired vision or hearing, underlying metabolic abnormalities, dehydration, psychiatric diagnoses, and notify attending LIP  2. [place] high risk fall precautions, as indicated  3. Provide frequent short contacts to provide reality reorientation, refocusing and direction  4. Decrease environmental stimuli, including noise as appropriate  5. Monitor and intervene to maintain adequate nutrition, hydration, elimination, sleep and activity  6. If unable to ensure safety without constant attention obtain sitter and review sitter guidelines with assigned personnel  7.  Initiate Psychosocial CNS and Spiritual Care consult, as indicated  Outcome: Progressing     Problem: Chronic Conditions and Co-morbidities  Goal: Patient's chronic conditions and co-morbidity symptoms are monitored and maintained or improved  Outcome: Progressing     Problem: Respiratory - Adult  Goal: Achieves optimal ventilation and oxygenation  Outcome: Progressing     Problem: Skin/Tissue Integrity - Adult  Goal: Skin integrity remains intact  Outcome: Progressing  Goal: Incisions, wounds, or drain sites healing

## 2023-10-05 NOTE — PROGRESS NOTES
V2.0  [facility] Note      Name:  Dannie Phalen /Age/Sex: 1950  (68 y.o. male)   MRN & CSN:  [id number] & [id number] Encounter Date/Time: 10/5/2023 3:14 PM EDT    Location:  -A PCP: Fan Shi MD       Hospital Day: 12      Subjective:     Chief Complaint:  Hypoglycemia     He feels he is doing well with this breathing  He is ready to go home. Assessment and Plan:     Severe hypoglycemia  -blood sugar 29 on admission. Hx of DM II. Low glucose likely 2/2 home insulin, poor appetite, low reserve, cancer, and infection. Was on D10 now off, started on Lantus 25U nightly (from home 40U nightly) but still hypoglycemic, now changed to 10U nightly with hold precautions and glucose much improved. Discussed with endocrinology. Will monitor closely.  -On prandial insulin. Off Jardiance and metformin and Tresiba. -Improved glucose. Acute respiratory failure with hypoxia 2/2 bacterial PNA. CXR acute interstitial edema or PNA. (baseline 3L NC). Hx of Bronchiectasis with resp cs Pseudomonas intermediate resistant to cefepime and merrem. Strep, legionella and MRSA all negative. S/p vanc+cefepime, stopped vancomycin as MRSA nares negative and continue cefepime, added inhaled tobramycin but ID d/seth, await resp cx. Continue acapella, chest physiotherapy, consider CT chest if no improvement.   -On 4 L nasal cannula. Severe sepsis 2/2 bacterial PNA and Staph. Epi bacteremia   P/w leukocytosis WBC 17, tachycardia 100's,  LA 2.2. Blood cx Staph. Ep x2.   -On abx as above    -TTE with echodensity on aortic valves, healed vegetation vs calcification  -ID following: Completed cefepime 7 day course on , cefazolin started after completion of cefepime  -F/u repeat Bcx from  peripheral Bcx (pending?)  -ANTONY 10/2: Negative for vegetations only calcified aortic valve leaflet. ID recommends IV cefazolin 2 g every 8 hours for 10 days end date 10/5/2023.   Mediport culture negative as

## 2023-10-05 NOTE — PROGRESS NOTES
Progress Note( Dr. Napoles Pod)  10/4/2023  Subjective:   Admit Date: 9/24/2023  PCP: Cierra Lenz MD    Admitted For :Shortness of breath, has hypoglycemia    Consulted For:  Better control of blood glucose    Interval History patient is not doing well this morning short of breath not interested in eating his breakfast this morning  ANTONY 10/2/2023 and negative for endocarditis summary report below      Denies any chest pains,   Yes SOB . On Oxygen  Denies nausea or vomiting. Now patient is eating better home food  No new bowel or bladder symptoms. Intake/Output Summary (Last 24 hours) at 10/4/2023 2133  Last data filed at 10/4/2023 1715  Gross per 24 hour   Intake 1200 ml   Output 650 ml   Net 550 ml         DATA    CBC:   Recent Labs     10/02/23  0540 10/03/23  0516 10/04/23  0421   WBC 8.7 8.8 9.4   HGB 8.8* 9.2* 8.9*   PLT 91* 83* 83*      CMP:  Recent Labs     10/02/23  0540 10/03/23  0516 10/04/23  0421    140 142   K 4.5 4.7 4.7    102 103   CO2 36* 33* 31   BUN 19 19 22   CREATININE 0.5* 0.4* 0.5*   CALCIUM 8.5 8.6 8.5       Lipids:   Lab Results   Component Value Date/Time    CHOL 160 08/03/2012 02:47 PM    HDL 32 08/03/2012 02:47 PM    TRIG 274 08/03/2012 02:47 PM     Glucose:  Recent Labs     10/04/23  1202 10/04/23  1716 10/04/23  1948   POCGLU 177* 256* 297*       [id number]:  Lab Results   Component Value Date/Time    LABA1C 5.0 09/24/2023 09:36 AM     High Sensitivity TSH:   Lab Results   Component Value Date/Time    TSHHS 3.930 09/24/2023 09:36 AM     Free T3:   Lab Results   Component Value Date/Time    FT3 3.2 03/27/2012 12:39 PM     Free T4:  Lab Results   Component Value Date/Time    T4FREE 1.37 04/04/2019 12:39 PM       XR HIP RIGHT (2-3 VIEWS)   Final Result   Intact right hip without acute osseous fracture. Mild degenerative changes seen within the hip joint.          XR CHEST PORTABLE   Final Result   Acute interstitial edema or pneumonia is suspected medicines as needed   Allowed to bring food from his home. Prepared by his family. Will follow     .      Christiana Rivero MD, MD

## 2023-10-06 LAB
ANION GAP SERPL CALCULATED.3IONS-SCNC: 9 MMOL/L (ref 4–16)
BASOPHILS ABSOLUTE: 0.1 K/CU MM
BASOPHILS RELATIVE PERCENT: 0.6 % (ref 0–1)
BUN SERPL-MCNC: 18 MG/DL (ref 6–23)
CALCIUM SERPL-MCNC: 8.4 MG/DL (ref 8.3–10.6)
CHLORIDE BLD-SCNC: 97 MMOL/L (ref 99–110)
CO2: 32 MMOL/L (ref 21–32)
CREAT SERPL-MCNC: 0.4 MG/DL (ref 0.9–1.3)
DIFFERENTIAL TYPE: ABNORMAL
EOSINOPHILS ABSOLUTE: 0.1 K/CU MM
EOSINOPHILS RELATIVE PERCENT: 1.3 % (ref 0–3)
GFR SERPL CREATININE-BSD FRML MDRD: >60 ML/MIN/1.73M2
GLUCOSE BLD-MCNC: 130 MG/DL (ref 70–99)
GLUCOSE BLD-MCNC: 176 MG/DL (ref 70–99)
GLUCOSE BLD-MCNC: 185 MG/DL (ref 70–99)
GLUCOSE BLD-MCNC: 213 MG/DL (ref 70–99)
GLUCOSE BLD-MCNC: 286 MG/DL (ref 70–99)
GLUCOSE SERPL-MCNC: 163 MG/DL (ref 70–99)
HCT VFR BLD CALC: 30.5 % (ref 42–52)
HEMOGLOBIN: 9.1 GM/DL (ref 13.5–18)
IMMATURE NEUTROPHIL %: 3.4 % (ref 0–0.43)
LYMPHOCYTES ABSOLUTE: 5.3 K/CU MM
LYMPHOCYTES RELATIVE PERCENT: 57.2 % (ref 24–44)
MCH RBC QN AUTO: 28.3 PG (ref 27–31)
MCHC RBC AUTO-ENTMCNC: 29.8 % (ref 32–36)
MCV RBC AUTO: 95 FL (ref 78–100)
MONOCYTES ABSOLUTE: 1.1 K/CU MM
MONOCYTES RELATIVE PERCENT: 11.9 % (ref 0–4)
NUCLEATED RBC %: 0 %
PDW BLD-RTO: 20.8 % (ref 11.7–14.9)
PLATELET # BLD: 84 K/CU MM (ref 140–440)
PMV BLD AUTO: 9.6 FL (ref 7.5–11.1)
POTASSIUM SERPL-SCNC: 4.8 MMOL/L (ref 3.5–5.1)
RBC # BLD: 3.21 M/CU MM (ref 4.6–6.2)
SEGMENTED NEUTROPHILS ABSOLUTE COUNT: 2.4 K/CU MM
SEGMENTED NEUTROPHILS RELATIVE PERCENT: 25.6 % (ref 36–66)
SODIUM BLD-SCNC: 138 MMOL/L (ref 135–145)
TOTAL IMMATURE NEUTOROPHIL: 0.32 K/CU MM
TOTAL NUCLEATED RBC: 0 K/CU MM
WBC # BLD: 9.3 K/CU MM (ref 4–10.5)

## 2023-10-06 PROCEDURE — 85025 COMPLETE CBC W/AUTO DIFF WBC: CPT

## 2023-10-06 PROCEDURE — 6370000000 HC RX 637 (ALT 250 FOR IP): Performed by: INTERNAL MEDICINE

## 2023-10-06 PROCEDURE — 2700000000 HC OXYGEN THERAPY PER DAY

## 2023-10-06 PROCEDURE — 2580000003 HC RX 258: Performed by: INTERNAL MEDICINE

## 2023-10-06 PROCEDURE — 99231 SBSQ HOSP IP/OBS SF/LOW 25: CPT | Performed by: PHYSICIAN ASSISTANT

## 2023-10-06 PROCEDURE — 6370000000 HC RX 637 (ALT 250 FOR IP): Performed by: STUDENT IN AN ORGANIZED HEALTH CARE EDUCATION/TRAINING PROGRAM

## 2023-10-06 PROCEDURE — 82962 GLUCOSE BLOOD TEST: CPT

## 2023-10-06 PROCEDURE — 97112 NEUROMUSCULAR REEDUCATION: CPT

## 2023-10-06 PROCEDURE — 36415 COLL VENOUS BLD VENIPUNCTURE: CPT

## 2023-10-06 PROCEDURE — 94640 AIRWAY INHALATION TREATMENT: CPT

## 2023-10-06 PROCEDURE — 97166 OT EVAL MOD COMPLEX 45 MIN: CPT

## 2023-10-06 PROCEDURE — 97162 PT EVAL MOD COMPLEX 30 MIN: CPT

## 2023-10-06 PROCEDURE — 97530 THERAPEUTIC ACTIVITIES: CPT

## 2023-10-06 PROCEDURE — 80048 BASIC METABOLIC PNL TOTAL CA: CPT

## 2023-10-06 PROCEDURE — 94761 N-INVAS EAR/PLS OXIMETRY MLT: CPT

## 2023-10-06 PROCEDURE — 6360000002 HC RX W HCPCS: Performed by: INTERNAL MEDICINE

## 2023-10-06 PROCEDURE — 2060000000 HC ICU INTERMEDIATE R&B

## 2023-10-06 RX ADMIN — SODIUM CHLORIDE, PRESERVATIVE FREE 5 ML: 5 INJECTION INTRAVENOUS at 09:01

## 2023-10-06 RX ADMIN — INSULIN GLARGINE 10 UNITS: 100 INJECTION, SOLUTION SUBCUTANEOUS at 20:37

## 2023-10-06 RX ADMIN — GUAIFENESIN 1200 MG: 600 TABLET, EXTENDED RELEASE ORAL at 20:37

## 2023-10-06 RX ADMIN — ENOXAPARIN SODIUM 40 MG: 100 INJECTION SUBCUTANEOUS at 08:03

## 2023-10-06 RX ADMIN — INSULIN LISPRO 2 UNITS: 100 INJECTION, SOLUTION INTRAVENOUS; SUBCUTANEOUS at 08:00

## 2023-10-06 RX ADMIN — GABAPENTIN 100 MG: 100 CAPSULE ORAL at 17:22

## 2023-10-06 RX ADMIN — VALACYCLOVIR HYDROCHLORIDE 500 MG: 500 TABLET, FILM COATED ORAL at 08:48

## 2023-10-06 RX ADMIN — INSULIN LISPRO 5 UNITS: 100 INJECTION, SOLUTION INTRAVENOUS; SUBCUTANEOUS at 16:47

## 2023-10-06 RX ADMIN — GABAPENTIN 100 MG: 100 CAPSULE ORAL at 08:48

## 2023-10-06 RX ADMIN — SODIUM CHLORIDE, PRESERVATIVE FREE 10 ML: 5 INJECTION INTRAVENOUS at 20:37

## 2023-10-06 RX ADMIN — IPRATROPIUM BROMIDE AND ALBUTEROL SULFATE 1 DOSE: 2.5; .5 SOLUTION RESPIRATORY (INHALATION) at 20:50

## 2023-10-06 RX ADMIN — GUAIFENESIN 1200 MG: 600 TABLET, EXTENDED RELEASE ORAL at 08:07

## 2023-10-06 RX ADMIN — GABAPENTIN 100 MG: 100 CAPSULE ORAL at 20:36

## 2023-10-06 RX ADMIN — TIOTROPIUM BROMIDE AND OLODATEROL 2 PUFF: 3.124; 2.736 SPRAY, METERED RESPIRATORY (INHALATION) at 09:09

## 2023-10-06 RX ADMIN — FINASTERIDE 5 MG: 5 TABLET, FILM COATED ORAL at 08:07

## 2023-10-06 RX ADMIN — IPRATROPIUM BROMIDE AND ALBUTEROL SULFATE 1 DOSE: 2.5; .5 SOLUTION RESPIRATORY (INHALATION) at 16:06

## 2023-10-06 RX ADMIN — INSULIN LISPRO 5 UNITS: 100 INJECTION, SOLUTION INTRAVENOUS; SUBCUTANEOUS at 08:01

## 2023-10-06 RX ADMIN — IPRATROPIUM BROMIDE AND ALBUTEROL SULFATE 1 DOSE: 2.5; .5 SOLUTION RESPIRATORY (INHALATION) at 09:09

## 2023-10-06 RX ADMIN — HYDROCODONE BITARTRATE AND ACETAMINOPHEN 1 TABLET: 10; 325 TABLET ORAL at 05:34

## 2023-10-06 RX ADMIN — ALLOPURINOL 300 MG: 100 TABLET ORAL at 08:07

## 2023-10-06 RX ADMIN — HYDROCODONE BITARTRATE AND ACETAMINOPHEN 1 TABLET: 10; 325 TABLET ORAL at 17:15

## 2023-10-06 RX ADMIN — IPRATROPIUM BROMIDE AND ALBUTEROL SULFATE 1 DOSE: 2.5; .5 SOLUTION RESPIRATORY (INHALATION) at 13:02

## 2023-10-06 RX ADMIN — ATORVASTATIN CALCIUM 40 MG: 40 TABLET, FILM COATED ORAL at 20:36

## 2023-10-06 RX ADMIN — TAMSULOSIN HYDROCHLORIDE 0.4 MG: 0.4 CAPSULE ORAL at 08:07

## 2023-10-06 ASSESSMENT — PAIN DESCRIPTION - LOCATION
LOCATION: LEG;SHOULDER
LOCATION: HIP
LOCATION: HIP;KNEE;SHOULDER
LOCATION: HIP
LOCATION: HIP

## 2023-10-06 ASSESSMENT — PAIN SCALES - GENERAL
PAINLEVEL_OUTOF10: 5
PAINLEVEL_OUTOF10: 3
PAINLEVEL_OUTOF10: 0
PAINLEVEL_OUTOF10: 6
PAINLEVEL_OUTOF10: 6
PAINLEVEL_OUTOF10: 2
PAINLEVEL_OUTOF10: 3

## 2023-10-06 ASSESSMENT — PAIN DESCRIPTION - FREQUENCY
FREQUENCY: CONTINUOUS
FREQUENCY: CONTINUOUS

## 2023-10-06 ASSESSMENT — PAIN DESCRIPTION - DESCRIPTORS
DESCRIPTORS: ACHING
DESCRIPTORS: ACHING;DISCOMFORT
DESCRIPTORS: ACHING

## 2023-10-06 ASSESSMENT — PAIN DESCRIPTION - PAIN TYPE
TYPE: ACUTE PAIN
TYPE: ACUTE PAIN
TYPE: CHRONIC PAIN

## 2023-10-06 ASSESSMENT — PAIN DESCRIPTION - ONSET
ONSET: PROGRESSIVE
ONSET: ON-GOING

## 2023-10-06 ASSESSMENT — PAIN DESCRIPTION - ORIENTATION
ORIENTATION: RIGHT

## 2023-10-06 NOTE — CONSULTS
Consult completed. [place] needleset changed and new sterile dressing applied with CHG scrub, skinprep, CHG dssg, and new luerlok caps/swabcaps applied. Line does not draw blood, patient to be discharged today. Patient tolerated well and no other needs noted or reported.

## 2023-10-06 NOTE — PROGRESS NOTES
Occupational Therapy  [facility] ACUTE CARE OCCUPATIONAL THERAPY EVALUATION    Man Sanchez, 1950, 2019/2019-A, 10/6/2023    Discharge Recommendation: [address]      History:  [place]:  The primary encounter diagnosis was Hypoglycemia. Diagnoses of Transient alteration of awareness and Pneumonia of right middle lobe due to infectious organism were also pertinent to this visit. Past Medical History:   Diagnosis Date    Arthritis     knees, Rt hand/wrist    BPH (benign prostatic hyperplasia)     CAD (coronary artery disease)     Cancer (HCC)     CLL--Sees Dr Kelly Desouza    Diabetes mellitus [facility])     Since about 2011    History of blood transfusion     no reaction    Hx of motion sickness     Hyperlipidemia     MDRO (multiple drug resistant organisms) resistance     abscess on buttock 10yrs ago    Migraine     last one summer 2016    Pneumonia 2016    Wears dentures     full upper--lower dentures    Wears glasses        Subjective:  Patient states: \"I won't be standing. I can't\"  Pain:  R knee, R shoulder, back pain.  Did not rate  Communication with other providers: co-eval w/ PT, handoff to RN  Restrictions: General Precautions, Fall Risk, contact isolation    Home Setup/Prior level of function:  Social/Functional History  Lives With: Son  Type of Home: House  Home Layout: One level  Home Access: Ramped entrance  Bathroom Shower/Tub:  (sponge bathes)  Bathroom Toilet: Bedside commode  Bathroom Equipment: 3-in-1 commode  Home Equipment: Walker, rolling, Hospital bed, [address] Help From: Family  ADL Assistance: Needs assistance (son helps with lower body dressing and bathing)  Toileting: Independent  Homemaking Assistance: Needs assistance  Homemaking Responsibilities: No  Ambulation Assistance: Non-ambulatory (hasn't ambulated for a year)  Transfer Assistance: Independent (stand pivots to/from [facility])  Active : No  Mode of Transportation: Car  Additional 21  Timed treatment minutes: 11        Electronically signed by:      SEBASTIEN Zarate/MARIA DEL CARMEN  [id number]

## 2023-10-06 NOTE — PLAN OF CARE
Problem: Discharge Planning  Goal: Discharge to home or other facility with appropriate resources  Outcome: Progressing     Problem: Safety - Adult  Goal: Free from fall injury  Outcome: Progressing     Problem: Skin/Tissue Integrity  Goal: Absence of new skin breakdown  Description: 1. Monitor for areas of redness and/or skin breakdown  2. Assess vascular access sites hourly  3. Every 4-6 hours minimum:  Change oxygen saturation probe site  4. Every 4-6 hours:  If on nasal continuous positive airway pressure, respiratory therapy assess nares and determine need for appliance change or resting period. Outcome: Progressing     Problem: Confusion  Goal: Confusion, delirium, dementia, or psychosis is improved or at baseline  Description: INTERVENTIONS:  1. Assess for possible contributors to thought disturbance, including medications, impaired vision or hearing, underlying metabolic abnormalities, dehydration, psychiatric diagnoses, and notify attending LIP  2. [place] high risk fall precautions, as indicated  3. Provide frequent short contacts to provide reality reorientation, refocusing and direction  4. Decrease environmental stimuli, including noise as appropriate  5. Monitor and intervene to maintain adequate nutrition, hydration, elimination, sleep and activity  6. If unable to ensure safety without constant attention obtain sitter and review sitter guidelines with assigned personnel  7.  Initiate Psychosocial CNS and Spiritual Care consult, as indicated  Outcome: Progressing     Problem: Chronic Conditions and Co-morbidities  Goal: Patient's chronic conditions and co-morbidity symptoms are monitored and maintained or improved  Outcome: Progressing     Problem: Respiratory - Adult  Goal: Achieves optimal ventilation and oxygenation  Outcome: Progressing     Problem: Skin/Tissue Integrity - Adult  Goal: Skin integrity remains intact  Outcome: Progressing  Goal: Incisions, wounds, or drain sites healing

## 2023-10-06 NOTE — CARE COORDINATION
CM spoke with pt regarding SNF. He stated that his first choice is to go home if his son is ready for his return. Pt asked CM to call son. Pt stated that if he needs to go to a facility then he would like [facility]. CM called son, Kaley Cordero, and left a  asking for a return call. BINA called Valorie Bee with  and gave her the referral. She will review chart. PT has recommended SNF, OT has not put a note in at this time. Consult completed.

## 2023-10-06 NOTE — DISCHARGE INSTR - COC
Continuity of Care Form    Patient Name: Jason Barger   :  1950  MRN:  [id number]    Admit date:  2023  Discharge date:  ***    Code Status Order: Full Code   Advance Directives:     Admitting Physician:  No admitting provider for patient encounter.   PCP: Lucy Galarza MD    Discharging Nurse: [facility] Unit/Room#: -A  Discharging Unit Phone Number: ***    Emergency Contact:   Extended Emergency Contact Information  Primary Emergency Contact: Cayden Campos YONYJASS  Address: [address] [place] [address] Avenue [address] [place] [address] of [address] [address] Phone: [phone]  Mobile Phone: [phone]  Relation: Child    Past Surgical History:  Past Surgical History:   Procedure Laterality Date    BRONCHOSCOPY N/A 10/28/2022    BRONCHOSCOPY performed by Keith Parham MD at [facility]      x 2  last showed \"inflammation of heart\"    COLONOSCOPY      DENTAL SURGERY      all teeth extracted     EYE SURGERY Right 2016    Cataract removal    FRACTURE SURGERY Left     left ankle plate and screws    IR BIOPSY PERC SUPERF BONE  2022    IR BIOPSY PERC SUPERF BONE 2022 [address] SPECIAL PROCEDURES    KNEE SURGERY  1970    left    OTHER SURGICAL HISTORY Left 2017    groin excision    TONSILLECTOMY  late     age 13    TUNNELED VENOUS PORT PLACEMENT      Right upper chest       Immunization History:   Immunization History   Administered Date(s) Administered    COVID-19, MODERNA BLUE border, Primary or Immunocompromised, (age 12y+), IM, 100 mcg/0.5mL 2021, 2021, 2021    COVID-19, [place] Vaccine, Vaccine Unspecified 2022    Influenza A ([id number]) Vaccine PF IM 2009    Influenza Vaccine, unspecified formulation 10/28/2011    Influenza Virus Vaccine 10/28/2011    Influenza, FLUARIX, FLULAVAL, FLUZONE (age 10 mo+) AND AFLURIA, (age 1 y+), PF, 0.5mL 2021    Influenza, FLUZONE (age 72 y+),

## 2023-10-06 NOTE — CONSULTS
[facility], 1950, 2019/2019-A, 10/6/2023    History  [place]:  The primary encounter diagnosis was Hypoglycemia. Diagnoses of Transient alteration of awareness and Pneumonia of right middle lobe due to infectious organism were also pertinent to this visit. Patient  has a past medical history of Arthritis, BPH (benign prostatic hyperplasia), CAD (coronary artery disease), Cancer ([address]), Diabetes mellitus ([address]), History of blood transfusion, Hx of motion sickness, Hyperlipidemia, MDRO (multiple drug resistant organisms) resistance, Migraine, Pneumonia, Wears dentures, and Wears glasses. Patient  has a past surgical history that includes knee surgery (1970); Tunneled venous port placement (2013); Colonoscopy (2010); fracture surgery (Left, 1990's); Cardiac catheterization (1990's); Tonsillectomy (late 1960's); Dental surgery; eye surgery (Right, 08/2016); other surgical history (Left, 01/18/2017); bronchoscopy (N/A, 10/28/2022); and IR BIOPSY BONE MARROW (12/29/2022). Discharge Recommendation: SNF    Subjective:    Patient states:  \"I'm too scared to stand. \"      Pain:  states pain in R knee, R shoulder, and back. Does not numerically rate when asked.       Communication with other providers:  Handoff to RN, OT    Restrictions: general precautions, fall risk, contact isolation    Home Setup/Prior level of function  Social/Functional History  Lives With: Son  Type of Home: House  Home Layout: One level  Home Access: Ramped entrance  Bathroom Shower/Tub:  (sponge bathes)  Bathroom Toilet: Bedside commode  Bathroom Equipment: 3-in-1 commode  Home Equipment: Liz Caballero, [address], [address] Help From: Family  ADL Assistance: Needs assistance (son helps with lower body dressing and bathing)  Toileting: Independent  Homemaking Assistance: Needs assistance  Homemaking Responsibilities: No  Ambulation Assistance: Non-ambulatory

## 2023-10-06 NOTE — PROGRESS NOTES
V2.0  [facility] [place]      Name:  Ricky Organ /Age/Sex: 1950  (68 y.o. male)   MRN & CSN:  [id number] & [id number] Encounter Date/Time: 10/6/2023 3:14 PM EDT    Location:  -A PCP: Madhu Billy MD       Hospital Day: 13      Subjective:     Chief Complaint:  Hypoglycemia     Patient states that he is breathing at his baseline     Assessment and Plan:     Severe hypoglycemia  -blood sugar 29 on admission. Hx of DM II. Low glucose likely 2/2 home insulin, poor appetite, low reserve, cancer, and infection. Was on D10 now off, started on Lantus 25U nightly (from home 40U nightly) but still hypoglycemic, now changed to 10U nightly with hold precautions and glucose much improved. Discussed with endocrinology. Will monitor closely.  -On prandial insulin. Off Jardiance and metformin and Tresiba. -Improved glucose. Acute respiratory failure with hypoxia 2/2 bacterial PNA. CXR acute interstitial edema or PNA. (baseline 3L NC). Hx of Bronchiectasis with resp cs Pseudomonas intermediate resistant to cefepime and merrem. Strep, legionella and MRSA all negative. S/p vanc+cefepime, stopped vancomycin as MRSA nares negative and continue cefepime, added inhaled tobramycin but ID d/seth, await resp cx. Continue acapella, chest physiotherapy, consider CT chest if no improvement.   -On 4 L nasal cannula. Severe sepsis 2/2 bacterial PNA and Staph. Epi bacteremia   P/w leukocytosis WBC 17, tachycardia 100's,  LA 2.2. Blood cx Staph. Ep x2.   -On abx as above    -TTE with echodensity on aortic valves, healed vegetation vs calcification  -ID following: Completed cefepime 7 day course on , cefazolin started after completion of cefepime  -F/u repeat Bcx from  peripheral Bcx (pending?)  -ANTONY 10/2: Negative for vegetations only calcified aortic valve leaflet. ID recommends IV cefazolin 2 g every 8 hours for 10 days end date 10/5/2023. Mediport culture negative as per ID.   Follow-up Imaging/Diagnostics Last 24 Hours   XR CHEST PORTABLE    Result Date: 9/24/2023  EXAMINATION: ONE XRAY VIEW OF THE CHEST 9/24/2023 10:57 am COMPARISON: CT chest 07/06/2023 and 03/17/2023, chest x-ray 02/28/2023 HISTORY: ORDERING SYSTEM PROVIDED HISTORY: Altered Mental Status TECHNOLOGIST PROVIDED HISTORY: Reason for exam:->Altered Mental Status Reason for Exam: Altered Mental Status FINDINGS: Cavitary disease in the lower right lung has improved from the earlier studies. There is suspected mild acute interstitial infiltrate or edema superimposed on chronic lung disease/COPD. Compared with the most recent CT chest of 07/06/2023. There is no pneumothorax or pleural effusion. Heart size is normal.  Descending thoracic aorta is tortuous and unchanged. There is an unchanged right IJ MediPort catheter. No acute bone finding. Acute interstitial edema or pneumonia is suspected superimposed on chronic lung disease/COPD. Cavitary disease in the right lower lung noted on earlier studies has improved. CT HEAD WO CONTRAST    Result Date: 9/24/2023  EXAMINATION: CT OF THE HEAD WITHOUT CONTRAST  9/24/2023 10:14 am TECHNIQUE: CT of the head was performed without the administration of intravenous contrast. Automated exposure control, iterative reconstruction, and/or weight based adjustment of the mA/kV was utilized to reduce the radiation dose to as low as reasonably achievable. COMPARISON: 09/28/2022 CT head HISTORY: ORDERING SYSTEM PROVIDED HISTORY: AMS TECHNOLOGIST PROVIDED HISTORY: Reason for exam:->AMS Has a \"code stroke\" or \"stroke alert\" been called? ->No Decision Support Exception - unselect if not a suspected or confirmed emergency medical condition->Emergency Medical Condition (MA) Reason for Exam: AMS  HYPOGLYCEMIA Additional signs and symptoms: PT MOTION  PT UNABLE TO UNDERSTAND ALL COMMANDS FINDINGS: BRAIN/VENTRICLES: There is no acute intracranial hemorrhage, mass effect or midline shift.   No abnormal

## 2023-10-06 NOTE — PROGRESS NOTES
cooperative with exam  Neck: no JVD or bruit  Thyroid : Normal lobes   Lungs: Has Vesicular Breath sounds diminished breath sounds bilaterally  Heart:  regular rate and rhythm  Abdomen: soft, non-tender; bowel sounds normal; no masses,  no organomegaly  Musculoskeletal: Normal  Extremities: extremities normal, , no edema  Neurologic:  Awake, alert, oriented to name, place and time. Cranial nerves II-XII are grossly intact. Motor weakness, sensory neuropathy,  and gait is abnormal.  Unstable    Assessment:     Patient Active Problem List:     Pneumonia     Sepsis ([address])     Hypogammaglobulinemia ([address])     CLL (chronic lymphocytic leukemia) ([address])     Upper respiratory infection, acute     Generalized muscle weakness     Type 2 diabetes mellitus with hyperglycemia, with long-term current use of insulin (HCC)     Poor appetite     Chronic obstructive pulmonary disease (HCC)     Neutropenia (HCC)     Other specified disorders of bone density and structure, unspecified site     Cellulitis of right upper limb     Herpesviral infection     Intestinal malabsorption     Other nonspecific abnormal finding of lung field     Iron deficiency anemia due to chronic blood loss     Other muscle spasm     Chronic lymphocytic leukemia (HCC)     Selective deficiency of IgG (HCC)     Acute bronchitis     Severe malnutrition (HCC)     Pneumonia due to organism     Personal history of CLL (chronic lymphocytic leukemia)     Rhinovirus infection     Anemia     Thrombocytopenia (HCC)     Acute on chronic respiratory failure with hypoxemia (HCC)     Bronchiectasis with acute lower respiratory infection (HCC)     Chronic respiratory failure (HCC)     Other chest pain     B12 deficiency     Immune thrombocytopenia (HCC)     Dehydration     Pneumonia, unspecified organism      Plan:     Reviewed POC blood glucose .  Labs and X ray results   Reviewed Current Medicines   Started back on meal plus correction bolus Humalog/ Basal Lantus Insulin regime change the parameters to a higher level  Modified  the dose of Insulin/ other medicines as needed   Allowed to bring food from his home. Prepared by his family. Will follow     .      Kathrin Machado MD, MD

## 2023-10-07 VITALS
HEIGHT: 72 IN | DIASTOLIC BLOOD PRESSURE: 72 MMHG | OXYGEN SATURATION: 95 % | BODY MASS INDEX: 19.56 KG/M2 | TEMPERATURE: 98.2 F | WEIGHT: 144.4 LBS | HEART RATE: 88 BPM | RESPIRATION RATE: 35 BRPM | SYSTOLIC BLOOD PRESSURE: 156 MMHG

## 2023-10-07 LAB
ANION GAP SERPL CALCULATED.3IONS-SCNC: 9 MMOL/L (ref 4–16)
ANISOCYTOSIS: ABNORMAL
BANDED NEUTROPHILS ABSOLUTE COUNT: 0.86 K/CU MM
BANDED NEUTROPHILS RELATIVE PERCENT: 9 % (ref 5–11)
BUN SERPL-MCNC: 17 MG/DL (ref 6–23)
CALCIUM SERPL-MCNC: 8.7 MG/DL (ref 8.3–10.6)
CHLORIDE BLD-SCNC: 96 MMOL/L (ref 99–110)
CO2: 33 MMOL/L (ref 21–32)
CREAT SERPL-MCNC: 0.4 MG/DL (ref 0.9–1.3)
DIFFERENTIAL TYPE: ABNORMAL
GFR SERPL CREATININE-BSD FRML MDRD: >60 ML/MIN/1.73M2
GLUCOSE BLD-MCNC: 113 MG/DL (ref 70–99)
GLUCOSE BLD-MCNC: 147 MG/DL (ref 70–99)
GLUCOSE BLD-MCNC: 207 MG/DL (ref 70–99)
GLUCOSE SERPL-MCNC: 214 MG/DL (ref 70–99)
HCT VFR BLD CALC: 31.5 % (ref 42–52)
HEMOGLOBIN: 9.5 GM/DL (ref 13.5–18)
LYMPHOCYTES ABSOLUTE: 5.7 K/CU MM
LYMPHOCYTES RELATIVE PERCENT: 61 % (ref 24–44)
MCH RBC QN AUTO: 28.3 PG (ref 27–31)
MCHC RBC AUTO-ENTMCNC: 30.2 % (ref 32–36)
MCV RBC AUTO: 93.8 FL (ref 78–100)
METAMYELOCYTES ABSOLUTE COUNT: 0.1 K/CU MM
METAMYELOCYTES PERCENT: 1 %
MONOCYTES ABSOLUTE: 0.4 K/CU MM
MONOCYTES RELATIVE PERCENT: 4 % (ref 0–4)
PDW BLD-RTO: 21.1 % (ref 11.7–14.9)
PLATELET # BLD: 93 K/CU MM (ref 140–440)
PMV BLD AUTO: 9.6 FL (ref 7.5–11.1)
POLYCHROMASIA: ABNORMAL
POTASSIUM SERPL-SCNC: 5.1 MMOL/L (ref 3.5–5.1)
RBC # BLD: 3.36 M/CU MM (ref 4.6–6.2)
RBC # BLD: ABNORMAL 10*6/UL
SEGMENTED NEUTROPHILS ABSOLUTE COUNT: 2.4 K/CU MM
SEGMENTED NEUTROPHILS RELATIVE PERCENT: 25 % (ref 36–66)
SODIUM BLD-SCNC: 138 MMOL/L (ref 135–145)
WBC # BLD: 9.5 K/CU MM (ref 4–10.5)
WBC # BLD: ABNORMAL 10*3/UL

## 2023-10-07 PROCEDURE — 85027 COMPLETE CBC AUTOMATED: CPT

## 2023-10-07 PROCEDURE — 94761 N-INVAS EAR/PLS OXIMETRY MLT: CPT

## 2023-10-07 PROCEDURE — 6360000002 HC RX W HCPCS: Performed by: HOSPITALIST

## 2023-10-07 PROCEDURE — 6370000000 HC RX 637 (ALT 250 FOR IP): Performed by: INTERNAL MEDICINE

## 2023-10-07 PROCEDURE — 80048 BASIC METABOLIC PNL TOTAL CA: CPT

## 2023-10-07 PROCEDURE — 82962 GLUCOSE BLOOD TEST: CPT

## 2023-10-07 PROCEDURE — 2580000003 HC RX 258: Performed by: INTERNAL MEDICINE

## 2023-10-07 PROCEDURE — 99232 SBSQ HOSP IP/OBS MODERATE 35: CPT | Performed by: INTERNAL MEDICINE

## 2023-10-07 PROCEDURE — 2700000000 HC OXYGEN THERAPY PER DAY

## 2023-10-07 PROCEDURE — 94640 AIRWAY INHALATION TREATMENT: CPT

## 2023-10-07 PROCEDURE — 85007 BL SMEAR W/DIFF WBC COUNT: CPT

## 2023-10-07 PROCEDURE — 36415 COLL VENOUS BLD VENIPUNCTURE: CPT

## 2023-10-07 PROCEDURE — 6360000002 HC RX W HCPCS: Performed by: INTERNAL MEDICINE

## 2023-10-07 RX ORDER — INSULIN DEGLUDEC INJECTION 100 U/ML
10 INJECTION, SOLUTION SUBCUTANEOUS NIGHTLY
Qty: 5 ADJUSTABLE DOSE PRE-FILLED PEN SYRINGE | Refills: 3 | Status: SHIPPED
Start: 2023-10-07

## 2023-10-07 RX ORDER — INSULIN DEGLUDEC INJECTION 100 U/ML
10 INJECTION, SOLUTION SUBCUTANEOUS NIGHTLY
Qty: 5 ADJUSTABLE DOSE PRE-FILLED PEN SYRINGE | Refills: 3 | Status: SHIPPED
Start: 2023-10-07 | End: 2023-10-07 | Stop reason: SDUPTHER

## 2023-10-07 RX ORDER — INSULIN ASPART 100 [IU]/ML
5 INJECTION, SOLUTION INTRAVENOUS; SUBCUTANEOUS
Qty: 4.5 ML | Refills: 0 | Status: SHIPPED
Start: 2023-10-07 | End: 2023-11-06

## 2023-10-07 RX ORDER — HEPARIN 100 UNIT/ML
100 SYRINGE INTRAVENOUS PRN
Status: DISCONTINUED | OUTPATIENT
Start: 2023-10-07 | End: 2023-10-07 | Stop reason: HOSPADM

## 2023-10-07 RX ORDER — INSULIN ASPART 100 [IU]/ML
5 INJECTION, SOLUTION INTRAVENOUS; SUBCUTANEOUS
Qty: 4.5 ML | Refills: 0 | Status: SHIPPED
Start: 2023-10-07 | End: 2023-10-07 | Stop reason: SDUPTHER

## 2023-10-07 RX ADMIN — SODIUM CHLORIDE, PRESERVATIVE FREE 10 ML: 5 INJECTION INTRAVENOUS at 08:28

## 2023-10-07 RX ADMIN — INSULIN LISPRO 5 UNITS: 100 INJECTION, SOLUTION INTRAVENOUS; SUBCUTANEOUS at 11:41

## 2023-10-07 RX ADMIN — GABAPENTIN 100 MG: 100 CAPSULE ORAL at 08:28

## 2023-10-07 RX ADMIN — ALLOPURINOL 300 MG: 100 TABLET ORAL at 08:28

## 2023-10-07 RX ADMIN — TAMSULOSIN HYDROCHLORIDE 0.4 MG: 0.4 CAPSULE ORAL at 08:28

## 2023-10-07 RX ADMIN — FINASTERIDE 5 MG: 5 TABLET, FILM COATED ORAL at 08:28

## 2023-10-07 RX ADMIN — Medication 100 UNITS: at 14:52

## 2023-10-07 RX ADMIN — IPRATROPIUM BROMIDE AND ALBUTEROL SULFATE 1 DOSE: 2.5; .5 SOLUTION RESPIRATORY (INHALATION) at 07:16

## 2023-10-07 RX ADMIN — VALACYCLOVIR HYDROCHLORIDE 500 MG: 500 TABLET, FILM COATED ORAL at 08:28

## 2023-10-07 RX ADMIN — GUAIFENESIN 1200 MG: 600 TABLET, EXTENDED RELEASE ORAL at 08:28

## 2023-10-07 RX ADMIN — HYDROCODONE BITARTRATE AND ACETAMINOPHEN 1 TABLET: 10; 325 TABLET ORAL at 08:27

## 2023-10-07 RX ADMIN — INSULIN LISPRO 2 UNITS: 100 INJECTION, SOLUTION INTRAVENOUS; SUBCUTANEOUS at 11:41

## 2023-10-07 RX ADMIN — ENOXAPARIN SODIUM 40 MG: 100 INJECTION SUBCUTANEOUS at 08:28

## 2023-10-07 ASSESSMENT — PAIN DESCRIPTION - ORIENTATION
ORIENTATION: RIGHT
ORIENTATION: RIGHT
ORIENTATION: RIGHT;LEFT

## 2023-10-07 ASSESSMENT — PAIN DESCRIPTION - PAIN TYPE
TYPE: CHRONIC PAIN

## 2023-10-07 ASSESSMENT — PAIN DESCRIPTION - FREQUENCY
FREQUENCY: CONTINUOUS
FREQUENCY: CONTINUOUS

## 2023-10-07 ASSESSMENT — PAIN DESCRIPTION - ONSET
ONSET: ON-GOING
ONSET: ON-GOING

## 2023-10-07 ASSESSMENT — PAIN - FUNCTIONAL ASSESSMENT
PAIN_FUNCTIONAL_ASSESSMENT: PREVENTS OR INTERFERES SOME ACTIVE ACTIVITIES AND ADLS

## 2023-10-07 ASSESSMENT — PAIN DESCRIPTION - DESCRIPTORS
DESCRIPTORS: ACHING

## 2023-10-07 ASSESSMENT — PAIN SCALES - GENERAL
PAINLEVEL_OUTOF10: 6
PAINLEVEL_OUTOF10: 5
PAINLEVEL_OUTOF10: 3
PAINLEVEL_OUTOF10: 4

## 2023-10-07 ASSESSMENT — PAIN DESCRIPTION - LOCATION
LOCATION: HIP;KNEE;SHOULDER

## 2023-10-07 ASSESSMENT — PAIN SCALES - WONG BAKER
WONGBAKER_NUMERICALRESPONSE: 4
WONGBAKER_NUMERICALRESPONSE: 4

## 2023-10-07 NOTE — PROGRESS NOTES
Medport de accessed and heparinized. Belongings with pt. Chemo medication with patient.  [facility] transport arranged for 3:00 pm

## 2023-10-07 NOTE — DISCHARGE SUMMARY
V2.0  Discharge Summary    Name:  Nery Guardado /Age/Sex: 1950 (68 y.o. male)   Admit Date: 2023  Discharge Date: 10/7/23    MRN & CSN:  [id number] & [id number] Encounter Date and Time 10/7/23 12:36 PM EDT    Attending:  Omar Bender MD Discharging Provider: Omar Bender MD       Hospital Course:     HPI:   Chief Complaint:   Nery Guardado is a 68 y.o. male      Patient presented to ED for hypoglycemia. Blood sugar was 29 in the ED. EMS had concern for aspiration. He was being bagged upon arrival to ED. When I saw him he was on 6 L oxygen. Cough  : \"I cough up a lot of mucous at t night for a very long time\"     Cough is at baseline per patient     Dyspnea: dnies any change from his normal FROST     Oxygen: 3 L at home \"they put me on 6 here\"     \"I woke up in the middle of the night sweating\"  Son said \"I was not responding to him\"     \"I am no eating well\"     Has Glucerna at home     Reports had hypoglycemia one other time       Problem Based Course:   Patient had severe hypoglycemia. It was attributed to home insulin and poor appetite and infection. Patient was placed on dextrose. Long-acting insulin was changed to 10 units nightly. Jardiance and metformin were stopped. Glucose improved. Patient had acute respiratory failure with hypoxia due to bacterial pneumonia. Patient was treated for pneumonia. Blood culture grew staph epi. Transthoracic echo showed echodensity on aortic valve with healed vegetation versus calcification. ANTONY on 10/2 was negative for vegetations, only calcified aortic valve leaflet. ID had patient complete 7-day course of cefepime on  and then switched to cefazolin for 10 days completed on 10/6. Follow-up blood culture from [facility] was negative. Breathing improved during the course of patient's stay and he was weaned down to his 3 L of oxygen which is his baseline. Patient has CLL.   Oncology recommended to hold Sheryle Atul until patient fluid collection. The gray-white differentiation is maintained without evidence of an acute infarct. There are nonspecific areas of hypoattenuation within the periventricular and subcortical white matter, which likely represent chronic microvascular ischemic change. There is prominence of the ventricles and sulci due to global parenchymal volume loss. ORBITS: The visualized portion of the orbits demonstrate no acute abnormality. SINUSES: There is significant mucosal thickening of the ethmoid air cells and maxillary sinuses. Small left and moderate right mastoid effusions. SOFT TISSUES/SKULL:  No acute abnormality of the visualized skull or soft tissues. 1.  No acute intracranial abnormality. 2.  Findings compatible with chronic microvascular ischemic disease and involutional change. 3.  Significant mucosal thickening of the ethmoid air cells and maxillary sinuses. Correlate for clinical evidence of sinusitis.        Time Spent Discharging patient 48 minutes    Electronically signed by Areli Fischer MD on 10/7/2023 at 12:36 PM

## 2023-10-07 NOTE — FLOWSHEET NOTE
Resource RN rounded on pt for DI score of 64. Pt is eating lunch, family at bedside. No needs at this time.

## 2023-10-07 NOTE — PROGRESS NOTES
Progress Note( Dr. Carmelo Felty)  10/7/2023  Subjective:   Admit Date: 9/24/2023  PCP: Unruly Heller MD    Admitted For :Shortness of breath, has hypoglycemia    Consulted For:  Better control of blood glucose    Interval History patient is not doing well this morning short of breath ANTONY 10/2/2023 and negative for endocarditis summary report below  Patient doing better this morning glucoses are also acceptable range  Patient is interested to go home rather than go to skilled nursing facility    Denies any chest pains,   Yes SOB . On Oxygen  Denies nausea or vomiting. Now patient is eating better home food  No new bowel or bladder symptoms. Intake/Output Summary (Last 24 hours) at 10/7/2023 0952  Last data filed at 10/7/2023 0902  Gross per 24 hour   Intake 1460 ml   Output 350 ml   Net 1110 ml         DATA    CBC:   Recent Labs     10/05/23  0040 10/06/23  0411   WBC 10.2 9.3   HGB 9.4* 9.1*   PLT 90* 84*      CMP:  Recent Labs     10/05/23  0040 10/06/23  0411    138   K 5.4* 4.8    97*   CO2 31 32   BUN 21 18   CREATININE 0.5* 0.4*   CALCIUM 8.6 8.4       Lipids:   Lab Results   Component Value Date/Time    CHOL 160 08/03/2012 02:47 PM    HDL 32 08/03/2012 02:47 PM    TRIG 274 08/03/2012 02:47 PM     Glucose:  Recent Labs     10/06/23  2017 10/07/23  0222 10/07/23  0626   POCGLU 286* 147* 113*       HmwsbutbitL8U:  Lab Results   Component Value Date/Time    LABA1C 5.0 09/24/2023 09:36 AM     High Sensitivity TSH:   Lab Results   Component Value Date/Time    TSHHS 3.930 09/24/2023 09:36 AM     Free T3:   Lab Results   Component Value Date/Time    FT3 3.2 03/27/2012 12:39 PM     Free T4:  Lab Results   Component Value Date/Time    T4FREE 1.37 04/04/2019 12:39 PM       XR HIP RIGHT (2-3 VIEWS)   Final Result   Intact right hip without acute osseous fracture. Mild degenerative changes seen within the hip joint.          XR CHEST PORTABLE   Final Result   Acute interstitial edema or pneumonia Allowed to bring food from his home. Prepared by his family. Possibly discharge home soon  Will follow     .      Cherre Skiff, MD, MD

## 2023-10-07 NOTE — CARE COORDINATION
Received call from Dr. Forrest Moore to speak with pt's son Darron Mckay as he now is wanting to take pt home and wants to know about [facility] services. TC to Darron Mckay, explained [facility] services are short term, intermittent assistance and primary caregiving would still be family responsibility. Darron Mckay states he has been working for [employer] with [employer] to get [employer] services, advised that process is lengthy and can take months to arrange. He still wants to take pt home, rather than a nursing home. Advised pt is medically stable for discharge, he states pt will need stretcher home. DISCHARGING NURSE: Please call [facility] [phone]) to notify pt was discharged. Also, [phone] fax number is [phone]. Fax the AVS, d/c Summary if available & Inpatient Home Health Needs order to the above number. If the fax fails to send call [phone] to receive a additional fax number to send the mentioned information.    [facility] [phone]

## 2023-10-07 NOTE — PROGRESS NOTES
Patient Name:  Jason Barger  Patient :  1950  Patient MRN:  [id number]     Primary Oncologist: Charline Donald MD  Referring Provider: Lucy Galarza MD     Date of Service: 2023      Reason for Consult: To evaluate the patient with CLL. Chief Complaint:    Chief Complaint   Patient presents with    Hypoglycemia     Patient Active Problem List:     Pneumonia     Sepsis ([address])     Hypogammaglobulinemia ([address])     CLL (chronic lymphocytic leukemia) ([address])     Upper respiratory infection, acute     Generalized muscle weakness     Type 2 diabetes mellitus with hyperglycemia, with long-term current use of insulin (HCC)     Poor appetite     Chronic obstructive pulmonary disease (HCC)     Neutropenia (HCC)     Other specified disorders of bone density and structure, unspecified site     Cellulitis of right upper limb     Herpesviral infection     Intestinal malabsorption     Other nonspecific abnormal finding of lung field     Iron deficiency anemia due to chronic blood loss     Other muscle spasm     Chronic lymphocytic leukemia (HCC)     Selective deficiency of IgG (HCC)     Acute bronchitis     Severe malnutrition ([address])     Pneumonia due to organism     Personal history of CLL (chronic lymphocytic leukemia)     Rhinovirus infection     Anemia     Thrombocytopenia (HCC)     Acute on chronic respiratory failure with hypoxemia (HCC)     Bronchiectasis with acute lower respiratory infection ([address])     Chronic respiratory failure (HCC)     Other chest pain     B12 deficiency     Immune thrombocytopenia (HCC)     Dehydration     Pneumonia, unspecified organism    HPI:   Jason Barger is a 68 y.o. male with recurrent CLL currently on venetoclax, presented to [facility] on 23 with hypoglycemia, SOB with increasing wheeze. He was admitted for acute on chronic hypoxemic resp failure, pneumonia, COPD exacerbation and bronchiectasis.      He is getting romiplostin for thrombocytopenia and he was  supposed to

## 2023-10-07 NOTE — PLAN OF CARE
Problem: Discharge Planning  Goal: Discharge to home or other facility with appropriate resources  Outcome: Progressing     Problem: Safety - Adult  Goal: Free from fall injury  Outcome: Progressing     Problem: Skin/Tissue Integrity  Goal: Absence of new skin breakdown  Description: 1. Monitor for areas of redness and/or skin breakdown  2. Assess vascular access sites hourly  3. Every 4-6 hours minimum:  Change oxygen saturation probe site  4. Every 4-6 hours:  If on nasal continuous positive airway pressure, respiratory therapy assess nares and determine need for appliance change or resting period. Outcome: Progressing     Problem: Confusion  Goal: Confusion, delirium, dementia, or psychosis is improved or at baseline  Description: INTERVENTIONS:  1. Assess for possible contributors to thought disturbance, including medications, impaired vision or hearing, underlying metabolic abnormalities, dehydration, psychiatric diagnoses, and notify attending LIP  2. Avella high risk fall precautions, as indicated  3. Provide frequent short contacts to provide reality reorientation, refocusing and direction  4. Decrease environmental stimuli, including noise as appropriate  5. Monitor and intervene to maintain adequate nutrition, hydration, elimination, sleep and activity  6. If unable to ensure safety without constant attention obtain sitter and review sitter guidelines with assigned personnel  7.  Initiate Psychosocial CNS and Spiritual Care consult, as indicated  Outcome: Progressing  Flowsheets (Taken 10/6/2023 2000 by Marylou Simmons RN)  Effect of thought disturbance (confusion, delirium, dementia, or psychosis) are managed with adequate functional status: Assess for contributors to thought disturbance, including medications, impaired vision or hearing, underlying metabolic abnormalities, dehydration, psychiatric diagnoses, notify LIP     Problem: Chronic Conditions and Co-morbidities  Goal: Patient's chronic Adult  Goal: Achieves stable or improved neurological status  Outcome: Progressing  Goal: Absence of seizures  Outcome: Progressing  Goal: Remains free of injury related to seizures activity  Outcome: Progressing  Goal: Achieves maximal functionality and self care  Outcome: Progressing     Problem: Gastrointestinal - Adult  Goal: Minimal or absence of nausea and vomiting  Outcome: Progressing  Goal: Maintains or returns to baseline bowel function  Outcome: Progressing  Goal: Maintains adequate nutritional intake  Outcome: Progressing  Goal: Establish and maintain optimal ostomy function  Outcome: Progressing

## 2023-10-11 ENCOUNTER — HOSPITAL ENCOUNTER (OUTPATIENT)
Age: 73
Setting detail: SPECIMEN
Discharge: HOME | End: 2023-10-11
Payer: COMMERCIAL

## 2023-10-11 LAB
ALBUMIN SERPL-MCNC: 3.5 GM/DL (ref 3.4–5)
ALP BLD-CCNC: 333 IU/L (ref 40–128)
ALT SERPL-CCNC: 22 U/L (ref 10–40)
ANION GAP SERPL CALCULATED.3IONS-SCNC: 8 MMOL/L (ref 4–16)
ANISOCYTOSIS: ABNORMAL
AST SERPL-CCNC: 20 IU/L (ref 15–37)
ATYPICAL LYMPHOCYTE ABSOLUTE COUNT: ABNORMAL
BANDED NEUTROPHILS ABSOLUTE COUNT: 0.88 K/CU MM
BANDED NEUTROPHILS RELATIVE PERCENT: 7 % (ref 5–11)
BASOPHILIC STIPPLING: PRESENT
BASOPHILS ABSOLUTE: 0.1 K/CU MM
BASOPHILS RELATIVE PERCENT: 1 % (ref 0–1)
BILIRUB SERPL-MCNC: 0.6 MG/DL (ref 0–1)
BUN SERPL-MCNC: 21 MG/DL (ref 6–23)
CALCIUM SERPL-MCNC: 8.8 MG/DL (ref 8.3–10.6)
CHLORIDE BLD-SCNC: 96 MMOL/L (ref 99–110)
CO2: 32 MMOL/L (ref 21–32)
CREAT SERPL-MCNC: 0.5 MG/DL (ref 0.9–1.3)
DIFFERENTIAL TYPE: ABNORMAL
GFR SERPL CREATININE-BSD FRML MDRD: >60 ML/MIN/1.73M2
GLUCOSE SERPL-MCNC: 187 MG/DL (ref 70–99)
HCT VFR BLD CALC: 34.9 % (ref 42–52)
HEMOGLOBIN: 10.8 GM/DL (ref 13.5–18)
LYMPHOCYTES ABSOLUTE: 8.7 K/CU MM
LYMPHOCYTES RELATIVE PERCENT: 69 % (ref 24–44)
MCH RBC QN AUTO: 29.1 PG (ref 27–31)
MCHC RBC AUTO-ENTMCNC: 30.9 % (ref 32–36)
MCV RBC AUTO: 94.1 FL (ref 78–100)
METAMYELOCYTES ABSOLUTE COUNT: 0.13 K/CU MM
METAMYELOCYTES PERCENT: 1 %
MICROCYTES: ABNORMAL
MONOCYTES ABSOLUTE: 0.1 K/CU MM
MONOCYTES RELATIVE PERCENT: 1 % (ref 0–4)
OVALOCYTES: ABNORMAL
PDW BLD-RTO: 21 % (ref 11.7–14.9)
PLATELET # BLD: 97 K/CU MM (ref 140–440)
PMV BLD AUTO: 9.4 FL (ref 7.5–11.1)
POLYCHROMASIA: ABNORMAL
POTASSIUM SERPL-SCNC: 4.5 MMOL/L (ref 3.5–5.1)
RBC # BLD: 3.71 M/CU MM (ref 4.6–6.2)
SEGMENTED NEUTROPHILS ABSOLUTE COUNT: 2.6 K/CU MM
SEGMENTED NEUTROPHILS RELATIVE PERCENT: 21 % (ref 36–66)
SODIUM BLD-SCNC: 136 MMOL/L (ref 135–145)
TEAR DROP CELLS: ABNORMAL
TOTAL PROTEIN: 5.2 GM/DL (ref 6.4–8.2)
WBC # BLD: 12.5 K/CU MM (ref 4–10.5)

## 2023-10-11 PROCEDURE — 85027 COMPLETE CBC AUTOMATED: CPT

## 2023-10-11 PROCEDURE — 85007 BL SMEAR W/DIFF WBC COUNT: CPT

## 2023-10-11 PROCEDURE — 80053 COMPREHEN METABOLIC PANEL: CPT

## 2023-10-16 DIAGNOSIS — C91.10 CHRONIC LYMPHOCYTIC LEUKEMIA (HCC): ICD-10-CM

## 2023-10-17 ENCOUNTER — CLINICAL DOCUMENTATION (OUTPATIENT)
Dept: RADIATION ONCOLOGY | Age: 73
End: 2023-10-17

## 2023-10-17 DIAGNOSIS — C91.10 CHRONIC LYMPHOCYTIC LEUKEMIA (HCC): Primary | ICD-10-CM

## 2023-10-17 RX ORDER — GABAPENTIN 100 MG/1
100 CAPSULE ORAL 3 TIMES DAILY
Qty: 90 CAPSULE | Refills: 0 | Status: SHIPPED | OUTPATIENT
Start: 2023-10-17 | End: 2023-11-16

## 2023-10-17 RX ORDER — HYDROCODONE BITARTRATE AND ACETAMINOPHEN 10; 325 MG/1; MG/1
1 TABLET ORAL EVERY 8 HOURS PRN
Qty: 90 TABLET | Refills: 0 | Status: SHIPPED | OUTPATIENT
Start: 2023-10-17 | End: 2023-11-16

## 2023-10-17 RX ORDER — GUAIFENESIN 600 MG/1
600 TABLET, EXTENDED RELEASE ORAL 4 TIMES DAILY
Qty: 120 TABLET | Refills: 0 | Status: SHIPPED | OUTPATIENT
Start: 2023-10-17 | End: 2023-11-16

## 2023-10-17 NOTE — CARE COORDINATION
LSW phoned Pt regarding transportation. Pt had LSW speak with his son who is also Pt's POA. Son stated they were unable to come to Pt's [facility] appointment yesterday due to lack of transportation. He also shared that Pt has less strength and requires assistance to get out of bed. LSW informed Son about transportation through [address] and [address] [facility]. It was clarified that both programs require the rider to be curbside for pick-up, whether ambulatory or wheelchair, and are unable to assist Pt's out of their home or porch. Pt is active with [facility] which provides both transportation and case management services. Son is aware of Pt's  so LSW recommended reaching out to them for coordinated assistance. LSW provided Son with LSW's direct contact number for additional help or needs.

## 2023-10-18 ENCOUNTER — APPOINTMENT (OUTPATIENT)
Dept: GENERAL RADIOLOGY | Age: 73
DRG: 190 | End: 2023-10-18
Payer: COMMERCIAL

## 2023-10-18 ENCOUNTER — HOSPITAL ENCOUNTER (INPATIENT)
Age: 73
LOS: 4 days | Discharge: HOSPICE | DRG: 190 | End: 2023-10-22
Attending: STUDENT IN AN ORGANIZED HEALTH CARE EDUCATION/TRAINING PROGRAM | Admitting: STUDENT IN AN ORGANIZED HEALTH CARE EDUCATION/TRAINING PROGRAM
Payer: COMMERCIAL

## 2023-10-18 DIAGNOSIS — R06.02 SHORTNESS OF BREATH: Primary | ICD-10-CM

## 2023-10-18 DIAGNOSIS — R79.89 ELEVATED TROPONIN: ICD-10-CM

## 2023-10-18 DIAGNOSIS — R09.02 HYPOXIA: ICD-10-CM

## 2023-10-18 PROBLEM — J44.1 ACUTE EXACERBATION OF CHRONIC OBSTRUCTIVE PULMONARY DISEASE (COPD) (HCC): Status: ACTIVE | Noted: 2023-10-18

## 2023-10-18 LAB
ALBUMIN SERPL-MCNC: 3.2 GM/DL (ref 3.4–5)
ALP BLD-CCNC: 296 IU/L (ref 40–129)
ALT SERPL-CCNC: 21 U/L (ref 10–40)
ANION GAP SERPL CALCULATED.3IONS-SCNC: 5 MMOL/L (ref 4–16)
AST SERPL-CCNC: 21 IU/L (ref 15–37)
ATYPICAL LYMPHOCYTE ABSOLUTE COUNT: ABNORMAL
BACTERIA: NEGATIVE /HPF
BANDED NEUTROPHILS ABSOLUTE COUNT: 0.09 K/CU MM
BANDED NEUTROPHILS RELATIVE PERCENT: 1 % (ref 5–11)
BILIRUB SERPL-MCNC: 0.5 MG/DL (ref 0–1)
BILIRUBIN URINE: NEGATIVE MG/DL
BLOOD, URINE: NEGATIVE
BUN SERPL-MCNC: 17 MG/DL (ref 6–23)
CALCIUM SERPL-MCNC: 9 MG/DL (ref 8.3–10.6)
CHLORIDE BLD-SCNC: 98 MMOL/L (ref 99–110)
CLARITY: CLEAR
CO2: 34 MMOL/L (ref 21–32)
COLOR: YELLOW
CREAT SERPL-MCNC: 0.5 MG/DL (ref 0.9–1.3)
DIFFERENTIAL TYPE: ABNORMAL
EKG ATRIAL RATE: 83 BPM
EKG DIAGNOSIS: NORMAL
EKG P AXIS: 58 DEGREES
EKG P-R INTERVAL: 154 MS
EKG Q-T INTERVAL: 360 MS
EKG QRS DURATION: 88 MS
EKG QTC CALCULATION (BAZETT): 423 MS
EKG R AXIS: 41 DEGREES
EKG T AXIS: 76 DEGREES
EKG VENTRICULAR RATE: 83 BPM
ELLIPTOCYTES: ABNORMAL
EOSINOPHILS ABSOLUTE: 0.1 K/CU MM
EOSINOPHILS RELATIVE PERCENT: 1 % (ref 0–3)
GFR SERPL CREATININE-BSD FRML MDRD: >60 ML/MIN/1.73M2
GLUCOSE BLD-MCNC: 287 MG/DL (ref 70–99)
GLUCOSE SERPL-MCNC: 139 MG/DL (ref 70–99)
GLUCOSE, URINE: NEGATIVE MG/DL
HCT VFR BLD CALC: 33.5 % (ref 42–52)
HEMOGLOBIN: 10.2 GM/DL (ref 13.5–18)
HYPOCHROMIA: ABNORMAL
KETONES, URINE: NEGATIVE MG/DL
LEUKOCYTE ESTERASE, URINE: NEGATIVE
LYMPHOCYTES ABSOLUTE: 5.1 K/CU MM
LYMPHOCYTES RELATIVE PERCENT: 57 % (ref 24–44)
MAGNESIUM: 1.9 MG/DL (ref 1.8–2.4)
MCH RBC QN AUTO: 28.7 PG (ref 27–31)
MCHC RBC AUTO-ENTMCNC: 30.4 % (ref 32–36)
MCV RBC AUTO: 94.1 FL (ref 78–100)
METAMYELOCYTES ABSOLUTE COUNT: 0.09 K/CU MM
METAMYELOCYTES PERCENT: 1 %
MONOCYTES ABSOLUTE: 0.3 K/CU MM
MONOCYTES RELATIVE PERCENT: 3 % (ref 0–4)
NITRITE URINE, QUANTITATIVE: NEGATIVE
PDW BLD-RTO: 19 % (ref 11.7–14.9)
PH, URINE: 5.5 (ref 5–8)
PLATELET # BLD: 92 K/CU MM (ref 140–440)
PMV BLD AUTO: 9.7 FL (ref 7.5–11.1)
POLYCHROMASIA: ABNORMAL
POTASSIUM SERPL-SCNC: 4.1 MMOL/L (ref 3.5–5.1)
PRO-BNP: 917.5 PG/ML
PROTEIN UA: 30 MG/DL
RBC # BLD: 3.56 M/CU MM (ref 4.6–6.2)
RBC # BLD: ABNORMAL 10*6/UL
RBC URINE: <1 /HPF (ref 0–3)
SEGMENTED NEUTROPHILS ABSOLUTE COUNT: 3.3 K/CU MM
SEGMENTED NEUTROPHILS RELATIVE PERCENT: 37 % (ref 36–66)
SODIUM BLD-SCNC: 137 MMOL/L (ref 135–145)
SPECIFIC GRAVITY UA: 1.01 (ref 1–1.03)
TEAR DROP CELLS: ABNORMAL
TOTAL PROTEIN: 5.4 GM/DL (ref 6.4–8.2)
TOXIC GRANULATION: PRESENT
TRICHOMONAS: NORMAL /HPF
TROPONIN, HIGH SENSITIVITY: 32 NG/L (ref 0–22)
TROPONIN, HIGH SENSITIVITY: 33 NG/L (ref 0–22)
UROBILINOGEN, URINE: 0.2 MG/DL (ref 0.2–1)
WBC # BLD: 9 K/CU MM (ref 4–10.5)
WBC UA: 1 /HPF (ref 0–2)

## 2023-10-18 PROCEDURE — 83735 ASSAY OF MAGNESIUM: CPT

## 2023-10-18 PROCEDURE — 6370000000 HC RX 637 (ALT 250 FOR IP): Performed by: STUDENT IN AN ORGANIZED HEALTH CARE EDUCATION/TRAINING PROGRAM

## 2023-10-18 PROCEDURE — 2580000003 HC RX 258: Performed by: STUDENT IN AN ORGANIZED HEALTH CARE EDUCATION/TRAINING PROGRAM

## 2023-10-18 PROCEDURE — 80053 COMPREHEN METABOLIC PANEL: CPT

## 2023-10-18 PROCEDURE — 83880 ASSAY OF NATRIURETIC PEPTIDE: CPT

## 2023-10-18 PROCEDURE — 93005 ELECTROCARDIOGRAM TRACING: CPT | Performed by: STUDENT IN AN ORGANIZED HEALTH CARE EDUCATION/TRAINING PROGRAM

## 2023-10-18 PROCEDURE — 1200000000 HC SEMI PRIVATE

## 2023-10-18 PROCEDURE — 6360000002 HC RX W HCPCS: Performed by: STUDENT IN AN ORGANIZED HEALTH CARE EDUCATION/TRAINING PROGRAM

## 2023-10-18 PROCEDURE — 71045 X-RAY EXAM CHEST 1 VIEW: CPT

## 2023-10-18 PROCEDURE — 85027 COMPLETE CBC AUTOMATED: CPT

## 2023-10-18 PROCEDURE — 93010 ELECTROCARDIOGRAM REPORT: CPT | Performed by: INTERNAL MEDICINE

## 2023-10-18 PROCEDURE — 84484 ASSAY OF TROPONIN QUANT: CPT

## 2023-10-18 PROCEDURE — 94640 AIRWAY INHALATION TREATMENT: CPT

## 2023-10-18 PROCEDURE — 85007 BL SMEAR W/DIFF WBC COUNT: CPT

## 2023-10-18 PROCEDURE — 81001 URINALYSIS AUTO W/SCOPE: CPT

## 2023-10-18 PROCEDURE — 82962 GLUCOSE BLOOD TEST: CPT

## 2023-10-18 PROCEDURE — 99285 EMERGENCY DEPT VISIT HI MDM: CPT

## 2023-10-18 PROCEDURE — 0202U NFCT DS 22 TRGT SARS-COV-2: CPT

## 2023-10-18 RX ORDER — SODIUM CHLORIDE 9 MG/ML
INJECTION, SOLUTION INTRAVENOUS PRN
Status: DISCONTINUED | OUTPATIENT
Start: 2023-10-18 | End: 2023-10-22 | Stop reason: HOSPADM

## 2023-10-18 RX ORDER — DEXTROSE MONOHYDRATE 100 MG/ML
INJECTION, SOLUTION INTRAVENOUS CONTINUOUS PRN
Status: DISCONTINUED | OUTPATIENT
Start: 2023-10-18 | End: 2023-10-22 | Stop reason: HOSPADM

## 2023-10-18 RX ORDER — IPRATROPIUM BROMIDE AND ALBUTEROL SULFATE 2.5; .5 MG/3ML; MG/3ML
1 SOLUTION RESPIRATORY (INHALATION)
Status: COMPLETED | OUTPATIENT
Start: 2023-10-18 | End: 2023-10-18

## 2023-10-18 RX ORDER — SODIUM CHLORIDE 0.9 % (FLUSH) 0.9 %
5-40 SYRINGE (ML) INJECTION PRN
Status: DISCONTINUED | OUTPATIENT
Start: 2023-10-18 | End: 2023-10-22 | Stop reason: HOSPADM

## 2023-10-18 RX ORDER — ALLOPURINOL 300 MG/1
300 TABLET ORAL DAILY
Status: DISCONTINUED | OUTPATIENT
Start: 2023-10-18 | End: 2023-10-22 | Stop reason: HOSPADM

## 2023-10-18 RX ORDER — INSULIN LISPRO 100 [IU]/ML
5 INJECTION, SOLUTION INTRAVENOUS; SUBCUTANEOUS
Status: DISCONTINUED | OUTPATIENT
Start: 2023-10-18 | End: 2023-10-19

## 2023-10-18 RX ORDER — ONDANSETRON 4 MG/1
4 TABLET, ORALLY DISINTEGRATING ORAL EVERY 8 HOURS PRN
Status: DISCONTINUED | OUTPATIENT
Start: 2023-10-18 | End: 2023-10-22 | Stop reason: HOSPADM

## 2023-10-18 RX ORDER — IPRATROPIUM BROMIDE AND ALBUTEROL SULFATE 2.5; .5 MG/3ML; MG/3ML
1 SOLUTION RESPIRATORY (INHALATION)
Status: DISCONTINUED | OUTPATIENT
Start: 2023-10-18 | End: 2023-10-22 | Stop reason: HOSPADM

## 2023-10-18 RX ORDER — VALACYCLOVIR HYDROCHLORIDE 500 MG/1
500 TABLET, FILM COATED ORAL DAILY
Status: DISCONTINUED | OUTPATIENT
Start: 2023-10-18 | End: 2023-10-22 | Stop reason: HOSPADM

## 2023-10-18 RX ORDER — INSULIN LISPRO 100 [IU]/ML
0-4 INJECTION, SOLUTION INTRAVENOUS; SUBCUTANEOUS NIGHTLY
Status: DISCONTINUED | OUTPATIENT
Start: 2023-10-18 | End: 2023-10-19

## 2023-10-18 RX ORDER — ATORVASTATIN CALCIUM 40 MG/1
40 TABLET, FILM COATED ORAL EVERY EVENING
Status: DISCONTINUED | OUTPATIENT
Start: 2023-10-18 | End: 2023-10-22 | Stop reason: HOSPADM

## 2023-10-18 RX ORDER — FINASTERIDE 5 MG/1
5 TABLET, FILM COATED ORAL DAILY
Status: DISCONTINUED | OUTPATIENT
Start: 2023-10-18 | End: 2023-10-22 | Stop reason: HOSPADM

## 2023-10-18 RX ORDER — PREDNISONE 20 MG/1
40 TABLET ORAL DAILY
Status: DISCONTINUED | OUTPATIENT
Start: 2023-10-21 | End: 2023-10-20

## 2023-10-18 RX ORDER — INSULIN GLARGINE 100 [IU]/ML
8 INJECTION, SOLUTION SUBCUTANEOUS NIGHTLY
Status: DISCONTINUED | OUTPATIENT
Start: 2023-10-18 | End: 2023-10-19

## 2023-10-18 RX ORDER — TAMSULOSIN HYDROCHLORIDE 0.4 MG/1
0.4 CAPSULE ORAL DAILY
Status: DISCONTINUED | OUTPATIENT
Start: 2023-10-18 | End: 2023-10-22 | Stop reason: HOSPADM

## 2023-10-18 RX ORDER — ENOXAPARIN SODIUM 100 MG/ML
40 INJECTION SUBCUTANEOUS DAILY
Status: DISCONTINUED | OUTPATIENT
Start: 2023-10-18 | End: 2023-10-22 | Stop reason: HOSPADM

## 2023-10-18 RX ORDER — INSULIN LISPRO 100 [IU]/ML
0-4 INJECTION, SOLUTION INTRAVENOUS; SUBCUTANEOUS
Status: DISCONTINUED | OUTPATIENT
Start: 2023-10-18 | End: 2023-10-19

## 2023-10-18 RX ORDER — PANTOPRAZOLE SODIUM 40 MG/1
40 TABLET, DELAYED RELEASE ORAL
Status: DISCONTINUED | OUTPATIENT
Start: 2023-10-19 | End: 2023-10-22 | Stop reason: HOSPADM

## 2023-10-18 RX ORDER — SODIUM CHLORIDE 0.9 % (FLUSH) 0.9 %
5-40 SYRINGE (ML) INJECTION EVERY 12 HOURS SCHEDULED
Status: DISCONTINUED | OUTPATIENT
Start: 2023-10-18 | End: 2023-10-22 | Stop reason: HOSPADM

## 2023-10-18 RX ORDER — ACETAMINOPHEN 325 MG/1
650 TABLET ORAL EVERY 6 HOURS PRN
Status: DISCONTINUED | OUTPATIENT
Start: 2023-10-18 | End: 2023-10-22 | Stop reason: HOSPADM

## 2023-10-18 RX ORDER — GLUCAGON 1 MG/ML
1 KIT INJECTION PRN
Status: DISCONTINUED | OUTPATIENT
Start: 2023-10-18 | End: 2023-10-22 | Stop reason: HOSPADM

## 2023-10-18 RX ORDER — ONDANSETRON 2 MG/ML
4 INJECTION INTRAMUSCULAR; INTRAVENOUS EVERY 6 HOURS PRN
Status: DISCONTINUED | OUTPATIENT
Start: 2023-10-18 | End: 2023-10-22 | Stop reason: HOSPADM

## 2023-10-18 RX ORDER — ACETAMINOPHEN 650 MG/1
650 SUPPOSITORY RECTAL EVERY 6 HOURS PRN
Status: DISCONTINUED | OUTPATIENT
Start: 2023-10-18 | End: 2023-10-22 | Stop reason: HOSPADM

## 2023-10-18 RX ORDER — AZITHROMYCIN 250 MG/1
500 TABLET, FILM COATED ORAL DAILY
Status: COMPLETED | OUTPATIENT
Start: 2023-10-18 | End: 2023-10-20

## 2023-10-18 RX ORDER — HYDROCODONE BITARTRATE AND ACETAMINOPHEN 10; 325 MG/1; MG/1
1 TABLET ORAL EVERY 6 HOURS PRN
Status: DISCONTINUED | OUTPATIENT
Start: 2023-10-18 | End: 2023-10-22 | Stop reason: HOSPADM

## 2023-10-18 RX ORDER — GABAPENTIN 100 MG/1
100 CAPSULE ORAL 3 TIMES DAILY
Status: DISCONTINUED | OUTPATIENT
Start: 2023-10-18 | End: 2023-10-22 | Stop reason: HOSPADM

## 2023-10-18 RX ORDER — GUAIFENESIN/DEXTROMETHORPHAN 100-10MG/5
5 SYRUP ORAL EVERY 4 HOURS PRN
Status: DISCONTINUED | OUTPATIENT
Start: 2023-10-18 | End: 2023-10-22 | Stop reason: HOSPADM

## 2023-10-18 RX ORDER — POLYETHYLENE GLYCOL 3350 17 G/17G
17 POWDER, FOR SOLUTION ORAL DAILY PRN
Status: DISCONTINUED | OUTPATIENT
Start: 2023-10-18 | End: 2023-10-22 | Stop reason: HOSPADM

## 2023-10-18 RX ADMIN — CEFTRIAXONE SODIUM 1000 MG: 1 INJECTION, POWDER, FOR SOLUTION INTRAMUSCULAR; INTRAVENOUS at 21:08

## 2023-10-18 RX ADMIN — AZITHROMYCIN 500 MG: 250 TABLET, FILM COATED ORAL at 21:00

## 2023-10-18 RX ADMIN — SODIUM CHLORIDE: 9 INJECTION, SOLUTION INTRAVENOUS at 21:08

## 2023-10-18 RX ADMIN — SODIUM CHLORIDE, PRESERVATIVE FREE 10 ML: 5 INJECTION INTRAVENOUS at 21:03

## 2023-10-18 RX ADMIN — ENOXAPARIN SODIUM 40 MG: 100 INJECTION SUBCUTANEOUS at 21:42

## 2023-10-18 RX ADMIN — VALACYCLOVIR HYDROCHLORIDE 500 MG: 500 TABLET, FILM COATED ORAL at 21:00

## 2023-10-18 RX ADMIN — IPRATROPIUM BROMIDE AND ALBUTEROL SULFATE 1 DOSE: 2.5; .5 SOLUTION RESPIRATORY (INHALATION) at 14:45

## 2023-10-18 RX ADMIN — HYDROCODONE BITARTRATE AND ACETAMINOPHEN 1 TABLET: 10; 325 TABLET ORAL at 21:43

## 2023-10-18 RX ADMIN — METHYLPREDNISOLONE SODIUM SUCCINATE 40 MG: 40 INJECTION, POWDER, FOR SOLUTION INTRAMUSCULAR; INTRAVENOUS at 21:01

## 2023-10-18 RX ADMIN — FINASTERIDE 5 MG: 5 TABLET, FILM COATED ORAL at 21:00

## 2023-10-18 RX ADMIN — ALLOPURINOL 300 MG: 300 TABLET ORAL at 21:00

## 2023-10-18 RX ADMIN — INSULIN GLARGINE 8 UNITS: 100 INJECTION, SOLUTION SUBCUTANEOUS at 21:42

## 2023-10-18 RX ADMIN — TAMSULOSIN HYDROCHLORIDE 0.4 MG: 0.4 CAPSULE ORAL at 21:00

## 2023-10-18 RX ADMIN — GABAPENTIN 100 MG: 100 CAPSULE ORAL at 21:00

## 2023-10-18 RX ADMIN — ATORVASTATIN CALCIUM 40 MG: 40 TABLET, FILM COATED ORAL at 21:00

## 2023-10-18 ASSESSMENT — PAIN DESCRIPTION - LOCATION
LOCATION: LEG;SHOULDER
LOCATION: SHOULDER
LOCATION: LEG;SHOULDER

## 2023-10-18 ASSESSMENT — PAIN SCALES - GENERAL
PAINLEVEL_OUTOF10: 7
PAINLEVEL_OUTOF10: 6
PAINLEVEL_OUTOF10: 7

## 2023-10-18 ASSESSMENT — PAIN DESCRIPTION - DESCRIPTORS
DESCRIPTORS: ACHING

## 2023-10-18 ASSESSMENT — PAIN DESCRIPTION - ORIENTATION
ORIENTATION: RIGHT

## 2023-10-18 ASSESSMENT — LIFESTYLE VARIABLES
HOW OFTEN DO YOU HAVE A DRINK CONTAINING ALCOHOL: NEVER
HOW MANY STANDARD DRINKS CONTAINING ALCOHOL DO YOU HAVE ON A TYPICAL DAY: PATIENT DOES NOT DRINK

## 2023-10-18 NOTE — ED PROVIDER NOTES
Emergency Department Encounter    Patient: Carmen Mota  MRN: [id number]  : 1950  Date of Evaluation: 10/18/2023  ED Provider:  Jeny Ware MD    Triage Chief Complaint:   Failure To Thrive (Pt states that he just doesn't feel well and is gradually getting worse. Pt states that he has lost weight and has a poor appetite. )    Yocha Dehe:  Carmen Mota is a 68 y.o. male comes past medical history including diabetes with multiple presentations for hypoglycemia, COPD, CLL that presents with shortness of breath. Patient reports he has been having trouble breathing for the last 2 days, it is at rest and worse with exertion. Today he was started yesterday for cough therapy and was unable to continue due to shortness of breath. Patient is on 3 L of oxygen at home. He also reports cough which is productive. He denies fevers, runny nose, sore throat, chest pain, abdominal pain, nausea, vomiting, dysuria, hematuria, numbness, tingling, falls. Of note, patient reports that he believes that he needs to be placed.     ROS - see HPI    Past Medical History:   Diagnosis Date    Arthritis     knees, Rt hand/wrist    BPH (benign prostatic hyperplasia)     CAD (coronary artery disease)     Cancer (HCC)     CLL--Sees Dr Belinda Kaufman    Diabetes mellitus [facility])     Since about     History of blood transfusion     no reaction    Hx of motion sickness     Hyperlipidemia     MDRO (multiple drug resistant organisms) resistance     abscess on buttock 10yrs ago    Migraine     last one summer 2016    Pneumonia 2016    Wears dentures     full upper--lower dentures    Wears glasses      Past Surgical History:   Procedure Laterality Date    BRONCHOSCOPY N/A 10/28/2022    BRONCHOSCOPY performed by Bon Avendano MD at [address] [place] [address]  's    x 2  last showed \"inflammation of heart\"    COLONOSCOPY      DENTAL SURGERY      all teeth extracted     EYE SURGERY Right 2016    Cataract

## 2023-10-18 NOTE — ED NOTES
ED TO INPATIENT SBAR HANDOFF    Patient Name: Nery Guardado   :  1950  68 y.o. Preferred Name    Family/Caregiver Present no   Restraints no   C-SSRS: Risk of Suicide: No Risk  Sitter no   Sepsis Risk Score Sepsis Risk Score: 1.31      Situation  Chief Complaint   Patient presents with    Failure To Thrive     Pt states that he just doesn't feel well and is gradually getting worse. Pt states that he has lost weight and has a poor appetite. Brief Description of Patient's Condition: pt presented to ed with complaints of failure to thrive. States that he has been having trouble breathing and has had no appetite. States that he has lost weight and just feels like he's \"struggling. \"Pt does have a hx of COPD and diabetes. Mental Status: oriented  Arrived from: home    Imaging:   XR CHEST PORTABLE   Final Result   Chronic bilateral interstitial markings. No definite acute process.            Abnormal labs:   Abnormal Labs Reviewed   CBC WITH AUTO DIFFERENTIAL - Abnormal; Notable for the following components:       Result Value    RBC 3.56 (*)     Hemoglobin 10.2 (*)     Hematocrit 33.5 (*)     MCHC 30.4 (*)     RDW 19.0 (*)     Platelets 92 (*)     Metamyelocytes Relative 1 (*)     Bands Relative 1 (*)     Lymphocytes % 57.0 (*)     All other components within normal limits   COMPREHENSIVE METABOLIC PANEL - Abnormal; Notable for the following components:    Chloride 98 (*)     CO2 34 (*)     Glucose 139 (*)     Creatinine 0.5 (*)     Total Protein 5.4 (*)     Albumin 3.2 (*)     Alkaline Phosphatase 296 (*)     All other components within normal limits   TROPONIN - Abnormal; Notable for the following components:    Troponin, High Sensitivity 33 (*)     All other components within normal limits   TROPONIN - Abnormal; Notable for the following components:    Troponin, High Sensitivity 32 (*)     All other components within normal limits   URINALYSIS WITH REFLEX TO CULTURE - Abnormal; Notable for the

## 2023-10-18 NOTE — H&P
[place].            Disposition:   Current Living situation: home  Expected Disposition: ? SNF  Estimated D/C: 1-2 Days    Diet No diet orders on file   DVT Prophylaxis [x] Lovenox, []  Heparin, [] SCDs, [] Ambulation,  [] Eliquis, [] Xarelto, [] Coumadin   Code Status Prior   Surrogate Decision Maker/ POA      Personally reviewed Lab Studies and Imaging     Discussed management of the case with ER provider who recommended admission    EKG interpreted personally and results normal sinus rhythm. No acute ST changes    Imaging that was interpreted personally includes chest x-ray and results no acute process    History from:     patient    History of Present Illness:     Chief Complaint: Shortness of breath  Dana Novoa is a 68 y.o. male who presents with shortness of breath. Patient has history of COPD. Was recently discharged on 10/7 after getting treated for bacterial pneumonia. He had history of bronchiectasis with respiratory culture growing Pseudomonas. He completed 7-day course of cefepime which was later changed to cefazolin for 10 more days. Patient reported he has been having shortness of breath since last 2 days even at rest.  His shortness of breath is associated with productive cough as well. In ED his oxygen requirement was 4 L. He usually uses 3 L at home. Otherwise patient denied any fever, chills, sore throat, chest pain, abdominal pain, nausea, vomiting, dysuria. His troponin was slightly elevated. However in the absence of chest pain and no acute ST changes on EKG, the likely elevation in troponin is from demand ischemia. We will continue to monitor. Patient improved after stabilization. He also mentions that he is unable to take care of himself and needs placement.      Review of Systems:        Pertinent positives and negatives discussed in HPI     Objective:   No intake or output data in the 24 hours ending 10/18/23 5315   Vitals:   Vitals:    10/18/23 1500 10/18/23 1530 10/18/23

## 2023-10-19 LAB
ALBUMIN SERPL-MCNC: 3.5 GM/DL (ref 3.4–5)
ALP BLD-CCNC: 317 IU/L (ref 40–129)
ALT SERPL-CCNC: 22 U/L (ref 10–40)
ANION GAP SERPL CALCULATED.3IONS-SCNC: 7 MMOL/L (ref 4–16)
ANION GAP SERPL CALCULATED.3IONS-SCNC: 8 MMOL/L (ref 4–16)
ANISOCYTOSIS: ABNORMAL
AST SERPL-CCNC: 21 IU/L (ref 15–37)
ATYPICAL LYMPHOCYTE ABSOLUTE COUNT: ABNORMAL
B PARAP IS1001 DNA NPH QL NAA+NON-PROBE: NOT DETECTED
B PERT.PT PRMT NPH QL NAA+NON-PROBE: NOT DETECTED
BANDED NEUTROPHILS ABSOLUTE COUNT: 0.72 K/CU MM
BANDED NEUTROPHILS RELATIVE PERCENT: 9 % (ref 5–11)
BASOPHILIC STIPPLING: PRESENT
BILIRUB SERPL-MCNC: 0.3 MG/DL (ref 0–1)
BUN SERPL-MCNC: 17 MG/DL (ref 6–23)
BUN SERPL-MCNC: 21 MG/DL (ref 6–23)
C PNEUM DNA NPH QL NAA+NON-PROBE: NOT DETECTED
CALCIUM SERPL-MCNC: 8.7 MG/DL (ref 8.3–10.6)
CALCIUM SERPL-MCNC: 8.9 MG/DL (ref 8.3–10.6)
CHLORIDE BLD-SCNC: 99 MMOL/L (ref 99–110)
CHLORIDE BLD-SCNC: 99 MMOL/L (ref 99–110)
CO2: 32 MMOL/L (ref 21–32)
CO2: 34 MMOL/L (ref 21–32)
CREAT SERPL-MCNC: 0.5 MG/DL (ref 0.9–1.3)
CREAT SERPL-MCNC: 0.5 MG/DL (ref 0.9–1.3)
DIFFERENTIAL TYPE: ABNORMAL
ESTIMATED AVERAGE GLUCOSE: 103 MG/DL
FLUAV H1 2009 PAN RNA NPH NAA+NON-PROBE: NOT DETECTED
FLUAV H1 RNA NPH QL NAA+NON-PROBE: NOT DETECTED
FLUAV H3 RNA NPH QL NAA+NON-PROBE: NOT DETECTED
FLUAV RNA NPH QL NAA+NON-PROBE: NOT DETECTED
FLUBV RNA NPH QL NAA+NON-PROBE: NOT DETECTED
GFR SERPL CREATININE-BSD FRML MDRD: >60 ML/MIN/1.73M2
GFR SERPL CREATININE-BSD FRML MDRD: >60 ML/MIN/1.73M2
GLUCOSE BLD-MCNC: 290 MG/DL (ref 70–99)
GLUCOSE BLD-MCNC: 368 MG/DL (ref 70–99)
GLUCOSE BLD-MCNC: 440 MG/DL (ref 70–99)
GLUCOSE BLD-MCNC: 468 MG/DL (ref 70–99)
GLUCOSE BLD-MCNC: 544 MG/DL (ref 70–99)
GLUCOSE SERPL-MCNC: 296 MG/DL (ref 70–99)
GLUCOSE SERPL-MCNC: 421 MG/DL (ref 70–99)
HADV DNA NPH QL NAA+NON-PROBE: NOT DETECTED
HBA1C MFR BLD: 5.2 % (ref 4.2–6.3)
HCOV 229E RNA NPH QL NAA+NON-PROBE: NOT DETECTED
HCOV HKU1 RNA NPH QL NAA+NON-PROBE: NOT DETECTED
HCOV NL63 RNA NPH QL NAA+NON-PROBE: NOT DETECTED
HCOV OC43 RNA NPH QL NAA+NON-PROBE: NOT DETECTED
HCT VFR BLD CALC: 35.2 % (ref 42–52)
HEMOGLOBIN: 10.5 GM/DL (ref 13.5–18)
HMPV RNA NPH QL NAA+NON-PROBE: NOT DETECTED
HPIV1 RNA NPH QL NAA+NON-PROBE: NOT DETECTED
HPIV2 RNA NPH QL NAA+NON-PROBE: NOT DETECTED
HPIV3 RNA NPH QL NAA+NON-PROBE: NOT DETECTED
HPIV4 RNA NPH QL NAA+NON-PROBE: NOT DETECTED
HYPOCHROMIA: ABNORMAL
LYMPHOCYTES ABSOLUTE: 3 K/CU MM
LYMPHOCYTES RELATIVE PERCENT: 38 % (ref 24–44)
M PNEUMO DNA NPH QL NAA+NON-PROBE: NOT DETECTED
MCH RBC QN AUTO: 28.5 PG (ref 27–31)
MCHC RBC AUTO-ENTMCNC: 29.8 % (ref 32–36)
MCV RBC AUTO: 95.7 FL (ref 78–100)
METAMYELOCYTES ABSOLUTE COUNT: 0.08 K/CU MM
METAMYELOCYTES PERCENT: 1 %
MONOCYTES ABSOLUTE: 0.6 K/CU MM
MONOCYTES RELATIVE PERCENT: 7 % (ref 0–4)
PDW BLD-RTO: 19.1 % (ref 11.7–14.9)
PLATELET # BLD: 87 K/CU MM (ref 140–440)
PMV BLD AUTO: 9.1 FL (ref 7.5–11.1)
POLYCHROMASIA: ABNORMAL
POTASSIUM SERPL-SCNC: 5.4 MMOL/L (ref 3.5–5.1)
POTASSIUM SERPL-SCNC: 5.5 MMOL/L (ref 3.5–5.1)
RBC # BLD: 3.68 M/CU MM (ref 4.6–6.2)
RSV RNA NPH QL NAA+NON-PROBE: NOT DETECTED
RV+EV RNA NPH QL NAA+NON-PROBE: ABNORMAL
SARS-COV-2 RNA NPH QL NAA+NON-PROBE: NOT DETECTED
SEGMENTED NEUTROPHILS ABSOLUTE COUNT: 3.6 K/CU MM
SEGMENTED NEUTROPHILS RELATIVE PERCENT: 45 % (ref 36–66)
SODIUM BLD-SCNC: 139 MMOL/L (ref 135–145)
SODIUM BLD-SCNC: 140 MMOL/L (ref 135–145)
TEAR DROP CELLS: ABNORMAL
TOTAL PROTEIN: 5.2 GM/DL (ref 6.4–8.2)
WBC # BLD: 8 K/CU MM (ref 4–10.5)

## 2023-10-19 PROCEDURE — 2580000003 HC RX 258: Performed by: STUDENT IN AN ORGANIZED HEALTH CARE EDUCATION/TRAINING PROGRAM

## 2023-10-19 PROCEDURE — 1200000000 HC SEMI PRIVATE

## 2023-10-19 PROCEDURE — 97166 OT EVAL MOD COMPLEX 45 MIN: CPT

## 2023-10-19 PROCEDURE — 82962 GLUCOSE BLOOD TEST: CPT

## 2023-10-19 PROCEDURE — 6370000000 HC RX 637 (ALT 250 FOR IP): Performed by: FAMILY MEDICINE

## 2023-10-19 PROCEDURE — 36415 COLL VENOUS BLD VENIPUNCTURE: CPT

## 2023-10-19 PROCEDURE — 2700000000 HC OXYGEN THERAPY PER DAY

## 2023-10-19 PROCEDURE — 97162 PT EVAL MOD COMPLEX 30 MIN: CPT

## 2023-10-19 PROCEDURE — 97535 SELF CARE MNGMENT TRAINING: CPT

## 2023-10-19 PROCEDURE — 6360000002 HC RX W HCPCS: Performed by: STUDENT IN AN ORGANIZED HEALTH CARE EDUCATION/TRAINING PROGRAM

## 2023-10-19 PROCEDURE — 6370000000 HC RX 637 (ALT 250 FOR IP): Performed by: STUDENT IN AN ORGANIZED HEALTH CARE EDUCATION/TRAINING PROGRAM

## 2023-10-19 PROCEDURE — 80053 COMPREHEN METABOLIC PANEL: CPT

## 2023-10-19 PROCEDURE — 85027 COMPLETE CBC AUTOMATED: CPT

## 2023-10-19 PROCEDURE — 94761 N-INVAS EAR/PLS OXIMETRY MLT: CPT

## 2023-10-19 PROCEDURE — 97530 THERAPEUTIC ACTIVITIES: CPT

## 2023-10-19 PROCEDURE — 83036 HEMOGLOBIN GLYCOSYLATED A1C: CPT

## 2023-10-19 PROCEDURE — 94640 AIRWAY INHALATION TREATMENT: CPT

## 2023-10-19 PROCEDURE — 94664 DEMO&/EVAL PT USE INHALER: CPT

## 2023-10-19 PROCEDURE — 85007 BL SMEAR W/DIFF WBC COUNT: CPT

## 2023-10-19 PROCEDURE — 80048 BASIC METABOLIC PNL TOTAL CA: CPT

## 2023-10-19 RX ORDER — INSULIN GLARGINE 100 [IU]/ML
12 INJECTION, SOLUTION SUBCUTANEOUS NIGHTLY
Status: DISCONTINUED | OUTPATIENT
Start: 2023-10-19 | End: 2023-10-20

## 2023-10-19 RX ORDER — INSULIN LISPRO 100 [IU]/ML
0-8 INJECTION, SOLUTION INTRAVENOUS; SUBCUTANEOUS
Status: DISCONTINUED | OUTPATIENT
Start: 2023-10-19 | End: 2023-10-20

## 2023-10-19 RX ORDER — INSULIN LISPRO 100 [IU]/ML
8 INJECTION, SOLUTION INTRAVENOUS; SUBCUTANEOUS
Status: DISCONTINUED | OUTPATIENT
Start: 2023-10-19 | End: 2023-10-20

## 2023-10-19 RX ORDER — INSULIN LISPRO 100 [IU]/ML
14 INJECTION, SOLUTION INTRAVENOUS; SUBCUTANEOUS ONCE
Status: COMPLETED | OUTPATIENT
Start: 2023-10-19 | End: 2023-10-19

## 2023-10-19 RX ORDER — INSULIN LISPRO 100 [IU]/ML
0-4 INJECTION, SOLUTION INTRAVENOUS; SUBCUTANEOUS NIGHTLY
Status: DISCONTINUED | OUTPATIENT
Start: 2023-10-19 | End: 2023-10-20

## 2023-10-19 RX ADMIN — INSULIN LISPRO 8 UNITS: 100 INJECTION, SOLUTION INTRAVENOUS; SUBCUTANEOUS at 12:52

## 2023-10-19 RX ADMIN — ENOXAPARIN SODIUM 40 MG: 100 INJECTION SUBCUTANEOUS at 09:40

## 2023-10-19 RX ADMIN — METHYLPREDNISOLONE SODIUM SUCCINATE 40 MG: 40 INJECTION, POWDER, FOR SOLUTION INTRAMUSCULAR; INTRAVENOUS at 12:01

## 2023-10-19 RX ADMIN — INSULIN LISPRO 5 UNITS: 100 INJECTION, SOLUTION INTRAVENOUS; SUBCUTANEOUS at 09:40

## 2023-10-19 RX ADMIN — FINASTERIDE 5 MG: 5 TABLET, FILM COATED ORAL at 09:42

## 2023-10-19 RX ADMIN — SODIUM ZIRCONIUM CYCLOSILICATE 10 G: 10 POWDER, FOR SUSPENSION ORAL at 20:10

## 2023-10-19 RX ADMIN — TAMSULOSIN HYDROCHLORIDE 0.4 MG: 0.4 CAPSULE ORAL at 09:40

## 2023-10-19 RX ADMIN — HYDROCODONE BITARTRATE AND ACETAMINOPHEN 1 TABLET: 10; 325 TABLET ORAL at 04:11

## 2023-10-19 RX ADMIN — SODIUM ZIRCONIUM CYCLOSILICATE 10 G: 10 POWDER, FOR SUSPENSION ORAL at 09:40

## 2023-10-19 RX ADMIN — IPRATROPIUM BROMIDE AND ALBUTEROL SULFATE 1 DOSE: 2.5; .5 SOLUTION RESPIRATORY (INHALATION) at 20:38

## 2023-10-19 RX ADMIN — Medication 5 DROP: at 15:22

## 2023-10-19 RX ADMIN — METHYLPREDNISOLONE SODIUM SUCCINATE 40 MG: 40 INJECTION, POWDER, FOR SOLUTION INTRAMUSCULAR; INTRAVENOUS at 20:10

## 2023-10-19 RX ADMIN — IPRATROPIUM BROMIDE AND ALBUTEROL SULFATE 1 DOSE: 2.5; .5 SOLUTION RESPIRATORY (INHALATION) at 07:36

## 2023-10-19 RX ADMIN — ALLOPURINOL 300 MG: 300 TABLET ORAL at 09:40

## 2023-10-19 RX ADMIN — GABAPENTIN 100 MG: 100 CAPSULE ORAL at 09:40

## 2023-10-19 RX ADMIN — PANTOPRAZOLE SODIUM 40 MG: 40 TABLET, DELAYED RELEASE ORAL at 06:15

## 2023-10-19 RX ADMIN — SODIUM CHLORIDE, PRESERVATIVE FREE 10 ML: 5 INJECTION INTRAVENOUS at 20:11

## 2023-10-19 RX ADMIN — ATORVASTATIN CALCIUM 40 MG: 40 TABLET, FILM COATED ORAL at 18:03

## 2023-10-19 RX ADMIN — IPRATROPIUM BROMIDE AND ALBUTEROL SULFATE 1 DOSE: 2.5; .5 SOLUTION RESPIRATORY (INHALATION) at 14:17

## 2023-10-19 RX ADMIN — INSULIN GLARGINE 12 UNITS: 100 INJECTION, SOLUTION SUBCUTANEOUS at 21:27

## 2023-10-19 RX ADMIN — CEFTRIAXONE SODIUM 1000 MG: 1 INJECTION, POWDER, FOR SOLUTION INTRAMUSCULAR; INTRAVENOUS at 20:16

## 2023-10-19 RX ADMIN — VALACYCLOVIR HYDROCHLORIDE 500 MG: 500 TABLET, FILM COATED ORAL at 09:40

## 2023-10-19 RX ADMIN — METHYLPREDNISOLONE SODIUM SUCCINATE 40 MG: 40 INJECTION, POWDER, FOR SOLUTION INTRAMUSCULAR; INTRAVENOUS at 04:11

## 2023-10-19 RX ADMIN — INSULIN LISPRO 8 UNITS: 100 INJECTION, SOLUTION INTRAVENOUS; SUBCUTANEOUS at 18:04

## 2023-10-19 RX ADMIN — GABAPENTIN 100 MG: 100 CAPSULE ORAL at 21:27

## 2023-10-19 RX ADMIN — HYDROCODONE BITARTRATE AND ACETAMINOPHEN 1 TABLET: 10; 325 TABLET ORAL at 20:10

## 2023-10-19 RX ADMIN — AZITHROMYCIN 500 MG: 250 TABLET, FILM COATED ORAL at 09:49

## 2023-10-19 RX ADMIN — INSULIN LISPRO 2 UNITS: 100 INJECTION, SOLUTION INTRAVENOUS; SUBCUTANEOUS at 09:40

## 2023-10-19 RX ADMIN — SODIUM CHLORIDE, PRESERVATIVE FREE 10 ML: 5 INJECTION INTRAVENOUS at 12:01

## 2023-10-19 RX ADMIN — GABAPENTIN 100 MG: 100 CAPSULE ORAL at 15:21

## 2023-10-19 RX ADMIN — SODIUM ZIRCONIUM CYCLOSILICATE 10 G: 10 POWDER, FOR SUSPENSION ORAL at 15:22

## 2023-10-19 RX ADMIN — INSULIN HUMAN 10 UNITS: 100 INJECTION, SOLUTION PARENTERAL at 20:01

## 2023-10-19 RX ADMIN — INSULIN LISPRO 8 UNITS: 100 INJECTION, SOLUTION INTRAVENOUS; SUBCUTANEOUS at 18:05

## 2023-10-19 RX ADMIN — INSULIN LISPRO 14 UNITS: 100 INJECTION, SOLUTION INTRAVENOUS; SUBCUTANEOUS at 23:29

## 2023-10-19 ASSESSMENT — PAIN DESCRIPTION - LOCATION: LOCATION: LEG;SHOULDER

## 2023-10-19 ASSESSMENT — PAIN DESCRIPTION - DESCRIPTORS: DESCRIPTORS: ACHING

## 2023-10-19 ASSESSMENT — PAIN DESCRIPTION - ORIENTATION: ORIENTATION: RIGHT

## 2023-10-19 ASSESSMENT — PAIN SCALES - GENERAL
PAINLEVEL_OUTOF10: 7
PAINLEVEL_OUTOF10: 7

## 2023-10-19 NOTE — CARE COORDINATION
Pt is from home with his son. Son has lift and hospital bed at home and what is needed to care for him. Home O2. Son notes that he had assistance with [employer], has CM, and has resources planned with them after discharge. Kristy England [phone] is CM from [employer]. Called her LVM. Called Mata Jacob for follow up. Asked for transportation assistance as his car is broke down to get home. 10/19/23 6575   Service Assessment   Patient Orientation Alert and Oriented   Cognition Alert   History Provided By Patient   Primary [address] [place] [address]   Patient's Healthcare Decision Maker is: Legal Next of Kin   PCP Verified by CM Yes   Last Visit to PCP Within last 6 months   Prior Functional Level Assistance with the following:   Current Functional Level Assistance with the following:   Can patient return to prior living arrangement No   Ability to make needs known: Fair   Family able to assist with home care needs: Yes   Would you like for me to discuss the discharge plan with any other family members/significant others, and if so, who?  No   Social/Functional History   ADL Assistance Needs assistance   Toileting Needs assistance   Homemaking Assistance Needs assistance   Ambulation Assistance Needs assistance   Transfer Assistance Needs assistance   Active  No

## 2023-10-19 NOTE — PLAN OF CARE
Problem: Discharge Planning  Goal: Discharge to home or other facility with appropriate resources  Outcome: Progressing     Problem: Pain  Goal: Verbalizes/displays adequate comfort level or baseline comfort level  Outcome: Progressing     Problem: Skin/Tissue Integrity  Goal: Absence of new skin breakdown  Description: 1. Monitor for areas of redness and/or skin breakdown  2. Assess vascular access sites hourly  3. Every 4-6 hours minimum:  Change oxygen saturation probe site  4. Every 4-6 hours:  If on nasal continuous positive airway pressure, respiratory therapy assess nares and determine need for appliance change or resting period.   Outcome: Progressing     Problem: Safety - Adult  Goal: Free from fall injury  Outcome: Progressing     Problem: Respiratory - Adult  Goal: Achieves optimal ventilation and oxygenation  Outcome: Progressing

## 2023-10-20 LAB
ALBUMIN SERPL-MCNC: 3.4 GM/DL (ref 3.4–5)
ALBUMIN SERPL-MCNC: 3.5 GM/DL (ref 3.4–5)
ALP BLD-CCNC: 288 IU/L (ref 40–129)
ALP BLD-CCNC: 300 IU/L (ref 40–129)
ALT SERPL-CCNC: 26 U/L (ref 10–40)
ALT SERPL-CCNC: 29 U/L (ref 10–40)
ANION GAP SERPL CALCULATED.3IONS-SCNC: 10 MMOL/L (ref 4–16)
ANION GAP SERPL CALCULATED.3IONS-SCNC: 7 MMOL/L (ref 4–16)
ANISOCYTOSIS: ABNORMAL
AST SERPL-CCNC: 24 IU/L (ref 15–37)
AST SERPL-CCNC: 25 IU/L (ref 15–37)
B-OH-BUTYR SERPL-MCNC: <0.1 MG/DL (ref 0–3)
BANDED NEUTROPHILS ABSOLUTE COUNT: 0.54 K/CU MM
BANDED NEUTROPHILS RELATIVE PERCENT: 8 % (ref 5–11)
BASE EXCESS MIXED: 8.7 (ref 0–1.2)
BASOPHILS ABSOLUTE: 0.1 K/CU MM
BASOPHILS RELATIVE PERCENT: 1 % (ref 0–1)
BILIRUB SERPL-MCNC: 0.2 MG/DL (ref 0–1)
BILIRUB SERPL-MCNC: 0.2 MG/DL (ref 0–1)
BUN SERPL-MCNC: 30 MG/DL (ref 6–23)
BUN SERPL-MCNC: 34 MG/DL (ref 6–23)
CALCIUM SERPL-MCNC: 8.6 MG/DL (ref 8.3–10.6)
CALCIUM SERPL-MCNC: 8.9 MG/DL (ref 8.3–10.6)
CHLORIDE BLD-SCNC: 95 MMOL/L (ref 99–110)
CHLORIDE BLD-SCNC: 98 MMOL/L (ref 99–110)
CO2: 31 MMOL/L (ref 21–32)
CO2: 33 MMOL/L (ref 21–32)
COMMENT: ABNORMAL
CREAT SERPL-MCNC: 0.6 MG/DL (ref 0.9–1.3)
CREAT SERPL-MCNC: 0.6 MG/DL (ref 0.9–1.3)
DIFFERENTIAL TYPE: ABNORMAL
EOSINOPHILS ABSOLUTE: 0 K/CU MM
EOSINOPHILS RELATIVE PERCENT: 0 % (ref 0–3)
GFR SERPL CREATININE-BSD FRML MDRD: >60 ML/MIN/1.73M2
GFR SERPL CREATININE-BSD FRML MDRD: >60 ML/MIN/1.73M2
GLUCOSE BLD-MCNC: 124 MG/DL (ref 70–99)
GLUCOSE BLD-MCNC: 296 MG/DL (ref 70–99)
GLUCOSE BLD-MCNC: 390 MG/DL (ref 70–99)
GLUCOSE BLD-MCNC: 435 MG/DL (ref 70–99)
GLUCOSE BLD-MCNC: 447 MG/DL (ref 70–99)
GLUCOSE BLD-MCNC: 552 MG/DL (ref 70–99)
GLUCOSE BLD-MCNC: 559 MG/DL (ref 70–99)
GLUCOSE SERPL-MCNC: 398 MG/DL (ref 70–99)
GLUCOSE SERPL-MCNC: 462 MG/DL (ref 70–99)
HCO3 VENOUS: 36.4 MMOL/L (ref 19–25)
HCT VFR BLD CALC: 29.5 % (ref 42–52)
HEMOGLOBIN: 8.7 GM/DL (ref 13.5–18)
HYPOCHROMIA: ABNORMAL
IMMATURE NEUTROPHIL %: 0 % (ref 0–0.43)
LYMPHOCYTES ABSOLUTE: 3.5 K/CU MM
LYMPHOCYTES RELATIVE PERCENT: 51 % (ref 24–44)
MACROCYTES: ABNORMAL
MCH RBC QN AUTO: 28.5 PG (ref 27–31)
MCHC RBC AUTO-ENTMCNC: 29.5 % (ref 32–36)
MCV RBC AUTO: 96.7 FL (ref 78–100)
MONOCYTES ABSOLUTE: 0.1 K/CU MM
MONOCYTES RELATIVE PERCENT: 2 % (ref 0–4)
O2 SAT, VEN: 93.1 % (ref 50–70)
PCO2, VEN: 63 MMHG (ref 38–52)
PDW BLD-RTO: 18.4 % (ref 11.7–14.9)
PH VENOUS: 7.37 (ref 7.32–7.42)
PLATELET # BLD: 82 K/CU MM (ref 140–440)
PMV BLD AUTO: 9.7 FL (ref 7.5–11.1)
PO2, VEN: 89 MMHG (ref 28–48)
POLYCHROMASIA: ABNORMAL
POTASSIUM SERPL-SCNC: 4.6 MMOL/L (ref 3.5–5.1)
POTASSIUM SERPL-SCNC: 5 MMOL/L (ref 3.5–5.1)
RBC # BLD: 3.05 M/CU MM (ref 4.6–6.2)
SEGMENTED NEUTROPHILS ABSOLUTE COUNT: 2.5 K/CU MM
SEGMENTED NEUTROPHILS RELATIVE PERCENT: 38 % (ref 36–66)
SODIUM BLD-SCNC: 136 MMOL/L (ref 135–145)
SODIUM BLD-SCNC: 138 MMOL/L (ref 135–145)
TOTAL IMMATURE NEUTOROPHIL: 0 K/CU MM
TOTAL PROTEIN: 4.9 GM/DL (ref 6.4–8.2)
TOTAL PROTEIN: 5.2 GM/DL (ref 6.4–8.2)
WBC # BLD: 6.7 K/CU MM (ref 4–10.5)

## 2023-10-20 PROCEDURE — 97530 THERAPEUTIC ACTIVITIES: CPT

## 2023-10-20 PROCEDURE — 6370000000 HC RX 637 (ALT 250 FOR IP): Performed by: INTERNAL MEDICINE

## 2023-10-20 PROCEDURE — 85025 COMPLETE CBC W/AUTO DIFF WBC: CPT

## 2023-10-20 PROCEDURE — 2580000003 HC RX 258: Performed by: STUDENT IN AN ORGANIZED HEALTH CARE EDUCATION/TRAINING PROGRAM

## 2023-10-20 PROCEDURE — 94640 AIRWAY INHALATION TREATMENT: CPT

## 2023-10-20 PROCEDURE — 36415 COLL VENOUS BLD VENIPUNCTURE: CPT

## 2023-10-20 PROCEDURE — 6370000000 HC RX 637 (ALT 250 FOR IP): Performed by: STUDENT IN AN ORGANIZED HEALTH CARE EDUCATION/TRAINING PROGRAM

## 2023-10-20 PROCEDURE — 82010 KETONE BODYS QUAN: CPT

## 2023-10-20 PROCEDURE — 6360000002 HC RX W HCPCS: Performed by: STUDENT IN AN ORGANIZED HEALTH CARE EDUCATION/TRAINING PROGRAM

## 2023-10-20 PROCEDURE — 82805 BLOOD GASES W/O2 SATURATION: CPT

## 2023-10-20 PROCEDURE — 1200000000 HC SEMI PRIVATE

## 2023-10-20 PROCEDURE — 80053 COMPREHEN METABOLIC PANEL: CPT

## 2023-10-20 PROCEDURE — 82962 GLUCOSE BLOOD TEST: CPT

## 2023-10-20 RX ORDER — INSULIN LISPRO 100 [IU]/ML
10 INJECTION, SOLUTION INTRAVENOUS; SUBCUTANEOUS
Status: DISCONTINUED | OUTPATIENT
Start: 2023-10-20 | End: 2023-10-20

## 2023-10-20 RX ORDER — INSULIN LISPRO 100 [IU]/ML
30 INJECTION, SOLUTION INTRAVENOUS; SUBCUTANEOUS ONCE
Status: COMPLETED | OUTPATIENT
Start: 2023-10-20 | End: 2023-10-20

## 2023-10-20 RX ORDER — INSULIN LISPRO 100 [IU]/ML
15 INJECTION, SOLUTION INTRAVENOUS; SUBCUTANEOUS
Status: DISCONTINUED | OUTPATIENT
Start: 2023-10-20 | End: 2023-10-22

## 2023-10-20 RX ORDER — INSULIN LISPRO 100 [IU]/ML
0-16 INJECTION, SOLUTION INTRAVENOUS; SUBCUTANEOUS
Status: DISCONTINUED | OUTPATIENT
Start: 2023-10-20 | End: 2023-10-21

## 2023-10-20 RX ORDER — INSULIN GLARGINE 100 [IU]/ML
40 INJECTION, SOLUTION SUBCUTANEOUS NIGHTLY
Status: DISCONTINUED | OUTPATIENT
Start: 2023-10-20 | End: 2023-10-22

## 2023-10-20 RX ORDER — INSULIN LISPRO 100 [IU]/ML
15 INJECTION, SOLUTION INTRAVENOUS; SUBCUTANEOUS ONCE
Status: COMPLETED | OUTPATIENT
Start: 2023-10-20 | End: 2023-10-20

## 2023-10-20 RX ORDER — INSULIN LISPRO 100 [IU]/ML
0-4 INJECTION, SOLUTION INTRAVENOUS; SUBCUTANEOUS
Status: DISCONTINUED | OUTPATIENT
Start: 2023-10-20 | End: 2023-10-20

## 2023-10-20 RX ORDER — INSULIN LISPRO 100 [IU]/ML
0-16 INJECTION, SOLUTION INTRAVENOUS; SUBCUTANEOUS
Status: DISCONTINUED | OUTPATIENT
Start: 2023-10-20 | End: 2023-10-20

## 2023-10-20 RX ORDER — PREDNISONE 20 MG/1
40 TABLET ORAL DAILY
Status: DISCONTINUED | OUTPATIENT
Start: 2023-10-21 | End: 2023-10-22 | Stop reason: HOSPADM

## 2023-10-20 RX ORDER — INSULIN LISPRO 100 [IU]/ML
15 INJECTION, SOLUTION INTRAVENOUS; SUBCUTANEOUS
Status: DISCONTINUED | OUTPATIENT
Start: 2023-10-20 | End: 2023-10-20

## 2023-10-20 RX ORDER — INSULIN LISPRO 100 [IU]/ML
0-8 INJECTION, SOLUTION INTRAVENOUS; SUBCUTANEOUS
Status: DISCONTINUED | OUTPATIENT
Start: 2023-10-20 | End: 2023-10-20

## 2023-10-20 RX ORDER — INSULIN LISPRO 100 [IU]/ML
25 INJECTION, SOLUTION INTRAVENOUS; SUBCUTANEOUS ONCE
Status: COMPLETED | OUTPATIENT
Start: 2023-10-20 | End: 2023-10-20

## 2023-10-20 RX ORDER — INSULIN LISPRO 100 [IU]/ML
0-24 INJECTION, SOLUTION INTRAVENOUS; SUBCUTANEOUS
Status: DISCONTINUED | OUTPATIENT
Start: 2023-10-20 | End: 2023-10-20

## 2023-10-20 RX ADMIN — GABAPENTIN 100 MG: 100 CAPSULE ORAL at 08:40

## 2023-10-20 RX ADMIN — GABAPENTIN 100 MG: 100 CAPSULE ORAL at 20:45

## 2023-10-20 RX ADMIN — INSULIN LISPRO 15 UNITS: 100 INJECTION, SOLUTION INTRAVENOUS; SUBCUTANEOUS at 09:12

## 2023-10-20 RX ADMIN — INSULIN LISPRO 25 UNITS: 100 INJECTION, SOLUTION INTRAVENOUS; SUBCUTANEOUS at 13:19

## 2023-10-20 RX ADMIN — FINASTERIDE 5 MG: 5 TABLET, FILM COATED ORAL at 08:40

## 2023-10-20 RX ADMIN — GABAPENTIN 100 MG: 100 CAPSULE ORAL at 15:35

## 2023-10-20 RX ADMIN — SODIUM ZIRCONIUM CYCLOSILICATE 10 G: 10 POWDER, FOR SUSPENSION ORAL at 08:40

## 2023-10-20 RX ADMIN — SODIUM CHLORIDE, PRESERVATIVE FREE 10 ML: 5 INJECTION INTRAVENOUS at 08:40

## 2023-10-20 RX ADMIN — ALLOPURINOL 300 MG: 300 TABLET ORAL at 08:40

## 2023-10-20 RX ADMIN — IPRATROPIUM BROMIDE AND ALBUTEROL SULFATE 1 DOSE: 2.5; .5 SOLUTION RESPIRATORY (INHALATION) at 19:23

## 2023-10-20 RX ADMIN — INSULIN LISPRO 16 UNITS: 100 INJECTION, SOLUTION INTRAVENOUS; SUBCUTANEOUS at 13:19

## 2023-10-20 RX ADMIN — CEFTRIAXONE SODIUM 1000 MG: 1 INJECTION, POWDER, FOR SOLUTION INTRAMUSCULAR; INTRAVENOUS at 20:44

## 2023-10-20 RX ADMIN — ATORVASTATIN CALCIUM 40 MG: 40 TABLET, FILM COATED ORAL at 17:30

## 2023-10-20 RX ADMIN — INSULIN LISPRO 30 UNITS: 100 INJECTION, SOLUTION INTRAVENOUS; SUBCUTANEOUS at 12:03

## 2023-10-20 RX ADMIN — SODIUM CHLORIDE, PRESERVATIVE FREE 10 ML: 5 INJECTION INTRAVENOUS at 20:42

## 2023-10-20 RX ADMIN — INSULIN LISPRO 12 UNITS: 100 INJECTION, SOLUTION INTRAVENOUS; SUBCUTANEOUS at 17:29

## 2023-10-20 RX ADMIN — PANTOPRAZOLE SODIUM 40 MG: 40 TABLET, DELAYED RELEASE ORAL at 06:20

## 2023-10-20 RX ADMIN — HUMAN INSULIN 20 UNITS: 100 INJECTION, SUSPENSION SUBCUTANEOUS at 14:05

## 2023-10-20 RX ADMIN — INSULIN LISPRO 18 UNITS: 100 INJECTION, SOLUTION INTRAVENOUS; SUBCUTANEOUS at 17:30

## 2023-10-20 RX ADMIN — METHYLPREDNISOLONE SODIUM SUCCINATE 40 MG: 40 INJECTION, POWDER, FOR SOLUTION INTRAMUSCULAR; INTRAVENOUS at 20:42

## 2023-10-20 RX ADMIN — INSULIN HUMAN 15 UNITS: 100 INJECTION, SUSPENSION SUBCUTANEOUS at 09:12

## 2023-10-20 RX ADMIN — IPRATROPIUM BROMIDE AND ALBUTEROL SULFATE 1 DOSE: 2.5; .5 SOLUTION RESPIRATORY (INHALATION) at 12:28

## 2023-10-20 RX ADMIN — ENOXAPARIN SODIUM 40 MG: 100 INJECTION SUBCUTANEOUS at 08:39

## 2023-10-20 RX ADMIN — VALACYCLOVIR HYDROCHLORIDE 500 MG: 500 TABLET, FILM COATED ORAL at 08:40

## 2023-10-20 RX ADMIN — AZITHROMYCIN 500 MG: 250 TABLET, FILM COATED ORAL at 08:40

## 2023-10-20 RX ADMIN — INSULIN LISPRO 16 UNITS: 100 INJECTION, SOLUTION INTRAVENOUS; SUBCUTANEOUS at 09:11

## 2023-10-20 RX ADMIN — HYDROCODONE BITARTRATE AND ACETAMINOPHEN 1 TABLET: 10; 325 TABLET ORAL at 15:35

## 2023-10-20 RX ADMIN — TAMSULOSIN HYDROCHLORIDE 0.4 MG: 0.4 CAPSULE ORAL at 08:54

## 2023-10-20 RX ADMIN — IPRATROPIUM BROMIDE AND ALBUTEROL SULFATE 1 DOSE: 2.5; .5 SOLUTION RESPIRATORY (INHALATION) at 08:34

## 2023-10-20 RX ADMIN — INSULIN LISPRO 15 UNITS: 100 INJECTION, SOLUTION INTRAVENOUS; SUBCUTANEOUS at 17:29

## 2023-10-20 RX ADMIN — METHYLPREDNISOLONE SODIUM SUCCINATE 40 MG: 40 INJECTION, POWDER, FOR SOLUTION INTRAMUSCULAR; INTRAVENOUS at 04:18

## 2023-10-20 ASSESSMENT — PAIN SCALES - GENERAL: PAINLEVEL_OUTOF10: 6

## 2023-10-20 ASSESSMENT — PAIN DESCRIPTION - LOCATION: LOCATION: GENERALIZED

## 2023-10-20 ASSESSMENT — PAIN DESCRIPTION - DESCRIPTORS: DESCRIPTORS: ACHING

## 2023-10-20 ASSESSMENT — PAIN - FUNCTIONAL ASSESSMENT: PAIN_FUNCTIONAL_ASSESSMENT: ACTIVITIES ARE NOT PREVENTED

## 2023-10-20 NOTE — PLAN OF CARE
Problem: Discharge Planning  Goal: Discharge to home or other facility with appropriate resources  Outcome: Progressing     Problem: Pain  Goal: Verbalizes/displays adequate comfort level or baseline comfort level  Outcome: Progressing     Problem: Skin/Tissue Integrity  Goal: Absence of new skin breakdown  Description: 1. Monitor for areas of redness and/or skin breakdown  2. Assess vascular access sites hourly  3. Every 4-6 hours minimum:  Change oxygen saturation probe site  4. Every 4-6 hours:  If on nasal continuous positive airway pressure, respiratory therapy assess nares and determine need for appliance change or resting period.   Outcome: Progressing     Problem: Safety - Adult  Goal: Free from fall injury  Outcome: Progressing     Problem: Respiratory - Adult  Goal: Achieves optimal ventilation and oxygenation  Outcome: Progressing     Problem: Chronic Conditions and Co-morbidities  Goal: Patient's chronic conditions and co-morbidity symptoms are monitored and maintained or improved  Outcome: Progressing     Problem: Nutrition Deficit:  Goal: Optimize nutritional status  Outcome: Progressing

## 2023-10-20 NOTE — CARE COORDINATION
Reviewed chart, discussed in IDR, was hoping to d/c soon but BS running in 500s . Plan remains home with [phone] CM will continue to follow.

## 2023-10-20 NOTE — PLAN OF CARE
Problem: Discharge Planning  Goal: Discharge to home or other facility with appropriate resources  10/20/2023 1022 by Poppy Vaughn LPN  Outcome: Progressing  10/20/2023 0144 by Maria Cordova RN  Outcome: Progressing     Problem: Pain  Goal: Verbalizes/displays adequate comfort level or baseline comfort level  10/20/2023 1022 by Poppy Vaughn LPN  Outcome: Progressing  10/20/2023 0144 by Maria Cordova RN  Outcome: Progressing     Problem: Skin/Tissue Integrity  Goal: Absence of new skin breakdown  Description: 1. Monitor for areas of redness and/or skin breakdown  2. Assess vascular access sites hourly  3. Every 4-6 hours minimum:  Change oxygen saturation probe site  4. Every 4-6 hours:  If on nasal continuous positive airway pressure, respiratory therapy assess nares and determine need for appliance change or resting period.   10/20/2023 1022 by Poppy Vaughn LPN  Outcome: Progressing  10/20/2023 0144 by Maria Cordova RN  Outcome: Progressing

## 2023-10-21 LAB
ALBUMIN SERPL-MCNC: 3.2 GM/DL (ref 3.4–5)
ALP BLD-CCNC: 262 IU/L (ref 40–129)
ALT SERPL-CCNC: 33 U/L (ref 10–40)
ANION GAP SERPL CALCULATED.3IONS-SCNC: 11 MMOL/L (ref 4–16)
ANION GAP SERPL CALCULATED.3IONS-SCNC: 7 MMOL/L (ref 4–16)
AST SERPL-CCNC: 32 IU/L (ref 15–37)
BASE EXCESS MIXED: 11.3 (ref 0–1.2)
BASOPHILS ABSOLUTE: 0 K/CU MM
BASOPHILS RELATIVE PERCENT: 0.6 % (ref 0–1)
BILIRUB SERPL-MCNC: 0.2 MG/DL (ref 0–1)
BUN SERPL-MCNC: 31 MG/DL (ref 6–23)
BUN SERPL-MCNC: 33 MG/DL (ref 6–23)
CALCIUM SERPL-MCNC: 8.8 MG/DL (ref 8.3–10.6)
CALCIUM SERPL-MCNC: 9.2 MG/DL (ref 8.3–10.6)
CHLORIDE BLD-SCNC: 96 MMOL/L (ref 99–110)
CHLORIDE BLD-SCNC: 97 MMOL/L (ref 99–110)
CO2: 33 MMOL/L (ref 21–32)
CO2: 34 MMOL/L (ref 21–32)
COMMENT: ABNORMAL
CREAT SERPL-MCNC: 0.6 MG/DL (ref 0.9–1.3)
CREAT SERPL-MCNC: 0.7 MG/DL (ref 0.9–1.3)
DIFFERENTIAL TYPE: ABNORMAL
EOSINOPHILS ABSOLUTE: 0 K/CU MM
EOSINOPHILS RELATIVE PERCENT: 0 % (ref 0–3)
GFR SERPL CREATININE-BSD FRML MDRD: >60 ML/MIN/1.73M2
GFR SERPL CREATININE-BSD FRML MDRD: >60 ML/MIN/1.73M2
GLUCOSE BLD-MCNC: 221 MG/DL (ref 70–99)
GLUCOSE BLD-MCNC: 353 MG/DL (ref 70–99)
GLUCOSE BLD-MCNC: 360 MG/DL (ref 70–99)
GLUCOSE BLD-MCNC: 396 MG/DL (ref 70–99)
GLUCOSE BLD-MCNC: 417 MG/DL (ref 70–99)
GLUCOSE BLD-MCNC: 458 MG/DL (ref 70–99)
GLUCOSE SERPL-MCNC: 283 MG/DL (ref 70–99)
GLUCOSE SERPL-MCNC: 404 MG/DL (ref 70–99)
HCO3 VENOUS: 40.6 MMOL/L (ref 19–25)
HCT VFR BLD CALC: 29 % (ref 42–52)
HEMOGLOBIN: 8.8 GM/DL (ref 13.5–18)
IMMATURE NEUTROPHIL %: 6 % (ref 0–0.43)
LYMPHOCYTES ABSOLUTE: 3.2 K/CU MM
LYMPHOCYTES RELATIVE PERCENT: 50.5 % (ref 24–44)
MCH RBC QN AUTO: 29.2 PG (ref 27–31)
MCHC RBC AUTO-ENTMCNC: 30.3 % (ref 32–36)
MCV RBC AUTO: 96.3 FL (ref 78–100)
MONOCYTES ABSOLUTE: 0.1 K/CU MM
MONOCYTES RELATIVE PERCENT: 2.1 % (ref 0–4)
NUCLEATED RBC %: 0 %
O2 SAT, VEN: 85.5 % (ref 50–70)
PCO2, VEN: 77 MMHG (ref 38–52)
PDW BLD-RTO: 17.9 % (ref 11.7–14.9)
PH VENOUS: 7.33 (ref 7.32–7.42)
PLATELET # BLD: 65 K/CU MM (ref 140–440)
PMV BLD AUTO: 9.4 FL (ref 7.5–11.1)
PO2, VEN: 63 MMHG (ref 28–48)
POTASSIUM SERPL-SCNC: 4.9 MMOL/L (ref 3.5–5.1)
POTASSIUM SERPL-SCNC: 5.6 MMOL/L (ref 3.5–5.1)
RBC # BLD: 3.01 M/CU MM (ref 4.6–6.2)
SEGMENTED NEUTROPHILS ABSOLUTE COUNT: 2.6 K/CU MM
SEGMENTED NEUTROPHILS RELATIVE PERCENT: 40.8 % (ref 36–66)
SODIUM BLD-SCNC: 138 MMOL/L (ref 135–145)
SODIUM BLD-SCNC: 140 MMOL/L (ref 135–145)
TOTAL IMMATURE NEUTOROPHIL: 0.38 K/CU MM
TOTAL NUCLEATED RBC: 0 K/CU MM
TOTAL PROTEIN: 4.8 GM/DL (ref 6.4–8.2)
WBC # BLD: 6.3 K/CU MM (ref 4–10.5)

## 2023-10-21 PROCEDURE — 82805 BLOOD GASES W/O2 SATURATION: CPT

## 2023-10-21 PROCEDURE — 36415 COLL VENOUS BLD VENIPUNCTURE: CPT

## 2023-10-21 PROCEDURE — 2580000003 HC RX 258: Performed by: STUDENT IN AN ORGANIZED HEALTH CARE EDUCATION/TRAINING PROGRAM

## 2023-10-21 PROCEDURE — 6370000000 HC RX 637 (ALT 250 FOR IP): Performed by: STUDENT IN AN ORGANIZED HEALTH CARE EDUCATION/TRAINING PROGRAM

## 2023-10-21 PROCEDURE — 80048 BASIC METABOLIC PNL TOTAL CA: CPT

## 2023-10-21 PROCEDURE — 1200000000 HC SEMI PRIVATE

## 2023-10-21 PROCEDURE — 82962 GLUCOSE BLOOD TEST: CPT

## 2023-10-21 PROCEDURE — 94640 AIRWAY INHALATION TREATMENT: CPT

## 2023-10-21 PROCEDURE — 6360000002 HC RX W HCPCS: Performed by: STUDENT IN AN ORGANIZED HEALTH CARE EDUCATION/TRAINING PROGRAM

## 2023-10-21 PROCEDURE — 6370000000 HC RX 637 (ALT 250 FOR IP): Performed by: INTERNAL MEDICINE

## 2023-10-21 PROCEDURE — 6370000000 HC RX 637 (ALT 250 FOR IP): Performed by: FAMILY MEDICINE

## 2023-10-21 PROCEDURE — 2700000000 HC OXYGEN THERAPY PER DAY

## 2023-10-21 PROCEDURE — 85025 COMPLETE CBC W/AUTO DIFF WBC: CPT

## 2023-10-21 PROCEDURE — 94761 N-INVAS EAR/PLS OXIMETRY MLT: CPT

## 2023-10-21 PROCEDURE — 80053 COMPREHEN METABOLIC PANEL: CPT

## 2023-10-21 RX ORDER — INSULIN LISPRO 100 [IU]/ML
0-24 INJECTION, SOLUTION INTRAVENOUS; SUBCUTANEOUS
Qty: 3 ML | Refills: 0 | Status: SHIPPED | OUTPATIENT
Start: 2023-10-21 | End: 2023-11-20

## 2023-10-21 RX ORDER — INSULIN GLARGINE 100 [IU]/ML
30 INJECTION, SOLUTION SUBCUTANEOUS NIGHTLY
Qty: 10 ML | Refills: 3 | Status: SHIPPED | OUTPATIENT
Start: 2023-10-21 | End: 2023-10-22 | Stop reason: SDUPTHER

## 2023-10-21 RX ORDER — PREDNISONE 20 MG/1
40 TABLET ORAL DAILY
Qty: 4 TABLET | Refills: 0 | Status: SHIPPED | OUTPATIENT
Start: 2023-10-22 | End: 2023-10-24

## 2023-10-21 RX ORDER — INSULIN LISPRO 100 [IU]/ML
0-24 INJECTION, SOLUTION INTRAVENOUS; SUBCUTANEOUS
Status: DISCONTINUED | OUTPATIENT
Start: 2023-10-21 | End: 2023-10-22

## 2023-10-21 RX ORDER — INSULIN LISPRO 100 [IU]/ML
0-24 INJECTION, SOLUTION INTRAVENOUS; SUBCUTANEOUS
Status: DISCONTINUED | OUTPATIENT
Start: 2023-10-21 | End: 2023-10-22 | Stop reason: HOSPADM

## 2023-10-21 RX ORDER — CEFDINIR 300 MG/1
300 CAPSULE ORAL 2 TIMES DAILY
Qty: 10 CAPSULE | Refills: 0 | Status: SHIPPED | OUTPATIENT
Start: 2023-10-21 | End: 2023-10-26

## 2023-10-21 RX ADMIN — INSULIN HUMAN 10 UNITS: 100 INJECTION, SOLUTION PARENTERAL at 05:22

## 2023-10-21 RX ADMIN — INSULIN LISPRO 24 UNITS: 100 INJECTION, SOLUTION INTRAVENOUS; SUBCUTANEOUS at 18:33

## 2023-10-21 RX ADMIN — SODIUM CHLORIDE: 9 INJECTION, SOLUTION INTRAVENOUS at 20:20

## 2023-10-21 RX ADMIN — TAMSULOSIN HYDROCHLORIDE 0.4 MG: 0.4 CAPSULE ORAL at 12:07

## 2023-10-21 RX ADMIN — INSULIN LISPRO 15 UNITS: 100 INJECTION, SOLUTION INTRAVENOUS; SUBCUTANEOUS at 12:06

## 2023-10-21 RX ADMIN — INSULIN LISPRO 15 UNITS: 100 INJECTION, SOLUTION INTRAVENOUS; SUBCUTANEOUS at 18:33

## 2023-10-21 RX ADMIN — VALACYCLOVIR HYDROCHLORIDE 500 MG: 500 TABLET, FILM COATED ORAL at 12:07

## 2023-10-21 RX ADMIN — ALLOPURINOL 300 MG: 300 TABLET ORAL at 12:07

## 2023-10-21 RX ADMIN — INSULIN LISPRO 24 UNITS: 100 INJECTION, SOLUTION INTRAVENOUS; SUBCUTANEOUS at 12:12

## 2023-10-21 RX ADMIN — INSULIN GLARGINE 40 UNITS: 100 INJECTION, SOLUTION SUBCUTANEOUS at 21:56

## 2023-10-21 RX ADMIN — HYDROCODONE BITARTRATE AND ACETAMINOPHEN 1 TABLET: 10; 325 TABLET ORAL at 18:38

## 2023-10-21 RX ADMIN — CEFTRIAXONE SODIUM 1000 MG: 1 INJECTION, POWDER, FOR SOLUTION INTRAMUSCULAR; INTRAVENOUS at 20:21

## 2023-10-21 RX ADMIN — IPRATROPIUM BROMIDE AND ALBUTEROL SULFATE 1 DOSE: 2.5; .5 SOLUTION RESPIRATORY (INHALATION) at 20:22

## 2023-10-21 RX ADMIN — PREDNISONE 40 MG: 20 TABLET ORAL at 12:07

## 2023-10-21 RX ADMIN — INSULIN HUMAN 10 UNITS: 100 INJECTION, SUSPENSION SUBCUTANEOUS at 12:06

## 2023-10-21 RX ADMIN — IPRATROPIUM BROMIDE AND ALBUTEROL SULFATE 1 DOSE: 2.5; .5 SOLUTION RESPIRATORY (INHALATION) at 15:16

## 2023-10-21 RX ADMIN — ATORVASTATIN CALCIUM 40 MG: 40 TABLET, FILM COATED ORAL at 18:32

## 2023-10-21 RX ADMIN — PANTOPRAZOLE SODIUM 40 MG: 40 TABLET, DELAYED RELEASE ORAL at 05:24

## 2023-10-21 RX ADMIN — HYDROCODONE BITARTRATE AND ACETAMINOPHEN 1 TABLET: 10; 325 TABLET ORAL at 02:10

## 2023-10-21 RX ADMIN — SODIUM CHLORIDE, PRESERVATIVE FREE 10 ML: 5 INJECTION INTRAVENOUS at 21:57

## 2023-10-21 RX ADMIN — IPRATROPIUM BROMIDE AND ALBUTEROL SULFATE 1 DOSE: 2.5; .5 SOLUTION RESPIRATORY (INHALATION) at 08:09

## 2023-10-21 RX ADMIN — IPRATROPIUM BROMIDE AND ALBUTEROL SULFATE 1 DOSE: 2.5; .5 SOLUTION RESPIRATORY (INHALATION) at 11:33

## 2023-10-21 RX ADMIN — INSULIN LISPRO 16 UNITS: 100 INJECTION, SOLUTION INTRAVENOUS; SUBCUTANEOUS at 02:57

## 2023-10-21 RX ADMIN — FINASTERIDE 5 MG: 5 TABLET, FILM COATED ORAL at 12:07

## 2023-10-21 RX ADMIN — GABAPENTIN 100 MG: 100 CAPSULE ORAL at 21:56

## 2023-10-21 RX ADMIN — GABAPENTIN 100 MG: 100 CAPSULE ORAL at 12:07

## 2023-10-21 RX ADMIN — SODIUM CHLORIDE, PRESERVATIVE FREE 10 ML: 5 INJECTION INTRAVENOUS at 12:07

## 2023-10-21 RX ADMIN — INSULIN LISPRO 24 UNITS: 100 INJECTION, SOLUTION INTRAVENOUS; SUBCUTANEOUS at 22:02

## 2023-10-21 RX ADMIN — GABAPENTIN 100 MG: 100 CAPSULE ORAL at 18:32

## 2023-10-21 ASSESSMENT — PAIN DESCRIPTION - DESCRIPTORS
DESCRIPTORS: ACHING
DESCRIPTORS: ACHING;STABBING

## 2023-10-21 ASSESSMENT — PAIN SCALES - GENERAL
PAINLEVEL_OUTOF10: 7
PAINLEVEL_OUTOF10: 7
PAINLEVEL_OUTOF10: 0
PAINLEVEL_OUTOF10: 0

## 2023-10-21 ASSESSMENT — PAIN DESCRIPTION - ORIENTATION
ORIENTATION: RIGHT
ORIENTATION: RIGHT

## 2023-10-21 ASSESSMENT — PAIN - FUNCTIONAL ASSESSMENT
PAIN_FUNCTIONAL_ASSESSMENT: PREVENTS OR INTERFERES WITH ALL ACTIVE AND SOME PASSIVE ACTIVITIES
PAIN_FUNCTIONAL_ASSESSMENT: ACTIVITIES ARE NOT PREVENTED

## 2023-10-21 ASSESSMENT — PAIN DESCRIPTION - LOCATION
LOCATION: SHOULDER;LEG
LOCATION: SHOULDER

## 2023-10-22 VITALS
HEART RATE: 82 BPM | RESPIRATION RATE: 20 BRPM | BODY MASS INDEX: 17.8 KG/M2 | OXYGEN SATURATION: 94 % | WEIGHT: 131.39 LBS | DIASTOLIC BLOOD PRESSURE: 73 MMHG | HEIGHT: 72 IN | TEMPERATURE: 97.4 F | SYSTOLIC BLOOD PRESSURE: 114 MMHG

## 2023-10-22 LAB
GLUCOSE BLD-MCNC: 111 MG/DL (ref 70–99)
GLUCOSE BLD-MCNC: 135 MG/DL (ref 70–99)
GLUCOSE BLD-MCNC: 271 MG/DL (ref 70–99)
GLUCOSE BLD-MCNC: 376 MG/DL (ref 70–99)

## 2023-10-22 PROCEDURE — 82962 GLUCOSE BLOOD TEST: CPT

## 2023-10-22 PROCEDURE — 6370000000 HC RX 637 (ALT 250 FOR IP): Performed by: STUDENT IN AN ORGANIZED HEALTH CARE EDUCATION/TRAINING PROGRAM

## 2023-10-22 PROCEDURE — 6370000000 HC RX 637 (ALT 250 FOR IP): Performed by: INTERNAL MEDICINE

## 2023-10-22 PROCEDURE — 94761 N-INVAS EAR/PLS OXIMETRY MLT: CPT

## 2023-10-22 PROCEDURE — 94640 AIRWAY INHALATION TREATMENT: CPT

## 2023-10-22 PROCEDURE — 99222 1ST HOSP IP/OBS MODERATE 55: CPT | Performed by: INTERNAL MEDICINE

## 2023-10-22 PROCEDURE — 6360000002 HC RX W HCPCS: Performed by: STUDENT IN AN ORGANIZED HEALTH CARE EDUCATION/TRAINING PROGRAM

## 2023-10-22 PROCEDURE — 2700000000 HC OXYGEN THERAPY PER DAY

## 2023-10-22 RX ORDER — INSULIN GLARGINE 100 [IU]/ML
40 INJECTION, SOLUTION SUBCUTANEOUS NIGHTLY
Qty: 10 ML | Refills: 3 | Status: SHIPPED | OUTPATIENT
Start: 2023-10-22

## 2023-10-22 RX ORDER — INSULIN LISPRO 100 [IU]/ML
10 INJECTION, SOLUTION INTRAVENOUS; SUBCUTANEOUS
Status: DISCONTINUED | OUTPATIENT
Start: 2023-10-22 | End: 2023-10-22 | Stop reason: HOSPADM

## 2023-10-22 RX ORDER — INSULIN GLARGINE 100 [IU]/ML
50 INJECTION, SOLUTION SUBCUTANEOUS NIGHTLY
Status: DISCONTINUED | OUTPATIENT
Start: 2023-10-22 | End: 2023-10-22 | Stop reason: HOSPADM

## 2023-10-22 RX ORDER — INSULIN LISPRO 100 [IU]/ML
0-16 INJECTION, SOLUTION INTRAVENOUS; SUBCUTANEOUS
Status: DISCONTINUED | OUTPATIENT
Start: 2023-10-22 | End: 2023-10-22 | Stop reason: HOSPADM

## 2023-10-22 RX ADMIN — IPRATROPIUM BROMIDE AND ALBUTEROL SULFATE 1 DOSE: 2.5; .5 SOLUTION RESPIRATORY (INHALATION) at 08:09

## 2023-10-22 RX ADMIN — VALACYCLOVIR HYDROCHLORIDE 500 MG: 500 TABLET, FILM COATED ORAL at 09:30

## 2023-10-22 RX ADMIN — FINASTERIDE 5 MG: 5 TABLET, FILM COATED ORAL at 09:30

## 2023-10-22 RX ADMIN — ALLOPURINOL 300 MG: 300 TABLET ORAL at 09:30

## 2023-10-22 RX ADMIN — PANTOPRAZOLE SODIUM 40 MG: 40 TABLET, DELAYED RELEASE ORAL at 05:46

## 2023-10-22 RX ADMIN — GABAPENTIN 100 MG: 100 CAPSULE ORAL at 09:30

## 2023-10-22 RX ADMIN — IPRATROPIUM BROMIDE AND ALBUTEROL SULFATE 1 DOSE: 2.5; .5 SOLUTION RESPIRATORY (INHALATION) at 11:15

## 2023-10-22 RX ADMIN — ENOXAPARIN SODIUM 40 MG: 100 INJECTION SUBCUTANEOUS at 09:32

## 2023-10-22 RX ADMIN — HYDROCODONE BITARTRATE AND ACETAMINOPHEN 1 TABLET: 10; 325 TABLET ORAL at 03:40

## 2023-10-22 RX ADMIN — GABAPENTIN 100 MG: 100 CAPSULE ORAL at 14:47

## 2023-10-22 RX ADMIN — IPRATROPIUM BROMIDE AND ALBUTEROL SULFATE 1 DOSE: 2.5; .5 SOLUTION RESPIRATORY (INHALATION) at 15:50

## 2023-10-22 RX ADMIN — TAMSULOSIN HYDROCHLORIDE 0.4 MG: 0.4 CAPSULE ORAL at 09:30

## 2023-10-22 RX ADMIN — PREDNISONE 40 MG: 20 TABLET ORAL at 09:30

## 2023-10-22 RX ADMIN — INSULIN LISPRO 12 UNITS: 100 INJECTION, SOLUTION INTRAVENOUS; SUBCUTANEOUS at 14:46

## 2023-10-22 RX ADMIN — INSULIN LISPRO 15 UNITS: 100 INJECTION, SOLUTION INTRAVENOUS; SUBCUTANEOUS at 10:26

## 2023-10-22 ASSESSMENT — PAIN DESCRIPTION - LOCATION: LOCATION: SHOULDER

## 2023-10-22 ASSESSMENT — PAIN SCALES - GENERAL
PAINLEVEL_OUTOF10: 0
PAINLEVEL_OUTOF10: 7

## 2023-10-22 ASSESSMENT — PAIN DESCRIPTION - ORIENTATION: ORIENTATION: RIGHT

## 2023-10-22 NOTE — CONSULTS
CMP:  Recent Labs     10/18/23  1415 10/19/23  0804 10/19/23  1053 10/20/23  0038    139 140 138   K 4.1 5.5* 5.4* 5.0   CL 98* 99 99 98*   CO2 34* 32 34* 33*   BUN 17 17 21 30*   CREATININE 0.5* 0.5* 0.5* 0.6*   CALCIUM 9.0 8.7 8.9 8.6   PROT 5.4* 5.2*  --  4.9*   LABALBU 3.2* 3.5  --  3.4   BILITOT 0.5 0.3  --  0.2   ALKPHOS 296* 317*  --  300*   AST 21 21  --  24   ALT 21 22  --  26     Lipids:   Lab Results   Component Value Date/Time    CHOL 160 08/03/2012 02:47 PM    HDL 32 08/03/2012 02:47 PM    TRIG 274 08/03/2012 02:47 PM     Glucose:   Recent Labs     10/19/23  2211 10/20/23  0215 10/20/23  0747   POCGLU 468* 390* 435*     Hemoglobin A1C:   Lab Results   Component Value Date/Time    LABA1C 5.2 10/19/2023 08:04 AM     Free T4:   Lab Results   Component Value Date/Time    T4FREE 1.37 04/04/2019 12:39 PM     Free T3:   Lab Results   Component Value Date/Time    FT3 3.2 03/27/2012 12:39 PM     TSH High Sensitivity:   Lab Results   Component Value Date/Time    TSHHS 3.930 09/24/2023 09:36 AM       XR CHEST PORTABLE   Final Result   Chronic bilateral interstitial markings. No definite acute process.                 Scheduled Medicines   Medications:    insulin lispro  15 Units SubCUTAneous TID WC    insulin glargine  40 Units SubCUTAneous Nightly    insulin NPH  15 Units SubCUTAneous QAM    insulin lispro  0-8 Units SubCUTAneous 2 times per day    sodium zirconium cyclosilicate  10 g Oral TID    insulin lispro  0-8 Units SubCUTAneous TID WC    sodium chloride flush  5-40 mL IntraVENous 2 times per day    enoxaparin  40 mg SubCUTAneous Daily    ipratropium 0.5 mg-albuterol 2.5 mg  1 Dose Inhalation Q4H WA RT    methylPREDNISolone  40 mg IntraVENous Q8H    Followed by    Nova Sherman ON 10/21/2023] predniSONE  40 mg Oral Daily    cefTRIAXone (ROCEPHIN) IV  1,000 mg IntraVENous Q24H    azithromycin  500 mg Oral Daily    allopurinol  300 mg Oral Daily    atorvastatin  40 mg Oral QPM    finasteride  5 mg Oral Daily
mobility    Recommendations for NURSING mobility: encourage OOB for meals    Treatment:  Facilitation of rolling L<>R x4 to assist in performing chico-care and changing linens. Patient able to roll with CGA to either direction. Therapist providing education on the importance of OOB tasks and continued progression in functional mobility related tasks.      Time:   Time in: 1110  Time out: 1140  Timed treatment minutes: 10  Total time: 30    Electronically signed by:    Kael Smith PT  10/19/2023, 12:57 PM
tenderness to palpation. No hepatospleenomegaly  LYMPHADENOPATHY:  no axillary or supraclavicular adenopathy. No cervical adnenopathy  PSYCHIATRIC: Oriented to person place and time. No obvious depression or anxiety. MUSCULOSKELETAL: No obvious misalignment or effusion of the joints. No clubbing, cyanosis of the digits. SKIN:  normal skin color, texture, turgor and no redness, warmth, or swelling.  No palpable nodules    DATA:    Old records have been reviewed  CBC with Differential:    Lab Results   Component Value Date/Time    WBC 6.3 10/21/2023 03:06 AM    RBC 3.01 10/21/2023 03:06 AM    RBC 3.38 08/21/2017 09:06 AM    HGB 8.8 10/21/2023 03:06 AM    HCT 29.0 10/21/2023 03:06 AM    PLT 65 10/21/2023 03:06 AM    MCV 96.3 10/21/2023 03:06 AM    MCH 29.2 10/21/2023 03:06 AM    MCHC 30.3 10/21/2023 03:06 AM    RDW 17.9 10/21/2023 03:06 AM    NRBC 1 04/03/2023 08:49 AM    SEGSPCT 40.8 10/21/2023 03:06 AM    BANDSPCT 8 10/20/2023 12:38 AM    BLASTSPCT 1 07/08/2016 04:17 AM    LYMPHOPCT 50.5 10/21/2023 03:06 AM    LYMPHOPCT 90.7 08/21/2017 09:06 AM    MONOPCT 2.1 10/21/2023 03:06 AM    MYELOPCT 1 11/19/2022 04:40 AM    BASOPCT 0.6 10/21/2023 03:06 AM    MONOSABS 0.1 10/21/2023 03:06 AM    LYMPHSABS 3.2 10/21/2023 03:06 AM    EOSABS 0.0 10/21/2023 03:06 AM    BASOSABS 0.0 10/21/2023 03:06 AM    DIFFTYPE AUTOMATED DIFFERENTIAL 10/21/2023 03:06 AM     BMP:    Lab Results   Component Value Date/Time     10/21/2023 10:23 AM    K 4.9 10/21/2023 10:23 AM    CL 96 10/21/2023 10:23 AM    CO2 33 10/21/2023 10:23 AM    BUN 31 10/21/2023 10:23 AM    CREATININE 0.6 10/21/2023 10:23 AM    CALCIUM 9.2 10/21/2023 10:23 AM    GFRAA >60 10/11/2022 10:30 AM    LABGLOM >60 10/21/2023 10:23 AM    GLUCOSE 283 10/21/2023 10:23 AM     Hepatic Function Panel:    Lab Results   Component Value Date/Time    ALKPHOS 262 10/21/2023 03:06 AM    ALT 33 10/21/2023 03:06 AM    AST 32 10/21/2023 03:06 AM    PROT 4.8 10/21/2023 03:06 AM    PROT 6.1

## 2023-10-22 NOTE — PROGRESS NOTES
10/20/23 1524   Encounter Summary   Encounter Overview/Reason  Initial Encounter   Service Provided For: Patient   Referral/Consult From: [facility]   Support System Children   Last Encounter  10/20/23   Complexity of Encounter Low   Begin Time 1500   End Time  1509   Total Time Calculated 9 min   Spiritual/Emotional needs   Type Spiritual Support   Grief, Loss, and Adjustments   Type Life Adjustments; Adjustment to illness   Assessment/Intervention/Outcome   Assessment Coping   Intervention Active listening;Sustaining Presence/Ministry of presence   Outcome Expressed Gratitude   Plan and Referrals   Plan/Referrals No future visits requested
4 Eyes Skin Assessment     NAME:  Radhika Faye OF BIRTH:  1950  MEDICAL RECORD NUMBER:  [id number]    The patient is being assessed for  Admission    I agree that at least one RN has performed a thorough Head to Toe Skin Assessment on the patient. ALL assessment sites listed below have been assessed. Areas assessed by both nurses:    Head, Face, Ears, Shoulders, Back, Chest, Arms, Elbows, Hands, Sacrum. Buttock, Coccyx, Ischium, Legs. Feet and Heels, Under Medical Devices , and Other          Does the Patient have a Wound? Yes wound(s) were present on assessment.  LDA wound assessment was Initiated and completed by RN       Russ Prevention initiated by RN: Yes  Wound Care Orders initiated by RN: No    Pressure Injury (Stage 3,4, Unstageable, DTI, NWPT, and Complex wounds) if present, place Wound referral order by RN under : No    New Ostomies, if present place, Ostomy referral order under : No     Nurse 1 eSignature: Electronically signed by Forrest Zayas RN on 10/18/23 at 10:21 PM EDT    **SHARE this note so that the co-signing nurse can place an eSignature**    Nurse 2 eSignature: Electronically signed by Jocelyne Cisneros LPN on 88/32/83 at 05:45 PM EDT
Comprehensive Nutrition Assessment    Type and Reason for Visit:  Initial, Positive Nutrition Screen (MST: 2, Low BMI)    Nutrition Recommendations/Plan:   Continue CHO controlled; 1800 ml FR per MD   Consider K+ binder, pt mainly tolerates tomato soup   Please consider consult to Endocrinology for better BG management   Switch supplements to Diabetic oral nutrition supplement QID, will add protein snacks at each meal   Consider BM regimen/fiber supplementation as needed   For long term nutrition care, would consider PEG tube placement, please discuss with pt as current oral intake will not need at least 75% of estimated needs. Malnutrition Assessment:  Malnutrition Status:  Severe malnutrition (10/19/23 1521)    Context:  Chronic Illness     Findings of the 6 clinical characteristics of malnutrition:  Energy Intake:  75% or less estimated energy requirements for 1 month or longer  Weight Loss:  Greater than 20% over 1 year     Body Fat Loss:  Severe body fat loss Orbital, Triceps, Fat Overlying Ribs, Buccal region   Muscle Mass Loss:  Severe muscle mass loss Scapula (trapezius), Hand (interosseous), Calf (gastrocnemius), Thigh (quadraceps), Clavicles (pectoralis & deltoids), Temples (temporalis) (unable to stand or ambulate anymore per pt)  Fluid Accumulation:  Unable to assess Extremities   Strength:  Not Performed    Nutrition Assessment:    Admitted with ac COPD, H/o CLL, severe malnutrition secondary to above, T2DM, Thrombocytopenia, B12 deficency. Currently on carb controlled 5 diet with 1800 FR. Consumes mainly carb loaded meals due to dysguesia, main diet: yogurt, pudding, tomato soup, ice creams and nutrition suppements. Tolerates fulls liquids when offered Minced and moist items (vegetables/meats), pt states \"I can't eat it. It doesn't taste good. \" Pt amits unintentional wt loss in the last year. Significant wt loss of 33#/20.5% in 11 months, progressive weight loss over the last 2-3 years.  Pt has
Occupational Therapy    Occupational Therapy Treatment Note    Name: Carmen Mota MRN: [id number] :   1950   Date:  10/20/2023   Admission Date: 10/18/2023 Room:  [address] [place] [address]     Primary Problem: COPD exacerbation    Restrictions/Precautions: General Precautions, Fall Risk, Contact and Droplet Precautions, 3L o2, Bed exit alarm     Communication with other providers: PT Katrin    Subjective:  Patient states: \"I can't do much today. \" (Pt with continued self-limiting behaviors)  Pain: Pt denied pain this date    Objective:    Observation: Pt received in supine upon OT arrival. Agreeable to limited treatment after max encouragement and education. Objective Measures: Stable vitals throughout session on 3L o2    Treatment, including education:  Therapeutic Activity Training:   Therapeutic activity training was instructed today. Cues were given for safety, sequence, UE/LE placement, awareness, and balance. Pt received in supine upon OT arrival. Pt re-educated on role of OT, POC, and importance of EOB/OOB activity. Pt dependent with donning BL socks at bed level. Pt transferred supine to sitting EOB min A with HOB elevated to 30', increased time/effort, and use of bed rail required. Pt able to sit at EOB level initially min A with mild retropulsion but progressing to SBA with time/postural cues. Pt adamantly declined completing any ADLs seated EOB. Pt completed sit to stand transfer from bed mod A x 2, and clean bed pad positioned under pt. Pt required min A to mod A for standing balance using RW and max cues for posture. Pt reported fatigued and transferred stand to sit to bed min A for controlled descent. Pt returned from sitting EOB to supine min A, and required max A x 2 for scooting hips up in bed. Pt left positioned for comfort in bed with all lines intact, all needs within reach, and bed alarm on.     Assessment / Impression:    Patient's tolerance of treatment: Fair  Adverse Reaction:
Physical Therapy Treatment Note  Name: Estephania Goldsmith MRN: [id number] :   1950   Date:  10/20/2023   Admission Date: 10/18/2023 Room:  [address] [place] [address]   Restrictions/Precautions:          Fall Risk, General Precautions  Communication with other providers:  Kassy Crawley OT  Subjective:  Patient states:  \"I can't do anything. \"  Pain:   Location, Type, Intensity (0/10 to 10/10):  denies pain  Objective:    Observation:  Patient presenting supine in bed with HOB elevated. Patient initially denied willingness to participate, but with max encouragement and education became agreeable. Treatment, including education/measures:    Therapeutic Activity:   Facilitation of bed mobility in preparation for sitting at EOB. Patient performed transition from supine>sitting at EOB with Ai with HOB elevated. Patient requesting utilization of therapist hand to perform. Patient able to initiate scooting toward edge of bed. Patient denies performing any further ADL related tasks. Patient was agreeable to change pad on bed and performed STS transfer with modA x2 with FWW. Patient requesting to return to supine and performed with iA. Patient requested to be boosted, requiring maxA with little assistance provided by patient. Assessment / Impression:    Patient requiring max encouragement to participate in therapy related tasks. Patient continues to perseverate on not being able to perform tasks. Patient requiring Ai to perform bed mobility and modA x2 to perform STS transfer.      Patient's tolerance of treatment:  Fair   Adverse Reaction: None  Significant change in status and impact:  None  Barriers to improvement:  self-limiting    Plan for Next Session:    Continue to address goals as stated in patient POC    Time in:  936  Time out:  953  Timed treatment minutes:  16  Total treatment time:  16    Previously filed items:  Social/Functional History  Lives With: Son (\"he's in and out\")  Type of Home: House  Home Layout: Two
Physician Progress Note      Ana Navarro  [facility] #:                  [id number]  :                       1950  ADMIT DATE:       10/18/2023 12:53 PM  [facility] DATE:  RESPONDING  PROVIDER #:        Valerie Nevarez MD          QUERY TEXT:    Internal Medicine,    Patient admitted with COPD exacerbation and has demand ischemia documented. If   possible, please document in progress notes and discharge summary if you are   evaluating and/or treating any of the following: The medical record reflects the following:  Risk Factors: COPD  Clinical Indicators: Elevated troponin-likely from demand ischemia. No chest   pain. EKG did not show any acute ST changes. Continue to trend troponin, BNP   elevation  Treatment: labs, imaging, medical management, supportive care    Thank you,  Stephanie Ramos RN [facility]  [phone]  Options provided:  -- NSTEMI  -- Type 2 MI  -- Demand Ischemia only, no MI  -- Other - I will add my own diagnosis  -- Disagree - Not applicable / Not valid  -- Disagree - Clinically unable to determine / Unknown  -- Refer to Clinical Documentation Reviewer    PROVIDER RESPONSE TEXT:    This patient has demand ischemia only, no MI.     Query created by: Stephanie Ramos on 10/20/2023 12:00 PM      Electronically signed by:  Valerie Nevarez MD 10/22/2023 9:15 AM
Physician Progress Note      Lorene Escalante  CSN #:                  [id number]  :                       1950  ADMIT DATE:       10/18/2023 12:53 PM  [facility] DATE:  RESPONDING  PROVIDER #:        Karen Noriega MD          QUERY TEXT:    Internal Medicine,,    Patient admitted with COPD exacerbation and has severe malnutrition documented   b the Dietitian. If possible, please document in progress notes and discharge   summary if the severe malnutrition was confirmed or ruled out. The medical record reflects the following:  Risk Factors: chronic illness  Clinical Indicators: Malnutrition Status:  Severe malnutrition Energy Intake:    75% or less estimated energy requirements for 1 month or longer Weight Loss:    Greater than 20% over 1 year   Body Fat Loss:  Severe body fat loss and   muscle mass loss  Treatment: *labs, Dietitian consult, I&O, weights, ONS    Thank you,  Karen Salas RN CDS  [phone]    ASPEN Criteria:    https://aspenjournals. onlinelibrary. oliveros. com/doi/full/10.1177/726422851775671  5  Options provided:  -- Mild Malnutrition  -- Moderate Malnutrition  -- Severe Malnutrition  -- Mild Protein calorie malnutrition  -- Moderate Protein calorie malnutrition  -- Severe Protein calorie malnutrition  -- Other - I will add my own diagnosis  -- Disagree - Not applicable / Not valid  -- Disagree - Clinically unable to determine / Unknown  -- Refer to Clinical Documentation Reviewer    PROVIDER RESPONSE TEXT:    This patient has severe malnutrition.     Query created by: Karen Salas on 10/20/2023 11:48 AM      Electronically signed by:  Karen Noriega MD 10/20/2023 11:56 AM
Physician Progress Note      PATIENT:               Anish Benitez  CSN #:                  [id number]  :                       1950  ADMIT DATE:       10/18/2023 12:53 PM  [facility] DATE:  [facility]  PROVIDER #:        Matt Kirk MD          QUERY TEXT:    Patient admitted with COPD Exacerbation. Pt on 3L NC at baseline. Noted   documentation of Acute hypoxemic respiratory failure in H&P with SpO2 -   %, treated with 3-4L NC. In order to support the diagnosis of acute   respiratory failure, please include additional clinical indicators in your   documentation. Or please document if the diagnosis of acute respiratory   failure has been ruled out after further study. The medical record reflects the following:  Risk Factors: COPD    Clinical Indicators:    Co2 32-34    H&P, 10/18 \"Acute hypoxemic respiratory failure secondary to acute   exacerbation of  COPD, Endorsed worsening SOB with increased productive cough;   Continue steroids (solumedrol 40 mg BID, switch to PO when improved), Obtain   respiratory panel, Legionella, strep, Azithro, ceftriaxone and DuoNebs. Continue supportive care., On 3 L NC at baseline. Currently needing 4 L. Goal   SpO2 of 88-92%\". SpO2 - %    Treatment: [email or web address], Solumedrol 40 mg BID, Azithro, ceftriaxone and DuoNebs    Thank you,  Lloyd Lala RN, BSN, CRCR, CCDS, SMART  Clinical Documentation Improvement  Misael Luna. [email or web address]. [email or web address]  [phone] or via Perfect Serve  Options provided:  -- Acute Respiratory Failure as evidenced by, Please document evidence.   -- Acute Respiratory Failure ruled out and chronic hypercapnic respiratory   failure ruled in  -- Other - I will add my own diagnosis  -- Disagree - Not applicable / Not valid  -- Disagree - Clinically unable to determine / Unknown  -- Refer to Clinical Documentation Reviewer    PROVIDER RESPONSE TEXT:    Acute Respiratory Failure has been ruled out and chronic hypercapnic   respiratory
Progress Note( Dr. Jerome Rodriguez)  10/22/2023  Subjective:   Admit Date: 10/18/2023  PCP: Don Rashid MD    Admitted For :Severe shortness of breath    Consulted For:  Better control of blood glucose    Interval History: Better control of blood glucose    Denies any chest pains,   Still SOB . On oxygen  Denies nausea or vomiting. Better  No new bowel or bladder symptoms. Intake/Output Summary (Last 24 hours) at 10/22/2023 1157  Last data filed at 10/22/2023 1026  Gross per 24 hour   Intake 472 ml   Output --   Net 472 ml         DATA    CBC:   Recent Labs     10/20/23  0038 10/21/23  0306   WBC 6.7 6.3   HGB 8.7* 8.8*   PLT 82* 65*      CMP:  Recent Labs     10/20/23  0038 10/20/23  1501 10/21/23  0306 10/21/23  1023    136 138 140   K 5.0 4.6 5.6* 4.9   CL 98* 95* 97* 96*   CO2 33* 31 34* 33*   BUN 30* 34* 33* 31*   CREATININE 0.6* 0.6* 0.7* 0.6*   CALCIUM 8.6 8.9 8.8 9.2   PROT 4.9* 5.2* 4.8*  --    LABALBU 3.4 3.5 3.2*  --    BILITOT 0.2 0.2 0.2  --    ALKPHOS 300* 288* 262*  --    AST 24 25 32  --    ALT 26 29 33  --        Lipids:   Lab Results   Component Value Date/Time    CHOL 160 08/03/2012 02:47 PM    HDL 32 08/03/2012 02:47 PM    TRIG 274 08/03/2012 02:47 PM     Glucose:  Recent Labs     10/21/23  2113 10/22/23  0140 10/22/23  0816   POCGLU 417* 111* 135*       [id number]:  Lab Results   Component Value Date/Time    LABA1C 5.2 10/19/2023 08:04 AM     High Sensitivity TSH:   Lab Results   Component Value Date/Time    TSHHS 3.930 09/24/2023 09:36 AM     Free T3:   Lab Results   Component Value Date/Time    FT3 3.2 03/27/2012 12:39 PM     Free T4:  Lab Results   Component Value Date/Time    T4FREE 1.37 04/04/2019 12:39 PM       XR CHEST PORTABLE   Final Result   Chronic bilateral interstitial markings. No definite acute process.               Scheduled Medicines   Medications:    insulin glargine  50 Units SubCUTAneous Nightly    insulin lispro  0-16 Units SubCUTAneous 2 times per day
Progress Note( Dr. Shantel Washington)  10/21/2023  Subjective:   Admit Date: 10/18/2023  PCP: Tor Tee MD    Admitted For :Severe shortness of breath    Consulted For:  Better control of blood glucose    Interval History: Better control of blood glucose    Denies any chest pains,   Still SOB . On oxygen  Denies nausea or vomiting. Better  No new bowel or bladder symptoms. Intake/Output Summary (Last 24 hours) at 10/21/2023 1121  Last data filed at 10/21/2023 0558  Gross per 24 hour   Intake 120 ml   Output 800 ml   Net -680 ml       DATA    CBC:   Recent Labs     10/19/23  0804 10/20/23  0038 10/21/23  0306   WBC 8.0 6.7 6.3   HGB 10.5* 8.7* 8.8*   PLT 87* 82* 65*    CMP:  Recent Labs     10/20/23  0038 10/20/23  1501 10/21/23  0306    136 138   K 5.0 4.6 5.6*   CL 98* 95* 97*   CO2 33* 31 34*   BUN 30* 34* 33*   CREATININE 0.6* 0.6* 0.7*   CALCIUM 8.6 8.9 8.8   PROT 4.9* 5.2* 4.8*   LABALBU 3.4 3.5 3.2*   BILITOT 0.2 0.2 0.2   ALKPHOS 300* 288* 262*   AST 24 25 32   ALT 26 29 33     Lipids:   Lab Results   Component Value Date/Time    CHOL 160 08/03/2012 02:47 PM    HDL 32 08/03/2012 02:47 PM    TRIG 274 08/03/2012 02:47 PM     Glucose:  Recent Labs     10/21/23  0242 10/21/23  0523 10/21/23  0924   POCGLU 353* 360* 221*     [id number]:  Lab Results   Component Value Date/Time    LABA1C 5.2 10/19/2023 08:04 AM     High Sensitivity TSH:   Lab Results   Component Value Date/Time    TSHHS 3.930 09/24/2023 09:36 AM     Free T3:   Lab Results   Component Value Date/Time    FT3 3.2 03/27/2012 12:39 PM     Free T4:  Lab Results   Component Value Date/Time    T4FREE 1.37 04/04/2019 12:39 PM       XR CHEST PORTABLE   Final Result   Chronic bilateral interstitial markings. No definite acute process.               Scheduled Medicines   Medications:    insulin lispro  0-24 Units SubCUTAneous TID WC    insulin lispro  0-24 Units SubCUTAneous 2 times per day    insulin glargine  40 Units SubCUTAneous Nightly
V2.0    [facility] [place] [facility] Progress Note      Name:  Meg Godoy /Age/Sex: 1950  (68 y.o. male)   MRN & CSN:  [id number] & [id number] Encounter Date/Time: 10/20/2023 2:56 PM EDT   Location:  [facility] PCP: Miller Bernabe MD     Attending:Pipo Wright, [facility] Day: 3    Assessment and Recommendations   Meg Godoy is a 68 y.o. male with who presents with Acute exacerbation of chronic obstructive pulmonary disease (COPD) ([address] )      Plan:     Hyperglycemia - Uncontrolled DM   BS > 500   Will obtain - VBG, CMP, hydroxybutyrate    Endocrine following. On SSI and lantus    Hold DC for now        Acute  respiratory distress secondary to acute exacerbation of  COPD and Rhinovirus  Endorsed worsening SOB with increased productive cough   Admit to med/telemetry. Elevated troponin-likely from demand ischemia. No chest pain. EKG did not show any acute ST changes. Continue to trend troponin  Elevated BNP-euvolemic on exam.  We will continue to monitor urine output and daily weight. Last echo showed ejection fraction 50-55%. RVSP 20 mmHg  Significant bilateral wheezing on exam  Obtain  VBG . CXR: No acute process. Continue steroids (solumedrol 40 mg BID, switch to PO when improved)  Obtain respiratory panel, Legionella, strep  Azithro, ceftriaxone and DuoNebs. Continue supportive care. Now back to base line 3 L     Severe malnutrition    Patient currently not willing to try tube feeding. Says he will decide later once he goes home and discuss with family        Hyperkalemia  - resolved    K 5.6   Placed on Lokelma. Will continue to monitor     Debility-patient unable to take care of himself. Will consult  for appropriate disposition. PT OT consult placed. Recommended SNF but refused      DM type II-continue home insulin with SSI. Monitor for hypoglycemia     History of CLL              Initially diagnosed in . On venetoclax since 2023.   Follows with Dr. Salvador Christopher.
V2.0    [facility] [place]      Name:  Rafi Esquivel /Age/Sex: 1950  (68 y.o. male)   MRN & CSN:  [id number] & [id number] Encounter Date/Time: 10/19/2023 2:56 PM EDT   Location:  [facility]/8815-D PCP: Kailyn Britt MD     Attending:Pipo Wright, [address] [place] [address] Day: 2    Assessment and Recommendations   Rafi Esquivel is a 68 y.o. male with who presents with Acute exacerbation of chronic obstructive pulmonary disease (COPD) ([address])      Plan:     Acute  respiratory distress secondary to acute exacerbation of  COPD and Rhinovirus  Endorsed worsening SOB with increased productive cough   Admit to med/telemetry. Elevated troponin-likely from demand ischemia. No chest pain. EKG did not show any acute ST changes. Continue to trend troponin  Elevated BNP-euvolemic on exam.  We will continue to monitor urine output and daily weight. Last echo showed ejection fraction 50-55%. RVSP 20 mmHg  Significant bilateral wheezing on exam  Obtain  VBG . CXR: No acute process. Continue steroids (solumedrol 40 mg BID, switch to PO when improved)  Obtain respiratory panel, Legionella, strep  Azithro, ceftriaxone and DuoNebs. Continue supportive care. On 3 L NC at baseline. Currently needing 4 L. Goal SpO2 of 88-92%. Hyperkalemia    K 5.6   Placed on Lokelma. Will continue to monitor     Debility-patient unable to take care of himself. Will consult  for appropriate disposition. PT OT consult placed. DM type II-continue home insulin with SSI. Monitor for hypoglycemia     History of CLL              Initially diagnosed in . On venetoclax since 2023. Follows with Dr. Neal Arshad.      Hypogammaglobulinemia. Last time he received IV immunoglobulin (Gamunex-C) was on 2023     History of thrombocytopenia, ITP     NAEL-continue to monitor hemoglobin.   Currently stable     Gout-continue allopurinol     Diabetic neuropathy-continue gabapentin     Chronic pain syndrome-continue
V2.0    [facility] [place]      Name:  Kera Brock /Age/Sex: 1950  (68 y.o. male)   MRN & CSN:  [id number] & [id number] Encounter Date/Time: 10/21/2023 2:56 PM EDT   Location:  8054/7018-D PCP: Baldemar Adorno MD     Attending:Pipo Wright, 5 [facility] Day: 4    Assessment and Recommendations   Kera Brock is a 68 y.o. male with who presents with Acute exacerbation of chronic obstructive pulmonary disease (COPD) ([address] )      Plan:     Hyperglycemia - Uncontrolled DM   BS > 500   Will obtain - VBG, CMP, hydroxybutyrate - not suggestive of DKA   Endocrine following. On SSI and lantus           Acute  respiratory distress secondary to acute exacerbation of  COPD and Rhinovirus  Endorsed worsening SOB with increased productive cough   Admit to med/telemetry. Elevated troponin-likely from demand ischemia. No chest pain. EKG did not show any acute ST changes. Continue to trend troponin  Elevated BNP-euvolemic on exam.  We will continue to monitor urine output and daily weight. Last echo showed ejection fraction 50-55%. RVSP 20 mmHg  Significant bilateral wheezing on exam  Obtain  VBG . CXR: No acute process. Continue steroids (solumedrol 40 mg BID, switch to PO when improved)  Obtain respiratory panel, Legionella, strep  Azithro, ceftriaxone and DuoNebs. Continue supportive care. Now back to base line 3 L  Still SOB. Will get Pulm consult      Severe malnutrition    Patient currently not willing to try tube feeding. Says he will decide later once he goes home and discuss with family        Hyperkalemia  - resolved    K 5.6   Placed on Lokelma. Will continue to monitor     Debility-patient unable to take care of himself. Will consult  for appropriate disposition. PT OT consult placed. Recommended SNF but refused      DM type II-continue home insulin with SSI. Monitor for hypoglycemia     History of CLL              Initially diagnosed in .  On venetoclax since
1120  Time out: 1143  Timed treatment minutes: 13  Total time: 23    Electronically signed by:    SEBASTIEN Oneill/L, 9 [facility], .[id number]

## 2023-10-22 NOTE — DISCHARGE SUMMARY
EOMI  ENT: neck supple  Cardiovascular: Regular rate. Respiratory: Clear to auscultation  Gastrointestinal: Soft, non tender  Genitourinary: no suprapubic tenderness  Musculoskeletal: No edema  Skin: warm, dry  Neuro: Alert. Psych: Mood appropriate. Labs and Imaging   XR CHEST PORTABLE    Result Date: 10/18/2023  EXAMINATION: ONE XRAY VIEW OF THE CHEST 10/18/2023 2:32 pm COMPARISON: Chest radiograph 09/24/2023. HISTORY: ORDERING SYSTEM PROVIDED HISTORY: Shortness of Breath TECHNOLOGIST PROVIDED HISTORY: Reason for exam:->Shortness of Breath Reason for Exam: Shortness of Breath FINDINGS: A right chest port terminates in the lower superior vena cava. The cardiomediastinal silhouette is normal in appearance. Chronic bilateral interstitial markings without significant change. No pneumothorax, consolidation, or pleural effusion. No acute osseous abnormality. Chronic bilateral interstitial markings. No definite acute process.        CBC:   Recent Labs     10/20/23  0038 10/21/23  0306   WBC 6.7 6.3   HGB 8.7* 8.8*   PLT 82* 65*       BMP:    Recent Labs     10/20/23  1501 10/21/23  0306 10/21/23  1023    138 140   K 4.6 5.6* 4.9   CL 95* 97* 96*   CO2 31 34* 33*   BUN 34* 33* 31*   CREATININE 0.6* 0.7* 0.6*   GLUCOSE 462* 404* 283*       Hepatic:   Recent Labs     10/20/23  0038 10/20/23  1501 10/21/23  0306   AST 24 25 32   ALT 26 29 33   BILITOT 0.2 0.2 0.2   ALKPHOS 300* 288* 262*       Lipids:   Lab Results   Component Value Date/Time    CHOL 160 08/03/2012 02:47 PM    HDL 32 08/03/2012 02:47 PM    TRIG 274 08/03/2012 02:47 PM     Hemoglobin A1C:   Lab Results   Component Value Date/Time    LABA1C 5.2 10/19/2023 08:04 AM     TSH: No results found for: \"TSH\"  Troponin:   Lab Results   Component Value Date/Time    TROPONINT <0.010 11/25/2022 01:15 PM    TROPONINT <0.010 11/25/2022 03:10 AM    TROPONINT <0.010 11/24/2022 08:45 PM     Lactic Acid: No results for input(s): \"LACTA\" in the last 72

## 2023-10-22 NOTE — CARE COORDINATION
Notified by RN, Olvin Ruiz pt to be dc'd today with open hospice consult. LSW spoke to Dr. Robert Godoy, who reported he has spoke to pt and son re: hospice at home and both in agreement. LSW called son to confirm dc plans/needs. Son confirmed he is pt's primary caregiver and would like [facility] to see him once home. LSW offered a list of hospice agencies for review, but son declined. LSW advised son that hospice does not provide 24 hr care. Son voiced verbal understanding and denied questions. Son requested he be called to coordinate stretcher transportation. LSW called hospice referral into [place] at [facility]. LSW requested they call son to coordinate first visit. LSW updated Elizabeth FRANCO, on POC.   Electronically signed by GEORGE Kuo on 10/22/2023 at 11:29 AM

## 2023-10-22 NOTE — PALLIATIVE CARE DISCHARGE
V2.0  Discharge Summary    Name:  Eri Tran /Age/Sex: 1950 (68 y.o. male)   Admit Date: 10/18/2023  Discharge Date: 10/22/23    MRN & CSN:  [id number] & [id number] Encounter Date and Time 10/22/23 12:00 PM EDT    Attending:  Haylee Eden,* Discharging Provider: Haylee Eden MD       Hospital Course:     Brief HPI: Eri Tran is a 68 y.o. male who presents with shortness of breath. Patient has history of COPD. Was recently discharged on 10/7 after getting treated for bacterial pneumonia. He had history of bronchiectasis with respiratory culture growing Pseudomonas. He completed 7-day course of cefepime which was later changed to cefazolin for 10 more days. Patient reported he has been having shortness of breath since last 2 days even at rest.  His shortness of breath is associated with productive cough as well. In ED his oxygen requirement was 4 L. He usually uses 3 L at home. Otherwise patient denied any fever, chills, sore throat, chest pain, abdominal pain, nausea, vomiting, dysuria. His troponin was slightly elevated. However in the absence of chest pain and no acute ST changes on EKG, the likely elevation in troponin is from demand ischemia. We will continue to monitor. Patient improved after stabilization. He also mentions that he is unable to take care of himself and needs placement. Brief Problem Based Course:     Acute  respiratory distress secondary to acute exacerbation of  COPD and Rhinovirus - Improved   Endorsed worsening SOB with increased productive cough   Admit to med/telemetry. Elevated troponin-likely from demand ischemia. No chest pain. EKG did not show any acute ST changes. Continue to trend troponin  Elevated BNP-euvolemic on exam.  We will continue to monitor urine output and daily weight. Last echo showed ejection fraction 50-55%. RVSP 20 mmHg  Obtain  VBG . CXR: No acute process.    Continue steroids (solumedrol 40 mg BID,

## 2023-10-23 PROBLEM — R06.02 SHORTNESS OF BREATH: Status: ACTIVE | Noted: 2023-10-23

## 2023-11-19 ENCOUNTER — HOSPITAL ENCOUNTER (INPATIENT)
Age: 73
LOS: 1 days | DRG: 951 | End: 2023-11-19
Attending: EMERGENCY MEDICINE | Admitting: FAMILY MEDICINE
Payer: COMMERCIAL

## 2023-11-19 VITALS
OXYGEN SATURATION: 87 % | RESPIRATION RATE: 30 BRPM | WEIGHT: 132.94 LBS | SYSTOLIC BLOOD PRESSURE: 93 MMHG | BODY MASS INDEX: 18.01 KG/M2 | TEMPERATURE: 99.2 F | DIASTOLIC BLOOD PRESSURE: 58 MMHG | HEIGHT: 72 IN | HEART RATE: 124 BPM

## 2023-11-19 DIAGNOSIS — R62.7 FAILURE TO THRIVE IN ADULT: Primary | ICD-10-CM

## 2023-11-19 PROBLEM — J06.9 UPPER RESPIRATORY INFECTION, ACUTE: Status: RESOLVED | Noted: 2019-04-09 | Resolved: 2023-11-19

## 2023-11-19 PROBLEM — J20.9 ACUTE BRONCHITIS: Status: RESOLVED | Noted: 2022-02-10 | Resolved: 2023-11-19

## 2023-11-19 PROBLEM — M62.838 OTHER MUSCLE SPASM: Status: RESOLVED | Noted: 2020-04-08 | Resolved: 2023-11-19

## 2023-11-19 PROBLEM — J96.21 ACUTE ON CHRONIC RESPIRATORY FAILURE WITH HYPOXIA (HCC): Status: RESOLVED | Noted: 2023-09-26 | Resolved: 2023-11-19

## 2023-11-19 PROBLEM — I33.0 INFECTIVE ENDOCARDITIS: Status: RESOLVED | Noted: 2023-10-02 | Resolved: 2023-11-19

## 2023-11-19 PROBLEM — M85.80 OTHER SPECIFIED DISORDERS OF BONE DENSITY AND STRUCTURE, UNSPECIFIED SITE: Status: RESOLVED | Noted: 2020-04-08 | Resolved: 2023-11-19

## 2023-11-19 PROBLEM — D70.9 NEUTROPENIA (HCC): Status: RESOLVED | Noted: 2020-04-08 | Resolved: 2023-11-19

## 2023-11-19 PROBLEM — D50.0 IRON DEFICIENCY ANEMIA DUE TO CHRONIC BLOOD LOSS: Status: RESOLVED | Noted: 2020-04-08 | Resolved: 2023-11-19

## 2023-11-19 PROBLEM — R78.81 STAPHYLOCOCCUS EPIDERMIDIS BACTEREMIA: Status: RESOLVED | Noted: 2023-09-26 | Resolved: 2023-11-19

## 2023-11-19 PROBLEM — J96.90 RESPIRATORY FAILURE (HCC): Status: ACTIVE | Noted: 2023-11-19

## 2023-11-19 PROBLEM — J18.9 PNEUMONIA, UNSPECIFIED ORGANISM: Status: RESOLVED | Noted: 2023-09-24 | Resolved: 2023-11-19

## 2023-11-19 PROBLEM — B34.8 RHINOVIRUS INFECTION: Status: RESOLVED | Noted: 2022-10-24 | Resolved: 2023-11-19

## 2023-11-19 PROBLEM — J18.9 PNEUMONIA DUE TO ORGANISM: Status: RESOLVED | Noted: 2022-10-22 | Resolved: 2023-11-19

## 2023-11-19 PROBLEM — R63.0 POOR APPETITE: Status: RESOLVED | Noted: 2019-04-09 | Resolved: 2023-11-19

## 2023-11-19 PROBLEM — B95.7 STAPHYLOCOCCUS EPIDERMIDIS BACTEREMIA: Status: RESOLVED | Noted: 2023-09-26 | Resolved: 2023-11-19

## 2023-11-19 PROBLEM — E16.2 HYPOGLYCEMIA: Status: RESOLVED | Noted: 2023-09-27 | Resolved: 2023-11-19

## 2023-11-19 PROBLEM — E86.0 DEHYDRATION: Status: RESOLVED | Noted: 2023-02-28 | Resolved: 2023-11-19

## 2023-11-19 PROBLEM — L03.113 CELLULITIS OF RIGHT UPPER LIMB: Status: RESOLVED | Noted: 2020-04-08 | Resolved: 2023-11-19

## 2023-11-19 PROBLEM — B00.9 HERPESVIRAL INFECTION: Status: RESOLVED | Noted: 2020-04-08 | Resolved: 2023-11-19

## 2023-11-19 PROCEDURE — 2700000000 HC OXYGEN THERAPY PER DAY

## 2023-11-19 PROCEDURE — 99285 EMERGENCY DEPT VISIT HI MDM: CPT

## 2023-11-19 PROCEDURE — 6370000000 HC RX 637 (ALT 250 FOR IP): Performed by: EMERGENCY MEDICINE

## 2023-11-19 PROCEDURE — 94761 N-INVAS EAR/PLS OXIMETRY MLT: CPT

## 2023-11-19 PROCEDURE — 96372 THER/PROPH/DIAG INJ SC/IM: CPT

## 2023-11-19 PROCEDURE — 94640 AIRWAY INHALATION TREATMENT: CPT

## 2023-11-19 PROCEDURE — 1200000000 HC SEMI PRIVATE

## 2023-11-19 PROCEDURE — 6360000002 HC RX W HCPCS: Performed by: FAMILY MEDICINE

## 2023-11-19 PROCEDURE — 6360000002 HC RX W HCPCS: Performed by: EMERGENCY MEDICINE

## 2023-11-19 RX ORDER — ACETAMINOPHEN 650 MG/1
650 SUPPOSITORY RECTAL EVERY 4 HOURS PRN
Status: DISCONTINUED | OUTPATIENT
Start: 2023-11-19 | End: 2023-11-19

## 2023-11-19 RX ORDER — GLYCOPYRROLATE 0.2 MG/ML
0.2 INJECTION INTRAMUSCULAR; INTRAVENOUS
Status: DISCONTINUED | OUTPATIENT
Start: 2023-11-19 | End: 2023-11-19 | Stop reason: HOSPADM

## 2023-11-19 RX ORDER — ONDANSETRON 2 MG/ML
4 INJECTION INTRAMUSCULAR; INTRAVENOUS EVERY 6 HOURS PRN
Status: DISCONTINUED | OUTPATIENT
Start: 2023-11-19 | End: 2023-11-19 | Stop reason: HOSPADM

## 2023-11-19 RX ORDER — ACETAMINOPHEN 325 MG/1
650 TABLET ORAL EVERY 6 HOURS PRN
Status: DISCONTINUED | OUTPATIENT
Start: 2023-11-19 | End: 2023-11-19

## 2023-11-19 RX ORDER — MORPHINE SULFATE 4 MG/ML
4 INJECTION, SOLUTION INTRAMUSCULAR; INTRAVENOUS
Status: DISCONTINUED | OUTPATIENT
Start: 2023-11-19 | End: 2023-11-19 | Stop reason: HOSPADM

## 2023-11-19 RX ORDER — HALOPERIDOL 5 MG/ML
1 INJECTION INTRAMUSCULAR
Status: DISCONTINUED | OUTPATIENT
Start: 2023-11-19 | End: 2023-11-19 | Stop reason: HOSPADM

## 2023-11-19 RX ORDER — SODIUM CHLORIDE 0.9 % (FLUSH) 0.9 %
10 SYRINGE (ML) INJECTION PRN
Status: DISCONTINUED | OUTPATIENT
Start: 2023-11-19 | End: 2023-11-19 | Stop reason: HOSPADM

## 2023-11-19 RX ORDER — IPRATROPIUM BROMIDE AND ALBUTEROL SULFATE 2.5; .5 MG/3ML; MG/3ML
1 SOLUTION RESPIRATORY (INHALATION)
Status: DISCONTINUED | OUTPATIENT
Start: 2023-11-19 | End: 2023-11-19

## 2023-11-19 RX ORDER — TAMSULOSIN HYDROCHLORIDE 0.4 MG/1
0.4 CAPSULE ORAL DAILY
Status: DISCONTINUED | OUTPATIENT
Start: 2023-11-19 | End: 2023-11-19

## 2023-11-19 RX ORDER — ONDANSETRON 4 MG/1
4 TABLET, ORALLY DISINTEGRATING ORAL EVERY 8 HOURS PRN
Status: DISCONTINUED | OUTPATIENT
Start: 2023-11-19 | End: 2023-11-19

## 2023-11-19 RX ORDER — ACETAMINOPHEN 650 MG/1
650 SUPPOSITORY RECTAL EVERY 6 HOURS PRN
Status: DISCONTINUED | OUTPATIENT
Start: 2023-11-19 | End: 2023-11-19 | Stop reason: HOSPADM

## 2023-11-19 RX ORDER — SODIUM CHLORIDE 9 MG/ML
INJECTION, SOLUTION INTRAVENOUS PRN
Status: DISCONTINUED | OUTPATIENT
Start: 2023-11-19 | End: 2023-11-19 | Stop reason: HOSPADM

## 2023-11-19 RX ORDER — SODIUM CHLORIDE 0.9 % (FLUSH) 0.9 %
5-40 SYRINGE (ML) INJECTION EVERY 12 HOURS SCHEDULED
Status: DISCONTINUED | OUTPATIENT
Start: 2023-11-19 | End: 2023-11-19 | Stop reason: HOSPADM

## 2023-11-19 RX ORDER — ONDANSETRON 4 MG/1
4 TABLET, FILM COATED ORAL EVERY 8 HOURS PRN
Status: DISCONTINUED | OUTPATIENT
Start: 2023-11-19 | End: 2023-11-19

## 2023-11-19 RX ORDER — GLYCOPYRROLATE 0.2 MG/ML
0.2 INJECTION INTRAMUSCULAR; INTRAVENOUS EVERY 4 HOURS PRN
Status: DISCONTINUED | OUTPATIENT
Start: 2023-11-19 | End: 2023-11-19

## 2023-11-19 RX ORDER — LORAZEPAM 2 MG/ML
0.5 INJECTION INTRAMUSCULAR
Status: DISCONTINUED | OUTPATIENT
Start: 2023-11-19 | End: 2023-11-19 | Stop reason: HOSPADM

## 2023-11-19 RX ORDER — IPRATROPIUM BROMIDE AND ALBUTEROL SULFATE 2.5; .5 MG/3ML; MG/3ML
2 SOLUTION RESPIRATORY (INHALATION) ONCE
Status: COMPLETED | OUTPATIENT
Start: 2023-11-19 | End: 2023-11-19

## 2023-11-19 RX ORDER — BISACODYL 10 MG
10 SUPPOSITORY, RECTAL RECTAL DAILY PRN
Status: DISCONTINUED | OUTPATIENT
Start: 2023-11-19 | End: 2023-11-19 | Stop reason: HOSPADM

## 2023-11-19 RX ORDER — PANTOPRAZOLE SODIUM 40 MG/1
40 TABLET, DELAYED RELEASE ORAL
Status: DISCONTINUED | OUTPATIENT
Start: 2023-11-19 | End: 2023-11-19

## 2023-11-19 RX ORDER — LORAZEPAM 2 MG/ML
0.5 INJECTION INTRAMUSCULAR ONCE
Status: COMPLETED | OUTPATIENT
Start: 2023-11-19 | End: 2023-11-19

## 2023-11-19 RX ORDER — GABAPENTIN 100 MG/1
100 CAPSULE ORAL 3 TIMES DAILY
Status: DISCONTINUED | OUTPATIENT
Start: 2023-11-19 | End: 2023-11-19

## 2023-11-19 RX ORDER — IPRATROPIUM BROMIDE AND ALBUTEROL SULFATE 2.5; .5 MG/3ML; MG/3ML
1 SOLUTION RESPIRATORY (INHALATION) EVERY 4 HOURS PRN
Status: DISCONTINUED | OUTPATIENT
Start: 2023-11-19 | End: 2023-11-19 | Stop reason: HOSPADM

## 2023-11-19 RX ORDER — LORAZEPAM 2 MG/ML
1 INJECTION INTRAMUSCULAR ONCE
Status: DISCONTINUED | OUTPATIENT
Start: 2023-11-19 | End: 2023-11-19

## 2023-11-19 RX ORDER — LORAZEPAM 2 MG/ML
0.5 INJECTION INTRAMUSCULAR EVERY 6 HOURS PRN
Status: DISCONTINUED | OUTPATIENT
Start: 2023-11-19 | End: 2023-11-19

## 2023-11-19 RX ORDER — HYDROCODONE BITARTRATE AND ACETAMINOPHEN 5; 325 MG/1; MG/1
1 TABLET ORAL ONCE
Status: DISCONTINUED | OUTPATIENT
Start: 2023-11-19 | End: 2023-11-19

## 2023-11-19 RX ORDER — IPRATROPIUM BROMIDE AND ALBUTEROL SULFATE 2.5; .5 MG/3ML; MG/3ML
SOLUTION RESPIRATORY (INHALATION)
Status: DISCONTINUED
Start: 2023-11-19 | End: 2023-11-19

## 2023-11-19 RX ORDER — MORPHINE SULFATE 2 MG/ML
2 INJECTION, SOLUTION INTRAMUSCULAR; INTRAVENOUS
Status: DISCONTINUED | OUTPATIENT
Start: 2023-11-19 | End: 2023-11-19 | Stop reason: HOSPADM

## 2023-11-19 RX ADMIN — LORAZEPAM 0.5 MG: 2 INJECTION INTRAMUSCULAR; INTRAVENOUS at 03:30

## 2023-11-19 RX ADMIN — MORPHINE SULFATE 4 MG: 4 INJECTION, SOLUTION INTRAMUSCULAR; INTRAVENOUS at 04:12

## 2023-11-19 RX ADMIN — IPRATROPIUM BROMIDE AND ALBUTEROL SULFATE 2 DOSE: 2.5; .5 SOLUTION RESPIRATORY (INHALATION) at 01:30

## 2023-11-19 ASSESSMENT — PAIN SCALES - GENERAL: PAINLEVEL_OUTOF10: 0

## 2023-11-19 ASSESSMENT — PAIN SCALES - WONG BAKER
WONGBAKER_NUMERICALRESPONSE: 0
WONGBAKER_NUMERICALRESPONSE: 0

## 2023-11-19 NOTE — PROGRESS NOTES
4 Eyes Skin Assessment     NAME:  Rin Weiss OF BIRTH:  1950  MEDICAL RECORD NUMBER:  [id number]    The patient is being assessed for  Admission    I agree that at least one RN has performed a thorough Head to Toe Skin Assessment on the patient. ALL assessment sites listed below have been assessed. Areas assessed by both nurses:            Does the Patient have a Wound? Yes wound(s) were present on assessment.  LDA wound assessment was Initiated and completed by RN open area on coccyx and left hip area       Russ Prevention initiated by RN: Yes  Wound Care Orders initiated by RN: Yes    Pressure Injury (Stage 3,4, Unstageable, DTI, NWPT, and Complex wounds) if present, place Wound referral order by RN under : No    New Ostomies, if present place, Ostomy referral order under : No     Nurse 1 eSignature: Electronically signed by Sharyl Sever, LPN on 96/43/07 at 4:79 AM EST    **SHARE this note so that the co-signing nurse can place an eSignature**    Nurse 2 eSignature: Electronically signed by Norman See RN on 11/19/23 at 6:51 AM EST

## 2023-11-19 NOTE — ED PROVIDER NOTES
Triage Chief Complaint:   Shortness of Breath (Hx of lung cancer, currently on hospice)    [place]:  Yeyo Jacobson is a 68 y.o. male that presents for debility, increasing shortness of breath. Patient is DNR CC on hospice. He has a history of severe COPD, CLL and malnutrition. Reportedly has deteriorated over the past few days and on EMS arrival, had increased oxygen requirement, currently on nonrebreather. He usually uses 3 L nasal cannula at home. On arrival, patient denies any difficulty breathing at this time. Complains of chronic right hip pain but denies any other new complaints. No reported vomiting or diarrhea. ROS:  At least 10 systems reviewed and otherwise acutely negative except as in the [address]     Past Medical History:   Diagnosis Date    Arthritis     knees, Rt hand/wrist    BPH (benign prostatic hyperplasia)     CAD (coronary artery disease)     Cancer (HCC)     CLL--Sees Dr Deepthi Magallon    Diabetes mellitus [facility])     Since about 2011    History of blood transfusion     no reaction    Hx of motion sickness     Hyperlipidemia     MDRO (multiple drug resistant organisms) resistance     abscess on buttock 10yrs ago    Migraine     last one summer 2016    Pneumonia 2016    Wears dentures     full upper--lower dentures    Wears glasses      Past Surgical History:   Procedure Laterality Date    BRONCHOSCOPY N/A 10/28/2022    BRONCHOSCOPY performed by Young Villalobos MD at [facility]  1990's    x 2  last showed \"inflammation of heart\"    COLONOSCOPY  2010    DENTAL SURGERY      all teeth extracted     EYE SURGERY Right 08/2016    Cataract removal    FRACTURE SURGERY Left 1990's    left ankle plate and screws    IR BIOPSY PERC SUPERF BONE  12/29/2022    IR BIOPSY PERC SUPERF BONE 12/29/2022 [address] SPECIAL PROCEDURES    KNEE SURGERY  1970    left    OTHER SURGICAL HISTORY Left 01/18/2017    groin excision    TONSILLECTOMY  late 1960's    age 13    TUNNELED VENOUS PORT

## 2023-11-19 NOTE — PROGRESS NOTES
Went into room to do rounds, and pt was agonal breathing-1 or 2 breaths a minute. Pt had some mucous in his mouth, suctioned it out, and then pt stopped breathing. Listened to pt's HR and didn't hear a heart beat. Called Dulce RN-charge nurse, she verified pt was not breathing or a heart beat. Will call son to notify him.  Will get pt cleaned up and will do checklist.

## 2023-11-19 NOTE — PLAN OF CARE
Problem: Discharge Planning  Goal: Discharge to home or other facility with appropriate resources  Outcome: Progressing     Problem: Skin/Tissue Integrity  Goal: Absence of new skin breakdown  Description: 1. Monitor for areas of redness and/or skin breakdown  2. Assess vascular access sites hourly  3. Every 4-6 hours minimum:  Change oxygen saturation probe site  4. Every 4-6 hours:  If on nasal continuous positive airway pressure, respiratory therapy assess nares and determine need for appliance change or resting period. Outcome: Progressing     Problem: Pain  Goal: Verbalizes/displays adequate comfort level or baseline comfort level  Outcome: Progressing     Problem: Dyspnea Due to End of Life  Goal: Demonstrate understanding of and ability to manage respiratory symptoms at end of life  Description: Patient  and or family/caregiver will verbalize recall of breathing strategies to maintain an effective breathing pattern during the inpatient hospice stay.         Outcome: Progressing

## 2023-11-19 NOTE — PROGRESS NOTES
Family was done seeing pt around 1300. Cleaned pt up, did post mortem care. Called supervisor saying that we were bringing the body to the [facility] and that family didn't have a  home yet in mind and will call us with a name for the  home after he talks with family. Gave the family my phone number and the number for the supervisor.

## 2023-11-19 NOTE — ED TRIAGE NOTES
Pt to the ED with difficulty breathing. Pt has lung cancer and is currently on hospice. Per medics, pt's family stated he was a DNR, but could not find the paperwork. Dr. David Mcguire at bedside.

## 2023-11-19 NOTE — DISCHARGE SUMMARY
[facility]    Death Summary    Date: 11/19/2023  Name: Kera Brock  MRN: [id number]  YOB: 1950     Patient's PCP: Baldemar Adorno MD  Admit Date: 11/19/2023 to [address]  Date of Death: 11/19/2023  Time: 6547  Admitting Physician: Sandy Kincaid MD to [address]   Discharge Physician: Romina Dodd MD  Consultation: none during General Inpatient Hospice stay    Invasive procedures: none during General Inpatient Hospice stay    Discharge Diagnoses:  Acute superimposed on chronic respiratory failure with underlying end stage COPD.    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    CLL (chronic lymphocytic leukemia) ([address]) C91.10    Generalized muscle weakness M62.81    Type 2 diabetes mellitus with hyperglycemia, with long-term current use of insulin ([facility]) E11.65, Z79.4    Chronic obstructive pulmonary disease ([facility]) J44.9    Intestinal malabsorption K90.9    Other nonspecific abnormal finding of lung field R91.8    Chronic lymphocytic leukemia (HCC) C91.10    Selective deficiency of IgG ([facility]) D80.3    Severe protein-calorie malnutrition ([facility]) E43    Personal history of CLL (chronic lymphocytic leukemia) Z85.6    Anemia D64.9    Thrombocytopenia ([facility]) D69.6    Acute on chronic respiratory failure with hypoxemia ([facility]) J96.21    Bronchiectasis with acute lower respiratory infection ([facility]) J47.0    Chronic respiratory failure ([facility]) J96.10    Other chest pain R07.89    B12 deficiency E53.8    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 male who is on home hospice since October 24, 2023 with a terminal

## 2023-11-19 NOTE — PLAN OF CARE
Problem: Discharge Planning  Goal: Discharge to home or other facility with appropriate resources  11/19/2023 1056 by Shruti Leblanc RN  Outcome: Progressing  Flowsheets (Taken 11/19/2023 0800)  Discharge to home or other facility with appropriate resources:   Identify barriers to discharge with patient and caregiver   Arrange for needed discharge resources and transportation as appropriate   Identify discharge learning needs (meds, wound care, etc)   Arrange for interpreters to assist at discharge as needed   Refer to discharge planning if patient needs post-hospital services based on physician order or complex needs related to functional status, cognitive ability or social support system  11/19/2023 0653 by Wilmer Herring RN  Outcome: Progressing     Problem: Skin/Tissue Integrity  Goal: Absence of new skin breakdown  Description: 1. Monitor for areas of redness and/or skin breakdown  2. Assess vascular access sites hourly  3. Every 4-6 hours minimum:  Change oxygen saturation probe site  4. Every 4-6 hours:  If on nasal continuous positive airway pressure, respiratory therapy assess nares and determine need for appliance change or resting period. 11/19/2023 1056 by Shruti Leblanc RN  Outcome: Progressing  11/19/2023 0653 by Wilmer Herring RN  Outcome: Progressing     Problem: Safety - Adult  Goal: Free from fall injury  Outcome: Progressing     Problem: Pain  Goal: Verbalizes/displays adequate comfort level or baseline comfort level  11/19/2023 1056 by Shruti Leblanc RN  Outcome: Progressing  11/19/2023 0653 by Wilmer Herring RN  Outcome: Progressing     Problem: Dyspnea Due to End of Life  Goal: Demonstrate understanding of and ability to manage respiratory symptoms at end of life  Description: Patient  and or family/caregiver will verbalize recall of breathing strategies to maintain an effective breathing pattern during the inpatient hospice stay.         11/19/2023 1056 by Shruti Leblanc

## 2023-11-19 NOTE — PLAN OF CARE
Problem: Discharge Planning  Goal: Discharge to home or other facility with appropriate resources  11/19/2023 1112 by Audelia Pond RN  Outcome: Completed  11/19/2023 1056 by Audelia Pond RN  Outcome: Progressing  Flowsheets (Taken 11/19/2023 0800)  Discharge to home or other facility with appropriate resources:   Identify barriers to discharge with patient and caregiver   Arrange for needed discharge resources and transportation as appropriate   Identify discharge learning needs (meds, wound care, etc)   Arrange for interpreters to assist at discharge as needed   Refer to discharge planning if patient needs post-hospital services based on physician order or complex needs related to functional status, cognitive ability or social support system  11/19/2023 0653 by Karen Stockton RN  Outcome: Progressing     Problem: Skin/Tissue Integrity  Goal: Absence of new skin breakdown  Description: 1. Monitor for areas of redness and/or skin breakdown  2. Assess vascular access sites hourly  3. Every 4-6 hours minimum:  Change oxygen saturation probe site  4. Every 4-6 hours:  If on nasal continuous positive airway pressure, respiratory therapy assess nares and determine need for appliance change or resting period.   11/19/2023 1112 by Audelia Pond RN  Outcome: Completed  11/19/2023 1056 by Audelia Pond RN  Outcome: Progressing  11/19/2023 0653 by Karen Stockton RN  Outcome: Progressing     Problem: Safety - Adult  Goal: Free from fall injury  11/19/2023 1112 by Audelia Pond RN  Outcome: Completed  11/19/2023 1056 by Audelia Pond RN  Outcome: Progressing     Problem: Pain  Goal: Verbalizes/displays adequate comfort level or baseline comfort level  11/19/2023 1112 by Audelia Pond RN  Outcome: Completed  11/19/2023 1056 by Audelia Pond RN  Outcome: Progressing  11/19/2023 0653 by Karen Stockton RN  Outcome: Progressing     Problem: Dyspnea Due to End of Life  Goal: Demonstrate understanding of

## 2023-11-19 NOTE — H&P
[address] [facility]  General Inpatient History and Physical      Date: 11/19/2023   Name: Tone Borden  MRN: [id number]  YOB: 1950  Admit Date: 11/19/2023  Primary Care:  Aamir Ospina MD  Oncology: Dr Hearn Deaner: the patient's son, Octavio Hill. I discussed with the hospice nurse liaison and the patient's nurse. I examined the patient who is unable to provide any information due to clinical status. Chief Complaint: respiratory distress    History of Present Illness: Tone Borden is a 68 y.o. male who is on home hospice since October 24, 2023 with a terminal diagnosis of COPD and hypoxic respiratory failure. Additional illnesses include B cell Chronic Lymphocytic Leukemia on Venetoclax, Type 2 diabetes mellitus, BPH, coronary artery disease. The patient has had several admissions most recently October 18 - 22, 2023 and enrolled in home hospice on 11/24/23. The patient's sone reports that the patient has been in declining health for a year. He is currently bed confined, and requires assistance for ADL's. He is chronically on oxygen at 3 lpm. He has had involuntary weight loss of 13 pounds in the 3 weeks preceding hospice enrollment. The patient's son reports that the patient ate during the day 11/18/23. He became less responsive in the evening and developed labored breathing and his son panicked and called EMS rather that hospice. ED records are reviewed. The patient evidently was able to speak to Dr. Mohsen Collins, complains of hip pain, and the patient received Hydrocodone - Acetaminophen and a DuoNeb treatment. No diagnostic studies were performed. The patient was seen by a hospice nurse in the ED who called me and reported tachypnea and labored breathing. The patient's son requests hospice admission and comfort care. This morning, the patient is unresponsive with agonal respirations and upper airway noise. He is unable to safely take oral meds or diet.  I have D69.3    Acute exacerbation of chronic obstructive pulmonary disease (COPD) ([facility]) J44.1    Shortness of breath R06.02    Respiratory failure ([facility]) J96.90       MIGUEL Person MD  11/19/2023

## 2024-05-05 NOTE — PROGRESS NOTES
MA Rooming Questions  Patient: Parmjit Montoya  MRN: [id number]    Date: 2/10/2022        1. Do you have any new issues?   no         2. Do you need any refills on medications?    no    3. Have you had any imaging done since your last visit?   no    4. Have you been hospitalized or seen in the emergency room since your last visit here?   no    5. Did the patient have a depression screening completed today?  Yes    No data recorded     PHQ-9 Given to (if applicable):               PHQ-9 Score (if applicable):                     [] Positive     [x]  Negative              Does question #9 need addressed (if applicable)                     [] Yes    []  No               Jeannie Rapp CMA Pt c/o right hip pain that radiates down right leg, started this morning; walking with a limp, no assistance; last dose of Tylenol @ 1630

## (undated) DEVICE — AIRLIFE™ NASAL OXYGEN CANNULA CURVED, NONFLARED TIP, WITH 7' FEET (2.1 M) CRUSH RESISTANT TUBING, OVER-THE-EAR STYLE: Brand: AIRLIFE™

## (undated) DEVICE — Z DISCONTINUED NO SUB IDED TUBING ETCO2 AD L6.5FT NSL ORAL CVD PRNG NONFLARED TIP OVR